# Patient Record
Sex: FEMALE | Race: WHITE | NOT HISPANIC OR LATINO | Employment: OTHER | URBAN - METROPOLITAN AREA
[De-identification: names, ages, dates, MRNs, and addresses within clinical notes are randomized per-mention and may not be internally consistent; named-entity substitution may affect disease eponyms.]

---

## 2017-04-14 ENCOUNTER — TRANSCRIBE ORDERS (OUTPATIENT)
Dept: ADMINISTRATIVE | Facility: HOSPITAL | Age: 82
End: 2017-04-14

## 2017-04-14 DIAGNOSIS — R41.3 MEMORY LOSS: Primary | ICD-10-CM

## 2017-04-20 ENCOUNTER — APPOINTMENT (OUTPATIENT)
Dept: MRI IMAGING | Facility: HOSPITAL | Age: 82
End: 2017-04-20
Attending: INTERNAL MEDICINE

## 2017-04-21 ENCOUNTER — HOSPITAL ENCOUNTER (OUTPATIENT)
Dept: MRI IMAGING | Facility: HOSPITAL | Age: 82
End: 2017-04-21
Attending: INTERNAL MEDICINE

## 2017-04-26 ENCOUNTER — HOSPITAL ENCOUNTER (OUTPATIENT)
Dept: MRI IMAGING | Facility: HOSPITAL | Age: 82
Discharge: HOME | End: 2017-04-26
Attending: INTERNAL MEDICINE | Admitting: INTERNAL MEDICINE

## 2017-04-26 DIAGNOSIS — R41.3 MEMORY LOSS: ICD-10-CM

## 2017-04-26 PROCEDURE — 0 GADOBENATE DIMEGLUMINE 529 MG/ML SOLUTION: Performed by: INTERNAL MEDICINE

## 2017-04-26 PROCEDURE — A9577 INJ MULTIHANCE: HCPCS | Performed by: INTERNAL MEDICINE

## 2017-04-26 PROCEDURE — 70553 MRI BRAIN STEM W/O & W/DYE: CPT

## 2017-04-26 PROCEDURE — 82565 ASSAY OF CREATININE: CPT

## 2017-04-26 RX ADMIN — GADOBENATE DIMEGLUMINE 10 ML: 529 INJECTION, SOLUTION INTRAVENOUS at 14:45

## 2017-05-01 LAB — CREAT BLDA-MCNC: 0.7 MG/DL (ref 0.6–1.3)

## 2017-05-19 ENCOUNTER — TRANSCRIBE ORDERS (OUTPATIENT)
Dept: ADMINISTRATIVE | Facility: HOSPITAL | Age: 82
End: 2017-05-19

## 2017-05-19 ENCOUNTER — HOSPITAL ENCOUNTER (OUTPATIENT)
Dept: GENERAL RADIOLOGY | Facility: HOSPITAL | Age: 82
Discharge: HOME | End: 2017-05-19
Attending: INTERNAL MEDICINE | Admitting: INTERNAL MEDICINE

## 2017-05-19 DIAGNOSIS — M54.50 ACUTE RIGHT-SIDED LOW BACK PAIN WITHOUT SCIATICA: Primary | ICD-10-CM

## 2017-05-19 PROCEDURE — 72110 X-RAY EXAM L-2 SPINE 4/>VWS: CPT

## 2017-10-20 ENCOUNTER — TRANSCRIBE ORDERS (OUTPATIENT)
Dept: ADMINISTRATIVE | Facility: HOSPITAL | Age: 82
End: 2017-10-20

## 2017-10-20 ENCOUNTER — HOSPITAL ENCOUNTER (OUTPATIENT)
Dept: GENERAL RADIOLOGY | Facility: HOSPITAL | Age: 82
Discharge: HOME | End: 2017-10-20
Admitting: INTERNAL MEDICINE

## 2017-10-20 DIAGNOSIS — I27.20 PULMONARY HYPERTENSION (HCC): Primary | ICD-10-CM

## 2017-10-20 PROCEDURE — 71020 HC CHEST PA AND LATERAL: CPT

## 2018-05-06 PROBLEM — S81.812A NONINFECTED SKIN TEAR OF LEG, LEFT, INITIAL ENCOUNTER: Status: ACTIVE | Noted: 2018-05-06

## 2018-05-06 PROBLEM — S81.811A SKIN TEAR OF LOWER LEG WITHOUT COMPLICATION, RIGHT, INITIAL ENCOUNTER: Status: ACTIVE | Noted: 2018-05-06

## 2019-01-03 ENCOUNTER — OFFICE VISIT (OUTPATIENT)
Dept: ORTHOPEDIC SURGERY | Facility: CLINIC | Age: 84
End: 2019-01-03

## 2019-01-03 VITALS — HEART RATE: 80 BPM | BODY MASS INDEX: 21.99 KG/M2 | OXYGEN SATURATION: 98 % | HEIGHT: 60 IN | WEIGHT: 111.99 LBS

## 2019-01-03 DIAGNOSIS — M17.11 PRIMARY OSTEOARTHRITIS OF RIGHT KNEE: Primary | ICD-10-CM

## 2019-01-03 PROCEDURE — 20610 DRAIN/INJ JOINT/BURSA W/O US: CPT | Performed by: ORTHOPAEDIC SURGERY

## 2019-01-03 PROCEDURE — 99204 OFFICE O/P NEW MOD 45 MIN: CPT | Performed by: ORTHOPAEDIC SURGERY

## 2019-01-03 RX ORDER — TRIAMCINOLONE ACETONIDE 40 MG/ML
40 INJECTION, SUSPENSION INTRA-ARTICULAR; INTRAMUSCULAR
Status: COMPLETED | OUTPATIENT
Start: 2019-01-03 | End: 2019-01-03

## 2019-01-03 RX ORDER — TRAMADOL HYDROCHLORIDE 50 MG/1
TABLET ORAL
COMMUNITY
End: 2019-03-04 | Stop reason: SDUPTHER

## 2019-01-03 RX ORDER — AMLODIPINE BESYLATE 10 MG/1
TABLET ORAL
COMMUNITY
End: 2019-03-04 | Stop reason: SDUPTHER

## 2019-01-03 RX ORDER — LIDOCAINE HYDROCHLORIDE 10 MG/ML
3 INJECTION, SOLUTION INFILTRATION; PERINEURAL
Status: COMPLETED | OUTPATIENT
Start: 2019-01-03 | End: 2019-01-03

## 2019-01-03 RX ORDER — ACETAMINOPHEN 500 MG
TABLET ORAL
COMMUNITY
End: 2019-10-04 | Stop reason: HOSPADM

## 2019-01-03 RX ORDER — HYDROCHLOROTHIAZIDE 50 MG/1
TABLET ORAL
COMMUNITY
End: 2019-03-25 | Stop reason: ALTCHOICE

## 2019-01-03 RX ORDER — DONEPEZIL HYDROCHLORIDE 5 MG/1
5 TABLET, FILM COATED ORAL NIGHTLY
COMMUNITY
End: 2019-04-15 | Stop reason: SDUPTHER

## 2019-01-03 RX ADMIN — TRIAMCINOLONE ACETONIDE 40 MG: 40 INJECTION, SUSPENSION INTRA-ARTICULAR; INTRAMUSCULAR at 15:33

## 2019-01-03 RX ADMIN — LIDOCAINE HYDROCHLORIDE 3 ML: 10 INJECTION, SOLUTION INFILTRATION; PERINEURAL at 15:33

## 2019-01-03 NOTE — PROGRESS NOTES
[facility] Note    Subjective     Pain of the Right Knee (Started around 2000- has had 3 falls since that time. Last fall was apx 5 years ago. Has been getting cortisone injections every 3 months by Dr Vergara ( last injection Oct 2018). Pain scale 6/10 today. Mod factors- bracing, activity mod, voltaren gel)      HPI    Temi Jarrett is a 88 y.o. female.  Patient is here today for evaluation of her right knee.  She has fallen on 3 different occasions over the last 15-20 years.  Her rheumatologist Dr. Vergara recently retired and she has had both cortisone and viscous supplement injections in the past.  She reacted unfavorably to the Visco supplement and therefore that has not been repeated.  Her pain as 6 out of 10.  She has been using a knee brace.  Her pain is medial in location.  Physical therapy has made her symptoms worse.     Past Medical History:   Diagnosis Date   • Disease of thyroid gland    • Hypertension    • IBS (irritable bowel syndrome)       Past Surgical History:   Procedure Laterality Date   • CHOLECYSTECTOMY     • EYE SURGERY     • HERNIA REPAIR     • HYSTERECTOMY     • TONSILLECTOMY        Family History   Problem Relation Age of Onset   • Hypertension Mother    • Hypertension Father      Social History     Socioeconomic History   • Marital status:      Spouse name: Not on file   • Number of children: Not on file   • Years of education: Not on file   • Highest education level: Not on file   Social Needs   • Financial resource strain: Not on file   • Food insecurity - worry: Not on file   • Food insecurity - inability: Not on file   • Transportation needs - medical: Not on file   • Transportation needs - non-medical: Not on file   Occupational History   • Not on file   Tobacco Use   • Smoking status: Never Smoker   • Smokeless tobacco: Never Used   Substance and Sexual Activity   • Alcohol use: No   • Drug use: Defer   • Sexual activity: Defer   Other Topics Concern   •  "Not on file   Social History Narrative   • Not on file      Current Outpatient Medications on File Prior to Visit   Medication Sig Dispense Refill   • acetaminophen (TYLENOL) 500 MG tablet Tylenol Extra Strength 500 mg tablet     • amLODIPine (NORVASC) 10 MG tablet Norvasc 10 mg tablet   Daily     • aspirin 81 MG tablet aspirin 81 mg tablet   Daily     • benazepril-hydrochlorthiazide (LOTENSIN HCT) 10-12.5 MG per tablet Take 1 tablet by mouth Daily.     • Calcium Carbonate-Vit D-Min (CALTRATE PLUS PO) Take  by mouth.     • Cholecalciferol (VITAMIN D PO) Vitamin D3     • donepezil (ARICEPT) 5 MG tablet Take 5 mg by mouth Every Night.     • hydrochlorothiazide (HYDRODIURIL) 50 MG tablet hydrochlorothiazide 50 mg tablet     • Levothyroxine Sodium (SYNTHROID PO) Take  by mouth.     • lisinopril (PRINIVIL,ZESTRIL) 10 MG tablet Take 10 mg by mouth Daily.     • POTASSIUM CHLORIDE RENNY ER PO Take  by mouth.     • Sucralfate (CARAFATE PO) sucralfate     • traMADol (ULTRAM) 50 MG tablet tramadol 50 mg tablet       No current facility-administered medications on file prior to visit.       Allergies   Allergen Reactions   • Codeine Nausea Only        The following portions of the patient's history were reviewed and updated as appropriate: allergies, current medications, past family history, past medical history, past social history, past surgical history and problem list.    Review of Systems   Constitutional: Negative.    HENT: Negative.    Eyes: Negative.    Respiratory: Negative.    Cardiovascular: Negative.    Gastrointestinal: Negative.    Endocrine: Negative.    Genitourinary: Negative.    Musculoskeletal: Positive for arthralgias.   Skin: Negative.    Allergic/Immunologic: Negative.    Neurological: Negative.    Hematological: Negative.    Psychiatric/Behavioral: Negative.         Objective      Physical Exam  Pulse 80   Ht 152.4 cm (60\")   Wt 50.8 kg (111 lb 15.9 oz)   LMP  (LMP Unknown)   SpO2 98%   BMI 21.87 " kg/m²     Body mass index is 21.87 kg/m².    General  Mental Status - alert  General Appearance - cooperative, well groomed, not in acute distress  Orientation - Oriented X3  Build & Nutrition - well developed and well nourished  Posture - normal posture  Gait - normal gait     Integumentary  Global Assessment  Examination of related systems reveals - no lymphadenopathy  Ears:  No abnormality  Nose:  No mucous drainage  General Characteristics  Overall examination of the patient's skin reveals - no rashes, no evidence of scars, no suspicious lesions and no bruises.  Color - normal coloration of skin.  Vascular: Brisk capillary refill in all extremities    Ortho Exam  Peripheral Vascular:    Upper Extremity:   Inspection:  Left--no cyanotic nail beds Right--no cyanotic nail beds   Bilateral:  Pink nail beds with brisk capillary refill   Palpation:  Bilateral radial pulse normal    Musculoskeletal:  Global Assessment:  Overall assessment of Lower Extremity Muscle Strength and Tone:  Right quadriceps--5/5   Right hamstrings--5/5       Right tibialis anterior--5/5  Right gastroc-soleus--5/5  Right EHL --5/5    Lower Extremity:  Knee/Patella:  No digital clubbing or cyanosis.    Examination of right knee reveals:  Normal deep tendon reflexes, coordination, strength, tone, sensation.  No known fractures or deformities.    Inspection and Palpation:  Right knee:  Tenderness:  Over the medial joint line and moderate severity  Effusion:  1+  Crepitus:  Positive  Pulses:  2+  Ecchymosis:  None  Warmth:  None     ROM:  Right:  Extension: 5    Flexion:120  Left:  Extension: 5     Flexion: 120    Instability:    Right:  Lachman Test:  Negative, Varus stress test negative, Valgus stress test negative    Deformities/Malalignments/Discrepancies:    Left:  No deformities   Right:  Genu Varum    Functional Testing:  Deion's test:  Negative  Patella grind test:  Positive  Q-angle:  normal        Imaging/Studies    Imaging Results  (last 24 hours)     Procedure Component Value Units Date/Time    XR Knee 4+ View Right [[id number]] Resulted:  01/03/19 1726     Updated:  01/03/19 1729    Narrative:       Knee X-Ray    Indication: Pain    Study:  Upright AP, Skiers, Lateral, and Sunrise views of Right knee(s)    Comparison: None    Findings:    Patient appears to have end-stage degenerative changes in the medial   compartment.  There are minimal degenerative changes in the lateral   compartment.  There are severe changes in the patellofemoral compartment.    Patient has overall varus alignment.  Osteopenia is also noted.    Impression: Severe osteoarthritis of the medial compartment and severe   patellofemoral compartment osteoarthritis of the right knee.              Assessment:  1. Primary osteoarthritis of right knee        Plan:  1. Continue over-the-counter medication as needed for discomfort  2. Right knee injection will be given through an anterior medial approach today at her request  3. Follow-up in 3 months  4. At some point going forward, we may need to assess her specific response to the Visco supplement that caused her to bruise.  5. Off-the-shelf medial off  will be provided today.      Medical Decision Making  Management Options : over-the-counter medicine and prescription/IM medicine  Data/Risk: radiology tests and independent visualization of imaging, lab tests, or EMG/NCV    Gamal Denny MD  01/03/19  6:45 PM

## 2019-01-03 NOTE — PROGRESS NOTES
Procedure   Large Joint Arthrocentesis: R knee  Date/Time: 1/3/2019 3:33 PM  Consent given by: patient  Site marked: site marked  Timeout: Immediately prior to procedure a time out was called to verify the correct patient, procedure, equipment, support staff and site/side marked as required   Supporting Documentation  Indications: pain   Procedure Details  Location: knee - R knee  Preparation: Patient was prepped and draped in the usual sterile fashion  Needle size: 25 G  Approach: anteromedial  Medications administered: 3 mL lidocaine 1 %; 40 mg triamcinolone acetonide 40 MG/ML  Patient tolerance: patient tolerated the procedure well with no immediate complications

## 2019-01-14 ENCOUNTER — DOCUMENTATION (OUTPATIENT)
Dept: INTERNAL MEDICINE | Facility: CLINIC | Age: 84
End: 2019-01-14

## 2019-01-14 RX ORDER — LEVOTHYROXINE SODIUM 0.05 MG/1
50 TABLET ORAL DAILY
COMMUNITY
End: 2019-01-14

## 2019-01-14 RX ORDER — LEVOTHYROXINE SODIUM 175 UG/1
175 TABLET ORAL DAILY
Qty: 90 TABLET | Refills: 0 | Status: SHIPPED | OUTPATIENT
Start: 2019-01-14 | End: 2019-02-28 | Stop reason: SDUPTHER

## 2019-01-15 ENCOUNTER — TELEPHONE (OUTPATIENT)
Dept: INTERNAL MEDICINE | Facility: CLINIC | Age: 84
End: 2019-01-15

## 2019-01-16 ENCOUNTER — CLINICAL SUPPORT (OUTPATIENT)
Dept: INTERNAL MEDICINE | Facility: CLINIC | Age: 84
End: 2019-01-16

## 2019-01-16 DIAGNOSIS — E53.8 VITAMIN B12 DEFICIENCY: Primary | ICD-10-CM

## 2019-01-16 PROCEDURE — 96372 THER/PROPH/DIAG INJ SC/IM: CPT | Performed by: INTERNAL MEDICINE

## 2019-01-16 RX ORDER — CYANOCOBALAMIN 1000 UG/ML
1000 INJECTION, SOLUTION INTRAMUSCULAR; SUBCUTANEOUS
Status: DISCONTINUED | OUTPATIENT
Start: 2019-01-16 | End: 2020-08-03

## 2019-01-16 RX ADMIN — CYANOCOBALAMIN 1000 MCG: 1000 INJECTION, SOLUTION INTRAMUSCULAR; SUBCUTANEOUS at 15:53

## 2019-01-24 ENCOUNTER — TELEPHONE (OUTPATIENT)
Dept: INTERNAL MEDICINE | Facility: CLINIC | Age: 84
End: 2019-01-24

## 2019-02-04 ENCOUNTER — TELEPHONE (OUTPATIENT)
Dept: ORTHOPEDIC SURGERY | Facility: CLINIC | Age: 84
End: 2019-02-04

## 2019-02-04 NOTE — TELEPHONE ENCOUNTER
Patient wants to know if Dr. Denny can take over her nerve pain medication, gabapentin, or if that will need to go to another doctor?

## 2019-02-05 NOTE — TELEPHONE ENCOUNTER
The patient is wanting to know if you will be able to take over prescribing her gabapentin for her since her doctor Karrie Vergara has retired.

## 2019-02-06 NOTE — TELEPHONE ENCOUNTER
Called the patient back to advise that she needs to have her current PCP prescribe the gabapentin for her.

## 2019-02-15 RX ORDER — LISINOPRIL 10 MG/1
TABLET ORAL
Qty: 360 TABLET | Refills: 0 | OUTPATIENT
Start: 2019-02-15

## 2019-02-15 NOTE — TELEPHONE ENCOUNTER
WANTS TO KNOW IF DR. GRIFFITH WILL TAKE OVER THE GABAPENTIN FOR ME THE  THAT PRESCRIBED IT HAS RETIRED. .

## 2019-02-18 ENCOUNTER — CLINICAL SUPPORT (OUTPATIENT)
Dept: INTERNAL MEDICINE | Facility: CLINIC | Age: 84
End: 2019-02-18

## 2019-02-18 DIAGNOSIS — E53.8 VITAMIN B12 DEFICIENCY: ICD-10-CM

## 2019-02-18 PROCEDURE — 96372 THER/PROPH/DIAG INJ SC/IM: CPT | Performed by: INTERNAL MEDICINE

## 2019-02-18 RX ORDER — GABAPENTIN 300 MG/1
300 CAPSULE ORAL 2 TIMES DAILY
COMMUNITY
End: 2019-02-18 | Stop reason: SDUPTHER

## 2019-02-18 RX ORDER — GABAPENTIN 300 MG/1
300 CAPSULE ORAL 2 TIMES DAILY
Qty: 60 CAPSULE | Refills: 3 | Status: SHIPPED | OUTPATIENT
Start: 2019-02-18 | End: 2019-06-18 | Stop reason: SDUPTHER

## 2019-02-18 RX ORDER — GABAPENTIN 300 MG/1
300 CAPSULE ORAL 2 TIMES DAILY
Qty: 60 CAPSULE | Refills: 2 | Status: CANCELLED | OUTPATIENT
Start: 2019-02-18

## 2019-02-18 RX ADMIN — CYANOCOBALAMIN 1000 MCG: 1000 INJECTION, SOLUTION INTRAMUSCULAR; SUBCUTANEOUS at 14:09

## 2019-02-20 RX ORDER — TRAMADOL HYDROCHLORIDE 50 MG/1
TABLET ORAL
Qty: 120 TABLET | Refills: 1 | OUTPATIENT
Start: 2019-02-20

## 2019-02-20 NOTE — TELEPHONE ENCOUNTER
It looks like Dr Rodas gave it last month.  I don't mind prescribing it but please make sure she actually is taking it, and needs it.

## 2019-02-25 RX ORDER — TRAMADOL HYDROCHLORIDE 50 MG/1
TABLET ORAL
Qty: 120 TABLET | Refills: 1 | OUTPATIENT
Start: 2019-02-25

## 2019-02-26 RX ORDER — LISINOPRIL 10 MG/1
TABLET ORAL
Qty: 360 TABLET | Refills: 0 | Status: SHIPPED | OUTPATIENT
Start: 2019-02-26 | End: 2019-05-30 | Stop reason: SDUPTHER

## 2019-02-28 RX ORDER — LEVOTHYROXINE SODIUM 175 UG/1
175 TABLET ORAL DAILY
Qty: 90 TABLET | Refills: 0 | COMMUNITY
Start: 2019-02-28 | End: 2019-02-28 | Stop reason: SDUPTHER

## 2019-02-28 NOTE — TELEPHONE ENCOUNTER
PT RECEIVES A PRESCRIPTION FROM [facility] AT NO CHARGE  HERE SHE CALLED CHECKING TO SEE IF IT WAS HERE     SHE VERBALIZED UNDERSTANDING TO JUST GO TO FRONT AND ASK FOR IT  IT'LL BE UP THERE

## 2019-03-01 RX ORDER — LEVOTHYROXINE SODIUM 175 UG/1
175 TABLET ORAL DAILY
Qty: 90 TABLET | Refills: 0 | COMMUNITY
Start: 2019-03-01 | End: 2019-05-21 | Stop reason: SDUPTHER

## 2019-03-04 RX ORDER — AMLODIPINE BESYLATE 10 MG/1
5 TABLET ORAL DAILY
Qty: 30 TABLET | Refills: 3 | Status: SHIPPED | OUTPATIENT
Start: 2019-03-04 | End: 2019-03-05

## 2019-03-04 RX ORDER — TRAMADOL HYDROCHLORIDE 50 MG/1
50 TABLET ORAL DAILY PRN
Qty: 30 TABLET | Refills: 2 | Status: SHIPPED | OUTPATIENT
Start: 2019-03-04 | End: 2019-06-02 | Stop reason: SDUPTHER

## 2019-03-04 NOTE — TELEPHONE ENCOUNTER
[facility] has amlodipine 5mg daily,   [facility] has 10mg daily.   Please clarify what you want prescribed

## 2019-03-05 DIAGNOSIS — I10 BENIGN ESSENTIAL HYPERTENSION: ICD-10-CM

## 2019-03-05 DIAGNOSIS — E78.2 MIXED HYPERLIPIDEMIA: ICD-10-CM

## 2019-03-05 DIAGNOSIS — E03.9 ACQUIRED HYPOTHYROIDISM: ICD-10-CM

## 2019-03-05 DIAGNOSIS — E53.8 B12 DEFICIENCY: Primary | ICD-10-CM

## 2019-03-05 DIAGNOSIS — E55.9 VITAMIN D DEFICIENCY: ICD-10-CM

## 2019-03-05 NOTE — TELEPHONE ENCOUNTER
PT STATES WHEN SHE WENT TO  HER AMLODIPINE 5 MG  SHE WAS GIVEN 10 MG TO CUT THEM IN HALF. SHE STATED SHE PROTESTED UNTIL THEY GAVE HER THE 5 MG. WANTS TO KNOW IF YOU REALLY WANT HER TO CUT THEM IN HALF WITH THE POSSIBLITY OF HER CUTTING HERSELF . SHE STATES SHE WILL NOT DO IT .  WANTS A CALL BACK ON WHY IT WAS SENT IN LIKE THAT

## 2019-03-05 NOTE — TELEPHONE ENCOUNTER
Called and advised patient. She was just concerned about how it got changed. I advised her i will change it to 5 mg.

## 2019-03-06 RX ORDER — AMLODIPINE BESYLATE 5 MG/1
5 TABLET ORAL DAILY
Qty: 90 TABLET | Refills: 0
Start: 2019-03-06 | End: 2019-07-10 | Stop reason: SDUPTHER

## 2019-03-18 ENCOUNTER — CLINICAL SUPPORT (OUTPATIENT)
Dept: INTERNAL MEDICINE | Facility: CLINIC | Age: 84
End: 2019-03-18

## 2019-03-18 DIAGNOSIS — E53.8 VITAMIN B12 DEFICIENCY: ICD-10-CM

## 2019-03-18 PROCEDURE — 96372 THER/PROPH/DIAG INJ SC/IM: CPT | Performed by: INTERNAL MEDICINE

## 2019-03-18 RX ADMIN — CYANOCOBALAMIN 1000 MCG: 1000 INJECTION, SOLUTION INTRAMUSCULAR; SUBCUTANEOUS at 13:38

## 2019-03-25 RX ORDER — HYDROCHLOROTHIAZIDE 25 MG/1
TABLET ORAL
Qty: 90 TABLET | Refills: 0 | Status: SHIPPED | OUTPATIENT
Start: 2019-03-25 | End: 2019-06-17

## 2019-03-28 ENCOUNTER — OFFICE VISIT (OUTPATIENT)
Dept: ORTHOPEDIC SURGERY | Facility: CLINIC | Age: 84
End: 2019-03-28

## 2019-03-28 VITALS — HEIGHT: 60 IN | BODY MASS INDEX: 22.77 KG/M2 | WEIGHT: 115.96 LBS | HEART RATE: 65 BPM | OXYGEN SATURATION: 91 %

## 2019-03-28 DIAGNOSIS — M17.11 PRIMARY OSTEOARTHRITIS OF RIGHT KNEE: Primary | ICD-10-CM

## 2019-03-28 PROBLEM — I87.2 PERIPHERAL VENOUS INSUFFICIENCY: Status: ACTIVE | Noted: 2019-03-28

## 2019-03-28 PROBLEM — N30.90 BLADDER INFECTION: Status: ACTIVE | Noted: 2019-03-28

## 2019-03-28 PROBLEM — G56.01 CARPAL TUNNEL SYNDROME OF RIGHT WRIST: Status: ACTIVE | Noted: 2019-03-28

## 2019-03-28 PROBLEM — F01.50 MULTI-INFARCT DEMENTIA: Status: ACTIVE | Noted: 2019-03-28

## 2019-03-28 PROBLEM — D51.0 PERNICIOUS ANEMIA: Status: ACTIVE | Noted: 2019-03-28

## 2019-03-28 PROBLEM — R41.3 MEMORY LOSS: Status: ACTIVE | Noted: 2019-03-28

## 2019-03-28 PROBLEM — I10 BENIGN ESSENTIAL HYPERTENSION: Status: ACTIVE | Noted: 2019-03-28

## 2019-03-28 PROBLEM — R35.0 URINARY FREQUENCY: Status: ACTIVE | Noted: 2019-03-28

## 2019-03-28 PROBLEM — M25.569 PAIN, KNEE: Status: ACTIVE | Noted: 2019-03-28

## 2019-03-28 PROBLEM — M54.50 ACUTE RIGHT-SIDED LOW BACK PAIN WITHOUT SCIATICA: Status: ACTIVE | Noted: 2019-03-28

## 2019-03-28 PROBLEM — E78.5 HYPERLIPIDEMIA: Status: ACTIVE | Noted: 2019-03-28

## 2019-03-28 PROBLEM — M48.061 SPINAL STENOSIS OF LUMBAR REGION: Status: ACTIVE | Noted: 2019-03-28

## 2019-03-28 PROBLEM — G62.9 PERIPHERAL NEUROPATHY: Status: ACTIVE | Noted: 2019-03-28

## 2019-03-28 PROBLEM — M51.9 DISC DISORDER OF LUMBAR REGION: Status: ACTIVE | Noted: 2019-03-28

## 2019-03-28 PROBLEM — E03.9 HYPOTHYROIDISM: Status: ACTIVE | Noted: 2019-03-28

## 2019-03-28 PROBLEM — M81.0 OSTEOPOROSIS: Status: ACTIVE | Noted: 2019-03-28

## 2019-03-28 PROBLEM — Z00.00 ANNUAL PHYSICAL EXAM: Status: ACTIVE | Noted: 2019-03-28

## 2019-03-28 PROBLEM — F41.1 GENERALIZED ANXIETY DISORDER: Status: ACTIVE | Noted: 2019-03-28

## 2019-03-28 PROBLEM — N81.10 BLADDER PROLAPSE, FEMALE, ACQUIRED: Status: ACTIVE | Noted: 2019-03-28

## 2019-03-28 PROBLEM — K21.9 ESOPHAGEAL REFLUX: Status: ACTIVE | Noted: 2019-03-28

## 2019-03-28 PROBLEM — R50.9 FEVER: Status: ACTIVE | Noted: 2019-03-28

## 2019-03-28 PROBLEM — N30.00 ACUTE CYSTITIS WITHOUT HEMATURIA: Status: ACTIVE | Noted: 2019-03-28

## 2019-03-28 PROBLEM — N39.0 ACUTE UTI: Status: ACTIVE | Noted: 2019-03-28

## 2019-03-28 PROCEDURE — 20610 DRAIN/INJ JOINT/BURSA W/O US: CPT | Performed by: ORTHOPAEDIC SURGERY

## 2019-03-28 RX ORDER — TRIAMCINOLONE ACETONIDE 40 MG/ML
40 INJECTION, SUSPENSION INTRA-ARTICULAR; INTRAMUSCULAR
Status: COMPLETED | OUTPATIENT
Start: 2019-03-28 | End: 2019-03-28

## 2019-03-28 RX ORDER — LIDOCAINE HYDROCHLORIDE 10 MG/ML
3 INJECTION, SOLUTION INFILTRATION; PERINEURAL
Status: COMPLETED | OUTPATIENT
Start: 2019-03-28 | End: 2019-03-28

## 2019-03-28 RX ADMIN — TRIAMCINOLONE ACETONIDE 40 MG: 40 INJECTION, SUSPENSION INTRA-ARTICULAR; INTRAMUSCULAR at 11:45

## 2019-03-28 RX ADMIN — LIDOCAINE HYDROCHLORIDE 3 ML: 10 INJECTION, SOLUTION INFILTRATION; PERINEURAL at 11:45

## 2019-03-28 NOTE — PROGRESS NOTES
[facility] [place] [facility] Note    Subjective     CC: Follow-up (3 month f/u; Primary osteoarthritis of right knee (injection given last visit 1/3/19))      MARY Jarrett is a 88 y.o. female.  Patient returns the office today for follow-up of her right knee.  In January 2019, she was injected into the right knee through an anterior medial approach and got great relief from the injection.  She has seen Dr. Karrie Vergara for many years but Dr. Vergara has since retired.      ROS:    Constiutional:Pt denies fever, chills, nausea, or vomiting.  MSK:as above    Objective      Past Medical History  Past Medical History:   Diagnosis Date   • Disease of thyroid gland    • Gastric polyp    • History of colonic polyps    • Hypertension    • IBS (irritable bowel syndrome)    • Macular degeneration    • Torn meniscus     right knee      Assessment:  1. Primary osteoarthritis of right knee        Plan:  1. Recommend over the counter anti-inflammatories for pain and/or swelling  2. Repeat steroid injection right knee through an anteromedial approach will be given today  3. Follow-up in 3 months.        Gamal Denny MD  03/28/19  1:14 PM

## 2019-03-28 NOTE — PROGRESS NOTES
Procedure   Large Joint Arthrocentesis: R knee  Date/Time: 3/28/2019 11:45 AM  Consent given by: patient  Site marked: site marked  Timeout: Immediately prior to procedure a time out was called to verify the correct patient, procedure, equipment, support staff and site/side marked as required   Supporting Documentation  Indications: pain   Procedure Details  Location: knee - R knee  Preparation: Patient was prepped and draped in the usual sterile fashion  Needle size: 25 G  Approach: anterolateral  Medications administered: 3 mL lidocaine 1 %; 40 mg triamcinolone acetonide 40 MG/ML  Patient tolerance: patient tolerated the procedure well with no immediate complications

## 2019-04-15 ENCOUNTER — OFFICE VISIT (OUTPATIENT)
Dept: INTERNAL MEDICINE | Facility: CLINIC | Age: 84
End: 2019-04-15

## 2019-04-15 VITALS
WEIGHT: 113 LBS | HEIGHT: 60 IN | DIASTOLIC BLOOD PRESSURE: 72 MMHG | BODY MASS INDEX: 22.19 KG/M2 | SYSTOLIC BLOOD PRESSURE: 156 MMHG | HEART RATE: 64 BPM

## 2019-04-15 DIAGNOSIS — M48.061 SPINAL STENOSIS OF LUMBAR REGION WITHOUT NEUROGENIC CLAUDICATION: ICD-10-CM

## 2019-04-15 DIAGNOSIS — E53.8 VITAMIN B 12 DEFICIENCY: ICD-10-CM

## 2019-04-15 DIAGNOSIS — F41.1 GENERALIZED ANXIETY DISORDER: ICD-10-CM

## 2019-04-15 DIAGNOSIS — F01.50 MULTI-INFARCT DEMENTIA WITHOUT BEHAVIORAL DISTURBANCE (HCC): Primary | ICD-10-CM

## 2019-04-15 DIAGNOSIS — I10 BENIGN ESSENTIAL HYPERTENSION: ICD-10-CM

## 2019-04-15 DIAGNOSIS — G63 POLYNEUROPATHY ASSOCIATED WITH UNDERLYING DISEASE (HCC): ICD-10-CM

## 2019-04-15 PROCEDURE — 96372 THER/PROPH/DIAG INJ SC/IM: CPT | Performed by: INTERNAL MEDICINE

## 2019-04-15 PROCEDURE — 99214 OFFICE O/P EST MOD 30 MIN: CPT | Performed by: INTERNAL MEDICINE

## 2019-04-15 RX ORDER — BUSPIRONE HYDROCHLORIDE 5 MG/1
5 TABLET ORAL 2 TIMES DAILY
Qty: 60 TABLET | Refills: 3 | Status: SHIPPED | OUTPATIENT
Start: 2019-04-15 | End: 2019-06-17 | Stop reason: SDUPTHER

## 2019-04-15 RX ORDER — DONEPEZIL HYDROCHLORIDE 10 MG/1
10 TABLET, FILM COATED ORAL NIGHTLY
Qty: 30 TABLET | Refills: 5 | Status: SHIPPED | OUTPATIENT
Start: 2019-04-15 | End: 2020-01-20

## 2019-04-15 RX ADMIN — CYANOCOBALAMIN 1000 MCG: 1000 INJECTION, SOLUTION INTRAMUSCULAR; SUBCUTANEOUS at 16:36

## 2019-04-15 NOTE — PROGRESS NOTES
[facility]     Temi Jarrett  5/8/1930   [id number]      Patient Care Team:  Eric Patel MD as PCP - General  Eric Patel MD as PCP - Family Medicine    Chief Complaint::   Chief Complaint   Patient presents with   • Hyperlipidemia   • Hypertension   • Hypothyroidism   • Osteoporosis   • Anemia        HPI  Mrs Jarrett comes in concerned that her memory is worse and that she has more anxiety.  She is also concerned about weight loss.  She is very worried about her  whose health has declined and who has lost considerable weight.  She is here for follow-up of her dementia, hypertension, peripheral neuropathy, and knee pain.    Chronic Conditions:      Patient Active Problem List   Diagnosis   • Skin tear of lower leg without complication, right, initial encounter   • Esophageal reflux   • Hypothyroidism   • Generalized anxiety disorder   • Acute UTI   • Hyperlipidemia   • Bladder infection   • Multi-infarct dementia   • Peripheral neuropathy   • Acute cystitis without hematuria   • Carpal tunnel syndrome of right wrist   • Spinal stenosis of lumbar region   • Osteoporosis   • Fever   • Urinary frequency   • Peripheral venous insufficiency   • Disc disorder of lumbar region   • Bladder prolapse, female, acquired   • Acute right-sided low back pain without sciatica   • Pain, knee   • Benign essential hypertension   • Pernicious anemia   • Memory loss   • Annual physical exam        Past Medical History:   Diagnosis Date   • Disease of thyroid gland    • Gastric polyp    • History of colonic polyps    • Hypertension    • IBS (irritable bowel syndrome)    • Macular degeneration    • Torn meniscus     right knee        Past Surgical History:   Procedure Laterality Date   • CHOLECYSTECTOMY  1997   • EYE SURGERY  1998    Cataract extraction   • HERNIA REPAIR     • HYSTERECTOMY  1976   • KNEE SURGERY Right     arthroscopy- 2000   • TONSILLECTOMY         Family History   Problem Relation  Age of Onset   • Hypertension Mother    • Breast cancer Mother    • Obesity Mother    • Hypertension Father    • Diabetes Son        Social History     Socioeconomic History   • Marital status:      Spouse name: Not on file   • Number of children: Not on file   • Years of education: Not on file   • Highest education level: Not on file   Tobacco Use   • Smoking status: Never Smoker   • Smokeless tobacco: Never Used   Substance and Sexual Activity   • Alcohol use: No   • Drug use: Defer   • Sexual activity: Defer       Allergies   Allergen Reactions   • Codeine Nausea Only     Trouble Breathing          Current Outpatient Medications:   •  acetaminophen (TYLENOL) 500 MG tablet, Tylenol Extra Strength 500 mg tablet, Disp: , Rfl:   •  amLODIPine (NORVASC) 5 MG tablet, Take 1 tablet by mouth Daily., Disp: 90 tablet, Rfl: 0  •  aspirin 81 MG tablet, aspirin 81 mg tablet  Daily, Disp: , Rfl:   •  Calcium Carbonate-Vit D-Min (CALTRATE PLUS PO), Take  by mouth., Disp: , Rfl:   •  Cholecalciferol (VITAMIN D PO), Vitamin D3, Disp: , Rfl:   •  diclofenac (VOLTAREN) 1 % gel gel, Apply 4 g topically to the appropriate area as directed 4 (Four) Times a Day., Disp: 100 g, Rfl: 5  •  donepezil (ARICEPT) 10 MG tablet, Take 1 tablet by mouth Every Night., Disp: 30 tablet, Rfl: 5  •  gabapentin (NEURONTIN) 300 MG capsule, Take 1 capsule by mouth 2 (Two) Times a Day., Disp: 60 capsule, Rfl: 3  •  hydrochlorothiazide (HYDRODIURIL) 25 MG tablet, TAKE ONE TABLET BY MOUTH DAILY, Disp: 90 tablet, Rfl: 0  •  levothyroxine (SYNTHROID) 175 MCG tablet, Take 1 tablet by mouth Daily. Patient receives medication from patient assistance.  NDC:[id number] EXP:10/17/2019 [id number], Disp: 90 tablet, Rfl: 0  •  lisinopril (PRINIVIL,ZESTRIL) 10 MG tablet, TAKE TWO TABLETS BY MOUTH TWICE A DAY, Disp: 360 tablet, Rfl: 0  •  POTASSIUM CHLORIDE RENNY ER PO, Take  by mouth., Disp: , Rfl:   •  Sucralfate (CARAFATE PO), sucralfate, Disp: , Rfl:   •   "traMADol (ULTRAM) 50 MG tablet, Take 1 tablet by mouth Daily As Needed for Moderate Pain  (Right knee pain)., Disp: 30 tablet, Rfl: 2  •  busPIRone (BUSPAR) 5 MG tablet, Take 1 tablet by mouth 2 (Two) Times a Day., Disp: 60 tablet, Rfl: 3    Current Facility-Administered Medications:   •  cyanocobalamin injection 1,000 mcg, 1,000 mcg, Intramuscular, Q28 Days, Eric Patel MD, 1,000 mcg at 04/15/19 1636    Review of Systems   Constitutional: Negative for chills, fatigue and fever.   HENT: Negative for congestion, ear pain and sinus pressure.    Respiratory: Negative for cough, chest tightness, shortness of breath and wheezing.    Cardiovascular: Negative for chest pain and palpitations.   Gastrointestinal: Negative for abdominal pain, blood in stool and constipation.   Skin: Negative for color change.   Allergic/Immunologic: Negative for environmental allergies.   Neurological: Negative for dizziness, speech difficulty and headache.   Psychiatric/Behavioral: Negative for decreased concentration. The patient is not nervous/anxious.         Vital Signs  Vitals:    04/15/19 1520   BP: 156/72   BP Location: Left arm   Patient Position: Sitting   Cuff Size: Adult   Pulse: 64   Weight: 51.3 kg (113 lb)   Height: 152.4 cm (60\")       Physical Exam   Constitutional: She is oriented to person, place, and time. She appears well-developed and well-nourished.   HENT:   Head: Normocephalic and atraumatic.   Cardiovascular: Normal rate, regular rhythm and normal heart sounds.   No murmur heard.  Pulmonary/Chest: Effort normal and breath sounds normal.   Neurological: She is alert and oriented to person, place, and time.   Psychiatric: She has a normal mood and affect.   Vitals reviewed.     Procedures    ACE III MINI             Assessment/Plan:    Dementia, observed to be worse by her family.  Increase donepezil to 5 mg a day.    Anxiety, situational, buspirone 5 mg a day which we can increase if needed.  Reviewing her " weights over the past year, she is not lost significant weight only 3-4 pounds so at this point I am not concerned about her weight loss.  We discussed diet and the importance of maintaining her current weight.    Peripheral neuropathy secondary to B12 deficiency, continue gabapentin    Osteoarthritis of the left knee currently controlled with diclofenac gel, tramadol and gabapentin    Hypertension controlled with amlodipine, and lisinopril.    Plan of care reviewed with patient at the conclusion of today's visit. Education was provided regarding diagnosis, management, and any prescribed or recommended OTC medications.Patient verbalizes understanding of and agreement with management plan.         Eric Patel MD

## 2019-04-15 NOTE — PATIENT INSTRUCTIONS
We will increase donepezil to 10 mg a day. Buspirone is for anxiety.  I am giving you a very low dose.  We can increase this in a few weeks if needed.

## 2019-04-17 ENCOUNTER — TELEPHONE (OUTPATIENT)
Dept: INTERNAL MEDICINE | Facility: CLINIC | Age: 84
End: 2019-04-17

## 2019-04-17 RX ORDER — MEMANTINE HYDROCHLORIDE 5 MG/1
5 TABLET ORAL 2 TIMES DAILY
Qty: 60 TABLET | Refills: 5 | Status: SHIPPED | OUTPATIENT
Start: 2019-04-17 | End: 2019-06-17 | Stop reason: SDUPTHER

## 2019-04-17 NOTE — TELEPHONE ENCOUNTER
No, she said she was on 5.  She should stay on 10 mg of donepezil and I will add a second medication called Namenda that she will add to that for her memory.   no

## 2019-04-17 NOTE — TELEPHONE ENCOUNTER
PT CALLED WANTING TO MAKE SURE SHE UNDERSTANDS CORRECTLY THAT YOU  WANT HER TO INCREASE HER DONEPEZIL    IN MED LIST IT STATES 10 MG  1 EVERY NIGHT  IN THE OV NOTE IT STATES INCREASE DONEPIZIL TO 5 MG A DAY     PLEASE ADVISE

## 2019-04-24 ENCOUNTER — TELEPHONE (OUTPATIENT)
Dept: INTERNAL MEDICINE | Facility: CLINIC | Age: 84
End: 2019-04-24

## 2019-04-24 NOTE — TELEPHONE ENCOUNTER
PT CALLED STATING SHE HAS A SLIGHT SORE THROAT ONLY WHEN SHE SWALLOWS   IT'S BEEN GOING ON FOR 4 DAYS NOW  AND WANTS TO KNOW IF SHE SHOULD COME IN OR WILL YOU SEND HER IN SOMETHING     I CALLED PT TO TRY AND GET AN APPT SCHEDULED LEFT  TO CALL BACK

## 2019-04-25 ENCOUNTER — OFFICE VISIT (OUTPATIENT)
Dept: INTERNAL MEDICINE | Facility: CLINIC | Age: 84
End: 2019-04-25

## 2019-04-25 VITALS
DIASTOLIC BLOOD PRESSURE: 60 MMHG | HEART RATE: 64 BPM | TEMPERATURE: 99.9 F | BODY MASS INDEX: 21.6 KG/M2 | HEIGHT: 60 IN | SYSTOLIC BLOOD PRESSURE: 102 MMHG | WEIGHT: 110 LBS

## 2019-04-25 DIAGNOSIS — J02.9 SORE THROAT: Primary | ICD-10-CM

## 2019-04-25 LAB
EXPIRATION DATE: NORMAL
INTERNAL CONTROL: NORMAL
Lab: NORMAL
S PYO RRNA THROAT QL PROBE: NEGATIVE

## 2019-04-25 PROCEDURE — 99213 OFFICE O/P EST LOW 20 MIN: CPT | Performed by: PHYSICIAN ASSISTANT

## 2019-04-25 PROCEDURE — 87651 STREP A DNA AMP PROBE: CPT | Performed by: PHYSICIAN ASSISTANT

## 2019-04-25 NOTE — PROGRESS NOTES
"Patient Care Team:  Eric Patel MD as PCP - General  Eric Patel MD as PCP - Family Medicine    Chief Complaint;:   Chief Complaint   Patient presents with   • Sore Throat     Started over the weekend   • Hoarse   • Fever     Fever was 100.2 yesterday        Subjective     HPI  Sore throat started 4 days ago, slightly better today.   Fever last night.  No nasal congestion, no ear pain, no cough.  Strep was negative  Past Medical History:   Diagnosis Date   • Disease of thyroid gland    • Gastric polyp    • History of colonic polyps    • Hypertension    • IBS (irritable bowel syndrome)    • Macular degeneration    • Torn meniscus     right knee        Social History     Socioeconomic History   • Marital status:      Spouse name: Not on file   • Number of children: Not on file   • Years of education: Not on file   • Highest education level: Not on file   Tobacco Use   • Smoking status: Never Smoker   • Smokeless tobacco: Never Used   Substance and Sexual Activity   • Alcohol use: No   • Drug use: Defer   • Sexual activity: Defer       Allergies   Allergen Reactions   • Codeine Nausea Only     Trouble Breathing        Review of Systems:     Review of Systems   Constitutional: Negative.    HENT: Positive for sore throat and voice change. Negative for congestion, ear pain, hearing loss, mouth sores, postnasal drip, rhinorrhea, sinus pressure, sneezing and trouble swallowing.    Respiratory: Negative for cough.        Vital Signs  Vitals:    04/25/19 1257   BP: 102/60   BP Location: Left arm   Patient Position: Sitting   Cuff Size: Adult   Pulse: 64   Temp: 99.9 °F (37.7 °C)   TempSrc: Temporal   Weight: 49.9 kg (110 lb)   Height: 152.4 cm (60\")         Current Outpatient Medications:   •  acetaminophen (TYLENOL) 500 MG tablet, Tylenol Extra Strength 500 mg tablet, Disp: , Rfl:   •  amLODIPine (NORVASC) 5 MG tablet, Take 1 tablet by mouth Daily., Disp: 90 tablet, Rfl: 0  •  aspirin 81 MG " tablet, aspirin 81 mg tablet  Daily, Disp: , Rfl:   •  busPIRone (BUSPAR) 5 MG tablet, Take 1 tablet by mouth 2 (Two) Times a Day., Disp: 60 tablet, Rfl: 3  •  Calcium Carbonate-Vit D-Min (CALTRATE PLUS PO), Take  by mouth., Disp: , Rfl:   •  diclofenac (VOLTAREN) 1 % gel gel, Apply 4 g topically to the appropriate area as directed 4 (Four) Times a Day., Disp: 100 g, Rfl: 5  •  donepezil (ARICEPT) 10 MG tablet, Take 1 tablet by mouth Every Night., Disp: 30 tablet, Rfl: 5  •  gabapentin (NEURONTIN) 300 MG capsule, Take 1 capsule by mouth 2 (Two) Times a Day., Disp: 60 capsule, Rfl: 3  •  hydrochlorothiazide (HYDRODIURIL) 25 MG tablet, TAKE ONE TABLET BY MOUTH DAILY, Disp: 90 tablet, Rfl: 0  •  levothyroxine (SYNTHROID) 175 MCG tablet, Take 1 tablet by mouth Daily. Patient receives medication from patient assistance.  NDC:[id number] EXP:10/17/2019 [id number], Disp: 90 tablet, Rfl: 0  •  lisinopril (PRINIVIL,ZESTRIL) 10 MG tablet, TAKE TWO TABLETS BY MOUTH TWICE A DAY, Disp: 360 tablet, Rfl: 0  •  memantine (NAMENDA) 5 MG tablet, Take 1 tablet by mouth 2 (Two) Times a Day., Disp: 60 tablet, Rfl: 5  •  POTASSIUM CHLORIDE RENNY ER PO, Take  by mouth., Disp: , Rfl:   •  Sucralfate (CARAFATE PO), sucralfate, Disp: , Rfl:   •  traMADol (ULTRAM) 50 MG tablet, Take 1 tablet by mouth Daily As Needed for Moderate Pain  (Right knee pain)., Disp: 30 tablet, Rfl: 2  •  Cholecalciferol (VITAMIN D PO), Vitamin D3, Disp: , Rfl:     Current Facility-Administered Medications:   •  cyanocobalamin injection 1,000 mcg, 1,000 mcg, Intramuscular, Q28 Days, Eric Patel MD, 1,000 mcg at 04/15/19 1636    Physical Exam:    Physical Exam   Constitutional: She is oriented to person, place, and time. She appears well-developed and well-nourished.   HENT:   Head: Normocephalic and atraumatic.   Throat erythematous   Neck: Normal range of motion. Neck supple.   Pulmonary/Chest: Effort normal and breath sounds normal.   Lymphadenopathy:      She has cervical adenopathy.   Neurological: She is alert and oriented to person, place, and time.   Nursing note and vitals reviewed.      Procedures      Assessment/Plan   Problem List Items Addressed This Visit     None      Visit Diagnoses     Sore throat    -  Primary    Viral.   Tylenol for fever.      Relevant Orders    POCT Strep A, molecular (Completed)        Patient Instructions   Viral.   Tylenol as needed for fever.   Call if not better early next week or sooner if symptoms change.      Plan of care reviewed with patient at the conclusion of today's visit. Education was provided regarding diagnosis, management, and any prescribed or recommended OTC medications.Patient verbalizes understanding of and agreement with management plan.     Shyann Parker PA-C

## 2019-04-25 NOTE — PATIENT INSTRUCTIONS
Viral.   Tylenol as needed for fever.   Call if not better early next week or sooner if symptoms change.

## 2019-05-15 ENCOUNTER — TRANSCRIBE ORDERS (OUTPATIENT)
Dept: LAB | Facility: HOSPITAL | Age: 84
End: 2019-05-15

## 2019-05-15 ENCOUNTER — CLINICAL SUPPORT (OUTPATIENT)
Dept: INTERNAL MEDICINE | Facility: CLINIC | Age: 84
End: 2019-05-15

## 2019-05-15 ENCOUNTER — LAB (OUTPATIENT)
Dept: LAB | Facility: HOSPITAL | Age: 84
End: 2019-05-15

## 2019-05-15 DIAGNOSIS — R63.4 LOSS OF WEIGHT: Primary | ICD-10-CM

## 2019-05-15 DIAGNOSIS — R41.3 MEMORY LOSS: ICD-10-CM

## 2019-05-15 LAB
T3 SERPL-MCNC: 73.5 NG/DL (ref 80–200)
T3FREE SERPL-MCNC: 2.33 PG/ML (ref 2–4.4)
T4 FREE SERPL-MCNC: 1.85 NG/DL (ref 0.93–1.7)
TSH SERPL DL<=0.05 MIU/L-ACNC: 0.38 MIU/ML (ref 0.27–4.2)

## 2019-05-15 PROCEDURE — 84481 FREE ASSAY (FT-3): CPT

## 2019-05-15 PROCEDURE — 84439 ASSAY OF FREE THYROXINE: CPT | Performed by: INTERNAL MEDICINE

## 2019-05-15 PROCEDURE — 82540 ASSAY OF CREATINE: CPT

## 2019-05-15 PROCEDURE — 96372 THER/PROPH/DIAG INJ SC/IM: CPT | Performed by: INTERNAL MEDICINE

## 2019-05-15 PROCEDURE — 84480 ASSAY TRIIODOTHYRONINE (T3): CPT

## 2019-05-15 PROCEDURE — 84443 ASSAY THYROID STIM HORMONE: CPT

## 2019-05-15 PROCEDURE — 36415 COLL VENOUS BLD VENIPUNCTURE: CPT

## 2019-05-15 RX ADMIN — CYANOCOBALAMIN 1000 MCG: 1000 INJECTION, SOLUTION INTRAMUSCULAR; SUBCUTANEOUS at 14:15

## 2019-05-17 LAB — T4 SERPL-MCNC: 10 MCG/DL (ref 4.5–11.7)

## 2019-05-20 ENCOUNTER — TELEPHONE (OUTPATIENT)
Dept: INTERNAL MEDICINE | Facility: CLINIC | Age: 84
End: 2019-05-20

## 2019-05-20 NOTE — TELEPHONE ENCOUNTER
PT WANTS TO KNOW WHEN SHE HAD HER LAST TETANUS VACCINE     PT CUT THE BOTTOM OF HER LEG ON A PIECE OF METAL LAWN CHAIR     I INFORMED PT THAT WE DON'T HAVE A RECORD OF PT HAVING A TETANUS VACCINE AND IT IS RECOMMENDED THAT SHE GET ONE PER [place]     I INFORMED PT WE CANT GIVE IT TO HER HERE THAT SHE WOULD HAVE TO GO TO HER PHARMACY  SHE VERBALIZED UNDERSTANDING

## 2019-05-21 LAB — CREATINE SERPL-MCNC: 1 MG/DL (ref 0.1–1)

## 2019-05-21 RX ORDER — LEVOTHYROXINE SODIUM 175 UG/1
175 TABLET ORAL DAILY
Qty: 90 TABLET | Refills: 0 | COMMUNITY
Start: 2019-05-21 | End: 2019-08-05 | Stop reason: SDUPTHER

## 2019-05-21 RX ORDER — LEVOTHYROXINE SODIUM 175 UG/1
175 TABLET ORAL DAILY
Qty: 90 TABLET | Refills: 0 | COMMUNITY
Start: 2019-05-21 | End: 2019-05-21 | Stop reason: SDUPTHER

## 2019-05-22 RX ORDER — TRAMADOL HYDROCHLORIDE 50 MG/1
TABLET ORAL
Qty: 30 TABLET | Refills: 1 | OUTPATIENT
Start: 2019-05-22

## 2019-05-30 RX ORDER — LISINOPRIL 10 MG/1
TABLET ORAL
Qty: 360 TABLET | Refills: 1 | Status: SHIPPED | OUTPATIENT
Start: 2019-05-30 | End: 2019-06-17 | Stop reason: SDUPTHER

## 2019-06-03 ENCOUNTER — TELEPHONE (OUTPATIENT)
Dept: INTERNAL MEDICINE | Facility: CLINIC | Age: 84
End: 2019-06-03

## 2019-06-03 DIAGNOSIS — I10 BENIGN ESSENTIAL HYPERTENSION: Primary | ICD-10-CM

## 2019-06-03 DIAGNOSIS — M81.0 AGE-RELATED OSTEOPOROSIS WITHOUT CURRENT PATHOLOGICAL FRACTURE: ICD-10-CM

## 2019-06-03 DIAGNOSIS — M25.561 CHRONIC PAIN OF RIGHT KNEE: Primary | ICD-10-CM

## 2019-06-03 DIAGNOSIS — E03.9 ACQUIRED HYPOTHYROIDISM: ICD-10-CM

## 2019-06-03 DIAGNOSIS — E78.2 MIXED HYPERLIPIDEMIA: ICD-10-CM

## 2019-06-03 DIAGNOSIS — G89.29 CHRONIC PAIN OF RIGHT KNEE: Primary | ICD-10-CM

## 2019-06-03 DIAGNOSIS — D51.0 PERNICIOUS ANEMIA: ICD-10-CM

## 2019-06-03 RX ORDER — TRAMADOL HYDROCHLORIDE 50 MG/1
TABLET ORAL
Qty: 30 TABLET | Refills: 1 | Status: SHIPPED | OUTPATIENT
Start: 2019-06-03 | End: 2019-08-05 | Stop reason: SDUPTHER

## 2019-06-03 NOTE — TELEPHONE ENCOUNTER
PT CALLED WANTING REFILL ON HER TRAMADOL   SHE WANTS TO KNOW IF IT CAN GET RENEWED IN THE NEXT HOUR OR SO BECAUSE SHE HAS ONLY ONE CHANCE TO GET WHEN [facility] TAKES HER TO [facility] AND SHE'S OUT OF IT   SHE ALSO WANTS TO KNOW IF SHE CAN HAVE SOME REFILLS     THE TRAMADOL HAS  ALREADY BEEN SENT TO  DR. GRIFFITH TO [place] SIGN     I CALLED PT AND INFORMED HER OF STATUS THAT IT HAS ALREADY BEEN SENT SHE VERBALIZED UNDERSTANDING

## 2019-06-03 NOTE — TELEPHONE ENCOUNTER
PATIENT WANTS A REFERRAL FOR PHYSICAL THERAPY FOR HER RIGHT KNEE AT [facility] PHYSICAL [facility] BEFORE SHE GETS THERE NEXT WEEK.

## 2019-06-03 NOTE — TELEPHONE ENCOUNTER
Last filled: 3/4/19  Last seen:4/25/19  Next appointment:6/17/19  KENDRA: BARBRA enrollment pending

## 2019-06-07 ENCOUNTER — TELEPHONE (OUTPATIENT)
Dept: INTERNAL MEDICINE | Facility: CLINIC | Age: 84
End: 2019-06-07

## 2019-06-10 ENCOUNTER — LAB (OUTPATIENT)
Dept: LAB | Facility: HOSPITAL | Age: 84
End: 2019-06-10

## 2019-06-10 DIAGNOSIS — M81.0 AGE-RELATED OSTEOPOROSIS WITHOUT CURRENT PATHOLOGICAL FRACTURE: ICD-10-CM

## 2019-06-10 DIAGNOSIS — E03.9 ACQUIRED HYPOTHYROIDISM: ICD-10-CM

## 2019-06-10 DIAGNOSIS — D51.0 PERNICIOUS ANEMIA: ICD-10-CM

## 2019-06-10 DIAGNOSIS — E78.2 MIXED HYPERLIPIDEMIA: ICD-10-CM

## 2019-06-10 DIAGNOSIS — I10 BENIGN ESSENTIAL HYPERTENSION: ICD-10-CM

## 2019-06-10 LAB
25(OH)D3 SERPL-MCNC: 25.3 NG/ML (ref 30–100)
ALBUMIN SERPL-MCNC: 4.4 G/DL (ref 3.5–5.2)
ALBUMIN UR-MCNC: 4.3 MG/L
ALBUMIN/GLOB SERPL: 1.9 G/DL
ALP SERPL-CCNC: 58 U/L (ref 39–117)
ALT SERPL W P-5'-P-CCNC: 17 U/L (ref 1–33)
ANION GAP SERPL CALCULATED.3IONS-SCNC: 13.2 MMOL/L
AST SERPL-CCNC: 20 U/L (ref 1–32)
BASOPHILS # BLD AUTO: 0.07 10*3/MM3 (ref 0–0.2)
BASOPHILS NFR BLD AUTO: 1 % (ref 0–1.5)
BILIRUB SERPL-MCNC: 0.7 MG/DL (ref 0.2–1.2)
BUN BLD-MCNC: 20 MG/DL (ref 8–23)
BUN/CREAT SERPL: 27 (ref 7–25)
CALCIUM SPEC-SCNC: 8.8 MG/DL (ref 8.6–10.5)
CHLORIDE SERPL-SCNC: 101 MMOL/L (ref 98–107)
CHOLEST SERPL-MCNC: 163 MG/DL (ref 0–200)
CO2 SERPL-SCNC: 29.8 MMOL/L (ref 22–29)
CREAT BLD-MCNC: 0.74 MG/DL (ref 0.57–1)
CREAT UR-MCNC: 81.4 MG/DL
DEPRECATED RDW RBC AUTO: 48.4 FL (ref 37–54)
EOSINOPHIL # BLD AUTO: 0.07 10*3/MM3 (ref 0–0.4)
EOSINOPHIL NFR BLD AUTO: 1 % (ref 0.3–6.2)
ERYTHROCYTE [DISTWIDTH] IN BLOOD BY AUTOMATED COUNT: 14.1 % (ref 12.3–15.4)
GFR SERPL CREATININE-BSD FRML MDRD: 74 ML/MIN/1.73
GLOBULIN UR ELPH-MCNC: 2.3 GM/DL
GLUCOSE BLD-MCNC: 97 MG/DL (ref 65–99)
HCT VFR BLD AUTO: 42.8 % (ref 34–46.6)
HDLC SERPL-MCNC: 69 MG/DL (ref 40–60)
HGB BLD-MCNC: 13.5 G/DL (ref 12–15.9)
IMM GRANULOCYTES # BLD AUTO: 0.02 10*3/MM3 (ref 0–0.05)
IMM GRANULOCYTES NFR BLD AUTO: 0.3 % (ref 0–0.5)
LDLC SERPL CALC-MCNC: 79 MG/DL (ref 0–100)
LDLC/HDLC SERPL: 1.14 {RATIO}
LYMPHOCYTES # BLD AUTO: 1.32 10*3/MM3 (ref 0.7–3.1)
LYMPHOCYTES NFR BLD AUTO: 18.5 % (ref 19.6–45.3)
MCH RBC QN AUTO: 29.5 PG (ref 26.6–33)
MCHC RBC AUTO-ENTMCNC: 31.5 G/DL (ref 31.5–35.7)
MCV RBC AUTO: 93.4 FL (ref 79–97)
MICROALBUMIN/CREAT UR: 52.8 MG/G
MONOCYTES # BLD AUTO: 0.63 10*3/MM3 (ref 0.1–0.9)
MONOCYTES NFR BLD AUTO: 8.8 % (ref 5–12)
NEUTROPHILS # BLD AUTO: 5.03 10*3/MM3 (ref 1.7–7)
NEUTROPHILS NFR BLD AUTO: 70.4 % (ref 42.7–76)
NRBC BLD AUTO-RTO: 0 /100 WBC (ref 0–0.2)
PLATELET # BLD AUTO: 242 10*3/MM3 (ref 140–450)
PMV BLD AUTO: 10.2 FL (ref 6–12)
POTASSIUM BLD-SCNC: 3.7 MMOL/L (ref 3.5–5.2)
PROT SERPL-MCNC: 6.7 G/DL (ref 6–8.5)
RBC # BLD AUTO: 4.58 10*6/MM3 (ref 3.77–5.28)
SODIUM BLD-SCNC: 144 MMOL/L (ref 136–145)
T4 FREE SERPL-MCNC: 1.84 NG/DL (ref 0.93–1.7)
TRIGL SERPL-MCNC: 77 MG/DL (ref 0–150)
TSH SERPL DL<=0.05 MIU/L-ACNC: 1.06 MIU/ML (ref 0.27–4.2)
VIT B12 BLD-MCNC: 380 PG/ML (ref 211–946)
VLDLC SERPL-MCNC: 15.4 MG/DL (ref 5–40)
WBC NRBC COR # BLD: 7.14 10*3/MM3 (ref 3.4–10.8)

## 2019-06-10 PROCEDURE — 80061 LIPID PANEL: CPT

## 2019-06-10 PROCEDURE — 84443 ASSAY THYROID STIM HORMONE: CPT

## 2019-06-10 PROCEDURE — 85025 COMPLETE CBC W/AUTO DIFF WBC: CPT

## 2019-06-10 PROCEDURE — 82306 VITAMIN D 25 HYDROXY: CPT

## 2019-06-10 PROCEDURE — 82607 VITAMIN B-12: CPT

## 2019-06-10 PROCEDURE — 82570 ASSAY OF URINE CREATININE: CPT

## 2019-06-10 PROCEDURE — 82043 UR ALBUMIN QUANTITATIVE: CPT

## 2019-06-10 PROCEDURE — 84439 ASSAY OF FREE THYROXINE: CPT

## 2019-06-10 PROCEDURE — 80053 COMPREHEN METABOLIC PANEL: CPT

## 2019-06-17 ENCOUNTER — OFFICE VISIT (OUTPATIENT)
Dept: INTERNAL MEDICINE | Facility: CLINIC | Age: 84
End: 2019-06-17

## 2019-06-17 VITALS
WEIGHT: 114 LBS | SYSTOLIC BLOOD PRESSURE: 148 MMHG | HEIGHT: 60 IN | BODY MASS INDEX: 22.38 KG/M2 | DIASTOLIC BLOOD PRESSURE: 56 MMHG | HEART RATE: 64 BPM

## 2019-06-17 DIAGNOSIS — E53.8 VITAMIN B 12 DEFICIENCY: ICD-10-CM

## 2019-06-17 DIAGNOSIS — I10 BENIGN ESSENTIAL HYPERTENSION: ICD-10-CM

## 2019-06-17 DIAGNOSIS — F01.50 MULTI-INFARCT DEMENTIA WITHOUT BEHAVIORAL DISTURBANCE (HCC): ICD-10-CM

## 2019-06-17 DIAGNOSIS — G63 POLYNEUROPATHY ASSOCIATED WITH UNDERLYING DISEASE (HCC): ICD-10-CM

## 2019-06-17 DIAGNOSIS — M48.061 SPINAL STENOSIS OF LUMBAR REGION WITHOUT NEUROGENIC CLAUDICATION: ICD-10-CM

## 2019-06-17 DIAGNOSIS — F41.1 GENERALIZED ANXIETY DISORDER: ICD-10-CM

## 2019-06-17 DIAGNOSIS — Z00.00 ANNUAL PHYSICAL EXAM: Primary | ICD-10-CM

## 2019-06-17 DIAGNOSIS — D51.0 PERNICIOUS ANEMIA: ICD-10-CM

## 2019-06-17 DIAGNOSIS — E03.9 ACQUIRED HYPOTHYROIDISM: ICD-10-CM

## 2019-06-17 DIAGNOSIS — E78.2 MIXED HYPERLIPIDEMIA: ICD-10-CM

## 2019-06-17 DIAGNOSIS — M17.0 PRIMARY OSTEOARTHRITIS OF BOTH KNEES: ICD-10-CM

## 2019-06-17 PROCEDURE — 90471 IMMUNIZATION ADMIN: CPT | Performed by: INTERNAL MEDICINE

## 2019-06-17 PROCEDURE — 96372 THER/PROPH/DIAG INJ SC/IM: CPT | Performed by: INTERNAL MEDICINE

## 2019-06-17 PROCEDURE — G0439 PPPS, SUBSEQ VISIT: HCPCS | Performed by: INTERNAL MEDICINE

## 2019-06-17 PROCEDURE — 90715 TDAP VACCINE 7 YRS/> IM: CPT | Performed by: INTERNAL MEDICINE

## 2019-06-17 RX ORDER — MONTELUKAST SODIUM 4 MG/1
1 TABLET, CHEWABLE ORAL 2 TIMES DAILY
COMMUNITY
End: 2019-11-21 | Stop reason: HOSPADM

## 2019-06-17 RX ORDER — BUSPIRONE HYDROCHLORIDE 10 MG/1
10 TABLET ORAL 2 TIMES DAILY
Qty: 60 TABLET | Refills: 5 | Status: SHIPPED | OUTPATIENT
Start: 2019-06-17 | End: 2020-03-09

## 2019-06-17 RX ORDER — MEMANTINE HYDROCHLORIDE 10 MG/1
10 TABLET ORAL 2 TIMES DAILY
Qty: 60 TABLET | Refills: 5 | Status: SHIPPED | OUTPATIENT
Start: 2019-06-17 | End: 2020-04-06

## 2019-06-17 RX ORDER — DOCUSATE SODIUM 100 MG/1
100 CAPSULE, LIQUID FILLED ORAL 2 TIMES DAILY PRN
COMMUNITY
End: 2019-07-15

## 2019-06-17 RX ORDER — LISINOPRIL 10 MG/1
20 TABLET ORAL 2 TIMES DAILY
Qty: 360 TABLET | Refills: 1 | Status: SHIPPED | OUTPATIENT
Start: 2019-06-17 | End: 2019-10-04 | Stop reason: HOSPADM

## 2019-06-17 RX ADMIN — CYANOCOBALAMIN 1000 MCG: 1000 INJECTION, SOLUTION INTRAMUSCULAR; SUBCUTANEOUS at 16:58

## 2019-06-17 NOTE — PROGRESS NOTES
QUICK REFERENCE INFORMATION:  The ABCs of the Annual Wellness Visit    Subsequent Medicare Wellness Visit    HEALTH RISK ASSESSMENT    5/8/1930    Recent Hospitalizations:  No hospitalization(s) within the last year..        Current Medical Providers:  Patient Care Team:  Eric Patel MD as PCP - General  Eric Patel MD as PCP - Family Medicine        Smoking Status:  Social History     Tobacco Use   Smoking Status Never Smoker   Smokeless Tobacco Never Used       Alcohol Consumption:  Social History     Substance and Sexual Activity   Alcohol Use No       Depression Screen:   PHQ-2/PHQ-9 Depression Screening 6/17/2019   Little interest or pleasure in doing things 0   Feeling down, depressed, or hopeless 0   Total Score 0       Health Habits and Functional and Cognitive Screening:  No flowsheet data found.        Does the patient have evidence of cognitive impairment? Yes            Aspirin use counseling: Taking ASA appropriately as indicated      Recent Lab Results:  CMP:  Lab Results   Component Value Date    BUN 20 06/10/2019    CREATININE 0.74 06/10/2019    EGFRIFNONA 74 06/10/2019    BCR 27.0 (H) 06/10/2019     06/10/2019    K 3.7 06/10/2019    CO2 29.8 (H) 06/10/2019    CALCIUM 8.8 06/10/2019    ALBUMIN 4.40 06/10/2019    BILITOT 0.7 06/10/2019    ALKPHOS 58 06/10/2019    AST 20 06/10/2019    ALT 17 06/10/2019     HbA1c:  No results found for: HGBA1C  Microalbumin:  Lab Results   Component Value Date    MICROALBUR 4.3 06/10/2019     Lipid Panel  Lab Results   Component Value Date    CHOL 163 06/10/2019    TRIG 77 06/10/2019    HDL 69 (H) 06/10/2019    LDL 79 06/10/2019    AST 20 06/10/2019    ALT 17 06/10/2019       Visual Acuity:  No exam data present    Age-appropriate Screening Schedule:  Refer to the list below for future screening recommendations based on patient's age, sex and/or medical conditions. Orders for these recommended tests are listed in the plan section. The patient  has been provided with a written plan.    Health Maintenance   Topic Date Due   • ZOSTER VACCINE (1 of 2) 05/08/1980   • DXA SCAN  05/12/2019   • INFLUENZA VACCINE  08/01/2019   • LIPID PANEL  06/10/2020   • TDAP/TD VACCINES (2 - Td) 06/17/2029   • PNEUMOCOCCAL VACCINES (65+ LOW/MEDIUM RISK)  Completed        Subjective   History of Present Illness    Temi Jarrett is a 89 y.o. female who presents for a Subsequent Wellness Visit.  Her biggest concern today is her 's declining health.  He is currently in the hospital, and will almost certainly require placement in a facility after discharge.  Her daughter-in-law, who accompanies her today, says they are concerned about her memory which seems to be worse.  She also has considerable bruising, and is very stressed and anxious.  She has also lost weight.  Her cardiologist suggested that she stop hydrochlorothiazide, she wanted to check with me first.    CHRONIC CONDITIONS    The following portions of the patient's history were reviewed and updated as appropriate: allergies, current medications, past family history, past medical history, past social history, past surgical history and problem list.    Outpatient Medications Prior to Visit   Medication Sig Dispense Refill   • acetaminophen (TYLENOL) 500 MG tablet Tylenol Extra Strength 500 mg tablet     • amLODIPine (NORVASC) 5 MG tablet Take 1 tablet by mouth Daily. 90 tablet 0   • aspirin 81 MG tablet aspirin 81 mg tablet   Daily     • Calcium Carbonate-Vit D-Min (CALTRATE PLUS PO) Take  by mouth.     • Cholecalciferol (VITAMIN D PO) 1000 U qd     • colestipol (COLESTID) 1 g tablet Take 1 g by mouth Daily.     • diclofenac (VOLTAREN) 1 % gel gel Apply 4 g topically to the appropriate area as directed 4 (Four) Times a Day. 100 g 5   • Diphenoxylate-Atropine (LOMOTIL PO) Take 1 tablet by mouth As Needed.     • docusate sodium (COLACE) 100 MG capsule Take 100 mg by mouth 2 (Two) Times a Day As Needed for Constipation.      • donepezil (ARICEPT) 10 MG tablet Take 1 tablet by mouth Every Night. 30 tablet 5   • levothyroxine (SYNTHROID) 175 MCG tablet Take 1 tablet by mouth Daily. Patient receives medication from patient assistance.  NDC:[id number] EXP:10/17/2019 [id number] 90 tablet 0   • Melatonin 3 MG capsule Take 1 capsule by mouth every night at bedtime.     • Multiple Vitamins-Minerals (PRESERVISION AREDS PO) Take 1 tablet by mouth Daily.     • POTASSIUM CHLORIDE RENNY ER PO Take 20 mEq by mouth Daily.     • Probiotic Product (PROBIOTIC-10) capsule Take 1 capsule by mouth Daily.     • Sucralfate (CARAFATE PO) sucralfate     • traMADol (ULTRAM) 50 MG tablet TAKE ONE TABLET BY MOUTH DAILY AS NEEDED FOR MODERATE PAIN (RIGHT KNEE PAIN) 30 tablet 1   • busPIRone (BUSPAR) 5 MG tablet Take 1 tablet by mouth 2 (Two) Times a Day. 60 tablet 3   • gabapentin (NEURONTIN) 300 MG capsule Take 1 capsule by mouth 2 (Two) Times a Day. 60 capsule 3   • hydrochlorothiazide (HYDRODIURIL) 25 MG tablet TAKE ONE TABLET BY MOUTH DAILY 90 tablet 0   • lisinopril (PRINIVIL,ZESTRIL) 10 MG tablet TAKE TWO TABLETS BY MOUTH TWICE A  tablet 1   • memantine (NAMENDA) 5 MG tablet Take 1 tablet by mouth 2 (Two) Times a Day. 60 tablet 5     Facility-Administered Medications Prior to Visit   Medication Dose Route Frequency Provider Last Rate Last Dose   • cyanocobalamin injection 1,000 mcg  1,000 mcg Intramuscular Q28 Days Eric Patel MD   1,000 mcg at 06/17/19 1658       Patient Active Problem List   Diagnosis   • Skin tear of lower leg without complication, right, initial encounter   • Esophageal reflux   • Hypothyroidism   • Generalized anxiety disorder   • Hyperlipidemia   • Multi-infarct dementia   • Peripheral neuropathy   • Carpal tunnel syndrome of right wrist   • Spinal stenosis of lumbar region   • Osteoporosis   • Fever   • Urinary frequency   • Peripheral venous insufficiency   • Disc disorder of lumbar region   • Bladder prolapse,  female, acquired   • Acute right-sided low back pain without sciatica   • Pain, knee   • Benign essential hypertension   • Pernicious anemia   • Memory loss   • Annual physical exam   • Primary osteoarthritis of both knees       Advance Care Planning:  Patient has an advance directive - a copy has been provided and is visible in patient header    Identification of Risk Factors:  Risk factors include: weight , cardiovascular risk, inactivity, increased fall risk, chronic pain and cognitive impairment.    Review of Systems   Constitutional: Negative for chills, fatigue and fever.   HENT: Negative for congestion, ear pain and sinus pressure.    Respiratory: Negative for cough, chest tightness, shortness of breath and wheezing.    Cardiovascular: Negative for chest pain and palpitations.   Gastrointestinal: Negative for abdominal pain, blood in stool and constipation.   Skin: Negative for color change.   Allergic/Immunologic: Negative for environmental allergies.   Neurological: Negative for dizziness, speech difficulty and headaches.   Psychiatric/Behavioral: Negative for confusion. The patient is nervous/anxious.        Compared to one year ago, the patient feels her physical health is the same.  Compared to one year ago, the patient feels her mental health is the same.    Objective     Physical Exam   Constitutional: She is oriented to person, place, and time. She appears well-developed and well-nourished.   HENT:   Head: Normocephalic and atraumatic.   Right Ear: External ear normal.   Left Ear: External ear normal.   Mouth/Throat: Oropharynx is clear and moist. No oropharyngeal exudate.   Neck: Normal range of motion. Neck supple. No JVD present. No thyromegaly present.   Cardiovascular: Normal rate, regular rhythm and normal heart sounds.   No murmur heard.  Pulmonary/Chest: Effort normal and breath sounds normal.   Abdominal: Soft. Bowel sounds are normal. There is no tenderness.   Lymphadenopathy:     She has no  "cervical adenopathy.   Neurological: She is alert and oriented to person, place, and time.   Skin: Skin is warm and dry.   Psychiatric: She has a normal mood and affect.   Vitals reviewed.       Procedures     Vitals:    06/17/19 1511   BP: 148/56   BP Location: Left arm   Patient Position: Sitting   Cuff Size: Adult   Pulse: 64   Weight: 51.7 kg (114 lb)   Height: 152.4 cm (60\")       Patient's Body mass index is 22.26 kg/m². BMI is within normal parameters. No follow-up required..      Assessment/Plan   Problem List Items Addressed This Visit        Cardiovascular and Mediastinum    Hyperlipidemia    Relevant Medications    colestipol (COLESTID) 1 g tablet    Multi-infarct dementia    Relevant Medications    donepezil (ARICEPT) 10 MG tablet    memantine (NAMENDA) 10 MG tablet    busPIRone (BUSPAR) 10 MG tablet    Benign essential hypertension    Relevant Medications    amLODIPine (NORVASC) 5 MG tablet    lisinopril (PRINIVIL,ZESTRIL) 10 MG tablet       Endocrine    Hypothyroidism    Relevant Medications    levothyroxine (SYNTHROID) 175 MCG tablet       Nervous and Auditory    Peripheral neuropathy       Musculoskeletal and Integument    Primary osteoarthritis of both knees       Hematopoietic and Hemostatic    Pernicious anemia       Other    Generalized anxiety disorder    Relevant Medications    donepezil (ARICEPT) 10 MG tablet    memantine (NAMENDA) 10 MG tablet    busPIRone (BUSPAR) 10 MG tablet    Spinal stenosis of lumbar region    Annual physical exam - Primary      Other Visit Diagnoses     Vitamin B 12 deficiency            Plan    We discussed caregiver stress at length.  Also her 's condition and prognosis.  She will increase her memantine to 10 mg twice a day and continue donepezil.  She will increase her vitamin D to 2000 units a day because her vitamin D was a bit low.  She needs to be on aspirin because of her history of stroke, but due to bleeding she will reduce it to every other day.  She " will increase her buspirone to 10 mg twice a day.    Patient Self-Management and Personalized Health Advice  The patient has been provided with information about: diet, weight management, prevention of cardiac or vascular disease and fall prevention and preventive services including:   · Exercise counseling provided, Nutrition counseling provided.    Outpatient Encounter Medications as of 6/17/2019   Medication Sig Dispense Refill   • acetaminophen (TYLENOL) 500 MG tablet Tylenol Extra Strength 500 mg tablet     • amLODIPine (NORVASC) 5 MG tablet Take 1 tablet by mouth Daily. 90 tablet 0   • aspirin 81 MG tablet aspirin 81 mg tablet   Daily     • busPIRone (BUSPAR) 10 MG tablet Take 1 tablet by mouth 2 (Two) Times a Day. 60 tablet 5   • Calcium Carbonate-Vit D-Min (CALTRATE PLUS PO) Take  by mouth.     • Cholecalciferol (VITAMIN D PO) 1000 U qd     • colestipol (COLESTID) 1 g tablet Take 1 g by mouth Daily.     • diclofenac (VOLTAREN) 1 % gel gel Apply 4 g topically to the appropriate area as directed 4 (Four) Times a Day. 100 g 5   • Diphenoxylate-Atropine (LOMOTIL PO) Take 1 tablet by mouth As Needed.     • docusate sodium (COLACE) 100 MG capsule Take 100 mg by mouth 2 (Two) Times a Day As Needed for Constipation.     • donepezil (ARICEPT) 10 MG tablet Take 1 tablet by mouth Every Night. 30 tablet 5   • levothyroxine (SYNTHROID) 175 MCG tablet Take 1 tablet by mouth Daily. Patient receives medication from patient assistance.  NDC:[id number] EXP:10/17/2019 [id number] 90 tablet 0   • lisinopril (PRINIVIL,ZESTRIL) 10 MG tablet Take 2 tablets by mouth 2 (Two) Times a Day. 360 tablet 1   • Melatonin 3 MG capsule Take 1 capsule by mouth every night at bedtime.     • memantine (NAMENDA) 10 MG tablet Take 1 tablet by mouth 2 (Two) Times a Day. 60 tablet 5   • Multiple Vitamins-Minerals (PRESERVISION AREDS PO) Take 1 tablet by mouth Daily.     • POTASSIUM CHLORIDE RENNY ER PO Take 20 mEq by mouth Daily.     • Probiotic  Product (PROBIOTIC-10) capsule Take 1 capsule by mouth Daily.     • Sucralfate (CARAFATE PO) sucralfate     • traMADol (ULTRAM) 50 MG tablet TAKE ONE TABLET BY MOUTH DAILY AS NEEDED FOR MODERATE PAIN (RIGHT KNEE PAIN) 30 tablet 1   • [DISCONTINUED] busPIRone (BUSPAR) 5 MG tablet Take 1 tablet by mouth 2 (Two) Times a Day. 60 tablet 3   • [DISCONTINUED] gabapentin (NEURONTIN) 300 MG capsule Take 1 capsule by mouth 2 (Two) Times a Day. 60 capsule 3   • [DISCONTINUED] hydrochlorothiazide (HYDRODIURIL) 25 MG tablet TAKE ONE TABLET BY MOUTH DAILY 90 tablet 0   • [DISCONTINUED] lisinopril (PRINIVIL,ZESTRIL) 10 MG tablet TAKE TWO TABLETS BY MOUTH TWICE A  tablet 1   • [DISCONTINUED] memantine (NAMENDA) 5 MG tablet Take 1 tablet by mouth 2 (Two) Times a Day. 60 tablet 5     Facility-Administered Encounter Medications as of 6/17/2019   Medication Dose Route Frequency Provider Last Rate Last Dose   • cyanocobalamin injection 1,000 mcg  1,000 mcg Intramuscular Q28 Days Eric Patel MD   1,000 mcg at 06/17/19 1655       Reviewed use of high risk medication in the elderly: yes  Reviewed for potential of harmful drug interactions in the elderly: yes    Follow Up:  Return in about 6 months (around 12/17/2019) for follow up.     An After Visit Summary and PPPS with all of these plans were given to the patient.

## 2019-06-17 NOTE — PATIENT INSTRUCTIONS
Stop hydrochlorothiazide.  Increase memantine to 10 mg twice day.  Increase vitamin D to 2000 units a day. Reduce aspirin to every other day. Increase buspirone to 10 mg twice a day.

## 2019-06-18 RX ORDER — GABAPENTIN 300 MG/1
CAPSULE ORAL
Qty: 60 CAPSULE | Refills: 2 | Status: SHIPPED | OUTPATIENT
Start: 2019-06-18 | End: 2019-09-16 | Stop reason: SDUPTHER

## 2019-06-19 PROBLEM — M17.0 PRIMARY OSTEOARTHRITIS OF BOTH KNEES: Status: ACTIVE | Noted: 2019-06-19

## 2019-06-21 RX ORDER — HYDROCHLOROTHIAZIDE 25 MG/1
TABLET ORAL
Qty: 90 TABLET | Refills: 1 | Status: SHIPPED | OUTPATIENT
Start: 2019-06-21 | End: 2019-07-15

## 2019-06-24 ENCOUNTER — APPOINTMENT (OUTPATIENT)
Dept: GENERAL RADIOLOGY | Facility: HOSPITAL | Age: 84
End: 2019-06-24

## 2019-06-24 ENCOUNTER — HOSPITAL ENCOUNTER (OUTPATIENT)
Facility: HOSPITAL | Age: 84
Setting detail: OBSERVATION
Discharge: HOME | End: 2019-06-24
Attending: EMERGENCY MEDICINE | Admitting: EMERGENCY MEDICINE

## 2019-06-24 ENCOUNTER — APPOINTMENT (OUTPATIENT)
Dept: CT IMAGING | Facility: HOSPITAL | Age: 84
End: 2019-06-24

## 2019-06-24 VITALS
DIASTOLIC BLOOD PRESSURE: 77 MMHG | OXYGEN SATURATION: 97 % | HEART RATE: 83 BPM | RESPIRATION RATE: 18 BRPM | WEIGHT: 114 LBS | BODY MASS INDEX: 22.38 KG/M2 | SYSTOLIC BLOOD PRESSURE: 134 MMHG | HEIGHT: 60 IN | TEMPERATURE: 98.2 F

## 2019-06-24 DIAGNOSIS — R07.9 CHEST PAIN, UNSPECIFIED TYPE: Primary | ICD-10-CM

## 2019-06-24 DIAGNOSIS — R10.13 EPIGASTRIC ABDOMINAL PAIN: ICD-10-CM

## 2019-06-24 DIAGNOSIS — K44.9 HIATAL HERNIA: ICD-10-CM

## 2019-06-24 PROBLEM — R11.2 INTRACTABLE NAUSEA AND VOMITING: Status: ACTIVE | Noted: 2019-06-24

## 2019-06-24 PROBLEM — R11.2 INTRACTABLE NAUSEA AND VOMITING: Status: RESOLVED | Noted: 2019-06-24 | Resolved: 2019-06-24

## 2019-06-24 PROBLEM — R07.89 CHEST PAIN, ATYPICAL: Status: ACTIVE | Noted: 2019-06-24

## 2019-06-24 LAB
ALBUMIN SERPL-MCNC: 3.7 G/DL (ref 3.5–5.2)
ALBUMIN/GLOB SERPL: 1.9 G/DL
ALP SERPL-CCNC: 53 U/L (ref 39–117)
ALT SERPL W P-5'-P-CCNC: 14 U/L (ref 1–33)
ANION GAP SERPL CALCULATED.3IONS-SCNC: 14 MMOL/L
AST SERPL-CCNC: 14 U/L (ref 1–32)
BASOPHILS # BLD AUTO: 0.04 10*3/MM3 (ref 0–0.2)
BASOPHILS NFR BLD AUTO: 0.5 % (ref 0–1.5)
BILIRUB SERPL-MCNC: 0.3 MG/DL (ref 0.2–1.2)
BUN BLD-MCNC: 26 MG/DL (ref 8–23)
BUN/CREAT SERPL: 38.8 (ref 7–25)
CALCIUM SPEC-SCNC: 8 MG/DL (ref 8.6–10.5)
CHLORIDE SERPL-SCNC: 106 MMOL/L (ref 98–107)
CO2 SERPL-SCNC: 25 MMOL/L (ref 22–29)
CREAT BLD-MCNC: 0.67 MG/DL (ref 0.57–1)
DEPRECATED RDW RBC AUTO: 47.1 FL (ref 37–54)
EOSINOPHIL # BLD AUTO: 0.04 10*3/MM3 (ref 0–0.4)
EOSINOPHIL NFR BLD AUTO: 0.5 % (ref 0.3–6.2)
ERYTHROCYTE [DISTWIDTH] IN BLOOD BY AUTOMATED COUNT: 13.8 % (ref 12.3–15.4)
GFR SERPL CREATININE-BSD FRML MDRD: 83 ML/MIN/1.73
GLOBULIN UR ELPH-MCNC: 2 GM/DL
GLUCOSE BLD-MCNC: 129 MG/DL (ref 65–99)
HCT VFR BLD AUTO: 36 % (ref 34–46.6)
HGB BLD-MCNC: 11.3 G/DL (ref 12–15.9)
HOLD SPECIMEN: NORMAL
HOLD SPECIMEN: NORMAL
IMM GRANULOCYTES # BLD AUTO: 0.03 10*3/MM3 (ref 0–0.05)
IMM GRANULOCYTES NFR BLD AUTO: 0.4 % (ref 0–0.5)
LIPASE SERPL-CCNC: 36 U/L (ref 13–60)
LYMPHOCYTES # BLD AUTO: 0.46 10*3/MM3 (ref 0.7–3.1)
LYMPHOCYTES NFR BLD AUTO: 5.8 % (ref 19.6–45.3)
MCH RBC QN AUTO: 29.4 PG (ref 26.6–33)
MCHC RBC AUTO-ENTMCNC: 31.4 G/DL (ref 31.5–35.7)
MCV RBC AUTO: 93.5 FL (ref 79–97)
MONOCYTES # BLD AUTO: 0.48 10*3/MM3 (ref 0.1–0.9)
MONOCYTES NFR BLD AUTO: 6 % (ref 5–12)
NEUTROPHILS # BLD AUTO: 6.95 10*3/MM3 (ref 1.7–7)
NEUTROPHILS NFR BLD AUTO: 86.8 % (ref 42.7–76)
NRBC BLD AUTO-RTO: 0 /100 WBC (ref 0–0.2)
NT-PROBNP SERPL-MCNC: 356 PG/ML (ref 5–1800)
PLATELET # BLD AUTO: 192 10*3/MM3 (ref 140–450)
PMV BLD AUTO: 9.6 FL (ref 6–12)
POTASSIUM BLD-SCNC: 3.9 MMOL/L (ref 3.5–5.2)
PROT SERPL-MCNC: 5.7 G/DL (ref 6–8.5)
RBC # BLD AUTO: 3.85 10*6/MM3 (ref 3.77–5.28)
SODIUM BLD-SCNC: 145 MMOL/L (ref 136–145)
TROPONIN T SERPL-MCNC: <0.01 NG/ML (ref 0–0.03)
TROPONIN T SERPL-MCNC: <0.01 NG/ML (ref 0–0.03)
WBC NRBC COR # BLD: 8 10*3/MM3 (ref 3.4–10.8)
WHOLE BLOOD HOLD SPECIMEN: NORMAL
WHOLE BLOOD HOLD SPECIMEN: NORMAL

## 2019-06-24 PROCEDURE — G0378 HOSPITAL OBSERVATION PER HR: HCPCS

## 2019-06-24 PROCEDURE — 83880 ASSAY OF NATRIURETIC PEPTIDE: CPT | Performed by: EMERGENCY MEDICINE

## 2019-06-24 PROCEDURE — 25010000002 ONDANSETRON PER 1 MG: Performed by: EMERGENCY MEDICINE

## 2019-06-24 PROCEDURE — 85025 COMPLETE CBC W/AUTO DIFF WBC: CPT | Performed by: EMERGENCY MEDICINE

## 2019-06-24 PROCEDURE — 25010000002 IOPAMIDOL 61 % SOLUTION: Performed by: EMERGENCY MEDICINE

## 2019-06-24 PROCEDURE — 83690 ASSAY OF LIPASE: CPT | Performed by: EMERGENCY MEDICINE

## 2019-06-24 PROCEDURE — 80053 COMPREHEN METABOLIC PANEL: CPT | Performed by: EMERGENCY MEDICINE

## 2019-06-24 PROCEDURE — 96361 HYDRATE IV INFUSION ADD-ON: CPT

## 2019-06-24 PROCEDURE — 99285 EMERGENCY DEPT VISIT HI MDM: CPT

## 2019-06-24 PROCEDURE — 93005 ELECTROCARDIOGRAM TRACING: CPT | Performed by: EMERGENCY MEDICINE

## 2019-06-24 PROCEDURE — 84484 ASSAY OF TROPONIN QUANT: CPT | Performed by: EMERGENCY MEDICINE

## 2019-06-24 PROCEDURE — 96374 THER/PROPH/DIAG INJ IV PUSH: CPT

## 2019-06-24 PROCEDURE — 71045 X-RAY EXAM CHEST 1 VIEW: CPT

## 2019-06-24 PROCEDURE — 99219 PR INITIAL OBSERVATION CARE/DAY 50 MINUTES: CPT | Performed by: INTERNAL MEDICINE

## 2019-06-24 PROCEDURE — 74177 CT ABD & PELVIS W/CONTRAST: CPT

## 2019-06-24 RX ORDER — ALUMINA, MAGNESIA, AND SIMETHICONE 2400; 2400; 240 MG/30ML; MG/30ML; MG/30ML
15 SUSPENSION ORAL ONCE
Status: COMPLETED | OUTPATIENT
Start: 2019-06-24 | End: 2019-06-24

## 2019-06-24 RX ORDER — FAMOTIDINE 20 MG/1
20 TABLET, FILM COATED ORAL 2 TIMES DAILY
Qty: 60 TABLET | Refills: 1 | Status: SHIPPED | OUTPATIENT
Start: 2019-06-24 | End: 2019-07-15

## 2019-06-24 RX ORDER — SODIUM CHLORIDE 0.9 % (FLUSH) 0.9 %
10 SYRINGE (ML) INJECTION AS NEEDED
Status: DISCONTINUED | OUTPATIENT
Start: 2019-06-24 | End: 2019-06-24 | Stop reason: HOSPADM

## 2019-06-24 RX ORDER — ONDANSETRON 4 MG/1
4 TABLET, FILM COATED ORAL EVERY 8 HOURS PRN
Qty: 10 TABLET | Refills: 1 | Status: SHIPPED | OUTPATIENT
Start: 2019-06-24 | End: 2019-11-08

## 2019-06-24 RX ORDER — ONDANSETRON 2 MG/ML
4 INJECTION INTRAMUSCULAR; INTRAVENOUS ONCE
Status: COMPLETED | OUTPATIENT
Start: 2019-06-24 | End: 2019-06-24

## 2019-06-24 RX ORDER — DEXTROSE, SODIUM CHLORIDE, AND POTASSIUM CHLORIDE 5; .45; .15 G/100ML; G/100ML; G/100ML
100 INJECTION INTRAVENOUS CONTINUOUS
Status: DISCONTINUED | OUTPATIENT
Start: 2019-06-24 | End: 2019-06-24 | Stop reason: HOSPADM

## 2019-06-24 RX ORDER — ASPIRIN 81 MG/1
324 TABLET, CHEWABLE ORAL ONCE
Status: DISCONTINUED | OUTPATIENT
Start: 2019-06-24 | End: 2019-06-24 | Stop reason: HOSPADM

## 2019-06-24 RX ORDER — BISACODYL 10 MG
10 SUPPOSITORY, RECTAL RECTAL ONCE
Status: DISCONTINUED | OUTPATIENT
Start: 2019-06-24 | End: 2019-06-24 | Stop reason: HOSPADM

## 2019-06-24 RX ADMIN — SODIUM CHLORIDE 500 ML: 9 INJECTION, SOLUTION INTRAVENOUS at 04:34

## 2019-06-24 RX ADMIN — ONDANSETRON 4 MG: 2 INJECTION INTRAMUSCULAR; INTRAVENOUS at 04:32

## 2019-06-24 RX ADMIN — IOPAMIDOL 100 ML: 612 INJECTION, SOLUTION INTRAVENOUS at 06:03

## 2019-06-24 RX ADMIN — POTASSIUM CHLORIDE, DEXTROSE MONOHYDRATE AND SODIUM CHLORIDE 100 ML/HR: 150; 5; 450 INJECTION, SOLUTION INTRAVENOUS at 09:57

## 2019-06-24 RX ADMIN — ALUMINUM HYDROXIDE, MAGNESIUM HYDROXIDE, AND DIMETHICONE 15 ML: 400; 400; 40 SUSPENSION ORAL at 04:30

## 2019-06-24 RX ADMIN — LIDOCAINE HYDROCHLORIDE 15 ML: 20 SOLUTION ORAL; TOPICAL at 04:30

## 2019-06-24 NOTE — PROGRESS NOTES
Discharge Planning Assessment  [facility]     Patient Name: Temi Jarrett  MRN: [id number]  Today's Date: 6/24/2019    Admit Date: 6/24/2019    Discharge Needs Assessment     Row Name 06/24/19 0846       Living Environment    Lives With  child(darvin), adult    Current Living Arrangements  home/apartment/condo    Primary Care Provided by  self    Provides Primary Care For  no one, unable/limited ability to care for self    Family Caregiver if Needed  child(darvin), adult    Quality of Family Relationships  involved;helpful    Able to Return to Prior Arrangements  yes       Transition Planning    Patient/Family Anticipates Transition to  home with family    Patient/Family Anticipated Services at Transition  none    Transportation Anticipated  family or friend will provide       Discharge Needs Assessment    Readmission Within the Last 30 Days  no previous admission in last 30 days    Concerns to be Addressed  discharge planning    Equipment Currently Used at Home  cane, quad;walker, rolling    Anticipated Changes Related to Illness  none    Equipment Needed After Discharge  none        Discharge Plan     Row Name 06/24/19 0846       Plan    Plan  home    Patient/Family in Agreement with Plan  yes    Plan Comments  Pt lives with her adult son and daughter-in-law in [facility]. She reports she uses a cane in the house and a walker when she is outside. She is independent with most of her ADLs and daughter-in-law assists when needed. Pt is followed by her PCP and has medicare but does not have Medicare D; all medications will be out of pocket. At this time her plan for dicharge is to reutrn home. CM to follow.        Destination      No service coordination in this encounter.      Durable Medical Equipment      No service coordination in this encounter.      Dialysis/Infusion      No service coordination in this encounter.      Home Medical Care      No service coordination in this encounter.      Therapy      No service  coordination in this encounter.      Community Resources      No service coordination in this encounter.          Demographic Summary     Row Name 06/24/19 0845       General Information    Admission Type  observation    Referral Source  physician    Reason for Consult  discharge planning    General Information Comments  PCP Eric Patel        Contact Information    Permission Granted to Share Info With  ;family/designee    Contact Information Comments  Chris Jarrett [phone]        Functional Status     Row Name 06/24/19 0845       Functional Status, IADL    Medications  independent    Meal Preparation  assistive person    Housekeeping  assistive person    Laundry  assistive person    Shopping  independent       Mental Status    General Appearance WDL  WDL       Mental Status Summary    Recent Changes in Mental Status/Cognitive Functioning  no changes        Psychosocial    No documentation.       Abuse/Neglect    No documentation.       Legal    No documentation.       Substance Abuse    No documentation.       Patient Forms    No documentation.           Nisreen Kaur RN

## 2019-06-24 NOTE — ED PROVIDER NOTES
Subjective   89-year-old female presents complaining of upper abdominal pain, chest pain, and nausea/vomiting.  Of note, the patient's only cardiac risk factor is hypertension.  She has no prior history of coronary artery disease.  She states that she felt well yesterday until she ate dinner last night.  After eating veal Marsala, she began experiencing epigastric abdominal pain that radiated up to her chest as well as nausea/vomiting.  No diaphoresis.  No shortness of breath.  EMS was called regarding her symptoms this morning and she was subsequently brought to the emergency department for further evaluation and treatment.  Currently, the pain that she is experiencing is described as a mild discomfort.  Her main complaint at this time is nausea.  She received aspirin and nitroglycerin from EMS prior to arrival without any significant relief.            Review of Systems   Cardiovascular: Positive for chest pain.   Gastrointestinal: Positive for abdominal pain, nausea and vomiting.   All other systems reviewed and are negative.      Past Medical History:   Diagnosis Date   • Disease of thyroid gland    • Gastric polyp    • History of colonic polyps    • Hypertension    • IBS (irritable bowel syndrome)    • Macular degeneration    • Torn meniscus     right knee        Allergies   Allergen Reactions   • Codeine Nausea Only     Trouble Breathing        Past Surgical History:   Procedure Laterality Date   • CHOLECYSTECTOMY  1997   • EYE SURGERY  1998    Cataract extraction   • HERNIA REPAIR     • HYSTERECTOMY  1976   • KNEE SURGERY Right     arthroscopy- 2000   • TONSILLECTOMY         Family History   Problem Relation Age of Onset   • Hypertension Mother    • Breast cancer Mother    • Obesity Mother    • Hypertension Father    • Diabetes Son        Social History     Socioeconomic History   • Marital status:      Spouse name: Not on file   • Number of children: Not on file   • Years of education: Not on file   •  Highest education level: Not on file   Tobacco Use   • Smoking status: Never Smoker   • Smokeless tobacco: Never Used   Substance and Sexual Activity   • Alcohol use: No   • Drug use: Defer   • Sexual activity: Defer           Objective   Physical Exam   Constitutional: She is oriented to person, place, and time. She appears well-developed and well-nourished. No distress.   Well-appearing female in no acute distress   HENT:   Head: Normocephalic and atraumatic.   Mouth/Throat: Oropharynx is clear and moist.   Eyes: EOM are normal. Pupils are equal, round, and reactive to light.   Neck: Normal range of motion. Neck supple.   Cardiovascular: Normal rate, regular rhythm, normal heart sounds, intact distal pulses and normal pulses. Exam reveals no gallop and no friction rub.   No murmur heard.  Pulmonary/Chest: Effort normal and breath sounds normal. No respiratory distress. She has no wheezes. She has no rales.   Abdominal: Soft. Bowel sounds are normal. She exhibits no distension and no mass. There is no tenderness. There is no rebound and no guarding.   No focal abdominal tenderness, no peritoneal signs, negative Alford's sign   Musculoskeletal: Normal range of motion.   Neurological: She is alert and oriented to person, place, and time.   Skin: Skin is warm and dry. No rash noted. She is not diaphoretic. No erythema.   Psychiatric: She has a normal mood and affect. Judgment and thought content normal.   Nursing note and vitals reviewed.      Procedures           ED Course  ED Course as of Jun 24 0744 Mon Jun 24, 2019   0440 89-year-old female presents complaining of epigastric abdominal pain, chest pain, and nausea/vomiting after eating veal Marsala for dinner last night.  She attributes her symptoms to the food.  On arrival to the ED, patient nontoxic-appearing.  Nonsurgical abdomen.  Negative Alford's sign.  The patient's only cardiac risk factor is hypertension.  [DD]   0440 Initial EKG revealed sinus rhythm  with a heart rate of 83, frequent PVCs, and no other ST segments suggestive of are concerning for ischemia.  We will obtain labs and a chest x-ray and will reassess following initial interventions.  [DD]   0502 Chest x-ray remarkable for cardiomegaly and a large hiatal hernia.  [DD]   0648 CT revealed a large hiatal hernia with stomach contents essentially within the thoracic cavity.  [DD]   0718 Upon reevaluation, patient with persistent epigastric abdominal pain as well as persistent nausea.  HEART score of 4.  I had a long discussion with the patient and her family regarding inpatient versus outpatient management, and using shared decision-making, we elected to admit the patient to the hospital for observation and potential cardiology/gastroenterology consults for her persistent symptoms.  She is hemodynamically stable at this time and aware/agreeable with the plan.  [DD]      ED Course User Index  [DD] Angela, James Longoria MD        Recent Results (from the past 24 hour(s))   Troponin    Collection Time: 06/24/19  5:27 AM   Result Value Ref Range    Troponin T <0.010 0.000 - 0.030 ng/mL   Comprehensive Metabolic Panel    Collection Time: 06/24/19  5:27 AM   Result Value Ref Range    Glucose 129 (H) 65 - 99 mg/dL    BUN 26 (H) 8 - 23 mg/dL    Creatinine 0.67 0.57 - 1.00 mg/dL    Sodium 145 136 - 145 mmol/L    Potassium 3.9 3.5 - 5.2 mmol/L    Chloride 106 98 - 107 mmol/L    CO2 25.0 22.0 - 29.0 mmol/L    Calcium 8.0 (L) 8.6 - 10.5 mg/dL    Total Protein 5.7 (L) 6.0 - 8.5 g/dL    Albumin 3.70 3.50 - 5.20 g/dL    ALT (SGPT) 14 1 - 33 U/L    AST (SGOT) 14 1 - 32 U/L    Alkaline Phosphatase 53 39 - 117 U/L    Total Bilirubin 0.3 0.2 - 1.2 mg/dL    eGFR Non African Amer 83 >60 mL/min/1.73    Globulin 2.0 gm/dL    A/G Ratio 1.9 g/dL    BUN/Creatinine Ratio 38.8 (H) 7.0 - 25.0    Anion Gap 14.0 mmol/L   Lipase    Collection Time: 06/24/19  5:27 AM   Result Value Ref Range    Lipase 36 13 - 60 U/L   BNP    Collection  Time: 06/24/19  5:27 AM   Result Value Ref Range    proBNP 356.0 5.0-1,800.0 pg/mL   Light Blue Top    Collection Time: 06/24/19  5:27 AM   Result Value Ref Range    Extra Tube hold for add-on    Green Top (Gel)    Collection Time: 06/24/19  5:27 AM   Result Value Ref Range    Extra Tube Hold for add-ons.    Lavender Top    Collection Time: 06/24/19  5:27 AM   Result Value Ref Range    Extra Tube hold for add-on    Gold Top - SST    Collection Time: 06/24/19  5:27 AM   Result Value Ref Range    Extra Tube Hold for add-ons.    CBC Auto Differential    Collection Time: 06/24/19  5:27 AM   Result Value Ref Range    WBC 8.00 3.40 - 10.80 10*3/mm3    RBC 3.85 3.77 - 5.28 10*6/mm3    Hemoglobin 11.3 (L) 12.0 - 15.9 g/dL    Hematocrit 36.0 34.0 - 46.6 %    MCV 93.5 79.0 - 97.0 fL    MCH 29.4 26.6 - 33.0 pg    MCHC 31.4 (L) 31.5 - 35.7 g/dL    RDW 13.8 12.3 - 15.4 %    RDW-SD 47.1 37.0 - 54.0 fl    MPV 9.6 6.0 - 12.0 fL    Platelets 192 140 - 450 10*3/mm3    Neutrophil % 86.8 (H) 42.7 - 76.0 %    Lymphocyte % 5.8 (L) 19.6 - 45.3 %    Monocyte % 6.0 5.0 - 12.0 %    Eosinophil % 0.5 0.3 - 6.2 %    Basophil % 0.5 0.0 - 1.5 %    Immature Grans % 0.4 0.0 - 0.5 %    Neutrophils, Absolute 6.95 1.70 - 7.00 10*3/mm3    Lymphocytes, Absolute 0.46 (L) 0.70 - 3.10 10*3/mm3    Monocytes, Absolute 0.48 0.10 - 0.90 10*3/mm3    Eosinophils, Absolute 0.04 0.00 - 0.40 10*3/mm3    Basophils, Absolute 0.04 0.00 - 0.20 10*3/mm3    Immature Grans, Absolute 0.03 0.00 - 0.05 10*3/mm3    nRBC 0.0 0.0 - 0.2 /100 WBC     Note: In addition to lab results from this visit, the labs listed above may include labs taken at another facility or during a different encounter within the last 24 hours. Please correlate lab times with ED admission and discharge times for further clarification of the services performed during this visit.    CT Abdomen Pelvis With Contrast   Final Result   Large hiatal hernia with essentially intrathoracic stomach. Mild fluid  "distention but no obvious complication.      Fluid distention of the distal esophagus most likely reflects reflux.      Left lower lobe parenchymal opacity favoring atelectasis. Longitudinal follow-up may be warranted to confirm resolution. Recommend consideration for referral to [facility]. For questions or to make appointment call [phone].      Moderate colonic stool burden may reflect constipation. No obstruction.                     Signer Name: MARIANNE Alcazar MD    Signed: 6/24/2019 6:41 AM    Workstation Name: [email or web address]          XR Chest 1 View   Final Result   Cardiomegaly and large hiatal hernia. No other acute chest findings.      Signer Name: Tanner Mireles MD    Signed: 6/24/2019 4:50 AM    Workstation Name: [email or web address]            Vitals:    06/24/19 0352 06/24/19 0715 06/24/19 0720   BP: 155/91 129/76    Pulse: 81  75   Resp: 20     Temp: 98.3 °F (36.8 °C)     TempSrc: Oral     SpO2: 97%     Weight: 51.7 kg (114 lb)     Height: 152.4 cm (60\")       Medications   sodium chloride 0.9 % flush 10 mL (not administered)   aspirin chewable tablet 324 mg (324 mg Oral Not Given 6/24/19 0355)   sodium chloride 0.9 % bolus 500 mL (500 mL Intravenous New Bag 6/24/19 0434)   aluminum-magnesium hydroxide-simethicone (MAALOX MAX) 400-400-40 MG/5ML suspension 15 mL (15 mL Oral Given 6/24/19 0430)   lidocaine viscous (XYLOCAINE) 2 % mouth solution 15 mL (15 mL Mouth/Throat Given 6/24/19 0430)   ondansetron (ZOFRAN) injection 4 mg (4 mg Intravenous Given 6/24/19 0432)   iopamidol (ISOVUE-300) 61 % injection 100 mL (100 mL Intravenous Given 6/24/19 0603)     ECG/EMG Results (last 24 hours)     Procedure Component Value Units Date/Time    ECG 12 Lead [[id number]] Collected:  06/24/19 0356     Updated:  06/24/19 0652    ECG 12 Lead [[id number]] Collected:  06/24/19 0739     Updated:  06/24/19 0742        ECG 12 Lead         ECG 12 Lead                       MDM      Final diagnoses:   Chest " pain, unspecified type   Epigastric abdominal pain   Hiatal hernia            James Miller MD  06/24/19 7021

## 2019-06-24 NOTE — H&P
[facility] Medicine Services  HISTORY AND PHYSICAL    Patient Name: Temi Jarrett  : 1930  MRN: [id number]  Primary Care Physician: Eric Patel MD    Subjective   Subjective     Chief Complaint: Chest pain and nausea      HPI:  Temi Jarrett is a 89 y.o. female presented to the emergency room overnight after eating chicken Radha.  Patient then proceeded to have chest pain tightness nausea and vomiting.  Patient received anti-medics in the ambulance and continued to be hydrated and treated symptomatically in the emergency room but symptoms did not completely resolve.  Patient was put in observation to monitor her chest pain and to make sure she was stable to eat.  Patient denies any chest pain heaviness or tightness she denies any radiation of pain to her jaw back shoulder or arm.  Review of Systems   Otherwise complete 10-system ROS is negative except as mentioned in the HPI.    Personal History     Past Medical History:   Diagnosis Date   • Disease of thyroid gland    • Gastric polyp    • History of colonic polyps    • Hypertension    • IBS (irritable bowel syndrome)    • Macular degeneration    • Torn meniscus     right knee        Past Surgical History:   Procedure Laterality Date   • CHOLECYSTECTOMY     • EYE SURGERY      Cataract extraction   • HERNIA REPAIR     • HYSTERECTOMY     • KNEE SURGERY Right     arthroscopy-    • TONSILLECTOMY         Family History: family history includes Breast cancer in her mother; Diabetes in her son; Hypertension in her father and mother; Obesity in her mother.     Social History:  reports that she has never smoked. She has never used smokeless tobacco. Drug use questions deferred to the physician. She reports that she does not drink alcohol.  She lives with her son and daughter-in-law, [facility]    Medications:  Facility-Administered Medications Prior to Admission   Medication Dose  Route Frequency Provider Last Rate Last Dose   • cyanocobalamin injection 1,000 mcg  1,000 mcg Intramuscular Q28 Days Eric Patel MD   1,000 mcg at 06/17/19 1658     Medications Prior to Admission   Medication Sig Dispense Refill Last Dose   • acetaminophen (TYLENOL) 500 MG tablet Tylenol Extra Strength 500 mg tablet   Taking   • amLODIPine (NORVASC) 5 MG tablet Take 1 tablet by mouth Daily. 90 tablet 0 Taking   • aspirin 81 MG tablet aspirin 81 mg tablet   Daily   Taking   • busPIRone (BUSPAR) 10 MG tablet Take 1 tablet by mouth 2 (Two) Times a Day. 60 tablet 5    • Calcium Carbonate-Vit D-Min (CALTRATE PLUS PO) Take  by mouth.   Taking   • Cholecalciferol (VITAMIN D PO) 1000 U qd   Patient Taking Differently   • colestipol (COLESTID) 1 g tablet Take 1 g by mouth Daily.   Taking   • Diphenoxylate-Atropine (LOMOTIL PO) Take 2 tablets by mouth As Needed.   Taking   • docusate sodium (COLACE) 100 MG capsule Take 100 mg by mouth 2 (Two) Times a Day As Needed for Constipation.   Taking   • donepezil (ARICEPT) 10 MG tablet Take 1 tablet by mouth Every Night. 30 tablet 5 Taking   • gabapentin (NEURONTIN) 300 MG capsule TAKE ONE CAPSULE BY MOUTH TWICE A DAY 60 capsule 2    • hydrochlorothiazide (HYDRODIURIL) 25 MG tablet TAKE ONE TABLET BY MOUTH DAILY 90 tablet 1    • levothyroxine (SYNTHROID) 175 MCG tablet Take 1 tablet by mouth Daily. Patient receives medication from patient assistance.  NDC:[id number] EXP:10/17/2019 [id number] 90 tablet 0 Taking   • lisinopril (PRINIVIL,ZESTRIL) 10 MG tablet Take 2 tablets by mouth 2 (Two) Times a Day. 360 tablet 1    • memantine (NAMENDA) 10 MG tablet Take 1 tablet by mouth 2 (Two) Times a Day. 60 tablet 5    • Multiple Vitamins-Minerals (PRESERVISION AREDS PO) Take 1 tablet by mouth Daily.   Taking   • POTASSIUM CHLORIDE RENNY ER PO Take 20 mEq by mouth Daily.   Patient Taking Differently   • Probiotic Product (PROBIOTIC-10) capsule Take 1 capsule by mouth Daily.   Taking    • Sucralfate (CARAFATE PO) sucralfate   Taking   • traMADol (ULTRAM) 50 MG tablet TAKE ONE TABLET BY MOUTH DAILY AS NEEDED FOR MODERATE PAIN (RIGHT KNEE PAIN) 30 tablet 1 Taking   • diclofenac (VOLTAREN) 1 % gel gel Apply 4 g topically to the appropriate area as directed 4 (Four) Times a Day. 100 g 5 Taking   • Melatonin 3 MG capsule Take 1 capsule by mouth every night at bedtime.   Taking       Allergies   Allergen Reactions   • Codeine Nausea Only     Trouble Breathing        Objective   Objective     Vital Signs:   Temp:  [98.3 °F (36.8 °C)] 98.3 °F (36.8 °C)  Heart Rate:  [75-81] 75  Resp:  [20] 20  BP: (129-155)/(76-91) 129/76   Physical Exam   Patient is alert and talkative in no distress at rest she is completely comfortable lying flat in bed  Neck is without mass or JVD  Heart is Reg with soft systolic murmur  Lungs are clear wo wheeze or crackle  Abd is soft without HSM or mass, not tender or distended  MAEW  Skin is without rash  Neurologic exam in nonfocal she is oriented to person and place, people  Mood is appropriate    Results Reviewed:  I have personally reviewed current lab, radiology, and data and agree.    Results from last 7 days   Lab Units 06/24/19  0527   WBC 10*3/mm3 8.00   HEMOGLOBIN g/dL 11.3*   HEMATOCRIT % 36.0   PLATELETS 10*3/mm3 192     Results from last 7 days   Lab Units 06/24/19  0527   SODIUM mmol/L 145   POTASSIUM mmol/L 3.9   CHLORIDE mmol/L 106   CO2 mmol/L 25.0   BUN mg/dL 26*   CREATININE mg/dL 0.67   GLUCOSE mg/dL 129*   CALCIUM mg/dL 8.0*   ALT (SGPT) U/L 14   AST (SGOT) U/L 14   EKG showed PVCs but no ST segment changes troponins are negative x2    Imaging Results (last 24 hours)     Procedure Component Value Units Date/Time    CT Abdomen Pelvis With Contrast [[id number]] Collected:  06/24/19 0641     Updated:  06/24/19 0644    Narrative:       CT Abdomen Pelvis W    INDICATION:   Chest and upper abdominal pressure with abdominal pain after eating. Reported large hiatal  hernia. Gradual unintended weight loss.    TECHNIQUE:   CT of the abdomen and pelvis with 100 cc Isovue-300 IV contrast. Coronal and sagittal reconstructions were obtained.  Radiation dose reduction techniques included automated exposure control or exposure modulation based on body size. Count of known CT and  cardiac nuc med studies performed in previous 12 months: 0.     COMPARISON:   CT abdomen pelvis 10/18/2013    FINDINGS:  Abdomen: Large hiatal hernia with at least two thirds of the stomach projecting above the diaphragm. 1.4 x 2.5 cm left lower lobe parenchymal opacity posterior medial lung base may represent atelectasis or focal pneumonitis. Lung nodule or mass  considered less likely. Cardiomegaly. No effusions. Liver unremarkable. Mild splenomegaly (14.2 cm). Patient status post cholecystectomy. Pancreas and adrenal glands unremarkable. Bilateral renal cortical cyst. Moderate colonic stool burden no evidence  of obstruction. Visualized small bowel unremarkable. Diffuse aortic atherosclerotic change. No aneurysm.    Pelvis: Uterus surgically absent. Moderately advanced multilevel degenerative changes thoracic and lumbar spine.      Impression:       Large hiatal hernia with essentially intrathoracic stomach. Mild fluid distention but no obvious complication.    Fluid distention of the distal esophagus most likely reflects reflux.    Left lower lobe parenchymal opacity favoring atelectasis. Longitudinal follow-up may be warranted to confirm resolution. Recommend consideration for referral to [facility]. For questions or to make appointment call [phone].    Moderate colonic stool burden may reflect constipation. No obstruction.              Signer Name: MARIANNE Alcazar MD   Signed: 6/24/2019 6:41 AM   Workstation Name: [email or web address]       XR Chest 1 View [[id number]] Collected:  06/24/19 0450     Updated:  06/24/19 0452    Narrative:       CR Chest 1 Vw    INDICATION:    89-year-old female with chest pain today. Reflux.     COMPARISON:    Chest x-ray 10/20/2017.    FINDINGS:  Single portable AP view of the chest.  Mild cardiomegaly. Mediastinum and pulmonary vasculature are unremarkable. Large hiatal hernia. The lungs and pleural spaces are clear. No pneumothorax.      Impression:       Cardiomegaly and large hiatal hernia. No other acute chest findings.    Signer Name: Tanner Mireles MD   Signed: 6/24/2019 4:50 AM   Workstation Name: [email or web address]                Assessment/Plan   Assessment / Plan     Active Hospital Problems    Diagnosis   • **Hiatal hernia with intrathoracic stomach   • Chest pain, atypical   • Benign essential hypertension   • Esophageal reflux   • Hyperlipidemia   • Hypothyroidism   • Multi-infarct dementia         Assessment & Plan:  89-year-old woman with multi-infarct dementia, hypertension hypothyroidism, hyperlipidemia history of gastroesophageal reflux presented to the emergency room with intractable chest pain tightness nausea and vomiting.  Patient felt better after IV hydration and antiemetics.  She is hungry.  She denies any other chest pain.      Patient was admitted to the observation unit for IV hydration and anti-medics.  Then the patient lost her IV and did not want it replaced.  She was able to tolerate lunch.  Above stable to be discharged home.  Films of her CT scan and chest were shown to the patient and her daughter-in-law explaining her intrathoracic stomach.  Also discussed with Dr. Corcroan as above.  Given that she is quite advanced in age we need to want to minimize her reflux symptoms small frequent meals sitting up after meals--- not lying down.  Patient was discharged home in good condition.    I follow her up walking in the fox prior to discharge no distress.    Patient was discharged afternoon of the same day.      Electronically signed by Mahogany Virgen MD 06/24/19 7:48 PM

## 2019-06-24 NOTE — PROGRESS NOTES
Continued Stay Note  [facility]     Patient Name: Temi Jarrett  MRN: [id number]  Today's Date: 6/24/2019    Admit Date: 6/24/2019    Discharge Plan     Row Name 06/24/19 1449       Plan    Final Discharge Disposition Code  01 - home or self-care    Row Name 06/24/19 1447       Plan    Plan Comments  SW contacted by RN who reports that pt is needing a ride home today. MIHAI scheduled lyft transportation for 3:35pm today. Pt will need to be at [facility] entrance at 3:35pm today. MIHAI updated RN.         Discharge Codes    No documentation.       Expected Discharge Date and Time     Expected Discharge Date Expected Discharge Time    Jun 24, 2019             OLLIE Benjamin

## 2019-06-24 NOTE — PLAN OF CARE
Problem: Patient Care Overview  Goal: Plan of Care Review  Outcome: Ongoing (interventions implemented as appropriate)   06/24/19 1041   Coping/Psychosocial   Plan of Care Reviewed With patient   Plan of Care Review   Progress no change   OTHER   Outcome Summary pt. admitted VSS, no complaints at this time no signs of nausea and vomiting and would like to go home.

## 2019-06-24 NOTE — PROGRESS NOTES
Continued Stay Note  [facility]     Patient Name: Temi Jarrett  MRN: [id number]  Today's Date: 6/24/2019    Admit Date: 6/24/2019    Discharge Plan     Row Name 06/24/19 1447       Plan    Plan Comments  SW contacted by RN who reports that pt is needing a ride home today. MIHAI scheduled lyft transportation for 3:35pm today. Pt will need to be at [facility] entrance at 3:35pm today. MIHAI updated RN.         Discharge Codes    No documentation.       Expected Discharge Date and Time     Expected Discharge Date Expected Discharge Time    Jun 24, 2019             OLLIE Benjamin

## 2019-06-25 ENCOUNTER — READMISSION MANAGEMENT (OUTPATIENT)
Dept: CALL CENTER | Facility: HOSPITAL | Age: 84
End: 2019-06-25

## 2019-06-25 ENCOUNTER — TRANSITIONAL CARE MANAGEMENT TELEPHONE ENCOUNTER (OUTPATIENT)
Dept: INTERNAL MEDICINE | Facility: CLINIC | Age: 84
End: 2019-06-25

## 2019-06-25 NOTE — OUTREACH NOTE
Prep Survey      Responses   Facility patient discharged from?  [place]   Is patient eligible?  Yes   Discharge diagnosis  Hiatal hernia with intrathoracic stomach   Does the patient have one of the following disease processes/diagnoses(primary or secondary)?  Other   Does the patient have Home health ordered?  No   Is there a DME ordered?  No   Prep survey completed?  Yes          Christi Dorman RN

## 2019-06-26 ENCOUNTER — READMISSION MANAGEMENT (OUTPATIENT)
Dept: CALL CENTER | Facility: HOSPITAL | Age: 84
End: 2019-06-26

## 2019-06-26 NOTE — OUTREACH NOTE
Medical Week 1 Survey      Responses   Facility patient discharged from?  [place]   Does the patient have one of the following disease processes/diagnoses(primary or secondary)?  Other   Is there a successful TCM telephone encounter documented?  No   Week 1 attempt successful?  No   Unsuccessful attempts  Attempt 1          Parul Ley RN

## 2019-06-27 ENCOUNTER — OFFICE VISIT (OUTPATIENT)
Dept: ORTHOPEDIC SURGERY | Facility: CLINIC | Age: 84
End: 2019-06-27

## 2019-06-27 VITALS — WEIGHT: 115.08 LBS | BODY MASS INDEX: 22.59 KG/M2 | HEIGHT: 60 IN | HEART RATE: 103 BPM | OXYGEN SATURATION: 98 %

## 2019-06-27 DIAGNOSIS — M17.11 PRIMARY OSTEOARTHRITIS OF RIGHT KNEE: Primary | ICD-10-CM

## 2019-06-27 PROCEDURE — 20610 DRAIN/INJ JOINT/BURSA W/O US: CPT | Performed by: ORTHOPAEDIC SURGERY

## 2019-06-27 RX ORDER — TRIAMCINOLONE ACETONIDE 40 MG/ML
40 INJECTION, SUSPENSION INTRA-ARTICULAR; INTRAMUSCULAR
Status: COMPLETED | OUTPATIENT
Start: 2019-06-27 | End: 2019-06-27

## 2019-06-27 RX ORDER — LIDOCAINE HYDROCHLORIDE 10 MG/ML
3 INJECTION, SOLUTION EPIDURAL; INFILTRATION; INTRACAUDAL; PERINEURAL
Status: COMPLETED | OUTPATIENT
Start: 2019-06-27 | End: 2019-06-27

## 2019-06-27 RX ADMIN — TRIAMCINOLONE ACETONIDE 40 MG: 40 INJECTION, SUSPENSION INTRA-ARTICULAR; INTRAMUSCULAR at 14:22

## 2019-06-27 RX ADMIN — LIDOCAINE HYDROCHLORIDE 3 ML: 10 INJECTION, SOLUTION EPIDURAL; INFILTRATION; INTRACAUDAL; PERINEURAL at 14:22

## 2019-06-27 NOTE — PROGRESS NOTES
[facility] Note    Subjective     CC: Follow-up of the Right Knee (3 month f/u/Primary Osteoarthritis of Right Knee /Cortisone Injection given 03/28/2019/)      MARY Jarrett is a 89 y.o. female.  Patient is here today for follow-up of her right knee arthritis.  She was injected on 3/28/2019 and got good relief.  She is here requesting another injection today.      ROS:    Constiutional:Pt denies fever, chills, nausea, or vomiting.  MSK:as above    Objective      Past Medical History  Past Medical History:   Diagnosis Date   • Disease of thyroid gland    • Gastric polyp    • History of colonic polyps    • Hypertension    • IBS (irritable bowel syndrome)    • Macular degeneration    • Torn meniscus     right knee          Assessment:  1. Primary osteoarthritis of right knee        Plan:  1. Recommend over the counter anti-inflammatories for pain and/or swelling  2. Right knee arthritis--repeat injection will be given today  3. Follow-up in 4 months      Gamal Denny MD  06/27/19  5:41 PM

## 2019-06-27 NOTE — OUTREACH NOTE
Multiple attempts have been made to contact pt to complete YOLANDA call. No further attempts will be made. Pt has a hosp f/u scheduled with PCP Jyothi Murrell APRN 7/1/19 and TCM is applicable.

## 2019-06-27 NOTE — PROGRESS NOTES
Procedure   Large Joint Arthrocentesis: R knee  Date/Time: 6/27/2019 2:22 PM  Consent given by: patient  Site marked: site marked  Timeout: Immediately prior to procedure a time out was called to verify the correct patient, procedure, equipment, support staff and site/side marked as required   Supporting Documentation  Indications: pain   Procedure Details  Location: knee - R knee  Preparation: Patient was prepped and draped in the usual sterile fashion  Needle size: 22 G  Medications administered: 3 mL lidocaine PF 1% 1 %; 40 mg triamcinolone acetonide 40 MG/ML  Patient tolerance: patient tolerated the procedure well with no immediate complications

## 2019-06-28 ENCOUNTER — READMISSION MANAGEMENT (OUTPATIENT)
Dept: CALL CENTER | Facility: HOSPITAL | Age: 84
End: 2019-06-28

## 2019-06-28 NOTE — OUTREACH NOTE
Medical Week 1 Survey      Responses   Facility patient discharged from?  [facility]   Does the patient have one of the following disease processes/diagnoses(primary or secondary)?  Other   Is there a successful TCM telephone encounter documented?  No   Week 1 attempt successful?  No   Unsuccessful attempts  Attempt 2          Laurence Albright RN

## 2019-07-05 RX ORDER — AMLODIPINE BESYLATE 5 MG/1
5 TABLET ORAL DAILY
Qty: 30 TABLET | Refills: 2 | Status: SHIPPED | OUTPATIENT
Start: 2019-07-05 | End: 2019-07-10 | Stop reason: SDUPTHER

## 2019-07-10 ENCOUNTER — TELEPHONE (OUTPATIENT)
Dept: INTERNAL MEDICINE | Facility: CLINIC | Age: 84
End: 2019-07-10

## 2019-07-10 RX ORDER — AMLODIPINE BESYLATE 5 MG/1
5 TABLET ORAL 2 TIMES DAILY
Qty: 60 TABLET | Refills: 2 | Status: SHIPPED | OUTPATIENT
Start: 2019-07-10 | End: 2019-10-04 | Stop reason: HOSPADM

## 2019-07-10 NOTE — TELEPHONE ENCOUNTER
PATIENT'S SON PRADIP LUND ([phone]) CALLED CONCERNED ABOUT HIS MOTHER'S BLOOD PRESSURE.    HE SAYS THAT IN THE MORNINGS SHE RUNS HIGH WITH THE SYSTOLIC 150-160'S AND IS LOWER DURING THE EVENINGS 138-140'S    HE FEELS LIKE HER BLOOD PRESSURE IS DANGEROUSLY HIGH IN THE MORNING AND WORRIED ABOUT A STROKE OR HEART ATTACK.  SHE IS NOT TAKING THE HCTZ, BUT TAKES NORVASC 5MG IN THE MORNING AND LISINOPRIL 10MG 2 TABS IN MORNING AND 2 TABS IN EVENING.  SHE WILL TAKE AN EXTRA LISINOPRIL IN THE MORNING (PER DR. SANTOS) TO GET PRESSURE DOWN.    THE SON WANTS TO KNOW IF SOMETHING CAN BE GIVEN BEFORE THE PRESSURE GETS HIGH IN THE MORNING INSTEAD OF WAITING.?    (DR. GRIFFITH'S PATIENT)

## 2019-07-10 NOTE — TELEPHONE ENCOUNTER
Called and spoke with Victor M. He verbalized understanding of instructions. Temi has appointment for Monday with MAHIN

## 2019-07-10 NOTE — TELEPHONE ENCOUNTER
Have the patient increase amlodipine to 5 mg twice a day.  Continue current dose of lisinopril.  Make her a follow-up appointment in the office with in a week or so

## 2019-07-15 ENCOUNTER — OFFICE VISIT (OUTPATIENT)
Dept: INTERNAL MEDICINE | Facility: CLINIC | Age: 84
End: 2019-07-15

## 2019-07-15 VITALS
DIASTOLIC BLOOD PRESSURE: 72 MMHG | HEART RATE: 76 BPM | BODY MASS INDEX: 23.16 KG/M2 | WEIGHT: 118 LBS | SYSTOLIC BLOOD PRESSURE: 134 MMHG | HEIGHT: 60 IN

## 2019-07-15 DIAGNOSIS — I10 BENIGN ESSENTIAL HYPERTENSION: ICD-10-CM

## 2019-07-15 DIAGNOSIS — E53.8 VITAMIN B12 DEFICIENCY: ICD-10-CM

## 2019-07-15 DIAGNOSIS — F41.1 GENERALIZED ANXIETY DISORDER: ICD-10-CM

## 2019-07-15 DIAGNOSIS — K44.9 HIATAL HERNIA: Primary | ICD-10-CM

## 2019-07-15 DIAGNOSIS — F01.50 MULTI-INFARCT DEMENTIA WITHOUT BEHAVIORAL DISTURBANCE (HCC): ICD-10-CM

## 2019-07-15 PROCEDURE — 99214 OFFICE O/P EST MOD 30 MIN: CPT | Performed by: INTERNAL MEDICINE

## 2019-07-15 PROCEDURE — 96372 THER/PROPH/DIAG INJ SC/IM: CPT | Performed by: INTERNAL MEDICINE

## 2019-07-15 RX ADMIN — CYANOCOBALAMIN 1000 MCG: 1000 INJECTION, SOLUTION INTRAMUSCULAR; SUBCUTANEOUS at 16:01

## 2019-07-15 NOTE — PROGRESS NOTES
[facility]     Temi Jarrett  5/8/1930   [id number]      Patient Care Team:  Eric Patel MD as PCP - General  Eric Patel MD as PCP - Family Medicine    Chief Complaint::   Chief Complaint   Patient presents with   • Hyperlipidemia   • Hypertension   • Hypothyroidism   • Memory Loss        HPI  Mrs. Jarrett is now 89.  She comes in for follow-up of her dementia, neuropathy, lumbar spinal stenosis, and anxiety.  She is very stressed at present because of her 's declining health.  She is very surprised that he now requires nursing home placement.  This is despite an obvious physical decline over the past 6 to 12 months.  Last month she was admitted to the hospital overnight for chest pain, subsequently felt to be due to the fact that her stomach is entirely in her chest.  She attributes it to the fact that she had [employer] sauce at [facility] the night before.  The thoracic stomach is not a new problem.  She has had no symptoms since.    Chronic Conditions:      Patient Active Problem List   Diagnosis   • Skin tear of lower leg without complication, right, initial encounter   • Esophageal reflux   • Hypothyroidism   • Generalized anxiety disorder   • Hyperlipidemia   • Multi-infarct dementia   • Peripheral neuropathy   • Carpal tunnel syndrome of right wrist   • Spinal stenosis of lumbar region   • Osteoporosis   • Fever   • Urinary frequency   • Peripheral venous insufficiency   • Disc disorder of lumbar region   • Bladder prolapse, female, acquired   • Acute right-sided low back pain without sciatica   • Pain, knee   • Benign essential hypertension   • Pernicious anemia   • Memory loss   • Annual physical exam   • Primary osteoarthritis of both knees   • Chest pain, atypical   • Hiatal hernia with intrathoracic stomach        Past Medical History:   Diagnosis Date   • Disease of thyroid gland    • Gastric polyp    • History of colonic polyps    • Hypertension    • IBS  (irritable bowel syndrome)    • Macular degeneration    • Torn meniscus     right knee        Past Surgical History:   Procedure Laterality Date   • CHOLECYSTECTOMY  1997   • EYE SURGERY  1998    Cataract extraction   • HERNIA REPAIR     • HYSTERECTOMY  1976   • KNEE SURGERY Right     arthroscopy- 2000   • TONSILLECTOMY         Family History   Problem Relation Age of Onset   • Hypertension Mother    • Breast cancer Mother    • Obesity Mother    • Hypertension Father    • Diabetes Son        Social History     Socioeconomic History   • Marital status:      Spouse name: Not on file   • Number of children: Not on file   • Years of education: Not on file   • Highest education level: Not on file   Tobacco Use   • Smoking status: Never Smoker   • Smokeless tobacco: Never Used   Substance and Sexual Activity   • Alcohol use: No   • Drug use: Defer   • Sexual activity: Defer   Social History Narrative    Lives with son and daughter in law--  is at [facility] getting rehab       Allergies   Allergen Reactions   • Codeine Nausea Only     Trouble Breathing          Current Outpatient Medications:   •  acetaminophen (TYLENOL) 500 MG tablet, Tylenol Extra Strength 500 mg tablet, Disp: , Rfl:   •  amLODIPine (NORVASC) 5 MG tablet, Take 1 tablet by mouth 2 (Two) Times a Day., Disp: 60 tablet, Rfl: 2  •  aspirin 81 MG tablet, aspirin 81 mg tablet  Daily, Disp: , Rfl:   •  busPIRone (BUSPAR) 10 MG tablet, Take 1 tablet by mouth 2 (Two) Times a Day., Disp: 60 tablet, Rfl: 5  •  Calcium Carbonate-Vit D-Min (CALTRATE PLUS PO), Take  by mouth., Disp: , Rfl:   •  Cholecalciferol (VITAMIN D PO), 2000 U qd, Disp: , Rfl:   •  colestipol (COLESTID) 1 g tablet, Take 1 g by mouth Daily., Disp: , Rfl:   •  diclofenac (VOLTAREN) 1 % gel gel, Apply 4 g topically to the appropriate area as directed 4 (Four) Times a Day., Disp: 100 g, Rfl: 5  •  Diphenoxylate-Atropine (LOMOTIL PO), Take 2 tablets by mouth As Needed., Disp: , Rfl:   •   donepezil (ARICEPT) 10 MG tablet, Take 1 tablet by mouth Every Night., Disp: 30 tablet, Rfl: 5  •  gabapentin (NEURONTIN) 300 MG capsule, TAKE ONE CAPSULE BY MOUTH TWICE A DAY, Disp: 60 capsule, Rfl: 2  •  levothyroxine (SYNTHROID) 175 MCG tablet, Take 1 tablet by mouth Daily. Patient receives medication from patient assistance.  NDC:[id number] EXP:10/17/2019 [id number], Disp: 90 tablet, Rfl: 0  •  lisinopril (PRINIVIL,ZESTRIL) 10 MG tablet, Take 2 tablets by mouth 2 (Two) Times a Day., Disp: 360 tablet, Rfl: 1  •  Melatonin 3 MG capsule, Take 3 capsules by mouth every night at bedtime., Disp: , Rfl:   •  memantine (NAMENDA) 10 MG tablet, Take 1 tablet by mouth 2 (Two) Times a Day., Disp: 60 tablet, Rfl: 5  •  Multiple Vitamins-Minerals (PRESERVISION AREDS PO), Take 1 tablet by mouth Daily., Disp: , Rfl:   •  ondansetron (ZOFRAN) 4 MG tablet, Take 1 tablet by mouth Every 8 (Eight) Hours As Needed for Nausea or Vomiting., Disp: 10 tablet, Rfl: 1  •  POTASSIUM CHLORIDE RENNY ER PO, Take 20 mEq by mouth Daily., Disp: , Rfl:   •  Probiotic Product (PROBIOTIC-10) capsule, Take 1 capsule by mouth Daily., Disp: , Rfl:   •  Sucralfate (CARAFATE PO), sucralfate, Disp: , Rfl:   •  traMADol (ULTRAM) 50 MG tablet, TAKE ONE TABLET BY MOUTH DAILY AS NEEDED FOR MODERATE PAIN (RIGHT KNEE PAIN), Disp: 30 tablet, Rfl: 1  •  sertraline (ZOLOFT) 50 MG tablet, Take 1 tablet by mouth Daily., Disp: 30 tablet, Rfl: 5    Current Facility-Administered Medications:   •  cyanocobalamin injection 1,000 mcg, 1,000 mcg, Intramuscular, Q28 Days, Eric Patel MD, 1,000 mcg at 07/15/19 1601    Review of Systems   Constitutional: Negative for chills, fatigue and fever.   HENT: Negative for congestion, ear pain and sinus pressure.    Respiratory: Negative for cough, chest tightness, shortness of breath and wheezing.    Cardiovascular: Negative for chest pain and palpitations.   Gastrointestinal: Negative for abdominal pain, blood in stool  "and constipation.   Skin: Negative for color change.   Allergic/Immunologic: Negative for environmental allergies.   Neurological: Negative for dizziness, speech difficulty and headache.   Psychiatric/Behavioral: Negative for decreased concentration. The patient is not nervous/anxious.         Vital Signs  Vitals:    07/15/19 1510   BP: 134/72   BP Location: Left arm   Patient Position: Sitting   Cuff Size: Adult   Pulse: 76   Weight: 53.5 kg (118 lb)   Height: 152.4 cm (60\")       Physical Exam   Constitutional: She is oriented to person, place, and time. She appears well-developed and well-nourished.   HENT:   Head: Normocephalic and atraumatic.   Cardiovascular: Normal rate, regular rhythm and normal heart sounds.   No murmur heard.  Pulmonary/Chest: Effort normal and breath sounds normal.   Neurological: She is alert and oriented to person, place, and time.   Psychiatric: She has a normal mood and affect.   Vitals reviewed.     Procedures    ACE III MINI             Assessment/Plan:    Temi was seen today for hyperlipidemia, hypertension, hypothyroidism and memory loss.    Diagnoses and all orders for this visit:    Hiatal hernia with intrathoracic stomach    Vitamin B12 deficiency    Benign essential hypertension    Multi-infarct dementia without behavioral disturbance    Generalized anxiety disorder    Other orders  -     sertraline (ZOLOFT) 50 MG tablet; Take 1 tablet by mouth Daily.    Plan    Her intrathoracic stomach is not a new problem, although her symptoms are.  It appears to me that she may be correct that her symptoms were due to what she ate.  No change in therapy at this point.  Do not think she needs to start on a PPI.    Blood pressure is well controlled on current medication.  Continue amlodipine, lisinopril and salt restriction.    Her dementia is stable, although it complicates her ability to deal with her 's physical decline.  She will continue current medication including donepezil and " memantine.     After discussion with patient and her daughter-in-law, trial of sertraline is given to see if they can help her anxiety due to her 's illness.      Plan of care reviewed with patient at the conclusion of today's visit. Education was provided regarding diagnosis, management, and any prescribed or recommended OTC medications.Patient verbalizes understanding of and agreement with management plan.         Eric Patel MD

## 2019-08-05 RX ORDER — LEVOTHYROXINE SODIUM 175 UG/1
175 TABLET ORAL DAILY
Qty: 90 TABLET | Refills: 0 | COMMUNITY
Start: 2019-08-05 | End: 2020-02-24 | Stop reason: SDUPTHER

## 2019-08-05 RX ORDER — TRAMADOL HYDROCHLORIDE 50 MG/1
TABLET ORAL
Qty: 30 TABLET | Refills: 0 | Status: SHIPPED | OUTPATIENT
Start: 2019-08-05 | End: 2019-09-08 | Stop reason: SDUPTHER

## 2019-08-08 ENCOUNTER — TELEPHONE (OUTPATIENT)
Dept: INTERNAL MEDICINE | Facility: CLINIC | Age: 84
End: 2019-08-08

## 2019-08-08 NOTE — TELEPHONE ENCOUNTER
PT CALLED NEEDING HER TRAMADOL REFILLED I INFORMED HER THAT IT WAS FILLED ON 8/5/19 SHE SAID SHE WOULD CALL THE PHARMACY

## 2019-08-13 RX ORDER — HYDROCHLOROTHIAZIDE 25 MG/1
25 TABLET ORAL DAILY
COMMUNITY
End: 2019-08-13 | Stop reason: SDUPTHER

## 2019-08-13 RX ORDER — HYDROCHLOROTHIAZIDE 25 MG/1
25 TABLET ORAL DAILY
Qty: 90 TABLET | Refills: 1 | Status: SHIPPED | OUTPATIENT
Start: 2019-08-13 | End: 2019-10-04 | Stop reason: HOSPADM

## 2019-08-13 NOTE — TELEPHONE ENCOUNTER
Patient's daughter called stating that patient 's legs and ankles are starting to swell again and would like a refill on Hydrochlorothiazide 25mg.    Prescription was reported in Next Gen but not in Epic. Patient says she thinks her cardiologist took her off of it months ago.  Will you refill?

## 2019-08-16 ENCOUNTER — OFFICE VISIT (OUTPATIENT)
Dept: INTERNAL MEDICINE | Facility: CLINIC | Age: 84
End: 2019-08-16

## 2019-08-16 VITALS
DIASTOLIC BLOOD PRESSURE: 60 MMHG | HEART RATE: 76 BPM | BODY MASS INDEX: 23.66 KG/M2 | WEIGHT: 120.5 LBS | HEIGHT: 60 IN | SYSTOLIC BLOOD PRESSURE: 124 MMHG

## 2019-08-16 DIAGNOSIS — I87.2 VENOUS STASIS ULCER OF OTHER PART OF LEFT LOWER LEG LIMITED TO BREAKDOWN OF SKIN WITHOUT VARICOSE VEINS (HCC): Primary | ICD-10-CM

## 2019-08-16 DIAGNOSIS — E53.8 VITAMIN B12 DEFICIENCY: ICD-10-CM

## 2019-08-16 DIAGNOSIS — L97.821 VENOUS STASIS ULCER OF OTHER PART OF LEFT LOWER LEG LIMITED TO BREAKDOWN OF SKIN WITHOUT VARICOSE VEINS (HCC): Primary | ICD-10-CM

## 2019-08-16 DIAGNOSIS — I87.2 VENOUS INSUFFICIENCY: ICD-10-CM

## 2019-08-16 PROCEDURE — 99213 OFFICE O/P EST LOW 20 MIN: CPT | Performed by: INTERNAL MEDICINE

## 2019-08-16 PROCEDURE — 96372 THER/PROPH/DIAG INJ SC/IM: CPT | Performed by: INTERNAL MEDICINE

## 2019-08-16 RX ORDER — LANSOPRAZOLE 30 MG/1
30 CAPSULE, DELAYED RELEASE ORAL DAILY
Qty: 30 CAPSULE | Refills: 5 | Status: SHIPPED | OUTPATIENT
Start: 2019-08-16 | End: 2019-09-26

## 2019-08-16 RX ORDER — LANSOPRAZOLE 30 MG/1
30 CAPSULE, DELAYED RELEASE ORAL DAILY
COMMUNITY
End: 2019-08-16 | Stop reason: SDUPTHER

## 2019-08-16 RX ADMIN — CYANOCOBALAMIN 1000 MCG: 1000 INJECTION, SOLUTION INTRAMUSCULAR; SUBCUTANEOUS at 16:09

## 2019-08-16 NOTE — PROGRESS NOTES
[facility]     Temi Jarrett  5/8/1930   [id number]      Patient Care Team:  Eric Patel MD as PCP - General  Eric Patel MD as PCP - Family Medicine  Eric Patel MD as PCP - Claims Attributed    Chief Complaint::   Chief Complaint   Patient presents with   • leg wound     left lower leg   • Heartburn   • Edema     legs and ankles        HPI  Mrs Jarrett is in concerned about a lesion on the left lower leg that is weeping.  She remains sad about her 's declining health and the fact that he is in a nursing home.     Chronic Conditions:      Patient Active Problem List   Diagnosis   • Skin tear of lower leg without complication, right, initial encounter   • Esophageal reflux   • Hypothyroidism   • Generalized anxiety disorder   • Hyperlipidemia   • Multi-infarct dementia   • Peripheral neuropathy   • Carpal tunnel syndrome of right wrist   • Spinal stenosis of lumbar region   • Osteoporosis   • Fever   • Urinary frequency   • Peripheral venous insufficiency   • Disc disorder of lumbar region   • Bladder prolapse, female, acquired   • Acute right-sided low back pain without sciatica   • Pain, knee   • Benign essential hypertension   • Pernicious anemia   • Memory loss   • Annual physical exam   • Primary osteoarthritis of both knees   • Chest pain, atypical   • Hiatal hernia with intrathoracic stomach        Past Medical History:   Diagnosis Date   • Disease of thyroid gland    • Gastric polyp    • History of colonic polyps    • Hypertension    • IBS (irritable bowel syndrome)    • Macular degeneration    • Torn meniscus     right knee        Past Surgical History:   Procedure Laterality Date   • CHOLECYSTECTOMY  1997   • EYE SURGERY  1998    Cataract extraction   • HERNIA REPAIR     • HYSTERECTOMY  1976   • KNEE SURGERY Right     arthroscopy- 2000   • TONSILLECTOMY         Family History   Problem Relation Age of Onset   • Hypertension Mother    • Breast cancer Mother     • Obesity Mother    • Hypertension Father    • Diabetes Son        Social History     Socioeconomic History   • Marital status:      Spouse name: Not on file   • Number of children: Not on file   • Years of education: Not on file   • Highest education level: Not on file   Tobacco Use   • Smoking status: Never Smoker   • Smokeless tobacco: Never Used   Substance and Sexual Activity   • Alcohol use: No   • Drug use: Defer   • Sexual activity: Defer   Social History Narrative    Lives with son and daughter in law--  is at [facility] getting rehab       Allergies   Allergen Reactions   • Codeine Nausea Only     Trouble Breathing          Current Outpatient Medications:   •  acetaminophen (TYLENOL) 500 MG tablet, Tylenol Extra Strength 500 mg tablet, Disp: , Rfl:   •  amLODIPine (NORVASC) 5 MG tablet, Take 1 tablet by mouth 2 (Two) Times a Day., Disp: 60 tablet, Rfl: 2  •  aspirin 81 MG tablet, aspirin 81 mg tablet  Daily, Disp: , Rfl:   •  busPIRone (BUSPAR) 10 MG tablet, Take 1 tablet by mouth 2 (Two) Times a Day., Disp: 60 tablet, Rfl: 5  •  Calcium Carbonate-Vit D-Min (CALTRATE PLUS PO), Take  by mouth., Disp: , Rfl:   •  Cholecalciferol (VITAMIN D PO), 2000 U qd, Disp: , Rfl:   •  colestipol (COLESTID) 1 g tablet, Take 1 g by mouth Daily., Disp: , Rfl:   •  diclofenac (VOLTAREN) 1 % gel gel, Apply 4 g topically to the appropriate area as directed 4 (Four) Times a Day., Disp: 100 g, Rfl: 5  •  Diphenoxylate-Atropine (LOMOTIL PO), Take 2 tablets by mouth As Needed., Disp: , Rfl:   •  donepezil (ARICEPT) 10 MG tablet, Take 1 tablet by mouth Every Night., Disp: 30 tablet, Rfl: 5  •  gabapentin (NEURONTIN) 300 MG capsule, TAKE ONE CAPSULE BY MOUTH TWICE A DAY, Disp: 60 capsule, Rfl: 2  •  hydrochlorothiazide (HYDRODIURIL) 25 MG tablet, Take 1 tablet by mouth Daily., Disp: 90 tablet, Rfl: 1  •  lansoprazole (PREVACID) 30 MG capsule, Take 1 capsule by mouth Daily., Disp: 30 capsule, Rfl: 5  •  levothyroxine  (SYNTHROID) 175 MCG tablet, Take 1 tablet by mouth Daily. Patient receives medication from patient assistance.  NDC:7229-7693-20 EXP:07/02/2020 LOT [id number], Disp: 90 tablet, Rfl: 0  •  lisinopril (PRINIVIL,ZESTRIL) 10 MG tablet, Take 2 tablets by mouth 2 (Two) Times a Day., Disp: 360 tablet, Rfl: 1  •  Melatonin 3 MG capsule, Take 3 capsules by mouth every night at bedtime., Disp: , Rfl:   •  memantine (NAMENDA) 10 MG tablet, Take 1 tablet by mouth 2 (Two) Times a Day., Disp: 60 tablet, Rfl: 5  •  Multiple Vitamins-Minerals (PRESERVISION AREDS PO), Take 1 tablet by mouth Daily., Disp: , Rfl:   •  ondansetron (ZOFRAN) 4 MG tablet, Take 1 tablet by mouth Every 8 (Eight) Hours As Needed for Nausea or Vomiting., Disp: 10 tablet, Rfl: 1  •  POTASSIUM CHLORIDE RENNY ER PO, Take 20 mEq by mouth Daily., Disp: , Rfl:   •  Probiotic Product (PROBIOTIC-10) capsule, Take 1 capsule by mouth Daily., Disp: , Rfl:   •  sertraline (ZOLOFT) 50 MG tablet, Take 1 tablet by mouth Daily., Disp: 30 tablet, Rfl: 5  •  traMADol (ULTRAM) 50 MG tablet, TAKE ONE TABLET BY MOUTH DAILY AS NEEDED FOR MODERATE PAIN (RIGHT KNEE), Disp: 30 tablet, Rfl: 0    Current Facility-Administered Medications:   •  cyanocobalamin injection 1,000 mcg, 1,000 mcg, Intramuscular, Q28 Days, Eric Patel MD, 1,000 mcg at 08/16/19 1609    Review of Systems   Constitutional: Negative for chills, fatigue and fever.   HENT: Negative for congestion, ear pain and sinus pressure.    Respiratory: Negative for cough, chest tightness, shortness of breath and wheezing.    Cardiovascular: Negative for chest pain and palpitations.   Gastrointestinal: Negative for abdominal pain, blood in stool and constipation.   Skin: Negative for color change.   Allergic/Immunologic: Negative for environmental allergies.   Neurological: Negative for dizziness, speech difficulty and headache.   Psychiatric/Behavioral: Negative for decreased concentration. The patient is  "nervous/anxious.         Vital Signs  Vitals:    08/16/19 1527   BP: 124/60   BP Location: Left arm   Patient Position: Sitting   Cuff Size: Adult   Pulse: 76   Weight: 54.7 kg (120 lb 8 oz)   Height: 152.4 cm (60\")   PainSc: 0-No pain       Physical Exam   Constitutional: She is oriented to person, place, and time. She appears well-developed and well-nourished.   HENT:   Head: Normocephalic and atraumatic.   Cardiovascular: Normal rate, regular rhythm and normal heart sounds.   No murmur heard.  Pulmonary/Chest: Effort normal and breath sounds normal.   Musculoskeletal: She exhibits edema.   1+ pitting edema bilaterally to mid tibia   Neurological: She is alert and oriented to person, place, and time.   Skin:   Mild venous stasis dermatitis bilaterally.  There is a 1 cm superficial ulceration on the left lower extremity above the lateral malleolus.  There is no erythema there is serosanguineous drainage.   Psychiatric: She has a normal mood and affect.   Vitals reviewed.     Procedures    ACE III MINI             Assessment/Plan:    Temi was seen today for leg wound, heartburn and edema.    Diagnoses and all orders for this visit:    Venous stasis ulcer of other part of left lower leg limited to breakdown of skin without varicose veins (CMS/HCC)    Vitamin B12 deficiency    Venous insufficiency    Other orders  -     lansoprazole (PREVACID) 30 MG capsule; Take 1 capsule by mouth Daily.    Plan    Discussed with the patient and her daughter-in-law, Chris, that the treatment for this is compression.  The ulceration is a result of venous stasis, it is not infected, the drainage is simply due to edema.  Recommended light compression less than 20 mm pressure.      Plan of care reviewed with patient at the conclusion of today's visit. Education was provided regarding diagnosis, management, and any prescribed or recommended OTC medications.Patient verbalizes understanding of and agreement with management plan. "         Eric Patel MD

## 2019-08-30 ENCOUNTER — TELEPHONE (OUTPATIENT)
Dept: INTERNAL MEDICINE | Facility: CLINIC | Age: 84
End: 2019-08-30

## 2019-09-09 RX ORDER — TRAMADOL HYDROCHLORIDE 50 MG/1
TABLET ORAL
Qty: 30 TABLET | Refills: 0 | Status: SHIPPED | OUTPATIENT
Start: 2019-09-09 | End: 2019-10-04 | Stop reason: HOSPADM

## 2019-09-09 RX ORDER — TRAMADOL HYDROCHLORIDE 50 MG/1
TABLET ORAL
Qty: 30 TABLET | Refills: 0 | OUTPATIENT
Start: 2019-09-09

## 2019-09-09 NOTE — TELEPHONE ENCOUNTER
Last seen-  08/16/2019  Next appointment-  12/18/2019  Last approved-  08/05/2019  Jarrell-  arun

## 2019-09-16 ENCOUNTER — CLINICAL SUPPORT (OUTPATIENT)
Dept: INTERNAL MEDICINE | Facility: CLINIC | Age: 84
End: 2019-09-16

## 2019-09-16 DIAGNOSIS — E53.8 B12 DEFICIENCY: ICD-10-CM

## 2019-09-16 PROCEDURE — 96372 THER/PROPH/DIAG INJ SC/IM: CPT | Performed by: INTERNAL MEDICINE

## 2019-09-16 RX ORDER — POTASSIUM CHLORIDE 750 MG/1
CAPSULE, EXTENDED RELEASE ORAL
Qty: 180 CAPSULE | Refills: 2 | Status: SHIPPED | OUTPATIENT
Start: 2019-09-16 | End: 2020-09-08

## 2019-09-16 RX ORDER — GABAPENTIN 300 MG/1
CAPSULE ORAL
Qty: 60 CAPSULE | Refills: 1 | OUTPATIENT
Start: 2019-09-16

## 2019-09-16 RX ORDER — GABAPENTIN 300 MG/1
CAPSULE ORAL
Qty: 60 CAPSULE | Refills: 1 | Status: SHIPPED | OUTPATIENT
Start: 2019-09-16 | End: 2020-01-20 | Stop reason: SDUPTHER

## 2019-09-16 RX ADMIN — CYANOCOBALAMIN 1000 MCG: 1000 INJECTION, SOLUTION INTRAMUSCULAR; SUBCUTANEOUS at 15:04

## 2019-09-26 ENCOUNTER — OFFICE VISIT (OUTPATIENT)
Dept: ORTHOPEDIC SURGERY | Facility: CLINIC | Age: 84
End: 2019-09-26

## 2019-09-26 VITALS — OXYGEN SATURATION: 95 % | WEIGHT: 111.55 LBS | HEART RATE: 64 BPM | HEIGHT: 60 IN | BODY MASS INDEX: 21.9 KG/M2

## 2019-09-26 DIAGNOSIS — M17.11 PRIMARY OSTEOARTHRITIS OF RIGHT KNEE: Primary | ICD-10-CM

## 2019-09-26 PROCEDURE — 20610 DRAIN/INJ JOINT/BURSA W/O US: CPT | Performed by: ORTHOPAEDIC SURGERY

## 2019-09-26 RX ORDER — TRIAMCINOLONE ACETONIDE 40 MG/ML
40 INJECTION, SUSPENSION INTRA-ARTICULAR; INTRAMUSCULAR
Status: COMPLETED | OUTPATIENT
Start: 2019-09-26 | End: 2019-09-26

## 2019-09-26 RX ORDER — LIDOCAINE HYDROCHLORIDE 10 MG/ML
3 INJECTION, SOLUTION EPIDURAL; INFILTRATION; INTRACAUDAL; PERINEURAL
Status: COMPLETED | OUTPATIENT
Start: 2019-09-26 | End: 2019-09-26

## 2019-09-26 RX ORDER — RABEPRAZOLE SODIUM 20 MG/1
TABLET, DELAYED RELEASE ORAL
COMMUNITY
End: 2019-11-08

## 2019-09-26 RX ADMIN — LIDOCAINE HYDROCHLORIDE 3 ML: 10 INJECTION, SOLUTION EPIDURAL; INFILTRATION; INTRACAUDAL; PERINEURAL at 15:09

## 2019-09-26 RX ADMIN — TRIAMCINOLONE ACETONIDE 40 MG: 40 INJECTION, SUSPENSION INTRA-ARTICULAR; INTRAMUSCULAR at 15:09

## 2019-09-26 NOTE — PROGRESS NOTES
Procedure   Large Joint Arthrocentesis: R knee  Date/Time: 9/26/2019 3:09 PM  Consent given by: patient  Site marked: site marked  Timeout: Immediately prior to procedure a time out was called to verify the correct patient, procedure, equipment, support staff and site/side marked as required   Supporting Documentation  Indications: pain   Procedure Details  Location: knee - R knee  Preparation: Patient was prepped and draped in the usual sterile fashion  Needle size: 25 G  Approach: anterolateral  Medications administered: 3 mL lidocaine PF 1% 1 %; 40 mg triamcinolone acetonide 40 MG/ML  Patient tolerance: patient tolerated the procedure well with no immediate complications

## 2019-09-26 NOTE — PROGRESS NOTES
[facility] Note    Subjective     CC: Follow-up (3 month follow up; Primary osteoarthritis of right knee-injections given at last visit 6/27/19)      MARY Jarrett is a 89 y.o. female.  Patient is here today for right knee injection.  She has anterior medial knee pain.  She insists on being injected anterior medially.      ROS:    Constiutional:Pt denies fever, chills, nausea, or vomiting.  MSK:as above    Objective      Past Medical History  Past Medical History:   Diagnosis Date   • Disease of thyroid gland    • Gastric polyp    • History of colonic polyps    • Hypertension    • IBS (irritable bowel syndrome)    • Macular degeneration    • Torn meniscus     right knee          Assessment:  1. Primary osteoarthritis of right knee        Plan:  1. Recommend over the counter anti-inflammatories for pain and/or swelling  2. Right knee arthritis--anterior medial steroid injection will be given today.  Follow-up in 3 to 4 months.      Gamal Denny MD  09/26/19  5:38 PM

## 2019-09-27 ENCOUNTER — HOSPITAL ENCOUNTER (INPATIENT)
Facility: HOSPITAL | Age: 84
LOS: 7 days | Discharge: NURSING, REHAB OR LONG-TERM CARE | End: 2019-10-04
Attending: EMERGENCY MEDICINE | Admitting: ORTHOPAEDIC SURGERY

## 2019-09-27 ENCOUNTER — APPOINTMENT (OUTPATIENT)
Dept: GENERAL RADIOLOGY | Facility: HOSPITAL | Age: 84
End: 2019-09-27

## 2019-09-27 ENCOUNTER — TELEPHONE (OUTPATIENT)
Dept: INTERNAL MEDICINE | Facility: CLINIC | Age: 84
End: 2019-09-27

## 2019-09-27 DIAGNOSIS — I10 ELEVATED BLOOD PRESSURE READING WITH DIAGNOSIS OF HYPERTENSION: ICD-10-CM

## 2019-09-27 DIAGNOSIS — S72.002A CLOSED FRACTURE OF NECK OF LEFT FEMUR, INITIAL ENCOUNTER (HCC): Primary | ICD-10-CM
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
## 2019-09-27 DIAGNOSIS — Z74.09 IMPAIRED MOBILITY AND ADLS: ICD-10-CM

## 2019-09-27 DIAGNOSIS — Z78.9 IMPAIRED MOBILITY AND ADLS: ICD-10-CM

## 2019-09-27 DIAGNOSIS — Z74.09 IMPAIRED FUNCTIONAL MOBILITY, BALANCE, GAIT, AND ENDURANCE: ICD-10-CM

## 2019-09-27 DIAGNOSIS — S72.002A FRACTURE, HIP, LEFT, CLOSED, INITIAL ENCOUNTER (HCC): ICD-10-CM

## 2019-09-27 DIAGNOSIS — K44.9 HIATAL HERNIA: ICD-10-CM

## 2019-09-27 LAB
ALBUMIN SERPL-MCNC: 4 G/DL (ref 3.5–5.2)
ALBUMIN/GLOB SERPL: 1.7 G/DL
ALP SERPL-CCNC: 55 U/L (ref 39–117)
ALT SERPL W P-5'-P-CCNC: 19 U/L (ref 1–33)
ANION GAP SERPL CALCULATED.3IONS-SCNC: 10 MMOL/L (ref 5–15)
APTT PPP: 30.9 SECONDS (ref 24–37)
AST SERPL-CCNC: 20 U/L (ref 1–32)
BASOPHILS # BLD AUTO: 0.02 10*3/MM3 (ref 0–0.2)
BASOPHILS NFR BLD AUTO: 0.1 % (ref 0–1.5)
BILIRUB SERPL-MCNC: 0.4 MG/DL (ref 0.2–1.2)
BUN BLD-MCNC: 37 MG/DL (ref 8–23)
BUN/CREAT SERPL: 48.7 (ref 7–25)
CALCIUM SPEC-SCNC: 8.8 MG/DL (ref 8.6–10.5)
CHLORIDE SERPL-SCNC: 103 MMOL/L (ref 98–107)
CO2 SERPL-SCNC: 28 MMOL/L (ref 22–29)
CREAT BLD-MCNC: 0.76 MG/DL (ref 0.57–1)
DEPRECATED RDW RBC AUTO: 43.8 FL (ref 37–54)
EOSINOPHIL # BLD AUTO: 0 10*3/MM3 (ref 0–0.4)
EOSINOPHIL NFR BLD AUTO: 0 % (ref 0.3–6.2)
ERYTHROCYTE [DISTWIDTH] IN BLOOD BY AUTOMATED COUNT: 13.3 % (ref 12.3–15.4)
FERRITIN SERPL-MCNC: 54.6 NG/ML (ref 13–150)
GFR SERPL CREATININE-BSD FRML MDRD: 72 ML/MIN/1.73
GLOBULIN UR ELPH-MCNC: 2.4 GM/DL
GLUCOSE BLD-MCNC: 118 MG/DL (ref 65–99)
HCT VFR BLD AUTO: 36.3 % (ref 34–46.6)
HGB BLD-MCNC: 11.9 G/DL (ref 12–15.9)
IMM GRANULOCYTES # BLD AUTO: 0.13 10*3/MM3 (ref 0–0.05)
IMM GRANULOCYTES NFR BLD AUTO: 0.9 % (ref 0–0.5)
INR PPP: 1.12 (ref 0.85–1.16)
LIPASE SERPL-CCNC: 20 U/L (ref 13–60)
LYMPHOCYTES # BLD AUTO: 0.54 10*3/MM3 (ref 0.7–3.1)
LYMPHOCYTES NFR BLD AUTO: 3.6 % (ref 19.6–45.3)
MAGNESIUM SERPL-MCNC: 1.9 MG/DL (ref 1.6–2.4)
MCH RBC QN AUTO: 29.2 PG (ref 26.6–33)
MCHC RBC AUTO-ENTMCNC: 32.8 G/DL (ref 31.5–35.7)
MCV RBC AUTO: 89.2 FL (ref 79–97)
MONOCYTES # BLD AUTO: 0.81 10*3/MM3 (ref 0.1–0.9)
MONOCYTES NFR BLD AUTO: 5.4 % (ref 5–12)
NEUTROPHILS # BLD AUTO: 13.46 10*3/MM3 (ref 1.7–7)
NEUTROPHILS NFR BLD AUTO: 90 % (ref 42.7–76)
NRBC BLD AUTO-RTO: 0 /100 WBC (ref 0–0.2)
PLATELET # BLD AUTO: 213 10*3/MM3 (ref 140–450)
PMV BLD AUTO: 9 FL (ref 6–12)
POTASSIUM BLD-SCNC: 4.4 MMOL/L (ref 3.5–5.2)
PROT SERPL-MCNC: 6.4 G/DL (ref 6–8.5)
PROTHROMBIN TIME: 13.9 SECONDS (ref 11.2–14.3)
RBC # BLD AUTO: 4.07 10*6/MM3 (ref 3.77–5.28)
RETICS # AUTO: 0.06 10*6/MM3 (ref 0.02–0.13)
RETICS/RBC NFR AUTO: 1.49 % (ref 0.7–1.9)
SODIUM BLD-SCNC: 141 MMOL/L (ref 136–145)
WBC NRBC COR # BLD: 14.96 10*3/MM3 (ref 3.4–10.8)

## 2019-09-27 PROCEDURE — 83690 ASSAY OF LIPASE: CPT | Performed by: EMERGENCY MEDICINE

## 2019-09-27 PROCEDURE — 80053 COMPREHEN METABOLIC PANEL: CPT | Performed by: EMERGENCY MEDICINE

## 2019-09-27 PROCEDURE — 82746 ASSAY OF FOLIC ACID SERUM: CPT | Performed by: INTERNAL MEDICINE

## 2019-09-27 PROCEDURE — 99284 EMERGENCY DEPT VISIT MOD MDM: CPT

## 2019-09-27 PROCEDURE — 25010000002 MORPHINE PER 10 MG: Performed by: INTERNAL MEDICINE

## 2019-09-27 PROCEDURE — 85025 COMPLETE CBC W/AUTO DIFF WBC: CPT | Performed by: EMERGENCY MEDICINE

## 2019-09-27 PROCEDURE — 3E0T3BZ INTRODUCTION OF ANESTHETIC AGENT INTO PERIPHERAL NERVES AND PLEXI, PERCUTANEOUS APPROACH: ICD-10-PCS | Performed by: EMERGENCY MEDICINE

## 2019-09-27 PROCEDURE — 82728 ASSAY OF FERRITIN: CPT | Performed by: INTERNAL MEDICINE

## 2019-09-27 PROCEDURE — 86900 BLOOD TYPING SEROLOGIC ABO: CPT | Performed by: INTERNAL MEDICINE

## 2019-09-27 PROCEDURE — 86901 BLOOD TYPING SEROLOGIC RH(D): CPT | Performed by: INTERNAL MEDICINE

## 2019-09-27 PROCEDURE — 85610 PROTHROMBIN TIME: CPT | Performed by: EMERGENCY MEDICINE

## 2019-09-27 PROCEDURE — 85730 THROMBOPLASTIN TIME PARTIAL: CPT | Performed by: EMERGENCY MEDICINE

## 2019-09-27 PROCEDURE — 71045 X-RAY EXAM CHEST 1 VIEW: CPT

## 2019-09-27 PROCEDURE — 99285 EMERGENCY DEPT VISIT HI MDM: CPT

## 2019-09-27 PROCEDURE — 99223 1ST HOSP IP/OBS HIGH 75: CPT | Performed by: INTERNAL MEDICINE

## 2019-09-27 PROCEDURE — 86850 RBC ANTIBODY SCREEN: CPT | Performed by: INTERNAL MEDICINE

## 2019-09-27 PROCEDURE — 73502 X-RAY EXAM HIP UNI 2-3 VIEWS: CPT

## 2019-09-27 PROCEDURE — 82607 VITAMIN B-12: CPT | Performed by: INTERNAL MEDICINE

## 2019-09-27 PROCEDURE — 25010000002 MORPHINE PER 10 MG: Performed by: EMERGENCY MEDICINE

## 2019-09-27 PROCEDURE — 85045 AUTOMATED RETICULOCYTE COUNT: CPT | Performed by: INTERNAL MEDICINE

## 2019-09-27 PROCEDURE — 83735 ASSAY OF MAGNESIUM: CPT | Performed by: INTERNAL MEDICINE

## 2019-09-27 PROCEDURE — 25010000002 FENTANYL CITRATE (PF) 100 MCG/2ML SOLUTION: Performed by: EMERGENCY MEDICINE

## 2019-09-27 PROCEDURE — 93005 ELECTROCARDIOGRAM TRACING: CPT | Performed by: EMERGENCY MEDICINE

## 2019-09-27 RX ORDER — POTASSIUM CHLORIDE 750 MG/1
40 CAPSULE, EXTENDED RELEASE ORAL AS NEEDED
Status: DISCONTINUED | OUTPATIENT
Start: 2019-09-27 | End: 2019-10-04 | Stop reason: HOSPADM

## 2019-09-27 RX ORDER — MAGNESIUM SULFATE HEPTAHYDRATE 40 MG/ML
2 INJECTION, SOLUTION INTRAVENOUS AS NEEDED
Status: DISCONTINUED | OUTPATIENT
Start: 2019-09-27 | End: 2019-10-04 | Stop reason: HOSPADM

## 2019-09-27 RX ORDER — FENTANYL CITRATE 50 UG/ML
50 INJECTION, SOLUTION INTRAMUSCULAR; INTRAVENOUS
Status: DISCONTINUED | OUTPATIENT
Start: 2019-09-27 | End: 2019-09-27

## 2019-09-27 RX ORDER — POTASSIUM CHLORIDE 7.45 MG/ML
10 INJECTION INTRAVENOUS
Status: DISCONTINUED | OUTPATIENT
Start: 2019-09-27 | End: 2019-10-04 | Stop reason: HOSPADM

## 2019-09-27 RX ORDER — LISINOPRIL 20 MG/1
20 TABLET ORAL 2 TIMES DAILY
Status: DISCONTINUED | OUTPATIENT
Start: 2019-09-27 | End: 2019-10-01

## 2019-09-27 RX ORDER — ASPIRIN 81 MG/1
81 TABLET ORAL DAILY
Status: DISCONTINUED | OUTPATIENT
Start: 2019-09-28 | End: 2019-10-04 | Stop reason: HOSPADM

## 2019-09-27 RX ORDER — SODIUM CHLORIDE 9 MG/ML
75 INJECTION, SOLUTION INTRAVENOUS ONCE
Status: DISCONTINUED | OUTPATIENT
Start: 2019-09-27 | End: 2019-10-04 | Stop reason: HOSPADM

## 2019-09-27 RX ORDER — MORPHINE SULFATE 4 MG/ML
4 INJECTION, SOLUTION INTRAMUSCULAR; INTRAVENOUS ONCE
Status: COMPLETED | OUTPATIENT
Start: 2019-09-27 | End: 2019-09-27

## 2019-09-27 RX ORDER — SODIUM CHLORIDE 0.9 % (FLUSH) 0.9 %
10 SYRINGE (ML) INJECTION AS NEEDED
Status: DISCONTINUED | OUTPATIENT
Start: 2019-09-27 | End: 2019-10-04 | Stop reason: HOSPADM

## 2019-09-27 RX ORDER — POTASSIUM CHLORIDE 1.5 G/1.77G
40 POWDER, FOR SOLUTION ORAL AS NEEDED
Status: DISCONTINUED | OUTPATIENT
Start: 2019-09-27 | End: 2019-10-04 | Stop reason: HOSPADM

## 2019-09-27 RX ORDER — ACETAMINOPHEN 500 MG
1000 TABLET ORAL ONCE
Status: COMPLETED | OUTPATIENT
Start: 2019-09-27 | End: 2019-09-27

## 2019-09-27 RX ORDER — CYCLOBENZAPRINE HCL 10 MG
10 TABLET ORAL 2 TIMES DAILY PRN
Status: DISCONTINUED | OUTPATIENT
Start: 2019-09-27 | End: 2019-10-02

## 2019-09-27 RX ORDER — DIPHENOXYLATE HYDROCHLORIDE AND ATROPINE SULFATE 2.5; .025 MG/1; MG/1
1 TABLET ORAL 4 TIMES DAILY PRN
Status: DISCONTINUED | OUTPATIENT
Start: 2019-09-27 | End: 2019-10-04 | Stop reason: HOSPADM

## 2019-09-27 RX ORDER — NALOXONE HCL 0.4 MG/ML
0.4 VIAL (ML) INJECTION
Status: DISCONTINUED | OUTPATIENT
Start: 2019-09-27 | End: 2019-10-04 | Stop reason: HOSPADM

## 2019-09-27 RX ORDER — GABAPENTIN 300 MG/1
300 CAPSULE ORAL 2 TIMES DAILY
Status: DISCONTINUED | OUTPATIENT
Start: 2019-09-27 | End: 2019-10-04 | Stop reason: HOSPADM

## 2019-09-27 RX ORDER — SODIUM CHLORIDE 0.9 % (FLUSH) 0.9 %
10 SYRINGE (ML) INJECTION EVERY 12 HOURS SCHEDULED
Status: DISCONTINUED | OUTPATIENT
Start: 2019-09-27 | End: 2019-10-04 | Stop reason: HOSPADM

## 2019-09-27 RX ORDER — BUSPIRONE HYDROCHLORIDE 10 MG/1
10 TABLET ORAL 2 TIMES DAILY
Status: DISCONTINUED | OUTPATIENT
Start: 2019-09-27 | End: 2019-10-04 | Stop reason: HOSPADM

## 2019-09-27 RX ORDER — BUPIVACAINE HYDROCHLORIDE 2.5 MG/ML
30 INJECTION, SOLUTION EPIDURAL; INFILTRATION; INTRACAUDAL ONCE
Status: COMPLETED | OUTPATIENT
Start: 2019-09-27 | End: 2019-09-27

## 2019-09-27 RX ORDER — MAGNESIUM SULFATE HEPTAHYDRATE 40 MG/ML
4 INJECTION, SOLUTION INTRAVENOUS AS NEEDED
Status: DISCONTINUED | OUTPATIENT
Start: 2019-09-27 | End: 2019-10-04 | Stop reason: HOSPADM

## 2019-09-27 RX ORDER — ACETAMINOPHEN 325 MG/1
650 TABLET ORAL EVERY 4 HOURS PRN
Status: DISCONTINUED | OUTPATIENT
Start: 2019-09-27 | End: 2019-10-04 | Stop reason: HOSPADM

## 2019-09-27 RX ORDER — MULTIPLE VITAMINS W/ MINERALS TAB 9MG-400MCG
1 TAB ORAL DAILY
Status: DISCONTINUED | OUTPATIENT
Start: 2019-09-28 | End: 2019-10-04 | Stop reason: HOSPADM

## 2019-09-27 RX ORDER — ONDANSETRON 2 MG/ML
4 INJECTION INTRAMUSCULAR; INTRAVENOUS EVERY 6 HOURS PRN
Status: DISCONTINUED | OUTPATIENT
Start: 2019-09-27 | End: 2019-10-04 | Stop reason: HOSPADM

## 2019-09-27 RX ORDER — MEMANTINE HYDROCHLORIDE 10 MG/1
10 TABLET ORAL 2 TIMES DAILY
Status: DISCONTINUED | OUTPATIENT
Start: 2019-09-27 | End: 2019-10-04 | Stop reason: HOSPADM

## 2019-09-27 RX ORDER — PANTOPRAZOLE SODIUM 40 MG/1
40 TABLET, DELAYED RELEASE ORAL EVERY MORNING
Status: DISCONTINUED | OUTPATIENT
Start: 2019-09-28 | End: 2019-10-04 | Stop reason: HOSPADM

## 2019-09-27 RX ORDER — AMLODIPINE BESYLATE 5 MG/1
5 TABLET ORAL 2 TIMES DAILY
Status: DISCONTINUED | OUTPATIENT
Start: 2019-09-27 | End: 2019-10-02

## 2019-09-27 RX ORDER — MORPHINE SULFATE 2 MG/ML
1 INJECTION, SOLUTION INTRAMUSCULAR; INTRAVENOUS
Status: DISCONTINUED | OUTPATIENT
Start: 2019-09-27 | End: 2019-10-04 | Stop reason: HOSPADM

## 2019-09-27 RX ORDER — CYCLOBENZAPRINE HCL 10 MG
10 TABLET ORAL 2 TIMES DAILY PRN
Qty: 30 TABLET | Refills: 0 | Status: SHIPPED | OUTPATIENT
Start: 2019-09-27 | End: 2019-10-04 | Stop reason: HOSPADM

## 2019-09-27 RX ORDER — ACETAMINOPHEN 160 MG/5ML
650 SOLUTION ORAL EVERY 4 HOURS PRN
Status: DISCONTINUED | OUTPATIENT
Start: 2019-09-27 | End: 2019-10-04 | Stop reason: HOSPADM

## 2019-09-27 RX ORDER — ACETAMINOPHEN 650 MG/1
650 SUPPOSITORY RECTAL EVERY 4 HOURS PRN
Status: DISCONTINUED | OUTPATIENT
Start: 2019-09-27 | End: 2019-10-04 | Stop reason: HOSPADM

## 2019-09-27 RX ORDER — TRAMADOL HYDROCHLORIDE 50 MG/1
25 TABLET ORAL EVERY 8 HOURS PRN
Status: DISCONTINUED | OUTPATIENT
Start: 2019-09-27 | End: 2019-10-04 | Stop reason: HOSPADM

## 2019-09-27 RX ORDER — DONEPEZIL HYDROCHLORIDE 10 MG/1
10 TABLET, FILM COATED ORAL NIGHTLY
Status: DISCONTINUED | OUTPATIENT
Start: 2019-09-27 | End: 2019-10-04 | Stop reason: HOSPADM

## 2019-09-27 RX ORDER — HYDROCHLOROTHIAZIDE 25 MG/1
25 TABLET ORAL DAILY
Status: DISCONTINUED | OUTPATIENT
Start: 2019-09-28 | End: 2019-10-01

## 2019-09-27 RX ORDER — LIDOCAINE HYDROCHLORIDE AND EPINEPHRINE 10; 10 MG/ML; UG/ML
10 INJECTION, SOLUTION INFILTRATION; PERINEURAL ONCE
Status: COMPLETED | OUTPATIENT
Start: 2019-09-27 | End: 2019-09-27

## 2019-09-27 RX ADMIN — MORPHINE SULFATE 4 MG: 4 INJECTION, SOLUTION INTRAMUSCULAR; INTRAVENOUS at 18:28

## 2019-09-27 RX ADMIN — LIDOCAINE HYDROCHLORIDE,EPINEPHRINE BITARTRATE 5 ML: 10; .01 INJECTION, SOLUTION INFILTRATION; PERINEURAL at 21:00

## 2019-09-27 RX ADMIN — CYCLOBENZAPRINE HYDROCHLORIDE 10 MG: 10 TABLET, FILM COATED ORAL at 23:04

## 2019-09-27 RX ADMIN — FENTANYL CITRATE 50 MCG: 50 INJECTION INTRAMUSCULAR; INTRAVENOUS at 18:59

## 2019-09-27 RX ADMIN — TRAMADOL HYDROCHLORIDE 25 MG: 50 TABLET, FILM COATED ORAL at 23:04

## 2019-09-27 RX ADMIN — ACETAMINOPHEN 1000 MG: 500 TABLET, FILM COATED ORAL at 18:58

## 2019-09-27 RX ADMIN — BUPIVACAINE HYDROCHLORIDE 30 ML: 2.5 INJECTION, SOLUTION EPIDURAL; INFILTRATION; INTRACAUDAL at 21:08

## 2019-09-27 RX ADMIN — MORPHINE SULFATE 1 MG: 2 INJECTION, SOLUTION INTRAMUSCULAR; INTRAVENOUS at 22:36

## 2019-09-27 NOTE — TELEPHONE ENCOUNTER
PT CALLED WANTS TO KNOW IF YOU WOULD SEND IN HER A MUSCLE RELAXER SHE SHE HAS A VERY PAINFUL MUSCLE SPASM IN HER LEFT THIGH   SHE ALSO WANTS TO KNOW IF  THERE IS  ANYTHING SHE CAN TAKE TO PREVENT THE SPASMS ON A REGULAR BASIS IF SO SHE WANTS YOU TO SEND THAT IN ALSO

## 2019-09-28 ENCOUNTER — ANESTHESIA (OUTPATIENT)
Dept: PERIOP | Facility: HOSPITAL | Age: 84
End: 2019-09-28

## 2019-09-28 ENCOUNTER — ANESTHESIA EVENT (OUTPATIENT)
Dept: PERIOP | Facility: HOSPITAL | Age: 84
End: 2019-09-28

## 2019-09-28 ENCOUNTER — APPOINTMENT (OUTPATIENT)
Dept: GENERAL RADIOLOGY | Facility: HOSPITAL | Age: 84
End: 2019-09-28

## 2019-09-28 LAB
ABO GROUP BLD: NORMAL
ABO GROUP BLD: NORMAL
ANION GAP SERPL CALCULATED.3IONS-SCNC: 8 MMOL/L (ref 5–15)
BACTERIA UR QL AUTO: ABNORMAL /HPF
BASOPHILS # BLD AUTO: 0.01 10*3/MM3 (ref 0–0.2)
BASOPHILS NFR BLD AUTO: 0.1 % (ref 0–1.5)
BILIRUB UR QL STRIP: NEGATIVE
BLD GP AB SCN SERPL QL: NEGATIVE
BUN BLD-MCNC: 25 MG/DL (ref 8–23)
BUN/CREAT SERPL: 41 (ref 7–25)
CALCIUM SPEC-SCNC: 8.6 MG/DL (ref 8.6–10.5)
CHLORIDE SERPL-SCNC: 106 MMOL/L (ref 98–107)
CLARITY UR: CLEAR
CO2 SERPL-SCNC: 30 MMOL/L (ref 22–29)
COLOR UR: YELLOW
CREAT BLD-MCNC: 0.61 MG/DL (ref 0.57–1)
DEPRECATED RDW RBC AUTO: 44.7 FL (ref 37–54)
EOSINOPHIL # BLD AUTO: 0.01 10*3/MM3 (ref 0–0.4)
EOSINOPHIL NFR BLD AUTO: 0.1 % (ref 0.3–6.2)
ERYTHROCYTE [DISTWIDTH] IN BLOOD BY AUTOMATED COUNT: 13.7 % (ref 12.3–15.4)
FOLATE SERPL-MCNC: 14 NG/ML (ref 4.78–24.2)
GFR SERPL CREATININE-BSD FRML MDRD: 92 ML/MIN/1.73
GLUCOSE BLD-MCNC: 94 MG/DL (ref 65–99)
GLUCOSE UR STRIP-MCNC: NEGATIVE MG/DL
HCT VFR BLD AUTO: 37.9 % (ref 34–46.6)
HGB BLD-MCNC: 11.9 G/DL (ref 12–15.9)
HGB UR QL STRIP.AUTO: ABNORMAL
HYALINE CASTS UR QL AUTO: ABNORMAL /LPF
IMM GRANULOCYTES # BLD AUTO: 0.03 10*3/MM3 (ref 0–0.05)
IMM GRANULOCYTES NFR BLD AUTO: 0.3 % (ref 0–0.5)
IRON 24H UR-MRATE: 54 MCG/DL (ref 37–145)
IRON SATN MFR SERPL: 18 % (ref 20–50)
KETONES UR QL STRIP: NEGATIVE
LEUKOCYTE ESTERASE UR QL STRIP.AUTO: NEGATIVE
LYMPHOCYTES # BLD AUTO: 0.78 10*3/MM3 (ref 0.7–3.1)
LYMPHOCYTES NFR BLD AUTO: 7.8 % (ref 19.6–45.3)
MAGNESIUM SERPL-MCNC: 1.9 MG/DL (ref 1.6–2.4)
MCH RBC QN AUTO: 28.4 PG (ref 26.6–33)
MCHC RBC AUTO-ENTMCNC: 31.4 G/DL (ref 31.5–35.7)
MCV RBC AUTO: 90.5 FL (ref 79–97)
MONOCYTES # BLD AUTO: 0.72 10*3/MM3 (ref 0.1–0.9)
MONOCYTES NFR BLD AUTO: 7.2 % (ref 5–12)
NEUTROPHILS # BLD AUTO: 8.46 10*3/MM3 (ref 1.7–7)
NEUTROPHILS NFR BLD AUTO: 84.5 % (ref 42.7–76)
NITRITE UR QL STRIP: NEGATIVE
NRBC BLD AUTO-RTO: 0 /100 WBC (ref 0–0.2)
PH UR STRIP.AUTO: 7 [PH] (ref 5–8)
PHOSPHATE SERPL-MCNC: 3.8 MG/DL (ref 2.5–4.5)
PLATELET # BLD AUTO: 202 10*3/MM3 (ref 140–450)
PMV BLD AUTO: 9.2 FL (ref 6–12)
POTASSIUM BLD-SCNC: 4.4 MMOL/L (ref 3.5–5.2)
PROCALCITONIN SERPL-MCNC: 0.16 NG/ML (ref 0.1–0.25)
PROT UR QL STRIP: NEGATIVE
RBC # BLD AUTO: 4.19 10*6/MM3 (ref 3.77–5.28)
RBC # UR: ABNORMAL /HPF
REF LAB TEST METHOD: ABNORMAL
RH BLD: POSITIVE
RH BLD: POSITIVE
SODIUM BLD-SCNC: 144 MMOL/L (ref 136–145)
SP GR UR STRIP: 1.01 (ref 1–1.03)
SQUAMOUS #/AREA URNS HPF: ABNORMAL /HPF
T&S EXPIRATION DATE: NORMAL
TIBC SERPL-MCNC: 302 MCG/DL (ref 298–536)
TRANSFERRIN SERPL-MCNC: 203 MG/DL (ref 200–360)
TSH SERPL DL<=0.05 MIU/L-ACNC: 0.72 UIU/ML (ref 0.27–4.2)
UROBILINOGEN UR QL STRIP: ABNORMAL
VIT B12 BLD-MCNC: 546 PG/ML (ref 211–946)
WBC NRBC COR # BLD: 10.01 10*3/MM3 (ref 3.4–10.8)
WBC UR QL AUTO: ABNORMAL /HPF

## 2019-09-28 PROCEDURE — 25010000002 PROPOFOL 10 MG/ML EMULSION: Performed by: NURSE ANESTHETIST, CERTIFIED REGISTERED

## 2019-09-28 PROCEDURE — 73502 X-RAY EXAM HIP UNI 2-3 VIEWS: CPT

## 2019-09-28 PROCEDURE — 88311 DECALCIFY TISSUE: CPT | Performed by: ORTHOPAEDIC SURGERY

## 2019-09-28 PROCEDURE — 25010000002 MORPHINE PER 10 MG: Performed by: INTERNAL MEDICINE

## 2019-09-28 PROCEDURE — C1776 JOINT DEVICE (IMPLANTABLE): HCPCS | Performed by: ORTHOPAEDIC SURGERY

## 2019-09-28 PROCEDURE — P9612 CATHETERIZE FOR URINE SPEC: HCPCS

## 2019-09-28 PROCEDURE — 25010000002 ONDANSETRON PER 1 MG: Performed by: NURSE ANESTHETIST, CERTIFIED REGISTERED

## 2019-09-28 PROCEDURE — 84443 ASSAY THYROID STIM HORMONE: CPT | Performed by: INTERNAL MEDICINE

## 2019-09-28 PROCEDURE — 86901 BLOOD TYPING SEROLOGIC RH(D): CPT

## 2019-09-28 PROCEDURE — 84100 ASSAY OF PHOSPHORUS: CPT | Performed by: INTERNAL MEDICINE

## 2019-09-28 PROCEDURE — 0SRS0JA REPLACEMENT OF LEFT HIP JOINT, FEMORAL SURFACE WITH SYNTHETIC SUBSTITUTE, UNCEMENTED, OPEN APPROACH: ICD-10-PCS | Performed by: ORTHOPAEDIC SURGERY

## 2019-09-28 PROCEDURE — 25010000002 NEOSTIGMINE 10 MG/10ML SOLUTION: Performed by: NURSE ANESTHETIST, CERTIFIED REGISTERED

## 2019-09-28 PROCEDURE — 84466 ASSAY OF TRANSFERRIN: CPT | Performed by: INTERNAL MEDICINE

## 2019-09-28 PROCEDURE — 25010000003 CEFAZOLIN IN DEXTROSE 2-4 GM/100ML-% SOLUTION: Performed by: ORTHOPAEDIC SURGERY

## 2019-09-28 PROCEDURE — 88305 TISSUE EXAM BY PATHOLOGIST: CPT | Performed by: ORTHOPAEDIC SURGERY

## 2019-09-28 PROCEDURE — 80048 BASIC METABOLIC PNL TOTAL CA: CPT | Performed by: INTERNAL MEDICINE

## 2019-09-28 PROCEDURE — 94799 UNLISTED PULMONARY SVC/PX: CPT

## 2019-09-28 PROCEDURE — 81001 URINALYSIS AUTO W/SCOPE: CPT | Performed by: EMERGENCY MEDICINE

## 2019-09-28 PROCEDURE — 83540 ASSAY OF IRON: CPT | Performed by: INTERNAL MEDICINE

## 2019-09-28 PROCEDURE — 99231 SBSQ HOSP IP/OBS SF/LOW 25: CPT | Performed by: INTERNAL MEDICINE

## 2019-09-28 PROCEDURE — 84145 PROCALCITONIN (PCT): CPT | Performed by: INTERNAL MEDICINE

## 2019-09-28 PROCEDURE — 83735 ASSAY OF MAGNESIUM: CPT | Performed by: INTERNAL MEDICINE

## 2019-09-28 PROCEDURE — 85025 COMPLETE CBC W/AUTO DIFF WBC: CPT | Performed by: INTERNAL MEDICINE

## 2019-09-28 PROCEDURE — 25010000002 ROPIVACAINE PER 1 MG: Performed by: NURSE ANESTHETIST, CERTIFIED REGISTERED

## 2019-09-28 PROCEDURE — 25010000002 FENTANYL CITRATE (PF) 100 MCG/2ML SOLUTION: Performed by: ANESTHESIOLOGY

## 2019-09-28 PROCEDURE — 25010000002 DEXAMETHASONE PER 1 MG: Performed by: NURSE ANESTHETIST, CERTIFIED REGISTERED

## 2019-09-28 PROCEDURE — 86900 BLOOD TYPING SEROLOGIC ABO: CPT

## 2019-09-28 DEVICE — SYNERGY POROUS PLUS HA STANDARD                                    OFFSET SIZE 12
Type: IMPLANTABLE DEVICE | Status: FUNCTIONAL
Brand: SYNERGY

## 2019-09-28 DEVICE — TANDEM BIPOLAR COBALT CHROME 45MM                                    OUTER DIAMETER 28MM INNER DIAMETER
Type: IMPLANTABLE DEVICE | Site: HIP | Status: FUNCTIONAL
Brand: TANDEM

## 2019-09-28 DEVICE — COBALT CHROME 12/14 TAPER FEMORAL                                    HEAD 28MM + 0: Type: IMPLANTABLE DEVICE | Site: TROCHANTER | Status: FUNCTIONAL

## 2019-09-28 DEVICE — IMPLANTABLE DEVICE: Type: IMPLANTABLE DEVICE | Status: FUNCTIONAL

## 2019-09-28 DEVICE — SUT FW 5 W .5 CIR CUT NDL 48M AR7211: Type: IMPLANTABLE DEVICE | Site: HIP | Status: FUNCTIONAL

## 2019-09-28 RX ORDER — FENTANYL CITRATE 50 UG/ML
50 INJECTION, SOLUTION INTRAMUSCULAR; INTRAVENOUS
Status: DISCONTINUED | OUTPATIENT
Start: 2019-09-28 | End: 2019-09-28 | Stop reason: HOSPADM

## 2019-09-28 RX ORDER — ROCURONIUM BROMIDE 10 MG/ML
INJECTION, SOLUTION INTRAVENOUS AS NEEDED
Status: DISCONTINUED | OUTPATIENT
Start: 2019-09-28 | End: 2019-09-28 | Stop reason: SURG

## 2019-09-28 RX ORDER — ONDANSETRON 2 MG/ML
4 INJECTION INTRAMUSCULAR; INTRAVENOUS ONCE AS NEEDED
Status: DISCONTINUED | OUTPATIENT
Start: 2019-09-28 | End: 2019-09-28 | Stop reason: HOSPADM

## 2019-09-28 RX ORDER — LIDOCAINE HYDROCHLORIDE 10 MG/ML
INJECTION, SOLUTION EPIDURAL; INFILTRATION; INTRACAUDAL; PERINEURAL AS NEEDED
Status: DISCONTINUED | OUTPATIENT
Start: 2019-09-28 | End: 2019-09-28 | Stop reason: SURG

## 2019-09-28 RX ORDER — DEXAMETHASONE SODIUM PHOSPHATE 4 MG/ML
INJECTION, SOLUTION INTRA-ARTICULAR; INTRALESIONAL; INTRAMUSCULAR; INTRAVENOUS; SOFT TISSUE AS NEEDED
Status: DISCONTINUED | OUTPATIENT
Start: 2019-09-28 | End: 2019-09-28 | Stop reason: SURG

## 2019-09-28 RX ORDER — MAGNESIUM HYDROXIDE 1200 MG/15ML
LIQUID ORAL AS NEEDED
Status: DISCONTINUED | OUTPATIENT
Start: 2019-09-28 | End: 2019-09-28 | Stop reason: HOSPADM

## 2019-09-28 RX ORDER — HYDROMORPHONE HYDROCHLORIDE 1 MG/ML
0.2 INJECTION, SOLUTION INTRAMUSCULAR; INTRAVENOUS; SUBCUTANEOUS
Status: DISCONTINUED | OUTPATIENT
Start: 2019-09-28 | End: 2019-09-28 | Stop reason: HOSPADM

## 2019-09-28 RX ORDER — SODIUM CHLORIDE 0.9 % (FLUSH) 0.9 %
3-10 SYRINGE (ML) INJECTION AS NEEDED
Status: CANCELLED | OUTPATIENT
Start: 2019-09-28

## 2019-09-28 RX ORDER — CEFAZOLIN SODIUM 2 G/100ML
2 INJECTION, SOLUTION INTRAVENOUS EVERY 8 HOURS
Status: COMPLETED | OUTPATIENT
Start: 2019-09-28 | End: 2019-09-30

## 2019-09-28 RX ORDER — ESMOLOL HYDROCHLORIDE 10 MG/ML
INJECTION INTRAVENOUS AS NEEDED
Status: DISCONTINUED | OUTPATIENT
Start: 2019-09-28 | End: 2019-09-28 | Stop reason: SURG

## 2019-09-28 RX ORDER — SUCRALFATE 1 G/1
1 TABLET ORAL
Status: DISCONTINUED | OUTPATIENT
Start: 2019-09-28 | End: 2019-10-04 | Stop reason: HOSPADM

## 2019-09-28 RX ORDER — ONDANSETRON 2 MG/ML
INJECTION INTRAMUSCULAR; INTRAVENOUS AS NEEDED
Status: DISCONTINUED | OUTPATIENT
Start: 2019-09-28 | End: 2019-09-28 | Stop reason: SURG

## 2019-09-28 RX ORDER — SUCRALFATE 1 G/1
1 TABLET ORAL ONCE
Status: DISCONTINUED | OUTPATIENT
Start: 2019-09-28 | End: 2019-09-28 | Stop reason: SDUPTHER

## 2019-09-28 RX ORDER — FENTANYL CITRATE 50 UG/ML
25 INJECTION, SOLUTION INTRAMUSCULAR; INTRAVENOUS
Status: DISCONTINUED | OUTPATIENT
Start: 2019-09-28 | End: 2019-09-28

## 2019-09-28 RX ORDER — SODIUM CHLORIDE, SODIUM LACTATE, POTASSIUM CHLORIDE, CALCIUM CHLORIDE 600; 310; 30; 20 MG/100ML; MG/100ML; MG/100ML; MG/100ML
9 INJECTION, SOLUTION INTRAVENOUS CONTINUOUS
Status: DISCONTINUED | OUTPATIENT
Start: 2019-09-28 | End: 2019-10-04 | Stop reason: HOSPADM

## 2019-09-28 RX ORDER — ROPIVACAINE HYDROCHLORIDE 2 MG/ML
INJECTION, SOLUTION EPIDURAL; INFILTRATION; PERINEURAL
Status: COMPLETED | OUTPATIENT
Start: 2019-09-28 | End: 2019-09-28

## 2019-09-28 RX ORDER — PROPOFOL 10 MG/ML
VIAL (ML) INTRAVENOUS AS NEEDED
Status: DISCONTINUED | OUTPATIENT
Start: 2019-09-28 | End: 2019-09-28 | Stop reason: SURG

## 2019-09-28 RX ORDER — NEOSTIGMINE METHYLSULFATE 1 MG/ML
INJECTION, SOLUTION INTRAVENOUS AS NEEDED
Status: DISCONTINUED | OUTPATIENT
Start: 2019-09-28 | End: 2019-09-28 | Stop reason: SURG

## 2019-09-28 RX ORDER — GLYCOPYRROLATE 0.2 MG/ML
INJECTION INTRAMUSCULAR; INTRAVENOUS AS NEEDED
Status: DISCONTINUED | OUTPATIENT
Start: 2019-09-28 | End: 2019-09-28 | Stop reason: SURG

## 2019-09-28 RX ORDER — HYDROMORPHONE HYDROCHLORIDE 1 MG/ML
0.5 INJECTION, SOLUTION INTRAMUSCULAR; INTRAVENOUS; SUBCUTANEOUS
Status: DISCONTINUED | OUTPATIENT
Start: 2019-09-28 | End: 2019-09-28

## 2019-09-28 RX ORDER — LIDOCAINE HYDROCHLORIDE 10 MG/ML
0.5 INJECTION, SOLUTION EPIDURAL; INFILTRATION; INTRACAUDAL; PERINEURAL ONCE AS NEEDED
Status: CANCELLED | OUTPATIENT
Start: 2019-09-28

## 2019-09-28 RX ORDER — LEVOTHYROXINE SODIUM 175 UG/1
175 TABLET ORAL
Status: DISCONTINUED | OUTPATIENT
Start: 2019-09-29 | End: 2019-10-04 | Stop reason: HOSPADM

## 2019-09-28 RX ORDER — SODIUM CHLORIDE 0.9 % (FLUSH) 0.9 %
3 SYRINGE (ML) INJECTION EVERY 12 HOURS SCHEDULED
Status: CANCELLED | OUTPATIENT
Start: 2019-09-28

## 2019-09-28 RX ADMIN — DONEPEZIL HYDROCHLORIDE 10 MG: 10 TABLET, FILM COATED ORAL at 20:33

## 2019-09-28 RX ADMIN — LISINOPRIL 20 MG: 20 TABLET ORAL at 11:49

## 2019-09-28 RX ADMIN — TRAMADOL HYDROCHLORIDE 25 MG: 50 TABLET, FILM COATED ORAL at 13:54

## 2019-09-28 RX ADMIN — GLYCOPYRROLATE 0.4 MG: 0.2 INJECTION, SOLUTION INTRAMUSCULAR; INTRAVENOUS at 09:46

## 2019-09-28 RX ADMIN — SODIUM CHLORIDE, POTASSIUM CHLORIDE, SODIUM LACTATE AND CALCIUM CHLORIDE: 600; 310; 30; 20 INJECTION, SOLUTION INTRAVENOUS at 08:15

## 2019-09-28 RX ADMIN — MULTIPLE VITAMINS W/ MINERALS TAB 1 TABLET: TAB ORAL at 11:48

## 2019-09-28 RX ADMIN — CEFAZOLIN SODIUM 2 G: 2 INJECTION, SOLUTION INTRAVENOUS at 16:04

## 2019-09-28 RX ADMIN — CEFAZOLIN SODIUM 2 G: 2 INJECTION, SOLUTION INTRAVENOUS at 20:32

## 2019-09-28 RX ADMIN — SUCRALFATE 1 G: 1 TABLET ORAL at 17:42

## 2019-09-28 RX ADMIN — LISINOPRIL 20 MG: 20 TABLET ORAL at 00:16

## 2019-09-28 RX ADMIN — GABAPENTIN 300 MG: 300 CAPSULE ORAL at 00:16

## 2019-09-28 RX ADMIN — BUSPIRONE HYDROCHLORIDE 10 MG: 10 TABLET ORAL at 20:33

## 2019-09-28 RX ADMIN — AMLODIPINE BESYLATE 5 MG: 5 TABLET ORAL at 20:34

## 2019-09-28 RX ADMIN — ASPIRIN 81 MG: 81 TABLET, COATED ORAL at 16:03

## 2019-09-28 RX ADMIN — LIDOCAINE HYDROCHLORIDE 50 MG: 10 INJECTION, SOLUTION EPIDURAL; INFILTRATION; INTRACAUDAL; PERINEURAL at 08:19

## 2019-09-28 RX ADMIN — LISINOPRIL 20 MG: 20 TABLET ORAL at 20:33

## 2019-09-28 RX ADMIN — BUSPIRONE HYDROCHLORIDE 10 MG: 10 TABLET ORAL at 00:16

## 2019-09-28 RX ADMIN — TRANEXAMIC ACID 1000 MG: 100 INJECTION, SOLUTION INTRAVENOUS at 08:26

## 2019-09-28 RX ADMIN — AMLODIPINE BESYLATE 5 MG: 5 TABLET ORAL at 00:16

## 2019-09-28 RX ADMIN — DONEPEZIL HYDROCHLORIDE 10 MG: 10 TABLET, FILM COATED ORAL at 00:15

## 2019-09-28 RX ADMIN — BUSPIRONE HYDROCHLORIDE 10 MG: 10 TABLET ORAL at 11:48

## 2019-09-28 RX ADMIN — SODIUM CHLORIDE, PRESERVATIVE FREE 10 ML: 5 INJECTION INTRAVENOUS at 20:34

## 2019-09-28 RX ADMIN — HYDROCHLOROTHIAZIDE 25 MG: 25 TABLET ORAL at 11:49

## 2019-09-28 RX ADMIN — MEMANTINE 10 MG: 10 TABLET ORAL at 20:34

## 2019-09-28 RX ADMIN — FENTANYL CITRATE 50 MCG: 50 INJECTION INTRAMUSCULAR; INTRAVENOUS at 10:43

## 2019-09-28 RX ADMIN — AMLODIPINE BESYLATE 5 MG: 5 TABLET ORAL at 11:48

## 2019-09-28 RX ADMIN — SODIUM CHLORIDE, PRESERVATIVE FREE 10 ML: 5 INJECTION INTRAVENOUS at 00:26

## 2019-09-28 RX ADMIN — MORPHINE SULFATE 1 MG: 2 INJECTION, SOLUTION INTRAMUSCULAR; INTRAVENOUS at 01:28

## 2019-09-28 RX ADMIN — PROPOFOL 100 MG: 10 INJECTION, EMULSION INTRAVENOUS at 08:19

## 2019-09-28 RX ADMIN — MORPHINE SULFATE 1 MG: 2 INJECTION, SOLUTION INTRAMUSCULAR; INTRAVENOUS at 11:36

## 2019-09-28 RX ADMIN — FENTANYL CITRATE 25 MCG: 50 INJECTION INTRAMUSCULAR; INTRAVENOUS at 10:36

## 2019-09-28 RX ADMIN — TRANEXAMIC ACID 1000 MG: 100 INJECTION, SOLUTION INTRAVENOUS at 09:46

## 2019-09-28 RX ADMIN — SERTRALINE HYDROCHLORIDE 50 MG: 50 TABLET ORAL at 11:50

## 2019-09-28 RX ADMIN — ROCURONIUM BROMIDE 40 MG: 10 SOLUTION INTRAVENOUS at 08:19

## 2019-09-28 RX ADMIN — ONDANSETRON 4 MG: 2 INJECTION INTRAMUSCULAR; INTRAVENOUS at 09:46

## 2019-09-28 RX ADMIN — MAGNESIUM SULFATE HEPTAHYDRATE 4 G: 40 INJECTION, SOLUTION INTRAVENOUS at 11:37

## 2019-09-28 RX ADMIN — MEMANTINE 10 MG: 10 TABLET ORAL at 00:16

## 2019-09-28 RX ADMIN — MEMANTINE 10 MG: 10 TABLET ORAL at 11:49

## 2019-09-28 RX ADMIN — ROPIVACAINE HYDROCHLORIDE 40 ML: 2 INJECTION, SOLUTION EPIDURAL; INFILTRATION at 08:32

## 2019-09-28 RX ADMIN — DEXAMETHASONE SODIUM PHOSPHATE 8 MG: 4 INJECTION, SOLUTION INTRAMUSCULAR; INTRAVENOUS at 08:42

## 2019-09-28 RX ADMIN — NEOSTIGMINE METHYLSULFATE 3 MG: 1 INJECTION, SOLUTION INTRAVENOUS at 09:46

## 2019-09-28 RX ADMIN — ESMOLOL HYDROCHLORIDE 20 MG: 10 INJECTION, SOLUTION INTRAVENOUS at 08:24

## 2019-09-28 RX ADMIN — GABAPENTIN 300 MG: 300 CAPSULE ORAL at 20:33

## 2019-09-28 RX ADMIN — GABAPENTIN 300 MG: 300 CAPSULE ORAL at 11:49

## 2019-09-29 LAB
BASOPHILS # BLD AUTO: 0.01 10*3/MM3 (ref 0–0.2)
BASOPHILS NFR BLD AUTO: 0.1 % (ref 0–1.5)
DEPRECATED RDW RBC AUTO: 44.9 FL (ref 37–54)
EOSINOPHIL # BLD AUTO: 0 10*3/MM3 (ref 0–0.4)
EOSINOPHIL NFR BLD AUTO: 0 % (ref 0.3–6.2)
ERYTHROCYTE [DISTWIDTH] IN BLOOD BY AUTOMATED COUNT: 13.7 % (ref 12.3–15.4)
HCT VFR BLD AUTO: 37.1 % (ref 34–46.6)
HGB BLD-MCNC: 11.8 G/DL (ref 12–15.9)
IMM GRANULOCYTES # BLD AUTO: 0.12 10*3/MM3 (ref 0–0.05)
IMM GRANULOCYTES NFR BLD AUTO: 0.9 % (ref 0–0.5)
LYMPHOCYTES # BLD AUTO: 1.12 10*3/MM3 (ref 0.7–3.1)
LYMPHOCYTES NFR BLD AUTO: 8.2 % (ref 19.6–45.3)
MAGNESIUM SERPL-MCNC: 2 MG/DL (ref 1.6–2.4)
MCH RBC QN AUTO: 28.6 PG (ref 26.6–33)
MCHC RBC AUTO-ENTMCNC: 31.8 G/DL (ref 31.5–35.7)
MCV RBC AUTO: 89.8 FL (ref 79–97)
MONOCYTES # BLD AUTO: 1.33 10*3/MM3 (ref 0.1–0.9)
MONOCYTES NFR BLD AUTO: 9.7 % (ref 5–12)
NEUTROPHILS # BLD AUTO: 11.09 10*3/MM3 (ref 1.7–7)
NEUTROPHILS NFR BLD AUTO: 81.1 % (ref 42.7–76)
NRBC BLD AUTO-RTO: 0 /100 WBC (ref 0–0.2)
PLAT MORPH BLD: NORMAL
PLATELET # BLD AUTO: 234 10*3/MM3 (ref 140–450)
PMV BLD AUTO: 9.4 FL (ref 6–12)
RBC # BLD AUTO: 4.13 10*6/MM3 (ref 3.77–5.28)
RBC MORPH BLD: NORMAL
WBC MORPH BLD: NORMAL
WBC NRBC COR # BLD: 13.67 10*3/MM3 (ref 3.4–10.8)

## 2019-09-29 PROCEDURE — 25010000002 ENOXAPARIN PER 10 MG: Performed by: INTERNAL MEDICINE

## 2019-09-29 PROCEDURE — 97162 PT EVAL MOD COMPLEX 30 MIN: CPT

## 2019-09-29 PROCEDURE — 85007 BL SMEAR W/DIFF WBC COUNT: CPT | Performed by: INTERNAL MEDICINE

## 2019-09-29 PROCEDURE — 25010000002 MORPHINE PER 10 MG: Performed by: INTERNAL MEDICINE

## 2019-09-29 PROCEDURE — 97110 THERAPEUTIC EXERCISES: CPT

## 2019-09-29 PROCEDURE — 99231 SBSQ HOSP IP/OBS SF/LOW 25: CPT | Performed by: INTERNAL MEDICINE

## 2019-09-29 PROCEDURE — 85025 COMPLETE CBC W/AUTO DIFF WBC: CPT | Performed by: INTERNAL MEDICINE

## 2019-09-29 PROCEDURE — P9612 CATHETERIZE FOR URINE SPEC: HCPCS

## 2019-09-29 PROCEDURE — 97166 OT EVAL MOD COMPLEX 45 MIN: CPT | Performed by: OCCUPATIONAL THERAPIST

## 2019-09-29 PROCEDURE — 25010000003 CEFAZOLIN IN DEXTROSE 2-4 GM/100ML-% SOLUTION: Performed by: ORTHOPAEDIC SURGERY

## 2019-09-29 PROCEDURE — 83735 ASSAY OF MAGNESIUM: CPT | Performed by: INTERNAL MEDICINE

## 2019-09-29 PROCEDURE — 25010000002 ONDANSETRON PER 1 MG: Performed by: INTERNAL MEDICINE

## 2019-09-29 RX ADMIN — ENOXAPARIN SODIUM 40 MG: 40 INJECTION SUBCUTANEOUS at 20:05

## 2019-09-29 RX ADMIN — AMLODIPINE BESYLATE 5 MG: 5 TABLET ORAL at 09:23

## 2019-09-29 RX ADMIN — CEFAZOLIN SODIUM 2 G: 2 INJECTION, SOLUTION INTRAVENOUS at 19:34

## 2019-09-29 RX ADMIN — CEFAZOLIN SODIUM 2 G: 2 INJECTION, SOLUTION INTRAVENOUS at 11:40

## 2019-09-29 RX ADMIN — BUSPIRONE HYDROCHLORIDE 10 MG: 10 TABLET ORAL at 19:35

## 2019-09-29 RX ADMIN — DONEPEZIL HYDROCHLORIDE 10 MG: 10 TABLET, FILM COATED ORAL at 19:35

## 2019-09-29 RX ADMIN — PANTOPRAZOLE SODIUM 40 MG: 40 TABLET, DELAYED RELEASE ORAL at 05:12

## 2019-09-29 RX ADMIN — TRAMADOL HYDROCHLORIDE 25 MG: 50 TABLET, FILM COATED ORAL at 18:42

## 2019-09-29 RX ADMIN — TRAMADOL HYDROCHLORIDE 25 MG: 50 TABLET, FILM COATED ORAL at 09:22

## 2019-09-29 RX ADMIN — MEMANTINE 10 MG: 10 TABLET ORAL at 19:35

## 2019-09-29 RX ADMIN — LISINOPRIL 20 MG: 20 TABLET ORAL at 19:36

## 2019-09-29 RX ADMIN — CEFAZOLIN SODIUM 2 G: 2 INJECTION, SOLUTION INTRAVENOUS at 05:11

## 2019-09-29 RX ADMIN — BUSPIRONE HYDROCHLORIDE 10 MG: 10 TABLET ORAL at 09:23

## 2019-09-29 RX ADMIN — GABAPENTIN 300 MG: 300 CAPSULE ORAL at 09:22

## 2019-09-29 RX ADMIN — MORPHINE SULFATE 1 MG: 2 INJECTION, SOLUTION INTRAMUSCULAR; INTRAVENOUS at 04:50

## 2019-09-29 RX ADMIN — MEMANTINE 10 MG: 10 TABLET ORAL at 09:22

## 2019-09-29 RX ADMIN — MULTIPLE VITAMINS W/ MINERALS TAB 1 TABLET: TAB ORAL at 09:22

## 2019-09-29 RX ADMIN — SERTRALINE HYDROCHLORIDE 50 MG: 50 TABLET ORAL at 09:22

## 2019-09-29 RX ADMIN — HYDROCHLOROTHIAZIDE 25 MG: 25 TABLET ORAL at 09:22

## 2019-09-29 RX ADMIN — GABAPENTIN 300 MG: 300 CAPSULE ORAL at 19:35

## 2019-09-29 RX ADMIN — AMLODIPINE BESYLATE 5 MG: 5 TABLET ORAL at 19:35

## 2019-09-29 RX ADMIN — LISINOPRIL 20 MG: 20 TABLET ORAL at 09:22

## 2019-09-29 RX ADMIN — ASPIRIN 81 MG: 81 TABLET, COATED ORAL at 09:22

## 2019-09-29 RX ADMIN — LEVOTHYROXINE SODIUM 175 MCG: 175 TABLET ORAL at 05:11

## 2019-09-29 RX ADMIN — ONDANSETRON 4 MG: 2 INJECTION INTRAMUSCULAR; INTRAVENOUS at 01:16

## 2019-09-30 ENCOUNTER — TELEPHONE (OUTPATIENT)
Dept: INTERNAL MEDICINE | Facility: CLINIC | Age: 84
End: 2019-09-30

## 2019-09-30 ENCOUNTER — APPOINTMENT (OUTPATIENT)
Dept: GENERAL RADIOLOGY | Facility: HOSPITAL | Age: 84
End: 2019-09-30

## 2019-09-30 LAB
ANION GAP SERPL CALCULATED.3IONS-SCNC: 9 MMOL/L (ref 5–15)
BASOPHILS # BLD AUTO: 0.02 10*3/MM3 (ref 0–0.2)
BASOPHILS NFR BLD AUTO: 0.1 % (ref 0–1.5)
BUN BLD-MCNC: 17 MG/DL (ref 8–23)
BUN/CREAT SERPL: 25 (ref 7–25)
CALCIUM SPEC-SCNC: 8.2 MG/DL (ref 8.6–10.5)
CHLORIDE SERPL-SCNC: 98 MMOL/L (ref 98–107)
CO2 SERPL-SCNC: 32 MMOL/L (ref 22–29)
CREAT BLD-MCNC: 0.68 MG/DL (ref 0.57–1)
DEPRECATED RDW RBC AUTO: 44.6 FL (ref 37–54)
EOSINOPHIL # BLD AUTO: 0.08 10*3/MM3 (ref 0–0.4)
EOSINOPHIL NFR BLD AUTO: 0.6 % (ref 0.3–6.2)
ERYTHROCYTE [DISTWIDTH] IN BLOOD BY AUTOMATED COUNT: 13.4 % (ref 12.3–15.4)
GFR SERPL CREATININE-BSD FRML MDRD: 81 ML/MIN/1.73
GLUCOSE BLD-MCNC: 129 MG/DL (ref 65–99)
HCT VFR BLD AUTO: 36 % (ref 34–46.6)
HGB BLD-MCNC: 11.3 G/DL (ref 12–15.9)
IMM GRANULOCYTES # BLD AUTO: 0.06 10*3/MM3 (ref 0–0.05)
IMM GRANULOCYTES NFR BLD AUTO: 0.4 % (ref 0–0.5)
LYMPHOCYTES # BLD AUTO: 1.01 10*3/MM3 (ref 0.7–3.1)
LYMPHOCYTES NFR BLD AUTO: 7.3 % (ref 19.6–45.3)
MCH RBC QN AUTO: 28.4 PG (ref 26.6–33)
MCHC RBC AUTO-ENTMCNC: 31.4 G/DL (ref 31.5–35.7)
MCV RBC AUTO: 90.5 FL (ref 79–97)
MONOCYTES # BLD AUTO: 1.27 10*3/MM3 (ref 0.1–0.9)
MONOCYTES NFR BLD AUTO: 9.2 % (ref 5–12)
NEUTROPHILS # BLD AUTO: 11.42 10*3/MM3 (ref 1.7–7)
NEUTROPHILS NFR BLD AUTO: 82.4 % (ref 42.7–76)
NRBC BLD AUTO-RTO: 0 /100 WBC (ref 0–0.2)
PLATELET # BLD AUTO: 210 10*3/MM3 (ref 140–450)
PMV BLD AUTO: 9.5 FL (ref 6–12)
POTASSIUM BLD-SCNC: 3 MMOL/L (ref 3.5–5.2)
PROCALCITONIN SERPL-MCNC: 0.32 NG/ML (ref 0.1–0.25)
RBC # BLD AUTO: 3.98 10*6/MM3 (ref 3.77–5.28)
SODIUM BLD-SCNC: 139 MMOL/L (ref 136–145)
WBC NRBC COR # BLD: 13.86 10*3/MM3 (ref 3.4–10.8)

## 2019-09-30 PROCEDURE — 97530 THERAPEUTIC ACTIVITIES: CPT

## 2019-09-30 PROCEDURE — P9612 CATHETERIZE FOR URINE SPEC: HCPCS

## 2019-09-30 PROCEDURE — 97116 GAIT TRAINING THERAPY: CPT

## 2019-09-30 PROCEDURE — 85025 COMPLETE CBC W/AUTO DIFF WBC: CPT | Performed by: INTERNAL MEDICINE

## 2019-09-30 PROCEDURE — 92610 EVALUATE SWALLOWING FUNCTION: CPT

## 2019-09-30 PROCEDURE — 99232 SBSQ HOSP IP/OBS MODERATE 35: CPT | Performed by: INTERNAL MEDICINE

## 2019-09-30 PROCEDURE — 80048 BASIC METABOLIC PNL TOTAL CA: CPT | Performed by: INTERNAL MEDICINE

## 2019-09-30 PROCEDURE — 25010000002 ENOXAPARIN PER 10 MG: Performed by: INTERNAL MEDICINE

## 2019-09-30 PROCEDURE — 84145 PROCALCITONIN (PCT): CPT | Performed by: INTERNAL MEDICINE

## 2019-09-30 PROCEDURE — 71045 X-RAY EXAM CHEST 1 VIEW: CPT

## 2019-09-30 PROCEDURE — 97535 SELF CARE MNGMENT TRAINING: CPT

## 2019-09-30 PROCEDURE — 25010000003 CEFAZOLIN IN DEXTROSE 2-4 GM/100ML-% SOLUTION: Performed by: ORTHOPAEDIC SURGERY

## 2019-09-30 RX ORDER — BISACODYL 10 MG
10 SUPPOSITORY, RECTAL RECTAL DAILY PRN
Status: DISCONTINUED | OUTPATIENT
Start: 2019-09-30 | End: 2019-10-04 | Stop reason: HOSPADM

## 2019-09-30 RX ADMIN — GABAPENTIN 300 MG: 300 CAPSULE ORAL at 21:48

## 2019-09-30 RX ADMIN — CEFAZOLIN SODIUM 2 G: 2 INJECTION, SOLUTION INTRAVENOUS at 03:03

## 2019-09-30 RX ADMIN — TRAMADOL HYDROCHLORIDE 25 MG: 50 TABLET, FILM COATED ORAL at 13:28

## 2019-09-30 RX ADMIN — BUSPIRONE HYDROCHLORIDE 10 MG: 10 TABLET ORAL at 21:48

## 2019-09-30 RX ADMIN — AMLODIPINE BESYLATE 5 MG: 5 TABLET ORAL at 09:32

## 2019-09-30 RX ADMIN — DONEPEZIL HYDROCHLORIDE 10 MG: 10 TABLET, FILM COATED ORAL at 21:48

## 2019-09-30 RX ADMIN — LEVOTHYROXINE SODIUM 175 MCG: 175 TABLET ORAL at 09:48

## 2019-09-30 RX ADMIN — LISINOPRIL 20 MG: 20 TABLET ORAL at 09:32

## 2019-09-30 RX ADMIN — SERTRALINE HYDROCHLORIDE 50 MG: 50 TABLET ORAL at 09:32

## 2019-09-30 RX ADMIN — AMLODIPINE BESYLATE 5 MG: 5 TABLET ORAL at 21:48

## 2019-09-30 RX ADMIN — ACETAMINOPHEN 650 MG: 325 TABLET ORAL at 05:32

## 2019-09-30 RX ADMIN — GABAPENTIN 300 MG: 300 CAPSULE ORAL at 09:32

## 2019-09-30 RX ADMIN — MULTIPLE VITAMINS W/ MINERALS TAB 1 TABLET: TAB ORAL at 09:32

## 2019-09-30 RX ADMIN — SODIUM CHLORIDE, PRESERVATIVE FREE 10 ML: 5 INJECTION INTRAVENOUS at 21:49

## 2019-09-30 RX ADMIN — MEMANTINE 10 MG: 10 TABLET ORAL at 09:32

## 2019-09-30 RX ADMIN — ASPIRIN 81 MG: 81 TABLET, COATED ORAL at 17:57

## 2019-09-30 RX ADMIN — ENOXAPARIN SODIUM 40 MG: 40 INJECTION SUBCUTANEOUS at 21:48

## 2019-09-30 RX ADMIN — BUSPIRONE HYDROCHLORIDE 10 MG: 10 TABLET ORAL at 09:32

## 2019-09-30 RX ADMIN — SUCRALFATE 1 G: 1 TABLET ORAL at 17:57

## 2019-09-30 RX ADMIN — MEMANTINE 10 MG: 10 TABLET ORAL at 21:48

## 2019-09-30 RX ADMIN — HYDROCHLOROTHIAZIDE 25 MG: 25 TABLET ORAL at 09:32

## 2019-09-30 RX ADMIN — Medication 10 MG: at 06:27

## 2019-10-01 LAB
ANION GAP SERPL CALCULATED.3IONS-SCNC: 4 MMOL/L (ref 5–15)
BASOPHILS # BLD AUTO: 0.02 10*3/MM3 (ref 0–0.2)
BASOPHILS NFR BLD AUTO: 0.2 % (ref 0–1.5)
BUN BLD-MCNC: 20 MG/DL (ref 8–23)
BUN/CREAT SERPL: 36.4 (ref 7–25)
CALCIUM SPEC-SCNC: 8.1 MG/DL (ref 8.6–10.5)
CHLORIDE SERPL-SCNC: 101 MMOL/L (ref 98–107)
CO2 SERPL-SCNC: 33 MMOL/L (ref 22–29)
CREAT BLD-MCNC: 0.55 MG/DL (ref 0.57–1)
CYTO UR: NORMAL
DEPRECATED RDW RBC AUTO: 44.3 FL (ref 37–54)
EOSINOPHIL # BLD AUTO: 0.17 10*3/MM3 (ref 0–0.4)
EOSINOPHIL NFR BLD AUTO: 1.8 % (ref 0.3–6.2)
ERYTHROCYTE [DISTWIDTH] IN BLOOD BY AUTOMATED COUNT: 13.3 % (ref 12.3–15.4)
GFR SERPL CREATININE-BSD FRML MDRD: 104 ML/MIN/1.73
GLUCOSE BLD-MCNC: 99 MG/DL (ref 65–99)
HCT VFR BLD AUTO: 32.1 % (ref 34–46.6)
HGB BLD-MCNC: 10 G/DL (ref 12–15.9)
IMM GRANULOCYTES # BLD AUTO: 0.03 10*3/MM3 (ref 0–0.05)
IMM GRANULOCYTES NFR BLD AUTO: 0.3 % (ref 0–0.5)
LAB AP CASE REPORT: NORMAL
LAB AP CLINICAL INFORMATION: NORMAL
LYMPHOCYTES # BLD AUTO: 1.21 10*3/MM3 (ref 0.7–3.1)
LYMPHOCYTES NFR BLD AUTO: 12.5 % (ref 19.6–45.3)
MCH RBC QN AUTO: 28.4 PG (ref 26.6–33)
MCHC RBC AUTO-ENTMCNC: 31.2 G/DL (ref 31.5–35.7)
MCV RBC AUTO: 91.2 FL (ref 79–97)
MONOCYTES # BLD AUTO: 0.84 10*3/MM3 (ref 0.1–0.9)
MONOCYTES NFR BLD AUTO: 8.7 % (ref 5–12)
NEUTROPHILS # BLD AUTO: 7.38 10*3/MM3 (ref 1.7–7)
NEUTROPHILS NFR BLD AUTO: 76.5 % (ref 42.7–76)
NRBC BLD AUTO-RTO: 0 /100 WBC (ref 0–0.2)
PATH REPORT.FINAL DX SPEC: NORMAL
PATH REPORT.GROSS SPEC: NORMAL
PLATELET # BLD AUTO: 182 10*3/MM3 (ref 140–450)
PMV BLD AUTO: 9.5 FL (ref 6–12)
POTASSIUM BLD-SCNC: 4.1 MMOL/L (ref 3.5–5.2)
RBC # BLD AUTO: 3.52 10*6/MM3 (ref 3.77–5.28)
SODIUM BLD-SCNC: 138 MMOL/L (ref 136–145)
WBC NRBC COR # BLD: 9.65 10*3/MM3 (ref 3.4–10.8)

## 2019-10-01 PROCEDURE — 97110 THERAPEUTIC EXERCISES: CPT

## 2019-10-01 PROCEDURE — 97116 GAIT TRAINING THERAPY: CPT

## 2019-10-01 PROCEDURE — 25010000002 ENOXAPARIN PER 10 MG: Performed by: INTERNAL MEDICINE

## 2019-10-01 PROCEDURE — 94799 UNLISTED PULMONARY SVC/PX: CPT

## 2019-10-01 PROCEDURE — 99232 SBSQ HOSP IP/OBS MODERATE 35: CPT | Performed by: INTERNAL MEDICINE

## 2019-10-01 PROCEDURE — 85025 COMPLETE CBC W/AUTO DIFF WBC: CPT | Performed by: INTERNAL MEDICINE

## 2019-10-01 PROCEDURE — 80048 BASIC METABOLIC PNL TOTAL CA: CPT | Performed by: INTERNAL MEDICINE

## 2019-10-01 RX ORDER — LISINOPRIL 5 MG/1
5 TABLET ORAL 2 TIMES DAILY
Status: DISCONTINUED | OUTPATIENT
Start: 2019-10-01 | End: 2019-10-02

## 2019-10-01 RX ORDER — BISACODYL 5 MG/1
10 TABLET, DELAYED RELEASE ORAL DAILY PRN
Status: DISCONTINUED | OUTPATIENT
Start: 2019-10-01 | End: 2019-10-04 | Stop reason: HOSPADM

## 2019-10-01 RX ORDER — HYDROCHLOROTHIAZIDE 12.5 MG/1
12.5 TABLET ORAL DAILY
Status: DISCONTINUED | OUTPATIENT
Start: 2019-10-01 | End: 2019-10-02

## 2019-10-01 RX ORDER — TAMSULOSIN HYDROCHLORIDE 0.4 MG/1
0.4 CAPSULE ORAL DAILY
Status: DISCONTINUED | OUTPATIENT
Start: 2019-10-01 | End: 2019-10-04 | Stop reason: HOSPADM

## 2019-10-01 RX ADMIN — BISACODYL 10 MG: 5 TABLET, COATED ORAL at 05:37

## 2019-10-01 RX ADMIN — SERTRALINE HYDROCHLORIDE 50 MG: 50 TABLET ORAL at 08:51

## 2019-10-01 RX ADMIN — TRAMADOL HYDROCHLORIDE 25 MG: 50 TABLET, FILM COATED ORAL at 12:46

## 2019-10-01 RX ADMIN — TAMSULOSIN HYDROCHLORIDE 0.4 MG: 0.4 CAPSULE ORAL at 19:43

## 2019-10-01 RX ADMIN — AMLODIPINE BESYLATE 5 MG: 5 TABLET ORAL at 08:54

## 2019-10-01 RX ADMIN — LEVOTHYROXINE SODIUM 175 MCG: 175 TABLET ORAL at 05:37

## 2019-10-01 RX ADMIN — ENOXAPARIN SODIUM 40 MG: 40 INJECTION SUBCUTANEOUS at 22:14

## 2019-10-01 RX ADMIN — TRAMADOL HYDROCHLORIDE 25 MG: 50 TABLET, FILM COATED ORAL at 02:37

## 2019-10-01 RX ADMIN — BUSPIRONE HYDROCHLORIDE 10 MG: 10 TABLET ORAL at 22:15

## 2019-10-01 RX ADMIN — MEMANTINE 10 MG: 10 TABLET ORAL at 22:14

## 2019-10-01 RX ADMIN — POTASSIUM CHLORIDE 40 MEQ: 750 CAPSULE, EXTENDED RELEASE ORAL at 02:37

## 2019-10-01 RX ADMIN — GABAPENTIN 300 MG: 300 CAPSULE ORAL at 08:51

## 2019-10-01 RX ADMIN — PANTOPRAZOLE SODIUM 40 MG: 40 TABLET, DELAYED RELEASE ORAL at 05:37

## 2019-10-01 RX ADMIN — SODIUM CHLORIDE, PRESERVATIVE FREE 10 ML: 5 INJECTION INTRAVENOUS at 09:03

## 2019-10-01 RX ADMIN — SUCRALFATE 1 G: 1 TABLET ORAL at 16:53

## 2019-10-01 RX ADMIN — POTASSIUM CHLORIDE 40 MEQ: 750 CAPSULE, EXTENDED RELEASE ORAL at 07:51

## 2019-10-01 RX ADMIN — MULTIPLE VITAMINS W/ MINERALS TAB 1 TABLET: TAB ORAL at 08:51

## 2019-10-01 RX ADMIN — SODIUM CHLORIDE, PRESERVATIVE FREE 10 ML: 5 INJECTION INTRAVENOUS at 22:15

## 2019-10-01 RX ADMIN — POTASSIUM CHLORIDE 40 MEQ: 750 CAPSULE, EXTENDED RELEASE ORAL at 15:15

## 2019-10-01 RX ADMIN — GABAPENTIN 300 MG: 300 CAPSULE ORAL at 22:14

## 2019-10-01 RX ADMIN — BUSPIRONE HYDROCHLORIDE 10 MG: 10 TABLET ORAL at 08:51

## 2019-10-01 RX ADMIN — HYDROCHLOROTHIAZIDE 12.5 MG: 12.5 TABLET ORAL at 08:51

## 2019-10-01 RX ADMIN — DONEPEZIL HYDROCHLORIDE 10 MG: 10 TABLET, FILM COATED ORAL at 22:14

## 2019-10-01 RX ADMIN — MEMANTINE 10 MG: 10 TABLET ORAL at 08:52

## 2019-10-01 RX ADMIN — ASPIRIN 81 MG: 81 TABLET, COATED ORAL at 08:51

## 2019-10-01 NOTE — PROGRESS NOTES
[facility] Medicine Services  PROGRESS NOTE    Patient Name: Temi Jarrett  : 1930  MRN: [id number]    Date of Admission: 2019  Primary Care Physician: Eric Patel MD    Subjective   Subjective     CC:  Hip pain    HPI:  Hip pain improved. Patient very complimentary of the hospital staff and appreciates the care she has recieved    Review of Systems  Gen- No fevers, chills  CV- No chest pain, palpitations  Resp- No cough, dyspnea  GI- No N/V/D, abd pain            Objective   Objective     Vital Signs:   Temp:  [98.1 °F (36.7 °C)-99.5 °F (37.5 °C)] 98.1 °F (36.7 °C)  Heart Rate:  [69-81] 74  Resp:  [16-18] 16  BP: (100-115)/(50-63) 115/54        Physical Exam:  Constitutional -no acute distress, non toxic, in bed, frail female  HEENT-NCAT, mucous membranes moist  CV-RRR, S1 S2 normal, no m/r/g  Resp-CTAB, no wheezes, rhonchi or rales  Abd-soft, non-tender, non-distended, normo active bowel sounds  Ext-No lower extremity cyanosis, clubbing or edema bilaterally  Neuro-alert and oriented, speech clear, moves all extremities   Psych-normal affect   Skin- No rash on exposed UE or LE bilaterally      Results Reviewed:    Results from last 7 days   Lab Units 10/01/19  0757 09/30/19  1104 19  0915  19  0646 19  1824   WBC 10*3/mm3 9.65 13.86* 13.67*   < >  --  14.96*   HEMOGLOBIN g/dL 10.0* 11.3* 11.8*   < >  --  11.9*   HEMATOCRIT % 32.1* 36.0 37.1   < >  --  36.3   PLATELETS 10*3/mm3 182 210 234   < >  --  213   INR   --   --   --   --   --  1.12   PROCALCITONIN ng/mL  --  0.32*  --   --  0.16  --     < > = values in this interval not displayed.     Results from last 7 days   Lab Units 10/01/19  0757 09/30/19  1104 19  0646 19  1824   SODIUM mmol/L 138 139 144 141   POTASSIUM mmol/L 4.1 3.0* 4.4 4.4   CHLORIDE mmol/L 101 98 106 103   CO2 mmol/L 33.0* 32.0* 30.0* 28.0   BUN mg/dL 20 17 25* 37*   CREATININE mg/dL 0.55* 0.68 0.61 0.76   GLUCOSE  mg/dL 99 129* 94 118*   CALCIUM mg/dL 8.1* 8.2* 8.6 8.8   ALT (SGPT) U/L  --   --   --  19   AST (SGOT) U/L  --   --   --  20     Estimated Creatinine Clearance: 40 mL/min (A) (by C-G formula based on SCr of 0.55 mg/dL (L)).    Microbiology Results Abnormal     None          Imaging Results (last 24 hours)     Procedure Component Value Units Date/Time    XR Chest 1 View [[id number]] Collected:  10/01/19 0724     Updated:  10/01/19 0935    Narrative:       EXAMINATION: XR CHEST 1 VW-09/30/2019:      INDICATION: Low grade temps, hip fracture.; S72.002A-Fracture of  unspecified part of neck of left femur, initial encounter for closed  fracture; I10-Essential (primary) hypertension; K44.9-Diaphragmatic  hernia without obstruction or gangrene; W19.XXXA-Unspecified fall,  initial encounter; S72.002A-Fracture of unspecified part of neck of left  femur, initial encounter for closed fracture; Z74.09-Other reduced mo.      COMPARISON: 09/27/2019.     FINDINGS: Very large hiatal hernia is noted. The heart is upper normal  size.  The vasculature is at upper limits of normal. Mild nonspecific  interstitial changes appear stable. There is trace bibasilar pleural  effusion.           Impression:       1.  Pulmonary venous hypertension and trace bibasilar pleural effusion.  2.  Very large hiatal hernia as on the prior study.     D:  10/01/2019  E:  10/01/2019                        I have reviewed the medications:  Scheduled Meds:    amLODIPine 5 mg Oral BID   aspirin 81 mg Oral Daily   busPIRone 10 mg Oral BID   donepezil 10 mg Oral Nightly   enoxaparin 40 mg Subcutaneous Nightly   gabapentin 300 mg Oral BID   hydroCHLOROthiazide 12.5 mg Oral Daily   lisinopril 5 mg Oral BID   memantine 10 mg Oral BID   multivitamin with minerals 1 tablet Oral Daily   pantoprazole 40 mg Oral QAM   sertraline 50 mg Oral Daily   sodium chloride 10 mL Intravenous Q12H   sodium chloride 75 mL/hr Intravenous Once   sucralfate 1 g Oral TID AC    levothyroxine 175 mcg Oral Q AM     Continuous Infusions:    lactated ringers 9 mL/hr     PRN Meds:.•  acetaminophen **OR** acetaminophen **OR** acetaminophen  •  bisacodyl  •  bisacodyl  •  cyclobenzaprine  •  diphenoxylate-atropine  •  magnesium sulfate **OR** magnesium sulfate **OR** magnesium sulfate  •  Morphine **AND** naloxone  •  ondansetron  •  potassium chloride **OR** potassium chloride **OR** potassium chloride  •  [COMPLETED] Insert peripheral IV **AND** sodium chloride  •  sodium chloride  •  traMADol      Assessment/Plan   Assessment / Plan     Active Hospital Problems    Diagnosis  POA   • **Closed fracture of neck of left femur (CMS/HCC) [S72.002A]  Yes   • Primary osteoarthritis of both knees [M17.0]  Yes   • Pernicious anemia [D51.0]  Yes   • Osteoporosis [M81.0]  Yes   • Multi-infarct dementia (CMS/[facility]) [F01.50]  Yes   • Hyperlipidemia [E78.5]  Yes   • Hypothyroidism [E03.9]  Yes   • Generalized anxiety disorder [F41.1]  Yes   • Esophageal reflux [K21.9]  Yes   • Benign essential hypertension [I10]  Yes      Resolved Hospital Problems   No resolved problems to display.        Brief Hospital Course to date:  Temi Jarrett is a 89 y.o. female with history of dementia and osteoporosis with mechanical fall found to have a left hip fracture s/p LT hemiarthroplasty with  9/28/19     Assessment/Plan     LT subcapital neck fracture of the femur s/p mechanical fall  S/p hemiarthroplasty 9/28/19 with Dr. Segundo  - pain control  - PT/OT  - lovenox 40mg DA x2 weeks followed by ASA 325mg DA x4 weeks per Dr. Segundo    Leukocytosis with low grade temperature  - suspect elevated temps from atelectasis, Leukocytosis most likely post-op reactive changes  - while procalcitonin somewhat elevated, UA bland, and CXR from yesterday evening personally reviewed and no infiltrate to my eye. Leukocytosis  - temps improving with reinforcement of incentive spirometer use    Urinary retention  - added flomax, I/O  cath prn, encourage up to commode    HTN  - low bp this am, lowered lisinopril from 20 to 5 mg, and hctz from 25 to 12.5 mg.    Normocytic anemia, history of pernicious anemia  - stable     Dementia  - aricept, namend,     DVT Prophylaxis:  Lovenox     Disposition: I expect the patient to be discharged pending PT/OT and rehab placement needs, may be medically ready in am.      CODE STATUS:   Code Status and Medical Interventions:   Ordered at: 09/27/19 2124     Level Of Support Discussed With:    Patient     Code Status:    CPR     Medical Interventions (Level of Support Prior to Arrest):    Full         Electronically signed by Jose Mustafa MD, 10/01/19, 6:10 PM.

## 2019-10-01 NOTE — PLAN OF CARE
Problem: Patient Care Overview  Goal: Plan of Care Review  Outcome: Ongoing (interventions implemented as appropriate)   10/01/19 2150   Coping/Psychosocial   Plan of Care Reviewed With patient   Plan of Care Review   Progress improving   OTHER   Outcome Summary Patient progressing with mobility. Able to ambulate 40 feet with mechanical gait aid.

## 2019-10-01 NOTE — PLAN OF CARE
Problem: Patient Care Overview  Goal: Plan of Care Review   10/01/19 1542   Coping/Psychosocial   Plan of Care Reviewed With patient   Plan of Care Review   Progress no change   OTHER   Outcome Summary Remains sinus with PVC;s on the moitor. Prineo dressing CDI. Up to chair and tolerates well. Did not cry out loud with movement today. Still unable to void on her own. Bladder scanned today at 1100 and result was 297. Bladder scanned at approsximately 1240 and result was 354. Pt was symptomatic at this time and was In and out cathed for 400 ml of urine. Urine is concentrated and dark yellow in color. Waiting discharge plan. Pain controlled with tramadol .        Problem: Pain, Chronic (Adult)  Goal: Acceptable Pain/Comfort Level and Functional Ability  Outcome: Ongoing (interventions implemented as appropriate)   10/01/19 1542   Pain, Chronic (Adult)   Acceptable Pain/Comfort Level and Functional Ability making progress toward outcome       Problem: Fall Risk (Adult)  Goal: Absence of Fall  Outcome: Ongoing (interventions implemented as appropriate)   10/01/19 1542   Fall Risk (Adult)   Absence of Fall making progress toward outcome       Problem: Skin Injury Risk (Adult)  Goal: Skin Health and Integrity  Outcome: Ongoing (interventions implemented as appropriate)   10/01/19 1542   Skin Injury Risk (Adult)   Skin Health and Integrity making progress toward outcome       Problem: Hip Arthroplasty (Total, Partial) (Adult)  Goal: Signs and Symptoms of Listed Potential Problems Will be Absent, Minimized or Managed (Hip Arthroplasty)  Outcome: Ongoing (interventions implemented as appropriate)   10/01/19 0413   Goal/Outcome Evaluation   Problems Assessed (Hip Arthroplasty) all   Problems Present (Hip Arthroplasty) pain;functional deficit;situational response

## 2019-10-01 NOTE — PLAN OF CARE
Problem: Patient Care Overview  Goal: Plan of Care Review  Outcome: Ongoing (interventions implemented as appropriate)   10/01/19 0413   Coping/Psychosocial   Plan of Care Reviewed With patient;family   Plan of Care Review   Progress no change   OTHER   Outcome Summary PT VSS and AOx4. Pt got up to the BSC with x3 assist and gait belt. Pt needed lots of encouragement to try to get out of bed to the BSC. Pt retaining fluid. Bladder scanned Pt for 715ml. Cathed Pt for 500ml. Pt got potassium replacement. Pt potassium was 3.0. Awaiting new lab draws. Continuing to monitor.        Problem: Hip Arthroplasty (Total, Partial) (Adult)  Goal: Signs and Symptoms of Listed Potential Problems Will be Absent, Minimized or Managed (Hip Arthroplasty)  Outcome: Ongoing (interventions implemented as appropriate)

## 2019-10-01 NOTE — THERAPY TREATMENT NOTE
Patient Name: Temi Jarrett  : 1930    MRN: [id number]                              Today's Date: 10/1/2019       Admit Date: 2019    Visit Dx:     ICD-10-CM ICD-9-CM   1. Closed fracture of neck of left femur, initial encounter (CMS/HCC) S72.002A 820.8   2. Elevated blood pressure reading with diagnosis of hypertension I10 401.9   3. Hiatal hernia K44.9 553.3   4. Fall, initial encounter W19.XXXA E888.9   5. Fracture, hip, left, closed, initial encounter (CMS/HCC) S72.002A 820.8   6. Impaired functional mobility, balance, gait, and endurance Z74.09 V49.89   7. Impaired mobility and ADLs Z74.09 799.89     Patient Active Problem List   Diagnosis   • Skin tear of lower leg without complication, right, initial encounter   • Esophageal reflux   • Hypothyroidism   • Generalized anxiety disorder   • Hyperlipidemia   • Multi-infarct dementia (CMS/HCC)   • Peripheral neuropathy   • Carpal tunnel syndrome of right wrist   • Spinal stenosis of lumbar region   • Osteoporosis   • Fever   • Urinary frequency   • Peripheral venous insufficiency   • Disc disorder of lumbar region   • Bladder prolapse, female, acquired   • Acute right-sided low back pain without sciatica   • Pain, knee   • Benign essential hypertension   • Pernicious anemia   • Memory loss   • Annual physical exam   • Primary osteoarthritis of both knees   • Chest pain, atypical   • Hiatal hernia with intrathoracic stomach   • Closed fracture of neck of left femur (CMS/[facility])     Past Medical History:   Diagnosis Date   • Disease of thyroid gland    • Gastric polyp    • History of colonic polyps    • Hypertension    • IBS (irritable bowel syndrome)    • Macular degeneration    • Torn meniscus     right knee      Past Surgical History:   Procedure Laterality Date   • CHOLECYSTECTOMY     • EYE SURGERY      Cataract extraction   • HERNIA REPAIR     • HIP HEMIARTHROPLASTY Left 2019    Procedure: HIP HEMIARTHROPLASTY LEFT;  Surgeon: Reddy Segundo  MARIANNE Palacios MD;  Location: [facility] OR;  Service: Orthopedics   • HYSTERECTOMY  1976   • KNEE SURGERY Right     arthroscopy- 2000   • TONSILLECTOMY       General Information     Row Name 10/01/19 1449          PT Evaluation Time/Intention    Document Type  progress note/recertification;therapy note (daily note)  -SC     Mode of Treatment  physical therapy  -SC     Row Name 10/01/19 1449          General Information    Patient Profile Reviewed?  yes  -SC     Row Name 10/01/19 1449          Cognitive Assessment/Intervention- PT/OT    Orientation Status (Cognition)  oriented x 4  -SC     Cognitive Assessment/Intervention Comment  follows  commands with cues  -SC     Row Name 10/01/19 1449          Safety Issues, Functional Mobility    Impairments Affecting Function (Mobility)  endurance/activity tolerance;balance;pain;strength  -SC       User Key  (r) = Recorded By, (t) = Taken By, (c) = Cosigned By    Initials Name Provider Type    SC Erika Ocampo, PT Physical Therapist        Mobility     Row Name 10/01/19 1451          Bed Mobility Assessment/Treatment    Bed Mobility Assessment/Treatment  supine-sit  -SC     Supine-Sit [place] (Bed Mobility)  maximum assist (25% patient effort);2 person assist;verbal cues  -SC     Assistive Device (Bed Mobility)  draw sheet;head of bed elevated  -SC     Comment (Bed Mobility)  working on bridging over to edge of bed, draw sheet to help paitnent  -SC     Row Name 10/01/19 1451          Transfer Assessment/Treatment    Comment (Transfers)  STS with arjo with cues for hand placement  -SC     Row Name 10/01/19 1451          Sit-Stand Transfer    Sit-Stand [place] (Transfers)  moderate assist (50% patient effort)  -SC     Assistive Device (Sit-Stand Transfers)  -- arjo  -SC     Row Name 10/01/19 1451          Gait/Stairs Assessment/Training    Gait/Stairs Assessment/Training  gait/ambulation independence  -SC     [place] Level (Gait)  moderate assist (50% patient effort);1  person to manage equipment;1 person assist  -SC     Assistive Device (Gait)  -- arjo  -SC     Distance in Feet (Gait)  40-in room  -SC     Pattern (Gait)  step-to  -SC     Deviations/Abnormal Patterns (Gait)  left sided deviations;antalgic  -SC     Left Sided Gait Deviations  weight shift ability decreased  -SC     Comment (Gait/Stairs)  Working on sequencing steps, encouragd to wt bear through elbows. Demonstrated good effort  -[facility] Name 10/01/19 1451          Mobility Assessment/Intervention    Extremity Weight-bearing Status  left lower extremity  -SC     Left Lower Extremity (Weight-bearing Status)  weight-bearing as tolerated (WBAT)  -SC       User Key  (r) = Recorded By, (t) = Taken By, (c) = Cosigned By    Initials Name Provider Type    SC Erika Ocampo, PT Physical Therapist        Obj/Interventions     [facility] Name 10/01/19 1459          Therapeutic Exercise    Lower Extremity (Therapeutic Exercise)  gluteal sets;heel slides, left;LAQ (long arc quad), left;SAQ (short arc quad), left  -SC     Lower Extremity Range of Motion (Therapeutic Exercise)  hip abduction/adduction, left  -[facility] Name 10/01/19 1459          Dynamic Standing Balance    Level of [place], Reaches Outside Midline (Standing, Dynamic Balance)  moderate assist, 50 to 74% patient effort  -SC     Time Able to Maintain Position, Reaches Outside Midline (Standing, Dynamic Balance)  4 to 5 minutes  -SC     Assistive Device Utilized (Supported Standing, Dynamic Balance)  -- arjo  -SC       User Key  (r) = Recorded By, (t) = Taken By, (c) = Cosigned By    Initials Name Provider Type    SC Erika Ocampo, PT Physical Therapist        Goals/Plan    No documentation.       Clinical Impression     [facility] Name 10/01/19 1500          Pain Assessment    Additional Documentation  Pain Scale: FACES Pre/Post-Treatment (Group)  -[facility] Name 10/01/19 1500          Pain Scale: Numbers Pre/Post-Treatment    Pain Scale: Numbers, Pretreatment  3/10   -SC     Pain Scale: Numbers, Post-Treatment  5/10  -SC     Pain Location - Side  Left  -SC     Pain Location  hip  -SC     Pain Intervention(s)  Repositioned  -SC     Row Name 10/01/19 1500          Plan of Care Review    Plan of Care Reviewed With  patient  -SC     Row Name 10/01/19 1500          Physical Therapy Clinical Impression    Criteria for Skilled Interventions Met (PT Clinical Impression)  yes;treatment indicated  -SC     Rehab Potential (PT Clinical Summary)  good, to achieve stated therapy goals  -SC     Row Name 10/01/19 1500          Positioning and Restraints    Pre-Treatment Position  in bed  -SC     Post Treatment Position  chair  -SC     In Chair  reclined;sitting;exit alarm on;notified nsg;call light within reach  -SC       User Key  (r) = Recorded By, (t) = Taken By, (c) = Cosigned By    Initials Name Provider Type    Erika Whitlock PT Physical Therapist        Outcome Measures     Row Name 10/01/19 1502          How much help from another person do you currently need...    Turning from your back to your side while in flat bed without using bedrails?  2  -SC     Moving from lying on back to sitting on the side of a flat bed without bedrails?  2  -SC     Moving to and from a bed to a chair (including a wheelchair)?  2  -SC     Standing up from a chair using your arms (e.g., wheelchair, bedside chair)?  2  -SC     Climbing 3-5 steps with a railing?  1  -SC     To walk in hospital room?  2  -SC     AM-PAC 6 Clicks Score (PT)  11  -SC     Row Name 10/01/19 1502          Functional Assessment    Outcome Measure Options  AM-PAC 6 Clicks Basic Mobility (PT)  -SC       User Key  (r) = Recorded By, (t) = Taken By, (c) = Cosigned By    Initials Name Provider Type    Erika Whitlock, PT Physical Therapist        Physical Therapy Education     Title: PT OT SLP Therapies (In Progress)     Topic: Physical Therapy (Done)     Point: Mobility training (Done)     Learning Progress Summary            Patient Lisa, IZABELLA, VU,NR by SC at 10/1/2019  3:03 PM    Comment:  reviewed benefits of activity    Acceptance, E,D, NR by  at 9/30/2019  9:33 AM    Comment:  Reviewed hip precautions, HEP, gait mechanics, correct transfer technique, benefits of mobility    Acceptance, E,D,H, NR by  at 9/29/2019  8:15 AM    Comment:  Edu re: hip precautions, HEP, correct transfer technique, benefits of mobility. Issued hip precautions handout                   Point: Home exercise program (Done)     Learning Progress Summary           Patient Lisa, IZABELLA, VU,NR by SC at 10/1/2019  3:03 PM    Comment:  reviewed benefits of activity    Acceptance, E,D, NR by  at 9/30/2019  9:33 AM    Comment:  Reviewed hip precautions, HEP, gait mechanics, correct transfer technique, benefits of mobility    Acceptance, E,D,H, NR by  at 9/29/2019  8:15 AM    Comment:  Edu re: hip precautions, HEP, correct transfer technique, benefits of mobility. Issued hip precautions handout                   Point: Body mechanics (Done)     Learning Progress Summary           Patient Lisa, IZABELLA, VU,NR by SC at 10/1/2019  3:03 PM    Comment:  reviewed benefits of activity    Acceptance, E,D, NR by  at 9/30/2019  9:33 AM    Comment:  Reviewed hip precautions, HEP, gait mechanics, correct transfer technique, benefits of mobility    Acceptance, E,D,H, NR by  at 9/29/2019  8:15 AM    Comment:  Edu re: hip precautions, HEP, correct transfer technique, benefits of mobility. Issued hip precautions handout                   Point: Precautions (Done)     Learning Progress Summary           Patient Lisa, IZABELLA, VU,NR by SC at 10/1/2019  3:03 PM    Comment:  reviewed benefits of activity    Acceptance, E,D, NR by  at 9/30/2019  9:33 AM    Comment:  Reviewed hip precautions, HEP, gait mechanics, correct transfer technique, benefits of mobility    Acceptance, E,D,H, NR by  at 9/29/2019  8:15 AM    Comment:  Edu re: hip precautions, HEP, correct transfer technique,  benefits of mobility. Issued hip precautions handout                               User Key     Initials Effective Dates Name Provider Type Discipline    SC 06/19/15 -  Erika Ocampo PT Physical Therapist PT     04/03/18 -  Dorian Aden PT Physical Therapist PT              PT Recommendation and Plan     Outcome Summary/Treatment Plan (PT)  Anticipated Discharge Disposition (PT): skilled nursing facility  Plan of Care Reviewed With: patient  Progress: improving  Outcome Summary: Patient progressing with mobility. Able to ambulate 40 feet wtih mechanical gait aid.     Time Calculation:   PT Charges     Row Name 10/01/19 1505 10/01/19 1333          Time Calculation    Start Time  --  1333  -SC     PT Received On  --  10/01/19  -SC     PT Goal Re-Cert Due Date  --  10/09/19  -SC        Time Calculation- PT    Total Timed Code Minutes- PT  --  30 minute(s)  -SC        Timed Charges    82227 - PT Therapeutic Exercise Minutes  10  -SC  --     49373 - Gait Training Minutes   10  -SC  --     14868 - PT Therapeutic Activity Minutes  10  -SC  --       User Key  (r) = Recorded By, (t) = Taken By, (c) = Cosigned By    Initials Name Provider Type    SC Erika Ocampo, PT Physical Therapist        Therapy Charges for Today     Code Description Service Date Service Provider Modifiers Qty    25372506125 HC PT THER PROC EA 15 MIN 10/1/2019 Erika Ocampo, PT GP 1    68281991379 HC GAIT TRAINING EA 15 MIN 10/1/2019 Erika Ocampo PT GP 1    76924202684 HC PT THER SUPP EA 15 MIN 10/1/2019 Erika Ocampo PT GP 2          PT G-Codes  Outcome Measure Options: AM-PAC 6 Clicks Basic Mobility (PT)  AM-PAC 6 Clicks Score (PT): 11  AM-PAC 6 Clicks Score (OT): 14    Erika Ocampo PT  10/1/2019

## 2019-10-01 NOTE — PROGRESS NOTES
"Orthopedic Daily Progress Note      CC: YULI overnight. Potential d/c tho [facility] when medically ready and approved by insurance.    Pain well controlled  General: no fevers, chills  Abdomen: no nausea, vomiting, or diarrhea    No other complaints    Physical Exam:  I have reviewed the vital signs.  Temp:  [98.2 °F (36.8 °C)-99.9 °F (37.7 °C)] 99.1 °F (37.3 °C)  Heart Rate:  [69-81] 77  Resp:  [16-18] 16  BP: (100-112)/(50-63) 106/50    Objective:  Vital signs: (most recent): Blood pressure 106/50, pulse 77, temperature 99.1 °F (37.3 °C), temperature source Oral, resp. rate 16, height 154.9 cm (61\"), weight 61.2 kg (135 lb), SpO2 96 %, not currently breastfeeding.            General Appearance:    Alert, cooperative, no distress  Extremities: incision c/d/i  Pulses:  2+ in distal surgical extremity        Results Review:    I have reviewed the labs, radiology results and diagnostic studies:    Results from last 7 days   Lab Units 10/01/19  0757   WBC 10*3/mm3 9.65   HEMOGLOBIN g/dL 10.0*   PLATELETS 10*3/mm3 182     Results from last 7 days   Lab Units 10/01/19  0757   SODIUM mmol/L 138   POTASSIUM mmol/L 4.1   CO2 mmol/L 33.0*   CREATININE mg/dL 0.55*   GLUCOSE mg/dL 99       I have reviewed the medications.    Assessment/Problem List  POD #3 L hip helio    Plan  WBAT LLE with post hip precautions x 6weeks  PT/OT  DVT ppx with Lovenox 40 mg sq daily x 2 weeks followed by  mg daily x 4 weeks      Reddy Segundo Jr., MD  10/01/19  10:18 AM            "

## 2019-10-02 PROCEDURE — 99233 SBSQ HOSP IP/OBS HIGH 50: CPT | Performed by: INTERNAL MEDICINE

## 2019-10-02 PROCEDURE — 97110 THERAPEUTIC EXERCISES: CPT

## 2019-10-02 PROCEDURE — 97535 SELF CARE MNGMENT TRAINING: CPT | Performed by: OCCUPATIONAL THERAPIST

## 2019-10-02 PROCEDURE — 25010000002 ENOXAPARIN PER 10 MG: Performed by: INTERNAL MEDICINE

## 2019-10-02 PROCEDURE — 97530 THERAPEUTIC ACTIVITIES: CPT

## 2019-10-02 PROCEDURE — 94799 UNLISTED PULMONARY SVC/PX: CPT

## 2019-10-02 RX ORDER — SODIUM CHLORIDE 9 MG/ML
75 INJECTION, SOLUTION INTRAVENOUS CONTINUOUS
Status: ACTIVE | OUTPATIENT
Start: 2019-10-02 | End: 2019-10-03

## 2019-10-02 RX ADMIN — CYCLOBENZAPRINE HYDROCHLORIDE 10 MG: 10 TABLET, FILM COATED ORAL at 11:51

## 2019-10-02 RX ADMIN — HYDROCHLOROTHIAZIDE 12.5 MG: 12.5 TABLET ORAL at 09:23

## 2019-10-02 RX ADMIN — SODIUM CHLORIDE 75 ML/HR: 9 INJECTION, SOLUTION INTRAVENOUS at 13:48

## 2019-10-02 RX ADMIN — SODIUM CHLORIDE, PRESERVATIVE FREE 10 ML: 5 INJECTION INTRAVENOUS at 09:52

## 2019-10-02 RX ADMIN — BUSPIRONE HYDROCHLORIDE 10 MG: 10 TABLET ORAL at 09:23

## 2019-10-02 RX ADMIN — AMLODIPINE BESYLATE 5 MG: 5 TABLET ORAL at 09:24

## 2019-10-02 RX ADMIN — TRAMADOL HYDROCHLORIDE 25 MG: 50 TABLET, FILM COATED ORAL at 20:36

## 2019-10-02 RX ADMIN — BISACODYL 10 MG: 5 TABLET, COATED ORAL at 11:51

## 2019-10-02 RX ADMIN — TRAMADOL HYDROCHLORIDE 25 MG: 50 TABLET, FILM COATED ORAL at 09:23

## 2019-10-02 RX ADMIN — TAMSULOSIN HYDROCHLORIDE 0.4 MG: 0.4 CAPSULE ORAL at 09:24

## 2019-10-02 RX ADMIN — SUCRALFATE 1 G: 1 TABLET ORAL at 17:24

## 2019-10-02 RX ADMIN — SERTRALINE HYDROCHLORIDE 50 MG: 50 TABLET ORAL at 09:23

## 2019-10-02 RX ADMIN — GABAPENTIN 300 MG: 300 CAPSULE ORAL at 20:36

## 2019-10-02 RX ADMIN — MEMANTINE 10 MG: 10 TABLET ORAL at 20:37

## 2019-10-02 RX ADMIN — DONEPEZIL HYDROCHLORIDE 10 MG: 10 TABLET, FILM COATED ORAL at 20:43

## 2019-10-02 RX ADMIN — LISINOPRIL 5 MG: 5 TABLET ORAL at 09:23

## 2019-10-02 RX ADMIN — GABAPENTIN 300 MG: 300 CAPSULE ORAL at 09:24

## 2019-10-02 RX ADMIN — PANTOPRAZOLE SODIUM 40 MG: 40 TABLET, DELAYED RELEASE ORAL at 06:19

## 2019-10-02 RX ADMIN — LEVOTHYROXINE SODIUM 175 MCG: 175 TABLET ORAL at 06:19

## 2019-10-02 RX ADMIN — ASPIRIN 81 MG: 81 TABLET, COATED ORAL at 09:23

## 2019-10-02 RX ADMIN — MEMANTINE 10 MG: 10 TABLET ORAL at 09:23

## 2019-10-02 RX ADMIN — BUSPIRONE HYDROCHLORIDE 10 MG: 10 TABLET ORAL at 20:36

## 2019-10-02 RX ADMIN — ENOXAPARIN SODIUM 40 MG: 40 INJECTION SUBCUTANEOUS at 20:36

## 2019-10-02 RX ADMIN — MULTIPLE VITAMINS W/ MINERALS TAB 1 TABLET: TAB ORAL at 09:23

## 2019-10-02 NOTE — THERAPY TREATMENT NOTE
Acute Care - Occupational Therapy Treatment Note  [facility]     Patient Name: Temi Jarrett  : 1930  MRN: [id number]  Today's Date: 10/2/2019  Onset of Illness/Injury or Date of Surgery: 19  Date of Referral to OT: 19  Referring Physician: MD Sanya    Admit Date: 2019       ICD-10-CM ICD-9-CM   1. Closed fracture of neck of left femur, initial encounter (CMS/HCC) S72.002A 820.8   2. Elevated blood pressure reading with diagnosis of hypertension I10 401.9   3. Hiatal hernia K44.9 553.3   4. Fall, initial encounter W19.XXXA E888.9   5. Fracture, hip, left, closed, initial encounter (CMS/HCC) S72.002A 820.8   6. Impaired functional mobility, balance, gait, and endurance Z74.09 V49.89   7. Impaired mobility and ADLs Z74.09 799.89     Patient Active Problem List   Diagnosis   • Skin tear of lower leg without complication, right, initial encounter   • Esophageal reflux   • Hypothyroidism   • Generalized anxiety disorder   • Hyperlipidemia   • Multi-infarct dementia (CMS/[facility])   • Peripheral neuropathy   • Carpal tunnel syndrome of right wrist   • Spinal stenosis of lumbar region   • Osteoporosis   • Fever   • Urinary frequency   • Peripheral venous insufficiency   • Disc disorder of lumbar region   • Bladder prolapse, female, acquired   • Acute right-sided low back pain without sciatica   • Pain, knee   • Benign essential hypertension   • Pernicious anemia   • Memory loss   • Annual physical exam   • Primary osteoarthritis of both knees   • Chest pain, atypical   • Hiatal hernia with intrathoracic stomach   • Closed fracture of neck of left femur (CMS/[facility])     Past Medical History:   Diagnosis Date   • Disease of thyroid gland    • Gastric polyp    • History of colonic polyps    • Hypertension    • IBS (irritable bowel syndrome)    • Macular degeneration    • Torn meniscus     right knee      Past Surgical History:   Procedure Laterality Date   • CHOLECYSTECTOMY     • EYE SURGERY       Cataract extraction   • HERNIA REPAIR     • HIP HEMIARTHROPLASTY Left 9/28/2019    Procedure: HIP HEMIARTHROPLASTY LEFT;  Surgeon: Reddy Segundo Jr., MD;  Location: [facility];  Service: Orthopedics   • HYSTERECTOMY  1976   • KNEE SURGERY Right     arthroscopy- 2000   • TONSILLECTOMY         Therapy Treatment    Rehabilitation Treatment Summary     Row Name 10/02/19 1059             Treatment Time/Intention    Discipline  occupational therapist  -ST      Document Type  therapy note (daily note)  -ST      Subjective Information  complains of;pain abd. pain  -ST      Mode of Treatment  individual therapy;occupational therapy  -ST      Patient/Family Observations  no family present; UIC at arrival  -ST      Therapy Frequency (OT Eval)  daily  -ST      Patient Effort  good  -ST      Recorded by [ST] Shanelle Souza OTR 10/02/19 1129      Row Name 10/02/19 1059             Cognitive Assessment/Intervention- PT/OT    Affect/Mental Status (Cognitive)  WNL  -ST      Orientation Status (Cognition)  oriented x 4  -ST      Follows Commands (Cognition)  WNL with grooming  -ST      Cognitive Assessment/Intervention Comment  pt motivated to complete grooming tasks   -ST      Recorded by [ST] Shanelle Souza OTR 10/02/19 1129      Row Name 10/02/19 1059             Mobility Assessment/Intervention    Extremity Weight-bearing Status  left lower extremity  -ST      Left Lower Extremity (Weight-bearing Status)  weight-bearing as tolerated (WBAT)  -ST      Recorded by [ST] Shanelle Souza OTR 10/02/19 1129      Row Name 10/02/19 1059             Bed Mobility Assessment/Treatment    Comment (Bed Mobility)  UIC at arrival  -ST      Recorded by [ST] Shanelle Souza OTR 10/02/19 1129      Row Name 10/02/19 1059             Transfer Assessment/Treatment    Comment (Transfers)  pt refused  -ST      Recorded by [ST] Shanelle Souza OTR 10/02/19 1129      Row Name 10/02/19 1059             ADL Assessment/Intervention     "40952 - OT Self Care/Mgmt Minutes  24  -ST      BADL Assessment/Intervention  bathing;grooming  -ST      Recorded by [ST] Shanelle Souza OTR 10/02/19 1129      Row Name 10/02/19 1059             Bathing Assessment/Intervention    Bathing [place] Level  upper body;upper extremities;chest/trunk  -ST      Bathing Position  supported sitting  -ST      Comment (Bathing)  neck/chest/BUE's/hands/axilla regions  -ST      Recorded by [ST] Shanelle Souza OTR 10/02/19 1129      Row Name 10/02/19 1059             Grooming Assessment/Training    [place] Level (Grooming)  wash face, hands;oral care regimen;hair care, combing/brushing;set up  -ST      Grooming Position  supported sitting  -ST      Comment (Grooming)  additional time for all grooming d/t fatigue level   -ST      Recorded by [ST] Shanelle Souza OTR 10/02/19 1129      Row Name 10/02/19 1059             Pain Scale: Numbers Pre/Post-Treatment    Pain Scale: Numbers, Pretreatment  5/10  -ST      Pain Scale: Numbers, Post-Treatment  5/10  -ST      Pain Location - Orientation  lower  -ST      Pain Location  abdomen  -ST      Pain Intervention(s)  -- notified RN; pt states \"bowel pain\"  -ST      Recorded by [ST] Shanelle Souza OTR 10/02/19 1129      Row Name                Wound 09/28/19 0917 Left hip Incision    Wound - Properties Group Date first assessed: 09/28/19 [JS] Time first assessed: 0917 [JS] Side: Left [JS] Location: hip [JS] Primary Wound Type: Incision [JS] Recorded by:  [ITKA] Tiffany Aguirre RN 09/28/19 0917      User Key  (r) = Recorded By, (t) = Taken By, (c) = Cosigned By    Initials Name Effective Dates Discipline    ST Shanelle Souza OTR 06/10/18 -  OT    Tiffany Rey RN 01/23/17 -  Nurse        Wound 09/28/19 0917 Left hip Incision (Active)   Dressing Appearance dry;intact;no drainage 10/2/2019  6:19 AM   Closure Liquid skin adhesive 10/1/2019  7:55 PM   Base clean;dry 10/1/2019  7:55 PM   Periwound " intact;dry 10/1/2019  7:55 PM   Periwound Temperature warm 10/1/2019  7:55 PM   Drainage Amount none 10/1/2019  7:55 PM       Occupational Therapy Education     Title: PT OT SLP Therapies (In Progress)     Topic: Occupational Therapy (Done)     Point: ADL training (Done)     Description: Instruct learner(s) on proper safety adaptation and remediation techniques during self care or transfers.   Instruct in proper use of assistive devices.    Learning Progress Summary           Patient Acceptance, E,TB,D, VU,DU,NR by  at 10/2/2019 10:59 AM    Comment:  see flow sheet    Acceptance, E, NR by  at 9/30/2019 10:09 AM    Acceptance, E,TB,D, VU by  at 9/29/2019  8:01 AM    Comment:  see flow sheet                   Point: Home exercise program (Done)     Description: Instruct learner(s) on appropriate technique for monitoring, assisting and/or progressing therapeutic exercises/activities.    Learning Progress Summary           Patient Acceptance, E,TB,D, VU by  at 9/29/2019  8:01 AM    Comment:  see flow sheet                   Point: Precautions (Done)     Description: Instruct learner(s) on prescribed precautions during self-care and functional transfers.    Learning Progress Summary           Patient Acceptance, E,TB,D, VU,DU,NR by  at 10/2/2019 10:59 AM    Comment:  see flow sheet    Acceptance, E,TB,D, VU by  at 9/29/2019  8:01 AM    Comment:  see flow sheet                   Point: Body mechanics (Done)     Description: Instruct learner(s) on proper positioning and spine alignment during self-care, functional mobility activities and/or exercises.    Learning Progress Summary           Patient Acceptance, E,TB,D, VU by  at 9/29/2019  8:01 AM    Comment:  see flow sheet                               User Key     Initials Effective Dates Name Provider Type Discipline     06/10/18 -  Shanelle Souza OTR Occupational Therapist OT     07/18/19 -  Felipa Garrison OT Occupational Therapist OT                 OT Recommendation and Plan  Outcome Summary/Treatment Plan (OT)  Anticipated Discharge Disposition (OT): skilled nursing facility  Planned Therapy Interventions (OT Eval): activity tolerance training, adaptive equipment training, functional balance retraining, occupation/activity based interventions, patient/caregiver education/training, transfer/mobility retraining  Therapy Frequency (OT Eval): daily  Plan of Care Review  Plan of Care Reviewed With: patient  Plan of Care Reviewed With: patient  Outcome Summary: Pt declined standing/transfers d/t abd. discomfort however motivated to complete grooming and UB bathing tasks. Pt requires significantly more time to complete all activities however SUA. Pt w/difficulty recalling THP.   Outcome Measures     Row Name 10/02/19 1059 09/30/19 1009 09/30/19 0933       How much help from another person do you currently need...    Turning from your back to your side while in flat bed without using bedrails?  --  --  2  -MJ    Moving from lying on back to sitting on the side of a flat bed without bedrails?  --  --  2  -MJ    Moving to and from a bed to a chair (including a wheelchair)?  --  --  2  -MJ    Standing up from a chair using your arms (e.g., wheelchair, bedside chair)?  --  --  2  -MJ    Climbing 3-5 steps with a railing?  --  --  1  -MJ    To walk in hospital room?  --  --  2  -MJ    AM-PAC 6 Clicks Score (PT)  --  --  11  -MJ       How much help from another is currently needed...    Putting on and taking off regular lower body clothing?  1  -ST  1  -LC  --    Bathing (including washing, rinsing, and drying)  2  -ST  2  -LC  --    Toileting (which includes using toilet bed pan or urinal)  1  -ST  1  -LC  --    Putting on and taking off regular upper body clothing  3  -ST  3  -LC  --    Taking care of personal grooming (such as brushing teeth)  4  -ST  3  -LC  --    Eating meals  4  -ST  4  -LC  --    AM-PAC 6 Clicks Score (OT)  15  -ST  14  -LC  --        Functional Assessment    Outcome Measure Options  AM-PAC 6 Clicks Daily Activity (OT)  -ST  --  AM-PAC 6 Clicks Basic Mobility (PT)  -      User Key  (r) = Recorded By, (t) = Taken By, (c) = Cosigned By    Initials Name Provider Type    Shanelle Zavala OTEVETTE Occupational Therapist    Dorian Hernández, PT Physical Therapist    Felipa Juarez, OT Occupational Therapist           Time Calculation:   Time Calculation- OT     Row Name 10/02/19 1059 10/02/19 0953          Time Calculation- OT    OT Start Time  1059 -ST  --     OT Received On  10/02/19  -ST  --     OT Goal Re-Cert Due Date  10/09/19  -ST  --        Timed Charges    30993 - Gait Training Minutes   --  5  -SC     33073 - OT Self Care/Mgmt Minutes  24  -ST  --       User Key  (r) = Recorded By, (t) = Taken By, (c) = Cosigned By    Initials Name Provider Type    SC Erika Ocampo, PT Physical Therapist     Shanelle Souza OTR Occupational Therapist        Therapy Charges for Today     Code Description Service Date Service Provider Modifiers Qty    [id number]  OT SELF CARE/MGMT/TRAIN EA 15 MIN 10/2/2019 Shanelle Souza OTR GO 2               BRENNAN Feng  10/2/2019

## 2019-10-02 NOTE — PROGRESS NOTES
Case Management Discharge Note    Final note: Per Gustavo burnett/ [facility], they are able to accept patient to the General Rehab Unit tomorrow if medically ready, nurse to call report to [phone], dc summary will be pulled from [email or web address]. EMS scheduled for 1300, pcs in the chart. Patient notified and agreeable.     Destination - Selection Complete      Service Provider Request Status Selected Services Address Phone Number Fax Number    [facility] Selected Inpatient Rehabilitation [address] [facility] [address] [phone]           Transportation Services  Ambulance: [facility]    Final Discharge Disposition Code: 62 - inpatient rehab facility

## 2019-10-02 NOTE — PLAN OF CARE
Problem: Patient Care Overview  Goal: Plan of Care Review  Outcome: Ongoing (interventions implemented as appropriate)   10/02/19 1541   Coping/Psychosocial   Plan of Care Reviewed With patient   Plan of Care Review   Progress no change   OTHER   Outcome Summary Pt remains sinus on the monitor with PVC;s . BP dropped today to 86/46. MD notified and fluids started at 75 per hour. Prineo dressing CDI. O2 at 2 liters per NC. SCDS on . Pain managed with oral medications today. Still unable to urinate on her own. In and out cath today for 400 ml. Pt was symptomatic and in tears due to bladder discomfort. Discomfort resolved with draining of the bladder. Discharge planned for tomorrow at 1:00 PM to rehab.        Problem: Pain, Chronic (Adult)  Goal: Acceptable Pain/Comfort Level and Functional Ability  Outcome: Ongoing (interventions implemented as appropriate)   10/02/19 1541   Pain, Chronic (Adult)   Acceptable Pain/Comfort Level and Functional Ability making progress toward outcome       Problem: Fall Risk (Adult)  Goal: Absence of Fall  Outcome: Ongoing (interventions implemented as appropriate)   10/02/19 1541   Fall Risk (Adult)   Absence of Fall making progress toward outcome       Problem: Skin Injury Risk (Adult)  Goal: Skin Health and Integrity  Outcome: Ongoing (interventions implemented as appropriate)   10/02/19 1541   Skin Injury Risk (Adult)   Skin Health and Integrity making progress toward outcome       Problem: Hip Arthroplasty (Total, Partial) (Adult)  Goal: Signs and Symptoms of Listed Potential Problems Will be Absent, Minimized or Managed (Hip Arthroplasty)  Outcome: Ongoing (interventions implemented as appropriate)   10/02/19 1059   Goal/Outcome Evaluation   Problems Assessed (Hip Arthroplasty) functional deficit   Problems Present (Hip Arthroplasty) functional deficit          Chief Complaint:  Patient is a 52y old  Female who presents with a chief complaint of cholangitis (09 Jan 2019 08:25)      Interval Events:   - she is feeling much better today  - denies abdominal pain, nausea or vomiting.     From consult note: HPI: 51 y/o Female w/ a pmh significant for DM2, HTN, OA and asthma presenting to the ED w/ a chief complaint of abdominal pain. Pt recently discharged from [facility] (12/18-12/24), at that time was admitted for dizziness and fall, found to have elevated liver enzymes (tbili 2.3, ALP in the 500s) and CBD dilatation up to 12mm and cholelithiasis on US, MRCP showing dilated common bile duct with an abrupt transition at the very distal common bile duct, concerning for bile duct stone or cholangiocarcinoma, s/p ERCP with plastic stent placement and biopsy confirming invasive ampullary adenocarcinoma. Pt was discharged home and was told to follow with surgical oncology for management. She has some chronic abdominal upper quadrant pain since prior to the last hospitalization at [place]. Pt presented to the ED today with acute onset RUQ and RLQ abdominal pain x1 day that is constant and crampy, followed by 2 episodes of NBNB emesis and decreased PO intake. Pt also has been having intermittent fevers for the past few days. In the ED, patient was found be febrile to 101.5, hypotensive initially to 80s/40s, labs notable for WBC of 15.2, , Tbili 2.2, AST//60. Pt s/p Vanc and zosyn. Due to insurance issues, they have not had follow-up with oncology or surgical oncology yet.  Repeat CT showed intra and extrahepatic biliary ductal dilatation with pneumobilia likely related to recent ERCP, also hyperattenuation and mild wall thickening involving CBD raises possibility of cholangitis, CBD (pigtail) stent located in distal CBD and duodenum. Currently patient feels better and has no further fevers. She has the chronic abdominal pain which is present even prior to the last admission and is unchanged.    Allergies:  No Known Allergies      Hospital Medications:  acetaminophen   Tablet .. 650 milliGRAM(s) Oral every 6 hours PRN  benzocaine 15 mG/menthol 3.6 mG Lozenge 1 Lozenge Oral every 4 hours PRN  benzonatate 100 milliGRAM(s) Oral three times a day PRN  dextrose 40% Gel 15 Gram(s) Oral once PRN  dextrose 5%. 1000 milliLiter(s) IV Continuous <Continuous>  dextrose 50% Injectable 12.5 Gram(s) IV Push once  dextrose 50% Injectable 25 Gram(s) IV Push once  dextrose 50% Injectable 25 Gram(s) IV Push once  DULoxetine 60 milliGRAM(s) Oral daily  enoxaparin Injectable 40 milliGRAM(s) SubCutaneous daily  gabapentin 100 milliGRAM(s) Oral three times a day  glucagon  Injectable 1 milliGRAM(s) IntraMuscular once PRN  insulin lispro (HumaLOG) corrective regimen sliding scale   SubCutaneous Before meals and at bedtime  ondansetron Injectable 8 milliGRAM(s) IV Push every 6 hours PRN  piperacillin/tazobactam IVPB. 3.375 Gram(s) IV Intermittent every 8 hours  polyethylene glycol 3350 17 Gram(s) Oral daily  senna 2 Tablet(s) Oral at bedtime  traMADol 50 milliGRAM(s) Oral every 8 hours PRN      PMHX/PSHX:  Adenocarcinoma of gallbladder  Type 2 diabetes mellitus without complication, without long-term current use of insulin  Neuropathy  Arthritis  Mild intermittent asthma without complication  Gastroesophageal reflux disease without esophagitis  Essential hypertension  No pertinent past medical history  No significant past surgical history      Family history:  No pertinent family history in first degree relatives      ROS:     General:  No wt loss, fevers, chills, night sweats, fatigue,   Eyes:  Good vision, no reported pain  ENT:  No sore throat, pain, runny nose, dysphagia  CV:  No pain, palpitations, hypo/hypertension  Resp:  No dyspnea, cough, tachypnea, wheezing  GI:  See HPI  :  No pain, bleeding, incontinence, nocturia  Muscle:  No pain, weakness  Neuro:  No weakness, tingling, memory problems  Psych:  No fatigue, insomnia, mood problems, depression  Endocrine:  No polyuria, polydipsia, cold/heat intolerance  Heme:  No petechiae, ecchymosis, easy bruisability  Skin:  No rash, edema      PHYSICAL EXAM:     GENERAL:  Appears stated age, well-groomed, well-nourished, no distress  HEENT:  NC/AT,  conjunctivae clear, sclera -anicteric  CHEST:  Full & symmetric excursion, no increased effort, breath sounds clear  HEART:  Regular rhythm, S1, S2, no murmur/rub/S3/S4,  no edema  ABDOMEN:  Soft, non-tender, non-distended, normoactive bowel sounds,  no masses ,no hepato-splenomegaly,   EXTREMITIES:  no cyanosis,clubbing or edema  SKIN:  No rash/erythema/ecchymoses/petechiae/wounds/abscess/warm/dry  NEURO:  Alert, oriented    Vital Signs:  Vital Signs Last 24 Hrs  T(C): 36.4 (09 Jan 2019 05:34), Max: 37.1 (08 Jan 2019 15:35)  T(F): 97.6 (09 Jan 2019 05:34), Max: 98.7 (08 Jan 2019 15:35)  HR: 59 (09 Jan 2019 05:34) (51 - 66)  BP: 129/82 (09 Jan 2019 05:34) (107/55 - 131/75)  BP(mean): --  RR: 17 (09 Jan 2019 05:34) (17 - 18)  SpO2: 99% (09 Jan 2019 05:34) (96% - 99%)  Daily     Daily     LABS:                        9.6    10.10 )-----------( 238      ( 09 Jan 2019 06:04 )             29.8     01-09    141  |  103  |  7   ----------------------------<  125<H>  3.6   |  27  |  0.68    Ca    9.0      09 Jan 2019 06:04  Phos  3.4     01-09  Mg     2.0     01-09    TPro  7.0  /  Alb  3.2<L>  /  TBili  1.1  /  DBili  x   /  AST  36<H>  /  ALT  32  /  AlkPhos  421<H>  01-09    LIVER FUNCTIONS - ( 09 Jan 2019 06:04 )  Alb: 3.2 g/dL / Pro: 7.0 g/dL / ALK PHOS: 421 u/L / ALT: 32 u/L / AST: 36 u/L / GGT: x               Imaging:      < from: CT Abdomen and Pelvis w/ IV Cont (01.05.19 @ 06:42) >  LIVER: Normal morphology. Heterogeneous enhancement in the right lobe   with questionable hypodensity in segment 7 measuring 1.3 cm (series 2   image 20).  BILE DUCTS: Intrahepatic and extrahepatic biliary ductal dilatation; the   common bile duct measures up to 1.9 cm wall thickening and   hyperattenuation. The biliary duct catheter pigtails arein the distal   common bile duct and the small bowel at the level of the duodenal jejunal   flexure. Mild pneumobilia likely secondary to recent ERCP.  GALLBLADDER: Gallbladder wall edema cholelithiasis.  SPLEEN: Within normal limits.  PANCREAS: The main pancreatic duct measures up to 0.6 cm in the   pancreatic head, similar in appearance to the prior exam 12/21/2018.  ADRENALS: Within normal limits.  KIDNEYS/URETERS: Heterogeneous patchy hypoenhancement involving the right   midpole (series 2 image 31 and series 602 image 64). No hydronephrosis.    BLADDER: Within normal limits.  REPRODUCTIVE ORGANS: Unremarkable CT appearance of the uterus and adnexa.    BOWEL: No bowel obstruction. Appendix is normal.  PERITONEUM: No ascites or pneumoperitoneum.  VESSELS:  Normal caliber abdominal aorta.  RETROPERITONEUM: Prominent retroperitoneal nodes, a reference precarinal   node measures 1.0 cm (series 2 image 33).    ABDOMINAL WALL: Multiple subcutaneous nodules within the ventral abdomen.   For example, there is a 1.6 cm nodule in the left ventral abdomen (2, 76)   and a 1.6 cm nodule in the right ventral abdomen (2, 79), likely related   to injection granulomas; correlate clinically.  BONES: Mild degenerative changes of the spine.    IMPRESSION:     Intra and extrahepatic biliary ductal dilatation is again noted with   pneumobilia likely related to recent ERCP. Hyperattenuation and mild wall   thickening involving the CBD raises the possibility of cholangitis;   correlate with laboratory values. CBD stent located in the distal CBD and   duodenum.    Heterogeneity of the right hepatic lobe with questionable 1.3 cm   hypoattenuating lesion in segment 7.    Heterogeneous patchy hypoenhancement involving the right kidney;   correlate clinically for pyelonephritis.  < end of copied text >      ERCP 1/7/2018    Impression:   - During the EGD, one stent was removed from the biliary tree.   - ERCP was then done with one new plastic stent placed into the common bile duct.     Recommendation:   - Return patient to hospital [facility] for ongoing care.   - Clear liquid diet.   - Continue antibiotics.   - Continue IVF LR  - Patient needs to follow with surg onc for resection of the ampullary adenocarcinoma.

## 2019-10-02 NOTE — THERAPY TREATMENT NOTE
Patient Name: Temi Jarrett  : 1930    MRN: [id number]                              Today's Date: 10/2/2019       Admit Date: 2019    Visit Dx:     ICD-10-CM ICD-9-CM   1. Closed fracture of neck of left femur, initial encounter (CMS/HCC) S72.002A 820.8   2. Elevated blood pressure reading with diagnosis of hypertension I10 401.9   3. Hiatal hernia K44.9 553.3   4. Fall, initial encounter W19.XXXA E888.9   5. Fracture, hip, left, closed, initial encounter (CMS/HCC) S72.002A 820.8   6. Impaired functional mobility, balance, gait, and endurance Z74.09 V49.89   7. Impaired mobility and ADLs Z74.09 799.89     Patient Active Problem List   Diagnosis   • Skin tear of lower leg without complication, right, initial encounter   • Esophageal reflux   • Hypothyroidism   • Generalized anxiety disorder   • Hyperlipidemia   • Multi-infarct dementia (CMS/HCC)   • Peripheral neuropathy   • Carpal tunnel syndrome of right wrist   • Spinal stenosis of lumbar region   • Osteoporosis   • Fever   • Urinary frequency   • Peripheral venous insufficiency   • Disc disorder of lumbar region   • Bladder prolapse, female, acquired   • Acute right-sided low back pain without sciatica   • Pain, knee   • Benign essential hypertension   • Pernicious anemia   • Memory loss   • Annual physical exam   • Primary osteoarthritis of both knees   • Chest pain, atypical   • Hiatal hernia with intrathoracic stomach   • Closed fracture of neck of left femur (CMS/[facility])     Past Medical History:   Diagnosis Date   • Disease of thyroid gland    • Gastric polyp    • History of colonic polyps    • Hypertension    • IBS (irritable bowel syndrome)    • Macular degeneration    • Torn meniscus     right knee      Past Surgical History:   Procedure Laterality Date   • CHOLECYSTECTOMY     • EYE SURGERY      Cataract extraction   • HERNIA REPAIR     • HIP HEMIARTHROPLASTY Left 2019    Procedure: HIP HEMIARTHROPLASTY LEFT;  Surgeon: Reddy Segundo  MARIANNE Palacios MD;  Location: [facility] OR;  Service: Orthopedics   • HYSTERECTOMY  1976   • KNEE SURGERY Right     arthroscopy- 2000   • TONSILLECTOMY       General Information     Row Name 10/02/19 1117          PT Evaluation Time/Intention    Document Type  therapy note (daily note)  -SC     Mode of Treatment  physical therapy  -SC     Row Name 10/02/19 1117          General Information    Patient Profile Reviewed?  yes c/o L calf spasm and L groin pain  -SC     Row Name 10/02/19 1117          Cognitive Assessment/Intervention- PT/OT    Orientation Status (Cognition)  oriented x 3  -SC     Cognitive Assessment/Intervention Comment  flat affect today, follows commands with repeted cues  -SC       User Key  (r) = Recorded By, (t) = Taken By, (c) = Cosigned By    Initials Name Provider Type    SC Erika Ocampo PT Physical Therapist        Mobility     Row Name 10/02/19 1118          Bed Mobility Assessment/Treatment    Bed Mobility Assessment/Treatment  supine-sit  -SC     Supine-Sit [place] (Bed Mobility)  maximum assist (25% patient effort);verbal cues;1 person assist  -SC     Assistive Device (Bed Mobility)  draw sheet;head of bed elevated  -SC     Row Name 10/02/19 1118          Transfer Assessment/Treatment    Comment (Transfers)  STS with vc for hand placement. Patietn c/o L calf spasms which improved in standing  -SC     Row Name 10/02/19 1118          Sit-Stand Transfer    Sit-Stand [place] (Transfers)  moderate assist (50% patient effort)  -SC     Assistive Device (Sit-Stand Transfers)  mechanical lift/aid ARJO  -SC     Row Name 10/02/19 1118          Gait/Stairs Assessment/Training    Gait/Stairs Assessment/Training  gait/ambulation independence  -SC     [place] Level (Gait)  moderate assist (50% patient effort);1 person to manage equipment;1 person assist  -SC     Assistive Device (Gait)  other (see comments) ARJO  -SC     Distance in Feet (Gait)  20 in room. limited by c/o weakness  -SC      Pattern (Gait)  step-to  -SC     Deviations/Abnormal Patterns (Gait)  left sided deviations;antalgic  -SC     Bilateral Gait Deviations  forward flexed posture  -SC     Left Sided Gait Deviations  weight shift ability decreased  -SC     Comment (Gait/Stairs)  walk focused on staying closer to arjo and improving upright posture. Followed by chair  -SC     Row Name 10/02/19 1118          Mobility Assessment/Intervention    Extremity Weight-bearing Status  left lower extremity  -SC     Left Lower Extremity (Weight-bearing Status)  weight-bearing as tolerated (WBAT)  -SC       User Key  (r) = Recorded By, (t) = Taken By, (c) = Cosigned By    Initials Name Provider Type    SC Terrence, Carrollon A, PT Physical Therapist        Obj/Interventions     Row Name 10/02/19 1122          Therapeutic Exercise    Lower Extremity (Therapeutic Exercise)  LAQ (long arc quad), left;heel slides, left;gastroc stretch, bilateral;gluteal sets  -SC     Lower Extremity Range of Motion (Therapeutic Exercise)  hip abduction/adduction, left  -SC     Exercise Type (Therapeutic Exercise)  AAROM (active assistive range of motion);AROM (active range of motion);isometric contraction, static  -SC     Position (Therapeutic Exercise)  seated;supine  -SC     Sets/Reps (Therapeutic Exercise)  10  -SC     Row Name 10/02/19 1122          Dynamic Standing Balance    Level of [place], Reaches Outside Midline (Standing, Dynamic Balance)  moderate assist, 50 to 74% patient effort  -SC     Time Able to Maintain Position, Reaches Outside Midline (Standing, Dynamic Balance)  3 to 4 minutes  -SC     Assistive Device Utilized (Supported Standing, Dynamic Balance)  -- arjo  -SC       User Key  (r) = Recorded By, (t) = Taken By, (c) = Cosigned By    Initials Name Provider Type    SC Erika Ocampo, PT Physical Therapist        Goals/Plan    No documentation.       Clinical Impression     Row Name 10/02/19 1123          Pain Assessment    Additional Documentation   Pain Scale: FACES Pre/Post-Treatment (Group)  -SC     Row Name 10/02/19 1123          Pain Scale: Numbers Pre/Post-Treatment    Pain Location - Side  Left  -SC     Pain Location  groin  -SC     Pain Intervention(s)  Repositioned;Cold applied;Other (Comment) massage to L calf  -SC     Row Name 10/02/19 1123          Pain Scale: FACES Pre/Post-Treatment    Pain: FACES Scale, Pretreatment  2-->hurts little bit  -SC     Pain: FACES Scale, Post-Treatment  6-->hurts even more  -SC     Pre/Post Treatment Pain Comment  c/o L groin pain and L calf spasms  -SC     Row Name 10/02/19 1123          Plan of Care Review    Plan of Care Reviewed With  patient  -SC     Row Name 10/02/19 1123          Physical Therapy Clinical Impression    Criteria for Skilled Interventions Met (PT Clinical Impression)  yes;treatment indicated  -SC     Rehab Potential (PT Clinical Summary)  good, to achieve stated therapy goals  -SC     Row Name 10/02/19 1123          Positioning and Restraints    Pre-Treatment Position  in bed  -SC     Post Treatment Position  chair  -SC     In Chair  notified nsg;reclined;sitting;call light within reach;encouraged to call for assist;exit alarm on  -SC       User Key  (r) = Recorded By, (t) = Taken By, (c) = Cosigned By    Initials Name Provider Type    Erika Whitlock, PT Physical Therapist        Outcome Measures     Row Name 10/02/19 1128          How much help from another person do you currently need...    Turning from your back to your side while in flat bed without using bedrails?  2  -SC     Moving to and from a bed to a chair (including a wheelchair)?  2  -SC     Standing up from a chair using your arms (e.g., wheelchair, bedside chair)?  2  -SC     Climbing 3-5 steps with a railing?  1  -SC     To walk in hospital room?  2  -SC       User Key  (r) = Recorded By, (t) = Taken By, (c) = Cosigned By    Initials Name Provider Type    Erika Whitlock, PT Physical Therapist        Physical Therapy  Education     Title: PT OT SLP Therapies (In Progress)     Topic: Physical Therapy (In Progress)     Point: Mobility training (In Progress)     Learning Progress Summary           Patient Acceptance, E, NR by SC at 10/2/2019 11:27 AM    Comment:  reviewed HEP    Eager, E, VU,NR by SC at 10/1/2019  3:03 PM    Comment:  reviewed benefits of activity    Acceptance, E,D, NR by  at 9/30/2019  9:33 AM    Comment:  Reviewed hip precautions, HEP, gait mechanics, correct transfer technique, benefits of mobility    Acceptance, E,D,H, NR by  at 9/29/2019  8:15 AM    Comment:  Edu re: hip precautions, HEP, correct transfer technique, benefits of mobility. Issued hip precautions handout                   Point: Home exercise program (In Progress)     Learning Progress Summary           Patient Acceptance, E, NR by SC at 10/2/2019 11:27 AM    Comment:  reviewed HEP    Eager, E, VU,NR by SC at 10/1/2019  3:03 PM    Comment:  reviewed benefits of activity    Acceptance, E,D, NR by  at 9/30/2019  9:33 AM    Comment:  Reviewed hip precautions, HEP, gait mechanics, correct transfer technique, benefits of mobility    Acceptance, E,D,H, NR by  at 9/29/2019  8:15 AM    Comment:  Edu re: hip precautions, HEP, correct transfer technique, benefits of mobility. Issued hip precautions handout                   Point: Body mechanics (In Progress)     Learning Progress Summary           Patient Acceptance, E, NR by SC at 10/2/2019 11:27 AM    Comment:  reviewed HEP    Eager, E, VU,NR by SC at 10/1/2019  3:03 PM    Comment:  reviewed benefits of activity    Acceptance, E,D, NR by  at 9/30/2019  9:33 AM    Comment:  Reviewed hip precautions, HEP, gait mechanics, correct transfer technique, benefits of mobility    Acceptance, E,D,H, NR by  at 9/29/2019  8:15 AM    Comment:  Edu re: hip precautions, HEP, correct transfer technique, benefits of mobility. Issued hip precautions handout                   Point: Precautions (In Progress)      Learning Progress Summary           Patient Acceptance, E, NR by SC at 10/2/2019 11:27 AM    Comment:  reviewed HEP    Eager, IZABELLA, VU,NR by SC at 10/1/2019  3:03 PM    Comment:  reviewed benefits of activity    Acceptance, E,D, NR by  at 9/30/2019  9:33 AM    Comment:  Reviewed hip precautions, HEP, gait mechanics, correct transfer technique, benefits of mobility    Acceptance, E,D,H, NR by  at 9/29/2019  8:15 AM    Comment:  Edu re: hip precautions, HEP, correct transfer technique, benefits of mobility. Issued hip precautions handout                               User Key     Initials Effective Dates Name Provider Type Discipline    SC 06/19/15 -  Erika Ocampo PT Physical Therapist PT    ELENA 04/03/18 -  Dorian Aden PT Physical Therapist PT              PT Recommendation and Plan     Outcome Summary/Treatment Plan (PT)  Anticipated Discharge Disposition (PT): skilled nursing facility  Plan of Care Reviewed With: patient  Progress: improving  Outcome Summary: Patietn with multiple complaints today of L calf spasms and L groin pain. This limited her overall mobility. She ambulated 20 feet wtih [employer] gait aid     Time Calculation:   PT Charges     Row Name 10/02/19 0953             Time Calculation    Start Time  0953  -SC      PT Received On  10/02/19  -SC      PT Goal Re-Cert Due Date  10/09/19  -SC         Time Calculation- PT    Total Timed Code Minutes- PT  35 minute(s)  -SC         Timed Charges    88177 - PT Therapeutic Exercise Minutes  15  -SC      18003 - Gait Training Minutes   5  -SC      42258 - PT Therapeutic Activity Minutes  15  -SC        User Key  (r) = Recorded By, (t) = Taken By, (c) = Cosigned By    Initials Name Provider Type    SC Erika Ocampo PT Physical Therapist        Therapy Charges for Today     Code Description Service Date Service Provider Modifiers Qty    58767214622 HC PT THER PROC EA 15 MIN 10/1/2019 Erika Ocampo, PT GP 1    56381688647 HC GAIT TRAINING EA 15 MIN  10/1/2019 Terrence, Erika LEOS, PT GP 1    52615115954 HC PT THER SUPP EA 15 MIN 10/1/2019 Terrence, Erika A, PT GP 2    21476962073 HC PT THER PROC EA 15 MIN 10/2/2019 Terrence, Carrollon DAFNE, PT GP 1    72047560468 HC PT THERAPEUTIC ACT EA 15 MIN 10/2/2019 Terrence, Carrollon DAFNE, PT GP 1    94496924479 HC PT THER SUPP EA 15 MIN 10/2/2019 TerrenceErika valle, PT GP 2          PT G-Codes  Outcome Measure Options: AM-PAC 6 Clicks Basic Mobility (PT)  AM-PAC 6 Clicks Score (PT): 11  AM-PAC 6 Clicks Score (OT): 14    Erika LOCKE Terrence, PT  10/2/2019

## 2019-10-02 NOTE — PROGRESS NOTES
Continued Stay Note  [facility]     Patient Name: Temi Jarrett  MRN: [id number]  Today's Date: 10/2/2019    Admit Date: 9/27/2019    Discharge Plan     Row Name 10/02/19 0914       Plan    Plan  [facility]    Patient/Family in Agreement with Plan  yes    Plan Comments  Per Gustavo burnett/ [facility], they are still following and are tentatively hoping to accept patient tomorrow if medically ready. Once an accepting unit has been finalized  will update the note with number to call report.     Final Discharge Disposition Code  62 - inpatient rehab facility        Discharge Codes    No documentation.       Expected Discharge Date and Time     Expected Discharge Date Expected Discharge Time    Oct 4, 2019             Sherly Howell RN

## 2019-10-02 NOTE — PLAN OF CARE
Problem: Patient Care Overview  Goal: Plan of Care Review  Outcome: Ongoing (interventions implemented as appropriate)   10/02/19 1127   Coping/Psychosocial   Plan of Care Reviewed With patient   OTHER   Outcome Summary Patient with multiple complaints today of L calf spasms and L groin pain. This limited her overall mobility. She ambulated 20 feet with mechanical gait aid

## 2019-10-02 NOTE — PROGRESS NOTES
[facility] Medicine Services  PROGRESS NOTE    Patient Name: Temi Jarrett  : 1930  MRN: [id number]    Date of Admission: 2019  Primary Care Physician: Eric Patel MD    Subjective   Subjective     CC:  Hip pain    HPI:  Hip still a bit sore, but improving since admission. Had a BM this morning. Asked patient several times regarding possible urinary retention, but patient does not think this is a problem now.    Review of Systems  Gen- No fevers, chills  CV- No chest pain, palpitations  Resp- No cough, dyspnea  GI- No N/V/D, abd pain      Objective   Objective     Vital Signs:   Temp:  [97.8 °F (36.6 °C)-98.2 °F (36.8 °C)] 97.8 °F (36.6 °C)  Heart Rate:  [66-85] 74  Resp:  [14-18] 18  BP: ()/(46-76) 97/55        Physical Exam:  Constitutional - no acute distress, non toxic, up in the recliner, asleep but awakens easily to voice, frail.  HEENT- NCAT, mucous membranes moist  CV- RRR, S1 S2 normal, no m/r/g  Resp - CTAB, no wheezes, rhonchi or rales  Abd-soft, non-tender, non-distended, normo active bowel sounds  Ext - No lower extremity cyanosis, clubbing or edema bilaterally  Neuro-alert, speech clear, moves all extremities   Psych-normal affect   Skin- No rash on exposed UE or LE bilaterally      Results Reviewed:    Results from last 7 days   Lab Units 10/01/19  0757 19  1104 19  0915  19  0646 19  1824   WBC 10*3/mm3 9.65 13.86* 13.67*   < >  --  14.96*   HEMOGLOBIN g/dL 10.0* 11.3* 11.8*   < >  --  11.9*   HEMATOCRIT % 32.1* 36.0 37.1   < >  --  36.3   PLATELETS 10*3/mm3 182 210 234   < >  --  213   INR   --   --   --   --   --  1.12   PROCALCITONIN ng/mL  --  0.32*  --   --  0.16  --     < > = values in this interval not displayed.     Results from last 7 days   Lab Units 10/01/19  0757 19  1104 19  0646 19  1824   SODIUM mmol/L 138 139 144 141   POTASSIUM mmol/L 4.1 3.0* 4.4 4.4   CHLORIDE mmol/L 101 98 106 103   CO2  mmol/L 33.0* 32.0* 30.0* 28.0   BUN mg/dL 20 17 25* 37*   CREATININE mg/dL 0.55* 0.68 0.61 0.76   GLUCOSE mg/dL 99 129* 94 118*   CALCIUM mg/dL 8.1* 8.2* 8.6 8.8   ALT (SGPT) U/L  --   --   --  19   AST (SGOT) U/L  --   --   --  20     Estimated Creatinine Clearance: 40.1 mL/min (A) (by C-G formula based on SCr of 0.55 mg/dL (L)).    Microbiology Results Abnormal     None          Imaging Results (last 24 hours)     Procedure Component Value Units Date/Time    XR Chest 1 View [[id number]] Collected:  10/01/19 0724     Updated:  10/01/19 2115    Narrative:       EXAMINATION: XR CHEST 1 VW-09/30/2019:      INDICATION: Low grade temps, hip fracture.; S72.002A-Fracture of  unspecified part of neck of left femur, initial encounter for closed  fracture; I10-Essential (primary) hypertension; K44.9-Diaphragmatic  hernia without obstruction or gangrene; W19.XXXA-Unspecified fall,  initial encounter; S72.002A-Fracture of unspecified part of neck of left  femur, initial encounter for closed fracture; Z74.09-Other reduced mo.      COMPARISON: 09/27/2019.     FINDINGS: Very large hiatal hernia is noted. The heart is upper normal  size.  The vasculature is at upper limits of normal. Mild nonspecific  interstitial changes appear stable. There is trace bibasilar pleural  effusion.           Impression:       1.  Pulmonary venous hypertension and trace bibasilar pleural effusion.  2.  Very large hiatal hernia as on the prior study.     D:  10/01/2019  E:  10/01/2019     This report was finalized on 10/1/2019 9:11 PM by DR. Joni Lagos MD.                  I have reviewed the medications:  Scheduled Meds:    aspirin 81 mg Oral Daily   busPIRone 10 mg Oral BID   donepezil 10 mg Oral Nightly   enoxaparin 40 mg Subcutaneous Nightly   gabapentin 300 mg Oral BID   memantine 10 mg Oral BID   multivitamin with minerals 1 tablet Oral Daily   pantoprazole 40 mg Oral QAM   sertraline 50 mg Oral Daily   sodium chloride 10 mL Intravenous Q12H    sodium chloride 75 mL/hr Intravenous Once   sucralfate 1 g Oral TID AC   levothyroxine 175 mcg Oral Q AM   tamsulosin 0.4 mg Oral Daily     Continuous Infusions:    lactated ringers 9 mL/hr    sodium chloride 75 mL/hr Last Rate: 75 mL/hr (10/02/19 1348)     PRN Meds:.•  acetaminophen **OR** acetaminophen **OR** acetaminophen  •  bisacodyl  •  bisacodyl  •  diphenoxylate-atropine  •  magnesium sulfate **OR** magnesium sulfate **OR** magnesium sulfate  •  Morphine **AND** naloxone  •  ondansetron  •  potassium chloride **OR** potassium chloride **OR** potassium chloride  •  [COMPLETED] Insert peripheral IV **AND** sodium chloride  •  sodium chloride  •  traMADol      Assessment/Plan   Assessment / Plan     Active Hospital Problems    Diagnosis  POA   • **Closed fracture of neck of left femur (CMS/[facility]) [S72.002A]  Yes   • Primary osteoarthritis of both knees [M17.0]  Yes   • Pernicious anemia [D51.0]  Yes   • Osteoporosis [M81.0]  Yes   • Multi-infarct dementia (CMS/[facility]) [F01.50]  Yes   • Hyperlipidemia [E78.5]  Yes   • Hypothyroidism [E03.9]  Yes   • Generalized anxiety disorder [F41.1]  Yes   • Esophageal reflux [K21.9]  Yes   • Benign essential hypertension [I10]  Yes      Resolved Hospital Problems   No resolved problems to display.        Brief Hospital Course to date:  Temi Jarrett is a 89 y.o. female with history of dementia and osteoporosis with mechanical fall found to have a left hip fracture s/p LT hemiarthroplasty with  9/28/19     Assessment/Plan     LT subcapital neck fracture of the femur s/p mechanical fall  S/p hemiarthroplasty 9/28/19 with Dr. Segundo  - pain control  - PT/OT  - lovenox 40mg DA x2 weeks followed by ASA 325mg DA x4 weeks per Dr. Segundo    Leukocytosis with low grade temperature  - leukocytosis resolved  - no further elevated temps after encouraging regular incentive spirometry use    Urinary retention  - post surgery  - added flomax  - patient denied ongoing retention  today, but I see that later this afternoon she required I/O catheterization  - could consider adding bethanechol, but would prefer simply to get patient up to toilet for voiding attempts    Hypotension  - low bp this morning with systolics in 80's -- suspect secondary to narcotics and muscle relaxers  - yesterday had cut back antihypertensives signficantly  - today with stop all antihypertensives, stop flexeril, give gentle IV fluids x 12 hrs    HTN  - will need to add back BP meds as needed while at rehab    Normocytic anemia, history of pernicious anemia  - stable     Dementia  - aricept, namenda     DVT Prophylaxis:  Lovenox     Disposition: I expect the patient to be discharged pending PT/OT and rehab placement needs, may be medically ready in am.      CODE STATUS:   Code Status and Medical Interventions:   Ordered at: 09/27/19 2124     Level Of Support Discussed With:    Patient     Code Status:    CPR     Medical Interventions (Level of Support Prior to Arrest):    Full         Electronically signed by Jose Mustafa MD, 10/02/19, 4:36 PM.

## 2019-10-02 NOTE — PLAN OF CARE
Problem: Patient Care Overview  Goal: Plan of Care Review  Outcome: Ongoing (interventions implemented as appropriate)   10/02/19 0419   Coping/Psychosocial   Plan of Care Reviewed With patient   Plan of Care Review   Progress no change   OTHER   Outcome Summary Patient Alert and Oriented x3. Has short term memory deficit and mild confusion at times; easily re-oriented. VSS. She continues with inability to void. Monitored by bladder scan and I&O cath as needed. Pain controlled well with po meds. Was able to be transported in her bed to visit with her spouse in room 366 last night. Left hip dressing CDI, no drainage. Possible transfer to [facility] when medically ready.       Problem: Fall Risk (Adult)  Goal: Absence of Fall  Outcome: Ongoing (interventions implemented as appropriate)

## 2019-10-02 NOTE — PLAN OF CARE
Problem: Patient Care Overview  Goal: Plan of Care Review  Outcome: Ongoing (interventions implemented as appropriate)   10/02/19 1059   Coping/Psychosocial   Plan of Care Reviewed With patient   OTHER   Outcome Summary Pt declined standing/transfers d/t abd. discomfort however motivated to complete grooming and UB bathing tasks. Pt requires significantly more time to complete all activities however SUA. Pt w/difficulty recalling THP.        Problem: Hip Arthroplasty (Total, Partial) (Adult)  Goal: Signs and Symptoms of Listed Potential Problems Will be Absent, Minimized or Managed (Hip Arthroplasty)  Outcome: Ongoing (interventions implemented as appropriate)   10/02/19 1059   Goal/Outcome Evaluation   Problems Assessed (Hip Arthroplasty) functional deficit   Problems Present (Hip Arthroplasty) functional deficit

## 2019-10-02 NOTE — PROGRESS NOTES
"Orthopedic Daily Progress Note      CC: YULI overnight. Potential d/c tho [facility] when medically ready and approved by insurance.    Pain well controlled  General: no fevers, chills  Abdomen: no nausea, vomiting, or diarrhea    No other complaints    Physical Exam:  I have reviewed the vital signs.  Temp:  [98.1 °F (36.7 °C)-98.2 °F (36.8 °C)] 98.1 °F (36.7 °C)  Heart Rate:  [66-85] 73  Resp:  [14-16] 16  BP: ()/(50-76) 103/58    Objective:  Vital signs: (most recent): Blood pressure 103/58, pulse 73, temperature 98.1 °F (36.7 °C), temperature source Oral, resp. rate 16, height 154.9 cm (61\"), weight 61.5 kg (135 lb 9.6 oz), SpO2 95 %, not currently breastfeeding.            General Appearance:    Alert, cooperative, no distress  Extremities: incision c/d/i  Pulses:  2+ in distal surgical extremity        Results Review:    I have reviewed the labs, radiology results and diagnostic studies:    Results from last 7 days   Lab Units 10/01/19  0757   WBC 10*3/mm3 9.65   HEMOGLOBIN g/dL 10.0*   PLATELETS 10*3/mm3 182     Results from last 7 days   Lab Units 10/01/19  0757   SODIUM mmol/L 138   POTASSIUM mmol/L 4.1   CO2 mmol/L 33.0*   CREATININE mg/dL 0.55*   GLUCOSE mg/dL 99       I have reviewed the medications.    Assessment/Problem List  POD #4 L hip helio    Plan  WBAT LLE with post hip precautions x 6weeks  PT/OT  DVT ppx with Lovenox 40 mg sq daily x 2 weeks followed by  mg daily x 4 weeks    F/u in 2-3 weeks at [facility].      Reddy Segundo Jr., MD  10/02/19  8:20 AM            "

## 2019-10-03 PROCEDURE — 94799 UNLISTED PULMONARY SVC/PX: CPT

## 2019-10-03 PROCEDURE — P9612 CATHETERIZE FOR URINE SPEC: HCPCS

## 2019-10-03 PROCEDURE — 97110 THERAPEUTIC EXERCISES: CPT | Performed by: PHYSICAL THERAPIST

## 2019-10-03 PROCEDURE — 99232 SBSQ HOSP IP/OBS MODERATE 35: CPT | Performed by: INTERNAL MEDICINE

## 2019-10-03 PROCEDURE — 97530 THERAPEUTIC ACTIVITIES: CPT | Performed by: PHYSICAL THERAPIST

## 2019-10-03 PROCEDURE — 25010000002 ENOXAPARIN PER 10 MG: Performed by: INTERNAL MEDICINE

## 2019-10-03 RX ORDER — BETHANECHOL CHLORIDE 10 MG/1
5 TABLET ORAL 3 TIMES DAILY
Status: DISCONTINUED | OUTPATIENT
Start: 2019-10-03 | End: 2019-10-04

## 2019-10-03 RX ADMIN — MULTIPLE VITAMINS W/ MINERALS TAB 1 TABLET: TAB ORAL at 08:18

## 2019-10-03 RX ADMIN — GABAPENTIN 300 MG: 300 CAPSULE ORAL at 20:15

## 2019-10-03 RX ADMIN — DONEPEZIL HYDROCHLORIDE 10 MG: 10 TABLET, FILM COATED ORAL at 20:14

## 2019-10-03 RX ADMIN — MEMANTINE 10 MG: 10 TABLET ORAL at 08:18

## 2019-10-03 RX ADMIN — BUSPIRONE HYDROCHLORIDE 10 MG: 10 TABLET ORAL at 20:14

## 2019-10-03 RX ADMIN — BUSPIRONE HYDROCHLORIDE 10 MG: 10 TABLET ORAL at 08:18

## 2019-10-03 RX ADMIN — GABAPENTIN 300 MG: 300 CAPSULE ORAL at 08:18

## 2019-10-03 RX ADMIN — LEVOTHYROXINE SODIUM 175 MCG: 175 TABLET ORAL at 06:01

## 2019-10-03 RX ADMIN — SODIUM CHLORIDE, PRESERVATIVE FREE 10 ML: 5 INJECTION INTRAVENOUS at 20:15

## 2019-10-03 RX ADMIN — ENOXAPARIN SODIUM 40 MG: 40 INJECTION SUBCUTANEOUS at 20:15

## 2019-10-03 RX ADMIN — PANTOPRAZOLE SODIUM 40 MG: 40 TABLET, DELAYED RELEASE ORAL at 06:01

## 2019-10-03 RX ADMIN — BETHANECHOL CHLORIDE 5 MG: 10 TABLET ORAL at 20:30

## 2019-10-03 RX ADMIN — SERTRALINE HYDROCHLORIDE 50 MG: 50 TABLET ORAL at 08:18

## 2019-10-03 RX ADMIN — TRAMADOL HYDROCHLORIDE 25 MG: 50 TABLET, FILM COATED ORAL at 20:15

## 2019-10-03 RX ADMIN — MEMANTINE 10 MG: 10 TABLET ORAL at 20:15

## 2019-10-03 RX ADMIN — SUCRALFATE 1 G: 1 TABLET ORAL at 17:14

## 2019-10-03 RX ADMIN — SUCRALFATE 1 G: 1 TABLET ORAL at 10:36

## 2019-10-03 RX ADMIN — TAMSULOSIN HYDROCHLORIDE 0.4 MG: 0.4 CAPSULE ORAL at 08:18

## 2019-10-03 NOTE — THERAPY TREATMENT NOTE
Patient Name: Temi Jarrett  : 1930    MRN: [id number]                              Today's Date: 10/3/2019       Admit Date: 2019    Visit Dx:     ICD-10-CM ICD-9-CM   1. Closed fracture of neck of left femur, initial encounter (CMS/HCC) S72.002A 820.8   2. Elevated blood pressure reading with diagnosis of hypertension I10 401.9   3. Hiatal hernia K44.9 553.3   4. Fall, initial encounter W19.XXXA E888.9   5. Fracture, hip, left, closed, initial encounter (CMS/HCC) S72.002A 820.8   6. Impaired functional mobility, balance, gait, and endurance Z74.09 V49.89   7. Impaired mobility and ADLs Z74.09 799.89     Patient Active Problem List   Diagnosis   • Skin tear of lower leg without complication, right, initial encounter   • Esophageal reflux   • Hypothyroidism   • Generalized anxiety disorder   • Hyperlipidemia   • Multi-infarct dementia (CMS/HCC)   • Peripheral neuropathy   • Carpal tunnel syndrome of right wrist   • Spinal stenosis of lumbar region   • Osteoporosis   • Fever   • Urinary frequency   • Peripheral venous insufficiency   • Disc disorder of lumbar region   • Bladder prolapse, female, acquired   • Acute right-sided low back pain without sciatica   • Pain, knee   • Benign essential hypertension   • Pernicious anemia   • Memory loss   • Annual physical exam   • Primary osteoarthritis of both knees   • Chest pain, atypical   • Hiatal hernia with intrathoracic stomach   • Closed fracture of neck of left femur (CMS/[facility])     Past Medical History:   Diagnosis Date   • Disease of thyroid gland    • Gastric polyp    • History of colonic polyps    • Hypertension    • IBS (irritable bowel syndrome)    • Macular degeneration    • Torn meniscus     right knee      Past Surgical History:   Procedure Laterality Date   • CHOLECYSTECTOMY     • EYE SURGERY      Cataract extraction   • HERNIA REPAIR     • HIP HEMIARTHROPLASTY Left 2019    Procedure: HIP HEMIARTHROPLASTY LEFT;  Surgeon: Reddy Segundo  MARIANNE Palacios MD;  Location: formerly [facility] OR;  Service: Orthopedics   • HYSTERECTOMY  1976   • KNEE SURGERY Right     arthroscopy- 2000   • TONSILLECTOMY       General Information     Row Name 10/03/19 1100          PT Evaluation Time/Intention    Document Type  therapy note (daily note)  -CS     Mode of Treatment  physical therapy  -CS     Row Name 10/03/19 1100          General Information    Patient Profile Reviewed?  yes  -CS     Existing Precautions/Restrictions  hip, posterior;fall  -CS     Row Name 10/03/19 1100          Cognitive Assessment/Intervention- PT/OT    Orientation Status (Cognition)  oriented to;person;place;situation  -CS     Row Name 10/03/19 1100          Safety Issues, Functional Mobility    Impairments Affecting Function (Mobility)  endurance/activity tolerance;balance;pain;strength  -CS       User Key  (r) = Recorded By, (t) = Taken By, (c) = Cosigned By    Initials Name Provider Type    CS Leandra Elise, PT Physical Therapist        Mobility     Row Name 10/03/19 1100          Bed Mobility Assessment/Treatment    Bed Mobility Assessment/Treatment  supine-sit  -CS     Supine-Sit [place] (Bed Mobility)  maximum assist (25% patient effort);1 person assist;verbal cues  -CS     Assistive Device (Bed Mobility)  draw sheet;head of bed elevated  -CS     Comment (Bed Mobility)  Pt tends to push back with supine to sit. Several cues to not push back.   -CS     Row Name 10/03/19 1100          Transfer Assessment/Treatment    Comment (Transfers)  VC's with STS on hand placement of walker. Pt reports L calf pain with standing and also reported abdominal pain due to needing to go to the bathroom.   -CS     Row Name 10/03/19 1100          Bed-Chair Transfer    Bed-Chair [place] (Transfers)  1 person assist;minimum assist (75% patient effort)  -CS     Assistive Device (Bed-Chair Transfers)  walker, front-wheeled  -CS     Row Name 10/03/19 1100          Sit-Stand Transfer    Sit-Stand [place]  (Transfers)  moderate assist (50% patient effort);1 person assist  -CS     Assistive Device (Sit-Stand Transfers)  walker, front-wheeled  -CS     Row Name 10/03/19 1100          Gait/Stairs Assessment/Training    83028 - Gait Training Minutes   5  -CS     Gait/Stairs Assessment/Training  gait/ambulation independence;gait/ambulation assistive device;distance ambulated;gait pattern  -CS     [place] Level (Gait)  minimum assist (75% patient effort);1 person assist  -CS     Assistive Device (Gait)  walker, front-wheeled  -CS     Distance in Feet (Gait)  10'   -CS     Pattern (Gait)  step-to  -CS     Deviations/Abnormal Patterns (Gait)  left sided deviations;antalgic;kane decreased;stride length decreased  -CS     Bilateral Gait Deviations  forward flexed posture  -CS     Left Sided Gait Deviations  weight shift ability decreased  -CS     Comment (Gait/Stairs)  Pt ambulated 10' with RW with min assist. Pt required several cues to maintain upright posture and to increase WB on L LE. Pt was limited due to weakness.   -CS     Row Name 10/03/19 1100          Mobility Assessment/Intervention    Extremity Weight-bearing Status  left lower extremity  -CS     Left Lower Extremity (Weight-bearing Status)  weight-bearing as tolerated (WBAT)  -CS       User Key  (r) = Recorded By, (t) = Taken By, (c) = Cosigned By    Initials Name Provider Type    CS Leandra Elise PT Physical Therapist        Obj/Interventions     Row Name 10/03/19 1100          Therapeutic Exercise    Lower Extremity (Therapeutic Exercise)  gluteal sets;quad sets, bilateral;heel slides, left  -CS     Lower Extremity Range of Motion (Therapeutic Exercise)  hip abduction/adduction, left;ankle dorsiflexion/plantar flexion, bilateral  -     Exercise Type (Therapeutic Exercise)  AAROM (active assistive range of motion);AROM (active range of motion);isotonic contraction, concentric;isometric contraction, static  -CS     Position (Therapeutic Exercise)   supine  -CS     Sets/Reps (Therapeutic Exercise)  10 each   -CS     Comment (Therapeutic Exercise)  Cues for technique   -CS       User Key  (r) = Recorded By, (t) = Taken By, (c) = Cosigned By    Initials Name Provider Type    CS Leandra Elise, PT Physical Therapist        Goals/Plan     Row Name 10/03/19 1100          Bed Mobility Goal 1 (PT)    Activity/Assistive Device (Bed Mobility Goal 1, PT)  sit to supine/supine to sit  -CS     [place] Level/Cues Needed (Bed Mobility Goal 1, PT)  moderate assist (50-74% patient effort);2 person assist  -CS     Time Frame (Bed Mobility Goal 1, PT)  1 week;long term goal (LTG)  -CS     Progress/Outcomes (Bed Mobility Goal 1, PT)  goal ongoing  -CS     Row Name 10/03/19 1100          Transfer Goal 1 (PT)    Activity/Assistive Device (Transfer Goal 1, PT)  sit-to-stand/stand-to-sit;walker, rolling  -CS     [place] Level/Cues Needed (Transfer Goal 1, PT)  minimum assist (75% or more patient effort);2 person assist  -CS     Time Frame (Transfer Goal 1, PT)  long term goal (LTG);1 week  -CS     Progress/Outcome (Transfer Goal 1, PT)  goal ongoing  -CS     Row Name 10/03/19 1100          Gait Training Goal 1 (PT)    Activity/Assistive Device (Gait Training Goal 1, PT)  gait (walking locomotion);walker, rolling  -CS     [place] Level (Gait Training Goal 1, PT)  moderate assist (50-74% patient effort);2 person assist  -CS     Time Frame (Gait Training Goal 1, PT)  1 week;long term goal (LTG)  -CS     Progress/Outcome (Gait Training Goal 1, PT)  goal ongoing  -CS       User Key  (r) = Recorded By, (t) = Taken By, (c) = Cosigned By    Initials Name Provider Type    Leandra Vogt PT Physical Therapist        Clinical Impression     Row Name 10/03/19 1100          Pain Assessment    Additional Documentation  Pain Scale: FACES Pre/Post-Treatment (Group)  -CS     Row Name 10/03/19 1100          Pain Scale: Numbers Pre/Post-Treatment    Pain Location - Side   Left  -CS     Pain Location - Orientation  lower  -CS     Pain Location  hip  -CS     Pain Intervention(s)  Repositioned;Ambulation/increased activity  -CS     Row Name 10/03/19 1100          Pain Scale: FACES Pre/Post-Treatment    Pain: FACES Scale, Pretreatment  2-->hurts little bit  -CS     Pain: FACES Scale, Post-Treatment  4-->hurts little more  -CS     Pre/Post Treatment Pain Comment  Pt reports increase in L calf pain. Reported no more abdominal pain at end of therapy session after using bathroom.   -CS     Row Name 10/03/19 1100          Plan of Care Review    Plan of Care Reviewed With  patient  -CS     Row Name 10/03/19 1100          Positioning and Restraints    Pre-Treatment Position  in bed  -CS     Post Treatment Position  chair  -CS     In Chair  reclined;call light within reach;encouraged to call for assist;exit alarm on;on mechanical lift sling  -CS       User Key  (r) = Recorded By, (t) = Taken By, (c) = Cosigned By    Initials Name Provider Type    Leandra Vogt PT Physical Therapist        Outcome Measures     Row Name 10/03/19 1100          How much help from another person do you currently need...    Turning from your back to your side while in flat bed without using bedrails?  2  -CS     Moving from lying on back to sitting on the side of a flat bed without bedrails?  2  -CS     Moving to and from a bed to a chair (including a wheelchair)?  2  -CS     Standing up from a chair using your arms (e.g., wheelchair, bedside chair)?  2  -CS     Climbing 3-5 steps with a railing?  1  -CS     To walk in hospital room?  3  -CS     AM-PAC 6 Clicks Score (PT)  12  -CS     Row Name 10/03/19 1100          Functional Assessment    Outcome Measure Options  AM-PAC 6 Clicks Basic Mobility (PT)  -CS       User Key  (r) = Recorded By, (t) = Taken By, (c) = Cosigned By    Initials Name Provider Type    Leandra Vogt PT Physical Therapist        Physical Therapy Education     Title: PT OT SLP  Therapies (Done)     Topic: Physical Therapy (Done)     Point: Mobility training (Done)     Learning Progress Summary           Patient Acceptance, E,TB,H, VU,NR by  at 10/3/2019 11:00 AM    Comment:  Educated patient on hip precautions and patient was able to recite back keeping toes forward but could not recall the others. Provided pt handout in room as reminder.    Acceptance, E, NR by SC at 10/2/2019 11:27 AM    Comment:  reviewed HEP    Eager, E, VU,NR by SC at 10/1/2019  3:03 PM    Comment:  reviewed benefits of activity    Acceptance, E,D, NR by  at 9/30/2019  9:33 AM    Comment:  Reviewed hip precautions, HEP, gait mechanics, correct transfer technique, benefits of mobility    Acceptance, E,D,H, NR by  at 9/29/2019  8:15 AM    Comment:  Edu re: hip precautions, HEP, correct transfer technique, benefits of mobility. Issued hip precautions handout                   Point: Home exercise program (Done)     Learning Progress Summary           Patient Acceptance, E,TB,H, VU,NR by  at 10/3/2019 11:00 AM    Comment:  Educated patient on hip precautions and patient was able to recite back keeping toes forward but could not recall the others. Provided pt handout in room as reminder.    Acceptance, E, NR by SC at 10/2/2019 11:27 AM    Comment:  reviewed HEP    Eager, E, VU,NR by SC at 10/1/2019  3:03 PM    Comment:  reviewed benefits of activity    Acceptance, E,D, NR by  at 9/30/2019  9:33 AM    Comment:  Reviewed hip precautions, HEP, gait mechanics, correct transfer technique, benefits of mobility    Acceptance, E,D,H, NR by  at 9/29/2019  8:15 AM    Comment:  Edu re: hip precautions, HEP, correct transfer technique, benefits of mobility. Issued hip precautions handout                   Point: Body mechanics (Done)     Learning Progress Summary           Patient Acceptance, E,TB,H, VU,NR by  at 10/3/2019 11:00 AM    Comment:  Educated patient on hip precautions and patient was able to recite back  keeping toes forward but could not recall the others. Provided pt handout in room as reminder.    Acceptance, E, NR by SC at 10/2/2019 11:27 AM    Comment:  reviewed HEP    Eager, E, VU,NR by SC at 10/1/2019  3:03 PM    Comment:  reviewed benefits of activity    Acceptance, E,D, NR by  at 9/30/2019  9:33 AM    Comment:  Reviewed hip precautions, HEP, gait mechanics, correct transfer technique, benefits of mobility    Acceptance, E,D,H, NR by  at 9/29/2019  8:15 AM    Comment:  Edu re: hip precautions, HEP, correct transfer technique, benefits of mobility. Issued hip precautions handout                   Point: Precautions (Done)     Learning Progress Summary           Patient Acceptance, E,TB,H, VU,NR by  at 10/3/2019 11:00 AM    Comment:  Educated patient on hip precautions and patient was able to recite back keeping toes forward but could not recall the others. Provided pt handout in room as reminder.    Acceptance, E, NR by SC at 10/2/2019 11:27 AM    Comment:  reviewed HEP    Eager, E, VU,NR by SC at 10/1/2019  3:03 PM    Comment:  reviewed benefits of activity    Acceptance, E,D, NR by  at 9/30/2019  9:33 AM    Comment:  Reviewed hip precautions, HEP, gait mechanics, correct transfer technique, benefits of mobility    Acceptance, E,D,H, NR by  at 9/29/2019  8:15 AM    Comment:  Edu re: hip precautions, HEP, correct transfer technique, benefits of mobility. Issued hip precautions handout                               User Key     Initials Effective Dates Name Provider Type Discipline    SC 06/19/15 -  Erika Ocampo, PT Physical Therapist PT     04/03/18 -  Dorian Aden, PT Physical Therapist PT     03/26/19 -  Leandra Elise, PT Physical Therapist PT              PT Recommendation and Plan  Planned Therapy Interventions (PT Eval): balance training, bed mobility training, gait training, home exercise program, patient/family education, strengthening, transfer training  Plan of Care Reviewed  With: patient  Progress: no change  Outcome Summary: Pt able to ambulate 10' in room with RW min assist x 1. Pt required cuing to maintain upright posture and increase WB on L LE. Pt also able to transfer to [facility] with min assist. Pt requires mod assist for STS and cues on hand placement with walker. Pt continues to require cues for hip precautions.     Time Calculation:   PT Charges     Row Name 10/03/19 1100             Time Calculation    Start Time  1100  -CS      PT Received On  10/03/19  -         Time Calculation- PT    Total Timed Code Minutes- PT  26 minute(s)  -CS         Timed Charges    60523 - PT Therapeutic Exercise Minutes  8  -CS      04148 - Gait Training Minutes   5  -CS      67643 - PT Therapeutic Activity Minutes  13  -CS        User Key  (r) = Recorded By, (t) = Taken By, (c) = Cosigned By    Initials Name Provider Type    CS Leandra Elise, PT Physical Therapist        Therapy Charges for Today     Code Description Service Date Service Provider Modifiers Qty    93721700917 HC PT THERAPEUTIC ACT EA 15 MIN 10/3/2019 Leandra Elise, PT GP 1    26278933159 HC PT THER PROC EA 15 MIN 10/3/2019 Leandra Elise, PT GP 1          PT G-Codes  Outcome Measure Options: AM-PAC 6 Clicks Basic Mobility (PT)  AM-PAC 6 Clicks Score (PT): 12  AM-PAC 6 Clicks Score (OT): 15    Leandra Elise PT  10/3/2019

## 2019-10-03 NOTE — PLAN OF CARE
Problem: Patient Care Overview  Goal: Plan of Care Review  Outcome: Ongoing (interventions implemented as appropriate)   10/03/19 0500   Coping/Psychosocial   Plan of Care Reviewed With patient   Plan of Care Review   Progress no change   OTHER   Outcome Summary Patient alert and oriented x3. She does have short term memory loss and confusion at times. BP remains on lower end. VSS. Left hip dressing intact, no SS of infection. Pain controlled with po tramadol. She remains Unable to void. Requires I&O catheterization to drain bladder despite medications and C voiding trials. She is requesting a Urology consult. Bed available today at [facility] and transportation arranged for 1300 if medically ready.       Problem: Fall Risk (Adult)  Goal: Absence of Fall  Outcome: Ongoing (interventions implemented as appropriate)

## 2019-10-03 NOTE — PLAN OF CARE
Problem: Patient Care Overview  Goal: Plan of Care Review  Outcome: Ongoing (interventions implemented as appropriate)   10/03/19 1100   Coping/Psychosocial   Plan of Care Reviewed With patient   Plan of Care Review   Progress no change   OTHER   Outcome Summary Pt able to ambulate 10' in room with RW min assist x 1. Pt required cuing to maintain upright posture and increase WB on L LE. Pt also able to transfer to [facility] with min assist. Pt requires mod assist for STS and cues on hand placement with walker. Pt continues to require cues for hip precautions.

## 2019-10-03 NOTE — PROGRESS NOTES
"Orthopedic Daily Progress Note      CC: YULI overnight. Potential d/c [facility] tomorrow, 10/4.    Pain well controlled  General: no fevers, chills  Abdomen: no nausea, vomiting, or diarrhea    No other complaints    Physical Exam:  I have reviewed the vital signs.  Temp:  [97.2 °F (36.2 °C)-99.8 °F (37.7 °C)] 97.2 °F (36.2 °C)  Heart Rate:  [64-82] 70  Resp:  [16-18] 18  BP: ()/(43-55) 101/53    Objective:  Vital signs: (most recent): Blood pressure 101/53, pulse 70, temperature 97.2 °F (36.2 °C), temperature source Oral, resp. rate 18, height 154.9 cm (61\"), weight 61.5 kg (135 lb 9.6 oz), SpO2 98 %, not currently breastfeeding.            General Appearance:    Alert, cooperative, no distress  Extremities: incision c/d/i  Pulses:  2+ in distal surgical extremity        Results Review:    I have reviewed the labs, radiology results and diagnostic studies:    Results from last 7 days   Lab Units 10/01/19  0757   WBC 10*3/mm3 9.65   HEMOGLOBIN g/dL 10.0*   PLATELETS 10*3/mm3 182     Results from last 7 days   Lab Units 10/01/19  0757   SODIUM mmol/L 138   POTASSIUM mmol/L 4.1   CO2 mmol/L 33.0*   CREATININE mg/dL 0.55*   GLUCOSE mg/dL 99       I have reviewed the medications.    Assessment/Problem List  POD #5 L hip helio    Plan  WBAT LLE with post hip precautions x 6weeks  PT/OT  DVT ppx with Lovenox 40 mg sq daily x 2 weeks followed by  mg daily x 4 weeks  I will be out of town starting tomorrow, 10/4. Should problems/questions arise, please call me at [phone].    F/u in 2-3 weeks at [facility].      Reddy Segundo Jr., MD  10/03/19  2:11 PM            "

## 2019-10-03 NOTE — PLAN OF CARE
Problem: Skin Injury Risk (Adult)  Goal: Identify Related Risk Factors and Signs and Symptoms  Outcome: Ongoing (interventions implemented as appropriate)   10/03/19 2809   Skin Injury Risk (Adult)   Related Risk Factors (Skin Injury Risk) advanced age;tissue perfusion altered

## 2019-10-03 NOTE — PLAN OF CARE
Problem: Patient Care Overview  Goal: Plan of Care Review  Outcome: Ongoing (interventions implemented as appropriate)   10/03/19 1800   Coping/Psychosocial   Plan of Care Reviewed With patient   Plan of Care Review   Progress no change   OTHER   Outcome Summary pain controlled still unable to void rehab vitals WNL site CDI

## 2019-10-03 NOTE — PROGRESS NOTES
Continued Stay Note  [facility]     Patient Name: Temi Jarrett  MRN: [id number]  Today's Date: 10/3/2019    Admit Date: 9/27/2019    Discharge Plan     Row Name 10/03/19 1109       Plan    Plan  [facility]    Plan Comments  Discussed patient in am rounds.  Patient not medically ready for discharge today.  Updated Gustavo with [facility].  EMS rescheduled for tomorrow, 10/4 at 1300.   will continue to follow.  Jemma Vargas RN [phone]    Final Discharge Disposition Code  62 - inpatient rehab facility        Discharge Codes    No documentation.       Expected Discharge Date and Time     Expected Discharge Date Expected Discharge Time    Oct 3, 2019             Jemma Vargas RN

## 2019-10-03 NOTE — DISCHARGE INSTRUCTIONS
You have a prineo dressing in place. This dressing is waterproof and you may shower with it in place. It will remain in place for 2 weeks and does not need to be covered.

## 2019-10-03 NOTE — PROGRESS NOTES
[facility] Medicine Services  PROGRESS NOTE    Patient Name: Temi Jarrett  : 1930  MRN: [id number]    Date of Admission: 2019  Primary Care Physician: Eric Patel MD    Subjective   Subjective     CC:  Hip pain    HPI:  Hip still remains a bit tender. Feels fatigued. Concerned about spouse.    Review of Systems  Gen- No fevers, chills  CV- No chest pain, palpitations  Resp- No cough, dyspnea  GI- No N/V/D, abd pain          Objective   Objective     Vital Signs:   Temp:  [97.2 °F (36.2 °C)-99.6 °F (37.6 °C)] 99.4 °F (37.4 °C)  Heart Rate:  [64-91] 91  Resp:  [16-18] 16  BP: ()/(43-85) 108/85        Physical Exam:  Constitutional -non toxic, frail female, in bed  HEENT-NCAT, mucous membranes moist  CV-RRR, S1 S2 normal, no m/r/g  Resp-CTAB, no wheezes, rhonchi or rales  Abd-soft, non-tender, non-distended, normo active bowel sounds  Ext-No lower extremity cyanosis, clubbing or edema bilaterally  Neuro-alert and oriented, speech clear, moves all extremities   Psych-normal affect   Skin- No rash on exposed UE or LE bilaterally        Results Reviewed:    Results from last 7 days   Lab Units 10/01/19  0757 09/30/19  1104 09/29/19  0915  19  1824   WBC 10*3/mm3 9.65 13.86* 13.67*   < >  --  14.96*   HEMOGLOBIN g/dL 10.0* 11.3* 11.8*   < >  --  11.9*   HEMATOCRIT % 32.1* 36.0 37.1   < >  --  36.3   PLATELETS 10*3/mm3 182 210 234   < >  --  213   INR   --   --   --   --   --  1.12   PROCALCITONIN ng/mL  --  0.32*  --   --  0.16  --     < > = values in this interval not displayed.     Results from last 7 days   Lab Units 10/01/19  0757 09/30/19  11019/19  1824   SODIUM mmol/L 138 139 144 141   POTASSIUM mmol/L 4.1 3.0* 4.4 4.4   CHLORIDE mmol/L 101 98 106 103   CO2 mmol/L 33.0* 32.0* 30.0* 28.0   BUN mg/dL 20 17 25* 37*   CREATININE mg/dL 0.55* 0.68 0.61 0.76   GLUCOSE mg/dL 99 129* 94 118*   CALCIUM mg/dL 8.1* 8.2* 8.6 8.8   ALT  (SGPT) U/L  --   --   --  19   AST (SGOT) U/L  --   --   --  20     Estimated Creatinine Clearance: 40.1 mL/min (A) (by C-G formula based on SCr of 0.55 mg/dL (L)).    Microbiology Results Abnormal     None          Imaging Results (last 24 hours)     ** No results found for the last 24 hours. **               I have reviewed the medications:  Scheduled Meds:    aspirin 81 mg Oral Daily   busPIRone 10 mg Oral BID   donepezil 10 mg Oral Nightly   enoxaparin 40 mg Subcutaneous Nightly   gabapentin 300 mg Oral BID   memantine 10 mg Oral BID   multivitamin with minerals 1 tablet Oral Daily   pantoprazole 40 mg Oral QAM   sertraline 50 mg Oral Daily   sodium chloride 10 mL Intravenous Q12H   sodium chloride 75 mL/hr Intravenous Once   sucralfate 1 g Oral TID AC   levothyroxine 175 mcg Oral Q AM   tamsulosin 0.4 mg Oral Daily     Continuous Infusions:    lactated ringers 9 mL/hr     PRN Meds:.•  acetaminophen **OR** acetaminophen **OR** acetaminophen  •  bisacodyl  •  bisacodyl  •  diphenoxylate-atropine  •  magnesium sulfate **OR** magnesium sulfate **OR** magnesium sulfate  •  Morphine **AND** naloxone  •  ondansetron  •  potassium chloride **OR** potassium chloride **OR** potassium chloride  •  [COMPLETED] Insert peripheral IV **AND** sodium chloride  •  sodium chloride  •  traMADol      Assessment/Plan   Assessment / Plan     Active Hospital Problems    Diagnosis  POA   • **Closed fracture of neck of left femur (CMS/[facility]) [S72.002A]  Yes   • Primary osteoarthritis of both knees [M17.0]  Yes   • Pernicious anemia [D51.0]  Yes   • Osteoporosis [M81.0]  Yes   • Multi-infarct dementia (CMS/[facility]) [F01.50]  Yes   • Hyperlipidemia [E78.5]  Yes   • Hypothyroidism [E03.9]  Yes   • Generalized anxiety disorder [F41.1]  Yes   • Esophageal reflux [K21.9]  Yes   • Benign essential hypertension [I10]  Yes      Resolved Hospital Problems   No resolved problems to display.        Brief Hospital Course to date:  Temi Jarrett is a 89 y.o.  female with history of dementia and osteoporosis with mechanical fall found to have a left hip fracture s/p LT hemiarthroplasty with  9/28/19     Assessment/Plan     LT subcapital neck fracture of the femur s/p mechanical fall  S/p hemiarthroplasty 9/28/19 with Dr. Segundo  - pain control  - PT/OT  - lovenox 40mg DA x2 weeks followed by ASA 325mg DA x4 weeks per Dr. Segundo    Leukocytosis with low grade temperature  - leukocytosis resolved  - no further elevated temps after encouraging regular incentive spirometry use    Urinary retention  - persists post-operatively  - no improvement with flomax  - discussed with Urology Dr Yoder. Resonable to try addition of bethanechol, but real improvement may not be seen for 1-2 weeks after patient becomes more mobile.  - continue I/O catheterization prn    Hypotension  - marginal bp but improved compared to yesterday, held all bp meds and muscle relaxers    HTN  - will need to add back BP meds as needed while at rehab    Normocytic anemia, history of pernicious anemia  - stable     Dementia  - aricept, namenda     DVT Prophylaxis:  Lovenox     Disposition: I expect the patient to be discharged to rehab, should be medically ready in am.      CODE STATUS:   Code Status and Medical Interventions:   Ordered at: 09/27/19 3707     Level Of Support Discussed With:    Patient     Code Status:    CPR     Medical Interventions (Level of Support Prior to Arrest):    Full         Electronically signed by Jose Mustafa MD, 10/03/19, 7:36 PM.

## 2019-10-04 VITALS
TEMPERATURE: 98.3 F | RESPIRATION RATE: 16 BRPM | OXYGEN SATURATION: 100 % | SYSTOLIC BLOOD PRESSURE: 106 MMHG | HEIGHT: 61 IN | HEART RATE: 72 BPM | WEIGHT: 130.5 LBS | BODY MASS INDEX: 24.64 KG/M2 | DIASTOLIC BLOOD PRESSURE: 56 MMHG

## 2019-10-04 PROCEDURE — 97110 THERAPEUTIC EXERCISES: CPT | Performed by: PHYSICAL THERAPIST

## 2019-10-04 PROCEDURE — 97530 THERAPEUTIC ACTIVITIES: CPT | Performed by: PHYSICAL THERAPIST

## 2019-10-04 PROCEDURE — 99239 HOSP IP/OBS DSCHRG MGMT >30: CPT | Performed by: INTERNAL MEDICINE

## 2019-10-04 PROCEDURE — 94799 UNLISTED PULMONARY SVC/PX: CPT

## 2019-10-04 RX ORDER — TRAMADOL HYDROCHLORIDE 50 MG/1
25 TABLET ORAL EVERY 8 HOURS PRN
Qty: 10 TABLET | Refills: 0 | Status: SHIPPED | OUTPATIENT
Start: 2019-10-04 | End: 2019-10-31

## 2019-10-04 RX ORDER — TAMSULOSIN HYDROCHLORIDE 0.4 MG/1
0.4 CAPSULE ORAL DAILY
Qty: 30 CAPSULE
Start: 2019-10-05 | End: 2019-11-08

## 2019-10-04 RX ORDER — ASPIRIN 325 MG
325 TABLET, DELAYED RELEASE (ENTERIC COATED) ORAL DAILY
Qty: 28 TABLET | Refills: 0
Start: 2019-10-15 | End: 2019-11-21 | Stop reason: HOSPADM

## 2019-10-04 RX ORDER — ACETAMINOPHEN 325 MG/1
650 TABLET ORAL EVERY 4 HOURS PRN
Start: 2019-10-04 | End: 2019-11-08

## 2019-10-04 RX ORDER — ASPIRIN 325 MG
325 TABLET, DELAYED RELEASE (ENTERIC COATED) ORAL DAILY
Qty: 14 TABLET | Refills: 0 | Status: SHIPPED | OUTPATIENT
Start: 2019-10-15 | End: 2019-10-04 | Stop reason: SDUPTHER

## 2019-10-04 RX ADMIN — MEMANTINE 10 MG: 10 TABLET ORAL at 08:39

## 2019-10-04 RX ADMIN — SUCRALFATE 1 G: 1 TABLET ORAL at 07:50

## 2019-10-04 RX ADMIN — TRAMADOL HYDROCHLORIDE 25 MG: 50 TABLET, FILM COATED ORAL at 07:49

## 2019-10-04 RX ADMIN — ASPIRIN 81 MG: 81 TABLET, COATED ORAL at 10:37

## 2019-10-04 RX ADMIN — PANTOPRAZOLE SODIUM 40 MG: 40 TABLET, DELAYED RELEASE ORAL at 06:40

## 2019-10-04 RX ADMIN — SERTRALINE HYDROCHLORIDE 50 MG: 50 TABLET ORAL at 08:38

## 2019-10-04 RX ADMIN — MULTIPLE VITAMINS W/ MINERALS TAB 1 TABLET: TAB ORAL at 08:38

## 2019-10-04 RX ADMIN — LEVOTHYROXINE SODIUM 175 MCG: 175 TABLET ORAL at 06:40

## 2019-10-04 RX ADMIN — BUSPIRONE HYDROCHLORIDE 10 MG: 10 TABLET ORAL at 08:38

## 2019-10-04 RX ADMIN — TAMSULOSIN HYDROCHLORIDE 0.4 MG: 0.4 CAPSULE ORAL at 08:38

## 2019-10-04 RX ADMIN — GABAPENTIN 300 MG: 300 CAPSULE ORAL at 08:38

## 2019-10-04 RX ADMIN — BETHANECHOL CHLORIDE 5 MG: 10 TABLET ORAL at 08:39

## 2019-10-04 NOTE — PLAN OF CARE
Problem: Patient Care Overview  Goal: Plan of Care Review  Outcome: Ongoing (interventions implemented as appropriate)   10/04/19 1005   Coping/Psychosocial   Plan of Care Reviewed With patient   Plan of Care Review   Progress no change   OTHER   Outcome Summary Performed supine to sit with max assist x 1. Once EOB pt had significant calf spasms in L LE. Pt became very emotional with calf spasms. Performed calf stretch and massage to calf and pt continued to have significant pain and refused to stand. Pt was agreeable to exercises. Pt was max x 2 for sit to supine. Applied ice to calf once supine and patient reported relief of spasms.

## 2019-10-04 NOTE — PLAN OF CARE
"Problem: Patient Care Overview  Goal: Plan of Care Review  Outcome: Ongoing (interventions implemented as appropriate)   10/04/19 2408   Coping/Psychosocial   Plan of Care Reviewed With patient   Plan of Care Review   Progress no change   OTHER   Outcome Summary Patient became anxious early in the shift. Stated nervous about moving tomorrow. complained of \"heavy feeling\" in left leg. Pain managed with po tramadol. she remains unable to void. I&O cath required. Left hip dressing CDI. VSS. Plans are for transfer to [facility] today by ambulance.          "

## 2019-10-04 NOTE — PROGRESS NOTES
Continued Stay Note  [facility]     Patient Name: Temi Jarrett  MRN: [id number]  Today's Date: 10/4/2019    Admit Date: 9/27/2019    Discharge Plan     Row Name 10/04/19 1033       Plan    Plan  [facility]    Patient/Family in Agreement with Plan  yes    Plan Comments  Patient's plan is an acute rehab bed on [facility] at [facility] today.  Nurse to call report to [phone].  Discharge summary will be pulled from [facility].  EMS scheduled for 1300.  PCS on chartlet.  Jemma Vargas RN [phone]        Discharge Codes    No documentation.       Expected Discharge Date and Time     Expected Discharge Date Expected Discharge Time    Oct 4, 2019             Jemma Vargas RN

## 2019-10-04 NOTE — THERAPY TREATMENT NOTE
Patient Name: Temi Jarrett  : 1930    MRN: [id number]                              Today's Date: 10/4/2019       Admit Date: 2019    Visit Dx:     ICD-10-CM ICD-9-CM   1. Closed fracture of neck of left femur, initial encounter (CMS/HCC) S72.002A 820.8   2. Elevated blood pressure reading with diagnosis of hypertension I10 401.9   3. Hiatal hernia K44.9 553.3   4. Fall, initial encounter W19.XXXA E888.9   5. Fracture, hip, left, closed, initial encounter (CMS/HCC) S72.002A 820.8   6. Impaired functional mobility, balance, gait, and endurance Z74.09 V49.89   7. Impaired mobility and ADLs Z74.09 799.89     Patient Active Problem List   Diagnosis   • Skin tear of lower leg without complication, right, initial encounter   • Esophageal reflux   • Hypothyroidism   • Generalized anxiety disorder   • Hyperlipidemia   • Multi-infarct dementia (CMS/HCC)   • Peripheral neuropathy   • Carpal tunnel syndrome of right wrist   • Spinal stenosis of lumbar region   • Osteoporosis   • Fever   • Urinary frequency   • Peripheral venous insufficiency   • Disc disorder of lumbar region   • Bladder prolapse, female, acquired   • Acute right-sided low back pain without sciatica   • Pain, knee   • Benign essential hypertension   • Pernicious anemia   • Memory loss   • Annual physical exam   • Primary osteoarthritis of both knees   • Chest pain, atypical   • Hiatal hernia with intrathoracic stomach   • Closed fracture of neck of left femur (CMS/[facility])     Past Medical History:   Diagnosis Date   • Disease of thyroid gland    • Gastric polyp    • History of colonic polyps    • Hypertension    • IBS (irritable bowel syndrome)    • Macular degeneration    • Torn meniscus     right knee      Past Surgical History:   Procedure Laterality Date   • CHOLECYSTECTOMY     • EYE SURGERY      Cataract extraction   • HERNIA REPAIR     • HIP HEMIARTHROPLASTY Left 2019    Procedure: HIP HEMIARTHROPLASTY LEFT;  Surgeon: Reddy Segundo  MARIANNE Palacios MD;  Location: [facility];  Service: Orthopedics   • HYSTERECTOMY  1976   • KNEE SURGERY Right     arthroscopy- 2000   • TONSILLECTOMY       General Information     Row Name 10/04/19 1005          PT Evaluation Time/Intention    Document Type  therapy note (daily note)  -CS     Mode of Treatment  individual therapy;physical therapy  -CS     Row Name 10/04/19 1005          General Information    Patient Profile Reviewed?  yes  -CS     Row Name 10/04/19 1005          Cognitive Assessment/Intervention- PT/OT    Orientation Status (Cognition)  oriented to;person;situation;place  -CS     Row Name 10/04/19 1005          Safety Issues, Functional Mobility    Safety Issues Affecting Function (Mobility)  safety precaution awareness;safety precautions follow-through/compliance  -CS     Impairments Affecting Function (Mobility)  endurance/activity tolerance;balance;pain;strength  -CS       User Key  (r) = Recorded By, (t) = Taken By, (c) = Cosigned By    Initials Name Provider Type    CS Leandra Elise, PT Physical Therapist        Mobility     Row Name 10/04/19 1005          Bed Mobility Assessment/Treatment    Bed Mobility Assessment/Treatment  supine-sit;sit-supine  -CS     Supine-Sit [place] (Bed Mobility)  maximum assist (25% patient effort);1 person assist  -CS     Sit-Supine [place] (Bed Mobility)  maximum assist (25% patient effort);2 person assist  -CS     Assistive Device (Bed Mobility)  bed rails;draw sheet;head of bed elevated  -CS     Comment (Bed Mobility)  Pt requires verbal/tactile cues to slide legs to EOB. Requires max assist to sit and verbal cues to not push back. Pt requires max x 2 for sit to supine.   -CS     Row Name 10/04/19 1005          Transfer Assessment/Treatment    Comment (Transfers)  Did not perform today due to patient's L calf cramping and being in significant pain. Pt was very emotional about leg cramping, stretched calf, massaged calf and pt still in pain and refused to  stand.  -     Row Name 10/04/19 1005          Gait/Stairs Assessment/Training    Comment (Gait/Stairs)  Did not perform today. See comment in transfers section.   -CS     Row Name 10/04/19 1005          Mobility Assessment/Intervention    Extremity Weight-bearing Status  left lower extremity  -CS     Left Lower Extremity (Weight-bearing Status)  weight-bearing as tolerated (WBAT)  -CS       User Key  (r) = Recorded By, (t) = Taken By, (c) = Cosigned By    Initials Name Provider Type    Leandra Vogt PT Physical Therapist        Obj/Interventions     Row Name 10/04/19 1005          Therapeutic Exercise    Lower Extremity (Therapeutic Exercise)  gluteal sets;quad sets, bilateral;heel slides, left;LAQ (long arc quad), bilateral;gastroc stretch, left  -CS     Lower Extremity Range of Motion (Therapeutic Exercise)  hip abduction/adduction, left;ankle dorsiflexion/plantar flexion, bilateral  -CS     Exercise Type (Therapeutic Exercise)  AAROM (active assistive range of motion);AROM (active range of motion);isometric contraction, static;isotonic contraction, concentric  -CS     Position (Therapeutic Exercise)  supine  -CS     Sets/Reps (Therapeutic Exercise)  10 reps each  -CS     Comment (Therapeutic Exercise)  cues for technique  -CS       User Key  (r) = Recorded By, (t) = Taken By, (c) = Cosigned By    Initials Name Provider Type    Leandra Vogt, LEFTY Physical Therapist        Goals/Plan     Row Name 10/04/19 1005          Bed Mobility Goal 1 (PT)    Activity/Assistive Device (Bed Mobility Goal 1, PT)  sit to supine;supine to sit  -CS     [place] Level/Cues Needed (Bed Mobility Goal 1, PT)  moderate assist (50-74% patient effort);2 person assist  -CS     Time Frame (Bed Mobility Goal 1, PT)  long term goal (LTG);1 week  -CS     Progress/Outcomes (Bed Mobility Goal 1, PT)  goal ongoing  -     Row Name 10/04/19 1005          Transfer Goal 1 (PT)    Activity/Assistive Device (Transfer Goal 1,  PT)  sit-to-stand/stand-to-sit;walker, rolling  -CS     [place] Level/Cues Needed (Transfer Goal 1, PT)  minimum assist (75% or more patient effort);2 person assist  -CS     Time Frame (Transfer Goal 1, PT)  long term goal (LTG);1 week  -CS     Progress/Outcome (Transfer Goal 1, PT)  goal ongoing  -CS     Row Name 10/04/19 1005          Gait Training Goal 1 (PT)    Activity/Assistive Device (Gait Training Goal 1, PT)  gait (walking locomotion);walker, rolling  -CS     [place] Level (Gait Training Goal 1, PT)  moderate assist (50-74% patient effort);2 person assist  -CS     Distance (Gait Goal 1, PT)  10'  -CS     Time Frame (Gait Training Goal 1, PT)  long term goal (LTG);1 week  -CS     Progress/Outcome (Gait Training Goal 1, PT)  goal ongoing  -CS       User Key  (r) = Recorded By, (t) = Taken By, (c) = Cosigned By    Initials Name Provider Type    CS Leandra Elise, PT Physical Therapist        Clinical Impression     Row Name 10/04/19 1005          Pain Assessment    Additional Documentation  Pain Scale: FACES Pre/Post-Treatment (Group)  -CS     Row Name 10/04/19 1005          Pain Scale: Numbers Pre/Post-Treatment    Pain Location - Side  Left  -CS     Pain Location - Orientation  lower  -CS     Pain Location  other (see comments) hip and calf  -CS     Pain Intervention(s)  Repositioned;Ambulation/increased activity;Cold pack;Other (Comment) Ice to calf for cramping. Nsg reports it helped previous day  -CS     Row Name 10/04/19 1005          Pain Scale: FACES Pre/Post-Treatment    Pain: FACES Scale, Pretreatment  4-->hurts little more  -CS     Pain: FACES Scale, Post-Treatment  6-->hurts even more  -CS     Pre/Post Treatment Pain Comment  Pt reports increase in spasm in L calf. Reports relief once ice is applied to leg.   -CS     Row Name 10/04/19 1005          Plan of Care Review    Plan of Care Reviewed With  patient  -CS     Row Name 10/04/19 1005          Positioning and Restraints     Pre-Treatment Position  in bed  -CS     Post Treatment Position  bed  -CS     In Bed  notified nsg;supine;call light within reach;encouraged to call for assist;exit alarm on  -CS       User Key  (r) = Recorded By, (t) = Taken By, (c) = Cosigned By    Initials Name Provider Type    Leandra Vogt PT Physical Therapist        Outcome Measures     Row Name 10/04/19 1005          How much help from another person do you currently need...    Turning from your back to your side while in flat bed without using bedrails?  2  -CS     Moving from lying on back to sitting on the side of a flat bed without bedrails?  2  -CS     Moving to and from a bed to a chair (including a wheelchair)?  2  -CS     Standing up from a chair using your arms (e.g., wheelchair, bedside chair)?  2  -CS     Climbing 3-5 steps with a railing?  1  -CS     To walk in hospital room?  2  -CS     AM-PAC 6 Clicks Score (PT)  11  -CS     Row Name 10/04/19 1005          Functional Assessment    Outcome Measure Options  AM-PAC 6 Clicks Basic Mobility (PT)  -CS       User Key  (r) = Recorded By, (t) = Taken By, (c) = Cosigned By    Initials Name Provider Type    Leandra Vogt PT Physical Therapist        Physical Therapy Education     Title: PT OT SLP Therapies (Done)     Topic: Physical Therapy (Done)     Point: Mobility training (Done)     Learning Progress Summary           Patient Acceptance, E,TB, VU,NR by  at 10/4/2019 10:05 AM    Comment:  Educated patient on hip precautions, pt only able to recall keep feet facing forward. Pt needed max cuing to not lean forward when siting on edge of bed and required a lot of education on not leaning forward.    Acceptance, E,TB,H, VU,NR by  at 10/3/2019 11:00 AM    Comment:  Educated patient on hip precautions and patient was able to recite back keeping toes forward but could not recall the others. Provided pt handout in room as reminder.    Acceptance, E, NR by SC at 10/2/2019 11:27 AM     Comment:  reviewed HEP    Eager, E, VU,NR by SC at 10/1/2019  3:03 PM    Comment:  reviewed benefits of activity    Acceptance, E,D, NR by  at 9/30/2019  9:33 AM    Comment:  Reviewed hip precautions, HEP, gait mechanics, correct transfer technique, benefits of mobility    Acceptance, E,D,H, NR by  at 9/29/2019  8:15 AM    Comment:  Edu re: hip precautions, HEP, correct transfer technique, benefits of mobility. Issued hip precautions handout                   Point: Home exercise program (Done)     Learning Progress Summary           Patient Acceptance, E,TB, VU,NR by  at 10/4/2019 10:05 AM    Comment:  Educated patient on hip precautions, pt only able to recall keep feet facing forward. Pt needed max cuing to not lean forward when siting on edge of bed and required a lot of education on not leaning forward.    Acceptance, E,TB,H, VU,NR by  at 10/3/2019 11:00 AM    Comment:  Educated patient on hip precautions and patient was able to recite back keeping toes forward but could not recall the others. Provided pt handout in room as reminder.    Acceptance, E, NR by SC at 10/2/2019 11:27 AM    Comment:  reviewed HEP    Eager, E, VU,NR by SC at 10/1/2019  3:03 PM    Comment:  reviewed benefits of activity    Acceptance, E,D, NR by  at 9/30/2019  9:33 AM    Comment:  Reviewed hip precautions, HEP, gait mechanics, correct transfer technique, benefits of mobility    Acceptance, E,D,H, NR by  at 9/29/2019  8:15 AM    Comment:  Edu re: hip precautions, HEP, correct transfer technique, benefits of mobility. Issued hip precautions handout                   Point: Body mechanics (Done)     Learning Progress Summary           Patient Acceptance, E,TB, VU,NR by  at 10/4/2019 10:05 AM    Comment:  Educated patient on hip precautions, pt only able to recall keep feet facing forward. Pt needed max cuing to not lean forward when siting on edge of bed and required a lot of education on not leaning forward.    Acceptance,  E,TB,H, VU,NR by  at 10/3/2019 11:00 AM    Comment:  Educated patient on hip precautions and patient was able to recite back keeping toes forward but could not recall the others. Provided pt handout in room as reminder.    Acceptance, E, NR by SC at 10/2/2019 11:27 AM    Comment:  reviewed HEP    Eager, E, VU,NR by SC at 10/1/2019  3:03 PM    Comment:  reviewed benefits of activity    Acceptance, E,D, NR by  at 9/30/2019  9:33 AM    Comment:  Reviewed hip precautions, HEP, gait mechanics, correct transfer technique, benefits of mobility    Acceptance, E,D,H, NR by  at 9/29/2019  8:15 AM    Comment:  Edu re: hip precautions, HEP, correct transfer technique, benefits of mobility. Issued hip precautions handout                   Point: Precautions (Done)     Learning Progress Summary           Patient Acceptance, E,TB, VU,NR by  at 10/4/2019 10:05 AM    Comment:  Educated patient on hip precautions, pt only able to recall keep feet facing forward. Pt needed max cuing to not lean forward when siting on edge of bed and required a lot of education on not leaning forward.    Acceptance, E,TB,H, VU,NR by  at 10/3/2019 11:00 AM    Comment:  Educated patient on hip precautions and patient was able to recite back keeping toes forward but could not recall the others. Provided pt handout in room as reminder.    Acceptance, E, NR by SC at 10/2/2019 11:27 AM    Comment:  reviewed HEP    Eager, E, VU,NR by SC at 10/1/2019  3:03 PM    Comment:  reviewed benefits of activity    Acceptance, E,D, NR by  at 9/30/2019  9:33 AM    Comment:  Reviewed hip precautions, HEP, gait mechanics, correct transfer technique, benefits of mobility    Acceptance, E,D,H, NR by  at 9/29/2019  8:15 AM    Comment:  Edu re: hip precautions, HEP, correct transfer technique, benefits of mobility. Issued hip precautions handout                               User Key     Initials Effective Dates Name Provider Type Discipline    SC 06/19/15 -   Erika Ocampo, PT Physical Therapist PT    MJ 04/03/18 -  Dorian Aden PT Physical Therapist PT    CS 03/26/19 -  Leandra Elise PT Physical Therapist PT              PT Recommendation and Plan  Planned Therapy Interventions (PT Eval): balance training, bed mobility training, gait training, home exercise program, neuromuscular re-education, patient/family education, strengthening, transfer training  Plan of Care Reviewed With: patient  Progress: no change  Outcome Summary: Performed supine to sit with max assist x 1. Once EOB pt had significant calf spasms in L LE. Pt became very emotional with calf spasms. Performed calf stretch and massage to calf and pt continued to have significant pain and refused to stand. Pt was agreeable to exercises. Pt was max x 2 for sit to supine. Applied ice to calf once supine and patient reported relief of spasms.      Time Calculation:   PT Charges     Row Name 10/04/19 1005             Time Calculation    Start Time  1005  -CS      PT Received On  10/04/19  -CS         Time Calculation- PT    Total Timed Code Minutes- PT  29 minute(s)  -CS         Timed Charges    02161 - PT Therapeutic Exercise Minutes  8  -CS      78976 - PT Therapeutic Activity Minutes  20  -CS        User Key  (r) = Recorded By, (t) = Taken By, (c) = Cosigned By    Initials Name Provider Type    CS Leandra Elise PT Physical Therapist        Therapy Charges for Today     Code Description Service Date Service Provider Modifiers Qty    96646865561 HC PT THERAPEUTIC ACT EA 15 MIN 10/3/2019 Leandra Elise, PT GP 1    03717273436 HC PT THER PROC EA 15 MIN 10/3/2019 Leandra Elise, PT GP 1    71995307587 HC PT THER PROC EA 15 MIN 10/4/2019 Leandra Elise, PT GP 1    64289344222 HC PT THERAPEUTIC ACT EA 15 MIN 10/4/2019 Leandra Elise, PT GP 1          PT G-Codes  Outcome Measure Options: AM-PAC 6 Clicks Basic Mobility (PT)  AM-PAC 6 Clicks Score (PT): 11  AM-PAC 6 Clicks Score (OT):  15    Leandra Elise, PT  10/4/2019

## 2019-10-04 NOTE — DISCHARGE SUMMARY
[facility] Medicine Services  DISCHARGE SUMMARY    Patient Name: Temi Jarrett  : 1930  MRN: [id number]    Date of Admission: 2019  Date of Discharge:  10/4/19  Primary Care Physician: Eric Patel MD    Consults     Date and Time Order Name Status Description    2019 Inpatient Orthopedic Surgery Consult      2019 Inpatient Orthopedic Surgery Consult Completed           Hospital Course     Presenting Problem:   Closed fracture of neck of left femur, initial encounter (CMS/[facility]) [S72.002A]    Active Hospital Problems    Diagnosis  POA   • **Closed fracture of neck of left femur (CMS/[facility]) [S72.002A]  Yes   • Primary osteoarthritis of both knees [M17.0]  Yes   • Pernicious anemia [D51.0]  Yes   • Osteoporosis [M81.0]  Yes   • Multi-infarct dementia (CMS/[facility]) [F01.50]  Yes   • Hyperlipidemia [E78.5]  Yes   • Hypothyroidism [E03.9]  Yes   • Generalized anxiety disorder [F41.1]  Yes   • Esophageal reflux [K21.9]  Yes   • Benign essential hypertension [I10]  Yes      Resolved Hospital Problems   No resolved problems to display.          Hospital Course:  Temi Jarrett is a 89 y.o. female with history of dementia and osteoporosis with mechanical fall found to have a left hip fracture s/p LT hemiarthroplasty with  19     Assessment/Plan     LT subcapital neck fracture of the femur s/p mechanical fall  S/p hemiarthroplasty 19 with Dr. Segundo  - pain control  - PT/OT  - lovenox 40mg per day x 2 weeks followed by  mg per day x 4 weeks per Dr. Segundo (of course, hold home asa 81 mg while on the 325 mg dose)  - follow up in 2-3 weeks with Orthopedics at [facility]     Leukocytosis with low grade temperature  - leukocytosis resolved  - no further elevated temps after encouraging regular incentive spirometry use     Urinary retention  - persists post-operatively  - no improvement with flomax  - discussed with Urology, real improvement may not be  seen for 1-2 weeks after patient becomes more mobile.  - continue I/O catheterization prn     Hypotension  - marginal bp but improved compared to yesterday, held all bp meds and muscle relaxers     HTN  - will need to add back home BP meds as needed while at rehab, currently patient normotensive off all antihypertensives     Normocytic anemia, history of pernicious anemia  - stable     Dementia  - aricept, namenda         Discharge Follow Up Recommendations for labs/diagnostics:      Day of Discharge     HPI:   Occasional left thigh spasms, but none currently. Somewhat anxious regarding going to rehab today, states that she does not like change.    Review of Systems  Gen- No fevers, chills  CV- No chest pain, palpitations  Resp- No cough, dyspnea  GI- No N/V/D, abd pain        Vital Signs:   Temp:  [98.1 °F (36.7 °C)-99.5 °F (37.5 °C)] 98.3 °F (36.8 °C)  Heart Rate:  [72-91] 72  Resp:  [16-18] 16  BP: (106-114)/(53-85) 106/56     Physical Exam:  Constitutional -no acute distress, non toxic, in bed, frail female  HEENT-NCAT, mucous membranes moist  CV-RRR, S1 S2 normal, no m/r/g  Resp-CTAB, no wheezes, rhonchi or rales  Abd-soft, non-tender, non-distended, normo active bowel sounds  Ext-No lower extremity cyanosis, clubbing or edema bilaterally  Neuro-alert, speech clear, moves all extremities   Psych-normal affect   Skin- No rash on exposed UE or LE bilaterally      Pertinent  and/or Most Recent Results     Results from last 7 days   Lab Units 10/01/19  0757 09/30/19  1104 09/29/19  0915 09/28/19  0647 09/28/19  0646 09/27/19  1824   WBC 10*3/mm3 9.65 13.86* 13.67* 10.01  --  14.96*   HEMOGLOBIN g/dL 10.0* 11.3* 11.8* 11.9*  --  11.9*   HEMATOCRIT % 32.1* 36.0 37.1 37.9  --  36.3   PLATELETS 10*3/mm3 182 210 234 202  --  213   SODIUM mmol/L 138 139  --   --  144 141   POTASSIUM mmol/L 4.1 3.0*  --   --  4.4 4.4   CHLORIDE mmol/L 101 98  --   --  106 103   CO2 mmol/L 33.0* 32.0*  --   --  30.0* 28.0   BUN mg/dL 20 17   --   --  25* 37*   CREATININE mg/dL 0.55* 0.68  --   --  0.61 0.76   GLUCOSE mg/dL 99 129*  --   --  94 118*   CALCIUM mg/dL 8.1* 8.2*  --   --  8.6 8.8     Results from last 7 days   Lab Units 09/27/19  1824   BILIRUBIN mg/dL 0.4   ALK PHOS U/L 55   ALT (SGPT) U/L 19   AST (SGOT) U/L 20   PROTIME Seconds 13.9   INR  1.12   APTT seconds 30.9           Invalid input(s): TG, LDLCALC, LDLREALC  Results from last 7 days   Lab Units 09/30/19  1104 09/28/19  0646   TSH uIU/mL  --  0.716   PROCALCITONIN ng/mL 0.32* 0.16       Brief Urine Lab Results  (Last result in the past 365 days)      Color   Clarity   Blood   Leuk Est   Nitrite   Protein   CREAT   Urine HCG        09/28/19 1439 Yellow Clear Small (1+) Negative Negative Negative               Microbiology Results Abnormal     None          Imaging Results (all)     Procedure Component Value Units Date/Time    XR Chest 1 View [[id number]] Collected:  10/01/19 0724     Updated:  10/01/19 2115    Narrative:       EXAMINATION: XR CHEST 1 VW-09/30/2019:      INDICATION: Low grade temps, hip fracture.; S72.002A-Fracture of  unspecified part of neck of left femur, initial encounter for closed  fracture; I10-Essential (primary) hypertension; K44.9-Diaphragmatic  hernia without obstruction or gangrene; W19.XXXA-Unspecified fall,  initial encounter; S72.002A-Fracture of unspecified part of neck of left  femur, initial encounter for closed fracture; Z74.09-Other reduced mo.      COMPARISON: 09/27/2019.     FINDINGS: Very large hiatal hernia is noted. The heart is upper normal  size.  The vasculature is at upper limits of normal. Mild nonspecific  interstitial changes appear stable. There is trace bibasilar pleural  effusion.           Impression:       1.  Pulmonary venous hypertension and trace bibasilar pleural effusion.  2.  Very large hiatal hernia as on the prior study.     D:  10/01/2019  E:  10/01/2019     This report was finalized on 10/1/2019 9:11 PM by DR. Joni Lagos,  MD.       XR Hip With or Without Pelvis 2 - 3 View Left [[id number]] Collected:  09/28/19 2005     Updated:  09/30/19 0016    Narrative:       HISTORY: Left hip fracture.     AP PELVIS; AP LEFT HIP, 3 VIEWS - 09/28/2019     COMPARISON: 09/27/2019     FINDINGS: Left hip prosthesis is now seen in place and in anatomic  alignment.     Bony structures are osteopenic but intact. No new bony abnormality is   appreciated. There is expected post-op soft tissue air.       Impression:       Left hip prosthesis in anatomic alignment.     DICTATED:   09/29/2019  EDITED/ls :   09/29/2019      This report was finalized on 9/30/2019 12:13 AM by DR. Joni Lagos MD.       XR Hip With or Without Pelvis 2 - 3 View Left [[id number]] Collected:  09/27/19 1923     Updated:  09/28/19 2323    Narrative:       HISTORY: Fall, left hip pain.     AP PELVIS, AP LEFT HIP - 09/27/2019       Impression:       None.     FINDINGS: There is a subcapital neck fracture of the proximal humerus.  No comminution is identified. Remaining bony structures are osteopenic  but intact.     IMPRESSION: Acute left femoral neck fracture.     DICTATED:   09/27/2019  EDITED/ls :   09/27/2019      This report was finalized on 9/28/2019 11:19 PM by DR. Joni Lagos MD.       XR Chest 1 View [[id number]] Collected:  09/27/19 1936     Updated:  09/27/19 1938    Narrative:       CR Chest 1 Vw    INDICATION:   Status post fall with likely left hip fracture.     COMPARISON:    6/24/2019    FINDINGS:  Single portable AP view(s) of the chest.  Cardiomegaly without failure. No acute airspace disease or effusions. No pneumothorax. Middle mediastinal density highly suggestive of a large hiatal hernia.      Impression:       Suspected large hiatal hernia. No acute cardiopulmonary findings.    Signer Name: MARIANNE Aclazar MD   Signed: 9/27/2019 7:36 PM   Workstation Name: [facility]    [facility]                           Discharge Details        Discharge  Medications      New Medications      Instructions Start Date   enoxaparin 40 MG/0.4ML solution syringe  Commonly known as:  LOVENOX   40 mg, Subcutaneous, Nightly      tamsulosin 0.4 MG capsule 24 hr capsule  Commonly known as:  FLOMAX   0.4 mg, Oral, Daily   Start Date:  10/5/2019        Changes to Medications      Instructions Start Date   acetaminophen 325 MG tablet  Commonly known as:  TYLENOL  What changed:    · medication strength  · See the new instructions.   650 mg, Oral, Every 4 Hours PRN      aspirin 81 MG tablet  What changed:  Another medication with the same name was added. Make sure you understand how and when to take each.   aspirin 81 mg tablet   Daily      aspirin  MG tablet  What changed:  You were already taking a medication with the same name, and this prescription was added. Make sure you understand how and when to take each.   325 mg, Oral, Daily   Start Date:  10/15/2019     traMADol 50 MG tablet  Commonly known as:  ULTRAM  What changed:  See the new instructions.   25 mg, Oral, Every 8 Hours PRN         Continue These Medications      Instructions Start Date   ACIPHEX 20 MG EC tablet  Generic drug:  RABEprazole   AcipHex 20 mg tablet,delayed release   Daily      busPIRone 10 MG tablet  Commonly known as:  BUSPAR   10 mg, Oral, 2 Times Daily      CALTRATE PLUS PO   Oral      colestipol 1 g tablet  Commonly known as:  COLESTID   1 g, Oral, Daily      diclofenac 1 % gel gel  Commonly known as:  VOLTAREN   4 g, Topical, 4 Times Daily      donepezil 10 MG tablet  Commonly known as:  ARICEPT   10 mg, Oral, Nightly      gabapentin 300 MG capsule  Commonly known as:  NEURONTIN   TAKE ONE CAPSULE BY MOUTH TWICE A DAY      levothyroxine 175 MCG tablet  Commonly known as:  SYNTHROID   175 mcg, Oral, Daily, Patient receives medication from patient assistance.  NDC:4674-9032-57 EXP:07/02/2020 LOT [id number]      LOMOTIL PO   2 tablets, Oral, As Needed      Melatonin 3 MG capsule   3 capsules, Oral,  Every Night at Bedtime      memantine 10 MG tablet  Commonly known as:  NAMENDA   10 mg, Oral, 2 Times Daily      ondansetron 4 MG tablet  Commonly known as:  ZOFRAN   4 mg, Oral, Every 8 Hours PRN      potassium chloride 10 MEQ CR capsule  Commonly known as:  MICRO-K   TAKE TWO CAPSULES BY MOUTH DAILY      PRESERVISION AREDS PO   1 tablet, Oral, Daily      PROBIOTIC-10 capsule   1 capsule, Oral, Daily      sertraline 50 MG tablet  Commonly known as:  ZOLOFT   50 mg, Oral, Daily      VITAMIN D PO   2000 U qd         Stop These Medications    amLODIPine 5 MG tablet  Commonly known as:  NORVASC     cyclobenzaprine 10 MG tablet  Commonly known as:  FLEXERIL     hydroCHLOROthiazide 25 MG tablet  Commonly known as:  HYDRODIURIL     lisinopril 10 MG tablet  Commonly known as:  PRINIVIL,ZESTRIL     POTASSIUM CHLORIDE RENNY ER PO            Allergies   Allergen Reactions   • Codeine Nausea Only     Trouble Breathing          Discharge Disposition:  Rehab Facility or Unit (DC - External)    Diet:  Hospital:  Diet Order   Procedures   • Diet Regular     Discharge:   Diet Instructions     You may resume a regular diet.                  Discharge Activity:   Activity Instructions     You may bear weight as tolerated on your left leg.     Use ice as needed for pain and swelling.                    CODE STATUS:    Code Status and Medical Interventions:   Ordered at: 09/27/19 2124     Level Of Support Discussed With:    Patient     Code Status:    CPR     Medical Interventions (Level of Support Prior to Arrest):    Full         Future Appointments   Date Time Provider Department Center   12/18/2019  1:30 PM Eric Patel MD [facility]   1/2/2020  3:10 PM Gamal Denny MD [facility] None       Additional Instructions for the Follow-ups that You Need to Schedule     Discharge Follow-up with Specialty: follow up in in 2-3 weeks with Ortho Dr Segundo's office ([facility])   As directed      Specialty:  follow up in  in 2-3 weeks with Ortho Dr Segundo's office ([facility])               Time Spent on Discharge:  40 minutes    Electronically signed by Jose Mustafa MD, 10/04/19, 12:48 PM.

## 2019-10-22 ENCOUNTER — TELEPHONE (OUTPATIENT)
Dept: INTERNAL MEDICINE | Facility: CLINIC | Age: 84
End: 2019-10-22

## 2019-10-22 ENCOUNTER — READMISSION MANAGEMENT (OUTPATIENT)
Dept: CALL CENTER | Facility: HOSPITAL | Age: 84
End: 2019-10-22

## 2019-10-22 NOTE — TELEPHONE ENCOUNTER
Patient called asking to speak with Natalia. Patient would like to know if she has a bottle of sample Synthroid waiting for pickup in the office. Please advise and return patients call.

## 2019-10-22 NOTE — TELEPHONE ENCOUNTER
We had gotten notification that pt was d/c'd from [facility] on 10/19/19, however, when I called pt. for TCM call, she states she is still there and possibly going home tomorrow.

## 2019-10-22 NOTE — OUTREACH NOTE
Prep Survey      Responses   Facility patient discharged from?  [place]   Is patient eligible?  Yes   Discharge diagnosis  Closed fracture of neck of left femur after a fall, s/p LT hemiarthroplasty with Dr. Segundo 9/28/2019, dementia   Does the patient have one of the following disease processes/diagnoses(primary or secondary)?  Other   Does the patient have Home health ordered?  Yes   What is the Home health agency?   [facility] at d/c from [facility]   Is there a DME ordered?  No   What DME was ordered?  Per [facility]   Comments regarding appointments  See AVS   Medication alerts for this patient  See AVs   General alerts for this patient  D/C sub-acute care, lives with NA Winkler and son    Prep survey completed?  Yes          Lynn Thompson RN

## 2019-10-23 ENCOUNTER — TRANSITIONAL CARE MANAGEMENT TELEPHONE ENCOUNTER (OUTPATIENT)
Dept: INTERNAL MEDICINE | Facility: CLINIC | Age: 84
End: 2019-10-23

## 2019-10-23 NOTE — OUTREACH NOTE
TCM call completed.  See TCM flowsheet for details. Needs follow up appt with Dr. Patel.   Has been home a couple hours from [facility]. Granddaughter is with her helping her today.  Is up and is able to walk with walker.  Still has dressing on area, but states [facility] is suppose to see her and she will have them check incision site.  Eating and drinking and no issues with bowels or bladder.  Denies and fever, chills, lightheaded or dizziness, N/V or SOB.  Needs hospital follow up appt, but only wants to see Dr. Patel.  Will send message to office.

## 2019-10-23 NOTE — TELEPHONE ENCOUNTER
Called and advised patient. She just opened new bottle. Will check with company to see if it is time to enroll again.

## 2019-10-24 ENCOUNTER — TELEPHONE (OUTPATIENT)
Dept: PEDIATRICS | Facility: OTHER | Age: 84
End: 2019-10-24

## 2019-10-24 NOTE — TELEPHONE ENCOUNTER
PATIENT RECENTLY DISCHARGED  FROM [facility].  THE NURSE OPENED THE PATIENT TO HOME HLTH SERVICES YESTERDAY.  SHE IS VERY CONFUSED ABOUT THE MEDICINES.  SHE WOULD LIKE HOME HLTH TO COME OUT AND EXPLAIN MEDS TO HER.

## 2019-10-25 ENCOUNTER — READMISSION MANAGEMENT (OUTPATIENT)
Dept: CALL CENTER | Facility: HOSPITAL | Age: 84
End: 2019-10-25

## 2019-10-25 NOTE — OUTREACH NOTE
Medical Week 1 Survey      Responses   Facility patient discharged from?  [place]   Does the patient have one of the following disease processes/diagnoses(primary or secondary)?  Other   Call start time  1544   Call end time  1548   General alerts for this patient  D/C sub-acute care, lives with NA Winkler and son    Discharge diagnosis  Closed fracture of neck of left femur after a fall, s/p LT hemiarthroplasty with Dr. Segundo 9/28/2019, dementia   Meds reviewed with patient/caregiver?  Yes   Is the patient having any side effects they believe may be caused by any medication additions or changes?  No   Does the patient have all medications ordered at discharge?  Yes   Is the patient taking all medications as directed (includes completed medication regime)?  Yes   Does the patient have a primary care provider?   Yes   Does the patient have an appointment with their PCP within 7 days of discharge?  Yes   Has the patient kept scheduled appointments due by today?  Yes   What is the Home health agency?   [facility]  at d/c from [facility]   Has home health visited the patient within 72 hours of discharge?  Yes   Psychosocial issues?  No   Did the patient receive a copy of their discharge instructions?  Yes   Nursing interventions  Reviewed instructions with patient   What is the patient's perception of their health status since discharge?  Improving   Is the patient/caregiver able to teach back signs and symptoms related to disease process for when to call PCP?  Yes   Is the patient/caregiver able to teach back signs and symptoms related to disease process for when to call 911?  Yes   Is the patient/caregiver able to teach back the hierarchy of who to call/visit for symptoms/problems? PCP, Specialist, Home health nurse, Urgent Care, ED, 911  Yes          James Ashraf RN

## 2019-10-28 ENCOUNTER — HOSPITAL ENCOUNTER (EMERGENCY)
Facility: HOSPITAL | Age: 84
Discharge: LEFT BEFORE DISCHARGE | End: 2019-10-28

## 2019-10-28 VITALS
DIASTOLIC BLOOD PRESSURE: 70 MMHG | HEIGHT: 68 IN | SYSTOLIC BLOOD PRESSURE: 154 MMHG | HEART RATE: 80 BPM | TEMPERATURE: 98.6 F | OXYGEN SATURATION: 100 % | BODY MASS INDEX: 18.19 KG/M2 | WEIGHT: 120 LBS | RESPIRATION RATE: 16 BRPM

## 2019-10-29 ENCOUNTER — TELEPHONE (OUTPATIENT)
Dept: INTERNAL MEDICINE | Facility: CLINIC | Age: 84
End: 2019-10-29

## 2019-10-29 NOTE — TELEPHONE ENCOUNTER
[facility] called back and gave info of Dr. Patel's verbal order for in and out cath as needed.     No further questions at this time

## 2019-10-29 NOTE — TELEPHONE ENCOUNTER
Cady from [facility] called to inform doctor that the patient is now back home but she is having difficulty urinating. She is requesting a call back to get an order for a catheter. Please advise. Call back number provided is [phone].

## 2019-10-30 ENCOUNTER — TELEPHONE (OUTPATIENT)
Dept: INTERNAL MEDICINE | Facility: CLINIC | Age: 84
End: 2019-10-30

## 2019-10-30 NOTE — TELEPHONE ENCOUNTER
Patients daughter (Jan) states that her mother was informed by [facility] ([phone]) that for her mother to receive a bath that her Doctor needed to write a referral for her to receive a bath atleast 3 times a week.    Please call and advise.

## 2019-10-30 NOTE — TELEPHONE ENCOUNTER
Called and spoke with kwesi. They do need an order for bathing 3-4 tomes a week sent to [facility]

## 2019-10-30 NOTE — TELEPHONE ENCOUNTER
Patients daughter (Jan) states that her mother was informed by [facility] ([phone]) that for her mother to receive a bath that her Doctor needed to write a referral for her to receive a bath atleast 3 times a week.    Please call and advise.     Previous encounter closed in error. Encounter is still open.

## 2019-10-31 RX ORDER — TRAMADOL HYDROCHLORIDE 50 MG/1
TABLET ORAL
Qty: 30 TABLET | Refills: 0 | Status: SHIPPED | OUTPATIENT
Start: 2019-10-31 | End: 2020-01-02 | Stop reason: SDUPTHER

## 2019-10-31 NOTE — TELEPHONE ENCOUNTER
Last filled: 10/4/19  Last seen:8/16/19  Next appointment:11/8/19  KENDRA: TELLO  *instructions given to stop at discharge

## 2019-11-03 ENCOUNTER — READMISSION MANAGEMENT (OUTPATIENT)
Dept: CALL CENTER | Facility: HOSPITAL | Age: 84
End: 2019-11-03

## 2019-11-03 NOTE — OUTREACH NOTE
Medical Week 4 Survey      Responses   Facility patient discharged from?  [place]   Does the patient have one of the following disease processes/diagnoses(primary or secondary)?  Other   Week 4 attempt successful?  No          Radha Lassiter RN

## 2019-11-08 ENCOUNTER — OFFICE VISIT (OUTPATIENT)
Dept: INTERNAL MEDICINE | Facility: CLINIC | Age: 84
End: 2019-11-08

## 2019-11-08 VITALS
HEIGHT: 59 IN | HEART RATE: 64 BPM | WEIGHT: 125 LBS | BODY MASS INDEX: 25.2 KG/M2 | SYSTOLIC BLOOD PRESSURE: 148 MMHG | DIASTOLIC BLOOD PRESSURE: 90 MMHG

## 2019-11-08 DIAGNOSIS — Z23 NEEDS FLU SHOT: ICD-10-CM

## 2019-11-08 DIAGNOSIS — E53.8 VITAMIN B12 DEFICIENCY: ICD-10-CM

## 2019-11-08 DIAGNOSIS — S72.002D CLOSED FRACTURE OF NECK OF LEFT FEMUR WITH ROUTINE HEALING, SUBSEQUENT ENCOUNTER: ICD-10-CM

## 2019-11-08 DIAGNOSIS — I10 BENIGN ESSENTIAL HYPERTENSION: Primary | ICD-10-CM

## 2019-11-08 DIAGNOSIS — F01.50 MULTI-INFARCT DEMENTIA WITHOUT BEHAVIORAL DISTURBANCE (HCC): ICD-10-CM

## 2019-11-08 DIAGNOSIS — M48.061 SPINAL STENOSIS OF LUMBAR REGION WITHOUT NEUROGENIC CLAUDICATION: ICD-10-CM

## 2019-11-08 DIAGNOSIS — L89.151 PRESSURE INJURY OF SACRAL REGION, STAGE 1: ICD-10-CM

## 2019-11-08 PROCEDURE — 96372 THER/PROPH/DIAG INJ SC/IM: CPT | Performed by: INTERNAL MEDICINE

## 2019-11-08 PROCEDURE — 90653 IIV ADJUVANT VACCINE IM: CPT | Performed by: INTERNAL MEDICINE

## 2019-11-08 PROCEDURE — G0008 ADMIN INFLUENZA VIRUS VAC: HCPCS | Performed by: INTERNAL MEDICINE

## 2019-11-08 PROCEDURE — 99214 OFFICE O/P EST MOD 30 MIN: CPT | Performed by: INTERNAL MEDICINE

## 2019-11-08 RX ORDER — BENZONATATE 100 MG/1
100 CAPSULE ORAL 3 TIMES DAILY PRN
COMMUNITY
End: 2019-11-21 | Stop reason: HOSPADM

## 2019-11-08 RX ORDER — HYDROCHLOROTHIAZIDE 25 MG/1
1 TABLET ORAL
COMMUNITY
End: 2019-11-21 | Stop reason: HOSPADM

## 2019-11-08 RX ORDER — SUCRALFATE 1 G/1
1 TABLET ORAL
COMMUNITY
End: 2020-07-06 | Stop reason: SDUPTHER

## 2019-11-08 RX ORDER — METHOCARBAMOL 500 MG/1
500 TABLET, FILM COATED ORAL 4 TIMES DAILY
COMMUNITY
End: 2020-01-02 | Stop reason: SDUPTHER

## 2019-11-08 RX ORDER — BETHANECHOL CHLORIDE 10 MG/1
10 TABLET ORAL 4 TIMES DAILY
COMMUNITY
End: 2020-01-02 | Stop reason: SDUPTHER

## 2019-11-08 RX ADMIN — CYANOCOBALAMIN 1000 MCG: 1000 INJECTION, SOLUTION INTRAMUSCULAR; SUBCUTANEOUS at 17:08

## 2019-11-08 NOTE — PROGRESS NOTES
[facility]     Temi Jarrett  5/8/1930   [id number]      Patient Care Team:  Eric Patel MD as PCP - General  Eric Patel MD as PCP - Family Medicine  Eric Patel MD as PCP - Claims Attributed    Chief Complaint::   Chief Complaint   Patient presents with   • Hyperlipidemia   • Hip replacement     follow up - surg in Sept   • Skin Ulcer     bottom        HPI  Mrs Jarrett sustained left hip fracture in late September requiring surgical repair and then a stay at [facility].  She has been home about two weeks.  She is doing well.  Her mood is better, she is less distressed about her 's poor health now requiring him to be in nursing facility.  She has no pain.  She does have an area of skiin breakdown in the presacral area. She is also here for follow up of HTN, dementia, and lumbar spinal stenosis.     Chronic Conditions:      Patient Active Problem List   Diagnosis   • Skin tear of lower leg without complication, right, initial encounter   • Esophageal reflux   • Hypothyroidism   • Generalized anxiety disorder   • Hyperlipidemia   • Multi-infarct dementia (CMS/HCC)   • Peripheral neuropathy   • Carpal tunnel syndrome of right wrist   • Spinal stenosis of lumbar region   • Osteoporosis   • Fever   • Urinary frequency   • Peripheral venous insufficiency   • Disc disorder of lumbar region   • Bladder prolapse, female, acquired   • Acute right-sided low back pain without sciatica   • Pain, knee   • Benign essential hypertension   • Pernicious anemia   • Memory loss   • Annual physical exam   • Primary osteoarthritis of both knees   • Chest pain, atypical   • Hiatal hernia with intrathoracic stomach   • Closed fracture of neck of left femur (CMS/HCC)        Past Medical History:   Diagnosis Date   • Disease of thyroid gland    • Gastric polyp    • History of colonic polyps    • Hypertension    • IBS (irritable bowel syndrome)    • Macular degeneration    • Torn meniscus      right knee        Past Surgical History:   Procedure Laterality Date   • CHOLECYSTECTOMY  1997   • EYE SURGERY  1998    Cataract extraction   • HERNIA REPAIR     • HIP HEMIARTHROPLASTY Left 9/28/2019    Procedure: HIP HEMIARTHROPLASTY LEFT;  Surgeon: Reddy Segundo Jr., MD;  Location: [facility];  Service: Orthopedics   • HYSTERECTOMY  1976   • KNEE SURGERY Right     arthroscopy- 2000   • TONSILLECTOMY         Family History   Problem Relation Age of Onset   • Hypertension Mother    • Breast cancer Mother    • Obesity Mother    • Hypertension Father    • Diabetes Son        Social History     Socioeconomic History   • Marital status:      Spouse name: Not on file   • Number of children: Not on file   • Years of education: Not on file   • Highest education level: Not on file   Tobacco Use   • Smoking status: Never Smoker   • Smokeless tobacco: Never Used   Substance and Sexual Activity   • Alcohol use: No   • Drug use: Defer   • Sexual activity: Defer   Social History Narrative    Lives with son and daughter in law--  is at [place] getting rehab       Allergies   Allergen Reactions   • Codeine Nausea Only     Trouble Breathing          Current Outpatient Medications:   •  aspirin  MG tablet, Take 1 tablet by mouth Daily for 28 days., Disp: 28 tablet, Rfl: 0  •  benzonatate (TESSALON) 100 MG capsule, Take 100 mg by mouth 3 (Three) Times a Day As Needed for Cough., Disp: , Rfl:   •  bethanechol (URECHOLINE) 10 MG tablet, Take 10 mg by mouth 4 (Four) Times a Day., Disp: , Rfl:   •  busPIRone (BUSPAR) 10 MG tablet, Take 1 tablet by mouth 2 (Two) Times a Day., Disp: 60 tablet, Rfl: 5  •  Calcium Carbonate-Vit D-Min (CALTRATE PLUS PO), Take  by mouth., Disp: , Rfl:   •  Cholecalciferol (VITAMIN D PO), 2000 U qd, Disp: , Rfl:   •  colestipol (COLESTID) 1 g tablet, Take 1 g by mouth 2 (Two) Times a Day., Disp: , Rfl:   •  diclofenac (VOLTAREN) 1 % gel gel, Apply 4 g topically to the appropriate area  as directed 4 (Four) Times a Day., Disp: 100 g, Rfl: 5  •  Diphenoxylate-Atropine (LOMOTIL PO), Take 2 tablets by mouth As Needed., Disp: , Rfl:   •  donepezil (ARICEPT) 10 MG tablet, Take 1 tablet by mouth Every Night., Disp: 30 tablet, Rfl: 5  •  gabapentin (NEURONTIN) 300 MG capsule, TAKE ONE CAPSULE BY MOUTH TWICE A DAY, Disp: 60 capsule, Rfl: 1  •  hydroCHLOROthiazide (HYDRODIURIL) 25 MG tablet, Take 1 tablet by mouth., Disp: , Rfl:   •  levothyroxine (SYNTHROID) 175 MCG tablet, Take 1 tablet by mouth Daily. Patient receives medication from patient assistance.  NDC:5805-5485-27 EXP:07/02/2020 LOT [id number], Disp: 90 tablet, Rfl: 0  •  Melatonin 3 MG capsule, Take 3 capsules by mouth every night at bedtime., Disp: , Rfl:   •  memantine (NAMENDA) 10 MG tablet, Take 1 tablet by mouth 2 (Two) Times a Day., Disp: 60 tablet, Rfl: 5  •  methocarbamol (ROBAXIN) 500 MG tablet, Take 500 mg by mouth 4 (Four) Times a Day., Disp: , Rfl:   •  Multiple Vitamins-Minerals (PRESERVISION AREDS PO), Take 1 tablet by mouth Daily., Disp: , Rfl:   •  potassium chloride (MICRO-K) 10 MEQ CR capsule, TAKE TWO CAPSULES BY MOUTH DAILY, Disp: 180 capsule, Rfl: 2  •  Probiotic Product (PROBIOTIC-10) capsule, Take 1 capsule by mouth Daily., Disp: , Rfl:   •  sertraline (ZOLOFT) 50 MG tablet, Take 1 tablet by mouth Daily., Disp: 30 tablet, Rfl: 5  •  sucralfate (CARAFATE) 1 g tablet, Take 1 tablet by mouth Every 6 (Six) Hours., Disp: , Rfl:   •  traMADol (ULTRAM) 50 MG tablet, TAKE ONE TABLET BY MOUTH DAILY AS NEEDED FOR MODERATE PAIN (RIGHT KNEE), Disp: 30 tablet, Rfl: 0    Current Facility-Administered Medications:   •  cyanocobalamin injection 1,000 mcg, 1,000 mcg, Intramuscular, Q28 Days, Eric Patel MD, 1,000 mcg at 11/08/19 1708    Review of Systems   Constitutional: Negative for chills, fatigue and fever.   HENT: Negative for congestion, ear pain and sinus pressure.    Respiratory: Negative for cough, chest tightness,  "shortness of breath and wheezing.    Cardiovascular: Negative for chest pain and palpitations.   Gastrointestinal: Negative for abdominal pain, blood in stool and constipation.   Skin: Negative for color change.   Allergic/Immunologic: Negative for environmental allergies.   Neurological: Negative for dizziness, speech difficulty and headache.   Psychiatric/Behavioral: Negative for decreased concentration. The patient is not nervous/anxious.         Vital Signs  Vitals:    11/08/19 1622   BP: 148/90   BP Location: Right arm   Patient Position: Sitting   Cuff Size: Adult   Pulse: 64   Weight: 56.7 kg (125 lb)   Height: 148.6 cm (58.5\")   PainSc: 0-No pain       Physical Exam   Constitutional: She is oriented to person, place, and time. She appears well-developed and well-nourished.   HENT:   Head: Normocephalic and atraumatic.   Cardiovascular: Normal rate, regular rhythm and normal heart sounds.   No murmur heard.  Pulmonary/Chest: Effort normal and breath sounds normal.   Neurological: She is alert and oriented to person, place, and time.   Skin:   Left hip incision well healed.  She has erythema without skin breakdown in presacral area.    Psychiatric: She has a normal mood and affect.   Vitals reviewed.     Procedures    ACE III MINI             Assessment/Plan:    Temi was seen today for hyperlipidemia, hip replacement and skin ulcer.    Diagnoses and all orders for this visit:    Benign essential hypertension    Needs flu shot  -     Fluad Tri 65yr+    Vitamin B12 deficiency    Closed fracture of neck of left femur with routine healing, subsequent encounter    Multi-infarct dementia without behavioral disturbance (CMS/HCC)    Spinal stenosis of lumbar region without neurogenic claudication    Pressure injury of sacral region, stage 1    Plan    Blood pressure is well controlled on hydrochlorothiazide.  Her edema is dependent, most likely due to venous insufficiency.  No evidence of heart failure.    She will " continue B12 injections monthly.    She is doing very well about 6 weeks following hip fracture.    Dementia is stable.  Her depression appears to be much better.  Continue memantine is donepezil, and sertraline for depression.    Back pain is about the same, she uses her walker.  She uses tramadol as needed for her back and her knees.    She will continue using barrier protection of her sacral area the help of home health nursing.      Plan of care reviewed with patient at the conclusion of today's visit. Education was provided regarding diagnosis, management, and any prescribed or recommended OTC medications.Patient verbalizes understanding of and agreement with management plan.         Eric Patel MD

## 2019-11-11 ENCOUNTER — TELEPHONE (OUTPATIENT)
Dept: INTERNAL MEDICINE | Facility: CLINIC | Age: 84
End: 2019-11-11

## 2019-11-11 RX ORDER — ONDANSETRON 4 MG/1
4 TABLET, ORALLY DISINTEGRATING ORAL EVERY 8 HOURS PRN
Qty: 20 TABLET | Refills: 0 | Status: SHIPPED | OUTPATIENT
Start: 2019-11-11 | End: 2020-08-09 | Stop reason: HOSPADM

## 2019-11-11 NOTE — TELEPHONE ENCOUNTER
Agree with tylenol, may use 1000 mg every 8 hours.  I have sent in zofran for nausea.  Might also help to take benadryl since this is probably a reaction to the flu shot.  Benadryl might blunt the inflammatory response, 25 mg every 8 hours.

## 2019-11-11 NOTE — TELEPHONE ENCOUNTER
Called and talked to Chris. She said vomiting started Sat evening -she did not check her temp Sat. Sun she felt a little better, but had a bad cough Sun evening. This morning her temp was 100.8 around 9:00 and she gave her 2 regular strength Tylenol. She checked her temp again around 11:30 and it was 101.2. She is chilling and still coughing

## 2019-11-11 NOTE — TELEPHONE ENCOUNTER
Patients daughter called stating Temi received a flu shot on Friday and thinks she is having an allergic reaction to it. She stated that she was vomiting, had a temp of 100.5, and felt cold over the weekend. She stated that this morning she was still running  A fever and felt like she could vomit again. She was also coughing a lot.     Warm transferred to office.

## 2019-11-11 NOTE — TELEPHONE ENCOUNTER
Please call Chris at [phone]; she stated pts temp has dropped 99 degrees; she said you may speak to her, Tiffany, or Victor M

## 2019-11-12 ENCOUNTER — APPOINTMENT (OUTPATIENT)
Dept: GENERAL RADIOLOGY | Facility: HOSPITAL | Age: 84
End: 2019-11-12

## 2019-11-12 ENCOUNTER — TELEPHONE (OUTPATIENT)
Dept: INTERNAL MEDICINE | Facility: CLINIC | Age: 84
End: 2019-11-12

## 2019-11-12 ENCOUNTER — HOSPITAL ENCOUNTER (INPATIENT)
Facility: HOSPITAL | Age: 84
LOS: 9 days | Discharge: NURSING, REHAB OR LONG-TERM CARE | End: 2019-11-21
Attending: EMERGENCY MEDICINE | Admitting: INTERNAL MEDICINE

## 2019-11-12 DIAGNOSIS — J96.01 ACUTE RESPIRATORY FAILURE WITH HYPOXIA (HCC): ICD-10-CM

## 2019-11-12 DIAGNOSIS — A49.9 BACTERIAL UTI: ICD-10-CM

## 2019-11-12 DIAGNOSIS — N39.0 BACTERIAL UTI: ICD-10-CM

## 2019-11-12 DIAGNOSIS — Z74.09 IMPAIRED MOBILITY AND ADLS: ICD-10-CM

## 2019-11-12 DIAGNOSIS — A41.9 SEPSIS WITHOUT ACUTE ORGAN DYSFUNCTION, DUE TO UNSPECIFIED ORGANISM (HCC): Primary | ICD-10-CM

## 2019-11-12 DIAGNOSIS — Z78.9 IMPAIRED MOBILITY AND ADLS: ICD-10-CM

## 2019-11-12 DIAGNOSIS — J90 PLEURAL EFFUSION: ICD-10-CM

## 2019-11-12 DIAGNOSIS — J18.9 PNEUMONIA OF BOTH LUNGS DUE TO INFECTIOUS ORGANISM, UNSPECIFIED PART OF LUNG: ICD-10-CM

## 2019-11-12 PROBLEM — R82.90 ABNORMAL URINALYSIS: Status: ACTIVE | Noted: 2019-11-12

## 2019-11-12 PROBLEM — D72.829 LEUKOCYTOSIS: Status: ACTIVE | Noted: 2019-11-12

## 2019-11-12 PROBLEM — R09.02 HYPOXIA: Status: ACTIVE | Noted: 2019-11-12

## 2019-11-12 PROBLEM — R79.89 ELEVATED LACTIC ACID LEVEL: Status: ACTIVE | Noted: 2019-11-12

## 2019-11-12 LAB
ALBUMIN SERPL-MCNC: 3.4 G/DL (ref 3.5–5.2)
ALBUMIN/GLOB SERPL: 1.3 G/DL
ALP SERPL-CCNC: 75 U/L (ref 39–117)
ALT SERPL W P-5'-P-CCNC: 9 U/L (ref 1–33)
ANION GAP SERPL CALCULATED.3IONS-SCNC: 13 MMOL/L (ref 5–15)
AST SERPL-CCNC: 16 U/L (ref 1–32)
B PARAPERT DNA SPEC QL NAA+PROBE: NOT DETECTED
B PERT DNA SPEC QL NAA+PROBE: NOT DETECTED
BACTERIA UR QL AUTO: ABNORMAL /HPF
BASOPHILS # BLD MANUAL: 0.14 10*3/MM3 (ref 0–0.2)
BASOPHILS NFR BLD AUTO: 1 % (ref 0–1.5)
BILIRUB SERPL-MCNC: 0.7 MG/DL (ref 0.2–1.2)
BILIRUB UR QL STRIP: NEGATIVE
BUN BLD-MCNC: 17 MG/DL (ref 8–23)
BUN/CREAT SERPL: 27 (ref 7–25)
C PNEUM DNA NPH QL NAA+NON-PROBE: NOT DETECTED
CALCIUM SPEC-SCNC: 8.5 MG/DL (ref 8.6–10.5)
CHLORIDE SERPL-SCNC: 98 MMOL/L (ref 98–107)
CLARITY UR: ABNORMAL
CO2 SERPL-SCNC: 31 MMOL/L (ref 22–29)
COLOR UR: YELLOW
CREAT BLD-MCNC: 0.63 MG/DL (ref 0.57–1)
D-LACTATE SERPL-SCNC: 1.8 MMOL/L (ref 0.5–2)
D-LACTATE SERPL-SCNC: 2.2 MMOL/L (ref 0.5–2)
DEPRECATED RDW RBC AUTO: 44.8 FL (ref 37–54)
EOSINOPHIL # BLD MANUAL: 0 10*3/MM3 (ref 0–0.4)
EOSINOPHIL NFR BLD MANUAL: 0 % (ref 0.3–6.2)
ERYTHROCYTE [DISTWIDTH] IN BLOOD BY AUTOMATED COUNT: 14 % (ref 12.3–15.4)
FLUAV H1 2009 PAND RNA NPH QL NAA+PROBE: NOT DETECTED
FLUAV H1 HA GENE NPH QL NAA+PROBE: NOT DETECTED
FLUAV H3 RNA NPH QL NAA+PROBE: NOT DETECTED
FLUAV SUBTYP SPEC NAA+PROBE: NOT DETECTED
FLUBV RNA ISLT QL NAA+PROBE: NOT DETECTED
GFR SERPL CREATININE-BSD FRML MDRD: 89 ML/MIN/1.73
GLOBULIN UR ELPH-MCNC: 2.6 GM/DL
GLUCOSE BLD-MCNC: 136 MG/DL (ref 65–99)
GLUCOSE UR STRIP-MCNC: NEGATIVE MG/DL
HADV DNA SPEC NAA+PROBE: NOT DETECTED
HCOV 229E RNA SPEC QL NAA+PROBE: NOT DETECTED
HCOV HKU1 RNA SPEC QL NAA+PROBE: NOT DETECTED
HCOV NL63 RNA SPEC QL NAA+PROBE: NOT DETECTED
HCOV OC43 RNA SPEC QL NAA+PROBE: NOT DETECTED
HCT VFR BLD AUTO: 39.5 % (ref 34–46.6)
HGB BLD-MCNC: 12.1 G/DL (ref 12–15.9)
HGB UR QL STRIP.AUTO: ABNORMAL
HMPV RNA NPH QL NAA+NON-PROBE: NOT DETECTED
HOLD SPECIMEN: NORMAL
HPIV1 RNA SPEC QL NAA+PROBE: NOT DETECTED
HPIV2 RNA SPEC QL NAA+PROBE: NOT DETECTED
HPIV3 RNA NPH QL NAA+PROBE: NOT DETECTED
HPIV4 P GENE NPH QL NAA+PROBE: NOT DETECTED
HYALINE CASTS UR QL AUTO: ABNORMAL /LPF
HYPOCHROMIA BLD QL: ABNORMAL
KETONES UR QL STRIP: NEGATIVE
LEUKOCYTE ESTERASE UR QL STRIP.AUTO: ABNORMAL
LYMPHOCYTES # BLD MANUAL: 0.84 10*3/MM3 (ref 0.7–3.1)
LYMPHOCYTES NFR BLD MANUAL: 3 % (ref 5–12)
LYMPHOCYTES NFR BLD MANUAL: 6 % (ref 19.6–45.3)
M PNEUMO IGG SER IA-ACNC: NOT DETECTED
MCH RBC QN AUTO: 27 PG (ref 26.6–33)
MCHC RBC AUTO-ENTMCNC: 30.6 G/DL (ref 31.5–35.7)
MCV RBC AUTO: 88.2 FL (ref 79–97)
MONOCYTES # BLD AUTO: 0.42 10*3/MM3 (ref 0.1–0.9)
MRSA DNA SPEC QL NAA+PROBE: NEGATIVE
NEUTROPHILS # BLD AUTO: 12.5 10*3/MM3 (ref 1.7–7)
NEUTROPHILS NFR BLD MANUAL: 73 % (ref 42.7–76)
NEUTS BAND NFR BLD MANUAL: 16 % (ref 0–5)
NITRITE UR QL STRIP: POSITIVE
NT-PROBNP SERPL-MCNC: 1318 PG/ML (ref 5–1800)
PH UR STRIP.AUTO: 6 [PH] (ref 5–8)
PLAT MORPH BLD: NORMAL
PLATELET # BLD AUTO: 267 10*3/MM3 (ref 140–450)
PMV BLD AUTO: 9.1 FL (ref 6–12)
POLYCHROMASIA BLD QL SMEAR: ABNORMAL
POTASSIUM BLD-SCNC: 3.4 MMOL/L (ref 3.5–5.2)
POTASSIUM BLD-SCNC: 4.1 MMOL/L (ref 3.5–5.2)
PROCALCITONIN SERPL-MCNC: 0.23 NG/ML (ref 0.1–0.25)
PROT SERPL-MCNC: 6 G/DL (ref 6–8.5)
PROT UR QL STRIP: ABNORMAL
RBC # BLD AUTO: 4.48 10*6/MM3 (ref 3.77–5.28)
RBC # UR: ABNORMAL /HPF
REF LAB TEST METHOD: ABNORMAL
RHINOVIRUS RNA SPEC NAA+PROBE: NOT DETECTED
RSV RNA NPH QL NAA+NON-PROBE: NOT DETECTED
SODIUM BLD-SCNC: 142 MMOL/L (ref 136–145)
SP GR UR STRIP: 1.02 (ref 1–1.03)
SQUAMOUS #/AREA URNS HPF: ABNORMAL /HPF
TROPONIN T SERPL-MCNC: 0.03 NG/ML (ref 0–0.03)
UROBILINOGEN UR QL STRIP: ABNORMAL
VARIANT LYMPHS NFR BLD MANUAL: 1 % (ref 0–5)
WBC MORPH BLD: NORMAL
WBC NRBC COR # BLD: 14.05 10*3/MM3 (ref 3.4–10.8)
WBC UR QL AUTO: ABNORMAL /HPF
WHOLE BLOOD HOLD SPECIMEN: NORMAL
WHOLE BLOOD HOLD SPECIMEN: NORMAL

## 2019-11-12 PROCEDURE — 0100U HC BIOFIRE FILMARRAY RESP PANEL 2: CPT | Performed by: INTERNAL MEDICINE

## 2019-11-12 PROCEDURE — 94799 UNLISTED PULMONARY SVC/PX: CPT

## 2019-11-12 PROCEDURE — 85007 BL SMEAR W/DIFF WBC COUNT: CPT | Performed by: EMERGENCY MEDICINE

## 2019-11-12 PROCEDURE — 87040 BLOOD CULTURE FOR BACTERIA: CPT | Performed by: INTERNAL MEDICINE

## 2019-11-12 PROCEDURE — 84145 PROCALCITONIN (PCT): CPT | Performed by: INTERNAL MEDICINE

## 2019-11-12 PROCEDURE — 94640 AIRWAY INHALATION TREATMENT: CPT

## 2019-11-12 PROCEDURE — 25010000002 HEPARIN (PORCINE) PER 1000 UNITS: Performed by: INTERNAL MEDICINE

## 2019-11-12 PROCEDURE — 87641 MR-STAPH DNA AMP PROBE: CPT

## 2019-11-12 PROCEDURE — 25010000002 VANCOMYCIN PER 500 MG: Performed by: EMERGENCY MEDICINE

## 2019-11-12 PROCEDURE — 25010000002 PIPERACILLIN SOD-TAZOBACTAM PER 1 G: Performed by: INTERNAL MEDICINE

## 2019-11-12 PROCEDURE — 99223 1ST HOSP IP/OBS HIGH 75: CPT | Performed by: INTERNAL MEDICINE

## 2019-11-12 PROCEDURE — 93005 ELECTROCARDIOGRAM TRACING: CPT | Performed by: EMERGENCY MEDICINE

## 2019-11-12 PROCEDURE — 87186 SC STD MICRODIL/AGAR DIL: CPT | Performed by: EMERGENCY MEDICINE

## 2019-11-12 PROCEDURE — 87077 CULTURE AEROBIC IDENTIFY: CPT | Performed by: EMERGENCY MEDICINE

## 2019-11-12 PROCEDURE — 80053 COMPREHEN METABOLIC PANEL: CPT | Performed by: EMERGENCY MEDICINE

## 2019-11-12 PROCEDURE — 84132 ASSAY OF SERUM POTASSIUM: CPT | Performed by: INTERNAL MEDICINE

## 2019-11-12 PROCEDURE — P9612 CATHETERIZE FOR URINE SPEC: HCPCS

## 2019-11-12 PROCEDURE — 99285 EMERGENCY DEPT VISIT HI MDM: CPT

## 2019-11-12 PROCEDURE — 87040 BLOOD CULTURE FOR BACTERIA: CPT | Performed by: EMERGENCY MEDICINE

## 2019-11-12 PROCEDURE — 87086 URINE CULTURE/COLONY COUNT: CPT | Performed by: EMERGENCY MEDICINE

## 2019-11-12 PROCEDURE — 25010000002 PIPERACILLIN SOD-TAZOBACTAM PER 1 G: Performed by: EMERGENCY MEDICINE

## 2019-11-12 PROCEDURE — 83880 ASSAY OF NATRIURETIC PEPTIDE: CPT | Performed by: EMERGENCY MEDICINE

## 2019-11-12 PROCEDURE — 83605 ASSAY OF LACTIC ACID: CPT | Performed by: EMERGENCY MEDICINE

## 2019-11-12 PROCEDURE — 71045 X-RAY EXAM CHEST 1 VIEW: CPT

## 2019-11-12 PROCEDURE — 84484 ASSAY OF TROPONIN QUANT: CPT | Performed by: EMERGENCY MEDICINE

## 2019-11-12 PROCEDURE — 81001 URINALYSIS AUTO W/SCOPE: CPT | Performed by: EMERGENCY MEDICINE

## 2019-11-12 PROCEDURE — 85025 COMPLETE CBC W/AUTO DIFF WBC: CPT | Performed by: EMERGENCY MEDICINE

## 2019-11-12 RX ORDER — VANCOMYCIN HYDROCHLORIDE 1 G/200ML
20 INJECTION, SOLUTION INTRAVENOUS ONCE
Status: DISCONTINUED | OUTPATIENT
Start: 2019-11-12 | End: 2019-11-12

## 2019-11-12 RX ORDER — IPRATROPIUM BROMIDE AND ALBUTEROL SULFATE 2.5; .5 MG/3ML; MG/3ML
3 SOLUTION RESPIRATORY (INHALATION)
Status: DISCONTINUED | OUTPATIENT
Start: 2019-11-12 | End: 2019-11-17

## 2019-11-12 RX ORDER — DOCUSATE SODIUM 100 MG/1
100 CAPSULE, LIQUID FILLED ORAL 2 TIMES DAILY PRN
Status: DISCONTINUED | OUTPATIENT
Start: 2019-11-12 | End: 2019-11-21 | Stop reason: HOSPADM

## 2019-11-12 RX ORDER — ACETAMINOPHEN 160 MG/5ML
650 SOLUTION ORAL EVERY 4 HOURS PRN
Status: DISCONTINUED | OUTPATIENT
Start: 2019-11-12 | End: 2019-11-21 | Stop reason: HOSPADM

## 2019-11-12 RX ORDER — HEPARIN SODIUM 5000 [USP'U]/ML
5000 INJECTION, SOLUTION INTRAVENOUS; SUBCUTANEOUS EVERY 12 HOURS SCHEDULED
Status: DISCONTINUED | OUTPATIENT
Start: 2019-11-12 | End: 2019-11-15

## 2019-11-12 RX ORDER — VANCOMYCIN HYDROCHLORIDE 1 G/200ML
20 INJECTION, SOLUTION INTRAVENOUS ONCE
Status: COMPLETED | OUTPATIENT
Start: 2019-11-12 | End: 2019-11-12

## 2019-11-12 RX ORDER — BUSPIRONE HYDROCHLORIDE 10 MG/1
10 TABLET ORAL 2 TIMES DAILY
Status: DISCONTINUED | OUTPATIENT
Start: 2019-11-12 | End: 2019-11-21 | Stop reason: HOSPADM

## 2019-11-12 RX ORDER — BETHANECHOL CHLORIDE 10 MG/1
10 TABLET ORAL 4 TIMES DAILY
Status: DISCONTINUED | OUTPATIENT
Start: 2019-11-12 | End: 2019-11-21 | Stop reason: HOSPADM

## 2019-11-12 RX ORDER — TRAMADOL HYDROCHLORIDE 50 MG/1
50 TABLET ORAL EVERY 8 HOURS PRN
Status: DISCONTINUED | OUTPATIENT
Start: 2019-11-12 | End: 2019-11-21 | Stop reason: HOSPADM

## 2019-11-12 RX ORDER — ACETAMINOPHEN 650 MG/1
650 SUPPOSITORY RECTAL EVERY 4 HOURS PRN
Status: DISCONTINUED | OUTPATIENT
Start: 2019-11-12 | End: 2019-11-21 | Stop reason: HOSPADM

## 2019-11-12 RX ORDER — POTASSIUM CHLORIDE 750 MG/1
40 CAPSULE, EXTENDED RELEASE ORAL AS NEEDED
Status: DISCONTINUED | OUTPATIENT
Start: 2019-11-12 | End: 2019-11-21 | Stop reason: HOSPADM

## 2019-11-12 RX ORDER — GABAPENTIN 300 MG/1
300 CAPSULE ORAL 2 TIMES DAILY
Status: DISCONTINUED | OUTPATIENT
Start: 2019-11-12 | End: 2019-11-21 | Stop reason: HOSPADM

## 2019-11-12 RX ORDER — MEMANTINE HYDROCHLORIDE 10 MG/1
10 TABLET ORAL 2 TIMES DAILY
Status: DISCONTINUED | OUTPATIENT
Start: 2019-11-12 | End: 2019-11-21 | Stop reason: HOSPADM

## 2019-11-12 RX ORDER — CALCIUM CARBONATE 750 MG/1
750 TABLET, CHEWABLE ORAL 3 TIMES DAILY PRN
Status: DISCONTINUED | OUTPATIENT
Start: 2019-11-12 | End: 2019-11-21 | Stop reason: HOSPADM

## 2019-11-12 RX ORDER — ASPIRIN 325 MG
325 TABLET, DELAYED RELEASE (ENTERIC COATED) ORAL DAILY
Status: DISCONTINUED | OUTPATIENT
Start: 2019-11-12 | End: 2019-11-21 | Stop reason: HOSPADM

## 2019-11-12 RX ORDER — SODIUM CHLORIDE 0.9 % (FLUSH) 0.9 %
10 SYRINGE (ML) INJECTION AS NEEDED
Status: DISCONTINUED | OUTPATIENT
Start: 2019-11-12 | End: 2019-11-21 | Stop reason: HOSPADM

## 2019-11-12 RX ORDER — SODIUM CHLORIDE 0.9 % (FLUSH) 0.9 %
10 SYRINGE (ML) INJECTION EVERY 12 HOURS SCHEDULED
Status: DISCONTINUED | OUTPATIENT
Start: 2019-11-12 | End: 2019-11-21 | Stop reason: HOSPADM

## 2019-11-12 RX ORDER — ACETAMINOPHEN 325 MG/1
650 TABLET ORAL EVERY 4 HOURS PRN
Status: DISCONTINUED | OUTPATIENT
Start: 2019-11-12 | End: 2019-11-21 | Stop reason: HOSPADM

## 2019-11-12 RX ORDER — DIPHENOXYLATE HYDROCHLORIDE AND ATROPINE SULFATE 2.5; .025 MG/1; MG/1
1 TABLET ORAL 4 TIMES DAILY PRN
Status: DISCONTINUED | OUTPATIENT
Start: 2019-11-12 | End: 2019-11-21 | Stop reason: HOSPADM

## 2019-11-12 RX ORDER — GUAIFENESIN 600 MG/1
600 TABLET, EXTENDED RELEASE ORAL EVERY 12 HOURS SCHEDULED
Status: DISCONTINUED | OUTPATIENT
Start: 2019-11-12 | End: 2019-11-18

## 2019-11-12 RX ORDER — SUCRALFATE 1 G/1
1 TABLET ORAL EVERY 6 HOURS
Status: DISCONTINUED | OUTPATIENT
Start: 2019-11-12 | End: 2019-11-17

## 2019-11-12 RX ORDER — POTASSIUM CHLORIDE 1.5 G/1.77G
40 POWDER, FOR SOLUTION ORAL AS NEEDED
Status: DISCONTINUED | OUTPATIENT
Start: 2019-11-12 | End: 2019-11-21 | Stop reason: HOSPADM

## 2019-11-12 RX ORDER — POTASSIUM CHLORIDE 7.45 MG/ML
10 INJECTION INTRAVENOUS
Status: DISCONTINUED | OUTPATIENT
Start: 2019-11-12 | End: 2019-11-21 | Stop reason: HOSPADM

## 2019-11-12 RX ORDER — DONEPEZIL HYDROCHLORIDE 10 MG/1
10 TABLET, FILM COATED ORAL NIGHTLY
Status: DISCONTINUED | OUTPATIENT
Start: 2019-11-12 | End: 2019-11-21 | Stop reason: HOSPADM

## 2019-11-12 RX ORDER — ACETAMINOPHEN 325 MG/1
650 TABLET ORAL ONCE
Status: COMPLETED | OUTPATIENT
Start: 2019-11-12 | End: 2019-11-12

## 2019-11-12 RX ORDER — SODIUM CHLORIDE 9 MG/ML
100 INJECTION, SOLUTION INTRAVENOUS CONTINUOUS
Status: ACTIVE | OUTPATIENT
Start: 2019-11-12 | End: 2019-11-12

## 2019-11-12 RX ADMIN — POTASSIUM CHLORIDE 40 MEQ: 750 CAPSULE, EXTENDED RELEASE ORAL at 14:00

## 2019-11-12 RX ADMIN — GABAPENTIN 300 MG: 300 CAPSULE ORAL at 12:27

## 2019-11-12 RX ADMIN — TAZOBACTAM SODIUM AND PIPERACILLIN SODIUM 3.38 G: 375; 3 INJECTION, SOLUTION INTRAVENOUS at 21:47

## 2019-11-12 RX ADMIN — BETHANECHOL CHLORIDE 10 MG: 10 TABLET ORAL at 21:48

## 2019-11-12 RX ADMIN — MEMANTINE 10 MG: 10 TABLET ORAL at 21:48

## 2019-11-12 RX ADMIN — Medication 750 MG: at 23:17

## 2019-11-12 RX ADMIN — SERTRALINE HYDROCHLORIDE 50 MG: 50 TABLET ORAL at 12:27

## 2019-11-12 RX ADMIN — GUAIFENESIN 600 MG: 600 TABLET, EXTENDED RELEASE ORAL at 14:00

## 2019-11-12 RX ADMIN — BUSPIRONE HYDROCHLORIDE 10 MG: 10 TABLET ORAL at 12:27

## 2019-11-12 RX ADMIN — BETHANECHOL CHLORIDE 10 MG: 10 TABLET ORAL at 13:41

## 2019-11-12 RX ADMIN — ASPIRIN 325 MG: 325 TABLET, DELAYED RELEASE ORAL at 12:27

## 2019-11-12 RX ADMIN — HEPARIN SODIUM 5000 UNITS: 5000 INJECTION, SOLUTION INTRAVENOUS; SUBCUTANEOUS at 21:48

## 2019-11-12 RX ADMIN — SODIUM CHLORIDE 100 ML/HR: 9 INJECTION, SOLUTION INTRAVENOUS at 12:26

## 2019-11-12 RX ADMIN — DONEPEZIL HYDROCHLORIDE 10 MG: 10 TABLET, FILM COATED ORAL at 21:48

## 2019-11-12 RX ADMIN — SUCRALFATE 1 G: 1 TABLET ORAL at 12:27

## 2019-11-12 RX ADMIN — IPRATROPIUM BROMIDE AND ALBUTEROL SULFATE 3 ML: 2.5; .5 SOLUTION RESPIRATORY (INHALATION) at 15:32

## 2019-11-12 RX ADMIN — MEMANTINE 10 MG: 10 TABLET ORAL at 12:30

## 2019-11-12 RX ADMIN — BUSPIRONE HYDROCHLORIDE 10 MG: 10 TABLET ORAL at 21:48

## 2019-11-12 RX ADMIN — ACETAMINOPHEN 650 MG: 325 TABLET ORAL at 07:56

## 2019-11-12 RX ADMIN — GUAIFENESIN 600 MG: 600 TABLET, EXTENDED RELEASE ORAL at 21:48

## 2019-11-12 RX ADMIN — VANCOMYCIN HYDROCHLORIDE 1000 MG: 1 INJECTION, SOLUTION INTRAVENOUS at 08:24

## 2019-11-12 RX ADMIN — TAZOBACTAM SODIUM AND PIPERACILLIN SODIUM 3.38 G: 375; 3 INJECTION, SOLUTION INTRAVENOUS at 07:57

## 2019-11-12 RX ADMIN — LEVOTHYROXINE SODIUM 175 MCG: 25 TABLET ORAL at 13:45

## 2019-11-12 RX ADMIN — TAZOBACTAM SODIUM AND PIPERACILLIN SODIUM 3.38 G: 375; 3 INJECTION, SOLUTION INTRAVENOUS at 13:42

## 2019-11-12 RX ADMIN — HEPARIN SODIUM 5000 UNITS: 5000 INJECTION, SOLUTION INTRAVENOUS; SUBCUTANEOUS at 12:27

## 2019-11-12 RX ADMIN — SODIUM CHLORIDE 100 ML/HR: 9 INJECTION, SOLUTION INTRAVENOUS at 21:22

## 2019-11-12 RX ADMIN — SODIUM CHLORIDE, PRESERVATIVE FREE 10 ML: 5 INJECTION INTRAVENOUS at 21:48

## 2019-11-12 RX ADMIN — TRAMADOL HYDROCHLORIDE 50 MG: 50 TABLET, FILM COATED ORAL at 21:53

## 2019-11-12 RX ADMIN — BETHANECHOL CHLORIDE 10 MG: 10 TABLET ORAL at 17:12

## 2019-11-12 RX ADMIN — SUCRALFATE 1 G: 1 TABLET ORAL at 17:48

## 2019-11-12 RX ADMIN — POTASSIUM CHLORIDE 40 MEQ: 750 CAPSULE, EXTENDED RELEASE ORAL at 17:41

## 2019-11-12 RX ADMIN — SODIUM CHLORIDE, PRESERVATIVE FREE 10 ML: 5 INJECTION INTRAVENOUS at 12:29

## 2019-11-12 RX ADMIN — GABAPENTIN 300 MG: 300 CAPSULE ORAL at 21:48

## 2019-11-12 NOTE — TELEPHONE ENCOUNTER
Pt son has called stating pt is in [facility] with acute pneumonia and would like the ma or  give him a call regarding other medication for his family

## 2019-11-12 NOTE — TELEPHONE ENCOUNTER
PT'S SON HAS CALLED BACK WANTING TO SPEAK TO SOMEONE ABOUT HIS MOTHER     SON IS WORRIED ABOUT NEEDING AN ANTIBIOTIC FOR HIS FAMILY DUE TO HIS MOTHER HAVING BACTERIA  PNEUMONIA. MOTHER LIVES WITH THEM AND THEY HAVE BEEN AROUND HER.     PLEASE ADVISE AND GIVE CALL   VERIFIED PHONE NUMBER [phone]

## 2019-11-12 NOTE — ED PROVIDER NOTES
Subjective   Temi Jarrett is a 89 y.o. female who presents to the ED with complaints of shortness of breath. She reports that she woke up in the middle of the night to go to the bathroom. She states that she has no health complaints but that her son and daughter said she was very short of breath prompting the call to EMS. EMS reports that her oxygen saturation was 73% on room air at their arrival. She does not wear oxygen at home. She has also experienced fever, chills, and a cough for about 2 days so her son and daughter think she has pneumonia. She denies experiencing any pain. There are no other acute complaints at this time.        History provided by:  Patient  Shortness of Breath   Severity:  Mild  Onset quality:  Sudden  Timing:  Sporadic  Progression:  Resolved  Chronicity:  New  Relieved by:  None tried  Ineffective treatments:  None tried  Associated symptoms: cough and fever    Associated symptoms: no abdominal pain, no chest pain, no headaches and no neck pain        Review of Systems   Constitutional: Positive for chills and fever.   Respiratory: Positive for cough and shortness of breath.    Cardiovascular: Negative for chest pain.   Gastrointestinal: Negative for abdominal pain.   Musculoskeletal: Negative for neck pain.   Neurological: Negative for headaches.   All other systems reviewed and are negative.      Past Medical History:   Diagnosis Date   • Disease of thyroid gland    • Gastric polyp    • History of colonic polyps    • Hypertension    • IBS (irritable bowel syndrome)    • Macular degeneration    • Torn meniscus     right knee        Allergies   Allergen Reactions   • Codeine Nausea Only     Trouble Breathing        Past Surgical History:   Procedure Laterality Date   • CHOLECYSTECTOMY  1997   • EYE SURGERY  1998    Cataract extraction   • HERNIA REPAIR     • HIP HEMIARTHROPLASTY Left 9/28/2019    Procedure: HIP HEMIARTHROPLASTY LEFT;  Surgeon: Reddy Segundo Jr., MD;  Location:   [facility];  Service: Orthopedics   • HYSTERECTOMY  1976   • KNEE SURGERY Right     arthroscopy- 2000   • TONSILLECTOMY         Family History   Problem Relation Age of Onset   • Hypertension Mother    • Breast cancer Mother    • Obesity Mother    • Hypertension Father    • Diabetes Son        Social History     Socioeconomic History   • Marital status:      Spouse name: Not on file   • Number of children: Not on file   • Years of education: Not on file   • Highest education level: Not on file   Tobacco Use   • Smoking status: Never Smoker   • Smokeless tobacco: Never Used   Substance and Sexual Activity   • Alcohol use: No   • Drug use: Defer   • Sexual activity: Defer   Social History Narrative    Lives with son and daughter in law--  is at [facility] getting rehab         Objective   Physical Exam   Constitutional: She is oriented to person, place, and time. She appears well-developed and well-nourished. No distress.   HENT:   Head: Normocephalic and atraumatic.   Nose: Nose normal.   Eyes: Conjunctivae are normal. No scleral icterus.   Neck: Normal range of motion. Neck supple.   Cardiovascular: Normal rate, regular rhythm and normal heart sounds.   No murmur heard.  Pulmonary/Chest: Effort normal. No respiratory distress. She has rales.   Rales diffusely in the right lung. Has an occasional rattling cough.   Abdominal: Soft. She exhibits no mass. There is no tenderness. There is no rebound and no guarding.   Abdomen soft and non-tender.   Musculoskeletal: Normal range of motion. She exhibits no edema.        Right lower leg: She exhibits no edema.        Left lower leg: She exhibits no edema.   No pedal edema.   Neurological: She is alert and oriented to person, place, and time.   Skin: Skin is warm and dry.   Psychiatric: She has a normal mood and affect. Her behavior is normal.   Nursing note and vitals reviewed.      Procedures         ED Course  ED Course as of Nov 12 1430   Tue Nov 12, 2019   0900  D/w Dr Claudio who will admit  [DT]      ED Course User Index  [DT] Cecil Serrato MD      Recent Results (from the past 24 hour(s))   Comprehensive Metabolic Panel    Collection Time: 11/12/19  7:16 AM   Result Value Ref Range    Glucose 136 (H) 65 - 99 mg/dL    BUN 17 8 - 23 mg/dL    Creatinine 0.63 0.57 - 1.00 mg/dL    Sodium 142 136 - 145 mmol/L    Potassium 3.4 (L) 3.5 - 5.2 mmol/L    Chloride 98 98 - 107 mmol/L    CO2 31.0 (H) 22.0 - 29.0 mmol/L    Calcium 8.5 (L) 8.6 - 10.5 mg/dL    Total Protein 6.0 6.0 - 8.5 g/dL    Albumin 3.40 (L) 3.50 - 5.20 g/dL    ALT (SGPT) 9 1 - 33 U/L    AST (SGOT) 16 1 - 32 U/L    Alkaline Phosphatase 75 39 - 117 U/L    Total Bilirubin 0.7 0.2 - 1.2 mg/dL    eGFR Non African Amer 89 >60 mL/min/1.73    Globulin 2.6 gm/dL    A/G Ratio 1.3 g/dL    BUN/Creatinine Ratio 27.0 (H) 7.0 - 25.0    Anion Gap 13.0 5.0 - 15.0 mmol/L   BNP    Collection Time: 11/12/19  7:16 AM   Result Value Ref Range    proBNP 1,318.0 5.0-1,800.0 pg/mL   Troponin    Collection Time: 11/12/19  7:16 AM   Result Value Ref Range    Troponin T 0.030 0.000 - 0.030 ng/mL   Light Blue Top    Collection Time: 11/12/19  7:16 AM   Result Value Ref Range    Extra Tube hold for add-on    Green Top (Gel)    Collection Time: 11/12/19  7:16 AM   Result Value Ref Range    Extra Tube Hold for add-ons.    Lavender Top    Collection Time: 11/12/19  7:16 AM   Result Value Ref Range    Extra Tube hold for add-on    Gold Top - SST    Collection Time: 11/12/19  7:16 AM   Result Value Ref Range    Extra Tube Hold for add-ons.    CBC Auto Differential    Collection Time: 11/12/19  7:16 AM   Result Value Ref Range    WBC 14.05 (H) 3.40 - 10.80 10*3/mm3    RBC 4.48 3.77 - 5.28 10*6/mm3    Hemoglobin 12.1 12.0 - 15.9 g/dL    Hematocrit 39.5 34.0 - 46.6 %    MCV 88.2 79.0 - 97.0 fL    MCH 27.0 26.6 - 33.0 pg    MCHC 30.6 (L) 31.5 - 35.7 g/dL    RDW 14.0 12.3 - 15.4 %    RDW-SD 44.8 37.0 - 54.0 fl    MPV 9.1 6.0 - 12.0 fL    Platelets 267  140 - 450 10*3/mm3   Lactic Acid, Plasma    Collection Time: 11/12/19  7:16 AM   Result Value Ref Range    Lactate 2.2 (C) 0.5 - 2.0 mmol/L   Lactic Acid, Reflex Timer (This will reflex a repeat order 3-3:15 hours after ordered.)    Collection Time: 11/12/19  7:16 AM   Result Value Ref Range    Extra Tube Hold for add-ons.    Manual Differential    Collection Time: 11/12/19  7:16 AM   Result Value Ref Range    Neutrophil % 73.0 42.7 - 76.0 %    Lymphocyte % 6.0 (L) 19.6 - 45.3 %    Monocyte % 3.0 (L) 5.0 - 12.0 %    Eosinophil % 0.0 (L) 0.3 - 6.2 %    Basophil % 1.0 0.0 - 1.5 %    Bands %  16.0 (H) 0.0 - 5.0 %    Atypical Lymphocyte % 1.0 0.0 - 5.0 %    Neutrophils Absolute 12.50 (H) 1.70 - 7.00 10*3/mm3    Lymphocytes Absolute 0.84 0.70 - 3.10 10*3/mm3    Monocytes Absolute 0.42 0.10 - 0.90 10*3/mm3    Eosinophils Absolute 0.00 0.00 - 0.40 10*3/mm3    Basophils Absolute 0.14 0.00 - 0.20 10*3/mm3    Hypochromia Slight/1+ None Seen    Polychromasia Slight/1+ None Seen    WBC Morphology Normal Normal    Platelet Morphology Normal Normal   Procalcitonin    Collection Time: 11/12/19  7:16 AM   Result Value Ref Range    Procalcitonin 0.23 0.10 - 0.25 ng/mL   Urinalysis With Culture If Indicated - Urine, Catheter    Collection Time: 11/12/19  7:17 AM   Result Value Ref Range    Color, UA Yellow Yellow, Straw    Appearance, UA Cloudy (A) Clear    pH, UA 6.0 5.0 - 8.0    Specific Gravity, UA 1.025 1.005 - 1.030    Glucose, UA Negative Negative    Ketones, UA Negative Negative    Bilirubin, UA Negative Negative    Blood, UA Small (1+) (A) Negative    Protein,  mg/dL (2+) (A) Negative    Leuk Esterase, UA Moderate (2+) (A) Negative    Nitrite, UA Positive (A) Negative    Urobilinogen, UA 0.2 E.U./dL 0.2 - 1.0 E.U./dL   Urinalysis, Microscopic Only - Urine, Catheter    Collection Time: 11/12/19  7:17 AM   Result Value Ref Range    RBC, UA 0-2 None Seen, 0-2 /HPF    WBC, UA Too Numerous to Count (A) None Seen, 0-2 /HPF     Bacteria, UA 4+ (A) None Seen, Trace /HPF    Squamous Epithelial Cells, UA 0-2 None Seen, 0-2 /HPF    Hyaline Casts, UA 0-6 0 - 6 /LPF    Methodology Automated Microscopy    Respiratory Panel, PCR - Swab, Nasopharynx    Collection Time: 11/12/19 10:22 AM   Result Value Ref Range    ADENOVIRUS, PCR Not Detected Not Detected    Coronavirus 229E Not Detected Not Detected    Coronavirus HKU1 Not Detected Not Detected    Coronavirus NL63 Not Detected Not Detected    Coronavirus OC43 Not Detected Not Detected    Human Metapneumovirus Not Detected Not Detected    Human Rhinovirus/Enterovirus Not Detected Not Detected    Influenza B PCR Not Detected Not Detected    Parainfluenza Virus 1 Not Detected Not Detected    Parainfluenza Virus 2 Not Detected Not Detected    Parainfluenza Virus 3 Not Detected Not Detected    Parainfluenza Virus 4 Not Detected Not Detected    Bordetella pertussis pcr Not Detected Not Detected    Influenza A H1 2009 PCR Not Detected Not Detected    Chlamydophila pneumoniae PCR Not Detected Not Detected    Mycoplasma pneumo by PCR Not Detected Not Detected    Influenza A PCR Not Detected Not Detected    Influenza A H3 Not Detected Not Detected    Influenza A H1 Not Detected Not Detected    RSV, PCR Not Detected Not Detected    Bordetella parapertussis PCR Not Detected Not Detected   Lactic Acid, Reflex    Collection Time: 11/12/19 11:31 AM   Result Value Ref Range    Lactate 1.8 0.5 - 2.0 mmol/L     Note: In addition to lab results from this visit, the labs listed above may include labs taken at another facility or during a different encounter within the last 24 hours. Please correlate lab times with ED admission and discharge times for further clarification of the services performed during this visit.    XR Chest 1 View   Preliminary Result   1.  Interval development of right upper lobe pneumonia with additional   right lower lobe and left lower lobe hazy airspace changes suggested   superimposed on  "chronic lung disease. Follow-up chest radiography to   resolution advised.    2.  Cardiomegaly, similar to prior.   3.  Suspected large hiatal hernia, not as pronounced on today's exam   when compared to prior studies.       D:  11/12/2019   E:  11/12/2019                            Vitals:    11/12/19 0830 11/12/19 1022 11/12/19 1110 11/12/19 1115   BP: 101/54  110/70 107/59   BP Location:   Left arm Right arm   Patient Position:   Lying Lying   Pulse: 100  88 99   Resp:  20 16    Temp:   98.2 °F (36.8 °C)    TempSrc:   Oral    SpO2: 93%  93% 95%   Weight:   56.7 kg (125 lb 1.6 oz)    Height:   152.4 cm (60\")      Medications   sodium chloride 0.9 % flush 10 mL (not administered)   aspirin EC tablet 325 mg (325 mg Oral Given 11/12/19 1227)   bethanechol (URECHOLINE) tablet 10 mg (10 mg Oral Given 11/12/19 1341)   busPIRone (BUSPAR) tablet 10 mg (10 mg Oral Given 11/12/19 1227)   diphenoxylate-atropine (LOMOTIL) 2.5-0.025 MG per tablet 1 tablet (not administered)   donepezil (ARICEPT) tablet 10 mg (not administered)   gabapentin (NEURONTIN) capsule 300 mg (300 mg Oral Given 11/12/19 1227)   memantine (NAMENDA) tablet 10 mg (10 mg Oral Given 11/12/19 1230)   sertraline (ZOLOFT) tablet 50 mg (50 mg Oral Given 11/12/19 1227)   sucralfate (CARAFATE) tablet 1 g (1 g Oral Given 11/12/19 1227)   traMADol (ULTRAM) tablet 50 mg (not administered)   sodium chloride 0.9 % flush 10 mL (10 mL Intravenous Given 11/12/19 1229)   sodium chloride 0.9 % flush 10 mL (not administered)   heparin (porcine) 5000 UNIT/ML injection 5,000 Units (5,000 Units Subcutaneous Given 11/12/19 1227)   acetaminophen (TYLENOL) tablet 650 mg (not administered)     Or   acetaminophen (TYLENOL) 160 MG/5ML solution 650 mg (not administered)     Or   acetaminophen (TYLENOL) suppository 650 mg (not administered)   sodium chloride 0.9 % infusion (100 mL/hr Intravenous New Bag 11/12/19 1226)   docusate sodium (COLACE) capsule 100 mg (not administered) "   Pharmacy to dose vancomycin (not administered)   piperacillin-tazobactam (ZOSYN) 3.375 g in iso-osmotic dextrose 50 ml (premix) (3.375 g Intravenous New Bag 11/12/19 1342)   levothyroxine (SYNTHROID, LEVOTHROID) tablet 175 mcg (175 mcg Oral Given 11/12/19 1345)   potassium chloride (MICRO-K) CR capsule 40 mEq (40 mEq Oral Given 11/12/19 1400)     Or   potassium chloride (KLOR-CON) packet 40 mEq ( Oral Not Given:  See Alt 11/12/19 1400)     Or   potassium chloride 10 mEq in 100 mL IVPB ( Intravenous Not Given:  See Alt 11/12/19 1400)   ipratropium-albuterol (DUO-NEB) nebulizer solution 3 mL (not administered)   guaiFENesin (MUCINEX) 12 hr tablet 600 mg (600 mg Oral Given 11/12/19 1400)   vancomycin (VANCOCIN) in iso-osmotic dextrose IVPB 1 g (premix) 200 mL (0 mg/kg × 53.5 kg Intravenous Stopped 11/12/19 0957)   piperacillin-tazobactam (ZOSYN) 3.375 g in iso-osmotic dextrose 50 ml (premix) (0 g Intravenous Stopped 11/12/19 0824)   acetaminophen (TYLENOL) tablet 650 mg (650 mg Oral Given 11/12/19 0756)     ECG/EMG Results (last 24 hours)     Procedure Component Value Units Date/Time    ECG 12 Lead [[id number]] Collected:  11/12/19 0658     Updated:  11/12/19 0659        ECG 12 Lead                               MDM  Number of Diagnoses or Management Options  Acute respiratory failure with hypoxia (CMS/HCC): new and requires workup  Bacterial UTI: new and requires workup  Pneumonia of both lungs due to infectious organism, unspecified part of lung: new and requires workup  Sepsis without acute organ dysfunction, due to unspecified organism (CMS/HCC): new and requires workup     Amount and/or Complexity of Data Reviewed  Clinical lab tests: reviewed and ordered  Tests in the radiology section of CPT®: ordered and reviewed  Obtain history from someone other than the patient: yes  Discuss the patient with other providers: yes  Independent visualization of images, tracings, or specimens: yes    Patient Progress  Patient  progress: improved      Final diagnoses:   Sepsis without acute organ dysfunction, due to unspecified organism (CMS/[facility])   Pneumonia of both lungs due to infectious organism, unspecified part of lung   Bacterial UTI   Acute respiratory failure with hypoxia (CMS/[facility])       Documentation assistance provided by abril Mosqueda.  Information recorded by the scribe was done at my direction and has been verified and validated by me.     Jagdish Mosqueda  11/12/19 0850       Cecil Serrato MD  11/12/19 3812

## 2019-11-12 NOTE — TELEPHONE ENCOUNTER
Called [phone] with no answer and unable to leave voicemail. LVM on cell # in chart. Patient has been admitted to the hospital with UTI, Sepsis, pneumonia in both lungs.

## 2019-11-12 NOTE — PROGRESS NOTES
Discharge Planning Assessment  [facility]     Patient Name: Temi Jarrett  MRN: [id number]  Today's Date: 11/12/2019    Admit Date: 11/12/2019    Discharge Needs Assessment     Row Name 11/12/19 0929       Living Environment    Lives With  child(darvin), adult    Name(s) of Who Lives With Patient  KSENIA JONES    Current Living Arrangements  home/apartment/condo    Primary Care Provided by  other (see comments)    Provides Primary Care For  no one    Family Caregiver if Needed  child(darvin), adult    Family Caregiver Names  ALYSSA AND TORO JARRETT    Quality of Family Relationships  unable to assess    Able to Return to Prior Arrangements  yes       Resource/Environmental Concerns    Resource/Environmental Concerns  none    Transportation Concerns  car, none       Transition Planning    Patient/Family Anticipates Transition to  home with family    Patient/Family Anticipated Services at Transition  home health care    Transportation Anticipated  family or friend will provide       Discharge Needs Assessment    Readmission Within the Last 30 Days  no previous admission in last 30 days    Concerns to be Addressed  denies needs/concerns at this time    Equipment Currently Used at Home  walker, rolling    Anticipated Changes Related to Illness  none    Outpatient/Agency/Support Group Needs  homecare agency    [facility]        Discharge Plan     Row Name 11/12/19 0936       Plan    Plan  INITIAL    Plan Comments  PT LIVES IN [place] WITH HER SON, ALYSSA, AND DAUGHTER-IN-LAW TORO. SHE USES ASSIST OF FURNITURE IN HOME TO AMBULATE BUT FAMILY INSISTS SHE USE HER WALKER WHEN GOIING OUT. SHE DOES REALIZE SHE PROBABLY SHOULD AT LEAST USE A CANE IN THE HOME. SHE IS CURRENT WITH [facility] FOR SN, PT, AND NURSING AIDE. TORO HAS ALREADY CALLED TO CANCEL FOR TODAY. PCP IS BARRY GRIFFITH    Final Discharge Disposition Code  01 - home or self-care     "    Destination      No service coordination in this encounter.      Durable Medical Equipment      No service coordination in this encounter.      Dialysis/Infusion      No service coordination in this encounter.      Home Medical Care      No service coordination in this encounter.      Therapy      No service coordination in this encounter.      Community Resources      No service coordination in this encounter.          Demographic Summary    No documentation.       Functional Status     Row Name 11/12/19 0927       Functional Status    Usual Activity Tolerance  good    Current Activity Tolerance  fair    Functional Status Comments  USES A ROLLING WALKER OUTSIDE THE HOUSE BUT USES THE \"FURNITURE\" IN THE HOUSE       Functional Status, IADL    Medications  assistive person    Meal Preparation  assistive person    Housekeeping  assistive person    Laundry  assistive person    Shopping  assistive person    IADL Comments  -- CURRENTLY HAS CARETENDERS AIDE FOR BATHING       Mental Status    General Appearance WDL  WDL       Mental Status Summary    Recent Changes in Mental Status/Cognitive Functioning  no changes       Employment/    Employment Status  retired        Psychosocial    No documentation.       Abuse/Neglect    No documentation.       Legal    No documentation.       Substance Abuse    No documentation.       Patient Forms    No documentation.           Olesya Lainez RN    "

## 2019-11-12 NOTE — PROGRESS NOTES
"Pharmacy Consult - Vancomycin Dosing    Temi Jarrett is a 89 y.o. female receiving vancomycin therapy.     Indication: Pneumonia   Consulting Provider: Dr. Claudio  ID Consult: No    Goal Trough: 15-20 mcg/mL    Current Antimicrobial Therapy  Zosyn 11/12-now  Vancomycin 11/12-now      Allergies  Allergies as of 11/12/2019 - Reviewed 11/12/2019   Allergen Reaction Noted   • Codeine Nausea Only 05/06/2018       Labs    Results from last 7 days   Lab Units 11/12/19  0716   BUN mg/dL 17   CREATININE mg/dL 0.63     Results from last 7 days   Lab Units 11/12/19  0716   WBC 10*3/mm3 14.05*       Evaluation of Dosing     Last Dose Received in the ED/Outside Facility: vancomycin 1000mg (~20 mg/kg) IV x1 given in ED 11/12 at 0824  Is Patient on Dialysis or Renal Replacement: no    Ht - 152.4 cm (60\")  Wt - 56.7 kg (125 lb 1.6 oz)    Estimated Creatinine Clearance: 37.6 mL/min (by C-G formula based on SCr of 0.63 mg/dL).    Intake & Output (last 3 days)         11/09 0701 - 11/10 0700 11/10 0701 - 11/11 0700 11/11 0701 - 11/12 0700 11/12 0701 - 11/13 0700    IV Piggyback    250    Total Intake(mL/kg)    250 (4.4)    Net    +250                    Microbiology and Radiology  Microbiology Results (last 10 days)       Procedure Component Value - Date/Time    Respiratory Panel, PCR - Swab, Nasopharynx [[id number]]  (Normal) Collected:  11/12/19 1022    Lab Status:  Final result Specimen:  Swab from Nasopharynx Updated:  11/12/19 1247     ADENOVIRUS, PCR Not Detected     Coronavirus 229E Not Detected     Coronavirus HKU1 Not Detected     Coronavirus NL63 Not Detected     Coronavirus OC43 Not Detected     Human Metapneumovirus Not Detected     Human Rhinovirus/Enterovirus Not Detected     Influenza B PCR Not Detected     Parainfluenza Virus 1 Not Detected     Parainfluenza Virus 2 Not Detected     Parainfluenza Virus 3 Not Detected     Parainfluenza Virus 4 Not Detected     Bordetella pertussis pcr Not Detected     Influenza A H1 " 2009 PCR Not Detected     Chlamydophila pneumoniae PCR Not Detected     Mycoplasma pneumo by PCR Not Detected     Influenza A PCR Not Detected     Influenza A H3 Not Detected     Influenza A H1 Not Detected     RSV, PCR Not Detected     Bordetella parapertussis PCR Not Detected            Evaluation of Level                 Assessment/Plan:  1. Pharmacy dosing vancomycin for pneumonia, goal trough 15-20.  2. Patient received loading dose of vancomycin 1000mg (~20 mg/kg) IV x1 in ED at 0824. Due to patient's age and renal function, will obtain random vancomycin level tomorrow morning to determine further dosing.  3. MRSA PCR ordered and in process.  4. Pharmacy will continue to monitor and adjust vancomycin dose as necessary based on renal function, cultures, labs, and clinical status.       Thank you,  Jean Claude Martell, PharmD, BCPS  11/12/2019  2:40 PM

## 2019-11-12 NOTE — H&P
[facility] Medicine Services  HISTORY AND PHYSICAL    Patient Name: Temi Jarrett  : 1930  MRN: [id number]  Primary Care Physician: Eric Patel MD  Date of admission: 2019      Subjective   Subjective     Chief Complaint:  SOA     HPI:  Temi Jarrett is a 89 y.o. female with history of HTN, dementia, recent hip fracture who presents today with SOA. Recent admission in Sept for hip fracture and she was discharged to rehab. She has been staying with her son and daughter in law and has been doing fairly well. States she noted a cough and chest heaviness that started about 1 week ago. This AM son noted she was breathing heavy and presented to the ED. Patient denies any new SOA. States she has had increase in fatigue. Denies fever but thinks she has had chills. Cough has not been productive.     In the ED, she was noted to be hypoxic and required 5-6L of O2.  CXR with multifocal PNA, leukocytosis and elevated lactate. She was given vanc and zosyn and medicine notified for admission.     Denies recent URI symptoms, n/v/d, abd pain, dysuria or frequency.     Review of Systems   Gen- No fevers, chills  CV- No chest pain, palpitations  Resp- + cough, + dyspnea  GI- No N/V/D, abd pain    All other systems reviewed and are negative.     Personal History     Past Medical History:   Diagnosis Date   • Disease of thyroid gland    • Gastric polyp    • History of colonic polyps    • Hypertension    • IBS (irritable bowel syndrome)    • Macular degeneration    • Torn meniscus     right knee         Past Surgical History:   Procedure Laterality Date   • CHOLECYSTECTOMY     • EYE SURGERY      Cataract extraction   • HERNIA REPAIR     • HIP HEMIARTHROPLASTY Left 2019    Procedure: HIP HEMIARTHROPLASTY LEFT;  Surgeon: Reddy Segundo Jr., MD;  Location: [facility];  Service: Orthopedics   • HYSTERECTOMY     • KNEE SURGERY Right     arthroscopy-    •  TONSILLECTOMY         Family History: family history includes Breast cancer in her mother; Diabetes in her son; Hypertension in her father and mother; Obesity in her mother. Otherwise pertinent FHx was reviewed and unremarkable.     Social History:  reports that she has never smoked. She has never used smokeless tobacco. Drug use questions deferred to the physician. She reports that she does not drink alcohol.  Social History     Social History Narrative    Lives with son and daughter in law--  is at [facility] getting rehab       Medications:  Available home medication information reviewed.  Facility-Administered Medications Prior to Admission   Medication Dose Route Frequency Provider Last Rate Last Dose   • cyanocobalamin injection 1,000 mcg  1,000 mcg Intramuscular Q28 Days Eric Patel MD   1,000 mcg at 11/08/19 1708     Medications Prior to Admission   Medication Sig Dispense Refill Last Dose   • aspirin  MG tablet Take 1 tablet by mouth Daily for 28 days. 28 tablet 0 Taking   • benzonatate (TESSALON) 100 MG capsule Take 100 mg by mouth 3 (Three) Times a Day As Needed for Cough.   Taking   • bethanechol (URECHOLINE) 10 MG tablet Take 10 mg by mouth 4 (Four) Times a Day.   Taking   • busPIRone (BUSPAR) 10 MG tablet Take 1 tablet by mouth 2 (Two) Times a Day. 60 tablet 5 Taking   • Calcium Carbonate-Vit D-Min (CALTRATE PLUS PO) Take  by mouth.   Taking   • Cholecalciferol (VITAMIN D PO) 2000 U qd   Taking   • colestipol (COLESTID) 1 g tablet Take 1 g by mouth 2 (Two) Times a Day.   Patient Taking Differently   • diclofenac (VOLTAREN) 1 % gel gel Apply 4 g topically to the appropriate area as directed 4 (Four) Times a Day. 100 g 5 Taking   • Diphenoxylate-Atropine (LOMOTIL PO) Take 2 tablets by mouth As Needed.   Taking   • donepezil (ARICEPT) 10 MG tablet Take 1 tablet by mouth Every Night. 30 tablet 5 Taking   • gabapentin (NEURONTIN) 300 MG capsule TAKE ONE CAPSULE BY MOUTH TWICE A DAY 60  capsule 1 Taking   • hydroCHLOROthiazide (HYDRODIURIL) 25 MG tablet Take 1 tablet by mouth.   Taking   • levothyroxine (SYNTHROID) 175 MCG tablet Take 1 tablet by mouth Daily. Patient receives medication from patient assistance.  NDC:7376-6015-71 EXP:07/02/2020 LOT [id number] 90 tablet 0 Taking   • Melatonin 3 MG capsule Take 3 capsules by mouth every night at bedtime.   Taking   • memantine (NAMENDA) 10 MG tablet Take 1 tablet by mouth 2 (Two) Times a Day. 60 tablet 5 Taking   • methocarbamol (ROBAXIN) 500 MG tablet Take 500 mg by mouth 4 (Four) Times a Day.   Taking   • Multiple Vitamins-Minerals (PRESERVISION AREDS PO) Take 1 tablet by mouth Daily.   Taking   • ondansetron ODT (ZOFRAN-ODT) 4 MG disintegrating tablet Take 1 tablet by mouth Every 8 (Eight) Hours As Needed for Nausea or Vomiting. 20 tablet 0    • potassium chloride (MICRO-K) 10 MEQ CR capsule TAKE TWO CAPSULES BY MOUTH DAILY 180 capsule 2 Taking   • Probiotic Product (PROBIOTIC-10) capsule Take 1 capsule by mouth Daily.   Taking   • sertraline (ZOLOFT) 50 MG tablet Take 1 tablet by mouth Daily. 30 tablet 5 Taking   • sucralfate (CARAFATE) 1 g tablet Take 1 tablet by mouth Every 6 (Six) Hours.   Taking   • traMADol (ULTRAM) 50 MG tablet TAKE ONE TABLET BY MOUTH DAILY AS NEEDED FOR MODERATE PAIN (RIGHT KNEE) 30 tablet 0 Taking       Allergies   Allergen Reactions   • Codeine Nausea Only     Trouble Breathing        Objective   Objective     Vital Signs:   Temp:  [98.2 °F (36.8 °C)-101.5 °F (38.6 °C)] 98.2 °F (36.8 °C)  Heart Rate:  [] 99  Resp:  [16-32] 16  BP: (101-139)/(50-73) 107/59        Physical Exam   Constitutional: Awake, alert; elderly   Eyes: PERRLA, sclerae anicteric, no conjunctival injection  HENT: NCAT, mucous membranes dry  Neck: Supple, no thyromegaly, no lymphadenopathy, trachea midline  Respiratory: + rhonchi/rales mostly in upper lobes; slight increase in effort when talking   Cardiovascular: RRR, II/VI murmurs, no rubs, or  gallops, palpable pedal pulses bilaterally  Gastrointestinal: Positive bowel sounds, soft, nontender, nondistended  Musculoskeletal: trace bilateral ankle edema, no clubbing or cyanosis to extremities  Psychiatric: Appropriate affect, cooperative  Neurologic: Oriented x 3, strength symmetric in all extremities, Cranial Nerves grossly intact to confrontation, speech clear  Skin: No rashes      Results Reviewed:  I have personally reviewed current lab and radiology data.    Results from last 7 days   Lab Units 11/12/19  0716   WBC 10*3/mm3 14.05*   HEMOGLOBIN g/dL 12.1   HEMATOCRIT % 39.5   PLATELETS 10*3/mm3 267     Results from last 7 days   Lab Units 11/12/19  1131 11/12/19  0716   SODIUM mmol/L  --  142   POTASSIUM mmol/L  --  3.4*   CHLORIDE mmol/L  --  98   CO2 mmol/L  --  31.0*   BUN mg/dL  --  17   CREATININE mg/dL  --  0.63   GLUCOSE mg/dL  --  136*   CALCIUM mg/dL  --  8.5*   ALT (SGPT) U/L  --  9   AST (SGOT) U/L  --  16   TROPONIN T ng/mL  --  0.030   PROBNP pg/mL  --  1,318.0   LACTATE mmol/L 1.8 2.2*   PROCALCITONIN ng/mL  --  0.23     Estimated Creatinine Clearance: 37.6 mL/min (by C-G formula based on SCr of 0.63 mg/dL).  Brief Urine Lab Results  (Last result in the past 365 days)      Color   Clarity   Blood   Leuk Est   Nitrite   Protein   CREAT   Urine HCG        11/12/19 0717 Yellow Cloudy Small (1+) Moderate (2+) Positive 100 mg/dL (2+)             Imaging Results (Last 24 Hours)     Procedure Component Value Units Date/Time    XR Chest 1 View [[id number]] Collected:  11/12/19 0822     Updated:  11/12/19 0907    Narrative:       EXAMINATION: XR CHEST 1 VW-11/12/2019:      INDICATION: SOA, triage protocol.      COMPARISON: Chest radiograph 09/30/2019.     FINDINGS: Single portable chest radiograph was submitted for review. The  heart is enlarged, similar to prior. Probable large hiatal hernia noted,  not as pronounced on today's exam as when compared to 09/30/2019. There  has been interval  development of right upper lobe airspace disease with  additional right lower lobe airspace disease superimposed on chronic  lung changes. No sizable pleural effusion or pneumothorax. The  visualized upper abdomen is unrevealing. No acute osseous abnormality is  identified. Diffuse osteopenia is present.           Impression:       1.  Interval development of right upper lobe pneumonia with additional  right lower lobe and left lower lobe hazy airspace changes suggested  superimposed on chronic lung disease. Follow-up chest radiography to  resolution advised.   2.  Cardiomegaly, similar to prior.  3.  Suspected large hiatal hernia, not as pronounced on today's exam  when compared to prior studies.     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 Jarrett is a 89 year old female with history of HTN, dementia, recent hip fracture who presented with cough and SOA found to have multifocal PNA and significant hypoxia.     Acute hypoxic resp failure  Multifocal PNA   Sepsis related to PNA   - Febrile, leukocytosis, chest imaging with multifocal PNA   - BCx pending; resp PCR negative   - Strep and legionella pending   - Started on vanc and zosyn (11/12)  - O2 support; duo-nebs; IS and flutter     Elevated lactate  - Trended down with fluids     Abnormal UA  - No symptoms; history of prolapse  - UCx pending      Hypokalemia   - sliding scale replacement; monitor     HTN   - holding home HCTZ; resume when appropriate     Hypothyroid   - continue home synthroid     Dementia/mood  - Continue home zoloft, buspar, aricept and namenda     Neuropathy  - Cont home gabapentin     History of pernicious anemia   - blood counts stable     DVT prophylaxis:  Heparin     CODE STATUS:    Code Status and Medical Interventions:   Ordered at: 11/12/19 1002     Level Of Support Discussed With:    Patient     Code Status:    CPR     Medical Interventions (Level of Support Prior to Arrest):    Full       Admission Status:  I believe this patient meets INPATIENT status due to acute hypoxic resp failure, need for O2, IV antibiotics and close monitoring on tele with high risk of decompensation.  I feel patient’s risk for adverse outcomes and need for care warrant INPATIENT evaluation and I predict the patient’s care encounter to likely last beyond 2 midnights.      Electronically signed by Amanda Claudio DO, 11/12/19, 1:29 PM.

## 2019-11-12 NOTE — PLAN OF CARE
Problem: Patient Care Overview  Goal: Plan of Care Review  Outcome: Ongoing (interventions implemented as appropriate)   19 1120   Coping/Psychosocial   Plan of Care Reviewed With patient;family     Goal: Individualization and Mutuality  Outcome: Ongoing (interventions implemented as appropriate)    Goal: Discharge Needs Assessment  Outcome: Ongoing (interventions implemented as appropriate)   19 0929 19 1126   Disability   Equipment Currently Used at Home --  walker, rolling   Discharge Needs Assessment   Readmission Within the Last 30 Days no previous admission in last 30 days --    Concerns to be Addressed denies needs/concerns at this time --    Patient/Family Anticipates Transition to home with family --    Patient/Family Anticipated Services at Transition home health care --    Transportation Concerns car, none --    Transportation Anticipated family or friend will provide --    Anticipated Changes Related to Illness none --    Outpatient/Agency/Support Group Needs homecare agency --    Patient's Choice of Community Agency(s) [facility]  FOR PT, SN, AND AIDE --      Goal: Interprofessional Rounds/Family Conf  Outcome: Ongoing (interventions implemented as appropriate)      Problem: Fall Risk (Adult)  Intervention: Monitor/Assist with Self Care   19 1600   Activity   Activity Assistance Provided assistance, 1 person     Intervention: Reduce Risk/Promote Restraint Free Environment   19 1600   Safety Management   Environmental Safety Modification assistive device/personal items within reach;clutter free environment maintained;room near unit station;room organization consistent   Safety Promotion/Fall Prevention activity supervised;fall prevention program maintained;nonskid shoes/slippers when out of bed;safety round/check completed   Prevent  Drop/Fall   Safety/Security Measures bed alarm set     Intervention: Review Medications/Identify Contributors to Fall Risk   19  1200   Safety Management   Medication Review/Management medications reviewed       Goal: Identify Related Risk Factors and Signs and Symptoms  Outcome: Ongoing (interventions implemented as appropriate)    Goal: Absence of Fall  Outcome: Ongoing (interventions implemented as appropriate)      Problem: Skin Injury Risk (Adult)  Intervention: Prevent/Manage Excess Moisture   11/12/19 1400   Skin Interventions   Skin Protection adhesive use limited;incontinence pads utilized     Intervention: Maintain Head of Bed Elevation Less Than 30 Degrees as Tolerated   11/12/19 1600   Positioning   Head of Bed (HOB) HOB at 30-45 degrees     Intervention: Prevent/Minimize Shear/Friction Injuries   11/12/19 1400 11/12/19 1600   Skin Interventions   Pressure Reduction Devices pressure-redistributing mattress utilized;positioning supports utilized --    Positioning   Positioning/Transfer Devices --  in use;pillows     Intervention: Prevent or Minimize Pressure   11/12/19 1400 11/12/19 1600   Skin Interventions   Pressure Reduction Techniques frequent weight shift encouraged;heels elevated off bed;weight shift assistance provided --    Positioning   Body Position --  weight shift assistance provided       Goal: Identify Related Risk Factors and Signs and Symptoms  Outcome: Ongoing (interventions implemented as appropriate)    Goal: Skin Health and Integrity  Outcome: Ongoing (interventions implemented as appropriate)      Problem: Sepsis/Septic Shock (Adult)  Intervention: Promote Rest/Minimize Oxygen Consumption   11/12/19 1600   Activity   Activity Management activity adjusted per tolerance     Intervention: Provide Oxygenation/Ventilation/Perfusion Support   11/12/19 1600   Activity   Activity Management activity adjusted per tolerance   Positioning   Head of Bed (HOB) HOB at 30-45 degrees     Intervention: Monitor/Manage Perfusion   11/12/19 1200   Safety Management   Medication Review/Management medications reviewed      Intervention: Prevent/Manage DVT/VTE Risk   11/12/19 1120   Interventions   VTE Prevention/Management other (see comments)  (pt has heparin)       Goal: Signs and Symptoms of Listed Potential Problems Will be Absent, Minimized or Managed (Sepsis/Septic Shock)  Outcome: Ongoing (interventions implemented as appropriate)

## 2019-11-13 LAB
ANION GAP SERPL CALCULATED.3IONS-SCNC: 7 MMOL/L (ref 5–15)
BASOPHILS # BLD MANUAL: 0.12 10*3/MM3 (ref 0–0.2)
BASOPHILS NFR BLD AUTO: 1 % (ref 0–1.5)
BUN BLD-MCNC: 21 MG/DL (ref 8–23)
BUN/CREAT SERPL: 34.4 (ref 7–25)
CALCIUM SPEC-SCNC: 7.9 MG/DL (ref 8.6–10.5)
CHLORIDE SERPL-SCNC: 101 MMOL/L (ref 98–107)
CO2 SERPL-SCNC: 31 MMOL/L (ref 22–29)
CREAT BLD-MCNC: 0.61 MG/DL (ref 0.57–1)
DEPRECATED RDW RBC AUTO: 44.3 FL (ref 37–54)
EOSINOPHIL # BLD MANUAL: 0.12 10*3/MM3 (ref 0–0.4)
EOSINOPHIL NFR BLD MANUAL: 1 % (ref 0.3–6.2)
ERYTHROCYTE [DISTWIDTH] IN BLOOD BY AUTOMATED COUNT: 13.9 % (ref 12.3–15.4)
GFR SERPL CREATININE-BSD FRML MDRD: 92 ML/MIN/1.73
GLUCOSE BLD-MCNC: 98 MG/DL (ref 65–99)
HCT VFR BLD AUTO: 31.2 % (ref 34–46.6)
HGB BLD-MCNC: 9.6 G/DL (ref 12–15.9)
LYMPHOCYTES # BLD MANUAL: 1.23 10*3/MM3 (ref 0.7–3.1)
LYMPHOCYTES NFR BLD MANUAL: 10 % (ref 19.6–45.3)
LYMPHOCYTES NFR BLD MANUAL: 4 % (ref 5–12)
MAGNESIUM SERPL-MCNC: 1.7 MG/DL (ref 1.6–2.4)
MCH RBC QN AUTO: 26.7 PG (ref 26.6–33)
MCHC RBC AUTO-ENTMCNC: 30.8 G/DL (ref 31.5–35.7)
MCV RBC AUTO: 86.7 FL (ref 79–97)
MONOCYTES # BLD AUTO: 0.49 10*3/MM3 (ref 0.1–0.9)
NEUTROPHILS # BLD AUTO: 10.32 10*3/MM3 (ref 1.7–7)
NEUTROPHILS NFR BLD MANUAL: 81 % (ref 42.7–76)
NEUTS BAND NFR BLD MANUAL: 3 % (ref 0–5)
PLAT MORPH BLD: NORMAL
PLATELET # BLD AUTO: 228 10*3/MM3 (ref 140–450)
PMV BLD AUTO: 9.6 FL (ref 6–12)
POTASSIUM BLD-SCNC: 4.2 MMOL/L (ref 3.5–5.2)
PROCALCITONIN SERPL-MCNC: 4.04 NG/ML (ref 0.1–0.25)
RBC # BLD AUTO: 3.6 10*6/MM3 (ref 3.77–5.28)
RBC MORPH BLD: NORMAL
SODIUM BLD-SCNC: 139 MMOL/L (ref 136–145)
VANCOMYCIN SERPL-MCNC: 5.6 MCG/ML (ref 5–40)
WBC MORPH BLD: NORMAL
WBC NRBC COR # BLD: 12.28 10*3/MM3 (ref 3.4–10.8)

## 2019-11-13 PROCEDURE — 85027 COMPLETE CBC AUTOMATED: CPT | Performed by: INTERNAL MEDICINE

## 2019-11-13 PROCEDURE — 94799 UNLISTED PULMONARY SVC/PX: CPT

## 2019-11-13 PROCEDURE — 97116 GAIT TRAINING THERAPY: CPT

## 2019-11-13 PROCEDURE — 97530 THERAPEUTIC ACTIVITIES: CPT

## 2019-11-13 PROCEDURE — 83735 ASSAY OF MAGNESIUM: CPT | Performed by: INTERNAL MEDICINE

## 2019-11-13 PROCEDURE — 25010000002 MAGNESIUM SULFATE 2 GM/50ML SOLUTION

## 2019-11-13 PROCEDURE — 25010000002 VANCOMYCIN PER 500 MG

## 2019-11-13 PROCEDURE — 97165 OT EVAL LOW COMPLEX 30 MIN: CPT

## 2019-11-13 PROCEDURE — 25010000002 HEPARIN (PORCINE) PER 1000 UNITS: Performed by: INTERNAL MEDICINE

## 2019-11-13 PROCEDURE — 99233 SBSQ HOSP IP/OBS HIGH 50: CPT | Performed by: INTERNAL MEDICINE

## 2019-11-13 PROCEDURE — 97162 PT EVAL MOD COMPLEX 30 MIN: CPT

## 2019-11-13 PROCEDURE — 84145 PROCALCITONIN (PCT): CPT

## 2019-11-13 PROCEDURE — 97535 SELF CARE MNGMENT TRAINING: CPT

## 2019-11-13 PROCEDURE — 80048 BASIC METABOLIC PNL TOTAL CA: CPT | Performed by: INTERNAL MEDICINE

## 2019-11-13 PROCEDURE — 85007 BL SMEAR W/DIFF WBC COUNT: CPT | Performed by: INTERNAL MEDICINE

## 2019-11-13 PROCEDURE — 80202 ASSAY OF VANCOMYCIN: CPT

## 2019-11-13 PROCEDURE — 25010000002 PIPERACILLIN SOD-TAZOBACTAM PER 1 G: Performed by: INTERNAL MEDICINE

## 2019-11-13 RX ORDER — GUAIFENESIN/DEXTROMETHORPHAN 100-10MG/5
5 SYRUP ORAL EVERY 4 HOURS PRN
Status: DISCONTINUED | OUTPATIENT
Start: 2019-11-13 | End: 2019-11-21 | Stop reason: HOSPADM

## 2019-11-13 RX ORDER — MAGNESIUM SULFATE HEPTAHYDRATE 40 MG/ML
4 INJECTION, SOLUTION INTRAVENOUS AS NEEDED
Status: DISCONTINUED | OUTPATIENT
Start: 2019-11-13 | End: 2019-11-21 | Stop reason: HOSPADM

## 2019-11-13 RX ORDER — MAGNESIUM SULFATE 1 G/100ML
1 INJECTION INTRAVENOUS AS NEEDED
Status: DISCONTINUED | OUTPATIENT
Start: 2019-11-13 | End: 2019-11-21 | Stop reason: HOSPADM

## 2019-11-13 RX ORDER — MAGNESIUM SULFATE HEPTAHYDRATE 40 MG/ML
2 INJECTION, SOLUTION INTRAVENOUS AS NEEDED
Status: DISCONTINUED | OUTPATIENT
Start: 2019-11-13 | End: 2019-11-21 | Stop reason: HOSPADM

## 2019-11-13 RX ADMIN — MEMANTINE 10 MG: 10 TABLET ORAL at 09:37

## 2019-11-13 RX ADMIN — BETHANECHOL CHLORIDE 10 MG: 10 TABLET ORAL at 17:49

## 2019-11-13 RX ADMIN — SODIUM CHLORIDE, PRESERVATIVE FREE 10 ML: 5 INJECTION INTRAVENOUS at 22:05

## 2019-11-13 RX ADMIN — MEMANTINE 10 MG: 10 TABLET ORAL at 22:03

## 2019-11-13 RX ADMIN — IPRATROPIUM BROMIDE AND ALBUTEROL SULFATE 3 ML: 2.5; .5 SOLUTION RESPIRATORY (INHALATION) at 08:54

## 2019-11-13 RX ADMIN — SUCRALFATE 1 G: 1 TABLET ORAL at 17:49

## 2019-11-13 RX ADMIN — TAZOBACTAM SODIUM AND PIPERACILLIN SODIUM 3.38 G: 375; 3 INJECTION, SOLUTION INTRAVENOUS at 14:46

## 2019-11-13 RX ADMIN — LEVOTHYROXINE SODIUM 175 MCG: 25 TABLET ORAL at 09:37

## 2019-11-13 RX ADMIN — DONEPEZIL HYDROCHLORIDE 10 MG: 10 TABLET, FILM COATED ORAL at 22:03

## 2019-11-13 RX ADMIN — HEPARIN SODIUM 5000 UNITS: 5000 INJECTION, SOLUTION INTRAVENOUS; SUBCUTANEOUS at 22:04

## 2019-11-13 RX ADMIN — SERTRALINE HYDROCHLORIDE 50 MG: 50 TABLET ORAL at 09:37

## 2019-11-13 RX ADMIN — TAZOBACTAM SODIUM AND PIPERACILLIN SODIUM 3.38 G: 375; 3 INJECTION, SOLUTION INTRAVENOUS at 22:04

## 2019-11-13 RX ADMIN — TRAMADOL HYDROCHLORIDE 50 MG: 50 TABLET, FILM COATED ORAL at 22:03

## 2019-11-13 RX ADMIN — TAZOBACTAM SODIUM AND PIPERACILLIN SODIUM 3.38 G: 375; 3 INJECTION, SOLUTION INTRAVENOUS at 06:15

## 2019-11-13 RX ADMIN — BETHANECHOL CHLORIDE 10 MG: 10 TABLET ORAL at 12:15

## 2019-11-13 RX ADMIN — ASPIRIN 325 MG: 325 TABLET, DELAYED RELEASE ORAL at 09:37

## 2019-11-13 RX ADMIN — SUCRALFATE 1 G: 1 TABLET ORAL at 09:37

## 2019-11-13 RX ADMIN — GABAPENTIN 300 MG: 300 CAPSULE ORAL at 09:38

## 2019-11-13 RX ADMIN — IPRATROPIUM BROMIDE AND ALBUTEROL SULFATE 3 ML: 2.5; .5 SOLUTION RESPIRATORY (INHALATION) at 12:43

## 2019-11-13 RX ADMIN — GUAIFENESIN 600 MG: 600 TABLET, EXTENDED RELEASE ORAL at 09:38

## 2019-11-13 RX ADMIN — GUAIFENESIN 600 MG: 600 TABLET, EXTENDED RELEASE ORAL at 22:04

## 2019-11-13 RX ADMIN — IPRATROPIUM BROMIDE AND ALBUTEROL SULFATE 3 ML: 2.5; .5 SOLUTION RESPIRATORY (INHALATION) at 16:49

## 2019-11-13 RX ADMIN — BETHANECHOL CHLORIDE 10 MG: 10 TABLET ORAL at 09:38

## 2019-11-13 RX ADMIN — GABAPENTIN 300 MG: 300 CAPSULE ORAL at 22:03

## 2019-11-13 RX ADMIN — BUSPIRONE HYDROCHLORIDE 10 MG: 10 TABLET ORAL at 09:38

## 2019-11-13 RX ADMIN — MAGNESIUM SULFATE 2 G: 2 INJECTION INTRAVENOUS at 09:50

## 2019-11-13 RX ADMIN — SODIUM CHLORIDE, PRESERVATIVE FREE 10 ML: 5 INJECTION INTRAVENOUS at 09:38

## 2019-11-13 RX ADMIN — BUSPIRONE HYDROCHLORIDE 10 MG: 10 TABLET ORAL at 22:03

## 2019-11-13 RX ADMIN — GUAIFENESIN AND DEXTROMETHORPHAN 5 ML: 100; 10 SYRUP ORAL at 12:15

## 2019-11-13 RX ADMIN — ACETAMINOPHEN 650 MG: 325 TABLET ORAL at 16:10

## 2019-11-13 RX ADMIN — BETHANECHOL CHLORIDE 10 MG: 10 TABLET ORAL at 22:04

## 2019-11-13 RX ADMIN — HEPARIN SODIUM 5000 UNITS: 5000 INJECTION, SOLUTION INTRAVENOUS; SUBCUTANEOUS at 09:36

## 2019-11-13 NOTE — THERAPY EVALUATION
Patient Name: Temi Jarrett  : 1930    MRN: [id number]                              Today's Date: 2019       Admit Date: 2019    Visit Dx:     ICD-10-CM ICD-9-CM   1. Sepsis without acute organ dysfunction, due to unspecified organism (CMS/HCC) A41.9 038.9     995.91   2. Pneumonia of both lungs due to infectious organism, unspecified part of lung J18.9 483.8   3. Bacterial UTI N39.0 599.0    A49.9 041.9   4. Acute respiratory failure with hypoxia (CMS/[facility]) J96.01 518.81   5. Impaired mobility and ADLs Z74.09 799.89     Patient Active Problem List   Diagnosis   • Skin tear of lower leg without complication, right, initial encounter   • Esophageal reflux   • Hypothyroidism   • Generalized anxiety disorder   • Hyperlipidemia   • Multi-infarct dementia (CMS/[facility])   • Peripheral neuropathy   • Carpal tunnel syndrome of right wrist   • Spinal stenosis of lumbar region   • Osteoporosis   • Fever   • Urinary frequency   • Peripheral venous insufficiency   • Disc disorder of lumbar region   • Bladder prolapse, female, acquired   • Acute right-sided low back pain without sciatica   • Pain, knee   • Benign essential hypertension   • Pernicious anemia   • Memory loss   • Annual physical exam   • Primary osteoarthritis of both knees   • Chest pain, atypical   • Hiatal hernia with intrathoracic stomach   • Closed fracture of neck of left femur (CMS/HCC)   • Hypoxia   • Multifocal pneumonia   • Elevated lactic acid level   • Leukocytosis   • Sepsis (CMS/HCC)   • Abnormal urinalysis   • Acute respiratory failure with hypoxia (CMS/HCC)     Past Medical History:   Diagnosis Date   • Disease of thyroid gland    • Gastric polyp    • History of colonic polyps    • Hypertension    • IBS (irritable bowel syndrome)    • Macular degeneration    • Torn meniscus     right knee      Past Surgical History:   Procedure Laterality Date   • CHOLECYSTECTOMY     • EYE SURGERY      Cataract extraction   • HERNIA REPAIR      • HIP HEMIARTHROPLASTY Left 9/28/2019    Procedure: HIP HEMIARTHROPLASTY LEFT;  Surgeon: Reddy Segundo Jr., MD;  Location: [facility];  Service: Orthopedics   • HYSTERECTOMY  1976   • KNEE SURGERY Right     arthroscopy- 2000   • TONSILLECTOMY       General Information     Row Name 11/13/19 1400          PT Evaluation Time/Intention    Document Type  evaluation  -VG     Mode of Treatment  individual therapy;physical therapy  -VG     Row Name 11/13/19 1400          General Information    Patient Profile Reviewed?  yes  -VG     Prior Level of Function  independent:;all household mobility;min assist:;ADL's;home management;dependent:;community mobility  -VG     Existing Precautions/Restrictions  fall;oxygen therapy device and L/min;left;hip, posterior  (Significant)  s/p L hip helio arthroplasty 9/28  -VG     Barriers to Rehab  previous functional deficit  -VG     Row Name 11/13/19 1400          Relationship/Environment    Lives With  child(darvin), adult Lives with son and DIL who provide 24/7 spv/assist.  -VG     Row Name 11/13/19 1400          Resource/Environmental Concerns    Current Living Arrangements  home/apartment/condo  -VG     Row Name 11/13/19 1400          Home Main Entrance    Number of Stairs, Main Entrance  none  -VG     Row Name 11/13/19 1400          Stairs Within Home, Primary    Stairs, Within Home, Primary  23 stairs including landing to acces 2nd floor for primary bed/bath. Bilateral hand rails.  -VG     Row Name 11/13/19 1400          Cognitive Assessment/Intervention- PT/OT    Orientation Status (Cognition)  oriented to;person;place;situation;time  -VG     Row Name 11/13/19 1400          Safety Issues, Functional Mobility    Safety Issues Affecting Function (Mobility)  safety precaution awareness;safety precautions follow-through/compliance  -VG     Impairments Affecting Function (Mobility)  balance;endurance/activity tolerance;strength  -VG     Comment, Safety Issues/Impairments (Mobility)  Pt  able to recal 1/3 posterior hip precautions  -VG       User Key  (r) = Recorded By, (t) = Taken By, (c) = Cosigned By    Initials Name Provider Type    VG Natalia Pires, PT Physical Therapist        Mobility     Row Name 11/13/19 1403          Bed Mobility Assessment/Treatment    Comment (Bed Mobility)  UIC  -VG     Row Name 11/13/19 1403          Transfer Assessment/Treatment    Comment (Transfers)  Cues for hand placement and sequencing.  -VG     Row Name 11/13/19 1403          Sit-Stand Transfer    Sit-Stand [place] (Transfers)  contact guard;verbal cues  -VG     Assistive Device (Sit-Stand Transfers)  walker, front-wheeled  -VG     Row Name 11/13/19 1403          Gait/Stairs Assessment/Training    [place] Level (Gait)  contact guard;verbal cues  -VG     Assistive Device (Gait)  walker, front-wheeled  -VG     Distance in Feet (Gait)  100  -VG     Deviations/Abnormal Patterns (Gait)  antalgic;festinating/shuffling;gait speed decreased;stride length decreased  -VG     Comment (Gait/Stairs)  Distance limited by fatigue, weakness, R knee pain, and SOA. VSS on 6LO2NC. Cues for upright posture and AD mangement. Demonstrates impaired endurance, balance, strength.  -VG       User Key  (r) = Recorded By, (t) = Taken By, (c) = Cosigned By    Initials Name Provider Type    VG Natalia Pires, PT Physical Therapist        Obj/Interventions     Row Name 11/13/19 1404          General ROM    GENERAL ROM COMMENTS  BLEs WFL  -VG     Row Name 11/13/19 1404          MMT (Manual Muscle Testing)    General MMT Comments  BLEs 4/5 grossly  -VG     Row Name 11/13/19 1404          Static Standing Balance    Level of [place] (Supported Standing, Static Balance)  contact guard assist  -VG     Assistive Device Utilized (Supported Standing, Static Balance)  walker, rolling  -VG     Row Name 11/13/19 1404          Sensory Assessment/Intervention    Sensory General Assessment  no sensation deficits identified  -VG        User Key  (r) = Recorded By, (t) = Taken By, (c) = Cosigned By    Initials Name Provider Type    VG Natalia Pires, PT Physical Therapist        Goals/Plan     Row Name 11/13/19 1406          Bed Mobility Goal 1 (PT)    Activity/Assistive Device (Bed Mobility Goal 1, PT)  sit to supine/supine to sit  -VG     [place] Level/Cues Needed (Bed Mobility Goal 1, PT)  independent  -VG     Time Frame (Bed Mobility Goal 1, PT)  long term goal (LTG);2 weeks  -VG     Row Name 11/13/19 1406          Transfer Goal 1 (PT)    Activity/Assistive Device (Transfer Goal 1, PT)  sit-to-stand/stand-to-sit;walker, rolling  -VG     [place] Level/Cues Needed (Transfer Goal 1, PT)  conditional independence  -VG     Time Frame (Transfer Goal 1, PT)  long term goal (LTG);2 weeks  -VG     Row Name 11/13/19 1406          Gait Training Goal 1 (PT)    Activity/Assistive Device (Gait Training Goal 1, PT)  gait (walking locomotion);assistive device use;walker, rolling  -VG     [place] Level (Gait Training Goal 1, PT)  conditional independence  -VG     Distance (Gait Goal 1, PT)  300  -VG     Time Frame (Gait Training Goal 1, PT)  long term goal (LTG);2 weeks  -VG     Row Name 11/13/19 1406          Stairs Goal 1 (PT)    Activity/Assistive Device (Stairs Goal 1, PT)  stairs, all skills;using handrail, right;using handrail, left  -VG     [place] Level/Cues Needed (Stairs Goal 1, PT)  supervision required  -VG     Number of Stairs (Stairs Goal 1, PT)  23  -VG     Time Frame (Stairs Goal 1, PT)  long term goal (LTG);2 weeks  -VG     Row Name 11/13/19 1406          Patient Education Goal (PT)    Activity (Patient Education Goal, PT)  HEP  -VG     [place]/Cues/Accuracy (Memory Goal 2, PT)  independent  -VG     Time Frame (Patient Education Goal, PT)  long term goal (LTG);2 weeks  -VG       User Key  (r) = Recorded By, (t) = Taken By, (c) = Cosigned By    Initials Name Provider Type    VG Natalia Pires, PT Physical Therapist         Clinical Impression     Row Name 11/13/19 1405          Pain Scale: Numbers Pre/Post-Treatment    Pain Scale: Numbers, Pretreatment  0/10 - no pain  -VG     Pain Scale: Numbers, Post-Treatment  0/10 - no pain  -VG     Row Name 11/13/19 1405          Plan of Care Review    Plan of Care Reviewed With  patient  -VG     Row Name 11/13/19 1405          Physical Therapy Clinical Impression    Criteria for Skilled Interventions Met (PT Clinical Impression)  yes;treatment indicated  -VG     Rehab Potential (PT Clinical Summary)  good, to achieve stated therapy goals  -VG     Row Name 11/13/19 1405          Vital Signs    Pre Systolic BP Rehab  106  -VG     Pre Treatment Diastolic BP  58  -VG     Pretreatment Heart Rate (beats/min)  95  -VG     Posttreatment Heart Rate (beats/min)  124  -VG     Pre SpO2 (%)  95  -VG     O2 Delivery Pre Treatment  supplemental O2  -VG     Post SpO2 (%)  91  -VG     O2 Delivery Post Treatment  supplemental O2  -VG     Pre Patient Position  Sitting  -VG     Post Patient Position  Sitting  -VG     Row Name 11/13/19 1404          Positioning and Restraints    Pre-Treatment Position  sitting in chair/recliner  -VG     Post Treatment Position  chair  -VG     In Chair  reclined;call light within reach;encouraged to call for assist;exit alarm on;notified nsg;waffle cushion;legs elevated  -VG       User Key  (r) = Recorded By, (t) = Taken By, (c) = Cosigned By    Initials Name Provider Type    Natalia Acevedo, PT Physical Therapist        Outcome Measures     Row Name 11/13/19 1409          How much help from another person do you currently need...    Turning from your back to your side while in flat bed without using bedrails?  4  -VG     Moving from lying on back to sitting on the side of a flat bed without bedrails?  3  -VG     Moving to and from a bed to a chair (including a wheelchair)?  3  -VG     Standing up from a chair using your arms (e.g., wheelchair, bedside chair)?  3  -VG      Climbing 3-5 steps with a railing?  2  -VG     To walk in hospital room?  3  -VG     AM-PAC 6 Clicks Score (PT)  18  -VG     Row Name 11/13/19 1407          Functional Assessment    Outcome Measure Options  AM-PAC 6 Clicks Basic Mobility (PT)  -VG       User Key  (r) = Recorded By, (t) = Taken By, (c) = Cosigned By    Initials Name Provider Type    VG Natalia Pires, PT Physical Therapist        Physical Therapy Education     Title: PT OT SLP Therapies (In Progress)     Topic: Physical Therapy (In Progress)     Point: Mobility training (In Progress)     Learning Progress Summary           Patient Acceptance, E,TB, NR by VG at 11/13/2019  2:08 PM    Comment:  Pt able to recall 1/3 posterior hip prec.                   Point: Home exercise program (In Progress)     Learning Progress Summary           Patient Acceptance, E,TB, NR by VG at 11/13/2019  2:08 PM    Comment:  Pt able to recall 1/3 posterior hip prec.                   Point: Body mechanics (In Progress)     Learning Progress Summary           Patient Acceptance, E,TB, NR by VG at 11/13/2019  2:08 PM    Comment:  Pt able to recall 1/3 posterior hip prec.                   Point: Precautions (In Progress)     Learning Progress Summary           Patient Acceptance, E,TB, NR by VG at 11/13/2019  2:08 PM    Comment:  Pt able to recall 1/3 posterior hip prec.                               User Key     Initials Effective Dates Name Provider Type Discipline     05/29/18 -  Natalia Pires, PT Physical Therapist PT              PT Recommendation and Plan  Planned Therapy Interventions (PT Eval): balance training, bed mobility training, gait training, home exercise program, patient/family education, stair training, strengthening, transfer training  Outcome Summary/Treatment Plan (PT)  Anticipated Discharge Disposition (PT): home with assist, home with home health  Plan of Care Reviewed With: patient  Outcome Summary: IP PT eval completed. Pt ambulated 100 ft  with RW and CGA, limited by fatigue, weakness and SOA. VSS on 6LO2NC. Demonstrates impaired endurance, balance, strength and safety awareness. Pt s/p L hemiarthroplasty 9/28, still on posterior hip prec. Recommend appropriate for home with assist and HH PT upon d/c. Will continue to progress pt as able per POC.     Time Calculation:   PT Charges     Row Name 11/13/19 1316             Time Calculation    Start Time  1316  -VG      PT Received On  11/13/19  -VG      PT Goal Re-Cert Due Date  11/23/19  -VG         Timed Charges    14121 - Gait Training Minutes   15  -VG        User Key  (r) = Recorded By, (t) = Taken By, (c) = Cosigned By    Initials Name Provider Type    VG Natalia Pires, PT Physical Therapist        Therapy Charges for Today     Code Description Service Date Service Provider Modifiers Qty    52763411336 HC GAIT TRAINING EA 15 MIN 11/13/2019 Natalia Pires, PT GP 1    [id number]  PT EVAL MOD COMPLEXITY 4 11/13/2019 Natalia Pires, PT GP 1          PT G-Codes  Outcome Measure Options: AM-PAC 6 Clicks Basic Mobility (PT)  AM-PAC 6 Clicks Score (PT): 18  AM-PAC 6 Clicks Score (OT): 18    Brenda Pires PT  11/13/2019

## 2019-11-13 NOTE — PLAN OF CARE
Problem: Patient Care Overview  Goal: Plan of Care Review   11/13/19 6946   Coping/Psychosocial   Plan of Care Reviewed With patient   OTHER   Outcome Summary IP PT eval completed. Pt ambulated 100 ft with RW and CGA, limited by fatigue, weakness and SOA. VSS on 6LO2NC. Demonstrates impaired endurance, balance, strength and safety awareness. Pt s/p L hemiarthroplasty 9/28, still on posterior hip prec. Recommend appropriate for home with assist and HH PT upon d/c. Will continue to progress pt as able per POC.

## 2019-11-13 NOTE — PROGRESS NOTES
Continued Stay Note   [place]     Patient Name: Temi Jarrett  MRN: [id number]  Today's Date: 11/13/2019    Admit Date: 11/12/2019    Discharge Plan     Row Name 11/13/19 1518       Plan    Plan  update    Patient/Family in Agreement with Plan  yes    Plan Comments  Spoke with patient at bedside regarding discharge plan.  Patient indicates that she plans to return home when she is better and has [facility]  coming out to see her.  Discussed with patient that she may need O2 at time of discharge home who indicates that she does not want to have Oxygen.  CM reassured patient that we will continue to monitor her O2 sats to see if home O2 is needed.  Patient verbalized understanding.  Patient has no discharge needs at this time.  CM following.  Patient plan is to discharge home and resume [facility]  with family to transport.     Final Discharge Disposition Code  06 - home with home health care        Discharge Codes    No documentation.       Expected Discharge Date and Time     Expected Discharge Date Expected Discharge Time    Nov 16, 2019             Abbey Serna RN

## 2019-11-13 NOTE — PLAN OF CARE
Problem: Patient Care Overview  Goal: Plan of Care Review  Outcome: Ongoing (interventions implemented as appropriate)   11/13/19 0354   Coping/Psychosocial   Plan of Care Reviewed With patient   Plan of Care Review   Progress no change   OTHER   Outcome Summary VSS. 6L NC. Flat affect. Up x 1 with walker. NS @100. Pt states she only takes carafate once daily with dinner. Pharmacy advised. Congested cough, not productive. Mucinex given. NSR with PAC's. Will continue to monitor.       Problem: Fall Risk (Adult)  Goal: Identify Related Risk Factors and Signs and Symptoms  Outcome: Outcome(s) achieved Date Met: 11/13/19    Goal: Absence of Fall  Outcome: Ongoing (interventions implemented as appropriate)      Problem: Skin Injury Risk (Adult)  Goal: Identify Related Risk Factors and Signs and Symptoms  Outcome: Outcome(s) achieved Date Met: 11/13/19    Goal: Skin Health and Integrity  Outcome: Ongoing (interventions implemented as appropriate)      Problem: Sepsis/Septic Shock (Adult)  Goal: Signs and Symptoms of Listed Potential Problems Will be Absent, Minimized or Managed (Sepsis/Septic Shock)  Outcome: Ongoing (interventions implemented as appropriate)      Problem: Pneumonia (Adult)  Goal: Signs and Symptoms of Listed Potential Problems Will be Absent, Minimized or Managed (Pneumonia)  Outcome: Ongoing (interventions implemented as appropriate)

## 2019-11-13 NOTE — PLAN OF CARE
Problem: Patient Care Overview  Goal: Plan of Care Review  Outcome: Ongoing (interventions implemented as appropriate)   11/13/19 1621   Coping/Psychosocial   Plan of Care Reviewed With patient   Plan of Care Review   Progress no change   OTHER   Outcome Summary NSR on monitor. Pt using 4L O2, down from 6L. Fever spiked, 101.2, tylenol given. Pt worked with PT/OT. IV abx continued. Will continue to monitor.        Problem: Fall Risk (Adult)  Goal: Absence of Fall  Outcome: Ongoing (interventions implemented as appropriate)   11/13/19 1621   Fall Risk (Adult)   Absence of Fall making progress toward outcome       Problem: Skin Injury Risk (Adult)  Goal: Skin Health and Integrity  Outcome: Ongoing (interventions implemented as appropriate)      Problem: Sepsis/Septic Shock (Adult)  Goal: Signs and Symptoms of Listed Potential Problems Will be Absent, Minimized or Managed (Sepsis/Septic Shock)  Outcome: Ongoing (interventions implemented as appropriate)   11/13/19 1621   Goal/Outcome Evaluation   Problems Assessed (Sepsis) all   Problems Present (Sepsis) none       Problem: Pneumonia (Adult)  Goal: Signs and Symptoms of Listed Potential Problems Will be Absent, Minimized or Managed (Pneumonia)  Outcome: Ongoing (interventions implemented as appropriate)

## 2019-11-13 NOTE — TELEPHONE ENCOUNTER
Called and advisedVictor M. He verbalized understnaidng. He wanted to know how long it would take for her to get better. I advised he should talk to the MD at the hospital since they are taking care of her and know what her treatment plan is

## 2019-11-13 NOTE — PLAN OF CARE
Problem: Patient Care Overview  Goal: Plan of Care Review  Outcome: Ongoing (interventions implemented as appropriate)   11/13/19 1100   Coping/Psychosocial   Plan of Care Reviewed With patient   Plan of Care Review   Progress improving   OTHER   Outcome Summary OT evaluation completed post brief chart review. Pt. SBA with bed mobility, CGA trasnfers, CGA grooming sinkside, min A toileting BSC. Pt. needs cues for safety with transfers. Pt. use AE at home for LBD. Pt. CGA mobility to bathroom and to recliner. Pt. reported fatigue throughout session. Pt. appropriate for skilled OT services for safety and endurance building. Pt. will kye be able to discharge home with family assist.

## 2019-11-13 NOTE — THERAPY EVALUATION
Acute Care - Occupational Therapy Initial Evaluation  [facility]     Patient Name: Temi Jarrett  : 1930  MRN: [id number]  Today's Date: 2019             Admit Date: 2019       ICD-10-CM ICD-9-CM   1. Sepsis without acute organ dysfunction, due to unspecified organism (CMS/HCC) A41.9 038.9     995.91   2. Pneumonia of both lungs due to infectious organism, unspecified part of lung J18.9 483.8   3. Bacterial UTI N39.0 599.0    A49.9 041.9   4. Acute respiratory failure with hypoxia (CMS/HCC) J96.01 518.81   5. Impaired mobility and ADLs Z74.09 799.89     Patient Active Problem List   Diagnosis   • Skin tear of lower leg without complication, right, initial encounter   • Esophageal reflux   • Hypothyroidism   • Generalized anxiety disorder   • Hyperlipidemia   • Multi-infarct dementia (CMS/HCC)   • Peripheral neuropathy   • Carpal tunnel syndrome of right wrist   • Spinal stenosis of lumbar region   • Osteoporosis   • Fever   • Urinary frequency   • Peripheral venous insufficiency   • Disc disorder of lumbar region   • Bladder prolapse, female, acquired   • Acute right-sided low back pain without sciatica   • Pain, knee   • Benign essential hypertension   • Pernicious anemia   • Memory loss   • Annual physical exam   • Primary osteoarthritis of both knees   • Chest pain, atypical   • Hiatal hernia with intrathoracic stomach   • Closed fracture of neck of left femur (CMS/HCC)   • Hypoxia   • Multifocal pneumonia   • Elevated lactic acid level   • Leukocytosis   • Sepsis (CMS/HCC)   • Abnormal urinalysis   • Acute respiratory failure with hypoxia (CMS/HCC)     Past Medical History:   Diagnosis Date   • Disease of thyroid gland    • Gastric polyp    • History of colonic polyps    • Hypertension    • IBS (irritable bowel syndrome)    • Macular degeneration    • Torn meniscus     right knee      Past Surgical History:   Procedure Laterality Date   • CHOLECYSTECTOMY     • EYE SURGERY       Cataract extraction   • HERNIA REPAIR     • HIP HEMIARTHROPLASTY Left 9/28/2019    Procedure: HIP HEMIARTHROPLASTY LEFT;  Surgeon: Reddy Segundo Jr., MD;  Location: [facility];  Service: Orthopedics   • HYSTERECTOMY  1976   • KNEE SURGERY Right     arthroscopy- 2000   • TONSILLECTOMY            OT ASSESSMENT FLOWSHEET (last 12 hours)      Occupational Therapy Evaluation     Row Name 11/13/19 1100                   OT Evaluation Time/Intention    Subjective Information  complains of;fatigue  -SANDEEP        Document Type  evaluation  -SANDEEP        Mode of Treatment  individual therapy;occupational therapy  -SANDEEP        Patient Effort  excellent  -SANDEEP        Symptoms Noted During/After Treatment  fatigue  -SANDEEP           General Information    Patient Profile Reviewed?  yes  -SANDEEP        Patient Observations  alert;cooperative;agree to therapy  -SANDEEP        Patient/Family Observations  Pt. sitting up in bed on 6L of 02 on tele with IV.  -SANDEEP        Prior Level of Function  independent:;all household mobility;ADL's;max assist:;home management  -SANDEEP        Equipment Currently Used at Home  rollator;commode, bedside;bath bench;grab bar;dressing device per pt. she was not using tub bench recently  -SANDEEP        Existing Precautions/Restrictions  fall;oxygen therapy device and L/min  -SANDEEP        Risks Reviewed  patient:;LOB;increased discomfort;change in vital signs;lines disloged  -SANDEEP        Benefits Reviewed  patient:;improve function;increase independence;increase knowledge improve endurance.  -SANDEEP           Relationship/Environment    Lives With  child(darvin), adult son and DIL  -SANDEEP           Resource/Environmental Concerns    Current Living Arrangements  home/apartment/condo  -SANDEEP           Stairs Within Home, Primary    Stairs, Within Home, Primary  -- 1.5 flight to upstairs bedroom and bathroom, 2 rails  -SANDEEP           Cognitive Assessment/Intervention- PT/OT    Orientation Status (Cognition)  oriented to;person;place +year, pt. thought was  end of October.  -SANDEEP        Follows Commands (Cognition)  follows one step commands;over 90% accuracy;repetition of directions required  -SANDEEP        Safety Deficit (Cognitive)  safety precautions awareness  -SANDEEP        Cognitive Assessment/Intervention Comment  pt. appears with some underlying confusion with mild STM loss  -SANDEEP           Safety Issues, Functional Mobility    Safety Issues Affecting Function (Mobility)  safety precaution awareness  -SANDEEP        Impairments Affecting Function (Mobility)  endurance/activity tolerance;balance  -SANDEEP           Bed Mobility Assessment/Treatment    Bed Mobility Assessment/Treatment  supine-sit;scooting/bridging  -SANDEEP        Scooting/Bridging [place] (Bed Mobility)  supervision;verbal cues  -SANDEEP        Supine-Sit [place] (Bed Mobility)  supervision;verbal cues  -SANDEEP        Assistive Device (Bed Mobility)  bed rails;head of bed elevated  -SANDEEP        Comment (Bed Mobility)  assist with lines.  Pt. had to rest EOB a short time due to fatigue.  -SANDEEP           Functional Mobility    Functional Mobility- Ind. Level  contact guard assist;verbal cues required  -SANDEEP        Functional Mobility- Device  rolling walker  -        Functional Mobility-Distance (Feet)  20  -SANDEEP        Functional Mobility- Safety Issues  step length decreased;supplemental O2  -SANDEEP        Functional Mobility- Comment  cues to watch for lines and turn correct direction  -SANDEEP           Transfer Assessment/Treatment    Transfer Assessment/Treatment  sit-stand transfer;stand-sit transfer;toilet transfer  -        Comment (Transfers)  cues for hand placement.  -SANDEEP           Sit-Stand Transfer    Sit-Stand [place] (Transfers)  contact guard;verbal cues  -SANDEEP        Assistive Device (Sit-Stand Transfers)  walker, front-wheeled  -SANDEEP           Stand-Sit Transfer    Stand-Sit [place] (Transfers)  contact guard;verbal cues  -SANDEEP        Assistive Device (Stand-Sit Transfers)  walker, front-wheeled  -SANDEEP            Toilet Transfer    Type (Toilet Transfer)  stand-sit;sit-stand  -SANDEEP        [place] Level (Toilet Transfer)  contact guard;verbal cues  -SANDEEP        Assistive Device (Toilet Transfer)  commode, bedside without drop arms  -SANDEEP           ADL Assessment/Intervention    38805 - OT Self Care/Mgmt Minutes  18  -SANDEEP        BADL Assessment/Intervention  lower body dressing;upper body dressing;grooming;toileting;feeding  -SANDEEP           Upper Body Dressing Assessment/Training    Upper Body Dressing [place] Level  maximum assist (25% patient effort);hoa/jordy  -SANDEEP        Comment (Upper Body Dressing)  limited by lines  -SANDEEP           Lower Body Dressing Assessment/Training    Lower Body Dressing [place] Level  don;socks;dependent (less than 25% patient effort)  -SANDEEP        Comment (Lower Body Dressing)  per pt. she uses AE to dress self at home since hip surgery.  -SANDEEP           Grooming Assessment/Training    [place] Level (Grooming)  hair care, combing/brushing;oral care regimen;wash face, hands;supervision  -SANDEEP        Grooming Position  sink side  -SANDEEP        Comment (Grooming)  assist to only open new containers.  Pt. having to rest elbow on sink at times to balance self.  -SANDEEP           Self-Feeding Assessment/Training    [place] Level (Feeding)  liquids to mouth;independent  -SANDEEP        Position (Self-Feeding)  supported sitting  -SANDEEP           Toileting Assessment/Training    [place] Level (Toileting)  adjust/manage clothing;minimum assist (75% patient effort);perform perineal hygiene;contact guard assist  -SANDEEP        Assistive Devices (Toileting)  commode, bedside without drop arms  -SANDEEP        Toileting Position  supported standing  -SANDEEP           BADL Safety/Performance    Impairments, BADL Safety/Performance  balance;endurance/activity tolerance  -SANDEEP        Skilled BADL Treatment/Intervention  cognitive/safety deficit modifications  -SANDEEP           General ROM    GENERAL ROM COMMENTS  WFL AROM BUE  -SANDEEP            MMT (Manual Muscle Testing)    General MMT Comments  Grossly 4+/5 BUE  -SANDEEP           Motor Assessment/Interventions    Additional Documentation  Balance (Group);Balance Interventions (Group);Therapeutic Exercise (Group);Therapeutic Exercise Interventions (Group)  -SANDEEP           Therapeutic Exercise    47494 - OT Therapeutic Activity Minutes  8  -SANDEEP           Balance    Balance  static sitting balance;static standing balance;dynamic sitting balance;dynamic standing balance  -SANDEEP           Static Sitting Balance    Level of [place] (Unsupported Sitting, Static Balance)  independent  -SANDEEP        Sitting Position (Unsupported Sitting, Static Balance)  sitting on edge of bed  -SANDEEP           Dynamic Sitting Balance    Level of [place], Reaches Outside Midline (Sitting, Dynamic Balance)  standby assist  -SANDEEP        Sitting Position, Reaches Outside Midline (Sitting, Dynamic Balance)  -- [facility] toileting  -SANDEEP           Static Standing Balance    Level [place] (Supported Standing, Static Balance)  standby assist  -SANDEEP        Assistive Device Utilized (Supported Standing, Static Balance)  walker, rolling  -SANDEEP           Dynamic Standing Balance    Level of [place], Reaches Outside Midline (Standing, Dynamic Balance)  contact guard assist  -SANDEEP        Time Able to Maintain Position, Reaches Outside Midline (Standing, Dynamic Balance)  more than 5 minutes  -SNADEEP        Assistive Device Utilized (Supported Standing, Dynamic Balance)  walker, rolling  -SANDEEP           Sensory Assessment/Intervention    Sensory General Assessment  no sensation deficits identified BUE  -SANDEEP        Additional Documentation  Vision Assessment/Intervention (Group)  -SANDEEP           Vision Assessment/Intervention    Visual Impairment/Limitations  corrective lenses full time  -SANDEEP           Positioning and Restraints    Pre-Treatment Position  in bed  -SANDEEP        Post Treatment Position  chair  -SANDEEP        In Chair  reclined;call light within  reach;encouraged to call for assist;exit alarm on;waffle cushion;heels elevated  -SANDEEP           Pain Assessment    Additional Documentation  Pain Scale: Numbers Pre/Post-Treatment (Group)  -SANDEEP           Pain Scale: Numbers Pre/Post-Treatment    Pain Scale: Numbers, Pretreatment  0/10 - no pain  -SANDEEP        Pain Scale: Numbers, Post-Treatment  0/10 - no pain  -SANDEEP           Plan of Care Review    Plan of Care Reviewed With  patient  -SANDEEP           Clinical Impression (OT)    OT Diagnosis  impaired ADL indpendence  -SANDEEP        Patient/Family Goals Statement (OT Eval)  return home to [facility]  -SANDEEP        Criteria for Skilled Therapeutic Interventions Met (OT Eval)  yes;treatment indicated  -SANDEEP        Rehab Potential (OT Eval)  good, to achieve stated therapy goals  -SANDEEP        Therapy Frequency (OT Eval)  daily  -SANDEEP        Care Plan Review (OT)  evaluation/treatment results reviewed;care plan/treatment goals reviewed;risks/benefits reviewed;current/potential barriers reviewed;patient/other agree to care plan  -SANDEEP        Anticipated Discharge Disposition (OT)  home with assist  -SANDEEP           Vital Signs    Pre Systolic BP Rehab  118  -SANDEEP        Pre Treatment Diastolic BP  69  -SANDEEP        Pretreatment Heart Rate (beats/min)  81  -SANDEEP        Posttreatment Heart Rate (beats/min)  79  -SANDEEP        Pre SpO2 (%)  93  -SANDEEP        O2 Delivery Pre Treatment  supplemental O2  -SANDEEP        Post SpO2 (%)  94  -SANDEEP        O2 Delivery Post Treatment  supplemental O2  -SANDEEP        Pre Patient Position  Supine  -SANDEEP        Intra Patient Position  Standing  -SANDEEP        Post Patient Position  Sitting  -SANDEEP           Planned OT Interventions    Planned Therapy Interventions (OT Eval)  activity tolerance training;BADL retraining;functional balance retraining;occupation/activity based interventions;patient/caregiver education/training  -           OT Goals    Transfer Goal Selection (OT)  transfer, OT goal 1  -SANDEEP        Dressing Goal Selection (OT)  dressing,  OT goal 1  -SANDEEP        Toileting Goal Selection (OT)  toileting, OT goal 1  -SANDEEP        Activity Tolerance Goal Selection (OT)  activity tolerance, OT goal 1  -SANDEEP        Additional Documentation  Activity Tolerance Goal Selection (OT) (Row)  -SANDEEP           Transfer Goal 1 (OT)    Activity/Assistive Device (Transfer Goal 1, OT)  sit-to-stand/stand-to-sit;bed-to-chair/chair-to-bed;toilet;commode, bedside without drop arms;walker, rolling good safety awareness  -SANDEEP        [place] Level/Cues Needed (Transfer Goal 1, OT)  standby assist  -SANDEEP        Time Frame (Transfer Goal 1, OT)  long term goal (LTG);10 days  -SANDEEP        Progress/Outcome (Transfer Goal 1, OT)  goal ongoing  -SADNEEP           Dressing Goal 1 (OT)    Activity/Assistive Device (Dressing Goal 1, OT)  lower body dressing;long handled shoe horn;reacher;sock-aid  -SANDEEP        [place]/Cues Needed (Dressing Goal 1, OT)  set-up required;standby assist  -SANDEEP        Time Frame (Dressing Goal 1, OT)  long term goal (LTG);10 days  -SANDEEP        Progress/Outcome (Dressing Goal 1, OT)  goal ongoing  -SANDEEP           Toileting Goal 1 (OT)    Activity/Device (Toileting Goal 1, OT)  toileting skills, all;commode, bedside without drop arms;grab bar/safety frame;raised toilet seat  -SANDEEP        [place] Level/Cues Needed (Toileting Goal 1, OT)  standby assist  -SANDEEP        Time Frame (Toileting Goal 1, OT)  long term goal (LTG);10 days  -SANDEEP        Progress/Outcome (Toileting Goal 1, OT)  goal ongoing  -SANDEEP            Activity Tolerance Goal 1 (OT)    Activity Tolerance Goal 1 (OT)  TE, ADL, mobility  -SANEDEP        Activity Level (Endurance Goal 1, OT)  20 min activity;O2 sat >/ equal to 88% 3 L or less  -SANDEEP        Time Frame (Activity Tolerance Goal 1, OT)  long term goal (LTG);10 days  -SANDEEP        Progress/Outcome (Activity Tolerance Goal 1, OT)  goal ongoing  -SANDEEP           Living Environment    Home Accessibility  stairs within home;tub/shower is not walk in  -SANDEEP          User Key   (r) = Recorded By, (t) = Taken By, (c) = Cosigned By    Initials Name Effective Dates    Didi Chavez OT 06/08/18 -          Occupational Therapy Education     Title: PT OT SLP Therapies (In Progress)     Topic: Occupational Therapy (In Progress)     Point: ADL training (Done)     Description: Instruct learner(s) on proper safety adaptation and remediation techniques during self care or transfers.   Instruct in proper use of assistive devices.    Learning Progress Summary           Patient Acceptance, E, VU,NR by SANDEEP at 11/13/2019 11:00 AM    Comment:  reason for consult, noted deficits, role OT, transfer safety.                   Point: Precautions (Done)     Description: Instruct learner(s) on prescribed precautions during self-care and functional transfers.    Learning Progress Summary           Patient Acceptance, E, VU,NR by SANDEEP at 11/13/2019 11:00 AM    Comment:  reason for consult, noted deficits, role OT, transfer safety.                               User Key     Initials Effective Dates Name Provider Type Discipline    SANDEEP 06/08/18 -  Didi Tao, ROSEMARY Occupational Therapist OT                  OT Recommendation and Plan  Outcome Summary/Treatment Plan (OT)  Anticipated Discharge Disposition (OT): home with assist  Planned Therapy Interventions (OT Eval): activity tolerance training, BADL retraining, functional balance retraining, occupation/activity based interventions, patient/caregiver education/training  Therapy Frequency (OT Eval): daily  Plan of Care Review  Plan of Care Reviewed With: patient  Plan of Care Reviewed With: patient  Outcome Summary: OT evaluation completed post brief chart review.  Pt. SBA with bed mobility, CGA trasnfers, CGA grooming sinkside, min A toileting BSC.  Pt. needs cues for safety with transfers.  Pt. use AE at home for LBD.  Pt.  CGA mobility to bathroom and to recliner.  Pt. reported fatigue throughout session.  Pt. appropriate for skilled OT services for safety and  endurance building.  Pt. will kye be able to discharge home with family assist.    Outcome Measures     Row Name 11/13/19 1100             How much help from another is currently needed...    Putting on and taking off regular lower body clothing?  3 if has AE  -SANDEEP      Bathing (including washing, rinsing, and drying)  2  -SANDEEP      Toileting (which includes using toilet bed pan or urinal)  3  -SANDEEP      Putting on and taking off regular upper body clothing  3  -SANDEEP      Taking care of personal grooming (such as brushing teeth)  3  -SANDEEP      Eating meals  4  -SANDEEP      AM-PAC 6 Clicks Score (OT)  18  -SANDEEP         Functional Assessment    Outcome Measure Options  AM-PAC 6 Clicks Daily Activity (OT)  -SANDEEP        User Key  (r) = Recorded By, (t) = Taken By, (c) = Cosigned By    Initials Name Provider Type    Didi Chavez OT Occupational Therapist          Time Calculation:   Time Calculation- OT     Row Name 11/13/19 1100             Time Calculation- OT    OT Start Time  1100  -SANDEEP      OT Received On  11/13/19  -SANDEEP      OT Goal Re-Cert Due Date  11/23/19  -         Timed Charges    34019 - OT Therapeutic Activity Minutes  8  -SANDEEP      88029 - OT Self Care/Mgmt Minutes  18  -SANDEEP        User Key  (r) = Recorded By, (t) = Taken By, (c) = Cosigned By    Initials Name Provider Type    Didi Chavez OT Occupational Therapist        Therapy Charges for Today     Code Description Service Date Service Provider Modifiers Qty    21710485777 HC OT THERAPEUTIC ACT EA 15 MIN 11/13/2019 Didi Tao OT GO 1    98893618097 HC OT SELF CARE/MGMT/TRAIN EA 15 MIN 11/13/2019 Didi Tao OT GO 1    72869994809 HC OT EVAL LOW COMPLEXITY 3 11/13/2019 Didi Tao OT GO 1               Didi Tao OT  11/13/2019

## 2019-11-13 NOTE — PROGRESS NOTES
[facility] Medicine Services  PROGRESS NOTE    Patient Name: Temi Jarrett  : 1930  MRN: [id number]    Date of Admission: 2019  Primary Care Physician: Eric Patel MD    Subjective   Subjective     CC:  PNA    HPI:  Patient complaining of continued dry cough, asking for cough suppressant. Feels about the same as admission. No family present at bedside.     Review of Systems  Gen- No fevers, chills  CV- No chest pain, palpitations  Resp- + cough, dyspnea  GI- No N/V/D, abd pain          Objective   Objective     Vital Signs:   Temp:  [97.4 °F (36.3 °C)-99.7 °F (37.6 °C)] 98.4 °F (36.9 °C)  Heart Rate:  [] 100  Resp:  [14-20] 20  BP: (108-122)/(6-73) 118/69        Physical Exam:  GEN- no acute distress noted, chronically ill, elderly   HEENT- atraumatic, normocephlic, eomi  NECK- supple, trachea midline, no masses  RESP: on 4 L NC, rhonchorus breath sounds, coughing throughout   CV: no murmurs, s1/s2, rrr  MSK: no edema noted, spontaneous movement of all extremities  NEURO: alert, oriented, no focal deficits  SKIN: no rashes  PSYCH: appropriate mood and affect       Results Reviewed:    Results from last 7 days   Lab Units 19  0353 19  0716   WBC 10*3/mm3 12.28* 14.05*   HEMOGLOBIN g/dL 9.6* 12.1   HEMATOCRIT % 31.2* 39.5   PLATELETS 10*3/mm3 228 267   PROCALCITONIN ng/mL 4.04* 0.23     Results from last 7 days   Lab Units 19  0353 19  2145 19  0716   SODIUM mmol/L 139  --  142   POTASSIUM mmol/L 4.2 4.1 3.4*   CHLORIDE mmol/L 101  --  98   CO2 mmol/L 31.0*  --  31.0*   BUN mg/dL 21  --  17   CREATININE mg/dL 0.61  --  0.63   GLUCOSE mg/dL 98  --  136*   CALCIUM mg/dL 7.9*  --  8.5*   ALT (SGPT) U/L  --   --  9   AST (SGOT) U/L  --   --  16   TROPONIN T ng/mL  --   --  0.030   PROBNP pg/mL  --   --  1,318.0     Estimated Creatinine Clearance: 37.6 mL/min (by C-G formula based on SCr of 0.61 mg/dL).    Microbiology Results Abnormal      Procedure Component Value - Date/Time    Blood Culture - Blood, Arm, Left [[id number]] Collected:  11/12/19 1218    Lab Status:  Preliminary result Specimen:  Blood from Arm, Left Updated:  11/13/19 1246     Blood Culture No growth at 24 hours    Blood Culture - Blood, Arm, Right [[id number]] Collected:  11/12/19 1218    Lab Status:  Preliminary result Specimen:  Blood from Arm, Right Updated:  11/13/19 1246     Blood Culture No growth at 24 hours    Urine Culture - Urine, Urine, Catheter [[id number]]  (Abnormal) Collected:  11/12/19 0717    Lab Status:  Preliminary result Specimen:  Urine, Catheter Updated:  11/13/19 1223     Urine Culture >100,000 CFU/mL Gram Negative Bacilli    Blood Culture - Blood, Arm, Left [[id number]] Collected:  11/12/19 0745    Lab Status:  Preliminary result Specimen:  Blood from Arm, Left Updated:  11/13/19 1016     Blood Culture No growth at 24 hours    Blood Culture - Blood, Arm, Right [[id number]] Collected:  11/12/19 0740    Lab Status:  Preliminary result Specimen:  Blood from Arm, Right Updated:  11/13/19 1016     Blood Culture No growth at 24 hours    MRSA Screen, PCR - Swab, Nares [[id number]]  (Normal) Collected:  11/12/19 1403    Lab Status:  Final result Specimen:  Swab from Nares Updated:  11/12/19 1541     MRSA PCR Negative    Narrative:       MRSA Negative    Respiratory Panel, PCR - Swab, Nasopharynx [[id number]]  (Normal) Collected:  11/12/19 1022    Lab Status:  Final result Specimen:  Swab from Nasopharynx Updated:  11/12/19 1247     ADENOVIRUS, PCR Not Detected     Coronavirus 229E Not Detected     Coronavirus HKU1 Not Detected     Coronavirus NL63 Not Detected     Coronavirus OC43 Not Detected     Human Metapneumovirus Not Detected     Human Rhinovirus/Enterovirus Not Detected     Influenza B PCR Not Detected     Parainfluenza Virus 1 Not Detected     Parainfluenza Virus 2 Not Detected     Parainfluenza Virus 3 Not Detected     Parainfluenza Virus 4 Not Detected      Bordetella pertussis pcr Not Detected     Influenza A H1 2009 PCR Not Detected     Chlamydophila pneumoniae PCR Not Detected     Mycoplasma pneumo by PCR Not Detected     Influenza A PCR Not Detected     Influenza A H3 Not Detected     Influenza A H1 Not Detected     RSV, PCR Not Detected     Bordetella parapertussis PCR Not Detected          Imaging Results (Last 24 Hours)     Procedure Component Value Units Date/Time    XR Chest 1 View [[id number]] Collected:  11/12/19 0822     Updated:  11/13/19 0841    Narrative:       EXAMINATION: XR CHEST 1 VW-11/12/2019:      INDICATION: SOA, triage protocol.      COMPARISON: Chest radiograph 09/30/2019.     FINDINGS: Single portable chest radiograph was submitted for review. The  heart is enlarged, similar to prior. Probable large hiatal hernia noted,  not as pronounced on today's exam as when compared to 09/30/2019. There  has been interval development of right upper lobe airspace disease with  additional right lower lobe airspace disease superimposed on chronic  lung changes. No sizable pleural effusion or pneumothorax. The  visualized upper abdomen is unrevealing. No acute osseous abnormality is  identified. Diffuse osteopenia is present.           Impression:       1.  Interval development of right upper lobe pneumonia with additional  right lower lobe and left lower lobe hazy airspace changes suggested  superimposed on chronic lung disease. Follow-up chest radiography to  resolution advised.   2.  Cardiomegaly, similar to prior.  3.  Suspected large hiatal hernia, not as pronounced on today's exam  when compared to prior studies.     D:  11/12/2019  E:  11/12/2019           This report was finalized on 11/13/2019 8:38 AM by Dr. Kendall Doe MD.                  I have reviewed the medications:  Scheduled Meds:  aspirin  mg Oral Daily   bethanechol 10 mg Oral 4x Daily   busPIRone 10 mg Oral BID   donepezil 10 mg Oral Nightly   gabapentin 300 mg Oral BID    guaiFENesin 600 mg Oral Q12H   heparin (porcine) 5,000 Units Subcutaneous Q12H   ipratropium-albuterol 3 mL Nebulization 4x Daily - RT   levothyroxine 175 mcg Oral Daily   memantine 10 mg Oral BID   piperacillin-tazobactam 3.375 g Intravenous Q8H   sertraline 50 mg Oral Daily   sodium chloride 10 mL Intravenous Q12H   sucralfate 1 g Oral Q6H     Continuous Infusions:   PRN Meds:.•  acetaminophen **OR** acetaminophen **OR** acetaminophen  •  calcium carbonate EX  •  diphenoxylate-atropine  •  docusate sodium  •  guaiFENesin-dextromethorphan  •  magnesium sulfate **OR** magnesium sulfate in D5W 1g/100mL (PREMIX) **OR** magnesium sulfate  •  potassium chloride **OR** potassium chloride **OR** potassium chloride  •  sodium chloride  •  sodium chloride  •  traMADol      Assessment/Plan   Assessment / Plan     Active Hospital Problems    Diagnosis  POA   • Hypoxia [R09.02]  Unknown   • Multifocal pneumonia [J18.9]  Unknown   • Elevated lactic acid level [R79.89]  Unknown   • Leukocytosis [D72.829]  Unknown   • Sepsis (CMS/HCC) [A41.9]  Unknown   • Abnormal urinalysis [R82.90]  Unknown   • Acute respiratory failure with hypoxia (CMS/HCC) [J96.01]  Unknown   • Hypothyroidism [E03.9]  Yes   • Hyperlipidemia [E78.5]  Yes   • Benign essential hypertension [I10]  Yes   • Multi-infarct dementia (CMS/HCC) [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        Brief Hospital Course to date:  Temi Jarrett is a 89 year old female with history of HTN, dementia, recent hip fracture who presented with cough and SOA found to have multifocal PNA and significant hypoxia.      Acute hypoxic resp failure  Multifocal PNA   Sepsis related to PNA   - Febrile, leukocytosis on admission-- chest imaging reviewed with multifocal PNA   - BCx pending; resp PCR negative   - Strep and legionella pending   - Started on vanc and zosyn, will continue for now  - O2 support; duo-nebs;  IS and flutter   - adding robittusin today for cough     Elevated lactate, resolved  - resolved with IVF      Abnormal UA  GNB UTI  - No symptoms; history of prolapse  - UCx with >100k GNB     Hypokalemia   - sliding scale replacement; monitor      HTN   - holding home HCTZ in setting of sepsis      Hypothyroid   - continue home synthroid      Dementia/mood  - Continue home zoloft, buspar, aricept and namenda      Neuropathy  - Cont home gabapentin      History of pernicious anemia   - blood counts stable      DVT prophylaxis:  Heparin     Disposition: I expect the patient to be discharged pending further improvement    CODE STATUS:   Code Status and Medical Interventions:   Ordered at: 11/12/19 1002     Level Of Support Discussed With:    Patient     Code Status:    CPR     Medical Interventions (Level of Support Prior to Arrest):    Full         Electronically signed by Fadia Kingston MD, 11/13/19, 2:30 PM.

## 2019-11-14 ENCOUNTER — APPOINTMENT (OUTPATIENT)
Dept: GENERAL RADIOLOGY | Facility: HOSPITAL | Age: 84
End: 2019-11-14

## 2019-11-14 LAB
ANION GAP SERPL CALCULATED.3IONS-SCNC: 10 MMOL/L (ref 5–15)
BACTERIA SPEC AEROBE CULT: ABNORMAL
BASOPHILS # BLD AUTO: 0.06 10*3/MM3 (ref 0–0.2)
BASOPHILS NFR BLD AUTO: 0.5 % (ref 0–1.5)
BUN BLD-MCNC: 16 MG/DL (ref 8–23)
BUN/CREAT SERPL: 29.6 (ref 7–25)
CALCIUM SPEC-SCNC: 7.7 MG/DL (ref 8.6–10.5)
CHLORIDE SERPL-SCNC: 101 MMOL/L (ref 98–107)
CO2 SERPL-SCNC: 28 MMOL/L (ref 22–29)
CREAT BLD-MCNC: 0.54 MG/DL (ref 0.57–1)
DEPRECATED RDW RBC AUTO: 45.3 FL (ref 37–54)
EOSINOPHIL # BLD AUTO: 0.17 10*3/MM3 (ref 0–0.4)
EOSINOPHIL NFR BLD AUTO: 1.5 % (ref 0.3–6.2)
ERYTHROCYTE [DISTWIDTH] IN BLOOD BY AUTOMATED COUNT: 13.9 % (ref 12.3–15.4)
GFR SERPL CREATININE-BSD FRML MDRD: 106 ML/MIN/1.73
GLUCOSE BLD-MCNC: 98 MG/DL (ref 65–99)
GLUCOSE BLDC GLUCOMTR-MCNC: 111 MG/DL (ref 70–130)
HCT VFR BLD AUTO: 33.2 % (ref 34–46.6)
HGB BLD-MCNC: 9.8 G/DL (ref 12–15.9)
IMM GRANULOCYTES # BLD AUTO: 0.13 10*3/MM3 (ref 0–0.05)
IMM GRANULOCYTES NFR BLD AUTO: 1.2 % (ref 0–0.5)
LYMPHOCYTES # BLD AUTO: 0.79 10*3/MM3 (ref 0.7–3.1)
LYMPHOCYTES NFR BLD AUTO: 7.2 % (ref 19.6–45.3)
MCH RBC QN AUTO: 26.3 PG (ref 26.6–33)
MCHC RBC AUTO-ENTMCNC: 29.5 G/DL (ref 31.5–35.7)
MCV RBC AUTO: 89.2 FL (ref 79–97)
MONOCYTES # BLD AUTO: 0.63 10*3/MM3 (ref 0.1–0.9)
MONOCYTES NFR BLD AUTO: 5.7 % (ref 5–12)
NEUTROPHILS # BLD AUTO: 9.2 10*3/MM3 (ref 1.7–7)
NEUTROPHILS NFR BLD AUTO: 83.9 % (ref 42.7–76)
NRBC BLD AUTO-RTO: 0 /100 WBC (ref 0–0.2)
PLATELET # BLD AUTO: 237 10*3/MM3 (ref 140–450)
PMV BLD AUTO: 9.7 FL (ref 6–12)
POTASSIUM BLD-SCNC: 3.8 MMOL/L (ref 3.5–5.2)
RBC # BLD AUTO: 3.72 10*6/MM3 (ref 3.77–5.28)
SODIUM BLD-SCNC: 139 MMOL/L (ref 136–145)
WBC NRBC COR # BLD: 10.98 10*3/MM3 (ref 3.4–10.8)

## 2019-11-14 PROCEDURE — 25010000002 HEPARIN (PORCINE) PER 1000 UNITS: Performed by: INTERNAL MEDICINE

## 2019-11-14 PROCEDURE — 80048 BASIC METABOLIC PNL TOTAL CA: CPT | Performed by: INTERNAL MEDICINE

## 2019-11-14 PROCEDURE — 73501 X-RAY EXAM HIP UNI 1 VIEW: CPT

## 2019-11-14 PROCEDURE — 82962 GLUCOSE BLOOD TEST: CPT

## 2019-11-14 PROCEDURE — 97110 THERAPEUTIC EXERCISES: CPT

## 2019-11-14 PROCEDURE — 85025 COMPLETE CBC W/AUTO DIFF WBC: CPT | Performed by: INTERNAL MEDICINE

## 2019-11-14 PROCEDURE — 99233 SBSQ HOSP IP/OBS HIGH 50: CPT | Performed by: INTERNAL MEDICINE

## 2019-11-14 PROCEDURE — 25010000002 PIPERACILLIN SOD-TAZOBACTAM PER 1 G: Performed by: INTERNAL MEDICINE

## 2019-11-14 PROCEDURE — 94799 UNLISTED PULMONARY SVC/PX: CPT

## 2019-11-14 PROCEDURE — 97116 GAIT TRAINING THERAPY: CPT

## 2019-11-14 RX ORDER — BENZONATATE 100 MG/1
100 CAPSULE ORAL 3 TIMES DAILY PRN
Status: DISCONTINUED | OUTPATIENT
Start: 2019-11-14 | End: 2019-11-21 | Stop reason: HOSPADM

## 2019-11-14 RX ADMIN — ACETAMINOPHEN 650 MG: 325 TABLET ORAL at 23:58

## 2019-11-14 RX ADMIN — BETHANECHOL CHLORIDE 10 MG: 10 TABLET ORAL at 22:17

## 2019-11-14 RX ADMIN — TAZOBACTAM SODIUM AND PIPERACILLIN SODIUM 3.38 G: 375; 3 INJECTION, SOLUTION INTRAVENOUS at 22:17

## 2019-11-14 RX ADMIN — DONEPEZIL HYDROCHLORIDE 10 MG: 10 TABLET, FILM COATED ORAL at 22:17

## 2019-11-14 RX ADMIN — BETHANECHOL CHLORIDE 10 MG: 10 TABLET ORAL at 11:55

## 2019-11-14 RX ADMIN — SUCRALFATE 1 G: 1 TABLET ORAL at 17:56

## 2019-11-14 RX ADMIN — BETHANECHOL CHLORIDE 10 MG: 10 TABLET ORAL at 10:14

## 2019-11-14 RX ADMIN — SUCRALFATE 1 G: 1 TABLET ORAL at 11:54

## 2019-11-14 RX ADMIN — BETHANECHOL CHLORIDE 10 MG: 10 TABLET ORAL at 17:56

## 2019-11-14 RX ADMIN — GABAPENTIN 300 MG: 300 CAPSULE ORAL at 10:14

## 2019-11-14 RX ADMIN — HEPARIN SODIUM 5000 UNITS: 5000 INJECTION, SOLUTION INTRAVENOUS; SUBCUTANEOUS at 22:18

## 2019-11-14 RX ADMIN — GUAIFENESIN 600 MG: 600 TABLET, EXTENDED RELEASE ORAL at 10:13

## 2019-11-14 RX ADMIN — IPRATROPIUM BROMIDE AND ALBUTEROL SULFATE 3 ML: 2.5; .5 SOLUTION RESPIRATORY (INHALATION) at 19:59

## 2019-11-14 RX ADMIN — MEMANTINE 10 MG: 10 TABLET ORAL at 22:18

## 2019-11-14 RX ADMIN — SODIUM CHLORIDE, PRESERVATIVE FREE 10 ML: 5 INJECTION INTRAVENOUS at 22:18

## 2019-11-14 RX ADMIN — IPRATROPIUM BROMIDE AND ALBUTEROL SULFATE 3 ML: 2.5; .5 SOLUTION RESPIRATORY (INHALATION) at 11:48

## 2019-11-14 RX ADMIN — TRAMADOL HYDROCHLORIDE 50 MG: 50 TABLET, FILM COATED ORAL at 06:12

## 2019-11-14 RX ADMIN — ASPIRIN 325 MG: 325 TABLET, DELAYED RELEASE ORAL at 10:14

## 2019-11-14 RX ADMIN — SERTRALINE HYDROCHLORIDE 50 MG: 50 TABLET ORAL at 10:13

## 2019-11-14 RX ADMIN — MEMANTINE 10 MG: 10 TABLET ORAL at 10:14

## 2019-11-14 RX ADMIN — GUAIFENESIN 600 MG: 600 TABLET, EXTENDED RELEASE ORAL at 22:18

## 2019-11-14 RX ADMIN — IPRATROPIUM BROMIDE AND ALBUTEROL SULFATE 3 ML: 2.5; .5 SOLUTION RESPIRATORY (INHALATION) at 16:38

## 2019-11-14 RX ADMIN — BUSPIRONE HYDROCHLORIDE 10 MG: 10 TABLET ORAL at 10:13

## 2019-11-14 RX ADMIN — BENZONATATE 100 MG: 100 CAPSULE ORAL at 22:18

## 2019-11-14 RX ADMIN — GABAPENTIN 300 MG: 300 CAPSULE ORAL at 22:17

## 2019-11-14 RX ADMIN — IPRATROPIUM BROMIDE AND ALBUTEROL SULFATE 3 ML: 2.5; .5 SOLUTION RESPIRATORY (INHALATION) at 07:41

## 2019-11-14 RX ADMIN — HEPARIN SODIUM 5000 UNITS: 5000 INJECTION, SOLUTION INTRAVENOUS; SUBCUTANEOUS at 10:15

## 2019-11-14 RX ADMIN — SODIUM CHLORIDE, PRESERVATIVE FREE 10 ML: 5 INJECTION INTRAVENOUS at 10:16

## 2019-11-14 RX ADMIN — TAZOBACTAM SODIUM AND PIPERACILLIN SODIUM 3.38 G: 375; 3 INJECTION, SOLUTION INTRAVENOUS at 06:17

## 2019-11-14 RX ADMIN — LEVOTHYROXINE SODIUM 175 MCG: 25 TABLET ORAL at 10:13

## 2019-11-14 RX ADMIN — TRAMADOL HYDROCHLORIDE 50 MG: 50 TABLET, FILM COATED ORAL at 22:17

## 2019-11-14 RX ADMIN — BUSPIRONE HYDROCHLORIDE 10 MG: 10 TABLET ORAL at 22:18

## 2019-11-14 RX ADMIN — TAZOBACTAM SODIUM AND PIPERACILLIN SODIUM 3.38 G: 375; 3 INJECTION, SOLUTION INTRAVENOUS at 13:33

## 2019-11-14 NOTE — PROGRESS NOTES
Continued Stay Note  [facility]     Patient Name: Temi Jarrett  MRN: [id number]  Today's Date: 11/14/2019    Admit Date: 11/12/2019    Discharge Plan     Row Name 11/14/19 1532       Plan    Plan  update    Patient/Family in Agreement with Plan  yes    Plan Comments  Spoke with patient at bedside regarding discharge plan and talked about where she may want to go for rehab.  Patient reports she has been to [facility] in the past and would like to go back there.  Discussed other facility choices and patient is agreeable to [facility] if [facility] will not take her.  No other discharge needs verbalized.  Called Kandis with [facility] who will review referral and call back.  CM following.  Patient plan is to discharge to [facility] via car with family to transport.      Final Discharge Disposition Code  03 - skilled nursing facility (SNF)        Discharge Codes    No documentation.       Expected Discharge Date and Time     Expected Discharge Date Expected Discharge Time    Nov 16, 2019             Abbey Serna RN

## 2019-11-14 NOTE — THERAPY TREATMENT NOTE
Patient Name: Temi Jarrett  : 1930    MRN: [id number]                              Today's Date: 2019       Admit Date: 2019    Visit Dx:     ICD-10-CM ICD-9-CM   1. Sepsis without acute organ dysfunction, due to unspecified organism (CMS/HCC) A41.9 038.9     995.91   2. Pneumonia of both lungs due to infectious organism, unspecified part of lung J18.9 483.8   3. Bacterial UTI N39.0 599.0    A49.9 041.9   4. Acute respiratory failure with hypoxia (CMS/[facility]) J96.01 518.81   5. Impaired mobility and ADLs Z74.09 799.89     Patient Active Problem List   Diagnosis   • Skin tear of lower leg without complication, right, initial encounter   • Esophageal reflux   • Hypothyroidism   • Generalized anxiety disorder   • Hyperlipidemia   • Multi-infarct dementia (CMS/[facility])   • Peripheral neuropathy   • Carpal tunnel syndrome of right wrist   • Spinal stenosis of lumbar region   • Osteoporosis   • Fever   • Urinary frequency   • Peripheral venous insufficiency   • Disc disorder of lumbar region   • Bladder prolapse, female, acquired   • Acute right-sided low back pain without sciatica   • Pain, knee   • Benign essential hypertension   • Pernicious anemia   • Memory loss   • Annual physical exam   • Primary osteoarthritis of both knees   • Chest pain, atypical   • Hiatal hernia with intrathoracic stomach   • Closed fracture of neck of left femur (CMS/HCC)   • Hypoxia   • Multifocal pneumonia   • Elevated lactic acid level   • Leukocytosis   • Sepsis (CMS/HCC)   • Abnormal urinalysis   • Acute respiratory failure with hypoxia (CMS/HCC)     Past Medical History:   Diagnosis Date   • Disease of thyroid gland    • Gastric polyp    • History of colonic polyps    • Hypertension    • IBS (irritable bowel syndrome)    • Macular degeneration    • Torn meniscus     right knee      Past Surgical History:   Procedure Laterality Date   • CHOLECYSTECTOMY     • EYE SURGERY      Cataract extraction   • HERNIA REPAIR      • HIP HEMIARTHROPLASTY Left 9/28/2019    Procedure: HIP HEMIARTHROPLASTY LEFT;  Surgeon: Reddy Segundo Jr., MD;  Location: [facility];  Service: Orthopedics   • HYSTERECTOMY  1976   • KNEE SURGERY Right     arthroscopy- 2000   • TONSILLECTOMY       General Information     Row Name 11/14/19 1404          PT Evaluation Time/Intention    Document Type  therapy note (daily note)  -VG     Mode of Treatment  individual therapy;physical therapy  -VG     Row Name 11/14/19 1404          General Information    Existing Precautions/Restrictions  fall  -VG     Row Name 11/14/19 1404          Cognitive Assessment/Intervention- PT/OT    Orientation Status (Cognition)  oriented x 3  -VG       User Key  (r) = Recorded By, (t) = Taken By, (c) = Cosigned By    Initials Name Provider Type    VG Natalia Pires PT Physical Therapist        Mobility     Row Name 11/14/19 1404          Bed Mobility Assessment/Treatment    Bed Mobility Assessment/Treatment  supine-sit  -VG     Supine-Sit [place] (Bed Mobility)  minimum assist (75% patient effort);verbal cues  -VG     Assistive Device (Bed Mobility)  bed rails;head of bed elevated  -VG     Comment (Bed Mobility)  Assist at trunk for supine to sit and scooting hips to EOB.  -VG     Row Name 11/14/19 1404          Transfer Assessment/Treatment    Comment (Transfers)  Cues for hand placement and sequencing.  -VG     Row Name 11/14/19 1404          Sit-Stand Transfer    Sit-Stand [place] (Transfers)  contact guard;verbal cues  -VG     Assistive Device (Sit-Stand Transfers)  walker, front-wheeled  -VG     Row Name 11/14/19 1404          Gait/Stairs Assessment/Training    [place] Level (Gait)  contact guard;verbal cues  -VG     Assistive Device (Gait)  walker, front-wheeled  -VG     Distance in Feet (Gait)  50  -VG     Deviations/Abnormal Patterns (Gait)  base of support, narrow;kane decreased;gait speed decreased;festinating/shuffling  -VG     Comment (Gait/Stairs)   Distance limited by fatigue and weakness.   -VG       User Key  (r) = Recorded By, (t) = Taken By, (c) = Cosigned By    Initials Name Provider Type    Natalia Acevedo, LEFTY Physical Therapist        Obj/Interventions     Row Name 11/14/19 1407          Therapeutic Exercise    Lower Extremity (Therapeutic Exercise)  LAQ (long arc quad), bilateral;marching while seated  -VG     Lower Extremity Range of Motion (Therapeutic Exercise)  hip abduction/adduction, bilateral;ankle dorsiflexion/plantar flexion, bilateral  -VG     Exercise Type (Therapeutic Exercise)  AROM (active range of motion)  -VG     Position (Therapeutic Exercise)  seated  -VG     Sets/Reps (Therapeutic Exercise)  10x  -VG     Comment (Therapeutic Exercise)  Cues for technique.  -VG       User Key  (r) = Recorded By, (t) = Taken By, (c) = Cosigned By    Initials Name Provider Type    Natalia Acevedo, PT Physical Therapist        Goals/Plan    No documentation.       Clinical Impression     Row Name 11/14/19 1407          Pain Scale: Numbers Pre/Post-Treatment    Pain Scale: Numbers, Pretreatment  0/10 - no pain  -VG     Pain Scale: Numbers, Post-Treatment  0/10 - no pain  -VG     Row Name 11/14/19 1407          Vital Signs    Pre SpO2 (%)  90  -VG     O2 Delivery Pre Treatment  supplemental O2  -VG     Intra SpO2 (%)  98  -VG     O2 Delivery Intra Treatment  supplemental O2  -VG     Post SpO2 (%)  98  -VG     O2 Delivery Post Treatment  supplemental O2  -VG     Pre Patient Position  Supine  -VG     Intra Patient Position  Sitting  -VG     Post Patient Position  Sitting  -VG     Row Name 11/14/19 1407          Positioning and Restraints    Pre-Treatment Position  in bed  -VG     Post Treatment Position  chair  -VG     In Chair  reclined;call light within reach;encouraged to call for assist;exit alarm on;waffle cushion;legs elevated  -VG       User Key  (r) = Recorded By, (t) = Taken By, (c) = Cosigned By    Initials Name Provider Type    DHARMESH Pires  Natalia FERNANDEZ, PT Physical Therapist        Outcome Measures     Row Name 11/14/19 1408          How much help from another person do you currently need...    Turning from your back to your side while in flat bed without using bedrails?  4  -VG     Moving from lying on back to sitting on the side of a flat bed without bedrails?  3  -VG     Moving to and from a bed to a chair (including a wheelchair)?  3  -VG     Standing up from a chair using your arms (e.g., wheelchair, bedside chair)?  3  -VG     Climbing 3-5 steps with a railing?  2  -VG     To walk in hospital room?  3  -VG     AM-PAC 6 Clicks Score (PT)  18  -VG     Row Name 11/14/19 1408          Functional Assessment    Outcome Measure Options  AM-PAC 6 Clicks Basic Mobility (PT)  -VG       User Key  (r) = Recorded By, (t) = Taken By, (c) = Cosigned By    Initials Name Provider Type    VG Natalia Pires, PT Physical Therapist        Physical Therapy Education     Title: PT OT SLP Therapies (In Progress)     Topic: Physical Therapy (Done)     Point: Mobility training (Done)     Learning Progress Summary           Patient Acceptance, E, VU,NR by VG at 11/14/2019  2:09 PM    Acceptance, E,TB, NR by VG at 11/13/2019  2:08 PM    Comment:  Pt able to recall 1/3 posterior hip prec.                   Point: Home exercise program (Done)     Learning Progress Summary           Patient Acceptance, E, VU,NR by VG at 11/14/2019  2:09 PM    Acceptance, E,TB, NR by VG at 11/13/2019  2:08 PM    Comment:  Pt able to recall 1/3 posterior hip prec.                   Point: Body mechanics (Done)     Learning Progress Summary           Patient Acceptance, E, VU,NR by VG at 11/14/2019  2:09 PM    Acceptance, E,TB, NR by VG at 11/13/2019  2:08 PM    Comment:  Pt able to recall 1/3 posterior hip prec.                   Point: Precautions (Done)     Learning Progress Summary           Patient Acceptance, E, VU,NR by VG at 11/14/2019  2:09 PM    Acceptance, E,TB, NR by VG at 11/13/2019   2:08 PM    Comment:  Pt able to recall 1/3 posterior hip prec.                               User Key     Initials Effective Dates Name Provider Type Discipline     05/29/18 -  Natalia Pires, PT Physical Therapist PT              PT Recommendation and Plan  Planned Therapy Interventions (PT Eval): balance training, bed mobility training, gait training, home exercise program, patient/family education, stair training, strengthening, transfer training  Outcome Summary/Treatment Plan (PT)  Anticipated Discharge Disposition (PT): home with assist, home with home health  Plan of Care Reviewed With: patient  Progress: no change  Outcome Summary: Pt ambulated 50 ft with RW and CGA, limited by fatigue and weakness. VSS on 5-6LO2NC, titrated for activity. Will continue to progress pt as able per POC.     Time Calculation:   PT Charges     Row Name 11/14/19 1331             Time Calculation    Start Time  1331  -VG      PT Received On  11/14/19  -      PT Goal Re-Cert Due Date  11/23/19  -VG         Time Calculation- PT    Total Timed Code Minutes- PT  30 minute(s)  -VG         Timed Charges    12115 - PT Therapeutic Exercise Minutes  10  -VG      45047 - Gait Training Minutes   20  -VG        User Key  (r) = Recorded By, (t) = Taken By, (c) = Cosigned By    Initials Name Provider Type    VG Natalia Pires, PT Physical Therapist        Therapy Charges for Today     Code Description Service Date Service Provider Modifiers Qty    12330866698 HC GAIT TRAINING EA 15 MIN 11/13/2019 Natalia Pires, PT GP 1    39968915203 HC PT EVAL MOD COMPLEXITY 4 11/13/2019 Natalia Pires, PT GP 1    13246792500 HC PT THER PROC EA 15 MIN 11/14/2019 Natalia Pires, PT GP 1    37990035469 HC GAIT TRAINING EA 15 MIN 11/14/2019 Natalia Pires, PT GP 1          PT G-Codes  Outcome Measure Options: AM-PAC 6 Clicks Basic Mobility (PT)  AM-PAC 6 Clicks Score (PT): 18  AM-PAC 6 Clicks Score (OT): 18    Brenda JIM Pires  PT  11/14/2019

## 2019-11-14 NOTE — PLAN OF CARE
Problem: Patient Care Overview  Goal: Plan of Care Review   11/14/19 1616   Coping/Psychosocial   Plan of Care Reviewed With patient   Plan of Care Review   Progress no change   OTHER   Outcome Summary Pt ambulated 50 ft with RW and CGA, limited by fatigue and weakness. VSS on 5-6LO2NC, titrated for activity. Will continue to progress pt as able per POC.

## 2019-11-14 NOTE — SIGNIFICANT NOTE
At 05:35 patient found on floor next to bed.  Patient states she thought she could get up by herself. Walker next to bed.  Patient states she fell on her bottom.  Patient had recent hip sx so L hip and pelvis xray ordered.  Reported to House supervisor, MAIDA Bravo, and pt son.  Skin tear on Right middle finger.  Cleaned and covered with a bandaid.  Patient tearful and apologetic.  Re-educated on calling for help before getting out of bed.  Pt verbalizes understanding.

## 2019-11-14 NOTE — PROGRESS NOTES
[facility] Medicine Services  PROGRESS NOTE    Patient Name: Temi Jarrett  : 1930  MRN: [id number]    Date of Admission: 2019  Primary Care Physician: Eric Patel MD    Subjective   Subjective     CC:  PNA    HPI:  Fall noted this morning close to 6 AM, noted finger laceration with bleeding now stopped and fall on left hip. Patient reports minimal hip pain at this time, finger improving. Breathing overall stable, not much better. Patient reports she was getting out of bed by herself which led to fall.     Review of Systems  Gen- No fevers, chills, +finger pain  CV- No chest pain, palpitations  Resp- + cough, dyspnea  GI- No N/V/D, abd pain          Objective   Objective     Vital Signs:   Temp:  [98.2 °F (36.8 °C)-101.2 °F (38.4 °C)] 98.6 °F (37 °C)  Heart Rate:  [] 103  Resp:  [16-20] 18  BP: (113-152)/(62-83) 113/74        Physical Exam:  GEN- no acute distress noted, chronically ill, elderly   HEENT- atraumatic, normocephlic, eomi  NECK- supple, trachea midline, no masses  RESP: on 4 L NC, rhonchorus breath sounds, coughing throughout   CV: no murmurs, s1/s2, rrr  MSK: no edema noted, spontaneous movement of all extremities, right second finger with laceration, some bleeding, steri strips in place  NEURO: alert, oriented, no focal deficits  SKIN: no rashes  PSYCH: appropriate mood and affect       Results Reviewed:    Results from last 7 days   Lab Units 19  0534 193 19  0716   WBC 10*3/mm3 10.98* 12.28* 14.05*   HEMOGLOBIN g/dL 9.8* 9.6* 12.1   HEMATOCRIT % 33.2* 31.2* 39.5   PLATELETS 10*3/mm3 237 228 267   PROCALCITONIN ng/mL  --  4.04* 0.23     Results from last 7 days   Lab Units 19  0534 19  0353 19  2145 19  0716   SODIUM mmol/L 139 139  --  142   POTASSIUM mmol/L 3.8 4.2 4.1 3.4*   CHLORIDE mmol/L 101 101  --  98   CO2 mmol/L 28.0 31.0*  --  31.0*   BUN mg/dL 16 21  --  17   CREATININE mg/dL 0.54*  0.61  --  0.63   GLUCOSE mg/dL 98 98  --  136*   CALCIUM mg/dL 7.7* 7.9*  --  8.5*   ALT (SGPT) U/L  --   --   --  9   AST (SGOT) U/L  --   --   --  16   TROPONIN T ng/mL  --   --   --  0.030   PROBNP pg/mL  --   --   --  1,318.0     Estimated Creatinine Clearance: 37.6 mL/min (A) (by C-G formula based on SCr of 0.54 mg/dL (L)).    Microbiology Results Abnormal     Procedure Component Value - Date/Time    Blood Culture - Blood, Arm, Left [[id number]] Collected:  11/12/19 0745    Lab Status:  Preliminary result Specimen:  Blood from Arm, Left Updated:  11/14/19 1016     Blood Culture No growth at 2 days    Blood Culture - Blood, Arm, Right [[id number]] Collected:  11/12/19 0740    Lab Status:  Preliminary result Specimen:  Blood from Arm, Right Updated:  11/14/19 1016     Blood Culture No growth at 2 days    Urine Culture - Urine, Urine, Catheter [[id number]]  (Abnormal)  (Susceptibility) Collected:  11/12/19 0717    Lab Status:  Final result Specimen:  Urine, Catheter Updated:  11/14/19 0348     Urine Culture >100,000 CFU/mL Klebsiella pneumoniae ssp pneumoniae    Susceptibility      Klebsiella pneumoniae ssp pneumoniae     MARY KAY     Ampicillin Resistant     Ampicillin + Sulbactam Susceptible     Cefazolin Susceptible     Cefepime Susceptible     Ceftazidime Susceptible     Ceftriaxone Susceptible     Gentamicin Susceptible     Levofloxacin Susceptible     Nitrofurantoin Intermediate     Piperacillin + Tazobactam Susceptible     Tetracycline Susceptible     Trimethoprim + Sulfamethoxazole Susceptible                    Blood Culture - Blood, Arm, Left [[id number]] Collected:  11/12/19 1218    Lab Status:  Preliminary result Specimen:  Blood from Arm, Left Updated:  11/13/19 1246     Blood Culture No growth at 24 hours    Blood Culture - Blood, Arm, Right [[id number]] Collected:  11/12/19 1218    Lab Status:  Preliminary result Specimen:  Blood from Arm, Right Updated:  11/13/19 1246     Blood Culture No growth at 24 hours     MRSA Screen, PCR - Swab, Nares [[id number]]  (Normal) Collected:  11/12/19 1403    Lab Status:  Final result Specimen:  Swab from Nares Updated:  11/12/19 1541     MRSA PCR Negative    Narrative:       MRSA Negative    Respiratory Panel, PCR - Swab, Nasopharynx [[id number]]  (Normal) Collected:  11/12/19 1022    Lab Status:  Final result Specimen:  Swab from Nasopharynx Updated:  11/12/19 1247     ADENOVIRUS, PCR Not Detected     Coronavirus 229E Not Detected     Coronavirus HKU1 Not Detected     Coronavirus NL63 Not Detected     Coronavirus OC43 Not Detected     Human Metapneumovirus Not Detected     Human Rhinovirus/Enterovirus Not Detected     Influenza B PCR Not Detected     Parainfluenza Virus 1 Not Detected     Parainfluenza Virus 2 Not Detected     Parainfluenza Virus 3 Not Detected     Parainfluenza Virus 4 Not Detected     Bordetella pertussis pcr Not Detected     Influenza A H1 2009 PCR Not Detected     Chlamydophila pneumoniae PCR Not Detected     Mycoplasma pneumo by PCR Not Detected     Influenza A PCR Not Detected     Influenza A H3 Not Detected     Influenza A H1 Not Detected     RSV, PCR Not Detected     Bordetella parapertussis PCR Not Detected          Imaging Results (Last 24 Hours)     Procedure Component Value Units Date/Time    XR Hip With or Without Pelvis 1 View Left [[id number]] Collected:  11/14/19 0704     Updated:  11/14/19 0706    Narrative:       CR Hip 1 Vw LT    INDICATION:   Status post fall complains of left hip pain. Recent left hip arthroplasty for fracture.    COMPARISON:   9/28/2019    FINDINGS:  AP and frog-leg lateral view(s) of the left hip.  Left hip hemiarthroplasty component in expected position alignment. No periprosthetic fracture or loosening. The visualized pelvis unremarkable. Questionable induration and edema over the left hip region  may be related to contusion.      Impression:       Left hip hemiarthroplasty in expected position. No acute osseous abnormality.  Questionable reticulation and edema overlying the left hip and gluteal region.    Signer Name: MARIANNE Alcazar MD   Signed: 11/14/2019 7:04 AM   Workstation Name: [facility]    [facility]               I have reviewed the medications:  Scheduled Meds:    aspirin  mg Oral Daily   bethanechol 10 mg Oral 4x Daily   busPIRone 10 mg Oral BID   donepezil 10 mg Oral Nightly   gabapentin 300 mg Oral BID   guaiFENesin 600 mg Oral Q12H   heparin (porcine) 5,000 Units Subcutaneous Q12H   ipratropium-albuterol 3 mL Nebulization 4x Daily - RT   levothyroxine 175 mcg Oral Daily   memantine 10 mg Oral BID   piperacillin-tazobactam 3.375 g Intravenous Q8H   sertraline 50 mg Oral Daily   sodium chloride 10 mL Intravenous Q12H   sucralfate 1 g Oral Q6H     Continuous Infusions:   PRN Meds:.•  acetaminophen **OR** acetaminophen **OR** acetaminophen  •  calcium carbonate EX  •  diphenoxylate-atropine  •  docusate sodium  •  guaiFENesin-dextromethorphan  •  magnesium sulfate **OR** magnesium sulfate in D5W 1g/100mL (PREMIX) **OR** magnesium sulfate  •  potassium chloride **OR** potassium chloride **OR** potassium chloride  •  sodium chloride  •  sodium chloride  •  traMADol      Assessment/Plan   Assessment / Plan     Active Hospital Problems    Diagnosis  POA   • Hypoxia [R09.02]  Unknown   • Multifocal pneumonia [J18.9]  Unknown   • Elevated lactic acid level [R79.89]  Unknown   • Leukocytosis [D72.829]  Unknown   • Sepsis (CMS/HCC) [A41.9]  Unknown   • Abnormal urinalysis [R82.90]  Unknown   • Acute respiratory failure with hypoxia (CMS/HCC) [J96.01]  Unknown   • Hypothyroidism [E03.9]  Yes   • Hyperlipidemia [E78.5]  Yes   • Benign essential hypertension [I10]  Yes   • Multi-infarct dementia (CMS/HCC) [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        Brief Hospital Course to date:  Temi Jarrett is a 89 year old female with  history of HTN, dementia, recent hip fracture who presented with cough and SOA found to have multifocal PNA and significant hypoxia.      Acute hypoxic resp failure  Multifocal PNA   Sepsis related to PNA   - Febrile, leukocytosis on admission-- chest imaging reviewed with multifocal PNA   - BCx pending; resp PCR negative   - Strep and legionella pending   - Started on vanc and zosyn, will continue deescalate to zosyn as MRSA PCR negative  - O2 support; duo-nebs; IS and flutter   - added robittusin today for cough     Elevated lactate, resolved  - resolved with IVF      Klebsiella UTI  - No symptoms; history of prolapse  - UCx with >100k GNB, now speciated to Klebsiella, suspectibilities reviewed, continue zosyn     Fall, finger laceration  - continue steri strips  - monitor hip pain- xray reviewed with ? Edema but hemiarthroplasty in appropriate place- if worsening pain can consider further imaging  - fall precautions     Hypokalemia   - sliding scale replacement; monitor , normal today at 3.8     HTN   - holding home HCTZ in setting of sepsis , remains normotensive      Hypothyroid   - continue home synthroid      Dementia/mood  - Continue home zoloft, buspar, aricept and namenda      Neuropathy  - Cont home gabapentin      History of pernicious anemia   - blood counts stable      DVT prophylaxis:  Heparin     Disposition: I expect the patient to be discharged pending further improvement    CODE STATUS:   Code Status and Medical Interventions:   Ordered at: 11/12/19 1002     Level Of Support Discussed With:    Patient     Code Status:    CPR     Medical Interventions (Level of Support Prior to Arrest):    Full         Electronically signed by Fadia Kingston MD, 11/14/19, 11:47 AM.

## 2019-11-14 NOTE — PLAN OF CARE
Problem: Patient Care Overview  Goal: Plan of Care Review  Outcome: Ongoing (interventions implemented as appropriate)   11/14/19 2424   Coping/Psychosocial   Plan of Care Reviewed With patient   Plan of Care Review   Progress no change   OTHER   Outcome Summary Afebrile overnight. Some confusion. Up x 1 assist and walker. IV abx continued. 5L NC. Will continue to monitor.       Problem: Fall Risk (Adult)  Goal: Absence of Fall  Outcome: Ongoing (interventions implemented as appropriate)      Problem: Skin Injury Risk (Adult)  Goal: Skin Health and Integrity  Outcome: Ongoing (interventions implemented as appropriate)      Problem: Sepsis/Septic Shock (Adult)  Goal: Signs and Symptoms of Listed Potential Problems Will be Absent, Minimized or Managed (Sepsis/Septic Shock)  Outcome: Ongoing (interventions implemented as appropriate)      Problem: Pneumonia (Adult)  Goal: Signs and Symptoms of Listed Potential Problems Will be Absent, Minimized or Managed (Pneumonia)  Outcome: Ongoing (interventions implemented as appropriate)

## 2019-11-14 NOTE — PLAN OF CARE
Problem: Patient Care Overview  Goal: Plan of Care Review  Outcome: Ongoing (interventions implemented as appropriate)   11/14/19 1409   Coping/Psychosocial   Plan of Care Reviewed With patient   Plan of Care Review   Progress no change   OTHER   Outcome Summary Pt ambulated 50 ft with RW and CGA, limited by fatigue and weakness. VSS on 5-6LO2NC, titrated for activity. Will continue to progress pt as able per POC.     Goal: Individualization and Mutuality  Outcome: Ongoing (interventions implemented as appropriate)    Goal: Discharge Needs Assessment  Outcome: Ongoing (interventions implemented as appropriate)   11/12/19 0929 11/12/19 1126   Disability   Equipment Currently Used at Home --  walker, rolling   Discharge Needs Assessment   Readmission Within the Last 30 Days no previous admission in last 30 days --    Concerns to be Addressed denies needs/concerns at this time --    Patient/Family Anticipates Transition to home with family --    Patient/Family Anticipated Services at Transition home health care --    Transportation Concerns car, none --    Transportation Anticipated family or friend will provide --    Anticipated Changes Related to Illness none --    Outpatient/Agency/Support Group Needs homecare agency --    [facility] --      Goal: Interprofessional Rounds/Family Conf  Outcome: Ongoing (interventions implemented as appropriate)      Problem: Fall Risk (Adult)  Intervention: Monitor/Assist with Self Care   11/14/19 1600   Activity   Activity Assistance Provided assistance, 1 person     Intervention: Reduce Risk/Promote Restraint Free Environment   11/14/19 1600   Safety Management   Environmental Safety Modification assistive device/personal items within reach;clutter free environment maintained;room near unit station;room organization consistent   Safety Promotion/Fall Prevention activity supervised;fall prevention program  maintained;nonskid shoes/slippers when out of bed;safety round/check completed   Prevent  Drop/Fall   Safety/Security Measures chair alarm set     Intervention: Review Medications/Identify Contributors to Fall Risk   19 1000   Safety Management   Medication Review/Management medications reviewed       Goal: Absence of Fall  Outcome: Ongoing (interventions implemented as appropriate)   19 044   Fall Risk (Adult)   Absence of Fall achieves outcome       Problem: Skin Injury Risk (Adult)  Intervention: Prevent/Manage Excess Moisture   19 1026 19 1600   Hygiene Care   Perineal Care perineum cleansed --    Bathing/Skin Care bath, complete;dressed/undressed;foot care;linen changed --    Skin Interventions   Skin Protection --  adhesive use limited;incontinence pads utilized     Intervention: Maintain Head of Bed Elevation Less Than 30 Degrees as Tolerated   19 1600   Positioning   Head of Bed (HOB) HOB at 30-45 degrees     Intervention: Prevent/Minimize Shear/Friction Injuries   19 1600   Skin Interventions   Pressure Reduction Devices pressure-redistributing mattress utilized;positioning supports utilized   Positioning   Positioning/Transfer Devices in use;pillows     Intervention: Prevent or Minimize Pressure   19 1600   Skin Interventions   Pressure Reduction Techniques frequent weight shift encouraged;heels elevated off bed;weight shift assistance provided   Positioning   Body Position weight shift assistance provided       Goal: Skin Health and Integrity  Outcome: Ongoing (interventions implemented as appropriate)   19   Skin Injury Risk (Adult)   Skin Health and Integrity making progress toward outcome       Problem: Sepsis/Septic Shock (Adult)  Intervention: Promote Rest/Minimize Oxygen Consumption   19 1600   Activity   Activity Management activity adjusted per tolerance;up in chair     Intervention: Provide Oxygenation/Ventilation/Perfusion  Support   11/14/19 1600   Activity   Activity Management activity adjusted per tolerance;up in chair   Positioning   Head of Bed (HOB) HOB at 30-45 degrees     Intervention: Support Psychosocial Response to Life-changing Event/Hospitalization   11/13/19 2013 11/14/19 0815   Coping/Psychosocial Interventions   Supportive Measures active listening utilized;verbalization of feelings encouraged --    Psychosocial Support   Family/Support System Care --  self-care encouraged;support provided     Intervention: Prevent Infection Progression   11/14/19 1600   Safety Management   Infection Prevention single patient room provided     Intervention: Monitor/Manage Perfusion   11/14/19 1000   Safety Management   Medication Review/Management medications reviewed     Intervention: Prevent/Manage DVT/VTE Risk   11/14/19 0815   Interventions   VTE Prevention/Management bilateral;sequential compression devices on;other (see comments)  (pt has heparin)     Intervention: Manage Bleeding Risk   11/14/19 1600   Safety Interventions   Bleeding Management dressing monitored   Bleeding Precautions monitored for signs of bleeding       Goal: Signs and Symptoms of Listed Potential Problems Will be Absent, Minimized or Managed (Sepsis/Septic Shock)  Outcome: Ongoing (interventions implemented as appropriate)   11/14/19 0440   Goal/Outcome Evaluation   Problems Assessed (Sepsis) all   Problems Present (Sepsis) hypoxia/hypoxemia;situational response;infection progression       Problem: Pneumonia (Adult)  Intervention: Maximize Oxygenation/Ventilation/Perfusion   11/14/19 1600   Positioning   Head of Bed (HOB) HOB at 30-45 degrees     Intervention: Prevent/Manage Infection Progression   11/14/19 1600   Safety Management   Infection Prevention single patient room provided       Goal: Signs and Symptoms of Listed Potential Problems Will be Absent, Minimized or Managed (Pneumonia)  Outcome: Ongoing (interventions implemented as appropriate)    11/13/19 1621 11/14/19 0440   Goal/Outcome Evaluation   Problems Assessed (Pneumonia) all --    Problems Present (Pneumonia) --  respiratory compromise;infection progression

## 2019-11-15 ENCOUNTER — TELEPHONE (OUTPATIENT)
Dept: INTERNAL MEDICINE | Facility: CLINIC | Age: 84
End: 2019-11-15

## 2019-11-15 ENCOUNTER — APPOINTMENT (OUTPATIENT)
Dept: CT IMAGING | Facility: HOSPITAL | Age: 84
End: 2019-11-15

## 2019-11-15 LAB
ANION GAP SERPL CALCULATED.3IONS-SCNC: 11 MMOL/L (ref 5–15)
BASOPHILS # BLD AUTO: 0.04 10*3/MM3 (ref 0–0.2)
BASOPHILS NFR BLD AUTO: 0.3 % (ref 0–1.5)
BUN BLD-MCNC: 18 MG/DL (ref 8–23)
BUN/CREAT SERPL: 32.1 (ref 7–25)
CALCIUM SPEC-SCNC: 7.9 MG/DL (ref 8.6–10.5)
CHLORIDE SERPL-SCNC: 100 MMOL/L (ref 98–107)
CO2 SERPL-SCNC: 28 MMOL/L (ref 22–29)
CREAT BLD-MCNC: 0.56 MG/DL (ref 0.57–1)
DEPRECATED RDW RBC AUTO: 44.5 FL (ref 37–54)
EOSINOPHIL # BLD AUTO: 0.13 10*3/MM3 (ref 0–0.4)
EOSINOPHIL NFR BLD AUTO: 0.9 % (ref 0.3–6.2)
ERYTHROCYTE [DISTWIDTH] IN BLOOD BY AUTOMATED COUNT: 13.8 % (ref 12.3–15.4)
GFR SERPL CREATININE-BSD FRML MDRD: 102 ML/MIN/1.73
GLUCOSE BLD-MCNC: 118 MG/DL (ref 65–99)
HCT VFR BLD AUTO: 37.2 % (ref 34–46.6)
HGB BLD-MCNC: 11 G/DL (ref 12–15.9)
IMM GRANULOCYTES # BLD AUTO: 0.07 10*3/MM3 (ref 0–0.05)
IMM GRANULOCYTES NFR BLD AUTO: 0.5 % (ref 0–0.5)
LYMPHOCYTES # BLD AUTO: 0.9 10*3/MM3 (ref 0.7–3.1)
LYMPHOCYTES NFR BLD AUTO: 6 % (ref 19.6–45.3)
MCH RBC QN AUTO: 26.1 PG (ref 26.6–33)
MCHC RBC AUTO-ENTMCNC: 29.6 G/DL (ref 31.5–35.7)
MCV RBC AUTO: 88.2 FL (ref 79–97)
MONOCYTES # BLD AUTO: 1.28 10*3/MM3 (ref 0.1–0.9)
MONOCYTES NFR BLD AUTO: 8.6 % (ref 5–12)
NEUTROPHILS # BLD AUTO: 12.49 10*3/MM3 (ref 1.7–7)
NEUTROPHILS NFR BLD AUTO: 83.7 % (ref 42.7–76)
NRBC BLD AUTO-RTO: 0 /100 WBC (ref 0–0.2)
PLATELET # BLD AUTO: 358 10*3/MM3 (ref 140–450)
PMV BLD AUTO: 9.4 FL (ref 6–12)
POTASSIUM BLD-SCNC: 3.6 MMOL/L (ref 3.5–5.2)
POTASSIUM BLD-SCNC: 4.4 MMOL/L (ref 3.5–5.2)
PROCALCITONIN SERPL-MCNC: 1.3 NG/ML (ref 0.1–0.25)
RBC # BLD AUTO: 4.22 10*6/MM3 (ref 3.77–5.28)
SODIUM BLD-SCNC: 139 MMOL/L (ref 136–145)
WBC NRBC COR # BLD: 14.91 10*3/MM3 (ref 3.4–10.8)

## 2019-11-15 PROCEDURE — 97110 THERAPEUTIC EXERCISES: CPT

## 2019-11-15 PROCEDURE — 80048 BASIC METABOLIC PNL TOTAL CA: CPT | Performed by: INTERNAL MEDICINE

## 2019-11-15 PROCEDURE — 97116 GAIT TRAINING THERAPY: CPT

## 2019-11-15 PROCEDURE — 25010000002 IOPAMIDOL 61 % SOLUTION: Performed by: INTERNAL MEDICINE

## 2019-11-15 PROCEDURE — 87899 AGENT NOS ASSAY W/OPTIC: CPT | Performed by: INTERNAL MEDICINE

## 2019-11-15 PROCEDURE — 84132 ASSAY OF SERUM POTASSIUM: CPT | Performed by: INTERNAL MEDICINE

## 2019-11-15 PROCEDURE — 25010000002 AZITHROMYCIN PER 500 MG: Performed by: INTERNAL MEDICINE

## 2019-11-15 PROCEDURE — 84145 PROCALCITONIN (PCT): CPT

## 2019-11-15 PROCEDURE — 94799 UNLISTED PULMONARY SVC/PX: CPT

## 2019-11-15 PROCEDURE — 25010000002 PIPERACILLIN SOD-TAZOBACTAM PER 1 G: Performed by: INTERNAL MEDICINE

## 2019-11-15 PROCEDURE — 25010000002 ONDANSETRON PER 1 MG: Performed by: INTERNAL MEDICINE

## 2019-11-15 PROCEDURE — 99233 SBSQ HOSP IP/OBS HIGH 50: CPT | Performed by: INTERNAL MEDICINE

## 2019-11-15 PROCEDURE — 25010000002 HEPARIN (PORCINE) PER 1000 UNITS: Performed by: INTERNAL MEDICINE

## 2019-11-15 PROCEDURE — 85025 COMPLETE CBC W/AUTO DIFF WBC: CPT | Performed by: INTERNAL MEDICINE

## 2019-11-15 PROCEDURE — 71260 CT THORAX DX C+: CPT

## 2019-11-15 RX ORDER — HEPARIN SODIUM 5000 [USP'U]/ML
5000 INJECTION, SOLUTION INTRAVENOUS; SUBCUTANEOUS EVERY 12 HOURS SCHEDULED
Status: DISCONTINUED | OUTPATIENT
Start: 2019-11-16 | End: 2019-11-16

## 2019-11-15 RX ORDER — ONDANSETRON 2 MG/ML
4 INJECTION INTRAMUSCULAR; INTRAVENOUS EVERY 6 HOURS PRN
Status: DISCONTINUED | OUTPATIENT
Start: 2019-11-15 | End: 2019-11-21 | Stop reason: HOSPADM

## 2019-11-15 RX ADMIN — BETHANECHOL CHLORIDE 10 MG: 10 TABLET ORAL at 12:27

## 2019-11-15 RX ADMIN — SODIUM CHLORIDE, PRESERVATIVE FREE 10 ML: 5 INJECTION INTRAVENOUS at 09:16

## 2019-11-15 RX ADMIN — AZITHROMYCIN MONOHYDRATE 500 MG: 500 INJECTION, POWDER, LYOPHILIZED, FOR SOLUTION INTRAVENOUS at 17:17

## 2019-11-15 RX ADMIN — SUCRALFATE 1 G: 1 TABLET ORAL at 09:14

## 2019-11-15 RX ADMIN — SUCRALFATE 1 G: 1 TABLET ORAL at 18:45

## 2019-11-15 RX ADMIN — GUAIFENESIN AND DEXTROMETHORPHAN 5 ML: 100; 10 SYRUP ORAL at 16:04

## 2019-11-15 RX ADMIN — GUAIFENESIN 600 MG: 600 TABLET, EXTENDED RELEASE ORAL at 09:14

## 2019-11-15 RX ADMIN — DONEPEZIL HYDROCHLORIDE 10 MG: 10 TABLET, FILM COATED ORAL at 22:07

## 2019-11-15 RX ADMIN — BETHANECHOL CHLORIDE 10 MG: 10 TABLET ORAL at 22:07

## 2019-11-15 RX ADMIN — BUSPIRONE HYDROCHLORIDE 10 MG: 10 TABLET ORAL at 22:07

## 2019-11-15 RX ADMIN — IPRATROPIUM BROMIDE AND ALBUTEROL SULFATE 3 ML: 2.5; .5 SOLUTION RESPIRATORY (INHALATION) at 20:10

## 2019-11-15 RX ADMIN — POTASSIUM CHLORIDE 40 MEQ: 750 CAPSULE, EXTENDED RELEASE ORAL at 12:53

## 2019-11-15 RX ADMIN — SERTRALINE HYDROCHLORIDE 50 MG: 50 TABLET ORAL at 09:15

## 2019-11-15 RX ADMIN — TAZOBACTAM SODIUM AND PIPERACILLIN SODIUM 3.38 G: 375; 3 INJECTION, SOLUTION INTRAVENOUS at 13:00

## 2019-11-15 RX ADMIN — HEPARIN SODIUM 5000 UNITS: 5000 INJECTION, SOLUTION INTRAVENOUS; SUBCUTANEOUS at 09:14

## 2019-11-15 RX ADMIN — MEMANTINE 10 MG: 10 TABLET ORAL at 09:15

## 2019-11-15 RX ADMIN — LEVOTHYROXINE SODIUM 175 MCG: 25 TABLET ORAL at 09:15

## 2019-11-15 RX ADMIN — GABAPENTIN 300 MG: 300 CAPSULE ORAL at 09:14

## 2019-11-15 RX ADMIN — TAZOBACTAM SODIUM AND PIPERACILLIN SODIUM 3.38 G: 375; 3 INJECTION, SOLUTION INTRAVENOUS at 05:57

## 2019-11-15 RX ADMIN — ONDANSETRON 4 MG: 2 INJECTION INTRAMUSCULAR; INTRAVENOUS at 18:34

## 2019-11-15 RX ADMIN — BETHANECHOL CHLORIDE 10 MG: 10 TABLET ORAL at 09:14

## 2019-11-15 RX ADMIN — MEMANTINE 10 MG: 10 TABLET ORAL at 22:08

## 2019-11-15 RX ADMIN — IPRATROPIUM BROMIDE AND ALBUTEROL SULFATE 3 ML: 2.5; .5 SOLUTION RESPIRATORY (INHALATION) at 11:57

## 2019-11-15 RX ADMIN — SUCRALFATE 1 G: 1 TABLET ORAL at 12:27

## 2019-11-15 RX ADMIN — POTASSIUM CHLORIDE 40 MEQ: 750 CAPSULE, EXTENDED RELEASE ORAL at 09:22

## 2019-11-15 RX ADMIN — IOPAMIDOL 60 ML: 612 INJECTION, SOLUTION INTRAVENOUS at 11:30

## 2019-11-15 RX ADMIN — BUSPIRONE HYDROCHLORIDE 10 MG: 10 TABLET ORAL at 09:15

## 2019-11-15 RX ADMIN — ASPIRIN 325 MG: 325 TABLET, DELAYED RELEASE ORAL at 09:15

## 2019-11-15 RX ADMIN — TAZOBACTAM SODIUM AND PIPERACILLIN SODIUM 3.38 G: 375; 3 INJECTION, SOLUTION INTRAVENOUS at 22:08

## 2019-11-15 RX ADMIN — BETHANECHOL CHLORIDE 10 MG: 10 TABLET ORAL at 18:45

## 2019-11-15 RX ADMIN — GABAPENTIN 300 MG: 300 CAPSULE ORAL at 22:07

## 2019-11-15 RX ADMIN — GUAIFENESIN 600 MG: 600 TABLET, EXTENDED RELEASE ORAL at 22:06

## 2019-11-15 RX ADMIN — GUAIFENESIN AND DEXTROMETHORPHAN 5 ML: 100; 10 SYRUP ORAL at 09:15

## 2019-11-15 RX ADMIN — IPRATROPIUM BROMIDE AND ALBUTEROL SULFATE 3 ML: 2.5; .5 SOLUTION RESPIRATORY (INHALATION) at 08:08

## 2019-11-15 NOTE — PLAN OF CARE
Problem: Patient Care Overview  Goal: Plan of Care Review  Outcome: Ongoing (interventions implemented as appropriate)   11/15/19 0219   Coping/Psychosocial   Plan of Care Reviewed With patient   Plan of Care Review   Progress declining   OTHER   Outcome Summary Pt very drowsy, Temp of 101.7. Reported to RABIA Pickett. Tylenol given. Slow rebound with any exertion on O2 sats. Continues to require 6L NC. IV ABX. Will continue to monitor.       Problem: Fall Risk (Adult)  Goal: Absence of Fall  Outcome: Ongoing (interventions implemented as appropriate)      Problem: Skin Injury Risk (Adult)  Goal: Skin Health and Integrity  Outcome: Ongoing (interventions implemented as appropriate)      Problem: Sepsis/Septic Shock (Adult)  Goal: Signs and Symptoms of Listed Potential Problems Will be Absent, Minimized or Managed (Sepsis/Septic Shock)  Outcome: Ongoing (interventions implemented as appropriate)      Problem: Pneumonia (Adult)  Goal: Signs and Symptoms of Listed Potential Problems Will be Absent, Minimized or Managed (Pneumonia)  Outcome: Ongoing (interventions implemented as appropriate)

## 2019-11-15 NOTE — THERAPY TREATMENT NOTE
Patient Name: Temi Jarrett  : 1930    MRN: [id number]                              Today's Date: 11/15/2019       Admit Date: 2019    Visit Dx:     ICD-10-CM ICD-9-CM   1. Sepsis without acute organ dysfunction, due to unspecified organism (CMS/HCC) A41.9 038.9     995.91   2. Pneumonia of both lungs due to infectious organism, unspecified part of lung J18.9 483.8   3. Bacterial UTI N39.0 599.0    A49.9 041.9   4. Acute respiratory failure with hypoxia (CMS/[facility]) J96.01 518.81   5. Impaired mobility and ADLs Z74.09 799.89     Patient Active Problem List   Diagnosis   • Skin tear of lower leg without complication, right, initial encounter   • Esophageal reflux   • Hypothyroidism   • Generalized anxiety disorder   • Hyperlipidemia   • Multi-infarct dementia (CMS/[facility])   • Peripheral neuropathy   • Carpal tunnel syndrome of right wrist   • Spinal stenosis of lumbar region   • Osteoporosis   • Fever   • Urinary frequency   • Peripheral venous insufficiency   • Disc disorder of lumbar region   • Bladder prolapse, female, acquired   • Acute right-sided low back pain without sciatica   • Pain, knee   • Benign essential hypertension   • Pernicious anemia   • Memory loss   • Annual physical exam   • Primary osteoarthritis of both knees   • Chest pain, atypical   • Hiatal hernia with intrathoracic stomach   • Closed fracture of neck of left femur (CMS/HCC)   • Hypoxia   • Multifocal pneumonia   • Elevated lactic acid level   • Leukocytosis   • Sepsis (CMS/HCC)   • Abnormal urinalysis   • Acute respiratory failure with hypoxia (CMS/HCC)     Past Medical History:   Diagnosis Date   • Disease of thyroid gland    • Gastric polyp    • History of colonic polyps    • Hypertension    • IBS (irritable bowel syndrome)    • Macular degeneration    • Torn meniscus     right knee      Past Surgical History:   Procedure Laterality Date   • CHOLECYSTECTOMY     • EYE SURGERY      Cataract extraction   • HERNIA REPAIR      • HIP HEMIARTHROPLASTY Left 9/28/2019    Procedure: HIP HEMIARTHROPLASTY LEFT;  Surgeon: Reddy Segundo Jr., MD;  Location: [facility];  Service: Orthopedics   • HYSTERECTOMY  1976   • KNEE SURGERY Right     arthroscopy- 2000   • TONSILLECTOMY       General Information     Row Name 11/15/19 1430          PT Evaluation Time/Intention    Document Type  therapy note (daily note)  -VG     Mode of Treatment  individual therapy;physical therapy  -VG     Row Name 11/15/19 1430          General Information    Existing Precautions/Restrictions  fall;oxygen therapy device and L/min;hip, posterior  (Significant)   -VG     Row Name 11/15/19 1430          Cognitive Assessment/Intervention- PT/OT    Orientation Status (Cognition)  oriented x 3  -VG       User Key  (r) = Recorded By, (t) = Taken By, (c) = Cosigned By    Initials Name Provider Type    VG Natalia Pires, PT Physical Therapist        Mobility     Row Name 11/15/19 1430          Bed Mobility Assessment/Treatment    Bed Mobility Assessment/Treatment  supine-sit  -VG     Supine-Sit Trigg (Bed Mobility)  minimum assist (75% patient effort);verbal cues  -VG     Assistive Device (Bed Mobility)  bed rails;head of bed elevated  -VG     Comment (Bed Mobility)  Assist at trunk for supine to sit and scooting hips to EOB.  -VG     Row Name 11/15/19 1430          Transfer Assessment/Treatment    Comment (Transfers)  Cues for hand placement and sequencing.  -VG     Row Name 11/15/19 1430          Sit-Stand Transfer    Sit-Stand Trigg (Transfers)  contact guard;verbal cues  -VG     Assistive Device (Sit-Stand Transfers)  walker, front-wheeled  -VG     Row Name 11/15/19 1430          Gait/Stairs Assessment/Training    Trigg Level (Gait)  contact guard;verbal cues  -VG     Assistive Device (Gait)  walker, front-wheeled  -VG     Distance in Feet (Gait)  125  -VG     Deviations/Abnormal Patterns (Gait)  kane decreased;festinating/shuffling;gait speed  decreased  -VG     Bilateral Gait Deviations  forward flexed posture  -VG     Comment (Gait/Stairs)  Distance limited by fatigue, weakness, and SOA. Cues for upright posture and AD management. Improved endurance this session.  -VG       User Key  (r) = Recorded By, (t) = Taken By, (c) = Cosigned By    Initials Name Provider Type    VG Natalia Pires, PT Physical Therapist        Obj/Interventions     Row Name 11/15/19 1432          Therapeutic Exercise    Lower Extremity (Therapeutic Exercise)  LAQ (long arc quad), bilateral;marching while seated  -VG     Lower Extremity Range of Motion (Therapeutic Exercise)  hip abduction/adduction, bilateral;ankle dorsiflexion/plantar flexion, bilateral  -VG     Exercise Type (Therapeutic Exercise)  AROM (active range of motion)  -VG     Position (Therapeutic Exercise)  seated  -VG     Sets/Reps (Therapeutic Exercise)  10x  -VG     Comment (Therapeutic Exercise)  Cues for technique.  -VG       User Key  (r) = Recorded By, (t) = Taken By, (c) = Cosigned By    Initials Name Provider Type    Natalia Acevedo, PT Physical Therapist        Goals/Plan    No documentation.       Clinical Impression     Row Name 11/15/19 1432          Pain Scale: Numbers Pre/Post-Treatment    Pain Scale: Numbers, Pretreatment  0/10 - no pain  -VG     Pain Scale: Numbers, Post-Treatment  0/10 - no pain  -VG     Row Name 11/15/19 1432          Positioning and Restraints    Pre-Treatment Position  in bed  -VG     Post Treatment Position  bsc  -VG     On BS commode  sitting;call light within reach;encouraged to call for assist  -VG       User Key  (r) = Recorded By, (t) = Taken By, (c) = Cosigned By    Initials Name Provider Type    Natalia Acevedo, PT Physical Therapist        Outcome Measures     Row Name 11/15/19 1350          How much help from another person do you currently need...    Turning from your back to your side while in flat bed without using bedrails?  4  -VG     Moving from lying on  back to sitting on the side of a flat bed without bedrails?  3  -VG     Moving to and from a bed to a chair (including a wheelchair)?  3  -VG     Standing up from a chair using your arms (e.g., wheelchair, bedside chair)?  3  -VG     Climbing 3-5 steps with a railing?  2  -VG     To walk in hospital room?  3  -VG     AM-PAC 6 Clicks Score (PT)  18  -VG     Row Name 11/15/19 1620          Functional Assessment    Outcome Measure Options  AM-PAC 6 Clicks Basic Mobility (PT)  -VG       User Key  (r) = Recorded By, (t) = Taken By, (c) = Cosigned By    Initials Name Provider Type    VG Natalia Pires PT Physical Therapist        Physical Therapy Education     Title: PT OT SLP Therapies (In Progress)     Topic: Physical Therapy (Done)     Point: Mobility training (Done)     Learning Progress Summary           Patient Acceptance, E, VU by VG at 11/15/2019  2:33 PM    Acceptance, E, VU,NR by VG at 11/14/2019  2:09 PM    Acceptance, E,TB, NR by VG at 11/13/2019  2:08 PM    Comment:  Pt able to recall 1/3 posterior hip prec.                   Point: Home exercise program (Done)     Learning Progress Summary           Patient Acceptance, E, VU by VG at 11/15/2019  2:33 PM    Acceptance, E, VU,NR by VG at 11/14/2019  2:09 PM    Acceptance, E,TB, NR by VG at 11/13/2019  2:08 PM    Comment:  Pt able to recall 1/3 posterior hip prec.                   Point: Body mechanics (Done)     Learning Progress Summary           Patient Acceptance, E, VU by VG at 11/15/2019  2:33 PM    Acceptance, E, VU,NR by VG at 11/14/2019  2:09 PM    Acceptance, E,TB, NR by VG at 11/13/2019  2:08 PM    Comment:  Pt able to recall 1/3 posterior hip prec.                   Point: Precautions (Done)     Learning Progress Summary           Patient Acceptance, E, VU by VG at 11/15/2019  2:33 PM    Acceptance, E, VU,NR by VG at 11/14/2019  2:09 PM    Acceptance, E,TB, NR by VG at 11/13/2019  2:08 PM    Comment:  Pt able to recall 1/3 posterior hip prec.                                User Key     Initials Effective Dates Name Provider Type Discipline    VG 05/29/18 -  Natalia Pires, PT Physical Therapist PT              PT Recommendation and Plan  Planned Therapy Interventions (PT Eval): balance training, bed mobility training, gait training, home exercise program, patient/family education, stair training, strengthening, transfer training  Outcome Summary/Treatment Plan (PT)  Anticipated Discharge Disposition (PT): home with assist, home with home health  Plan of Care Reviewed With: patient  Progress: improving  Outcome Summary: Pt increased ambulation distance to 125 ft with RW and CGA, limited by fatigue, weakness, and SOA. Improved endurance. Will continue to progress pt as able per POC.     Time Calculation:   PT Charges     Row Name 11/15/19 1350             Time Calculation    Start Time  1350  -VG      PT Received On  11/15/19  -      PT Goal Re-Cert Due Date  11/23/19  -         Time Calculation- PT    Total Timed Code Minutes- PT  39 minute(s)  -VG         Timed Charges    44369 - PT Therapeutic Exercise Minutes  14  -VG      98975 - Gait Training Minutes   25  -VG        User Key  (r) = Recorded By, (t) = Taken By, (c) = Cosigned By    Initials Name Provider Type    VG Natalia Pires, PT Physical Therapist        Therapy Charges for Today     Code Description Service Date Service Provider Modifiers Qty    55684494886 HC PT THER PROC EA 15 MIN 11/14/2019 Natalia Pires, PT GP 1    46346554007 HC GAIT TRAINING EA 15 MIN 11/14/2019 Natalia Pires, PT GP 1    70219332432 HC PT THER PROC EA 15 MIN 11/15/2019 Natalia Pires, PT GP 1    07549256454 HC GAIT TRAINING EA 15 MIN 11/15/2019 Natalia Pires, PT GP 2          PT G-Codes  Outcome Measure Options: AM-PAC 6 Clicks Basic Mobility (PT)  AM-PAC 6 Clicks Score (PT): 18  AM-PAC 6 Clicks Score (OT): 18    Brenda Pires PT  11/15/2019

## 2019-11-15 NOTE — PLAN OF CARE
Problem: Patient Care Overview  Goal: Plan of Care Review   11/15/19 6626   Coping/Psychosocial   Plan of Care Reviewed With patient   Plan of Care Review   Progress improving   OTHER   Outcome Summary Pt increased ambulation distance to 125 ft with RW and CGA, limited by fatigue, weakness, and SOA. Improved endurance. Will continue to progress pt as able per POC.

## 2019-11-15 NOTE — PROGRESS NOTES
Continued Stay Note  [facility]     Patient Name: Temi Jarrett  MRN: [id number]  Today's Date: 11/15/2019    Admit Date: 11/12/2019    Discharge Plan     Row Name 11/15/19 1439       Plan    Plan  update    Patient/Family in Agreement with Plan  yes    Plan Comments  Spoke to Feng with [facility] who reports that patient looks appropriate for SRU, CM to advises with patient ready to discharge to check for bed availability.  Spoke with patient at bedside regarding discharge plan and advised that when she was ready for rehab [facility] will check for bed availability.  Patient verbalizes agreement with plan.  No new needs verbalized.  Per RN, possible procedure upcoming.  CM following.  Patient plan is to discharge to [facility] via car with family to transport.      Final Discharge Disposition Code  03 - skilled nursing facility (SNF)        Discharge Codes    No documentation.       Expected Discharge Date and Time     Expected Discharge Date Expected Discharge Time    Nov 16, 2019             Abbey Serna, RN

## 2019-11-15 NOTE — TELEPHONE ENCOUNTER
PATIENT CALLED WANTED TO MAKE SURE SHE HAD A B-12 SHOT 11/8/19 AND SHE WANTED CONY AND DR GRIFFITH TO KNOW THAT SHE HAS BEEN HOSPITALIZED THE LAST 4 DAYS WITH PNEUMONIA

## 2019-11-15 NOTE — PLAN OF CARE
Problem: Patient Care Overview  Goal: Plan of Care Review  Outcome: Ongoing (interventions implemented as appropriate)  Patient A+Ox4 with periods of confusion.  Patient has been mid 90s-100s (HR).  All other VS stable.  Patient has had a CT of her chest today, has not been read as of 1330.  Continues on 5 L of oxygen via nasal canula.  Alarms in use for patient safety, but has not attempted to get up by herself.     11/15/19 1341   Coping/Psychosocial   Plan of Care Reviewed With patient   Plan of Care Review   Progress no change     Goal: Discharge Needs Assessment  Outcome: Ongoing (interventions implemented as appropriate)      Problem: Fall Risk (Adult)  Goal: Absence of Fall  Outcome: Ongoing (interventions implemented as appropriate)      Problem: Skin Injury Risk (Adult)  Goal: Skin Health and Integrity  Outcome: Ongoing (interventions implemented as appropriate)      Problem: Sepsis/Septic Shock (Adult)  Goal: Signs and Symptoms of Listed Potential Problems Will be Absent, Minimized or Managed (Sepsis/Septic Shock)  Outcome: Ongoing (interventions implemented as appropriate)      Problem: Pneumonia (Adult)  Goal: Signs and Symptoms of Listed Potential Problems Will be Absent, Minimized or Managed (Pneumonia)  Outcome: Ongoing (interventions implemented as appropriate)

## 2019-11-15 NOTE — PROGRESS NOTES
[facility] Medicine Services  PROGRESS NOTE    Patient Name: Temi Jarrett  : 1930  MRN: [id number]    Date of Admission: 2019  Primary Care Physician: Eric Patel MD    Subjective   Subjective     CC:  PNA    HPI:  Patient states she feels about the same, continues to have very slow improvement in respiratory status. Also complaining of dry skin on face/lips.    Review of Systems  Gen- No fevers, chills, +finger pain  CV- No chest pain, palpitations  Resp- + cough, + dyspnea  GI- No N/V/D, abd pain          Objective   Objective     Vital Signs:   Temp:  [97.9 °F (36.6 °C)-101.7 °F (38.7 °C)] 97.9 °F (36.6 °C)  Heart Rate:  [] 111  Resp:  [8-18] 18  BP: (106-136)/(81-86) 106/84        Physical Exam:  GEN- no acute distress noted, chronically ill, elderly   HEENT- atraumatic, normocephlic, eomi, dry crusted skin on lips/.face  NECK- supple, trachea midline, no masses  RESP: on 4 L NC, rhonchorus breath sounds, minimal improvement  CV: no murmurs, s1/s2, rrr  MSK: no edema noted, spontaneous movement of all extremities, right second finger with laceration, some bleeding, steri strips in place  NEURO: alert, oriented, no focal deficits  SKIN: no rashes  PSYCH: appropriate mood and affect       Results Reviewed:    Results from last 7 days   Lab Units 11/15/19  061934 19  0716   WBC 10*3/mm3  --  10.98* 12.28* 14.05*   HEMOGLOBIN g/dL  --  9.8* 9.6* 12.1   HEMATOCRIT %  --  33.2* 31.2* 39.5   PLATELETS 10*3/mm3  --  237 228 267   PROCALCITONIN ng/mL 1.30*  --  4.04* 0.23     Results from last 7 days   Lab Units 11/15/19  0623 19  0534 193  19  0716   SODIUM mmol/L 139 139 139  --  142   POTASSIUM mmol/L 3.6 3.8 4.2   < > 3.4*   CHLORIDE mmol/L 100 101 101  --  98   CO2 mmol/L 28.0 28.0 31.0*  --  31.0*   BUN mg/dL 18 16 21  --  17   CREATININE mg/dL 0.56* 0.54* 0.61  --  0.63   GLUCOSE mg/dL 118* 98 98   --  136*   CALCIUM mg/dL 7.9* 7.7* 7.9*  --  8.5*   ALT (SGPT) U/L  --   --   --   --  9   AST (SGOT) U/L  --   --   --   --  16   TROPONIN T ng/mL  --   --   --   --  0.030   PROBNP pg/mL  --   --   --   --  1,318.0    < > = values in this interval not displayed.     Estimated Creatinine Clearance: 37.6 mL/min (A) (by C-G formula based on SCr of 0.56 mg/dL (L)).    Microbiology Results Abnormal     Procedure Component Value - Date/Time    Blood Culture - Blood, Arm, Left [[id number]] Collected:  11/12/19 1218    Lab Status:  Preliminary result Specimen:  Blood from Arm, Left Updated:  11/15/19 1246     Blood Culture No growth at 3 days    Blood Culture - Blood, Arm, Right [[id number]] Collected:  11/12/19 1218    Lab Status:  Preliminary result Specimen:  Blood from Arm, Right Updated:  11/15/19 1246     Blood Culture No growth at 3 days    Blood Culture - Blood, Arm, Left [[id number]] Collected:  11/12/19 0745    Lab Status:  Preliminary result Specimen:  Blood from Arm, Left Updated:  11/15/19 1016     Blood Culture No growth at 3 days    Blood Culture - Blood, Arm, Right [[id number]] Collected:  11/12/19 0740    Lab Status:  Preliminary result Specimen:  Blood from Arm, Right Updated:  11/15/19 1016     Blood Culture No growth at 3 days    Urine Culture - Urine, Urine, Catheter [[id number]]  (Abnormal)  (Susceptibility) Collected:  11/12/19 0717    Lab Status:  Final result Specimen:  Urine, Catheter Updated:  11/14/19 0348     Urine Culture >100,000 CFU/mL Klebsiella pneumoniae ssp pneumoniae    Susceptibility      Klebsiella pneumoniae ssp pneumoniae     MARY KAY     Ampicillin Resistant     Ampicillin + Sulbactam Susceptible     Cefazolin Susceptible     Cefepime Susceptible     Ceftazidime Susceptible     Ceftriaxone Susceptible     Gentamicin Susceptible     Levofloxacin Susceptible     Nitrofurantoin Intermediate     Piperacillin + Tazobactam Susceptible     Tetracycline Susceptible     Trimethoprim +  Sulfamethoxazole Susceptible                    MRSA Screen, PCR - Swab, Nares [[id number]]  (Normal) Collected:  11/12/19 1403    Lab Status:  Final result Specimen:  Swab from Nares Updated:  11/12/19 1541     MRSA PCR Negative    Narrative:       MRSA Negative    Respiratory Panel, PCR - Swab, Nasopharynx [[id number]]  (Normal) Collected:  11/12/19 1022    Lab Status:  Final result Specimen:  Swab from Nasopharynx Updated:  11/12/19 1247     ADENOVIRUS, PCR Not Detected     Coronavirus 229E Not Detected     Coronavirus HKU1 Not Detected     Coronavirus NL63 Not Detected     Coronavirus OC43 Not Detected     Human Metapneumovirus Not Detected     Human Rhinovirus/Enterovirus Not Detected     Influenza B PCR Not Detected     Parainfluenza Virus 1 Not Detected     Parainfluenza Virus 2 Not Detected     Parainfluenza Virus 3 Not Detected     Parainfluenza Virus 4 Not Detected     Bordetella pertussis pcr Not Detected     Influenza A H1 2009 PCR Not Detected     Chlamydophila pneumoniae PCR Not Detected     Mycoplasma pneumo by PCR Not Detected     Influenza A PCR Not Detected     Influenza A H3 Not Detected     Influenza A H1 Not Detected     RSV, PCR Not Detected     Bordetella parapertussis PCR Not Detected          Imaging Results (Last 24 Hours)     Procedure Component Value Units Date/Time    CT Chest With Contrast [[id number]] Updated:  11/15/19 1139               I have reviewed the medications:  Scheduled Meds:    aspirin  mg Oral Daily   bethanechol 10 mg Oral 4x Daily   busPIRone 10 mg Oral BID   donepezil 10 mg Oral Nightly   gabapentin 300 mg Oral BID   guaiFENesin 600 mg Oral Q12H   heparin (porcine) 5,000 Units Subcutaneous Q12H   ipratropium-albuterol 3 mL Nebulization 4x Daily - RT   levothyroxine 175 mcg Oral Daily   memantine 10 mg Oral BID   piperacillin-tazobactam 3.375 g Intravenous Q8H   sertraline 50 mg Oral Daily   sodium chloride 10 mL Intravenous Q12H   sucralfate 1 g Oral Q6H      Continuous Infusions:   PRN Meds:.•  acetaminophen **OR** acetaminophen **OR** acetaminophen  •  benzonatate  •  calcium carbonate EX  •  diphenoxylate-atropine  •  docusate sodium  •  guaiFENesin-dextromethorphan  •  magnesium sulfate **OR** magnesium sulfate in D5W 1g/100mL (PREMIX) **OR** magnesium sulfate  •  potassium chloride **OR** potassium chloride **OR** potassium chloride  •  sodium chloride  •  sodium chloride  •  traMADol      Assessment/Plan   Assessment / Plan     Active Hospital Problems    Diagnosis  POA   • Hypoxia [R09.02]  Unknown   • Multifocal pneumonia [J18.9]  Unknown   • Elevated lactic acid level [R79.89]  Unknown   • Leukocytosis [D72.829]  Unknown   • Sepsis (CMS/HCC) [A41.9]  Unknown   • Abnormal urinalysis [R82.90]  Unknown   • Acute respiratory failure with hypoxia (CMS/HCC) [J96.01]  Unknown   • Hypothyroidism [E03.9]  Yes   • Hyperlipidemia [E78.5]  Yes   • Benign essential hypertension [I10]  Yes   • Multi-infarct dementia (CMS/HCC) [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        Brief Hospital Course to date:  Temi Jarrett is a 89 year old female with history of HTN, dementia, recent hip fracture who presented with cough and SOA found to have multifocal PNA and significant hypoxia.      Acute hypoxic resp failure  Multifocal PNA   Sepsis related to PNA   - Febrile, leukocytosis on admission-- chest xray reviewed with multifocal PNA --given continued fevers and slow improvement will obtain CT chest with contrast today  - BCx pending; resp PCR negative   - Strep and legionella pending   - Continues zosyn monotherapy- vanc deescalated as MRSA PCR negative  - O2 support; duo-nebs; IS and flutter   - added robittusin for cough    Addendum--- reviewed results of CT chest. D/w patient and recommended thoracentesis via IR. Patient wishes to talk with son and asked me to call son to discuss. Wants to hold on  thoracentesis at this time. I attempted x2 to call son Victor M with no answer. Will place pulmonary consultation as well. Added azithromycin for atypical coverage.    Addendum--- d/w Turner Jarrett- he is agreeable to holding on thoracentesis at this time. Will await pulmonary recommendations.       Elevated lactate, resolved  - resolved with IVF      Klebsiella UTI  - No symptoms; history of prolapse  - UCx with >100k GNB, now speciated to Klebsiella, suspectibilities reviewed, continue zosyn     Fall, finger laceration  - continue steri strips  - monitor hip pain- improved  - fall precautions     Hypokalemia   - sliding scale replacement; monitor , normal today at 3.8     HTN   - holding home HCTZ in setting of sepsis , remains normotensive      Hypothyroid   - continue home synthroid      Dementia/mood  - Continue home zoloft, buspar, aricept and namenda      Neuropathy  - Cont home gabapentin      History of pernicious anemia   - blood counts stable      DVT prophylaxis:  Heparin     Disposition: I expect the patient to be discharged pending further improvement    CODE STATUS:   Code Status and Medical Interventions:   Ordered at: 11/12/19 1002     Level Of Support Discussed With:    Patient     Code Status:    CPR     Medical Interventions (Level of Support Prior to Arrest):    Full         Electronically signed by Fadia Kingston MD, 11/15/19, 1:08 PM.

## 2019-11-16 ENCOUNTER — APPOINTMENT (OUTPATIENT)
Dept: GENERAL RADIOLOGY | Facility: HOSPITAL | Age: 84
End: 2019-11-16

## 2019-11-16 PROBLEM — J90 PLEURAL EFFUSION: Status: ACTIVE | Noted: 2019-11-16

## 2019-11-16 LAB
ANION GAP SERPL CALCULATED.3IONS-SCNC: 10 MMOL/L (ref 5–15)
APPEARANCE FLD: ABNORMAL
BASOPHILS # BLD AUTO: 0.08 10*3/MM3 (ref 0–0.2)
BASOPHILS NFR BLD AUTO: 0.6 % (ref 0–1.5)
BUN BLD-MCNC: 19 MG/DL (ref 8–23)
BUN/CREAT SERPL: 30.6 (ref 7–25)
CALCIUM SPEC-SCNC: 8.4 MG/DL (ref 8.6–10.5)
CHLORIDE SERPL-SCNC: 102 MMOL/L (ref 98–107)
CO2 SERPL-SCNC: 29 MMOL/L (ref 22–29)
COLOR FLD: YELLOW
CREAT BLD-MCNC: 0.62 MG/DL (ref 0.57–1)
DEPRECATED RDW RBC AUTO: 44.3 FL (ref 37–54)
EOSINOPHIL # BLD AUTO: 0.26 10*3/MM3 (ref 0–0.4)
EOSINOPHIL NFR BLD AUTO: 1.9 % (ref 0.3–6.2)
ERYTHROCYTE [DISTWIDTH] IN BLOOD BY AUTOMATED COUNT: 13.7 % (ref 12.3–15.4)
GFR SERPL CREATININE-BSD FRML MDRD: 91 ML/MIN/1.73
GLUCOSE BLD-MCNC: 101 MG/DL (ref 65–99)
HCT VFR BLD AUTO: 38.5 % (ref 34–46.6)
HGB BLD-MCNC: 11.4 G/DL (ref 12–15.9)
IMM GRANULOCYTES # BLD AUTO: 0.16 10*3/MM3 (ref 0–0.05)
IMM GRANULOCYTES NFR BLD AUTO: 1.1 % (ref 0–0.5)
KOH PREP NAIL: NORMAL
L PNEUMO1 AG UR QL IA: NEGATIVE
LYMPHOCYTES # BLD AUTO: 1.42 10*3/MM3 (ref 0.7–3.1)
LYMPHOCYTES NFR BLD AUTO: 10.2 % (ref 19.6–45.3)
MCH RBC QN AUTO: 26 PG (ref 26.6–33)
MCHC RBC AUTO-ENTMCNC: 29.6 G/DL (ref 31.5–35.7)
MCV RBC AUTO: 87.7 FL (ref 79–97)
MONOCYTES # BLD AUTO: 1.36 10*3/MM3 (ref 0.1–0.9)
MONOCYTES NFR BLD AUTO: 9.7 % (ref 5–12)
NEUTROPHILS # BLD AUTO: 10.68 10*3/MM3 (ref 1.7–7)
NEUTROPHILS NFR BLD AUTO: 76.5 % (ref 42.7–76)
NRBC BLD AUTO-RTO: 0 /100 WBC (ref 0–0.2)
PLATELET # BLD AUTO: 384 10*3/MM3 (ref 140–450)
PMV BLD AUTO: 9.3 FL (ref 6–12)
POTASSIUM BLD-SCNC: 4.7 MMOL/L (ref 3.5–5.2)
RBC # BLD AUTO: 4.39 10*6/MM3 (ref 3.77–5.28)
RBC # FLD AUTO: 5000 /MM3
S PNEUM AG SPEC QL LA: NEGATIVE
SODIUM BLD-SCNC: 141 MMOL/L (ref 136–145)
WBC # FLD AUTO: 727 /MM3
WBC NRBC COR # BLD: 13.96 10*3/MM3 (ref 3.4–10.8)

## 2019-11-16 PROCEDURE — 87205 SMEAR GRAM STAIN: CPT | Performed by: INTERNAL MEDICINE

## 2019-11-16 PROCEDURE — 89051 BODY FLUID CELL COUNT: CPT | Performed by: INTERNAL MEDICINE

## 2019-11-16 PROCEDURE — 88305 TISSUE EXAM BY PATHOLOGIST: CPT | Performed by: INTERNAL MEDICINE

## 2019-11-16 PROCEDURE — 99222 1ST HOSP IP/OBS MODERATE 55: CPT | Performed by: INTERNAL MEDICINE

## 2019-11-16 PROCEDURE — 88112 CYTOPATH CELL ENHANCE TECH: CPT | Performed by: INTERNAL MEDICINE

## 2019-11-16 PROCEDURE — 87015 SPECIMEN INFECT AGNT CONCNTJ: CPT | Performed by: INTERNAL MEDICINE

## 2019-11-16 PROCEDURE — 25010000002 AZITHROMYCIN PER 500 MG: Performed by: INTERNAL MEDICINE

## 2019-11-16 PROCEDURE — 87070 CULTURE OTHR SPECIMN AEROBIC: CPT | Performed by: INTERNAL MEDICINE

## 2019-11-16 PROCEDURE — 87220 TISSUE EXAM FOR FUNGI: CPT | Performed by: INTERNAL MEDICINE

## 2019-11-16 PROCEDURE — 71045 X-RAY EXAM CHEST 1 VIEW: CPT

## 2019-11-16 PROCEDURE — 25010000002 ONDANSETRON PER 1 MG: Performed by: INTERNAL MEDICINE

## 2019-11-16 PROCEDURE — 32555 ASPIRATE PLEURA W/ IMAGING: CPT | Performed by: INTERNAL MEDICINE

## 2019-11-16 PROCEDURE — 87102 FUNGUS ISOLATION CULTURE: CPT | Performed by: INTERNAL MEDICINE

## 2019-11-16 PROCEDURE — 94799 UNLISTED PULMONARY SVC/PX: CPT

## 2019-11-16 PROCEDURE — 99232 SBSQ HOSP IP/OBS MODERATE 35: CPT | Performed by: INTERNAL MEDICINE

## 2019-11-16 PROCEDURE — 87206 SMEAR FLUORESCENT/ACID STAI: CPT | Performed by: INTERNAL MEDICINE

## 2019-11-16 PROCEDURE — 25010000002 PIPERACILLIN SOD-TAZOBACTAM PER 1 G: Performed by: INTERNAL MEDICINE

## 2019-11-16 PROCEDURE — 0W9930Z DRAINAGE OF RIGHT PLEURAL CAVITY WITH DRAINAGE DEVICE, PERCUTANEOUS APPROACH: ICD-10-PCS | Performed by: INTERNAL MEDICINE

## 2019-11-16 PROCEDURE — 83615 LACTATE (LD) (LDH) ENZYME: CPT | Performed by: INTERNAL MEDICINE

## 2019-11-16 PROCEDURE — 80048 BASIC METABOLIC PNL TOTAL CA: CPT | Performed by: INTERNAL MEDICINE

## 2019-11-16 PROCEDURE — 87116 MYCOBACTERIA CULTURE: CPT | Performed by: INTERNAL MEDICINE

## 2019-11-16 PROCEDURE — 85025 COMPLETE CBC W/AUTO DIFF WBC: CPT | Performed by: INTERNAL MEDICINE

## 2019-11-16 RX ORDER — HEPARIN SODIUM 5000 [USP'U]/ML
5000 INJECTION, SOLUTION INTRAVENOUS; SUBCUTANEOUS EVERY 12 HOURS SCHEDULED
Status: DISCONTINUED | OUTPATIENT
Start: 2019-11-17 | End: 2019-11-21 | Stop reason: HOSPADM

## 2019-11-16 RX ORDER — HEPARIN SODIUM 5000 [USP'U]/ML
5000 INJECTION, SOLUTION INTRAVENOUS; SUBCUTANEOUS ONCE
Status: COMPLETED | OUTPATIENT
Start: 2019-11-17 | End: 2019-11-16

## 2019-11-16 RX ADMIN — HEPARIN SODIUM 5000 UNITS: 5000 INJECTION, SOLUTION INTRAVENOUS; SUBCUTANEOUS at 23:35

## 2019-11-16 RX ADMIN — GABAPENTIN 300 MG: 300 CAPSULE ORAL at 23:38

## 2019-11-16 RX ADMIN — TAZOBACTAM SODIUM AND PIPERACILLIN SODIUM 3.38 G: 375; 3 INJECTION, SOLUTION INTRAVENOUS at 13:01

## 2019-11-16 RX ADMIN — SUCRALFATE 1 G: 1 TABLET ORAL at 12:02

## 2019-11-16 RX ADMIN — GUAIFENESIN AND DEXTROMETHORPHAN 5 ML: 100; 10 SYRUP ORAL at 18:38

## 2019-11-16 RX ADMIN — TAZOBACTAM SODIUM AND PIPERACILLIN SODIUM 3.38 G: 375; 3 INJECTION, SOLUTION INTRAVENOUS at 06:54

## 2019-11-16 RX ADMIN — TAZOBACTAM SODIUM AND PIPERACILLIN SODIUM 3.38 G: 375; 3 INJECTION, SOLUTION INTRAVENOUS at 23:34

## 2019-11-16 RX ADMIN — BETHANECHOL CHLORIDE 10 MG: 10 TABLET ORAL at 12:02

## 2019-11-16 RX ADMIN — SODIUM CHLORIDE, PRESERVATIVE FREE 10 ML: 5 INJECTION INTRAVENOUS at 08:07

## 2019-11-16 RX ADMIN — BUSPIRONE HYDROCHLORIDE 10 MG: 10 TABLET ORAL at 23:36

## 2019-11-16 RX ADMIN — GUAIFENESIN 600 MG: 600 TABLET, EXTENDED RELEASE ORAL at 23:35

## 2019-11-16 RX ADMIN — SUCRALFATE 1 G: 1 TABLET ORAL at 06:54

## 2019-11-16 RX ADMIN — GUAIFENESIN 600 MG: 600 TABLET, EXTENDED RELEASE ORAL at 08:06

## 2019-11-16 RX ADMIN — BETHANECHOL CHLORIDE 10 MG: 10 TABLET ORAL at 23:37

## 2019-11-16 RX ADMIN — LEVOTHYROXINE SODIUM 175 MCG: 25 TABLET ORAL at 08:07

## 2019-11-16 RX ADMIN — GABAPENTIN 300 MG: 300 CAPSULE ORAL at 08:06

## 2019-11-16 RX ADMIN — BETHANECHOL CHLORIDE 10 MG: 10 TABLET ORAL at 08:07

## 2019-11-16 RX ADMIN — MEMANTINE 10 MG: 10 TABLET ORAL at 23:37

## 2019-11-16 RX ADMIN — SERTRALINE HYDROCHLORIDE 50 MG: 50 TABLET ORAL at 08:07

## 2019-11-16 RX ADMIN — ONDANSETRON 4 MG: 2 INJECTION INTRAMUSCULAR; INTRAVENOUS at 19:06

## 2019-11-16 RX ADMIN — MEMANTINE 10 MG: 10 TABLET ORAL at 08:06

## 2019-11-16 RX ADMIN — ASPIRIN 325 MG: 325 TABLET, DELAYED RELEASE ORAL at 08:06

## 2019-11-16 RX ADMIN — DONEPEZIL HYDROCHLORIDE 10 MG: 10 TABLET, FILM COATED ORAL at 23:38

## 2019-11-16 RX ADMIN — IPRATROPIUM BROMIDE AND ALBUTEROL SULFATE 3 ML: 2.5; .5 SOLUTION RESPIRATORY (INHALATION) at 07:51

## 2019-11-16 RX ADMIN — SUCRALFATE 1 G: 1 TABLET ORAL at 23:35

## 2019-11-16 RX ADMIN — IPRATROPIUM BROMIDE AND ALBUTEROL SULFATE 3 ML: 2.5; .5 SOLUTION RESPIRATORY (INHALATION) at 16:38

## 2019-11-16 RX ADMIN — SUCRALFATE 1 G: 1 TABLET ORAL at 17:07

## 2019-11-16 RX ADMIN — GUAIFENESIN AND DEXTROMETHORPHAN 5 ML: 100; 10 SYRUP ORAL at 08:06

## 2019-11-16 RX ADMIN — BUSPIRONE HYDROCHLORIDE 10 MG: 10 TABLET ORAL at 08:06

## 2019-11-16 RX ADMIN — BETHANECHOL CHLORIDE 10 MG: 10 TABLET ORAL at 17:07

## 2019-11-16 RX ADMIN — AZITHROMYCIN MONOHYDRATE 500 MG: 500 INJECTION, POWDER, LYOPHILIZED, FOR SOLUTION INTRAVENOUS at 17:07

## 2019-11-16 RX ADMIN — TRAMADOL HYDROCHLORIDE 50 MG: 50 TABLET, FILM COATED ORAL at 23:45

## 2019-11-16 NOTE — PROGRESS NOTES
[facility] Medicine Services  PROGRESS NOTE    Patient Name: Temi Jarrett  : 1930  MRN: [id number]    Date of Admission: 2019  Primary Care Physician: Eric Patel MD    Subjective   Subjective     CC:  PNA    HPI:  Patient states she feels about the same, no changes. No fevers overnight.     Review of Systems  Gen- No fevers, chills  CV- No chest pain, palpitations  Resp- + cough, + dyspnea  GI- No N/V/D, abd pain          Objective   Objective     Vital Signs:   Temp:  [97.7 °F (36.5 °C)-99.1 °F (37.3 °C)] 98.9 °F (37.2 °C)  Heart Rate:  [108-110] 110  Resp:  [16-20] 18  BP: (110-145)/(72-93) 139/93        Physical Exam:  GEN- no acute distress noted, chronically ill, elderly   HEENT- atraumatic, normocephlic, eomi, dry crusted skin on lips/.face  NECK- supple, trachea midline, no masses  RESP: on 4 L NC, rhonchorus breath sounds, stable from previous exam  CV: no murmurs, s1/s2, rrr  MSK: no edema noted, spontaneous movement of all extremities, right second finger steri strips in place  NEURO: alert, oriented, no focal deficits  SKIN: no rashes  PSYCH: appropriate mood and affect       Results Reviewed:    Results from last 7 days   Lab Units 11/15/19  1644 11/15/19  061934 193 19  0716   WBC 10*3/mm3 14.91*  --  10.98* 12.28* 14.05*   HEMOGLOBIN g/dL 11.0*  --  9.8* 9.6* 12.1   HEMATOCRIT % 37.2  --  33.2* 31.2* 39.5   PLATELETS 10*3/mm3 358  --  237 228 267   PROCALCITONIN ng/mL  --  1.30*  --  4.04* 0.23     Results from last 7 days   Lab Units 11/15/19  1644 11/15/19  0623 19  0534 19  0353  19  0716   SODIUM mmol/L  --  139 139 139  --  142   POTASSIUM mmol/L 4.4 3.6 3.8 4.2   < > 3.4*   CHLORIDE mmol/L  --  100 101 101  --  98   CO2 mmol/L  --  28.0 28.0 31.0*  --  31.0*   BUN mg/dL  --  18 16 21  --  17   CREATININE mg/dL  --  0.56* 0.54* 0.61  --  0.63   GLUCOSE mg/dL  --  118* 98 98  --  136*   CALCIUM  mg/dL  --  7.9* 7.7* 7.9*  --  8.5*   ALT (SGPT) U/L  --   --   --   --   --  9   AST (SGOT) U/L  --   --   --   --   --  16   TROPONIN T ng/mL  --   --   --   --   --  0.030   PROBNP pg/mL  --   --   --   --   --  1,318.0    < > = values in this interval not displayed.     Estimated Creatinine Clearance: 37.6 mL/min (A) (by C-G formula based on SCr of 0.56 mg/dL (L)).    Microbiology Results Abnormal     Procedure Component Value - Date/Time    Blood Culture - Blood, Arm, Left [[id number]] Collected:  11/12/19 1218    Lab Status:  Preliminary result Specimen:  Blood from Arm, Left Updated:  11/16/19 1246     Blood Culture No growth at 4 days    Blood Culture - Blood, Arm, Right [[id number]] Collected:  11/12/19 1218    Lab Status:  Preliminary result Specimen:  Blood from Arm, Right Updated:  11/16/19 1246     Blood Culture No growth at 4 days    Blood Culture - Blood, Arm, Left [[id number]] Collected:  11/12/19 0745    Lab Status:  Preliminary result Specimen:  Blood from Arm, Left Updated:  11/16/19 1016     Blood Culture No growth at 4 days    Blood Culture - Blood, Arm, Right [[id number]] Collected:  11/12/19 0740    Lab Status:  Preliminary result Specimen:  Blood from Arm, Right Updated:  11/16/19 1016     Blood Culture No growth at 4 days    S. Pneumo Ag Urine or CSF - Urine, Urine, Clean Catch [[id number]]  (Normal) Collected:  11/15/19 1459    Lab Status:  Final result Specimen:  Urine, Clean Catch Updated:  11/16/19 0042     Strep Pneumo Ag Negative    Legionella Antigen, Urine - Urine, Urine, Clean Catch [[id number]]  (Normal) Collected:  11/15/19 1459    Lab Status:  Final result Specimen:  Urine, Clean Catch Updated:  11/16/19 0042     LEGIONELLA ANTIGEN, URINE Negative    Urine Culture - Urine, Urine, Catheter [[id number]]  (Abnormal)  (Susceptibility) Collected:  11/12/19 0717    Lab Status:  Final result Specimen:  Urine, Catheter Updated:  11/14/19 0348     Urine Culture >100,000 CFU/mL Klebsiella  pneumoniae ssp pneumoniae    Susceptibility      Klebsiella pneumoniae ssp pneumoniae     MARY KAY     Ampicillin Resistant     Ampicillin + Sulbactam Susceptible     Cefazolin Susceptible     Cefepime Susceptible     Ceftazidime Susceptible     Ceftriaxone Susceptible     Gentamicin Susceptible     Levofloxacin Susceptible     Nitrofurantoin Intermediate     Piperacillin + Tazobactam Susceptible     Tetracycline Susceptible     Trimethoprim + Sulfamethoxazole Susceptible                    MRSA Screen, PCR - Swab, Nares [[id number]]  (Normal) Collected:  11/12/19 1403    Lab Status:  Final result Specimen:  Swab from Nares Updated:  11/12/19 1541     MRSA PCR Negative    Narrative:       MRSA Negative    Respiratory Panel, PCR - Swab, Nasopharynx [[id number]]  (Normal) Collected:  11/12/19 1022    Lab Status:  Final result Specimen:  Swab from Nasopharynx Updated:  11/12/19 1247     ADENOVIRUS, PCR Not Detected     Coronavirus 229E Not Detected     Coronavirus HKU1 Not Detected     Coronavirus NL63 Not Detected     Coronavirus OC43 Not Detected     Human Metapneumovirus Not Detected     Human Rhinovirus/Enterovirus Not Detected     Influenza B PCR Not Detected     Parainfluenza Virus 1 Not Detected     Parainfluenza Virus 2 Not Detected     Parainfluenza Virus 3 Not Detected     Parainfluenza Virus 4 Not Detected     Bordetella pertussis pcr Not Detected     Influenza A H1 2009 PCR Not Detected     Chlamydophila pneumoniae PCR Not Detected     Mycoplasma pneumo by PCR Not Detected     Influenza A PCR Not Detected     Influenza A H3 Not Detected     Influenza A H1 Not Detected     RSV, PCR Not Detected     Bordetella parapertussis PCR Not Detected          Imaging Results (Last 24 Hours)     Procedure Component Value Units Date/Time    CT Chest With Contrast [[id number]] Collected:  11/15/19 1325     Updated:  11/15/19 1903    Narrative:       EXAMINATION: CT CHEST WITH CONTRAST-11/15/2019:      INDICATION: Multifocal PNA;  A41.9-Sepsis, unspecified organism;  J18.9-Pneumonia, unspecified organism; N39.0-Urinary tract infection,  site not specified; A49.9-Bacterial infection, unspecified; J96.01-Acute  respiratory failure with hypoxia; Z74.09-Other reduced mobility.     TECHNIQUE: CT scan of the chest was performed without contrast.     The radiation dose reduction device was turned on for each scan per the  ALARA (As Low as Reasonably Achievable) protocol.     COMPARISON: 05/09/2014.     FINDINGS: There is no axillary lymphadenopathy. There is mild  mediastinal lymphadenopathy.  There is no pericardial effusion. Images  displayed at lung window settings demonstrate widespread consolidation  and fibrotic change in the upper lobes, right greater than left. There  is also similar disease in the lingula with lesser and bibasilar disease  and with moderately large pleural effusions, right greater than left.  All of the aforementioned abnormalities are new when compared with  05/09/2014.       Impression:       There is widespread disease with some areas of consolidation  and fibrotic change in the right upper lobe, left upper lobe and  lingula. To a lesser extent there is bibasilar disease which is somewhat  obscured by sizable bilateral pleural effusions, right greater than  left. All of the aforementioned findings are new when compared with  05/09/2014.     D:  11/15/2019  E:  11/15/2019           This report was finalized on 11/15/2019 7:00 PM by Dr. Maikol Poe MD.                  I have reviewed the medications:  Scheduled Meds:    aspirin  mg Oral Daily   azithromycin 500 mg Intravenous Q24H   bethanechol 10 mg Oral 4x Daily   busPIRone 10 mg Oral BID   donepezil 10 mg Oral Nightly   gabapentin 300 mg Oral BID   guaiFENesin 600 mg Oral Q12H   heparin (porcine) 5,000 Units Subcutaneous Q12H   ipratropium-albuterol 3 mL Nebulization 4x Daily - RT   levothyroxine 175 mcg Oral Daily   memantine 10 mg Oral BID    piperacillin-tazobactam 3.375 g Intravenous Q8H   sertraline 50 mg Oral Daily   sodium chloride 10 mL Intravenous Q12H   sucralfate 1 g Oral Q6H     Continuous Infusions:   PRN Meds:.•  acetaminophen **OR** acetaminophen **OR** acetaminophen  •  benzonatate  •  calcium carbonate EX  •  diphenoxylate-atropine  •  docusate sodium  •  guaiFENesin-dextromethorphan  •  magnesium sulfate **OR** magnesium sulfate in D5W 1g/100mL (PREMIX) **OR** magnesium sulfate  •  ondansetron  •  potassium chloride **OR** potassium chloride **OR** potassium chloride  •  sodium chloride  •  sodium chloride  •  traMADol      Assessment/Plan   Assessment / Plan     Active Hospital Problems    Diagnosis  POA   • Hypoxia [R09.02]  Unknown   • Multifocal pneumonia [J18.9]  Unknown   • Elevated lactic acid level [R79.89]  Unknown   • Leukocytosis [D72.829]  Unknown   • Sepsis (CMS/HCC) [A41.9]  Unknown   • Abnormal urinalysis [R82.90]  Unknown   • Acute respiratory failure with hypoxia (CMS/HCC) [J96.01]  Unknown   • Hypothyroidism [E03.9]  Yes   • Hyperlipidemia [E78.5]  Yes   • Benign essential hypertension [I10]  Yes   • Multi-infarct dementia (CMS/HCC) [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        Brief Hospital Course to date:  Temi Jarrett is a 89 year old female with history of HTN, dementia, recent hip fracture who presented with cough and SOA found to have multifocal PNA and significant hypoxia.      Acute hypoxic resp failure  Multifocal PNA   Sepsis related to PNA   - Febrile, leukocytosis on admission-- chest xray reviewed with multifocal PNA --CT 11/15 with multifocal PNA and b/l pleural effusions. D/w patient and recommended thoracentesis via IR. Also d/w son Victor M per patient request. They wish to await pulmonary opinion prior to thoracentesis.  - BCx pending; resp PCR negative   - Strep and legionella pending   - Continues zosyn/azithro- vanc  deescalated as MRSA PCR negative  - O2 support; duo-nebs; IS and flutter   - added robittusin for cough      Elevated lactate, resolved  - resolved with IVF      Klebsiella UTI  - No symptoms; history of prolapse  - UCx with >100k GNB, now speciated to Klebsiella, suspectibilities reviewed, continue zosyn as above    Fall, finger laceration  - continue steri strips  - monitor hip pain- improved  - fall precautions     Hypokalemia   - sliding scale replacement; monitor , labs today pending     HTN   - holding home HCTZ in setting of sepsis , remains normotensive      Hypothyroid   - continue home synthroid      Dementia/mood  - Continue home zoloft, buspar, aricept and namenda      Neuropathy  - Cont home gabapentin      History of pernicious anemia   - blood counts stable      DVT prophylaxis:  Heparin     Disposition: I expect the patient to be discharged pending further improvement    CODE STATUS:   Code Status and Medical Interventions:   Ordered at: 11/12/19 1002     Level Of Support Discussed With:    Patient     Code Status:    CPR     Medical Interventions (Level of Support Prior to Arrest):    Full         Electronically signed by Fadia Kingston MD, 11/16/19, 12:54 PM.       25-Feb-2017

## 2019-11-16 NOTE — PLAN OF CARE
Problem: Patient Care Overview  Goal: Plan of Care Review  Outcome: Ongoing (interventions implemented as appropriate)   11/16/19 0551   Coping/Psychosocial   Plan of Care Reviewed With patient   Plan of Care Review   Progress improving   OTHER   Outcome Summary VSS. Denies pain/ discomfort. Using call bell for assist as needed. Safety maintained. Will continue to monitor.       Problem: Fall Risk (Adult)  Goal: Absence of Fall  Outcome: Ongoing (interventions implemented as appropriate)      Problem: Skin Injury Risk (Adult)  Goal: Skin Health and Integrity  Outcome: Ongoing (interventions implemented as appropriate)      Problem: Sepsis/Septic Shock (Adult)  Goal: Signs and Symptoms of Listed Potential Problems Will be Absent, Minimized or Managed (Sepsis/Septic Shock)  Outcome: Ongoing (interventions implemented as appropriate)      Problem: Pneumonia (Adult)  Goal: Signs and Symptoms of Listed Potential Problems Will be Absent, Minimized or Managed (Pneumonia)  Outcome: Ongoing (interventions implemented as appropriate)

## 2019-11-16 NOTE — PLAN OF CARE
Problem: Patient Care Overview  Goal: Plan of Care Review  Outcome: Ongoing (interventions implemented as appropriate)  Pts VSS, has denied pain and SOA.  Was on 6 liters O2, decreased to 5L at 1300.  Pt continues with non-productive cough, PRN medications given.  Patient A+Ox4 with periods of confusion.  Patient has been up in bedside chair this am and currently in bed.  Alarms in place for patient safety, has not attempted to get up without calling for assistance.  Rests on a waffle mattress and changes position every one-two hours.  IV antibiotics given per MD orders.      11/16/19 1304   Coping/Psychosocial   Plan of Care Reviewed With patient   Plan of Care Review   Progress no change     Goal: Discharge Needs Assessment  Outcome: Ongoing (interventions implemented as appropriate)      Problem: Fall Risk (Adult)  Goal: Absence of Fall  Outcome: Ongoing (interventions implemented as appropriate)      Problem: Skin Injury Risk (Adult)  Goal: Skin Health and Integrity  Outcome: Ongoing (interventions implemented as appropriate)      Problem: Sepsis/Septic Shock (Adult)  Goal: Signs and Symptoms of Listed Potential Problems Will be Absent, Minimized or Managed (Sepsis/Septic Shock)  Outcome: Ongoing (interventions implemented as appropriate)      Problem: Pneumonia (Adult)  Goal: Signs and Symptoms of Listed Potential Problems Will be Absent, Minimized or Managed (Pneumonia)  Outcome: Ongoing (interventions implemented as appropriate)

## 2019-11-17 ENCOUNTER — APPOINTMENT (OUTPATIENT)
Dept: GENERAL RADIOLOGY | Facility: HOSPITAL | Age: 84
End: 2019-11-17

## 2019-11-17 PROBLEM — J95.811 POSTPROCEDURAL PNEUMOTHORAX: Status: ACTIVE | Noted: 2019-11-17

## 2019-11-17 LAB
ANION GAP SERPL CALCULATED.3IONS-SCNC: 9 MMOL/L (ref 5–15)
BACTERIA SPEC AEROBE CULT: NORMAL
BASOPHILS # BLD AUTO: 0.08 10*3/MM3 (ref 0–0.2)
BASOPHILS NFR BLD AUTO: 0.7 % (ref 0–1.5)
BUN BLD-MCNC: 15 MG/DL (ref 8–23)
BUN/CREAT SERPL: 25.9 (ref 7–25)
CALCIUM SPEC-SCNC: 8.1 MG/DL (ref 8.6–10.5)
CHLORIDE SERPL-SCNC: 104 MMOL/L (ref 98–107)
CO2 SERPL-SCNC: 29 MMOL/L (ref 22–29)
CREAT BLD-MCNC: 0.58 MG/DL (ref 0.57–1)
DEPRECATED RDW RBC AUTO: 44.3 FL (ref 37–54)
EOSINOPHIL # BLD AUTO: 0.25 10*3/MM3 (ref 0–0.4)
EOSINOPHIL NFR BLD AUTO: 2.3 % (ref 0.3–6.2)
ERYTHROCYTE [DISTWIDTH] IN BLOOD BY AUTOMATED COUNT: 14 % (ref 12.3–15.4)
GFR SERPL CREATININE-BSD FRML MDRD: 98 ML/MIN/1.73
GLUCOSE BLD-MCNC: 102 MG/DL (ref 65–99)
HCT VFR BLD AUTO: 33.6 % (ref 34–46.6)
HGB BLD-MCNC: 9.9 G/DL (ref 12–15.9)
IMM GRANULOCYTES # BLD AUTO: 0.1 10*3/MM3 (ref 0–0.05)
IMM GRANULOCYTES NFR BLD AUTO: 0.9 % (ref 0–0.5)
LDH FLD-CCNC: 187 U/L
LDH SERPL-CCNC: 204 U/L (ref 135–214)
LYMPHOCYTES # BLD AUTO: 1.21 10*3/MM3 (ref 0.7–3.1)
LYMPHOCYTES NFR BLD AUTO: 11.3 % (ref 19.6–45.3)
LYMPHOCYTES NFR FLD MANUAL: 7 %
MCH RBC QN AUTO: 25.8 PG (ref 26.6–33)
MCHC RBC AUTO-ENTMCNC: 29.5 G/DL (ref 31.5–35.7)
MCV RBC AUTO: 87.5 FL (ref 79–97)
MONOCYTES # BLD AUTO: 0.97 10*3/MM3 (ref 0.1–0.9)
MONOCYTES NFR BLD AUTO: 9.1 % (ref 5–12)
MONOCYTES NFR FLD: 2 %
NEUTROPHILS # BLD AUTO: 8.07 10*3/MM3 (ref 1.7–7)
NEUTROPHILS NFR BLD AUTO: 75.7 % (ref 42.7–76)
NEUTROPHILS NFR FLD MANUAL: 91 %
NRBC BLD AUTO-RTO: 0 /100 WBC (ref 0–0.2)
PLATELET # BLD AUTO: 297 10*3/MM3 (ref 140–450)
PMV BLD AUTO: 9.4 FL (ref 6–12)
POTASSIUM BLD-SCNC: 4.3 MMOL/L (ref 3.5–5.2)
PROCALCITONIN SERPL-MCNC: 0.64 NG/ML (ref 0.1–0.25)
PROT FLD-MCNC: 2.1 G/DL
RBC # BLD AUTO: 3.84 10*6/MM3 (ref 3.77–5.28)
SODIUM BLD-SCNC: 142 MMOL/L (ref 136–145)
WBC NRBC COR # BLD: 10.68 10*3/MM3 (ref 3.4–10.8)

## 2019-11-17 PROCEDURE — 97530 THERAPEUTIC ACTIVITIES: CPT

## 2019-11-17 PROCEDURE — 25010000002 AZITHROMYCIN PER 500 MG: Performed by: INTERNAL MEDICINE

## 2019-11-17 PROCEDURE — 92610 EVALUATE SWALLOWING FUNCTION: CPT

## 2019-11-17 PROCEDURE — 84145 PROCALCITONIN (PCT): CPT

## 2019-11-17 PROCEDURE — 25010000002 HEPARIN (PORCINE) PER 1000 UNITS: Performed by: INTERNAL MEDICINE

## 2019-11-17 PROCEDURE — 25010000002 PIPERACILLIN SOD-TAZOBACTAM PER 1 G: Performed by: INTERNAL MEDICINE

## 2019-11-17 PROCEDURE — 84157 ASSAY OF PROTEIN OTHER: CPT | Performed by: INTERNAL MEDICINE

## 2019-11-17 PROCEDURE — 83615 LACTATE (LD) (LDH) ENZYME: CPT | Performed by: INTERNAL MEDICINE

## 2019-11-17 PROCEDURE — 80048 BASIC METABOLIC PNL TOTAL CA: CPT | Performed by: INTERNAL MEDICINE

## 2019-11-17 PROCEDURE — 85025 COMPLETE CBC W/AUTO DIFF WBC: CPT | Performed by: INTERNAL MEDICINE

## 2019-11-17 PROCEDURE — 99232 SBSQ HOSP IP/OBS MODERATE 35: CPT | Performed by: INTERNAL MEDICINE

## 2019-11-17 PROCEDURE — 97535 SELF CARE MNGMENT TRAINING: CPT

## 2019-11-17 PROCEDURE — 25010000002 KETOROLAC TROMETHAMINE PER 15 MG: Performed by: NURSE PRACTITIONER

## 2019-11-17 PROCEDURE — 71045 X-RAY EXAM CHEST 1 VIEW: CPT

## 2019-11-17 RX ORDER — KETOROLAC TROMETHAMINE 30 MG/ML
15 INJECTION, SOLUTION INTRAMUSCULAR; INTRAVENOUS ONCE
Status: COMPLETED | OUTPATIENT
Start: 2019-11-17 | End: 2019-11-17

## 2019-11-17 RX ORDER — SUCRALFATE 1 G/1
1 TABLET ORAL
Status: DISCONTINUED | OUTPATIENT
Start: 2019-11-17 | End: 2019-11-21 | Stop reason: HOSPADM

## 2019-11-17 RX ORDER — LIDOCAINE 50 MG/G
1 PATCH TOPICAL
Status: DISCONTINUED | OUTPATIENT
Start: 2019-11-17 | End: 2019-11-21 | Stop reason: HOSPADM

## 2019-11-17 RX ORDER — IPRATROPIUM BROMIDE AND ALBUTEROL SULFATE 2.5; .5 MG/3ML; MG/3ML
3 SOLUTION RESPIRATORY (INHALATION) 4 TIMES DAILY PRN
Status: DISCONTINUED | OUTPATIENT
Start: 2019-11-17 | End: 2019-11-21 | Stop reason: HOSPADM

## 2019-11-17 RX ADMIN — SUCRALFATE 1 G: 1 TABLET ORAL at 20:35

## 2019-11-17 RX ADMIN — ASPIRIN 325 MG: 325 TABLET, DELAYED RELEASE ORAL at 08:34

## 2019-11-17 RX ADMIN — KETOROLAC TROMETHAMINE 15 MG: 30 INJECTION, SOLUTION INTRAMUSCULAR; INTRAVENOUS at 00:42

## 2019-11-17 RX ADMIN — TAZOBACTAM SODIUM AND PIPERACILLIN SODIUM 3.38 G: 375; 3 INJECTION, SOLUTION INTRAVENOUS at 21:19

## 2019-11-17 RX ADMIN — BETHANECHOL CHLORIDE 10 MG: 10 TABLET ORAL at 11:00

## 2019-11-17 RX ADMIN — TAZOBACTAM SODIUM AND PIPERACILLIN SODIUM 3.38 G: 375; 3 INJECTION, SOLUTION INTRAVENOUS at 13:29

## 2019-11-17 RX ADMIN — BETHANECHOL CHLORIDE 10 MG: 10 TABLET ORAL at 08:34

## 2019-11-17 RX ADMIN — LIDOCAINE 1 PATCH: 50 PATCH TOPICAL at 10:59

## 2019-11-17 RX ADMIN — BUSPIRONE HYDROCHLORIDE 10 MG: 10 TABLET ORAL at 08:35

## 2019-11-17 RX ADMIN — AZITHROMYCIN MONOHYDRATE 500 MG: 500 INJECTION, POWDER, LYOPHILIZED, FOR SOLUTION INTRAVENOUS at 18:05

## 2019-11-17 RX ADMIN — SUCRALFATE 1 G: 1 TABLET ORAL at 11:05

## 2019-11-17 RX ADMIN — SODIUM CHLORIDE, PRESERVATIVE FREE 10 ML: 5 INJECTION INTRAVENOUS at 08:36

## 2019-11-17 RX ADMIN — GUAIFENESIN 600 MG: 600 TABLET, EXTENDED RELEASE ORAL at 08:35

## 2019-11-17 RX ADMIN — MEMANTINE 10 MG: 10 TABLET ORAL at 20:35

## 2019-11-17 RX ADMIN — DONEPEZIL HYDROCHLORIDE 10 MG: 10 TABLET, FILM COATED ORAL at 20:35

## 2019-11-17 RX ADMIN — SUCRALFATE 1 G: 1 TABLET ORAL at 18:06

## 2019-11-17 RX ADMIN — SODIUM CHLORIDE, PRESERVATIVE FREE 10 ML: 5 INJECTION INTRAVENOUS at 20:34

## 2019-11-17 RX ADMIN — TAZOBACTAM SODIUM AND PIPERACILLIN SODIUM 3.38 G: 375; 3 INJECTION, SOLUTION INTRAVENOUS at 06:12

## 2019-11-17 RX ADMIN — ACETAMINOPHEN 650 MG: 325 TABLET ORAL at 20:37

## 2019-11-17 RX ADMIN — MEMANTINE 10 MG: 10 TABLET ORAL at 08:34

## 2019-11-17 RX ADMIN — BETHANECHOL CHLORIDE 10 MG: 10 TABLET ORAL at 18:05

## 2019-11-17 RX ADMIN — SERTRALINE HYDROCHLORIDE 50 MG: 50 TABLET ORAL at 08:34

## 2019-11-17 RX ADMIN — BETHANECHOL CHLORIDE 10 MG: 10 TABLET ORAL at 20:35

## 2019-11-17 RX ADMIN — HEPARIN SODIUM 5000 UNITS: 5000 INJECTION, SOLUTION INTRAVENOUS; SUBCUTANEOUS at 08:34

## 2019-11-17 RX ADMIN — BUSPIRONE HYDROCHLORIDE 10 MG: 10 TABLET ORAL at 20:35

## 2019-11-17 RX ADMIN — LEVOTHYROXINE SODIUM 175 MCG: 25 TABLET ORAL at 08:34

## 2019-11-17 RX ADMIN — HEPARIN SODIUM 5000 UNITS: 5000 INJECTION, SOLUTION INTRAVENOUS; SUBCUTANEOUS at 20:34

## 2019-11-17 RX ADMIN — SUCRALFATE 1 G: 1 TABLET ORAL at 06:12

## 2019-11-17 RX ADMIN — GUAIFENESIN 600 MG: 600 TABLET, EXTENDED RELEASE ORAL at 20:35

## 2019-11-17 RX ADMIN — GABAPENTIN 300 MG: 300 CAPSULE ORAL at 08:35

## 2019-11-17 RX ADMIN — GABAPENTIN 300 MG: 300 CAPSULE ORAL at 20:35

## 2019-11-17 NOTE — THERAPY TREATMENT NOTE
Acute Care - Occupational Therapy Treatment Note  [facility]     Patient Name: Temi Jarrett  : 1930  MRN: [id number]  Today's Date: 2019             Admit Date: 2019       ICD-10-CM ICD-9-CM   1. Sepsis without acute organ dysfunction, due to unspecified organism (CMS/Formerly [facility]) A41.9 038.9     995.91   2. Pneumonia of both lungs due to infectious organism, unspecified part of lung J18.9 483.8   3. Bacterial UTI N39.0 599.0    A49.9 041.9   4. Acute respiratory failure with hypoxia (CMS/Formerly [facility]) J96.01 518.81   5. Impaired mobility and ADLs Z74.09 799.89   6. Pleural effusion - bilateral, right greater than left parapneumonic pleural effusions s/p right thoracentesis 19.  J90 511.9     Patient Active Problem List   Diagnosis   • Skin tear of lower leg without complication, right, initial encounter   • Esophageal reflux   • Hypothyroidism   • Generalized anxiety disorder   • Hyperlipidemia   • Multi-infarct dementia (CMS/Formerly [facility])   • Peripheral neuropathy   • Carpal tunnel syndrome of right wrist   • Spinal stenosis of lumbar region   • Osteoporosis   • Fever   • Urinary frequency   • Peripheral venous insufficiency   • Disc disorder of lumbar region   • Bladder prolapse, female, acquired   • Acute right-sided low back pain without sciatica   • Pain, knee   • Benign essential hypertension   • Pernicious anemia   • Memory loss   • Annual physical exam   • Primary osteoarthritis of both knees   • Chest pain, atypical   • Hiatal hernia with intrathoracic stomach   • Closed fracture of neck of left femur (CMS/Formerly [facility])   • Hypoxia   • Multifocal pneumonia   • Elevated lactic acid level   • Leukocytosis   • Sepsis (CMS/Formerly [facility])   • Abnormal urinalysis   • Acute respiratory failure with hypoxia (CMS/Formerly [facility])   • Pleural effusion - bilateral, right greater than left parapneumonic pleural effusions s/p right thoracentesis 19.    • Postprocedural pneumothorax     Past Medical History:   Diagnosis Date   • Disease of  thyroid gland    • Gastric polyp    • History of colonic polyps    • Hypertension    • IBS (irritable bowel syndrome)    • Macular degeneration    • Torn meniscus     right knee      Past Surgical History:   Procedure Laterality Date   • CHOLECYSTECTOMY  1997   • EYE SURGERY  1998    Cataract extraction   • HERNIA REPAIR     • HIP HEMIARTHROPLASTY Left 9/28/2019    Procedure: HIP HEMIARTHROPLASTY LEFT;  Surgeon: Reddy Segundo Jr., MD;  Location: [facility];  Service: Orthopedics   • HYSTERECTOMY  1976   • KNEE SURGERY Right     arthroscopy- 2000   • TONSILLECTOMY         Therapy Treatment    Rehabilitation Treatment Summary     Row Name 11/17/19 1418             Treatment Time/Intention    Discipline  occupational therapist  -JR      Document Type  therapy note (daily note)  -JR      Subjective Information  complains of;pain  -JR      Mode of Treatment  occupational therapy  -JR      Care Plan Review  risks/benefits reviewed;patient/other agree to care plan  -JR      Patient Effort  adequate  -JR      Existing Precautions/Restrictions  fall;oxygen therapy device and L/min R chest tube  -JR      Recorded by [JR] Tiffany Lord OT 11/17/19 1455      Row Name 11/17/19 1418             Vital Signs    Pre Systolic BP Rehab  125  -JR      Pre Treatment Diastolic BP  73  -JR      Post Systolic BP Rehab  128  -JR      Post Treatment Diastolic BP  77  -JR      Pretreatment Heart Rate (beats/min)  108  -JR      Posttreatment Heart Rate (beats/min)  108  -JR      Pre SpO2 (%)  95  -JR      O2 Delivery Pre Treatment  supplemental O2 7L  -JR      Post SpO2 (%)  93  -JR      O2 Delivery Post Treatment  supplemental O2  -JR      Pre Patient Position  Sitting  -JR      Intra Patient Position  Standing  -JR      Post Patient Position  Supine  -JR      Recorded by [JR] Tiffany Lrod OT 11/17/19 1455      Row Name 11/17/19 1418             Cognitive Assessment/Intervention- PT/OT    Orientation Status (Cognition)  oriented  x 3  -JR      Follows Commands (Cognition)  over 90% accuracy  -JR      Safety Deficit (Cognitive)  mild deficit;awareness of need for assistance;insight into deficits/self awareness  -JR      Recorded by [JR] Tiffany Lord OT 11/17/19 1455      Row Name 11/17/19 1418             Bed Mobility Assessment/Treatment    Bed Mobility Assessment/Treatment  sit-supine  -JR      Sit-Supine [place] (Bed Mobility)  moderate assist (50% patient effort);verbal cues  -JR      Bed Mobility, Safety Issues  cognitive deficits limit understanding;decreased use of arms for pushing/pulling;decreased use of legs for bridging/pushing  -JR      Assistive Device (Bed Mobility)  draw sheet  -JR      Recorded by [JR] Tiffany Lord OT 11/17/19 1455      Row Name 11/17/19 1418             Functional Mobility    Functional Mobility- Ind. Level  contact guard assist;verbal cues required  -JR      Functional Mobility- Device  rolling walker  -JR      Functional Mobility-Distance (Feet)  -- chair to bed  -JR      Functional Mobility- Safety Issues  step length decreased;supplemental O2  -JR      Recorded by [JR] Tiffany Lord OT 11/17/19 1455      Row Name 11/17/19 1418             Sit-Stand Transfer    Sit-Stand [place] (Transfers)  contact guard;verbal cues  -JR      Assistive Device (Sit-Stand Transfers)  walker, front-wheeled  -JR      Recorded by [JR] Tiffany Lord OT 11/17/19 1455      Row Name 11/17/19 1418             Stand-Sit Transfer    Stand-Sit [place] (Transfers)  contact guard;verbal cues  -JR      Assistive Device (Stand-Sit Transfers)  walker, front-wheeled  -JR      Recorded by [JR] Tiffany Lord OT 11/17/19 1455      Row Name 11/17/19 1418             ADL Assessment/Intervention    66836 - OT Self Care/Mgmt Minutes  13  -JR      Recorded by [JR] Tiffany Lord OT 11/17/19 1455      Row Name 11/17/19 1418             Grooming Assessment/Training    [place]he Level (Grooming)  hair  care, combing/brushing;set up  -JR      Grooming Position  supported sitting  -JR      Recorded by [JR] Tiffany Lord OT 11/17/19 1455      Row Name 11/17/19 1418             Motor Skills Assessment/Interventions    Additional Documentation  Therapeutic Exercise (Group);Therapeutic Exercise Interventions (Group)  -JR      Recorded by [JR] Tiffany Lord OT 11/17/19 1455      Row Name 11/17/19 1418             Therapeutic Exercise    80527 - OT Therapeutic Activity Minutes  10  -JR      Recorded by [JR] Tiffany Lord OT 11/17/19 1457      Row Name 11/17/19 1418             Therapeutic Exercise    Upper Extremity Range of Motion (Therapeutic Exercise)  shoulder flexion/extension, bilateral;elbow flexion/extension, bilateral;forearm supination/pronation, bilateral;shoulder abduction/adduction, bilateral  -JR      Exercise Type (Therapeutic Exercise)  AROM (active range of motion)  -JR      Position (Therapeutic Exercise)  seated  -JR      Sets/Reps (Therapeutic Exercise)  1/10  -JR      Recorded by [JR] Tiffany Lord OT 11/17/19 1455      Row Name 11/17/19 1418             Static Standing Balance    Level of [place] (Supported Standing, Static Balance)  standby assist  -JR      Recorded by [JR] Tiffany Lord OT 11/17/19 1455      Row Name 11/17/19 1418             Dynamic Standing Balance    Level of [place], Reaches Outside Midline (Standing, Dynamic Balance)  contact guard assist  -JR      Assistive Device Utilized (Supported Standing, Dynamic Balance)  walker, rolling  -JR      Recorded by [JR] Tiffany Lord OT 11/17/19 1458      Row Name 11/17/19 1418             Positioning and Restraints    Pre-Treatment Position  sitting in chair/recliner  -JR      Post Treatment Position  bed  -JR      In Bed  notified nsg;supine;call light within reach;encouraged to call for assist;exit alarm on  -JR      Recorded by [JR] Tiffany Lord OT 11/17/19 1455      Row Name 11/17/19 1418              Pain Scale: Numbers Pre/Post-Treatment    Pain Scale: Numbers, Pretreatment  7/10  -JR      Pain Scale: Numbers, Post-Treatment  5/10  -JR      Recorded by [JR] Tiffany Lord OT 11/17/19 1455      Row Name 11/17/19 1418             Plan of Care Review    Plan of Care Reviewed With  patient  -JR      Recorded by [JR] Tiffany Lord OT 11/17/19 1455      Row Name 11/17/19 1418             Outcome Summary/Treatment Plan (OT)    Daily Summary of Progress (OT)  progress toward functional goals is good  -JR      Barriers to Overall Progress (OT)  medicall.y complex  -JR      Plan for Continued Treatment (OT)  Continue OT per POC  -JR      Anticipated Discharge Disposition (OT)  skilled nursing facility  -JR      Recorded by [JR] Tiffany Lord OT 11/17/19 1455        User Key  (r) = Recorded By, (t) = Taken By, (c) = Cosigned By    Initials Name Effective Dates Discipline    JR Tiffany Lord OT 06/22/15 -  OT             Occupational Therapy Education     Title: PT OT SLP Therapies (In Progress)     Topic: Occupational Therapy (In Progress)     Point: ADL training (In Progress)     Description: Instruct learner(s) on proper safety adaptation and remediation techniques during self care or transfers.   Instruct in proper use of assistive devices.    Learning Progress Summary           Patient Acceptance, E, NR by  at 11/17/2019  2:11 PM    Comment:  Educated pt regarding therex, THP    Acceptance, E, VU,NR by SANDEEP at 11/13/2019 11:00 AM    Comment:  reason for consult, noted deficits, role OT, transfer safety.                   Point: Home exercise program (In Progress)     Description: Instruct learner(s) on appropriate technique for monitoring, assisting and/or progressing therapeutic exercises/activities.    Learning Progress Summary           Patient Acceptance, E, NR by  at 11/17/2019  2:11 PM    Comment:  Educated pt regarding therex, THP                   Point: Precautions (In Progress)      Description: Instruct learner(s) on prescribed precautions during self-care and functional transfers.    Learning Progress Summary           Patient Acceptance, E, NR by  at 11/17/2019  2:11 PM    Comment:  Educated pt regarding therex, THP    Acceptance, E, VU,NR by SANDEEP at 11/13/2019 11:00 AM    Comment:  reason for consult, noted deficits, role OT, transfer safety.                               User Key     Initials Effective Dates Name Provider Type Discipline    SANDEEP 06/08/18 -  Didi Tao, OT Occupational Therapist OT     06/22/15 -  Tiffnay Lord OT Occupational Therapist OT                OT Recommendation and Plan  Outcome Summary/Treatment Plan (OT)  Daily Summary of Progress (OT): progress toward functional goals is good  Barriers to Overall Progress (OT): medicall.y complex  Plan for Continued Treatment (OT): Continue OT per POC  Anticipated Discharge Disposition (OT): skilled nursing facility  Daily Summary of Progress (OT): progress toward functional goals is good  Plan of Care Review  Plan of Care Reviewed With: patient  Plan of Care Reviewed With: patient  Outcome Summary: Pt with increased pain and SOB limiting mobility this date. Recommend continued skilled OT services and SNF at d/c.  Outcome Measures     Row Name 11/17/19 1418             How much help from another is currently needed...    Putting on and taking off regular lower body clothing?  1  -JR      Bathing (including washing, rinsing, and drying)  2  -JR      Toileting (which includes using toilet bed pan or urinal)  1  -JR      Putting on and taking off regular upper body clothing  2  -JR      Taking care of personal grooming (such as brushing teeth)  3  -JR      Eating meals  3  -JR      AM-PAC 6 Clicks Score (OT)  12  -JR         Functional Assessment    Outcome Measure Options  AM-PAC 6 Clicks Daily Activity (OT)  -JR        User Key  (r) = Recorded By, (t) = Taken By, (c) = Cosigned By    Initials Name Provider Type      Tiffany Lord OT Occupational Therapist           Time Calculation:   Time Calculation- OT     Row Name 11/17/19 1418             Time Calculation- OT    OT Start Time  1418  -      Total Timed Code Minutes- OT  23 minute(s)  -      OT Received On  11/17/19  -         Timed Charges    44349 - OT Therapeutic Activity Minutes  10  -      84952 - OT Self Care/Mgmt Minutes  13  -        User Key  (r) = Recorded By, (t) = Taken By, (c) = Cosigned By    Initials Name Provider Type     Tiffany Lord OT Occupational Therapist        Therapy Charges for Today     Code Description Service Date Service Provider Modifiers Qty    05642870238 HC OT THERAPEUTIC ACT EA 15 MIN 11/17/2019 Tiffany Lord OT GO 1    68819331046 HC OT SELF CARE/MGMT/TRAIN EA 15 MIN 11/17/2019 Tiffany Lord OT GO 1               Tiffany Lord OT  11/17/2019

## 2019-11-17 NOTE — CONSULTS
Pulmonary Hospital Consult Note     LOS: 4 days   Patient Care Team:  Eric Patel MD as PCP - General  Eric Patel MD as PCP - Family Medicine  Eric Patel MD as PCP - Claims Attributed    Chief Complaint:    Chief Complaint   Patient presents with   • Shortness of Breath     Subjective     HPI: 89 y.o. female with history of hiatal hernia, hypothyroidism, hypertension, hypothyroidism, recent left hip fracture status post left hip hemiarthroplasty by Dr. Segundo on 9/28/2019 who was admitted on 11/12/2019 for pneumonia to the hospitalist service.  Patient was initially febrile however has defervesced.  CT scan of the chest done on 11/15/2019 showed a moderate right and small left pleural effusion.  The hospitalist ordered a CT-guided thoracentesis however the patient's son was concerned about this and wanted a pulmonary consultation prior to the procedure.    The patient reports that she generally feels better since admission however did not have a great day today.  She feels a bit weaker today and was sweating a bit more.  She has not been up much.  No fevers or chills.  No nausea or vomiting.  She denies chest pain.  She does have dyspnea.  She does have a cough which has been nonproductive.  The patient does report occasional coughing spells at night but that they are infrequent.  She does have occasional reflux symptoms but denies recent symptoms.    History taken from: patient    Past Medical History:   Diagnosis Date   • Disease of thyroid gland    • Gastric polyp    • History of colonic polyps    • Hypertension    • IBS (irritable bowel syndrome)    • Macular degeneration    • Torn meniscus     right knee        Past Surgical History:   Procedure Laterality Date   • CHOLECYSTECTOMY  1997   • EYE SURGERY  1998    Cataract extraction   • HERNIA REPAIR     • HIP HEMIARTHROPLASTY Left 9/28/2019    Procedure: HIP HEMIARTHROPLASTY LEFT;  Surgeon: Reddy Segundo Jr., MD;   Location: [facility] OR;  Service: Orthopedics   • HYSTERECTOMY  1976   • KNEE SURGERY Right     arthroscopy- 2000   • TONSILLECTOMY         Family History   Problem Relation Age of Onset   • Hypertension Mother    • Breast cancer Mother    • Obesity Mother    • Hypertension Father    • Diabetes Son        Social History     Socioeconomic History   • Marital status:      Spouse name: Not on file   • Number of children: Not on file   • Years of education: Not on file   • Highest education level: Not on file   Tobacco Use   • Smoking status: Never Smoker   • Smokeless tobacco: Never Used   Substance and Sexual Activity   • Alcohol use: No   • Drug use: Defer   • Sexual activity: Defer   Social History Narrative    Lives with son and daughter in law--  is at [place] getting rehab       Allergies   Allergen Reactions   • Codeine Nausea Only     Trouble Breathing        PMH/FH/SocH were reviewed by me and updates were made.     Review of Systems:    All other systems were reviewed and are negative.  Exceptions are noted in subjective or below.      Objective     Vital Signs  Temp:  [97.7 °F (36.5 °C)-99.1 °F (37.3 °C)] 98.3 °F (36.8 °C)  Heart Rate:  [106-111] 110  Resp:  [16-20] 18  BP: (117-145)/(80-96) 138/96    Physical Exam:     Constitutional:    Alert, cooperative, in no acute distress   Head:    Normocephalic, without obvious abnormality, atraumatic   Eyes:            Lids and lashes normal, conjunctivae and sclerae normal, no   icterus, no pallor, corneas clear, PER   ENMT:   Ears appear intact with no abnormalities noted      No oral lesions, no thrush, oral mucosa moist   Neck:   No adenopathy, supple, trachea midline, no thyromegaly, no JVD       Lungs/Resp:     Normal effort, symmetric chest rise, no crepitus, mild rhonchi bilaterally with diminished breath sounds at bases bilaterally.  Dullness to percussion at bases.                  Heart/CV:    Regular rhythm and normal rate, normal S1 and S2,  no            murmur   Abdomen/GI:     Normal bowel sounds, no masses, no organomegaly, soft,        non-tender, non-distended, no guarding, no rebound                tenderness   :     Deferred   Extremities/MSK:   No clubbing, cyanosis or edema.  No deformities.    Pulses:   Pulses palpable and equal bilaterally   Skin:   No bleeding, bruising or rash   Hem/Lymph:   No cervical or supraclavicular adenopathy.    Neurologic:   Moves all extremities with no obvious focal motor deficit.  Cranial nerves 2 - 12 grossly intact             Psychiatric: Normal mood and affect, oriented x 3.      Results Review:     I reviewed the patient's new clinical results.   Results from last 7 days   Lab Units 11/16/19  1438 11/15/19  1644 11/15/19  0623 11/14/19  0534  11/12/19  0716   SODIUM mmol/L 141  --  139 139   < > 142   POTASSIUM mmol/L 4.7 4.4 3.6 3.8   < > 3.4*   CHLORIDE mmol/L 102  --  100 101   < > 98   CO2 mmol/L 29.0  --  28.0 28.0   < > 31.0*   BUN mg/dL 19  --  18 16   < > 17   CREATININE mg/dL 0.62  --  0.56* 0.54*   < > 0.63   CALCIUM mg/dL 8.4*  --  7.9* 7.7*   < > 8.5*   BILIRUBIN mg/dL  --   --   --   --   --  0.7   ALK PHOS U/L  --   --   --   --   --  75   ALT (SGPT) U/L  --   --   --   --   --  9   AST (SGOT) U/L  --   --   --   --   --  16   GLUCOSE mg/dL 101*  --  118* 98   < > 136*    < > = values in this interval not displayed.     Results from last 7 days   Lab Units 11/16/19  1438 11/15/19  1644 11/14/19  0534 11/13/19  0353 11/12/19  0716   WBC 10*3/mm3 13.96* 14.91* 10.98* 12.28* 14.05*   HEMOGLOBIN g/dL 11.4* 11.0* 9.8* 9.6* 12.1   HEMATOCRIT % 38.5 37.2 33.2* 31.2* 39.5   PLATELETS 10*3/mm3 384 358 237 228 267   MONOCYTES % %  --   --   --  4.0* 3.0*           I reviewed the patient's new imaging including images and reports.  CT chest 11/15/2019 was reviewed.  It shows bilateral airspace opacities slightly worse in the upper lobes with a moderate right and small left pleural effusion.  The  patient has a large hiatal hernia with herniation of the stomach into both the right and left chest cavities.  The radiologist impression follows:    IMPRESSION:  There is widespread disease with some areas of consolidation  and fibrotic change in the right upper lobe, left upper lobe and  lingula. To a lesser extent there is bibasilar disease which is somewhat  obscured by sizable bilateral pleural effusions, right greater than  left. All of the aforementioned findings are new when compared with 05/09/2014.    Medication Review:     aspirin  mg Oral Daily   azithromycin 500 mg Intravenous Q24H   bethanechol 10 mg Oral 4x Daily   busPIRone 10 mg Oral BID   donepezil 10 mg Oral Nightly   gabapentin 300 mg Oral BID   guaiFENesin 600 mg Oral Q12H   [START ON 11/17/2019] heparin (porcine) 5,000 Units Subcutaneous Q12H   ipratropium-albuterol 3 mL Nebulization 4x Daily - RT   levothyroxine 175 mcg Oral Daily   memantine 10 mg Oral BID   piperacillin-tazobactam 3.375 g Intravenous Q8H   sertraline 50 mg Oral Daily   sodium chloride 10 mL Intravenous Q12H   sucralfate 1 g Oral Q6H       hold 1 each       Assessment/Plan       Hypothyroidism    Hyperlipidemia    Multi-infarct dementia (CMS/HCC)    Benign essential hypertension    Pernicious anemia    Hypoxia    Multifocal pneumonia    Elevated lactic acid level    Leukocytosis    Sepsis (CMS/HCC)    Abnormal urinalysis    Acute respiratory failure with hypoxia (CMS/HCC)    89 y.o. female admitted for pneumonia who was found to have bilateral pleural effusions.  She also has a large hiatal hernia.  Given her large hiatal hernia as well as evidence of fluid and possible food in the dilated esophagus I do have concerns that the patient may actually have aspiration pneumonia.  She is certainly at high risk for reflux given her hiatal hernia with evidence of herniation of her stomach into her chest and dilation of her esophagus.    As far as the patient's pleural  effusions.  I do think that, given her slight increase in white blood cell count, and moderate size right pleural effusion, that a thoracentesis on the right would be worthwhile.  Leaving the fluid might lead to a situation where the fluid thickens and she ends up with fibrothorax and a trapped lung or empyema.  I discussed the risks and benefits with the patient as well as her son/POA by phone.  I told him that I could do the procedure by ultrasound at the bedside or the procedure could be done by interventional radiology by CT guidance but that that would not be done tonight and possibly would not be done until Monday.  After discussion, it was decided to go ahead and do a bedside ultrasound-guided thoracentesis.  Additionally, MRSA swab from nares was negative.    Plan:  1.  Continue current antibiotics, Zosyn and azithromycin.  2.  Ultrasound guided thoracentesis at bedside this evening.  3.  Plan to send fluid for usual analysis.  4.  Recommend consideration for evaluation of GI tract, possibly upper GI series with small bowel follow-through and/or speech evaluation.  Defer to primary/consultants.  5.  Pulmonary consults will follow.      Keith Perry MD  11/16/19  10:55 PM

## 2019-11-17 NOTE — PROGRESS NOTES
Pulmonary/Critical Care Follow-up     LOS: 5 days   Patient Care Team:  Eric Patel MD as PCP - General  Eric Patel MD as PCP - Family Medicine  Eric Patel MD as PCP - Claims Attributed        Chief Complaint   Patient presents with   • Shortness of Breath     Subjective      HPI: 89 y.o. female with history of hiatal hernia, hypothyroidism, hypertension, hypothyroidism, recent left hip fracture status post left hip hemiarthroplasty by Dr. Segundo on 9/28/2019 who was admitted on 11/12/2019 for pneumonia to the hospitalist service.  Patient was initially febrile however has defervesced.  CT scan of the chest done on 11/15/2019 showed a moderate right and small left pleural effusion.  The hospitalist ordered a CT-guided thoracentesis however the patient's son was concerned about this and wanted a pulmonary consultation prior to the procedure.     The patient reports that she generally feels better since admission however did not have a great day today.  She feels a bit weaker today and was sweating a bit more.  She has not been up much.  No fevers or chills.  No nausea or vomiting.  She denies chest pain.  She does have dyspnea.  She does have a cough which has been nonproductive.  The patient does report occasional coughing spells at night but that they are infrequent.  She does have occasional reflux symptoms but denies recent symptoms.    Interval History:   Patient had some pain at chest tube site overnight and was given Toradol per nursing.  She was drowsy after that.  She is arousable this morning.  She denies pain or dyspnea currently.  Oxygen saturations are 95% on supplemental oxygen.  No fevers or chills overnight.  No nausea or vomiting.    Patient had 100 mL of serous fluid from chest tube overnight.  No current air leak.    History taken from: Patient.    PMH/FH/Social History were reviewed and updated appropriately in the electronic medical record.     Review of  Systems:    Review of 14 systems was completed with positives and pertinent negatives noted in the subjective section.  All other systems reviewed and are negative.         Objective     Vital Signs  Temp:  [98.3 °F (36.8 °C)-98.9 °F (37.2 °C)] 98.3 °F (36.8 °C)  Heart Rate:  [105-111] 105  Resp:  [18] 18  BP: (124-139)/(82-96) 135/96  11/16 0701 - 11/17 0700  In: 350   Out: 675 [Urine:175]  Body mass index is 24.43 kg/m².     Physical Exam:     Constitutional:   Alert, cooperative, in no acute distress   Head:   Normocephalic, without obvious abnormality, atraumatic   Eyes:           Lids and lashes normal, conjunctivae and sclerae normal.  No icterus, no pallor, corneas clear, PER   ENMT:  Ears appear intact with no abnormalities noted     No oral lesions, no thrush, oral mucosa moist   Neck:  No adenopathy, supple, trachea midline, no thyromegaly, no JVD   Lungs/Resp:    Normal effort, symmetric chest rise, no crepitus, right side small bore chest tube in place without air leak, mild rhonchi on left with diminished breath sounds at left base              Heart/CV:   Regular rhythm and normal rate, normal S1 and S2, no            murmur   Abdomen/GI:    Normal bowel sounds, no masses, no organomegaly, soft        non-tender, non-distended   :    Deferred   Extremities/MSK:  No clubbing or cyanosis.  No edema.  Normal tone.    No deformities.    Pulses:  Pulses palpable and equal bilaterally   Skin:  No bleeding, bruising or rash   Heme/Lymph:  No cervical or supraclavicular adenopathy.   Neurologic:    Psychiatric:    Moves all extremities with no obvious focal motor deficit.  Cranial nerves 2 - 12 grossly intact  Alert, normal affect.      Results Review:     I reviewed the patient's new clinical results.   Results from last 7 days   Lab Units 11/17/19  0525 11/16/19  1438 11/15/19  1644 11/15/19  0623  11/12/19  0716   SODIUM mmol/L 142 141  --  139   < > 142   POTASSIUM mmol/L 4.3 4.7 4.4 3.6   < > 3.4*    CHLORIDE mmol/L 104 102  --  100   < > 98   CO2 mmol/L 29.0 29.0  --  28.0   < > 31.0*   BUN mg/dL 15 19  --  18   < > 17   CREATININE mg/dL 0.58 0.62  --  0.56*   < > 0.63   CALCIUM mg/dL 8.1* 8.4*  --  7.9*   < > 8.5*   BILIRUBIN mg/dL  --   --   --   --   --  0.7   ALK PHOS U/L  --   --   --   --   --  75   ALT (SGPT) U/L  --   --   --   --   --  9   AST (SGOT) U/L  --   --   --   --   --  16   GLUCOSE mg/dL 102* 101*  --  118*   < > 136*    < > = values in this interval not displayed.     Results from last 7 days   Lab Units 11/17/19  0525 11/16/19  1438 11/15/19  1644  11/13/19  0353 11/12/19  0716   WBC 10*3/mm3 10.68 13.96* 14.91*   < > 12.28* 14.05*   HEMOGLOBIN g/dL 9.9* 11.4* 11.0*   < > 9.6* 12.1   HEMATOCRIT % 33.6* 38.5 37.2   < > 31.2* 39.5   PLATELETS 10*3/mm3 297 384 358   < > 228 267   MONOCYTES % %  --   --   --   --  4.0* 3.0*    < > = values in this interval not displayed.         Results from last 7 days   Lab Units 11/13/19  0353   MAGNESIUM mg/dL 1.7       I reviewed the patient's new imaging including images and reports.    Chest x-ray done this morning's shows small residual right pneumothorax with small bore chest tube remaining in position.    Medication Review:     aspirin  mg Oral Daily   azithromycin 500 mg Intravenous Q24H   bethanechol 10 mg Oral 4x Daily   busPIRone 10 mg Oral BID   donepezil 10 mg Oral Nightly   gabapentin 300 mg Oral BID   guaiFENesin 600 mg Oral Q12H   heparin (porcine) 5,000 Units Subcutaneous Q12H   ipratropium-albuterol 3 mL Nebulization 4x Daily - RT   levothyroxine 175 mcg Oral Daily   memantine 10 mg Oral BID   piperacillin-tazobactam 3.375 g Intravenous Q8H   sertraline 50 mg Oral Daily   sodium chloride 10 mL Intravenous Q12H   sucralfate 1 g Oral Q6H       hold 1 each       Assessment/Plan       Hypothyroidism    Hyperlipidemia    Multi-infarct dementia (CMS/HCC)    Benign essential hypertension    Pernicious anemia    Hypoxia    Multifocal  pneumonia    Elevated lactic acid level    Leukocytosis    Sepsis (CMS/HCC)    Abnormal urinalysis    Acute respiratory failure with hypoxia (CMS/HCC)    Pleural effusion - bilateral, right greater than left parapneumonic pleural effusions s/p right thoracentesis 11/16/19.     Postprocedural pneumothorax    89 y.o. female admitted for pneumonia who was found to have bilateral pleural effusions.  She also has a large hiatal hernia.  Given her large hiatal hernia as well as evidence of fluid and possible food in the dilated esophagus I do have concerns that the patient may actually have aspiration pneumonia.  She is certainly at high risk for reflux given her hiatal hernia with evidence of herniation of her stomach into her chest and dilation of her esophagus.     As far as the patient's pleural effusions.  I felt that, given her slight increase in white blood cell count, and moderate size right pleural effusion, that a thoracentesis on the right would be worthwhile.  I subsequently performed a right-sided ultrasound-guided thoracentesis.  There was some air within the pleural space and I left the tube in position to continue draining.  Follow-up chest x-ray did show a small pneumothorax on the right.  It is unclear if it was from introduction of air from the procedure or iatrogenic.  I left the drainage catheter in place as management of potential pneumothorax.     Plan:  1.  Continue current antibiotics, Zosyn and azithromycin.  2.  Leave small bore catheter in place in the right chest.  Will place to waterseal.  3.  Follow-up labs on fluid for usual analysis.  4.  Recommend consideration for evaluation of GI tract, possibly upper GI series with small bowel follow-through and/or speech evaluation.  Defer to primary/consultants.  5.  Pulmonary consults will follow.  6.  Follow chest x-ray daily.    Keith Perry MD  11/17/19  8:17 AM      *. Please note that portions of this note were completed with a voice  recognition program. Efforts were made to edit the dictations, but occasionally words are mistranscribed.

## 2019-11-17 NOTE — PLAN OF CARE
Problem: Patient Care Overview  Goal: Plan of Care Review  Outcome: Ongoing (interventions implemented as appropriate)   11/17/19 8283   Coping/Psychosocial   Plan of Care Reviewed With patient   Plan of Care Review   Progress no change   OTHER   Outcome Summary The patient slept throughout the shift. She complained of being sore where the tube was placed last night, a lidocaine patch was applied today. When she was up or shifting in the bed, her oxygen saturation dropped and it took a couple of minutes for her to recover. She remained on 6L oxygen. She also had a cough in the morning but drinking water appeared to help. Will continue POC.

## 2019-11-17 NOTE — PLAN OF CARE
Problem: Patient Care Overview  Goal: Plan of Care Review  Outcome: Ongoing (interventions implemented as appropriate)   11/17/19 1015   Coping/Psychosocial   Plan of Care Reviewed With patient   Plan of Care Review   Progress improving   SLP evaluation completed. Will sign-off dysphagia. Please see note for further details and recommendations.

## 2019-11-17 NOTE — THERAPY TREATMENT NOTE
Patient Name: Temi Jarrett  : 1930    MRN: [id number]                              Today's Date: 2019       Admit Date: 2019    Visit Dx:     ICD-10-CM ICD-9-CM   1. Sepsis without acute organ dysfunction, due to unspecified organism (CMS/[facility]) A41.9 038.9     995.91   2. Pneumonia of both lungs due to infectious organism, unspecified part of lung J18.9 483.8   3. Bacterial UTI N39.0 599.0    A49.9 041.9   4. Acute respiratory failure with hypoxia (CMS/[facility]) J96.01 518.81   5. Impaired mobility and ADLs Z74.09 799.89   6. Pleural effusion - bilateral, right greater than left parapneumonic pleural effusions s/p right thoracentesis 19.  J90 511.9     Patient Active Problem List   Diagnosis   • Skin tear of lower leg without complication, right, initial encounter   • Esophageal reflux   • Hypothyroidism   • Generalized anxiety disorder   • Hyperlipidemia   • Multi-infarct dementia (CMS/[facility])   • Peripheral neuropathy   • Carpal tunnel syndrome of right wrist   • Spinal stenosis of lumbar region   • Osteoporosis   • Fever   • Urinary frequency   • Peripheral venous insufficiency   • Disc disorder of lumbar region   • Bladder prolapse, female, acquired   • Acute right-sided low back pain without sciatica   • Pain, knee   • Benign essential hypertension   • Pernicious anemia   • Memory loss   • Annual physical exam   • Primary osteoarthritis of both knees   • Chest pain, atypical   • Hiatal hernia with intrathoracic stomach   • Closed fracture of neck of left femur (CMS/[facility])   • Hypoxia   • Multifocal pneumonia   • Elevated lactic acid level   • Leukocytosis   • Sepsis (CMS/[facility])   • Abnormal urinalysis   • Acute respiratory failure with hypoxia (CMS/[facility])   • Pleural effusion - bilateral, right greater than left parapneumonic pleural effusions s/p right thoracentesis 19.    • Postprocedural pneumothorax     Past Medical History:   Diagnosis Date   • Disease of thyroid gland    • Gastric polyp     • History of colonic polyps    • Hypertension    • IBS (irritable bowel syndrome)    • Macular degeneration    • Torn meniscus     right knee      Past Surgical History:   Procedure Laterality Date   • CHOLECYSTECTOMY  1997   • EYE SURGERY  1998    Cataract extraction   • HERNIA REPAIR     • HIP HEMIARTHROPLASTY Left 9/28/2019    Procedure: HIP HEMIARTHROPLASTY LEFT;  Surgeon: Reddy Segundo Jr., MD;  Location: [facility];  Service: Orthopedics   • HYSTERECTOMY  1976   • KNEE SURGERY Right     arthroscopy- 2000   • TONSILLECTOMY       General Information     Row Name 11/17/19 1459          PT Evaluation Time/Intention    Document Type  therapy note (daily note)  -KR     Mode of Treatment  physical therapy  -KR     Row Name 11/17/19 1459          General Information    Existing Precautions/Restrictions  fall;oxygen therapy device and L/min;other (see comments) R chest tube  -KR     Row Name 11/17/19 1459          Cognitive Assessment/Intervention- PT/OT    Orientation Status (Cognition)  oriented to;person;place  -KR     Cognitive Assessment/Intervention Comment  confusion throughout treatment session  -KR     Row Name 11/17/19 1459          Safety Issues, Functional Mobility    Safety Issues Affecting Function (Mobility)  insight into deficits/self awareness;safety precaution awareness;safety precautions follow-through/compliance  -KR     Impairments Affecting Function (Mobility)  balance;coordination;endurance/activity tolerance;shortness of breath;strength;pain  -KR       User Key  (r) = Recorded By, (t) = Taken By, (c) = Cosigned By    Initials Name Provider Type    KR Stacey Trimble, PT Physical Therapist        Mobility     Row Name 11/17/19 1500          Bed Mobility Assessment/Treatment    Bed Mobility Assessment/Treatment  supine-sit  -KR     Supine-Sit [place] (Bed Mobility)  minimum assist (75% patient effort);verbal cues  -KR     Sit-Supine [place] (Bed Mobility)  not tested  -KR      Assistive Device (Bed Mobility)  draw sheet;head of bed elevated  -KR     Comment (Bed Mobility)  VC's for sequencing. Pt required assistance at trunk and BLEs. Increased time to complete.   -KR     Row Name 11/17/19 1500          Transfer Assessment/Treatment    Comment (Transfers)  VC's for sequencing and hand placement.   -KR     Row Name 11/17/19 1500          Sit-Stand Transfer    Sit-Stand [place] (Transfers)  minimum assist (75% patient effort);verbal cues  -KR     Assistive Device (Sit-Stand Transfers)  walker, front-wheeled  -KR     Row Name 11/17/19 1500          Gait/Stairs Assessment/Training    Gait/Stairs Assessment/Training  gait/ambulation assistive device  -KR     [place] Level (Gait)  contact guard;verbal cues  -KR     Assistive Device (Gait)  walker, front-wheeled  -KR     Distance in Feet (Gait)  80  -KR     Pattern (Gait)  step-to  -KR     Deviations/Abnormal Patterns (Gait)  base of support, narrow;kane decreased;stride length decreased  -KR     Bilateral Gait Deviations  forward flexed posture;heel strike decreased  -KR     Comment (Gait/Stairs)  Pt demonstrated step to gait pattern with slow kane and forward flexed posture. Frequent cues to keep RW close and to take steps up inside walker. Pt required multiple standing rest breaks due to increased coughing. Mobility limited by c/o increased pain from R chest tube.   -KR       User Key  (r) = Recorded By, (t) = Taken By, (c) = Cosigned By    Initials Name Provider Type    Stacey Daniels, PT Physical Therapist        Obj/Interventions     Row Name 11/17/19 1503          Static Sitting Balance    Level of [place] (Unsupported Sitting, Static Balance)  supervision  -KR     Sitting Position (Unsupported Sitting, Static Balance)  sitting in chair  -KR     Row Name 11/17/19 1503          Static Standing Balance    Level of [place] (Supported Standing, Static Balance)  standby assist  -KR     Assistive Device Utilized  (Supported Standing, Static Balance)  walker, rolling  -KR     Row Name 11/17/19 1503          Dynamic Standing Balance    Level of [place], Reaches Outside Midline (Standing, Dynamic Balance)  contact guard assist  -KR     Assistive Device Utilized (Supported Standing, Dynamic Balance)  walker, rolling  -KR       User Key  (r) = Recorded By, (t) = Taken By, (c) = Cosigned By    Initials Name Provider Type    KR Stacey Trimble, PT Physical Therapist        Goals/Plan    No documentation.       Clinical Impression     Row Name 11/17/19 1503          Pain Assessment    Additional Documentation  Pain Scale: Numbers Pre/Post-Treatment (Group)  -KR     Row Name 11/17/19 1503          Pain Scale: Numbers Pre/Post-Treatment    Pain Scale: Numbers, Pretreatment  6/10  -KR     Pain Scale: Numbers, Post-Treatment  6/10  -KR     Pain Location - Side  Right  -KR     Pain Location  chest  -KR     Pain Intervention(s)  Repositioned;Ambulation/increased activity  -KR     Row Name 11/17/19 1503          Plan of Care Review    Plan of Care Reviewed With  patient  -KR     Row Name 11/17/19 1503          Vital Signs    Pre Systolic BP Rehab  125  -KR     Pre Treatment Diastolic BP  73  -KR     Pretreatment Heart Rate (beats/min)  110  -KR     Posttreatment Heart Rate (beats/min)  112  -KR     Pre SpO2 (%)  92  -KR     O2 Delivery Pre Treatment  supplemental O2  -KR     Post SpO2 (%)  90  -KR     O2 Delivery Post Treatment  supplemental O2  -KR     Pre Patient Position  Supine  -KR     Intra Patient Position  Standing  -KR     Post Patient Position  Sitting  -KR     Row Name 11/17/19 1503          Positioning and Restraints    Pre-Treatment Position  in bed  -KR     Post Treatment Position  chair  -KR     In Chair  notified nsg;sitting;call light within reach;encouraged to call for assist;exit alarm on  -KR       User Key  (r) = Recorded By, (t) = Taken By, (c) = Cosigned By    Initials Name Provider Type    Stacey Daniels,  PT Physical Therapist        Outcome Measures     Row Name 11/17/19 1505          How much help from another person do you currently need...    Turning from your back to your side while in flat bed without using bedrails?  3  -KR     Moving from lying on back to sitting on the side of a flat bed without bedrails?  3  -KR     Moving to and from a bed to a chair (including a wheelchair)?  3  -KR     Standing up from a chair using your arms (e.g., wheelchair, bedside chair)?  3  -KR     Climbing 3-5 steps with a railing?  2  -KR     To walk in hospital room?  3  -KR     AM-PAC 6 Clicks Score (PT)  17  -KR     Row Name 11/17/19 1505          Functional Assessment    Outcome Measure Options  AM-PAC 6 Clicks Basic Mobility (PT)  -KR       User Key  (r) = Recorded By, (t) = Taken By, (c) = Cosigned By    Initials Name Provider Type    Stacey Daniels, PT Physical Therapist        Physical Therapy Education     Title: PT OT SLP Therapies (In Progress)     Topic: Physical Therapy (In Progress)     Point: Mobility training (In Progress)     Learning Progress Summary           Patient Acceptance, E, NR by WOOD at 11/17/2019  1:20 PM    Acceptance, E, VU by DHARMESH at 11/15/2019  2:33 PM    Acceptance, E, VU,NR by VG at 11/14/2019  2:09 PM    Acceptance, E,TB, NR by VG at 11/13/2019  2:08 PM    Comment:  Pt able to recall 1/3 posterior hip prec.                   Point: Home exercise program (In Progress)     Learning Progress Summary           Patient Acceptance, E, NR by WOOD at 11/17/2019  1:20 PM    Acceptance, E, VU by VG at 11/15/2019  2:33 PM    Acceptance, E, VU,NR by VG at 11/14/2019  2:09 PM    Acceptance, E,TB, NR by DHARMESH at 11/13/2019  2:08 PM    Comment:  Pt able to recall 1/3 posterior hip prec.                   Point: Body mechanics (In Progress)     Learning Progress Summary           Patient Acceptance, E, NR by WOOD at 11/17/2019  1:20 PM    Acceptance, E, VU by VG at 11/15/2019  2:33 PM    Acceptance, E, VU,NR by VG  at 11/14/2019  2:09 PM    Acceptance, E,TB, NR by VG at 11/13/2019  2:08 PM    Comment:  Pt able to recall 1/3 posterior hip prec.                   Point: Precautions (In Progress)     Learning Progress Summary           Patient Acceptance, E, NR by KR at 11/17/2019  1:20 PM    Acceptance, E, VU by VG at 11/15/2019  2:33 PM    Acceptance, E, VU,NR by VG at 11/14/2019  2:09 PM    Acceptance, E,TB, NR by VG at 11/13/2019  2:08 PM    Comment:  Pt able to recall 1/3 posterior hip prec.                               User Key     Initials Effective Dates Name Provider Type Discipline     04/03/18 -  Stacey Trimble, PT Physical Therapist PT    VG 05/29/18 -  Natalia Pires, PT Physical Therapist PT              PT Recommendation and Plan     Plan of Care Reviewed With: patient  Progress: no change  Outcome Summary: Pt ambulated 80ft with RW and CGA. VC's for increased step length and to keep RW close. Pt required multiple standing rest breaks due to SOA and increased coughing. Mobility limited by c/o increased R chest/flank pain. Continue to progress as appropriate.      Time Calculation:   PT Charges     Row Name 11/17/19 1320             Time Calculation    Start Time  1320  -KR      PT Received On  11/17/19  -KR      PT Goal Re-Cert Due Date  11/23/19  -         Time Calculation- PT    Total Timed Code Minutes- PT  27 minute(s)  -KR         Timed Charges    02956 - PT Therapeutic Activity Minutes  27  -KR        User Key  (r) = Recorded By, (t) = Taken By, (c) = Cosigned By    Initials Name Provider Type    Stacey Daniels, PT Physical Therapist        Therapy Charges for Today     Code Description Service Date Service Provider Modifiers Qty    60498307090 HC PT THERAPEUTIC ACT EA 15 MIN 11/17/2019 Stacey Trimble, PT GP 2          PT G-Codes  Outcome Measure Options: AM-PAC 6 Clicks Basic Mobility (PT)  AM-PAC 6 Clicks Score (PT): 17  AM-PAC 6 Clicks Score (OT): 12    Chaya Trimble PT  11/17/2019

## 2019-11-17 NOTE — PLAN OF CARE
Problem: Patient Care Overview  Goal: Plan of Care Review  Outcome: Ongoing (interventions implemented as appropriate)   11/17/19 9331   Coping/Psychosocial   Plan of Care Reviewed With patient   OTHER   Outcome Summary Pt with increased pain and SOB limiting mobility this date. Recommend continued skilled OT services and SNF at d/c.

## 2019-11-17 NOTE — PLAN OF CARE
Problem: Patient Care Overview  Goal: Plan of Care Review  Outcome: Ongoing (interventions implemented as appropriate)   11/17/19 1320   Coping/Psychosocial   Plan of Care Reviewed With patient   Plan of Care Review   Progress no change   OTHER   Outcome Summary Pt ambulated 80ft with RW and CGA. VC's for increased step length and to keep RW close. Pt required multiple standing rest breaks due to SOA and increased coughing. Mobility limited by c/o increased R chest/flank pain. Continue to progress as appropriate.

## 2019-11-17 NOTE — PROGRESS NOTES
[facility] Medicine Services  PROGRESS NOTE    Patient Name: Temi Jarrett  : 1930  MRN: [id number]    Date of Admission: 2019  Primary Care Physician: Eric Patel MD    Subjective   Subjective     CC:  PNA    HPI: Patient with some side/chest discomfort around site of chest tube. Dyspnea persists. Otherwise no new issues. Will plan to get SLP eval.    :Review of Systems  Gen- No fevers, chills  CV- No chest pain, palpitations  Resp- + cough, + dyspnea  GI- No N/V/D, abd pain          Objective   Objective     Vital Signs:   Temp:  [97.5 °F (36.4 °C)-98.5 °F (36.9 °C)] 97.5 °F (36.4 °C)  Heart Rate:  [] 89  Resp:  [18-20] 20  BP: (124-138)/(73-96) 125/73        Physical Exam:  GEN- no acute distress noted, chronically ill, elderly   HEENT- atraumatic, normocephlic, eomi, dry crusted skin on lips/.face  NECK- supple, trachea midline, no masses  RESP: on 4 L NC, rhonchorus breath sounds, CXR in place with serosanginous drainage--approximately 100cc seen since 7AM  CV: no murmurs, s1/s2, rrr  MSK: no edema noted, spontaneous movement of all extremities, right second finger steri strips in place  NEURO: alert, oriented, no focal deficits  SKIN: no rashes  PSYCH: appropriate mood and affect       Results Reviewed:    Results from last 7 days   Lab Units 19  0525 19  1438 11/15/19  1644 11/15/19  0623  19  0353   WBC 10*3/mm3 10.68 13.96* 14.91*  --    < > 12.28*   HEMOGLOBIN g/dL 9.9* 11.4* 11.0*  --    < > 9.6*   HEMATOCRIT % 33.6* 38.5 37.2  --    < > 31.2*   PLATELETS 10*3/mm3 297 384 358  --    < > 228   PROCALCITONIN ng/mL 0.64*  --   --  1.30*  --  4.04*    < > = values in this interval not displayed.     Results from last 7 days   Lab Units 19  0525 19  1438 11/15/19  1644 11/15/19  0623  19  0716   SODIUM mmol/L 142 141  --  139   < > 142   POTASSIUM mmol/L 4.3 4.7 4.4 3.6   < > 3.4*   CHLORIDE mmol/L 104 102  --  100    < > 98   CO2 mmol/L 29.0 29.0  --  28.0   < > 31.0*   BUN mg/dL 15 19  --  18   < > 17   CREATININE mg/dL 0.58 0.62  --  0.56*   < > 0.63   GLUCOSE mg/dL 102* 101*  --  118*   < > 136*   CALCIUM mg/dL 8.1* 8.4*  --  7.9*   < > 8.5*   ALT (SGPT) U/L  --   --   --   --   --  9   AST (SGOT) U/L  --   --   --   --   --  16   TROPONIN T ng/mL  --   --   --   --   --  0.030   PROBNP pg/mL  --   --   --   --   --  1,318.0    < > = values in this interval not displayed.     Estimated Creatinine Clearance: 37.6 mL/min (by C-G formula based on SCr of 0.58 mg/dL).    Microbiology Results Abnormal     Procedure Component Value - Date/Time    AFB Culture - Body Fluid, Pleural Cavity [[id number]] Collected:  11/16/19 2253    Lab Status:  Preliminary result Specimen:  Body Fluid from Pleural Cavity Updated:  11/17/19 1159     AFB Stain No acid fast bacilli seen on concentrated smear    Blood Culture - Blood, Arm, Right [[id number]] Collected:  11/12/19 0740    Lab Status:  Final result Specimen:  Blood from Arm, Right Updated:  11/17/19 1016     Blood Culture No growth at 5 days    Blood Culture - Blood, Arm, Left [[id number]] Collected:  11/12/19 0778    Lab Status:  Final result Specimen:  Blood from Arm, Left Updated:  11/17/19 1016     Blood Culture No growth at 5 days    Body Fluid Culture - Body Fluid, Pleural Cavity [[id number]] Collected:  11/16/19 2253    Lab Status:  Preliminary result Specimen:  Body Fluid from Pleural Cavity Updated:  11/16/19 9560     Gram Stain Few (2+) WBCs seen      No organisms seen    KOH Prep - Body Fluid, Pleural Cavity [[id number]] Collected:  11/16/19 2253    Lab Status:  Final result Specimen:  Body Fluid from Pleural Cavity Updated:  11/16/19 2915     KOH Prep No yeast or hyphal elements seen    Blood Culture - Blood, Arm, Left [[id number]] Collected:  11/12/19 1218    Lab Status:  Preliminary result Specimen:  Blood from Arm, Left Updated:  11/16/19 1246     Blood Culture No growth at 4 days     Blood Culture - Blood, Arm, Right [[id number]] Collected:  11/12/19 1218    Lab Status:  Preliminary result Specimen:  Blood from Arm, Right Updated:  11/16/19 1246     Blood Culture No growth at 4 days    S. Pneumo Ag Urine or CSF - Urine, Urine, Clean Catch [[id number]]  (Normal) Collected:  11/15/19 1459    Lab Status:  Final result Specimen:  Urine, Clean Catch Updated:  11/16/19 0042     Strep Pneumo Ag Negative    Legionella Antigen, Urine - Urine, Urine, Clean Catch [[id number]]  (Normal) Collected:  11/15/19 1459    Lab Status:  Final result Specimen:  Urine, Clean Catch Updated:  11/16/19 0042     LEGIONELLA ANTIGEN, URINE Negative    Urine Culture - Urine, Urine, Catheter [[id number]]  (Abnormal)  (Susceptibility) Collected:  11/12/19 0717    Lab Status:  Final result Specimen:  Urine, Catheter Updated:  11/14/19 0348     Urine Culture >100,000 CFU/mL Klebsiella pneumoniae ssp pneumoniae    Susceptibility      Klebsiella pneumoniae ssp pneumoniae     MARY KAY     Ampicillin Resistant     Ampicillin + Sulbactam Susceptible     Cefazolin Susceptible     Cefepime Susceptible     Ceftazidime Susceptible     Ceftriaxone Susceptible     Gentamicin Susceptible     Levofloxacin Susceptible     Nitrofurantoin Intermediate     Piperacillin + Tazobactam Susceptible     Tetracycline Susceptible     Trimethoprim + Sulfamethoxazole Susceptible                    MRSA Screen, PCR - Swab, Nares [[id number]]  (Normal) Collected:  11/12/19 1403    Lab Status:  Final result Specimen:  Swab from Nares Updated:  11/12/19 1541     MRSA PCR Negative    Narrative:       MRSA Negative    Respiratory Panel, PCR - Swab, Nasopharynx [[id number]]  (Normal) Collected:  11/12/19 1022    Lab Status:  Final result Specimen:  Swab from Nasopharynx Updated:  11/12/19 1247     ADENOVIRUS, PCR Not Detected     Coronavirus 229E Not Detected     Coronavirus HKU1 Not Detected     Coronavirus NL63 Not Detected     Coronavirus OC43 Not Detected     Human  Metapneumovirus Not Detected     Human Rhinovirus/Enterovirus Not Detected     Influenza B PCR Not Detected     Parainfluenza Virus 1 Not Detected     Parainfluenza Virus 2 Not Detected     Parainfluenza Virus 3 Not Detected     Parainfluenza Virus 4 Not Detected     Bordetella pertussis pcr Not Detected     Influenza A H1 2009 PCR Not Detected     Chlamydophila pneumoniae PCR Not Detected     Mycoplasma pneumo by PCR Not Detected     Influenza A PCR Not Detected     Influenza A H3 Not Detected     Influenza A H1 Not Detected     RSV, PCR Not Detected     Bordetella parapertussis PCR Not Detected          Imaging Results (Last 24 Hours)     Procedure Component Value Units Date/Time    XR Chest 1 View [[id number]] Collected:  11/17/19 0813     Updated:  11/17/19 1142    Narrative:          EXAMINATION: XR CHEST 1 VW - 11/17/2019     INDICATION A41.9-Sepsis, unspecified organism; J18.9-Pneumonia,  unspecified organism; N39.0-Urinary tract infection, site not specified;  A49.9-Bacterial infection, unspecified; J96.01-Acute respiratory failure  with hypoxia; Z74.09-Other reduced mobility; T74-Kfllunt effusion, not  elsewhere classified      COMPARISON: 11/16/2019     FINDINGS: Portable chest reveals heart to be enlarged. There is a chest  tube identified on the right with tiny pneumothorax present. There are  increased markings seen within the left upper and lower lung fields.  Increased markings seen within the suprahilar region. Degenerative  changes seen within the spine.       Impression:       Chest tube identified on the right with tiny apical  pneumothorax present. The remainder of the chest is stable. Heart is  enlarged with increased markings again seen within the left mid to lower  lower lung field.     DICTATED:   11/17/2019  EDITED/ls :   11/17/2019        XR Chest 1 View [[id number]] Collected:  11/17/19 0834     Updated:  11/17/19 1131    Narrative:          EXAMINATION: XR CHEST 1 VW - 11/16/2019      INDICATION: ; A41.9-Sepsis, unspecified organism; J18.9-Pneumonia,  unspecified organism; N39.0-Urinary tract infection, site not specified;  A49.9-Bacterial infection, unspecified; J96.01-Acute respiratory failure  with hypoxia; Z74.09-Other reduced mobility      COMPARISON: 11/12/2019     FINDINGS: Portable chest reveals small chest tube identified on the  right with small apical pneumothorax. Increased markings improving  within the right upper lobe. Ill-defined opacification within the left  mid to lower lung field. Development of a small left pleural effusion.            Impression:       Chest tube identified on the right with small apical  pneumothorax. Improvement seen in aeration of the right upper lobe.  There is development of a small left pleural effusion with ill-defined  opacification within the left mid to lower lung field.     DICTATED:   11/17/2019  EDITED/ls :   11/17/2019        XR Chest 1 View [[id number]] Collected:  11/17/19 0941     Updated:  11/17/19 0942    Narrative:          EXAMINATION: XR CHEST 1 VW-      INDICATION: follow up pneumothorax; A41.9-Sepsis, unspecified organism;  J18.9-Pneumonia, unspecified organism; N39.0-Urinary tract infection,  site not specified; A49.9-Bacterial infection, unspecified; J96.01-Acute  respiratory failure with hypoxia; Z74.09-Other reduced mobility;  K99-Ubvxqop effusion, not elsewhere classified      COMPARISON: NONE     FINDINGS: Portable chest reveals heart to be enlarged. There is a chest  tube identified on the right with tiny apical pneumothorax present.  Ill-defined opacification seen within the left middle lower lung field.  The heart is enlarged. Air bronchograms identified on the left.  Degenerative changes seen within the spine.           Impression:       Chest tube identified on the right with tiny apical  pneumothorax. Ill-defined opacification again seen within the left  middle lower lung field with air bronchograms present on the left.  Small  left pleural effusion present. Some shift of the mediastinum to the left  which is stable.                     I have reviewed the medications:  Scheduled Meds:    aspirin  mg Oral Daily   azithromycin 500 mg Intravenous Q24H   bethanechol 10 mg Oral 4x Daily   busPIRone 10 mg Oral BID   donepezil 10 mg Oral Nightly   gabapentin 300 mg Oral BID   guaiFENesin 600 mg Oral Q12H   heparin (porcine) 5,000 Units Subcutaneous Q12H   levothyroxine 175 mcg Oral Daily   lidocaine 1 patch Transdermal Q24H   memantine 10 mg Oral BID   piperacillin-tazobactam 3.375 g Intravenous Q8H   sertraline 50 mg Oral Daily   sodium chloride 10 mL Intravenous Q12H   sucralfate 1 g Oral 4x Daily AC & at Bedtime     Continuous Infusions:   PRN Meds:.•  acetaminophen **OR** acetaminophen **OR** acetaminophen  •  benzonatate  •  calcium carbonate EX  •  diphenoxylate-atropine  •  docusate sodium  •  guaiFENesin-dextromethorphan  •  ipratropium-albuterol  •  magnesium sulfate **OR** magnesium sulfate in D5W 1g/100mL (PREMIX) **OR** magnesium sulfate  •  ondansetron  •  potassium chloride **OR** potassium chloride **OR** potassium chloride  •  sodium chloride  •  sodium chloride  •  traMADol      Assessment/Plan   Assessment / Plan     Active Hospital Problems    Diagnosis  POA   • Postprocedural pneumothorax [J95.811]  No   • Pleural effusion - bilateral, right greater than left parapneumonic pleural effusions s/p right thoracentesis 11/16/19.  [J90]  Unknown   • Hypoxia [R09.02]  Unknown   • Multifocal pneumonia [J18.9]  Unknown   • Elevated lactic acid level [R79.89]  Unknown   • Leukocytosis [D72.829]  Unknown   • Sepsis (CMS/HCC) [A41.9]  Unknown   • Abnormal urinalysis [R82.90]  Unknown   • Acute respiratory failure with hypoxia (CMS/HCC) [J96.01]  Unknown   • Hypothyroidism [E03.9]  Yes   • Hyperlipidemia [E78.5]  Yes   • Benign essential hypertension [I10]  Yes   • Multi-infarct dementia (CMS/HCC) [F01.50]  Yes   • Pernicious  anemia [D51.0]  Yes      Resolved Hospital Problems   No resolved problems to display.        Brief Hospital Course to date:  Temi Jarrett is a 89 year old female with history of HTN, dementia, recent hip fracture who presented with cough and SOA found to have multifocal PNA and significant hypoxia.      Acute hypoxic resp failure  Multifocal PNA   Sepsis related to PNA   - Febrile, leukocytosis on admission-- chest xray reviewed with multifocal PNA --CT 11/15 with multifocal PNA and b/l pleural effusions. Pulmonary evaluation and patient s/p bedside thoracentesis with pulmonary 11/16- prelim studies reviewed with LDH and protein still pending. CTX in place currently and monitoring output closely.   - BCx pending; resp PCR negative   - Given concern of possible aspiration component, will have SLP eval today   - Strep and legionella pending   - Continues zosyn/azithro- vanc deescalated as MRSA PCR negative  - O2 support; duo-nebs; IS and flutter   - added robittusin for cough      Elevated lactate, resolved  - resolved with IVF      Klebsiella UTI  - No symptoms; history of prolapse  - UCx with >100k GNB, now speciated to Klebsiella, suspectibilities reviewed, continue zosyn as above    Fall, finger laceration  - continue steri strips  - monitor hip pain- improved  - fall precautions     Hypokalemia   - sliding scale replacement; monitor , labs today pending     HTN   - holding home HCTZ in setting of sepsis , remains normotensive      Hypothyroid   - continue home synthroid      Dementia/mood  - Continue home zoloft, buspar, aricept and namenda      Neuropathy  - Cont home gabapentin      History of pernicious anemia   - blood counts stable      DVT prophylaxis:  Heparin     Disposition: I expect the patient to be discharged pending further improvement    CODE STATUS:   Code Status and Medical Interventions:   Ordered at: 11/12/19 1002     Level Of Support Discussed With:    Patient     Code Status:    CPR      Medical Interventions (Level of Support Prior to Arrest):    Full         Electronically signed by Fadia Kingston MD, 11/17/19, 12:28 PM.

## 2019-11-17 NOTE — THERAPY EVALUATION
Acute Care - Speech Language Pathology   Swallow Initial Evaluation [facility]     Patient Name: Temi Jarrett  : 1930  MRN: [id number]  Today's Date: 2019               Admit Date: 2019    Visit Dx:     ICD-10-CM ICD-9-CM   1. Sepsis without acute organ dysfunction, due to unspecified organism (CMS/[facility]) A41.9 038.9     995.91   2. Pneumonia of both lungs due to infectious organism, unspecified part of lung J18.9 483.8   3. Bacterial UTI N39.0 599.0    A49.9 041.9   4. Acute respiratory failure with hypoxia (CMS/[facility]) J96.01 518.81   5. Impaired mobility and ADLs Z74.09 799.89   6. Pleural effusion - bilateral, right greater than left parapneumonic pleural effusions s/p right thoracentesis 19.  J90 511.9     Patient Active Problem List   Diagnosis   • Skin tear of lower leg without complication, right, initial encounter   • Esophageal reflux   • Hypothyroidism   • Generalized anxiety disorder   • Hyperlipidemia   • Multi-infarct dementia (CMS/[facility])   • Peripheral neuropathy   • Carpal tunnel syndrome of right wrist   • Spinal stenosis of lumbar region   • Osteoporosis   • Fever   • Urinary frequency   • Peripheral venous insufficiency   • Disc disorder of lumbar region   • Bladder prolapse, female, acquired   • Acute right-sided low back pain without sciatica   • Pain, knee   • Benign essential hypertension   • Pernicious anemia   • Memory loss   • Annual physical exam   • Primary osteoarthritis of both knees   • Chest pain, atypical   • Hiatal hernia with intrathoracic stomach   • Closed fracture of neck of left femur (CMS/[facility])   • Hypoxia   • Multifocal pneumonia   • Elevated lactic acid level   • Leukocytosis   • Sepsis (CMS/[facility])   • Abnormal urinalysis   • Acute respiratory failure with hypoxia (CMS/[facility])   • Pleural effusion - bilateral, right greater than left parapneumonic pleural effusions s/p right thoracentesis 19.    • Postprocedural pneumothorax     Past Medical History:    Diagnosis Date   • Disease of thyroid gland    • Gastric polyp    • History of colonic polyps    • Hypertension    • IBS (irritable bowel syndrome)    • Macular degeneration    • Torn meniscus     right knee      Past Surgical History:   Procedure Laterality Date   • CHOLECYSTECTOMY  1997   • EYE SURGERY  1998    Cataract extraction   • HERNIA REPAIR     • HIP HEMIARTHROPLASTY Left 9/28/2019    Procedure: HIP HEMIARTHROPLASTY LEFT;  Surgeon: Reddy Segundo Jr., MD;  Location: [facility];  Service: Orthopedics   • HYSTERECTOMY  1976   • KNEE SURGERY Right     arthroscopy- 2000   • TONSILLECTOMY          SWALLOW EVALUATION (last 72 hours)      SLP Adult Swallow Evaluation     Row Name 11/17/19 1000                   Rehab Evaluation    Document Type  evaluation  -AW        Subjective Information  no complaints  -AW        Patient Observations  alert;cooperative  -AW        Patient/Family Observations  resting in bed  -AW        Patient Effort  good  -AW           General Information    Patient Profile Reviewed  yes  -AW        Pertinent History Of Current Problem  pna  -AW        Current Method of Nutrition  regular textures;thin liquids  -AW        Precautions/Limitations, Vision  WFL  -AW        Precautions/Limitations, Hearing  WFL  -AW        Prior Level of Function-Communication  cognitive-linguistic impairment h/o dementia  -AW        Prior Level of Function-Swallowing  no diet consistency restrictions  -AW        Plans/Goals Discussed with  patient  -AW        Barriers to Rehab  none identified  -AW        Patient's Goals for Discharge  patient did not state  -AW           Pain Assessment    Additional Documentation  Pain Scale: FACES Pre/Post-Treatment (Group)  -AW           Pain Scale: FACES Pre/Post-Treatment    Pain: FACES Scale, Pretreatment  2-->hurts little bit  -AW        Pain: FACES Scale, Post-Treatment  2-->hurts little bit  -AW        Pre/Post Treatment Pain Comment  R side, RN Aware  -AW         "   Oral Motor and Function    Dentition Assessment  natural, present and adequate  -AW        Secretion Management  WNL/WFL  -AW        Mucosal Quality  moist, healthy  -AW        Volitional Swallow  WFL  -AW        Volitional Cough  WFL  -AW           Oral Musculature and Cranial Nerve Assessment    Oral Motor General Assessment  WFL  -AW           General Eating/Swallowing Observations    Respiratory Support Currently in Use  nasal cannula  -AW        Eating/Swallowing Skills  self-fed  -AW        Positioning During Eating  upright in bed  -AW        Utensils Used  straw  -AW        Consistencies Trialed  thin liquids  -AW           Clinical Swallow Eval    Oral Prep Phase  WFL  -AW        Oral Transit  WFL  -AW        Oral Residue  WFL  -AW        Pharyngeal Phase  WFL  -AW        Esophageal Phase  unremarkable  -AW        Clinical Swallow Evaluation Summary  Pt with known hiatal hernia, but does not feel that this is giving  her any problems. She showed no s/s with thins and said \"I'm not having any problems, lets not create another one.\" Pt said no issue chewing, so did not test solids. She said she doesn't feel things sticking either when she eats. Exp possibility of aspiration of reflux, but she denies any issue. Do not suspect there is a pharyngeal dysphagia given observations with water. Pt clearly did not want further evaluation done. Will sign off at this time.   -AW           Clinical Impression    SLP Swallowing Diagnosis  functional oral phase;functional pharyngeal phase  -AW        Functional Impact  no impact on function  -AW        Swallow Criteria for Skilled Therapeutic Interventions Met  no problems identified which require skilled intervention  -AW           Recommendations    Therapy Frequency (Swallow)  evaluation only  -AW        SLP Diet Recommendation  regular textures;thin liquids  -AW        SLP Rec. for Method of Medication Administration  meds whole;with thin liquids  -AW        Monitor " for Signs of Aspiration  notify SLP if any concerns  -          User Key  (r) = Recorded By, (t) = Taken By, (c) = Cosigned By    Initials Name Effective Dates    Yumiko Corrigan MS CCC-SLP 06/22/15 -           EDUCATION  The patient has been educated in the following areas:   Dysphagia (Swallowing Impairment).    SLP Recommendation and Plan  SLP Swallowing Diagnosis: functional oral phase, functional pharyngeal phase  SLP Diet Recommendation: regular textures, thin liquids     SLP Rec. for Method of Medication Administration: meds whole, with thin liquids     Monitor for Signs of Aspiration: notify SLP if any concerns     Swallow Criteria for Skilled Therapeutic Interventions Met: no problems identified which require skilled intervention        Therapy Frequency (Swallow): evaluation only          Plan of Care Reviewed With: patient  Progress: improving           Time Calculation:   Time Calculation- SLP     Row Name 11/17/19 1016             Time Calculation- SLP    SLP Start Time  0915  -      SLP Received On  11/17/19  -        User Key  (r) = Recorded By, (t) = Taken By, (c) = Cosigned By    Initials Name Provider Type    Yumiko Corrigan MS CCC-SLP Speech and Language Pathologist          Therapy Charges for Today     Code Description Service Date Service Provider Modifiers Qty    [id number] HC ST EVAL ORAL PHARYNG SWALLOW 2 11/17/2019 Yumiko Freeman MS CCC-SLP GN 1               Yumiko Freeman MS CCC-JULIANA  11/17/2019

## 2019-11-17 NOTE — PROCEDURES
"Bedside Thoracentesis Without  Radiology  Date/Time: 11/16/2019 11:28 PM  Performed by: Keith Perry MD  Authorized by: Keith Perry MD   Consent: Verbal consent obtained.  Risks and benefits: risks, benefits and alternatives were discussed  Consent given by: power of   Patient understanding: patient states understanding of the procedure being performed  Patient consent: the patient's understanding of the procedure matches consent given  Patient identity confirmed: verbally with patient and arm band  Time out: Immediately prior to procedure a \"time out\" was called to verify the correct patient, procedure, equipment, support staff and site/side marked as required.  Procedure purpose: diagnostic and therapeutic  Indications: pleural effusion  Preparation: Patient was prepped and draped in the usual sterile fashion.  Local anesthesia used: yes    Anesthesia:  Local anesthesia used: yes  Local Anesthetic: lidocaine 1% without epinephrine  Anesthetic total: 10 mL    Sedation:  Patient sedated: no    Preparation: skin prepped with ChloraPrep  Patient position: right lateral decubitus  Ultrasound guidance: yes  Intercostal space: 7th  Puncture method: over-the-needle catheter  Needle size: 18  Catheter size: 8 Frisian  Number of attempts: 1  Drainage amount: 500 ml  Drainage characteristics: cloudy and serous  Patient tolerance: Patient tolerated the procedure well with no immediate complications  Chest x-ray performed: yes  Chest x-ray findings: pneumothorax  Comments: Patient did have some air that was aspirated from the pleural space at the termination of the procedure.  Because of this, I left the catheter in place and sutured it with a single 0 silk suture.  I placed a sterile dressing and obtained a stat chest x-ray which shows a small apical pneumothorax.  There is not currently an air leak from the tube.  The plan will be to leave the tube in place and hopefully remove within the next day or 2.  " I discussed the procedure with the patient's son by phone.

## 2019-11-17 NOTE — PLAN OF CARE
Problem: Patient Care Overview  Goal: Plan of Care Review  Outcome: Ongoing (interventions implemented as appropriate)   11/16/19 9635   Coping/Psychosocial   Plan of Care Reviewed With patient   Plan of Care Review   Progress no change   OTHER   Outcome Summary VSS. Denies pain/discomfort. No acute rep. distress. O2 at 5 L humidified in progress.       Problem: Fall Risk (Adult)  Goal: Absence of Fall  Outcome: Ongoing (interventions implemented as appropriate)      Problem: Skin Injury Risk (Adult)  Goal: Skin Health and Integrity  Outcome: Ongoing (interventions implemented as appropriate)      Problem: Sepsis/Septic Shock (Adult)  Goal: Signs and Symptoms of Listed Potential Problems Will be Absent, Minimized or Managed (Sepsis/Septic Shock)  Outcome: Ongoing (interventions implemented as appropriate)      Problem: Pneumonia (Adult)  Goal: Signs and Symptoms of Listed Potential Problems Will be Absent, Minimized or Managed (Pneumonia)  Outcome: Ongoing (interventions implemented as appropriate)

## 2019-11-18 ENCOUNTER — APPOINTMENT (OUTPATIENT)
Dept: GENERAL RADIOLOGY | Facility: HOSPITAL | Age: 84
End: 2019-11-18

## 2019-11-18 PROBLEM — A41.9 SEPSIS (HCC): Status: RESOLVED | Noted: 2019-11-12 | Resolved: 2019-11-18

## 2019-11-18 PROBLEM — B96.89 UTI DUE TO KLEBSIELLA SPECIES: Status: ACTIVE | Noted: 2019-11-18

## 2019-11-18 PROBLEM — D72.829 LEUKOCYTOSIS: Status: RESOLVED | Noted: 2019-11-12 | Resolved: 2019-11-18

## 2019-11-18 PROBLEM — R82.90 ABNORMAL URINALYSIS: Status: RESOLVED | Noted: 2019-11-12 | Resolved: 2019-11-18

## 2019-11-18 PROBLEM — R79.89 ELEVATED LACTIC ACID LEVEL: Status: RESOLVED | Noted: 2019-11-12 | Resolved: 2019-11-18

## 2019-11-18 PROBLEM — N39.0 UTI DUE TO KLEBSIELLA SPECIES: Status: ACTIVE | Noted: 2019-11-18

## 2019-11-18 LAB
ANION GAP SERPL CALCULATED.3IONS-SCNC: 11 MMOL/L (ref 5–15)
BASOPHILS # BLD AUTO: 0.05 10*3/MM3 (ref 0–0.2)
BASOPHILS NFR BLD AUTO: 0.6 % (ref 0–1.5)
BUN BLD-MCNC: 15 MG/DL (ref 8–23)
BUN/CREAT SERPL: 31.3 (ref 7–25)
CALCIUM SPEC-SCNC: 7.9 MG/DL (ref 8.6–10.5)
CHLORIDE SERPL-SCNC: 102 MMOL/L (ref 98–107)
CO2 SERPL-SCNC: 27 MMOL/L (ref 22–29)
CREAT BLD-MCNC: 0.48 MG/DL (ref 0.57–1)
DEPRECATED RDW RBC AUTO: 45.3 FL (ref 37–54)
EOSINOPHIL # BLD AUTO: 0.27 10*3/MM3 (ref 0–0.4)
EOSINOPHIL NFR BLD AUTO: 3.1 % (ref 0.3–6.2)
ERYTHROCYTE [DISTWIDTH] IN BLOOD BY AUTOMATED COUNT: 14.1 % (ref 12.3–15.4)
GFR SERPL CREATININE-BSD FRML MDRD: 122 ML/MIN/1.73
GLUCOSE BLD-MCNC: 94 MG/DL (ref 65–99)
HCT VFR BLD AUTO: 35.4 % (ref 34–46.6)
HGB BLD-MCNC: 10.4 G/DL (ref 12–15.9)
IMM GRANULOCYTES # BLD AUTO: 0.11 10*3/MM3 (ref 0–0.05)
IMM GRANULOCYTES NFR BLD AUTO: 1.3 % (ref 0–0.5)
LYMPHOCYTES # BLD AUTO: 1.18 10*3/MM3 (ref 0.7–3.1)
LYMPHOCYTES NFR BLD AUTO: 13.5 % (ref 19.6–45.3)
MCH RBC QN AUTO: 25.7 PG (ref 26.6–33)
MCHC RBC AUTO-ENTMCNC: 29.4 G/DL (ref 31.5–35.7)
MCV RBC AUTO: 87.4 FL (ref 79–97)
MONOCYTES # BLD AUTO: 0.76 10*3/MM3 (ref 0.1–0.9)
MONOCYTES NFR BLD AUTO: 8.7 % (ref 5–12)
NEUTROPHILS # BLD AUTO: 6.37 10*3/MM3 (ref 1.7–7)
NEUTROPHILS NFR BLD AUTO: 72.8 % (ref 42.7–76)
NRBC BLD AUTO-RTO: 0 /100 WBC (ref 0–0.2)
PLATELET # BLD AUTO: 297 10*3/MM3 (ref 140–450)
PMV BLD AUTO: 9.8 FL (ref 6–12)
POTASSIUM BLD-SCNC: 4 MMOL/L (ref 3.5–5.2)
RBC # BLD AUTO: 4.05 10*6/MM3 (ref 3.77–5.28)
SODIUM BLD-SCNC: 140 MMOL/L (ref 136–145)
WBC NRBC COR # BLD: 8.74 10*3/MM3 (ref 3.4–10.8)

## 2019-11-18 PROCEDURE — 25010000002 KETOROLAC TROMETHAMINE PER 15 MG: Performed by: INTERNAL MEDICINE

## 2019-11-18 PROCEDURE — 71045 X-RAY EXAM CHEST 1 VIEW: CPT

## 2019-11-18 PROCEDURE — 99233 SBSQ HOSP IP/OBS HIGH 50: CPT | Performed by: INTERNAL MEDICINE

## 2019-11-18 PROCEDURE — 80048 BASIC METABOLIC PNL TOTAL CA: CPT | Performed by: INTERNAL MEDICINE

## 2019-11-18 PROCEDURE — 93010 ELECTROCARDIOGRAM REPORT: CPT | Performed by: INTERNAL MEDICINE

## 2019-11-18 PROCEDURE — 25010000002 PIPERACILLIN SOD-TAZOBACTAM PER 1 G: Performed by: INTERNAL MEDICINE

## 2019-11-18 PROCEDURE — 85025 COMPLETE CBC W/AUTO DIFF WBC: CPT | Performed by: INTERNAL MEDICINE

## 2019-11-18 PROCEDURE — 25010000002 HEPARIN (PORCINE) PER 1000 UNITS: Performed by: INTERNAL MEDICINE

## 2019-11-18 PROCEDURE — 25010000002 AZITHROMYCIN PER 500 MG: Performed by: INTERNAL MEDICINE

## 2019-11-18 PROCEDURE — 93005 ELECTROCARDIOGRAM TRACING: CPT | Performed by: INTERNAL MEDICINE

## 2019-11-18 RX ORDER — GUAIFENESIN 600 MG/1
600 TABLET, EXTENDED RELEASE ORAL ONCE
Status: COMPLETED | OUTPATIENT
Start: 2019-11-18 | End: 2019-11-18

## 2019-11-18 RX ORDER — KETOROLAC TROMETHAMINE 30 MG/ML
15 INJECTION, SOLUTION INTRAMUSCULAR; INTRAVENOUS ONCE
Status: COMPLETED | OUTPATIENT
Start: 2019-11-18 | End: 2019-11-18

## 2019-11-18 RX ORDER — GUAIFENESIN 600 MG/1
1200 TABLET, EXTENDED RELEASE ORAL EVERY 12 HOURS SCHEDULED
Status: DISCONTINUED | OUTPATIENT
Start: 2019-11-18 | End: 2019-11-21 | Stop reason: HOSPADM

## 2019-11-18 RX ORDER — PANTOPRAZOLE SODIUM 40 MG/1
40 TABLET, DELAYED RELEASE ORAL
Status: DISCONTINUED | OUTPATIENT
Start: 2019-11-19 | End: 2019-11-21 | Stop reason: HOSPADM

## 2019-11-18 RX ADMIN — GABAPENTIN 300 MG: 300 CAPSULE ORAL at 08:07

## 2019-11-18 RX ADMIN — GUAIFENESIN 600 MG: 600 TABLET, EXTENDED RELEASE ORAL at 08:07

## 2019-11-18 RX ADMIN — KETOROLAC TROMETHAMINE 15 MG: 30 INJECTION, SOLUTION INTRAMUSCULAR; INTRAVENOUS at 13:33

## 2019-11-18 RX ADMIN — HEPARIN SODIUM 5000 UNITS: 5000 INJECTION, SOLUTION INTRAVENOUS; SUBCUTANEOUS at 08:06

## 2019-11-18 RX ADMIN — DONEPEZIL HYDROCHLORIDE 10 MG: 10 TABLET, FILM COATED ORAL at 21:44

## 2019-11-18 RX ADMIN — SERTRALINE HYDROCHLORIDE 50 MG: 50 TABLET ORAL at 08:07

## 2019-11-18 RX ADMIN — SODIUM CHLORIDE, PRESERVATIVE FREE 10 ML: 5 INJECTION INTRAVENOUS at 09:14

## 2019-11-18 RX ADMIN — ASPIRIN 325 MG: 325 TABLET, DELAYED RELEASE ORAL at 08:06

## 2019-11-18 RX ADMIN — GABAPENTIN 300 MG: 300 CAPSULE ORAL at 21:44

## 2019-11-18 RX ADMIN — HEPARIN SODIUM 5000 UNITS: 5000 INJECTION, SOLUTION INTRAVENOUS; SUBCUTANEOUS at 21:40

## 2019-11-18 RX ADMIN — TAZOBACTAM SODIUM AND PIPERACILLIN SODIUM 3.38 G: 375; 3 INJECTION, SOLUTION INTRAVENOUS at 21:39

## 2019-11-18 RX ADMIN — SUCRALFATE 1 G: 1 TABLET ORAL at 17:57

## 2019-11-18 RX ADMIN — SUCRALFATE 1 G: 1 TABLET ORAL at 21:40

## 2019-11-18 RX ADMIN — BUSPIRONE HYDROCHLORIDE 10 MG: 10 TABLET ORAL at 08:07

## 2019-11-18 RX ADMIN — AZITHROMYCIN MONOHYDRATE 500 MG: 500 INJECTION, POWDER, LYOPHILIZED, FOR SOLUTION INTRAVENOUS at 17:57

## 2019-11-18 RX ADMIN — GUAIFENESIN 1200 MG: 600 TABLET, EXTENDED RELEASE ORAL at 21:40

## 2019-11-18 RX ADMIN — BETHANECHOL CHLORIDE 10 MG: 10 TABLET ORAL at 21:40

## 2019-11-18 RX ADMIN — MEMANTINE 10 MG: 10 TABLET ORAL at 08:07

## 2019-11-18 RX ADMIN — TAZOBACTAM SODIUM AND PIPERACILLIN SODIUM 3.38 G: 375; 3 INJECTION, SOLUTION INTRAVENOUS at 05:29

## 2019-11-18 RX ADMIN — SUCRALFATE 1 G: 1 TABLET ORAL at 13:00

## 2019-11-18 RX ADMIN — TAZOBACTAM SODIUM AND PIPERACILLIN SODIUM 3.38 G: 375; 3 INJECTION, SOLUTION INTRAVENOUS at 13:00

## 2019-11-18 RX ADMIN — SUCRALFATE 1 G: 1 TABLET ORAL at 08:07

## 2019-11-18 RX ADMIN — MEMANTINE 10 MG: 10 TABLET ORAL at 21:40

## 2019-11-18 RX ADMIN — LEVOTHYROXINE SODIUM 175 MCG: 25 TABLET ORAL at 08:07

## 2019-11-18 RX ADMIN — TRAMADOL HYDROCHLORIDE 50 MG: 50 TABLET, FILM COATED ORAL at 09:09

## 2019-11-18 RX ADMIN — BETHANECHOL CHLORIDE 10 MG: 10 TABLET ORAL at 17:57

## 2019-11-18 RX ADMIN — GUAIFENESIN AND DEXTROMETHORPHAN 5 ML: 100; 10 SYRUP ORAL at 10:46

## 2019-11-18 RX ADMIN — BETHANECHOL CHLORIDE 10 MG: 10 TABLET ORAL at 13:00

## 2019-11-18 RX ADMIN — GUAIFENESIN 600 MG: 600 TABLET, EXTENDED RELEASE ORAL at 13:02

## 2019-11-18 RX ADMIN — BETHANECHOL CHLORIDE 10 MG: 10 TABLET ORAL at 08:07

## 2019-11-18 RX ADMIN — BUSPIRONE HYDROCHLORIDE 10 MG: 10 TABLET ORAL at 21:46

## 2019-11-18 RX ADMIN — LIDOCAINE 1 PATCH: 50 PATCH TOPICAL at 08:08

## 2019-11-18 NOTE — PROGRESS NOTES
Continued Stay Note   [facility]     Patient Name: Temi Jarrett  MRN: [id number]  Today's Date: 11/18/2019    Admit Date: 11/12/2019    Discharge Plan     Row Name 11/18/19 1557       Plan    Plan  update    Patient/Family in Agreement with Plan  yes    Plan Comments  Spoke with patient at bedside regarding discharge plan and advised that [facility] is still following her.  Patient still has chest tube in and complains of being uncomfortable.  No new discharge needs.  Called Annebelle with [facility] to advise who will continue to follow and will check for bed availability when patient has had CT removed and is ready to discharge.  CM following.  Patient plan is to discharge to [facility] via car with family to transport.      Final Discharge Disposition Code  03 - skilled nursing facility (SNF)        Discharge Codes    No documentation.       Expected Discharge Date and Time     Expected Discharge Date Expected Discharge Time    Nov 19, 2019             Abbey Serna RN

## 2019-11-18 NOTE — PROGRESS NOTES
[facility] Medicine Services  PROGRESS NOTE    Patient Name: Temi Jarrett  : 1930  MRN: [id number]    Date of Admission: 2019  Primary Care Physician: Eric Patel MD    Subjective   Subjective     CC:  Follow up for multifocal pneumonia    HPI:  Complaining of left sided chest pain worse with taking a deep breath. Continues to have a dry cough. Denies fever or chills.    Review of Systems  CV- No chest pain, palpitations  GI- No N/V/D, abd pain      Objective   Objective     Vital Signs:   Temp:  [97.4 °F (36.3 °C)-98.6 °F (37 °C)] 97.4 °F (36.3 °C)  Heart Rate:  [] 100  Resp:  [16-20] 20  BP: (119-148)/(74-99) 122/80        Physical Exam:  Constitutional: Very tearful and stating she has significant pain in the right side of her back   HENT: NCAT, mucous membranes moist  Respiratory: decreased breath sounds on left compared to right, no wheezing, on 4L nasal canula  Cardiovascular: no murmurs, rubs, or gallops, no lower extremity edema  Gastrointestinal: Positive bowel sounds, soft, nontender, nondistended  Psychiatric: Appropriate affect, cooperative  Neurologic: Oriented x 3, strength symmetric in all extremities, Cranial Nerves grossly intact to confrontation, speech clear  Skin: No rashes on exposed skin     Results Reviewed:    Results from last 7 days   Lab Units 19  0616 19  0525 19  1438  11/15/19  0623  19  0353   WBC 10*3/mm3 8.74 10.68 13.96*   < >  --    < > 12.28*   HEMOGLOBIN g/dL 10.4* 9.9* 11.4*   < >  --    < > 9.6*   HEMATOCRIT % 35.4 33.6* 38.5   < >  --    < > 31.2*   PLATELETS 10*3/mm3 297 297 384   < >  --    < > 228   PROCALCITONIN ng/mL  --  0.64*  --   --  1.30*  --  4.04*    < > = values in this interval not displayed.     Results from last 7 days   Lab Units 19  0616 19  0525 19  1438  19  0716   SODIUM mmol/L 140 142 141   < > 142   POTASSIUM mmol/L 4.0 4.3 4.7   < > 3.4*    CHLORIDE mmol/L 102 104 102   < > 98   CO2 mmol/L 27.0 29.0 29.0   < > 31.0*   BUN mg/dL 15 15 19   < > 17   CREATININE mg/dL 0.48* 0.58 0.62   < > 0.63   GLUCOSE mg/dL 94 102* 101*   < > 136*   CALCIUM mg/dL 7.9* 8.1* 8.4*   < > 8.5*   ALT (SGPT) U/L  --   --   --   --  9   AST (SGOT) U/L  --   --   --   --  16   TROPONIN T ng/mL  --   --   --   --  0.030   PROBNP pg/mL  --   --   --   --  1,318.0    < > = values in this interval not displayed.     Estimated Creatinine Clearance: 37.6 mL/min (A) (by C-G formula based on SCr of 0.48 mg/dL (L)).    Microbiology Results Abnormal     Procedure Component Value - Date/Time    Body Fluid Culture - Body Fluid, Pleural Cavity [[id number]] Collected:  11/16/19 2253    Lab Status:  Preliminary result Specimen:  Body Fluid from Pleural Cavity Updated:  11/18/19 0747     Body Fluid Culture No growth     Gram Stain Few (2+) WBCs seen      No organisms seen    Blood Culture - Blood, Arm, Left [[id number]] Collected:  11/12/19 1218    Lab Status:  Final result Specimen:  Blood from Arm, Left Updated:  11/17/19 1246     Blood Culture No growth at 5 days    Blood Culture - Blood, Arm, Right [[id number]] Collected:  11/12/19 1218    Lab Status:  Final result Specimen:  Blood from Arm, Right Updated:  11/17/19 1246     Blood Culture No growth at 5 days    AFB Culture - Body Fluid, Pleural Cavity [[id number]] Collected:  11/16/19 2253    Lab Status:  Preliminary result Specimen:  Body Fluid from Pleural Cavity Updated:  11/17/19 1159     AFB Stain No acid fast bacilli seen on concentrated smear    Blood Culture - Blood, Arm, Right [[id number]] Collected:  11/12/19 0792    Lab Status:  Final result Specimen:  Blood from Arm, Right Updated:  11/17/19 1016     Blood Culture No growth at 5 days    Blood Culture - Blood, Arm, Left [[id number]] Collected:  11/12/19 0742    Lab Status:  Final result Specimen:  Blood from Arm, Left Updated:  11/17/19 1016     Blood Culture No growth at 5 days     KOH Prep - Body Fluid, Pleural Cavity [[id number]] Collected:  11/16/19 2253    Lab Status:  Final result Specimen:  Body Fluid from Pleural Cavity Updated:  11/16/19 2355     KOH Prep No yeast or hyphal elements seen    S. Pneumo Ag Urine or CSF - Urine, Urine, Clean Catch [[id number]]  (Normal) Collected:  11/15/19 1459    Lab Status:  Final result Specimen:  Urine, Clean Catch Updated:  11/16/19 0042     Strep Pneumo Ag Negative    Legionella Antigen, Urine - Urine, Urine, Clean Catch [[id number]]  (Normal) Collected:  11/15/19 1459    Lab Status:  Final result Specimen:  Urine, Clean Catch Updated:  11/16/19 0042     LEGIONELLA ANTIGEN, URINE Negative    Urine Culture - Urine, Urine, Catheter [[id number]]  (Abnormal)  (Susceptibility) Collected:  11/12/19 0717    Lab Status:  Final result Specimen:  Urine, Catheter Updated:  11/14/19 0348     Urine Culture >100,000 CFU/mL Klebsiella pneumoniae ssp pneumoniae    Susceptibility      Klebsiella pneumoniae ssp pneumoniae     MARY KAY     Ampicillin Resistant     Ampicillin + Sulbactam Susceptible     Cefazolin Susceptible     Cefepime Susceptible     Ceftazidime Susceptible     Ceftriaxone Susceptible     Gentamicin Susceptible     Levofloxacin Susceptible     Nitrofurantoin Intermediate     Piperacillin + Tazobactam Susceptible     Tetracycline Susceptible     Trimethoprim + Sulfamethoxazole Susceptible                    MRSA Screen, PCR - Swab, Nares [[id number]]  (Normal) Collected:  11/12/19 1403    Lab Status:  Final result Specimen:  Swab from Nares Updated:  11/12/19 1541     MRSA PCR Negative    Narrative:       MRSA Negative    Respiratory Panel, PCR - Swab, Nasopharynx [[id number]]  (Normal) Collected:  11/12/19 1022    Lab Status:  Final result Specimen:  Swab from Nasopharynx Updated:  11/12/19 1247     ADENOVIRUS, PCR Not Detected     Coronavirus 229E Not Detected     Coronavirus HKU1 Not Detected     Coronavirus NL63 Not Detected     Coronavirus OC43 Not  Detected     Human Metapneumovirus Not Detected     Human Rhinovirus/Enterovirus Not Detected     Influenza B PCR Not Detected     Parainfluenza Virus 1 Not Detected     Parainfluenza Virus 2 Not Detected     Parainfluenza Virus 3 Not Detected     Parainfluenza Virus 4 Not Detected     Bordetella pertussis pcr Not Detected     Influenza A H1 2009 PCR Not Detected     Chlamydophila pneumoniae PCR Not Detected     Mycoplasma pneumo by PCR Not Detected     Influenza A PCR Not Detected     Influenza A H3 Not Detected     Influenza A H1 Not Detected     RSV, PCR Not Detected     Bordetella parapertussis PCR Not Detected          Imaging Results (Last 24 Hours)     Procedure Component Value Units Date/Time    XR Chest 1 View [[id number]] Collected:  11/18/19 0859     Updated:  11/18/19 0947    Narrative:       EXAMINATION: XR CHEST 1 VW-11/18/2019:      INDICATION: Chest tube clamped; A41.9-Sepsis, unspecified organism;  J18.9-Pneumonia, unspecified organism; N39.0-Urinary tract infection,  site not specified; A49.9-Bacterial infection, unspecified; J96.01-Acute  respiratory failure with hypoxia; Z74.09-Other reduced mobility;  M31-Oqyeygd effusion, not elsewhere classified.      COMPARISON: 11/17/2019.     FINDINGS: Portable chest reveals the heart to be enlarged. Ill-defined  opacification at the left lung base with small left pleural effusion.  Chest tube identified on the right with ill-defined opacification seen  within the right lung base. Small right pleural effusion. The upper lung  field on the right is stable and unchanged. Degenerative changes seen  within the spine with vascular calcification seen within the thoracic  aorta.           Impression:       Stable chest as above. No definite pneumothorax.     D:  11/18/2019  E:  11/18/2019             XR Chest 1 View [[id number]] Collected:  11/17/19 1615     Updated:  11/17/19 1735    Narrative:          EXAMINATION: XR CHEST 1 VW - 11/17/2019     INDICATION:   A41.9-Sepsis, unspecified organism; J18.9-Pneumonia,  unspecified organism; N39.0-Urinary tract infection, site not specified;  A49.9-Bacterial infection, unspecified; J96.01-Acute respiratory failure  with hypoxia; Z74.09-Other reduced mobility; G15-Vbklocw effusion, not  elsewhere classified      COMPARISON: None     FINDINGS: Portable chest reveals heart to be enlarged. Mild increased  markings identified in the upper lung fields. Degenerative changes seen  within the spine. Chest tube identified on the right with small left  pleural effusion. Mild increased markings identified left lung base. No  change seen in the size of the pneumothorax on the right.       Impression:       Chest tube remains on the right with no change seen in size  of the tiny pneumothorax. Ill-defined opacification seen within the left  mid to lower lung field. Left pleural effusion time-of-flight     DICTATED:   11/17/2019  EDITED/ls :   11/17/2019           XR Chest 1 View [[id number]] Collected:  11/17/19 0941     Updated:  11/17/19 1613    Narrative:          EXAMINATION: XR CHEST 1 VW - 11/17/2019     INDICATION: A41.9-Sepsis, unspecified organism; J18.9-Pneumonia,  unspecified organism; N39.0-Urinary tract infection, site not specified;  A49.9-Bacterial infection, unspecified; J96.01-Acute respiratory failure  with hypoxia; Z74.09-Other reduced mobility; H66-Degfkev effusion, not  elsewhere classified      COMPARISON: None     FINDINGS: Portable chest reveals heart to be enlarged. There is a chest  tube identified on the right with tiny apical pneumothorax present.  Ill-defined opacification seen within the left mid to lower lung field.  The heart is enlarged. Air bronchograms identified on the left.  Degenerative changes seen within the spine.           Impression:       Chest tube identified on the right with tiny apical  pneumothorax. Ill-defined opacification again seen within the left mid  to lower lung field with air  bronchograms present on the left. Small  left pleural effusion present. Some shift of the mediastinum to the left  which is stable.     DICTATED:   11/17/2019  EDITED/ls :   11/17/2019      This report was finalized on 11/17/2019 4:10 PM by Dr. Marita Vance MD.       XR Chest 1 View [[id number]] Collected:  11/17/19 0813     Updated:  11/17/19 1611    Narrative:          EXAMINATION: XR CHEST 1 VW - 11/17/2019     INDICATION A41.9-Sepsis, unspecified organism; J18.9-Pneumonia,  unspecified organism; N39.0-Urinary tract infection, site not specified;  A49.9-Bacterial infection, unspecified; J96.01-Acute respiratory failure  with hypoxia; Z74.09-Other reduced mobility; M97-Sizuofv effusion, not  elsewhere classified      COMPARISON: 11/16/2019     FINDINGS: Portable chest reveals heart to be enlarged. There is a chest  tube identified on the right with tiny pneumothorax present. There are  increased markings seen within the left upper and lower lung fields.  Increased markings seen within the suprahilar region. Degenerative  changes seen within the spine.       Impression:       Chest tube identified on the right with tiny apical  pneumothorax present. The remainder of the chest is stable. Heart is  enlarged with increased markings again seen within the left mid to lower  lower lung field.     DICTATED:   11/17/2019  EDITED/ls :   11/17/2019      This report was finalized on 11/17/2019 4:08 PM by Dr. Marita Vance MD.       XR Chest 1 View [[id number]] Collected:  11/17/19 0834     Updated:  11/17/19 1611    Narrative:          EXAMINATION: XR CHEST 1 VW - 11/16/2019     INDICATION: ; A41.9-Sepsis, unspecified organism; J18.9-Pneumonia,  unspecified organism; N39.0-Urinary tract infection, site not specified;  A49.9-Bacterial infection, unspecified; J96.01-Acute respiratory failure  with hypoxia; Z74.09-Other reduced mobility      COMPARISON: 11/12/2019     FINDINGS: Portable chest reveals small chest tube  identified on the  right with small apical pneumothorax. Increased markings improving  within the right upper lobe. Ill-defined opacification within the left  mid to lower lung field. Development of a small left pleural effusion.            Impression:       Chest tube identified on the right with small apical  pneumothorax. Improvement seen in aeration of the right upper lobe.  There is development of a small left pleural effusion with ill-defined  opacification within the left mid to lower lung field.     DICTATED:   11/17/2019  EDITED/ls :   11/17/2019      This report was finalized on 11/17/2019 4:07 PM by Dr. Marita Vance MD.                  I have reviewed the medications:  Scheduled Meds:  aspirin  mg Oral Daily   azithromycin 500 mg Intravenous Q24H   bethanechol 10 mg Oral 4x Daily   busPIRone 10 mg Oral BID   donepezil 10 mg Oral Nightly   gabapentin 300 mg Oral BID   guaiFENesin 1,200 mg Oral Q12H   heparin (porcine) 5,000 Units Subcutaneous Q12H   ketorolac 15 mg Intravenous Once   levothyroxine 175 mcg Oral Daily   lidocaine 1 patch Transdermal Q24H   memantine 10 mg Oral BID   [START ON 11/19/2019] pantoprazole 40 mg Oral Q AM   piperacillin-tazobactam 3.375 g Intravenous Q8H   sertraline 50 mg Oral Daily   sodium chloride 10 mL Intravenous Q12H   sucralfate 1 g Oral 4x Daily AC & at Bedtime     Continuous Infusions:   PRN Meds:.•  acetaminophen **OR** acetaminophen **OR** acetaminophen  •  benzonatate  •  calcium carbonate EX  •  diphenoxylate-atropine  •  docusate sodium  •  guaiFENesin-dextromethorphan  •  ipratropium-albuterol  •  magnesium sulfate **OR** magnesium sulfate in D5W 1g/100mL (PREMIX) **OR** magnesium sulfate  •  ondansetron  •  potassium chloride **OR** potassium chloride **OR** potassium chloride  •  sodium chloride  •  sodium chloride  •  traMADol      Assessment/Plan   Assessment / Plan     Active Hospital Problems    Diagnosis  POA   • **Multifocal aspiration  pneumonia due to large intrathoracic hiatal hernia [J18.9]  Yes   • UTI due to Klebsiella species [N39.0, B96.1]  Yes   • Small postprocedural pneumothorax [J95.811]  No   • Bilateral parapneumonic pleural effusions.  S/p placement small right chest tube 11/16/19.  [J90]  Yes   • Acute respiratory failure with hypoxia not requiring ventilatory support (CMS/HCC) [J96.01]  Yes   • Massive hiatal hernia with intrathoracic stomach [K44.9]  Yes   • Hypothyroidism [E03.9]  Yes   • Hyperlipidemia [E78.5]  Yes   • Benign essential hypertension [I10]  Yes   • Multi-infarct dementia (CMS/HCC) [F01.50]  Yes   • Pernicious anemia [D51.0]  Yes      Resolved Hospital Problems    Diagnosis Date Resolved POA   • Elevated lactic acid level [R79.89] 11/18/2019 Yes   • Leukocytosis [D72.829] 11/18/2019 Yes   • Sepsis (CMS/HCC) [A41.9] 11/18/2019 Yes   • Abnormal urinalysis [R82.90] 11/18/2019 Yes        Brief Hospital Course to date:  Temi Jarrett is a 89 y.o. female  New to me today with hx of HLD, dementia, pernicious anemia, and HTN who was admitted on 11/12/2019 for acute hypoxic respiratory failure and sepsis 2/2 multifocal pneumonia, had right sided thoracentesis on November 16 and now with chest tube in place.     Acute hypoxic respiratory failure  Sepsis   2/2 Multifocal pneumonia and Klebsiella UTI  -s pneumo and legionella urine antigens and MRSA nares negative   -blood cultures NG at 5 days   -s/p right sided thoracentesis w/chest tube on right side, will put to suction, once draining <100 cc/24 hr will remove per Dr. Stoddard's recs  -continue azithromycin x 5 days and zosyn x 10 days for multifocal pneumonia  -continue supplemental oxygen prn, and IS  -given extra dose of toradol IV today as she was having severe right sided pain at chest tube insertion     Large hiatal hernia  -as seen on CT chest and CT abd/pelvis  -SLP eval normal - signed off     -aspiration precautions while eating  -started on pantoprazole 40 mg  qd     Fall  -PT/OT following  -continue fall precautions     HTN : held HCTZ due to sepsis   Hypothyroidism: continue synthroid     Dementia: continue zoloft, buspar, aricept, and namenda   Neuropathy : continue gabapentin  Hx of pernicious anemia: Hgb stable       DVT Prophylaxis:  Heparin sc    Disposition: I expect the patient to be discharged when respiratory status improves.    CODE STATUS:   Code Status and Medical Interventions:   Ordered at: 11/12/19 1002     Level Of Support Discussed With:    Patient     Code Status:    CPR     Medical Interventions (Level of Support Prior to Arrest):    Full         Electronically signed by Darcy Cordova MD, 11/18/19, 1:29 PM.

## 2019-11-18 NOTE — PROGRESS NOTES
Intensivist Note     11/18/2019  Hospital Day: 6  * No surgery found *      Ms. Temi Jarrett, 89 y.o. female is followed for:    Multifocal aspiration pneumonia due to large intrathoracic hiatal hernia    Massive hiatal hernia with intrathoracic stomach    Acute respiratory failure with hypoxia not requiring ventilatory support (CMS/HCC)    Bilateral parapneumonic pleural effusions.  S/p placement small right chest tube 11/16/19.     Small postprocedural pneumothorax    UTI due to Klebsiella species    Multi-infarct dementia (CMS/HCC)    Hypothyroidism    Hyperlipidemia    Benign essential hypertension    Pernicious anemia       SUBJECTIVE     89-year-old white female status post recent left hip replacement by Dr. Segundo 9/28/2019.  Carries a history of large intrathoracic hiatal hernia with reflux, hypertension, hyperlipidemia, hypothyroidism on replacement, multi-infarct dementia, and pernicious anemia.  Patient was admitted 11/12/2019 with fever and evidence of bilateral diffuse pneumonia.  Fever has resolved and WBC is normal on Zosyn and Zithromax.  No organisms have been cultured although it was noted at the time of admission that she also has a Klebsiella UTI which is also sensitive to Zosyn.  During the course of her hospital stay a CT scan was obtained and it was apparent that the patient had small bilateral pleural effusions right greater than left.  We were contacted and a small chest tube was placed the evening of 11/16/2019.  A total of 770 cc was obtained the first day.  Fluid had 5000 WBCs with 91% segmented neutrophils and was an exudative effusion by LDH criteria.  Cultures remain negative.    Interval history: Patient has no fever and WBC is normal.  She is not coughing excessively but is requiring a significant amount of oxygen (6 L nasal O2).  Even on 6 L when ambulates her O2 sats dropped into the 80s.  Denies pleuritic pain or hemoptysis.  Drainage from right pleural tube is 75 cc over the  "last 24 hours, but for some reason the tube was clamped last evening.  Chest x-ray shows definite improvement in right upper lobe infiltrate and right lower lobe infiltrate but the left chest actually looks worse (although it is a poor rotated film).    ROS: Per subjective, all other systems reviewed and were negative.    The patient's relevant PMH, PSH, FH, and SH were reviewed and updated in Epic as appropriate. Allergies and Medications reviewed.    OBJECTIVE     /80 (BP Location: Right arm, Patient Position: Lying)   Pulse 100   Temp 97.4 °F (36.3 °C) (Oral)   Resp 20   Ht 152.4 cm (60\")   Wt 56.7 kg (125 lb 1.6 oz)   LMP  (LMP Unknown)   SpO2 94%   BMI 24.43 kg/m²      Flow (L/min): 6    Flowsheet Rows      First Filed Value   Admission Height  152.4 cm (60\") Documented at 11/12/2019 0653   Admission Weight  53.5 kg (118 lb) Documented at 11/12/2019 0653        Intake & Output (last day)       11/17 0701 - 11/18 0700 11/18 0701 - 11/19 0700    P.O. 120     IV Piggyback      Total Intake(mL/kg) 120 (2.1)     Urine (mL/kg/hr) 100 (0.1)     Drains 75     Total Output 175     Net -55           Urine Unmeasured Occurrence 1 x           Exam:  General Exam:  Elderly articulate white female who appears quite irritated and interrupts frequently as I try to explain her situation  HEENT: Pupils equal and reactive. Nose and throat clear.  Neck:                          Supple, no JVD, thyromegaly, or adenopathy  Lungs: Bilateral fine rales. Diminished in the bases.  Cardiovascular: RRR without murmurs or gallops.   bpm  Abdomen: Soft nontender without organomegaly or masses.   and rectal: Deferred.  Extremities: No cyanosis or clubbing or edema but has evidence of previous venous stasis skin changes.  Neurologic:                 Symmetric strength but diffusely weak. No focal deficits.    Chest X-Ray: Rotated poor quality film.  Cardiomegaly with a large intrathoracic hiatal hernia.  Grossly noted " right upper and right lower lobe infiltrates look improved.  Right pigtail drain remains in place without evidence of right-sided effusion.  Left lower lobe densely opacified and impossible to distinguish hiatal hernia from effusion, atelectasis, infiltrate      Results from last 7 days   Lab Units 11/18/19  0616 11/17/19  0525 11/16/19  1438   WBC 10*3/mm3 8.74 10.68 13.96*   HEMOGLOBIN g/dL 10.4* 9.9* 11.4*   HEMATOCRIT % 35.4 33.6* 38.5   PLATELETS 10*3/mm3 297 297 384     Results from last 7 days   Lab Units 11/18/19  0616 11/17/19  0525   SODIUM mmol/L 140 142   POTASSIUM mmol/L 4.0 4.3   CHLORIDE mmol/L 102 104   CO2 mmol/L 27.0 29.0   BUN mg/dL 15 15   CREATININE mg/dL 0.48* 0.58   GLUCOSE mg/dL 94 102*   CALCIUM mg/dL 7.9* 8.1*     Results from last 7 days   Lab Units 11/13/19  0353   MAGNESIUM mg/dL 1.7     Results from last 7 days   Lab Units 11/12/19  0716   ALK PHOS U/L 75   BILIRUBIN mg/dL 0.7   ALT (SGPT) U/L 9   AST (SGOT) U/L 16       No results found for: SEDRATE  No results found for: BNP  Lab Results   Component Value Date    TROPONINT 0.030 11/12/2019     Lab Results   Component Value Date    TSH 0.716 09/28/2019     Lab Results   Component Value Date    LACTATE 1.8 11/12/2019     No results found for: CORTISOL    I reviewed the patient's results, images and medication.    Assessment/Plan   ASSESSMENT        Multifocal aspiration pneumonia due to large intrathoracic hiatal hernia    Massive hiatal hernia with intrathoracic stomach    Acute respiratory failure with hypoxia not requiring ventilatory support (CMS/HCC)    Bilateral parapneumonic pleural effusions.  S/p placement small right chest tube 11/16/19.     Small postprocedural pneumothorax    UTI due to Klebsiella species    Multi-infarct dementia (CMS/HCC)    Hypothyroidism    Hyperlipidemia    Benign essential hypertension    Pernicious anemia      DISCUSSION: In reviewing her CT scan and comparing it to an old abdominal CT scan its  apparent that her massive intrathoracic hiatal hernia with air-fluid levels and it has been present for quite some time.  The differences that now I can see that her esophagus is dilated all the way up her entire trachea.  I feel quite sure that this entire presentation with bilateral infiltrates is due to aspiration.  I was trying to explain to her that this is all come about because of her hiatal hernia and she immediately went on guard to tell me that she would never consider surgery although I had not even recommended (and probably would not do so at her age).  Conservative medical management of her hiatal hernia is the best we can offer but she will have recurrent pneumonias.    PLAN     1.  Would probably complete a full 10 to 14 days of parenteral therapy with Zosyn.  If you send her home before that complete her course with Augmentin  2.  She should never sleep flat.  Would actually put her bed in reverse Trendelenburg (20 degrees) and keep the head of her bed elevated  3.  Should not eat or drink for 3 hours before laying down to sleep  4.  Should be on a PPI in my opinion (rather than Carafate)  3.  I have unclamped her pigtail drain and will pull it when less than 100 cc drains over a 24-hour.  (This is just a parapneumonic effusion).  4.  If left lower lobe effusion increases may have to drain it as well, but I doubt it  5.  Unfortunately because of her intrathoracic hiatal hernia, will always be difficult to read just standard chest x-rays  6.  I did not do so today, but the patient is adamant about not even considering the possibility of a corrective technique for her hiatal hernia, she should also reconsider her CODE STATUS.  I think she is probably correct that she would not tolerate surgery, but if that is the case it would also be inappropriate to put her through a code, intubation, ventilatory support.  That should be reflected in the record.      Plan of care and goals reviewed with  mulitdisciplinary team at daily rounds.    I discussed the patient's findings and my recommendations with patient and nursing staff    High level of risk due to: severe exacerbation of chronic illness, illness with threat to life or bodily function and parenteral controlled substances.    Time spent Critical care 35 min (It does not include procedure time).    Hoang Stoddard MD  Intensive Care Medicine  11/18/19 11:20 AM

## 2019-11-18 NOTE — NURSING NOTE
Per phone call from Dr. Nichols and chest tube clamp order placed by , the roller clamp on the pigtail tube and the main tube to the chest tube drain box has also been clamped.

## 2019-11-18 NOTE — PLAN OF CARE
Problem: Patient Care Overview  Goal: Plan of Care Review  Outcome: Ongoing (interventions implemented as appropriate)   11/18/19 0314   Coping/Psychosocial   Plan of Care Reviewed With patient   Plan of Care Review   Progress no change   OTHER   Outcome Summary No acute events throughout the night. Pt on 6L NC and no SOA reported. Chest tube clamped per MD order. Vital signs stable, will continue to monitor.        Problem: Fall Risk (Adult)  Goal: Absence of Fall  Outcome: Ongoing (interventions implemented as appropriate)   11/18/19 0314   Fall Risk (Adult)   Absence of Fall making progress toward outcome       Problem: Skin Injury Risk (Adult)  Goal: Skin Health and Integrity  Outcome: Ongoing (interventions implemented as appropriate)   11/18/19 0314   Skin Injury Risk (Adult)   Skin Health and Integrity making progress toward outcome       Problem: Sepsis/Septic Shock (Adult)  Goal: Signs and Symptoms of Listed Potential Problems Will be Absent, Minimized or Managed (Sepsis/Septic Shock)  Outcome: Ongoing (interventions implemented as appropriate)   11/18/19 0314   Goal/Outcome Evaluation   Problems Assessed (Sepsis) all   Problems Present (Sepsis) none       Problem: Pneumonia (Adult)  Goal: Signs and Symptoms of Listed Potential Problems Will be Absent, Minimized or Managed (Pneumonia)  Outcome: Ongoing (interventions implemented as appropriate)   11/18/19 0314   Goal/Outcome Evaluation   Problems Assessed (Pneumonia) all   Problems Present (Pneumonia) respiratory compromise

## 2019-11-19 ENCOUNTER — APPOINTMENT (OUTPATIENT)
Dept: GENERAL RADIOLOGY | Facility: HOSPITAL | Age: 84
End: 2019-11-19

## 2019-11-19 LAB — GIE STN SPEC: NORMAL

## 2019-11-19 PROCEDURE — 25010000002 HEPARIN (PORCINE) PER 1000 UNITS: Performed by: INTERNAL MEDICINE

## 2019-11-19 PROCEDURE — 25010000002 AZITHROMYCIN PER 500 MG: Performed by: INTERNAL MEDICINE

## 2019-11-19 PROCEDURE — 97116 GAIT TRAINING THERAPY: CPT

## 2019-11-19 PROCEDURE — 99233 SBSQ HOSP IP/OBS HIGH 50: CPT | Performed by: INTERNAL MEDICINE

## 2019-11-19 PROCEDURE — 97110 THERAPEUTIC EXERCISES: CPT

## 2019-11-19 PROCEDURE — 97535 SELF CARE MNGMENT TRAINING: CPT | Performed by: OCCUPATIONAL THERAPIST

## 2019-11-19 PROCEDURE — 25010000002 KETOROLAC TROMETHAMINE PER 15 MG: Performed by: INTERNAL MEDICINE

## 2019-11-19 PROCEDURE — 97530 THERAPEUTIC ACTIVITIES: CPT | Performed by: OCCUPATIONAL THERAPIST

## 2019-11-19 PROCEDURE — 71045 X-RAY EXAM CHEST 1 VIEW: CPT

## 2019-11-19 PROCEDURE — 25010000002 PIPERACILLIN SOD-TAZOBACTAM PER 1 G: Performed by: INTERNAL MEDICINE

## 2019-11-19 RX ORDER — KETOROLAC TROMETHAMINE 30 MG/ML
15 INJECTION, SOLUTION INTRAMUSCULAR; INTRAVENOUS EVERY 6 HOURS PRN
Status: DISCONTINUED | OUTPATIENT
Start: 2019-11-19 | End: 2019-11-21 | Stop reason: HOSPADM

## 2019-11-19 RX ADMIN — BUSPIRONE HYDROCHLORIDE 10 MG: 10 TABLET ORAL at 21:28

## 2019-11-19 RX ADMIN — PANTOPRAZOLE SODIUM 40 MG: 40 TABLET, DELAYED RELEASE ORAL at 06:29

## 2019-11-19 RX ADMIN — TAZOBACTAM SODIUM AND PIPERACILLIN SODIUM 3.38 G: 375; 3 INJECTION, SOLUTION INTRAVENOUS at 22:25

## 2019-11-19 RX ADMIN — LEVOTHYROXINE SODIUM 175 MCG: 25 TABLET ORAL at 08:10

## 2019-11-19 RX ADMIN — TAZOBACTAM SODIUM AND PIPERACILLIN SODIUM 3.38 G: 375; 3 INJECTION, SOLUTION INTRAVENOUS at 12:31

## 2019-11-19 RX ADMIN — KETOROLAC TROMETHAMINE 15 MG: 30 INJECTION, SOLUTION INTRAMUSCULAR at 11:33

## 2019-11-19 RX ADMIN — SUCRALFATE 1 G: 1 TABLET ORAL at 08:10

## 2019-11-19 RX ADMIN — SUCRALFATE 1 G: 1 TABLET ORAL at 11:32

## 2019-11-19 RX ADMIN — DONEPEZIL HYDROCHLORIDE 10 MG: 10 TABLET, FILM COATED ORAL at 21:27

## 2019-11-19 RX ADMIN — GUAIFENESIN 1200 MG: 600 TABLET, EXTENDED RELEASE ORAL at 08:09

## 2019-11-19 RX ADMIN — ASPIRIN 325 MG: 325 TABLET, DELAYED RELEASE ORAL at 08:09

## 2019-11-19 RX ADMIN — GABAPENTIN 300 MG: 300 CAPSULE ORAL at 08:09

## 2019-11-19 RX ADMIN — BETHANECHOL CHLORIDE 10 MG: 10 TABLET ORAL at 11:32

## 2019-11-19 RX ADMIN — AZITHROMYCIN MONOHYDRATE 500 MG: 500 INJECTION, POWDER, LYOPHILIZED, FOR SOLUTION INTRAVENOUS at 17:20

## 2019-11-19 RX ADMIN — LIDOCAINE 1 PATCH: 50 PATCH TOPICAL at 08:08

## 2019-11-19 RX ADMIN — HEPARIN SODIUM 5000 UNITS: 5000 INJECTION, SOLUTION INTRAVENOUS; SUBCUTANEOUS at 21:28

## 2019-11-19 RX ADMIN — SERTRALINE HYDROCHLORIDE 50 MG: 50 TABLET ORAL at 08:09

## 2019-11-19 RX ADMIN — BETHANECHOL CHLORIDE 10 MG: 10 TABLET ORAL at 21:28

## 2019-11-19 RX ADMIN — GUAIFENESIN 1200 MG: 600 TABLET, EXTENDED RELEASE ORAL at 21:28

## 2019-11-19 RX ADMIN — SODIUM CHLORIDE, PRESERVATIVE FREE 10 ML: 5 INJECTION INTRAVENOUS at 21:27

## 2019-11-19 RX ADMIN — TAZOBACTAM SODIUM AND PIPERACILLIN SODIUM 3.38 G: 375; 3 INJECTION, SOLUTION INTRAVENOUS at 06:29

## 2019-11-19 RX ADMIN — GABAPENTIN 300 MG: 300 CAPSULE ORAL at 21:28

## 2019-11-19 RX ADMIN — BETHANECHOL CHLORIDE 10 MG: 10 TABLET ORAL at 17:21

## 2019-11-19 RX ADMIN — KETOROLAC TROMETHAMINE 15 MG: 30 INJECTION, SOLUTION INTRAMUSCULAR at 23:18

## 2019-11-19 RX ADMIN — GUAIFENESIN AND DEXTROMETHORPHAN 5 ML: 100; 10 SYRUP ORAL at 11:32

## 2019-11-19 RX ADMIN — SUCRALFATE 1 G: 1 TABLET ORAL at 21:28

## 2019-11-19 RX ADMIN — HEPARIN SODIUM 5000 UNITS: 5000 INJECTION, SOLUTION INTRAVENOUS; SUBCUTANEOUS at 08:09

## 2019-11-19 RX ADMIN — MEMANTINE 10 MG: 10 TABLET ORAL at 08:09

## 2019-11-19 RX ADMIN — MEMANTINE 10 MG: 10 TABLET ORAL at 21:28

## 2019-11-19 RX ADMIN — BETHANECHOL CHLORIDE 10 MG: 10 TABLET ORAL at 08:08

## 2019-11-19 RX ADMIN — BUSPIRONE HYDROCHLORIDE 10 MG: 10 TABLET ORAL at 08:10

## 2019-11-19 RX ADMIN — SODIUM CHLORIDE, PRESERVATIVE FREE 10 ML: 5 INJECTION INTRAVENOUS at 08:08

## 2019-11-19 RX ADMIN — SUCRALFATE 1 G: 1 TABLET ORAL at 17:20

## 2019-11-19 NOTE — PLAN OF CARE
Problem: Patient Care Overview  Goal: Plan of Care Review  Outcome: Ongoing (interventions implemented as appropriate)   11/19/19 6460   Coping/Psychosocial   Plan of Care Reviewed With patient   Plan of Care Review   Progress no change   OTHER   Outcome Summary The patient was anxious, scared, and crying in the early afternoon. She calmed down once she was in the chair and given pain medication that she asked for (for her back). There was a small blood smear with a stool on her ismael. Dr. Cordova was told about it. She still frequently asked about her care that is being provided and frequently sought after staff. No acute issues arose during the shift, vital signs remained stable. Will continue POC.

## 2019-11-19 NOTE — PROGRESS NOTES
Total of 510 cc out right pigtail drain in the last 24 hours.  In the last 12 hours however was only 80 cc.  I am still going to wait until tomorrow before I remove the pigtail drain.  Patient is afebrile, right lung looks clear without any effusion, and the left chest remains to read because of the overlying large intrathoracic hiatal hernia.  If anything however the left chest has also improved.    Will come by tomorrow and probably remove right pigtail drain if minimal amount out.  No other recommendations other than continuing Zosyn and converting to oral Augmentin upon discharge if full course of therapy has not been completed.  Unfortunately she is a poor candidate for repairing her intrathoracic hiatal hernia as she will develop recurrent pulmonary issues due to aspiration.  Her aspiration does not occur with eating but is more likely to occur when sleeping or supine.  Esophagus is dilated all the way up to her neck.    Hoang Stoddard MD  Pulmonary and Critical Care Medicine  11/19/19 11:03 AM

## 2019-11-19 NOTE — PLAN OF CARE
Problem: Patient Care Overview  Goal: Plan of Care Review   11/19/19 1022   Coping/Psychosocial   Plan of Care Reviewed With patient   Plan of Care Review   Progress no change   OTHER   Outcome Summary Pt ambulated 50 ft with RW and CGA, limited by fatigue, weakness, SOA, and R flank pain at chest tube site. SpO2 desat 85% on 6LO2NC, recovered to 92% within 60 sec seated rest. Will continue to progress pt as able per POC.

## 2019-11-19 NOTE — PROGRESS NOTES
Clinical Nutrition     Nutrition Assessment  Reason for Visit:   [facility]    Patient Name: Temi Jarrett  YOB: 1930  MRN: [id number]  Date of Encounter: 11/19/19 3:01 PM  Admission date: 11/12/2019    Nutrition Assessment   Admission Diagnosis       Multifocal aspiration pneumonia due to large intrathoracic hiatal hernia    Hypothyroidism    Hyperlipidemia    Multi-infarct dementia (CMS/HCC)    Benign essential hypertension    Pernicious anemia    Massive hiatal hernia with intrathoracic stomach    Acute respiratory failure with hypoxia not requiring ventilatory support (CMS/HCC)    Bilateral parapneumonic pleural effusions.  S/p placement small right chest tube 11/16/19.     Small postprocedural pneumothorax    UTI due to Klebsiella species    s/p  Thoracentesis 11-16-19    PMH: She  has a past medical history of Disease of thyroid gland, Gastric polyp, History of colonic polyps, Hypertension, IBS (irritable bowel syndrome), Macular degeneration, and Torn meniscus.   PSxH: She  has a past surgical history that includes Cholecystectomy (1997); Eye surgery (1998); Hernia repair; Hysterectomy (1976); Tonsillectomy; Knee surgery (Right); and Hip hemiArthroplasty (Left, 9/28/2019).     SLP Recommendation and Plan 11-17-19  SLP Swallowing Diagnosis: functional oral phase, functional pharyngeal phase  SLP Diet Recommendation: regular textures, thin liquids  SLP Rec. for Method of Medication Administration: meds whole, with thin liquids  Reported/Observed/Food/Nutrition Related History:     Pt sitting up in chair, reports good appetite, she ate more than usual today and is very full, normally she eats smaller portions, she does not like supplement drinks, but is willing to eats snack in between meals,  she walked 2x today and would like to walk 4x/day if she can get someone to walk with her, has questions regarding when her CT will be removed and when she will go to rehab    Anthropometrics  "    Height: 152.4 cm (60\")  Last filed wt: 125lb  Weight Method: Bed scale    BMI: 24  Normal: 18.5-24.9kg/m2      Labs reviewed     Results from last 7 days   Lab Units 11/18/19  0616   SODIUM mmol/L 140   POTASSIUM mmol/L 4.0   CHLORIDE mmol/L 102   CO2 mmol/L 27.0   BUN mg/dL 15   CREATININE mg/dL 0.48*   CALCIUM mg/dL 7.9*   GLUCOSE mg/dL 94       Results from last 7 days   Lab Units 11/14/19  0545   GLUCOSE mg/dL 111     No results found for: HGBA1C      Medications reviewed   Pertinent: abx, heparin, protonix, carafate         GI: wnl    SKIN: red/ blanchable    Current Nutrition Prescription     PO: Diet Regular    Intake:43% of 11 meals         Nutrition Diagnosis     Problem Inadequate oral intake   Etiology Per Clinical Status   Signs/Symptoms Po Intake 43%       Nutrition Intervention     Interventions Goal    General: Nutrition support treatment  Increase Intake    Nutrition Interventions   1.  Follow treatment progress, Advised available snacks, Interview for preferences, Menu provided, Menu adjusted, Encourage intake, Supplement offered/refused    Encourage small meals, with snacks in between, sitting up a couple hour s/p eating or drinking 2nd large intrathoracic hiatal hernia    Monitor/ Evaluation    Per protocol, I&O, PO intake, Pertinent labs, Weight, Skin status, GI status, Symptoms, Swallow function    Eliana Leavitt RD  Time Spent: 60min   "

## 2019-11-19 NOTE — PLAN OF CARE
Problem: Patient Care Overview  Goal: Plan of Care Review  Outcome: Ongoing (interventions implemented as appropriate)   11/19/19 9360   Coping/Psychosocial   Plan of Care Reviewed With patient   Plan of Care Review   Progress improving   OTHER   Outcome Summary Pt. with increasing occupational endurance, tolerating 35 ft. ambulation with RW & O2 sats remaining in 90's. Pt. CGA with t/f's, set up with grooming & feeding, but max A with toileting.

## 2019-11-19 NOTE — PLAN OF CARE
Problem: Patient Care Overview  Goal: Plan of Care Review  Outcome: Ongoing (interventions implemented as appropriate)   11/19/19 0552   Coping/Psychosocial   Plan of Care Reviewed With patient   Plan of Care Review   Progress no change   OTHER   Outcome Summary VSS. Denies pain/discomfort. Chest tube to low suction. No acute respiratory distress. Will continue to monitor.       Problem: Fall Risk (Adult)  Goal: Absence of Fall  Outcome: Ongoing (interventions implemented as appropriate)      Problem: Skin Injury Risk (Adult)  Goal: Skin Health and Integrity  Outcome: Ongoing (interventions implemented as appropriate)      Problem: Sepsis/Septic Shock (Adult)  Goal: Signs and Symptoms of Listed Potential Problems Will be Absent, Minimized or Managed (Sepsis/Septic Shock)  Outcome: Ongoing (interventions implemented as appropriate)      Problem: Pneumonia (Adult)  Goal: Signs and Symptoms of Listed Potential Problems Will be Absent, Minimized or Managed (Pneumonia)  Outcome: Ongoing (interventions implemented as appropriate)

## 2019-11-19 NOTE — PROGRESS NOTES
Continued Stay Note  [facility]     Patient Name: Temi Jarrett  MRN: [id number]  Today's Date: 11/19/2019    Admit Date: 11/12/2019    Discharge Plan     Row Name 11/19/19 1617       Plan    Plan  update    Patient/Family in Agreement with Plan  yes    Plan Comments  Spoke with patient at bedside regarding discharge plan.  Patient still has chest tube in but indicates that the pain is much better.  Patient still indicates that she would like to go to [facility].  CM advised patient that [facility] is still following her and can offer her a bed when she is ready to discharge and bed is available.  No new needs noted.  CM following.  Patient plan is to discharge to [facility] via car with family to transport.          Discharge Codes    No documentation.       Expected Discharge Date and Time     Expected Discharge Date Expected Discharge Time    Nov 19, 2019             Abbey Serna RN

## 2019-11-19 NOTE — THERAPY TREATMENT NOTE
Acute Care - Occupational Therapy Treatment Note  [facility]     Patient Name: Temi Jarrett  : 1930  MRN: [id number]  Today's Date: 2019             Admit Date: 2019       ICD-10-CM ICD-9-CM   1. Sepsis without acute organ dysfunction, due to unspecified organism (CMS/[facility]) A41.9 038.9     995.91   2. Pneumonia of both lungs due to infectious organism, unspecified part of lung J18.9 483.8   3. Bacterial UTI N39.0 599.0    A49.9 041.9   4. Acute respiratory failure with hypoxia (CMS/[facility]) J96.01 518.81   5. Impaired mobility and ADLs Z74.09 799.89   6. Pleural effusion - bilateral, right greater than left parapneumonic pleural effusions s/p right thoracentesis 19.  J90 511.9     Patient Active Problem List   Diagnosis   • Skin tear of lower leg without complication, right, initial encounter   • Esophageal reflux   • Hypothyroidism   • Generalized anxiety disorder   • Hyperlipidemia   • Multi-infarct dementia (CMS/[facility])   • Peripheral neuropathy   • Carpal tunnel syndrome of right wrist   • Spinal stenosis of lumbar region   • Osteoporosis   • Fever   • Urinary frequency   • Peripheral venous insufficiency   • Disc disorder of lumbar region   • Bladder prolapse, female, acquired   • Acute right-sided low back pain without sciatica   • Pain, knee   • Benign essential hypertension   • Pernicious anemia   • Memory loss   • Annual physical exam   • Primary osteoarthritis of both knees   • Chest pain, atypical   • Massive hiatal hernia with intrathoracic stomach   • Closed fracture of neck of left femur (CMS/[facility])   • Hypoxia   • Multifocal aspiration pneumonia due to large intrathoracic hiatal hernia   • Acute respiratory failure with hypoxia not requiring ventilatory support (CMS/[facility])   • Bilateral parapneumonic pleural effusions.  S/p placement small right chest tube 19.    • Small postprocedural pneumothorax   • UTI due to Klebsiella species     Past Medical History:   Diagnosis Date   •  Disease of thyroid gland    • Gastric polyp    • History of colonic polyps    • Hypertension    • IBS (irritable bowel syndrome)    • Macular degeneration    • Torn meniscus     right knee      Past Surgical History:   Procedure Laterality Date   • CHOLECYSTECTOMY  1997   • EYE SURGERY  1998    Cataract extraction   • HERNIA REPAIR     • HIP HEMIARTHROPLASTY Left 9/28/2019    Procedure: HIP HEMIARTHROPLASTY LEFT;  Surgeon: Reddy Segundo Jr., MD;  Location: [facility];  Service: Orthopedics   • HYSTERECTOMY  1976   • KNEE SURGERY Right     arthroscopy- 2000   • TONSILLECTOMY         Therapy Treatment    Rehabilitation Treatment Summary     Row Name 11/19/19 0812             Treatment Time/Intention    Discipline  occupational therapist  -SD      Document Type  therapy note (daily note)  -SD      Subjective Information  complains of;pain  -SD      Mode of Treatment  occupational therapy  -SD      Patient/Family Observations  Pt. supine in bed. No family present. Pt. with R chest tube, 6LO2 via NC, purewick catheter, IV, SCDS, bed alarm, tele, & O2 sensor.  -SD      Care Plan Review  care plan/treatment goals reviewed;patient/other agree to care plan  -SD      Total Minutes, Occupational Therapy Treatment  34  -SD      Patient Effort  good  -SD      Recorded by [SD] Sadie Santos, OT 11/19/19 0902      Row Name 11/19/19 0812             Vital Signs    Pre Systolic BP Rehab  136  -SD      Pre Treatment Diastolic BP  76  -SD      Posttreatment Heart Rate (beats/min)  87  -SD      Pre SpO2 (%)  96  -SD      O2 Delivery Pre Treatment  nasal cannula  -SD      Intra SpO2 (%)  91  -SD      O2 Delivery Intra Treatment  nasal cannula  -SD      Post SpO2 (%)  94  -SD      O2 Delivery Post Treatment  nasal cannula  -SD      Pre Patient Position  Supine  -SD      Intra Patient Position  Standing  -SD      Post Patient Position  Sitting  -SD      Recorded by [SD] Sadie Santos OT 11/19/19 0902      Row Name  11/19/19 0812             Cognitive Assessment/Intervention- PT/OT    Orientation Status (Cognition)  oriented to;person;place;situation  -SD      Follows Commands (Cognition)  follows one step commands;over 90% accuracy;verbal cues/prompting required;repetition of directions required  -SD      Safety Deficit (Cognitive)  mild deficit;insight into deficits/self awareness;safety precautions awareness;safety precautions follow-through/compliance  -SD      Recorded by [SD] Sadie Santos, OT 11/19/19 0902      Row Name 11/19/19 0812             Safety Issues, Functional Mobility    Safety Issues Affecting Function (Mobility)  insight into deficits/self awareness;safety precaution awareness;safety precautions follow-through/compliance  -SD      Impairments Affecting Function (Mobility)  balance;endurance/activity tolerance;pain;strength;shortness of breath  -SD      Recorded by [SD] Saide Santos OT 11/19/19 0902      Row Name 11/19/19 0812             Bed Mobility Assessment/Treatment    Bed Mobility Assessment/Treatment  rolling left;scooting/bridging;supine-sit  -SD      Rolling Left [place] (Bed Mobility)  contact guard  -SD      Scooting/Bridging [place] (Bed Mobility)  contact guard;verbal cues  -SD      Supine-Sit [place] (Bed Mobility)  contact guard;verbal cues  -SD      Bed Mobility, Safety Issues  decreased use of arms for pushing/pulling;decreased use of legs for bridging/pushing  -SD      Assistive Device (Bed Mobility)  bed rails;head of bed elevated  -SD      Recorded by [SD] Sadie Santos OT 11/19/19 0902      Row Name 11/19/19 0812             Functional Mobility    Functional Mobility- Ind. Level  contact guard assist  -SD      Functional Mobility- Device  rolling walker  -SD      Functional Mobility-Distance (Feet)  35  -SD      Functional Mobility- Safety Issues  balance decreased during turns;supplemental O2  -SD      Recorded by [SD] Sadie Santos  Gretchen, OT 11/19/19 0902      Row Name 11/19/19 0812             Transfer Assessment/Treatment    Transfer Assessment/Treatment  bed-chair transfer;sit-stand transfer;stand-sit transfer;toilet transfer  -SD      Recorded by [SD] Sadie Santos, OT 11/19/19 0902      Row Name 11/19/19 0812             Bed-Chair Transfer    Bed-Chair [place] (Transfers)  contact guard  -SD      Assistive Device (Bed-Chair Transfers)  walker, front-wheeled  -SD      Recorded by [SD] Sadie Santos, OT 11/19/19 0902      Row Name 11/19/19 0812             Sit-Stand Transfer    Sit-Stand [place] (Transfers)  contact guard  -SD      Assistive Device (Sit-Stand Transfers)  walker, front-wheeled  -SD      Recorded by [SD] Sadie Santos OT 11/19/19 0902      Row Name 11/19/19 0812             Stand-Sit Transfer    Stand-Sit [place] (Transfers)  contact guard  -SD      Assistive Device (Stand-Sit Transfers)  walker, front-wheeled  -SD      Recorded by [SD] Sadie Santos, OT 11/19/19 0902      Row Name 11/19/19 0812             Toilet Transfer    Type (Toilet Transfer)  stand pivot/stand step  -SD      [place] Level (Toilet Transfer)  contact guard;verbal cues  -SD      Assistive Device (Toilet Transfer)  commode, bedside without drop arms;walker, front-wheeled  -SD      Recorded by [SD] Sadie Santos OT 11/19/19 0902      Row Name 11/19/19 0812             ADL Assessment/Intervention    01507 - OT Self Care/Mgmt Minutes  10  -SD      BADL Assessment/Intervention  grooming;toileting;upper body dressing;feeding  -SD      Recorded by [SD] Sadie Santos OT 11/19/19 0902      Row Name 11/19/19 0812             Upper Body Dressing Assessment/Training    Upper Body Dressing [place] Level  don;pajama/robe;minimum assist (75% patient effort)  -SD      Upper Body Dressing Position  edge of bed sitting  -SD      Recorded by [SD] Sadie Santos OT 11/19/19 0902       Row Name 11/19/19 0812             Grooming Assessment/Training    [place] Level (Grooming)  hair care, combing/brushing;wash face, hands;set up  -SD      Grooming Position  edge of bed sitting  -SD      Recorded by [SD] Sadie Santos OT 11/19/19 0902      Row Name 11/19/19 0812             Self-Feeding Assessment/Training    [place] Level (Feeding)  liquids to mouth;scoop food and bring to mouth;set up  -SD      Position (Self-Feeding)  supported sitting  -SD      Recorded by [SD] Sadie Santos, OT 11/19/19 0902      Row Name 11/19/19 0812             Toileting Assessment/Training    [place] Level (Toileting)  adjust/manage clothing;perform perineal hygiene;maximum assist (25% patient effort)  -SD      Assistive Devices (Toileting)  commode, bedside without drop arms  -SD      Toileting Position  supported standing  -SD      Recorded by [SD] Sadie Santos OT 11/19/19 0902      Row Name 11/19/19 0812             Therapeutic Exercise    41569 - OT Therapeutic Activity Minutes  24  -SD      Recorded by [SD] Sadie Santos OT 11/19/19 0902      Row Name 11/19/19 0812             Static Sitting Balance    Level of [place] (Unsupported Sitting, Static Balance)  supervision  -SD      Sitting Position (Unsupported Sitting, Static Balance)  sitting on edge of bed  -SD      Recorded by [SD] Sadie Santos OT 11/19/19 0902      Row Name 11/19/19 0812             Static Standing Balance    Level of [place] (Supported Standing, Static Balance)  contact guard assist  -SD      Assistive Device Utilized (Supported Standing, Static Balance)  walker, rolling  -SD      Recorded by [SD] Sadie Santos OT 11/19/19 0902      Row Name 11/19/19 0812             Dynamic Standing Balance    Level of [place], Reaches Outside Midline (Standing, Dynamic Balance)  contact guard assist  -SD      Assistive Device Utilized (Supported Standing, Dynamic Balance)   walker, rolling  -SD      Recorded by [SD] Sadie Santos, OT 11/19/19 0902      Row Name 11/19/19 0812             Positioning and Restraints    Pre-Treatment Position  in bed  -SD      Post Treatment Position  chair  -SD      In Chair  reclined;call light within reach;encouraged to call for assist;exit alarm on;legs elevated  -SD      Recorded by [SD] Sadie Santos, OT 11/19/19 0902      Row Name 11/19/19 0812             Pain Scale: Numbers Pre/Post-Treatment    Pain Location - Side  Right  -SD      Pain Location  chest  -SD      Pain Intervention(s)  Repositioned;Ambulation/increased activity  -SD      Recorded by [SD] Sadie Santos, OT 11/19/19 0902      Row Name 11/19/19 0812             Pain Scale: FACES Pre/Post-Treatment    Pain: FACES Scale, Pretreatment  2-->hurts little bit  -SD      Pain: FACES Scale, Post-Treatment  4-->hurts little more  -SD      Pre/Post Treatment Pain Comment  chest tube site  -SD      Recorded by [SD] Sadie Santos, OT 11/19/19 0902      Row Name 11/19/19 0812             Coping    Observed Emotional State  accepting;calm;cooperative  -SD      Verbalized Emotional State  acceptance  -SD      Recorded by [SD] Sadie Santos, OT 11/19/19 0902      Row Name 11/19/19 0812             Plan of Care Review    Plan of Care Reviewed With  patient  -SD      Recorded by [SD] Sadie Santos, OT 11/19/19 0902      Row Name 11/19/19 0812             Outcome Summary/Treatment Plan (OT)    Daily Summary of Progress (OT)  progress toward functional goals is good  -SD      Plan for Continued Treatment (OT)  cont OT POC  -SD      Anticipated Discharge Disposition (OT)  inpatient rehabilitation facility  -SD      Recorded by [SD] Sadie Santos, OT 11/19/19 0902        User Key  (r) = Recorded By, (t) = Taken By, (c) = Cosigned By    Initials Name Effective Dates Discipline    SD Sadie Santos, OT 06/08/18 -  OT              Occupational Therapy Education     Title: PT OT SLP Therapies (Done)     Topic: Occupational Therapy (Done)     Point: ADL training (Done)     Description: Instruct learner(s) on proper safety adaptation and remediation techniques during self care or transfers.   Instruct in proper use of assistive devices.    Learning Progress Summary           Patient Acceptance, E, VU by  at 11/18/2019 12:23 AM    Acceptance, E, NR by  at 11/17/2019  2:11 PM    Comment:  Educated pt regarding therex, THP    Acceptance, E, VU,NR by SANDEEP at 11/13/2019 11:00 AM    Comment:  reason for consult, noted deficits, role OT, transfer safety.                   Point: Home exercise program (Done)     Description: Instruct learner(s) on appropriate technique for monitoring, assisting and/or progressing therapeutic exercises/activities.    Learning Progress Summary           Patient Acceptance, E, VU by  at 11/18/2019 12:23 AM    Acceptance, E, NR by  at 11/17/2019  2:11 PM    Comment:  Educated pt regarding therex, THP                   Point: Precautions (Done)     Description: Instruct learner(s) on prescribed precautions during self-care and functional transfers.    Learning Progress Summary           Patient Acceptance, E, VU by  at 11/18/2019 12:23 AM    Acceptance, E, NR by  at 11/17/2019  2:11 PM    Comment:  Educated pt regarding therex, THP    Acceptance, E, VU,NR by SANDEEP at 11/13/2019 11:00 AM    Comment:  reason for consult, noted deficits, role OT, transfer safety.                   Point: Body mechanics (Done)     Description: Instruct learner(s) on proper positioning and spine alignment during self-care, functional mobility activities and/or exercises.    Learning Progress Summary           Patient Acceptance, E, VU by  at 11/18/2019 12:23 AM                               User Key     Initials Effective Dates Name Provider Type Discipline    SANDEEP 06/08/18 -  Didi Tao OT Occupational Therapist OT      06/22/15 -  Tiffany Lord, OT Occupational Therapist OT     02/15/19 -  Sunita Bauman RN Registered Nurse Nurse                OT Recommendation and Plan  Outcome Summary/Treatment Plan (OT)  Daily Summary of Progress (OT): progress toward functional goals is good  Plan for Continued Treatment (OT): cont OT POC  Anticipated Discharge Disposition (OT): inpatient rehabilitation facility  Daily Summary of Progress (OT): progress toward functional goals is good  Plan of Care Review  Plan of Care Reviewed With: patient  Plan of Care Reviewed With: patient  Outcome Summary: Pt. with increasing occupational endurance, tolerating 35 ft. ambulation with RW & O2 sats remaining in 90's. Pt. CGA with t/f's, set up with grooming & feeding, but max A with toileting.  Outcome Measures     Row Name 11/19/19 0812 11/17/19 1418          How much help from another is currently needed...    Putting on and taking off regular lower body clothing?  2  -SD  1  -JR     Bathing (including washing, rinsing, and drying)  2  -SD  2  -JR     Toileting (which includes using toilet bed pan or urinal)  2  -SD  1  -JR     Putting on and taking off regular upper body clothing  3  -SD  2  -JR     Taking care of personal grooming (such as brushing teeth)  3  -SD  3  -JR     Eating meals  3  -SD  3  -JR     AM-PAC 6 Clicks Score (OT)  15  -SD  12  -JR        Functional Assessment    Outcome Measure Options  AM-PAC 6 Clicks Daily Activity (OT)  -SD  AM-PAC 6 Clicks Daily Activity (OT)  -JR       User Key  (r) = Recorded By, (t) = Taken By, (c) = Cosigned By    Initials Name Provider Type    Sadie Cardenas, OT Occupational Therapist    Tiffany Dhaliwal OT Occupational Therapist           Time Calculation:   Time Calculation- OT     Row Name 11/19/19 0812             Time Calculation- OT    OT Start Time  0812  -SD      OT Stop Time  0846  -SD      OT Time Calculation (min)  34 min  -SD      OT Received On  11/19/19  -SD      OT  Goal Re-Cert Due Date  11/23/19  -SD         Timed Charges    57170 - OT Therapeutic Activity Minutes  24  -SD      02913 - OT Self Care/Mgmt Minutes  10  -SD        User Key  (r) = Recorded By, (t) = Taken By, (c) = Cosigned By    Initials Name Provider Type    Sadie Cardenas OT Occupational Therapist        Therapy Charges for Today     Code Description Service Date Service Provider Modifiers Qty    15111370987 HC OT THERAPEUTIC ACT EA 15 MIN 11/19/2019 Sadie Santos OT GO 1    01284179238 HC OT SELF CARE/MGMT/TRAIN EA 15 MIN 11/19/2019 Sadie Santos OT GO 1               Sadie Santos OT  11/19/2019

## 2019-11-19 NOTE — PROGRESS NOTES
[facility] Medicine Services  PROGRESS NOTE    Patient Name: Temi Jarrett  : 1930  MRN: [id number]    Date of Admission: 2019  Primary Care Physician: Eric Patel MD    Subjective   Subjective     CC:  Follow up multifocal pneumonia    HPI:  Continues to have pain at site of chest tube insertion. She continues to have dyspnea as well, but it's not better, or worse.    Review of Systems  Gen- No fevers, chills  CV- No chest pain, palpitations  GI- No N/V/D, abd pain    Objective   Objective     Vital Signs:   Temp:  [97.8 °F (36.6 °C)-98.8 °F (37.1 °C)] 98.1 °F (36.7 °C)  Heart Rate:  [80-92] 80  Resp:  [16-20] 16  BP: ()/(61-85) 97/61        Physical Exam:  Constitutional: No acute distress, awake, alert  HENT: NCAT, mucous membranes moist  Respiratory: crackles appreciated bilaterally, respiratory effort normal on supplemental oxygen, has chest tube in place on the right side   Cardiovascular: RRR, no murmurs, rubs, or gallops, no lower extremity edema   Gastrointestinal: Positive bowel sounds, soft, nontender, nondistended  Psychiatric: Appropriate affect, cooperative, very pessimistic about her situation   Neurologic: Oriented x 3, Cranial Nerves grossly intact to confrontation, speech clear    Results Reviewed:    Results from last 7 days   Lab Units 19  0616 19  0525 19  1438  11/15/19  0623  19  0353   WBC 10*3/mm3 8.74 10.68 13.96*   < >  --    < > 12.28*   HEMOGLOBIN g/dL 10.4* 9.9* 11.4*   < >  --    < > 9.6*   HEMATOCRIT % 35.4 33.6* 38.5   < >  --    < > 31.2*   PLATELETS 10*3/mm3 297 297 384   < >  --    < > 228   PROCALCITONIN ng/mL  --  0.64*  --   --  1.30*  --  4.04*    < > = values in this interval not displayed.     Results from last 7 days   Lab Units 19  0616 19  0525 19  1438   SODIUM mmol/L 140 142 141   POTASSIUM mmol/L 4.0 4.3 4.7   CHLORIDE mmol/L 102 104 102   CO2 mmol/L 27.0 29.0 29.0    BUN mg/dL 15 15 19   CREATININE mg/dL 0.48* 0.58 0.62   GLUCOSE mg/dL 94 102* 101*   CALCIUM mg/dL 7.9* 8.1* 8.4*     Estimated Creatinine Clearance: 37.6 mL/min (A) (by C-G formula based on SCr of 0.48 mg/dL (L)).    Microbiology Results Abnormal     Procedure Component Value - Date/Time    Body Fluid Culture - Body Fluid, Pleural Cavity [[id number]] Collected:  11/16/19 2253    Lab Status:  Preliminary result Specimen:  Body Fluid from Pleural Cavity Updated:  11/19/19 1220     Body Fluid Culture No growth at 2 days     Gram Stain Few (2+) WBCs seen      No organisms seen    Blood Culture - Blood, Arm, Left [[id number]] Collected:  11/12/19 1218    Lab Status:  Final result Specimen:  Blood from Arm, Left Updated:  11/17/19 1246     Blood Culture No growth at 5 days    Blood Culture - Blood, Arm, Right [[id number]] Collected:  11/12/19 1218    Lab Status:  Final result Specimen:  Blood from Arm, Right Updated:  11/17/19 1246     Blood Culture No growth at 5 days    AFB Culture - Body Fluid, Pleural Cavity [[id number]] Collected:  11/16/19 2253    Lab Status:  Preliminary result Specimen:  Body Fluid from Pleural Cavity Updated:  11/17/19 1159     AFB Stain No acid fast bacilli seen on concentrated smear    Blood Culture - Blood, Arm, Right [[id number]] Collected:  11/12/19 0740    Lab Status:  Final result Specimen:  Blood from Arm, Right Updated:  11/17/19 1016     Blood Culture No growth at 5 days    Blood Culture - Blood, Arm, Left [[id number]] Collected:  11/12/19 0745    Lab Status:  Final result Specimen:  Blood from Arm, Left Updated:  11/17/19 1016     Blood Culture No growth at 5 days    KOH Prep - Body Fluid, Pleural Cavity [[id number]] Collected:  11/16/19 2253    Lab Status:  Final result Specimen:  Body Fluid from Pleural Cavity Updated:  11/16/19 3749     KOH Prep No yeast or hyphal elements seen    S. Pneumo Ag Urine or CSF - Urine, Urine, Clean Catch [[id number]]  (Normal) Collected:  11/15/19 2053     Lab Status:  Final result Specimen:  Urine, Clean Catch Updated:  11/16/19 0042     Strep Pneumo Ag Negative    Legionella Antigen, Urine - Urine, Urine, Clean Catch [[id number]]  (Normal) Collected:  11/15/19 1459    Lab Status:  Final result Specimen:  Urine, Clean Catch Updated:  11/16/19 0042     LEGIONELLA ANTIGEN, URINE Negative    Urine Culture - Urine, Urine, Catheter [[id number]]  (Abnormal)  (Susceptibility) Collected:  11/12/19 0717    Lab Status:  Final result Specimen:  Urine, Catheter Updated:  11/14/19 0348     Urine Culture >100,000 CFU/mL Klebsiella pneumoniae ssp pneumoniae    Susceptibility      Klebsiella pneumoniae ssp pneumoniae     MARY KAY     Ampicillin Resistant     Ampicillin + Sulbactam Susceptible     Cefazolin Susceptible     Cefepime Susceptible     Ceftazidime Susceptible     Ceftriaxone Susceptible     Gentamicin Susceptible     Levofloxacin Susceptible     Nitrofurantoin Intermediate     Piperacillin + Tazobactam Susceptible     Tetracycline Susceptible     Trimethoprim + Sulfamethoxazole Susceptible                    MRSA Screen, PCR - Swab, Nares [[id number]]  (Normal) Collected:  11/12/19 1403    Lab Status:  Final result Specimen:  Swab from Nares Updated:  11/12/19 1541     MRSA PCR Negative    Narrative:       MRSA Negative    Respiratory Panel, PCR - Swab, Nasopharynx [[id number]]  (Normal) Collected:  11/12/19 1022    Lab Status:  Final result Specimen:  Swab from Nasopharynx Updated:  11/12/19 1247     ADENOVIRUS, PCR Not Detected     Coronavirus 229E Not Detected     Coronavirus HKU1 Not Detected     Coronavirus NL63 Not Detected     Coronavirus OC43 Not Detected     Human Metapneumovirus Not Detected     Human Rhinovirus/Enterovirus Not Detected     Influenza B PCR Not Detected     Parainfluenza Virus 1 Not Detected     Parainfluenza Virus 2 Not Detected     Parainfluenza Virus 3 Not Detected     Parainfluenza Virus 4 Not Detected     Bordetella pertussis pcr Not Detected      Influenza A H1 2009 PCR Not Detected     Chlamydophila pneumoniae PCR Not Detected     Mycoplasma pneumo by PCR Not Detected     Influenza A PCR Not Detected     Influenza A H3 Not Detected     Influenza A H1 Not Detected     RSV, PCR Not Detected     Bordetella parapertussis PCR Not Detected          Imaging Results (Last 24 Hours)     Procedure Component Value Units Date/Time    XR Chest 1 View [[id number]] Collected:  11/19/19 0818     Updated:  11/19/19 1110    Narrative:       EXAMINATION: XR CHEST 1 VW- 11/19/2019     INDICATION: f/u respiratory illness; A41.9-Sepsis, unspecified organism;  J18.9-Pneumonia, unspecified organism; N39.0-Urinary tract infection,  site not specified; A49.9-Bacterial infection, unspecified; J96.01-Acute  respiratory failure with hypoxia; Z74.09-Other reduced mobility;  Q11-Gerjgql effusion, not elsewhere classified      COMPARISON: Chest x-ray 11/18/2019     FINDINGS: Cardiac size enlarged along with large hiatal hernia projects  over the lower chest. Patchy opacifications in the bilateral upper lungs  similar to prior. Right percutaneous chest tube remains in place without  distinct pleural effusion component. Small left pleural effusion similar  to prior.           Impression:       No significant interval change.     D:  11/19/2019  E:  11/19/2019     This report was finalized on 11/19/2019 11:07 AM by Dr. Shai Lucas.       XR Chest 1 View [[id number]] Collected:  11/18/19 1355     Updated:  11/18/19 1551    Narrative:       EXAMINATION: XR CHEST 1 VW-11/18/2019:      INDICATION: DYSPNEA; A41.9-Sepsis, unspecified organism;  J18.9-Pneumonia, unspecified organism; N39.0-Urinary tract infection,  site not specified; A49.9-Bacterial infection, unspecified; J96.01-Acute  respiratory failure with hypoxia; Z74.09-Other reduced mobility;  O68-Yvpbkwb effusion, not elsewhere classified.      COMPARISON: 11/18/2019.     FINDINGS: Portable chest reveals the heart to be enlarged. There  is  ill-defined opacification again seen within the right upper lobe with  chest tube identified on the right. There is no definite pneumothorax  identified. Large hiatal hernia with enlargement of the heart.  Ill-defined opacification seen at the left lung base with small left  pleural effusion.           Impression:       Tiny chest tube identified on the right with no definite  pneumothorax. Increased markings seen within the right upper lobe as  well as dense consolidation seen in the left mid and lower lung field.  The heart remains enlarged with small-to-moderate sized left pleural  effusion.     D:  11/18/2019  E:  11/18/2019     This report was finalized on 11/18/2019 3:48 PM by Dr. Marita Vance MD.       XR Chest 1 View [[id number]] Collected:  11/18/19 0859     Updated:  11/18/19 1539    Narrative:       EXAMINATION: XR CHEST 1 VW-11/18/2019:      INDICATION: Chest tube clamped; A41.9-Sepsis, unspecified organism;  J18.9-Pneumonia, unspecified organism; N39.0-Urinary tract infection,  site not specified; A49.9-Bacterial infection, unspecified; J96.01-Acute  respiratory failure with hypoxia; Z74.09-Other reduced mobility;  Q44-Nadfvgo effusion, not elsewhere classified.      COMPARISON: 11/17/2019.     FINDINGS: Portable chest reveals the heart to be enlarged. Ill-defined  opacification at the left lung base with small left pleural effusion.  Chest tube identified on the right with ill-defined opacification seen  within the right lung base. Small right pleural effusion. The upper lung  field on the right is stable and unchanged. Degenerative changes seen  within the spine with vascular calcification seen within the thoracic  aorta.           Impression:       Stable chest as above. No definite pneumothorax.     D:  11/18/2019  E:  11/18/2019     This report was finalized on 11/18/2019 3:36 PM by Dr. Marita Vance MD.       XR Chest 1 View [[id number]] Collected:  11/17/19 1615     Updated:   11/18/19 1532    Narrative:          EXAMINATION: XR CHEST 1 VW - 11/17/2019     INDICATION:  A41.9-Sepsis, unspecified organism; J18.9-Pneumonia,  unspecified organism; N39.0-Urinary tract infection, site not specified;  A49.9-Bacterial infection, unspecified; J96.01-Acute respiratory failure  with hypoxia; Z74.09-Other reduced mobility; O96-Uefadji effusion, not  elsewhere classified      COMPARISON: None     FINDINGS: Portable chest reveals heart to be enlarged. Mild increased  markings identified in the upper lung fields. Degenerative changes seen  within the spine. Chest tube identified on the right with small left  pleural effusion. Mild increased markings identified left lung base. No  change seen in the size of the pneumothorax on the right.       Impression:       Chest tube remains on the right with no change seen in size  of the tiny pneumothorax. Ill-defined opacification seen within the left  mid to lower lung field. Left pleural effusion time-of-flight     DICTATED:   11/17/2019  EDITED/ls :   11/17/2019      This report was finalized on 11/18/2019 3:29 PM by Dr. Marita Vance MD.                  I have reviewed the medications:  Scheduled Meds:  aspirin  mg Oral Daily   azithromycin 500 mg Intravenous Q24H   bethanechol 10 mg Oral 4x Daily   busPIRone 10 mg Oral BID   donepezil 10 mg Oral Nightly   gabapentin 300 mg Oral BID   guaiFENesin 1,200 mg Oral Q12H   heparin (porcine) 5,000 Units Subcutaneous Q12H   levothyroxine 175 mcg Oral Daily   lidocaine 1 patch Transdermal Q24H   memantine 10 mg Oral BID   pantoprazole 40 mg Oral Q AM   piperacillin-tazobactam 3.375 g Intravenous Q8H   sertraline 50 mg Oral Daily   sodium chloride 10 mL Intravenous Q12H   sucralfate 1 g Oral 4x Daily AC & at Bedtime     Continuous Infusions:   PRN Meds:.•  acetaminophen **OR** acetaminophen **OR** acetaminophen  •  benzonatate  •  calcium carbonate EX  •  diphenoxylate-atropine  •  docusate sodium  •   guaiFENesin-dextromethorphan  •  ipratropium-albuterol  •  ketorolac  •  magnesium sulfate **OR** magnesium sulfate in D5W 1g/100mL (PREMIX) **OR** magnesium sulfate  •  ondansetron  •  potassium chloride **OR** potassium chloride **OR** potassium chloride  •  sodium chloride  •  sodium chloride  •  traMADol      Assessment/Plan   Assessment / Plan     Active Hospital Problems    Diagnosis  POA   • **Multifocal aspiration pneumonia due to large intrathoracic hiatal hernia [J18.9]  Yes   • UTI due to Klebsiella species [N39.0, B96.1]  Yes   • Small postprocedural pneumothorax [J95.811]  No   • Bilateral parapneumonic pleural effusions.  S/p placement small right chest tube 11/16/19.  [J90]  Yes   • Acute respiratory failure with hypoxia not requiring ventilatory support (CMS/HCC) [J96.01]  Yes   • Massive hiatal hernia with intrathoracic stomach [K44.9]  Yes   • Hypothyroidism [E03.9]  Yes   • Hyperlipidemia [E78.5]  Yes   • Benign essential hypertension [I10]  Yes   • Multi-infarct dementia (CMS/HCC) [F01.50]  Yes   • Pernicious anemia [D51.0]  Yes      Resolved Hospital Problems    Diagnosis Date Resolved POA   • Elevated lactic acid level [R79.89] 11/18/2019 Yes   • Leukocytosis [D72.829] 11/18/2019 Yes   • Sepsis (CMS/HCC) [A41.9] 11/18/2019 Yes   • Abnormal urinalysis [R82.90] 11/18/2019 Yes        Brief Hospital Course to date:  Temi Jarrett is a 89 y.o. female with hx of HLD, dementia, pernicious anemia, and HTN who was admitted on 11/12/2019 for acute hypoxic respiratory failure and sepsis 2/2 multifocal pneumonia, had right sided thoracentesis on November 16 and now with chest tube in place.      Acute hypoxic respiratory failure  Sepsis   2/2 Multifocal pneumonia and Klebsiella UTI  -s pneumo and legionella urine antigens and MRSA nares negative   -blood cultures NG at 5 days   -s/p right sided thoracentesis w/chest tube on right side, had a lot of output overnight, will CTM tonight and address day by  day status; pulmonology following   -continue azithromycin x 5 days and zosyn x 10 days for multifocal pneumonia  -continue supplemental oxygen prn, and IS  -toradol 15 mg prn severe pain at site of CT insertion     Large hiatal hernia - stable   -as seen on CT chest and CT abd/pelvis  -SLP eval normal - signed off     -aspiration precautions while eating  -continue pantoprazole 40 mg qd      Fall  -PT/OT following - will need SNF on discharge   -continue fall precautions      HTN : held HCTZ due to sepsis   Hypothyroidism: continue synthroid   Dementia: continue zoloft, buspar, aricept, and namenda   Neuropathy : continue gabapentin  Hx of pernicious anemia: Hgb stable      DVT Prophylaxis:  Heparin sc     Disposition: I expect the patient to be discharged when respiratory status improves.    CODE STATUS:   Code Status and Medical Interventions:   Ordered at: 11/12/19 1002     Level Of Support Discussed With:    Patient     Code Status:    CPR     Medical Interventions (Level of Support Prior to Arrest):    Full         Electronically signed by Darcy Cordova MD, 11/19/19, 2:24 PM.

## 2019-11-19 NOTE — PLAN OF CARE
Problem: Patient Care Overview  Goal: Plan of Care Review  Outcome: Ongoing (interventions implemented as appropriate)   11/19/19 0902   Coping/Psychosocial   Plan of Care Reviewed With patient   Plan of Care Review   Progress improving   OTHER   Outcome Summary Pt. supine to sit on EOB with CGA & v/c's. Pt. completed grooming skills sitting EOB with set up. Pt. STS from EOB with CGA using RW. Pt. ambulated 35 ft. with CGA using RW. Pt. t/f'ed onto BSC with CGA, then to chair with CGA. Pt. max A for toileting.

## 2019-11-19 NOTE — THERAPY TREATMENT NOTE
Patient Name: Temi Jarrett  : 1930    MRN: [id number]                              Today's Date: 2019       Admit Date: 2019    Visit Dx:     ICD-10-CM ICD-9-CM   1. Sepsis without acute organ dysfunction, due to unspecified organism (CMS/[facility]) A41.9 038.9     995.91   2. Pneumonia of both lungs due to infectious organism, unspecified part of lung J18.9 483.8   3. Bacterial UTI N39.0 599.0    A49.9 041.9   4. Acute respiratory failure with hypoxia (CMS/[facility]) J96.01 518.81   5. Impaired mobility and ADLs Z74.09 799.89   6. Pleural effusion - bilateral, right greater than left parapneumonic pleural effusions s/p right thoracentesis 19.  J90 511.9     Patient Active Problem List   Diagnosis   • Skin tear of lower leg without complication, right, initial encounter   • Esophageal reflux   • Hypothyroidism   • Generalized anxiety disorder   • Hyperlipidemia   • Multi-infarct dementia (CMS/[facility])   • Peripheral neuropathy   • Carpal tunnel syndrome of right wrist   • Spinal stenosis of lumbar region   • Osteoporosis   • Fever   • Urinary frequency   • Peripheral venous insufficiency   • Disc disorder of lumbar region   • Bladder prolapse, female, acquired   • Acute right-sided low back pain without sciatica   • Pain, knee   • Benign essential hypertension   • Pernicious anemia   • Memory loss   • Annual physical exam   • Primary osteoarthritis of both knees   • Chest pain, atypical   • Massive hiatal hernia with intrathoracic stomach   • Closed fracture of neck of left femur (CMS/[facility])   • Hypoxia   • Multifocal aspiration pneumonia due to large intrathoracic hiatal hernia   • Acute respiratory failure with hypoxia not requiring ventilatory support (CMS/[facility])   • Bilateral parapneumonic pleural effusions.  S/p placement small right chest tube 19.    • Small postprocedural pneumothorax   • UTI due to Klebsiella species     Past Medical History:   Diagnosis Date   • Disease of thyroid gland    •  Gastric polyp    • History of colonic polyps    • Hypertension    • IBS (irritable bowel syndrome)    • Macular degeneration    • Torn meniscus     right knee      Past Surgical History:   Procedure Laterality Date   • CHOLECYSTECTOMY  1997   • EYE SURGERY  1998    Cataract extraction   • HERNIA REPAIR     • HIP HEMIARTHROPLASTY Left 9/28/2019    Procedure: HIP HEMIARTHROPLASTY LEFT;  Surgeon: Reddy Segundo Jr., MD;  [facility];  Service: Orthopedics   • HYSTERECTOMY  1976   • KNEE SURGERY Right     arthroscopy- 2000   • TONSILLECTOMY       General Information     Row Name 11/19/19 1018          PT Evaluation Time/Intention    Document Type  therapy note (daily note)  -VG     Mode of Treatment  individual therapy;physical therapy  -VG     Row Name 11/19/19 1018          General Information    Existing Precautions/Restrictions  fall;oxygen therapy device and L/min  -VG     Row Name 11/19/19 1018          Cognitive Assessment/Intervention- PT/OT    Orientation Status (Cognition)  oriented x 3  -VG       User Key  (r) = Recorded By, (t) = Taken By, (c) = Cosigned By    Initials Name Provider Type    VG Natalia Pires, PT Physical Therapist        Mobility     Row Name 11/19/19 1018          Bed Mobility Assessment/Treatment    Comment (Bed Mobility)  UIC  -VG     Row Name 11/19/19 1018          Transfer Assessment/Treatment    Comment (Transfers)  Cues for hand placement and sequencing.   -VG     Row Name 11/19/19 1018          Sit-Stand Transfer    Sit-Stand [place] (Transfers)  minimum assist (75% patient effort);verbal cues  -VG     Assistive Device (Sit-Stand Transfers)  walker, front-wheeled  -VG     Row Name 11/19/19 1018          Gait/Stairs Assessment/Training    [place] Level (Gait)  contact guard;verbal cues  -VG     Assistive Device (Gait)  walker, front-wheeled  -VG     Distance in Feet (Gait)  50  -VG     Deviations/Abnormal Patterns (Gait)  base of support, narrow;[place]  decreased;festinating/shuffling;gait speed decreased  -VG     Bilateral Gait Deviations  forward flexed posture  -VG     Comment (Gait/Stairs)  Distance limited by fatigue, weakness, SOA, and pain in R flank d/t chest tube. SpO2 desat 85% on 6LO2NC. Cues for upright posture and PLB.   -VG       User Key  (r) = Recorded By, (t) = Taken By, (c) = Cosigned By    Initials Name Provider Type    Natalia Acevedo, PT Physical Therapist        Obj/Interventions     Row Name 11/19/19 1020          Therapeutic Exercise    Lower Extremity (Therapeutic Exercise)  LAQ (long arc quad), bilateral;marching while seated  -VG     Lower Extremity Range of Motion (Therapeutic Exercise)  hip abduction/adduction, bilateral;ankle dorsiflexion/plantar flexion, bilateral  -VG     Exercise Type (Therapeutic Exercise)  AROM (active range of motion)  -VG     Position (Therapeutic Exercise)  seated  -VG     Sets/Reps (Therapeutic Exercise)  10x  -VG     Comment (Therapeutic Exercise)  Cues for technique.  -VG       User Key  (r) = Recorded By, (t) = Taken By, (c) = Cosigned By    Initials Name Provider Type    VG Natalia Pires, PT Physical Therapist        Goals/Plan    No documentation.       Clinical Impression     Row Name 11/19/19 1021          Pain Scale: Numbers Pre/Post-Treatment    Pain Scale: Numbers, Pretreatment  6/10  -VG     Pain Scale: Numbers, Post-Treatment  8/10  -VG     Pain Location - Side  Right  -VG     Pain Location  flank  -VG     Row Name 11/19/19 1021          Plan of Care Review    Plan of Care Reviewed With  patient  -VG     Row Name 11/19/19 1021          Vital Signs    Pre Systolic BP Rehab  136  -VG     Pre Treatment Diastolic BP  76  -VG     Post Systolic BP Rehab  121  -VG     Post Treatment Diastolic BP  82  -VG     Pretreatment Heart Rate (beats/min)  80  -VG     Posttreatment Heart Rate (beats/min)  94  -VG     Pre SpO2 (%)  94  -VG     O2 Delivery Pre Treatment  supplemental O2  -VG     Intra SpO2 (%)   85  -VG     O2 Delivery Intra Treatment  supplemental O2  -VG     Post SpO2 (%)  92  -VG     O2 Delivery Post Treatment  supplemental O2  -VG     Pre Patient Position  Sitting  -VG     Intra Patient Position  Sitting  -VG     Post Patient Position  Sitting  -VG     Row Name 11/19/19 1021          Positioning and Restraints    Pre-Treatment Position  sitting in chair/recliner  -VG     Post Treatment Position  chair  -VG     In Chair  reclined;call light within reach;encouraged to call for assist;exit alarm on;waffle cushion;legs elevated  -VG       User Key  (r) = Recorded By, (t) = Taken By, (c) = Cosigned By    Initials Name Provider Type    VG Natalia Pires, PT Physical Therapist        Outcome Measures     Row Name 11/19/19 1022          How much help from another person do you currently need...    Turning from your back to your side while in flat bed without using bedrails?  3  -VG     Moving from lying on back to sitting on the side of a flat bed without bedrails?  3  -VG     Moving to and from a bed to a chair (including a wheelchair)?  3  -VG     Standing up from a chair using your arms (e.g., wheelchair, bedside chair)?  3  -VG     Climbing 3-5 steps with a railing?  2  -VG     To walk in hospital room?  3  -VG     AM-PAC 6 Clicks Score (PT)  17  -VG     Row Name 11/19/19 1022          Functional Assessment    Outcome Measure Options  AM-PAC 6 Clicks Basic Mobility (PT)  -VG       User Key  (r) = Recorded By, (t) = Taken By, (c) = Cosigned By    Initials Name Provider Type    Natalia Acevedo, PT Physical Therapist        Physical Therapy Education     Title: PT OT SLP Therapies (Done)     Topic: Physical Therapy (Done)     Point: Mobility training (Done)     Learning Progress Summary           Patient Acceptance, E, VU by DHARMESH at 11/19/2019 10:22 AM    Acceptance, E, VU by EVA at 11/18/2019 12:23 AM    Acceptance, E, NR by WOOD at 11/17/2019  1:20 PM    Acceptance, E, VU by DHARMESH at 11/15/2019  2:33 PM     Acceptance, E, VU,NR by VG at 11/14/2019  2:09 PM    Acceptance, E,TB, NR by VG at 11/13/2019  2:08 PM    Comment:  Pt able to recall 1/3 posterior hip prec.                   Point: Home exercise program (Done)     Learning Progress Summary           Patient Acceptance, E, VU by  at 11/19/2019 10:22 AM    Acceptance, E, VU by  at 11/18/2019 12:23 AM    Acceptance, E, NR by KR at 11/17/2019  1:20 PM    Acceptance, E, VU by VG at 11/15/2019  2:33 PM    Acceptance, E, VU,NR by VG at 11/14/2019  2:09 PM    Acceptance, E,TB, NR by VG at 11/13/2019  2:08 PM    Comment:  Pt able to recall 1/3 posterior hip prec.                   Point: Body mechanics (Done)     Learning Progress Summary           Patient Acceptance, E, VU by  at 11/19/2019 10:22 AM    Acceptance, E, VU by  at 11/18/2019 12:23 AM    Acceptance, E, NR by KR at 11/17/2019  1:20 PM    Acceptance, E, VU by VG at 11/15/2019  2:33 PM    Acceptance, E, VU,NR by VG at 11/14/2019  2:09 PM    Acceptance, E,TB, NR by VG at 11/13/2019  2:08 PM    Comment:  Pt able to recall 1/3 posterior hip prec.                   Point: Precautions (Done)     Learning Progress Summary           Patient Acceptance, E, VU by  at 11/19/2019 10:22 AM    Acceptance, E, VU by  at 11/18/2019 12:23 AM    Acceptance, E, NR by KR at 11/17/2019  1:20 PM    Acceptance, E, VU by VG at 11/15/2019  2:33 PM    Acceptance, E, VU,NR by VG at 11/14/2019  2:09 PM    Acceptance, E,TB, NR by VG at 11/13/2019  2:08 PM    Comment:  Pt able to recall 1/3 posterior hip prec.                               User Key     Initials Effective Dates Name Provider Type Discipline     02/15/19 -  Sunita Bauman RN Registered Nurse Nurse    KR 04/03/18 -  Stacey Trimble, PT Physical Therapist PT     05/29/18 -  Pires, Natalia D, PT Physical Therapist PT              PT Recommendation and Plan  Planned Therapy Interventions (PT Eval): balance training, bed mobility training, gait training, home  exercise program, patient/family education, stair training, strengthening, transfer training  Outcome Summary/Treatment Plan (PT)  Anticipated Discharge Disposition (PT): skilled nursing facility  Plan of Care Reviewed With: patient  Progress: no change  Outcome Summary: Pt ambulated 50 ft with RW and CGA, limited by fatigue, weakness, SOA, and R flank pain at chest tube site. SpO2 desat 85% on 6LO2NC, recovered to 92% within 60 sec seated rest. Will continue to progress pt as able per POC.     Time Calculation:   PT Charges     Row Name 11/19/19 0945             Time Calculation    Start Time  0945  -VG      PT Received On  11/19/19  -VG      PT Goal Re-Cert Due Date  11/23/19  -VG         Time Calculation- PT    Total Timed Code Minutes- PT  30 minute(s)  -VG         Timed Charges    93379 - PT Therapeutic Exercise Minutes  10  -VG      56751 - Gait Training Minutes   20  -VG        User Key  (r) = Recorded By, (t) = Taken By, (c) = Cosigned By    Initials Name Provider Type    VG Natalia Pires, PT Physical Therapist        Therapy Charges for Today     Code Description Service Date Service Provider Modifiers Qty    97701380072 HC PT THER PROC EA 15 MIN 11/19/2019 Natalia Pires, PT GP 1    58811039869 HC GAIT TRAINING EA 15 MIN 11/19/2019 Natalia Pires, PT GP 1          PT G-Codes  Outcome Measure Options: AM-PAC 6 Clicks Basic Mobility (PT)  AM-PAC 6 Clicks Score (PT): 17  AM-PAC 6 Clicks Score (OT): 15    Brenda Pires PT  11/19/2019

## 2019-11-20 ENCOUNTER — APPOINTMENT (OUTPATIENT)
Dept: GENERAL RADIOLOGY | Facility: HOSPITAL | Age: 84
End: 2019-11-20

## 2019-11-20 LAB
BACTERIA FLD CULT: NORMAL
GRAM STN SPEC: NORMAL
GRAM STN SPEC: NORMAL

## 2019-11-20 PROCEDURE — 99233 SBSQ HOSP IP/OBS HIGH 50: CPT | Performed by: INTERNAL MEDICINE

## 2019-11-20 PROCEDURE — 93010 ELECTROCARDIOGRAM REPORT: CPT | Performed by: INTERNAL MEDICINE

## 2019-11-20 PROCEDURE — 97116 GAIT TRAINING THERAPY: CPT

## 2019-11-20 PROCEDURE — 25010000002 HEPARIN (PORCINE) PER 1000 UNITS: Performed by: INTERNAL MEDICINE

## 2019-11-20 PROCEDURE — 25010000002 PIPERACILLIN SOD-TAZOBACTAM PER 1 G: Performed by: INTERNAL MEDICINE

## 2019-11-20 PROCEDURE — 93005 ELECTROCARDIOGRAM TRACING: CPT | Performed by: INTERNAL MEDICINE

## 2019-11-20 PROCEDURE — 99232 SBSQ HOSP IP/OBS MODERATE 35: CPT | Performed by: INTERNAL MEDICINE

## 2019-11-20 PROCEDURE — 71046 X-RAY EXAM CHEST 2 VIEWS: CPT

## 2019-11-20 PROCEDURE — 97110 THERAPEUTIC EXERCISES: CPT

## 2019-11-20 PROCEDURE — 25010000002 KETOROLAC TROMETHAMINE PER 15 MG: Performed by: INTERNAL MEDICINE

## 2019-11-20 RX ORDER — AMOXICILLIN AND CLAVULANATE POTASSIUM 875; 125 MG/1; MG/1
1 TABLET, FILM COATED ORAL EVERY 12 HOURS SCHEDULED
Status: DISCONTINUED | OUTPATIENT
Start: 2019-11-20 | End: 2019-11-21 | Stop reason: HOSPADM

## 2019-11-20 RX ADMIN — BETHANECHOL CHLORIDE 10 MG: 10 TABLET ORAL at 20:10

## 2019-11-20 RX ADMIN — HEPARIN SODIUM 5000 UNITS: 5000 INJECTION, SOLUTION INTRAVENOUS; SUBCUTANEOUS at 20:09

## 2019-11-20 RX ADMIN — MEMANTINE 10 MG: 10 TABLET ORAL at 20:09

## 2019-11-20 RX ADMIN — ASPIRIN 325 MG: 325 TABLET, DELAYED RELEASE ORAL at 09:47

## 2019-11-20 RX ADMIN — BETHANECHOL CHLORIDE 10 MG: 10 TABLET ORAL at 17:24

## 2019-11-20 RX ADMIN — GUAIFENESIN 1200 MG: 600 TABLET, EXTENDED RELEASE ORAL at 20:10

## 2019-11-20 RX ADMIN — SUCRALFATE 1 G: 1 TABLET ORAL at 09:47

## 2019-11-20 RX ADMIN — GUAIFENESIN 1200 MG: 600 TABLET, EXTENDED RELEASE ORAL at 09:47

## 2019-11-20 RX ADMIN — AMOXICILLIN AND CLAVULANATE POTASSIUM 1 TABLET: 875; 125 TABLET, FILM COATED ORAL at 20:09

## 2019-11-20 RX ADMIN — BETHANECHOL CHLORIDE 10 MG: 10 TABLET ORAL at 12:38

## 2019-11-20 RX ADMIN — BETHANECHOL CHLORIDE 10 MG: 10 TABLET ORAL at 09:47

## 2019-11-20 RX ADMIN — DONEPEZIL HYDROCHLORIDE 10 MG: 10 TABLET, FILM COATED ORAL at 20:08

## 2019-11-20 RX ADMIN — SUCRALFATE 1 G: 1 TABLET ORAL at 12:37

## 2019-11-20 RX ADMIN — BUSPIRONE HYDROCHLORIDE 10 MG: 10 TABLET ORAL at 09:47

## 2019-11-20 RX ADMIN — KETOROLAC TROMETHAMINE 15 MG: 30 INJECTION, SOLUTION INTRAMUSCULAR at 12:48

## 2019-11-20 RX ADMIN — SUCRALFATE 1 G: 1 TABLET ORAL at 17:24

## 2019-11-20 RX ADMIN — BUSPIRONE HYDROCHLORIDE 10 MG: 10 TABLET ORAL at 20:10

## 2019-11-20 RX ADMIN — PANTOPRAZOLE SODIUM 40 MG: 40 TABLET, DELAYED RELEASE ORAL at 05:45

## 2019-11-20 RX ADMIN — GABAPENTIN 300 MG: 300 CAPSULE ORAL at 20:10

## 2019-11-20 RX ADMIN — SODIUM CHLORIDE, PRESERVATIVE FREE 10 ML: 5 INJECTION INTRAVENOUS at 20:08

## 2019-11-20 RX ADMIN — SERTRALINE HYDROCHLORIDE 50 MG: 50 TABLET ORAL at 09:47

## 2019-11-20 RX ADMIN — GABAPENTIN 300 MG: 300 CAPSULE ORAL at 09:47

## 2019-11-20 RX ADMIN — TAZOBACTAM SODIUM AND PIPERACILLIN SODIUM 3.38 G: 375; 3 INJECTION, SOLUTION INTRAVENOUS at 05:45

## 2019-11-20 RX ADMIN — HEPARIN SODIUM 5000 UNITS: 5000 INJECTION, SOLUTION INTRAVENOUS; SUBCUTANEOUS at 09:48

## 2019-11-20 RX ADMIN — MEMANTINE 10 MG: 10 TABLET ORAL at 09:48

## 2019-11-20 NOTE — PROGRESS NOTES
Intensivist Note     11/20/2019  Hospital Day: 8  * No surgery found *      Ms. Temi Jarrett, 89 y.o. female is followed for:    Multifocal aspiration pneumonia due to large intrathoracic hiatal hernia    Massive hiatal hernia with intrathoracic stomach    Acute respiratory failure with hypoxia not requiring ventilatory support (CMS/HCC)    Bilateral parapneumonic pleural effusions.  S/p placement small right chest tube 11/16/19.     Small postprocedural pneumothorax    UTI due to Klebsiella species    Multi-infarct dementia (CMS/HCC)    Hypothyroidism    Hyperlipidemia    Benign essential hypertension    Pernicious anemia       SUBJECTIVE     89-year-old white female status post recent left hip replacement by Dr. Segundo 9/28/2019.  Carries a history of large intrathoracic hiatal hernia with reflux, hypertension, hyperlipidemia, hypothyroidism on replacement, multi-infarct dementia, and pernicious anemia.  Patient was admitted 11/12/2019 with fever and evidence of bilateral diffuse pneumonia.  Fever has resolved and WBC is normal on Zosyn and Zithromax.  No organisms have been cultured although it was noted at the time of admission that she also has a Klebsiella UTI which is also sensitive to Zosyn.  During the course of her hospital stay a CT scan was obtained and it was apparent that the patient had small bilateral pleural effusions right greater than left.  We were contacted and a small chest tube was placed the evening of 11/16/2019.  A total of 770 cc was obtained the first day.  Fluid had 5000 WBCs with 91% segmented neutrophils and met criteria for an exudative effusion by LDH criteria.  Cultures remain negative.     Interval history: Patient denies dyspnea, chest pain(except at CT site), has no fever and WBC has been normal.  She is not coughing excessively but is requiring a significant amount of oxygen (6 L nasal O2).  Even on 6 L when ambulates her O2 sats dropped into the 80s.  Denies pleuritic pain  "or hemoptysis.  Drainage from right pleural tube is 75 cc over the last 24 hours, but for some reason the tube was clamped last evening.  Chest x-ray shows definite improvement in right upper lobe infiltrate and right lower lobe infiltrate but the left chest actually looks worse (although it is a poor rotated film).    ROS: Per subjective, all other systems reviewed and were negative.    The patient's relevant PMH, PSH, FH, and SH were reviewed and updated in Epic as appropriate. Allergies and Medications reviewed.    OBJECTIVE     /81 (BP Location: Right arm, Patient Position: Lying)   Pulse 82   Temp 98.9 °F (37.2 °C) (Oral)   Resp 19   Ht 152.4 cm (60\")   Wt 56.7 kg (125 lb 1.6 oz)   LMP  (LMP Unknown)   SpO2 92%   BMI 24.43 kg/m²      Flow (L/min): 2    Flowsheet Rows      First Filed Value   Admission Height  152.4 cm (60\") Documented at 11/12/2019 0653   Admission Weight  53.5 kg (118 lb) Documented at 11/12/2019 0653        Intake & Output (last day)       11/19 0701 - 11/20 0700 11/20 0701 - 11/21 0700    IV Piggyback 100     Total Intake(mL/kg) 100 (1.8)     Urine (mL/kg/hr) 600 (0.4) 900 (5.7)    Drains 30     Stool      Total Output 630 900    Net -530 -900          Urine Unmeasured Occurrence 1 x     Stool Unmeasured Occurrence 1 x           Exam:  General Exam:  Elderly white female in good spirits. No acute distress.  HEENT: Pupils equal and reactive. Nose and throat clear.  Neck:                          Supple, no JVD, thyromegaly, or adenopathy  Lungs: Clear anteriorly, diminished in bases  Chest:                         Right posterolateral pigtail drain in place  Cardiovascular: Regular rate and rhythm without murmurs or gallops. HR 80 BPM  Abdomen: Soft nontender without organomegaly or masses.   and rectal: Deferred.  Extremities: No cyanosis clubbing edema.  Neurologic:                 Symmetric strength. No focal deficits. Alert, oriented and pleasant     Chest X-Ray: " Pending      Results from last 7 days   Lab Units 11/18/19  0616 11/17/19  0525 11/16/19  1438   WBC 10*3/mm3 8.74 10.68 13.96*   HEMOGLOBIN g/dL 10.4* 9.9* 11.4*   HEMATOCRIT % 35.4 33.6* 38.5   PLATELETS 10*3/mm3 297 297 384     Results from last 7 days   Lab Units 11/18/19  0616 11/17/19  0525   SODIUM mmol/L 140 142   POTASSIUM mmol/L 4.0 4.3   CHLORIDE mmol/L 102 104   CO2 mmol/L 27.0 29.0   BUN mg/dL 15 15   CREATININE mg/dL 0.48* 0.58   GLUCOSE mg/dL 94 102*   CALCIUM mg/dL 7.9* 8.1*               No results found for: SEDRATE  No results found for: BNP  Lab Results   Component Value Date    TROPONINT 0.030 11/12/2019     Lab Results   Component Value Date    TSH 0.716 09/28/2019     Lab Results   Component Value Date    LACTATE 1.8 11/12/2019     No results found for: CORTISOL    I reviewed the patient's results, images and medication.    Assessment/Plan   ASSESSMENT        Multifocal aspiration pneumonia due to large intrathoracic hiatal hernia    Massive hiatal hernia with intrathoracic stomach    Acute respiratory failure with hypoxia not requiring ventilatory support (CMS/[facility])    Bilateral parapneumonic pleural effusions.  S/p placement small right chest tube 11/16/19.     Small postprocedural pneumothorax    UTI due to Klebsiella species    Multi-infarct dementia (CMS/HCC)    Hypothyroidism    Hyperlipidemia    Benign essential hypertension    Pernicious anemia      DISCUSSION: Doing well and only 30 ml out R pigtail drain over 24 hr so will pull it. Will take quite a while for chest to clear completely and would just complete a total of 14 days of antimicrobial therapy. Can switch to Augmentin anytime you wish. She will remain at high risk of recurrent aspiration of gastric contents, but no good options as I agree with her that because of age and overall poor condition she would be at high risk for lap repair.    PLAN     Pull pigtail drain,,,,Done  F/U PA and lateral CXR post tube removal  Switch to  Augmentin and complete total of 14 days AB  Mobilize more  Rehab vs home with home health  Attempt to minimize reflux as per my note 11/18/19    Will be available PRN. Thanks    Plan of care and goals reviewed with mulitdisciplinary team at daily rounds.    I discussed the patient's findings and my recommendations with patient and nursing staff    High level of risk due to: severe exacerbation of chronic illness, illness with threat to life or bodily function and parenteral controlled substances.    Time spent Critical care 25 min (It does not include procedure time).    Hoang Stoddard MD  Intensive Care Medicine  11/20/19 9:47 AM

## 2019-11-20 NOTE — PLAN OF CARE
Problem: Patient Care Overview  Goal: Plan of Care Review  Outcome: Ongoing (interventions implemented as appropriate)   11/20/19 3466   OTHER   Outcome Summary Pt pleasant and cooperative, no acute issues. She is alert and oriented, but very forgetful. Pigtaiol pulled this am by pulm, cheset xray today. Pt complained of pain in L side, Toradol given. VSS will cont to monitor.        Problem: Fall Risk (Adult)  Goal: Absence of Fall  Outcome: Ongoing (interventions implemented as appropriate)      Problem: Skin Injury Risk (Adult)  Goal: Skin Health and Integrity  Outcome: Ongoing (interventions implemented as appropriate)      Problem: Sepsis/Septic Shock (Adult)  Goal: Signs and Symptoms of Listed Potential Problems Will be Absent, Minimized or Managed (Sepsis/Septic Shock)  Outcome: Ongoing (interventions implemented as appropriate)      Problem: Pneumonia (Adult)  Goal: Signs and Symptoms of Listed Potential Problems Will be Absent, Minimized or Managed (Pneumonia)  Outcome: Ongoing (interventions implemented as appropriate)

## 2019-11-20 NOTE — PLAN OF CARE
Problem: Patient Care Overview  Goal: Plan of Care Review   11/20/19 1292   Coping/Psychosocial   Plan of Care Reviewed With patient   Plan of Care Review   Progress improving   OTHER   Outcome Summary Pt with increased confusion today, required cues throughout for attention to task. Increased ambulation distance to 75 ft with RW and CGA, limited by fatigue, weakness and SOA. VSS on 5LO2NC. Improved endurance this session. Will continue to progress pt as able per POC.

## 2019-11-20 NOTE — PROGRESS NOTES
[facility] Medicine Services  PROGRESS NOTE    Patient Name: Temi Jarrett  : 1930  MRN: [id number]    Date of Admission: 2019  Primary Care Physician: Eric Patel MD    Subjective   Subjective     CC:  Follow up multifocal pneumonia     HPI:  No acute events overnight. Denies significant pain in right side where chest tube is. Denies worsening dyspnea or chest pain.    Review of Systems  Gen- No fevers, chills  CV- No chest pain, palpitations  GI- No N/V/D, abd pain    Objective   Objective     Vital Signs:   Temp:  [98 °F (36.7 °C)-98.9 °F (37.2 °C)] 98.5 °F (36.9 °C)  Heart Rate:  [76-92] 84  Resp:  [16-20] 20  BP: (104-149)/(76-83) 104/76        Physical Exam:  Constitutional: No acute distress, awake, alert, frail elderly lady  HENT: NCAT, mucous membranes moist  Respiratory: crackles appreciated bilaterally, breathing comfortably on nasal canula   Cardiovascular: no murmurs, rubs, or gallops, no lower extremity edema   Gastrointestinal: Positive bowel sounds, soft, nontender, nondistended  Musculoskeletal: No bilateral ankle edema  Psychiatric: Appropriate affect, cooperative  Neurologic: Oriented x 3, Cranial Nerves grossly intact to confrontation, speech clear  Skin: No rashes on exposed skin    Results Reviewed:    Results from last 7 days   Lab Units 19  0616 19  0525 19  1438  11/15/19  0623   WBC 10*3/mm3 8.74 10.68 13.96*   < >  --    HEMOGLOBIN g/dL 10.4* 9.9* 11.4*   < >  --    HEMATOCRIT % 35.4 33.6* 38.5   < >  --    PLATELETS 10*3/mm3 297 297 384   < >  --    PROCALCITONIN ng/mL  --  0.64*  --   --  1.30*    < > = values in this interval not displayed.     Results from last 7 days   Lab Units 19  0616 19  0525 19  1438   SODIUM mmol/L 140 142 141   POTASSIUM mmol/L 4.0 4.3 4.7   CHLORIDE mmol/L 102 104 102   CO2 mmol/L 27.0 29.0 29.0   BUN mg/dL 15 15 19   CREATININE mg/dL 0.48* 0.58 0.62   GLUCOSE mg/dL 94  102* 101*   CALCIUM mg/dL 7.9* 8.1* 8.4*     Estimated Creatinine Clearance: 37.6 mL/min (A) (by C-G formula based on SCr of 0.48 mg/dL (L)).    Microbiology Results Abnormal     Procedure Component Value - Date/Time    Fungus Smear - Body Fluid, Pleural Cavity [[id number]] Collected:  11/16/19 2253    Lab Status:  Final result Specimen:  Body Fluid from Pleural Cavity Updated:  11/19/19 2046     Fungal Stain No fungal elements seen    Body Fluid Culture - Body Fluid, Pleural Cavity [[id number]] Collected:  11/16/19 2253    Lab Status:  Preliminary result Specimen:  Body Fluid from Pleural Cavity Updated:  11/19/19 1220     Body Fluid Culture No growth at 2 days     Gram Stain Few (2+) WBCs seen      No organisms seen    Blood Culture - Blood, Arm, Left [[id number]] Collected:  11/12/19 1218    Lab Status:  Final result Specimen:  Blood from Arm, Left Updated:  11/17/19 1246     Blood Culture No growth at 5 days    Blood Culture - Blood, Arm, Right [[id number]] Collected:  11/12/19 1218    Lab Status:  Final result Specimen:  Blood from Arm, Right Updated:  11/17/19 1246     Blood Culture No growth at 5 days    AFB Culture - Body Fluid, Pleural Cavity [[id number]] Collected:  11/16/19 2253    Lab Status:  Preliminary result Specimen:  Body Fluid from Pleural Cavity Updated:  11/17/19 1159     AFB Stain No acid fast bacilli seen on concentrated smear    Blood Culture - Blood, Arm, Right [[id number]] Collected:  11/12/19 0740    Lab Status:  Final result Specimen:  Blood from Arm, Right Updated:  11/17/19 1016     Blood Culture No growth at 5 days    Blood Culture - Blood, Arm, Left [[id number]] Collected:  11/12/19 0745    Lab Status:  Final result Specimen:  Blood from Arm, Left Updated:  11/17/19 1016     Blood Culture No growth at 5 days    KOH Prep - Body Fluid, Pleural Cavity [[id number]] Collected:  11/16/19 2253    Lab Status:  Final result Specimen:  Body Fluid from Pleural Cavity Updated:  11/16/19 1060     JOCELINE Prep  No yeast or hyphal elements seen    S. Pneumo Ag Urine or CSF - Urine, Urine, Clean Catch [[id number]]  (Normal) Collected:  11/15/19 1459    Lab Status:  Final result Specimen:  Urine, Clean Catch Updated:  11/16/19 0042     Strep Pneumo Ag Negative    Legionella Antigen, Urine - Urine, Urine, Clean Catch [[id number]]  (Normal) Collected:  11/15/19 1459    Lab Status:  Final result Specimen:  Urine, Clean Catch Updated:  11/16/19 0042     LEGIONELLA ANTIGEN, URINE Negative    Urine Culture - Urine, Urine, Catheter [[id number]]  (Abnormal)  (Susceptibility) Collected:  11/12/19 0717    Lab Status:  Final result Specimen:  Urine, Catheter Updated:  11/14/19 0348     Urine Culture >100,000 CFU/mL Klebsiella pneumoniae ssp pneumoniae    Susceptibility      Klebsiella pneumoniae ssp pneumoniae     MARY KAY     Ampicillin Resistant     Ampicillin + Sulbactam Susceptible     Cefazolin Susceptible     Cefepime Susceptible     Ceftazidime Susceptible     Ceftriaxone Susceptible     Gentamicin Susceptible     Levofloxacin Susceptible     Nitrofurantoin Intermediate     Piperacillin + Tazobactam Susceptible     Tetracycline Susceptible     Trimethoprim + Sulfamethoxazole Susceptible                    MRSA Screen, PCR - Swab, Nares [[id number]]  (Normal) Collected:  11/12/19 1403    Lab Status:  Final result Specimen:  Swab from Nares Updated:  11/12/19 1541     MRSA PCR Negative    Narrative:       MRSA Negative    Respiratory Panel, PCR - Swab, Nasopharynx [[id number]]  (Normal) Collected:  11/12/19 1022    Lab Status:  Final result Specimen:  Swab from Nasopharynx Updated:  11/12/19 1247     ADENOVIRUS, PCR Not Detected     Coronavirus 229E Not Detected     Coronavirus HKU1 Not Detected     Coronavirus NL63 Not Detected     Coronavirus OC43 Not Detected     Human Metapneumovirus Not Detected     Human Rhinovirus/Enterovirus Not Detected     Influenza B PCR Not Detected     Parainfluenza Virus 1 Not Detected     Parainfluenza Virus 2  Not Detected     Parainfluenza Virus 3 Not Detected     Parainfluenza Virus 4 Not Detected     Bordetella pertussis pcr Not Detected     Influenza A H1 2009 PCR Not Detected     Chlamydophila pneumoniae PCR Not Detected     Mycoplasma pneumo by PCR Not Detected     Influenza A PCR Not Detected     Influenza A H3 Not Detected     Influenza A H1 Not Detected     RSV, PCR Not Detected     Bordetella parapertussis PCR Not Detected          Imaging Results (Last 24 Hours)     ** No results found for the last 24 hours. **               I have reviewed the medications:  Scheduled Meds:  amoxicillin-clavulanate 1 tablet Oral Q12H   aspirin  mg Oral Daily   bethanechol 10 mg Oral 4x Daily   busPIRone 10 mg Oral BID   donepezil 10 mg Oral Nightly   gabapentin 300 mg Oral BID   guaiFENesin 1,200 mg Oral Q12H   heparin (porcine) 5,000 Units Subcutaneous Q12H   [START ON 11/21/2019] levothyroxine 175 mcg Oral Q AM   lidocaine 1 patch Transdermal Q24H   memantine 10 mg Oral BID   pantoprazole 40 mg Oral Q AM   sertraline 50 mg Oral Daily   sodium chloride 10 mL Intravenous Q12H   sucralfate 1 g Oral 4x Daily AC & at Bedtime     Continuous Infusions:   PRN Meds:.•  acetaminophen **OR** acetaminophen **OR** acetaminophen  •  benzonatate  •  calcium carbonate EX  •  diphenoxylate-atropine  •  docusate sodium  •  guaiFENesin-dextromethorphan  •  ipratropium-albuterol  •  ketorolac  •  magnesium sulfate **OR** magnesium sulfate in D5W 1g/100mL (PREMIX) **OR** magnesium sulfate  •  ondansetron  •  potassium chloride **OR** potassium chloride **OR** potassium chloride  •  sodium chloride  •  sodium chloride  •  traMADol      Assessment/Plan   Assessment / Plan     Active Hospital Problems    Diagnosis  POA   • **Multifocal aspiration pneumonia due to large intrathoracic hiatal hernia [J18.9]  Yes   • UTI due to Klebsiella species [N39.0, B96.1]  Yes   • Small postprocedural pneumothorax [J95.811]  No   • Bilateral parapneumonic  pleural effusions.  S/p placement small right chest tube 11/16/19.  [J90]  Yes   • Acute respiratory failure with hypoxia not requiring ventilatory support (CMS/HCC) [J96.01]  Yes   • Massive hiatal hernia with intrathoracic stomach [K44.9]  Yes   • Hypothyroidism [E03.9]  Yes   • Hyperlipidemia [E78.5]  Yes   • Benign essential hypertension [I10]  Yes   • Multi-infarct dementia (CMS/HCC) [F01.50]  Yes   • Pernicious anemia [D51.0]  Yes      Resolved Hospital Problems    Diagnosis Date Resolved POA   • Elevated lactic acid level [R79.89] 11/18/2019 Yes   • Leukocytosis [D72.829] 11/18/2019 Yes   • Sepsis (CMS/HCC) [A41.9] 11/18/2019 Yes   • Abnormal urinalysis [R82.90] 11/18/2019 Yes        Brief Hospital Course to date:  Temi Jarrett is a 89 y.o. female female with hx of HLD, dementia, pernicious anemia, and HTN who was admitted on 11/12/2019 for acute hypoxic respiratory failure and sepsis 2/2 multifocal pneumonia, had right sided thoracentesis on November 16 and had chest tube in place until 11/20.     Acute hypoxic respiratory failure  Sepsis   2/2 Multifocal pneumonia and Klebsiella UTI  -s pneumo and legionella urine antigens and MRSA nares negative   -blood cultures NG at 5 days   -s/p right sided thoracentesis w/chest tube on right side, removed today 11/20 - post removal CXR pending  -completed azithromycin x 5 days and zosyn x 9 days, will continue augmentin po x5 more days   -continue supplemental oxygen prn and IS   -toradol 15 mg prn severe pain at site of CT insertion     Large hiatal hernia - stable   -as seen on CT chest and CT abd/pelvis  -SLP eval normal - signed off     -continue aspiration precautions while eating  -continue pantoprazole 40 mg qd      Fall  -PT/OT following - will need SNF on discharge   -continue fall precautions      HTN :-stable - held HCTZ due to sepsis    Hypothyroidism: continue synthroid   Dementia: continue zoloft, buspar, aricept, and namenda   Neuropathy : continue  gabapentin  Hx of pernicious anemia: Hgb stable      DVT Prophylaxis:  Heparin sc     Disposition: I expect the patient to be discharged tomorrow to rehab.    CODE STATUS:   Code Status and Medical Interventions:   Ordered at: 11/12/19 1002     Level Of Support Discussed With:    Patient     Code Status:    CPR     Medical Interventions (Level of Support Prior to Arrest):    Full         Electronically signed by Darcy Cordova MD, 11/20/19, 12:53 PM.

## 2019-11-20 NOTE — PLAN OF CARE
Problem: Patient Care Overview  Goal: Plan of Care Review  Outcome: Ongoing (interventions implemented as appropriate)   11/20/19 0451   Coping/Psychosocial   Plan of Care Reviewed With patient   Plan of Care Review   Progress no change   OTHER   Outcome Summary VSS. No acute overnight events. Cont to monitor.       Problem: Fall Risk (Adult)  Goal: Absence of Fall  Outcome: Ongoing (interventions implemented as appropriate)      Problem: Skin Injury Risk (Adult)  Goal: Skin Health and Integrity  Outcome: Ongoing (interventions implemented as appropriate)

## 2019-11-20 NOTE — PROGRESS NOTES
Continued Stay Note  [facility]     Patient Name: Temi Jarrett  MRN: [id number]  Today's Date: 11/20/2019    Admit Date: 11/12/2019    Discharge Plan     Row Name 11/20/19 1157       Plan    Plan  update    Patient/Family in Agreement with Plan  yes    Plan Comments  Per RN, chest tube has now been pulled.  Per MD rounds this a.m., if CT pulled today may be able to go to  [facility] tomorrow.  Called Annebelle with [facility] to advises who will check for bed availability and call back.  Spoke with patient at bedside regarding discharge plan and advised she may be able to go to [facility] for rehab tomorrow depending on how she is doing.  Patient agreeable to plan, reports feeling tired today.  No new discharge needs noted.  CM following.  Patient plan is to discharge to [facility] via car with family to transport.      Final Discharge Disposition Code  03 - skilled nursing facility (SNF)        Discharge Codes    No documentation.       Expected Discharge Date and Time     Expected Discharge Date Expected Discharge Time    Nov 22, 2019             Abbey Serna RN

## 2019-11-20 NOTE — THERAPY TREATMENT NOTE
Patient Name: Temi Jarrett  : 1930    MRN: [id number]                              Today's Date: 2019       Admit Date: 2019    Visit Dx:     ICD-10-CM ICD-9-CM   1. Sepsis without acute organ dysfunction, due to unspecified organism (CMS/[facility]) A41.9 038.9     995.91   2. Pneumonia of both lungs due to infectious organism, unspecified part of lung J18.9 483.8   3. Bacterial UTI N39.0 599.0    A49.9 041.9   4. Acute respiratory failure with hypoxia (CMS/[facility]) J96.01 518.81   5. Impaired mobility and ADLs Z74.09 799.89   6. Pleural effusion - bilateral, right greater than left parapneumonic pleural effusions s/p right thoracentesis 19.  J90 511.9     Patient Active Problem List   Diagnosis   • Skin tear of lower leg without complication, right, initial encounter   • Esophageal reflux   • Hypothyroidism   • Generalized anxiety disorder   • Hyperlipidemia   • Multi-infarct dementia (CMS/[facility])   • Peripheral neuropathy   • Carpal tunnel syndrome of right wrist   • Spinal stenosis of lumbar region   • Osteoporosis   • Fever   • Urinary frequency   • Peripheral venous insufficiency   • Disc disorder of lumbar region   • Bladder prolapse, female, acquired   • Acute right-sided low back pain without sciatica   • Pain, knee   • Benign essential hypertension   • Pernicious anemia   • Memory loss   • Annual physical exam   • Primary osteoarthritis of both knees   • Chest pain, atypical   • Massive hiatal hernia with intrathoracic stomach   • Closed fracture of neck of left femur (CMS/[facility])   • Hypoxia   • Multifocal aspiration pneumonia due to large intrathoracic hiatal hernia   • Acute respiratory failure with hypoxia not requiring ventilatory support (CMS/[facility])   • Bilateral parapneumonic pleural effusions.  S/p placement small right chest tube 19.    • Small postprocedural pneumothorax   • UTI due to Klebsiella species     Past Medical History:   Diagnosis Date   • Disease of thyroid gland    •  Gastric polyp    • History of colonic polyps    • Hypertension    • IBS (irritable bowel syndrome)    • Macular degeneration    • Torn meniscus     right knee      Past Surgical History:   Procedure Laterality Date   • CHOLECYSTECTOMY  1997   • EYE SURGERY  1998    Cataract extraction   • HERNIA REPAIR     • HIP HEMIARTHROPLASTY Left 9/28/2019    Procedure: HIP HEMIARTHROPLASTY LEFT;  Surgeon: Reddy Segundo Jr., MD;  [facility]: [facility];  Service: Orthopedics   • HYSTERECTOMY  1976   • KNEE SURGERY Right     arthroscopy- 2000   • TONSILLECTOMY       General Information     Row Name 11/20/19 1325          PT Evaluation Time/Intention    Document Type  therapy note (daily note)  -VG     Mode of Treatment  individual therapy;physical therapy  -VG     Row Name 11/20/19 1325          General Information    Existing Precautions/Restrictions  fall;oxygen therapy device and L/min  -VG     Row Name 11/20/19 1325          Cognitive Assessment/Intervention- PT/OT    Orientation Status (Cognition)  oriented x 3  -VG       User Key  (r) = Recorded By, (t) = Taken By, (c) = Cosigned By    Initials Name Provider Type    VG Natalia Pires PT Physical Therapist        Mobility     Row Name 11/20/19 1325          Bed Mobility Assessment/Treatment    Comment (Bed Mobility)  UIC  -VG     Row Name 11/20/19 1325          Transfer Assessment/Treatment    Comment (Transfers)  Cues for hand placement and sequencing.  -VG     Row Name 11/20/19 1325          Sit-Stand Transfer    Sit-Stand [place] (Transfers)  contact guard;verbal cues  -VG     Assistive Device (Sit-Stand Transfers)  walker, front-wheeled  -VG     Row Name 11/20/19 1325          Gait/Stairs Assessment/Training    [place] Level (Gait)  contact guard;verbal cues  -VG     Assistive Device (Gait)  walker, front-wheeled  -VG     Distance in Feet (Gait)  75  -VG     Deviations/Abnormal Patterns (Gait)  kane decreased;gait speed decreased;festinating/shuffling   -VG     Bilateral Gait Deviations  forward flexed posture  -VG     Comment (Gait/Stairs)  Distance limited by fatigue, weakness, and SOA. VSS on 5LO2NC. Cues for upright posture and AD management. Improved endurance.  -VG       User Key  (r) = Recorded By, (t) = Taken By, (c) = Cosigned By    Initials Name Provider Type    VG Natalia Pires, PT Physical Therapist        Obj/Interventions     Row Name 11/20/19 1326          Therapeutic Exercise    Lower Extremity (Therapeutic Exercise)  LAQ (long arc quad), bilateral;marching while seated  -VG     Lower Extremity Range of Motion (Therapeutic Exercise)  hip abduction/adduction, bilateral;ankle dorsiflexion/plantar flexion, bilateral  -VG     Exercise Type (Therapeutic Exercise)  AROM (active range of motion)  -VG     Position (Therapeutic Exercise)  seated  -VG     Sets/Reps (Therapeutic Exercise)  10x  -VG     Comment (Therapeutic Exercise)  Cues for technique.  -VG     Row Name 11/20/19 1326          Static Standing Balance    Level of [place] (Supported Standing, Static Balance)  supervision  -VG     Time Able to Maintain Position (Supported Standing, Static Balance)  more than 5 minutes  -VG     Assistive Device Utilized (Supported Standing, Static Balance)  walker, rolling  -VG     Comment (Supported Standing, Static Balance)  Worked on standing tolerance and balance while completing grooming tasks at sink.  -VG       User Key  (r) = Recorded By, (t) = Taken By, (c) = Cosigned By    Initials Name Provider Type    VG Natalia Pires, PT Physical Therapist        Goals/Plan    No documentation.       Clinical Impression     Row Name 11/20/19 1328          Pain Scale: Numbers Pre/Post-Treatment    Pain Scale: Numbers, Pretreatment  0/10 - no pain  -VG     Pain Scale: Numbers, Post-Treatment  0/10 - no pain  -VG     Row Name 11/20/19 1328          Plan of Care Review    Plan of Care Reviewed With  patient  -VG     Row Name 11/20/19 1328          Vital Signs     Pre Systolic BP Rehab  109  -VG     Pre Treatment Diastolic BP  68  -VG     Post Systolic BP Rehab  111  -VG     Post Treatment Diastolic BP  85  -VG     Pretreatment Heart Rate (beats/min)  83  -VG     Posttreatment Heart Rate (beats/min)  96  -VG     Pre SpO2 (%)  92  -VG     O2 Delivery Pre Treatment  supplemental O2  -VG     Intra SpO2 (%)  90  -VG     O2 Delivery Intra Treatment  supplemental O2  -VG     Post SpO2 (%)  90  -VG     O2 Delivery Post Treatment  supplemental O2  -VG     Pre Patient Position  Sitting  -VG     Intra Patient Position  Sitting  -VG     Post Patient Position  Sitting  -VG     Row Name 11/20/19 1328          Positioning and Restraints    Pre-Treatment Position  sitting in chair/recliner  -VG     Post Treatment Position  chair  -VG     In Chair  reclined;call light within reach;encouraged to call for assist;exit alarm on;waffle cushion  -VG       User Key  (r) = Recorded By, (t) = Taken By, (c) = Cosigned By    Initials Name Provider Type    VG Natalia Pires, PT Physical Therapist        Outcome Measures     Row Name 11/20/19 1329          How much help from another person do you currently need...    Turning from your back to your side while in flat bed without using bedrails?  3  -VG     Moving from lying on back to sitting on the side of a flat bed without bedrails?  3  -VG     Moving to and from a bed to a chair (including a wheelchair)?  3  -VG     Standing up from a chair using your arms (e.g., wheelchair, bedside chair)?  3  -VG     Climbing 3-5 steps with a railing?  2  -VG     To walk in hospital room?  3  -VG     AM-PAC 6 Clicks Score (PT)  17  -VG     Row Name 11/20/19 1329          Functional Assessment    Outcome Measure Options  AM-PAC 6 Clicks Basic Mobility (PT)  -VG       User Key  (r) = Recorded By, (t) = Taken By, (c) = Cosigned By    Initials Name Provider Type    VG Natalia Pires, LEFTY Physical Therapist        Physical Therapy Education     Title: PT OT SLP  Therapies (Done)     Topic: Physical Therapy (Done)     Point: Mobility training (Done)     Learning Progress Summary           Patient Acceptance, E, VU by VG at 11/20/2019  1:29 PM    Acceptance, E, VU by VG at 11/19/2019 10:22 AM    Acceptance, E, VU by  at 11/18/2019 12:23 AM    Acceptance, E, NR by KR at 11/17/2019  1:20 PM    Acceptance, E, VU by VG at 11/15/2019  2:33 PM    Acceptance, E, VU,NR by VG at 11/14/2019  2:09 PM    Acceptance, E,TB, NR by VG at 11/13/2019  2:08 PM    Comment:  Pt able to recall 1/3 posterior hip prec.                   Point: Home exercise program (Done)     Learning Progress Summary           Patient Acceptance, E, VU by VG at 11/20/2019  1:29 PM    Acceptance, E, VU by VG at 11/19/2019 10:22 AM    Acceptance, E, VU by EVA at 11/18/2019 12:23 AM    Acceptance, E, NR by KR at 11/17/2019  1:20 PM    Acceptance, E, VU by VG at 11/15/2019  2:33 PM    Acceptance, E, VU,NR by VG at 11/14/2019  2:09 PM    Acceptance, E,TB, NR by VG at 11/13/2019  2:08 PM    Comment:  Pt able to recall 1/3 posterior hip prec.                   Point: Body mechanics (Done)     Learning Progress Summary           Patient Acceptance, E, VU by VG at 11/20/2019  1:29 PM    Acceptance, E, VU by VG at 11/19/2019 10:22 AM    Acceptance, E, VU by EVA at 11/18/2019 12:23 AM    Acceptance, E, NR by KR at 11/17/2019  1:20 PM    Acceptance, E, VU by VG at 11/15/2019  2:33 PM    Acceptance, E, VU,NR by VG at 11/14/2019  2:09 PM    Acceptance, E,TB, NR by VG at 11/13/2019  2:08 PM    Comment:  Pt able to recall 1/3 posterior hip prec.                   Point: Precautions (Done)     Learning Progress Summary           Patient Acceptance, E, VU by VG at 11/20/2019  1:29 PM    Acceptance, E, VU by VG at 11/19/2019 10:22 AM    Acceptance, E, VU by EVA at 11/18/2019 12:23 AM    Acceptance, E NR by WOOD at 11/17/2019  1:20 PM    Acceptance, ESIOBHAN by DHARMESH at 11/15/2019  2:33 PM    Acceptance, SIOBHAN PAREKHNR by DHARMESH at 11/14/2019  2:09 PM     Acceptance, E,TB, NR by VG at 11/13/2019  2:08 PM    Comment:  Pt able to recall 1/3 posterior hip prec.                               User Key     Initials Effective Dates Name Provider Type Discipline     02/15/19 -  Sunita Bauman, RN Registered Nurse Nurse    KR 04/03/18 -  Stacey Trimble, PT Physical Therapist PT    VG 05/29/18 -  Natalia Pires, PT Physical Therapist PT              PT Recommendation and Plan  Planned Therapy Interventions (PT Eval): balance training, bed mobility training, gait training, home exercise program, patient/family education, stair training, strengthening, transfer training  Outcome Summary/Treatment Plan (PT)  Anticipated Discharge Disposition (PT): skilled nursing facility  Plan of Care Reviewed With: patient  Progress: improving  Outcome Summary: Pt with increased confusion today, required cues throughout for attention to task. Increased ambulation distance to 75 ft with RW and CGA, limited by fatigue, weakness and SOA. VSS on 5LO2NC. Improved endurance this session. Will continue to progress pt as able per POC.     Time Calculation:   PT Charges     Row Name 11/20/19 1100             Time Calculation    Start Time  1100  -VG      PT Received On  11/20/19  -VG      PT Goal Re-Cert Due Date  11/23/19  -VG         Time Calculation- PT    Total Timed Code Minutes- PT  39 minute(s)  -VG         Timed Charges    61365 - PT Therapeutic Exercise Minutes  14  -VG      33037 - Gait Training Minutes   25  -VG        User Key  (r) = Recorded By, (t) = Taken By, (c) = Cosigned By    Initials Name Provider Type    VG Natalia Pires, PT Physical Therapist        Therapy Charges for Today     Code Description Service Date Service Provider Modifiers Qty    80233759843 HC PT THER PROC EA 15 MIN 11/19/2019 Natalia Pires, PT GP 1    10989468218 HC GAIT TRAINING EA 15 MIN 11/19/2019 Natalia Pires, PT GP 1    8619348 HC PT THER PROC EA 15 MIN 11/20/2019 Ntaalia Pires, PT GP 1     [id number]  GAIT TRAINING EA 15 MIN 11/20/2019 Natalia Pires, PT GP 2          PT G-Codes  Outcome Measure Options: AM-PAC 6 Clicks Basic Mobility (PT)  AM-PAC 6 Clicks Score (PT): 17  AM-PAC 6 Clicks Score (OT): 15    Brenda Pires, LEFTY  11/20/2019

## 2019-11-21 VITALS
RESPIRATION RATE: 18 BRPM | DIASTOLIC BLOOD PRESSURE: 90 MMHG | WEIGHT: 134.7 LBS | OXYGEN SATURATION: 100 % | BODY MASS INDEX: 26.45 KG/M2 | HEART RATE: 79 BPM | HEIGHT: 60 IN | SYSTOLIC BLOOD PRESSURE: 139 MMHG | TEMPERATURE: 98.3 F

## 2019-11-21 PROCEDURE — 25010000002 KETOROLAC TROMETHAMINE PER 15 MG: Performed by: INTERNAL MEDICINE

## 2019-11-21 PROCEDURE — 99239 HOSP IP/OBS DSCHRG MGMT >30: CPT | Performed by: INTERNAL MEDICINE

## 2019-11-21 PROCEDURE — 25010000002 HEPARIN (PORCINE) PER 1000 UNITS: Performed by: INTERNAL MEDICINE

## 2019-11-21 RX ORDER — GUAIFENESIN 600 MG/1
1200 TABLET, EXTENDED RELEASE ORAL EVERY 12 HOURS SCHEDULED
Qty: 12 TABLET | Refills: 0 | Status: SHIPPED | OUTPATIENT
Start: 2019-11-21 | End: 2019-11-24

## 2019-11-21 RX ORDER — PANTOPRAZOLE SODIUM 40 MG/1
40 TABLET, DELAYED RELEASE ORAL DAILY
Qty: 28 TABLET | Refills: 0 | Status: SHIPPED | OUTPATIENT
Start: 2019-11-21 | End: 2019-12-24 | Stop reason: HOSPADM

## 2019-11-21 RX ORDER — AMOXICILLIN AND CLAVULANATE POTASSIUM 875; 125 MG/1; MG/1
1 TABLET, FILM COATED ORAL EVERY 12 HOURS SCHEDULED
Qty: 11 TABLET | Refills: 0 | Status: SHIPPED | OUTPATIENT
Start: 2019-11-21 | End: 2019-11-27

## 2019-11-21 RX ORDER — ACETAMINOPHEN 325 MG/1
650 TABLET ORAL EVERY 4 HOURS PRN
Qty: 18 TABLET | Refills: 0 | Status: SHIPPED | OUTPATIENT
Start: 2019-11-21 | End: 2019-11-24

## 2019-11-21 RX ORDER — IPRATROPIUM BROMIDE AND ALBUTEROL SULFATE 2.5; .5 MG/3ML; MG/3ML
3 SOLUTION RESPIRATORY (INHALATION) 4 TIMES DAILY PRN
Qty: 360 ML | Refills: 0 | Status: SHIPPED | OUTPATIENT
Start: 2019-11-21 | End: 2020-01-06

## 2019-11-21 RX ORDER — KETOROLAC TROMETHAMINE 30 MG/ML
15 INJECTION, SOLUTION INTRAMUSCULAR; INTRAVENOUS EVERY 6 HOURS PRN
Qty: 6 ML | Refills: 0 | Status: CANCELLED | OUTPATIENT
Start: 2019-11-21 | End: 2019-11-24

## 2019-11-21 RX ORDER — AMLODIPINE BESYLATE 5 MG/1
5 TABLET ORAL DAILY
Qty: 30 TABLET | Refills: 0 | Status: SHIPPED | OUTPATIENT
Start: 2019-11-21 | End: 2019-12-08 | Stop reason: HOSPADM

## 2019-11-21 RX ADMIN — BETHANECHOL CHLORIDE 10 MG: 10 TABLET ORAL at 12:06

## 2019-11-21 RX ADMIN — SERTRALINE HYDROCHLORIDE 50 MG: 50 TABLET ORAL at 08:10

## 2019-11-21 RX ADMIN — MEMANTINE 10 MG: 10 TABLET ORAL at 08:10

## 2019-11-21 RX ADMIN — HEPARIN SODIUM 5000 UNITS: 5000 INJECTION, SOLUTION INTRAVENOUS; SUBCUTANEOUS at 08:11

## 2019-11-21 RX ADMIN — AMOXICILLIN AND CLAVULANATE POTASSIUM 1 TABLET: 875; 125 TABLET, FILM COATED ORAL at 08:10

## 2019-11-21 RX ADMIN — LEVOTHYROXINE SODIUM 175 MCG: 25 TABLET ORAL at 04:53

## 2019-11-21 RX ADMIN — PANTOPRAZOLE SODIUM 40 MG: 40 TABLET, DELAYED RELEASE ORAL at 04:52

## 2019-11-21 RX ADMIN — GABAPENTIN 300 MG: 300 CAPSULE ORAL at 08:11

## 2019-11-21 RX ADMIN — KETOROLAC TROMETHAMINE 15 MG: 30 INJECTION, SOLUTION INTRAMUSCULAR at 04:53

## 2019-11-21 RX ADMIN — GUAIFENESIN 1200 MG: 600 TABLET, EXTENDED RELEASE ORAL at 08:10

## 2019-11-21 RX ADMIN — BETHANECHOL CHLORIDE 10 MG: 10 TABLET ORAL at 08:10

## 2019-11-21 RX ADMIN — BUSPIRONE HYDROCHLORIDE 10 MG: 10 TABLET ORAL at 08:10

## 2019-11-21 RX ADMIN — ASPIRIN 325 MG: 325 TABLET, DELAYED RELEASE ORAL at 08:10

## 2019-11-21 RX ADMIN — SUCRALFATE 1 G: 1 TABLET ORAL at 08:10

## 2019-11-21 NOTE — PLAN OF CARE
Problem: Patient Care Overview  Goal: Plan of Care Review  Outcome: Ongoing (interventions implemented as appropriate)   11/21/19 0513   Coping/Psychosocial   Plan of Care Reviewed With patient   Plan of Care Review   Progress improving   OTHER   Outcome Summary Pt's VSS. She is currently on 2L NC and satting well. No acute overnight events. Plan to cont to monitor.       Problem: Fall Risk (Adult)  Goal: Absence of Fall  Outcome: Ongoing (interventions implemented as appropriate)      Problem: Skin Injury Risk (Adult)  Goal: Skin Health and Integrity  Outcome: Ongoing (interventions implemented as appropriate)

## 2019-11-21 NOTE — DISCHARGE SUMMARY
[facility] Medicine Services  DISCHARGE SUMMARY    Patient Name: Temi Jarrett  : 1930  MRN: [id number]    Date of Admission: 2019  7:00 AM  Date of Discharge:  2019  Primary Care Physician: Eric Patel MD    Consults     Date and Time Order Name Status Description    2019 0030 Inpatient Pulmonology Consult Completed           Hospital Course     Presenting Problem:   Sepsis without acute organ dysfunction, due to unspecified organism (CMS/HCC) [A41.9]    Active Hospital Problems    Diagnosis  POA   • **Multifocal aspiration pneumonia due to large intrathoracic hiatal hernia [J18.9]  Yes   • UTI due to Klebsiella species [N39.0, B96.1]  Yes   • Small postprocedural pneumothorax [J95.811]  No   • Bilateral parapneumonic pleural effusions.  S/p placement small right chest tube 19.  [J90]  Yes   • Acute respiratory failure with hypoxia not requiring ventilatory support (CMS/HCC) [J96.01]  Yes   • Massive hiatal hernia with intrathoracic stomach [K44.9]  Yes   • Hypothyroidism [E03.9]  Yes   • Hyperlipidemia [E78.5]  Yes   • Benign essential hypertension [I10]  Yes   • Multi-infarct dementia (CMS/HCC) [F01.50]  Yes   • Pernicious anemia [D51.0]  Yes      Resolved Hospital Problems    Diagnosis Date Resolved POA   • Elevated lactic acid level [R79.89] 2019 Yes   • Leukocytosis [D72.829] 2019 Yes   • Sepsis (CMS/HCC) [A41.9] 2019 Yes   • Abnormal urinalysis [R82.90] 2019 Yes          Hospital Course:  Temi Jarrett is a 89 y.o. female with hx of HLD, dementia, pernicious anemia, and HTN who was admitted on 2019 for acute hypoxic respiratory failure and sepsis 2/2 multifocal pneumonia, she was started on vancomycin and zosyn initially, then de-escalated to zosyn and azitthromycin because MRSA nares was negative. She had a CT scan of her chest done on 11/15 that showed multifocal pneumonia and bilateral pleural effusions,  as well as a large hiatal hernia which was thought to be causing a lot of her symptoms. She had right sided thoracentesis on November 16 and had chest tube in place until 11/20. Chext xray on 11/20 after removal of chest tube did not show pneumothorax. Her respiratory status was stable on 2-3L nasal canula.  It was thought that she may have been aspirating due to the large hiatal hernia, and speech evaluation did not show significant aspiration. She was started on a PPI for the hernia.   Her blood pressure was initially low on presentation and antihypertensive medications were held while inpatient. On discharge her blood pressure was 140s/80s. She told me she had been on norvasc in the past, so I restarted norvasc 5 mg qd. Will stop HCTZ.    Discharge Follow Up Recommendations for labs/diagnostics:  Follow-up with PCP for blood pressure check     Day of Discharge     HPI:   No acute events overnight. She's worried about going to rehab and how much they are going to push her to do. I told her she will need to work hard, but take it day by day. Denies significant worsening shortness of breath, cough, or chest pain.    Review of Systems  Gen- No fevers, chills  CV- No chest pain, palpitations  GI- No N/V/D, abd pain  All other systems reviewed and are negative.    Vital Signs:   Temp:  [98 °F (36.7 °C)-98.4 °F (36.9 °C)] 98.3 °F (36.8 °C)  Heart Rate:  [77-85] 77  Resp:  [18-22] 18  BP: (124-146)/(75-91) 139/90     Physical Exam:  Constitutional: No acute distress, awake, alert  HENT: NCAT, mucous membranes moist  Respiratory: Clear to auscultation bilaterally, respiratory effort normal on nasal canula  Cardiovascular: RRR, no murmurs, rubs, or gallops, no lower extremity edema  Gastrointestinal: Positive bowel sounds, soft, nontender, nondistended  Psychiatric: Appropriate affect, cooperative, but worries a lot  Neurologic: Oriented x 3, strength symmetric in all extremities, Cranial Nerves grossly intact to  confrontation, speech clear  Skin: No rashes on exposed skin; has bandage on right flank/back area where chest tube was inserted, it was nontender and without erythema/drainage/bleeding.    Pertinent  and/or Most Recent Results     Results from last 7 days   Lab Units 11/18/19  0616 11/17/19  0525 11/16/19  1438 11/15/19  1644 11/15/19  0623   WBC 10*3/mm3 8.74 10.68 13.96* 14.91*  --    HEMOGLOBIN g/dL 10.4* 9.9* 11.4* 11.0*  --    HEMATOCRIT % 35.4 33.6* 38.5 37.2  --    PLATELETS 10*3/mm3 297 297 384 358  --    SODIUM mmol/L 140 142 141  --  139   POTASSIUM mmol/L 4.0 4.3 4.7 4.4 3.6   CHLORIDE mmol/L 102 104 102  --  100   CO2 mmol/L 27.0 29.0 29.0  --  28.0   BUN mg/dL 15 15 19  --  18   CREATININE mg/dL 0.48* 0.58 0.62  --  0.56*   GLUCOSE mg/dL 94 102* 101*  --  118*   CALCIUM mg/dL 7.9* 8.1* 8.4*  --  7.9*           Invalid input(s): PROT, LABALBU        Invalid input(s): TG, LDLCALC, LDLREALC  Results from last 7 days   Lab Units 11/17/19  0525 11/15/19  0623   PROCALCITONIN ng/mL 0.64* 1.30*       Brief Urine Lab Results  (Last result in the past 365 days)      Color   Clarity   Blood   Leuk Est   Nitrite   Protein   CREAT   Urine HCG        11/12/19 0717 Yellow Cloudy Small (1+) Moderate (2+) Positive 100 mg/dL (2+)               Microbiology Results Abnormal     Procedure Component Value - Date/Time    Body Fluid Culture - Body Fluid, Pleural Cavity [[id number]] Collected:  11/16/19 2253    Lab Status:  Final result Specimen:  Body Fluid from Pleural Cavity Updated:  11/20/19 1300     Body Fluid Culture No growth at 3 days     Gram Stain Few (2+) WBCs seen      No organisms seen    Fungus Smear - Body Fluid, Pleural Cavity [[id number]] Collected:  11/16/19 2253    Lab Status:  Final result Specimen:  Body Fluid from Pleural Cavity Updated:  11/19/19 2046     Fungal Stain No fungal elements seen    Blood Culture - Blood, Arm, Left [[id number]] Collected:  11/12/19 1218    Lab Status:  Final result Specimen:   Blood from Arm, Left Updated:  11/17/19 1246     Blood Culture No growth at 5 days    Blood Culture - Blood, Arm, Right [[id number]] Collected:  11/12/19 1218    Lab Status:  Final result Specimen:  Blood from Arm, Right Updated:  11/17/19 1246     Blood Culture No growth at 5 days    AFB Culture - Body Fluid, Pleural Cavity [[id number]] Collected:  11/16/19 2253    Lab Status:  Preliminary result Specimen:  Body Fluid from Pleural Cavity Updated:  11/17/19 1159     AFB Stain No acid fast bacilli seen on concentrated smear    Blood Culture - Blood, Arm, Right [[id number]] Collected:  11/12/19 0740    Lab Status:  Final result Specimen:  Blood from Arm, Right Updated:  11/17/19 1016     Blood Culture No growth at 5 days    Blood Culture - Blood, Arm, Left [[id number]] Collected:  11/12/19 0745    Lab Status:  Final result Specimen:  Blood from Arm, Left Updated:  11/17/19 1016     Blood Culture No growth at 5 days    KOH Prep - Body Fluid, Pleural Cavity [[id number]] Collected:  11/16/19 2253    Lab Status:  Final result Specimen:  Body Fluid from Pleural Cavity Updated:  11/16/19 2355     KOH Prep No yeast or hyphal elements seen    S. Pneumo Ag Urine or CSF - Urine, Urine, Clean Catch [[id number]]  (Normal) Collected:  11/15/19 1459    Lab Status:  Final result Specimen:  Urine, Clean Catch Updated:  11/16/19 0042     Strep Pneumo Ag Negative    Legionella Antigen, Urine - Urine, Urine, Clean Catch [[id number]]  (Normal) Collected:  11/15/19 1459    Lab Status:  Final result Specimen:  Urine, Clean Catch Updated:  11/16/19 0042     LEGIONELLA ANTIGEN, URINE Negative    Urine Culture - Urine, Urine, Catheter [[id number]]  (Abnormal)  (Susceptibility) Collected:  11/12/19 0717    Lab Status:  Final result Specimen:  Urine, Catheter Updated:  11/14/19 0348     Urine Culture >100,000 CFU/mL Klebsiella pneumoniae ssp pneumoniae    Susceptibility      Klebsiella pneumoniae ssp pneumoniae     MARY KAY     Ampicillin Resistant      Ampicillin + Sulbactam Susceptible     Cefazolin Susceptible     Cefepime Susceptible     Ceftazidime Susceptible     Ceftriaxone Susceptible     Gentamicin Susceptible     Levofloxacin Susceptible     Nitrofurantoin Intermediate     Piperacillin + Tazobactam Susceptible     Tetracycline Susceptible     Trimethoprim + Sulfamethoxazole Susceptible                    MRSA Screen, PCR - Swab, Nares [[id number]]  (Normal) Collected:  11/12/19 1403    Lab Status:  Final result Specimen:  Swab from Nares Updated:  11/12/19 1541     MRSA PCR Negative    Narrative:       MRSA Negative    Respiratory Panel, PCR - Swab, Nasopharynx [[id number]]  (Normal) Collected:  11/12/19 1022    Lab Status:  Final result Specimen:  Swab from Nasopharynx Updated:  11/12/19 1247     ADENOVIRUS, PCR Not Detected     Coronavirus 229E Not Detected     Coronavirus HKU1 Not Detected     Coronavirus NL63 Not Detected     Coronavirus OC43 Not Detected     Human Metapneumovirus Not Detected     Human Rhinovirus/Enterovirus Not Detected     Influenza B PCR Not Detected     Parainfluenza Virus 1 Not Detected     Parainfluenza Virus 2 Not Detected     Parainfluenza Virus 3 Not Detected     Parainfluenza Virus 4 Not Detected     Bordetella pertussis pcr Not Detected     Influenza A H1 2009 PCR Not Detected     Chlamydophila pneumoniae PCR Not Detected     Mycoplasma pneumo by PCR Not Detected     Influenza A PCR Not Detected     Influenza A H3 Not Detected     Influenza A H1 Not Detected     RSV, PCR Not Detected     Bordetella parapertussis PCR Not Detected          Imaging Results (All)     Procedure Component Value Units Date/Time    XR Chest PA & Lateral [[id number]] Collected:  11/20/19 1439     Updated:  11/20/19 1555    Narrative:       EXAMINATION: XR CHEST, PA AND LATERAL-11/20/2019:      INDICATION: F/U aspiration pneumonia and removal of R pigtail drain;  A41.9-Sepsis, unspecified organism; J18.9-Pneumonia, unspecified  organism; N39.0-Urinary  tract infection, site not specified;  A49.9-Bacterial infection, unspecified; J96.01-Acute respiratory failure  with hypoxia; Z74.09-Other reduced mobility; P79-Padizos effusion, not  elsewhere classified.      COMPARISON: NONE.     FINDINGS: PA and lateral views of the chest reveal the heart to be  enlarged. Bilateral pleural effusions with removal of the chest tube on  the right. The pulmonary vascularity is unchanged. Degenerative changes  seen within the spine. No pneumothorax.           Impression:       Removal of the chest tube on the right with no evidence of  pneumothorax. Bilateral pleural effusions are identified. Increased  markings at the lung bases. The heart remains enlarged.     D:  11/20/2019  E:  11/20/2019             XR Chest 1 View [[id number]] Collected:  11/19/19 0818     Updated:  11/19/19 1110    Narrative:       EXAMINATION: XR CHEST 1 VW- 11/19/2019     INDICATION: f/u respiratory illness; A41.9-Sepsis, unspecified organism;  J18.9-Pneumonia, unspecified organism; N39.0-Urinary tract infection,  site not specified; A49.9-Bacterial infection, unspecified; J96.01-Acute  respiratory failure with hypoxia; Z74.09-Other reduced mobility;  C85-Nxfpggg effusion, not elsewhere classified      COMPARISON: Chest x-ray 11/18/2019     FINDINGS: Cardiac size enlarged along with large hiatal hernia projects  over the lower chest. Patchy opacifications in the bilateral upper lungs  similar to prior. Right percutaneous chest tube remains in place without  distinct pleural effusion component. Small left pleural effusion similar  to prior.           Impression:       No significant interval change.     D:  11/19/2019  E:  11/19/2019     This report was finalized on 11/19/2019 11:07 AM by Dr. Shai Lucas.       XR Chest 1 View [[id number]] Collected:  11/18/19 1355     Updated:  11/18/19 1551    Narrative:       EXAMINATION: XR CHEST 1 VW-11/18/2019:      INDICATION: DYSPNEA; A41.9-Sepsis, unspecified  organism;  J18.9-Pneumonia, unspecified organism; N39.0-Urinary tract infection,  site not specified; A49.9-Bacterial infection, unspecified; J96.01-Acute  respiratory failure with hypoxia; Z74.09-Other reduced mobility;  O71-Axsgedq effusion, not elsewhere classified.      COMPARISON: 11/18/2019.     FINDINGS: Portable chest reveals the heart to be enlarged. There is  ill-defined opacification again seen within the right upper lobe with  chest tube identified on the right. There is no definite pneumothorax  identified. Large hiatal hernia with enlargement of the heart.  Ill-defined opacification seen at the left lung base with small left  pleural effusion.           Impression:       Tiny chest tube identified on the right with no definite  pneumothorax. Increased markings seen within the right upper lobe as  well as dense consolidation seen in the left mid and lower lung field.  The heart remains enlarged with small-to-moderate sized left pleural  effusion.     D:  11/18/2019  E:  11/18/2019     This report was finalized on 11/18/2019 3:48 PM by Dr. Marita Vance MD.       XR Chest 1 View [[id number]] Collected:  11/18/19 0859     Updated:  11/18/19 1539    Narrative:       EXAMINATION: XR CHEST 1 VW-11/18/2019:      INDICATION: Chest tube clamped; A41.9-Sepsis, unspecified organism;  J18.9-Pneumonia, unspecified organism; N39.0-Urinary tract infection,  site not specified; A49.9-Bacterial infection, unspecified; J96.01-Acute  respiratory failure with hypoxia; Z74.09-Other reduced mobility;  W64-Yoybnvm effusion, not elsewhere classified.      COMPARISON: 11/17/2019.     FINDINGS: Portable chest reveals the heart to be enlarged. Ill-defined  opacification at the left lung base with small left pleural effusion.  Chest tube identified on the right with ill-defined opacification seen  within the right lung base. Small right pleural effusion. The upper lung  field on the right is stable and unchanged.  Degenerative changes seen  within the spine with vascular calcification seen within the thoracic  aorta.           Impression:       Stable chest as above. No definite pneumothorax.     D:  11/18/2019  E:  11/18/2019     This report was finalized on 11/18/2019 3:36 PM by Dr. Marita Vance MD.       XR Chest 1 View [[id number]] Collected:  11/17/19 1615     Updated:  11/18/19 1532    Narrative:          EXAMINATION: XR CHEST 1 VW - 11/17/2019     INDICATION:  A41.9-Sepsis, unspecified organism; J18.9-Pneumonia,  unspecified organism; N39.0-Urinary tract infection, site not specified;  A49.9-Bacterial infection, unspecified; J96.01-Acute respiratory failure  with hypoxia; Z74.09-Other reduced mobility; I55-Mjjogin effusion, not  elsewhere classified      COMPARISON: None     FINDINGS: Portable chest reveals heart to be enlarged. Mild increased  markings identified in the upper lung fields. Degenerative changes seen  within the spine. Chest tube identified on the right with small left  pleural effusion. Mild increased markings identified left lung base. No  change seen in the size of the pneumothorax on the right.       Impression:       Chest tube remains on the right with no change seen in size  of the tiny pneumothorax. Ill-defined opacification seen within the left  mid to lower lung field. Left pleural effusion time-of-flight     DICTATED:   11/17/2019  EDITED/ls :   11/17/2019      This report was finalized on 11/18/2019 3:29 PM by Dr. Marita Vance MD.       XR Chest 1 View [[id number]] Collected:  11/17/19 0941     Updated:  11/17/19 1613    Narrative:          EXAMINATION: XR CHEST 1 VW - 11/17/2019     INDICATION: A41.9-Sepsis, unspecified organism; J18.9-Pneumonia,  unspecified organism; N39.0-Urinary tract infection, site not specified;  A49.9-Bacterial infection, unspecified; J96.01-Acute respiratory failure  with hypoxia; Z74.09-Other reduced mobility; K53-Glgtbrg effusion, not  elsewhere  classified      COMPARISON: None     FINDINGS: Portable chest reveals heart to be enlarged. There is a chest  tube identified on the right with tiny apical pneumothorax present.  Ill-defined opacification seen within the left mid to lower lung field.  The heart is enlarged. Air bronchograms identified on the left.  Degenerative changes seen within the spine.           Impression:       Chest tube identified on the right with tiny apical  pneumothorax. Ill-defined opacification again seen within the left mid  to lower lung field with air bronchograms present on the left. Small  left pleural effusion present. Some shift of the mediastinum to the left  which is stable.     DICTATED:   11/17/2019  EDITED/ls :   11/17/2019      This report was finalized on 11/17/2019 4:10 PM by Dr. Marita Vance MD.       XR Chest 1 View [[id number]] Collected:  11/17/19 0813     Updated:  11/17/19 1611    Narrative:          EXAMINATION: XR CHEST 1 VW - 11/17/2019     INDICATION A41.9-Sepsis, unspecified organism; J18.9-Pneumonia,  unspecified organism; N39.0-Urinary tract infection, site not specified;  A49.9-Bacterial infection, unspecified; J96.01-Acute respiratory failure  with hypoxia; Z74.09-Other reduced mobility; Y78-Vxznqkc effusion, not  elsewhere classified      COMPARISON: 11/16/2019     FINDINGS: Portable chest reveals heart to be enlarged. There is a chest  tube identified on the right with tiny pneumothorax present. There are  increased markings seen within the left upper and lower lung fields.  Increased markings seen within the suprahilar region. Degenerative  changes seen within the spine.       Impression:       Chest tube identified on the right with tiny apical  pneumothorax present. The remainder of the chest is stable. Heart is  enlarged with increased markings again seen within the left mid to lower  lower lung field.     DICTATED:   11/17/2019  EDITED/ls :   11/17/2019      This report was finalized on  11/17/2019 4:08 PM by Dr. Marita Vance MD.       XR Chest 1 View [[id number]] Collected:  11/17/19 0834     Updated:  11/17/19 1611    Narrative:          EXAMINATION: XR CHEST 1 VW - 11/16/2019     INDICATION: ; A41.9-Sepsis, unspecified organism; J18.9-Pneumonia,  unspecified organism; N39.0-Urinary tract infection, site not specified;  A49.9-Bacterial infection, unspecified; J96.01-Acute respiratory failure  with hypoxia; Z74.09-Other reduced mobility      COMPARISON: 11/12/2019     FINDINGS: Portable chest reveals small chest tube identified on the  right with small apical pneumothorax. Increased markings improving  within the right upper lobe. Ill-defined opacification within the left  mid to lower lung field. Development of a small left pleural effusion.            Impression:       Chest tube identified on the right with small apical  pneumothorax. Improvement seen in aeration of the right upper lobe.  There is development of a small left pleural effusion with ill-defined  opacification within the left mid to lower lung field.     DICTATED:   11/17/2019  EDITED/ls :   11/17/2019      This report was finalized on 11/17/2019 4:07 PM by Dr. Marita Vance MD.       CT Chest With Contrast [[id number]] Collected:  11/15/19 1325     Updated:  11/15/19 1903    Narrative:       EXAMINATION: CT CHEST WITH CONTRAST-11/15/2019:      INDICATION: Multifocal PNA; A41.9-Sepsis, unspecified organism;  J18.9-Pneumonia, unspecified organism; N39.0-Urinary tract infection,  site not specified; A49.9-Bacterial infection, unspecified; J96.01-Acute  respiratory failure with hypoxia; Z74.09-Other reduced mobility.     TECHNIQUE: CT scan of the chest was performed without contrast.     The radiation dose reduction device was turned on for each scan per the  ALARA (As Low as Reasonably Achievable) protocol.     COMPARISON: 05/09/2014.     FINDINGS: There is no axillary lymphadenopathy. There is mild  mediastinal  lymphadenopathy.  There is no pericardial effusion. Images  displayed at lung window settings demonstrate widespread consolidation  and fibrotic change in the upper lobes, right greater than left. There  is also similar disease in the lingula with lesser and bibasilar disease  and with moderately large pleural effusions, right greater than left.  All of the aforementioned abnormalities are new when compared with  05/09/2014.       Impression:       There is widespread disease with some areas of consolidation  and fibrotic change in the right upper lobe, left upper lobe and  lingula. To a lesser extent there is bibasilar disease which is somewhat  obscured by sizable bilateral pleural effusions, right greater than  left. All of the aforementioned findings are new when compared with  05/09/2014.     D:  11/15/2019  E:  11/15/2019           This report was finalized on 11/15/2019 7:00 PM by Dr. Maikol Poe MD.       XR Hip With or Without Pelvis 1 View Left [[id number]] Collected:  11/14/19 0704     Updated:  11/14/19 0706    Narrative:       CR Hip 1 Vw LT    INDICATION:   Status post fall complains of left hip pain. Recent left hip arthroplasty for fracture.    COMPARISON:   9/28/2019    FINDINGS:  AP and frog-leg lateral view(s) of the left hip.  Left hip hemiarthroplasty component in expected position alignment. No periprosthetic fracture or loosening. The visualized pelvis unremarkable. Questionable induration and edema over the left hip region  may be related to contusion.      Impression:       Left hip hemiarthroplasty in expected position. No acute osseous abnormality. Questionable reticulation and edema overlying the left hip and gluteal region.    Signer Name: MARIANNE Alcazar MD   Signed: 11/14/2019 7:04 AM   Workstation Name: [id number]    [facility]    XR Chest 1 View [[id number]] Collected:  11/12/19 0822     Updated:  11/13/19 0841    Narrative:       EXAMINATION: XR CHEST 1  VW-11/12/2019:      INDICATION: SOA, triage protocol.      COMPARISON: Chest radiograph 09/30/2019.     FINDINGS: Single portable chest radiograph was submitted for review. The  heart is enlarged, similar to prior. Probable large hiatal hernia noted,  not as pronounced on today's exam as when compared to 09/30/2019. There  has been interval development of right upper lobe airspace disease with  additional right lower lobe airspace disease superimposed on chronic  lung changes. No sizable pleural effusion or pneumothorax. The  visualized upper abdomen is unrevealing. No acute osseous abnormality is  identified. Diffuse osteopenia is present.           Impression:       1.  Interval development of right upper lobe pneumonia with additional  right lower lobe and left lower lobe hazy airspace changes suggested  superimposed on chronic lung disease. Follow-up chest radiography to  resolution advised.   2.  Cardiomegaly, similar to prior.  3.  Suspected large hiatal hernia, not as pronounced on today's exam  when compared to prior studies.     D:  11/12/2019  E:  11/12/2019           This report was finalized on 11/13/2019 8:38 AM by Dr. Kendall Doe MD.                             Order Current Status    Fungus Culture - Body Fluid, Pleural Cavity In process    Non-gynecologic Cytology In process    AFB Culture - Body Fluid, Pleural Cavity Preliminary result        Discharge Details        Discharge Medications      New Medications      Instructions Start Date   acetaminophen 325 MG tablet  Commonly known as:  TYLENOL   650 mg, Oral, Every 4 Hours PRN      amLODIPine 5 MG tablet  Commonly known as:  NORVASC   5 mg, Oral, Daily      amoxicillin-clavulanate 875-125 MG per tablet  Commonly known as:  AUGMENTIN   1 tablet, Oral, Every 12 Hours Scheduled      guaiFENesin 600 MG 12 hr tablet  Commonly known as:  MUCINEX   1,200 mg, Oral, Every 12 Hours Scheduled      ipratropium-albuterol 0.5-2.5 mg/3 ml  nebulizer  Commonly known as:  DUO-NEB   3 mL, Nebulization, 4 Times Daily PRN      pantoprazole 40 MG EC tablet  Commonly known as:  PROTONIX   40 mg, Oral, Daily         Continue These Medications      Instructions Start Date   aspirin 325 MG EC tablet   325 mg, Oral, Daily      bethanechol 10 MG tablet  Commonly known as:  URECHOLINE   10 mg, Oral, 4 Times Daily      busPIRone 10 MG tablet  Commonly known as:  BUSPAR   10 mg, Oral, 2 Times Daily      CALTRATE PLUS PO   Oral      diclofenac 1 % gel gel  Commonly known as:  VOLTAREN   4 g, Topical, 4 Times Daily      donepezil 10 MG tablet  Commonly known as:  ARICEPT   10 mg, Oral, Nightly      gabapentin 300 MG capsule  Commonly known as:  NEURONTIN   TAKE ONE CAPSULE BY MOUTH TWICE A DAY      levothyroxine 175 MCG tablet  Commonly known as:  SYNTHROID   175 mcg, Oral, Daily, Patient receives medication from patient assistance.  NDC:6150-3924-57 EXP:07/02/2020 LOT [id number]      LOMOTIL PO   2 tablets, Oral, As Needed      Melatonin 3 MG capsule   3 capsules, Oral, Every Night at Bedtime      memantine 10 MG tablet  Commonly known as:  NAMENDA   10 mg, Oral, 2 Times Daily      methocarbamol 500 MG tablet  Commonly known as:  ROBAXIN   500 mg, Oral, 4 Times Daily      ondansetron ODT 4 MG disintegrating tablet  Commonly known as:  ZOFRAN-ODT   4 mg, Oral, Every 8 Hours PRN      potassium chloride 10 MEQ CR capsule  Commonly known as:  MICRO-K   TAKE TWO CAPSULES BY MOUTH DAILY      PRESERVISION AREDS PO   1 tablet, Oral, Daily      PROBIOTIC-10 capsule   1 capsule, Oral, Daily      sucralfate 1 g tablet  Commonly known as:  CARAFATE   1 tablet, Oral, Every 6 Hours      traMADol 50 MG tablet  Commonly known as:  ULTRAM   TAKE ONE TABLET BY MOUTH DAILY AS NEEDED FOR MODERATE PAIN (RIGHT KNEE)      VITAMIN D PO   2000 U qd         Stop These Medications    benzonatate 100 MG capsule  Commonly known as:  TESSALON     colestipol 1 g tablet  Commonly known as:  COLESTID      hydroCHLOROthiazide 25 MG tablet  Commonly known as:  HYDRODIURIL     sertraline 50 MG tablet  Commonly known as:  ZOLOFT            Allergies   Allergen Reactions   • Codeine Nausea Only     Trouble Breathing          Discharge Disposition:  Skilled Nursing Facility (DC - External)    Diet:  Hospital:  Diet Order   Procedures   • Diet Regular       Activity: Up with assistance       Restrictions or Other Recommendations:  Sit up when eating   Do not eat 3 hours before bed time  Eat small, but frequent meals   Avoid spicy foods, as well as tomato and citrus based food, coffee, and alcohol       CODE STATUS:    Code Status and Medical Interventions:   Ordered at: 11/12/19 1002     Level Of Support Discussed With:    Patient     Code Status:    CPR     Medical Interventions (Level of Support Prior to Arrest):    Full       Future Appointments   Date Time Provider Department Center   12/18/2019  1:30 PM Eric Patel MD [facility]   1/2/2020  3:10 PM Gamal Denny MD [facility] None       Additional Instructions for the Follow-ups that You Need to Schedule     Discharge Follow-up with PCP   As directed       Currently Documented PCP:    Eric Patel MD    PCP Phone Number:    [phone]     Follow Up Details:  for blood pressure check               Time Spent on Discharge:  42 minutes    Electronically signed by Darcy Cordova MD, 11/21/19, 12:24 PM.

## 2019-11-21 NOTE — PROGRESS NOTES
Case Management Discharge Note      Final Note: Received a call that transport arrangements have been made for patient with [facility] to [facility] at 1500.  Called patient son, Victor M, to advise who verbalizes being very grateful and is in agreement.  Spoke to patient to advises who is also in agreement with plan.  No other discharge needs verbalized.  RN notified.  Patient plan is to discharge to [facility] Unit today via [facility] scheduled at 1500.  Nursing to call report to [phone].         Destination      No service has been selected for the patient.      Durable Medical Equipment      No service has been selected for the patient.      Dialysis/Infusion      No service has been selected for the patient.      Home Medical Care      No service has been selected for the patient.      Therapy      No service has been selected for the patient.      Community Resources      No service has been selected for the patient.             Final Discharge Disposition Code: 62 - inpatient rehab facility

## 2019-11-21 NOTE — PROGRESS NOTES
Continued Stay Note  [facility]     Patient Name: Temi Jarrett  MRN: [id number]  Today's Date: 11/21/2019    Admit Date: 11/12/2019    Discharge Plan     Row Name 11/21/19 1052       Plan    Plan  [facility]    Patient/Family in Agreement with Plan  yes    Plan Comments  Per MD rounds today patient ready for discharge.  Spoke to Oleksandr with [facility] who advises that they can accept patient to Med Unit today.  Spoke with patient daughter in law via telephone regarding transport to rehab who advises that they had assumed that patient would be transported via ambulance to rehab.  CM advised that an attempt will be made to schedule medical van transport.  Called to make arrangements for medical van transport however van full for today.  Called patient family to advises and offered to make arrangements for [employer] at the out of pocket cost of $80 who do not want to have to pay for transport and would like CM to find a way to cover cost.  CM following.      Final Discharge Disposition Code  62 - inpatient rehab facility        Discharge Codes    No documentation.       Expected Discharge Date and Time     Expected Discharge Date Expected Discharge Time    Nov 22, 2019             Abbey Serna, RN

## 2019-11-25 LAB
LAB AP CASE REPORT: NORMAL
PATH REPORT.FINAL DX SPEC: NORMAL

## 2019-12-04 ENCOUNTER — HOSPITAL ENCOUNTER (OUTPATIENT)
Facility: HOSPITAL | Age: 84
Setting detail: OBSERVATION
LOS: 1 days | Discharge: NURSING, REHAB OR LONG-TERM CARE | End: 2019-12-08
Attending: EMERGENCY MEDICINE | Admitting: HOSPITALIST

## 2019-12-04 DIAGNOSIS — D72.829 LEUKOCYTOSIS, UNSPECIFIED TYPE: ICD-10-CM

## 2019-12-04 DIAGNOSIS — Z78.9 IMPAIRED MOBILITY AND ADLS: ICD-10-CM

## 2019-12-04 DIAGNOSIS — K62.5 RECTAL BLEEDING: Primary | ICD-10-CM

## 2019-12-04 DIAGNOSIS — R19.7 DIARRHEA, UNSPECIFIED TYPE: ICD-10-CM

## 2019-12-04 DIAGNOSIS — Z74.09 IMPAIRED MOBILITY AND ADLS: ICD-10-CM

## 2019-12-04 DIAGNOSIS — E53.8 VITAMIN B12 DEFICIENCY: ICD-10-CM

## 2019-12-04 DIAGNOSIS — Z74.09 IMPAIRED FUNCTIONAL MOBILITY, BALANCE, GAIT, AND ENDURANCE: ICD-10-CM

## 2019-12-04 LAB
ABO GROUP BLD: NORMAL
ALBUMIN SERPL-MCNC: 3.5 G/DL (ref 3.5–5.2)
ALBUMIN/GLOB SERPL: 1.2 G/DL
ALP SERPL-CCNC: 88 U/L (ref 39–117)
ALT SERPL W P-5'-P-CCNC: 8 U/L (ref 1–33)
ANION GAP SERPL CALCULATED.3IONS-SCNC: 11 MMOL/L (ref 5–15)
APTT PPP: 32.2 SECONDS (ref 85–120)
AST SERPL-CCNC: 15 U/L (ref 1–32)
BASOPHILS # BLD AUTO: 0.09 10*3/MM3 (ref 0–0.2)
BASOPHILS NFR BLD AUTO: 0.4 % (ref 0–1.5)
BILIRUB SERPL-MCNC: 0.6 MG/DL (ref 0.2–1.2)
BLD GP AB SCN SERPL QL: NEGATIVE
BUN BLD-MCNC: 17 MG/DL (ref 8–23)
BUN/CREAT SERPL: 20.2 (ref 7–25)
CALCIUM SPEC-SCNC: 9.2 MG/DL (ref 8.6–10.5)
CHLORIDE SERPL-SCNC: 96 MMOL/L (ref 98–107)
CO2 SERPL-SCNC: 32 MMOL/L (ref 22–29)
CREAT BLD-MCNC: 0.84 MG/DL (ref 0.57–1)
DEPRECATED RDW RBC AUTO: 47.8 FL (ref 37–54)
DEVELOPER EXPIRATION DATE: ABNORMAL
DEVELOPER LOT NUMBER: ABNORMAL
EOSINOPHIL # BLD AUTO: 0.07 10*3/MM3 (ref 0–0.4)
EOSINOPHIL NFR BLD AUTO: 0.3 % (ref 0.3–6.2)
ERYTHROCYTE [DISTWIDTH] IN BLOOD BY AUTOMATED COUNT: 15.3 % (ref 12.3–15.4)
EXPIRATION DATE: ABNORMAL
FECAL OCCULT BLOOD SCREEN, POC: POSITIVE
GFR SERPL CREATININE-BSD FRML MDRD: 64 ML/MIN/1.73
GLOBULIN UR ELPH-MCNC: 3 GM/DL
GLUCOSE BLD-MCNC: 110 MG/DL (ref 65–99)
HCT VFR BLD AUTO: 38.3 % (ref 34–46.6)
HGB BLD-MCNC: 11.4 G/DL (ref 12–15.9)
HOLD SPECIMEN: NORMAL
HOLD SPECIMEN: NORMAL
IMM GRANULOCYTES # BLD AUTO: 0.11 10*3/MM3 (ref 0–0.05)
IMM GRANULOCYTES NFR BLD AUTO: 0.5 % (ref 0–0.5)
INR PPP: 1.3 (ref 0.85–1.16)
LYMPHOCYTES # BLD AUTO: 0.92 10*3/MM3 (ref 0.7–3.1)
LYMPHOCYTES NFR BLD AUTO: 4.4 % (ref 19.6–45.3)
Lab: ABNORMAL
MCH RBC QN AUTO: 25.4 PG (ref 26.6–33)
MCHC RBC AUTO-ENTMCNC: 29.8 G/DL (ref 31.5–35.7)
MCV RBC AUTO: 85.5 FL (ref 79–97)
MONOCYTES # BLD AUTO: 1.1 10*3/MM3 (ref 0.1–0.9)
MONOCYTES NFR BLD AUTO: 5.3 % (ref 5–12)
NEGATIVE CONTROL: NEGATIVE
NEUTROPHILS # BLD AUTO: 18.63 10*3/MM3 (ref 1.7–7)
NEUTROPHILS NFR BLD AUTO: 89.1 % (ref 42.7–76)
NRBC BLD AUTO-RTO: 0 /100 WBC (ref 0–0.2)
PLAT MORPH BLD: NORMAL
PLATELET # BLD AUTO: 389 10*3/MM3 (ref 140–450)
PMV BLD AUTO: 9.3 FL (ref 6–12)
POSITIVE CONTROL: POSITIVE
POTASSIUM BLD-SCNC: 3.5 MMOL/L (ref 3.5–5.2)
PROT SERPL-MCNC: 6.5 G/DL (ref 6–8.5)
PROTHROMBIN TIME: 15.6 SECONDS (ref 11.2–14.3)
RBC # BLD AUTO: 4.48 10*6/MM3 (ref 3.77–5.28)
RBC MORPH BLD: NORMAL
RH BLD: POSITIVE
SODIUM BLD-SCNC: 139 MMOL/L (ref 136–145)
T&S EXPIRATION DATE: NORMAL
WBC MORPH BLD: NORMAL
WBC NRBC COR # BLD: 20.92 10*3/MM3 (ref 3.4–10.8)
WHOLE BLOOD HOLD SPECIMEN: NORMAL
WHOLE BLOOD HOLD SPECIMEN: NORMAL

## 2019-12-04 PROCEDURE — 99220 PR INITIAL OBSERVATION CARE/DAY 70 MINUTES: CPT | Performed by: INTERNAL MEDICINE

## 2019-12-04 PROCEDURE — 85730 THROMBOPLASTIN TIME PARTIAL: CPT | Performed by: EMERGENCY MEDICINE

## 2019-12-04 PROCEDURE — 86900 BLOOD TYPING SEROLOGIC ABO: CPT | Performed by: EMERGENCY MEDICINE

## 2019-12-04 PROCEDURE — 99284 EMERGENCY DEPT VISIT MOD MDM: CPT

## 2019-12-04 PROCEDURE — 85007 BL SMEAR W/DIFF WBC COUNT: CPT | Performed by: EMERGENCY MEDICINE

## 2019-12-04 PROCEDURE — 85610 PROTHROMBIN TIME: CPT | Performed by: EMERGENCY MEDICINE

## 2019-12-04 PROCEDURE — 86850 RBC ANTIBODY SCREEN: CPT | Performed by: EMERGENCY MEDICINE

## 2019-12-04 PROCEDURE — 80053 COMPREHEN METABOLIC PANEL: CPT | Performed by: EMERGENCY MEDICINE

## 2019-12-04 PROCEDURE — 25010000002 CEFTRIAXONE PER 250 MG: Performed by: INTERNAL MEDICINE

## 2019-12-04 PROCEDURE — 85025 COMPLETE CBC W/AUTO DIFF WBC: CPT | Performed by: EMERGENCY MEDICINE

## 2019-12-04 PROCEDURE — 82270 OCCULT BLOOD FECES: CPT | Performed by: EMERGENCY MEDICINE

## 2019-12-04 PROCEDURE — 86901 BLOOD TYPING SEROLOGIC RH(D): CPT | Performed by: EMERGENCY MEDICINE

## 2019-12-04 RX ORDER — POTASSIUM CHLORIDE 20 MEQ/1
40 TABLET, EXTENDED RELEASE ORAL 2 TIMES DAILY
Status: ON HOLD | COMMUNITY
End: 2019-12-05

## 2019-12-04 RX ORDER — TRAZODONE HYDROCHLORIDE 50 MG/1
25 TABLET ORAL NIGHTLY PRN
Status: DISCONTINUED | OUTPATIENT
Start: 2019-12-04 | End: 2019-12-08 | Stop reason: HOSPADM

## 2019-12-04 RX ORDER — MEMANTINE HYDROCHLORIDE 10 MG/1
10 TABLET ORAL 2 TIMES DAILY
Status: DISCONTINUED | OUTPATIENT
Start: 2019-12-04 | End: 2019-12-08 | Stop reason: HOSPADM

## 2019-12-04 RX ORDER — MAGNESIUM SULFATE HEPTAHYDRATE 40 MG/ML
4 INJECTION, SOLUTION INTRAVENOUS AS NEEDED
Status: DISCONTINUED | OUTPATIENT
Start: 2019-12-04 | End: 2019-12-08 | Stop reason: HOSPADM

## 2019-12-04 RX ORDER — ASPIRIN 81 MG/1
81 TABLET ORAL DAILY
COMMUNITY

## 2019-12-04 RX ORDER — NYSTATIN 100000 [USP'U]/G
POWDER TOPICAL EVERY 12 HOURS SCHEDULED
Status: DISCONTINUED | OUTPATIENT
Start: 2019-12-04 | End: 2019-12-08 | Stop reason: HOSPADM

## 2019-12-04 RX ORDER — SODIUM CHLORIDE 0.9 % (FLUSH) 0.9 %
10 SYRINGE (ML) INJECTION AS NEEDED
Status: DISCONTINUED | OUTPATIENT
Start: 2019-12-04 | End: 2019-12-08 | Stop reason: HOSPADM

## 2019-12-04 RX ORDER — GABAPENTIN 300 MG/1
300 CAPSULE ORAL 2 TIMES DAILY
Status: DISCONTINUED | OUTPATIENT
Start: 2019-12-04 | End: 2019-12-08 | Stop reason: HOSPADM

## 2019-12-04 RX ORDER — DOCUSATE SODIUM 100 MG/1
100 CAPSULE, LIQUID FILLED ORAL 2 TIMES DAILY
Status: ON HOLD | COMMUNITY
End: 2019-12-08 | Stop reason: SDUPTHER

## 2019-12-04 RX ORDER — TRAZODONE HYDROCHLORIDE 50 MG/1
25 TABLET ORAL NIGHTLY PRN
COMMUNITY
End: 2019-12-24 | Stop reason: HOSPADM

## 2019-12-04 RX ORDER — BETHANECHOL CHLORIDE 10 MG/1
10 TABLET ORAL 4 TIMES DAILY
Status: DISCONTINUED | OUTPATIENT
Start: 2019-12-04 | End: 2019-12-08 | Stop reason: HOSPADM

## 2019-12-04 RX ORDER — MAGNESIUM SULFATE HEPTAHYDRATE 40 MG/ML
2 INJECTION, SOLUTION INTRAVENOUS AS NEEDED
Status: DISCONTINUED | OUTPATIENT
Start: 2019-12-04 | End: 2019-12-08 | Stop reason: HOSPADM

## 2019-12-04 RX ORDER — SODIUM CHLORIDE 0.9 % (FLUSH) 0.9 %
10 SYRINGE (ML) INJECTION EVERY 12 HOURS SCHEDULED
Status: DISCONTINUED | OUTPATIENT
Start: 2019-12-04 | End: 2019-12-08 | Stop reason: HOSPADM

## 2019-12-04 RX ORDER — POTASSIUM CHLORIDE 750 MG/1
40 CAPSULE, EXTENDED RELEASE ORAL AS NEEDED
Status: DISCONTINUED | OUTPATIENT
Start: 2019-12-04 | End: 2019-12-08 | Stop reason: HOSPADM

## 2019-12-04 RX ORDER — LEVOTHYROXINE SODIUM 175 UG/1
175 TABLET ORAL
Status: DISCONTINUED | OUTPATIENT
Start: 2019-12-05 | End: 2019-12-08 | Stop reason: HOSPADM

## 2019-12-04 RX ORDER — DONEPEZIL HYDROCHLORIDE 10 MG/1
10 TABLET, FILM COATED ORAL NIGHTLY
Status: DISCONTINUED | OUTPATIENT
Start: 2019-12-04 | End: 2019-12-08 | Stop reason: HOSPADM

## 2019-12-04 RX ORDER — POTASSIUM CHLORIDE 1.5 G/1.77G
40 POWDER, FOR SOLUTION ORAL AS NEEDED
Status: DISCONTINUED | OUTPATIENT
Start: 2019-12-04 | End: 2019-12-08 | Stop reason: HOSPADM

## 2019-12-04 RX ORDER — ACETAMINOPHEN 650 MG/1
650 SUPPOSITORY RECTAL EVERY 4 HOURS PRN
Status: DISCONTINUED | OUTPATIENT
Start: 2019-12-04 | End: 2019-12-08 | Stop reason: HOSPADM

## 2019-12-04 RX ORDER — ONDANSETRON 2 MG/ML
4 INJECTION INTRAMUSCULAR; INTRAVENOUS EVERY 6 HOURS PRN
Status: DISCONTINUED | OUTPATIENT
Start: 2019-12-04 | End: 2019-12-08 | Stop reason: HOSPADM

## 2019-12-04 RX ORDER — ACETAMINOPHEN 325 MG/1
650 TABLET ORAL EVERY 4 HOURS PRN
Status: DISCONTINUED | OUTPATIENT
Start: 2019-12-04 | End: 2019-12-08 | Stop reason: HOSPADM

## 2019-12-04 RX ORDER — FUROSEMIDE 40 MG/1
40 TABLET ORAL DAILY
Status: ON HOLD | COMMUNITY
End: 2019-12-24 | Stop reason: SDUPTHER

## 2019-12-04 RX ORDER — TRAMADOL HYDROCHLORIDE 50 MG/1
25 TABLET ORAL EVERY 6 HOURS PRN
Status: DISCONTINUED | OUTPATIENT
Start: 2019-12-04 | End: 2019-12-08 | Stop reason: HOSPADM

## 2019-12-04 RX ORDER — ACETAMINOPHEN 160 MG/5ML
650 SOLUTION ORAL EVERY 4 HOURS PRN
Status: DISCONTINUED | OUTPATIENT
Start: 2019-12-04 | End: 2019-12-08 | Stop reason: HOSPADM

## 2019-12-04 RX ORDER — ACETAMINOPHEN 325 MG/1
650 TABLET ORAL EVERY 6 HOURS PRN
COMMUNITY

## 2019-12-04 RX ORDER — POTASSIUM CHLORIDE 7.45 MG/ML
10 INJECTION INTRAVENOUS
Status: DISCONTINUED | OUTPATIENT
Start: 2019-12-04 | End: 2019-12-08 | Stop reason: HOSPADM

## 2019-12-04 RX ORDER — BUSPIRONE HYDROCHLORIDE 10 MG/1
10 TABLET ORAL 2 TIMES DAILY
Status: DISCONTINUED | OUTPATIENT
Start: 2019-12-04 | End: 2019-12-08 | Stop reason: HOSPADM

## 2019-12-04 RX ORDER — SODIUM CHLORIDE, SODIUM LACTATE, POTASSIUM CHLORIDE, CALCIUM CHLORIDE 600; 310; 30; 20 MG/100ML; MG/100ML; MG/100ML; MG/100ML
75 INJECTION, SOLUTION INTRAVENOUS CONTINUOUS
Status: DISCONTINUED | OUTPATIENT
Start: 2019-12-04 | End: 2019-12-05

## 2019-12-04 RX ORDER — ASPIRIN 81 MG/1
81 TABLET ORAL DAILY
Status: DISCONTINUED | OUTPATIENT
Start: 2019-12-05 | End: 2019-12-08 | Stop reason: HOSPADM

## 2019-12-04 RX ORDER — METHOCARBAMOL 500 MG/1
500 TABLET, FILM COATED ORAL 4 TIMES DAILY
Status: DISCONTINUED | OUTPATIENT
Start: 2019-12-04 | End: 2019-12-08 | Stop reason: HOSPADM

## 2019-12-04 RX ORDER — PANTOPRAZOLE SODIUM 40 MG/1
40 TABLET, DELAYED RELEASE ORAL
Status: DISCONTINUED | OUTPATIENT
Start: 2019-12-05 | End: 2019-12-08 | Stop reason: HOSPADM

## 2019-12-04 RX ORDER — ONDANSETRON 4 MG/1
4 TABLET, FILM COATED ORAL EVERY 6 HOURS PRN
Status: DISCONTINUED | OUTPATIENT
Start: 2019-12-04 | End: 2019-12-08 | Stop reason: HOSPADM

## 2019-12-04 RX ADMIN — METHOCARBAMOL 500 MG: 500 TABLET, FILM COATED ORAL at 21:03

## 2019-12-04 RX ADMIN — BETHANECHOL CHLORIDE 10 MG: 10 TABLET ORAL at 21:00

## 2019-12-04 RX ADMIN — SODIUM CHLORIDE, PRESERVATIVE FREE 10 ML: 5 INJECTION INTRAVENOUS at 21:00

## 2019-12-04 RX ADMIN — BUSPIRONE HYDROCHLORIDE 10 MG: 10 TABLET ORAL at 21:00

## 2019-12-04 RX ADMIN — NYSTATIN: 100000 POWDER TOPICAL at 22:23

## 2019-12-04 RX ADMIN — METRONIDAZOLE 500 MG: 500 INJECTION, SOLUTION INTRAVENOUS at 18:07

## 2019-12-04 RX ADMIN — SODIUM CHLORIDE, POTASSIUM CHLORIDE, SODIUM LACTATE AND CALCIUM CHLORIDE 75 ML/HR: 600; 310; 30; 20 INJECTION, SOLUTION INTRAVENOUS at 18:07

## 2019-12-04 RX ADMIN — DONEPEZIL HYDROCHLORIDE 10 MG: 10 TABLET, FILM COATED ORAL at 21:00

## 2019-12-04 RX ADMIN — BETHANECHOL CHLORIDE 10 MG: 10 TABLET ORAL at 18:08

## 2019-12-04 RX ADMIN — CEFTRIAXONE 1 G: 1 INJECTION, POWDER, FOR SOLUTION INTRAMUSCULAR; INTRAVENOUS at 16:50

## 2019-12-04 RX ADMIN — GABAPENTIN 300 MG: 300 CAPSULE ORAL at 21:00

## 2019-12-04 RX ADMIN — MEMANTINE 10 MG: 10 TABLET ORAL at 21:00

## 2019-12-04 NOTE — ED PROVIDER NOTES
Subjective   Temi Jarrett is an 89 y.o.female who presents to the ED with complaints of diarrhea and hematochezia. The patient has been passing bright red blood in her stool for the past two days. She has not taken any medication since developing her symptoms. Of note, she is currently residing at [facility] following a hip surgery in September. She was also diagnosed with pneumonia on 11/21. Her most recent hemoglobin was 9.7. There are no other complaints at this time.         History provided by:  EMS personnel and patient  Diarrhea   The primary symptoms include diarrhea and hematochezia. The illness began yesterday. The onset was sudden. The problem has not changed since onset.      Review of Systems   Gastrointestinal: Positive for blood in stool, diarrhea and hematochezia.   All other systems reviewed and are negative.      Past Medical History:   Diagnosis Date   • Disease of thyroid gland    • Gastric polyp    • History of colonic polyps    • Hypertension    • IBS (irritable bowel syndrome)    • Macular degeneration    • Torn meniscus     right knee        Allergies   Allergen Reactions   • Codeine Nausea Only     Trouble Breathing        Past Surgical History:   Procedure Laterality Date   • CHOLECYSTECTOMY  1997   • EYE SURGERY  1998    Cataract extraction   • HERNIA REPAIR     • HIP HEMIARTHROPLASTY Left 9/28/2019    Procedure: HIP HEMIARTHROPLASTY LEFT;  Surgeon: Reddy Segundo Jr., MD;  Location: [facility] OR;  Service: Orthopedics   • HYSTERECTOMY  1976   • KNEE SURGERY Right     arthroscopy- 2000   • TONSILLECTOMY         Family History   Problem Relation Age of Onset   • Hypertension Mother    • Breast cancer Mother    • Obesity Mother    • Hypertension Father    • Diabetes Son        Social History     Socioeconomic History   • Marital status:      Spouse name: Not on file   • Number of children: Not on file   • Years of education: Not on file   • Highest education level: Not on file    Tobacco Use   • Smoking status: Never Smoker   • Smokeless tobacco: Never Used   Substance and Sexual Activity   • Alcohol use: No   • Drug use: Defer   • Sexual activity: Defer   Social History Narrative    Lives with son and daughter in law--  is at [place] getting rehab         Objective   Physical Exam   Constitutional: She is oriented to person, place, and time. She appears well-developed and well-nourished.   HENT:   Head: Normocephalic and atraumatic.   Eyes: Conjunctivae are normal. No scleral icterus.   Neck: Normal range of motion. Neck supple.   Cardiovascular: Normal rate, regular rhythm, normal heart sounds and intact distal pulses.   No murmur heard.  Pulmonary/Chest: Effort normal. No tachypnea. No respiratory distress. She has no wheezes. She has no rhonchi. She has rales.   Minimal bibasilar rales.    Abdominal: Soft. Bowel sounds are normal. There is no tenderness.   Genitourinary: Rectal exam shows guaiac positive stool.   Musculoskeletal: Normal range of motion. She exhibits no edema.   Neurological: She is alert and oriented to person, place, and time.   Skin: Skin is warm and dry.   Yeast dermatitis on the left crease. Well healed left surgical scar.    Psychiatric: She has a normal mood and affect. Her behavior is normal.   Nursing note and vitals reviewed.      Procedures         ED Course  ED Course as of Dec 04 1330   Wed Dec 04, 2019   1130 Rectal examination reveals liquid bloody stool, and not just streaking.  I discussed this with the patient.  Will obtain stool biofire as well in view of her diarrhea for the last 2 days.  Patient is not ill-appearing.  She has no abdominal tenderness.  She is hemodynamically stable.  I discussed the plan of care with patient who agrees  [HH]   1330 This findings with Dr. Henry will admit for definitive inpatient care.  Have discussed plan of care with patient who agrees  [HH]      ED Course User Index  [HH] Dk Edwards MD     Recent  Results (from the past 24 hour(s))   POC Occult Blood Stool    Collection Time: 12/04/19 11:33 AM   Result Value Ref Range    Fecal Occult Blood Positive (A) Negative    Lot Number [id number]     Expiration Date 1/22     DEVELOPER LOT NUMBER [id number]     DEVELOPER EXPIRATION DATE 10/22     Positive Control Positive Positive    Negative Control Negative Negative   Light Blue Top    Collection Time: 12/04/19 12:01 PM   Result Value Ref Range    Extra Tube hold for add-on    Lavender Top    Collection Time: 12/04/19 12:01 PM   Result Value Ref Range    Extra Tube hold for add-on    CBC Auto Differential    Collection Time: 12/04/19 12:01 PM   Result Value Ref Range    WBC 20.92 (H) 3.40 - 10.80 10*3/mm3    RBC 4.48 3.77 - 5.28 10*6/mm3    Hemoglobin 11.4 (L) 12.0 - 15.9 g/dL    Hematocrit 38.3 34.0 - 46.6 %    MCV 85.5 79.0 - 97.0 fL    MCH 25.4 (L) 26.6 - 33.0 pg    MCHC 29.8 (L) 31.5 - 35.7 g/dL    RDW 15.3 12.3 - 15.4 %    RDW-SD 47.8 37.0 - 54.0 fl    MPV 9.3 6.0 - 12.0 fL    Platelets 389 140 - 450 10*3/mm3    Neutrophil % 89.1 (H) 42.7 - 76.0 %    Lymphocyte % 4.4 (L) 19.6 - 45.3 %    Monocyte % 5.3 5.0 - 12.0 %    Eosinophil % 0.3 0.3 - 6.2 %    Basophil % 0.4 0.0 - 1.5 %    Immature Grans % 0.5 0.0 - 0.5 %    Neutrophils, Absolute 18.63 (H) 1.70 - 7.00 10*3/mm3    Lymphocytes, Absolute 0.92 0.70 - 3.10 10*3/mm3    Monocytes, Absolute 1.10 (H) 0.10 - 0.90 10*3/mm3    Eosinophils, Absolute 0.07 0.00 - 0.40 10*3/mm3    Basophils, Absolute 0.09 0.00 - 0.20 10*3/mm3    Immature Grans, Absolute 0.11 (H) 0.00 - 0.05 10*3/mm3    nRBC 0.0 0.0 - 0.2 /100 WBC   Protime-INR    Collection Time: 12/04/19 12:01 PM   Result Value Ref Range    Protime 15.6 (H) 11.2 - 14.3 Seconds    INR 1.30 (H) 0.85 - 1.16   aPTT    Collection Time: 12/04/19 12:01 PM   Result Value Ref Range    PTT 32.2 (L) 85.0 - 120.0 seconds   Scan Slide    Collection Time: 12/04/19 12:01 PM   Result Value Ref Range    RBC Morphology Normal Normal    WBC  "Morphology Normal Normal    Platelet Morphology Normal Normal     Note: In addition to lab results from this visit, the labs listed above may include labs taken at another facility or during a different encounter within the last 24 hours. Please correlate lab times with ED admission and discharge times for further clarification of the services performed during this visit.    No orders to display     Vitals:    12/04/19 1100 12/04/19 1240   BP: 128/87 (!) 96/37   Patient Position: Lying    Pulse: 93    Resp: 14    Temp: 98 °F (36.7 °C)    TempSrc: Oral    SpO2: 95% 91%   Weight: 52.6 kg (116 lb)    Height: 152.4 cm (60\")      Medications   sodium chloride 0.9 % flush 10 mL (not administered)     ECG/EMG Results (last 24 hours)     ** No results found for the last 24 hours. **        No orders to display                       MDM    Final diagnoses:   Rectal bleeding   Diarrhea, unspecified type   Leukocytosis, unspecified type       Documentation assistance provided by abril Nunez.  Information recorded by the scribe was done at my direction and has been verified and validated by me.     Shaan Nunez  12/04/19 1135       Shaan Nunez  12/04/19 1329       Dk Edwards MD  12/04/19 1330    "

## 2019-12-04 NOTE — H&P
[facility] Medicine Services  HISTORY AND PHYSICAL    Patient Name: Temi Jarrett  : 1930  MRN: [id number]  Primary Care Physician: Eric Patel MD  Date of admission: 2019      Subjective   Subjective     Chief Complaint:  Diarrhea    HPI:  Temi Jarrett is a 89 y.o. female with history of hypertension, hyperlipidemia, dementia who presents with diarrhea.  Patient was recently admitted from - for sepsis secondary pneumonia and was on IV antibiotics.  She was discharged to [facility] and had been there doing well until last night when she had an episode of profuse diarrhea.  Patient denies any abdominal pain.  She had an additional episode of diarrhea this morning.  Patient reports that she was told that there was blood in her diarrhea; however, patient cannot see her diarrhea secondary to macular degeneration.  Denies any fevers or chills.  Tolerating p.o.  She denies any associated dizziness.  She does report generalized weakness over the past 1 to 2 days encompassing comparison to prior.    Review of Systems   Constitutional: Negative for chills and fever.        Weight gain   HENT: Negative for trouble swallowing.    Eyes: Negative for visual disturbance.        Baseline vision loss secondary to macular degeneration   Respiratory: Negative for cough and shortness of breath.    Cardiovascular: Negative for chest pain.        Chronic lower extremity swelling, no changes   Gastrointestinal: Positive for blood in stool and diarrhea. Negative for abdominal pain and nausea.   Endocrine: Negative for polyuria.   Genitourinary: Negative for dysuria.   Skin: Negative for rash.   Neurological: Positive for weakness. Negative for dizziness.        All other systems reviewed and are negative.     Personal History     Past Medical History:   Diagnosis Date   • Disease of thyroid gland    • Gastric polyp    • History of colonic polyps    • Hypertension    • IBS  (irritable bowel syndrome)    • Macular degeneration    • Torn meniscus     right knee        Past Surgical History:   Procedure Laterality Date   • CHOLECYSTECTOMY  1997   • EYE SURGERY  1998    Cataract extraction   • HERNIA REPAIR     • HIP HEMIARTHROPLASTY Left 9/28/2019    Procedure: HIP HEMIARTHROPLASTY LEFT;  Surgeon: Reddy Segundo Jr., MD;  Location: [facility];  Service: Orthopedics   • HYSTERECTOMY  1976   • KNEE SURGERY Right     arthroscopy- 2000   • TONSILLECTOMY         Family History: family history includes Breast cancer in her mother; Diabetes in her son; Hypertension in her father and mother; Obesity in her mother. Otherwise pertinent FHx was reviewed and unremarkable.     Social History:  reports that she has never smoked. She has never used smokeless tobacco. Drug use questions deferred to the physician. She reports that she does not drink alcohol.  Social History     Social History Narrative    Lives with son and daughter in law--  is at [place] getting rehab       Medications:  Available home medication information reviewed.    (Not in a hospital admission)    Allergies   Allergen Reactions   • Codeine Nausea Only     Trouble Breathing        Objective   Objective     Vital Signs:   Temp:  [98 °F (36.7 °C)] 98 °F (36.7 °C)  Heart Rate:  [93] 93  Resp:  [14] 14  BP: ()/(37-87) 96/37        Physical Exam   Constitutional: Awake, alert, no acute distress.  Pleasant elderly female  Eyes: PERRLA, sclerae anicteric, no conjunctival injection  HENT: NCAT, dry mucous membranes  Neck: Supple, trachea midline  Respiratory: Clear to auscultation bilaterally, nonlabored respirations   Cardiovascular: RRR, no murmurs, rubs, or gallops, palpable pedal pulses bilaterally  Gastrointestinal: Positive bowel sounds, soft, nontender, nondistended  Musculoskeletal: No bilateral ankle edema, no clubbing or cyanosis to extremities  Psychiatric: Appropriate affect, cooperative  Neurologic: Oriented x  3, strength symmetric in all extremities, Cranial Nerves grossly intact to confrontation, speech clear  Skin: No rashes      Results Reviewed:  I have personally reviewed current lab and radiology data.    Results from last 7 days   Lab Units 12/04/19  1201   WBC 10*3/mm3 20.92*   HEMOGLOBIN g/dL 11.4*   HEMATOCRIT % 38.3   PLATELETS 10*3/mm3 389   INR  1.30*     Results from last 7 days   Lab Units 12/04/19  1246   SODIUM mmol/L 139   POTASSIUM mmol/L 3.5   CHLORIDE mmol/L 96*   CO2 mmol/L 32.0*   BUN mg/dL 17   CREATININE mg/dL 0.84   GLUCOSE mg/dL 110*   CALCIUM mg/dL 9.2   ALT (SGPT) U/L 8   AST (SGOT) U/L 15     Estimated Creatinine Clearance: 37.7 mL/min (by C-G formula based on SCr of 0.84 mg/dL).  Brief Urine Lab Results  (Last result in the past 365 days)      Color   Clarity   Blood   Leuk Est   Nitrite   Protein   CREAT   Urine HCG        11/12/19 0717 Yellow Cloudy Small (1+) Moderate (2+) Positive 100 mg/dL (2+)             Imaging Results (Last 24 Hours)     ** No results found for the last 24 hours. 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  She does have a leukocytosis but otherwise is hemodynamically stable.    - Patient recently on IV antibiotics for sepsis.  Will check a C. Difficile.  -Will start empiric Rocephin and Flagyl  -Stool bio fire  -Fluids overnight  -BMP and CBC in a.m.  -If no improvement in diarrhea, worsening bleeding or worsening anemia we will consider GI consult in the morning    Hypertension  -Patient is borderline hypotensive on presentation.  Holding home  antihypertensives    Dementia  -Continue donepezil, amantadine    DVT prophylaxis:  SCDs    CODE STATUS:    Code Status and Medical Interventions:   Ordered at: 12/04/19 1354     Level Of Support Discussed With:    Patient     Code Status:    CPR     Medical Interventions (Level of Support Prior to Arrest):    Full       Admission Status:  I believe this patient meets INPATIENT status due to diarrhea with rectal bleeding. Recent admission with sepsis on antibiotics. Concern for C. diff.  I feel patient’s risk for adverse outcomes and need for care warrant INPATIENT evaluation and I predict the patient’s care encounter to likely last beyond 2 midnights.      Electronically signed by Mahogany Henry MD, 12/04/19, 1:55 PM.

## 2019-12-05 LAB
ANION GAP SERPL CALCULATED.3IONS-SCNC: 8 MMOL/L (ref 5–15)
BILIRUB UR QL STRIP: NEGATIVE
BUN BLD-MCNC: 16 MG/DL (ref 8–23)
BUN/CREAT SERPL: 24.2 (ref 7–25)
CALCIUM SPEC-SCNC: 8 MG/DL (ref 8.6–10.5)
CHLORIDE SERPL-SCNC: 97 MMOL/L (ref 98–107)
CLARITY UR: CLEAR
CO2 SERPL-SCNC: 32 MMOL/L (ref 22–29)
COLOR UR: YELLOW
CREAT BLD-MCNC: 0.66 MG/DL (ref 0.57–1)
DEPRECATED RDW RBC AUTO: 47.8 FL (ref 37–54)
ERYTHROCYTE [DISTWIDTH] IN BLOOD BY AUTOMATED COUNT: 15.4 % (ref 12.3–15.4)
GFR SERPL CREATININE-BSD FRML MDRD: 84 ML/MIN/1.73
GLUCOSE BLD-MCNC: 75 MG/DL (ref 65–99)
GLUCOSE UR STRIP-MCNC: NEGATIVE MG/DL
HCT VFR BLD AUTO: 31.8 % (ref 34–46.6)
HGB BLD-MCNC: 9.7 G/DL (ref 12–15.9)
HGB UR QL STRIP.AUTO: NEGATIVE
KETONES UR QL STRIP: NEGATIVE
LEUKOCYTE ESTERASE UR QL STRIP.AUTO: NEGATIVE
MAGNESIUM SERPL-MCNC: 1.8 MG/DL (ref 1.6–2.4)
MCH RBC QN AUTO: 25.9 PG (ref 26.6–33)
MCHC RBC AUTO-ENTMCNC: 30.5 G/DL (ref 31.5–35.7)
MCV RBC AUTO: 85 FL (ref 79–97)
NITRITE UR QL STRIP: NEGATIVE
PH UR STRIP.AUTO: 7 [PH] (ref 5–8)
PLATELET # BLD AUTO: 286 10*3/MM3 (ref 140–450)
PMV BLD AUTO: 9.4 FL (ref 6–12)
POTASSIUM BLD-SCNC: 3.6 MMOL/L (ref 3.5–5.2)
PROT UR QL STRIP: NEGATIVE
RBC # BLD AUTO: 3.74 10*6/MM3 (ref 3.77–5.28)
SODIUM BLD-SCNC: 137 MMOL/L (ref 136–145)
SP GR UR STRIP: 1.01 (ref 1–1.03)
UROBILINOGEN UR QL STRIP: NORMAL
WBC NRBC COR # BLD: 15.59 10*3/MM3 (ref 3.4–10.8)

## 2019-12-05 PROCEDURE — 80048 BASIC METABOLIC PNL TOTAL CA: CPT | Performed by: INTERNAL MEDICINE

## 2019-12-05 PROCEDURE — 81003 URINALYSIS AUTO W/O SCOPE: CPT | Performed by: INTERNAL MEDICINE

## 2019-12-05 PROCEDURE — 97110 THERAPEUTIC EXERCISES: CPT

## 2019-12-05 PROCEDURE — 96361 HYDRATE IV INFUSION ADD-ON: CPT

## 2019-12-05 PROCEDURE — 97530 THERAPEUTIC ACTIVITIES: CPT

## 2019-12-05 PROCEDURE — 25010000002 CEFTRIAXONE PER 250 MG: Performed by: INTERNAL MEDICINE

## 2019-12-05 PROCEDURE — 96365 THER/PROPH/DIAG IV INF INIT: CPT

## 2019-12-05 PROCEDURE — 51701 INSERT BLADDER CATHETER: CPT

## 2019-12-05 PROCEDURE — 85027 COMPLETE CBC AUTOMATED: CPT | Performed by: INTERNAL MEDICINE

## 2019-12-05 PROCEDURE — 97116 GAIT TRAINING THERAPY: CPT

## 2019-12-05 PROCEDURE — G0378 HOSPITAL OBSERVATION PER HR: HCPCS

## 2019-12-05 PROCEDURE — 99225 PR SBSQ OBSERVATION CARE/DAY 25 MINUTES: CPT | Performed by: INTERNAL MEDICINE

## 2019-12-05 PROCEDURE — 97162 PT EVAL MOD COMPLEX 30 MIN: CPT

## 2019-12-05 PROCEDURE — 83735 ASSAY OF MAGNESIUM: CPT | Performed by: INTERNAL MEDICINE

## 2019-12-05 RX ADMIN — TRAZODONE HYDROCHLORIDE 25 MG: 50 TABLET ORAL at 20:40

## 2019-12-05 RX ADMIN — METRONIDAZOLE 500 MG: 500 INJECTION, SOLUTION INTRAVENOUS at 17:16

## 2019-12-05 RX ADMIN — METRONIDAZOLE 500 MG: 500 INJECTION, SOLUTION INTRAVENOUS at 09:04

## 2019-12-05 RX ADMIN — METHOCARBAMOL 500 MG: 500 TABLET, FILM COATED ORAL at 17:16

## 2019-12-05 RX ADMIN — GABAPENTIN 300 MG: 300 CAPSULE ORAL at 20:18

## 2019-12-05 RX ADMIN — MEMANTINE 10 MG: 10 TABLET ORAL at 09:04

## 2019-12-05 RX ADMIN — CEFTRIAXONE 1 G: 1 INJECTION, POWDER, FOR SOLUTION INTRAMUSCULAR; INTRAVENOUS at 17:14

## 2019-12-05 RX ADMIN — BETHANECHOL CHLORIDE 10 MG: 10 TABLET ORAL at 13:07

## 2019-12-05 RX ADMIN — POTASSIUM CHLORIDE 40 MEQ: 750 CAPSULE, EXTENDED RELEASE ORAL at 20:31

## 2019-12-05 RX ADMIN — DONEPEZIL HYDROCHLORIDE 10 MG: 10 TABLET, FILM COATED ORAL at 20:18

## 2019-12-05 RX ADMIN — ASPIRIN 81 MG: 81 TABLET, COATED ORAL at 09:04

## 2019-12-05 RX ADMIN — BETHANECHOL CHLORIDE 10 MG: 10 TABLET ORAL at 09:04

## 2019-12-05 RX ADMIN — METHOCARBAMOL 500 MG: 500 TABLET, FILM COATED ORAL at 09:04

## 2019-12-05 RX ADMIN — METRONIDAZOLE 500 MG: 500 INJECTION, SOLUTION INTRAVENOUS at 02:04

## 2019-12-05 RX ADMIN — LEVOTHYROXINE SODIUM 175 MCG: 175 TABLET ORAL at 05:54

## 2019-12-05 RX ADMIN — METHOCARBAMOL 500 MG: 500 TABLET, FILM COATED ORAL at 20:18

## 2019-12-05 RX ADMIN — BETHANECHOL CHLORIDE 10 MG: 10 TABLET ORAL at 17:16

## 2019-12-05 RX ADMIN — SODIUM CHLORIDE, PRESERVATIVE FREE 10 ML: 5 INJECTION INTRAVENOUS at 20:19

## 2019-12-05 RX ADMIN — BUSPIRONE HYDROCHLORIDE 10 MG: 10 TABLET ORAL at 09:04

## 2019-12-05 RX ADMIN — MEMANTINE 10 MG: 10 TABLET ORAL at 20:18

## 2019-12-05 RX ADMIN — NYSTATIN 1 APPLICATION: 100000 POWDER TOPICAL at 09:05

## 2019-12-05 RX ADMIN — SODIUM CHLORIDE, PRESERVATIVE FREE 10 ML: 5 INJECTION INTRAVENOUS at 09:05

## 2019-12-05 RX ADMIN — PANTOPRAZOLE SODIUM 40 MG: 40 TABLET, DELAYED RELEASE ORAL at 05:54

## 2019-12-05 RX ADMIN — BETHANECHOL CHLORIDE 10 MG: 10 TABLET ORAL at 20:18

## 2019-12-05 RX ADMIN — SODIUM CHLORIDE, POTASSIUM CHLORIDE, SODIUM LACTATE AND CALCIUM CHLORIDE 75 ML/HR: 600; 310; 30; 20 INJECTION, SOLUTION INTRAVENOUS at 09:03

## 2019-12-05 RX ADMIN — BUSPIRONE HYDROCHLORIDE 10 MG: 10 TABLET ORAL at 20:18

## 2019-12-05 RX ADMIN — MAGNESIUM SULFATE HEPTAHYDRATE 4 G: 40 INJECTION, SOLUTION INTRAVENOUS at 20:31

## 2019-12-05 RX ADMIN — ONDANSETRON HYDROCHLORIDE 4 MG: 4 TABLET, FILM COATED ORAL at 22:03

## 2019-12-05 RX ADMIN — GABAPENTIN 300 MG: 300 CAPSULE ORAL at 09:05

## 2019-12-05 RX ADMIN — NYSTATIN: 100000 POWDER TOPICAL at 20:19

## 2019-12-05 RX ADMIN — METHOCARBAMOL 500 MG: 500 TABLET, FILM COATED ORAL at 13:08

## 2019-12-05 NOTE — THERAPY EVALUATION
Patient Name: Temi Jarrett  : 1930    MRN: [id number]                              Today's Date: 2019       Admit Date: 2019    Visit Dx:     ICD-10-CM ICD-9-CM   1. Rectal bleeding K62.5 569.3   2. Diarrhea, unspecified type R19.7 787.91   3. Leukocytosis, unspecified type D72.829 288.60   4. Vitamin B12 deficiency E53.8 266.2   5. Impaired functional mobility, balance, gait, and endurance Z74.09 V49.89     Patient Active Problem List   Diagnosis   • Skin tear of lower leg without complication, right, initial encounter   • Esophageal reflux   • Hypothyroidism   • Generalized anxiety disorder   • Hyperlipidemia   • Multi-infarct dementia (CMS/HCC)   • Peripheral neuropathy   • Carpal tunnel syndrome of right wrist   • Spinal stenosis of lumbar region   • Osteoporosis   • Fever   • Urinary frequency   • Peripheral venous insufficiency   • Disc disorder of lumbar region   • Bladder prolapse, female, acquired   • Acute right-sided low back pain without sciatica   • Pain, knee   • Benign essential hypertension   • Pernicious anemia   • Memory loss   • Annual physical exam   • Primary osteoarthritis of both knees   • Chest pain, atypical   • Massive hiatal hernia with intrathoracic stomach   • Closed fracture of neck of left femur (CMS/HCC)   • Hypoxia   • Multifocal aspiration pneumonia due to large intrathoracic hiatal hernia   • Leukocytosis   • Acute respiratory failure with hypoxia not requiring ventilatory support (CMS/HCC)   • Bilateral parapneumonic pleural effusions.  S/p placement small right chest tube 19.    • Small postprocedural pneumothorax   • UTI due to Klebsiella species   • Rectal bleeding   • Diarrhea     Past Medical History:   Diagnosis Date   • Disease of thyroid gland    • Gastric polyp    • History of colonic polyps    • Hypertension    • IBS (irritable bowel syndrome)    • Macular degeneration    • Torn meniscus     right knee      Past Surgical History:   Procedure  "Laterality Date   • CHOLECYSTECTOMY  1997   • EYE SURGERY  1998    Cataract extraction   • HERNIA REPAIR     • HIP HEMIARTHROPLASTY Left 9/28/2019    Procedure: HIP HEMIARTHROPLASTY LEFT;  Surgeon: Reddy Segundo Jr., MD;  Location: [facility] OR;  Service: Orthopedics   • HYSTERECTOMY  1976   • KNEE SURGERY Right     arthroscopy- 2000   • TONSILLECTOMY       General Information     Row Name 12/05/19 0844          PT Evaluation Time/Intention    Document Type  evaluation  -DM     Mode of Treatment  physical therapy  -DM     Row Name 12/05/19 0844          General Information    Patient Profile Reviewed?  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 but never told me what\");recent hosp [facility] 11-16 to 11-21 W/PNA,B pl.eff(had R C.T., PNX, & UTI),then d/c to [facility]  -DM     Barriers to Rehab  medically complex;previous functional deficit;cognitive status;visual deficit Mac Deg.  -DM     Row Name 12/05/19 0844          Relationship/Environment    Lives With  child(darvin), adult son,NA,grdtr  -DM     Row Name 12/05/19 0844          Resource/Environmental Concerns    Current Living Arrangements  home/apartment/condo currently@[facility],but lives w/ sonNA in 2-st. w/ 2nd fl B/B, tub/shower w/ bench & gr.bar, elev toilet seat w/ bars  -DM     Row Name 12/05/19 0844          Home Main Entrance    Number of Stairs, Main Entrance  one garage ent.  -DM     Stair Railings, Main Entrance  none  -DM     Row Name 12/05/19 0844          Stairs Within " "Home, Primary    Stairs, Within Home, Primary  15  -DM     Number of Stairs, Within Home, Primary  other (see comments)  -DM     Stair Railings, Within Home, Primary  railings on both sides of stairs w/ landing  -DM     Row Name 19          Cognitive Assessment/Intervention- PT/OT    Orientation Status (Cognition)  oriented to;person;place stating it is Nov  -DM     Cognitive Assessment/Intervention Comment  \"I can't remember much\"(req. freq repeating of cues)  -DM     Row Name 19          Safety Issues, Functional Mobility    Safety Issues Affecting Function (Mobility)  ability to follow commands;insight into deficits/self awareness;safety precaution awareness;safety precautions follow-through/compliance;judgment  -DM     Impairments Affecting Function (Mobility)  balance;cognition;endurance/activity tolerance;pain;postural/trunk control;strength;visual/perceptual  -DM       User Key  (r) = Recorded By, (t) = Taken By, (c) = Cosigned By    Initials Name Provider Type    DM Victoria Gallagher, PT Physical Therapist        Mobility     Row Name 19          Bed Mobility Assessment/Treatment    Comment (Bed Mobility)  UIC  -DM     Row Name 19          Transfer Assessment/Treatment    Comment (Transfers)  cues for HP,Seq;issued loaner R wx  -DM     Row Name 19          Sit-Stand Transfer    Sit-Stand [place] (Transfers)  verbal cues;moderate assist (50% patient effort) from low juan lounge chair;init.attempting to pull up w/R wx;cued to push from armrests; noted bloody mucous discharge from vaginal area upon standing (btwn thighs, on gown, & pads under pt); pads changed;BP standin/63;non-sympt;nsg notified;   -DM     Assistive Device (Sit-Stand Transfers)  walker, front-wheeled  -DM     Row Name 19          Gait/Stairs Assessment/Training    Gait/Stairs Assessment/Training  gait/ambulation independence  -DM     [place] Level (Gait)  minimum " "assist (75% patient effort);verbal cues has IV,port o2;Nicole to guide R wx (mac.deg.);protesting \"I'm too weak for this\",but agreed to sidestep to EOB, rest 4 min.sitting, sidestep back to chair,then F/B 5 FT X 2 w/min A;noted , w/trigem PVC's;c/o fatigue;o2 91%;sat to rest; nsg notified   -DM     Assistive Device (Gait)  walker, front-wheeled  -DM     Distance in Feet (Gait)  16 (3 ft x2 side stepping, + 5 ft x 2 F/B)  -DM     Pattern (Gait)  step-to  -DM     Deviations/Abnormal Patterns (Gait)  antalgic;right sided deviations;base of support, narrow;kane decreased;stride length decreased decr.step length;CHR. R Knee pain(denies pain@ L part.THR site)  -DM     Bilateral Gait Deviations  forward flexed posture;heel strike decreased  -DM     Comment (Gait/Stairs)  cues to incr step length, ext trunk/focus ahead, PLB  -DM       User Key  (r) = Recorded By, (t) = Taken By, (c) = Cosigned By    Initials Name Provider Type    DM Victoria Gallagher, PT Physical Therapist        Obj/Interventions     Row Name 12/05/19 2879          General ROM    GENERAL ROM COMMENTS  L hip campbell. 25% d/t recent part. L THR; UE 's per OT  -DM     Row Name 12/05/19 0846          MMT (Manual Muscle Testing)    General MMT Comments  L hip grossly 3- to  3/5; R quad 4/5 (limited by chr. knee pain)  -DM     Row Name 12/05/19 9785          Therapeutic Exercise    Lower Extremity (Therapeutic Exercise)  gluteal sets;hamstring sets, bilateral;heel slides, bilateral;LAQ (long arc quad), bilateral;marching while seated;quad sets, bilateral;SAQ (short arc quad), bilateral gentle marches L Hip  -DM     Lower Extremity Range of Motion (Therapeutic Exercise)  hip internal/external rotation, right;hip abduction/adduction, bilateral;ankle dorsiflexion/plantar flexion, bilateral abdom sets; def. L hip rot. d/t recent sx.  -DM     Exercise Type (Therapeutic Exercise)  AROM (active range of motion);isometric contraction, static  -DM     Position " (Therapeutic Exercise)  seated  -DM     Sets/Reps (Therapeutic Exercise)  1/10  -DM     Comment (Therapeutic Exercise)  cont. cueing needed to focus on task & for proper technique  -DM     Row Name 12/05/19 0844          Static Sitting Balance    Level of [place] (Unsupported Sitting, Static Balance)  supervision  -DM     Sitting Position (Unsupported Sitting, Static Balance)  sitting in chair;sitting on edge of bed  -DM     Time Able to Maintain Position (Unsupported Sitting, Static Balance)  4 to 5 minutes  -DM     Comment (Unsupported Sitting, Static Balance)  trunk ext, PLB  -DM     Row Name 12/05/19 0844          Dynamic Sitting Balance    Level of [place], Reaches Outside Midline (Sitting, Dynamic Balance)  contact guard assist  -DM     Sitting Position, Reaches Outside Midline (Sitting, Dynamic Balance)  sitting on edge of bed;sitting in chair  -DM     Comment, Reaches Outside Midline (Sitting, Dynamic Balance)  Recip scooting  -DM     Row Name 12/05/19 0844          Static Standing Balance    Level of [place] (Supported Standing, Static Balance)  contact guard assist  -DM     Time Able to Maintain Position (Supported Standing, Static Balance)  3 to 4 minutes  -DM     Assistive Device Utilized (Supported Standing, Static Balance)  walker, rolling  -DM     Comment (Supported Standing, Static Balance)  took orthostat BP Reading(signif drop); trunk ext;cued to focus ahead at PT's fingers held in front of pt,then identify no. held up L vis.field,& on R   -DM     Row Name 12/05/19 0844          Dynamic Standing Balance    Level of [place], Reaches Outside Midline (Standing, Dynamic Balance)  minimal assist, 75% patient effort  -DM     Time Able to Maintain Position, Reaches Outside Midline (Standing, Dynamic Balance)  45 to 60 seconds  -DM     Assistive Device Utilized (Supported Standing, Dynamic Balance)  walker, helio  -DM     Comment, Reaches Outside Midline (Standing, Dynamic Balance)  WS  to init sidesteps/backing to EOB, then chair  -DM       User Key  (r) = Recorded By, (t) = Taken By, (c) = Cosigned By    Initials Name Provider Type    Victoria Navarro, PT Physical Therapist        Goals/Plan     Row Name 12/05/19 0844          Bed Mobility Goal 1 (PT)    Activity/Assistive Device (Bed Mobility Goal 1, PT)  bed mobility activities, all  -DM     [place] Level/Cues Needed (Bed Mobility Goal 1, PT)  contact guard assist  -DM     Time Frame (Bed Mobility Goal 1, PT)  long term goal (LTG);1 week  -DM     Row Name 12/05/19 0844          Transfer Goal 1 (PT)    Activity/Assistive Device (Transfer Goal 1, PT)  sit-to-stand/stand-to-sit;bed-to-chair/chair-to-bed;walker, rolling  -DM     [place] Level/Cues Needed (Transfer Goal 1, PT)  standby assist  -DM     Time Frame (Transfer Goal 1, PT)  long term goal (LTG);1 week  -DM     Row Name 12/05/19 0844          Gait Training Goal 1 (PT)    Activity/Assistive Device (Gait Training Goal 1, PT)  gait (walking locomotion);assistive device use;walker, rolling stable VS  -DM     [place] Level (Gait Training Goal 1, PT)  contact guard assist  -DM     Distance (Gait Goal 1, PT)  100  -DM     Time Frame (Gait Training Goal 1, PT)  long term goal (LTG);1 week  -DM     Row Name 12/05/19 0844          Patient Education Goal (PT)    Activity (Patient Education Goal, PT)  HEP exer  -DM     [place]/Cues/Accuracy (Memory Goal 2, PT)  demonstrates adequately;verbalizes understanding  -DM     Time Frame (Patient Education Goal, PT)  long term goal (LTG);1 week  -DM       User Key  (r) = Recorded By, (t) = Taken By, (c) = Cosigned By    Initials Name Provider Type    Victoria Navarro, PT Physical Therapist        Clinical Impression     Row Name 12/05/19 0844          Pain Assessment    Additional Documentation  Pain Scale: Numbers Pre/Post-Treatment (Group)  -DM     Row Name 12/05/19 0844          Pain Scale: Numbers Pre/Post-Treatment    Pain Scale:  Numbers, Pretreatment  2/10  -DM     Pain Scale: Numbers, Post-Treatment  3/10  -DM     Pain Location - Side  Right  -DM     Pain Location  knee  -DM     Pain Intervention(s)  Repositioned;Rest;Elevated  -DM     Row Name 12/05/19 0844          Plan of Care Review    Plan of Care Reviewed With  patient  -DM     Row Name 12/05/19 0844          Physical Therapy Clinical Impression    Patient/Family Goals Statement (PT Clinical Impression)  improved funct mobil & ret. to PLOF  -DM     Criteria for Skilled Interventions Met (PT Clinical Impression)  yes;treatment indicated  -DM     Rehab Potential (PT Clinical Summary)  good, to achieve stated therapy goals  -DM     Row Name 12/05/19 0844          Vital Signs    Pre Systolic BP Rehab  106  -DM     Pre Treatment Diastolic BP  60  -DM     Intra Systolic BP Rehab  79  -DM     Intra Treatment Diastolic BP  63  -DM     Post Systolic BP Rehab  92  -DM     Post Treatment Diastolic BP  69  -DM     Pretreatment Heart Rate (beats/min)  82 IRREG  -DM     Intratreatment Heart Rate (beats/min)  109 PVC'S (INCL. trigem)  -DM     Posttreatment Heart Rate (beats/min)  86  -DM     Pre SpO2 (%)  97  -DM     O2 Delivery Pre Treatment  supplemental O2  -DM     Intra SpO2 (%)  91  -DM     O2 Delivery Intra Treatment  supplemental O2  -DM     Post SpO2 (%)  94  -DM     O2 Delivery Post Treatment  supplemental O2  -DM     Pre Patient Position  Sitting  -DM     Intra Patient Position  Standing  -DM     Post Patient Position  Sitting  -DM     Rest Breaks   3 2 stand. + 1 sit  -DM     Row Name 12/05/19 0844          Positioning and Restraints    Pre-Treatment Position  sitting in chair/recliner  -DM     Post Treatment Position  chair  -DM       User Key  (r) = Recorded By, (t) = Taken By, (c) = Cosigned By    Initials Name Provider Type    Victoria Navarro, PT Physical Therapist        Outcome Measures     Row Name 12/05/19 0844          How much help from another person do you currently  need...    Turning from your back to your side while in flat bed without using bedrails?  3  -DM     Moving from lying on back to sitting on the side of a flat bed without bedrails?  3  -DM     Moving to and from a bed to a chair (including a wheelchair)?  3  -DM     Standing up from a chair using your arms (e.g., wheelchair, bedside chair)?  2  -DM     Climbing 3-5 steps with a railing?  2  -DM     To walk in hospital room?  3  -DM     AM-PAC 6 Clicks Score (PT)  16  -DM     Row Name 12/05/19 0844          Functional Assessment    Outcome Measure Options  AM-PAC 6 Clicks Basic Mobility (PT)  -DM       User Key  (r) = Recorded By, (t) = Taken By, (c) = Cosigned By    Initials Name Provider Type    Victoria Navarro, PT Physical Therapist        Physical Therapy Education     Title: PT OT SLP Therapies (In Progress)     Topic: Physical Therapy (In Progress)     Point: Mobility training (In Progress)     Learning Progress Summary           Patient Eager, E,D, NR by DM at 12/5/2019 11:28 AM    Acceptance, E,TB, VU,NR by NOEMÍ at 12/4/2019  4:25 PM   Family Acceptance, E,TB, VU,NR by NOEMÍ at 12/4/2019  4:25 PM                   Point: Home exercise program (In Progress)     Learning Progress Summary           Patient Eager, E,D, NR by SALO at 12/5/2019 11:28 AM    Acceptance, E,TB, VU,NR by NOEMÍ at 12/4/2019  4:25 PM   Family Acceptance, E,TB, VU,NR by NOEMÍ at 12/4/2019  4:25 PM                   Point: Body mechanics (In Progress)     Learning Progress Summary           Patient Eager, E,D, NR by SALO at 12/5/2019 11:28 AM    Acceptance, E,TB, VU,NR by NOEMÍ at 12/4/2019  4:25 PM   Family Acceptance, E,TB, VU,NR by NOEMÍ at 12/4/2019  4:25 PM                   Point: Precautions (In Progress)     Learning Progress Summary           Patient Eager, E,D, NR by SALO at 12/5/2019 11:28 AM    Acceptance, E,TB, VU,NR by NOEMÍ at 12/4/2019  4:25 PM   Family Acceptance, E,TB, VU,NR by JA at 12/4/2019  4:25 PM                               User Key      Initials Effective Dates Name Provider Type Discipline    DM 06/19/15 -  Victoria Gallagher, PT Physical Therapist PT     09/05/17 -  Kristina Moreno, RN Registered Nurse Nurse              PT Recommendation and Plan  Planned Therapy Interventions (PT Eval): balance training, bed mobility training, gait training, home exercise program, patient/family education, postural re-education, strengthening, transfer training  Outcome Summary/Treatment Plan (PT)  Anticipated Discharge Disposition (PT): inpatient rehabilitation facility(ret. to [facility])  Plan of Care Reviewed With: patient  Progress: improving  Outcome Summary: Presents w/ evolving sympt incl rectal bleed, C Diff, recent L part.THR s/p fall 9/'19, low HGB (9.7), orthostat BP changes, decr. strength/endurance & impaired funct mobil; able to transf STS (from low chair) w/ Rwx & mod A, amb total of 16 ft w/ min A (2 stand. + 1 sit.rest), + perform bal activ & ther exer w/ mult cues d/t Dem.& visual impair.(mac deg.); recommend ret. to [facility] at d/c; requested OT consult      Time Calculation:   PT Charges     Row Name 12/05/19 1134             Time Calculation    Start Time  0844  -DM      PT Received On  12/05/19  -DM      PT Goal Re-Cert Due Date  12/15/19  -DM         Time Calculation- PT    Total Timed Code Minutes- PT  50 minute(s)  -DM         Timed Charges    70014 - PT Therapeutic Exercise Minutes  14  -DM      46478 - Gait Training Minutes   16  -DM      08971 - PT Therapeutic Activity Minutes  20  -DM        User Key  (r) = Recorded By, (t) = Taken By, (c) = Cosigned By    Initials Name Provider Type    DM Victoria Gallagher, PT Physical Therapist        Therapy Charges for Today     Code Description Service Date Service Provider Modifiers Qty    00825993204 HC PT THER PROC EA 15 MIN 12/5/2019 Victoria Gallagher, PT GP 1    55283091545 HC GAIT TRAINING EA 15 MIN 12/5/2019 Victoria Gallagher, PT GP 1    34008154811 HC PT THERAPEUTIC ACT EA 15 MIN 12/5/2019  Victoria Gallagher, PT GP 1    05592259336 HC PT EVAL MOD COMPLEXITY 4 12/5/2019 Victoria Gallagher, PT GP 1          PT G-Codes  Outcome Measure Options: AM-PAC 6 Clicks Basic Mobility (PT)  AM-PAC 6 Clicks Score (PT): 16    Victoria Gallagher, PT  12/5/2019

## 2019-12-05 NOTE — PLAN OF CARE
Problem: Patient Care Overview  Goal: Plan of Care Review  Outcome: Ongoing (interventions implemented as appropriate)    Goal: Individualization and Mutuality  Outcome: Ongoing (interventions implemented as appropriate)    Goal: Discharge Needs Assessment  Outcome: Ongoing (interventions implemented as appropriate)    Goal: Interprofessional Rounds/Family Conf  Outcome: Ongoing (interventions implemented as appropriate)      Problem: Skin Injury Risk (Adult)  Goal: Identify Related Risk Factors and Signs and Symptoms  Outcome: Ongoing (interventions implemented as appropriate)    Goal: Skin Health and Integrity  Outcome: Ongoing (interventions implemented as appropriate)      Problem: Gastrointestinal Bleeding (Adult)  Goal: Signs and Symptoms of Listed Potential Problems Will be Absent, Minimized or Managed (Gastrointestinal Bleeding)  Outcome: Ongoing (interventions implemented as appropriate)

## 2019-12-05 NOTE — PROGRESS NOTES
"    [facility] Medicine Services  PROGRESS NOTE    Patient Name: Temi Jarrett  : 1930  MRN: [id number]    Date of Admission: 2019  Primary Care Physician: Eric Patel MD    Subjective   Subjective     CC:  Blood per rectum assoc with diarrhea.     HPI:  No BMs today.    Denies rectal pain or abdominal pain.  Eating without difficulty.  Worried about cancer.   Sees Dr. Margo Antunez; has had colonoscopy before.     Review of Systems   Gen- No fevers, chills  CV- No chest pain, palpitations  Resp- No cough, dyspnea  GI- No N/V/D, abd pain.  Eating.    - voided this morning, no dysuria  History is limited by her memory deficit.      Objective   Objective     Vital Signs:   Temp:  [97.9 °F (36.6 °C)-98.6 °F (37 °C)] 98.3 °F (36.8 °C)  Heart Rate:  [79-95] 90  Resp:  [16-18] 18  BP: ()/(37-70) 86/58        Physical Exam:  Gen:  Bright perky woman sitting up in chair.  \"So, should I worry about cancer?\"  Neuro: alert and oriented, clear speech, follows commands, grossly nonfocal but cannot remember recent events well.   HEENT:  NC/AT PERRL, OP benign  Neck:  Supple, no LAD  Heart RRR no murmur, rub, or gallop  Lungs CTA, no wheeze, nonlabored sitting up at rest.   Abd:  Soft, nontender, no rebound or guarding, pos BS  Extrem:  No c/c/e    Results Reviewed:    Results from last 7 days   Lab Units 19  0515 19  1201   WBC 10*3/mm3 15.59* 20.92*   HEMOGLOBIN g/dL 9.7* 11.4*   HEMATOCRIT % 31.8* 38.3   PLATELETS 10*3/mm3 286 389   INR   --  1.30*     Results from last 7 days   Lab Units 19  0515 19  1246   SODIUM mmol/L 137 139   POTASSIUM mmol/L 3.6 3.5   CHLORIDE mmol/L 97* 96*   CO2 mmol/L 32.0* 32.0*   BUN mg/dL 16 17   CREATININE mg/dL 0.66 0.84   GLUCOSE mg/dL 75 110*   CALCIUM mg/dL 8.0* 9.2   ALT (SGPT) U/L  --  8   AST (SGOT) U/L  --  15     Estimated Creatinine Clearance: 40.3 mL/min (by C-G formula based on SCr of 0.66 " mg/dL).    Microbiology Results Abnormal     None          Imaging Results (Last 24 Hours)     ** No results found for the last 24 hours. **               I have reviewed the medications:  Scheduled Meds:  aspirin 81 mg Oral Daily   bethanechol 10 mg Oral 4x Daily   busPIRone 10 mg Oral BID   cefTRIAXone 1 g Intravenous Q24H   donepezil 10 mg Oral Nightly   gabapentin 300 mg Oral BID   levothyroxine 175 mcg Oral Q AM   memantine 10 mg Oral BID   methocarbamol 500 mg Oral 4x Daily   metroNIDAZOLE 500 mg Intravenous Q8H   nystatin  Topical Q12H   pantoprazole 40 mg Oral Q AM   sodium chloride 10 mL Intravenous Q12H     Continuous Infusions:  lactated ringers 75 mL/hr Last Rate: 75 mL/hr (12/05/19 0903)     PRN Meds:.•  acetaminophen **OR** acetaminophen **OR** acetaminophen  •  magnesium sulfate **OR** magnesium sulfate **OR** magnesium sulfate  •  ondansetron **OR** ondansetron  •  potassium chloride **OR** potassium chloride **OR** potassium chloride  •  sodium chloride  •  sodium chloride  •  traMADol  •  traZODone      Assessment/Plan   Assessment / Plan     Active Hospital Problems    Diagnosis  POA   • Rectal bleeding [K62.5]  Yes   • Diarrhea [R19.7]  Yes   • Leukocytosis [D72.829]  Yes   • Hyperlipidemia [E78.5]  Yes   • Multi-infarct dementia (CMS/HCC) [F01.50]  Yes   • Benign essential hypertension [I10]  Yes      Resolved Hospital Problems   No resolved problems to display.        Brief Hospital Course to date:  Temi Jarrett is a 89 y.o. female with recent admission for sepsis, sent from [facility] after acute diarrhea with rectal bleeding x 2 days.    Diarrhea and BRBPR with leukocytosis  - this seems to have stopped   - Cdiff and biofire ordered; no results yet since there are no samples to sent  -  empiric Rocephin and Flagyl  - request last colonoscopy from Dr. Antunez  - plan Dr Antunez followup after DC.     Some dispute over source of blood  - PT saw blood with mucus on front of gown  - s/p hysterectomy  -  rectal exam in ER confirmed rectal bleeding    Hypertension  -Patient was borderline hypotensive on presentation.  Holding home antihypertensives     Dementia  -Continue donepezil, amantadine     DVT Prophylaxis:  [facility]    Disposition: I expect the patient to be discharged tomorrow back to [facility] if no bleeding noted. .    CODE STATUS:   Code Status and Medical Interventions:   Ordered at: 12/04/19 1354     Level Of Support Discussed With:    Patient     Code Status:    CPR     Medical Interventions (Level of Support Prior to Arrest):    Full         Electronically signed by Elaine Wheeler MD, 12/05/19, 12:18 PM.

## 2019-12-05 NOTE — PLAN OF CARE
Problem: Patient Care Overview  Goal: Plan of Care Review  Outcome: Ongoing (interventions implemented as appropriate)   12/05/19 0331   Coping/Psychosocial   Plan of Care Reviewed With patient   Plan of Care Review   Progress no change   OTHER   Outcome Summary VSS. Denies pain or discomfort. Pt with decreased ouput this shift. Bladder scan done and 153 noted, pt denies discomfort. Will continue to monitor.      Goal: Discharge Needs Assessment  Outcome: Ongoing (interventions implemented as appropriate)   12/04/19 1625   Discharge Needs Assessment   Patient/Family Anticipates Transition to home with family;inpatient rehabilitation facility   Patient/Family Anticipated Services at Transition rehabilitation services;home health care   Transportation Concerns car, none   Transportation Anticipated family or friend will provide     Goal: Interprofessional Rounds/Family Conf  Outcome: Ongoing (interventions implemented as appropriate)   12/05/19 0331   Interdisciplinary Rounds/Family Conf   Participants ;family;nursing;patient;physician       Problem: Skin Injury Risk (Adult)  Goal: Identify Related Risk Factors and Signs and Symptoms  Outcome: Ongoing (interventions implemented as appropriate)   12/05/19 0331   Skin Injury Risk (Adult)   Related Risk Factors (Skin Injury Risk) advanced age;fluid intake inadequate;mobility impaired;nutritional deficiencies     Goal: Skin Health and Integrity  Outcome: Ongoing (interventions implemented as appropriate)   12/05/19 0331   Skin Injury Risk (Adult)   Skin Health and Integrity making progress toward outcome

## 2019-12-05 NOTE — PLAN OF CARE
Problem: Patient Care Overview  Goal: Plan of Care Review  Outcome: Ongoing (interventions implemented as appropriate)   12/05/19 1128   Coping/Psychosocial   Plan of Care Reviewed With patient   Plan of Care Review   Progress improving   OTHER   Outcome Summary Presents w/ evolving sympt incl rectal bleed, C Diff, recent L part.THR s/p fall 9/'19, low HGB (9.7), orthostat BP changes, decr. strength/endurance & impaired funct mobil; able to transf STS (from low chair) w/ Rwx & mod A, amb total of 16 ft w/ min A (2 stand. + 1 sit.rest), + perform bal activ & ther exer w/ mult cues d/t Dem.& visual impair.(mac deg.); recommend ret. to [facility] at d/c; requested OT consult

## 2019-12-05 NOTE — PROGRESS NOTES
Discharge Planning Assessment  [facility]     Patient Name: Temi Jarrett  MRN: [id number]  Today's Date: 12/5/2019    Admit Date: 12/4/2019    Discharge Needs Assessment     Row Name 12/05/19 1432       Living Environment    Lives With  child(darvin), adult    Name(s) of Who Lives With Patient  Benjamin    Current Living Arrangements  home/apartment/condo    Primary Care Provided by  self;child(darvin)    Provides Primary Care For  no one, unable/limited ability to care for self    Family Caregiver if Needed  child(darvin), adult    Quality of Family Relationships  helpful;supportive    Living Arrangement Comments  Spoke with pt in room with permission and spoke with pt's son, Victor M and daughter in law, Chris on cell.  Pt resides in [place] [facility] with adult children, Victor M and Chris.. Pt has been at [facility] for short term rehab and plan is to return to complete rehab.        Resource/Environmental Concerns    Resource/Environmental Concerns  none       Transition Planning    Patient/Family Anticipates Transition to  inpatient rehabilitation facility [facility]    Patient/Family Anticipated Services at Transition      Transportation Anticipated  other (see comments) [facility]       Discharge Needs Assessment    Readmission Within the Last 30 Days  current reason for admission unrelated to previous admission    Concerns to be Addressed  discharge planning    Equipment Currently Used at Home  walker, rolling    Anticipated Changes Related to Illness  other (see comments) Complete rehab    Equipment Needed After Discharge  walker, rolling    Outpatient/Agency/Support Group Needs  inpatient rehabilitation facility    Discharge Facility/Level of Care Needs  rehabilitation facility    Discharge Coordination/Progress  Pt has Medicare and [employer] and denies recent changes in insurance. Pt has prescription coverage and uses [facility] on [address]. Pt came from [facility](acute rehab) and plans to return to  complete rehab.  Spoke with Chanel and as long as pt returns by tomorrow evening, she will not need a new referral.  Spoke with Victor M and Chris and they want [facility] to arrange transportation.  There are no spots available with [facility]. Family is agreeable to [facility] to transport and cost of $77.. Transportation arranged with [facility] for tomorrow, Friday, 12/6 at 4pm(Ref no.  [id number]). Family will need to call [facility] by 230 pm to confirm and make payment.  Family wants a call from  by 2pm for update.    Per family request call [phone] or Chris's cell at [phone] CM will cont to follow                  Discharge Plan     Row Name 12/05/19 2592       Plan    Plan  discharge plan    Patient/Family in Agreement with Plan  yes    Plan Comments  Plan is back to [facility] acute rehab to complete rehab stay. Per Desirae, pt can return with no new referral as long as she returns by tomorrow.  CM will follow up with Desirae at [facility] tomorrow. Transportation arranged per pt requestl  [facility] to transport tomorrow, Friday, at 4pm and family agreeable to cost of $77. Family will need to call [facility] at [phone] by 2pm. Family wants an update by  at 2pm to make payment arrangements to [facility].    Per family request call [phone] or Chris's cell at [phone] CM will cont to follow         Final Discharge Disposition Code  62 - inpatient rehab facility        Destination      No service coordination in this encounter.      Durable Medical Equipment      No service coordination in this encounter.      Dialysis/Infusion      No service coordination in this encounter.      Home Medical Care      No service coordination in this encounter.      Therapy      No service coordination in this encounter.      Community Resources      No service coordination in this encounter.          Demographic Summary     Row Name 12/05/19 1412       General Information    General Information Comments  Pt's PCP is Eric Patel        Contact Information    Permission Granted to Share Info With      Contact Information Obtained for      Contact Information Comments  Victor M Jarrett(son):  [phone] or [phone]        Functional Status    No documentation.       Psychosocial    No documentation.       Abuse/Neglect    No documentation.       Legal    No documentation.       Substance Abuse    No documentation.       Patient Forms    No documentation.           Sudha Jaquez RN

## 2019-12-06 LAB
ANION GAP SERPL CALCULATED.3IONS-SCNC: 8 MMOL/L (ref 5–15)
BUN BLD-MCNC: 11 MG/DL (ref 8–23)
BUN/CREAT SERPL: 20.8 (ref 7–25)
CALCIUM SPEC-SCNC: 7.9 MG/DL (ref 8.6–10.5)
CHLORIDE SERPL-SCNC: 100 MMOL/L (ref 98–107)
CO2 SERPL-SCNC: 30 MMOL/L (ref 22–29)
CREAT BLD-MCNC: 0.53 MG/DL (ref 0.57–1)
DEPRECATED RDW RBC AUTO: 46.4 FL (ref 37–54)
ERYTHROCYTE [DISTWIDTH] IN BLOOD BY AUTOMATED COUNT: 15.2 % (ref 12.3–15.4)
GFR SERPL CREATININE-BSD FRML MDRD: 109 ML/MIN/1.73
GLUCOSE BLD-MCNC: 88 MG/DL (ref 65–99)
HCT VFR BLD AUTO: 31.3 % (ref 34–46.6)
HGB BLD-MCNC: 9.3 G/DL (ref 12–15.9)
MAGNESIUM SERPL-MCNC: 2.8 MG/DL (ref 1.6–2.4)
MCH RBC QN AUTO: 25 PG (ref 26.6–33)
MCHC RBC AUTO-ENTMCNC: 29.7 G/DL (ref 31.5–35.7)
MCV RBC AUTO: 84.1 FL (ref 79–97)
PLATELET # BLD AUTO: 264 10*3/MM3 (ref 140–450)
PMV BLD AUTO: 9.4 FL (ref 6–12)
POTASSIUM BLD-SCNC: 4.4 MMOL/L (ref 3.5–5.2)
RBC # BLD AUTO: 3.72 10*6/MM3 (ref 3.77–5.28)
SODIUM BLD-SCNC: 138 MMOL/L (ref 136–145)
WBC NRBC COR # BLD: 9.05 10*3/MM3 (ref 3.4–10.8)

## 2019-12-06 PROCEDURE — 96368 THER/DIAG CONCURRENT INF: CPT

## 2019-12-06 PROCEDURE — G0378 HOSPITAL OBSERVATION PER HR: HCPCS

## 2019-12-06 PROCEDURE — 83735 ASSAY OF MAGNESIUM: CPT | Performed by: INTERNAL MEDICINE

## 2019-12-06 PROCEDURE — 97530 THERAPEUTIC ACTIVITIES: CPT | Performed by: PHYSICAL THERAPIST

## 2019-12-06 PROCEDURE — 97166 OT EVAL MOD COMPLEX 45 MIN: CPT

## 2019-12-06 PROCEDURE — 25010000002 CEFTRIAXONE PER 250 MG: Performed by: INTERNAL MEDICINE

## 2019-12-06 PROCEDURE — 85027 COMPLETE CBC AUTOMATED: CPT | Performed by: INTERNAL MEDICINE

## 2019-12-06 PROCEDURE — 96366 THER/PROPH/DIAG IV INF ADDON: CPT

## 2019-12-06 PROCEDURE — 99225 PR SBSQ OBSERVATION CARE/DAY 25 MINUTES: CPT | Performed by: HOSPITALIST

## 2019-12-06 PROCEDURE — 99222 1ST HOSP IP/OBS MODERATE 55: CPT | Performed by: INTERNAL MEDICINE

## 2019-12-06 PROCEDURE — 80048 BASIC METABOLIC PNL TOTAL CA: CPT | Performed by: INTERNAL MEDICINE

## 2019-12-06 PROCEDURE — 97116 GAIT TRAINING THERAPY: CPT | Performed by: PHYSICAL THERAPIST

## 2019-12-06 RX ORDER — HYDROCORTISONE ACETATE 25 MG/1
25 SUPPOSITORY RECTAL 2 TIMES DAILY
Status: DISCONTINUED | OUTPATIENT
Start: 2019-12-06 | End: 2019-12-08 | Stop reason: HOSPADM

## 2019-12-06 RX ADMIN — POTASSIUM CHLORIDE 40 MEQ: 750 CAPSULE, EXTENDED RELEASE ORAL at 01:27

## 2019-12-06 RX ADMIN — BETHANECHOL CHLORIDE 10 MG: 10 TABLET ORAL at 21:08

## 2019-12-06 RX ADMIN — PANTOPRAZOLE SODIUM 40 MG: 40 TABLET, DELAYED RELEASE ORAL at 05:24

## 2019-12-06 RX ADMIN — METHOCARBAMOL 500 MG: 500 TABLET, FILM COATED ORAL at 21:07

## 2019-12-06 RX ADMIN — SODIUM CHLORIDE, PRESERVATIVE FREE 10 ML: 5 INJECTION INTRAVENOUS at 21:07

## 2019-12-06 RX ADMIN — METRONIDAZOLE 500 MG: 500 INJECTION, SOLUTION INTRAVENOUS at 17:15

## 2019-12-06 RX ADMIN — CEFTRIAXONE 1 G: 1 INJECTION, POWDER, FOR SOLUTION INTRAMUSCULAR; INTRAVENOUS at 17:16

## 2019-12-06 RX ADMIN — HYDROCORTISONE ACETATE 25 MG: 25 SUPPOSITORY RECTAL at 21:07

## 2019-12-06 RX ADMIN — BETHANECHOL CHLORIDE 10 MG: 10 TABLET ORAL at 17:16

## 2019-12-06 RX ADMIN — LEVOTHYROXINE SODIUM 175 MCG: 175 TABLET ORAL at 05:24

## 2019-12-06 RX ADMIN — NYSTATIN: 100000 POWDER TOPICAL at 21:07

## 2019-12-06 RX ADMIN — NYSTATIN: 100000 POWDER TOPICAL at 08:27

## 2019-12-06 RX ADMIN — METRONIDAZOLE 500 MG: 500 INJECTION, SOLUTION INTRAVENOUS at 01:28

## 2019-12-06 RX ADMIN — GABAPENTIN 300 MG: 300 CAPSULE ORAL at 08:27

## 2019-12-06 RX ADMIN — METHOCARBAMOL 500 MG: 500 TABLET, FILM COATED ORAL at 08:27

## 2019-12-06 RX ADMIN — BUSPIRONE HYDROCHLORIDE 10 MG: 10 TABLET ORAL at 21:08

## 2019-12-06 RX ADMIN — MEMANTINE 10 MG: 10 TABLET ORAL at 21:07

## 2019-12-06 RX ADMIN — GABAPENTIN 300 MG: 300 CAPSULE ORAL at 21:07

## 2019-12-06 RX ADMIN — BETHANECHOL CHLORIDE 10 MG: 10 TABLET ORAL at 11:57

## 2019-12-06 RX ADMIN — BUSPIRONE HYDROCHLORIDE 10 MG: 10 TABLET ORAL at 08:27

## 2019-12-06 RX ADMIN — METRONIDAZOLE 500 MG: 500 INJECTION, SOLUTION INTRAVENOUS at 08:27

## 2019-12-06 RX ADMIN — METHOCARBAMOL 500 MG: 500 TABLET, FILM COATED ORAL at 17:16

## 2019-12-06 RX ADMIN — ASPIRIN 81 MG: 81 TABLET, COATED ORAL at 08:27

## 2019-12-06 RX ADMIN — MEMANTINE 10 MG: 10 TABLET ORAL at 08:27

## 2019-12-06 RX ADMIN — METHOCARBAMOL 500 MG: 500 TABLET, FILM COATED ORAL at 11:57

## 2019-12-06 RX ADMIN — BETHANECHOL CHLORIDE 10 MG: 10 TABLET ORAL at 08:27

## 2019-12-06 RX ADMIN — DONEPEZIL HYDROCHLORIDE 10 MG: 10 TABLET, FILM COATED ORAL at 21:07

## 2019-12-06 NOTE — PLAN OF CARE
Problem: Patient Care Overview  Goal: Discharge Needs Assessment  Outcome: Ongoing (interventions implemented as appropriate)   12/05/19 1432 12/05/19 1722   Discharge Needs Assessment   Readmission Within the Last 30 Days --  current reason for admission unrelated to previous admission   Concerns to be Addressed --  discharge planning   Patient/Family Anticipates Transition to --  inpatient rehabilitation facility   Patient/Family Anticipated Services at Transition --     Transportation Concerns --  car, none   Transportation Anticipated other (see comments)  ([facility]) --    Anticipated Changes Related to Illness other (see comments)  (Complete rehab) --    Equipment Needed After Discharge --  walker, rolling   Outpatient/Agency/Support Group Needs --  inpatient rehabilitation facility   Discharge Facility/Level of Care Needs --  rehabilitation facility   Disability   Equipment Currently Used at Home --  walker, rolling     Goal: Interprofessional Rounds/Family Conf  Outcome: Ongoing (interventions implemented as appropriate)   12/06/19 0333   Interdisciplinary Rounds/Family Conf   Participants ;family;nursing;patient;physician       Problem: Skin Injury Risk (Adult)  Goal: Identify Related Risk Factors and Signs and Symptoms  Outcome: Ongoing (interventions implemented as appropriate)   12/06/19 0333   Skin Injury Risk (Adult)   Related Risk Factors (Skin Injury Risk) advanced age;fluid intake inadequate;mobility impaired;nutritional deficiencies;cognitive impairment     Goal: Skin Health and Integrity  Outcome: Ongoing (interventions implemented as appropriate)   12/06/19 0333   Skin Injury Risk (Adult)   Skin Health and Integrity making progress toward outcome       Problem: Gastrointestinal Bleeding (Adult)  Goal: Signs and Symptoms of Listed Potential Problems Will be Absent, Minimized or Managed (Gastrointestinal Bleeding)  Outcome: Ongoing (interventions implemented as appropriate)    12/06/19 0333   Goal/Outcome Evaluation   Problems Assessed (GI Bleeding) all   Problems Present (GI Bleeding) none

## 2019-12-06 NOTE — PLAN OF CARE
Problem: Patient Care Overview  Goal: Plan of Care Review  Outcome: Ongoing (interventions implemented as appropriate)   12/06/19 6506   Coping/Psychosocial   Plan of Care Reviewed With patient   OTHER   Outcome Summary Pt progressing well towards skilled PT goals.  Pt very motivated to work with PT and return to [facility]. Pt transferred supine-->sit with MinAx1, stood with CGA, and ambulated 200 feet using rw with CGAx1. Pt ambulates forward flexed and with an antalgic gait.  Continue with skilled PT to improve mobility and safety.

## 2019-12-06 NOTE — THERAPY EVALUATION
Acute Care - Occupational Therapy Initial Evaluation  [facility]     Patient Name: Temi Jarrett  : 1930  MRN: [id number]  Today's Date: 2019  Onset of Illness/Injury or Date of Surgery: 19  Date of Referral to OT: 19  Referring Physician: MD Summer    Admit Date: 2019       ICD-10-CM ICD-9-CM   1. Rectal bleeding K62.5 569.3   2. Diarrhea, unspecified type R19.7 787.91   3. Leukocytosis, unspecified type D72.829 288.60   4. Vitamin B12 deficiency E53.8 266.2   5. Impaired functional mobility, balance, gait, and endurance Z74.09 V49.89   6. Impaired mobility and ADLs Z74.09 799.89     Patient Active Problem List   Diagnosis   • Skin tear of lower leg without complication, right, initial encounter   • Esophageal reflux   • Hypothyroidism   • Generalized anxiety disorder   • Hyperlipidemia   • Multi-infarct dementia (CMS/HCC)   • Peripheral neuropathy   • Carpal tunnel syndrome of right wrist   • Spinal stenosis of lumbar region   • Osteoporosis   • Fever   • Urinary frequency   • Peripheral venous insufficiency   • Disc disorder of lumbar region   • Bladder prolapse, female, acquired   • Acute right-sided low back pain without sciatica   • Pain, knee   • Benign essential hypertension   • Pernicious anemia   • Memory loss   • Annual physical exam   • Primary osteoarthritis of both knees   • Chest pain, atypical   • Massive hiatal hernia with intrathoracic stomach   • Closed fracture of neck of left femur (CMS/HCC)   • Hypoxia   • Multifocal aspiration pneumonia due to large intrathoracic hiatal hernia   • Leukocytosis   • Acute respiratory failure with hypoxia not requiring ventilatory support (CMS/HCC)   • Bilateral parapneumonic pleural effusions.  S/p placement small right chest tube 19.    • Small postprocedural pneumothorax   • UTI due to Klebsiella species   • Rectal bleeding   • Diarrhea     Past Medical History:   Diagnosis Date   • Disease of thyroid gland    • Gastric  polyp    • History of colonic polyps    • Hypertension    • IBS (irritable bowel syndrome)    • Macular degeneration    • Torn meniscus     right knee      Past Surgical History:   Procedure Laterality Date   • CHOLECYSTECTOMY  1997   • EYE SURGERY  1998    Cataract extraction   • HERNIA REPAIR     • HIP HEMIARTHROPLASTY Left 9/28/2019    Procedure: HIP HEMIARTHROPLASTY LEFT;  Surgeon: Reddy Segundo Jr., MD;  Location: On license of [facility];  Service: Orthopedics   • HYSTERECTOMY  1976   • KNEE SURGERY Right     arthroscopy- 2000   • TONSILLECTOMY            OT ASSESSMENT FLOWSHEET (last 12 hours)      Occupational Therapy Evaluation     Row Name 12/06/19 1331                   OT Evaluation Time/Intention    Document Type  evaluation  -AC        Mode of Treatment  occupational therapy  -AC        Patient Effort  good  -AC           General Information    Patient Profile Reviewed?  yes  -AC        Onset of Illness/Injury or Date of Surgery  12/05/19  -AC        Referring Physician  MD Summer  -AC        Prior Level of Function  independent:;feeding;min assist:;grooming;all household mobility;transfer;max assist:;dressing;bathing  -AC        Equipment Currently Used at Home  bath bench;commode, bedside;rollator;grab bar  -AC        Pertinent History of Current Functional Problem  Admit from [facility] with rectal bleeding.  S/P L  THR 09/19.  Pt with dementia  -AC        Existing Precautions/Restrictions  fall  -AC        Barriers to Rehab  medically complex;previous functional deficit;cognitive status;visual deficit  -AC           Relationship/Environment    Primary Source of Support/Comfort  child(darvin)  -AC        Lives With  child(darvin), adult  -AC        Concerns About Impact on Relationships  son and dtr in law  -AC           Resource/Environmental Concerns    Current Living Arrangements  home/apartment/condo  -           Home Main Entrance    Number of Stairs, Main Entrance  one  -AC        Stair Railings, Main Entrance   none  -AC           Stairs Within Home, Primary    Stairs, Within Home, Primary  15  -AC        Stair Railings, Within Home, Primary  railings on both sides of stairs  -           Cognitive Assessment/Interventions    Additional Documentation  Cognitive Assessment/Intervention (Group)  -           Cognitive Assessment/Intervention- PT/OT    Orientation Status (Cognition)  oriented x 3  -AC        Follows Commands (Cognition)  follows one step commands;over 90% accuracy;repetition of directions required;verbal cues/prompting required  -        Cognitive Assessment/Intervention Comment  confused and repeating self every few minutes  -           Safety Issues, Functional Mobility    Safety Issues Affecting Function (Mobility)  safety precaution awareness;safety precautions follow-through/compliance  -        Impairments Affecting Function (Mobility)  balance;endurance/activity tolerance;strength;postural/trunk control  -           Bed Mobility Assessment/Treatment    Bed Mobility Assessment/Treatment  supine-sit;sit-supine  -        Supine-Sit [place] (Bed Mobility)  minimum assist (75% patient effort)  -        Sit-Supine [place] (Bed Mobility)  supervision  -        Assistive Device (Bed Mobility)  bed rails;head of bed elevated  -           Functional Mobility    Functional Mobility- Ind. Level  contact guard assist  -        Functional Mobility- Device  rolling walker  -        Functional Mobility-Distance (Feet)  200  -           Transfer Assessment/Treatment    Transfer Assessment/Treatment  sit-stand transfer;stand-sit transfer;toilet transfer  -        Comment (Transfers)  VCs for hand placement  -           Sit-Stand Transfer    Sit-Stand [place] (Transfers)  contact guard  -        Assistive Device (Sit-Stand Transfers)  walker, front-wheeled  -           Stand-Sit Transfer    Stand-Sit [place] (Transfers)  contact guard  -        Assistive Device  (Stand-Sit Transfers)  walker, front-wheeled  -           Toilet Transfer    Type (Toilet Transfer)  stand pivot/stand step  -AC        [place] Level (Toilet Transfer)  contact guard  -        Assistive Device (Toilet Transfer)  commode, bedside without drop arms;walker, front-wheeled  -AC           ADL Assessment/Intervention    BADL Assessment/Intervention  toileting  -           Toileting Assessment/Training    [place] Level (Toileting)  adjust/manage clothing;perform perineal hygiene;minimum assist (75% patient effort)  -        Assistive Devices (Toileting)  commode, bedside without drop arms  -AC        Toileting Position  unsupported sitting;supported standing  -           BADL Safety/Performance    Impairments, BADL Safety/Performance  balance;strength;trunk/postural control;endurance/activity tolerance  -           MMT (Manual Muscle Testing)    General MMT Comments  BUE grossly 4-/5  -AC           Positioning and Restraints    Pre-Treatment Position  in bed  -AC        Post Treatment Position  bed  -AC        In Bed  fowlers;call light within reach;encouraged to call for assist;exit alarm on  -AC           Pain Scale: Numbers Pre/Post-Treatment    Pain Scale: Numbers, Pretreatment  0/10 - no pain  -AC        Pain Scale: Numbers, Post-Treatment  0/10 - no pain  -AC           Clinical Impression (OT)    Date of Referral to OT  12/05/19  -        OT Diagnosis  ADL decline  -AC        Patient/Family Goals Statement (OT Eval)  increase ADL independence  -        Criteria for Skilled Therapeutic Interventions Met (OT Eval)  yes;treatment indicated  -AC        Rehab Potential (OT Eval)  good, to achieve stated therapy goals  -AC        Therapy Frequency (OT Eval)  daily  -AC        Care Plan Review (OT)  evaluation/treatment results reviewed  -AC        Anticipated Discharge Disposition (OT)  inpatient rehabilitation facility  -AC           Vital Signs    Pre Systolic BP Rehab  -- VSS   -AC           Planned OT Interventions    Planned Therapy Interventions (OT Eval)  BADL retraining;functional balance retraining;occupation/activity based interventions;strengthening exercise;transfer/mobility retraining  -AC           OT Goals    Bed Mobility Goal Selection (OT)  bed mobility, OT goal 1  -AC        Transfer Goal Selection (OT)  transfer, OT goal 1  -AC        Toileting Goal Selection (OT)  toileting, OT goal 1  -AC        Grooming Goal Selection (OT)  grooming, OT goal 1  -AC        Additional Documentation  Grooming Goal Selection (OT) (Row)  -AC           Bed Mobility Goal 1 (OT)    Activity/Assistive Device (Bed Mobility Goal 1, OT)  bed mobility activities, all  -AC        [place] Level/Cues Needed (Bed Mobility Goal 1, OT)  supervision required  -AC        Time Frame (Bed Mobility Goal 1, OT)  by discharge  -AC        Progress/Outcomes (Bed Mobility Goal 1, OT)  goal ongoing  -AC           Transfer Goal 1 (OT)    Activity/Assistive Device (Transfer Goal 1, OT)  bed-to-chair/chair-to-bed;toilet;walker, rolling  -AC        [place] Level/Cues Needed (Transfer Goal 1, OT)  supervision required  -AC        Time Frame (Transfer Goal 1, OT)  by discharge  -AC        Progress/Outcome (Transfer Goal 1, OT)  goal ongoing  -AC           Toileting Goal 1 (OT)    Activity/Device (Toileting Goal 1, OT)  toileting skills, all  -AC        [place] Level/Cues Needed (Toileting Goal 1, OT)  supervision required  -AC        Time Frame (Toileting Goal 1, OT)  by discharge  -AC        Progress/Outcome (Toileting Goal 1, OT)  goal ongoing  -AC           Grooming Goal 1 (OT)    Activity/Device (Grooming Goal 1, OT)  hair care;oral care;wash face, hands  -AC        [place] (Grooming Goal 1, OT)  supervision required sinkside  -AC        Time Frame (Grooming Goal 1, OT)  by discharge  -AC        Progress/Outcome (Grooming Goal 1, OT)  goal ongoing  -AC           Living Environment    Home  Accessibility  stairs within home;tub/shower is not walk in  -          User Key  (r) = Recorded By, (t) = Taken By, (c) = Cosigned By    Initials Name Effective Dates     Ramandeep Durán, OT 06/23/15 -          Occupational Therapy Education     Title: PT OT SLP Therapies (In Progress)     Topic: Occupational Therapy (In Progress)     Point: ADL training (Done)     Description: Instruct learner(s) on proper safety adaptation and remediation techniques during self care or transfers.   Instruct in proper use of assistive devices.    Learning Progress Summary           Patient Acceptance, E, VU by PRIYANKA at 12/6/2019  1:31 PM    Comment:  benefits of activity, role of OT    Eager, E,D, NR by SALO at 12/5/2019 11:28 AM    Acceptance, E,TB, VU,NR by NOEMÍ at 12/4/2019  4:25 PM   Family Acceptance, E,TB, VU,NR by NOEMÍ at 12/4/2019  4:25 PM                   Point: Home exercise program (In Progress)     Description: Instruct learner(s) on appropriate technique for monitoring, assisting and/or progressing therapeutic exercises/activities.    Learning Progress Summary           Patient Eager, E,D, NR by DM at 12/5/2019 11:28 AM    Acceptance, E,TB, VU,NR by NOEMÍ at 12/4/2019  4:25 PM   Family Acceptance, E,TB, VU,NR by NOEMÍ at 12/4/2019  4:25 PM                   Point: Precautions (In Progress)     Description: Instruct learner(s) on prescribed precautions during self-care and functional transfers.    Learning Progress Summary           Patient Eager, E,D, NR by DM at 12/5/2019 11:28 AM    Acceptance, E,TB, VU,NR by NOEMÍ at 12/4/2019  4:25 PM   Family Acceptance, E,TB, VU,NR by NOEMÍ at 12/4/2019  4:25 PM                   Point: Body mechanics (In Progress)     Description: Instruct learner(s) on proper positioning and spine alignment during self-care, functional mobility activities and/or exercises.    Learning Progress Summary           Patient Eager, E,D, NR by DM at 12/5/2019 11:28 AM    Acceptance, E,TB, VU,NR by NOEMÍ at 12/4/2019   4:25 PM   Family Acceptance, E,TB, VU,NR by NOEMÍ at 12/4/2019  4:25 PM                               User Key     Initials Effective Dates Name Provider Type Discipline    AC 06/23/15 -  Ramandeep Durán, OT Occupational Therapist OT    DM 06/19/15 -  Victoria Gallagher, PT Physical Therapist PT    NOEMÍ 09/05/17 -  Kristina Moreno, RN Registered Nurse Nurse                  OT Recommendation and Plan  Outcome Summary/Treatment Plan (OT)  Anticipated Discharge Disposition (OT): inpatient rehabilitation facility  Planned Therapy Interventions (OT Eval): BADL retraining, functional balance retraining, occupation/activity based interventions, strengthening exercise, transfer/mobility retraining  Therapy Frequency (OT Eval): daily  Plan of Care Review  Plan of Care Reviewed With: patient  Plan of Care Reviewed With: patient  Outcome Summary: OT eval complete.  Pt with h/o dementia, L THR 09/2019. Pt presents with weakness and decreased balance limiting independence with self care.  Pt CGA BSC transfer with RW,  min a post toileting hygiene.  Pt CGA to ambulate 200 ft with RW.   OT will follow to advance pt toward increased independence with self care.  Recommend IP rehab upon d/c.     Outcome Measures     Row Name 12/06/19 1331             How much help from another is currently needed...    Putting on and taking off regular lower body clothing?  2  -AC      Bathing (including washing, rinsing, and drying)  2  -AC      Toileting (which includes using toilet bed pan or urinal)  3  -AC      Putting on and taking off regular upper body clothing  3  -AC      Taking care of personal grooming (such as brushing teeth)  3  -AC      Eating meals  3  -AC      AM-PAC 6 Clicks Score (OT)  16  -AC         Functional Assessment    Outcome Measure Options  AM-PAC 6 Clicks Daily Activity (OT)  -AC        User Key  (r) = Recorded By, (t) = Taken By, (c) = Cosigned By    Initials Name Provider Type    AC Ramandeep Durán, OT Occupational Therapist           Time Calculation:   Time Calculation- OT     Row Name 12/06/19 1331 12/06/19 1326          Time Calculation- OT    OT Start Time  1331  -AC  --     OT Received On  12/06/19  -  --     OT Goal Re-Cert Due Date  12/16/19  -  --        Timed Charges    10736 - Gait Training Minutes   --  15  -LM       User Key  (r) = Recorded By, (t) = Taken By, (c) = Cosigned By    Initials Name Provider Type    AC Ramandeep Durán, OT Occupational Therapist     Sowmya Whipple, PT Physical Therapist        Therapy Charges for Today     Code Description Service Date Service Provider Modifiers Qty    [id number] HC OT EVAL MOD COMPLEXITY 4 12/6/2019 Ramandeep Durán OT GO 1               Ramandeep Durán OT  12/6/2019

## 2019-12-06 NOTE — PLAN OF CARE
Problem: Patient Care Overview  Goal: Plan of Care Review  Outcome: Ongoing (interventions implemented as appropriate)   12/06/19 1331   Coping/Psychosocial   Plan of Care Reviewed With patient   OTHER   Outcome Summary OT nehal complete. Pt with h/o dementia, L THR 09/2019. Pt presents with weakness and decreased balance limiting independence with self care. Pt CGA BSC transfer with RW, min a post toileting hygiene. Pt CGA to ambulate 200 ft with RW. OT will follow to advance pt toward increased independence with self care. Recommend IP rehab upon d/c.

## 2019-12-06 NOTE — THERAPY TREATMENT NOTE
Patient Name: Temi Jarrett  : 1930    MRN: [id number]                              Today's Date: 2019       Admit Date: 2019    Visit Dx:     ICD-10-CM ICD-9-CM   1. Rectal bleeding K62.5 569.3   2. Diarrhea, unspecified type R19.7 787.91   3. Leukocytosis, unspecified type D72.829 288.60   4. Vitamin B12 deficiency E53.8 266.2   5. Impaired functional mobility, balance, gait, and endurance Z74.09 V49.89   6. Impaired mobility and ADLs Z74.09 799.89     Patient Active Problem List   Diagnosis   • Skin tear of lower leg without complication, right, initial encounter   • Esophageal reflux   • Hypothyroidism   • Generalized anxiety disorder   • Hyperlipidemia   • Multi-infarct dementia (CMS/HCC)   • Peripheral neuropathy   • Carpal tunnel syndrome of right wrist   • Spinal stenosis of lumbar region   • Osteoporosis   • Fever   • Urinary frequency   • Peripheral venous insufficiency   • Disc disorder of lumbar region   • Bladder prolapse, female, acquired   • Acute right-sided low back pain without sciatica   • Pain, knee   • Benign essential hypertension   • Pernicious anemia   • Memory loss   • Annual physical exam   • Primary osteoarthritis of both knees   • Chest pain, atypical   • Massive hiatal hernia with intrathoracic stomach   • Closed fracture of neck of left femur (CMS/HCC)   • Hypoxia   • Multifocal aspiration pneumonia due to large intrathoracic hiatal hernia   • Leukocytosis   • Acute respiratory failure with hypoxia not requiring ventilatory support (CMS/HCC)   • Bilateral parapneumonic pleural effusions.  S/p placement small right chest tube 19.    • Small postprocedural pneumothorax   • UTI due to Klebsiella species   • Rectal bleeding   • Diarrhea     Past Medical History:   Diagnosis Date   • Disease of thyroid gland    • Gastric polyp    • History of colonic polyps    • Hypertension    • IBS (irritable bowel syndrome)    • Macular degeneration    • Torn meniscus     right  knee      Past Surgical History:   Procedure Laterality Date   • CHOLECYSTECTOMY  1997   • EYE SURGERY  1998    Cataract extraction   • HERNIA REPAIR     • HIP HEMIARTHROPLASTY Left 9/28/2019    Procedure: HIP HEMIARTHROPLASTY LEFT;  Surgeon: Reddy Segundo Jr., MD;  Location: [facility];  Service: Orthopedics   • HYSTERECTOMY  1976   • KNEE SURGERY Right     arthroscopy- 2000   • TONSILLECTOMY       General Information     Row Name 12/06/19 1326          PT Evaluation Time/Intention    Document Type  therapy note (daily note)  -LM     Mode of Treatment  individual therapy;physical therapy  -LM     Row Name 12/06/19 1326          General Information    Patient Profile Reviewed?  yes  -LM     Existing Precautions/Restrictions  fall  -LM     Row Name 12/06/19 1326          Cognitive Assessment/Intervention- PT/OT    Orientation Status (Cognition)  oriented x 3  -LM     Row Name 12/06/19 1326          Safety Issues, Functional Mobility    Safety Issues Affecting Function (Mobility)  safety precaution awareness;safety precautions follow-through/compliance  -LM     Impairments Affecting Function (Mobility)  balance;endurance/activity tolerance;strength  -LM       User Key  (r) = Recorded By, (t) = Taken By, (c) = Cosigned By    Initials Name Provider Type    LM Sowmya Whipple, PT Physical Therapist        Mobility     Row Name 12/06/19 1326          Bed Mobility Assessment/Treatment    Bed Mobility Assessment/Treatment  supine-sit  -LM     Supine-Sit [place] (Bed Mobility)  minimum assist (75% patient effort);1 person assist  -LM     Assistive Device (Bed Mobility)  bed rails;head of bed elevated  -     Row Name 12/06/19 1326          Transfer Assessment/Treatment    Comment (Transfers)  Vc's for hand placement.  Pt first transferred from bed-->BSC.    -LM     Row Name 12/06/19 1326          Bed-Chair Transfer    Bed-Chair [place] (Transfers)  contact guard;1 person assist  -LM     Assistive Device  (Bed-Chair Transfers)  walker, front-wheeled  -LM     Row Name 12/06/19 1326          Sit-Stand Transfer    Sit-Stand [place] (Transfers)  contact guard;1 person assist  -LM     Assistive Device (Sit-Stand Transfers)  walker, front-wheeled  -LM     Row Name 12/06/19 1326          Gait/Stairs Assessment/Training    Gait/Stairs Assessment/Training  gait/ambulation independence;gait/ambulation assistive device  -LM     [place] Level (Gait)  contact guard;1 person assist  -LM     Assistive Device (Gait)  walker, front-wheeled  -LM     Distance in Feet (Gait)  200 feet  -LM     Deviations/Abnormal Patterns (Gait)  antalgic;base of support, narrow;kane decreased;stride length decreased  -LM     Bilateral Gait Deviations  weight shift ability decreased;forward flexed posture;heel strike decreased  -LM     Comment (Gait/Stairs)  Vc's for upright posture and to stay inside walker.  -LM       User Key  (r) = Recorded By, (t) = Taken By, (c) = Cosigned By    Initials Name Provider Type    LM Sowmya Whipple PT Physical Therapist        Obj/Interventions     Row Name 12/06/19 1326          Static Sitting Balance    Level of [place] (Unsupported Sitting, Static Balance)  supervision  -LM     Sitting Position (Unsupported Sitting, Static Balance)  sitting on edge of bed;sitting in chair  -LM     Time Able to Maintain Position (Unsupported Sitting, Static Balance)  more than 5 minutes  -LM     Row Name 12/06/19 1326          Static Standing Balance    Level of [place] (Supported Standing, Static Balance)  contact guard assist;1 person assist  -LM     Time Able to Maintain Position (Supported Standing, Static Balance)  1 to 2 minutes  -LM     Assistive Device Utilized (Supported Standing, Static Balance)  walker, rolling  -LM     Row Name 12/06/19 1326          Dynamic Standing Balance    Level of [place], Reaches Outside Midline (Standing, Dynamic Balance)  contact guard assist;1 person assist  -LM      Time Able to Maintain Position, Reaches Outside Midline (Standing, Dynamic Balance)  2 to 3 minutes  -LM     Assistive Device Utilized (Supported Standing, Dynamic Balance)  walker, rolling  -LM       User Key  (r) = Recorded By, (t) = Taken By, (c) = Cosigned By    Initials Name Provider Type    Sowmya Jules, PT Physical Therapist        Goals/Plan    No documentation.       Clinical Impression     Row Name 12/06/19 1326          Pain Assessment    Additional Documentation  Pain Scale: Numbers Pre/Post-Treatment (Group)  -LM     Row Name 12/06/19 1326          Pain Scale: Numbers Pre/Post-Treatment    Pain Scale: Numbers, Pretreatment  0/10 - no pain  -LM     Pain Scale: Numbers, Post-Treatment  0/10 - no pain  -LM     Row Name 12/06/19 1326          Plan of Care Review    Plan of Care Reviewed With  patient  -LM     Row Name 12/06/19 1326          Vital Signs    Pre Systolic BP Rehab  -- VSS - RN cleared for tx  -LM     Pre Patient Position  Supine  -LM     Intra Patient Position  Standing  -LM     Post Patient Position  Supine  -LM     Row Name 12/06/19 1326          Positioning and Restraints    Pre-Treatment Position  in bed  -LM     Post Treatment Position  bed  -LM     In Bed  supine;call light within reach;encouraged to call for assist;exit alarm on;notified nsg  -LM       User Key  (r) = Recorded By, (t) = Taken By, (c) = Cosigned By    Initials Name Provider Type    Sowmya Jules, LEFTY Physical Therapist        Outcome Measures     Row Name 12/06/19 1326          How much help from another person do you currently need...    Turning from your back to your side while in flat bed without using bedrails?  4  -LM     Moving from lying on back to sitting on the side of a flat bed without bedrails?  3  -LM     Moving to and from a bed to a chair (including a wheelchair)?  3  -LM     Standing up from a chair using your arms (e.g., wheelchair, bedside chair)?  3  -LM     Climbing 3-5 steps with a railing?  3   -LM     To walk in hospital room?  3  -LM     AM-PAC 6 Clicks Score (PT)  19  -LM     Row Name 12/06/19 1326          Functional Assessment    Outcome Measure Options  AM-PAC 6 Clicks Basic Mobility (PT)  -LM       User Key  (r) = Recorded By, (t) = Taken By, (c) = Cosigned By    Initials Name Provider Type    LM Sowmya Whipple, PT Physical Therapist        Physical Therapy Education     Title: PT OT SLP Therapies (In Progress)     Topic: Physical Therapy (In Progress)     Point: Mobility training (In Progress)     Learning Progress Summary           Patient Eager, E,D, NR by DM at 12/5/2019 11:28 AM    Acceptance, E,TB, VU,NR by NOEMÍ at 12/4/2019  4:25 PM   Family Acceptance, E,TB, VU,NR by NOEMÍ at 12/4/2019  4:25 PM                   Point: Home exercise program (In Progress)     Learning Progress Summary           Patient Eager, E,D, NR by SALO at 12/5/2019 11:28 AM    Acceptance, E,TB, VU,NR by NOEMÍ at 12/4/2019  4:25 PM   Family Acceptance, E,TB, VU,NR by NOEMÍ at 12/4/2019  4:25 PM                   Point: Body mechanics (In Progress)     Learning Progress Summary           Patient Eager, E,D, NR by DM at 12/5/2019 11:28 AM    Acceptance, E,TB, VU,NR by NOEMÍ at 12/4/2019  4:25 PM   Family Acceptance, E,TB, VU,NR by NOEMÍ at 12/4/2019  4:25 PM                   Point: Precautions (In Progress)     Learning Progress Summary           Patient Eager, E,D, NR by SALO at 12/5/2019 11:28 AM    Acceptance, E,TB, VU,NR by NOEMÍ at 12/4/2019  4:25 PM   Family Acceptance, E,TB, VU,NR by NOEMÍ at 12/4/2019  4:25 PM                               User Key     Initials Effective Dates Name Provider Type Discipline    DM 06/19/15 -  Victoria Gallagher, PT Physical Therapist PT    NOEMÍ 09/05/17 -  Kristina Moreno, RN Registered Nurse Nurse              PT Recommendation and Plan     Plan of Care Reviewed With: patient  Outcome Summary: Pt progressing well towards skilled PT goals.  Pt very motivated to work with PT and return to [facility]. Pt transferred  supine-->sit with MinAx1, stood with CGA, and ambulated 200 feet using rw with CGAx1. Pt ambulates forward flexed and with an antalgic gait.  Continue with skilled PT to improve mobility and safety.     Time Calculation:   PT Charges     Row Name 12/06/19 1326             Time Calculation    Start Time  1326  -LM      PT Received On  12/06/19  -LM      PT Goal Re-Cert Due Date  12/15/19  -LM         Timed Charges    47377 - Gait Training Minutes   15  -LM      53306 - PT Therapeutic Activity Minutes  15  -LM        User Key  (r) = Recorded By, (t) = Taken By, (c) = Cosigned By    Initials Name Provider Type    LM Sowmya Whipple, PT Physical Therapist        Therapy Charges for Today     Code Description Service Date Service Provider Modifiers Qty    69518181287 HC GAIT TRAINING EA 15 MIN 12/6/2019 Sowmya Whipple, PT GP 1    46957728709 HC PT THERAPEUTIC ACT EA 15 MIN 12/6/2019 Sowmya Whipple, PT GP 1          PT G-Codes  Outcome Measure Options: AM-PAC 6 Clicks Basic Mobility (PT)  AM-PAC 6 Clicks Score (PT): 19    Sowmya Whipple PT  12/6/2019

## 2019-12-06 NOTE — PROGRESS NOTES
Continued Stay Note  [facility]     Patient Name: Temi Jarrett  MRN: [id number]  Today's Date: 12/6/2019    Admit Date: 12/4/2019    Discharge Plan     Row Name 12/06/19 1318       Plan    Plan  [facility]    Patient/Family in Agreement with Plan  yes    Plan Comments  Per MD, patient will be staying to have a GI evaluation and will not discharge today. Updated Celi with [facility]. She states patient will be considered a new referral and will need updated PT and OT evaluations. PT and OT are following patient. [facility] will re-evaluate patient for admission. There is a chance patient could return to [facility] if a bed is available, but acceptance and bed availability are PENDING. Spoke with patient's son, Victor M, by phone. He asks that the GI MD call him to update him on the POC. He states he would like CM to arranged [facility] transportation if patient is able to transfer to [facility] this weekend. CM will continue to follow.     Row Name 12/06/19 1308       Plan    Plan  [facility]    Patient/Family in Agreement with Plan  yes        Discharge Codes    No documentation.       Expected Discharge Date and Time     Expected Discharge Date Expected Discharge Time    Dec 6, 2019             Renée Herrera RN

## 2019-12-06 NOTE — CONSULTS
[facility]    Referring Provider: No ref. provider found    Primary Care Provider: Eric Patel MD    Reason for Consultation: Diarrhea, hematochezia    Chief complaint, diarrhea, hematochezia    History of present illness:  Temi Jarrett is a 89 y.o. female who is admitted with diarrhea and blood in stool.  Patient has some mild dementia gives a very good history.  They report the history is taken from her son.    2 days prior admission she was having some diarrhea with bloody contents.  She denied abdominal pain.  Apparently she has had several colonoscopies in the past by Dr. Smith.  Her last was in the last 3 to 5 years.  She has had some small polyps in the past.  She had some nausea and  vomiting prior to admission as well.  She has an intrathoracic stomach which she has elected not to repair in the past.    Allergies:  Codeine and Shrimp    Scheduled Meds:    aspirin 81 mg Oral Daily   bethanechol 10 mg Oral 4x Daily   busPIRone 10 mg Oral BID   cefTRIAXone 1 g Intravenous Q24H   donepezil 10 mg Oral Nightly   gabapentin 300 mg Oral BID   levothyroxine 175 mcg Oral Q AM   memantine 10 mg Oral BID   methocarbamol 500 mg Oral 4x Daily   metroNIDAZOLE 500 mg Intravenous Q8H   nystatin  Topical Q12H   pantoprazole 40 mg Oral Q AM   sodium chloride 10 mL Intravenous Q12H        Infusions:       PRN Meds:  •  acetaminophen **OR** acetaminophen **OR** acetaminophen  •  magnesium sulfate **OR** magnesium sulfate **OR** magnesium sulfate  •  ondansetron **OR** ondansetron  •  potassium chloride **OR** potassium chloride **OR** potassium chloride  •  sodium chloride  •  sodium chloride  •  traMADol  •  traZODone    Home Meds:  Facility-Administered Medications Prior to Admission   Medication Dose Route Frequency Provider Last Rate Last Dose   • cyanocobalamin injection 1,000 mcg  1,000 mcg Intramuscular Q28 Days Eric Patel MD   1,000 mcg at 11/08/19 1708     Medications Prior to  Admission   Medication Sig Dispense Refill Last Dose   • acetaminophen (TYLENOL) 325 MG tablet Take 650 mg by mouth Every 6 (Six) Hours As Needed for Mild Pain .   Past Week at Unknown time   • amLODIPine (NORVASC) 5 MG tablet Take 1 tablet by mouth Daily for 30 days. 30 tablet 0 12/4/2019 at Unknown time   • aspirin 81 MG EC tablet Take 81 mg by mouth Daily.   12/4/2019 at Unknown time   • bethanechol (URECHOLINE) 10 MG tablet Take 10 mg by mouth 4 (Four) Times a Day.   12/4/2019 at Unknown time   • busPIRone (BUSPAR) 10 MG tablet Take 1 tablet by mouth 2 (Two) Times a Day. 60 tablet 5 12/4/2019 at Unknown time   • Calcium Carbonate-Vit D-Min (CALTRATE PLUS PO) Take 1 tablet by mouth Daily.   12/4/2019 at Unknown time   • Cholecalciferol (VITAMIN D PO) 2000 U qd   Past Month at Unknown time   • diclofenac (VOLTAREN) 1 % gel gel Apply 4 g topically to the appropriate area as directed 4 (Four) Times a Day. 100 g 5 12/4/2019 at Unknown time   • docusate sodium (COLACE) 100 MG capsule Take 100 mg by mouth 2 (Two) Times a Day.   12/4/2019 at Unknown time   • donepezil (ARICEPT) 10 MG tablet Take 1 tablet by mouth Every Night. 30 tablet 5 12/3/2019 at Unknown time   • furosemide (LASIX) 40 MG tablet Take 40 mg by mouth Daily. After lunch   12/3/2019 at Unknown time   • gabapentin (NEURONTIN) 300 MG capsule TAKE ONE CAPSULE BY MOUTH TWICE A DAY 60 capsule 1 12/4/2019 at Unknown time   • GUAIFENESIN 1200 PO Take 2 tablets by mouth 2 (Two) Times a Day.   12/4/2019 at Unknown time   • ipratropium-albuterol (DUO-NEB) 0.5-2.5 mg/3 ml nebulizer Take 3 mL by nebulization 4 (Four) Times a Day As Needed for Shortness of Air. 360 mL 0 Past Month at Unknown time   • levothyroxine (SYNTHROID) 175 MCG tablet Take 1 tablet by mouth Daily. Patient receives medication from patient assistance.  NDC:4391-6006-93 EXP:07/02/2020 LOT [id number] 90 tablet 0 12/4/2019 at Unknown time   • Melatonin 3 MG capsule Take 3 capsules by mouth every night  at bedtime.   12/3/2019 at Unknown time   • memantine (NAMENDA) 10 MG tablet Take 1 tablet by mouth 2 (Two) Times a Day. 60 tablet 5 Past Month at Unknown time   • methocarbamol (ROBAXIN) 500 MG tablet Take 500 mg by mouth 4 (Four) Times a Day.   12/4/2019 at Unknown time   • Multiple Vitamins-Minerals (PRESERVISION AREDS PO) Take 1 tablet by mouth Daily.   12/4/2019 at Unknown time   • ondansetron ODT (ZOFRAN-ODT) 4 MG disintegrating tablet Take 1 tablet by mouth Every 8 (Eight) Hours As Needed for Nausea or Vomiting. 20 tablet 0 Past Week at Unknown time   • pantoprazole (PROTONIX) 40 MG EC tablet Take 1 tablet by mouth Daily for 28 days. 28 tablet 0 12/4/2019 at Unknown time   • potassium chloride (MICRO-K) 10 MEQ CR capsule TAKE TWO CAPSULES BY MOUTH DAILY 180 capsule 2 12/4/2019 at Unknown time   • Probiotic Product (PROBIOTIC-10) capsule Take 1 capsule by mouth Daily.   12/4/2019 at Unknown time   • sucralfate (CARAFATE) 1 g tablet Take 1 tablet by mouth Every 6 (Six) Hours.   12/4/2019 at Unknown time   • traMADol (ULTRAM) 50 MG tablet TAKE ONE TABLET BY MOUTH DAILY AS NEEDED FOR MODERATE PAIN (RIGHT KNEE) 30 tablet 0 Past Week at Unknown time   • traZODone (DESYREL) 25 MG half tablet Take 25 mg by mouth At Night As Needed for Sleep.   Past Week at Unknown time   • Diphenoxylate-Atropine (LOMOTIL PO) Take 2 tablets by mouth As Needed.   More than a month at Unknown time       ROS: Review of Systems   Unable to perform ROS: Dementia   Constitutional: Negative for unexpected weight change.   Respiratory: Negative for shortness of breath.    Cardiovascular: Negative for chest pain.   Gastrointestinal: Positive for blood in stool, diarrhea, nausea and vomiting.   Psychiatric/Behavioral:        Decreased short-term memory       PAST MED HX: Pt  has a past medical history of Disease of thyroid gland, Gastric polyp, History of colonic polyps, Hypertension, IBS (irritable bowel syndrome), Macular degeneration, and  "Torn meniscus.  PAST SURG HX: Pt  has a past surgical history that includes Cholecystectomy (1997); Eye surgery (1998); Hernia repair; Hysterectomy (1976); Tonsillectomy; Knee surgery (Right); and Hip hemiArthroplasty (Left, 9/28/2019).  FAM HX: family history includes Breast cancer in her mother; Diabetes in her son; Hypertension in her father and mother; Obesity in her mother.  SOC HX: Pt  reports that she has never smoked. She has never used smokeless tobacco. Drug use questions deferred to the physician. She reports that she does not drink alcohol.    /68 (BP Location: Left arm, Patient Position: Lying)   Pulse 90   Temp 98.4 °F (36.9 °C) (Oral)   Resp 18   Ht 152.4 cm (60\")   Wt 53.5 kg (118 lb)   LMP  (LMP Unknown)   SpO2 97%   BMI 23.05 kg/m²     Physical Exam  Wt Readings from Last 3 Encounters:   12/04/19 53.5 kg (118 lb)   11/21/19 61.1 kg (134 lb 11.2 oz)   11/08/19 56.7 kg (125 lb)   ,body mass index is 23.05 kg/m².    General Well developed; well nourished; no acute distress.   ENT Good dentition.  Oral mucosa pink & moist without thrush or lesions.    Neck Neck supple; trachea midline. No thyromegaly  Resp CTA; no rhonchi, rales, or wheezes.  Respiration effort normal  CV RRR; ; no M/R/G. No lower extremity edema  GI Abd soft, mild tender LLQ, ND, normal active bowel sounds.  No HSM.  No abd hernia  Skin No rash; no lesions; no bruises.  Skin turgor normal  Musc No clubbing; no cyanosis.    Psych Oriented to time, place, and person.  Appropriate affect      Results Review:   I reviewed the patient's new clinical results.    Lab Results   Component Value Date    WBC 9.05 12/06/2019    HGB 9.3 (L) 12/06/2019    HCT 31.3 (L) 12/06/2019    MCV 84.1 12/06/2019     12/06/2019       Lab Results   Component Value Date    GLUCOSE 88 12/06/2019    BUN 11 12/06/2019    CREATININE 0.53 (L) 12/06/2019    EGFRIFNONA 109 12/06/2019    BCR 20.8 12/06/2019    CO2 30.0 (H) 12/06/2019    CALCIUM 7.9 " (L) 12/06/2019    ALBUMIN 3.50 12/04/2019    AST 15 12/04/2019    ALT 8 12/04/2019       ASSESSMENTS/PLANS    1.  Diarrhea-resolved  2.  Blood in stool  3.  Anemia    Patient currently seems stable.  No diarrhea for the last 2 days.  She does have some mild left lower quadrant tenderness.  This may be ischemic colitis less likely diverticular.  May be infectious with secondary hemorrhoid bleed however she is not had enough stool to collect the specimen.  Has had several colonoscopies in the past.  This point recommended continue to observe.  If bleeding were to continue to worsen would proceed with colonoscopy otherwise watchful waiting.  Would  also begin hemorrhoid suppositories for a week to see if this will help    These findings were discussed with the patient as well as her son Victor M over the phone.        I discussed the patients findings and my recommendations with patient and family    Horacio Mills MD  12/06/19  4:47 PM

## 2019-12-06 NOTE — PROGRESS NOTES
[facility] Medicine Services  PROGRESS NOTE    Patient Name: Temi Jarrett  : 1930  MRN: [id number]    Date of Admission: 2019  Primary Care Physician: Eric Patel MD    Subjective   Subjective     CC:  Rectal bleeding    HPI:   Says she would like to know why she is bleeding.  She previously has seen Dr. Antunez.  Son says she has dementia.  She tells me she is has macular degeneration and cannot see.      Review of Systems    Gen- No fevers, chills  CV- No chest pain, palpitations  Resp- No cough, dyspnea  GI- No N/V/D, abd pain    Objective   Objective     Vital Signs:   Temp:  [98.4 °F (36.9 °C)-98.7 °F (37.1 °C)] 98.4 °F (36.9 °C)  Heart Rate:  [90] 90  Resp:  [18] 18  BP: (118-119)/(68-89) 119/68     Physical Exam:    Constitutional: No acute distress, awake, alert  HENT: NCAT, mucous membranes moist  Respiratory: Clear to auscultation bilaterally, respiratory effort normal   Cardiovascular: RRR, no murmurs, rubs, or gallops, palpable pedal pulses bilaterally  Gastrointestinal: Positive bowel sounds, soft, nontender, nondistended  Musculoskeletal: No bilateral ankle edema  Psychiatric: Appropriate affect, cooperative  Neurologic: Oriented x 3, strength symmetric in all extremities, Cranial Nerves grossly intact to confrontation, speech clear  Skin: No rashes    Results Reviewed:    Results from last 7 days   Lab Units 19  0536 19  0515 19  1201   WBC 10*3/mm3 9.05 15.59* 20.92*   HEMOGLOBIN g/dL 9.3* 9.7* 11.4*   HEMATOCRIT % 31.3* 31.8* 38.3   PLATELETS 10*3/mm3 264 286 389   INR   --   --  1.30*     Results from last 7 days   Lab Units 19  0536 19  0515 19  1246   SODIUM mmol/L 138 137 139   POTASSIUM mmol/L 4.4 3.6 3.5   CHLORIDE mmol/L 100 97* 96*   CO2 mmol/L 30.0* 32.0* 32.0*   BUN mg/dL 11 16 17   CREATININE mg/dL 0.53* 0.66 0.84   GLUCOSE mg/dL 88 75 110*   CALCIUM mg/dL 7.9* 8.0* 9.2   ALT (SGPT) U/L  --   --  8   AST  (SGOT) U/L  --   --  15     Estimated Creatinine Clearance: 40.3 mL/min (A) (by C-G formula based on SCr of 0.53 mg/dL (L)).    Microbiology Results Abnormal     None          I have reviewed the medications:  Scheduled Meds:  aspirin 81 mg Oral Daily   bethanechol 10 mg Oral 4x Daily   busPIRone 10 mg Oral BID   cefTRIAXone 1 g Intravenous Q24H   donepezil 10 mg Oral Nightly   gabapentin 300 mg Oral BID   levothyroxine 175 mcg Oral Q AM   memantine 10 mg Oral BID   methocarbamol 500 mg Oral 4x Daily   metroNIDAZOLE 500 mg Intravenous Q8H   nystatin  Topical Q12H   pantoprazole 40 mg Oral Q AM   sodium chloride 10 mL Intravenous Q12H     Continuous Infusions:   PRN Meds:.•  acetaminophen **OR** acetaminophen **OR** acetaminophen  •  magnesium sulfate **OR** magnesium sulfate **OR** magnesium sulfate  •  ondansetron **OR** ondansetron  •  potassium chloride **OR** potassium chloride **OR** potassium chloride  •  sodium chloride  •  sodium chloride  •  traMADol  •  traZODone    Assessment/Plan   Assessment / Plan     Active Hospital Problems    Diagnosis  POA   • Rectal bleeding [K62.5]  Yes   • Diarrhea [R19.7]  Yes   • Leukocytosis [D72.829]  Yes   • Hyperlipidemia [E78.5]  Yes   • Multi-infarct dementia (CMS/HCC) [F01.50]  Yes   • Benign essential hypertension [I10]  Yes      Resolved Hospital Problems   No resolved problems to display.     Brief Hospital Course to date:  Temi Jarrett is a 89 y.o. female presented from [facility] with bloody diarrhea.      Rectal Bleeding  - fecal occult + on admission  - baseline hemoglobin ~ 11  - hemoglobin in the 9 range today  - on aspirin at baseline  - history of Gastric Polyps, history of hiatal hernia  - history of gastroesophageal varices based on 2014 imaging  Leukocytosis  - improved on IV abx - Flagyl / Ceftriaxone  Diarrhea  - ? bleeding  - history of normal TSH in 2019  - started on IV abx on admission  Dementia  - on Aricept  - on Namenda  Hypothyroidism  -  continue levothyroxine 175 mcg    Long discussion with patient and son / daughter-in-law and ultimately they would like GI evaluation    DVT Prophylaxis:  SCDs    Disposition: I expect the patient to be discharged TBD    CODE STATUS:   Code Status and Medical Interventions:   Ordered at: 12/04/19 1354     Level Of Support Discussed With:    Patient     Code Status:    CPR     Medical Interventions (Level of Support Prior to Arrest):    Full       Electronically signed by Ceferino Robert MD, 12/06/19, 3:21 PM.

## 2019-12-07 PROCEDURE — G0378 HOSPITAL OBSERVATION PER HR: HCPCS

## 2019-12-07 PROCEDURE — 96366 THER/PROPH/DIAG IV INF ADDON: CPT

## 2019-12-07 PROCEDURE — 25010000002 CEFTRIAXONE PER 250 MG: Performed by: INTERNAL MEDICINE

## 2019-12-07 PROCEDURE — 99225 PR SBSQ OBSERVATION CARE/DAY 25 MINUTES: CPT | Performed by: HOSPITALIST

## 2019-12-07 RX ADMIN — MEMANTINE 10 MG: 10 TABLET ORAL at 09:07

## 2019-12-07 RX ADMIN — LEVOTHYROXINE SODIUM 175 MCG: 175 TABLET ORAL at 06:52

## 2019-12-07 RX ADMIN — METRONIDAZOLE 500 MG: 500 INJECTION, SOLUTION INTRAVENOUS at 03:00

## 2019-12-07 RX ADMIN — BUSPIRONE HYDROCHLORIDE 10 MG: 10 TABLET ORAL at 09:07

## 2019-12-07 RX ADMIN — CEFTRIAXONE 1 G: 1 INJECTION, POWDER, FOR SOLUTION INTRAMUSCULAR; INTRAVENOUS at 16:51

## 2019-12-07 RX ADMIN — BETHANECHOL CHLORIDE 10 MG: 10 TABLET ORAL at 11:55

## 2019-12-07 RX ADMIN — NYSTATIN: 100000 POWDER TOPICAL at 09:08

## 2019-12-07 RX ADMIN — GABAPENTIN 300 MG: 300 CAPSULE ORAL at 09:08

## 2019-12-07 RX ADMIN — ASPIRIN 81 MG: 81 TABLET, COATED ORAL at 09:07

## 2019-12-07 RX ADMIN — METRONIDAZOLE 500 MG: 500 INJECTION, SOLUTION INTRAVENOUS at 17:35

## 2019-12-07 RX ADMIN — POLYETHYLENE GLYCOL 3350 17 G: 17 POWDER, FOR SOLUTION ORAL at 21:00

## 2019-12-07 RX ADMIN — METHOCARBAMOL 500 MG: 500 TABLET, FILM COATED ORAL at 20:13

## 2019-12-07 RX ADMIN — BETHANECHOL CHLORIDE 10 MG: 10 TABLET ORAL at 09:07

## 2019-12-07 RX ADMIN — MEMANTINE 10 MG: 10 TABLET ORAL at 20:13

## 2019-12-07 RX ADMIN — METHOCARBAMOL 500 MG: 500 TABLET, FILM COATED ORAL at 09:08

## 2019-12-07 RX ADMIN — GABAPENTIN 300 MG: 300 CAPSULE ORAL at 20:13

## 2019-12-07 RX ADMIN — BETHANECHOL CHLORIDE 10 MG: 10 TABLET ORAL at 20:13

## 2019-12-07 RX ADMIN — HYDROCORTISONE ACETATE 25 MG: 25 SUPPOSITORY RECTAL at 21:00

## 2019-12-07 RX ADMIN — DONEPEZIL HYDROCHLORIDE 10 MG: 10 TABLET, FILM COATED ORAL at 20:13

## 2019-12-07 RX ADMIN — SODIUM CHLORIDE, PRESERVATIVE FREE 10 ML: 5 INJECTION INTRAVENOUS at 09:08

## 2019-12-07 RX ADMIN — METHOCARBAMOL 500 MG: 500 TABLET, FILM COATED ORAL at 17:35

## 2019-12-07 RX ADMIN — PANTOPRAZOLE SODIUM 40 MG: 40 TABLET, DELAYED RELEASE ORAL at 06:52

## 2019-12-07 RX ADMIN — BETHANECHOL CHLORIDE 10 MG: 10 TABLET ORAL at 17:35

## 2019-12-07 RX ADMIN — TRAZODONE HYDROCHLORIDE 25 MG: 50 TABLET ORAL at 01:49

## 2019-12-07 RX ADMIN — BUSPIRONE HYDROCHLORIDE 10 MG: 10 TABLET ORAL at 20:13

## 2019-12-07 RX ADMIN — METRONIDAZOLE 500 MG: 500 INJECTION, SOLUTION INTRAVENOUS at 09:07

## 2019-12-07 RX ADMIN — METHOCARBAMOL 500 MG: 500 TABLET, FILM COATED ORAL at 11:55

## 2019-12-07 RX ADMIN — NYSTATIN: 100000 POWDER TOPICAL at 21:00

## 2019-12-07 RX ADMIN — TRAZODONE HYDROCHLORIDE 25 MG: 50 TABLET ORAL at 22:20

## 2019-12-07 NOTE — PROGRESS NOTES
[facility] Medicine Services  PROGRESS NOTE    Patient Name: Temi Jarrett  : 1930  MRN: [id number]    Date of Admission: 2019  Primary Care Physician: Eric Patel MD    Subjective   Subjective     CC: Rectal bleeding    HPI: No rectal bleeding reported.  She is worried about constipation and would like to start a medicine for bowels.  She has some level of dementia that is unqualified.  Denies abdominal pain.  Denies fevers or chills    Review of Systems    Gen- No fevers, chills  CV- No chest pain, palpitations  Resp- No cough, dyspnea  GI- No N/V/D, abd pain    Objective   Objective     Vital Signs:   Temp:  [98.4 °F (36.9 °C)] 98.4 °F (36.9 °C)  Heart Rate:  [79] 79  Resp:  [18] 18  BP: (106-133)/(67-76) 133/76      Physical Exam:    Constitutional: No acute distress, awake, alert  HENT: NCAT, dry tongue  Respiratory: Clear to auscultation bilaterally, respiratory effort normal   Cardiovascular: RRR, no murmurs, rubs, or gallops, palpable pedal pulses bilaterally  Gastrointestinal: Positive bowel sounds, soft, nontender, nondistended  Musculoskeletal: No bilateral ankle edema  Psychiatric: Appropriate affect, cooperative  Skin: dry skin    Results Reviewed:    Results from last 7 days   Lab Units 19  0536 19  0515 19  1201   WBC 10*3/mm3 9.05 15.59* 20.92*   HEMOGLOBIN g/dL 9.3* 9.7* 11.4*   HEMATOCRIT % 31.3* 31.8* 38.3   PLATELETS 10*3/mm3 264 286 389   INR   --   --  1.30*     Results from last 7 days   Lab Units 19  0536 19  0515 19  1246   SODIUM mmol/L 138 137 139   POTASSIUM mmol/L 4.4 3.6 3.5   CHLORIDE mmol/L 100 97* 96*   CO2 mmol/L 30.0* 32.0* 32.0*   BUN mg/dL 11 16 17   CREATININE mg/dL 0.53* 0.66 0.84   GLUCOSE mg/dL 88 75 110*   CALCIUM mg/dL 7.9* 8.0* 9.2   ALT (SGPT) U/L  --   --  8   AST (SGOT) U/L  --   --  15     Estimated Creatinine Clearance: 40.3 mL/min (A) (by C-G formula based on SCr of 0.53 mg/dL  (L)).    Microbiology Results Abnormal     None          Imaging Results (Last 24 Hours)     ** No results found for the last 24 hours. **               I have reviewed the medications:  Scheduled Meds:  aspirin 81 mg Oral Daily   bethanechol 10 mg Oral 4x Daily   busPIRone 10 mg Oral BID   cefTRIAXone 1 g Intravenous Q24H   donepezil 10 mg Oral Nightly   gabapentin 300 mg Oral BID   hydrocortisone 25 mg Rectal BID   levothyroxine 175 mcg Oral Q AM   memantine 10 mg Oral BID   methocarbamol 500 mg Oral 4x Daily   metroNIDAZOLE 500 mg Intravenous Q8H   nystatin  Topical Q12H   pantoprazole 40 mg Oral Q AM   sodium chloride 10 mL Intravenous Q12H     Continuous Infusions:   PRN Meds:.•  acetaminophen **OR** acetaminophen **OR** acetaminophen  •  magnesium sulfate **OR** magnesium sulfate **OR** magnesium sulfate  •  ondansetron **OR** ondansetron  •  potassium chloride **OR** potassium chloride **OR** potassium chloride  •  sodium chloride  •  sodium chloride  •  traMADol  •  traZODone    Assessment/Plan   Assessment / Plan     Active Hospital Problems    Diagnosis  POA   • Rectal bleeding [K62.5]  Yes   • Diarrhea [R19.7]  Yes   • Leukocytosis [D72.829]  Yes   • Hyperlipidemia [E78.5]  Yes   • Multi-infarct dementia (CMS/HCC) [F01.50]  Yes   • Benign essential hypertension [I10]  Yes      Resolved Hospital Problems   No resolved problems to display.        Brief Hospital Course to date:  Temi Jarrett is a 89 y.o. female admitted with Rectal Bleeding    Rectal Bleeding  -No bleeding reported today  -Observe for now and transfuse as needed  -Check hemoglobin tomorrow  Leukocytosis  -IV Flagyl and IV ceftriaxone  -We will consider discontinuing at discharge  Constipation  -Start MiraLAX twice daily  Dementia  -Namenda 10mg oral twice a day  -Aricept 10 mg at night  Hypothyroidism  -Continue levothyroxine 175 mcg oral daily    She presented with diarrhea and now is worried about constipation.  We will start her on  MiraLAX but needed may need to stop it immediately.  She has a history of irritable bowel syndrome    DVT Prophylaxis: SCDs due to rectal bleeding    Disposition: I expect the patient to be discharged to [facility] tomorrow.    CODE STATUS:   Code Status and Medical Interventions:   Ordered at: 12/04/19 1354     Level Of Support Discussed With:    Patient     Code Status:    CPR     Medical Interventions (Level of Support Prior to Arrest):    Full         Electronically signed by Ceferino Robert MD, 12/07/19, 6:22 PM.

## 2019-12-07 NOTE — PROGRESS NOTES
Case Management Discharge Note      Final Note: Plan is for pt. to dc to Med Unit at [facility]. Family agreeable with arrangements. [facility] to pick pt. up at 1300pm. Left VM for son. Ref. number [id number]. Nurse please call report to [phone]. Please fax DC summary to [phone].          Destination - Selection Complete      Service Provider Request Status Selected Services Address Phone Number Fax Number    [facility] Selected Inpatient Rehabilitation [address] [address] [phone]      Durable Medical Equipment      No service has been selected for the patient.      Dialysis/Infusion      No service has been selected for the patient.      Home Medical Care      No service has been selected for the patient.      Therapy      No service has been selected for the patient.      Community Resources      No service has been selected for the patient.        Transportation Services  Ambulance: [facility](Sched. for  at 1300p on 12/8)    Final Discharge Disposition Code: 62 - inpatient rehab facility

## 2019-12-07 NOTE — PLAN OF CARE
Patient ambulated to bathroom multiple times. Ambulated in fox with walker and standby assist x 1 of family member. Spent most of the day sitting on the chair. No BM today, no c/o pain. Family visiting at the bedside.

## 2019-12-07 NOTE — PLAN OF CARE
Problem: Patient Care Overview  Goal: Plan of Care Review  Outcome: Ongoing (interventions implemented as appropriate)  Flowsheets  Taken 12/7/2019 0750  Progress: improving  Outcome Summary: VSS. Pt has had no signs of rectal bleeding tonight, no bowel movement. Pt has had no complaints of pain during the night. WIll continue to monitor.  Taken 12/6/2019 2107  Plan of Care Reviewed With: patient

## 2019-12-08 VITALS
HEIGHT: 60 IN | BODY MASS INDEX: 23.16 KG/M2 | HEART RATE: 85 BPM | RESPIRATION RATE: 18 BRPM | DIASTOLIC BLOOD PRESSURE: 82 MMHG | TEMPERATURE: 98.2 F | OXYGEN SATURATION: 92 % | WEIGHT: 118 LBS | SYSTOLIC BLOOD PRESSURE: 137 MMHG

## 2019-12-08 PROCEDURE — G0378 HOSPITAL OBSERVATION PER HR: HCPCS

## 2019-12-08 PROCEDURE — 96366 THER/PROPH/DIAG IV INF ADDON: CPT

## 2019-12-08 PROCEDURE — 99217 PR OBSERVATION CARE DISCHARGE MANAGEMENT: CPT | Performed by: NURSE PRACTITIONER

## 2019-12-08 RX ORDER — DOCUSATE SODIUM 100 MG/1
100 CAPSULE, LIQUID FILLED ORAL 2 TIMES DAILY PRN
Status: ON HOLD
Start: 2019-12-08 | End: 2020-08-03

## 2019-12-08 RX ORDER — HYDROCORTISONE ACETATE 25 MG/1
25 SUPPOSITORY RECTAL 2 TIMES DAILY
Qty: 7 SUPPOSITORY | Refills: 0
Start: 2019-12-08 | End: 2019-12-12

## 2019-12-08 RX ORDER — NYSTATIN 100000 [USP'U]/G
POWDER TOPICAL EVERY 12 HOURS SCHEDULED
Start: 2019-12-08 | End: 2020-01-06

## 2019-12-08 RX ADMIN — PANTOPRAZOLE SODIUM 40 MG: 40 TABLET, DELAYED RELEASE ORAL at 06:43

## 2019-12-08 RX ADMIN — METRONIDAZOLE 500 MG: 500 INJECTION, SOLUTION INTRAVENOUS at 08:39

## 2019-12-08 RX ADMIN — POLYETHYLENE GLYCOL 3350 17 G: 17 POWDER, FOR SOLUTION ORAL at 08:37

## 2019-12-08 RX ADMIN — ASPIRIN 81 MG: 81 TABLET, COATED ORAL at 08:38

## 2019-12-08 RX ADMIN — METRONIDAZOLE 500 MG: 500 INJECTION, SOLUTION INTRAVENOUS at 02:00

## 2019-12-08 RX ADMIN — GABAPENTIN 300 MG: 300 CAPSULE ORAL at 08:37

## 2019-12-08 RX ADMIN — NYSTATIN: 100000 POWDER TOPICAL at 08:37

## 2019-12-08 RX ADMIN — HYDROCORTISONE ACETATE 25 MG: 25 SUPPOSITORY RECTAL at 08:37

## 2019-12-08 RX ADMIN — BETHANECHOL CHLORIDE 10 MG: 10 TABLET ORAL at 08:37

## 2019-12-08 RX ADMIN — LEVOTHYROXINE SODIUM 175 MCG: 175 TABLET ORAL at 06:43

## 2019-12-08 RX ADMIN — METHOCARBAMOL 500 MG: 500 TABLET, FILM COATED ORAL at 08:37

## 2019-12-08 RX ADMIN — BUSPIRONE HYDROCHLORIDE 10 MG: 10 TABLET ORAL at 08:37

## 2019-12-08 RX ADMIN — MEMANTINE 10 MG: 10 TABLET ORAL at 08:37

## 2019-12-08 NOTE — DISCHARGE SUMMARY
[facility] Medicine Services  DISCHARGE SUMMARY    Patient Name: Temi Jarrett  : 1930  MRN: [id number]    Date of Admission: 2019 11:12 AM  Date of Discharge:  2019  Primary Care Physician: Eric Patel MD    Consults     Date and Time Order Name Status Description    2019 1304 Inpatient Gastroenterology Consult Completed     2019 0030 Inpatient Pulmonology Consult Completed           Hospital Course     Presenting Problem:   Rectal bleeding [K62.5]  Rectal bleeding [K62.5]    Active Hospital Problems    Diagnosis  POA   • Rectal bleeding [K62.5]  Yes   • Diarrhea [R19.7]  Yes   • Leukocytosis [D72.829]  Yes   • Hyperlipidemia [E78.5]  Yes   • Multi-infarct dementia (CMS/HCC) [F01.50]  Yes   • Benign essential hypertension [I10]  Yes      Resolved Hospital Problems   No resolved problems to display.          Hospital Course:  Temi Jarrett is a 89 y.o. female with history of hypertension, hyperlipidemia, dementia who presents with diarrhea.  Patient was recently admitted from - for sepsis secondary pneumonia and was on IV antibiotics.  She was discharged to [facility] and had been there doing well until last night when she had an episode of profuse diarrhea.  Patient denies any abdominal pain.  She had an additional episode of diarrhea this morning.  Patient reports that she was told that there was blood in her diarrhea; however, patient cannot see her diarrhea secondary to macular degeneration.  Denies any fevers or chills.  Tolerating p.o.  She denies any associated dizziness.  She does report generalized weakness over the past 1 to 2 days encompassing comparison to prior. Patient was admitted to hospital medicine for further evaluation and Gastroenterology was consulted.     Diarrhea and BRBPR with leukocytosis  - this seems to have stopped   - Cdiff and biofire ordered; no results yet since there are no samples to sent  -  empiric  Rocephin and Flagyl pt received 4 days of therapy while in hospital   -- leukocytosis resolved and patient has been afebrile   - request last colonoscopy from Dr. Antunez  - plan Dr Antunez followup after DC.   --GI was consulted and recommended observing for now. No diarrhea since admitted and  Bleeding felt secondary to hemorrhoid bleed.   -- pt was started on anusol suppositories and GI recommends continuing for a week   -- Miralax was started on 12/7 since patient had not had Bm since admit. She will need monitored due to hx of irritable bowel syndrome. Will defer to facility provider.      Some dispute over source of blood  - PT saw blood with mucus on front of gown  - s/p hysterectomy  - rectal exam in ER confirmed rectal bleeding     Hypertension  -Patient was borderline hypotensive on presentation.  Holding home antihypertensives  -- bp has been stable off of medication will defer to PCP or facility provider on restarting if needed.      Dementia  -Continue donepezil, amantadine    Patient has remained clinically stable and will be discharged to rehab today. Patient will need to follow up with PCP one week. Follow up with Dr. Antunez after dc from rehab.     Discharge Follow Up Recommendations for labs/diagnostics:   PCP one week   Follow up with Dr. Antunez after d/c from rehab     Day of Discharge     HPI:   Patient sitting up in bed in NAD eating breakfast. She feels good. Was OOB yesterday. No diarrhea. No acute events overnight per nursing. Plan for rehab today.     Review of Systems  Gen- No fevers, chills  CV- No chest pain, palpitations  Resp- No cough, dyspnea  GI- No N/V/D, abd pain    Otherwise ROS is negative except as mentioned in the HPI.    Vital Signs:   Temp:  [98.2 °F (36.8 °C)-98.5 °F (36.9 °C)] 98.2 °F (36.8 °C)  Heart Rate:  [71-85] 85  Resp:  [18] 18  BP: (125-137)/(68-82) 137/82     Physical Exam:  Constitutional: No acute distress, awake, alert  HENT: NCAT,   Respiratory: Clear to auscultation  bilaterally, respiratory effort normal 2L 95%  Cardiovascular: RRR, no murmurs, rubs, or gallops, palpable pedal pulses bilaterally  Gastrointestinal: Positive bowel sounds, soft, nontender, nondistended  Musculoskeletal: No bilateral ankle edema  Psychiatric: Appropriate affect, cooperative  Skin: warm, dry,    Pertinent  and/or Most Recent Results     Results from last 7 days   Lab Units 12/06/19  0536 12/05/19  0515 12/04/19  1246 12/04/19  1201   WBC 10*3/mm3 9.05 15.59*  --  20.92*   HEMOGLOBIN g/dL 9.3* 9.7*  --  11.4*   HEMATOCRIT % 31.3* 31.8*  --  38.3   PLATELETS 10*3/mm3 264 286  --  389   SODIUM mmol/L 138 137 139  --    POTASSIUM mmol/L 4.4 3.6 3.5  --    CHLORIDE mmol/L 100 97* 96*  --    CO2 mmol/L 30.0* 32.0* 32.0*  --    BUN mg/dL 11 16 17  --    CREATININE mg/dL 0.53* 0.66 0.84  --    GLUCOSE mg/dL 88 75 110*  --    CALCIUM mg/dL 7.9* 8.0* 9.2  --      Results from last 7 days   Lab Units 12/04/19  1246 12/04/19  1201   BILIRUBIN mg/dL 0.6  --    ALK PHOS U/L 88  --    ALT (SGPT) U/L 8  --    AST (SGOT) U/L 15  --    PROTIME Seconds  --  15.6*   INR   --  1.30*   APTT seconds  --  32.2*           Invalid input(s): TG, LDLCALC, LDLREALC        Brief Urine Lab Results  (Last result in the past 365 days)      Color   Clarity   Blood   Leuk Est   Nitrite   Protein   CREAT   Urine HCG        12/05/19 0647 Yellow Clear Negative Negative Negative Negative               Microbiology Results Abnormal     None          Imaging Results (All)     None                           Discharge Details        Discharge Medications      New Medications      Instructions Start Date   hydrocortisone 25 MG suppository  Commonly known as:  ANUSOL-HC   25 mg, Rectal, 2 Times Daily      nystatin 403427 UNIT/GM powder  Commonly known as:  MYCOSTATIN   Topical, Every 12 Hours Scheduled      polyethylene glycol pack packet  Commonly known as:  MIRALAX   17 g, Oral, Daily         Changes to Medications      Instructions Start  Date   docusate sodium 100 MG capsule  Commonly known as:  COLACE  What changed:    · when to take this  · reasons to take this   100 mg, Oral, 2 Times Daily PRN         Continue These Medications      Instructions Start Date   acetaminophen 325 MG tablet  Commonly known as:  TYLENOL   650 mg, Oral, Every 6 Hours PRN      aspirin 81 MG EC tablet   81 mg, Oral, Daily      bethanechol 10 MG tablet  Commonly known as:  URECHOLINE   10 mg, Oral, 4 Times Daily      busPIRone 10 MG tablet  Commonly known as:  BUSPAR   10 mg, Oral, 2 Times Daily      CALTRATE PLUS PO   1 tablet, Oral, Daily      diclofenac 1 % gel gel  Commonly known as:  VOLTAREN   4 g, Topical, 4 Times Daily      donepezil 10 MG tablet  Commonly known as:  ARICEPT   10 mg, Oral, Nightly      furosemide 40 MG tablet  Commonly known as:  LASIX   40 mg, Oral, Daily, After lunch       gabapentin 300 MG capsule  Commonly known as:  NEURONTIN   TAKE ONE CAPSULE BY MOUTH TWICE A DAY      GUAIFENESIN 1200 PO   2 tablets, Oral, 2 Times Daily      ipratropium-albuterol 0.5-2.5 mg/3 ml nebulizer  Commonly known as:  DUO-NEB   3 mL, Nebulization, 4 Times Daily PRN      levothyroxine 175 MCG tablet  Commonly known as:  SYNTHROID   175 mcg, Oral, Daily, Patient receives medication from patient assistance.  NDC:4925-4270-35 EXP:07/02/2020 LOT [id number]      LOMOTIL PO   2 tablets, Oral, As Needed      Melatonin 3 MG capsule   3 capsules, Oral, Every Night at Bedtime      memantine 10 MG tablet  Commonly known as:  NAMENDA   10 mg, Oral, 2 Times Daily      methocarbamol 500 MG tablet  Commonly known as:  ROBAXIN   500 mg, Oral, 4 Times Daily      ondansetron ODT 4 MG disintegrating tablet  Commonly known as:  ZOFRAN-ODT   4 mg, Oral, Every 8 Hours PRN      pantoprazole 40 MG EC tablet  Commonly known as:  PROTONIX   40 mg, Oral, Daily      potassium chloride 10 MEQ CR capsule  Commonly known as:  MICRO-K   TAKE TWO CAPSULES BY MOUTH DAILY      PRESERVISION AREDS PO   1  tablet, Oral, Daily      PROBIOTIC-10 capsule   1 capsule, Oral, Daily      sucralfate 1 g tablet  Commonly known as:  CARAFATE   1 tablet, Oral, Every 6 Hours      traMADol 50 MG tablet  Commonly known as:  ULTRAM   TAKE ONE TABLET BY MOUTH DAILY AS NEEDED FOR MODERATE PAIN (RIGHT KNEE)      traZODone 25 MG half tablet  Commonly known as:  DESYREL   25 mg, Oral, Nightly PRN      VITAMIN D PO   2000 U qd         Stop These Medications    amLODIPine 5 MG tablet  Commonly known as:  NORVASC            Allergies   Allergen Reactions   • Codeine Nausea Only     Trouble Breathing    • Shrimp Hives         Discharge Disposition:  Rehab Facility or Unit (DC - External)    Diet:  Hospital:  Diet Order   Procedures   • Diet Regular; Low Fiber / Low Residue, GI Soft       Activity:  Activity Instructions     Activity as Tolerated      Measure Blood Pressure            Restrictions or Other Recommendations:         CODE STATUS:    Code Status and Medical Interventions:   Ordered at: 12/04/19 1354     Level Of Support Discussed With:    Patient     Code Status:    CPR     Medical Interventions (Level of Support Prior to Arrest):    Full       Future Appointments   Date Time Provider Department Center   12/10/2019  1:45 PM Jyothi Murrell APRN [facility]   12/18/2019  1:30 PM Eric Patel MD [facility]   1/2/2020  3:10 PM Gamal Denny MD [facility] None       Additional Instructions for the Follow-ups that You Need to Schedule     Discharge Follow-up with PCP   As directed       Currently Documented PCP:    Eric Patel MD    PCP Phone Number:    [phone]     Follow Up Details:  pcp one week         Discharge Follow-up with Specified Provider: Dr. Antunez follow up after d/c from rehab   As directed      To:  Dr. Antunez follow up after d/c from rehab               Time Spent on Discharge:  35 minutes    Electronically signed by MAIDA Stapleton, 12/08/19, 8:37 AM.

## 2019-12-14 ENCOUNTER — APPOINTMENT (OUTPATIENT)
Dept: CT IMAGING | Facility: HOSPITAL | Age: 84
End: 2019-12-14

## 2019-12-14 ENCOUNTER — HOSPITAL ENCOUNTER (EMERGENCY)
Facility: HOSPITAL | Age: 84
Discharge: HOME | End: 2019-12-14
Attending: EMERGENCY MEDICINE | Admitting: EMERGENCY MEDICINE

## 2019-12-14 ENCOUNTER — APPOINTMENT (OUTPATIENT)
Dept: GENERAL RADIOLOGY | Facility: HOSPITAL | Age: 84
End: 2019-12-14

## 2019-12-14 VITALS
WEIGHT: 120 LBS | BODY MASS INDEX: 23.56 KG/M2 | HEART RATE: 76 BPM | HEIGHT: 60 IN | SYSTOLIC BLOOD PRESSURE: 149 MMHG | TEMPERATURE: 98.5 F | RESPIRATION RATE: 16 BRPM | OXYGEN SATURATION: 94 % | DIASTOLIC BLOOD PRESSURE: 90 MMHG

## 2019-12-14 DIAGNOSIS — R11.0 NAUSEA: ICD-10-CM

## 2019-12-14 DIAGNOSIS — R07.89 CHEST PRESSURE: Primary | ICD-10-CM

## 2019-12-14 DIAGNOSIS — K44.9 HIATAL HERNIA: ICD-10-CM

## 2019-12-14 LAB
ALBUMIN SERPL-MCNC: 3.5 G/DL (ref 3.5–5.2)
ALBUMIN/GLOB SERPL: 1.3 G/DL
ALP SERPL-CCNC: 66 U/L (ref 39–117)
ALT SERPL W P-5'-P-CCNC: 8 U/L (ref 1–33)
ANION GAP SERPL CALCULATED.3IONS-SCNC: 10 MMOL/L (ref 5–15)
AST SERPL-CCNC: 16 U/L (ref 1–32)
BASOPHILS # BLD AUTO: 0.04 10*3/MM3 (ref 0–0.2)
BASOPHILS NFR BLD AUTO: 0.5 % (ref 0–1.5)
BILIRUB SERPL-MCNC: 0.2 MG/DL (ref 0.2–1.2)
BUN BLD-MCNC: 11 MG/DL (ref 8–23)
BUN/CREAT SERPL: 18.3 (ref 7–25)
CALCIUM SPEC-SCNC: 8.7 MG/DL (ref 8.6–10.5)
CHLORIDE SERPL-SCNC: 99 MMOL/L (ref 98–107)
CO2 SERPL-SCNC: 33 MMOL/L (ref 22–29)
CREAT BLD-MCNC: 0.6 MG/DL (ref 0.57–1)
DEPRECATED RDW RBC AUTO: 48 FL (ref 37–54)
EOSINOPHIL # BLD AUTO: 0.16 10*3/MM3 (ref 0–0.4)
EOSINOPHIL NFR BLD AUTO: 2 % (ref 0.3–6.2)
ERYTHROCYTE [DISTWIDTH] IN BLOOD BY AUTOMATED COUNT: 15.8 % (ref 12.3–15.4)
FUNGUS WND CULT: NORMAL
GFR SERPL CREATININE-BSD FRML MDRD: 94 ML/MIN/1.73
GLOBULIN UR ELPH-MCNC: 2.7 GM/DL
GLUCOSE BLD-MCNC: 101 MG/DL (ref 65–99)
HCT VFR BLD AUTO: 36.5 % (ref 34–46.6)
HGB BLD-MCNC: 10.8 G/DL (ref 12–15.9)
HOLD SPECIMEN: NORMAL
HOLD SPECIMEN: NORMAL
IMM GRANULOCYTES # BLD AUTO: 0.03 10*3/MM3 (ref 0–0.05)
IMM GRANULOCYTES NFR BLD AUTO: 0.4 % (ref 0–0.5)
LIPASE SERPL-CCNC: 54 U/L (ref 13–60)
LYMPHOCYTES # BLD AUTO: 1.31 10*3/MM3 (ref 0.7–3.1)
LYMPHOCYTES NFR BLD AUTO: 16.5 % (ref 19.6–45.3)
MCH RBC QN AUTO: 25 PG (ref 26.6–33)
MCHC RBC AUTO-ENTMCNC: 29.6 G/DL (ref 31.5–35.7)
MCV RBC AUTO: 84.5 FL (ref 79–97)
MONOCYTES # BLD AUTO: 0.46 10*3/MM3 (ref 0.1–0.9)
MONOCYTES NFR BLD AUTO: 5.8 % (ref 5–12)
NEUTROPHILS # BLD AUTO: 5.96 10*3/MM3 (ref 1.7–7)
NEUTROPHILS NFR BLD AUTO: 74.8 % (ref 42.7–76)
NRBC BLD AUTO-RTO: 0 /100 WBC (ref 0–0.2)
NT-PROBNP SERPL-MCNC: 701.3 PG/ML (ref 5–1800)
PLATELET # BLD AUTO: 275 10*3/MM3 (ref 140–450)
PMV BLD AUTO: 9.5 FL (ref 6–12)
POTASSIUM BLD-SCNC: 3.8 MMOL/L (ref 3.5–5.2)
PROT SERPL-MCNC: 6.2 G/DL (ref 6–8.5)
RBC # BLD AUTO: 4.32 10*6/MM3 (ref 3.77–5.28)
SODIUM BLD-SCNC: 142 MMOL/L (ref 136–145)
TROPONIN T SERPL-MCNC: 0.01 NG/ML (ref 0–0.03)
TROPONIN T SERPL-MCNC: 0.02 NG/ML (ref 0–0.03)
WBC NRBC COR # BLD: 7.96 10*3/MM3 (ref 3.4–10.8)
WHOLE BLOOD HOLD SPECIMEN: NORMAL
WHOLE BLOOD HOLD SPECIMEN: NORMAL

## 2019-12-14 PROCEDURE — 84484 ASSAY OF TROPONIN QUANT: CPT | Performed by: EMERGENCY MEDICINE

## 2019-12-14 PROCEDURE — 93005 ELECTROCARDIOGRAM TRACING: CPT | Performed by: PHYSICIAN ASSISTANT

## 2019-12-14 PROCEDURE — 83690 ASSAY OF LIPASE: CPT | Performed by: EMERGENCY MEDICINE

## 2019-12-14 PROCEDURE — 83880 ASSAY OF NATRIURETIC PEPTIDE: CPT | Performed by: EMERGENCY MEDICINE

## 2019-12-14 PROCEDURE — 25010000002 ONDANSETRON PER 1 MG: Performed by: PHYSICIAN ASSISTANT

## 2019-12-14 PROCEDURE — 25010000002 PROMETHAZINE PER 50 MG: Performed by: PHYSICIAN ASSISTANT

## 2019-12-14 PROCEDURE — 99284 EMERGENCY DEPT VISIT MOD MDM: CPT

## 2019-12-14 PROCEDURE — 80053 COMPREHEN METABOLIC PANEL: CPT | Performed by: EMERGENCY MEDICINE

## 2019-12-14 PROCEDURE — 84484 ASSAY OF TROPONIN QUANT: CPT | Performed by: PHYSICIAN ASSISTANT

## 2019-12-14 PROCEDURE — 96374 THER/PROPH/DIAG INJ IV PUSH: CPT

## 2019-12-14 PROCEDURE — 85025 COMPLETE CBC W/AUTO DIFF WBC: CPT | Performed by: EMERGENCY MEDICINE

## 2019-12-14 PROCEDURE — 93005 ELECTROCARDIOGRAM TRACING: CPT | Performed by: EMERGENCY MEDICINE

## 2019-12-14 PROCEDURE — 74176 CT ABD & PELVIS W/O CONTRAST: CPT

## 2019-12-14 PROCEDURE — 71045 X-RAY EXAM CHEST 1 VIEW: CPT

## 2019-12-14 PROCEDURE — 96375 TX/PRO/DX INJ NEW DRUG ADDON: CPT

## 2019-12-14 RX ORDER — ALUMINA, MAGNESIA, AND SIMETHICONE 2400; 2400; 240 MG/30ML; MG/30ML; MG/30ML
15 SUSPENSION ORAL ONCE
Status: COMPLETED | OUTPATIENT
Start: 2019-12-14 | End: 2019-12-14

## 2019-12-14 RX ORDER — FAMOTIDINE 10 MG/ML
20 INJECTION, SOLUTION INTRAVENOUS ONCE
Status: COMPLETED | OUTPATIENT
Start: 2019-12-14 | End: 2019-12-14

## 2019-12-14 RX ORDER — LIDOCAINE HYDROCHLORIDE 20 MG/ML
15 SOLUTION OROPHARYNGEAL ONCE
Status: COMPLETED | OUTPATIENT
Start: 2019-12-14 | End: 2019-12-14

## 2019-12-14 RX ORDER — PROMETHAZINE HYDROCHLORIDE 25 MG/ML
6.25 INJECTION, SOLUTION INTRAMUSCULAR; INTRAVENOUS ONCE
Status: COMPLETED | OUTPATIENT
Start: 2019-12-14 | End: 2019-12-14

## 2019-12-14 RX ORDER — SODIUM CHLORIDE 0.9 % (FLUSH) 0.9 %
10 SYRINGE (ML) INJECTION AS NEEDED
Status: DISCONTINUED | OUTPATIENT
Start: 2019-12-14 | End: 2019-12-15 | Stop reason: HOSPADM

## 2019-12-14 RX ORDER — ONDANSETRON 2 MG/ML
4 INJECTION INTRAMUSCULAR; INTRAVENOUS ONCE
Status: COMPLETED | OUTPATIENT
Start: 2019-12-14 | End: 2019-12-14

## 2019-12-14 RX ADMIN — PROMETHAZINE HYDROCHLORIDE 6.25 MG: 25 INJECTION INTRAMUSCULAR; INTRAVENOUS at 21:49

## 2019-12-14 RX ADMIN — LIDOCAINE HYDROCHLORIDE 15 ML: 20 SOLUTION ORAL; TOPICAL at 21:21

## 2019-12-14 RX ADMIN — FAMOTIDINE 20 MG: 10 INJECTION, SOLUTION INTRAVENOUS at 19:36

## 2019-12-14 RX ADMIN — ALUMINUM HYDROXIDE, MAGNESIUM HYDROXIDE, AND DIMETHICONE 15 ML: 400; 400; 40 SUSPENSION ORAL at 21:21

## 2019-12-14 RX ADMIN — ONDANSETRON 4 MG: 2 INJECTION INTRAMUSCULAR; INTRAVENOUS at 21:21

## 2019-12-14 NOTE — ED PROVIDER NOTES
Subjective   Temi Jarrett is a 89 y.o. female who presents to the ED with complaints of intermittent chest discomfort for the past 6 hours. She states that the chest discomfort started shortly after she ate lunch today. She states it feels like indigestion. She reports that she ate baked chicken tenders, carrots, mashed potatoes, brownies and canned peaches. Subsequently, the patient states that she became nauseous. She also complains of constipation over the past 3-4 days and abdominal bloating. However, she denies any diarrhea, abdominal pain, shortness of breath, cough, fever, chills, dysuria, or difficulty urinating. The patient reports that she has had similar episode of discomfort secondary to her hiatal hernia. Her family reports that the patient has been at [facility] for rehab due to fractured left hip September 2019. Her stay in [facility] was extended when she developed pneumonia and then bloody stool. She has a history of HTN, hiatal hernia, thyroid disease, IBS, colonic polyps, and macular degeneration. Her past surgical history includes a cholecystectomy, hernia repair, hysterectomy, and knee arthroscopy. Her PCP is Dr. Patel. There are no other acute complaints at this time. She has been followed by GI Dr. Antunez in the past for hiatal hernia.      History provided by:  Patient  Chest Pain   Pain location:  Epigastric  Pain quality comment:  Discomfort  Pain radiates to:  Does not radiate  Pain severity:  Mild  Onset quality:  Sudden  Duration:  6 hours  Timing:  Intermittent  Progression:  Unchanged  Chronicity:  Recurrent  Context: eating    Relieved by:  None tried  Worsened by:  Nothing  Ineffective treatments:  None tried  Associated symptoms: nausea    Associated symptoms: no abdominal pain, no back pain, no cough, no dizziness, no dysphagia, no fever, no headache, no numbness, no shortness of breath, no vomiting and no weakness    Risk factors: hypertension    Risk factors: not male         Review of Systems   Constitutional: Negative for chills and fever.   HENT: Negative for congestion, ear pain, sore throat and trouble swallowing.    Eyes: Negative for pain, redness and visual disturbance.   Respiratory: Negative for cough, chest tightness and shortness of breath.    Cardiovascular: Positive for chest pain (discomfort). Negative for leg swelling.   Gastrointestinal: Positive for nausea. Negative for abdominal pain, constipation, diarrhea and vomiting.        +abdominal bloating   Genitourinary: Negative for difficulty urinating, dysuria, flank pain, hematuria and vaginal bleeding.   Musculoskeletal: Negative for arthralgias, back pain and joint swelling.   Skin: Negative for rash and wound.   Neurological: Negative for dizziness, syncope, speech difficulty, weakness, numbness and headaches.   Psychiatric/Behavioral: Negative for confusion.   All other systems reviewed and are negative.      Past Medical History:   Diagnosis Date   • Disease of thyroid gland    • Gastric polyp    • History of colonic polyps    • Hypertension    • IBS (irritable bowel syndrome)    • Macular degeneration    • Torn meniscus     right knee        Allergies   Allergen Reactions   • Codeine Nausea Only     Trouble Breathing    • Shrimp Hives       Past Surgical History:   Procedure Laterality Date   • CHOLECYSTECTOMY  1997   • EYE SURGERY  1998    Cataract extraction   • HERNIA REPAIR     • HIP HEMIARTHROPLASTY Left 9/28/2019    Procedure: HIP HEMIARTHROPLASTY LEFT;  Surgeon: Reddy Segundo Jr., MD;  Location: [facility];  Service: Orthopedics   • HYSTERECTOMY  1976   • KNEE SURGERY Right     arthroscopy- 2000   • TONSILLECTOMY         Family History   Problem Relation Age of Onset   • Hypertension Mother    • Breast cancer Mother    • Obesity Mother    • Hypertension Father    • Diabetes Son        Social History     Socioeconomic History   • Marital status:      Spouse name: Not on file   • Number of  children: Not on file   • Years of education: Not on file   • Highest education level: Not on file   Tobacco Use   • Smoking status: Never Smoker   • Smokeless tobacco: Never Used   Substance and Sexual Activity   • Alcohol use: No   • Drug use: Defer   • Sexual activity: Defer   Social History Narrative    Lives with son and daughter in law--  is at [place] getting rehab         Objective   Physical Exam   Constitutional: She is oriented to person, place, and time. She appears well-developed and well-nourished.   HENT:   Head: Normocephalic and atraumatic.   Nose: Nose normal.   Eyes: Conjunctivae are normal. No scleral icterus.   Neck: Normal range of motion. Neck supple.   Cardiovascular: Normal rate, regular rhythm and normal heart sounds.   No murmur heard.  Pulmonary/Chest: Effort normal and breath sounds normal. No respiratory distress.   Abdominal: Soft. Bowel sounds are normal. She exhibits no distension. There is tenderness in the epigastric area.   Mild epigastric tenderness. No distension. Normal bowel sounds.   Musculoskeletal: Normal range of motion.   Neurological: She is alert and oriented to person, place, and time.   Skin: Skin is warm and dry.   Psychiatric: She has a normal mood and affect. Her behavior is normal.   Nursing note and vitals reviewed.      Procedures         ED Course      Re-examined patient several times in ED. Pt anxious and family states this is a recurrent issue with her hiatal hernia and concern she is not taking her Carafate as prescribed 4 times daily and is only taking at night. She is supposed to eat 6 small meals per day and ate too much at lunch. Pt's sx improved after Zofran, GI cocktail, and Pepcid in ED. EKG x2 and Trop x2 WNL. Pt will be discharged back to [facility] and f/u with GI outpatient. She will return to ED if new or worse sx.     Reviewed old records.     Recent Results (from the past 24 hour(s))   Troponin    Collection Time: 12/14/19  6:35 PM    Result Value Ref Range    Troponin T 0.016 0.000 - 0.030 ng/mL   Comprehensive Metabolic Panel    Collection Time: 12/14/19  6:35 PM   Result Value Ref Range    Glucose 101 (H) 65 - 99 mg/dL    BUN 11 8 - 23 mg/dL    Creatinine 0.60 0.57 - 1.00 mg/dL    Sodium 142 136 - 145 mmol/L    Potassium 3.8 3.5 - 5.2 mmol/L    Chloride 99 98 - 107 mmol/L    CO2 33.0 (H) 22.0 - 29.0 mmol/L    Calcium 8.7 8.6 - 10.5 mg/dL    Total Protein 6.2 6.0 - 8.5 g/dL    Albumin 3.50 3.50 - 5.20 g/dL    ALT (SGPT) 8 1 - 33 U/L    AST (SGOT) 16 1 - 32 U/L    Alkaline Phosphatase 66 39 - 117 U/L    Total Bilirubin 0.2 0.2 - 1.2 mg/dL    eGFR Non African Amer 94 >60 mL/min/1.73    Globulin 2.7 gm/dL    A/G Ratio 1.3 g/dL    BUN/Creatinine Ratio 18.3 7.0 - 25.0    Anion Gap 10.0 5.0 - 15.0 mmol/L   Lipase    Collection Time: 12/14/19  6:35 PM   Result Value Ref Range    Lipase 54 13 - 60 U/L   BNP    Collection Time: 12/14/19  6:35 PM   Result Value Ref Range    proBNP 701.3 5.0-1,800.0 pg/mL   Light Blue Top    Collection Time: 12/14/19  6:35 PM   Result Value Ref Range    Extra Tube hold for add-on    Green Top (Gel)    Collection Time: 12/14/19  6:35 PM   Result Value Ref Range    Extra Tube Hold for add-ons.    Lavender Top    Collection Time: 12/14/19  6:35 PM   Result Value Ref Range    Extra Tube hold for add-on    Gold Top - SST    Collection Time: 12/14/19  6:35 PM   Result Value Ref Range    Extra Tube Hold for add-ons.    CBC Auto Differential    Collection Time: 12/14/19  6:35 PM   Result Value Ref Range    WBC 7.96 3.40 - 10.80 10*3/mm3    RBC 4.32 3.77 - 5.28 10*6/mm3    Hemoglobin 10.8 (L) 12.0 - 15.9 g/dL    Hematocrit 36.5 34.0 - 46.6 %    MCV 84.5 79.0 - 97.0 fL    MCH 25.0 (L) 26.6 - 33.0 pg    MCHC 29.6 (L) 31.5 - 35.7 g/dL    RDW 15.8 (H) 12.3 - 15.4 %    RDW-SD 48.0 37.0 - 54.0 fl    MPV 9.5 6.0 - 12.0 fL    Platelets 275 140 - 450 10*3/mm3    Neutrophil % 74.8 42.7 - 76.0 %    Lymphocyte % 16.5 (L) 19.6 - 45.3 %     "Monocyte % 5.8 5.0 - 12.0 %    Eosinophil % 2.0 0.3 - 6.2 %    Basophil % 0.5 0.0 - 1.5 %    Immature Grans % 0.4 0.0 - 0.5 %    Neutrophils, Absolute 5.96 1.70 - 7.00 10*3/mm3    Lymphocytes, Absolute 1.31 0.70 - 3.10 10*3/mm3    Monocytes, Absolute 0.46 0.10 - 0.90 10*3/mm3    Eosinophils, Absolute 0.16 0.00 - 0.40 10*3/mm3    Basophils, Absolute 0.04 0.00 - 0.20 10*3/mm3    Immature Grans, Absolute 0.03 0.00 - 0.05 10*3/mm3    nRBC 0.0 0.0 - 0.2 /100 WBC   Troponin    Collection Time: 12/14/19  9:04 PM   Result Value Ref Range    Troponin T 0.012 0.000 - 0.030 ng/mL     Note: In addition to lab results from this visit, the labs listed above may include labs taken at another facility or during a different encounter within the last 24 hours. Please correlate lab times with ED admission and discharge times for further clarification of the services performed during this visit.    CT Abdomen Pelvis Without Contrast   Final Result   There is a very large hiatal hernia containing most of the stomach and the stomach is significantly distended with fluid.      Small bowel is not distended.      Prior cholecystectomy and hysterectomy      Simple renal cysts.               Signer Name: Vikram Sánchez MD    Signed: 12/14/2019 7:45 PM    Workstation Name: [facility]     [facility]      XR Chest 1 View   Preliminary Result   Increased heart size with mild increased markings at the   lung bases, small bilateral pleural effusions and increased pulmonary   vascularity bilaterally.                Vitals:    12/14/19 1815 12/14/19 1937 12/14/19 2030   BP: 141/79 111/75 133/83   Patient Position: Lying     Pulse: 71 74 77   Resp: 16 16 16   Temp: 98.5 °F (36.9 °C)     TempSrc: Oral     SpO2: 94% 93% 93%   Weight: 54.4 kg (120 lb)     Height: 152.4 cm (60\")       Medications   sodium chloride 0.9 % flush 10 mL (has no administration in time range)   famotidine (PEPCID) injection 20 mg (20 mg Intravenous Given " 12/14/19 1936)   ondansetron (ZOFRAN) injection 4 mg (4 mg Intravenous Given 12/14/19 2121)   aluminum-magnesium hydroxide-simethicone (MAALOX MAX) 400-400-40 MG/5ML suspension 15 mL (15 mL Oral Given 12/14/19 2121)   Lidocaine Viscous HCl (XYLOCAINE) 2 % mouth solution 15 mL (15 mL Mouth/Throat Given 12/14/19 2121)   promethazine (PHENERGAN) injection 6.25 mg (6.25 mg Intravenous Given 12/14/19 2149)     ECG/EMG Results (last 24 hours)     Procedure Component Value Units Date/Time    ECG 12 Lead [[id number]] Collected:  12/14/19 1838     Updated:  12/14/19 1837        ECG 12 Lead   Final Result   Test Reason : UPPER ABDOMINAL PAIN   Blood Pressure : **/** mmHG   Vent. Rate : 072 BPM     Atrial Rate : 072 BPM      P-R Int : 168 ms          QRS Dur : 078 ms       QT Int : 376 ms       P-R-T Axes : 037 028 -14 degrees      QTc Int : 411 ms      Normal sinus rhythm   Possible Left atrial enlargement   Nonspecific T wave abnormality   Abnormal ECG   When compared with ECG of 14-DEC-2019 18:38,   premature atrial complexes are no longer present   Confirmed by RAMON MONTIEL MD ([id number]) on 12/14/2019 9:15:42 PM      Referred By:  SONAL DELGADO           Confirmed By:RAMON MONTIEL MD      ECG 12 Lead   Final Result   Test Reason : chest pain   Blood Pressure : **/** mmHG   Vent. Rate : 074 BPM     Atrial Rate : 074 BPM      P-R Int : 164 ms          QRS Dur : 078 ms       QT Int : 398 ms       P-R-T Axes : 012 020 -18 degrees      QTc Int : 441 ms      Sinus rhythm with premature atrial complexes   Possible Left atrial enlargement   Nonspecific T wave abnormality   Abnormal ECG   When compared with ECG of 20-NOV-2019 12:45,   premature atrial complexes are now present   Confirmed by RAMON MONTIEL MD ([id number]) on 12/14/2019 7:11:11 PM      Referred By:  SONAL DELGADO           Confirmed By:RAMON MONTIEL MD                        [facility]    Final diagnoses:   Chest pressure   Hiatal hernia   Nausea       Documentation assistance provided by abril  Ana Hardy.  Information recorded by the scribe was done at my direction and has been verified and validated by me.     Ana Hardy  12/14/19 1915       Clara Mares PA  12/14/19 1774

## 2019-12-15 NOTE — DISCHARGE INSTRUCTIONS
TAKE CARAFATE AS PRESCRIBED INSTEAD OF JUST IN THE EVENING PER PATIENT'S REPORT    POSSIBLY TRY GAS EX

## 2019-12-18 ENCOUNTER — APPOINTMENT (OUTPATIENT)
Dept: CT IMAGING | Facility: HOSPITAL | Age: 84
End: 2019-12-18

## 2019-12-18 ENCOUNTER — HOSPITAL ENCOUNTER (INPATIENT)
Facility: HOSPITAL | Age: 84
LOS: 6 days | Discharge: HOME HEALTH | End: 2019-12-24
Attending: EMERGENCY MEDICINE | Admitting: INTERNAL MEDICINE

## 2019-12-18 DIAGNOSIS — N39.0 UTI DUE TO KLEBSIELLA SPECIES: ICD-10-CM

## 2019-12-18 DIAGNOSIS — A41.9 SEPSIS, DUE TO UNSPECIFIED ORGANISM, UNSPECIFIED WHETHER ACUTE ORGAN DYSFUNCTION PRESENT (HCC): Primary | ICD-10-CM

## 2019-12-18 DIAGNOSIS — Z78.9 IMPAIRED MOBILITY AND ADLS: ICD-10-CM

## 2019-12-18 DIAGNOSIS — J96.01 ACUTE RESPIRATORY FAILURE WITH HYPOXIA (HCC): ICD-10-CM

## 2019-12-18 DIAGNOSIS — R07.89 CHEST PAIN, ATYPICAL: ICD-10-CM

## 2019-12-18 DIAGNOSIS — M17.0 PRIMARY OSTEOARTHRITIS OF BOTH KNEES: ICD-10-CM

## 2019-12-18 DIAGNOSIS — R19.7 DIARRHEA, UNSPECIFIED TYPE: ICD-10-CM

## 2019-12-18 DIAGNOSIS — I87.2 PERIPHERAL VENOUS INSUFFICIENCY: ICD-10-CM

## 2019-12-18 DIAGNOSIS — B96.89 UTI DUE TO KLEBSIELLA SPECIES: ICD-10-CM

## 2019-12-18 DIAGNOSIS — Z74.09 IMPAIRED MOBILITY AND ADLS: ICD-10-CM

## 2019-12-18 DIAGNOSIS — M54.50 ACUTE RIGHT-SIDED LOW BACK PAIN WITHOUT SCIATICA: ICD-10-CM

## 2019-12-18 LAB
ALBUMIN SERPL-MCNC: 3.7 G/DL (ref 3.5–5.2)
ALBUMIN/GLOB SERPL: 1.2 G/DL
ALP SERPL-CCNC: 76 U/L (ref 39–117)
ALT SERPL W P-5'-P-CCNC: 10 U/L (ref 1–33)
ANION GAP SERPL CALCULATED.3IONS-SCNC: 15 MMOL/L (ref 5–15)
AST SERPL-CCNC: 25 U/L (ref 1–32)
BACTERIA UR QL AUTO: ABNORMAL /HPF
BASOPHILS # BLD MANUAL: 0 10*3/MM3 (ref 0–0.2)
BASOPHILS NFR BLD AUTO: 0 % (ref 0–1.5)
BILIRUB SERPL-MCNC: 0.5 MG/DL (ref 0.2–1.2)
BILIRUB UR QL STRIP: ABNORMAL
BUN BLD-MCNC: 25 MG/DL (ref 8–23)
BUN/CREAT SERPL: 20 (ref 7–25)
CALCIUM SPEC-SCNC: 9.6 MG/DL (ref 8.6–10.5)
CHLORIDE SERPL-SCNC: 93 MMOL/L (ref 98–107)
CLARITY UR: ABNORMAL
CO2 SERPL-SCNC: 28 MMOL/L (ref 22–29)
COLOR UR: ABNORMAL
CREAT BLD-MCNC: 1.25 MG/DL (ref 0.57–1)
D-LACTATE SERPL-SCNC: 2.8 MMOL/L (ref 0.5–2)
D-LACTATE SERPL-SCNC: 4 MMOL/L (ref 0.5–2)
DEPRECATED RDW RBC AUTO: 48.6 FL (ref 37–54)
EOSINOPHIL # BLD MANUAL: 0 10*3/MM3 (ref 0–0.4)
EOSINOPHIL NFR BLD MANUAL: 0 % (ref 0.3–6.2)
ERYTHROCYTE [DISTWIDTH] IN BLOOD BY AUTOMATED COUNT: 16.4 % (ref 12.3–15.4)
GFR SERPL CREATININE-BSD FRML MDRD: 40 ML/MIN/1.73
GLOBULIN UR ELPH-MCNC: 3.2 GM/DL
GLUCOSE BLD-MCNC: 117 MG/DL (ref 65–99)
GLUCOSE UR STRIP-MCNC: NEGATIVE MG/DL
HCT VFR BLD AUTO: 41.3 % (ref 34–46.6)
HGB BLD-MCNC: 12.3 G/DL (ref 12–15.9)
HGB UR QL STRIP.AUTO: NEGATIVE
HOLD SPECIMEN: NORMAL
HYALINE CASTS UR QL AUTO: ABNORMAL /LPF
KETONES UR QL STRIP: ABNORMAL
LEUKOCYTE ESTERASE UR QL STRIP.AUTO: ABNORMAL
LYMPHOCYTES # BLD MANUAL: 2.09 10*3/MM3 (ref 0.7–3.1)
LYMPHOCYTES NFR BLD MANUAL: 3 % (ref 5–12)
LYMPHOCYTES NFR BLD MANUAL: 7 % (ref 19.6–45.3)
MCH RBC QN AUTO: 25 PG (ref 26.6–33)
MCHC RBC AUTO-ENTMCNC: 29.8 G/DL (ref 31.5–35.7)
MCV RBC AUTO: 83.9 FL (ref 79–97)
MONOCYTES # BLD AUTO: 0.89 10*3/MM3 (ref 0.1–0.9)
NEUTROPHILS # BLD AUTO: 26.85 10*3/MM3 (ref 1.7–7)
NEUTROPHILS NFR BLD MANUAL: 68 % (ref 42.7–76)
NEUTS BAND NFR BLD MANUAL: 22 % (ref 0–5)
NITRITE UR QL STRIP: POSITIVE
PH UR STRIP.AUTO: <=5 [PH] (ref 5–8)
PLAT MORPH BLD: NORMAL
PLATELET # BLD AUTO: 369 10*3/MM3 (ref 140–450)
PMV BLD AUTO: 9.7 FL (ref 6–12)
POTASSIUM BLD-SCNC: 3.7 MMOL/L (ref 3.5–5.2)
PROCALCITONIN SERPL-MCNC: 1.52 NG/ML (ref 0.1–0.25)
PROT SERPL-MCNC: 6.9 G/DL (ref 6–8.5)
PROT UR QL STRIP: ABNORMAL
RBC # BLD AUTO: 4.92 10*6/MM3 (ref 3.77–5.28)
RBC # UR: ABNORMAL /HPF
RBC MORPH BLD: NORMAL
REF LAB TEST METHOD: ABNORMAL
SODIUM BLD-SCNC: 136 MMOL/L (ref 136–145)
SP GR UR STRIP: 1.03 (ref 1–1.03)
SQUAMOUS #/AREA URNS HPF: ABNORMAL /HPF
UROBILINOGEN UR QL STRIP: ABNORMAL
WBC MORPH BLD: NORMAL
WBC NRBC COR # BLD: 29.83 10*3/MM3 (ref 3.4–10.8)
WBC UR QL AUTO: ABNORMAL /HPF
YEAST URNS QL MICRO: ABNORMAL /HPF

## 2019-12-18 PROCEDURE — 83605 ASSAY OF LACTIC ACID: CPT | Performed by: EMERGENCY MEDICINE

## 2019-12-18 PROCEDURE — 84145 PROCALCITONIN (PCT): CPT | Performed by: EMERGENCY MEDICINE

## 2019-12-18 PROCEDURE — 25010000002 PIPERACILLIN SOD-TAZOBACTAM PER 1 G: Performed by: HOSPITALIST

## 2019-12-18 PROCEDURE — 85025 COMPLETE CBC W/AUTO DIFF WBC: CPT | Performed by: EMERGENCY MEDICINE

## 2019-12-18 PROCEDURE — 99223 1ST HOSP IP/OBS HIGH 75: CPT | Performed by: HOSPITALIST

## 2019-12-18 PROCEDURE — 81001 URINALYSIS AUTO W/SCOPE: CPT | Performed by: EMERGENCY MEDICINE

## 2019-12-18 PROCEDURE — 87086 URINE CULTURE/COLONY COUNT: CPT | Performed by: INTERNAL MEDICINE

## 2019-12-18 PROCEDURE — 87040 BLOOD CULTURE FOR BACTERIA: CPT | Performed by: EMERGENCY MEDICINE

## 2019-12-18 PROCEDURE — 74176 CT ABD & PELVIS W/O CONTRAST: CPT

## 2019-12-18 PROCEDURE — 25010000002 PIPERACILLIN SOD-TAZOBACTAM PER 1 G: Performed by: EMERGENCY MEDICINE

## 2019-12-18 PROCEDURE — 99284 EMERGENCY DEPT VISIT MOD MDM: CPT

## 2019-12-18 PROCEDURE — 85007 BL SMEAR W/DIFF WBC COUNT: CPT | Performed by: EMERGENCY MEDICINE

## 2019-12-18 PROCEDURE — 80053 COMPREHEN METABOLIC PANEL: CPT | Performed by: EMERGENCY MEDICINE

## 2019-12-18 PROCEDURE — 25010000002 VANCOMYCIN PER 500 MG: Performed by: EMERGENCY MEDICINE

## 2019-12-18 PROCEDURE — 25010000002 HEPARIN (PORCINE) PER 1000 UNITS: Performed by: HOSPITALIST

## 2019-12-18 RX ORDER — SACCHAROMYCES BOULARDII 250 MG
250 CAPSULE ORAL 2 TIMES DAILY
Status: DISCONTINUED | OUTPATIENT
Start: 2019-12-18 | End: 2019-12-24 | Stop reason: HOSPADM

## 2019-12-18 RX ORDER — SENNOSIDES 8.6 MG
2 TABLET ORAL NIGHTLY
COMMUNITY
End: 2020-01-06

## 2019-12-18 RX ORDER — UREA 10 %
1 LOTION (ML) TOPICAL DAILY
COMMUNITY
End: 2022-05-27

## 2019-12-18 RX ORDER — AMOXICILLIN 250 MG
2 CAPSULE ORAL NIGHTLY PRN
Status: DISCONTINUED | OUTPATIENT
Start: 2019-12-18 | End: 2019-12-24 | Stop reason: HOSPADM

## 2019-12-18 RX ORDER — SODIUM CHLORIDE 9 MG/ML
125 INJECTION, SOLUTION INTRAVENOUS CONTINUOUS
Status: DISCONTINUED | OUTPATIENT
Start: 2019-12-18 | End: 2019-12-20

## 2019-12-18 RX ORDER — SODIUM CHLORIDE 0.9 % (FLUSH) 0.9 %
10 SYRINGE (ML) INJECTION AS NEEDED
Status: DISCONTINUED | OUTPATIENT
Start: 2019-12-18 | End: 2019-12-24 | Stop reason: HOSPADM

## 2019-12-18 RX ORDER — ALBUTEROL SULFATE 2.5 MG/3ML
2.5 SOLUTION RESPIRATORY (INHALATION)
COMMUNITY
End: 2020-01-06

## 2019-12-18 RX ORDER — VANCOMYCIN HYDROCHLORIDE 1 G/200ML
20 INJECTION, SOLUTION INTRAVENOUS ONCE
Status: DISCONTINUED | OUTPATIENT
Start: 2019-12-18 | End: 2019-12-18 | Stop reason: SDUPTHER

## 2019-12-18 RX ORDER — HEPARIN SODIUM 5000 [USP'U]/ML
5000 INJECTION, SOLUTION INTRAVENOUS; SUBCUTANEOUS EVERY 8 HOURS SCHEDULED
Status: DISCONTINUED | OUTPATIENT
Start: 2019-12-18 | End: 2019-12-24 | Stop reason: HOSPADM

## 2019-12-18 RX ORDER — LISINOPRIL 20 MG/1
20 TABLET ORAL 2 TIMES DAILY
COMMUNITY
End: 2020-07-08

## 2019-12-18 RX ORDER — VANCOMYCIN HYDROCHLORIDE 1 G/200ML
20 INJECTION, SOLUTION INTRAVENOUS ONCE
Status: COMPLETED | OUTPATIENT
Start: 2019-12-18 | End: 2019-12-18

## 2019-12-18 RX ORDER — SERTRALINE HYDROCHLORIDE 100 MG/1
100 TABLET, FILM COATED ORAL DAILY
Status: ON HOLD | COMMUNITY
End: 2020-08-03

## 2019-12-18 RX ADMIN — SODIUM CHLORIDE 75 ML/HR: 9 INJECTION, SOLUTION INTRAVENOUS at 20:35

## 2019-12-18 RX ADMIN — TAZOBACTAM SODIUM AND PIPERACILLIN SODIUM 3.38 G: 375; 3 INJECTION, SOLUTION INTRAVENOUS at 23:50

## 2019-12-18 RX ADMIN — TAZOBACTAM SODIUM AND PIPERACILLIN SODIUM 3.38 G: 375; 3 INJECTION, SOLUTION INTRAVENOUS at 15:41

## 2019-12-18 RX ADMIN — HEPARIN SODIUM 5000 UNITS: 5000 INJECTION INTRAVENOUS; SUBCUTANEOUS at 23:50

## 2019-12-18 RX ADMIN — SODIUM CHLORIDE 1000 ML: 9 INJECTION, SOLUTION INTRAVENOUS at 15:44

## 2019-12-18 RX ADMIN — Medication 250 MG: at 23:50

## 2019-12-18 RX ADMIN — VANCOMYCIN HYDROCHLORIDE 1000 MG: 1 INJECTION, SOLUTION INTRAVENOUS at 15:50

## 2019-12-19 PROBLEM — R79.89 LACTIC ACID BLOOD INCREASED: Status: RESOLVED | Noted: 2019-11-12 | Resolved: 2019-12-19

## 2019-12-19 LAB
ANION GAP SERPL CALCULATED.3IONS-SCNC: 10 MMOL/L (ref 5–15)
BASOPHILS # BLD AUTO: 0.08 10*3/MM3 (ref 0–0.2)
BASOPHILS NFR BLD AUTO: 0.6 % (ref 0–1.5)
BUN BLD-MCNC: 29 MG/DL (ref 8–23)
BUN/CREAT SERPL: 26.9 (ref 7–25)
CALCIUM SPEC-SCNC: 8.1 MG/DL (ref 8.6–10.5)
CHLORIDE SERPL-SCNC: 98 MMOL/L (ref 98–107)
CLUMPED PLATELETS: PRESENT
CO2 SERPL-SCNC: 27 MMOL/L (ref 22–29)
CREAT BLD-MCNC: 1.08 MG/DL (ref 0.57–1)
D-LACTATE SERPL-SCNC: 1.3 MMOL/L (ref 0.5–2)
D-LACTATE SERPL-SCNC: 1.4 MMOL/L (ref 0.5–2)
D-LACTATE SERPL-SCNC: 1.9 MMOL/L (ref 0.5–2)
DEPRECATED RDW RBC AUTO: 50.3 FL (ref 37–54)
EOSINOPHIL # BLD AUTO: 0.08 10*3/MM3 (ref 0–0.4)
EOSINOPHIL NFR BLD AUTO: 0.6 % (ref 0.3–6.2)
ERYTHROCYTE [DISTWIDTH] IN BLOOD BY AUTOMATED COUNT: 16.9 % (ref 12.3–15.4)
GFR SERPL CREATININE-BSD FRML MDRD: 48 ML/MIN/1.73
GLUCOSE BLD-MCNC: 81 MG/DL (ref 65–99)
HCT VFR BLD AUTO: 34.8 % (ref 34–46.6)
HGB BLD-MCNC: 10.3 G/DL (ref 12–15.9)
IMM GRANULOCYTES # BLD AUTO: 0.06 10*3/MM3 (ref 0–0.05)
IMM GRANULOCYTES NFR BLD AUTO: 0.5 % (ref 0–0.5)
LYMPHOCYTES # BLD AUTO: 1.13 10*3/MM3 (ref 0.7–3.1)
LYMPHOCYTES NFR BLD AUTO: 8.5 % (ref 19.6–45.3)
MCH RBC QN AUTO: 25.1 PG (ref 26.6–33)
MCHC RBC AUTO-ENTMCNC: 29.6 G/DL (ref 31.5–35.7)
MCV RBC AUTO: 84.9 FL (ref 79–97)
MONOCYTES # BLD AUTO: 0.6 10*3/MM3 (ref 0.1–0.9)
MONOCYTES NFR BLD AUTO: 4.5 % (ref 5–12)
NEUTROPHILS # BLD AUTO: 11.32 10*3/MM3 (ref 1.7–7)
NEUTROPHILS NFR BLD AUTO: 85.3 % (ref 42.7–76)
NRBC BLD AUTO-RTO: 0 /100 WBC (ref 0–0.2)
PLATELET # BLD AUTO: 258 10*3/MM3 (ref 140–450)
PMV BLD AUTO: 10.7 FL (ref 6–12)
POTASSIUM BLD-SCNC: 4.4 MMOL/L (ref 3.5–5.2)
RBC # BLD AUTO: 4.1 10*6/MM3 (ref 3.77–5.28)
RBC MORPH BLD: NORMAL
SODIUM BLD-SCNC: 135 MMOL/L (ref 136–145)
VANCOMYCIN SERPL-MCNC: 10.7 MCG/ML (ref 5–40)
WBC MORPH BLD: NORMAL
WBC NRBC COR # BLD: 13.27 10*3/MM3 (ref 3.4–10.8)

## 2019-12-19 PROCEDURE — 80202 ASSAY OF VANCOMYCIN: CPT

## 2019-12-19 PROCEDURE — 97162 PT EVAL MOD COMPLEX 30 MIN: CPT

## 2019-12-19 PROCEDURE — 85025 COMPLETE CBC W/AUTO DIFF WBC: CPT | Performed by: HOSPITALIST

## 2019-12-19 PROCEDURE — 97535 SELF CARE MNGMENT TRAINING: CPT

## 2019-12-19 PROCEDURE — 94799 UNLISTED PULMONARY SVC/PX: CPT

## 2019-12-19 PROCEDURE — 99233 SBSQ HOSP IP/OBS HIGH 50: CPT | Performed by: INTERNAL MEDICINE

## 2019-12-19 PROCEDURE — 63710000001 DIPHENHYDRAMINE PER 50 MG: Performed by: INTERNAL MEDICINE

## 2019-12-19 PROCEDURE — 97165 OT EVAL LOW COMPLEX 30 MIN: CPT

## 2019-12-19 PROCEDURE — 83605 ASSAY OF LACTIC ACID: CPT | Performed by: HOSPITALIST

## 2019-12-19 PROCEDURE — 25010000002 HEPARIN (PORCINE) PER 1000 UNITS: Performed by: HOSPITALIST

## 2019-12-19 PROCEDURE — 25010000002 PIPERACILLIN SOD-TAZOBACTAM PER 1 G: Performed by: HOSPITALIST

## 2019-12-19 PROCEDURE — 80048 BASIC METABOLIC PNL TOTAL CA: CPT | Performed by: HOSPITALIST

## 2019-12-19 PROCEDURE — 85007 BL SMEAR W/DIFF WBC COUNT: CPT | Performed by: HOSPITALIST

## 2019-12-19 RX ORDER — NYSTATIN 100000 [USP'U]/G
POWDER TOPICAL EVERY 12 HOURS SCHEDULED
Status: DISCONTINUED | OUTPATIENT
Start: 2019-12-19 | End: 2019-12-24 | Stop reason: HOSPADM

## 2019-12-19 RX ORDER — ALBUTEROL SULFATE 2.5 MG/3ML
2.5 SOLUTION RESPIRATORY (INHALATION)
Status: DISCONTINUED | OUTPATIENT
Start: 2019-12-19 | End: 2019-12-24 | Stop reason: HOSPADM

## 2019-12-19 RX ORDER — TRAZODONE HYDROCHLORIDE 50 MG/1
25 TABLET ORAL NIGHTLY PRN
Status: DISCONTINUED | OUTPATIENT
Start: 2019-12-19 | End: 2019-12-24 | Stop reason: HOSPADM

## 2019-12-19 RX ORDER — BETHANECHOL CHLORIDE 10 MG/1
10 TABLET ORAL 4 TIMES DAILY
Status: DISCONTINUED | OUTPATIENT
Start: 2019-12-19 | End: 2019-12-24 | Stop reason: HOSPADM

## 2019-12-19 RX ORDER — ASPIRIN 81 MG/1
81 TABLET ORAL DAILY
Status: DISCONTINUED | OUTPATIENT
Start: 2019-12-19 | End: 2019-12-24 | Stop reason: HOSPADM

## 2019-12-19 RX ORDER — IPRATROPIUM BROMIDE AND ALBUTEROL SULFATE 2.5; .5 MG/3ML; MG/3ML
3 SOLUTION RESPIRATORY (INHALATION) 4 TIMES DAILY PRN
Status: DISCONTINUED | OUTPATIENT
Start: 2019-12-19 | End: 2019-12-24 | Stop reason: HOSPADM

## 2019-12-19 RX ORDER — MULTIVIT AND MINERALS-FERROUS GLUCONATE 9 MG IRON/15 ML ORAL LIQUID 9 MG/15 ML
15 LIQUID (ML) ORAL DAILY
Status: DISCONTINUED | OUTPATIENT
Start: 2019-12-19 | End: 2019-12-24 | Stop reason: HOSPADM

## 2019-12-19 RX ORDER — DOCUSATE SODIUM 100 MG/1
100 CAPSULE, LIQUID FILLED ORAL 2 TIMES DAILY PRN
Status: DISCONTINUED | OUTPATIENT
Start: 2019-12-19 | End: 2019-12-24 | Stop reason: HOSPADM

## 2019-12-19 RX ORDER — PANTOPRAZOLE SODIUM 40 MG/1
40 TABLET, DELAYED RELEASE ORAL DAILY
Status: DISCONTINUED | OUTPATIENT
Start: 2019-12-19 | End: 2019-12-24 | Stop reason: HOSPADM

## 2019-12-19 RX ORDER — DONEPEZIL HYDROCHLORIDE 10 MG/1
10 TABLET, FILM COATED ORAL NIGHTLY
Status: DISCONTINUED | OUTPATIENT
Start: 2019-12-19 | End: 2019-12-24 | Stop reason: HOSPADM

## 2019-12-19 RX ORDER — TRAMADOL HYDROCHLORIDE 50 MG/1
25 TABLET ORAL EVERY 12 HOURS PRN
Status: DISCONTINUED | OUTPATIENT
Start: 2019-12-19 | End: 2019-12-24 | Stop reason: HOSPADM

## 2019-12-19 RX ORDER — MEMANTINE HYDROCHLORIDE 10 MG/1
10 TABLET ORAL 2 TIMES DAILY
Status: DISCONTINUED | OUTPATIENT
Start: 2019-12-19 | End: 2019-12-24 | Stop reason: HOSPADM

## 2019-12-19 RX ORDER — DIPHENHYDRAMINE HCL 25 MG
25 CAPSULE ORAL EVERY 6 HOURS PRN
Status: DISCONTINUED | OUTPATIENT
Start: 2019-12-19 | End: 2019-12-24 | Stop reason: HOSPADM

## 2019-12-19 RX ORDER — BUSPIRONE HYDROCHLORIDE 10 MG/1
10 TABLET ORAL 2 TIMES DAILY
Status: DISCONTINUED | OUTPATIENT
Start: 2019-12-19 | End: 2019-12-24 | Stop reason: HOSPADM

## 2019-12-19 RX ORDER — GABAPENTIN 300 MG/1
300 CAPSULE ORAL 2 TIMES DAILY
Status: DISCONTINUED | OUTPATIENT
Start: 2019-12-19 | End: 2019-12-24 | Stop reason: HOSPADM

## 2019-12-19 RX ORDER — SERTRALINE HYDROCHLORIDE 100 MG/1
100 TABLET, FILM COATED ORAL DAILY
Status: DISCONTINUED | OUTPATIENT
Start: 2019-12-19 | End: 2019-12-24 | Stop reason: HOSPADM

## 2019-12-19 RX ADMIN — HEPARIN SODIUM 5000 UNITS: 5000 INJECTION INTRAVENOUS; SUBCUTANEOUS at 06:24

## 2019-12-19 RX ADMIN — DONEPEZIL HYDROCHLORIDE 10 MG: 5 TABLET ORAL at 20:56

## 2019-12-19 RX ADMIN — ALBUTEROL SULFATE 2.5 MG: 2.5 SOLUTION RESPIRATORY (INHALATION) at 12:49

## 2019-12-19 RX ADMIN — BUSPIRONE HYDROCHLORIDE 10 MG: 10 TABLET ORAL at 12:56

## 2019-12-19 RX ADMIN — NYSTATIN: 100000 POWDER TOPICAL at 20:57

## 2019-12-19 RX ADMIN — Medication 250 MG: at 09:08

## 2019-12-19 RX ADMIN — GABAPENTIN 300 MG: 300 CAPSULE ORAL at 13:06

## 2019-12-19 RX ADMIN — TAZOBACTAM SODIUM AND PIPERACILLIN SODIUM 3.38 G: 375; 3 INJECTION, SOLUTION INTRAVENOUS at 13:14

## 2019-12-19 RX ADMIN — NYSTATIN: 100000 POWDER TOPICAL at 13:08

## 2019-12-19 RX ADMIN — MEMANTINE 10 MG: 10 TABLET ORAL at 20:56

## 2019-12-19 RX ADMIN — TRAZODONE HYDROCHLORIDE 25 MG: 50 TABLET ORAL at 22:30

## 2019-12-19 RX ADMIN — ASPIRIN 81 MG: 81 TABLET, COATED ORAL at 12:58

## 2019-12-19 RX ADMIN — SERTRALINE HYDROCHLORIDE 100 MG: 100 TABLET ORAL at 13:06

## 2019-12-19 RX ADMIN — ALBUTEROL SULFATE 2.5 MG: 2.5 SOLUTION RESPIRATORY (INHALATION) at 19:45

## 2019-12-19 RX ADMIN — MEMANTINE 10 MG: 10 TABLET ORAL at 12:57

## 2019-12-19 RX ADMIN — SODIUM CHLORIDE 75 ML/HR: 9 INJECTION, SOLUTION INTRAVENOUS at 09:31

## 2019-12-19 RX ADMIN — TAZOBACTAM SODIUM AND PIPERACILLIN SODIUM 3.38 G: 375; 3 INJECTION, SOLUTION INTRAVENOUS at 21:00

## 2019-12-19 RX ADMIN — HEPARIN SODIUM 5000 UNITS: 5000 INJECTION INTRAVENOUS; SUBCUTANEOUS at 21:03

## 2019-12-19 RX ADMIN — DIPHENHYDRAMINE HYDROCHLORIDE 25 MG: 25 CAPSULE ORAL at 18:33

## 2019-12-19 RX ADMIN — LEVOTHYROXINE SODIUM 175 MCG: 125 TABLET ORAL at 13:07

## 2019-12-19 RX ADMIN — BUSPIRONE HYDROCHLORIDE 10 MG: 10 TABLET ORAL at 21:03

## 2019-12-19 RX ADMIN — BETHANECHOL CHLORIDE 10 MG: 10 TABLET ORAL at 13:07

## 2019-12-19 RX ADMIN — SODIUM CHLORIDE 500 ML: 9 INJECTION, SOLUTION INTRAVENOUS at 07:03

## 2019-12-19 RX ADMIN — BETHANECHOL CHLORIDE 10 MG: 10 TABLET ORAL at 20:56

## 2019-12-19 RX ADMIN — TAZOBACTAM SODIUM AND PIPERACILLIN SODIUM 3.38 G: 375; 3 INJECTION, SOLUTION INTRAVENOUS at 06:24

## 2019-12-19 RX ADMIN — MULTIVITAMIN 15 ML: LIQUID ORAL at 13:08

## 2019-12-19 RX ADMIN — PANTOPRAZOLE SODIUM 40 MG: 40 TABLET, DELAYED RELEASE ORAL at 13:07

## 2019-12-19 RX ADMIN — SODIUM CHLORIDE 125 ML/HR: 9 INJECTION, SOLUTION INTRAVENOUS at 18:36

## 2019-12-19 RX ADMIN — BETHANECHOL CHLORIDE 10 MG: 10 TABLET ORAL at 17:53

## 2019-12-19 RX ADMIN — Medication 250 MG: at 20:56

## 2019-12-19 RX ADMIN — GABAPENTIN 300 MG: 300 CAPSULE ORAL at 21:03

## 2019-12-20 LAB
ANION GAP SERPL CALCULATED.3IONS-SCNC: 9 MMOL/L (ref 5–15)
BACTERIA SPEC AEROBE CULT: ABNORMAL
BUN BLD-MCNC: 18 MG/DL (ref 8–23)
BUN/CREAT SERPL: 26.5 (ref 7–25)
CALCIUM SPEC-SCNC: 7.5 MG/DL (ref 8.6–10.5)
CHLORIDE SERPL-SCNC: 109 MMOL/L (ref 98–107)
CO2 SERPL-SCNC: 26 MMOL/L (ref 22–29)
CREAT BLD-MCNC: 0.68 MG/DL (ref 0.57–1)
D-LACTATE SERPL-SCNC: 1.1 MMOL/L (ref 0.5–2)
D-LACTATE SERPL-SCNC: 1.1 MMOL/L (ref 0.5–2)
DEPRECATED RDW RBC AUTO: 51.1 FL (ref 37–54)
ERYTHROCYTE [DISTWIDTH] IN BLOOD BY AUTOMATED COUNT: 16.7 % (ref 12.3–15.4)
GFR SERPL CREATININE-BSD FRML MDRD: 81 ML/MIN/1.73
GLUCOSE BLD-MCNC: 92 MG/DL (ref 65–99)
HCT VFR BLD AUTO: 28.5 % (ref 34–46.6)
HGB BLD-MCNC: 8.5 G/DL (ref 12–15.9)
MCH RBC QN AUTO: 25.4 PG (ref 26.6–33)
MCHC RBC AUTO-ENTMCNC: 29.8 G/DL (ref 31.5–35.7)
MCV RBC AUTO: 85.3 FL (ref 79–97)
PLATELET # BLD AUTO: 230 10*3/MM3 (ref 140–450)
PMV BLD AUTO: 9.2 FL (ref 6–12)
POTASSIUM BLD-SCNC: 3.6 MMOL/L (ref 3.5–5.2)
RBC # BLD AUTO: 3.34 10*6/MM3 (ref 3.77–5.28)
SODIUM BLD-SCNC: 144 MMOL/L (ref 136–145)
WBC NRBC COR # BLD: 6.05 10*3/MM3 (ref 3.4–10.8)

## 2019-12-20 PROCEDURE — 83605 ASSAY OF LACTIC ACID: CPT | Performed by: HOSPITALIST

## 2019-12-20 PROCEDURE — 99232 SBSQ HOSP IP/OBS MODERATE 35: CPT | Performed by: INTERNAL MEDICINE

## 2019-12-20 PROCEDURE — 97116 GAIT TRAINING THERAPY: CPT

## 2019-12-20 PROCEDURE — 97530 THERAPEUTIC ACTIVITIES: CPT

## 2019-12-20 PROCEDURE — 80048 BASIC METABOLIC PNL TOTAL CA: CPT | Performed by: INTERNAL MEDICINE

## 2019-12-20 PROCEDURE — 63710000001 DIPHENHYDRAMINE PER 50 MG: Performed by: INTERNAL MEDICINE

## 2019-12-20 PROCEDURE — 85027 COMPLETE CBC AUTOMATED: CPT | Performed by: INTERNAL MEDICINE

## 2019-12-20 PROCEDURE — 25010000002 HEPARIN (PORCINE) PER 1000 UNITS: Performed by: HOSPITALIST

## 2019-12-20 PROCEDURE — 94799 UNLISTED PULMONARY SVC/PX: CPT

## 2019-12-20 PROCEDURE — 25010000002 ONDANSETRON PER 1 MG: Performed by: INTERNAL MEDICINE

## 2019-12-20 PROCEDURE — 25010000002 PIPERACILLIN SOD-TAZOBACTAM PER 1 G: Performed by: HOSPITALIST

## 2019-12-20 RX ORDER — SUCRALFATE 1 G/1
1 TABLET ORAL
Status: DISCONTINUED | OUTPATIENT
Start: 2019-12-20 | End: 2019-12-24 | Stop reason: HOSPADM

## 2019-12-20 RX ORDER — ONDANSETRON 2 MG/ML
4 INJECTION INTRAMUSCULAR; INTRAVENOUS EVERY 6 HOURS PRN
Status: DISCONTINUED | OUTPATIENT
Start: 2019-12-20 | End: 2019-12-24 | Stop reason: HOSPADM

## 2019-12-20 RX ADMIN — ONDANSETRON 4 MG: 2 INJECTION INTRAMUSCULAR; INTRAVENOUS at 15:47

## 2019-12-20 RX ADMIN — BETHANECHOL CHLORIDE 10 MG: 10 TABLET ORAL at 21:37

## 2019-12-20 RX ADMIN — MEMANTINE 10 MG: 10 TABLET ORAL at 08:42

## 2019-12-20 RX ADMIN — GABAPENTIN 300 MG: 300 CAPSULE ORAL at 08:42

## 2019-12-20 RX ADMIN — BETHANECHOL CHLORIDE 10 MG: 10 TABLET ORAL at 17:36

## 2019-12-20 RX ADMIN — BETHANECHOL CHLORIDE 10 MG: 10 TABLET ORAL at 08:42

## 2019-12-20 RX ADMIN — SUCRALFATE 1 G: 1 TABLET ORAL at 21:37

## 2019-12-20 RX ADMIN — BUSPIRONE HYDROCHLORIDE 10 MG: 10 TABLET ORAL at 08:42

## 2019-12-20 RX ADMIN — DONEPEZIL HYDROCHLORIDE 10 MG: 5 TABLET ORAL at 21:37

## 2019-12-20 RX ADMIN — SUCRALFATE 1 G: 1 TABLET ORAL at 17:36

## 2019-12-20 RX ADMIN — SODIUM CHLORIDE 125 ML/HR: 9 INJECTION, SOLUTION INTRAVENOUS at 06:16

## 2019-12-20 RX ADMIN — HEPARIN SODIUM 5000 UNITS: 5000 INJECTION INTRAVENOUS; SUBCUTANEOUS at 06:16

## 2019-12-20 RX ADMIN — ALBUTEROL SULFATE 2.5 MG: 2.5 SOLUTION RESPIRATORY (INHALATION) at 19:55

## 2019-12-20 RX ADMIN — SERTRALINE HYDROCHLORIDE 100 MG: 100 TABLET ORAL at 08:42

## 2019-12-20 RX ADMIN — HEPARIN SODIUM 5000 UNITS: 5000 INJECTION INTRAVENOUS; SUBCUTANEOUS at 14:43

## 2019-12-20 RX ADMIN — TAZOBACTAM SODIUM AND PIPERACILLIN SODIUM 3.38 G: 375; 3 INJECTION, SOLUTION INTRAVENOUS at 21:41

## 2019-12-20 RX ADMIN — ASPIRIN 81 MG: 81 TABLET, COATED ORAL at 08:42

## 2019-12-20 RX ADMIN — NYSTATIN 1 APPLICATION: 100000 POWDER TOPICAL at 21:38

## 2019-12-20 RX ADMIN — LEVOTHYROXINE SODIUM 175 MCG: 125 TABLET ORAL at 06:16

## 2019-12-20 RX ADMIN — BETHANECHOL CHLORIDE 10 MG: 10 TABLET ORAL at 11:50

## 2019-12-20 RX ADMIN — Medication 250 MG: at 08:42

## 2019-12-20 RX ADMIN — DIPHENHYDRAMINE HYDROCHLORIDE 25 MG: 25 CAPSULE ORAL at 00:42

## 2019-12-20 RX ADMIN — GABAPENTIN 300 MG: 300 CAPSULE ORAL at 21:37

## 2019-12-20 RX ADMIN — MEMANTINE 10 MG: 10 TABLET ORAL at 21:37

## 2019-12-20 RX ADMIN — TAZOBACTAM SODIUM AND PIPERACILLIN SODIUM 3.38 G: 375; 3 INJECTION, SOLUTION INTRAVENOUS at 06:16

## 2019-12-20 RX ADMIN — HEPARIN SODIUM 5000 UNITS: 5000 INJECTION INTRAVENOUS; SUBCUTANEOUS at 21:38

## 2019-12-20 RX ADMIN — NYSTATIN: 100000 POWDER TOPICAL at 08:41

## 2019-12-20 RX ADMIN — PANTOPRAZOLE SODIUM 40 MG: 40 TABLET, DELAYED RELEASE ORAL at 08:42

## 2019-12-20 RX ADMIN — BUSPIRONE HYDROCHLORIDE 10 MG: 10 TABLET ORAL at 21:37

## 2019-12-20 RX ADMIN — ALBUTEROL SULFATE 2.5 MG: 2.5 SOLUTION RESPIRATORY (INHALATION) at 08:09

## 2019-12-20 RX ADMIN — Medication 250 MG: at 21:37

## 2019-12-20 RX ADMIN — TAZOBACTAM SODIUM AND PIPERACILLIN SODIUM 3.38 G: 375; 3 INJECTION, SOLUTION INTRAVENOUS at 14:42

## 2019-12-21 LAB
ANION GAP SERPL CALCULATED.3IONS-SCNC: 9 MMOL/L (ref 5–15)
BUN BLD-MCNC: 9 MG/DL (ref 8–23)
BUN/CREAT SERPL: 19.6 (ref 7–25)
CALCIUM SPEC-SCNC: 7.8 MG/DL (ref 8.6–10.5)
CHLORIDE SERPL-SCNC: 103 MMOL/L (ref 98–107)
CO2 SERPL-SCNC: 29 MMOL/L (ref 22–29)
CREAT BLD-MCNC: 0.46 MG/DL (ref 0.57–1)
GFR SERPL CREATININE-BSD FRML MDRD: 128 ML/MIN/1.73
GLUCOSE BLD-MCNC: 88 MG/DL (ref 65–99)
POTASSIUM BLD-SCNC: 3.1 MMOL/L (ref 3.5–5.2)
POTASSIUM BLD-SCNC: 3.6 MMOL/L (ref 3.5–5.2)
SODIUM BLD-SCNC: 141 MMOL/L (ref 136–145)

## 2019-12-21 PROCEDURE — 25010000002 PIPERACILLIN SOD-TAZOBACTAM PER 1 G: Performed by: HOSPITALIST

## 2019-12-21 PROCEDURE — 99232 SBSQ HOSP IP/OBS MODERATE 35: CPT | Performed by: INTERNAL MEDICINE

## 2019-12-21 PROCEDURE — 97110 THERAPEUTIC EXERCISES: CPT

## 2019-12-21 PROCEDURE — 80048 BASIC METABOLIC PNL TOTAL CA: CPT | Performed by: INTERNAL MEDICINE

## 2019-12-21 PROCEDURE — 94799 UNLISTED PULMONARY SVC/PX: CPT

## 2019-12-21 PROCEDURE — 97116 GAIT TRAINING THERAPY: CPT

## 2019-12-21 PROCEDURE — 84132 ASSAY OF SERUM POTASSIUM: CPT

## 2019-12-21 PROCEDURE — 25010000002 HEPARIN (PORCINE) PER 1000 UNITS: Performed by: HOSPITALIST

## 2019-12-21 RX ORDER — POTASSIUM CHLORIDE 750 MG/1
40 CAPSULE, EXTENDED RELEASE ORAL ONCE
Status: COMPLETED | OUTPATIENT
Start: 2019-12-21 | End: 2019-12-21

## 2019-12-21 RX ADMIN — SERTRALINE HYDROCHLORIDE 100 MG: 100 TABLET ORAL at 09:21

## 2019-12-21 RX ADMIN — DONEPEZIL HYDROCHLORIDE 10 MG: 5 TABLET ORAL at 20:46

## 2019-12-21 RX ADMIN — DOCUSATE SODIUM 100 MG: 100 CAPSULE, LIQUID FILLED ORAL at 22:15

## 2019-12-21 RX ADMIN — Medication 250 MG: at 09:22

## 2019-12-21 RX ADMIN — BETHANECHOL CHLORIDE 10 MG: 10 TABLET ORAL at 17:42

## 2019-12-21 RX ADMIN — SUCRALFATE 1 G: 1 TABLET ORAL at 06:40

## 2019-12-21 RX ADMIN — MEMANTINE 10 MG: 10 TABLET ORAL at 20:46

## 2019-12-21 RX ADMIN — NYSTATIN: 100000 POWDER TOPICAL at 09:23

## 2019-12-21 RX ADMIN — MEMANTINE 10 MG: 10 TABLET ORAL at 09:21

## 2019-12-21 RX ADMIN — BUSPIRONE HYDROCHLORIDE 10 MG: 10 TABLET ORAL at 20:46

## 2019-12-21 RX ADMIN — BETHANECHOL CHLORIDE 10 MG: 10 TABLET ORAL at 09:21

## 2019-12-21 RX ADMIN — LEVOTHYROXINE SODIUM 175 MCG: 125 TABLET ORAL at 06:40

## 2019-12-21 RX ADMIN — POTASSIUM CHLORIDE 40 MEQ: 750 CAPSULE, EXTENDED RELEASE ORAL at 17:26

## 2019-12-21 RX ADMIN — HEPARIN SODIUM 5000 UNITS: 5000 INJECTION INTRAVENOUS; SUBCUTANEOUS at 06:39

## 2019-12-21 RX ADMIN — TRAZODONE HYDROCHLORIDE 25 MG: 50 TABLET ORAL at 01:36

## 2019-12-21 RX ADMIN — GABAPENTIN 300 MG: 300 CAPSULE ORAL at 20:46

## 2019-12-21 RX ADMIN — SUCRALFATE 1 G: 1 TABLET ORAL at 17:26

## 2019-12-21 RX ADMIN — GABAPENTIN 300 MG: 300 CAPSULE ORAL at 09:21

## 2019-12-21 RX ADMIN — BUSPIRONE HYDROCHLORIDE 10 MG: 10 TABLET ORAL at 09:22

## 2019-12-21 RX ADMIN — HEPARIN SODIUM 5000 UNITS: 5000 INJECTION INTRAVENOUS; SUBCUTANEOUS at 20:46

## 2019-12-21 RX ADMIN — TAZOBACTAM SODIUM AND PIPERACILLIN SODIUM 3.38 G: 375; 3 INJECTION, SOLUTION INTRAVENOUS at 06:40

## 2019-12-21 RX ADMIN — SUCRALFATE 1 G: 1 TABLET ORAL at 20:46

## 2019-12-21 RX ADMIN — ALBUTEROL SULFATE 2.5 MG: 2.5 SOLUTION RESPIRATORY (INHALATION) at 21:33

## 2019-12-21 RX ADMIN — PANTOPRAZOLE SODIUM 40 MG: 40 TABLET, DELAYED RELEASE ORAL at 09:21

## 2019-12-21 RX ADMIN — SUCRALFATE 1 G: 1 TABLET ORAL at 12:38

## 2019-12-21 RX ADMIN — BETHANECHOL CHLORIDE 10 MG: 10 TABLET ORAL at 12:38

## 2019-12-21 RX ADMIN — BETHANECHOL CHLORIDE 10 MG: 10 TABLET ORAL at 20:46

## 2019-12-21 RX ADMIN — ASPIRIN 81 MG: 81 TABLET, COATED ORAL at 09:22

## 2019-12-21 RX ADMIN — SENNOSIDES AND DOCUSATE SODIUM 2 TABLET: 8.6; 5 TABLET ORAL at 22:15

## 2019-12-21 RX ADMIN — HEPARIN SODIUM 5000 UNITS: 5000 INJECTION INTRAVENOUS; SUBCUTANEOUS at 14:52

## 2019-12-21 RX ADMIN — ALBUTEROL SULFATE 2.5 MG: 2.5 SOLUTION RESPIRATORY (INHALATION) at 07:40

## 2019-12-21 RX ADMIN — TAZOBACTAM SODIUM AND PIPERACILLIN SODIUM 3.38 G: 375; 3 INJECTION, SOLUTION INTRAVENOUS at 14:52

## 2019-12-21 RX ADMIN — Medication 250 MG: at 20:46

## 2019-12-22 LAB
ANION GAP SERPL CALCULATED.3IONS-SCNC: 9 MMOL/L (ref 5–15)
BUN BLD-MCNC: 10 MG/DL (ref 8–23)
BUN/CREAT SERPL: 15.2 (ref 7–25)
CALCIUM SPEC-SCNC: 8.2 MG/DL (ref 8.6–10.5)
CHLORIDE SERPL-SCNC: 103 MMOL/L (ref 98–107)
CO2 SERPL-SCNC: 31 MMOL/L (ref 22–29)
CREAT BLD-MCNC: 0.66 MG/DL (ref 0.57–1)
GFR SERPL CREATININE-BSD FRML MDRD: 84 ML/MIN/1.73
GLUCOSE BLD-MCNC: 88 MG/DL (ref 65–99)
POTASSIUM BLD-SCNC: 3.7 MMOL/L (ref 3.5–5.2)
SODIUM BLD-SCNC: 143 MMOL/L (ref 136–145)

## 2019-12-22 PROCEDURE — 25010000002 PIPERACILLIN SOD-TAZOBACTAM PER 1 G: Performed by: INTERNAL MEDICINE

## 2019-12-22 PROCEDURE — 94799 UNLISTED PULMONARY SVC/PX: CPT

## 2019-12-22 PROCEDURE — 99231 SBSQ HOSP IP/OBS SF/LOW 25: CPT | Performed by: INTERNAL MEDICINE

## 2019-12-22 PROCEDURE — 25010000002 HEPARIN (PORCINE) PER 1000 UNITS: Performed by: HOSPITALIST

## 2019-12-22 PROCEDURE — 80048 BASIC METABOLIC PNL TOTAL CA: CPT

## 2019-12-22 RX ORDER — LISINOPRIL 20 MG/1
20 TABLET ORAL
Status: DISCONTINUED | OUTPATIENT
Start: 2019-12-22 | End: 2019-12-24 | Stop reason: HOSPADM

## 2019-12-22 RX ORDER — BISACODYL 10 MG
10 SUPPOSITORY, RECTAL RECTAL DAILY PRN
Status: DISCONTINUED | OUTPATIENT
Start: 2019-12-22 | End: 2019-12-24 | Stop reason: HOSPADM

## 2019-12-22 RX ORDER — BISACODYL 10 MG
10 SUPPOSITORY, RECTAL RECTAL DAILY
Status: DISCONTINUED | OUTPATIENT
Start: 2019-12-22 | End: 2019-12-22

## 2019-12-22 RX ADMIN — LISINOPRIL 20 MG: 20 TABLET ORAL at 08:52

## 2019-12-22 RX ADMIN — Medication 250 MG: at 21:07

## 2019-12-22 RX ADMIN — SUCRALFATE 1 G: 1 TABLET ORAL at 08:53

## 2019-12-22 RX ADMIN — TAZOBACTAM SODIUM AND PIPERACILLIN SODIUM 3.38 G: 375; 3 INJECTION, SOLUTION INTRAVENOUS at 17:12

## 2019-12-22 RX ADMIN — HEPARIN SODIUM 5000 UNITS: 5000 INJECTION INTRAVENOUS; SUBCUTANEOUS at 13:00

## 2019-12-22 RX ADMIN — BUSPIRONE HYDROCHLORIDE 10 MG: 10 TABLET ORAL at 21:07

## 2019-12-22 RX ADMIN — SODIUM CHLORIDE, PRESERVATIVE FREE 10 ML: 5 INJECTION INTRAVENOUS at 08:53

## 2019-12-22 RX ADMIN — DONEPEZIL HYDROCHLORIDE 10 MG: 5 TABLET ORAL at 21:07

## 2019-12-22 RX ADMIN — BETHANECHOL CHLORIDE 10 MG: 10 TABLET ORAL at 21:07

## 2019-12-22 RX ADMIN — ALBUTEROL SULFATE 2.5 MG: 2.5 SOLUTION RESPIRATORY (INHALATION) at 08:55

## 2019-12-22 RX ADMIN — HEPARIN SODIUM 5000 UNITS: 5000 INJECTION INTRAVENOUS; SUBCUTANEOUS at 21:07

## 2019-12-22 RX ADMIN — GABAPENTIN 300 MG: 300 CAPSULE ORAL at 08:52

## 2019-12-22 RX ADMIN — TAZOBACTAM SODIUM AND PIPERACILLIN SODIUM 3.38 G: 375; 3 INJECTION, SOLUTION INTRAVENOUS at 00:31

## 2019-12-22 RX ADMIN — BETHANECHOL CHLORIDE 10 MG: 10 TABLET ORAL at 17:11

## 2019-12-22 RX ADMIN — ASPIRIN 81 MG: 81 TABLET, COATED ORAL at 08:53

## 2019-12-22 RX ADMIN — TRAZODONE HYDROCHLORIDE 25 MG: 50 TABLET ORAL at 00:29

## 2019-12-22 RX ADMIN — SERTRALINE HYDROCHLORIDE 100 MG: 100 TABLET ORAL at 08:53

## 2019-12-22 RX ADMIN — DOCUSATE SODIUM 100 MG: 100 CAPSULE, LIQUID FILLED ORAL at 08:52

## 2019-12-22 RX ADMIN — LEVOTHYROXINE SODIUM 175 MCG: 125 TABLET ORAL at 06:08

## 2019-12-22 RX ADMIN — HEPARIN SODIUM 5000 UNITS: 5000 INJECTION INTRAVENOUS; SUBCUTANEOUS at 06:08

## 2019-12-22 RX ADMIN — MEMANTINE 10 MG: 10 TABLET ORAL at 21:07

## 2019-12-22 RX ADMIN — TAZOBACTAM SODIUM AND PIPERACILLIN SODIUM 3.38 G: 375; 3 INJECTION, SOLUTION INTRAVENOUS at 08:52

## 2019-12-22 RX ADMIN — MEMANTINE 10 MG: 10 TABLET ORAL at 08:52

## 2019-12-22 RX ADMIN — Medication 250 MG: at 08:52

## 2019-12-22 RX ADMIN — NYSTATIN: 100000 POWDER TOPICAL at 21:07

## 2019-12-22 RX ADMIN — BETHANECHOL CHLORIDE 10 MG: 10 TABLET ORAL at 08:58

## 2019-12-22 RX ADMIN — MULTIVITAMIN 15 ML: LIQUID ORAL at 08:58

## 2019-12-22 RX ADMIN — GABAPENTIN 300 MG: 300 CAPSULE ORAL at 21:07

## 2019-12-22 RX ADMIN — PANTOPRAZOLE SODIUM 40 MG: 40 TABLET, DELAYED RELEASE ORAL at 08:52

## 2019-12-22 RX ADMIN — BETHANECHOL CHLORIDE 10 MG: 10 TABLET ORAL at 13:00

## 2019-12-22 RX ADMIN — NYSTATIN: 100000 POWDER TOPICAL at 08:58

## 2019-12-22 RX ADMIN — SUCRALFATE 1 G: 1 TABLET ORAL at 13:00

## 2019-12-22 RX ADMIN — SUCRALFATE 1 G: 1 TABLET ORAL at 21:07

## 2019-12-22 RX ADMIN — BUSPIRONE HYDROCHLORIDE 10 MG: 10 TABLET ORAL at 08:54

## 2019-12-22 RX ADMIN — SUCRALFATE 1 G: 1 TABLET ORAL at 17:11

## 2019-12-23 LAB
BACTERIA SPEC AEROBE CULT: NORMAL
BACTERIA SPEC AEROBE CULT: NORMAL

## 2019-12-23 PROCEDURE — 97116 GAIT TRAINING THERAPY: CPT | Performed by: PHYSICAL THERAPIST

## 2019-12-23 PROCEDURE — 94799 UNLISTED PULMONARY SVC/PX: CPT

## 2019-12-23 PROCEDURE — 25010000002 HEPARIN (PORCINE) PER 1000 UNITS: Performed by: HOSPITALIST

## 2019-12-23 PROCEDURE — 97110 THERAPEUTIC EXERCISES: CPT | Performed by: PHYSICAL THERAPIST

## 2019-12-23 PROCEDURE — 97535 SELF CARE MNGMENT TRAINING: CPT | Performed by: OCCUPATIONAL THERAPIST

## 2019-12-23 PROCEDURE — 99231 SBSQ HOSP IP/OBS SF/LOW 25: CPT | Performed by: INTERNAL MEDICINE

## 2019-12-23 RX ADMIN — NYSTATIN: 100000 POWDER TOPICAL at 09:22

## 2019-12-23 RX ADMIN — BUSPIRONE HYDROCHLORIDE 10 MG: 10 TABLET ORAL at 20:28

## 2019-12-23 RX ADMIN — SERTRALINE HYDROCHLORIDE 100 MG: 100 TABLET ORAL at 09:22

## 2019-12-23 RX ADMIN — LEVOTHYROXINE SODIUM 175 MCG: 125 TABLET ORAL at 05:44

## 2019-12-23 RX ADMIN — MEMANTINE 10 MG: 10 TABLET ORAL at 09:23

## 2019-12-23 RX ADMIN — BETHANECHOL CHLORIDE 10 MG: 10 TABLET ORAL at 20:28

## 2019-12-23 RX ADMIN — SUCRALFATE 1 G: 1 TABLET ORAL at 16:58

## 2019-12-23 RX ADMIN — SUCRALFATE 1 G: 1 TABLET ORAL at 13:11

## 2019-12-23 RX ADMIN — SUCRALFATE 1 G: 1 TABLET ORAL at 20:28

## 2019-12-23 RX ADMIN — GABAPENTIN 300 MG: 300 CAPSULE ORAL at 20:28

## 2019-12-23 RX ADMIN — ASPIRIN 81 MG: 81 TABLET, COATED ORAL at 09:22

## 2019-12-23 RX ADMIN — NYSTATIN: 100000 POWDER TOPICAL at 20:28

## 2019-12-23 RX ADMIN — TRAZODONE HYDROCHLORIDE 25 MG: 50 TABLET ORAL at 22:19

## 2019-12-23 RX ADMIN — HEPARIN SODIUM 5000 UNITS: 5000 INJECTION INTRAVENOUS; SUBCUTANEOUS at 16:57

## 2019-12-23 RX ADMIN — PANTOPRAZOLE SODIUM 40 MG: 40 TABLET, DELAYED RELEASE ORAL at 09:22

## 2019-12-23 RX ADMIN — SUCRALFATE 1 G: 1 TABLET ORAL at 08:29

## 2019-12-23 RX ADMIN — BETHANECHOL CHLORIDE 10 MG: 10 TABLET ORAL at 13:11

## 2019-12-23 RX ADMIN — LISINOPRIL 20 MG: 20 TABLET ORAL at 09:22

## 2019-12-23 RX ADMIN — BUSPIRONE HYDROCHLORIDE 10 MG: 10 TABLET ORAL at 09:22

## 2019-12-23 RX ADMIN — HEPARIN SODIUM 5000 UNITS: 5000 INJECTION INTRAVENOUS; SUBCUTANEOUS at 20:28

## 2019-12-23 RX ADMIN — ALBUTEROL SULFATE 2.5 MG: 2.5 SOLUTION RESPIRATORY (INHALATION) at 22:12

## 2019-12-23 RX ADMIN — DONEPEZIL HYDROCHLORIDE 10 MG: 5 TABLET ORAL at 20:27

## 2019-12-23 RX ADMIN — Medication 250 MG: at 20:27

## 2019-12-23 RX ADMIN — HEPARIN SODIUM 5000 UNITS: 5000 INJECTION INTRAVENOUS; SUBCUTANEOUS at 05:44

## 2019-12-23 RX ADMIN — Medication 250 MG: at 09:22

## 2019-12-23 RX ADMIN — MEMANTINE 10 MG: 10 TABLET ORAL at 20:28

## 2019-12-23 RX ADMIN — GABAPENTIN 300 MG: 300 CAPSULE ORAL at 09:22

## 2019-12-23 RX ADMIN — BETHANECHOL CHLORIDE 10 MG: 10 TABLET ORAL at 08:30

## 2019-12-24 VITALS
SYSTOLIC BLOOD PRESSURE: 107 MMHG | BODY MASS INDEX: 22.97 KG/M2 | WEIGHT: 117 LBS | OXYGEN SATURATION: 98 % | HEART RATE: 78 BPM | DIASTOLIC BLOOD PRESSURE: 64 MMHG | HEIGHT: 60 IN | RESPIRATION RATE: 16 BRPM | TEMPERATURE: 98.2 F

## 2019-12-24 PROCEDURE — 94799 UNLISTED PULMONARY SVC/PX: CPT

## 2019-12-24 PROCEDURE — 99239 HOSP IP/OBS DSCHRG MGMT >30: CPT | Performed by: INTERNAL MEDICINE

## 2019-12-24 PROCEDURE — 25010000002 HEPARIN (PORCINE) PER 1000 UNITS: Performed by: HOSPITALIST

## 2019-12-24 RX ORDER — TRAZODONE HYDROCHLORIDE 50 MG/1
25 TABLET ORAL NIGHTLY PRN
Qty: 15 TABLET | Refills: 0 | Status: ON HOLD | OUTPATIENT
Start: 2019-12-24 | End: 2020-08-03

## 2019-12-24 RX ORDER — FUROSEMIDE 40 MG/1
20 TABLET ORAL DAILY
Start: 2019-12-24 | End: 2020-01-06

## 2019-12-24 RX ADMIN — HEPARIN SODIUM 5000 UNITS: 5000 INJECTION INTRAVENOUS; SUBCUTANEOUS at 14:45

## 2019-12-24 RX ADMIN — SERTRALINE HYDROCHLORIDE 100 MG: 100 TABLET ORAL at 08:26

## 2019-12-24 RX ADMIN — HEPARIN SODIUM 5000 UNITS: 5000 INJECTION INTRAVENOUS; SUBCUTANEOUS at 06:30

## 2019-12-24 RX ADMIN — MEMANTINE 10 MG: 10 TABLET ORAL at 08:26

## 2019-12-24 RX ADMIN — LISINOPRIL 20 MG: 20 TABLET ORAL at 08:26

## 2019-12-24 RX ADMIN — NYSTATIN: 100000 POWDER TOPICAL at 08:25

## 2019-12-24 RX ADMIN — BUSPIRONE HYDROCHLORIDE 10 MG: 10 TABLET ORAL at 08:26

## 2019-12-24 RX ADMIN — MULTIVITAMIN 15 ML: LIQUID ORAL at 08:25

## 2019-12-24 RX ADMIN — DOCUSATE SODIUM 100 MG: 100 CAPSULE, LIQUID FILLED ORAL at 08:26

## 2019-12-24 RX ADMIN — SUCRALFATE 1 G: 1 TABLET ORAL at 12:27

## 2019-12-24 RX ADMIN — LEVOTHYROXINE SODIUM 175 MCG: 125 TABLET ORAL at 06:29

## 2019-12-24 RX ADMIN — PANTOPRAZOLE SODIUM 40 MG: 40 TABLET, DELAYED RELEASE ORAL at 08:26

## 2019-12-24 RX ADMIN — BETHANECHOL CHLORIDE 10 MG: 10 TABLET ORAL at 08:26

## 2019-12-24 RX ADMIN — SUCRALFATE 1 G: 1 TABLET ORAL at 08:26

## 2019-12-24 RX ADMIN — GABAPENTIN 300 MG: 300 CAPSULE ORAL at 08:26

## 2019-12-24 RX ADMIN — ALBUTEROL SULFATE 2.5 MG: 2.5 SOLUTION RESPIRATORY (INHALATION) at 09:40

## 2019-12-24 RX ADMIN — ASPIRIN 81 MG: 81 TABLET, COATED ORAL at 08:26

## 2019-12-24 RX ADMIN — BETHANECHOL CHLORIDE 10 MG: 10 TABLET ORAL at 12:27

## 2019-12-24 RX ADMIN — Medication 250 MG: at 08:26

## 2019-12-25 ENCOUNTER — READMISSION MANAGEMENT (OUTPATIENT)
Dept: CALL CENTER | Facility: HOSPITAL | Age: 84
End: 2019-12-25

## 2019-12-25 NOTE — OUTREACH NOTE
Prep Survey      Responses   Facility patient discharged from?  [facility]   Is patient eligible?  Yes   Discharge diagnosis  Sepsis   Does the patient have one of the following disease processes/diagnoses(primary or secondary)?  Sepsis   Does the patient have Home health ordered?  Yes   What is the Home health agency?   [address]    Is there a DME ordered?  No   Prep survey completed?  Yes          Jessie Cazares RN

## 2019-12-26 ENCOUNTER — TRANSITIONAL CARE MANAGEMENT TELEPHONE ENCOUNTER (OUTPATIENT)
Dept: INTERNAL MEDICINE | Facility: CLINIC | Age: 84
End: 2019-12-26

## 2019-12-26 NOTE — OUTREACH NOTE
TCM call completed with pt. Pt is glad to be home and states she is doing fine. She said she hasn't heard from HH yet. Called [facility] and they said they'll be coming out tomorrow. Pt notified. Pt continues to say she is doing fine and denies any other questions or needs. Hospital f/u appt confirmed.

## 2019-12-28 LAB
MYCOBACTERIUM SPEC CULT: NORMAL
NIGHT BLUE STAIN TISS: NORMAL

## 2019-12-30 ENCOUNTER — READMISSION MANAGEMENT (OUTPATIENT)
Dept: CALL CENTER | Facility: HOSPITAL | Age: 84
End: 2019-12-30

## 2019-12-30 NOTE — OUTREACH NOTE
Sepsis Week 1 Survey      Responses   Facility patient discharged from?  [facility]   Does the patient have one of the following disease processes/diagnoses(primary or secondary)?  Sepsis   Is there a successful TCM telephone encounter documented?  Yes          Kimberly Blackmon RN

## 2020-01-02 ENCOUNTER — OFFICE VISIT (OUTPATIENT)
Dept: ORTHOPEDIC SURGERY | Facility: CLINIC | Age: 85
End: 2020-01-02

## 2020-01-02 ENCOUNTER — READMISSION MANAGEMENT (OUTPATIENT)
Dept: CALL CENTER | Facility: HOSPITAL | Age: 85
End: 2020-01-02

## 2020-01-02 VITALS — BODY MASS INDEX: 23.75 KG/M2 | HEART RATE: 77 BPM | OXYGEN SATURATION: 96 % | WEIGHT: 121 LBS | HEIGHT: 60 IN

## 2020-01-02 DIAGNOSIS — M17.11 PRIMARY OSTEOARTHRITIS OF RIGHT KNEE: Primary | ICD-10-CM

## 2020-01-02 DIAGNOSIS — G89.29 OTHER CHRONIC PAIN: Primary | ICD-10-CM

## 2020-01-02 PROCEDURE — 20610 DRAIN/INJ JOINT/BURSA W/O US: CPT | Performed by: ORTHOPAEDIC SURGERY

## 2020-01-02 RX ORDER — TRAMADOL HYDROCHLORIDE 50 MG/1
TABLET ORAL
Qty: 30 TABLET | Refills: 0 | Status: SHIPPED | OUTPATIENT
Start: 2020-01-02 | End: 2020-02-18 | Stop reason: SDUPTHER

## 2020-01-02 RX ORDER — LIDOCAINE HYDROCHLORIDE 10 MG/ML
3 INJECTION, SOLUTION EPIDURAL; INFILTRATION; INTRACAUDAL; PERINEURAL
Status: COMPLETED | OUTPATIENT
Start: 2020-01-02 | End: 2020-01-02

## 2020-01-02 RX ORDER — TRIAMCINOLONE ACETONIDE 40 MG/ML
40 INJECTION, SUSPENSION INTRA-ARTICULAR; INTRAMUSCULAR
Status: COMPLETED | OUTPATIENT
Start: 2020-01-02 | End: 2020-01-02

## 2020-01-02 RX ORDER — BETHANECHOL CHLORIDE 10 MG/1
10 TABLET ORAL 4 TIMES DAILY
Qty: 120 TABLET | Refills: 0 | Status: SHIPPED | OUTPATIENT
Start: 2020-01-02 | End: 2020-02-18 | Stop reason: SDUPTHER

## 2020-01-02 RX ORDER — METHOCARBAMOL 500 MG/1
500 TABLET, FILM COATED ORAL 4 TIMES DAILY
Qty: 120 TABLET | Refills: 0 | Status: SHIPPED | OUTPATIENT
Start: 2020-01-02 | End: 2020-02-18 | Stop reason: SDUPTHER

## 2020-01-02 RX ADMIN — TRIAMCINOLONE ACETONIDE 40 MG: 40 INJECTION, SUSPENSION INTRA-ARTICULAR; INTRAMUSCULAR at 15:41

## 2020-01-02 RX ADMIN — LIDOCAINE HYDROCHLORIDE 3 ML: 10 INJECTION, SOLUTION EPIDURAL; INFILTRATION; INTRACAUDAL; PERINEURAL at 15:41

## 2020-01-02 NOTE — PROGRESS NOTES
Procedure   Large Joint Arthrocentesis: R knee  Date/Time: 1/2/2020 3:41 PM  Consent given by: patient  Site marked: site marked  Timeout: Immediately prior to procedure a time out was called to verify the correct patient, procedure, equipment, support staff and site/side marked as required   Supporting Documentation  Indications: pain   Procedure Details  Location: knee - R knee  Preparation: Patient was prepped and draped in the usual sterile fashion  Needle size: 22 G  Approach: anterolateral  Medications administered: 3 mL lidocaine PF 1% 1 %; 40 mg triamcinolone acetonide 40 MG/ML  Patient tolerance: patient tolerated the procedure well with no immediate complications

## 2020-01-02 NOTE — OUTREACH NOTE
Sepsis Week 2 Survey      Responses   Facility patient discharged from?  [place]   Does the patient have one of the following disease processes/diagnoses(primary or secondary)?  Sepsis   Week 2 attempt successful?  Yes   Call start time  1445   Call end time  1451   Discharge diagnosis  Sepsis   Person spoke with today (if not patient) and relationship  Chris-daughter in law   Meds reviewed with patient/caregiver?  Yes   Is the patient having any side effects they believe may be caused by any medication additions or changes?  No   Does the patient have all medications related to this admission filled (includes all antibiotics, inhalers, nebulizers,steroids,etc.)  Yes   Is the patient taking all medications as directed (includes completed medication regime)?  Yes   Does the patient have a primary care provider?   Yes   Does the patient have an appointment with their PCP within 7 days of discharge?  Greater than 7 days   What is preventing the patient from scheduling follow up appointments within 7 days of discharge?  Earlier appointment not available   Nursing Interventions  Verified appointment date/time/provider   Has the patient kept scheduled appointments due by today?  Yes   Comments  On the way to have knee injected   What is the Home health agency?   [address] [place]    Has home health visited the patient within 72 hours of discharge?  Yes   Psychosocial issues?  No   Did the patient receive a copy of their discharge instructions?  Yes   Nursing interventions  Reviewed instructions with patient   What is the patient's perception of their health status since discharge?  Improving   Is the patient/caregiver able to teach back the hierarchy of who to call/visit for symptoms/problems? PCP, Specialist, Home health nurse, Urgent Care, ED, 911  Yes   Week 2 call completed?  Yes   Wrap up additional comments  Quick call with family member as they were in car taking patient for an ortho injection,  call cut short          Jolene Arreguin RN

## 2020-01-02 NOTE — PROGRESS NOTES
[facility] Note        Subjective     CC: Follow-up (3 month follow up - Primary osteoarthritis of right knee -injection given 09/26/19)      MARY Jarrett is a 89 y.o. female.  Patient is here for her 3-month right knee injection.      ROS:    Constiutional:Pt denies fever, chills, nausea, or vomiting.  MSK:as above        Objective      Past Medical History  Past Medical History:   Diagnosis Date   • Arthritis    • Dementia (CMS/HCC)    • Depression    • Disease of thyroid gland    • Gastric polyp    • GERD (gastroesophageal reflux disease)    • History of colonic polyps    • Hyperlipidemia    • Hypertension    • IBS (irritable bowel syndrome)    • Macular degeneration    • Torn meniscus     right knee             Assessment    Assessment:  1. Primary osteoarthritis of right knee        Plan:  1. Recommend over the counter anti-inflammatories for pain and/or swelling  2. Right knee arthritis--patient injected anterior medially.  Follow-up in 3 months.      Gamal Denny MD  01/02/20  5:28 PM

## 2020-01-02 NOTE — TELEPHONE ENCOUNTER
Last seen- in hospital 12/04/2019    Next appointment-  01/06/2020    Last approved- 10/31/2019    Jarrell- in progress

## 2020-01-06 ENCOUNTER — OFFICE VISIT (OUTPATIENT)
Dept: INTERNAL MEDICINE | Facility: CLINIC | Age: 85
End: 2020-01-06

## 2020-01-06 ENCOUNTER — LAB (OUTPATIENT)
Dept: LAB | Facility: HOSPITAL | Age: 85
End: 2020-01-06

## 2020-01-06 VITALS
TEMPERATURE: 98.1 F | HEIGHT: 60 IN | SYSTOLIC BLOOD PRESSURE: 118 MMHG | BODY MASS INDEX: 24.94 KG/M2 | DIASTOLIC BLOOD PRESSURE: 68 MMHG | HEART RATE: 68 BPM | WEIGHT: 127 LBS

## 2020-01-06 DIAGNOSIS — F41.1 GENERALIZED ANXIETY DISORDER: ICD-10-CM

## 2020-01-06 DIAGNOSIS — N39.0 ACUTE UTI (URINARY TRACT INFECTION): ICD-10-CM

## 2020-01-06 DIAGNOSIS — I10 BENIGN ESSENTIAL HYPERTENSION: ICD-10-CM

## 2020-01-06 DIAGNOSIS — F01.50 MULTI-INFARCT DEMENTIA WITHOUT BEHAVIORAL DISTURBANCE (HCC): ICD-10-CM

## 2020-01-06 DIAGNOSIS — E03.9 ACQUIRED HYPOTHYROIDISM: ICD-10-CM

## 2020-01-06 DIAGNOSIS — R82.998 OTHER ABNORMAL FINDINGS IN URINE: ICD-10-CM

## 2020-01-06 DIAGNOSIS — M48.061 SPINAL STENOSIS OF LUMBAR REGION WITHOUT NEUROGENIC CLAUDICATION: ICD-10-CM

## 2020-01-06 DIAGNOSIS — Z87.440 HISTORY OF UTI: Primary | ICD-10-CM

## 2020-01-06 DIAGNOSIS — G63 POLYNEUROPATHY ASSOCIATED WITH UNDERLYING DISEASE (HCC): ICD-10-CM

## 2020-01-06 DIAGNOSIS — Z87.440 HISTORY OF UTI: ICD-10-CM

## 2020-01-06 LAB
BILIRUB BLD-MCNC: NEGATIVE MG/DL
CLARITY, POC: CLEAR
COLOR UR: YELLOW
GLUCOSE UR STRIP-MCNC: NEGATIVE MG/DL
KETONES UR QL: NEGATIVE
LEUKOCYTE EST, POC: ABNORMAL
NITRITE UR-MCNC: NEGATIVE MG/ML
PH UR: 6.5 [PH] (ref 5–8)
PROT UR STRIP-MCNC: NEGATIVE MG/DL
RBC # UR STRIP: NEGATIVE /UL
SP GR UR: 1.01 (ref 1–1.03)
UROBILINOGEN UR QL: NORMAL

## 2020-01-06 PROCEDURE — 99495 TRANSJ CARE MGMT MOD F2F 14D: CPT | Performed by: NURSE PRACTITIONER

## 2020-01-06 PROCEDURE — 81003 URINALYSIS AUTO W/O SCOPE: CPT | Performed by: NURSE PRACTITIONER

## 2020-01-06 PROCEDURE — 87086 URINE CULTURE/COLONY COUNT: CPT

## 2020-01-06 NOTE — PROGRESS NOTES
Transitional Care Follow Up Visit  Subjective     Temi Aretha Jarrett is a 89 y.o. female who presents for a transitional care management visit.    Within 48 business hours after discharge our office contacted her via telephone to coordinate her care and needs.      I reviewed and discussed the details of that call along with the discharge summary, hospital problems, inpatient lab results, inpatient diagnostic studies, and consultation reports with Temi.     Current outpatient and discharge medications have been reconciled for the patient.  Reviewed by: MAIDA Del Cid      Date of TCM Phone Call 12/26/2019   [facility]   Date of Admission -   Date of Discharge 12/24/2019   Discharge Disposition [facility]     Risk for Readmission (LACE) No data recorded      History of Present Illness   Course During Hospital Stay:    Recent hospitalizatin for urosepsis.  Admitted 12/18/19-12/24.  Discharged home with family and [facility] .  Asymptomatic. WBC 29.5=8.  Tx with zosyn IV Hx of recurrent UTI's -- about 4 this year, 2 requiring hospitalization.    Sept sustained fall and fx left hip, rehab at  with subsequent admissions Located within [facility] for pneumonia and urospesis.  See past records.    Using walker.  Fall last Friday and bruised right hip--got out of car without walker, no problem with mobility or pain in that hip since.  Family had taken her to visit her  at [facility].    No fever, night sweats, urinary sx since d/c, good po intake and appetite.     OAB/bladder prolapse:  On bethanechol 10mg four times daily    Anxiety: buspar 10mg BID and zoloft 100mg  Insomnia;  Trazodone 25 mg HS prn    Memory loss:  On memantnie 10 and donepezil 10mg    Hypothyroidism:  On levothyroxine 175mcg. (TSH 9/28/19 0.716)    Neuropathy: on neurontin    Htn: lisinopril 20mg good control    Spinal stenosis:  Using walker, has ultram prn pain and robaxin for muscle relaxer      The following portions of the  patient's history were reviewed and updated as appropriate: allergies, current medications, past family history, past medical history, past social history, past surgical history and problem list.    Review of Systems   Constitutional: Positive for fatigue. Negative for chills and fever.   HENT: Negative for congestion, ear pain and sinus pressure.    Respiratory: Negative for cough, chest tightness, shortness of breath and wheezing.    Cardiovascular: Negative for chest pain and palpitations.   Gastrointestinal: Negative for abdominal pain, blood in stool and constipation.   Skin: Negative for color change.   Allergic/Immunologic: Negative for environmental allergies.   Neurological: Negative for dizziness, speech difficulty and headaches.   Psychiatric/Behavioral: Negative for confusion. The patient is not nervous/anxious.        Objective   Physical Exam   Constitutional: She appears well-developed and well-nourished.   Cardiovascular: Normal rate and regular rhythm.   Pulmonary/Chest: Effort normal and breath sounds normal.   Abdominal: Soft. Bowel sounds are normal.   Musculoskeletal:        Right hip: She exhibits tenderness. She exhibits no swelling and no deformity.   Large ecchymosis s/p fall lateral hip, hematoma present.  Gait steady at baseline with walker   Neurological: She is alert.   Skin: Skin is warm. Capillary refill takes less than 2 seconds.   Psychiatric: She has a normal mood and affect. Her speech is normal and behavior is normal. Thought content normal. Cognition and memory are impaired. She expresses impulsivity.   Pleasantly confused, daughter in law present to help provide history.       Assessment/Plan   Temi was seen today for transitional care management and fall.    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  Notes with family and has [facility]    Acquired hypothyroidism  Comments:  Recheck TSH with next labs, stable in September.  Continue levothyroxine 175 mcg    Polyneuropathy associated with underlying disease (CMS/HCC)  Comments:  Continue Neurontin.  Use walker    Spinal stenosis of lumbar region without neurogenic claudication  Comments:  Use Ultram as needed and muscle relaxer as needed

## 2020-01-07 LAB — BACTERIA SPEC AEROBE CULT: NO GROWTH

## 2020-01-08 ENCOUNTER — TELEPHONE (OUTPATIENT)
Dept: INTERNAL MEDICINE | Facility: CLINIC | Age: 85
End: 2020-01-08

## 2020-01-08 NOTE — TELEPHONE ENCOUNTER
Kamryn with [facility] did the OT eval on patient today - needs a verbal order:    2 times per week for 2 weeks  1 time per week for 2 weeks    Also wants a  to visit for possible low vision equipment needs of patient.    Please call Kamryn @ [phone]

## 2020-01-09 NOTE — PATIENT INSTRUCTIONS
Walker at all times.  Continue medications no changes today.  We will recheck UA today.  Stay hydrated and work on adequate nutritional intake.  Has [facility] currently.

## 2020-01-10 ENCOUNTER — READMISSION MANAGEMENT (OUTPATIENT)
Dept: CALL CENTER | Facility: HOSPITAL | Age: 85
End: 2020-01-10

## 2020-01-10 NOTE — OUTREACH NOTE
Sepsis Week 3 Survey      Responses   Facility patient discharged from?  [place]   Does the patient have one of the following disease processes/diagnoses(primary or secondary)?  Sepsis   Week 3 attempt successful?  Yes   Call start time  1446   Call end time  1449   Discharge diagnosis  Sepsis   Has the patient kept scheduled appointments due by today?  Yes   What is the Home health agency?   [facility], [place]    Psychosocial issues?  No   Comments  Pt reports her stamina is slowly improving. Appetite is WNL. No issues voiding   What is the patient's perception of their health status since discharge?  Improving   Is the patient/caregiver able to teach back Sepsis?  S - Shivering,fever or very cold, S - Sleepy, difficult to arouse,confused   Is patient/caregiver able to teach back steps to recovery at home?  Record milestones and struggles in a journal, Set small, achievable goals for return to baseline health   Is the patient/caregiver able to teach back signs and symptoms of worsening condition:  Fever   Is the patient/caregiver able to teach back the hierarchy of who to call/visit for symptoms/problems? PCP, Specialist, Home health nurse, Urgent Care, ED, 911  Yes   Week 3 call completed?  Yes   Revoked  No further contact(revokes)-requires comment          Carolee Payton, RN

## 2020-01-16 ENCOUNTER — TELEPHONE (OUTPATIENT)
Dept: INTERNAL MEDICINE | Facility: CLINIC | Age: 85
End: 2020-01-16

## 2020-01-16 NOTE — TELEPHONE ENCOUNTER
Kush from [facility] called stating the caregiver for the pt called and stated her leg (location or side not given) is swollen and weeping and would like someone to come out an look at it.  Kush needs a verbal order for prn skilled nursing.

## 2020-01-17 ENCOUNTER — TELEPHONE (OUTPATIENT)
Dept: INTERNAL MEDICINE | Facility: CLINIC | Age: 85
End: 2020-01-17

## 2020-01-17 NOTE — TELEPHONE ENCOUNTER
Maxine with [facility] called and stated that patients legs are still weeping - Maxine is requesting an order for [employer], its a leg wrap that helps with the swelling then wrap the legs with Coban  - Verbal order is ok    Queenie Wynn [phone]

## 2020-01-20 ENCOUNTER — OFFICE VISIT (OUTPATIENT)
Dept: INTERNAL MEDICINE | Facility: CLINIC | Age: 85
End: 2020-01-20

## 2020-01-20 VITALS
BODY MASS INDEX: 25.32 KG/M2 | DIASTOLIC BLOOD PRESSURE: 84 MMHG | HEART RATE: 80 BPM | SYSTOLIC BLOOD PRESSURE: 142 MMHG | WEIGHT: 129 LBS | HEIGHT: 60 IN

## 2020-01-20 DIAGNOSIS — F41.1 GENERALIZED ANXIETY DISORDER: ICD-10-CM

## 2020-01-20 DIAGNOSIS — E53.8 B12 DEFICIENCY: ICD-10-CM

## 2020-01-20 DIAGNOSIS — F01.50 MULTI-INFARCT DEMENTIA WITHOUT BEHAVIORAL DISTURBANCE (HCC): ICD-10-CM

## 2020-01-20 DIAGNOSIS — I10 BENIGN ESSENTIAL HYPERTENSION: Primary | ICD-10-CM

## 2020-01-20 DIAGNOSIS — M17.0 PRIMARY OSTEOARTHRITIS OF BOTH KNEES: ICD-10-CM

## 2020-01-20 DIAGNOSIS — N30.00 ACUTE CYSTITIS WITHOUT HEMATURIA: ICD-10-CM

## 2020-01-20 DIAGNOSIS — G63 POLYNEUROPATHY ASSOCIATED WITH UNDERLYING DISEASE (HCC): ICD-10-CM

## 2020-01-20 LAB
BILIRUB BLD-MCNC: NEGATIVE MG/DL
CLARITY, POC: CLEAR
COLOR UR: ABNORMAL
GLUCOSE UR STRIP-MCNC: NEGATIVE MG/DL
KETONES UR QL: NEGATIVE
LEUKOCYTE EST, POC: ABNORMAL
NITRITE UR-MCNC: NEGATIVE MG/ML
PH UR: 7 [PH] (ref 5–8)
PROT UR STRIP-MCNC: ABNORMAL MG/DL
RBC # UR STRIP: NEGATIVE /UL
SP GR UR: 1.02 (ref 1–1.03)
UROBILINOGEN UR QL: NORMAL

## 2020-01-20 PROCEDURE — 96372 THER/PROPH/DIAG INJ SC/IM: CPT | Performed by: INTERNAL MEDICINE

## 2020-01-20 PROCEDURE — 99214 OFFICE O/P EST MOD 30 MIN: CPT | Performed by: INTERNAL MEDICINE

## 2020-01-20 PROCEDURE — 81003 URINALYSIS AUTO W/O SCOPE: CPT | Performed by: INTERNAL MEDICINE

## 2020-01-20 RX ORDER — GABAPENTIN 300 MG/1
300 CAPSULE ORAL 2 TIMES DAILY
Qty: 180 CAPSULE | Refills: 0 | Status: SHIPPED | OUTPATIENT
Start: 2020-01-20 | End: 2020-04-28

## 2020-01-20 RX ORDER — DONEPEZIL HYDROCHLORIDE 10 MG/1
10 TABLET, FILM COATED ORAL
Qty: 90 TABLET | Refills: 3 | Status: SHIPPED | OUTPATIENT
Start: 2020-01-20 | End: 2021-03-23

## 2020-01-20 RX ORDER — DONEPEZIL HYDROCHLORIDE 10 MG/1
TABLET, FILM COATED ORAL
Qty: 30 TABLET | Refills: 4 | Status: SHIPPED | OUTPATIENT
Start: 2020-01-20 | End: 2020-01-20 | Stop reason: SDUPTHER

## 2020-01-20 RX ADMIN — CYANOCOBALAMIN 1000 MCG: 1000 INJECTION, SOLUTION INTRAMUSCULAR; SUBCUTANEOUS at 15:56

## 2020-01-20 NOTE — PROGRESS NOTES
[facility]     Temi Jarrett  5/8/1930   [id number]      Patient Care Team:  Eric Patel MD as PCP - General  Eric Patel MD as PCP - Family Medicine    Chief Complaint::   Chief Complaint   Patient presents with   • Hyperlipidemia   • Hypertension   • Hypothyroidism        HPI  Mrs. Jarrett comes in for follow-up of her hypertension, peripheral neuropathy, B12 deficiency, dementia, osteoarthritis of both knees with worse pain on the right and anxiety.  She continues to experience edema.  Home health has placed compression wraps.  This has dramatically improved her edema.  She also has a prolapsed bladder and has had recurrent urinary tract infections, most recently causing hospitalization and sepsis.  She had no dysuria or other urinary tract symptoms prior.    Chronic Conditions:      Patient Active Problem List   Diagnosis   • Skin tear of lower leg without complication, right, initial encounter   • Esophageal reflux   • Hypothyroidism   • Generalized anxiety disorder   • Hyperlipidemia   • Multi-infarct dementia (CMS/HCC)   • Peripheral neuropathy   • Carpal tunnel syndrome of right wrist   • Spinal stenosis of lumbar region   • Osteoporosis   • Fever   • Urinary frequency   • Peripheral venous insufficiency   • Disc disorder of lumbar region   • Bladder prolapse, female, acquired   • Acute right-sided low back pain without sciatica   • Pain, knee   • Benign essential hypertension   • Pernicious anemia   • Memory loss   • Annual physical exam   • Primary osteoarthritis of both knees   • Chest pain, atypical   • Massive hiatal hernia with intrathoracic stomach   • Closed fracture of neck of left femur (CMS/[facility])   • Hypoxia   • Multifocal aspiration pneumonia due to large intrathoracic hiatal hernia   • Leukocytosis   • Sepsis (CMS/HCC)   • Acute respiratory failure with hypoxia not requiring ventilatory support (CMS/HCC)   • Bilateral parapneumonic pleural effusions.  S/p  placement small right chest tube 11/16/19.    • Small postprocedural pneumothorax   • UTI due to Klebsiella species   • Rectal bleeding   • Diarrhea   • Acute UTI (urinary tract infection)        Past Medical History:   Diagnosis Date   • Arthritis    • Dementia (CMS/HCC)    • Depression    • Disease of thyroid gland    • Gastric polyp    • GERD (gastroesophageal reflux disease)    • History of colonic polyps    • Hyperlipidemia    • Hypertension    • IBS (irritable bowel syndrome)    • Macular degeneration    • Torn meniscus     right knee        Past Surgical History:   Procedure Laterality Date   • CHOLECYSTECTOMY  1997   • EYE SURGERY  1998    Cataract extraction   • HERNIA REPAIR     • HIP HEMIARTHROPLASTY Left 9/28/2019    Procedure: HIP HEMIARTHROPLASTY LEFT;  Surgeon: Reddy Segundo Jr., MD;  Location: [facility];  Service: Orthopedics   • HYSTERECTOMY  1976   • KNEE SURGERY Right     arthroscopy- 2000   • TONSILLECTOMY         Family History   Problem Relation Age of Onset   • Hypertension Mother    • Breast cancer Mother    • Obesity Mother    • Hypertension Father    • Diabetes Son        Social History     Socioeconomic History   • Marital status:      Spouse name: Not on file   • Number of children: Not on file   • Years of education: Not on file   • Highest education level: Not on file   Tobacco Use   • Smoking status: Never Smoker   • Smokeless tobacco: Never Used   Substance and Sexual Activity   • Alcohol use: No   • Drug use: Defer   • Sexual activity: Defer   Social History Narrative    Lives with son and daughter in law--  is at [place] getting rehab       Allergies   Allergen Reactions   • Codeine Nausea Only     Trouble Breathing    • Shrimp Hives         Current Outpatient Medications:   •  acetaminophen (TYLENOL) 325 MG tablet, Take 650 mg by mouth Every 6 (Six) Hours As Needed for Mild Pain ., Disp: , Rfl:   •  aspirin 81 MG EC tablet, Take 81 mg by mouth Daily., Disp: , Rfl:    •  bethanechol (URECHOLINE) 10 MG tablet, Take 1 tablet by mouth 4 (Four) Times a Day., Disp: 120 tablet, Rfl: 0  •  busPIRone (BUSPAR) 10 MG tablet, Take 1 tablet by mouth 2 (Two) Times a Day., Disp: 60 tablet, Rfl: 5  •  Cholecalciferol (VITAMIN D PO), 2000 U qd, Disp: , Rfl:   •  diclofenac (VOLTAREN) 1 % gel gel, Apply 4 g topically to the appropriate area as directed 4 (Four) Times a Day., Disp: 100 g, Rfl: 5  •  Diphenoxylate-Atropine (LOMOTIL PO), Take 10 mg by mouth Daily As Needed., Disp: , Rfl:   •  docusate sodium (COLACE) 100 MG capsule, Take 1 capsule by mouth 2 (Two) Times a Day As Needed for Constipation., Disp: , Rfl:   •  donepezil (ARICEPT) 10 MG tablet, Take 1 tablet by mouth every night at bedtime., Disp: 90 tablet, Rfl: 3  •  gabapentin (NEURONTIN) 300 MG capsule, Take 1 capsule by mouth 2 (Two) Times a Day., Disp: 180 capsule, Rfl: 0  •  Lactobacillus tablet, Take 1 tablet by mouth Daily., Disp: , Rfl:   •  levothyroxine (SYNTHROID) 175 MCG tablet, Take 1 tablet by mouth Daily. Patient receives medication from patient assistance.  NDC:3610-3509-23 EXP:07/02/2020 LOT [id number], Disp: 90 tablet, Rfl: 0  •  lisinopril (PRINIVIL,ZESTRIL) 20 MG tablet, Take 20 mg by mouth 2 (Two) Times a Day., Disp: , Rfl:   •  memantine (NAMENDA) 10 MG tablet, Take 1 tablet by mouth 2 (Two) Times a Day., Disp: 60 tablet, Rfl: 5  •  methocarbamol (ROBAXIN) 500 MG tablet, Take 1 tablet by mouth 4 (Four) Times a Day., Disp: 120 tablet, Rfl: 0  •  Multiple Vitamins-Minerals (PRESERVISION AREDS PO), Take 1 tablet by mouth Daily., Disp: , Rfl:   •  ondansetron ODT (ZOFRAN-ODT) 4 MG disintegrating tablet, Take 1 tablet by mouth Every 8 (Eight) Hours As Needed for Nausea or Vomiting., Disp: 20 tablet, Rfl: 0  •  potassium chloride (MICRO-K) 10 MEQ CR capsule, TAKE TWO CAPSULES BY MOUTH DAILY, Disp: 180 capsule, Rfl: 2  •  sertraline (ZOLOFT) 100 MG tablet, Take 100 mg by mouth Daily., Disp: , Rfl:   •  sucralfate  "(CARAFATE) 1 g tablet, Take 1 tablet by mouth 3 (Three) Times a Day Before Meals., Disp: , Rfl:   •  traMADol (ULTRAM) 50 MG tablet, Take one tablet by mouth daily as needed for moderate pain  (right knee), Disp: 30 tablet, Rfl: 0  •  traZODone (DESYREL) 50 MG tablet, Take 0.5 tablets by mouth At Night As Needed for Sleep., Disp: 15 tablet, Rfl: 0    Current Facility-Administered Medications:   •  cyanocobalamin injection 1,000 mcg, 1,000 mcg, Intramuscular, Q28 Days, Eric Patel MD, 1,000 mcg at 01/20/20 1556    Review of Systems   Constitutional: Negative for chills, fatigue and fever.   HENT: Negative for congestion, ear pain and sinus pressure.    Respiratory: Negative for cough, chest tightness, shortness of breath and wheezing.    Cardiovascular: Negative for chest pain and palpitations.   Gastrointestinal: Negative for abdominal pain, blood in stool and constipation.   Skin: Negative for color change.   Allergic/Immunologic: Negative for environmental allergies.   Neurological: Negative for dizziness, speech difficulty and headache.   Psychiatric/Behavioral: Negative for decreased concentration. The patient is not nervous/anxious.         Vital Signs  Vitals:    01/20/20 1512   BP: 142/84   BP Location: Left arm   Patient Position: Sitting   Cuff Size: Adult   Pulse: 80   Weight: 58.5 kg (129 lb)   Height: 152.4 cm (60\")   PainSc: 0-No pain       Physical Exam   Constitutional: She is oriented to person, place, and time. She appears well-developed and well-nourished.   HENT:   Head: Normocephalic and atraumatic.   Cardiovascular: Normal rate, regular rhythm and normal heart sounds.   No murmur heard.  Pulmonary/Chest: Effort normal and breath sounds normal.   Musculoskeletal: She exhibits no edema.   She has bilateral lower extremity compression wraps to the knee.   Neurological: She is alert and oriented to person, place, and time.   Psychiatric: She has a normal mood and affect.   Vitals " reviewed.     Procedures    ACE III MINI             Assessment/Plan:    Temi was seen today for hyperlipidemia, hypertension and hypothyroidism.    Diagnoses and all orders for this visit:    Benign essential hypertension    Polyneuropathy associated with underlying disease (CMS/HCC)  -     gabapentin (NEURONTIN) 300 MG capsule; Take 1 capsule by mouth 2 (Two) Times a Day.    Acute cystitis without hematuria  -     POC Urinalysis Dipstick, Automated    B12 deficiency    Multi-infarct dementia without behavioral disturbance (CMS/HCC)    Primary osteoarthritis of both knees    Generalized anxiety disorder    Other orders  -     donepezil (ARICEPT) 10 MG tablet; Take 1 tablet by mouth every night at bedtime.    Plan    Blood pressure is well controlled on lisinopril taken twice a day.    Today's urinalysis is unremarkable, her urinary tract infection has resolved.    She will continue monthly parenteral B12    Cognitive function remains relatively stable.  She has recently begun memantine in addition to donepezil.    Neuropathy secondary to B12 deficiency persists.  She will continue B12 supplementation and gabapentin.    Tramadol helps control knee pain, she will continue taking tramadol plus Tylenol along with Voltaren gel.    Anxiety is reasonably well controlled on buspirone and sertraline.          Plan of care reviewed with patient at the conclusion of today's visit. Education was provided regarding diagnosis, management, and any prescribed or recommended OTC medications.Patient verbalizes understanding of and agreement with management plan.         Eric Patel MD

## 2020-01-27 ENCOUNTER — TELEPHONE (OUTPATIENT)
Dept: INTERNAL MEDICINE | Facility: CLINIC | Age: 85
End: 2020-01-27

## 2020-01-27 RX ORDER — OMEPRAZOLE 20 MG/1
20 CAPSULE, DELAYED RELEASE ORAL DAILY
Qty: 30 CAPSULE | Refills: 1 | Status: SHIPPED | OUTPATIENT
Start: 2020-01-27 | End: 2020-03-23

## 2020-01-27 RX ORDER — PROMETHAZINE HYDROCHLORIDE 25 MG/1
TABLET ORAL
Qty: 30 TABLET | Refills: 0 | Status: SHIPPED | OUTPATIENT
Start: 2020-01-27 | End: 2020-08-09 | Stop reason: HOSPADM

## 2020-01-27 NOTE — TELEPHONE ENCOUNTER
Patient's daughter-in-law, Chris, states that she has been having chronic nausea for the past week.  She has been taking Phenergan and Zofran but they do not seem to be working.   Does she need to come in and see him? Can she get something else prescribed?    [address]

## 2020-01-27 NOTE — TELEPHONE ENCOUNTER
Daugher in law, Chris,  says patients bp has been running high all weekend. 156/102, 159/88, 143/104. All these were taking today. Pt was concerned and wanted to speak with someone.

## 2020-01-27 NOTE — TELEPHONE ENCOUNTER
Suggest she increase lisinopril to twice a day.  For nausea, make sure she is taking her sucralfate.  If she is, suggest a trial of omeprazole 20 mg daily.

## 2020-01-27 NOTE — TELEPHONE ENCOUNTER
Called and talked to Victor M (pt son) He said she is taking sucralfate and would like to try omeprazole. He will increase lisinopril. Rx to pharm

## 2020-01-27 NOTE — TELEPHONE ENCOUNTER
Called and spoke with patient's daughter Jan [phone] to get further information.  She stated that she had been giving her mother Lisinopril 20mg once a day.  I told her that Dr. Patel instructed for it to be taken twice a day.  She denied her being upset or stressed about anything. Daughter verbalized understanding.     The daughter did say that her mother has been constantly nauseated for over a week.  She does have a hiatal hernia per the daughter said Promethazine or Zofran does not help.  Is there anything else she can take for the nausea?

## 2020-01-27 NOTE — TELEPHONE ENCOUNTER
We saw her a week ago and her blood pressure was controlled.  Is she otherwise doing OK?  Is she taking her lisinopril twice a day?  Has she been upset or more stressed about anything?  Any other symptpoms?

## 2020-01-29 ENCOUNTER — OFFICE VISIT (OUTPATIENT)
Dept: INTERNAL MEDICINE | Facility: CLINIC | Age: 85
End: 2020-01-29

## 2020-01-29 ENCOUNTER — LAB (OUTPATIENT)
Dept: LAB | Facility: HOSPITAL | Age: 85
End: 2020-01-29

## 2020-01-29 VITALS
HEART RATE: 84 BPM | DIASTOLIC BLOOD PRESSURE: 74 MMHG | HEIGHT: 60 IN | TEMPERATURE: 100.4 F | WEIGHT: 117 LBS | BODY MASS INDEX: 22.97 KG/M2 | SYSTOLIC BLOOD PRESSURE: 126 MMHG

## 2020-01-29 DIAGNOSIS — R11.2 NAUSEA AND VOMITING, INTRACTABILITY OF VOMITING NOT SPECIFIED, UNSPECIFIED VOMITING TYPE: ICD-10-CM

## 2020-01-29 DIAGNOSIS — R82.90 UNSPECIFIED ABNORMAL FINDINGS IN URINE: ICD-10-CM

## 2020-01-29 DIAGNOSIS — J10.1 INFLUENZA A: Primary | ICD-10-CM

## 2020-01-29 LAB
BILIRUB BLD-MCNC: NEGATIVE MG/DL
CLARITY, POC: ABNORMAL
COLOR UR: ABNORMAL
EXPIRATION DATE: ABNORMAL
FLUAV RNA RESP QL NAA+PROBE: POSITIVE
FLUBV RNA RESP QL NAA+PROBE: NEGATIVE
GLUCOSE UR STRIP-MCNC: NEGATIVE MG/DL
INTERNAL CONTROL: ABNORMAL
KETONES UR QL: NEGATIVE
LEUKOCYTE EST, POC: NEGATIVE
Lab: ABNORMAL
NITRITE UR-MCNC: NEGATIVE MG/ML
PH UR: 7 [PH] (ref 5–8)
PROT UR STRIP-MCNC: ABNORMAL MG/DL
RBC # UR STRIP: ABNORMAL /UL
SP GR UR: 1.02 (ref 1–1.03)
UROBILINOGEN UR QL: NORMAL

## 2020-01-29 PROCEDURE — 99213 OFFICE O/P EST LOW 20 MIN: CPT | Performed by: NURSE PRACTITIONER

## 2020-01-29 PROCEDURE — 87086 URINE CULTURE/COLONY COUNT: CPT

## 2020-01-29 PROCEDURE — 81003 URINALYSIS AUTO W/O SCOPE: CPT | Performed by: NURSE PRACTITIONER

## 2020-01-29 PROCEDURE — 87502 INFLUENZA DNA AMP PROBE: CPT | Performed by: NURSE PRACTITIONER

## 2020-01-29 RX ORDER — OSELTAMIVIR PHOSPHATE 75 MG/1
75 CAPSULE ORAL 2 TIMES DAILY
Qty: 10 CAPSULE | Refills: 0 | Status: ON HOLD | OUTPATIENT
Start: 2020-01-29 | End: 2020-08-03

## 2020-01-29 NOTE — PATIENT INSTRUCTIONS
"Influenza, Adult  Influenza is also called \"the flu.\" It is an infection in the lungs, nose, and throat (respiratory tract). It is caused by a virus. The flu causes symptoms that are similar to symptoms of a cold. It also causes a high fever and body aches.  The flu spreads easily from person to person (is contagious). Getting a flu shot (influenza vaccination) every year is the best way to prevent the flu.  What are the causes?  This condition is caused by the influenza virus. You can get the virus by:  · Breathing in droplets that are in the air from the cough or sneeze of a person who has the virus.  · Touching something that has the virus on it (is contaminated) and then touching your mouth, nose, or eyes.  What increases the risk?  Certain things may make you more likely to get the flu. These include:  · Not washing your hands often.  · Having close contact with many people during cold and flu season.  · Touching your mouth, eyes, or nose without first washing your hands.  · Not getting a flu shot every year.  You may have a higher risk for the flu, along with serious problems such as a lung infection (pneumonia), if you:  · Are older than 65.  · Are pregnant.  · Have a weakened disease-fighting system (immune system) because of a disease or taking certain medicines.  · Have a long-term (chronic) illness, such as:  ? Heart, kidney, or lung disease.  ? Diabetes.  ? Asthma.  · Have a liver disorder.  · Are very overweight (morbidly obese).  · Have anemia. This is a condition that affects your red blood cells.  What are the signs or symptoms?  Symptoms usually begin suddenly and last 4-14 days. They may include:  · Fever and chills.  · Headaches, body aches, or muscle aches.  · Sore throat.  · Cough.  · Runny or stuffy (congested) nose.  · Chest discomfort.  · Not wanting to eat as much as normal (poor appetite).  · Weakness or feeling tired (fatigue).  · Dizziness.  · Feeling sick to your stomach (nauseous) or " throwing up (vomiting).  How is this treated?  If the flu is found early, you can be treated with medicine that can help reduce how bad the illness is and how long it lasts (antiviral medicine). This may be given by mouth (orally) or through an IV tube.  Taking care of yourself at home can help your symptoms get better. Your doctor may suggest:  · Taking over-the-counter medicines.  · Drinking plenty of fluids.  The flu often goes away on its own. If you have very bad symptoms or other problems, you may be treated in a hospital.  Follow these instructions at home:         Activity  · Rest as needed. Get plenty of sleep.  · Stay home from work or school as told by your doctor.  ? Do not leave home until you do not have a fever for 24 hours without taking medicine.  ? Leave home only to visit your doctor.  Eating and drinking  · Take an ORS (oral rehydration solution). This is a drink that is sold at pharmacies and stores.  · Drink enough fluid to keep your pee (urine) pale yellow.  · Drink clear fluids in small amounts as you are able. Clear fluids include:  ? Water.  ? Ice chips.  ? Fruit juice that has water added (diluted fruit juice).  ? Low-calorie sports drinks.  · Eat bland, easy-to-digest foods in small amounts as you are able. These foods include:  ? Bananas.  ? Applesauce.  ? Rice.  ? Lean meats.  ? Toast.  ? Crackers.  · Do not eat or drink:  ? Fluids that have a lot of sugar or caffeine.  ? Alcohol.  ? Spicy or fatty foods.  General instructions  · Take over-the-counter and prescription medicines only as told by your doctor.  · Use a cool mist humidifier to add moisture to the air in your home. This can make it easier for you to breathe.  · Cover your mouth and nose when you cough or sneeze.  · Wash your hands with soap and water often, especially after you cough or sneeze. If you cannot use soap and water, use alcohol-based hand .  · Keep all follow-up visits as told by your doctor. This is  "important.  How is this prevented?    · Get a flu shot every year. You may get the flu shot in late summer, fall, or winter. Ask your doctor when you should get your flu shot.  · Avoid contact with people who are sick during fall and winter (cold and flu season).  Contact a doctor if:  · You get new symptoms.  · You have:  ? Chest pain.  ? Watery poop (diarrhea).  ? A fever.  · Your cough gets worse.  · You start to have more mucus.  · You feel sick to your stomach.  · You throw up.  Get help right away if you:  · Have shortness of breath.  · Have trouble breathing.  · Have skin or nails that turn a bluish color.  · Have very bad pain or stiffness in your neck.  · Get a sudden headache.  · Get sudden pain in your face or ear.  · Cannot eat or drink without throwing up.  Summary  · Influenza (\"the flu\") is an infection in the lungs, nose, and throat. It is caused by a virus.  · Take over-the-counter and prescription medicines only as told by your doctor.  · Getting a flu shot every year is the best way to avoid getting the flu.  This information is not intended to replace advice given to you by your health care provider. Make sure you discuss any questions you have with your health care provider.  Document Released: 09/26/2009 Document Revised: 06/05/2019 Document Reviewed: 06/05/2019  [employer] Interactive Patient Education © 2019 [employer]    "

## 2020-01-29 NOTE — PROGRESS NOTES
Temi Jarrett  5/8/1930  [id number]  Patient Care Team:  Eric Patel MD as PCP - General  Eric Patel MD as PCP - Family Medicine  Eric Patel MD as PCP - Claims Attributed    Temi Jarrett is a pleasant 89 y.o. female who presents for evaluation of Vomiting and Nausea    This patient is accompanied by their daughter-in-law who contributes to the history of their care.  Chief Complaint   Patient presents with   • Vomiting   • Nausea       HPI:   Nausea sine mid Jan.  Vomiting last few days with dry cough.  No abd pain, urinary sx. denies fever or chills at home. Appetite dec more than normal last 2-3 days.  Weight loss of 8 lbs since visit here 9 days ago.      Hx of hiatal hernia, reflux, recurrent urosepsis.    BP better when inc. Lisinopril to BID  On 1/27  Past Medical History:   Diagnosis Date   • Arthritis    • Dementia (CMS/HCC)    • Depression    • Disease of thyroid gland    • Gastric polyp    • GERD (gastroesophageal reflux disease)    • History of colonic polyps    • Hyperlipidemia    • Hypertension    • IBS (irritable bowel syndrome)    • Macular degeneration    • Torn meniscus     right knee      Past Surgical History:   Procedure Laterality Date   • CHOLECYSTECTOMY  1997   • EYE SURGERY  1998    Cataract extraction   • HERNIA REPAIR     • HIP HEMIARTHROPLASTY Left 9/28/2019    Procedure: HIP HEMIARTHROPLASTY LEFT;  Surgeon: Reddy Segundo Jr., MD;  Location: [facility];  Service: Orthopedics   • HYSTERECTOMY  1976   • KNEE SURGERY Right     arthroscopy- 2000   • TONSILLECTOMY       Family History   Problem Relation Age of Onset   • Hypertension Mother    • Breast cancer Mother    • Obesity Mother    • Hypertension Father    • Diabetes Son      Social History     Tobacco Use   Smoking Status Never Smoker   Smokeless Tobacco Never Used     Allergies   Allergen Reactions   • Codeine Nausea Only     Trouble Breathing    • Phenylpropanolamine Provider Review Needed   •  Shrimp Hives       Current Outpatient Medications:   •  acetaminophen (TYLENOL) 325 MG tablet, Take 650 mg by mouth Every 6 (Six) Hours As Needed for Mild Pain ., Disp: , Rfl:   •  aspirin 81 MG EC tablet, Take 81 mg by mouth Daily., Disp: , Rfl:   •  bethanechol (URECHOLINE) 10 MG tablet, Take 1 tablet by mouth 4 (Four) Times a Day., Disp: 120 tablet, Rfl: 0  •  busPIRone (BUSPAR) 10 MG tablet, Take 1 tablet by mouth 2 (Two) Times a Day., Disp: 60 tablet, Rfl: 5  •  Cholecalciferol (VITAMIN D PO), 2000 U qd, Disp: , Rfl:   •  diclofenac (VOLTAREN) 1 % gel gel, Apply 4 g topically to the appropriate area as directed 4 (Four) Times a Day., Disp: 100 g, Rfl: 5  •  Diphenoxylate-Atropine (LOMOTIL PO), Take 10 mg by mouth Daily As Needed., Disp: , Rfl:   •  docusate sodium (COLACE) 100 MG capsule, Take 1 capsule by mouth 2 (Two) Times a Day As Needed for Constipation., Disp: , Rfl:   •  donepezil (ARICEPT) 10 MG tablet, Take 1 tablet by mouth every night at bedtime., Disp: 90 tablet, Rfl: 3  •  gabapentin (NEURONTIN) 300 MG capsule, Take 1 capsule by mouth 2 (Two) Times a Day., Disp: 180 capsule, Rfl: 0  •  Lactobacillus tablet, Take 1 tablet by mouth Daily., Disp: , Rfl:   •  levothyroxine (SYNTHROID) 175 MCG tablet, Take 1 tablet by mouth Daily. Patient receives medication from patient assistance.  NDC:1975-9408-85 EXP:07/02/2020 LOT [id number], Disp: 90 tablet, Rfl: 0  •  lisinopril (PRINIVIL,ZESTRIL) 20 MG tablet, Take 20 mg by mouth 2 (Two) Times a Day., Disp: , Rfl:   •  memantine (NAMENDA) 10 MG tablet, Take 1 tablet by mouth 2 (Two) Times a Day., Disp: 60 tablet, Rfl: 5  •  methocarbamol (ROBAXIN) 500 MG tablet, Take 1 tablet by mouth 4 (Four) Times a Day., Disp: 120 tablet, Rfl: 0  •  Multiple Vitamins-Minerals (PRESERVISION AREDS PO), Take 1 tablet by mouth Daily., Disp: , Rfl:   •  omeprazole (PrilOSEC) 20 MG capsule, Take 1 capsule by mouth Daily., Disp: 30 capsule, Rfl: 1  •  ondansetron ODT (ZOFRAN-ODT) 4 MG  disintegrating tablet, Take 1 tablet by mouth Every 8 (Eight) Hours As Needed for Nausea or Vomiting., Disp: 20 tablet, Rfl: 0  •  potassium chloride (MICRO-K) 10 MEQ CR capsule, TAKE TWO CAPSULES BY MOUTH DAILY, Disp: 180 capsule, Rfl: 2  •  promethazine (PHENERGAN) 25 MG tablet, TAKE ONE-HALF TO ONE TABLET BY MOUTH EVERY 6 HOURS AS NEEDED FOR NAUSEA, Disp: 30 tablet, Rfl: 0  •  sertraline (ZOLOFT) 100 MG tablet, Take 100 mg by mouth Daily., Disp: , Rfl:   •  sucralfate (CARAFATE) 1 g tablet, Take 1 tablet by mouth 3 (Three) Times a Day Before Meals., Disp: , Rfl:   •  traMADol (ULTRAM) 50 MG tablet, Take one tablet by mouth daily as needed for moderate pain  (right knee), Disp: 30 tablet, Rfl: 0  •  traZODone (DESYREL) 50 MG tablet, Take 0.5 tablets by mouth At Night As Needed for Sleep., Disp: 15 tablet, Rfl: 0  •  oseltamivir (TAMIFLU) 75 MG capsule, Take 1 capsule by mouth 2 (Two) Times a Day., Disp: 10 capsule, Rfl: 0    Current Facility-Administered Medications:   •  cyanocobalamin injection 1,000 mcg, 1,000 mcg, Intramuscular, Q28 Days, Eric Patel MD, 1,000 mcg at 01/20/20 1556    Review of Systems   Constitutional: Negative for chills, fatigue and fever.   HENT: Negative for congestion, ear pain and sinus pressure.    Respiratory: Positive for cough. Negative for chest tightness, shortness of breath and wheezing.    Cardiovascular: Negative for chest pain and palpitations.   Gastrointestinal: Positive for diarrhea and nausea. Negative for abdominal pain, blood in stool and constipation.   Skin: Negative for color change.   Allergic/Immunologic: Negative for environmental allergies.   Neurological: Negative for dizziness, speech difficulty and headache.   Psychiatric/Behavioral: Negative for decreased concentration. The patient is not nervous/anxious.      /74 (BP Location: Left arm, Patient Position: Sitting, Cuff Size: Adult)   Pulse 84   Temp 100.4 °F (38 °C) (Temporal)   Ht 152.4 cm  "(60\")   Wt 53.1 kg (117 lb)   LMP  (LMP Unknown)   BMI 22.85 kg/m²     Physical Exam   Constitutional: She appears well-developed and well-nourished.   HENT:   Head: Normocephalic and atraumatic.   Right Ear: External ear normal.   Left Ear: External ear normal.   Mouth/Throat: Oropharynx is clear and moist.   Eyes: Conjunctivae and EOM are normal.   Neck: Normal range of motion. Neck supple.   Cardiovascular: Normal rate, regular rhythm and normal heart sounds.   Pulmonary/Chest: Effort normal and breath sounds normal. No respiratory distress. She has no wheezes. She has no rales.   Musculoskeletal: Normal range of motion.   Neurological: She is alert.   Skin: Skin is warm and dry.   Psychiatric: She has a normal mood and affect. Her behavior is normal. Thought content normal.       Results Review:  I reviewed the patient's new clinical results.    Assessment/Plan:  Temi was seen today for vomiting and nausea.    Diagnoses and all orders for this visit:    Influenza A  Comments:  tamiflu BID x 5 days, encourage good hydration    Nausea and vomiting, intractability of vomiting not specified, unspecified vomiting type  Comments:  will send ua for cx  Orders:  -     Cancel: Urinalysis With Culture If Indicated - Urine, Clean Catch; Future  -     POCT Flu A&B, Molecular  -     POC Urinalysis Dipstick, Automated  -     Urine Culture - Urine, Urine, Clean Catch; Future    Unspecified abnormal findings in urine   -     Urine Culture - Urine, Urine, Clean Catch; Future    Other orders  -     oseltamivir (TAMIFLU) 75 MG capsule; Take 1 capsule by mouth 2 (Two) Times a Day.       Patient Instructions   Influenza, Adult  Influenza is also called \"the flu.\" It is an infection in the lungs, nose, and throat (respiratory tract). It is caused by a virus. The flu causes symptoms that are similar to symptoms of a cold. It also causes a high fever and body aches.  The flu spreads easily from person to person (is contagious). " Getting a flu shot (influenza vaccination) every year is the best way to prevent the flu.  What are the causes?  This condition is caused by the influenza virus. You can get the virus by:  · Breathing in droplets that are in the air from the cough or sneeze of a person who has the virus.  · Touching something that has the virus on it (is contaminated) and then touching your mouth, nose, or eyes.  What increases the risk?  Certain things may make you more likely to get the flu. These include:  · Not washing your hands often.  · Having close contact with many people during cold and flu season.  · Touching your mouth, eyes, or nose without first washing your hands.  · Not getting a flu shot every year.  You may have a higher risk for the flu, along with serious problems such as a lung infection (pneumonia), if you:  · Are older than 65.  · Are pregnant.  · Have a weakened disease-fighting system (immune system) because of a disease or taking certain medicines.  · Have a long-term (chronic) illness, such as:  ? Heart, kidney, or lung disease.  ? Diabetes.  ? Asthma.  · Have a liver disorder.  · Are very overweight (morbidly obese).  · Have anemia. This is a condition that affects your red blood cells.  What are the signs or symptoms?  Symptoms usually begin suddenly and last 4-14 days. They may include:  · Fever and chills.  · Headaches, body aches, or muscle aches.  · Sore throat.  · Cough.  · Runny or stuffy (congested) nose.  · Chest discomfort.  · Not wanting to eat as much as normal (poor appetite).  · Weakness or feeling tired (fatigue).  · Dizziness.  · Feeling sick to your stomach (nauseous) or throwing up (vomiting).  How is this treated?  If the flu is found early, you can be treated with medicine that can help reduce how bad the illness is and how long it lasts (antiviral medicine). This may be given by mouth (orally) or through an IV tube.  Taking care of yourself at home can help your symptoms get better.  Your doctor may suggest:  · Taking over-the-counter medicines.  · Drinking plenty of fluids.  The flu often goes away on its own. If you have very bad symptoms or other problems, you may be treated in a hospital.  Follow these instructions at home:         Activity  · Rest as needed. Get plenty of sleep.  · Stay home from work or school as told by your doctor.  ? Do not leave home until you do not have a fever for 24 hours without taking medicine.  ? Leave home only to visit your doctor.  Eating and drinking  · Take an ORS (oral rehydration solution). This is a drink that is sold at pharmacies and stores.  · Drink enough fluid to keep your pee (urine) pale yellow.  · Drink clear fluids in small amounts as you are able. Clear fluids include:  ? Water.  ? Ice chips.  ? Fruit juice that has water added (diluted fruit juice).  ? Low-calorie sports drinks.  · Eat bland, easy-to-digest foods in small amounts as you are able. These foods include:  ? Bananas.  ? Applesauce.  ? Rice.  ? Lean meats.  ? Toast.  ? Crackers.  · Do not eat or drink:  ? Fluids that have a lot of sugar or caffeine.  ? Alcohol.  ? Spicy or fatty foods.  General instructions  · Take over-the-counter and prescription medicines only as told by your doctor.  · Use a cool mist humidifier to add moisture to the air in your home. This can make it easier for you to breathe.  · Cover your mouth and nose when you cough or sneeze.  · Wash your hands with soap and water often, especially after you cough or sneeze. If you cannot use soap and water, use alcohol-based hand .  · Keep all follow-up visits as told by your doctor. This is important.  How is this prevented?    · Get a flu shot every year. You may get the flu shot in late summer, fall, or winter. Ask your doctor when you should get your flu shot.  · Avoid contact with people who are sick during fall and winter (cold and flu season).  Contact a doctor if:  · You get new symptoms.  · You  "have:  ? Chest pain.  ? Watery poop (diarrhea).  ? A fever.  · Your cough gets worse.  · You start to have more mucus.  · You feel sick to your stomach.  · You throw up.  Get help right away if you:  · Have shortness of breath.  · Have trouble breathing.  · Have skin or nails that turn a bluish color.  · Have very bad pain or stiffness in your neck.  · Get a sudden headache.  · Get sudden pain in your face or ear.  · Cannot eat or drink without throwing up.  Summary  · Influenza (\"the flu\") is an infection in the lungs, nose, and throat. It is caused by a virus.  · Take over-the-counter and prescription medicines only as told by your doctor.  · Getting a flu shot every year is the best way to avoid getting the flu.  This information is not intended to replace advice given to you by your health care provider. Make sure you discuss any questions you have with your health care provider.  Document Released: 09/26/2009 Document Revised: 06/05/2019 Document Reviewed: 06/05/2019  [employer] Interactive Patient Education © 2019 [employer]      Plan of care reviewed with patient at the conclusion of today's visit. Education was provided regarding diagnosis, management and any prescribed or recommended OTC medications.  Patient verbalizes understanding of and agreement with management plan.    Return if symptoms worsen or fail to improve.    *Note that portions of this note were completed with a voice recognition program.  Efforts were made to edit the dictation but occasionally words are transcribed.    MAIDA Del Cid          "

## 2020-01-31 ENCOUNTER — TELEPHONE (OUTPATIENT)
Dept: INTERNAL MEDICINE | Facility: CLINIC | Age: 85
End: 2020-01-31

## 2020-01-31 LAB — BACTERIA SPEC AEROBE CULT: NO GROWTH

## 2020-01-31 NOTE — TELEPHONE ENCOUNTER
----- Message from MAIDA Del Cid sent at 1/31/2020  7:49 AM EST -----  Tell her there is no uti, tell daughter in law Chris as well

## 2020-02-04 ENCOUNTER — TELEPHONE (OUTPATIENT)
Dept: INTERNAL MEDICINE | Facility: CLINIC | Age: 85
End: 2020-02-04

## 2020-02-04 NOTE — TELEPHONE ENCOUNTER
Kamryn with [facility] called to get verbal orders for the patient.    She has requested be contacted at [phone] to clarify and confirm what exactly is needed by someone clinical in the office.

## 2020-02-17 ENCOUNTER — CLINICAL SUPPORT (OUTPATIENT)
Dept: INTERNAL MEDICINE | Facility: CLINIC | Age: 85
End: 2020-02-17

## 2020-02-17 DIAGNOSIS — E53.8 VITAMIN B 12 DEFICIENCY: ICD-10-CM

## 2020-02-17 PROCEDURE — 96372 THER/PROPH/DIAG INJ SC/IM: CPT | Performed by: INTERNAL MEDICINE

## 2020-02-17 RX ADMIN — CYANOCOBALAMIN 1000 MCG: 1000 INJECTION, SOLUTION INTRAMUSCULAR; SUBCUTANEOUS at 16:56

## 2020-02-18 DIAGNOSIS — G89.29 OTHER CHRONIC PAIN: ICD-10-CM

## 2020-02-18 RX ORDER — BETHANECHOL CHLORIDE 10 MG/1
10 TABLET ORAL 4 TIMES DAILY
Qty: 120 TABLET | Refills: 0 | Status: SHIPPED | OUTPATIENT
Start: 2020-02-18 | End: 2020-03-20

## 2020-02-18 RX ORDER — TRAMADOL HYDROCHLORIDE 50 MG/1
TABLET ORAL
Qty: 30 TABLET | Refills: 0 | Status: SHIPPED | OUTPATIENT
Start: 2020-02-18 | End: 2020-03-23

## 2020-02-18 RX ORDER — METHOCARBAMOL 500 MG/1
500 TABLET, FILM COATED ORAL 4 TIMES DAILY
Qty: 120 TABLET | Refills: 0 | Status: SHIPPED | OUTPATIENT
Start: 2020-02-18 | End: 2020-03-20

## 2020-02-18 NOTE — TELEPHONE ENCOUNTER
Last appt  1/29/20    Next appt  4/17/20    Last fill for all - 1/2/20 for a 1 month supply    Jarrell dias

## 2020-02-20 ENCOUNTER — TELEPHONE (OUTPATIENT)
Dept: INTERNAL MEDICINE | Facility: CLINIC | Age: 85
End: 2020-02-20

## 2020-02-24 ENCOUNTER — TELEPHONE (OUTPATIENT)
Dept: INTERNAL MEDICINE | Facility: CLINIC | Age: 85
End: 2020-02-24

## 2020-02-24 RX ORDER — LEVOTHYROXINE SODIUM 175 MCG
175 TABLET ORAL DAILY
Qty: 30 TABLET | Refills: 1 | Status: SHIPPED | OUTPATIENT
Start: 2020-02-24 | End: 2020-03-20 | Stop reason: SDUPTHER

## 2020-02-24 NOTE — TELEPHONE ENCOUNTER
Daughter-in-law dropped off note that pr needs synthroid (JUSTICE) sent to [facility] for 30 day supply. They are waiting on [facility] program and it could take about a month. [facility]

## 2020-02-26 ENCOUNTER — TELEPHONE (OUTPATIENT)
Dept: INTERNAL MEDICINE | Facility: CLINIC | Age: 85
End: 2020-02-26

## 2020-02-26 NOTE — TELEPHONE ENCOUNTER
Shelli with [facility] called and stated that she needs a faxed order saying to add diagnosis of pernicious anemia. Once diagnosis is added then she would an order for b-12 injections sent over to [facility]. Shelli also stated that an order for the b-12 serum would need to be sent to [facility] on [address].    Fax # is [phone]

## 2020-02-28 ENCOUNTER — TELEPHONE (OUTPATIENT)
Dept: INTERNAL MEDICINE | Facility: CLINIC | Age: 85
End: 2020-02-28

## 2020-02-28 NOTE — TELEPHONE ENCOUNTER
Kush from [facility] called as a follow up call requesting documentation that patient has pernicious anemia.    Faxed office note dated 1/20/20 with diagnosis listed.  Faxed to [phone]

## 2020-03-05 ENCOUNTER — TELEPHONE (OUTPATIENT)
Dept: INTERNAL MEDICINE | Facility: CLINIC | Age: 85
End: 2020-03-05

## 2020-03-05 NOTE — TELEPHONE ENCOUNTER
MARIA ANTONIA FROM [facility] STATED THAT PT NEEDS A OCCUPATIONAL THERAPY EVALUATION FOR HOME MODIFICATION.    PLEASE ADVISE.  CALL BACK: [phone]

## 2020-03-06 DIAGNOSIS — D51.0 PERNICIOUS ANEMIA: Primary | ICD-10-CM

## 2020-03-09 RX ORDER — BUSPIRONE HYDROCHLORIDE 10 MG/1
TABLET ORAL
Qty: 60 TABLET | Refills: 4 | Status: ON HOLD | OUTPATIENT
Start: 2020-03-09 | End: 2020-08-04

## 2020-03-11 ENCOUNTER — TELEPHONE (OUTPATIENT)
Dept: INTERNAL MEDICINE | Facility: CLINIC | Age: 85
End: 2020-03-11

## 2020-03-11 NOTE — TELEPHONE ENCOUNTER
GINA, FROM [facility], CALLED AND STATED THAT THE FAMILY OF THE PATIENT REQUESTED A VERBAL ORDER TO [facility] BUT NOT THE NURSE.    GINA'S CALLBACK # [phone]

## 2020-03-13 ENCOUNTER — TELEPHONE (OUTPATIENT)
Dept: INTERNAL MEDICINE | Facility: CLINIC | Age: 85
End: 2020-03-13

## 2020-03-13 DIAGNOSIS — E53.8 VITAMIN B 12 DEFICIENCY: Primary | ICD-10-CM

## 2020-03-13 RX ORDER — CYANOCOBALAMIN 1000 UG/ML
1000 INJECTION, SOLUTION INTRAMUSCULAR; SUBCUTANEOUS
Qty: 1 ML | Refills: 5 | Status: SHIPPED | OUTPATIENT
Start: 2020-03-13 | End: 2020-10-09

## 2020-03-13 RX ORDER — SYRINGE W-NEEDLE,DISPOSAB,3 ML 25GX5/8"
SYRINGE, EMPTY DISPOSABLE MISCELLANEOUS
Qty: 1 EACH | Refills: 5 | Status: SHIPPED | OUTPATIENT
Start: 2020-03-13 | End: 2020-10-09

## 2020-03-13 NOTE — TELEPHONE ENCOUNTER
B 12 injection with syringe. Please call this in for her, [facility] will be giving this to her next week.     [facility]

## 2020-03-20 ENCOUNTER — TELEPHONE (OUTPATIENT)
Dept: INTERNAL MEDICINE | Facility: CLINIC | Age: 85
End: 2020-03-20

## 2020-03-20 RX ORDER — METHOCARBAMOL 500 MG/1
TABLET, FILM COATED ORAL
Qty: 120 TABLET | Refills: 5 | Status: ON HOLD | OUTPATIENT
Start: 2020-03-20 | End: 2020-08-09 | Stop reason: SDUPTHER

## 2020-03-20 RX ORDER — LEVOTHYROXINE SODIUM 175 MCG
175 TABLET ORAL DAILY
Qty: 90 TABLET | Refills: 1 | Status: SHIPPED | OUTPATIENT
Start: 2020-03-20 | End: 2020-12-03 | Stop reason: SDUPTHER

## 2020-03-20 RX ORDER — BETHANECHOL CHLORIDE 10 MG/1
TABLET ORAL
Qty: 120 TABLET | Refills: 5 | Status: SHIPPED | OUTPATIENT
Start: 2020-03-20 | End: 2020-10-09

## 2020-03-20 NOTE — TELEPHONE ENCOUNTER
Pt's daughter in law called to request a 90 day prescription for Synthroid 175 MGC . She stated a 30 day had been sent but the 90 day is needed.    [facility]acy: [facility]  Ph: [phone]  FAX: [phone]

## 2020-03-22 DIAGNOSIS — G89.29 OTHER CHRONIC PAIN: ICD-10-CM

## 2020-03-23 RX ORDER — TRAMADOL HYDROCHLORIDE 50 MG/1
TABLET ORAL
Qty: 30 TABLET | Refills: 0 | Status: SHIPPED | OUTPATIENT
Start: 2020-03-23 | End: 2020-04-29

## 2020-03-23 RX ORDER — OMEPRAZOLE 20 MG/1
CAPSULE, DELAYED RELEASE ORAL
Qty: 30 CAPSULE | Refills: 0 | Status: SHIPPED | OUTPATIENT
Start: 2020-03-23 | End: 2020-04-20

## 2020-03-27 ENCOUNTER — TELEPHONE (OUTPATIENT)
Dept: INTERNAL MEDICINE | Facility: CLINIC | Age: 85
End: 2020-03-27

## 2020-03-27 NOTE — TELEPHONE ENCOUNTER
Called and talked to Celestina with [facility]. She said they just need the form signed and faxed back. Advised all outstanding forms were completed today

## 2020-04-01 ENCOUNTER — TELEPHONE (OUTPATIENT)
Dept: INTERNAL MEDICINE | Facility: CLINIC | Age: 85
End: 2020-04-01

## 2020-04-06 RX ORDER — MEMANTINE HYDROCHLORIDE 10 MG/1
TABLET ORAL
Qty: 60 TABLET | Refills: 4 | Status: SHIPPED | OUTPATIENT
Start: 2020-04-06 | End: 2020-07-08

## 2020-04-06 RX ORDER — HYDROCHLOROTHIAZIDE 25 MG/1
TABLET ORAL
Qty: 90 TABLET | Refills: 0 | OUTPATIENT
Start: 2020-04-06

## 2020-04-13 RX ORDER — HYDROCHLOROTHIAZIDE 25 MG/1
TABLET ORAL
Qty: 90 TABLET | Refills: 0 | OUTPATIENT
Start: 2020-04-13

## 2020-04-20 RX ORDER — OMEPRAZOLE 20 MG/1
CAPSULE, DELAYED RELEASE ORAL
Qty: 30 CAPSULE | Refills: 5 | Status: SHIPPED | OUTPATIENT
Start: 2020-04-20 | End: 2020-09-22

## 2020-04-28 DIAGNOSIS — G63 POLYNEUROPATHY ASSOCIATED WITH UNDERLYING DISEASE (HCC): ICD-10-CM

## 2020-04-28 DIAGNOSIS — G89.29 OTHER CHRONIC PAIN: ICD-10-CM

## 2020-04-28 NOTE — TELEPHONE ENCOUNTER
Last refill:  1/20/20 #180/0 Last office visit:1/29/20  Next office visit: 9/16/20 Jarrell: in process

## 2020-04-29 RX ORDER — GABAPENTIN 300 MG/1
CAPSULE ORAL
Qty: 180 CAPSULE | Refills: 0 | Status: SHIPPED | OUTPATIENT
Start: 2020-04-29 | End: 2020-08-10

## 2020-04-29 RX ORDER — TRAMADOL HYDROCHLORIDE 50 MG/1
TABLET ORAL
Qty: 30 TABLET | Refills: 0 | Status: SHIPPED | OUTPATIENT
Start: 2020-04-29 | End: 2020-06-01

## 2020-04-29 RX ORDER — HYDROCHLOROTHIAZIDE 25 MG/1
TABLET ORAL
Qty: 90 TABLET | Refills: 0 | Status: SHIPPED | OUTPATIENT
Start: 2020-04-29 | End: 2020-07-31 | Stop reason: SDUPTHER

## 2020-05-11 DIAGNOSIS — K59.1 FUNCTIONAL DIARRHEA: Primary | ICD-10-CM

## 2020-05-11 RX ORDER — DIPHENOXYLATE HYDROCHLORIDE AND ATROPINE SULFATE 2.5; .025 MG/1; MG/1
1 TABLET ORAL 4 TIMES DAILY PRN
Qty: 120 TABLET | Refills: 2 | Status: SHIPPED | OUTPATIENT
Start: 2020-05-11 | End: 2020-12-02

## 2020-05-11 NOTE — TELEPHONE ENCOUNTER
Message attached to rx request:    [facility] prescription was discontinued on 11/21/2019 by Darcy Cordova MD for the following reason: Stop Taking at Discharge.    Do you want to fill?

## 2020-05-11 NOTE — TELEPHONE ENCOUNTER
Patient's daughter Chris requested Diphenoxylate-Atropine (LOMOTIL PO) 2.5-0.025 MG tablets. Patient takes 4 tablets by mouth twice a day. Patient was prescribed this by Dr. Antunez who has retired.     Please call and advise. Chris's call back [phone]    [address] [phone] [facility] [phone] FX

## 2020-05-28 ENCOUNTER — OFFICE VISIT (OUTPATIENT)
Dept: ORTHOPEDIC SURGERY | Facility: CLINIC | Age: 85
End: 2020-05-28

## 2020-05-28 DIAGNOSIS — M17.11 PRIMARY OSTEOARTHRITIS OF RIGHT KNEE: Primary | ICD-10-CM

## 2020-05-28 PROCEDURE — 99443 PR PHYS/QHP TELEPHONE EVALUATION 21-30 MIN: CPT | Performed by: ORTHOPAEDIC SURGERY

## 2020-05-28 NOTE — PROGRESS NOTES
[facility] Telephone/Video Visit Note        Subjective     You have chosen to receive care through a telephone visit. Do you consent to use a telephone visit for your medical care today? Yes    CC: Follow-up of the Right Knee (4 months)      MARY Jarrett is a 89 y.o. female.  Patient is contacted via telephone and is on speaker phone with her son and daughter-in-law for follow-up of her right knee.    You have chosen to receive care through a telehealth visit.  Do you consent to use a video/audio connection for your medical care today? Yes     ROS:    Constiutional:Pt denies fever, chills, nausea, or vomiting.  MSK:as above        Objective      Past Medical History  Past Medical History:   Diagnosis Date   • Arthritis    • Dementia (CMS/HCC)    • Depression    • Disease of thyroid gland    • Gastric polyp    • GERD (gastroesophageal reflux disease)    • History of colonic polyps    • Hyperlipidemia    • Hypertension    • IBS (irritable bowel syndrome)    • Macular degeneration    • Torn meniscus     right knee          Telephone/video visit Notes:  Patient was last injected in January 2020 and is in severe pain  Family does not want to get her out and exposed to COVID  Family does not want to get out and expose himself to COVID  They have asked whether or not we can come to the house to injector or have a service come to her house and injector.  I suggested trying [facility] to see if that is an option.  I do not know of any home health service that will inject the patient intra-articularly.  We recommended trying ice as well  I have recommended that the patient be seen in the office for an intra-articular injection anterior medially with Kenalog.  We can do this on a Monday in [facility] and use room 9 and bring the patient in the back door to minimize exposure.  I believe this is a reasonable compromise.        Assessment    Assessment:  1. Primary osteoarthritis of right knee         Plan:  1. Recommend over the counter anti-inflammatories for pain and/or swelling  2. Right knee arthritis--schedule an office visit for right knee injection.    A total of 30 minutes was spent before, during, and after the visit for patient care, counseling and care coordination.  This visit has been rescheduled as a phone visit to comply with patient safety concerns in accordance with CDC recommendations. Total time of discussion was 30  minutes.          Gamal Denny MD  05/28/20  17:30

## 2020-06-01 DIAGNOSIS — G89.29 OTHER CHRONIC PAIN: ICD-10-CM

## 2020-06-01 RX ORDER — TRAMADOL HYDROCHLORIDE 50 MG/1
TABLET ORAL
Qty: 30 TABLET | Refills: 0 | Status: SHIPPED | OUTPATIENT
Start: 2020-06-01 | End: 2020-07-08

## 2020-06-12 ENCOUNTER — TELEPHONE (OUTPATIENT)
Dept: ORTHOPEDIC SURGERY | Facility: CLINIC | Age: 85
End: 2020-06-12

## 2020-06-12 NOTE — TELEPHONE ENCOUNTER
PATIENT IS REQUESTING AN ORDER FOR KENALOG AND LAST OFFICE VISIT IS FAXED [phone]. THIS OFFICE WILL COME TO HER AND PROVIDE INFUSIONS.

## 2020-07-06 ENCOUNTER — TELEPHONE (OUTPATIENT)
Dept: INTERNAL MEDICINE | Facility: CLINIC | Age: 85
End: 2020-07-06

## 2020-07-06 DIAGNOSIS — G89.29 OTHER CHRONIC PAIN: ICD-10-CM

## 2020-07-06 RX ORDER — SUCRALFATE 1 G/1
1 TABLET ORAL
Qty: 270 TABLET | Refills: 1 | Status: SHIPPED | OUTPATIENT
Start: 2020-07-06 | End: 2021-01-04

## 2020-07-06 NOTE — TELEPHONE ENCOUNTER
Caller: Chris Jarrett    Relationship: Emergency Contact    Best call back number: [phone]     What medication are you requesting: sucralfate (CARAFATE) 1 g tablet    What are your current symptoms: GASTRO SYMPTOMS    How long have you been experiencing symptoms:    Have you had these symptoms before:      [x] Yes  [] No    Have you been treated for these symptoms before:     [x] Yes  [] No    If a prescription is needed, what is your preferred pharmacy and phone number: [facility] [address] [phone]       Additional notes PT DAUGHTER CALLED IN STATED HER MOMS GASTRO DOCTOR HAS RETIRED AND SHE WOULD LIKE HER MOMS PCP TO TAKE OVER FILLING FOR THE RX sucralfate (CARAFATE) 1 g tablet DAUGHTER IS FURTHER REQUESTING A 90 DY SUPPLY 1 TABLET BY MOUTH 3 TIMES A DAY PLEASE ADVISE

## 2020-07-08 RX ORDER — TRAMADOL HYDROCHLORIDE 50 MG/1
TABLET ORAL
Qty: 30 TABLET | Refills: 0 | Status: SHIPPED | OUTPATIENT
Start: 2020-07-08 | End: 2020-08-11

## 2020-07-08 RX ORDER — MEMANTINE HYDROCHLORIDE 10 MG/1
TABLET ORAL
Qty: 180 TABLET | Refills: 3 | Status: SHIPPED | OUTPATIENT
Start: 2020-07-08 | End: 2021-07-07 | Stop reason: SDUPTHER

## 2020-07-08 RX ORDER — LISINOPRIL 10 MG/1
TABLET ORAL
Qty: 360 TABLET | Refills: 0 | Status: SHIPPED | OUTPATIENT
Start: 2020-07-08 | End: 2020-08-09 | Stop reason: HOSPADM

## 2020-07-08 NOTE — TELEPHONE ENCOUNTER
Spoke to dgtr in law Chris she states she is taking lisinopril 10mg    2 tablets twice daily.      (Dgtr in law is not sure why she is taking 2 of the  10mg tabs twice daily instead of the 20mg )    Requests 90 day supply

## 2020-07-08 NOTE — TELEPHONE ENCOUNTER
Last appt  1/20/20    Next appt 9/16/20    Last fill tramadol  6/6/20  #30    Is lisinopril DC'd?    Jarrell dias

## 2020-07-31 RX ORDER — HYDROCHLOROTHIAZIDE 25 MG/1
25 TABLET ORAL DAILY
Qty: 90 TABLET | Refills: 1 | Status: ON HOLD | OUTPATIENT
Start: 2020-07-31 | End: 2020-08-03

## 2020-08-02 ENCOUNTER — APPOINTMENT (OUTPATIENT)
Dept: CT IMAGING | Facility: HOSPITAL | Age: 85
End: 2020-08-02

## 2020-08-02 ENCOUNTER — HOSPITAL ENCOUNTER (INPATIENT)
Facility: HOSPITAL | Age: 85
LOS: 6 days | Discharge: HOME HEALTH | End: 2020-08-09
Attending: EMERGENCY MEDICINE | Admitting: INTERNAL MEDICINE

## 2020-08-02 DIAGNOSIS — R41.82 ALTERED MENTAL STATUS, UNSPECIFIED ALTERED MENTAL STATUS TYPE: ICD-10-CM

## 2020-08-02 DIAGNOSIS — I48.91 ATRIAL FIBRILLATION WITH RAPID VENTRICULAR RESPONSE (HCC): ICD-10-CM

## 2020-08-02 DIAGNOSIS — R50.9 FEVER, UNSPECIFIED FEVER CAUSE: ICD-10-CM

## 2020-08-02 DIAGNOSIS — J18.9 PNEUMONIA OF BOTH LOWER LOBES DUE TO INFECTIOUS ORGANISM: Primary | ICD-10-CM

## 2020-08-02 DIAGNOSIS — Z74.09 IMPAIRED MOBILITY AND ADLS: ICD-10-CM

## 2020-08-02 DIAGNOSIS — D72.825 BANDEMIA: ICD-10-CM

## 2020-08-02 DIAGNOSIS — Z78.9 IMPAIRED MOBILITY AND ADLS: ICD-10-CM

## 2020-08-02 LAB
ALBUMIN SERPL-MCNC: 3.3 G/DL (ref 3.5–5.2)
ALBUMIN/GLOB SERPL: 1.1 G/DL
ALP SERPL-CCNC: 111 U/L (ref 39–117)
ALT SERPL W P-5'-P-CCNC: 13 U/L (ref 1–33)
ANION GAP SERPL CALCULATED.3IONS-SCNC: 11 MMOL/L (ref 5–15)
AST SERPL-CCNC: 21 U/L (ref 1–32)
BASOPHILS # BLD AUTO: 0.08 10*3/MM3 (ref 0–0.2)
BASOPHILS NFR BLD AUTO: 0.5 % (ref 0–1.5)
BILIRUB SERPL-MCNC: 0.7 MG/DL (ref 0–1.2)
BUN SERPL-MCNC: 20 MG/DL (ref 8–23)
BUN/CREAT SERPL: 24.7 (ref 7–25)
CALCIUM SPEC-SCNC: 8.5 MG/DL (ref 8.2–9.6)
CHLORIDE SERPL-SCNC: 100 MMOL/L (ref 98–107)
CO2 SERPL-SCNC: 26 MMOL/L (ref 22–29)
CREAT SERPL-MCNC: 0.81 MG/DL (ref 0.57–1)
D-LACTATE SERPL-SCNC: 1.4 MMOL/L (ref 0.5–2)
DEPRECATED RDW RBC AUTO: 45.1 FL (ref 37–54)
EOSINOPHIL # BLD AUTO: 0.03 10*3/MM3 (ref 0–0.4)
EOSINOPHIL NFR BLD AUTO: 0.2 % (ref 0.3–6.2)
ERYTHROCYTE [DISTWIDTH] IN BLOOD BY AUTOMATED COUNT: 14.2 % (ref 12.3–15.4)
GFR SERPL CREATININE-BSD FRML MDRD: 66 ML/MIN/1.73
GLOBULIN UR ELPH-MCNC: 2.9 GM/DL
GLUCOSE SERPL-MCNC: 180 MG/DL (ref 65–99)
HCT VFR BLD AUTO: 33.9 % (ref 34–46.6)
HGB BLD-MCNC: 10.8 G/DL (ref 12–15.9)
HOLD SPECIMEN: NORMAL
HOLD SPECIMEN: NORMAL
IMM GRANULOCYTES # BLD AUTO: 0.07 10*3/MM3 (ref 0–0.05)
IMM GRANULOCYTES NFR BLD AUTO: 0.5 % (ref 0–0.5)
LYMPHOCYTES # BLD AUTO: 0.74 10*3/MM3 (ref 0.7–3.1)
LYMPHOCYTES NFR BLD AUTO: 4.8 % (ref 19.6–45.3)
MCH RBC QN AUTO: 27.6 PG (ref 26.6–33)
MCHC RBC AUTO-ENTMCNC: 31.9 G/DL (ref 31.5–35.7)
MCV RBC AUTO: 86.7 FL (ref 79–97)
MONOCYTES # BLD AUTO: 1.07 10*3/MM3 (ref 0.1–0.9)
MONOCYTES NFR BLD AUTO: 7 % (ref 5–12)
NEUTROPHILS NFR BLD AUTO: 13.3 10*3/MM3 (ref 1.7–7)
NEUTROPHILS NFR BLD AUTO: 87 % (ref 42.7–76)
NRBC BLD AUTO-RTO: 0 /100 WBC (ref 0–0.2)
PLAT MORPH BLD: NORMAL
PLATELET # BLD AUTO: 221 10*3/MM3 (ref 140–450)
PMV BLD AUTO: 10.5 FL (ref 6–12)
POTASSIUM SERPL-SCNC: 4.3 MMOL/L (ref 3.5–5.2)
PROT SERPL-MCNC: 6.2 G/DL (ref 6–8.5)
RBC # BLD AUTO: 3.91 10*6/MM3 (ref 3.77–5.28)
RBC MORPH BLD: NORMAL
SODIUM SERPL-SCNC: 137 MMOL/L (ref 136–145)
WBC # BLD AUTO: 15.29 10*3/MM3 (ref 3.4–10.8)
WBC MORPH BLD: NORMAL
WHOLE BLOOD HOLD SPECIMEN: NORMAL
WHOLE BLOOD HOLD SPECIMEN: NORMAL

## 2020-08-02 PROCEDURE — P9612 CATHETERIZE FOR URINE SPEC: HCPCS

## 2020-08-02 PROCEDURE — 83880 ASSAY OF NATRIURETIC PEPTIDE: CPT | Performed by: INTERNAL MEDICINE

## 2020-08-02 PROCEDURE — 99285 EMERGENCY DEPT VISIT HI MDM: CPT

## 2020-08-02 PROCEDURE — 93005 ELECTROCARDIOGRAM TRACING: CPT | Performed by: EMERGENCY MEDICINE

## 2020-08-02 PROCEDURE — 84443 ASSAY THYROID STIM HORMONE: CPT | Performed by: INTERNAL MEDICINE

## 2020-08-02 PROCEDURE — 80053 COMPREHEN METABOLIC PANEL: CPT | Performed by: EMERGENCY MEDICINE

## 2020-08-02 PROCEDURE — 84484 ASSAY OF TROPONIN QUANT: CPT | Performed by: INTERNAL MEDICINE

## 2020-08-02 PROCEDURE — 83605 ASSAY OF LACTIC ACID: CPT | Performed by: EMERGENCY MEDICINE

## 2020-08-02 PROCEDURE — 85007 BL SMEAR W/DIFF WBC COUNT: CPT | Performed by: EMERGENCY MEDICINE

## 2020-08-02 PROCEDURE — 87040 BLOOD CULTURE FOR BACTERIA: CPT | Performed by: EMERGENCY MEDICINE

## 2020-08-02 PROCEDURE — 25010000002 VANCOMYCIN PER 500 MG: Performed by: EMERGENCY MEDICINE

## 2020-08-02 PROCEDURE — 86140 C-REACTIVE PROTEIN: CPT | Performed by: INTERNAL MEDICINE

## 2020-08-02 PROCEDURE — 85025 COMPLETE CBC W/AUTO DIFF WBC: CPT | Performed by: EMERGENCY MEDICINE

## 2020-08-02 PROCEDURE — 25010000002 PIPERACILLIN SOD-TAZOBACTAM PER 1 G: Performed by: EMERGENCY MEDICINE

## 2020-08-02 RX ORDER — DILTIAZEM HCL IN NACL,ISO-OSM 125 MG/125
5-15 PLASTIC BAG, INJECTION (ML) INTRAVENOUS CONTINUOUS
Status: DISCONTINUED | OUTPATIENT
Start: 2020-08-02 | End: 2020-08-04

## 2020-08-02 RX ORDER — SODIUM CHLORIDE 0.9 % (FLUSH) 0.9 %
10 SYRINGE (ML) INJECTION AS NEEDED
Status: DISCONTINUED | OUTPATIENT
Start: 2020-08-02 | End: 2020-08-09 | Stop reason: HOSPADM

## 2020-08-02 RX ORDER — VANCOMYCIN HYDROCHLORIDE 1 G/200ML
20 INJECTION, SOLUTION INTRAVENOUS ONCE
Status: COMPLETED | OUTPATIENT
Start: 2020-08-02 | End: 2020-08-03

## 2020-08-02 RX ADMIN — Medication 5 MG/HR: at 22:03

## 2020-08-02 RX ADMIN — SODIUM CHLORIDE 1000 ML: 9 INJECTION, SOLUTION INTRAVENOUS at 23:38

## 2020-08-02 RX ADMIN — VANCOMYCIN HYDROCHLORIDE 1000 MG: 1 INJECTION, SOLUTION INTRAVENOUS at 23:11

## 2020-08-02 RX ADMIN — SODIUM CHLORIDE 500 ML: 9 INJECTION, SOLUTION INTRAVENOUS at 23:11

## 2020-08-02 RX ADMIN — TAZOBACTAM SODIUM AND PIPERACILLIN SODIUM 3.38 G: 375; 3 INJECTION, SOLUTION INTRAVENOUS at 22:48

## 2020-08-03 ENCOUNTER — APPOINTMENT (OUTPATIENT)
Dept: CARDIOLOGY | Facility: HOSPITAL | Age: 85
End: 2020-08-03

## 2020-08-03 ENCOUNTER — APPOINTMENT (OUTPATIENT)
Dept: CT IMAGING | Facility: HOSPITAL | Age: 85
End: 2020-08-03

## 2020-08-03 PROBLEM — N39.0 UTI DUE TO KLEBSIELLA SPECIES: Status: RESOLVED | Noted: 2019-11-18 | Resolved: 2020-08-03

## 2020-08-03 PROBLEM — J18.9 MULTIFOCAL PNEUMONIA: Status: RESOLVED | Noted: 2019-11-12 | Resolved: 2020-08-03

## 2020-08-03 PROBLEM — S72.002A CLOSED FRACTURE OF NECK OF LEFT FEMUR: Status: RESOLVED | Noted: 2019-09-27 | Resolved: 2020-08-03

## 2020-08-03 PROBLEM — I87.2 PERIPHERAL VENOUS INSUFFICIENCY: Status: RESOLVED | Noted: 2019-03-28 | Resolved: 2020-08-03

## 2020-08-03 PROBLEM — R07.89 CHEST PAIN, ATYPICAL: Status: RESOLVED | Noted: 2019-06-24 | Resolved: 2020-08-03

## 2020-08-03 PROBLEM — M54.50 ACUTE RIGHT-SIDED LOW BACK PAIN WITHOUT SCIATICA: Status: RESOLVED | Noted: 2019-03-28 | Resolved: 2020-08-03

## 2020-08-03 PROBLEM — R41.3 MEMORY LOSS: Status: RESOLVED | Noted: 2019-03-28 | Resolved: 2020-08-03

## 2020-08-03 PROBLEM — J95.811 POSTPROCEDURAL PNEUMOTHORAX: Status: RESOLVED | Noted: 2019-11-17 | Resolved: 2020-08-03

## 2020-08-03 PROBLEM — R09.02 HYPOXIA: Status: RESOLVED | Noted: 2019-11-12 | Resolved: 2020-08-03

## 2020-08-03 PROBLEM — G56.01 CARPAL TUNNEL SYNDROME OF RIGHT WRIST: Status: RESOLVED | Noted: 2019-03-28 | Resolved: 2020-08-03

## 2020-08-03 PROBLEM — K62.5 RECTAL BLEEDING: Status: RESOLVED | Noted: 2019-12-04 | Resolved: 2020-08-03

## 2020-08-03 PROBLEM — J96.01 ACUTE RESPIRATORY FAILURE WITH HYPOXIA: Status: RESOLVED | Noted: 2019-11-12 | Resolved: 2020-08-03

## 2020-08-03 PROBLEM — I48.0 PAROXYSMAL ATRIAL FIBRILLATION WITH RAPID VENTRICULAR RESPONSE: Status: ACTIVE | Noted: 2020-08-03

## 2020-08-03 PROBLEM — D72.829 LEUKOCYTOSIS: Status: RESOLVED | Noted: 2019-11-12 | Resolved: 2020-08-03

## 2020-08-03 PROBLEM — R19.7 DIARRHEA: Status: RESOLVED | Noted: 2019-12-04 | Resolved: 2020-08-03

## 2020-08-03 PROBLEM — S81.811A SKIN TEAR OF LOWER LEG WITHOUT COMPLICATION, RIGHT, INITIAL ENCOUNTER: Status: RESOLVED | Noted: 2018-05-06 | Resolved: 2020-08-03

## 2020-08-03 PROBLEM — M25.569 PAIN, KNEE: Status: RESOLVED | Noted: 2019-03-28 | Resolved: 2020-08-03

## 2020-08-03 PROBLEM — J18.9 PNEUMONIA OF BOTH LOWER LOBES DUE TO INFECTIOUS ORGANISM: Status: ACTIVE | Noted: 2020-08-03

## 2020-08-03 PROBLEM — I31.39 PERICARDIAL EFFUSION: Status: ACTIVE | Noted: 2020-08-03

## 2020-08-03 PROBLEM — A41.9 SEPSIS: Status: RESOLVED | Noted: 2019-11-12 | Resolved: 2020-08-03

## 2020-08-03 PROBLEM — F03.90 DEMENTIA: Status: ACTIVE | Noted: 2020-08-03

## 2020-08-03 PROBLEM — B96.89 UTI DUE TO KLEBSIELLA SPECIES: Status: RESOLVED | Noted: 2019-11-18 | Resolved: 2020-08-03

## 2020-08-03 PROBLEM — R35.0 URINARY FREQUENCY: Status: RESOLVED | Noted: 2019-03-28 | Resolved: 2020-08-03

## 2020-08-03 PROBLEM — J90 PLEURAL EFFUSION: Status: RESOLVED | Noted: 2019-11-16 | Resolved: 2020-08-03

## 2020-08-03 PROBLEM — F01.50 MULTI-INFARCT DEMENTIA (HCC): Status: RESOLVED | Noted: 2019-03-28 | Resolved: 2020-08-03

## 2020-08-03 PROBLEM — R50.9 FEVER: Status: RESOLVED | Noted: 2019-03-28 | Resolved: 2020-08-03

## 2020-08-03 LAB
B PARAPERT DNA SPEC QL NAA+PROBE: NOT DETECTED
B PERT DNA SPEC QL NAA+PROBE: NOT DETECTED
BACTERIA UR QL AUTO: ABNORMAL /HPF
BH CV ECHO MEAS - AO MAX PG (FULL): 14.8 MMHG
BH CV ECHO MEAS - AO MAX PG: 18.1 MMHG
BH CV ECHO MEAS - AO MEAN PG (FULL): 8.8 MMHG
BH CV ECHO MEAS - AO MEAN PG: 10.4 MMHG
BH CV ECHO MEAS - AO ROOT AREA (BSA CORRECTED): 2.2
BH CV ECHO MEAS - AO ROOT AREA: 8.4 CM^2
BH CV ECHO MEAS - AO ROOT DIAM: 3.3 CM
BH CV ECHO MEAS - AO V2 MAX: 212.5 CM/SEC
BH CV ECHO MEAS - AO V2 MEAN: 151.8 CM/SEC
BH CV ECHO MEAS - AO V2 VTI: 46.5 CM
BH CV ECHO MEAS - AVA(I,A): 1.4 CM^2
BH CV ECHO MEAS - AVA(I,D): 1.4 CM^2
BH CV ECHO MEAS - AVA(V,A): 1.3 CM^2
BH CV ECHO MEAS - AVA(V,D): 1.3 CM^2
BH CV ECHO MEAS - BSA(HAYCOCK): 1.5 M^2
BH CV ECHO MEAS - BSA: 1.5 M^2
BH CV ECHO MEAS - BZI_BMI: 23 KILOGRAMS/M^2
BH CV ECHO MEAS - BZI_METRIC_HEIGHT: 152.4 CM
BH CV ECHO MEAS - BZI_METRIC_WEIGHT: 53.5 KG
BH CV ECHO MEAS - EDV(CUBED): 74.4 ML
BH CV ECHO MEAS - EDV(MOD-SP2): 46 ML
BH CV ECHO MEAS - EDV(MOD-SP4): 46 ML
BH CV ECHO MEAS - EDV(TEICH): 78.8 ML
BH CV ECHO MEAS - EF(CUBED): 77.3 %
BH CV ECHO MEAS - EF(MOD-BP): 67 %
BH CV ECHO MEAS - EF(MOD-SP2): 67.4 %
BH CV ECHO MEAS - EF(MOD-SP4): 65.2 %
BH CV ECHO MEAS - EF(TEICH): 69.8 %
BH CV ECHO MEAS - ESV(CUBED): 16.9 ML
BH CV ECHO MEAS - ESV(MOD-SP2): 15 ML
BH CV ECHO MEAS - ESV(MOD-SP4): 16 ML
BH CV ECHO MEAS - ESV(TEICH): 23.8 ML
BH CV ECHO MEAS - FS: 39 %
BH CV ECHO MEAS - IVS/LVPW: 1
BH CV ECHO MEAS - IVSD: 1 CM
BH CV ECHO MEAS - LA DIMENSION: 4.3 CM
BH CV ECHO MEAS - LA/AO: 1.3
BH CV ECHO MEAS - LAD MAJOR: 6.8 CM
BH CV ECHO MEAS - LAT PEAK E' VEL: 5.2 CM/SEC
BH CV ECHO MEAS - LATERAL E/E' RATIO: 17.5
BH CV ECHO MEAS - LV DIASTOLIC VOL/BSA (35-75): 30.8 ML/M^2
BH CV ECHO MEAS - LV MASS(C)D: 143.1 GRAMS
BH CV ECHO MEAS - LV MASS(C)DI: 95.9 GRAMS/M^2
BH CV ECHO MEAS - LV MAX PG: 3.3 MMHG
BH CV ECHO MEAS - LV MEAN PG: 1.6 MMHG
BH CV ECHO MEAS - LV SYSTOLIC VOL/BSA (12-30): 10.7 ML/M^2
BH CV ECHO MEAS - LV V1 MAX: 91 CM/SEC
BH CV ECHO MEAS - LV V1 MEAN: 56.4 CM/SEC
BH CV ECHO MEAS - LV V1 VTI: 20.9 CM
BH CV ECHO MEAS - LVIDD: 4.2 CM
BH CV ECHO MEAS - LVIDS: 2.6 CM
BH CV ECHO MEAS - LVLD AP2: 5.8 CM
BH CV ECHO MEAS - LVLD AP4: 6.5 CM
BH CV ECHO MEAS - LVLS AP2: 5.1 CM
BH CV ECHO MEAS - LVLS AP4: 5.2 CM
BH CV ECHO MEAS - LVOT AREA (M): 3.1 CM^2
BH CV ECHO MEAS - LVOT AREA: 3.1 CM^2
BH CV ECHO MEAS - LVOT DIAM: 2 CM
BH CV ECHO MEAS - LVPWD: 1 CM
BH CV ECHO MEAS - MED PEAK E' VEL: 4.1 CM/SEC
BH CV ECHO MEAS - MEDIAL E/E' RATIO: 21.8
BH CV ECHO MEAS - MV A MAX VEL: 122.8 CM/SEC
BH CV ECHO MEAS - MV DEC SLOPE: 391.6 CM/SEC^2
BH CV ECHO MEAS - MV DEC TIME: 0.29 SEC
BH CV ECHO MEAS - MV E MAX VEL: 92.5 CM/SEC
BH CV ECHO MEAS - MV E/A: 0.75
BH CV ECHO MEAS - MV MAX PG: 7.7 MMHG
BH CV ECHO MEAS - MV MEAN PG: 3.4 MMHG
BH CV ECHO MEAS - MV P1/2T MAX VEL: 118.7 CM/SEC
BH CV ECHO MEAS - MV P1/2T: 88.8 MSEC
BH CV ECHO MEAS - MV V2 MAX: 138.8 CM/SEC
BH CV ECHO MEAS - MV V2 MEAN: 87.1 CM/SEC
BH CV ECHO MEAS - MV V2 VTI: 40.5 CM
BH CV ECHO MEAS - MVA P1/2T LCG: 1.9 CM^2
BH CV ECHO MEAS - MVA(P1/2T): 2.5 CM^2
BH CV ECHO MEAS - MVA(VTI): 1.6 CM^2
BH CV ECHO MEAS - PA ACC SLOPE: 474.3 CM/SEC^2
BH CV ECHO MEAS - PA ACC TIME: 0.1 SEC
BH CV ECHO MEAS - PA PR(ACCEL): 33.1 MMHG
BH CV ECHO MEAS - PI END-D VEL: 96.5 CM/SEC
BH CV ECHO MEAS - PULM DIAS VEL: 41.8 CM/SEC
BH CV ECHO MEAS - PULM S/D: 1.7
BH CV ECHO MEAS - PULM SYS VEL: 70.3 CM/SEC
BH CV ECHO MEAS - RAP SYSTOLE: 8 MMHG
BH CV ECHO MEAS - RVSP: 49 MMHG
BH CV ECHO MEAS - SI(AO): 260.5 ML/M^2
BH CV ECHO MEAS - SI(CUBED): 38.5 ML/M^2
BH CV ECHO MEAS - SI(LVOT): 43.9 ML/M^2
BH CV ECHO MEAS - SI(MOD-SP2): 20.8 ML/M^2
BH CV ECHO MEAS - SI(MOD-SP4): 20.1 ML/M^2
BH CV ECHO MEAS - SI(TEICH): 36.9 ML/M^2
BH CV ECHO MEAS - SV(AO): 388.6 ML
BH CV ECHO MEAS - SV(CUBED): 57.5 ML
BH CV ECHO MEAS - SV(LVOT): 65.5 ML
BH CV ECHO MEAS - SV(MOD-SP2): 31 ML
BH CV ECHO MEAS - SV(MOD-SP4): 30 ML
BH CV ECHO MEAS - SV(TEICH): 55 ML
BH CV ECHO MEAS - TAPSE (>1.6): 1.6 CM2
BH CV ECHO MEAS - TR MAX PG: 41 MMHG
BH CV ECHO MEAS - TR MAX VEL: 320 CM/SEC
BH CV ECHO MEAS - TV MAX PG: 35.7 MMHG
BH CV ECHO MEAS - TV V2 MAX: 298.3 CM/SEC
BH CV ECHO MEASUREMENTS AVERAGE E/E' RATIO: 19.89
BH CV VAS BP LEFT ARM: NORMAL MMHG
BH CV XLRA - RV BASE: 3.7 CM
BH CV XLRA - RV LENGTH: 6.8 CM
BH CV XLRA - RV MID: 3.7 CM
BH CV XLRA - TDI S': 9.21 CM/SEC
BILIRUB UR QL STRIP: ABNORMAL
C PNEUM DNA NPH QL NAA+NON-PROBE: NOT DETECTED
CLARITY UR: ABNORMAL
COLOR UR: ABNORMAL
CRP SERPL-MCNC: 19.18 MG/DL (ref 0–0.5)
FLUAV H1 2009 PAND RNA NPH QL NAA+PROBE: NOT DETECTED
FLUAV H1 HA GENE NPH QL NAA+PROBE: NOT DETECTED
FLUAV H3 RNA NPH QL NAA+PROBE: NOT DETECTED
FLUAV SUBTYP SPEC NAA+PROBE: NOT DETECTED
FLUBV RNA ISLT QL NAA+PROBE: NOT DETECTED
GLUCOSE UR STRIP-MCNC: NEGATIVE MG/DL
HADV DNA SPEC NAA+PROBE: NOT DETECTED
HCOV 229E RNA SPEC QL NAA+PROBE: NOT DETECTED
HCOV HKU1 RNA SPEC QL NAA+PROBE: NOT DETECTED
HCOV NL63 RNA SPEC QL NAA+PROBE: NOT DETECTED
HCOV OC43 RNA SPEC QL NAA+PROBE: NOT DETECTED
HGB UR QL STRIP.AUTO: NEGATIVE
HMPV RNA NPH QL NAA+NON-PROBE: NOT DETECTED
HPIV1 RNA SPEC QL NAA+PROBE: NOT DETECTED
HPIV2 RNA SPEC QL NAA+PROBE: NOT DETECTED
HPIV3 RNA NPH QL NAA+PROBE: NOT DETECTED
HPIV4 P GENE NPH QL NAA+PROBE: NOT DETECTED
KETONES UR QL STRIP: ABNORMAL
LEFT ATRIUM VOLUME INDEX: 50.3 ML/M^2
LEFT ATRIUM VOLUME: 75 ML
LEUKOCYTE ESTERASE UR QL STRIP.AUTO: ABNORMAL
LV EF 2D ECHO EST: 70 %
M PNEUMO IGG SER IA-ACNC: NOT DETECTED
MAXIMAL PREDICTED HEART RATE: 130 BPM
MRSA DNA SPEC QL NAA+PROBE: NEGATIVE
NITRITE UR QL STRIP: NEGATIVE
NT-PROBNP SERPL-MCNC: 1483 PG/ML (ref 0–1800)
PH UR STRIP.AUTO: <=5 [PH] (ref 5–8)
PROT UR QL STRIP: ABNORMAL
RBC # UR: ABNORMAL /HPF
REF LAB TEST METHOD: ABNORMAL
RHINOVIRUS RNA SPEC NAA+PROBE: NOT DETECTED
RSV RNA NPH QL NAA+NON-PROBE: NOT DETECTED
SARS-COV-2 RNA NPH QL NAA+NON-PROBE: NOT DETECTED
SP GR UR STRIP: 1.03 (ref 1–1.03)
SQUAMOUS #/AREA URNS HPF: ABNORMAL /HPF
STRESS TARGET HR: 111 BPM
TROPONIN T SERPL-MCNC: <0.01 NG/ML (ref 0–0.03)
TSH SERPL DL<=0.05 MIU/L-ACNC: 1.41 UIU/ML (ref 0.27–4.2)
UROBILINOGEN UR QL STRIP: ABNORMAL
WBC UR QL AUTO: ABNORMAL /HPF

## 2020-08-03 PROCEDURE — 74176 CT ABD & PELVIS W/O CONTRAST: CPT

## 2020-08-03 PROCEDURE — 93306 TTE W/DOPPLER COMPLETE: CPT

## 2020-08-03 PROCEDURE — 81001 URINALYSIS AUTO W/SCOPE: CPT | Performed by: EMERGENCY MEDICINE

## 2020-08-03 PROCEDURE — 71250 CT THORAX DX C-: CPT

## 2020-08-03 PROCEDURE — 25010000002 ENOXAPARIN PER 10 MG: Performed by: INTERNAL MEDICINE

## 2020-08-03 PROCEDURE — 97161 PT EVAL LOW COMPLEX 20 MIN: CPT

## 2020-08-03 PROCEDURE — 97535 SELF CARE MNGMENT TRAINING: CPT

## 2020-08-03 PROCEDURE — 93010 ELECTROCARDIOGRAM REPORT: CPT | Performed by: INTERNAL MEDICINE

## 2020-08-03 PROCEDURE — 25010000002 PIPERACILLIN SOD-TAZOBACTAM PER 1 G

## 2020-08-03 PROCEDURE — 93306 TTE W/DOPPLER COMPLETE: CPT | Performed by: INTERNAL MEDICINE

## 2020-08-03 PROCEDURE — 93005 ELECTROCARDIOGRAM TRACING: CPT | Performed by: INTERNAL MEDICINE

## 2020-08-03 PROCEDURE — 0202U NFCT DS 22 TRGT SARS-COV-2: CPT | Performed by: INTERNAL MEDICINE

## 2020-08-03 PROCEDURE — 97165 OT EVAL LOW COMPLEX 30 MIN: CPT

## 2020-08-03 PROCEDURE — 99222 1ST HOSP IP/OBS MODERATE 55: CPT | Performed by: INTERNAL MEDICINE

## 2020-08-03 PROCEDURE — 70450 CT HEAD/BRAIN W/O DYE: CPT

## 2020-08-03 PROCEDURE — 93005 ELECTROCARDIOGRAM TRACING: CPT | Performed by: EMERGENCY MEDICINE

## 2020-08-03 PROCEDURE — 99223 1ST HOSP IP/OBS HIGH 75: CPT | Performed by: INTERNAL MEDICINE

## 2020-08-03 PROCEDURE — 97116 GAIT TRAINING THERAPY: CPT

## 2020-08-03 PROCEDURE — 87641 MR-STAPH DNA AMP PROBE: CPT | Performed by: INTERNAL MEDICINE

## 2020-08-03 RX ORDER — ACETAMINOPHEN 325 MG/1
650 TABLET ORAL EVERY 4 HOURS PRN
Status: DISCONTINUED | OUTPATIENT
Start: 2020-08-03 | End: 2020-08-09 | Stop reason: HOSPADM

## 2020-08-03 RX ORDER — TRAMADOL HYDROCHLORIDE 50 MG/1
50 TABLET ORAL DAILY
Status: DISCONTINUED | OUTPATIENT
Start: 2020-08-03 | End: 2020-08-09 | Stop reason: HOSPADM

## 2020-08-03 RX ORDER — MEMANTINE HYDROCHLORIDE 10 MG/1
10 TABLET ORAL 2 TIMES DAILY
Status: DISCONTINUED | OUTPATIENT
Start: 2020-08-03 | End: 2020-08-09 | Stop reason: HOSPADM

## 2020-08-03 RX ORDER — ONDANSETRON 4 MG/1
4 TABLET, FILM COATED ORAL EVERY 6 HOURS PRN
Status: DISCONTINUED | OUTPATIENT
Start: 2020-08-03 | End: 2020-08-09 | Stop reason: HOSPADM

## 2020-08-03 RX ORDER — PANTOPRAZOLE SODIUM 40 MG/1
40 TABLET, DELAYED RELEASE ORAL
Status: DISCONTINUED | OUTPATIENT
Start: 2020-08-03 | End: 2020-08-09 | Stop reason: HOSPADM

## 2020-08-03 RX ORDER — HYDROCHLOROTHIAZIDE 25 MG/1
TABLET ORAL
Qty: 90 TABLET | Refills: 0 | Status: SHIPPED | OUTPATIENT
Start: 2020-08-03 | End: 2020-09-04

## 2020-08-03 RX ORDER — ACETAMINOPHEN 160 MG/5ML
650 SOLUTION ORAL EVERY 4 HOURS PRN
Status: DISCONTINUED | OUTPATIENT
Start: 2020-08-03 | End: 2020-08-09 | Stop reason: HOSPADM

## 2020-08-03 RX ORDER — ACETAMINOPHEN 650 MG/1
650 SUPPOSITORY RECTAL EVERY 4 HOURS PRN
Status: DISCONTINUED | OUTPATIENT
Start: 2020-08-03 | End: 2020-08-09 | Stop reason: HOSPADM

## 2020-08-03 RX ORDER — BETHANECHOL CHLORIDE 10 MG/1
10 TABLET ORAL 3 TIMES DAILY
Status: DISCONTINUED | OUTPATIENT
Start: 2020-08-03 | End: 2020-08-09 | Stop reason: HOSPADM

## 2020-08-03 RX ORDER — ASPIRIN 81 MG/1
81 TABLET ORAL DAILY
Status: DISCONTINUED | OUTPATIENT
Start: 2020-08-03 | End: 2020-08-04

## 2020-08-03 RX ORDER — SODIUM CHLORIDE 0.9 % (FLUSH) 0.9 %
10 SYRINGE (ML) INJECTION EVERY 12 HOURS SCHEDULED
Status: DISCONTINUED | OUTPATIENT
Start: 2020-08-03 | End: 2020-08-09 | Stop reason: HOSPADM

## 2020-08-03 RX ORDER — LEVOTHYROXINE SODIUM 175 UG/1
175 TABLET ORAL
Status: DISCONTINUED | OUTPATIENT
Start: 2020-08-03 | End: 2020-08-09 | Stop reason: HOSPADM

## 2020-08-03 RX ORDER — SODIUM CHLORIDE 0.9 % (FLUSH) 0.9 %
10 SYRINGE (ML) INJECTION AS NEEDED
Status: DISCONTINUED | OUTPATIENT
Start: 2020-08-03 | End: 2020-08-09 | Stop reason: HOSPADM

## 2020-08-03 RX ORDER — BUSPIRONE HYDROCHLORIDE 10 MG/1
10 TABLET ORAL 2 TIMES DAILY
Status: DISCONTINUED | OUTPATIENT
Start: 2020-08-03 | End: 2020-08-09 | Stop reason: HOSPADM

## 2020-08-03 RX ORDER — METHOCARBAMOL 500 MG/1
500 TABLET, FILM COATED ORAL EVERY 8 HOURS PRN
Status: DISCONTINUED | OUTPATIENT
Start: 2020-08-03 | End: 2020-08-09 | Stop reason: HOSPADM

## 2020-08-03 RX ORDER — CHOLECALCIFEROL (VITAMIN D3) 125 MCG
2.5 CAPSULE ORAL NIGHTLY
Status: DISCONTINUED | OUTPATIENT
Start: 2020-08-03 | End: 2020-08-05

## 2020-08-03 RX ORDER — GABAPENTIN 300 MG/1
300 CAPSULE ORAL 2 TIMES DAILY
Status: DISCONTINUED | OUTPATIENT
Start: 2020-08-03 | End: 2020-08-09 | Stop reason: HOSPADM

## 2020-08-03 RX ORDER — L.ACID,PARA/B.BIFIDUM/S.THERM 8B CELL
1 CAPSULE ORAL DAILY
Status: DISCONTINUED | OUTPATIENT
Start: 2020-08-03 | End: 2020-08-09 | Stop reason: HOSPADM

## 2020-08-03 RX ORDER — ONDANSETRON 2 MG/ML
4 INJECTION INTRAMUSCULAR; INTRAVENOUS EVERY 6 HOURS PRN
Status: DISCONTINUED | OUTPATIENT
Start: 2020-08-03 | End: 2020-08-09 | Stop reason: HOSPADM

## 2020-08-03 RX ORDER — LANOLIN ALCOHOL/MO/W.PET/CERES
9 CREAM (GRAM) TOPICAL NIGHTLY
COMMUNITY
End: 2023-03-02

## 2020-08-03 RX ORDER — DONEPEZIL HYDROCHLORIDE 10 MG/1
10 TABLET, FILM COATED ORAL NIGHTLY
Status: DISCONTINUED | OUTPATIENT
Start: 2020-08-03 | End: 2020-08-09 | Stop reason: HOSPADM

## 2020-08-03 RX ORDER — DOXYCYCLINE 100 MG/1
100 CAPSULE ORAL EVERY 12 HOURS SCHEDULED
Status: DISCONTINUED | OUTPATIENT
Start: 2020-08-03 | End: 2020-08-05

## 2020-08-03 RX ADMIN — ASPIRIN 81 MG: 81 TABLET, COATED ORAL at 10:19

## 2020-08-03 RX ADMIN — Medication 1 CAPSULE: at 10:19

## 2020-08-03 RX ADMIN — BUSPIRONE HYDROCHLORIDE 10 MG: 10 TABLET ORAL at 17:07

## 2020-08-03 RX ADMIN — TAZOBACTAM SODIUM AND PIPERACILLIN SODIUM 3.38 G: 375; 3 INJECTION, SOLUTION INTRAVENOUS at 22:00

## 2020-08-03 RX ADMIN — SODIUM CHLORIDE, PRESERVATIVE FREE 10 ML: 5 INJECTION INTRAVENOUS at 22:02

## 2020-08-03 RX ADMIN — BUSPIRONE HYDROCHLORIDE 10 MG: 10 TABLET ORAL at 10:20

## 2020-08-03 RX ADMIN — DOXYCYCLINE 100 MG: 100 CAPSULE ORAL at 22:00

## 2020-08-03 RX ADMIN — MEMANTINE 10 MG: 10 TABLET ORAL at 22:00

## 2020-08-03 RX ADMIN — TAZOBACTAM SODIUM AND PIPERACILLIN SODIUM 3.38 G: 375; 3 INJECTION, SOLUTION INTRAVENOUS at 04:37

## 2020-08-03 RX ADMIN — BETHANECHOL CHLORIDE 10 MG: 10 TABLET ORAL at 17:07

## 2020-08-03 RX ADMIN — MELATONIN TAB 5 MG 2.5 MG: 5 TAB at 22:00

## 2020-08-03 RX ADMIN — ENOXAPARIN SODIUM 40 MG: 40 INJECTION SUBCUTANEOUS at 10:21

## 2020-08-03 RX ADMIN — LEVOTHYROXINE SODIUM 175 MCG: 175 TABLET ORAL at 10:20

## 2020-08-03 RX ADMIN — PANTOPRAZOLE SODIUM 40 MG: 40 TABLET, DELAYED RELEASE ORAL at 10:20

## 2020-08-03 RX ADMIN — BETHANECHOL CHLORIDE 10 MG: 10 TABLET ORAL at 10:19

## 2020-08-03 RX ADMIN — TAZOBACTAM SODIUM AND PIPERACILLIN SODIUM 3.38 G: 375; 3 INJECTION, SOLUTION INTRAVENOUS at 14:28

## 2020-08-03 RX ADMIN — DOXYCYCLINE 100 MG: 100 CAPSULE ORAL at 10:20

## 2020-08-03 RX ADMIN — GABAPENTIN 300 MG: 300 CAPSULE ORAL at 22:00

## 2020-08-03 RX ADMIN — DONEPEZIL HYDROCHLORIDE 10 MG: 10 TABLET, FILM COATED ORAL at 22:00

## 2020-08-03 RX ADMIN — SERTRALINE HYDROCHLORIDE 50 MG: 50 TABLET, FILM COATED ORAL at 10:21

## 2020-08-03 RX ADMIN — BETHANECHOL CHLORIDE 10 MG: 10 TABLET ORAL at 12:20

## 2020-08-03 RX ADMIN — GABAPENTIN 300 MG: 300 CAPSULE ORAL at 10:20

## 2020-08-03 RX ADMIN — MEMANTINE 10 MG: 10 TABLET ORAL at 10:20

## 2020-08-03 RX ADMIN — TRAMADOL HYDROCHLORIDE 50 MG: 50 TABLET, FILM COATED ORAL at 10:21

## 2020-08-03 RX ADMIN — SODIUM CHLORIDE, PRESERVATIVE FREE 10 ML: 5 INJECTION INTRAVENOUS at 12:21

## 2020-08-03 NOTE — NURSING NOTE
Consulted to assess patient's coccyx:     COVID (rule out)    Will follow-up tomorrow if COVID negative for assessment.  If positive and WOC is needed, take wound photo with unit camera.     Dustin is 20.   No LDA put in.   Tried to contact RN but could not get ahold of her.   Will follow-up tomorrow AM.     In the intern use barrier cream as needed if are is truly a wound.     Thanks

## 2020-08-03 NOTE — PROGRESS NOTES
Pharmacy to dose Zosyn for Temi Jarrett    Estimated Creatinine Clearance: 39 mL/min (by C-G formula based on SCr of 0.81 mg/dL).  53.5 kg (118 lb)    Plan: Zosyn 3.375 Gm IV Q8H (extended infusion protocol)  Diagnosis: pneumonia  Consulting Provider: Hospital Service    Pharmacy will continue to monitor patient's renal function for further potential dose ajdustements.    Von [place], [facility]  8/3/2020  2:23 AM

## 2020-08-03 NOTE — THERAPY EVALUATION
Patient Name: Temi Jarrett  : 1930    MRN: [id number]                              Today's Date: 8/3/2020       Admit Date: 2020    Visit Dx:     ICD-10-CM ICD-9-CM   1. Pneumonia of both lower lobes due to infectious organism J18.9 486   2. Bandemia D72.825 288.66   3. Atrial fibrillation with rapid ventricular response (CMS/HCC) I48.91 427.31   4. Altered mental status, unspecified altered mental status type R41.82 780.97   5. Fever, unspecified fever cause R50.9 780.60   6. Impaired mobility and ADLs Z74.09 V49.89    Z78.9      Patient Active Problem List   Diagnosis   • Esophageal reflux   • Hypothyroidism (acquired)   • Generalized anxiety disorder   • Peripheral neuropathy   • Spinal stenosis of lumbar region   • Osteoporosis   • Disc disorder of lumbar region   • Bladder prolapse, female, acquired   • Essential hypertension   • Pernicious anemia   • Annual physical exam   • Primary osteoarthritis of both knees   • Massive hiatal hernia with intrathoracic stomach   • Acute UTI (urinary tract infection)   • Pneumonia of both lower lobes due to infectious organism   • Paroxysmal atrial fibrillation (CMS/HCC)   • Pericardial effusion   • Dementia (CMS/HCC)     Past Medical History:   Diagnosis Date   • Arthritis    • Closed fracture of neck of left femur (CMS/HCC) 2019   • Dementia (CMS/HCC)    • Depression    • Disease of thyroid gland    • Gastric polyp    • GERD (gastroesophageal reflux disease)    • History of colonic polyps    • Hyperlipidemia    • Hypertension    • IBS (irritable bowel syndrome)    • Macular degeneration    • Torn meniscus     right knee      Past Surgical History:   Procedure Laterality Date   • CHOLECYSTECTOMY     • EYE SURGERY      Cataract extraction   • HERNIA REPAIR     • HIP HEMIARTHROPLASTY Left 2019    Procedure: HIP HEMIARTHROPLASTY LEFT;  Surgeon: Reddy Segundo Jr., MD;  Location: [facility];  Service: Orthopedics   • HYSTERECTOMY     • KNEE  SURGERY Right     arthroscopy- 2000   • TONSILLECTOMY       General Information     Row Name 08/03/20 1454          PT Evaluation Time/Intention    Document Type  evaluation  -VG     Mode of Treatment  individual therapy;physical therapy  -VG     Row Name 08/03/20 1454          General Information    Patient Profile Reviewed?  yes  -VG     Prior Level of Function  independent:;all household mobility;community mobility;min assist:;ADL's;using stairs  -VG     Existing Precautions/Restrictions  fall;oxygen therapy device and L/min  -VG     Barriers to Rehab  medically complex;previous functional deficit  -VG     Row Name 08/03/20 1454          Relationship/Environment    Lives With  child(darvin), adult;grandchild(darvin) Son, DIL, and granddaughter collectively provide 24/7 spv/assist.  -VG     Row Name 08/03/20 1454          Resource/Environmental Concerns    Current Living Arrangements  home/apartment/condo  -VG     Row Name 08/03/20 1454          Home Main Entrance    Number of Stairs, Main Entrance  one  -VG     Stair Railings, Main Entrance  none  -VG     Row Name 08/03/20 1454          Stairs Within Home, Primary    Stairs, Within Home, Primary  12+landing+8 stairs with B handrails to access primary bed/bath.  -VG     Row Name 08/03/20 1451          Cognitive Assessment/Intervention- PT/OT    Orientation Status (Cognition)  oriented to;person;place;situation;time  -VG     Row Name 08/03/20 1450          Safety Issues, Functional Mobility    Safety Issues Affecting Function (Mobility)  insight into deficits/self awareness;awareness of need for assistance  -VG     Impairments Affecting Function (Mobility)  balance;endurance/activity tolerance;shortness of breath;strength  -VG       User Key  (r) = Recorded By, (t) = Taken By, (c) = Cosigned By    Initials Name Provider Type    VG Natalia Pires, PT Physical Therapist        Mobility     Row Name 08/03/20 145          Bed Mobility Assessment/Treatment    Bed Mobility  Assessment/Treatment  sit-supine  -VG     Scooting/Bridging [place] (Bed Mobility)  supervision  -VG     Sit-Supine [place] (Bed Mobility)  supervision  -VG     Assistive Device (Bed Mobility)  bed rails  -VG     Row Name 08/03/20 1455          Transfer Assessment/Treatment    Comment (Transfers)  Cues for hand placement and sequencing.  -VG     Row Name 08/03/20 1455          Sit-Stand Transfer    Sit-Stand [place] (Transfers)  contact guard;verbal cues  -VG     Assistive Device (Sit-Stand Transfers)  walker, front-wheeled  -VG     Row Name 08/03/20 1455          Gait/Stairs Assessment/Training    [place] Level (Gait)  contact guard;verbal cues  -VG     Assistive Device (Gait)  walker, front-wheeled  -VG     Distance in Feet (Gait)  150  -VG     Deviations/Abnormal Patterns (Gait)  festinating/shuffling;gait speed decreased  -VG     Bilateral Gait Deviations  forward flexed posture  -VG     Comment (Gait/Stairs)  Distance limited by fatigue, weakness, and SOA. VSS on 2LO2NC. 1 standing rest break required. Cues for upright posture and keeping body within RW frame.  -VG       User Key  (r) = Recorded By, (t) = Taken By, (c) = Cosigned By    Initials Name Provider Type    VG Natalia Pires, PT Physical Therapist        Obj/Interventions     Row Name 08/03/20 1457          General ROM    GENERAL ROM COMMENTS  BLEs WFL  -VG     Row Name 08/03/20 1457          MMT (Manual Muscle Testing)    General MMT Comments  BLEs 4+/5 grossly  -VG     Row Name 08/03/20 1457          Static Sitting Balance    Level of [place] (Unsupported Sitting, Static Balance)  supervision  -VG     Sitting Position (Unsupported Sitting, Static Balance)  sitting on edge of bed  -VG     Row Name 08/03/20 1457          Static Standing Balance    Level of [place] (Supported Standing, Static Balance)  contact guard assist  -VG     Assistive Device Utilized (Supported Standing, Static Balance)  walker, rolling  -VG      Row Name 08/03/20 1457          Sensory Assessment/Intervention    Sensory General Assessment  no sensation deficits identified  -VG       User Key  (r) = Recorded By, (t) = Taken By, (c) = Cosigned By    Initials Name Provider Type    VG Natalia Pires, PT Physical Therapist        Goals/Plan     Row Name 08/03/20 1500          Bed Mobility Goal 1 (PT)    Activity/Assistive Device (Bed Mobility Goal 1, PT)  bed mobility activities, all  -VG     [place] Level/Cues Needed (Bed Mobility Goal 1, PT)  independent  -VG     Time Frame (Bed Mobility Goal 1, PT)  long term goal (LTG);2 weeks  -VG     Row Name 08/03/20 1500          Transfer Goal 1 (PT)    Activity/Assistive Device (Transfer Goal 1, PT)  sit-to-stand/stand-to-sit;walker, rolling  -VG     [place] Level/Cues Needed (Transfer Goal 1, PT)  conditional independence  -VG     Time Frame (Transfer Goal 1, PT)  long term goal (LTG);2 weeks  -VG     Row Name 08/03/20 1500          Gait Training Goal 1 (PT)    Activity/Assistive Device (Gait Training Goal 1, PT)  gait (walking locomotion);walker, rolling  -VG     [place] Level (Gait Training Goal 1, PT)  conditional independence  -VG     Distance (Gait Goal 1, PT)  500  -VG     Time Frame (Gait Training Goal 1, PT)  long term goal (LTG);2 weeks  -VG     Row Name 08/03/20 1500          Stairs Goal 1 (PT)    Activity/Assistive Device (Stairs Goal 1, PT)  stairs, all skills;using handrail, right;using handrail, left  -VG     [place] Level/Cues Needed (Stairs Goal 1, PT)  contact guard assist  -VG     Number of Stairs (Stairs Goal 1, PT)  12+8  -VG     Time Frame (Stairs Goal 1, PT)  long term goal (LTG);2 weeks  -VG     Row Name 08/03/20 1500          Patient Education Goal (PT)    Activity (Patient Education Goal, PT)  HEP  -VG     [place]/Cues/Accuracy (Memory Goal 2, PT)  independent  -VG     Time Frame (Patient Education Goal, PT)  long term goal (LTG);2 weeks  -VG       User Key  (r) =  Recorded By, (t) = Taken By, (c) = Cosigned By    Initials Name Provider Type    VG Natalia Pires, PT Physical Therapist        Clinical Impression     Row Name 08/03/20 1457          Pain Scale: Numbers Pre/Post-Treatment    Pain Scale: Numbers, Pretreatment  0/10 - no pain  -VG     Pain Scale: Numbers, Post-Treatment  0/10 - no pain  -VG     Row Name 08/03/20 1457          Plan of Care Review    Plan of Care Reviewed With  patient  -VG     Outcome Summary  IP PT eval completed. Pt ambulated 150 ft with RW and CGA. Distance limited by fatigue, weakness, and SOA. VSS on 2LO2NC. Demonstrates mildly impaired endurance, balance, strength, aerobic capacity, and safety awareness. Recommend appropriate for home with 24/7 spv/assist and HH PT, pending performance on stairs next session (pt has 12+8 stairs with B handrails to access primary bed/bath). Will continue to progress pt as able per POC.  -VG     Row Name 08/03/20 1456          Physical Therapy Clinical Impression    Criteria for Skilled Interventions Met (PT Clinical Impression)  yes;treatment indicated  -VG     Rehab Potential (PT Clinical Summary)  good, to achieve stated therapy goals  -VG     Row Name 08/03/20 1451          Vital Signs    Pre Systolic BP Rehab  136  -VG     Pre Treatment Diastolic BP  76  -VG     Post Systolic BP Rehab  124  -VG     Post Treatment Diastolic BP  80  -VG     Pretreatment Heart Rate (beats/min)  106  -VG     Posttreatment Heart Rate (beats/min)  103  -VG     Pre SpO2 (%)  94  -VG     O2 Delivery Pre Treatment  supplemental O2  -VG     Post SpO2 (%)  96  -VG     O2 Delivery Post Treatment  supplemental O2  -VG     Pre Patient Position  Sitting  -VG     Post Patient Position  Supine  -VG     Row Name 08/03/20 1452          Positioning and Restraints    Pre-Treatment Position  sitting in chair/recliner  -VG     Post Treatment Position  bed  -VG     In Bed  fowlers;call light within reach;encouraged to call for assist;exit alarm  on;side rails up x2;notified nsg  -VG       User Key  (r) = Recorded By, (t) = Taken By, (c) = Cosigned By    Initials Name Provider Type    Natalia Acevedo PT Physical Therapist        Outcome Measures     Row Name 08/03/20 1501          How much help from another person do you currently need...    Turning from your back to your side while in flat bed without using bedrails?  4  -VG     Moving from lying on back to sitting on the side of a flat bed without bedrails?  4  -VG     Moving to and from a bed to a chair (including a wheelchair)?  3  -VG     Standing up from a chair using your arms (e.g., wheelchair, bedside chair)?  3  -VG     Climbing 3-5 steps with a railing?  3  -VG     To walk in hospital room?  3  -VG     AM-PAC 6 Clicks Score (PT)  20  -VG     Row Name 08/03/20 1501          Functional Assessment    Outcome Measure Options  AM-PAC 6 Clicks Basic Mobility (PT)  -VG       User Key  (r) = Recorded By, (t) = Taken By, (c) = Cosigned By    Initials Name Provider Type    Natalia Acevedo, PT Physical Therapist        Physical Therapy Education                 Title: PT OT SLP Therapies (Not Started)     Topic: Physical Therapy (In Progress)     Point: Mobility training (Done)     Description:   Instruct learner(s) on safety and technique for assisting patient out of bed, chair or wheelchair.  Instruct in the proper use of assistive devices, such as walker, crutches, cane or brace.              Patient Friendly Description:   It's important to get you on your feet again, but we need to do so in a way that is safe for you. Falling has serious consequences, and your personal safety is the most important thing of all.        When it's time to get out of bed, one of us or a family member will sit next to you on the bed to give you support.     If your doctor or nurse tells you to use a walker, crutches, a cane, or a brace, be sure you use it every time you get out of bed, even if you think you don't  need it.    Learning Progress Summary           Patient Acceptance, E, VU by  at 8/3/2020 1501                   Point: Home exercise program (Not Started)     Description:   Instruct learner(s) on appropriate technique for monitoring, assisting and/or progressing patient with therapeutic exercises and activities.              Learner Progress:   Not documented in this visit.          Point: Body mechanics (Done)     Description:   Instruct learner(s) on proper positioning and spine alignment for patient and/or caregiver during mobility tasks and/or exercises.              Learning Progress Summary           Patient Acceptance, E, VU by  at 8/3/2020 1501                   Point: Precautions (Done)     Description:   Instruct learner(s) on prescribed precautions during mobility and gait tasks              Learning Progress Summary           Patient Acceptance, E, VU by  at 8/3/2020 1501                               User Key     Initials Effective Dates Name Provider Type Discipline     05/29/18 -  Natalia Pires, PT Physical Therapist PT              PT Recommendation and Plan  Planned Therapy Interventions (PT Eval): balance training, bed mobility training, home exercise program, gait training, patient/family education, stair training, strengthening, transfer training  Outcome Summary/Treatment Plan (PT)  Anticipated Discharge Disposition (PT): home with 24/7 care, home with home health  Plan of Care Reviewed With: patient  Outcome Summary: IP PT eval completed. Pt ambulated 150 ft with RW and CGA. Distance limited by fatigue, weakness, and SOA. VSS on 2LO2NC. Demonstrates mildly impaired endurance, balance, strength, aerobic capacity, and safety awareness. Recommend appropriate for home with 24/7 spv/assist and HH PT, pending performance on stairs next session (pt has 12+8 stairs with B handrails to access primary bed/bath). Will continue to progress pt as able per POC.     Time Calculation:   PT Charges      Row Name 08/03/20 1335             Time Calculation    Start Time  1335  -VG      PT Received On  08/03/20  -VG      PT Goal Re-Cert Due Date  08/13/20  -VG         Time Calculation- PT    Total Timed Code Minutes- PT  15 minute(s)  -VG         Timed Charges    89439 - Gait Training Minutes   15  -VG        User Key  (r) = Recorded By, (t) = Taken By, (c) = Cosigned By    Initials Name Provider Type    VG Natalia Pires, PT Physical Therapist        Therapy Charges for Today     Code Description Service Date Service Provider Modifiers Qty    82180486083 HC GAIT TRAINING EA 15 MIN 8/3/2020 Natalia Pires, PT GP 1    77901912274 HC PT EVAL LOW COMPLEXITY 4 8/3/2020 Natalia Pires, PT GP 1          PT G-Codes  Outcome Measure Options: AM-PAC 6 Clicks Basic Mobility (PT)  AM-PAC 6 Clicks Score (PT): 20  AM-PAC 6 Clicks Score (OT): 19    Brenda Pires PT  8/3/2020

## 2020-08-03 NOTE — H&P
[facility] Medicine Services  HISTORY AND PHYSICAL    Patient Name: Temi Jarrett  : 1930  MRN: [id number]  Primary Care Physician: Eric Patel MD  Date of admission: 2020      Subjective   Subjective     Chief Complaint:  fever    HPI:  Temi Jarrett is a 89 y.o. female with history of mild dementia, hypertension, bladder prolapse, generalized anxiety, and frequent urinary tract infections presents from home with a fever of 102 °F and mild confusion.  Patient is not a good historian, and much recent history is obtained via phone from son Victor M and daughter-in-law Chris.  On the ED, Ms. Jarrett was noted to be in atrial fibrillation with RVR.  She was placed on IV Cardizem and converted spontaneously.  No prior history of arrhythmia.    The family says they have been careful not to expose Ms. Jarrett to anyone who may have the novel coronavirus.  She lives at home with her son and daughter-in-law, and her only exposure to people outside the family is a home health nurse who administers B12 shots in the driveway.    Ms. Jarrett denies cough or shortness of breath.  She also denies chest pain or palpitations.  She does follow with a cardiologist at [facility], Dr. Helton, but says she is unaware of any prior diagnosis of cardiac disease.    Review of Systems   Constitutional: Positive for fever.   HENT: Negative.    Eyes: Negative.    Respiratory: Negative.    Cardiovascular: Negative.    Gastrointestinal: Negative.    Endocrine: Negative.    Genitourinary: Negative.    Musculoskeletal: Negative.    Allergic/Immunologic: Negative.    Neurological: Negative.    Hematological: Negative.    Psychiatric/Behavioral: Negative.         All other systems reviewed and are negative.     Personal History     Past Medical History:   Diagnosis Date   • Arthritis    • Dementia (CMS/HCC)    • Depression    • Disease of thyroid gland    • Gastric polyp    • GERD (gastroesophageal  reflux disease)    • History of colonic polyps    • Hyperlipidemia    • Hypertension    • IBS (irritable bowel syndrome)    • Macular degeneration    • Torn meniscus     right knee        Past Surgical History:   Procedure Laterality Date   • CHOLECYSTECTOMY  1997   • EYE SURGERY  1998    Cataract extraction   • HERNIA REPAIR     • HIP HEMIARTHROPLASTY Left 9/28/2019    Procedure: HIP HEMIARTHROPLASTY LEFT;  Surgeon: Reddy Segundo Jr., MD;  [facility];  Service: Orthopedics   • HYSTERECTOMY  1976   • KNEE SURGERY Right     arthroscopy- 2000   • TONSILLECTOMY         Family History: family history includes Breast cancer in her mother; Diabetes in her son; Hypertension in her father and mother; Obesity in her mother. Otherwise pertinent FHx was reviewed and unremarkable.     Social History:  reports that she has never smoked. She has never used smokeless tobacco. Drug use questions deferred to the physician. She reports that she does not drink alcohol.  Social History     Social History Narrative    Lives with son and daughter in law--  is at [place] [facility] [place] getting rehab       Medications:  Available home medication information reviewed.    (Not in a hospital admission)    Allergies   Allergen Reactions   • Codeine Nausea Only     Trouble Breathing    • Phenylpropanolamine Provider Review Needed   • Shrimp Hives       Objective   Objective     Vital Signs:   Temp:  [99.7 °F (37.6 °C)] 99.7 °F (37.6 °C)  Heart Rate:  [] 75  Resp:  [22] 22  BP: ()/(31-78) 127/59        Physical Exam   Constitutional -no acute distress, non toxic, in bed  HEENT-NCAT, mucous membranes moist  CV-RRR, S1 S2 normal, no m/r/g  Resp-grossly clear bilaterally, diminished at bases  Abd-soft, non-tender, non-distended, normo active bowel sounds  Ext-trace to 1+ lower extremity edema bilaterally  Neuro-alert with some mild confusion, speech clear, moves all extremities   Psych-normal affect   Skin- No rash on exposed UE or  LE bilaterally    Results Reviewed:  I have personally reviewed current lab and radiology data.    Results from last 7 days   Lab Units 08/02/20  2156   WBC 10*3/mm3 15.29*   HEMOGLOBIN g/dL 10.8*   HEMATOCRIT % 33.9*   PLATELETS 10*3/mm3 221     Results from last 7 days   Lab Units 08/02/20  2156   SODIUM mmol/L 137   POTASSIUM mmol/L 4.3   CHLORIDE mmol/L 100   CO2 mmol/L 26.0   BUN mg/dL 20   CREATININE mg/dL 0.81   GLUCOSE mg/dL 180*   CALCIUM mg/dL 8.5   ALT (SGPT) U/L 13   AST (SGOT) U/L 21   LACTATE mmol/L 1.4     Estimated Creatinine Clearance: 39 mL/min (by C-G formula based on SCr of 0.81 mg/dL).  Brief Urine Lab Results  (Last result in the past 365 days)      Color   Clarity   Blood   Leuk Est   Nitrite   Protein   CREAT   Urine HCG        01/29/20 1627 Dark Yellow Slightly Cloudy Trace Negative Negative 300 mg/dL             Imaging Results (Last 24 Hours)     Procedure Component Value Units Date/Time    CT Abdomen Pelvis Without Contrast [[id number]] Collected:  08/03/20 0042     Updated:  08/03/20 0044    Narrative:       CT Abdomen Pelvis WO    INDICATION:   Altered mental status atrial fibrillation and confusion.    TECHNIQUE:   CT of the abdomen and pelvis without IV contrast. Coronal and sagittal reconstructions were obtained.  Radiation dose reduction techniques included automated exposure control or exposure modulation based on body size. Count of known CT and cardiac nuc  med studies performed in previous 12 months: 2.     COMPARISON:   12/18/2019    FINDINGS:  Abdomen: The chest CT has been dictated separately. Small pleural effusions and bibasilar atelectasis or pneumonia. Moderately large to large pericardial effusion new compared to the prior study. Large hiatal hernia and partial intrathoracic stomach.  Normal caliber aorta and atherosclerotic change. The spleen is enlarged and adrenal glands are unremarkable to the extent visualized. Pancreas not well visualized or assessed. Gallbladder  surgically absent. Trace pneumobilia probably reflects prior  sphincterotomy. Correlate clinically. The liver is enlarged. The kidneys are nonobstructed and there is no radiopaque stone. There are bilateral renal cysts.    Pelvis: Unremarkable bladder and no drainable fluid collection. Diverticulosis and moderate stool burden. Appendix not clearly demonstrated but no secondary signs of appendicitis. Inguinal canals negative. Status post left hip replacement with  significant streak artifact obscuring portions of the pelvis. No distinct free air. No suspicious bone lesion with degenerative changes.      Impression:         1. No clearly acute process in the abdomen or pelvis. Moderate stool burden but no distinct bowel obstruction.  2. Hepatosplenomegaly.  3. Large hiatal hernia and partial intrathoracic stomach.  4. Bilateral effusions and bibasilar atelectasis or pneumonia.  5. New pericardial effusion. CT chest dictated separately.  6. Bilateral renal cysts.  7. Diverticulosis.          Signer Name: Andriy Hester MD   Signed: 8/3/2020 12:42 AM   Workstation Name: [facility]    [facility]    CT Chest Without Contrast [[id number]] Collected:  08/03/20 0038     Updated:  08/03/20 0040    Narrative:       CT Chest WO    INDICATION:   Altered mental status and fever. Confusion.    TECHNIQUE:   CT of the thorax without IV contrast. Coronal and sagittal reconstructions were obtained.  Radiation dose reduction techniques included automated exposure control or exposure modulation based on body size. Count of known CT and cardiac nuc med studies  performed in previous 12 months: 2.     COMPARISON:   11/15/2019    FINDINGS:  There is a large hiatal hernia and intrathoracic stomach. Small bilateral pleural effusions with compressive atelectasis or pneumonia in the lung bases. The lungs are emphysematous. No new suspicious pulmonary nodule. Tiny 2 mm upper lobe pulmonary  nodule on the right incidentally  noted. A follow-up CT in one year is recommended.    Heart is enlarged. Moderate to large pericardial effusion measures up to 2.5 cm maximum thickness, new compared to the prior study. No new threshold adenopathy. Normal caliber aorta and atherosclerotic change. Probable underlying pulmonary arterial  hypertension. Included upper abdomen demonstrate surgical absence of the gallbladder and renal cysts. The abdomen and pelvis CT has been dictated separately. No suspicious bone lesion.      Impression:         1. Small bilateral effusions with compressive atelectasis or pneumonia in the lung bases.  2. Background emphysema and pulmonary arterial hypertension.  3. Cardiomegaly and moderately large to large pericardial effusion, new compared to prior study.  4. Large hiatal hernia and partial intrathoracic stomach.    Imaging features are atypical or uncommonly reported for COVID-19 pneumonia. Alternative diagnoses should be considered.    Signer Name: Andriy Hester MD   Signed: 8/3/2020 12:38 AM   Workstation Name: [facility]    [facility]    CT Head Without Contrast [[id number]] Collected:  08/03/20 0033     Updated:  08/03/20 0035    Narrative:       CT Head WO    HISTORY:   Altered mental status. Confusion. Atrial fibrillation.    TECHNIQUE:   Axial unenhanced head CT. Radiation dose reduction techniques included automated exposure control or exposure modulation based on body size. Count of known CT and cardiac nuc med studies performed in previous 12 months: 3.     Time of scan: 0010 hours    COMPARISON:   None.    FINDINGS:   No intracranial hemorrhage, mass, or infarct. No hydrocephalus or extra-axial fluid collection. There are senescent changes, including volume loss and nonspecific white matter change, but no acute abnormality is seen. The skull base, calvarium, and  extracranial soft tissues are normal.      Impression:       Senescent changes without acute abnormality.          Signer  Name: Andriy Hester MD   Signed: 8/3/2020 12:33 AM   Workstation Name: [id number]    [facility] [place]         CT chest from admission personally reviewed -to my eye there is bilateral basilar atelectasis with a small right effusion.  A pericardial effusion is noted.  Radiology also mentions pneumonia in the differential.    Assessment/Plan   Assessment & Plan     Active Hospital Problems    Diagnosis POA   • Pneumonia of both lower lobes due to infectious organism [J18.9] Yes       Fever  -102 °F at home  -Differential includes pneumonia, UTI, and viral illness  -Continue empiric antibiotics for possible pneumonia (Zosyn and doxycycline)  -UA remains pending (patient does have a history of frequent UTIs)  -Suspicion for the novel coronavirus remains low, but with the fever we will check a respiratory PCR panel with COVID-19.    Atrial fibrillation with RVR  -Patient is now spontaneously converted to sinus rhythm, suspect arrhythmia was precipitated by her fever  -We will obtain echo and ask cardiology to evaluate    Pericardial effusion  -Etiology unclear, will obtain echocardiogram  -Check cardiac enzymes and proBNP    Dementia    Hypothyroid  - check TSH        DVT prophylaxis:  lovenox      CODE STATUS:  Full code, discussed with son Victor M who says he is the patient's medical decision maker.  Code Status and Medical Interventions:   Ordered at: 08/03/20 0158     Level Of Support Discussed With:    Patient    Next of Kin (If No Surrogate)    Health Care Surrogate     Code Status:    CPR     Medical Interventions (Level of Support Prior to Arrest):    Full       Admission Status:  I believe this patient meets INPATIENT status due to need for IV antibiotics for pneumonia and further evaluation needed for new onset afib with RVR and pericardial effusion..  I feel patient’s risk for adverse outcomes and need for care warrant INPATIENT evaluation and I predict the patient’s care encounter to likely last  beyond 2 midnights.      Electronically signed by Jose Mustafa MD, 08/03/20, 1:58 AM.

## 2020-08-03 NOTE — PLAN OF CARE
Problem: Patient Care Overview  Goal: Plan of Care Review  Outcome: Ongoing (interventions implemented as appropriate)  Flowsheets (Taken 8/3/2020 1300)  Progress: improving  Plan of Care Reviewed With: patient  Outcome Summary: OT evaluation completed.  Pt. appears close to baseline function, but decrease in activity tolerance/endurance.  Pt. appropriate for skilled OT services to address deficit areas.  Pt. SBA to EOB, donned socks SBA, to BSC/chair CGA. Pt. toileted min A clothing CGA toileting. Recommend home with continued family assist.

## 2020-08-03 NOTE — PROGRESS NOTES
Discharge Planning Assessment  [facility]     Patient Name: Temi Jarrett  MRN: [id number]  Today's Date: 8/3/2020    Admit Date: 8/2/2020    Discharge Needs Assessment     Row Name 08/03/20 1152       Living Environment    Lives With  child(adrvin), adult    Current Living Arrangements  home/apartment/condo    Primary Care Provided by  child(darvin)    Provides Primary Care For  no one, unable/limited ability to care for self    Family Caregiver if Needed  child(darvin), adult    Quality of Family Relationships  involved;supportive    Able to Return to Prior Arrangements  yes       Resource/Environmental Concerns    Resource/Environmental Concerns  none       Transition Planning    Patient/Family Anticipates Transition to  home with help/services    Patient/Family Anticipated Services at Transition  ;rehabilitation services    Transportation Anticipated  family or friend will provide       Discharge Needs Assessment    Readmission Within the Last 30 Days  no previous admission in last 30 days    Concerns to be Addressed  discharge planning    Equipment Currently Used at Home  bath bench;rollator    Anticipated Changes Related to Illness  none        Discharge Plan     Row Name 08/03/20 1185       Plan    Plan  Ongoing    Patient/Family in Agreement with Plan  yes    Plan Comments  Spoke with patient's son by phone to initiate discharge planning.  Patient in isolation.  She lives with her son and daughter-in-law in [facility].  Prior to admission, she ambualted with a rollator and her DIL assisted with ADL's.  She also has a tub bench and has had [facility] in the past.  According to patient's son, patient does not have RX coverage, but has her scripts filled at [facility].  He anticipates that patient will need IRF at discharge.  Will await therapy recommendations to determine proper discharge placement.  CM will continue to follow.  Jemma Vargas RN [phone]    Final Discharge Disposition  Code  30 - still a patient        Destination      Coordination has not been started for this encounter.      Durable Medical Equipment      Coordination has not been started for this encounter.      Dialysis/Infusion      Coordination has not been started for this encounter.      Home Medical Care      Coordination has not been started for this encounter.      Therapy      Coordination has not been started for this encounter.      Community Resources      Coordination has not been started for this encounter.        Expected Discharge Date and Time     Expected Discharge Date Expected Discharge Time    Aug 7, 2020         Demographic Summary     Row Name 08/03/20 1150       General Information    Admission Type  inpatient    Arrived From  emergency department    Referral Source  admission list    Reason for Consult  discharge planning    Preferred Language  English     Used During This Interaction  no    General Information Comments  PCP- Eric Patel        Functional Status     Row Name 08/03/20 1151       Functional Status    Usual Activity Tolerance  fair    Current Activity Tolerance  fair       Functional Status, IADL    Medications  completely dependent    Meal Preparation  completely dependent    Housekeeping  completely dependent    Laundry  completely dependent    Shopping  completely dependent        Psychosocial    No documentation.       Abuse/Neglect    No documentation.       Legal     Row Name 08/03/20 1152       Financial/Legal    Finance Comments  Verified with patient's son that she has Medicare/[place].  No issues obtaining medications.        Substance Abuse    No documentation.       Patient Forms    No documentation.           Jemma Vargas RN

## 2020-08-03 NOTE — ED PROVIDER NOTES
EMERGENCY DEPARTMENT ENCOUNTER      Pt Name: Temi Jarrett  MRN: [id number]  YOB: 1930  Date of evaluation: 8/2/2020  Provider: Jagdish Mclaughlin DO    CHIEF COMPLAINT       Chief Complaint   Patient presents with   • Fever         HISTORY OF PRESENT ILLNESS  (Location/Symptom, Timing/Onset, Context/Setting, Quality, Duration, Modifying Factors, Severity.)   Temi Jarrett is a 89 y.o. female who presents to the emergency department via EMS for evaluation of altered mental status, generalized weakness, fever at home per family who is not present to the body additional history on arrival to the emergency department.  The patient herself is very elderly, does have significant Alzheimer's and underlying dementia which does limit my HPI.  She is awake and alert, she does states she does not have any current complaints, no chest pain shortness of breath, abdominal pain.  She is unsure of any prior history of atrial fibrillation.  States the other question she has for me is why she is here.  She denies any falls, no headache or head injury.  Denies any acute complaints at this time.  Again no family bedside to provide any additional history.      Nursing notes were reviewed.    REVIEW OF SYSTEMS    (2-9 systems for level 4, 10 or more for level 5)   ROS:  Except as noted above unable to Larry cath review of systems secondary to patient's Alzheimer's and severe dementia      PAST MEDICAL HISTORY     Past Medical History:   Diagnosis Date   • Arthritis    • Dementia (CMS/HCC)    • Depression    • Disease of thyroid gland    • Gastric polyp    • GERD (gastroesophageal reflux disease)    • History of colonic polyps    • Hyperlipidemia    • Hypertension    • IBS (irritable bowel syndrome)    • Macular degeneration    • Torn meniscus     right knee          SURGICAL HISTORY       Past Surgical History:   Procedure Laterality Date   • CHOLECYSTECTOMY  1997   • EYE SURGERY  1998    Cataract extraction   •  HERNIA REPAIR     • HIP HEMIARTHROPLASTY Left 9/28/2019    Procedure: HIP HEMIARTHROPLASTY LEFT;  Surgeon: Reddy Segundo Jr., MD;  Location: [facility];  Service: Orthopedics   • HYSTERECTOMY  1976   • KNEE SURGERY Right     arthroscopy- 2000   • TONSILLECTOMY           CURRENT MEDICATIONS       Current Facility-Administered Medications:   •  cyanocobalamin injection 1,000 mcg, 1,000 mcg, Intramuscular, Q28 Days, Eric Patel MD, 1,000 mcg at 02/17/20 1656  •  dilTIAZem (CARDIZEM) 125 mg in 125 mL 0.7% sodium chloride (1 mg/mL) infusion, 5-15 mg/hr, Intravenous, Continuous, Jagdish Mclaughlin DO, Stopped at 08/02/20 2321  •  sodium chloride 0.9 % flush 10 mL, 10 mL, Intravenous, PRN, Jagdish Mclaughlin DO  •  sodium chloride 0.9 % flush 10 mL, 10 mL, Intravenous, PRN, Jagdish Mclaughlin DO    Current Outpatient Medications:   •  acetaminophen (TYLENOL) 325 MG tablet, Take 650 mg by mouth Every 6 (Six) Hours As Needed for Mild Pain ., Disp: , Rfl:   •  aspirin 81 MG EC tablet, Take 81 mg by mouth Daily., Disp: , Rfl:   •  bethanechol (URECHOLINE) 10 MG tablet, TAKE ONE TABLET BY MOUTH FOUR TIMES A DAY, Disp: 120 tablet, Rfl: 5  •  busPIRone (BUSPAR) 10 MG tablet, TAKE ONE TABLET BY MOUTH TWICE A DAY, Disp: 60 tablet, Rfl: 4  •  Cholecalciferol (VITAMIN D PO), 2000 U qd, Disp: , Rfl:   •  cyanocobalamin 1000 MCG/ML injection, Inject 1 mL into the appropriate muscle as directed by prescriber Every 30 (Thirty) Days., Disp: 1 mL, Rfl: 5  •  diclofenac (VOLTAREN) 1 % gel gel, APPLY 4 GRAMS TOPICALLY TO THE APPROPRAITE AREA AS DIRECTED FOUR TIMES A DAY, Disp: 100 g, Rfl: 4  •  diphenoxylate-atropine (Lomotil) 2.5-0.025 MG per tablet, Take 1 tablet by mouth 4 (Four) Times a Day As Needed for Diarrhea., Disp: 120 tablet, Rfl: 2  •  docusate sodium (COLACE) 100 MG capsule, Take 1 capsule by mouth 2 (Two) Times a Day As Needed for Constipation., Disp: , Rfl:   •  donepezil (ARICEPT) 10 MG tablet, Take  "1 tablet by mouth every night at bedtime., Disp: 90 tablet, Rfl: 3  •  gabapentin (NEURONTIN) 300 MG capsule, TAKE ONE CAPSULE BY MOUTH TWICE A DAY, Disp: 180 capsule, Rfl: 0  •  hydroCHLOROthiazide (HYDRODIURIL) 25 MG tablet, Take 1 tablet by mouth Daily., Disp: 90 tablet, Rfl: 1  •  Lactobacillus tablet, Take 1 tablet by mouth Daily., Disp: , Rfl:   •  lisinopril (PRINIVIL,ZESTRIL) 10 MG tablet, TAKE TWO TABLETS BY MOUTH TWICE A DAY, Disp: 360 tablet, Rfl: 0  •  memantine (NAMENDA) 10 MG tablet, TAKE ONE TABLET BY MOUTH TWICE A DAY, Disp: 180 tablet, Rfl: 3  •  methocarbamol (ROBAXIN) 500 MG tablet, TAKE ONE TABLET BY MOUTH FOUR TIMES A DAY, Disp: 120 tablet, Rfl: 5  •  Multiple Vitamins-Minerals (PRESERVISION AREDS PO), Take 1 tablet by mouth Daily., Disp: , Rfl:   •  omeprazole (priLOSEC) 20 MG capsule, TAKE ONE CAPSULE BY MOUTH DAILY, Disp: 30 capsule, Rfl: 5  •  ondansetron ODT (ZOFRAN-ODT) 4 MG disintegrating tablet, Take 1 tablet by mouth Every 8 (Eight) Hours As Needed for Nausea or Vomiting., Disp: 20 tablet, Rfl: 0  •  oseltamivir (TAMIFLU) 75 MG capsule, Take 1 capsule by mouth 2 (Two) Times a Day., Disp: 10 capsule, Rfl: 0  •  potassium chloride (MICRO-K) 10 MEQ CR capsule, TAKE TWO CAPSULES BY MOUTH DAILY, Disp: 180 capsule, Rfl: 2  •  promethazine (PHENERGAN) 25 MG tablet, TAKE ONE-HALF TO ONE TABLET BY MOUTH EVERY 6 HOURS AS NEEDED FOR NAUSEA, Disp: 30 tablet, Rfl: 0  •  sertraline (ZOLOFT) 100 MG tablet, Take 100 mg by mouth Daily., Disp: , Rfl:   •  sertraline (ZOLOFT) 50 MG tablet, TAKE ONE TABLET BY MOUTH DAILY, Disp: 90 tablet, Rfl: 4  •  sucralfate (CARAFATE) 1 g tablet, Take 1 tablet by mouth 3 (Three) Times a Day Before Meals., Disp: 270 tablet, Rfl: 1  •  SYNTHROID 175 MCG tablet, Take 1 tablet by mouth Daily. Patient receives medication from patient assistance.  NDC:8035-1524-10 EXP:07/02/2020 LOT [id number], Disp: 90 tablet, Rfl: 1  •  Syringe 23G X 1\" 3 ML misc, Use to inject vitamin b12 " once a month, Disp: 1 each, Rfl: 5  •  traMADol (ULTRAM) 50 MG tablet, TAKE ONE TABLET BY MOUTH DAILY AS NEEDED FOR MODERATE PAIN IN THE RIGHT KNEE, Disp: 30 tablet, Rfl: 0  •  traZODone (DESYREL) 50 MG tablet, Take 0.5 tablets by mouth At Night As Needed for Sleep., Disp: 15 tablet, Rfl: 0    ALLERGIES     Codeine; Phenylpropanolamine; and Shrimp    FAMILY HISTORY       Family History   Problem Relation Age of Onset   • Hypertension Mother    • Breast cancer Mother    • Obesity Mother    • Hypertension Father    • Diabetes Son           SOCIAL HISTORY       Social History     Socioeconomic History   • Marital status:      Spouse name: Not on file   • Number of children: Not on file   • Years of education: Not on file   • Highest education level: Not on file   Tobacco Use   • Smoking status: Never Smoker   • Smokeless tobacco: Never Used   Substance and Sexual Activity   • Alcohol use: No   • Drug use: Defer   • Sexual activity: Defer   Social History Narrative    Lives with son and daughter in law--  is at [place] getting rehab         PHYSICAL EXAM    (up to 7 for level 4, 8 or more for level 5)     Vitals:    08/03/20 0023 08/03/20 0027 08/03/20 0030 08/03/20 0045   BP:  118/65 118/69 127/59   Pulse: 73 81 73    Resp:       Temp:       TempSrc:       SpO2:   97% 99%   Weight:       Height:           Physical Exam  General :Patient is awake, sitting upright in bed, answer most questions with 1-2 word responses, underlying dementia appreciated  HEENT: Pupils are equally round and reactive to light, EOMI, conjunctivae clear, sclerae white, there is no injection no icterus.  Oral mucosa is moist, no exudate. Uvula is midline  Neck: Neck is supple, full range of motion, trachea midline, no meningeal signs on exam  Cardiac: Heart tachycardic, irregularly irregular, no murmurs, rubs, or gallops  Lungs: Lungs with decreased breath sound bilaterally, no acute respiratory distress, no wheezing or rhonchi, no  accessory muscle usage.  Chest wall: There is no tenderness to palpation over the chest wall or over ribs  Abdomen: Abdomen is soft, nontender, nondistended. There is no firm or pulsatile masses, no rebound rigidity or guarding.   Musculoskeletal: Patient able to voluntarily move all 4 extremities although with some generalized muscle atrophy and generalized weakness.  Trace edema bilateral lower extremities.    Neuro: Patient follows most commands, individually able to raise her arms and legs up off the bed although somewhat weak, no left flaccid paralysis or significant unilateral weakness appreciated.  Patient unable to fully follow commands, with underlying dementia which limits my full neurological examination.  Dermatology: Skin is warm and dry  Psych: Unable to fully perform secondary to dementia/Alzheimer's      DIAGNOSTIC RESULTS     EKG: All EKG's are interpreted by the Emergency Department Physician who either signs or Co-signs this chart in the absence of a cardiologist.    ECG 12 Lead   Final Result   Test Reason : repeat   Blood Pressure : **/** mmHG   Vent. Rate : 071 BPM     Atrial Rate : 071 BPM      P-R Int : 126 ms          QRS Dur : 084 ms       QT Int : 426 ms       P-R-T Axes : 005 038 -04 degrees      QTc Int : 462 ms      Normal sinus rhythm   Normal ECG   When compared with ECG of 02-AUG-2020 21:48,   Previous ECG has undetermined rhythm, needs review   Confirmed by IVAN ELIZONDO MD ([id number]) on 8/3/2020 1:20:07 AM      Referred By:  EDMD           Confirmed By:IVAN ELIZONDO MD      ECG 12 Lead   Final Result   Test Reason : afib   Blood Pressure : **/** mmHG   Vent. Rate : 119 BPM     Atrial Rate : 337 BPM      P-R Int : 000 ms          QRS Dur : 076 ms       QT Int : 270 ms       P-R-T Axes : 000 040 -29 degrees      QTc Int : 379 ms      atrial fib with rvr   Low voltage QRS   Abnormal QRS-T angle, consider primary T wave abnormality   Abnormal ECG   When compared with ECG of 14-DEC-2019  20:45,   afib new   Vent. rate has increased BY  47 BPM   T wave inversion no longer evident in Anterior leads   Confirmed by IVAN ELIZONDO MD ([id number]) on 8/2/2020 9:55:48 PM      Referred By:  AP           Confirmed By:IVAN ELIZONDO MD          RADIOLOGY:   Non-plain film images such as CT, Ultrasound and MRI are read by the radiologist. Plain radiographic images are visualized and preliminarily interpreted by the emergency physician with the below findings:      [] Radiologist's Report Reviewed:  CT Head Without Contrast   Final Result   Senescent changes without acute abnormality.               Signer Name: Andriy Hester MD    Signed: 8/3/2020 12:33 AM    Workstation Name: [facility]     Radiology [facility]      CT Chest Without Contrast   Final Result      1. Small bilateral effusions with compressive atelectasis or pneumonia in the lung bases.   2. Background emphysema and pulmonary arterial hypertension.   3. Cardiomegaly and moderately large to large pericardial effusion, new compared to prior study.   4. Large hiatal hernia and partial intrathoracic stomach.      Imaging features are atypical or uncommonly reported for COVID-19 pneumonia. Alternative diagnoses should be considered.      Signer Name: Andriy Hester MD    Signed: 8/3/2020 12:38 AM    Workstation Name: [facility]     Radiology [facility]      CT Abdomen Pelvis Without Contrast   Final Result      1. No clearly acute process in the abdomen or pelvis. Moderate stool burden but no distinct bowel obstruction.   2. Hepatosplenomegaly.   3. Large hiatal hernia and partial intrathoracic stomach.   4. Bilateral effusions and bibasilar atelectasis or pneumonia.   5. New pericardial effusion. CT chest dictated separately.   6. Bilateral renal cysts.   7. Diverticulosis.               Signer Name: Andriy Hester MD    Signed: 8/3/2020 12:42 AM    Workstation Name: [facility]     Radiology [facility]            ED BEDSIDE  ULTRASOUND:   Performed by ED Physician - none    LABS:    I have reviewed and interpreted all of the currently available lab results from this visit (if applicable):  Results for orders placed or performed during the hospital encounter of 08/02/20   Comprehensive Metabolic Panel   Result Value Ref Range    Glucose 180 (H) 65 - 99 mg/dL    BUN 20 8 - 23 mg/dL    Creatinine 0.81 0.57 - 1.00 mg/dL    Sodium 137 136 - 145 mmol/L    Potassium 4.3 3.5 - 5.2 mmol/L    Chloride 100 98 - 107 mmol/L    CO2 26.0 22.0 - 29.0 mmol/L    Calcium 8.5 8.2 - 9.6 mg/dL    Total Protein 6.2 6.0 - 8.5 g/dL    Albumin 3.30 (L) 3.50 - 5.20 g/dL    ALT (SGPT) 13 1 - 33 U/L    AST (SGOT) 21 1 - 32 U/L    Alkaline Phosphatase 111 39 - 117 U/L    Total Bilirubin 0.7 0.0 - 1.2 mg/dL    eGFR Non African Amer 66 >60 mL/min/1.73    Globulin 2.9 gm/dL    A/G Ratio 1.1 g/dL    BUN/Creatinine Ratio 24.7 7.0 - 25.0    Anion Gap 11.0 5.0 - 15.0 mmol/L   Lactic Acid, Plasma   Result Value Ref Range    Lactate 1.4 0.5 - 2.0 mmol/L   CBC Auto Differential   Result Value Ref Range    WBC 15.29 (H) 3.40 - 10.80 10*3/mm3    RBC 3.91 3.77 - 5.28 10*6/mm3    Hemoglobin 10.8 (L) 12.0 - 15.9 g/dL    Hematocrit 33.9 (L) 34.0 - 46.6 %    MCV 86.7 79.0 - 97.0 fL    MCH 27.6 26.6 - 33.0 pg    MCHC 31.9 31.5 - 35.7 g/dL    RDW 14.2 12.3 - 15.4 %    RDW-SD 45.1 37.0 - 54.0 fl    MPV 10.5 6.0 - 12.0 fL    Platelets 221 140 - 450 10*3/mm3    Neutrophil % 87.0 (H) 42.7 - 76.0 %    Lymphocyte % 4.8 (L) 19.6 - 45.3 %    Monocyte % 7.0 5.0 - 12.0 %    Eosinophil % 0.2 (L) 0.3 - 6.2 %    Basophil % 0.5 0.0 - 1.5 %    Immature Grans % 0.5 0.0 - 0.5 %    Neutrophils, Absolute 13.30 (H) 1.70 - 7.00 10*3/mm3    Lymphocytes, Absolute 0.74 0.70 - 3.10 10*3/mm3    Monocytes, Absolute 1.07 (H) 0.10 - 0.90 10*3/mm3    Eosinophils, Absolute 0.03 0.00 - 0.40 10*3/mm3    Basophils, Absolute 0.08 0.00 - 0.20 10*3/mm3    Immature Grans, Absolute 0.07 (H) 0.00 - 0.05 10*3/mm3    nRBC 0.0  "0.0 - 0.2 /100 WBC   Scan Slide   Result Value Ref Range    RBC Morphology Normal Normal    WBC Morphology Normal Normal    Platelet Morphology Normal Normal   Green Top (Gel)   Result Value Ref Range    Extra Tube Hold for add-ons.    Lavender Top   Result Value Ref Range    Extra Tube hold for add-on    Light Blue Top   Result Value Ref Range    Extra Tube hold for add-on    Gold Top - SST   Result Value Ref Range    Extra Tube Hold for add-ons.         All other labs were within normal range or not returned as of this dictation.      EMERGENCY DEPARTMENT COURSE and DIFFERENTIAL DIAGNOSIS/MDM:   Vitals:    Vitals:    08/03/20 0023 08/03/20 0027 08/03/20 0030 08/03/20 0045   BP:  118/65 118/69 127/59   Pulse: 73 81 73    Resp:       Temp:       TempSrc:       SpO2:   97% 99%   Weight:       Height:           ED Course as of Aug 03 0120   Sun Aug 02, 2020   2156 [id number] Score for Atrial Fibrillation Stroke Risk from [employer]  on 8/2/2020  ** All calculations should be rechecked by clinician prior to use **    RESULT SUMMARY:  4 points  Stroke risk was 4.8% per year in >90,000 patients (the [place] Atrial Fibrillation Cohort Study) and 6.7% risk of stroke/TIA/systemic embolism.    One recommendation suggests a 0 score is \"low\" risk and may not require anticoagulation; a 1 score is \"low-moderate\" risk and should consider antiplatelet or anticoagulation, and score 2 or greater is \"moderate-high\" risk and should otherwise be an anticoagulation candidate.      INPUTS:  Age -> 2 = =75  Sex -> 1 = Female  CHF history -> 0 = No  Hypertension history -> 1 = Yes  Stroke/TIA/thromboembolism history -> 0 = No  Vascular disease history (prior MI, peripheral artery disease, or aortic plaque) -> 0 = No  Diabetes history -> 0 = No      [AP]   2157 HAS-BLED Score for Major Bleeding Risk from [email or web address]  on 8/2/2020  ** All calculations should be rechecked by clinician prior to use **    RESULT SUMMARY:  1 points  Risk was " 3.4% in one validation study (Lip 2011) and 1.02 bleeds per 100 patient-years in another validation study (Pisters 2010).    Anticoagulation should be considered: Patient has a relatively low risk for major bleeding (~1/100 patient-years).      INPUTS:  Hypertension -> 0 = No  Renal disease -> 0 = No  Liver disease -> 0 = No  Stroke history -> 0 = No  Prior major bleeding or predisposition to bleeding -> 0 = No  Labile INR -> 0 = No  Age >65 -> 1 = Yes  Medication usage predisposing to bleeding -> 0 = No  Alcohol use -> 0 = No      [AP]      ED Course User Index  [AP] Jagdish Mclaughlin, DO       Patient generalized weakness, confusion altered with fever at home temperature max of 101 per family.  Patient not appear toxic.  Patient family not here to provide additional history upon arrival, we did initiate a septic work-up, she is found to be in atrial fibrillation rapid response on arrival, blood pressure 101/78.  No active chest pain or difficulty breathing.  We will start with a small fluid bolus, initiate Cardizem infusion.  Also initiate a septic work-up.  Blood work as above.  Patient does have a leukocytosis with a left shift, no obvious source of infection, CT head, chest and abdomen obtained, looks like bilateral effusions or atelectasis or pneumonia, also pericardial effusion.  Patient did get hypotensive with her a Cardizem infusion which we did stop, patient was given IV fluids which she responded well to, actually converted back into normal sinus rhythm afterwards.  Blood pressure stabilized.  Given her multiple issues we will continue to treat for possible septic picture, plan for admission to the hospital for further work-up treatments and therapies.  Case discussed with our hospitalist team for admission.      MEDICATIONS ADMINISTERED IN ED:  Medications   sodium chloride 0.9 % flush 10 mL (has no administration in time range)   sodium chloride 0.9 % flush 10 mL (has no administration in time  "range)   dilTIAZem (CARDIZEM) 125 mg in 125 mL 0.7% sodium chloride (1 mg/mL) infusion (0 mg/hr Intravenous Stopped 8/2/20 2321)   dilTIAZem (CARDIZEM) bolus from bag 1 mg/mL 5 mg (5 mg Intravenous Bolus from Bag 8/2/20 2208)   sodium chloride 0.9 % bolus 500 mL (0 mL Intravenous Stopped 8/3/20 0003)   vancomycin (VANCOCIN) in iso-osmotic dextrose IVPB 1 g (premix) 200 mL (0 mg/kg × 53.5 kg Intravenous Stopped 8/3/20 0039)   piperacillin-tazobactam (ZOSYN) 3.375 g in iso-osmotic dextrose 50 ml (premix) (0 g Intravenous Stopped 8/2/20 2318)   sodium chloride 0.9 % bolus 1,000 mL (0 mL Intravenous Stopped 8/3/20 0039)       PROCEDURES:  Procedures    CRITICAL CARE TIME    Total Critical Care time was 45 minutes, excluding separately reportable procedures.  Atrial fibrillation with rapid ventricular response, fever of unknown source, sepsis requiring multiple interventions, rechecks and re-evaluations.  There was a high probability of clinically significant/life threatening deterioration in the patient's condition which required my urgent intervention.      FINAL IMPRESSION      1. Pneumonia of both lower lobes due to infectious organism    2. Bandemia    3. Atrial fibrillation with rapid ventricular response (CMS/HCC)    4. Altered mental status, unspecified altered mental status type    5. Fever, unspecified fever cause          DISPOSITION/PLAN     ED Disposition     ED Disposition Condition Comment    Decision to Admit            PATIENT REFERRED TO:  No follow-up provider specified.    DISCHARGE MEDICATIONS:     Medication List      CONTINUE taking these medications    Syringe 23G X 1\" 3 ML misc  Use to inject vitamin b12 once a month        ASK your doctor about these medications    acetaminophen 325 MG tablet  Commonly known as:  TYLENOL     aspirin 81 MG EC tablet     bethanechol 10 MG tablet  Commonly known as:  URECHOLINE  TAKE ONE TABLET BY MOUTH FOUR TIMES A DAY     busPIRone 10 MG tablet  Commonly known " as:  BUSPAR  TAKE ONE TABLET BY MOUTH TWICE A DAY     cyanocobalamin 1000 MCG/ML injection  Inject 1 mL into the appropriate muscle as directed by prescriber Every 30   (Thirty) Days.     diclofenac 1 % gel gel  Commonly known as:  VOLTAREN  APPLY 4 GRAMS TOPICALLY TO THE APPROPRAITE AREA AS DIRECTED FOUR TIMES A   DAY     diphenoxylate-atropine 2.5-0.025 MG per tablet  Commonly known as:  Lomotil  Take 1 tablet by mouth 4 (Four) Times a Day As Needed for Diarrhea.     docusate sodium 100 MG capsule  Commonly known as:  COLACE  Take 1 capsule by mouth 2 (Two) Times a Day As Needed for Constipation.     donepezil 10 MG tablet  Commonly known as:  ARICEPT  Take 1 tablet by mouth every night at bedtime.     gabapentin 300 MG capsule  Commonly known as:  NEURONTIN  TAKE ONE CAPSULE BY MOUTH TWICE A DAY     hydroCHLOROthiazide 25 MG tablet  Commonly known as:  HYDRODIURIL  Take 1 tablet by mouth Daily.     Lactobacillus tablet     lisinopril 10 MG tablet  Commonly known as:  PRINIVIL,ZESTRIL  TAKE TWO TABLETS BY MOUTH TWICE A DAY     memantine 10 MG tablet  Commonly known as:  NAMENDA  TAKE ONE TABLET BY MOUTH TWICE A DAY     methocarbamol 500 MG tablet  Commonly known as:  ROBAXIN  TAKE ONE TABLET BY MOUTH FOUR TIMES A DAY     omeprazole 20 MG capsule  Commonly known as:  priLOSEC  TAKE ONE CAPSULE BY MOUTH DAILY     ondansetron ODT 4 MG disintegrating tablet  Commonly known as:  ZOFRAN-ODT  Take 1 tablet by mouth Every 8 (Eight) Hours As Needed for Nausea or   Vomiting.     oseltamivir 75 MG capsule  Commonly known as:  Tamiflu  Take 1 capsule by mouth 2 (Two) Times a Day.     potassium chloride 10 MEQ CR capsule  Commonly known as:  MICRO-K  TAKE TWO CAPSULES BY MOUTH DAILY     PRESERVISION AREDS PO     promethazine 25 MG tablet  Commonly known as:  PHENERGAN  TAKE ONE-HALF TO ONE TABLET BY MOUTH EVERY 6 HOURS AS NEEDED FOR NAUSEA     * sertraline 100 MG tablet  Commonly known as:  ZOLOFT     * sertraline 50 MG  tablet  Commonly known as:  ZOLOFT  TAKE ONE TABLET BY MOUTH DAILY     sucralfate 1 g tablet  Commonly known as:  CARAFATE  Take 1 tablet by mouth 3 (Three) Times a Day Before Meals.     Synthroid 175 MCG tablet  Generic drug:  levothyroxine  Take 1 tablet by mouth Daily. Patient receives medication from patient   assistance.  NDC:7370-8732-44 EXP:07/02/2020 LOT [id number]     traMADol 50 MG tablet  Commonly known as:  ULTRAM  TAKE ONE TABLET BY MOUTH DAILY AS NEEDED FOR MODERATE PAIN IN THE RIGHT   KNEE     traZODone 50 MG tablet  Commonly known as:  DESYREL  Take 0.5 tablets by mouth At Night As Needed for Sleep.     VITAMIN D PO         * This list has 2 medication(s) that are the same as other medications   prescribed for you. Read the directions carefully, and ask your doctor or   other care provider to review them with you.                  Comment: Please note this report has been produced using speech recognition software.      Jagdish Mclaughlin DO  Attending Emergency Physician               Jagdish Mclaughlin DO  08/03/20 7077

## 2020-08-03 NOTE — THERAPY EVALUATION
Acute Care - Occupational Therapy Initial Evaluation  [facility]     Patient Name: Temi Jarrett  : 1930  MRN: [id number]  Today's Date: 8/3/2020             Admit Date: 2020       ICD-10-CM ICD-9-CM   1. Pneumonia of both lower lobes due to infectious organism J18.9 486   2. Bandemia D72.825 288.66   3. Atrial fibrillation with rapid ventricular response (CMS/HCC) I48.91 427.31   4. Altered mental status, unspecified altered mental status type R41.82 780.97   5. Fever, unspecified fever cause R50.9 780.60   6. Impaired mobility and ADLs Z74.09 V49.89    Z78.9      Patient Active Problem List   Diagnosis   • Esophageal reflux   • Hypothyroidism (acquired)   • Generalized anxiety disorder   • Peripheral neuropathy   • Spinal stenosis of lumbar region   • Osteoporosis   • Disc disorder of lumbar region   • Bladder prolapse, female, acquired   • Essential hypertension   • Pernicious anemia   • Annual physical exam   • Primary osteoarthritis of both knees   • Massive hiatal hernia with intrathoracic stomach   • Acute UTI (urinary tract infection)   • Pneumonia of both lower lobes due to infectious organism   • Paroxysmal atrial fibrillation with rapid ventricular response (CMS/HCC)   • Pericardial effusion   • Dementia (CMS/HCC)     Past Medical History:   Diagnosis Date   • Arthritis    • Closed fracture of neck of left femur (CMS/HCC) 2019   • Dementia (CMS/HCC)    • Depression    • Disease of thyroid gland    • Gastric polyp    • GERD (gastroesophageal reflux disease)    • History of colonic polyps    • Hyperlipidemia    • Hypertension    • IBS (irritable bowel syndrome)    • Macular degeneration    • Torn meniscus     right knee      Past Surgical History:   Procedure Laterality Date   • CHOLECYSTECTOMY     • EYE SURGERY      Cataract extraction   • HERNIA REPAIR     • HIP HEMIARTHROPLASTY Left 2019    Procedure: HIP HEMIARTHROPLASTY LEFT;  Surgeon: Reddy Segundo Jr., MD;   Location: [facility] OR;  Service: Orthopedics   • HYSTERECTOMY  1976   • KNEE SURGERY Right     arthroscopy- 2000   • TONSILLECTOMY            OT ASSESSMENT FLOWSHEET (last 12 hours)      Occupational Therapy Evaluation     Row Name 08/03/20 0919                   OT Evaluation Time/Intention    Subjective Information  no complaints  -SANDEEP        Document Type  evaluation  -SANDEEP        Mode of Treatment  individual therapy;occupational therapy  -SANDEEP        Patient Effort  good  -SANDEEP        Symptoms Noted During/After Treatment  none  -SANDEEP           General Information    Patient Profile Reviewed?  yes  -SANDEEP        Patient Observations  alert;cooperative;agree to therapy  -SANDEEP        Patient/Family Observations  NO family present.  Pt. sitting up in bed eating last few bites of BF on arrival.  -SANDEEP        Prior Level of Function  independent:;all household mobility;feeding;grooming;dressing;min assist:;bathing;dependent:;home management;driving;shopping meals to room, but eats with family for dinner  -SANDEEP        Equipment Currently Used at Home  bath bench 3 wheeled walker  -SANDEEP        Existing Precautions/Restrictions  fall contact and airborne precuations  -SANDEEP        Risks Reviewed  patient:;LOB;increased discomfort;change in vital signs;lines disloged  -SANDEEP        Benefits Reviewed  patient:;improve function;increase independence;increase strength;increase balance;increase knowledge  -SANDEEP        Barriers to Rehab  previous functional deficit  -SANDEEP           Relationship/Environment    Primary Source of Support/Comfort  child(darvin)  -SANDEEP        Lives With  child(darvin), adult;grandchild(darvin) son, DIL and grandtr assist  -SANDEEP           Resource/Environmental Concerns    Current Living Arrangements  home/apartment/condo  -SANDEEP           Home Main Entrance    Number of Stairs, Main Entrance  one  -SANDEEP           Stairs Within Home, Primary    Stairs, Within Home, Primary  -- 1 1/2 flight  -SANDEEP           Cognitive Assessment/Intervention- PT/OT     Orientation Status (Cognition)  oriented x 3  -SANDEEP        Follows Commands (Cognition)  follows one step commands;over 90% accuracy;repetition of directions required  -SANDEEP        Safety Deficit (Cognitive)  safety precautions awareness  -SANDEEP           Safety Issues, Functional Mobility    Safety Issues Affecting Function (Mobility)  safety precaution awareness  -SANDEEP        Impairments Affecting Function (Mobility)  balance;endurance/activity tolerance;strength  -SANDEEP           Bed Mobility Assessment/Treatment    Bed Mobility Assessment/Treatment  supine-sit;scooting/bridging  -SANDEEP        Scooting/Bridging [place] (Bed Mobility)  -- SBA  -SANDEEP        Supine-Sit [place] (Bed Mobility)  verbal cues SBA  -SANDEEP        Bed Mobility, Safety Issues  decreased use of arms for pushing/pulling  -SANDEEP        Assistive Device (Bed Mobility)  bed rails;head of bed elevated  -SANDEEP           Functional Mobility    Functional Mobility- Comment  deferred to PT  -SANDEEP           Transfer Assessment/Treatment    Transfer Assessment/Treatment  sit-stand transfer;stand-sit transfer;toilet transfer  -SANDEEP           Sit-Stand Transfer    Sit-Stand [place] (Transfers)  contact guard;verbal cues  -SANDEEP        Assistive Device (Sit-Stand Transfers)  other (see comments) gait belt, bed rail   -SANDEEP           Stand-Sit Transfer    Stand-Sit [place] (Transfers)  contact guard;verbal cues  -SANDEEP        Assistive Device (Stand-Sit Transfers)  other (see comments) gait belt and arm rest chair  -SANEDEP           Toilet Transfer    Type (Toilet Transfer)  stand-sit;sit-stand  -SANDEEP        [place] Level (Toilet Transfer)  contact guard  -SANDEEP        Assistive Device (Toilet Transfer)  commode, bedside without drop arms  -SANDEEP           ADL Assessment/Intervention    46641 - OT Self Care/Mgmt Minutes  10  -SANDEEP        BADL Assessment/Intervention  upper body dressing;lower body dressing;toileting;grooming extra time for all tasks  -SANDEEP           Upper Body  Dressing Assessment/Training    Upper Body Dressing [place] Level  don;pajama/robe;maximum assist (25% patient effort);verbal cues  -SANDEEP        Upper Body Dressing Position  edge of bed sitting  -SANDEEP           Lower Body Dressing Assessment/Training    Lower Body Dressing [place] Level  don;socks SBA  -SANDEEP        Lower Body Dressing Position  edge of bed sitting  -SANDEEP        Comment (Lower Body Dressing)  extra time and effort  -SANDEEP           Grooming Assessment/Training    [place] Level (Grooming)  oral care regimen;minimum assist (75% patient effort);wash face, hands;set up;supervision  -        Grooming Position  supported sitting  -SANDEEP           Toileting Assessment/Training    [place] Level (Toileting)  contact guard assist;perform perineal hygiene;minimum assist (75% patient effort);adjust/manage clothing  -SANDEEP        Assistive Devices (Toileting)  commode, bedside without drop arms  -SANDEEP        Toileting Position  supported standing;supported sitting  -SANDEEP        Comment (Toileting)  pt. could not wipe self in sitting position.  Pt. holding to arm rest of chair with one UE was able to wipe with other hand.  -SANDEEP           BADL Safety/Performance    Impairments, BADL Safety/Performance  balance;endurance/activity tolerance  -        Skilled BADL Treatment/Intervention  cognitive/safety deficit modifications  -SANDEEP           General ROM    GENERAL ROM COMMENTS  WFL AROM BUE  -SANDEEP           MMT (Manual Muscle Testing)    General MMT Comments  BUE grossly 4 to 4+/5  -SANDEEP           Motor Assessment/Interventions    Additional Documentation  Balance (Group);Balance Interventions (Group);Therapeutic Exercise (Group);Therapeutic Exercise Interventions (Group)  -           Therapeutic Exercise    83175 - OT Therapeutic Activity Minutes  5  -SANDEEP           Balance    Balance  static sitting balance;static standing balance;dynamic sitting balance;dynamic standing balance  -           Static Sitting Balance     Level of [place] (Unsupported Sitting, Static Balance)  supervision  -SANDEEP        Sitting Position (Unsupported Sitting, Static Balance)  sitting on edge of bed  -SANDEEP           Dynamic Sitting Balance    Level of [place], Reaches Outside Midline (Sitting, Dynamic Balance)  standby assist  -SANDEEP        Sitting Position, Reaches Outside Midline (Sitting, Dynamic Balance)  sitting on edge of bed  -SANDEEP           Static Standing Balance    Level of [place] (Supported Standing, Static Balance)  contact guard assist  -SANDEEP        Assistive Device Utilized (Supported Standing, Static Balance)  -- UE support  -SANDEEP           Sensory Assessment/Intervention    Sensory General Assessment  -- per pt. with neuropathy and some burning at night in feet  -SANDEEP           Positioning and Restraints    Pre-Treatment Position  in bed  -SANDEEP        Post Treatment Position  chair  -SANDEEP        In Chair  reclined;call light within reach;encouraged to call for assist;exit alarm on;waffle cushion;legs elevated  -SANDEEP           Pain Assessment    Additional Documentation  Pain Scale: Numbers Pre/Post-Treatment (Group)  -SANDEEP           Pain Scale: Numbers Pre/Post-Treatment    Pain Scale: Numbers, Pretreatment  0/10 - no pain  -SANDEEP        Pain Scale: Numbers, Post-Treatment  0/10 - no pain  -SANDEEP           Clinical Impression (OT)    OT Diagnosis  impaired ADL independence  -SANDEEP        Patient/Family Goals Statement (OT Eval)  return to PLOF and return home  -SANDEEP        Criteria for Skilled Therapeutic Interventions Met (OT Eval)  yes;treatment indicated  -SANDEEP        Rehab Potential (OT Eval)  good, to achieve stated therapy goals  -SANDEEP        Therapy Frequency (OT Eval)  daily  -SANDEEP        Care Plan Review (OT)  evaluation/treatment results reviewed;care plan/treatment goals reviewed;risks/benefits reviewed;current/potential barriers reviewed;patient/other agree to care plan  -SANDEEP        Anticipated Discharge Disposition (OT)  home with assist  -SANDEEP            Vital Signs    Pre Systolic BP Rehab  122  -SANDEEP        Pre Treatment Diastolic BP  56  -SANDEEP        Pretreatment Heart Rate (beats/min)  80  -SANDEEP        Posttreatment Heart Rate (beats/min)  107  -SANDEEP        Pre SpO2 (%)  96  -SANDEEP        O2 Delivery Pre Treatment  supplemental O2  -SANDEEP        Post SpO2 (%)  97  -SANDEEP        O2 Delivery Post Treatment  supplemental O2  -SANDEEP        Pre Patient Position  Supine  -SANDEEP        Intra Patient Position  Standing  -SANDEEP        Post Patient Position  Sitting  -SANDEEP           Planned OT Interventions    Planned Therapy Interventions (OT Eval)  activity tolerance training;BADL retraining;functional balance retraining;occupation/activity based interventions;patient/caregiver education/training;ROM/therapeutic exercise;strengthening exercise;transfer/mobility retraining  -SANDEEP           OT Goals    Bed Mobility Goal Selection (OT)  bed mobility, OT goal 1  -SANDEEP        Transfer Goal Selection (OT)  transfer, OT goal 1  -SANDEEP        Dressing Goal Selection (OT)  dressing, OT goal 1  -SANDEEP        Additional Documentation  Grooming Goal Selection (OT) (Row)  -SANDEEP           Bed Mobility Goal 1 (OT)    Activity/Assistive Device (Bed Mobility Goal 1, OT)  bed mobility activities, all  -SANDEEP        [place] Level/Cues Needed (Bed Mobility Goal 1, OT)  independent bed flat, no rails  -SANDEEP        Time Frame (Bed Mobility Goal 1, OT)  long term goal (LTG);10 days  -SANDEEP        Progress/Outcomes (Bed Mobility Goal 1, OT)  goal ongoing  -SANDEEP           Transfer Goal 1 (OT)    Activity/Assistive Device (Transfer Goal 1, OT)  sit-to-stand/stand-to-sit;bed-to-chair/chair-to-bed;toilet;commode, bedside without drop arms;walker, rolling  -SANDEEP        [place] Level/Cues Needed (Transfer Goal 1, OT)  standby assist  -SANDEEP        Time Frame (Transfer Goal 1, OT)  long term goal (LTG);10 days  -SANDEEP        Progress/Outcome (Transfer Goal 1, OT)  goal ongoing  -SANDEEP           Dressing Goal 1 (OT)    Activity/Assistive Device  (Dressing Goal 1, OT)  lower body dressing pants/undergarment  -SANDEEP        Ada/Cues Needed (Dressing Goal 1, OT)  standby assist  -SANDEEP        Time Frame (Dressing Goal 1, OT)  long term goal (LTG);10 days  -SANDEEP        Progress/Outcome (Dressing Goal 1, OT)  goal ongoing  -SANDEEP           Living Environment    Home Accessibility  stairs to enter home;stairs within home;tub/shower is not walk in  -SANDEEP          User Key  (r) = Recorded By, (t) = Taken By, (c) = Cosigned By    Initials Name Effective Dates    SANDEEP Tao Didi Diana, OT 06/08/18 -          Occupational Therapy Education                 Title: PT OT SLP Therapies (Not Started)     Topic: Occupational Therapy (In Progress)     Point: ADL training (Done)     Description:   Instruct learner(s) on proper safety adaptation and remediation techniques during self care or transfers.   Instruct in proper use of assistive devices.              Learning Progress Summary           Patient Acceptance, E, VU,NR by SANDEEP at 8/3/2020 0919    Comment:  reason for consult, noted deficit, goal setting.  Transfer safety                   Point: Home exercise program (Not Started)     Description:   Instruct learner(s) on appropriate technique for monitoring, assisting and/or progressing therapeutic exercises/activities.              Learner Progress:   Not documented in this visit.          Point: Precautions (Done)     Description:   Instruct learner(s) on prescribed precautions during self-care and functional transfers.              Learning Progress Summary           Patient Acceptance, E, VU,NR by SANDEEP at 8/3/2020 0919    Comment:  reason for consult, noted deficit, goal setting.  Transfer safety                   Point: Body mechanics (Not Started)     Description:   Instruct learner(s) on proper positioning and spine alignment during self-care, functional mobility activities and/or exercises.              Learner Progress:   Not documented in this visit.                       User Key     Initials Effective Dates Name Provider Type Discipline    SANDEEP 06/08/18 -  Didi Tao OT Occupational Therapist OT                  OT Recommendation and Plan  Outcome Summary/Treatment Plan (OT)  Anticipated Discharge Disposition (OT): home with assist  Planned Therapy Interventions (OT Eval): activity tolerance training, BADL retraining, functional balance retraining, occupation/activity based interventions, patient/caregiver education/training, ROM/therapeutic exercise, strengthening exercise, transfer/mobility retraining  Therapy Frequency (OT Eval): daily  Plan of Care Review  Plan of Care Reviewed With: patient  Plan of Care Reviewed With: patient  Outcome Summary: OT evaluation completed.  Pt. appears close to baseline function, but decrease in activity tolerance/endurance.  Pt. appropriate for skilled OT services to address deficit areas.  Pt. SBA to EOB, donned socks SBA, to BSC/chair CGA. Pt. toileted min A clothing CGA toileting. Recommend home with continued family assist.    Outcome Measures     Row Name 08/03/20 0919             How much help from another is currently needed...    Putting on and taking off regular lower body clothing?  3  -SANDEEP      Bathing (including washing, rinsing, and drying)  3  -SANDEEP      Toileting (which includes using toilet bed pan or urinal)  3  -SANDEEP      Putting on and taking off regular upper body clothing  3  -SANDEEP      Taking care of personal grooming (such as brushing teeth)  3  -SANDEEP      Eating meals  4  -SANDEEP      AM-PAC 6 Clicks Score (OT)  19  -SANDEEP         Functional Assessment    Outcome Measure Options  AM-PAC 6 Clicks Daily Activity (OT)  -SANDEEP        User Key  (r) = Recorded By, (t) = Taken By, (c) = Cosigned By    Initials Name Provider Type    Didi Chavez OT Occupational Therapist          Time Calculation:   Time Calculation- OT     Row Name 08/03/20 1303 08/03/20 0919          Time Calculation- OT    OT Start Time  0919  -SANDEEP  --     OT Received  On  08/03/20  -SANDEEP  --     OT Goal Re-Cert Due Date  08/13/20  -SANDEEP  --        Timed Charges    71153 - OT Therapeutic Activity Minutes  --  5  -SANDEEP     10500 - OT Self Care/Mgmt Minutes  --  10  -SANDEEP       User Key  (r) = Recorded By, (t) = Taken By, (c) = Cosigned By    Initials Name Provider Type    Didi Chavez, OT Occupational Therapist        Therapy Charges for Today     Code Description Service Date Service Provider Modifiers Qty    55454638342 HC OT SELF CARE/MGMT/TRAIN EA 15 MIN 8/3/2020 Didi Tao, OT GO 1    60963111085 HC OT EVAL LOW COMPLEXITY 4 8/3/2020 iDdi Tao OT GO 1               Didi Tao OT  8/3/2020

## 2020-08-03 NOTE — CONSULTS
"Inpatient Cardiology Consult  Consult performed by: Damon Barraza IV, MD  Consult ordered by: Jose Mustafa MD  Reason for consult: atrial fibrillation          [facility]  Cardiovascular Consultation Note    Reason for consult: Atrial fibrillation with RVR, pericardial effusion    Active Hospital Problems    Diagnosis   • **Pneumonia of both lower lobes due to infectious organism   • Acute UTI (urinary tract infection)   • Paroxysmal atrial fibrillation (CMS/HCC)     · A. fib RVR noted in the setting of PNA with quick resolution, 8/3/2020  · Echo (8/3/2020): Normal LVEF.  Moderate  pericardial effusion.  Moderate MR.  RVSP 49 mmHg  · Spontaneously converted to NSR on IV Cardizem  · Chads Vasc 6 (age > 75, female, HTN, CAD)      • Pericardial effusion     · Echo (8/3/2020): Normal LVEF.  Moderate pericardial effusion without tamponade.  Moderate MR. RVSP 49 mmHg     • Essential hypertension            The patient is a 89-year-old female with a history of frequent urinary tract infections who presented to [facility] early this morning with a fever of 102.  On presentation to the ER, she was noted to be in atrial fibrillation with RVR but quickly converted to sinus rhythm with initiation of diltiazem.  The patient was noted to have leukocytosis with left shift.  CT chest showed compressive atelectasis due to hiatal hernia/intrathoracic stomach.  Urinalysis showed mild leukocytes.  The patient has been started on Zosyn, Vanco, and doxycycline.    Presently, the patient feels well and denies any history of atrial fibrillation.  She establish care with Dr. Von Helton at [facility] several years ago \"just in case she needed a cardiologist\".  She denies any palpitations, racing heart, TIA, or stroke symptoms.  The patient denies any exertional chest pain.  She lives with her son who is a writer for the racing form.    Echocardiogram performed today reveals " normal LV systolic function, left atrial dilation, and a pericardial effusion without evidence of tamponade.    Cardiac risk factors: Advanced age, hypertension, hyperlipidemia    Past medical and surgical history, social and family history reviewed in EMR.    REVIEW OF SYSTEMS:   H&P ROS reviewed and pertinent CV ROS as noted in HPI.         Vital Sign Min/Max for last 24 hours  Temp  Min: 96.6 °F (35.9 °C)  Max: 99.7 °F (37.6 °C)   BP  Min: 54/41  Max: 127/59   Pulse  Min: 71  Max: 151   Resp  Min: 16  Max: 22   SpO2  Min: 83 %  Max: 100 %   No data recorded      Intake/Output Summary (Last 24 hours) at 8/3/2020 1514  Last data filed at 8/3/2020 0437  Gross per 24 hour   Intake 1935 ml   Output --   Net 1935 ml           Physical Exam   Constitutional: She is oriented to person, place, and time. She appears well-developed and well-nourished.   HENT:   Head: Normocephalic and atraumatic.   Eyes: Conjunctivae are normal. No scleral icterus.   Neck: Normal range of motion. No JVD present. Carotid bruit is not present. No thyromegaly present.   Cardiovascular: Normal rate and regular rhythm. Exam reveals no gallop.   No murmur heard.  Pulmonary/Chest: Effort normal. She has decreased breath sounds.   Abdominal: Soft. She exhibits no distension and no mass. There is no hepatosplenomegaly.   Musculoskeletal: She exhibits edema.   Mild ankle edema   Neurological: She is alert and oriented to person, place, and time.   Skin: Skin is warm and dry. No rash noted.   Psychiatric: She has a normal mood and affect. Her behavior is normal.     EK/3/2020: Normal sinus rhythm normal without acute ST changes    Lab Review:   Labs reviewed in the electronic medical record.  Pertinent findings include:  Lab Results   Component Value Date    GLUCOSE 180 (H) 2020    BUN 20 2020    CREATININE 0.81 2020    EGFRIFNONA 66 2020    BCR 24.7 2020    K 4.3 2020    CO2 26.0 2020    CALCIUM 8.5  08/02/2020    ALBUMIN 3.30 (L) 08/02/2020    AST 21 08/02/2020    ALT 13 08/02/2020     Lab Results   Component Value Date    WBC 15.29 (H) 08/02/2020    HGB 10.8 (L) 08/02/2020    HCT 33.9 (L) 08/02/2020    MCV 86.7 08/02/2020     08/02/2020     Lab Results   Component Value Date    CHOL 163 06/10/2019    TRIG 77 06/10/2019    HDL 69 (H) 06/10/2019    LDL 79 06/10/2019     Lab Results   Component Value Date    TSH 1.410 08/02/2020              Paroxysmal atrial fibrillation  · Atrial fibrillation easily converted with diltiazem  · Uncertain of clinical relevance of atrial fibrillation in the setting of infection  · Recommend patient follow-up with Dr. Helton and obtain outpatient Holter monitoring to assess for occult atrial fibrillation  · Defer anticoagulation due to short-lived nature of this episode    Pericardial effusion  · Echo demonstrates moderate effusion without tamponade  · Follow-up with Dr. Helton with repeat echo in 4 weeks    Hypertension  · Discontinue HCTZ      Patient requests discharge home rather than to [facility] as she is concerned about exposure to coronavirus.  Defer to hospitalist service, but this seems to be a reasonable request.         · Defer anticoagulation due to short-lived nature of atrial fibrillation.  Continue low dose aspirin  · Follow-up with primary cardiologist, River Cintron, for outpatient Holter monitoring and repeat echo to assess pericardial effusion  · Discontinue HCTZ  · Please call with additional questions      VIELKA Barraza MD [facility], [facility]  Interventional and General Cardiology

## 2020-08-04 LAB
ANION GAP SERPL CALCULATED.3IONS-SCNC: 11 MMOL/L (ref 5–15)
BUN SERPL-MCNC: 23 MG/DL (ref 8–23)
BUN/CREAT SERPL: 45.1 (ref 7–25)
CALCIUM SPEC-SCNC: 8.2 MG/DL (ref 8.2–9.6)
CHLORIDE SERPL-SCNC: 103 MMOL/L (ref 98–107)
CO2 SERPL-SCNC: 26 MMOL/L (ref 22–29)
CREAT SERPL-MCNC: 0.51 MG/DL (ref 0.57–1)
GFR SERPL CREATININE-BSD FRML MDRD: 113 ML/MIN/1.73
GLUCOSE SERPL-MCNC: 138 MG/DL (ref 65–99)
POTASSIUM SERPL-SCNC: 3.8 MMOL/L (ref 3.5–5.2)
SODIUM SERPL-SCNC: 140 MMOL/L (ref 136–145)

## 2020-08-04 PROCEDURE — 25010000002 AMIODARONE IN DEXTROSE 5% 150-4.21 MG/100ML-% SOLUTION: Performed by: INTERNAL MEDICINE

## 2020-08-04 PROCEDURE — 99233 SBSQ HOSP IP/OBS HIGH 50: CPT | Performed by: INTERNAL MEDICINE

## 2020-08-04 PROCEDURE — 93005 ELECTROCARDIOGRAM TRACING: CPT | Performed by: INTERNAL MEDICINE

## 2020-08-04 PROCEDURE — P9047 ALBUMIN (HUMAN), 25%, 50ML: HCPCS | Performed by: PHYSICIAN ASSISTANT

## 2020-08-04 PROCEDURE — 25010000002 PIPERACILLIN SOD-TAZOBACTAM PER 1 G

## 2020-08-04 PROCEDURE — 80048 BASIC METABOLIC PNL TOTAL CA: CPT | Performed by: INTERNAL MEDICINE

## 2020-08-04 PROCEDURE — 25010000002 AMIODARONE IN DEXTROSE 5% 360-4.14 MG/200ML-% SOLUTION: Performed by: INTERNAL MEDICINE

## 2020-08-04 PROCEDURE — 25010000002 ALBUMIN HUMAN 25% PER 50 ML: Performed by: PHYSICIAN ASSISTANT

## 2020-08-04 PROCEDURE — 93010 ELECTROCARDIOGRAM REPORT: CPT | Performed by: INTERNAL MEDICINE

## 2020-08-04 PROCEDURE — 25010000002 DIGOXIN PER 500 MCG: Performed by: PHYSICIAN ASSISTANT

## 2020-08-04 PROCEDURE — 99232 SBSQ HOSP IP/OBS MODERATE 35: CPT | Performed by: NURSE PRACTITIONER

## 2020-08-04 RX ORDER — BUSPIRONE HYDROCHLORIDE 10 MG/1
TABLET ORAL
Qty: 60 TABLET | Refills: 0 | Status: SHIPPED | OUTPATIENT
Start: 2020-08-04 | End: 2020-10-13 | Stop reason: SDUPTHER

## 2020-08-04 RX ORDER — AMIODARONE HYDROCHLORIDE 200 MG/1
200 TABLET ORAL EVERY 12 HOURS
Status: DISCONTINUED | OUTPATIENT
Start: 2020-08-12 | End: 2020-08-09 | Stop reason: HOSPADM

## 2020-08-04 RX ORDER — AMIODARONE HYDROCHLORIDE 200 MG/1
200 TABLET ORAL EVERY 8 HOURS
Status: DISCONTINUED | OUTPATIENT
Start: 2020-08-05 | End: 2020-08-09 | Stop reason: HOSPADM

## 2020-08-04 RX ORDER — DIGOXIN 0.25 MG/ML
125 INJECTION INTRAMUSCULAR; INTRAVENOUS ONCE
Status: COMPLETED | OUTPATIENT
Start: 2020-08-04 | End: 2020-08-04

## 2020-08-04 RX ORDER — AMIODARONE HYDROCHLORIDE 200 MG/1
200 TABLET ORAL DAILY
Status: DISCONTINUED | OUTPATIENT
Start: 2020-08-26 | End: 2020-08-09 | Stop reason: HOSPADM

## 2020-08-04 RX ORDER — AMIODARONE HYDROCHLORIDE 200 MG/1
200 TABLET ORAL ONCE
Status: COMPLETED | OUTPATIENT
Start: 2020-08-05 | End: 2020-08-05

## 2020-08-04 RX ORDER — DIGOXIN 0.25 MG/ML
250 INJECTION INTRAMUSCULAR; INTRAVENOUS ONCE
Status: COMPLETED | OUTPATIENT
Start: 2020-08-04 | End: 2020-08-04

## 2020-08-04 RX ORDER — ALBUMIN (HUMAN) 12.5 G/50ML
25 SOLUTION INTRAVENOUS ONCE
Status: COMPLETED | OUTPATIENT
Start: 2020-08-04 | End: 2020-08-04

## 2020-08-04 RX ADMIN — BETHANECHOL CHLORIDE 10 MG: 10 TABLET ORAL at 09:54

## 2020-08-04 RX ADMIN — Medication 1 CAPSULE: at 09:53

## 2020-08-04 RX ADMIN — AMIODARONE HYDROCHLORIDE 0.5 MG/MIN: 1.8 INJECTION, SOLUTION INTRAVENOUS at 15:58

## 2020-08-04 RX ADMIN — BUSPIRONE HYDROCHLORIDE 10 MG: 10 TABLET ORAL at 09:53

## 2020-08-04 RX ADMIN — APIXABAN 2.5 MG: 2.5 TABLET, FILM COATED ORAL at 09:53

## 2020-08-04 RX ADMIN — PANTOPRAZOLE SODIUM 40 MG: 40 TABLET, DELAYED RELEASE ORAL at 09:52

## 2020-08-04 RX ADMIN — SERTRALINE HYDROCHLORIDE 50 MG: 50 TABLET, FILM COATED ORAL at 09:52

## 2020-08-04 RX ADMIN — AMIODARONE HYDROCHLORIDE 1 MG/MIN: 1.8 INJECTION, SOLUTION INTRAVENOUS at 09:58

## 2020-08-04 RX ADMIN — Medication 5 MG/HR: at 03:49

## 2020-08-04 RX ADMIN — DONEPEZIL HYDROCHLORIDE 10 MG: 10 TABLET, FILM COATED ORAL at 20:31

## 2020-08-04 RX ADMIN — ACETAMINOPHEN 650 MG: 325 TABLET, FILM COATED ORAL at 04:25

## 2020-08-04 RX ADMIN — DIGOXIN 250 MCG: 0.25 INJECTION INTRAMUSCULAR; INTRAVENOUS at 04:25

## 2020-08-04 RX ADMIN — DIGOXIN 125 MCG: 0.25 INJECTION INTRAMUSCULAR; INTRAVENOUS at 06:51

## 2020-08-04 RX ADMIN — TAZOBACTAM SODIUM AND PIPERACILLIN SODIUM 3.38 G: 375; 3 INJECTION, SOLUTION INTRAVENOUS at 20:31

## 2020-08-04 RX ADMIN — BETHANECHOL CHLORIDE 10 MG: 10 TABLET ORAL at 17:57

## 2020-08-04 RX ADMIN — MELATONIN TAB 5 MG 2.5 MG: 5 TAB at 20:31

## 2020-08-04 RX ADMIN — DIGOXIN 125 MCG: 0.25 INJECTION INTRAMUSCULAR; INTRAVENOUS at 05:43

## 2020-08-04 RX ADMIN — GABAPENTIN 300 MG: 300 CAPSULE ORAL at 09:53

## 2020-08-04 RX ADMIN — GABAPENTIN 300 MG: 300 CAPSULE ORAL at 20:31

## 2020-08-04 RX ADMIN — MEMANTINE 10 MG: 10 TABLET ORAL at 20:31

## 2020-08-04 RX ADMIN — APIXABAN 2.5 MG: 2.5 TABLET, FILM COATED ORAL at 20:31

## 2020-08-04 RX ADMIN — ALBUMIN HUMAN 25 G: 0.25 SOLUTION INTRAVENOUS at 07:33

## 2020-08-04 RX ADMIN — TRAMADOL HYDROCHLORIDE 50 MG: 50 TABLET, FILM COATED ORAL at 09:53

## 2020-08-04 RX ADMIN — SODIUM CHLORIDE, PRESERVATIVE FREE 10 ML: 5 INJECTION INTRAVENOUS at 13:05

## 2020-08-04 RX ADMIN — DOXYCYCLINE 100 MG: 100 CAPSULE ORAL at 20:31

## 2020-08-04 RX ADMIN — TAZOBACTAM SODIUM AND PIPERACILLIN SODIUM 3.38 G: 375; 3 INJECTION, SOLUTION INTRAVENOUS at 04:36

## 2020-08-04 RX ADMIN — MEMANTINE 10 MG: 10 TABLET ORAL at 09:53

## 2020-08-04 RX ADMIN — DOXYCYCLINE 100 MG: 100 CAPSULE ORAL at 09:53

## 2020-08-04 RX ADMIN — TAZOBACTAM SODIUM AND PIPERACILLIN SODIUM 3.38 G: 375; 3 INJECTION, SOLUTION INTRAVENOUS at 14:02

## 2020-08-04 RX ADMIN — AMIODARONE HYDROCHLORIDE 150 MG: 1.5 INJECTION, SOLUTION INTRAVENOUS at 09:52

## 2020-08-04 RX ADMIN — BETHANECHOL CHLORIDE 10 MG: 10 TABLET ORAL at 13:02

## 2020-08-04 RX ADMIN — BUSPIRONE HYDROCHLORIDE 10 MG: 10 TABLET ORAL at 17:57

## 2020-08-04 RX ADMIN — LEVOTHYROXINE SODIUM 175 MCG: 175 TABLET ORAL at 04:25

## 2020-08-04 NOTE — PROGRESS NOTES
[facility] at [facility]  Cardiology Progress Note      Chief Complaint/Reason for service:    · Atrial fibrillation with RVR         The on-call cardiology PA was contacted last evening as the patient went into atrial fibrillation with RVR with heart rates in the 150s.  She was initially treated with IV Cardizem but became hypotensive.  She was given several doses of IV digoxin which did improve her rate.  She was also given IV albumin which improved her hypotension.  The patient remained asymptomatic.  She is currently lying flat in bed sleeping but arouses easily.  She denies chest pain or shortness of breath.  She remains in atrial fibrillation with rates in the 80s to low 100s.    Past medical, surgical, social and family history reviewed in the patient's electronic medical record.         Vital Sign Min/Max for last 24 hours  Temp  Min: 97 °F (36.1 °C)  Max: 99.5 °F (37.5 °C)   BP  Min: 97/66  Max: 144/87   Pulse  Min: 105  Max: 141   Resp  Min: 16  Max: 18   SpO2  Min: 94 %  Max: 95 %   Flow (L/min)  Min: 2  Max: 2      Intake/Output Summary (Last 24 hours) at 8/4/2020 0822  Last data filed at 8/4/2020 0433  Gross per 24 hour   Intake 197 ml   Output 300 ml   Net -103 ml           Physical Exam   Constitutional: She is oriented to person, place, and time. She appears well-developed and well-nourished.   HENT:   Head: Normocephalic.   Neck: No JVD present. Carotid bruit is not present.   Cardiovascular: Intact distal pulses. An irregularly irregular rhythm present. Tachycardia present. Exam reveals no gallop and no friction rub.   Murmur heard.  Pulmonary/Chest: Effort normal and breath sounds normal.   Abdominal: Soft.   Musculoskeletal: She exhibits no edema.   Neurological: She is alert and oriented to person, place, and time.   Skin: Skin is warm and dry. No cyanosis. Nails show no clubbing.   Psychiatric: She has a normal mood and affect. Her behavior is normal.       Tele: Atrial  fibrillation    Results Review (reviewed the patient's recent labs in the electronic medical record):     EKG (8/4/2020): Atrial fibrillation with RVR    CT of the chest (8/30/2020): Small bilateral effusions with compressive atelectasis or pneumonia in lung bases.  Background emphysema and pulmonary arterial hypertension.  Cardiomegaly and moderately large pericardial effusion new compared to prior study.  Large hiatal hernia and partial in intrathoracic stomach.  Images are atypical for Covid-19 pneumonia    ECHO (8/3/2020): LVEF 70% grade 1 diastolic dysfunction.  1 to 2 cm pericardial effusion without tamponade.  Moderate MR.  RVSP 49 mmHg.  Lambel's excrescence is noted on an aortic valve leaflet      Results from last 7 days   Lab Units 08/04/20  0626 08/02/20  2156   SODIUM mmol/L 140 137   POTASSIUM mmol/L 3.8 4.3   CHLORIDE mmol/L 103 100   BUN mg/dL 23 20   CREATININE mg/dL 0.51* 0.81     Results from last 7 days   Lab Units 08/02/20  2156   TROPONIN T ng/mL <0.010     Results from last 7 days   Lab Units 08/02/20  2156   WBC 10*3/mm3 15.29*   HEMOGLOBIN g/dL 10.8*   HEMATOCRIT % 33.9*   PLATELETS 10*3/mm3 221       No results found for: HGBA1C    Lab Results   Component Value Date    CHOL 163 06/10/2019    TRIG 77 06/10/2019    HDL 69 (H) 06/10/2019    LDL 79 06/10/2019              Active Hospital Problems    Diagnosis POA   • **Pneumonia of both lower lobes due to infectious organism [J18.9] Yes     Priority: High   • Pericardial effusion [I31.3] Yes     Priority: High     · Echo (8/3/2020): Normal LVEF.  Moderate pericardial effusion without tamponade.  Moderate MR. RVSP 49 mmHg     • Paroxysmal atrial fibrillation (CMS/HCC) [I48.0] Yes     Priority: Medium     · A. fib RVR noted in the setting of PNA with quick resolution, 8/3/2020  · Echo (8/3/2020): Normal LVEF.  Moderate  pericardial effusion.  Moderate MR.  RVSP 49 mmHg  · Spontaneously converted to NSR on IV Cardizem  · Chads Vasc 6 (age > 75,  female, HTN, CAD)      • Essential hypertension [I10] Yes     Priority: Medium   • Acute UTI (urinary tract infection) [N39.0] Yes     Priority: Low              Atrial fibrillation with RVR  · Start IV amiodarone per protocol  · Start anticoagulation with Eliquis 2.5 mg twice daily    Pericardial effusion  · Moderate 1 to 2 cm effusion without tamponade  · Will need follow-up echo in 4 weeks    Hypertension/hypotension  · Most recent blood pressure 108/80    Acute UTI/bilateral pneumonia versus atelectasis from partial intrathoracic stomach  · Managed by hospitalist  · WBCs elevated on admission greater than 15.  Likely related to UTI instead of pneumonia  · Receiving doxycycline and Zosyn    Jacquelin Conklin, APRN  8/4/2020

## 2020-08-04 NOTE — PROGRESS NOTES
[facility] Medicine Services  PROGRESS NOTE    Patient Name: Temi Jarrett  : 1930  MRN: [id number]    Date of Admission: 2020  Primary Care Physician: Eric Patel MD    Subjective   Subjective     CC:  Pneumonia, A.fib RVR    HPI:  Patient went back into A.fib RVR overnight refractory to digoxin, started on diltiazem with drop in BP. Now planning to start amiodarone protocol. She denies chest pain or shortness of breath this morning. She has no other complaints.    Review of Systems  Gen- No fevers, chills  CV- No chest pain, palpitations  Resp- No cough, dyspnea  GI- No N/V/D, abd pain        Objective   Objective     Vital Signs:   Temp:  [97 °F (36.1 °C)-99.5 °F (37.5 °C)] 97.4 °F (36.3 °C)  Heart Rate:  [] 91  Resp:  [16-18] 18  BP: ()/(66-87) 95/67        Physical Exam:  Constitutional: No acute distress, awake, alert  HENT: NCAT, mucous membranes moist  Respiratory: Clear to auscultation bilaterally, respiratory effort normal   Cardiovascular: irregularly irregular, tachycardic, no murmurs, rubs, or gallops, palpable pedal pulses bilaterally  Gastrointestinal: Positive bowel sounds, soft, nontender, nondistended  Musculoskeletal: No bilateral ankle edema  Psychiatric: Appropriate affect, cooperative  Neurologic: Oriented to person/place, strength symmetric in all extremities, Cranial Nerves grossly intact to confrontation, speech clear  Skin: No rashes      Results Reviewed:  Results from last 7 days   Lab Units 20  2156   WBC 10*3/mm3 15.29*   HEMOGLOBIN g/dL 10.8*   HEMATOCRIT % 33.9*   PLATELETS 10*3/mm3 221     Results from last 7 days   Lab Units 20  0626 20  2156   SODIUM mmol/L 140 137   POTASSIUM mmol/L 3.8 4.3   CHLORIDE mmol/L 103 100   CO2 mmol/L 26.0 26.0   BUN mg/dL 23 20   CREATININE mg/dL 0.51* 0.81   GLUCOSE mg/dL 138* 180*   CALCIUM mg/dL 8.2 8.5   ALT (SGPT) U/L  --  13   AST (SGOT) U/L  --  21   TROPONIN T  ng/mL  --  <0.010   PROBNP pg/mL  --  1,483.0     Estimated Creatinine Clearance: 39.5 mL/min (A) (by C-G formula based on SCr of 0.51 mg/dL (L)).    Microbiology Results Abnormal     Procedure Component Value - Date/Time    Blood Culture - Blood, Hand, Left [[id number]] Collected:  08/02/20 2155    Lab Status:  Preliminary result Specimen:  Blood from Hand, Left Updated:  08/03/20 2300     Blood Culture No growth at 24 hours    Blood Culture - Blood, Arm, Right [[id number]] Collected:  08/02/20 2150    Lab Status:  Preliminary result Specimen:  Blood from Arm, Right Updated:  08/03/20 2300     Blood Culture No growth at 24 hours    MRSA Screen, PCR (Inpatient) - Swab, Nares [[id number]]  (Normal) Collected:  08/03/20 0555    Lab Status:  Final result Specimen:  Swab from Nares Updated:  08/03/20 0720     MRSA PCR Negative    Narrative:       MRSA Negative    Respiratory Panel PCR w/COVID-19(SARS-CoV-2) FAMILIA/EMILE/KEARA In-House, NP Swab in [facility], 8-12 Hr TAT - Swab, Nasopharynx [[id number]]  (Normal) Collected:  08/03/20 0554    Lab Status:  Final result Specimen:  Swab from Nasopharynx Updated:  08/03/20 0651     ADENOVIRUS, PCR Not Detected     Coronavirus 229E Not Detected     Coronavirus HKU1 Not Detected     Coronavirus NL63 Not Detected     Coronavirus OC43 Not Detected     COVID19 Not Detected     Human Metapneumovirus Not Detected     Human Rhinovirus/Enterovirus Not Detected     Influenza A PCR Not Detected     Influenza A H1 Not Detected     Influenza A H1 2009 PCR Not Detected     Influenza A H3 Not Detected     Influenza B PCR Not Detected     Parainfluenza Virus 1 Not Detected     Parainfluenza Virus 2 Not Detected     Parainfluenza Virus 3 Not Detected     Parainfluenza Virus 4 Not Detected     RSV, PCR Not Detected     Bordetella pertussis pcr Not Detected     Bordetella parapertussis PCR Not Detected     Chlamydophila pneumoniae PCR Not Detected     Mycoplasma pneumo by PCR Not Detected    Narrative:        Fact sheet for providers: https://docs.[employer]/wp-content/uploads/EPD1004-9217-JO9.1-EUA-Provider-Fact-Sheet-3.pdf    Fact sheet for patients: https://docs.[employer]/wp-content/uploads/NMH6521-0708-RQ6.1-EUA-Patient-Fact-Sheet-1.pdf          Imaging Results (Last 24 Hours)     ** No results found for the last 24 hours. **          Results for orders placed during the hospital encounter of 08/02/20   Transthoracic Echo Complete With Contrast if Necessary Per Protocol    Narrative · Left ventricular systolic function is normal. Estimated EF = 70%.  · Left ventricular diastolic dysfunction (grade I) consistent with   impaired relaxation.  · Left atrial cavity size is moderately dilated.  · There is calcification of the aortic valve. There is no significant   stenosis or regurgitation. A Lambel's excrescence is noted on an aortic   valve leaflet.  · Moderate mitral valve regurgitation is present.  · Estimated right ventricular systolic pressure from tricuspid   regurgitation is moderately elevated (49 mmHg).  · There is a moderate (1-2cm) circumferential pericardial effusion. There   is no tamponade physiology.          I have reviewed the medications:  Scheduled Meds:  [START ON 8/5/2020] amiodarone 200 mg Oral Once   Followed by      [START ON 8/5/2020] amiodarone 200 mg Oral Q8H   Followed by      [START ON 8/12/2020] amiodarone 200 mg Oral Q12H   Followed by      [START ON 8/26/2020] amiodarone 200 mg Oral Daily   apixaban 2.5 mg Oral Q12H   bethanechol 10 mg Oral TID   busPIRone 10 mg Oral BID   donepezil 10 mg Oral Nightly   doxycycline 100 mg Oral Q12H   gabapentin 300 mg Oral BID   lactobacillus acidophilus 1 capsule Oral Daily   levothyroxine 175 mcg Oral Q AM   melatonin 2.5 mg Oral Nightly   memantine 10 mg Oral BID   pantoprazole 40 mg Oral Q AM   piperacillin-tazobactam 3.375 g Intravenous Q8H   sertraline 50 mg Oral Daily   sodium chloride 10 mL Intravenous Q12H   traMADol 50 mg Oral Daily      Continuous Infusions:  amiodarone 1 mg/min Last Rate: 1 mg/min (08/04/20 0958)   Followed by     amiodarone 0.5 mg/min      PRN Meds:.•  acetaminophen **OR** acetaminophen **OR** acetaminophen  •  methocarbamol  •  ondansetron **OR** ondansetron  •  sodium chloride  •  sodium chloride  •  sodium chloride    Assessment/Plan   Assessment & Plan     Active Hospital Problems    Diagnosis  POA   • **Pneumonia of both lower lobes due to infectious organism [J18.9]  Yes   • Paroxysmal atrial fibrillation (CMS/HCC) [I48.0]  Yes   • Pericardial effusion [I31.3]  Yes   • Acute UTI (urinary tract infection) [N39.0]  Yes   • Essential hypertension [I10]  Yes      Resolved Hospital Problems   No resolved problems to display.        Brief Hospital Course to date:  Temi Jarrett is a 89 y.o. female with history of mild dementia, hypertension, bladder prolapse, generalized anxiety, and frequent urinary tract infections presents from home with a fever of 102 °F and mild confusion.    Bilateral PNA with Fever and Leukocytosis  -Continue empiric antibiotics for pneumonia with Zosyn and doxycycline  - pulmonary toielt  - f/u cultures  - COVID Negative     Atrial fibrillation with RVR  -worsening overnight with HR in 140-160s, she is s/p 500mcg digoxin. Did not tolerate cardizem gtt due to hypotension. Amiodarone protocol started today, cardiology following  - start on Eliquis 2.5mg BID     Mod-Large Pericardial effusion  -Etiology unclear, Echo showing 1-2cm pericardial effusion without evidence of tamponade, will need repeat Echocardiogram in 4 weeks, patient followed by Cardiologist at [facility]  -Cardiology following     Dementia  - on Aricept and Namenda     Hypothyroid  - TSH WNL     DVT prophylaxis:  Lovenox    Called and updated patient's son today. Answered numerous questions.     Disposition: I expect the patient to be discharged 2-3 days    CODE STATUS:   Code Status and Medical Interventions:   Ordered at:  08/03/20 0158     Level Of Support Discussed With:    Patient    Next of Kin (If No Surrogate)    Health Care Surrogate     Code Status:    CPR     Medical Interventions (Level of Support Prior to Arrest):    Full       Electronically signed by Mili Doran DO, 08/04/20, 11:21.

## 2020-08-04 NOTE — NURSING NOTE
[facility] follow-up for skin assessment:     Was consulted yesterday for a coccyx wound.   COVID was negative.     At this time patient's coccyx and all areas on her bottom are intact, pink, and blanching.   There are no issues.   No identifiable issues or concerns.     Nothing needed from [facility] nurse.     Will sign off.     Thanks

## 2020-08-05 LAB
BASOPHILS # BLD AUTO: 0.06 10*3/MM3 (ref 0–0.2)
BASOPHILS NFR BLD AUTO: 0.8 % (ref 0–1.5)
DEPRECATED RDW RBC AUTO: 45 FL (ref 37–54)
EOSINOPHIL # BLD AUTO: 0.11 10*3/MM3 (ref 0–0.4)
EOSINOPHIL NFR BLD AUTO: 1.5 % (ref 0.3–6.2)
ERYTHROCYTE [DISTWIDTH] IN BLOOD BY AUTOMATED COUNT: 13.9 % (ref 12.3–15.4)
HCT VFR BLD AUTO: 30.2 % (ref 34–46.6)
HGB BLD-MCNC: 9.3 G/DL (ref 12–15.9)
IMM GRANULOCYTES # BLD AUTO: 0.04 10*3/MM3 (ref 0–0.05)
IMM GRANULOCYTES NFR BLD AUTO: 0.5 % (ref 0–0.5)
LYMPHOCYTES # BLD AUTO: 0.73 10*3/MM3 (ref 0.7–3.1)
LYMPHOCYTES NFR BLD AUTO: 9.8 % (ref 19.6–45.3)
MCH RBC QN AUTO: 27.1 PG (ref 26.6–33)
MCHC RBC AUTO-ENTMCNC: 30.8 G/DL (ref 31.5–35.7)
MCV RBC AUTO: 88 FL (ref 79–97)
MONOCYTES # BLD AUTO: 0.7 10*3/MM3 (ref 0.1–0.9)
MONOCYTES NFR BLD AUTO: 9.4 % (ref 5–12)
NEUTROPHILS NFR BLD AUTO: 5.83 10*3/MM3 (ref 1.7–7)
NEUTROPHILS NFR BLD AUTO: 78 % (ref 42.7–76)
NRBC BLD AUTO-RTO: 0 /100 WBC (ref 0–0.2)
PLATELET # BLD AUTO: 254 10*3/MM3 (ref 140–450)
PMV BLD AUTO: 10 FL (ref 6–12)
RBC # BLD AUTO: 3.43 10*6/MM3 (ref 3.77–5.28)
WBC # BLD AUTO: 7.47 10*3/MM3 (ref 3.4–10.8)

## 2020-08-05 PROCEDURE — 25010000002 PIPERACILLIN SOD-TAZOBACTAM PER 1 G

## 2020-08-05 PROCEDURE — 87086 URINE CULTURE/COLONY COUNT: CPT | Performed by: PHYSICIAN ASSISTANT

## 2020-08-05 PROCEDURE — 85025 COMPLETE CBC W/AUTO DIFF WBC: CPT | Performed by: INTERNAL MEDICINE

## 2020-08-05 PROCEDURE — 93010 ELECTROCARDIOGRAM REPORT: CPT | Performed by: INTERNAL MEDICINE

## 2020-08-05 PROCEDURE — 99232 SBSQ HOSP IP/OBS MODERATE 35: CPT | Performed by: PHYSICIAN ASSISTANT

## 2020-08-05 PROCEDURE — 99231 SBSQ HOSP IP/OBS SF/LOW 25: CPT | Performed by: NURSE PRACTITIONER

## 2020-08-05 PROCEDURE — 93005 ELECTROCARDIOGRAM TRACING: CPT | Performed by: INTERNAL MEDICINE

## 2020-08-05 PROCEDURE — 25010000002 AMIODARONE IN DEXTROSE 5% 360-4.14 MG/200ML-% SOLUTION: Performed by: INTERNAL MEDICINE

## 2020-08-05 RX ORDER — LISINOPRIL 10 MG/1
10 TABLET ORAL
Status: DISCONTINUED | OUTPATIENT
Start: 2020-08-05 | End: 2020-08-09 | Stop reason: HOSPADM

## 2020-08-05 RX ORDER — CEFDINIR 300 MG/1
300 CAPSULE ORAL EVERY 12 HOURS SCHEDULED
Status: DISCONTINUED | OUTPATIENT
Start: 2020-08-05 | End: 2020-08-09 | Stop reason: HOSPADM

## 2020-08-05 RX ORDER — AMIODARONE HYDROCHLORIDE 200 MG/1
200 TABLET ORAL EVERY 12 HOURS
Qty: 28 TABLET | Refills: 0 | Status: CANCELLED | OUTPATIENT
Start: 2020-08-12 | End: 2020-08-26

## 2020-08-05 RX ORDER — DOXYCYCLINE 100 MG/1
100 CAPSULE ORAL EVERY 12 HOURS SCHEDULED
Status: DISCONTINUED | OUTPATIENT
Start: 2020-08-05 | End: 2020-08-09 | Stop reason: HOSPADM

## 2020-08-05 RX ORDER — AMIODARONE HYDROCHLORIDE 200 MG/1
200 TABLET ORAL EVERY 8 HOURS
Qty: 20 TABLET | Refills: 0 | Status: CANCELLED | OUTPATIENT
Start: 2020-08-05 | End: 2020-08-12

## 2020-08-05 RX ORDER — AMIODARONE HYDROCHLORIDE 200 MG/1
200 TABLET ORAL DAILY
Qty: 90 TABLET | Refills: 0 | Status: CANCELLED | OUTPATIENT
Start: 2020-08-26

## 2020-08-05 RX ORDER — CHOLECALCIFEROL (VITAMIN D3) 125 MCG
5 CAPSULE ORAL NIGHTLY
Status: DISCONTINUED | OUTPATIENT
Start: 2020-08-05 | End: 2020-08-09 | Stop reason: HOSPADM

## 2020-08-05 RX ADMIN — BETHANECHOL CHLORIDE 10 MG: 10 TABLET ORAL at 08:02

## 2020-08-05 RX ADMIN — CEFDINIR 300 MG: 300 CAPSULE ORAL at 20:16

## 2020-08-05 RX ADMIN — LEVOTHYROXINE SODIUM 175 MCG: 175 TABLET ORAL at 06:31

## 2020-08-05 RX ADMIN — DOXYCYCLINE 100 MG: 100 CAPSULE ORAL at 20:16

## 2020-08-05 RX ADMIN — PANTOPRAZOLE SODIUM 40 MG: 40 TABLET, DELAYED RELEASE ORAL at 08:02

## 2020-08-05 RX ADMIN — DONEPEZIL HYDROCHLORIDE 10 MG: 10 TABLET, FILM COATED ORAL at 20:16

## 2020-08-05 RX ADMIN — TRAMADOL HYDROCHLORIDE 50 MG: 50 TABLET, FILM COATED ORAL at 08:02

## 2020-08-05 RX ADMIN — BUSPIRONE HYDROCHLORIDE 10 MG: 10 TABLET ORAL at 18:11

## 2020-08-05 RX ADMIN — BETHANECHOL CHLORIDE 10 MG: 10 TABLET ORAL at 13:07

## 2020-08-05 RX ADMIN — APIXABAN 2.5 MG: 2.5 TABLET, FILM COATED ORAL at 20:16

## 2020-08-05 RX ADMIN — GABAPENTIN 300 MG: 300 CAPSULE ORAL at 08:02

## 2020-08-05 RX ADMIN — Medication 1 CAPSULE: at 08:02

## 2020-08-05 RX ADMIN — MEMANTINE 10 MG: 10 TABLET ORAL at 20:16

## 2020-08-05 RX ADMIN — TAZOBACTAM SODIUM AND PIPERACILLIN SODIUM 3.38 G: 375; 3 INJECTION, SOLUTION INTRAVENOUS at 06:31

## 2020-08-05 RX ADMIN — BETHANECHOL CHLORIDE 10 MG: 10 TABLET ORAL at 18:11

## 2020-08-05 RX ADMIN — GABAPENTIN 300 MG: 300 CAPSULE ORAL at 20:16

## 2020-08-05 RX ADMIN — SERTRALINE HYDROCHLORIDE 50 MG: 50 TABLET, FILM COATED ORAL at 08:02

## 2020-08-05 RX ADMIN — APIXABAN 2.5 MG: 2.5 TABLET, FILM COATED ORAL at 08:02

## 2020-08-05 RX ADMIN — MELATONIN TAB 5 MG 5 MG: 5 TAB at 20:16

## 2020-08-05 RX ADMIN — MEMANTINE 10 MG: 10 TABLET ORAL at 08:02

## 2020-08-05 RX ADMIN — BUSPIRONE HYDROCHLORIDE 10 MG: 10 TABLET ORAL at 08:02

## 2020-08-05 RX ADMIN — AMIODARONE HYDROCHLORIDE 200 MG: 200 TABLET ORAL at 08:04

## 2020-08-05 RX ADMIN — LISINOPRIL 10 MG: 10 TABLET ORAL at 13:07

## 2020-08-05 RX ADMIN — DOXYCYCLINE 100 MG: 100 CAPSULE ORAL at 08:02

## 2020-08-05 RX ADMIN — AMIODARONE HYDROCHLORIDE 0.5 MG/MIN: 1.8 INJECTION, SOLUTION INTRAVENOUS at 04:10

## 2020-08-05 RX ADMIN — SODIUM CHLORIDE, PRESERVATIVE FREE 10 ML: 5 INJECTION INTRAVENOUS at 20:16

## 2020-08-05 RX ADMIN — AMIODARONE HYDROCHLORIDE 200 MG: 200 TABLET ORAL at 18:11

## 2020-08-05 NOTE — PROGRESS NOTES
Continued Stay Note  [facility]     Patient Name: Temi Jarrett  MRN: [id number]  Today's Date: 8/5/2020    Admit Date: 8/2/2020    Discharge Plan     Row Name 08/05/20 1511       Plan    Plan  Ongoing    Patient/Family in Agreement with Plan  yes    Plan Comments  Spoke with patient's son by phone.  Therapy recommending home with home health at discharge.  Patient's son still feels like patient will need inpatient rehab prior to returning home.  Patient on Amio gtt, not medically ready for discharge.  Will see how patient progresses with therapy.  CM will continue to follow.  Jemma Vargas RN [phone]    Final Discharge Disposition Code  30 - still a patient        Discharge Codes    No documentation.       Expected Discharge Date and Time     Expected Discharge Date Expected Discharge Time    Aug 7, 2020             Jemma Vargas RN

## 2020-08-05 NOTE — PROGRESS NOTES
[facility] at [facility]  Cardiology Progress Note      Chief Complaint/Reason for service:    · Atrial fibrillation with RVR         Patient was started on IV amiodarone yesterday and converted to normal sinus rhythm.  Blood pressure currently 154/90.  WBCs now within normal limits but H&H slightly decreased from yesterday.  She denies any chest pain.  She has remained hemodynamically stable overnight.    Past medical, surgical, social and family history reviewed in the patient's electronic medical record.         Vital Sign Min/Max for last 24 hours  Temp  Min: 96.4 °F (35.8 °C)  Max: 98.8 °F (37.1 °C)   BP  Min: 117/60  Max: 154/90   Pulse  Min: 82  Max: 115   Resp  Min: 16  Max: 18   SpO2  Min: 92 %  Max: 97 %   Flow (L/min)  Min: 2  Max: 2      Intake/Output Summary (Last 24 hours) at 8/5/2020 0807  Last data filed at 8/5/2020 0631  Gross per 24 hour   Intake 50 ml   Output 650 ml   Net -600 ml           Physical Exam   Constitutional: She is oriented to person, place, and time. She appears well-developed and well-nourished.   HENT:   Head: Normocephalic.   Neck: No JVD present. Carotid bruit is not present.   Cardiovascular: Normal rate, regular rhythm, normal heart sounds and intact distal pulses. Exam reveals no gallop and no friction rub.   No murmur heard.  Pulmonary/Chest: Effort normal and breath sounds normal.   Abdominal: Soft.   Musculoskeletal: She exhibits no edema.   Neurological: She is alert and oriented to person, place, and time.   Skin: Skin is warm and dry. No cyanosis. Nails show no clubbing.   Psychiatric: She has a normal mood and affect. Her behavior is normal.         Tele: Normal sinus rhythm     Results Review (reviewed the patient's recent labs in the electronic medical record):      EKG (8/5/2020):  Normal sinus rhythm nonspecific T wave abnormality     CT of the chest (8/30/2020): Small bilateral effusions with compressive atelectasis or pneumonia in lung  bases.  Background emphysema and pulmonary arterial hypertension.  Cardiomegaly and moderately large pericardial effusion new compared to prior study.  Large hiatal hernia and partial in intrathoracic stomach.  Images are atypical for Covid-19 pneumonia     ECHO (8/3/2020): LVEF 70% grade 1 diastolic dysfunction.  1 to 2 cm pericardial effusion without tamponade.  Moderate MR.  RVSP 49 mmHg.  Lambel's excrescence is noted on an aortic valve leaflet  Results from last 7 days   Lab Units 08/04/20  0626 08/02/20  2156   SODIUM mmol/L 140 137   POTASSIUM mmol/L 3.8 4.3   CHLORIDE mmol/L 103 100   BUN mg/dL 23 20   CREATININE mg/dL 0.51* 0.81     Results from last 7 days   Lab Units 08/02/20  2156   TROPONIN T ng/mL <0.010     Results from last 7 days   Lab Units 08/05/20  0533 08/02/20  2156   WBC 10*3/mm3 7.47 15.29*   HEMOGLOBIN g/dL 9.3* 10.8*   HEMATOCRIT % 30.2* 33.9*   PLATELETS 10*3/mm3 254 221       No results found for: HGBA1C    Lab Results   Component Value Date    CHOL 163 06/10/2019    TRIG 77 06/10/2019    HDL 69 (H) 06/10/2019    LDL 79 06/10/2019              Active Hospital Problems    Diagnosis POA   • **Pneumonia of both lower lobes due to infectious organism [J18.9] Yes     Priority: High   • Pericardial effusion [I31.3] Yes     Priority: High     · Echo (8/3/2020): Normal LVEF.  Moderate pericardial effusion without tamponade.  Moderate MR. RVSP 49 mmHg     • Paroxysmal atrial fibrillation (CMS/HCC) [I48.0] Yes     Priority: Medium     · A. fib RVR noted in the setting of PNA with quick resolution, 8/3/2020  · Echo (8/3/2020): Normal LVEF.  Moderate  pericardial effusion.  Moderate MR.  RVSP 49 mmHg  · Spontaneously converted to NSR on IV Cardizem  · Chads Vasc 6 (age > 75, female, HTN, CAD)      • Essential hypertension [I10] Yes     Priority: Medium   • Acute UTI (urinary tract infection) [N39.0] Yes     Priority: Low                 Atrial fibrillation with RVR  · Converted to normal sinus rhythm  on IV amiodarone  · Eliquis 2.5 mg twice daily started 8/4/2020  · Continue IV amiodarone to p.o. Protocol  · Maintaining normal sinus rhythm on telemetry     Pericardial effusion  · Moderate 1 to 2 cm effusion without tamponade  · Will need follow-up echo in 4 weeks  · Will need follow-up with primary cardiologist Dr. Von Helton in 4 weeks     Hypertension/hypotension  · Most recent blood pressure 154/90  · Start home dose lisinopril 10 mg daily     Acute UTI/bilateral pneumonia versus atelectasis from partial intrathoracic stomach  · Managed by hospitalist  · WBCs elevated on admission greater than 15.  Likely related to UTI instead of pneumonia  · Receiving doxycycline and Zosyn      · Follow IV amiodarone to p.o. taper protocol  · Continue anticoagulation with Eliquis  · Start home dose lisinopril  · Follow-up with primary cardiologist Dr. Von Helton in 4 weeks  · Please call with any further questions    MAIDA Morgan  8/5/2020

## 2020-08-05 NOTE — PROGRESS NOTES
[facility]  PROGRESS NOTE    Patient Name: Temi Jarrett  : 1930  MRN: [id number]    Date of Admission: 2020  Primary Care Physician: Eric Patel MD    Subjective     CC:  F/u pneumonia, A.fib RVR    HPI:  Patient was sitting in bed with primary complaint of weakness. Patient was teary because she misses her . Patient states she feels ill and was some what short of breath. Remains in normal sinus rhythm, now on PO amiodarone.    Review of Systems  Gen- No fevers, chills  CV- No chest pain, palpitations  Resp- No cough, mild dyspnea, no hypoxia   GI- No N/V/D, abd pain    Objective     Vital Signs:   Temp:  [96.4 °F (35.8 °C)-98.8 °F (37.1 °C)] 98.5 °F (36.9 °C)  Heart Rate:  [] 93  Resp:  [16-18] 18  BP: (117-164)/(60-92) 129/83        Physical Exam:  Constitutional: No acute distress, awake, alert, frail appearing   HENT: NCAT, mucous membranes moist  Respiratory: Breath sounds were diminished with no rales, wheezes, rhonchi. O2 Sat 92-96%  Cardiovascular: RR rate 80s, (+) murmur, no rubs or gallops, palpable pedal pulses bilaterally  Gastrointestinal: Positive bowel sounds, soft, nontender, nondistended  Musculoskeletal: No bilateral ankle edema  Psychiatric: Appropriate affect, cooperative  Neurologic: Strength symmetric in all extremities, Cranial Nerves grossly intact to confrontation, speech clear  Skin: No rashes    Results Reviewed:  Results from last 7 days   Lab Units 20  0533 20  2156   WBC 10*3/mm3 7.47 15.29*   HEMOGLOBIN g/dL 9.3* 10.8*   HEMATOCRIT % 30.2* 33.9*   PLATELETS 10*3/mm3 254 221     Results from last 7 days   Lab Units 20  0626 20  2156   SODIUM mmol/L 140 137   POTASSIUM mmol/L 3.8 4.3   CHLORIDE mmol/L 103 100   CO2 mmol/L 26.0 26.0   BUN mg/dL 23 20   CREATININE mg/dL 0.51* 0.81   GLUCOSE mg/dL 138* 180*   CALCIUM mg/dL 8.2 8.5   ALT (SGPT) U/L  --  13   AST (SGOT) U/L  --  21    TROPONIN T ng/mL  --  <0.010   PROBNP pg/mL  --  1,483.0     Estimated Creatinine Clearance: 39.5 mL/min (A) (by C-G formula based on SCr of 0.51 mg/dL (L)).    Microbiology Results Abnormal     Procedure Component Value - Date/Time    Blood Culture - Blood, Hand, Left [[id number]] Collected:  08/02/20 2155    Lab Status:  Preliminary result Specimen:  Blood from Hand, Left Updated:  08/04/20 2300     Blood Culture No growth at 2 days    Blood Culture - Blood, Arm, Right [[id number]] Collected:  08/02/20 2150    Lab Status:  Preliminary result Specimen:  Blood from Arm, Right Updated:  08/04/20 2300     Blood Culture No growth at 2 days    MRSA Screen, PCR (Inpatient) - Swab, Nares [[id number]]  (Normal) Collected:  08/03/20 0555    Lab Status:  Final result Specimen:  Swab from Nares Updated:  08/03/20 0720     MRSA PCR Negative    Narrative:       MRSA Negative    Respiratory Panel PCR w/COVID-19(SARS-CoV-2) FAMILIA/EMILE/KEARA In-House, NP Swab in [facility], 8-12 Hr TAT - Swab, Nasopharynx [[id number]]  (Normal) Collected:  08/03/20 0554    Lab Status:  Final result Specimen:  Swab from Nasopharynx Updated:  08/03/20 0651     ADENOVIRUS, PCR Not Detected     Coronavirus 229E Not Detected     Coronavirus HKU1 Not Detected     Coronavirus NL63 Not Detected     Coronavirus OC43 Not Detected     COVID19 Not Detected     Human Metapneumovirus Not Detected     Human Rhinovirus/Enterovirus Not Detected     Influenza A PCR Not Detected     Influenza A H1 Not Detected     Influenza A H1 2009 PCR Not Detected     Influenza A H3 Not Detected     Influenza B PCR Not Detected     Parainfluenza Virus 1 Not Detected     Parainfluenza Virus 2 Not Detected     Parainfluenza Virus 3 Not Detected     Parainfluenza Virus 4 Not Detected     RSV, PCR Not Detected     Bordetella pertussis pcr Not Detected     Bordetella parapertussis PCR Not Detected     Chlamydophila pneumoniae PCR Not Detected     Mycoplasma pneumo by PCR Not Detected     Narrative:       Fact sheet for providers: https://docs.[employer]/wp-content/uploads/HWU1326-0547-CV1.1-EUA-Provider-Fact-Sheet-3.pdf    Fact sheet for patients: https://docs.[employer]/wp-content/uploads/XHD8859-0052-XX8.1-EUA-Patient-Fact-Sheet-1.pdf        Imaging Results (Last 24 Hours)     ** No results found for the last 24 hours. **        Results for orders placed during the hospital encounter of 08/02/20   Transthoracic Echo Complete With Contrast if Necessary Per Protocol    Narrative · Left ventricular systolic function is normal. Estimated EF = 70%.  · Left ventricular diastolic dysfunction (grade I) consistent with   impaired relaxation.  · Left atrial cavity size is moderately dilated.  · There is calcification of the aortic valve. There is no significant   stenosis or regurgitation. A Lambel's excrescence is noted on an aortic   valve leaflet.  · Moderate mitral valve regurgitation is present.  · Estimated right ventricular systolic pressure from tricuspid   regurgitation is moderately elevated (49 mmHg).  · There is a moderate (1-2cm) circumferential pericardial effusion. There   is no tamponade physiology.        I have reviewed the medications:  Scheduled Meds:    amiodarone 200 mg Oral Q8H   Followed by      [START ON 8/12/2020] amiodarone 200 mg Oral Q12H   Followed by      [START ON 8/26/2020] amiodarone 200 mg Oral Daily   apixaban 2.5 mg Oral Q12H   bethanechol 10 mg Oral TID   busPIRone 10 mg Oral BID   donepezil 10 mg Oral Nightly   doxycycline 100 mg Oral Q12H   gabapentin 300 mg Oral BID   lactobacillus acidophilus 1 capsule Oral Daily   levothyroxine 175 mcg Oral Q AM   lisinopril 10 mg Oral Q24H   melatonin 2.5 mg Oral Nightly   memantine 10 mg Oral BID   pantoprazole 40 mg Oral Q AM   piperacillin-tazobactam 3.375 g Intravenous Q8H   sertraline 50 mg Oral Daily   sodium chloride 10 mL Intravenous Q12H   traMADol 50 mg Oral Daily     Continuous Infusions:   PRN Meds:.•   acetaminophen **OR** acetaminophen **OR** acetaminophen  •  methocarbamol  •  ondansetron **OR** ondansetron  •  sodium chloride  •  sodium chloride  •  sodium chloride    Assessment & Plan     Active Hospital Problems    Diagnosis  POA   • **Pneumonia of both lower lobes due to infectious organism [J18.9]  Yes   • Paroxysmal atrial fibrillation (CMS/HCC) [I48.0]  Yes   • Pericardial effusion [I31.3]  Yes   • Acute UTI (urinary tract infection) [N39.0]  Yes   • Essential hypertension [I10]  Yes      Resolved Hospital Problems   No resolved problems to display.     Brief Hospital Course to date:  Temi Jarrett is a 89 y.o. female with history of mild dementia, hypertension, bladder prolapse, generalized anxiety, and frequent urinary tract infections presents from home with a fever of 102 °F and mild confusion.    This patient's problems and plans were partially entered by my partner and updated as appropriate by me 08/05/20.    Bilateral PNA with Fever and Leukocytosis  - Currently on empiric antibiotics for pneumonia with Zosyn and doxycycline - will transition to Omnicef/Doxycycline to treat for total of 7 days   - Stable on room air, small amount of wheezing, will trial a nebulizer   - MRSA PCR negative, COVID negative, no sputum culture obtained   - Have ordered urine culture from 8/3 UA to rule out alternate cause of infection     Atrial fibrillation with RVR  - Appreciate cardiology assistance   - Did not tolerate cardizem gtt due to hypotension, s/p IV Digoxin   - Amiodarone protocol started, she has now been transitioned to PO Amiodarone and is in normal sinus rhythm   - Due to recurrence of A. Fib, patient started on Eliquis 2.5mg BID     Mod-Large Pericardial effusion  - Etiology unclear, Echo showing moderate 1-2 cm pericardial effusion without evidence of tamponade  - Needs repeat ECHO in 4 weeks  - Follow up with primary cardiologist, Dr. Von Helton of [facility]    Essential hypertension  -  Home dose Lisinopril resumed      Dementia  - on Aricept and Namenda     Hypothyroid  - TSH WNL     DVT prophylaxis:  Lovenox    I called and updated patient's son today and answered numerous question / addressed multiple concerns     Disposition: I expect the patient to be discharged 2-3 days    CODE STATUS:   Code Status and Medical Interventions:   Ordered at: 08/03/20 0158     Level Of Support Discussed With:    Patient    Next of Kin (If No Surrogate)    Health Care Surrogate     Code Status:    CPR     Medical Interventions (Level of Support Prior to Arrest):    Full     Electronically signed by Do Ramos PA-C, 08/05/20, 14:51.

## 2020-08-06 LAB
ANION GAP SERPL CALCULATED.3IONS-SCNC: 9 MMOL/L (ref 5–15)
BACTERIA SPEC AEROBE CULT: NO GROWTH
BUN SERPL-MCNC: 11 MG/DL (ref 8–23)
BUN/CREAT SERPL: 21.6 (ref 7–25)
CALCIUM SPEC-SCNC: 8.1 MG/DL (ref 8.2–9.6)
CHLORIDE SERPL-SCNC: 103 MMOL/L (ref 98–107)
CO2 SERPL-SCNC: 28 MMOL/L (ref 22–29)
CREAT SERPL-MCNC: 0.51 MG/DL (ref 0.57–1)
DEPRECATED RDW RBC AUTO: 43.8 FL (ref 37–54)
ERYTHROCYTE [DISTWIDTH] IN BLOOD BY AUTOMATED COUNT: 13.4 % (ref 12.3–15.4)
GFR SERPL CREATININE-BSD FRML MDRD: 113 ML/MIN/1.73
GLUCOSE BLDC GLUCOMTR-MCNC: 107 MG/DL (ref 70–130)
GLUCOSE BLDC GLUCOMTR-MCNC: 94 MG/DL (ref 70–130)
GLUCOSE SERPL-MCNC: 89 MG/DL (ref 65–99)
HCT VFR BLD AUTO: 30.7 % (ref 34–46.6)
HGB BLD-MCNC: 9.6 G/DL (ref 12–15.9)
MCH RBC QN AUTO: 27.6 PG (ref 26.6–33)
MCHC RBC AUTO-ENTMCNC: 31.3 G/DL (ref 31.5–35.7)
MCV RBC AUTO: 88.2 FL (ref 79–97)
PLATELET # BLD AUTO: 292 10*3/MM3 (ref 140–450)
PMV BLD AUTO: 9.7 FL (ref 6–12)
POTASSIUM SERPL-SCNC: 3.2 MMOL/L (ref 3.5–5.2)
RBC # BLD AUTO: 3.48 10*6/MM3 (ref 3.77–5.28)
SODIUM SERPL-SCNC: 140 MMOL/L (ref 136–145)
WBC # BLD AUTO: 5.99 10*3/MM3 (ref 3.4–10.8)

## 2020-08-06 PROCEDURE — 97530 THERAPEUTIC ACTIVITIES: CPT

## 2020-08-06 PROCEDURE — 80048 BASIC METABOLIC PNL TOTAL CA: CPT | Performed by: PHYSICIAN ASSISTANT

## 2020-08-06 PROCEDURE — 97535 SELF CARE MNGMENT TRAINING: CPT

## 2020-08-06 PROCEDURE — 82962 GLUCOSE BLOOD TEST: CPT

## 2020-08-06 PROCEDURE — 85027 COMPLETE CBC AUTOMATED: CPT | Performed by: PHYSICIAN ASSISTANT

## 2020-08-06 PROCEDURE — 93010 ELECTROCARDIOGRAM REPORT: CPT | Performed by: INTERNAL MEDICINE

## 2020-08-06 PROCEDURE — 97110 THERAPEUTIC EXERCISES: CPT

## 2020-08-06 PROCEDURE — 97116 GAIT TRAINING THERAPY: CPT

## 2020-08-06 PROCEDURE — 99232 SBSQ HOSP IP/OBS MODERATE 35: CPT | Performed by: INTERNAL MEDICINE

## 2020-08-06 PROCEDURE — 93005 ELECTROCARDIOGRAM TRACING: CPT | Performed by: INTERNAL MEDICINE

## 2020-08-06 RX ORDER — CHOLECALCIFEROL (VITAMIN D3) 125 MCG
5 CAPSULE ORAL NIGHTLY PRN
Status: DISCONTINUED | OUTPATIENT
Start: 2020-08-06 | End: 2020-08-06 | Stop reason: SDUPTHER

## 2020-08-06 RX ADMIN — AMIODARONE HYDROCHLORIDE 200 MG: 200 TABLET ORAL at 12:32

## 2020-08-06 RX ADMIN — BETHANECHOL CHLORIDE 10 MG: 10 TABLET ORAL at 17:32

## 2020-08-06 RX ADMIN — BUSPIRONE HYDROCHLORIDE 10 MG: 10 TABLET ORAL at 08:44

## 2020-08-06 RX ADMIN — CEFDINIR 300 MG: 300 CAPSULE ORAL at 08:44

## 2020-08-06 RX ADMIN — DOXYCYCLINE 100 MG: 100 CAPSULE ORAL at 20:15

## 2020-08-06 RX ADMIN — CEFDINIR 300 MG: 300 CAPSULE ORAL at 20:15

## 2020-08-06 RX ADMIN — DOXYCYCLINE 100 MG: 100 CAPSULE ORAL at 08:44

## 2020-08-06 RX ADMIN — AMIODARONE HYDROCHLORIDE 200 MG: 200 TABLET ORAL at 03:30

## 2020-08-06 RX ADMIN — BETHANECHOL CHLORIDE 10 MG: 10 TABLET ORAL at 12:32

## 2020-08-06 RX ADMIN — LEVOTHYROXINE SODIUM 175 MCG: 175 TABLET ORAL at 05:27

## 2020-08-06 RX ADMIN — LISINOPRIL 10 MG: 10 TABLET ORAL at 08:45

## 2020-08-06 RX ADMIN — Medication 1 CAPSULE: at 08:45

## 2020-08-06 RX ADMIN — BETHANECHOL CHLORIDE 10 MG: 10 TABLET ORAL at 08:44

## 2020-08-06 RX ADMIN — SERTRALINE HYDROCHLORIDE 50 MG: 50 TABLET, FILM COATED ORAL at 08:45

## 2020-08-06 RX ADMIN — DONEPEZIL HYDROCHLORIDE 10 MG: 10 TABLET, FILM COATED ORAL at 20:15

## 2020-08-06 RX ADMIN — PANTOPRAZOLE SODIUM 40 MG: 40 TABLET, DELAYED RELEASE ORAL at 08:45

## 2020-08-06 RX ADMIN — SODIUM CHLORIDE, PRESERVATIVE FREE 10 ML: 5 INJECTION INTRAVENOUS at 20:16

## 2020-08-06 RX ADMIN — GABAPENTIN 300 MG: 300 CAPSULE ORAL at 08:44

## 2020-08-06 RX ADMIN — MEMANTINE 10 MG: 10 TABLET ORAL at 08:45

## 2020-08-06 RX ADMIN — TRAMADOL HYDROCHLORIDE 50 MG: 50 TABLET, FILM COATED ORAL at 08:45

## 2020-08-06 RX ADMIN — APIXABAN 2.5 MG: 2.5 TABLET, FILM COATED ORAL at 08:44

## 2020-08-06 RX ADMIN — MELATONIN TAB 5 MG 5 MG: 5 TAB at 20:15

## 2020-08-06 RX ADMIN — GABAPENTIN 300 MG: 300 CAPSULE ORAL at 20:15

## 2020-08-06 RX ADMIN — MEMANTINE 10 MG: 10 TABLET ORAL at 20:15

## 2020-08-06 RX ADMIN — AMIODARONE HYDROCHLORIDE 200 MG: 200 TABLET ORAL at 18:00

## 2020-08-06 RX ADMIN — APIXABAN 2.5 MG: 2.5 TABLET, FILM COATED ORAL at 20:15

## 2020-08-06 RX ADMIN — BUSPIRONE HYDROCHLORIDE 10 MG: 10 TABLET ORAL at 17:31

## 2020-08-06 RX ADMIN — SODIUM CHLORIDE, PRESERVATIVE FREE 10 ML: 5 INJECTION INTRAVENOUS at 08:45

## 2020-08-06 NOTE — THERAPY TREATMENT NOTE
Acute Care - Occupational Therapy Treatment Note   [place]     Patient Name: Temi Jarrett  : 1930  MRN: [id number]  Today's Date: 2020             Admit Date: 2020       ICD-10-CM ICD-9-CM   1. Pneumonia of both lower lobes due to infectious organism J18.9 486   2. Bandemia D72.825 288.66   3. Atrial fibrillation with rapid ventricular response (CMS/[facility]) I48.91 427.31   4. Altered mental status, unspecified altered mental status type R41.82 780.97   5. Fever, unspecified fever cause R50.9 780.60   6. Impaired mobility and ADLs Z74.09 V49.89    Z78.9      Patient Active Problem List   Diagnosis   • Esophageal reflux   • Hypothyroidism (acquired)   • Generalized anxiety disorder   • Peripheral neuropathy   • Spinal stenosis of lumbar region   • Osteoporosis   • Disc disorder of lumbar region   • Bladder prolapse, female, acquired   • Essential hypertension   • Pernicious anemia   • Annual physical exam   • Primary osteoarthritis of both knees   • Massive hiatal hernia with intrathoracic stomach   • Acute UTI (urinary tract infection)   • Pneumonia of both lower lobes due to infectious organism   • Paroxysmal atrial fibrillation (CMS/HCC)   • Pericardial effusion   • Dementia (CMS/HCC)     Past Medical History:   Diagnosis Date   • Arthritis    • Closed fracture of neck of left femur (CMS/HCC) 2019   • Dementia (CMS/HCC)    • Depression    • Disease of thyroid gland    • Gastric polyp    • GERD (gastroesophageal reflux disease)    • History of colonic polyps    • Hyperlipidemia    • Hypertension    • IBS (irritable bowel syndrome)    • Macular degeneration    • Torn meniscus     right knee      Past Surgical History:   Procedure Laterality Date   • CHOLECYSTECTOMY     • EYE SURGERY      Cataract extraction   • HERNIA REPAIR     • HIP HEMIARTHROPLASTY Left 2019    Procedure: HIP HEMIARTHROPLASTY LEFT;  Surgeon: Reddy Segundo Jr., MD;  Location: [facility];  Service: Orthopedics    • HYSTERECTOMY  1976   • KNEE SURGERY Right     arthroscopy- 2000   • TONSILLECTOMY         Therapy Treatment    Rehabilitation Treatment Summary     Row Name 08/06/20 1311             Treatment Time/Intention    Discipline  occupational therapist  -SANDEEP      Document Type  therapy note (daily note)  -SANDEEP      Subjective Information  no complaints  -SANDEEP      Mode of Treatment  individual therapy;occupational therapy  -SANDEEP      Patient/Family Observations  no family present. Pt. up in recliner.  -SANDEEP      Care Plan Review  care plan/treatment goals reviewed;risks/benefits reviewed;patient/other agree to care plan  -SANDEEP      Therapy Frequency (OT Eval)  daily  -SANDEEP      Patient Effort  good  -SANDEEP      Existing Precautions/Restrictions  fall;oxygen therapy device and L/min  -SANDEEP      Recorded by [SANDEEP] Didi Tao, OT 08/06/20 1410      Row Name 08/06/20 1311             Vital Signs    Pre Systolic BP Rehab  139  -SANDEEP      Pre Treatment Diastolic BP  88  -SANDEEP      Post Systolic BP Rehab  143  -SANDEEP      Post Treatment Diastolic BP  79  -SANDEEP      Pretreatment Heart Rate (beats/min)  93  -SANDEEP      Posttreatment Heart Rate (beats/min)  94  -SANDEEP      O2 Delivery Pre Treatment  room air  -SANDEEP      Intra SpO2 (%)  -- in 86 to 93%, when drops if asked to do AB elevates quickly  -SANDEEP      O2 Delivery Intra Treatment  room air  -SANDEEP      Post SpO2 (%)  93  -SANDEEP      O2 Delivery Post Treatment  room air  -SANDEEP      Pre Patient Position  Sitting  -SANDEEP      Intra Patient Position  Standing  -SANDEEP      Post Patient Position  Sitting  -SANDEEP      Recorded by [SANDEEP] Didi Tao, OT 08/06/20 1410      Row Name 08/06/20 1311             Cognitive Assessment/Intervention- PT/OT    Orientation Status (Cognition)  oriented to;person  -SANDEEP      Follows Commands (Cognition)  WFL needs repitition just due [place]  -SANDEEP      Safety Deficit (Cognitive)  insight into deficits/self awareness  -SANDEEP      Recorded by [SANDEEP] Didi Tao, OT 08/06/20 1410      Row Name 08/06/20  1311             Safety Issues, Functional Mobility    Safety Issues Affecting Function (Mobility)  insight into deficits/self awareness  -SANDEEP      Impairments Affecting Function (Mobility)  balance;endurance/activity tolerance  -SANDEEP      Recorded by [SANDEEP] Didi Tao, OT 08/06/20 1410      Row Name 08/06/20 1311             Bed Mobility Assessment/Treatment    Supine-Sit [place] (Bed Mobility)  supervision  -SANDEEP      Sit-Supine [place] (Bed Mobility)  supervision  -SANDEEP      Comment (Bed Mobility)  in and out of bed to place brace on knee, but did not fully lay down  -SANDEEP      Recorded by [SANDEEP] Didi Tao, OT 08/06/20 1410      Row Name 08/06/20 1311             Functional Mobility    Functional Mobility- Ind. Level  contact guard assist  -SANDEEP      Functional Mobility- Device  rolling walker  -SANDEEP      Functional Mobility-Distance (Feet)  60  -SANDEEP      Functional Mobility- Safety Issues  balance decreased during turns  -SANDEEP      Recorded by [SANDEEP] Didi Tao, OT 08/06/20 1410      Row Name 08/06/20 1311             Transfer Assessment/Treatment    Transfer Assessment/Treatment  sit-stand transfer;stand-sit transfer  -SANDEEP      Recorded by [SANDEEP] Didi Tao, OT 08/06/20 1410      Row Name 08/06/20 1311             Sit-Stand Transfer    Sit-Stand [place] (Transfers)  stand by assist  -SANDEEP      Assistive Device (Sit-Stand Transfers)  walker front-wheeled  -SANDEEP      Recorded by [SANDEEP] Didi Tao, OT 08/06/20 1410      Row Name 08/06/20 1311             Stand-Sit Transfer    Stand-Sit [place] (Transfers)  stand by assist  -SANDEEP      Assistive Device (Stand-Sit Transfers)  walker front-wheeled  -SANDEEP      Recorded by [SANDEEP] Didi Tao, OT 08/06/20 1410      Row Name 08/06/20 1311             Toilet Transfer    Type (Toilet Transfer)  sit-stand;stand-sit  -SANDEEP      [place] Level (Toilet Transfer)  stand by assist  -SANDEEP      Assistive Device (Toilet Transfer)  commode, bedside without drop arms  over toilet  -SANDEEP      Recorded by [SANDEEP] Didi Tao, OT 08/06/20 1410      Row Name 08/06/20 1311             ADL Assessment/Intervention    21107 - OT Self Care/Mgmt Minutes  34  -SANDEEP      BADL Assessment/Intervention  lower body dressing;grooming;toileting  -SANDEEP      Comment, IADL Assessment/Training  pt. was able to straight covers on bed standing without wx using bed for support.  Pt. was also able to look through person items on couch and find brace and refold other clothing.  -SANDEEP      Additional Documentation  Comment, IADL Assessment/Training (Row)  -SANDEEP      Recorded by [SANDEEP] Didi Tao, OT 08/06/20 1410      Row Name 08/06/20 1311             Lower Body Dressing Assessment/Training    Lower Body Dressing [place] Level  don;undergarment;doff;socks;supervision  -SANDEEP      Lower Body Dressing Position  unsupported sitting edge of chair, did use chair arm rest  -SANDEEP      Comment (Lower Body Dressing)  extra time and effort to sharon undergarment and doff/sharon socks.  -SANDEEP      Recorded by [SANDEEP] Didi Tao, OT 08/06/20 1410      Row Name 08/06/20 1311             Grooming Assessment/Training    [place] Level (Grooming)  oral care regimen;supervision;wash face, hands;minimum assist (75% patient effort) assist to dispense soap needed  -SANDEEP      Grooming Position  supported standing sinkside  -SANDEEP      Recorded by [SANDEEP] Didi Tao, OT 08/06/20 1410      Row Name 08/06/20 1311             Toileting Assessment/Training    [place] Level (Toileting)  supervision;toileting skills  -SANDEEP      Assistive Devices (Toileting)  commode, bedside without drop arms over toilet  -SANDEEP      Toileting Position  supported standing;supported sitting  -SANDEEP      Recorded by [SANDEEP] Didi Tao, OT 08/06/20 1410      Row Name 08/06/20 1311             BADL Safety/Performance    Impairments, BADL Safety/Performance  balance;endurance/activity tolerance;strength;range of motion  -SANDEEP      Progress in BADL Status   independence level  -SANDEEP      Recorded by [SANDEEP] Didi Tao, OT 08/06/20 1410      Row Name 08/06/20 1311             Therapeutic Exercise    58779 - OT Therapeutic Activity Minutes  8  -SANDEEP      Recorded by [SANDEEP] Didi Toa, OT 08/06/20 1410      Row Name 08/06/20 1311             Balance    Balance  dynamic standing balance  -SANDEEP      Recorded by [SANDEEP] Didi Tao, OT 08/06/20 1410      Row Name 08/06/20 1311             Static Sitting Balance    Level of [place]ess (Unsupported Sitting, Static Balance)  independent  -SANDEEP      Sitting Position (Unsupported Sitting, Static Balance)  sitting in chair  -SANDEEP      Recorded by [SANDEEP] Didi Tao, OT 08/06/20 1410      Row Name 08/06/20 1311             Dynamic Sitting Balance    Level of [place], Reaches Outside Midline (Sitting, Dynamic Balance)  standby assist ADL tasks  -SANDEEP      Sitting Position, Reaches Outside Midline (Sitting, Dynamic Balance)  sitting in chair;sitting on edge of bed  -SANDEEP      Recorded by [SANDEEP] Didi Tao, OT 08/06/20 1410      Row Name 08/06/20 1311             Static Standing Balance    Level of [place] (Supported Standing, Static Balance)  standby assist  -SANDEEP      Assistive Device Utilized (Supported Standing, Static Balance)  walker, rolling  -SANDEEP      Recorded by [SANDEEP] Didi Tao, OT 08/06/20 1410      Row Name 08/06/20 1311             Dynamic Standing Balance    Level of [place], Reaches Outside Midline (Standing, Dynamic Balance)  contact guard assist  -SANDEEP      Time Able to Maintain Position, Reaches Outside Midline (Standing, Dynamic Balance)  more than 5 minutes  -SANDEEP      Assistive Device Utilized (Supported Standing, Dynamic Balance)  walker, rolling bed  -SANDEEP      Recorded by [SANDEEP] iDdi Tao, OT 08/06/20 1410      Row Name 08/06/20 1311             Positioning and Restraints    Pre-Treatment Position  sitting in chair/recliner  -SANDEEP      Post Treatment Position  chair  -SANDEEP      In Chair  reclined;call  light within reach;encouraged to call for assist;exit alarm on  -SANDEEP      Recorded by [SANDEEP] Dinh Didi Diana, OT 08/06/20 1410      Row Name 08/06/20 1311             Pain Scale: Numbers Pre/Post-Treatment    Pain Scale: Numbers, Pretreatment  0/10 - no pain  -SANDEEP      Pain Scale: Numbers, Post-Treatment  0/10 - no pain  -SANDEEP      Recorded by [SANDEEP] Dinh Didi Ortiz, OT 08/06/20 1410      Row Name 08/06/20 1311             Outcome Summary/Treatment Plan (OT)    Daily Summary of Progress (OT)  progress toward functional goals is good  -SANDEEP      Barriers to Overall Progress (OT)  02 drops with some activity  -SANDEEP      Plan for Continued Treatment (OT)  cont OT POC  -ASNDEEP      Anticipated Discharge Disposition (OT)  home with assist;home with home health  -SANDEEP      Recorded by [SANDEEP] Didi Tao, OT 08/06/20 1410        User Key  (r) = Recorded By, (t) = Taken By, (c) = Cosigned By    Initials Name Effective Dates Discipline    SANDEEP Didi Tao, OT 06/08/18 -  OT           Rehab Goal Summary     Row Name 08/06/20 1311             Bed Mobility Goal 1 (OT)    Progress/Outcomes (Bed Mobility Goal 1, OT)  good progress toward goal  -SANDEEP         Transfer Goal 1 (OT)    Progress/Outcome (Transfer Goal 1, OT)  goal partially met met all minus bed to chair and chair to bed  -SANDEEP         Dressing Goal 1 (OT)    Progress/Outcome (Dressing Goal 1, OT)  goal met  -SANDEEP        User Key  (r) = Recorded By, (t) = Taken By, (c) = Cosigned By    Initials Name Provider Type Discipline    Didi Chavez, OT Occupational Therapist OT        Occupational Therapy Education                 Title: PT OT SLP Therapies (Not Started)     Topic: Occupational Therapy (In Progress)     Point: ADL training (Done)     Description:   Instruct learner(s) on proper safety adaptation and remediation techniques during self care or transfers.   Instruct in proper use of assistive devices.              Learning Progress Summary           Patient Acceptance, E,D,  VU,NR by SANDEEP at 8/6/2020 1410    Comment:  adaptive breathing, pacing self, bed making EC technique, progress to goals    Acceptance, E, VU,NR by SANDEEP at 8/3/2020 0919    Comment:  reason for consult, noted deficit, goal setting.  Transfer safety                   Point: Home exercise program (Not Started)     Description:   Instruct learner(s) on appropriate technique for monitoring, assisting and/or progressing therapeutic exercises/activities.              Learner Progress:   Not documented in this visit.          Point: Precautions (Done)     Description:   Instruct learner(s) on prescribed precautions during self-care and functional transfers.              Learning Progress Summary           Patient Acceptance, E,D, VU,NR by SANDEEP at 8/6/2020 1410    Comment:  adaptive breathing, pacing self, bed making EC technique, progress to goals    Acceptance, E, VU,NR by SANDEEP at 8/3/2020 0919    Comment:  reason for consult, noted deficit, goal setting.  Transfer safety                   Point: Body mechanics (Done)     Description:   Instruct learner(s) on proper positioning and spine alignment during self-care, functional mobility activities and/or exercises.              Learning Progress Summary           Patient Acceptance, E,D, VU,NR by SANDEEP at 8/6/2020 1410    Comment:  adaptive breathing, pacing self, bed making EC technique, progress to goals                               User Key     Initials Effective Dates Name Provider Type Discipline    SANDEEP 06/08/18 -  Didi Tao, OT Occupational Therapist OT                OT Recommendation and Plan  Outcome Summary/Treatment Plan (OT)  Daily Summary of Progress (OT): progress toward functional goals is good  Barriers to Overall Progress (OT): 02 drops with some activity  Plan for Continued Treatment (OT): cont OT POC  Anticipated Discharge Disposition (OT): home with assist, home with home health  Planned Therapy Interventions (OT Eval): activity tolerance training, BADL  retraining, functional balance retraining, occupation/activity based interventions, patient/caregiver education/training, ROM/therapeutic exercise, strengthening exercise, transfer/mobility retraining  Therapy Frequency (OT Eval): daily  Daily Summary of Progress (OT): progress toward functional goals is good  Plan of Care Review  Plan of Care Reviewed With: patient  Plan of Care Reviewed With: patient  Outcome Summary: Pt. able to mobilize in her room SBA to CGA.  Pt. able to completed grooming SBA to min A.  Pt. was able to doff and sharon socks and sharon undergarment SBA, but needed extra time and effort and 02 drop to 86%, but with education on AB pt. quickly increased to 90's.  Pt. progressing to all goals.  Recommend home with HH OT/PT if able to perform her steps to second floor bedroom.  Outcome Measures     Row Name 08/06/20 1311             How much help from another is currently needed...    Putting on and taking off regular lower body clothing?  3  -SANDEEP      Bathing (including washing, rinsing, and drying)  3  -SANDEEP      Toileting (which includes using toilet bed pan or urinal)  4  -SANDEEP      Putting on and taking off regular upper body clothing  4  -SANDEEP      Taking care of personal grooming (such as brushing teeth)  3  -SANDEEP      Eating meals  4  -SANDEEP      AM-PAC 6 Clicks Score (OT)  21  -SANDEEP         Functional Assessment    Outcome Measure Options  AM-PAC 6 Clicks Daily Activity (OT)  -SANDEEP        User Key  (r) = Recorded By, (t) = Taken By, (c) = Cosigned By    Initials Name Provider Type    Didi Chavez, OT Occupational Therapist           Time Calculation:   Time Calculation- OT     Row Name 08/06/20 1417 08/06/20 1311 08/06/20 0945       Time Calculation- OT    OT Start Time  1311  -SANDEEP  --  --    OT Received On  08/06/20  -SANDEEP  --  --    OT Goal Re-Cert Due Date  08/13/20  -SANDEEP  --  --       Timed Charges    75064 - Gait Training Minutes   --  --  45  -VG    97038 - OT Therapeutic Activity Minutes  --  8  -SANDEEP   --    63635 - OT Self Care/Mgmt Minutes  --  34  -SANDEEP  --      User Key  (r) = Recorded By, (t) = Taken By, (c) = Cosigned By    Initials Name Provider Type    Didi Chavez, OT Occupational Therapist    Natalia Acevedo, PT Physical Therapist        Therapy Charges for Today     Code Description Service Date Service Provider Modifiers Qty    [id number]  OT THERAPEUTIC ACT EA 15 MIN 8/6/2020 Didi Tao, OT GO 1    [id number]  OT SELF CARE/MGMT/TRAIN EA 15 MIN 8/6/2020 Didi Tao OT GO 2               Didi Tao OT  8/6/2020

## 2020-08-06 NOTE — THERAPY TREATMENT NOTE
Patient Name: Temi Jarrett  : 1930    MRN: [id number]                              Today's Date: 2020       Admit Date: 2020    Visit Dx:     ICD-10-CM ICD-9-CM   1. Pneumonia of both lower lobes due to infectious organism J18.9 486   2. Bandemia D72.825 288.66   3. Atrial fibrillation with rapid ventricular response (CMS/HCC) I48.91 427.31   4. Altered mental status, unspecified altered mental status type R41.82 780.97   5. Fever, unspecified fever cause R50.9 780.60   6. Impaired mobility and ADLs Z74.09 V49.89    Z78.9      Patient Active Problem List   Diagnosis   • Esophageal reflux   • Hypothyroidism (acquired)   • Generalized anxiety disorder   • Peripheral neuropathy   • Spinal stenosis of lumbar region   • Osteoporosis   • Disc disorder of lumbar region   • Bladder prolapse, female, acquired   • Essential hypertension   • Pernicious anemia   • Annual physical exam   • Primary osteoarthritis of both knees   • Massive hiatal hernia with intrathoracic stomach   • Acute UTI (urinary tract infection)   • Pneumonia of both lower lobes due to infectious organism   • Paroxysmal atrial fibrillation (CMS/HCC)   • Pericardial effusion   • Dementia (CMS/HCC)     Past Medical History:   Diagnosis Date   • Arthritis    • Closed fracture of neck of left femur (CMS/HCC) 2019   • Dementia (CMS/HCC)    • Depression    • Disease of thyroid gland    • Gastric polyp    • GERD (gastroesophageal reflux disease)    • History of colonic polyps    • Hyperlipidemia    • Hypertension    • IBS (irritable bowel syndrome)    • Macular degeneration    • Torn meniscus     right knee      Past Surgical History:   Procedure Laterality Date   • CHOLECYSTECTOMY     • EYE SURGERY      Cataract extraction   • HERNIA REPAIR     • HIP HEMIARTHROPLASTY Left 2019    Procedure: HIP HEMIARTHROPLASTY LEFT;  Surgeon: Reddy Segundo Jr., MD;  Location: [facility];  Service: Orthopedics   • HYSTERECTOMY     • KNEE  SURGERY Right     arthroscopy- 2000   • TONSILLECTOMY       General Information     Row Name 08/06/20 1603 08/06/20 1107       PT Evaluation Time/Intention    Document Type  therapy note (daily note)  -VG  therapy note (daily note)  -VG    Mode of Treatment  individual therapy;physical therapy  -VG  individual therapy;physical therapy  -VG    Row Name 08/06/20 1603 08/06/20 1107       General Information    Existing Precautions/Restrictions  fall  -VG  fall;oxygen therapy device and L/min  -VG    Row Name 08/06/20 1603 08/06/20 1107       Cognitive Assessment/Intervention- PT/OT    Orientation Status (Cognition)  oriented x 4  -VG  oriented x 4  -VG      User Key  (r) = Recorded By, (t) = Taken By, (c) = Cosigned By    Initials Name Provider Type    VG Natalia Pires, PT Physical Therapist        Mobility     Row Name 08/06/20 1603 08/06/20 1107       Bed Mobility Assessment/Treatment    Comment (Bed Mobility)  UIC  -VG  UIC  -VG    Row Name 08/06/20 1603 08/06/20 1107       Transfer Assessment/Treatment    Comment (Transfers)  Cues for hand placement and sequencing.  -VG  Cues for hand placement and sequencing.  -VG    Row Name 08/06/20 1603 08/06/20 1107       Sit-Stand Transfer    Sit-Stand Bryan (Transfers)  contact guard;verbal cues  -VG  contact guard;verbal cues  -VG    Assistive Device (Sit-Stand Transfers)  walker, front-wheeled  -VG  walker, front-wheeled  -VG    Row Name 08/06/20 1603 08/06/20 1107       Gait/Stairs Assessment/Training    Bryan Level (Gait)  contact guard;verbal cues  -VG  contact guard;verbal cues  -VG    Assistive Device (Gait)  walker, front-wheeled  -VG  walker, front-wheeled  -VG    Distance in Feet (Gait)  250  -VG  250  -VG    Deviations/Abnormal Patterns (Gait)  kane decreased;festinating/shuffling;gait speed decreased  -VG  festinating/shuffling;gait speed decreased;kane decreased  -VG    Bilateral Gait Deviations  forward flexed posture  -VG  forward flexed  posture  -VG    [place] Level (Stairs)  contact guard;verbal cues  -VG  minimum assist (75% patient effort);verbal cues  -VG    Handrail Location (Stairs)  right side (ascending)  -VG  right side (ascending)  -VG    Number of Steps (Stairs)  12  -VG  6  -VG    Ascending Technique (Stairs)  step-to-step  -VG  step-to-step  -VG    Descending Technique (Stairs)  step-to-step  -VG  step-to-step  -VG    Stairs, Safety Issues  balance decreased during turns  -VG  --    Comment (Gait/Stairs)  Distance limited by fatigue and weakness. No SOA. Improved endurance. Cues for sequencing and pacing.  -VG  Cues for sequencing. Limited by fatigue, weakness, SOA. VSS on RA. Improved endurance. Still needs to be able to do 12+8 stairs for d/c home.   -VG      User Key  (r) = Recorded By, (t) = Taken By, (c) = Cosigned By    Initials Name Provider Type    VG Natalia Pires, PT Physical Therapist        Obj/Interventions     Row Name 08/06/20 1604 08/06/20 1109       Therapeutic Exercise    Lower Extremity (Therapeutic Exercise)  LAQ (long arc quad), bilateral;marching while seated  -VG  LAQ (long arc quad), bilateral;marching while seated  -VG    Lower Extremity Range of Motion (Therapeutic Exercise)  hip abduction/adduction, bilateral;ankle dorsiflexion/plantar flexion, bilateral  -VG  hip abduction/adduction, bilateral;ankle dorsiflexion/plantar flexion, bilateral  -VG    Exercise Type (Therapeutic Exercise)  AROM (active range of motion)  -VG  AROM (active range of motion)  -VG    Position (Therapeutic Exercise)  seated  -VG  seated  -VG    Sets/Reps (Therapeutic Exercise)  10x  -VG  10x  -VG    Comment (Therapeutic Exercise)  Cues for technique.  -VG  Cues for technique.  -VG    Row Name 08/06/20 1604          Sensory Assessment/Intervention    Sensory General Assessment  no sensation deficits identified  -VG       User Key  (r) = Recorded By, (t) = Taken By, (c) = Cosigned By    Initials Name Provider Type    DHARMESH Pires  Natalia FERNANDEZ PT Physical Therapist        Goals/Plan    No documentation.       Clinical Impression     Row Name 08/06/20 1110          Pain Assessment    Additional Documentation  Pain Scale: Numbers Pre/Post-Treatment (Group)  -VG     Row Name 08/06/20 1605 08/06/20 1110       Pain Scale: Numbers Pre/Post-Treatment    Pain Scale: Numbers, Pretreatment  0/10 - no pain  -VG  0/10 - no pain  -VG    Pain Scale: Numbers, Post-Treatment  0/10 - no pain  -VG  0/10 - no pain  -VG    Pain Intervention(s)  --  Ambulation/increased activity;Repositioned  -VG    Row Name 08/06/20 1605 08/06/20 1110       Plan of Care Review    Plan of Care Reviewed With  patient  -VG  patient  -VG    Progress  improving  -VG  improving  -VG    Outcome Summary  Pt completed 12 stairs with CGA-GRACE and R handrail. Then ambulated 250 ft with RW and CGA. Limited by fatigue. Improved endurance and stability with mobility. Anticipate pt will be safe to go home with assist and HH PT with 1-2 more days of BID PT.   -VG  Pt ambulated 250 ft with RW and CGA. Completed 6 stairs with GRACE and R handrail. Limited by fatigue, weakness, SOA. Pt became tearful, she is fearful of catching COVID-19 if she goes to rehab. Given pt's current functional mobility, with BID PT and increased mobilization with nsg (ambulating to bathroom, placed RW in room) for 1-2 day, pt may be able to complete the 12+8 stairs required to d/c home. Also issued written HEP and encouraged pt to complete throughout the day. Will continue to progress pt as able per POC.  -VG    Row Name 08/06/20 1110          Physical Therapy Clinical Impression    Criteria for Skilled Interventions Met (PT Clinical Impression)  yes;treatment indicated  -VG     Rehab Potential (PT Clinical Summary)  good, to achieve stated therapy goals  -VG     Row Name 08/06/20 1605 08/06/20 1110       Vital Signs    Pre SpO2 (%)  93  -VG  --    O2 Delivery Pre Treatment  room air  -VG  --    Intra SpO2 (%)  --  93  -VG     O2 Delivery Intra Treatment  --  room air  -VG    Post SpO2 (%)  93  -VG  93  -VG    O2 Delivery Post Treatment  room air  -VG  room air  -VG    Pre Patient Position  Sitting  -VG  Sitting  -VG    Intra Patient Position  --  Sitting  -VG    Post Patient Position  Sitting  -VG  Sitting  -VG    Row Name 08/06/20 1605 08/06/20 1110       Positioning and Restraints    Pre-Treatment Position  sitting in chair/recliner  -VG  sitting in chair/recliner  -VG    Post Treatment Position  chair  -VG  chair  -VG    In Chair  reclined;call light within reach;encouraged to call for assist;exit alarm on;waffle cushion;legs elevated;heels elevated  -VG  reclined;call light within reach;encouraged to call for assist;exit alarm on;with family/caregiver;waffle cushion;legs elevated;heels elevated;notified nsg  -VG      User Key  (r) = Recorded By, (t) = Taken By, (c) = Cosigned By    Initials Name Provider Type    VG Natalia Pires, PT Physical Therapist        Outcome Measures     Row Name 08/06/20 1609 08/06/20 1114       How much help from another person do you currently need...    Turning from your back to your side while in flat bed without using bedrails?  4  -VG  4  -VG    Moving from lying on back to sitting on the side of a flat bed without bedrails?  3  -VG  4  -VG    Moving to and from a bed to a chair (including a wheelchair)?  3  -VG  3  -VG    Standing up from a chair using your arms (e.g., wheelchair, bedside chair)?  3  -VG  3  -VG    Climbing 3-5 steps with a railing?  3  -VG  3  -VG    To walk in hospital room?  3  -VG  3  -VG    AM-PAC 6 Clicks Score (PT)  19  -VG  20  -VG    Row Name 08/06/20 1609 08/06/20 1114       Functional Assessment    Outcome Measure Options  AM-PAC 6 Clicks Basic Mobility (PT)  -VG  AM-PAC 6 Clicks Basic Mobility (PT)  -VG      User Key  (r) = Recorded By, (t) = Taken By, (c) = Cosigned By    Initials Name Provider Type    Natalia Acevedo, PT Physical Therapist        Physical  Therapy Education                 Title: PT OT SLP Therapies (Not Started)     Topic: Physical Therapy (Done)     Point: Mobility training (Done)     Description:   Instruct learner(s) on safety and technique for assisting patient out of bed, chair or wheelchair.  Instruct in the proper use of assistive devices, such as walker, crutches, cane or brace.              Patient Friendly Description:   It's important to get you on your feet again, but we need to do so in a way that is safe for you. Falling has serious consequences, and your personal safety is the most important thing of all.        When it's time to get out of bed, one of us or a family member will sit next to you on the bed to give you support.     If your doctor or nurse tells you to use a walker, crutches, a cane, or a brace, be sure you use it every time you get out of bed, even if you think you don't need it.    Learning Progress Summary           Patient Acceptance, E, VU,NR by VG at 8/6/2020 1610    Acceptance, E, VU by VG at 8/6/2020 1115    Acceptance, E, VU by VG at 8/3/2020 1501                   Point: Home exercise program (Done)     Description:   Instruct learner(s) on appropriate technique for monitoring, assisting and/or progressing patient with therapeutic exercises and activities.              Learning Progress Summary           Patient Acceptance, E, VU,NR by VG at 8/6/2020 1610    Acceptance, E, VU by VG at 8/6/2020 1115                   Point: Body mechanics (Done)     Description:   Instruct learner(s) on proper positioning and spine alignment for patient and/or caregiver during mobility tasks and/or exercises.              Learning Progress Summary           Patient Acceptance, E, VU,NR by VG at 8/6/2020 1610    Acceptance, E, VU by VG at 8/6/2020 1115    Acceptance, E, VU by VG at 8/3/2020 1501                   Point: Precautions (Done)     Description:   Instruct learner(s) on prescribed precautions during mobility and gait  tasks              Learning Progress Summary           Patient Acceptance, E, VU,NR by VG at 8/6/2020 1610    Acceptance, E, VU by VG at 8/6/2020 1115    Acceptance, E, VU by VG at 8/3/2020 1501                               User Key     Initials Effective Dates Name Provider Type Discipline     05/29/18 -  Natalia Pires, PT Physical Therapist PT              PT Recommendation and Plan  Planned Therapy Interventions (PT Eval): balance training, bed mobility training, home exercise program, gait training, patient/family education, stair training, strengthening, transfer training  Outcome Summary/Treatment Plan (PT)  Anticipated Discharge Disposition (PT): home with 24/7 care, home with home health  Plan of Care Reviewed With: patient  Progress: improving  Outcome Summary: Pt completed 12 stairs with CGA-GRACE and R handrail. Then ambulated 250 ft with RW and CGA. Limited by fatigue. Improved endurance and stability with mobility. Anticipate pt will be safe to go home with assist and  PT with 1-2 more days of BID PT.      Time Calculation:   PT Charges     Row Name 08/06/20 1515 08/06/20 0945          Time Calculation    Start Time  1515  -VG  0945  -VG     PT Received On  08/06/20  -VG  08/06/20  -VG     PT Goal Re-Cert Due Date  08/13/20  -VG  08/13/20  -VG        Time Calculation- PT    Total Timed Code Minutes- PT  45 minute(s)  -VG  60 minute(s)  -VG        Timed Charges    37330 - PT Therapeutic Exercise Minutes  15  -VG  15  -VG     69081 - Gait Training Minutes   30  -VG  45  -VG       User Key  (r) = Recorded By, (t) = Taken By, (c) = Cosigned By    Initials Name Provider Type     Natalia Pires, PT Physical Therapist        Therapy Charges for Today     Code Description Service Date Service Provider Modifiers Qty    56392235515 HC PT THER PROC EA 15 MIN 8/6/2020 Natalia Pires, PT GP 1    89527340761 HC GAIT TRAINING EA 15 MIN 8/6/2020 Natalia Pires, PT GP 3    77661333113 HC PT THER PROC EA  15 MIN 8/6/2020 Natalia Pires, PT GP 1    03494380290 HC GAIT TRAINING EA 15 MIN 8/6/2020 Natalia Pires, PT GP 2          PT G-Codes  Outcome Measure Options: AM-PAC 6 Clicks Basic Mobility (PT)  AM-PAC 6 Clicks Score (PT): 19  AM-PAC 6 Clicks Score (OT): 21    Brenda Pires PT  8/6/2020

## 2020-08-06 NOTE — THERAPY TREATMENT NOTE
Patient Name: Temi Jarrett  : 1930    MRN: [id number]                              Today's Date: 2020       Admit Date: 2020    Visit Dx:     ICD-10-CM ICD-9-CM   1. Pneumonia of both lower lobes due to infectious organism J18.9 486   2. Bandemia D72.825 288.66   3. Atrial fibrillation with rapid ventricular response (CMS/HCC) I48.91 427.31   4. Altered mental status, unspecified altered mental status type R41.82 780.97   5. Fever, unspecified fever cause R50.9 780.60   6. Impaired mobility and ADLs Z74.09 V49.89    Z78.9      Patient Active Problem List   Diagnosis   • Esophageal reflux   • Hypothyroidism (acquired)   • Generalized anxiety disorder   • Peripheral neuropathy   • Spinal stenosis of lumbar region   • Osteoporosis   • Disc disorder of lumbar region   • Bladder prolapse, female, acquired   • Essential hypertension   • Pernicious anemia   • Annual physical exam   • Primary osteoarthritis of both knees   • Massive hiatal hernia with intrathoracic stomach   • Acute UTI (urinary tract infection)   • Pneumonia of both lower lobes due to infectious organism   • Paroxysmal atrial fibrillation (CMS/HCC)   • Pericardial effusion   • Dementia (CMS/HCC)     Past Medical History:   Diagnosis Date   • Arthritis    • Closed fracture of neck of left femur (CMS/HCC) 2019   • Dementia (CMS/HCC)    • Depression    • Disease of thyroid gland    • Gastric polyp    • GERD (gastroesophageal reflux disease)    • History of colonic polyps    • Hyperlipidemia    • Hypertension    • IBS (irritable bowel syndrome)    • Macular degeneration    • Torn meniscus     right knee      Past Surgical History:   Procedure Laterality Date   • CHOLECYSTECTOMY     • EYE SURGERY      Cataract extraction   • HERNIA REPAIR     • HIP HEMIARTHROPLASTY Left 2019    Procedure: HIP HEMIARTHROPLASTY LEFT;  Surgeon: Reddy Segundo Jr., MD;  Location: [facility];  Service: Orthopedics   • HYSTERECTOMY     • KNEE  SURGERY Right     arthroscopy- 2000   • TONSILLECTOMY       General Information     Row Name 08/06/20 1107          PT Evaluation Time/Intention    Document Type  therapy note (daily note)  -VG     Mode of Treatment  individual therapy;physical therapy  -VG     Row Name 08/06/20 1107          General Information    Existing Precautions/Restrictions  fall;oxygen therapy device and L/min  -VG     Row Name 08/06/20 1107          Cognitive Assessment/Intervention- PT/OT    Orientation Status (Cognition)  oriented x 4  -VG       User Key  (r) = Recorded By, (t) = Taken By, (c) = Cosigned By    Initials Name Provider Type    VG Natalia Pires, PT Physical Therapist        Mobility     Row Name 08/06/20 1107          Bed Mobility Assessment/Treatment    Comment (Bed Mobility)  UIC  -VG     Row Name 08/06/20 1107          Transfer Assessment/Treatment    Comment (Transfers)  Cues for hand placement and sequencing.  -VG     Row Name 08/06/20 1107          Sit-Stand Transfer    Sit-Stand [place] (Transfers)  contact guard;verbal cues  -VG     Assistive Device (Sit-Stand Transfers)  walker, front-wheeled  -VG     Row Name 08/06/20 1107          Gait/Stairs Assessment/Training    [place] Level (Gait)  contact guard;verbal cues  -VG     Assistive Device (Gait)  walker, front-wheeled  -VG     Distance in Feet (Gait)  250  -VG     Deviations/Abnormal Patterns (Gait)  festinating/shuffling;gait speed decreased;kane decreased  -VG     Bilateral Gait Deviations  forward flexed posture  -VG     [place] Level (Stairs)  minimum assist (75% patient effort);verbal cues  -VG     Handrail Location (Stairs)  right side (ascending)  -VG     Number of Steps (Stairs)  6  -VG     Ascending Technique (Stairs)  step-to-step  -VG     Descending Technique (Stairs)  step-to-step  -VG     Comment (Gait/Stairs)  Cues for sequencing. Limited by fatigue, weakness, SOA. VSS on RA. Improved endurance. Still needs to be able to do 12+8  stairs for d/c home.   -VG       User Key  (r) = Recorded By, (t) = Taken By, (c) = Cosigned By    Initials Name Provider Type    Natalia Acevedo PT Physical Therapist        Obj/Interventions     Row Name 08/06/20 1109          Therapeutic Exercise    Lower Extremity (Therapeutic Exercise)  LAQ (long arc quad), bilateral;marching while seated  -VG     Lower Extremity Range of Motion (Therapeutic Exercise)  hip abduction/adduction, bilateral;ankle dorsiflexion/plantar flexion, bilateral  -VG     Exercise Type (Therapeutic Exercise)  AROM (active range of motion)  -VG     Position (Therapeutic Exercise)  seated  -VG     Sets/Reps (Therapeutic Exercise)  10x  -VG     Comment (Therapeutic Exercise)  Cues for technique.  -VG       User Key  (r) = Recorded By, (t) = Taken By, (c) = Cosigned By    Initials Name Provider Type    Natalia Acevedo, PT Physical Therapist        Goals/Plan    No documentation.       Clinical Impression     Row Name 08/06/20 1110          Pain Assessment    Additional Documentation  Pain Scale: Numbers Pre/Post-Treatment (Group)  -VG     Row Name 08/06/20 1110          Pain Scale: Numbers Pre/Post-Treatment    Pain Scale: Numbers, Pretreatment  0/10 - no pain  -VG     Pain Scale: Numbers, Post-Treatment  0/10 - no pain  -VG     Pain Intervention(s)  Ambulation/increased activity;Repositioned  -VG     Row Name 08/06/20 1110          Plan of Care Review    Plan of Care Reviewed With  patient  -VG     Progress  improving  -VG     Outcome Summary  Pt ambulated 250 ft with RW and CGA. Completed 6 stairs with GRACE and R handrail. Limited by fatigue, weakness, SOA. Pt became tearful, she is fearful of catching COVID-19 if she goes to rehab. Given pt's current functional mobility, with BID PT and increased mobilization with nsg (ambulating to bathroom, placed RW in room) for 1-2 day, pt may be able to complete the 12+8 stairs required to d/c home. Also issued written HEP and encouraged pt to  complete throughout the day. Will continue to progress pt as able per POC.  -VG     Row Name 08/06/20 1110          Physical Therapy Clinical Impression    Criteria for Skilled Interventions Met (PT Clinical Impression)  yes;treatment indicated  -VG     Rehab Potential (PT Clinical Summary)  good, to achieve stated therapy goals  -VG     Row Name 08/06/20 1110          Vital Signs    Intra SpO2 (%)  93  -VG     O2 Delivery Intra Treatment  room air  -VG     Post SpO2 (%)  93  -VG     O2 Delivery Post Treatment  room air  -VG     Pre Patient Position  Sitting  -VG     Intra Patient Position  Sitting  -VG     Post Patient Position  Sitting  -VG     Row Name 08/06/20 1110          Positioning and Restraints    Pre-Treatment Position  sitting in chair/recliner  -VG     Post Treatment Position  chair  -VG     In Chair  reclined;call light within reach;encouraged to call for assist;exit alarm on;with family/caregiver;waffle cushion;legs elevated;heels elevated;notified nsg  -VG       User Key  (r) = Recorded By, (t) = Taken By, (c) = Cosigned By    Initials Name Provider Type    VG Natalia Pires, PT Physical Therapist        Outcome Measures     Row Name 08/06/20 1114          How much help from another person do you currently need...    Turning from your back to your side while in flat bed without using bedrails?  4  -VG     Moving from lying on back to sitting on the side of a flat bed without bedrails?  4  -VG     Moving to and from a bed to a chair (including a wheelchair)?  3  -VG     Standing up from a chair using your arms (e.g., wheelchair, bedside chair)?  3  -VG     Climbing 3-5 steps with a railing?  3  -VG     To walk in hospital room?  3  -VG     AM-PAC 6 Clicks Score (PT)  20  -VG     Row Name 08/06/20 1114          Functional Assessment    Outcome Measure Options  AM-PAC 6 Clicks Basic Mobility (PT)  -VG       User Key  (r) = Recorded By, (t) = Taken By, (c) = Cosigned By    Initials Name Provider Type     Natalia Acevedo PT Physical Therapist        Physical Therapy Education                 Title: PT OT SLP Therapies (Not Started)     Topic: Physical Therapy (Done)     Point: Mobility training (Done)     Description:   Instruct learner(s) on safety and technique for assisting patient out of bed, chair or wheelchair.  Instruct in the proper use of assistive devices, such as walker, crutches, cane or brace.              Patient Friendly Description:   It's important to get you on your feet again, but we need to do so in a way that is safe for you. Falling has serious consequences, and your personal safety is the most important thing of all.        When it's time to get out of bed, one of us or a family member will sit next to you on the bed to give you support.     If your doctor or nurse tells you to use a walker, crutches, a cane, or a brace, be sure you use it every time you get out of bed, even if you think you don't need it.    Learning Progress Summary           Patient Acceptance, E, VU by VG at 8/6/2020 1115    Acceptance, E, VU by VG at 8/3/2020 1501                   Point: Home exercise program (Done)     Description:   Instruct learner(s) on appropriate technique for monitoring, assisting and/or progressing patient with therapeutic exercises and activities.              Learning Progress Summary           Patient Acceptance, E, VU by VG at 8/6/2020 1115                   Point: Body mechanics (Done)     Description:   Instruct learner(s) on proper positioning and spine alignment for patient and/or caregiver during mobility tasks and/or exercises.              Learning Progress Summary           Patient Acceptance, E, VU by VG at 8/6/2020 1115    Acceptance, E, VU by VG at 8/3/2020 1501                   Point: Precautions (Done)     Description:   Instruct learner(s) on prescribed precautions during mobility and gait tasks              Learning Progress Summary           Patient Acceptance, E, VU  by  at 8/6/2020 1115    Acceptance, E, VU by VG at 8/3/2020 1501                               User Key     Initials Effective Dates Name Provider Type Discipline     05/29/18 -  Natalia Pires, PT Physical Therapist PT              PT Recommendation and Plan  Planned Therapy Interventions (PT Eval): balance training, bed mobility training, home exercise program, gait training, patient/family education, stair training, strengthening, transfer training  Outcome Summary/Treatment Plan (PT)  Anticipated Discharge Disposition (PT): home with 24/7 care, home with home health  Plan of Care Reviewed With: patient  Progress: improving  Outcome Summary: Pt ambulated 250 ft with RW and CGA. Completed 6 stairs with GRACE and R handrail. Limited by fatigue, weakness, SOA. Pt became tearful, she is fearful of catching COVID-19 if she goes to rehab. Given pt's current functional mobility, with BID PT and increased mobilization with nsg (ambulating to bathroom, placed RW in room) for 1-2 day, pt may be able to complete the 12+8 stairs required to d/c home. Also issued written HEP and encouraged pt to complete throughout the day. Will continue to progress pt as able per POC.     Time Calculation:   PT Charges     Row Name 08/06/20 0945             Time Calculation    Start Time  0945  -VG      PT Received On  08/06/20  -      PT Goal Re-Cert Due Date  08/13/20  -         Time Calculation- PT    Total Timed Code Minutes- PT  60 minute(s)  -VG         Timed Charges    91754 - PT Therapeutic Exercise Minutes  15  -VG      36585 - Gait Training Minutes   45  -VG        User Key  (r) = Recorded By, (t) = Taken By, (c) = Cosigned By    Initials Name Provider Type     Natalia Pires, PT Physical Therapist        Therapy Charges for Today     Code Description Service Date Service Provider Modifiers Qty    04761984381 HC PT THER PROC EA 15 MIN 8/6/2020 Natalia Pires, PT GP 1    33705459629 HC GAIT TRAINING EA 15 MIN 8/6/2020  Natalia Pires, PT GP 3          PT G-Codes  Outcome Measure Options: AM-PAC 6 Clicks Basic Mobility (PT)  AM-PAC 6 Clicks Score (PT): 20  AM-PAC 6 Clicks Score (OT): 19    Brenda Pires, PT  8/6/2020

## 2020-08-06 NOTE — PLAN OF CARE
Problem: Patient Care Overview  Goal: Plan of Care Review  8/6/2020 1610 by Natalia Pires, PT  Flowsheets (Taken 8/6/2020 1605)  Progress: improving  Plan of Care Reviewed With: patient  Outcome Summary: Pt completed 12 stairs with CGA-GRACE and R handrail. Then ambulated 250 ft with RW and CGA. Limited by fatigue. Improved endurance and stability with mobility. Anticipate pt will be safe to go home with assist and HH PT with 1-2 more days of BID PT.

## 2020-08-06 NOTE — PLAN OF CARE
Patient pleasant and cooperative.  VSS.  Patient on room air.  Patient continue to walk with PT.  Await improvement in mobility for discharge.  Continue to monitor and provide care.

## 2020-08-06 NOTE — PLAN OF CARE
Problem: Patient Care Overview  Goal: Plan of Care Review  Flowsheets (Taken 8/6/2020 1110)  Progress: improving  Plan of Care Reviewed With: patient  Outcome Summary: Pt ambulated 250 ft with RW and CGA. Completed 6 stairs with GRACE and R handrail. Limited by fatigue, weakness, SOA. Pt became tearful, she is fearful of catching COVID-19 if she goes to rehab and strongly prefers to go home with assist of family and HH PT. Given pt's current functional mobility, with BID PT and increased mobilization with nsg (ambulating to bathroom, placed RW in room) for 1-2 day, pt may be able to complete the 12+8 stairs required to d/c home. Also issued written HEP and encouraged pt to complete throughout the day. Will continue to progress pt as able per POC.

## 2020-08-06 NOTE — PLAN OF CARE
Problem: Patient Care Overview  Goal: Plan of Care Review  Outcome: Ongoing (interventions implemented as appropriate)  Flowsheets (Taken 8/6/2020 1412)  Progress: improving  Plan of Care Reviewed With: patient  Outcome Summary: Pt. able to mobilize in her room SBA to CGA.  Pt. able to completed grooming SBA to min A.  Pt. was able to doff and sharon socks and sharon undergarment SBA, but needed extra time and effort and 02 drop to 86%, but with education on AB pt. quickly increased to 90's.  Pt.  Also worked on simple HM task but again with slight 02 drop, which improved with AB  Pt. progressing to all goals.  Recommend home with HH OT/PT if able to perform her steps to second floor bedroom.

## 2020-08-06 NOTE — PLAN OF CARE
Problem: Patient Care Overview  Goal: Plan of Care Review  8/6/2020 0639 by Divya Brooks, RN  Outcome: Ongoing (interventions implemented as appropriate)  Flowsheets (Taken 8/4/2020 2031 by Dorian Corona, RN)  Plan of Care Reviewed With: patient  Note:   Pt VSS, 2L/NC to keep saturation above 92%. no complaints of pain. Resting on and off throughout the night. Utilizing bedside commode x 1 assist. Has been alert and oriented throughout the shift. Utilizing call bell. Will cont to monitor.

## 2020-08-06 NOTE — PROGRESS NOTES
[facility] Medicine Services  PROGRESS NOTE    Patient Name: Temi Jarrett  : 1930  MRN: [id number]    Date of Admission: 2020  Primary Care Physician: Eric Patel MD    Subjective   Subjective     CC:  Pneumonia, A.fib RVR    HPI:  Patient resting comfortably in bed. Feels fatigued, but would like to get up and walk today. Felt like she had increased urinary frequency overnight and wants to know if the abx we have her on are treating a UTI. She does admit to drinking water all night because she felt thirsty. Endorses cough, but no SOA    Review of Systems  Gen- No fevers, chills  CV- No chest pain, palpitations  Resp- No cough, dyspnea  GI- No N/V/D, abd pain    Objective   Objective     Vital Signs:   Temp:  [97.7 °F (36.5 °C)-98.5 °F (36.9 °C)] 98.5 °F (36.9 °C)  Heart Rate:  [76-98] 85  Resp:  [16-20] 16  BP: (101-161)/(80-89) 139/88        Physical Exam:  Constitutional: No acute distress, awake, alert  HENT: NCAT, mucous membranes moist  Respiratory: Clear to auscultation bilaterally, respiratory effort normal   Cardiovascular: RRR, no murmurs, rubs, or gallops, palpable pedal pulses bilaterally  Gastrointestinal: Positive bowel sounds, soft, nontender, nondistended  Musculoskeletal: No bilateral ankle edema  Psychiatric: Appropriate affect, cooperative  Neurologic: Oriented x 3, strength symmetric in all extremities, Cranial Nerves grossly intact to confrontation, speech clear  Skin: No rashes        Results Reviewed:  Results from last 7 days   Lab Units 20  0402 20  0533 20  2156   WBC 10*3/mm3 5.99 7.47 15.29*   HEMOGLOBIN g/dL 9.6* 9.3* 10.8*   HEMATOCRIT % 30.7* 30.2* 33.9*   PLATELETS 10*3/mm3 292 254 221     Results from last 7 days   Lab Units 20  0402 20  0626 20  2156   SODIUM mmol/L 140 140 137   POTASSIUM mmol/L 3.2* 3.8 4.3   CHLORIDE mmol/L 103 103 100   CO2 mmol/L 28.0 26.0 26.0   BUN mg/dL 11 23 20    CREATININE mg/dL 0.51* 0.51* 0.81   GLUCOSE mg/dL 89 138* 180*   CALCIUM mg/dL 8.1* 8.2 8.5   ALT (SGPT) U/L  --   --  13   AST (SGOT) U/L  --   --  21   TROPONIN T ng/mL  --   --  <0.010   PROBNP pg/mL  --   --  1,483.0     Estimated Creatinine Clearance: 39.5 mL/min (A) (by C-G formula based on SCr of 0.51 mg/dL (L)).    Microbiology Results Abnormal     Procedure Component Value - Date/Time    Urine Culture - Urine, Urine, Catheter [[id number]]  (Normal) Collected:  08/05/20 1348    Lab Status:  Preliminary result Specimen:  Urine, Catheter Updated:  08/06/20 0928     Urine Culture No growth    Blood Culture - Blood, Hand, Left [[id number]] Collected:  08/02/20 2155    Lab Status:  Preliminary result Specimen:  Blood from Hand, Left Updated:  08/05/20 2300     Blood Culture No growth at 3 days    Blood Culture - Blood, Arm, Right [[id number]] Collected:  08/02/20 2150    Lab Status:  Preliminary result Specimen:  Blood from Arm, Right Updated:  08/05/20 2300     Blood Culture No growth at 3 days    MRSA Screen, PCR (Inpatient) - Swab, Nares [[id number]]  (Normal) Collected:  08/03/20 0555    Lab Status:  Final result Specimen:  Swab from Nares Updated:  08/03/20 0720     MRSA PCR Negative    Narrative:       MRSA Negative    Respiratory Panel PCR w/COVID-19(SARS-CoV-2) FAMILIA/EMILE/KEARA In-House, NP Swab in [facility], 8-12 Hr TAT - Swab, Nasopharynx [[id number]]  (Normal) Collected:  08/03/20 0554    Lab Status:  Final result Specimen:  Swab from Nasopharynx Updated:  08/03/20 0651     ADENOVIRUS, PCR Not Detected     Coronavirus 229E Not Detected     Coronavirus HKU1 Not Detected     Coronavirus NL63 Not Detected     Coronavirus OC43 Not Detected     COVID19 Not Detected     Human Metapneumovirus Not Detected     Human Rhinovirus/Enterovirus Not Detected     Influenza A PCR Not Detected     Influenza A H1 Not Detected     Influenza A H1 2009 PCR Not Detected     Influenza A H3 Not Detected     Influenza B PCR Not Detected      Parainfluenza Virus 1 Not Detected     Parainfluenza Virus 2 Not Detected     Parainfluenza Virus 3 Not Detected     Parainfluenza Virus 4 Not Detected     RSV, PCR Not Detected     Bordetella pertussis pcr Not Detected     Bordetella parapertussis PCR Not Detected     Chlamydophila pneumoniae PCR Not Detected     Mycoplasma pneumo by PCR Not Detected    Narrative:       Fact sheet for providers: https://docs.[employer]/wp-content/uploads/GQX5407-4820-LW8.1-EUA-Provider-Fact-Sheet-3.pdf    Fact sheet for patients: https://docs.[employer]/wp-content/uploads/JYI2125-6728-TH1.1-EUA-Patient-Fact-Sheet-1.pdf          Imaging Results (Last 24 Hours)     ** No results found for the last 24 hours. **          Results for orders placed during the hospital encounter of 08/02/20   Transthoracic Echo Complete With Contrast if Necessary Per Protocol    Narrative · Left ventricular systolic function is normal. Estimated EF = 70%.  · Left ventricular diastolic dysfunction (grade I) consistent with   impaired relaxation.  · Left atrial cavity size is moderately dilated.  · There is calcification of the aortic valve. There is no significant   stenosis or regurgitation. A Lambel's excrescence is noted on an aortic   valve leaflet.  · Moderate mitral valve regurgitation is present.  · Estimated right ventricular systolic pressure from tricuspid   regurgitation is moderately elevated (49 mmHg).  · There is a moderate (1-2cm) circumferential pericardial effusion. There   is no tamponade physiology.          I have reviewed the medications:  Scheduled Meds:    amiodarone 200 mg Oral Q8H   Followed by      [START ON 8/12/2020] amiodarone 200 mg Oral Q12H   Followed by      [START ON 8/26/2020] amiodarone 200 mg Oral Daily   apixaban 2.5 mg Oral Q12H   bethanechol 10 mg Oral TID   busPIRone 10 mg Oral BID   cefdinir 300 mg Oral Q12H   donepezil 10 mg Oral Nightly   doxycycline 100 mg Oral Q12H   gabapentin 300 mg Oral BID    lactobacillus acidophilus 1 capsule Oral Daily   levothyroxine 175 mcg Oral Q AM   lisinopril 10 mg Oral Q24H   melatonin 5 mg Oral Nightly   memantine 10 mg Oral BID   pantoprazole 40 mg Oral Q AM   sertraline 50 mg Oral Daily   sodium chloride 10 mL Intravenous Q12H   traMADol 50 mg Oral Daily     Continuous Infusions:   PRN Meds:.•  acetaminophen **OR** acetaminophen **OR** acetaminophen  •  methocarbamol  •  ondansetron **OR** ondansetron  •  sodium chloride  •  sodium chloride  •  sodium chloride    Assessment/Plan   Assessment & Plan     Active Hospital Problems    Diagnosis  POA   • **Pneumonia of both lower lobes due to infectious organism [J18.9]  Yes   • Paroxysmal atrial fibrillation (CMS/HCC) [I48.0]  Yes   • Pericardial effusion [I31.3]  Yes   • Acute UTI (urinary tract infection) [N39.0]  Yes   • Essential hypertension [I10]  Yes      Resolved Hospital Problems   No resolved problems to display.        Brief Hospital Course to date:  Temi Jarrett is a 89 y.o. female with history of mild dementia, hypertension, bladder prolapse, generalized anxiety, and frequent urinary tract infections presents from home with a fever of 102 °F and mild confusion.    Bilateral PNA with Fever and Leukocytosis  - Continue empiric antibiotics for pneumonia with Zosyn and doxycycline  - pulmonary toilet, encourage ambulation as able  - cultures are negative, MRSA PCR negative  - COVID Negative     Atrial fibrillation with RVR  - s/p 500mcg digoxin. Did not tolerate cardizem gtt due to hypotension. Amiodarone protocol started and patient now in NSR. Cardiology following  - started on Eliquis 2.5mg BID     Mod-Large Pericardial effusion  -Etiology unclear, Echo showing 1-2cm pericardial effusion without evidence of tamponade, will need repeat Echocardiogram in 4 weeks, patient followed by Cardiologist at [facility]  -Cardiology following     Dementia  - on Aricept and Namenda     Hypothyroid  - TSH WNL     DVT  prophylaxis:  Lovenox     Disposition: I expect the patient to be discharged 1-2 days, patient now considering rehab placement, but wants to see if she can get strong enough to return home instead as she is nervous about rehab with COVID. She does have several stairs at home that she will be required to climb. PT continues to work with her.    CODE STATUS:   Code Status and Medical Interventions:   Ordered at: 08/03/20 0158     Level Of Support Discussed With:    Patient    Next of Kin (If No Surrogate)    Health Care Surrogate     Code Status:    CPR     Medical Interventions (Level of Support Prior to Arrest):    Full       Electronically signed by Mili Doran DO, 08/06/20, 11:22.

## 2020-08-06 NOTE — PROGRESS NOTES
Patient is on Apixaban.  Education provided on 8/6 verbally and in writing.   Information provided includes  effects of medication,  drug-drug and drug-food interactions, and signs/symptoms of bleeding and clotting.   Patient and family verbalized understanding through teach back.  All pertinent questions were answered.     Tanner Juarez [facility]  8/6/2020  14:41

## 2020-08-07 LAB
ANION GAP SERPL CALCULATED.3IONS-SCNC: 8 MMOL/L (ref 5–15)
BACTERIA SPEC AEROBE CULT: NORMAL
BACTERIA SPEC AEROBE CULT: NORMAL
BASOPHILS # BLD AUTO: 0.07 10*3/MM3 (ref 0–0.2)
BASOPHILS NFR BLD AUTO: 1.1 % (ref 0–1.5)
BUN SERPL-MCNC: 13 MG/DL (ref 8–23)
BUN/CREAT SERPL: 24.1 (ref 7–25)
CALCIUM SPEC-SCNC: 8.3 MG/DL (ref 8.2–9.6)
CHLORIDE SERPL-SCNC: 102 MMOL/L (ref 98–107)
CO2 SERPL-SCNC: 30 MMOL/L (ref 22–29)
CREAT SERPL-MCNC: 0.54 MG/DL (ref 0.57–1)
DEPRECATED RDW RBC AUTO: 43.8 FL (ref 37–54)
EOSINOPHIL # BLD AUTO: 0.18 10*3/MM3 (ref 0–0.4)
EOSINOPHIL NFR BLD AUTO: 2.9 % (ref 0.3–6.2)
ERYTHROCYTE [DISTWIDTH] IN BLOOD BY AUTOMATED COUNT: 13.5 % (ref 12.3–15.4)
GFR SERPL CREATININE-BSD FRML MDRD: 106 ML/MIN/1.73
GLUCOSE SERPL-MCNC: 90 MG/DL (ref 65–99)
HCT VFR BLD AUTO: 34.9 % (ref 34–46.6)
HGB BLD-MCNC: 10.7 G/DL (ref 12–15.9)
IMM GRANULOCYTES # BLD AUTO: 0.02 10*3/MM3 (ref 0–0.05)
IMM GRANULOCYTES NFR BLD AUTO: 0.3 % (ref 0–0.5)
LYMPHOCYTES # BLD AUTO: 0.79 10*3/MM3 (ref 0.7–3.1)
LYMPHOCYTES NFR BLD AUTO: 12.9 % (ref 19.6–45.3)
MAGNESIUM SERPL-MCNC: 1.6 MG/DL (ref 1.6–2.4)
MCH RBC QN AUTO: 27.1 PG (ref 26.6–33)
MCHC RBC AUTO-ENTMCNC: 30.7 G/DL (ref 31.5–35.7)
MCV RBC AUTO: 88.4 FL (ref 79–97)
MONOCYTES # BLD AUTO: 0.53 10*3/MM3 (ref 0.1–0.9)
MONOCYTES NFR BLD AUTO: 8.7 % (ref 5–12)
NEUTROPHILS NFR BLD AUTO: 4.52 10*3/MM3 (ref 1.7–7)
NEUTROPHILS NFR BLD AUTO: 74.1 % (ref 42.7–76)
NRBC BLD AUTO-RTO: 0 /100 WBC (ref 0–0.2)
PLATELET # BLD AUTO: 354 10*3/MM3 (ref 140–450)
PMV BLD AUTO: 9.8 FL (ref 6–12)
POTASSIUM SERPL-SCNC: 3.7 MMOL/L (ref 3.5–5.2)
RBC # BLD AUTO: 3.95 10*6/MM3 (ref 3.77–5.28)
SODIUM SERPL-SCNC: 140 MMOL/L (ref 136–145)
WBC # BLD AUTO: 6.11 10*3/MM3 (ref 3.4–10.8)

## 2020-08-07 PROCEDURE — 97116 GAIT TRAINING THERAPY: CPT

## 2020-08-07 PROCEDURE — 25010000003 MAGNESIUM SULFATE 4 GM/100ML SOLUTION: Performed by: INTERNAL MEDICINE

## 2020-08-07 PROCEDURE — 83735 ASSAY OF MAGNESIUM: CPT | Performed by: INTERNAL MEDICINE

## 2020-08-07 PROCEDURE — 85025 COMPLETE CBC W/AUTO DIFF WBC: CPT | Performed by: INTERNAL MEDICINE

## 2020-08-07 PROCEDURE — 97110 THERAPEUTIC EXERCISES: CPT

## 2020-08-07 PROCEDURE — 99232 SBSQ HOSP IP/OBS MODERATE 35: CPT | Performed by: NURSE PRACTITIONER

## 2020-08-07 PROCEDURE — 93005 ELECTROCARDIOGRAM TRACING: CPT | Performed by: INTERNAL MEDICINE

## 2020-08-07 PROCEDURE — 80048 BASIC METABOLIC PNL TOTAL CA: CPT | Performed by: INTERNAL MEDICINE

## 2020-08-07 PROCEDURE — 93010 ELECTROCARDIOGRAM REPORT: CPT | Performed by: INTERNAL MEDICINE

## 2020-08-07 RX ORDER — POTASSIUM CHLORIDE 750 MG/1
40 CAPSULE, EXTENDED RELEASE ORAL AS NEEDED
Status: DISCONTINUED | OUTPATIENT
Start: 2020-08-07 | End: 2020-08-09 | Stop reason: HOSPADM

## 2020-08-07 RX ORDER — MAGNESIUM SULFATE HEPTAHYDRATE 40 MG/ML
2 INJECTION, SOLUTION INTRAVENOUS ONCE
Status: DISCONTINUED | OUTPATIENT
Start: 2020-08-07 | End: 2020-08-09 | Stop reason: HOSPADM

## 2020-08-07 RX ORDER — POTASSIUM CHLORIDE 1.5 G/1.77G
40 POWDER, FOR SOLUTION ORAL AS NEEDED
Status: DISCONTINUED | OUTPATIENT
Start: 2020-08-07 | End: 2020-08-09 | Stop reason: HOSPADM

## 2020-08-07 RX ORDER — POTASSIUM CHLORIDE 7.45 MG/ML
10 INJECTION INTRAVENOUS
Status: DISCONTINUED | OUTPATIENT
Start: 2020-08-07 | End: 2020-08-09 | Stop reason: HOSPADM

## 2020-08-07 RX ORDER — MAGNESIUM SULFATE HEPTAHYDRATE 40 MG/ML
2 INJECTION, SOLUTION INTRAVENOUS AS NEEDED
Status: DISCONTINUED | OUTPATIENT
Start: 2020-08-07 | End: 2020-08-09 | Stop reason: HOSPADM

## 2020-08-07 RX ORDER — MAGNESIUM SULFATE HEPTAHYDRATE 40 MG/ML
4 INJECTION, SOLUTION INTRAVENOUS AS NEEDED
Status: DISCONTINUED | OUTPATIENT
Start: 2020-08-07 | End: 2020-08-09 | Stop reason: HOSPADM

## 2020-08-07 RX ADMIN — AMIODARONE HYDROCHLORIDE 200 MG: 200 TABLET ORAL at 18:27

## 2020-08-07 RX ADMIN — LISINOPRIL 10 MG: 10 TABLET ORAL at 09:31

## 2020-08-07 RX ADMIN — Medication 1 CAPSULE: at 09:31

## 2020-08-07 RX ADMIN — AMIODARONE HYDROCHLORIDE 200 MG: 200 TABLET ORAL at 12:22

## 2020-08-07 RX ADMIN — SERTRALINE HYDROCHLORIDE 50 MG: 50 TABLET, FILM COATED ORAL at 09:30

## 2020-08-07 RX ADMIN — TRAMADOL HYDROCHLORIDE 50 MG: 50 TABLET, FILM COATED ORAL at 09:31

## 2020-08-07 RX ADMIN — LEVOTHYROXINE SODIUM 175 MCG: 175 TABLET ORAL at 05:51

## 2020-08-07 RX ADMIN — MAGNESIUM SULFATE HEPTAHYDRATE 4 G: 40 INJECTION, SOLUTION INTRAVENOUS at 18:28

## 2020-08-07 RX ADMIN — CEFDINIR 300 MG: 300 CAPSULE ORAL at 09:31

## 2020-08-07 RX ADMIN — GABAPENTIN 300 MG: 300 CAPSULE ORAL at 20:57

## 2020-08-07 RX ADMIN — DONEPEZIL HYDROCHLORIDE 10 MG: 10 TABLET, FILM COATED ORAL at 20:56

## 2020-08-07 RX ADMIN — APIXABAN 2.5 MG: 2.5 TABLET, FILM COATED ORAL at 09:30

## 2020-08-07 RX ADMIN — APIXABAN 2.5 MG: 2.5 TABLET, FILM COATED ORAL at 20:56

## 2020-08-07 RX ADMIN — NYSTATIN 500000 UNITS: 100000 SUSPENSION ORAL at 12:22

## 2020-08-07 RX ADMIN — AMIODARONE HYDROCHLORIDE 200 MG: 200 TABLET ORAL at 02:50

## 2020-08-07 RX ADMIN — GABAPENTIN 300 MG: 300 CAPSULE ORAL at 09:30

## 2020-08-07 RX ADMIN — DOXYCYCLINE 100 MG: 100 CAPSULE ORAL at 20:56

## 2020-08-07 RX ADMIN — PANTOPRAZOLE SODIUM 40 MG: 40 TABLET, DELAYED RELEASE ORAL at 05:51

## 2020-08-07 RX ADMIN — BETHANECHOL CHLORIDE 10 MG: 10 TABLET ORAL at 05:51

## 2020-08-07 RX ADMIN — MEMANTINE 10 MG: 10 TABLET ORAL at 09:30

## 2020-08-07 RX ADMIN — MEMANTINE 10 MG: 10 TABLET ORAL at 20:56

## 2020-08-07 RX ADMIN — SODIUM CHLORIDE, PRESERVATIVE FREE 10 ML: 5 INJECTION INTRAVENOUS at 20:57

## 2020-08-07 RX ADMIN — BUSPIRONE HYDROCHLORIDE 10 MG: 10 TABLET ORAL at 09:31

## 2020-08-07 RX ADMIN — SODIUM CHLORIDE, PRESERVATIVE FREE 10 ML: 5 INJECTION INTRAVENOUS at 09:34

## 2020-08-07 RX ADMIN — CEFDINIR 300 MG: 300 CAPSULE ORAL at 20:57

## 2020-08-07 RX ADMIN — DOXYCYCLINE 100 MG: 100 CAPSULE ORAL at 09:31

## 2020-08-07 RX ADMIN — BETHANECHOL CHLORIDE 10 MG: 10 TABLET ORAL at 18:31

## 2020-08-07 RX ADMIN — NYSTATIN 500000 UNITS: 100000 SUSPENSION ORAL at 20:56

## 2020-08-07 RX ADMIN — BUSPIRONE HYDROCHLORIDE 10 MG: 10 TABLET ORAL at 18:27

## 2020-08-07 RX ADMIN — BETHANECHOL CHLORIDE 10 MG: 10 TABLET ORAL at 12:22

## 2020-08-07 RX ADMIN — NYSTATIN 500000 UNITS: 100000 SUSPENSION ORAL at 18:26

## 2020-08-07 RX ADMIN — MELATONIN TAB 5 MG 5 MG: 5 TAB at 20:56

## 2020-08-07 NOTE — PROGRESS NOTES
Continued Stay Note  [facility]     Patient Name: Temi Jarrett  MRN: [id number]  Today's Date: 8/7/2020    Admit Date: 8/2/2020    Discharge Plan     Row Name 08/07/20 1425       Plan    Plan  Home    Patient/Family in Agreement with Plan  yes    Plan Comments  Spoke with patient's son by phone.  He is declining home health at this time.  He would prefer not to have anyone coming into the house due to Covid 19.  Per therapy notes, patient ambulated 250 ft w/RW and CGA.  Per MD notes, patient likely ready for discharge in 1-2 days.  Son would like patient discharged on Monday if possible.  Family will be available to transport home.  CM will continue to follow.  Jemma Vargas RN [phone]    Final Discharge Disposition Code  01 - home or self-care        Discharge Codes    No documentation.       Expected Discharge Date and Time     Expected Discharge Date Expected Discharge Time    Aug 9, 2020             Jemma Vargas RN

## 2020-08-07 NOTE — PROGRESS NOTES
[facility] Medicine Services  PROGRESS NOTE    Patient Name: Temi Jarrett  : 1930  MRN: [id number]    Date of Admission: 2020  Length of Stay: 4  Primary Care Physician: Eric Patel MD    Subjective   Subjective     CC:  Pneumonia, atrial fib with RVR    HPI:  Patient sitting up in chair, eating breakfast.  Patient alert and oriented x3, pleasant, talkative.  She reports to feel better today.  O2 sat 92% on room air.  Patient was not dependent on oxygen at home.  Reports sore throat.  Wants to stay an extra day or 2 to increase her strength with physical therapy.  Does not want to go to rehab due to fear of COVID.  She would like to have home health with [facility] at discharge. BM yesterday.     Review of Systems  Gen- No fevers, chills  ENT- sore throat  CV- No chest pain, palpitations  Resp- (+) cough, (-) dyspnea  GI- No N/V/D, abd pain  All 14 systems reviewed and are neg, except those mentioned in HPI.    Objective   Objective     Vital Signs:   Temp:  [97.4 °F (36.3 °C)-98.8 °F (37.1 °C)] 97.8 °F (36.6 °C)  Heart Rate:  [77-92] 80  Resp:  [16-20] 16  BP: (131-155)/(82-88) 155/82        Physical Exam:  Constitutional: Awake, alert  Eyes: PERRLA, sclerae anicteric, no conjunctival injection  HENT: NCAT, mucous membranes moist, mild pharyngeal erythema  Neck: Supple, no thyromegaly, no lymphadenopathy, trachea midline  Respiratory: Clear to auscultation bilaterally, nonlabored respirations   Cardiovascular: RRR, no murmurs, rubs, or gallops, palpable pedal pulses bilaterally  Gastrointestinal: Positive bowel sounds, soft, nontender, nondistended  Musculoskeletal: No bilateral ankle edema, no clubbing or cyanosis to extremities  Psychiatric: Appropriate affect, cooperative  Neurologic: Oriented x 3, strength symmetric in all extremities, Cranial Nerves grossly intact to confrontation, speech clear  Skin: No rashes    Results Reviewed:    Results from last 7  days   Lab Units 08/07/20  0552 08/06/20  0402 08/05/20  0533   WBC 10*3/mm3 6.11 5.99 7.47   HEMOGLOBIN g/dL 10.7* 9.6* 9.3*   HEMATOCRIT % 34.9 30.7* 30.2*   PLATELETS 10*3/mm3 354 292 254     Results from last 7 days   Lab Units 08/07/20  0552 08/06/20  0402 08/04/20  0626 08/02/20 2156   SODIUM mmol/L 140 140 140 137   POTASSIUM mmol/L 3.7 3.2* 3.8 4.3   CHLORIDE mmol/L 102 103 103 100   CO2 mmol/L 30.0* 28.0 26.0 26.0   BUN mg/dL 13 11 23 20   CREATININE mg/dL 0.54* 0.51* 0.51* 0.81   GLUCOSE mg/dL 90 89 138* 180*   CALCIUM mg/dL 8.3 8.1* 8.2 8.5   ALT (SGPT) U/L  --   --   --  13   AST (SGOT) U/L  --   --   --  21   TROPONIN T ng/mL  --   --   --  <0.010   PROBNP pg/mL  --   --   --  1,483.0     Estimated Creatinine Clearance: 39.5 mL/min (A) (by C-G formula based on SCr of 0.54 mg/dL (L)).    Microbiology Results Abnormal     Procedure Component Value - Date/Time    Blood Culture - Blood, Hand, Left [[id number]] Collected:  08/02/20 2155    Lab Status:  Preliminary result Specimen:  Blood from Hand, Left Updated:  08/06/20 2300     Blood Culture No growth at 4 days    Blood Culture - Blood, Arm, Right [[id number]] Collected:  08/02/20 2150    Lab Status:  Preliminary result Specimen:  Blood from Arm, Right Updated:  08/06/20 2300     Blood Culture No growth at 4 days    Urine Culture - Urine, Urine, Catheter [[id number]]  (Normal) Collected:  08/05/20 1348    Lab Status:  Final result Specimen:  Urine, Catheter Updated:  08/06/20 1854     Urine Culture No growth    MRSA Screen, PCR (Inpatient) - Swab, Nares [[id number]]  (Normal) Collected:  08/03/20 0555    Lab Status:  Final result Specimen:  Swab from Nares Updated:  08/03/20 0720     MRSA PCR Negative    Narrative:       MRSA Negative    Respiratory Panel PCR w/COVID-19(SARS-CoV-2) FAMILIA/EMILE/KEARA In-House, NP Swab in UTM/VTP, 8-12 Hr TAT - Swab, Nasopharynx [[id number]]  (Normal) Collected:  08/03/20 0554    Lab Status:  Final result Specimen:  Swab from  Nasopharynx Updated:  08/03/20 0651     ADENOVIRUS, PCR Not Detected     Coronavirus 229E Not Detected     Coronavirus HKU1 Not Detected     Coronavirus NL63 Not Detected     Coronavirus OC43 Not Detected     COVID19 Not Detected     Human Metapneumovirus Not Detected     Human Rhinovirus/Enterovirus Not Detected     Influenza A PCR Not Detected     Influenza A H1 Not Detected     Influenza A H1 2009 PCR Not Detected     Influenza A H3 Not Detected     Influenza B PCR Not Detected     Parainfluenza Virus 1 Not Detected     Parainfluenza Virus 2 Not Detected     Parainfluenza Virus 3 Not Detected     Parainfluenza Virus 4 Not Detected     RSV, PCR Not Detected     Bordetella pertussis pcr Not Detected     Bordetella parapertussis PCR Not Detected     Chlamydophila pneumoniae PCR Not Detected     Mycoplasma pneumo by PCR Not Detected    Narrative:       Fact sheet for providers: https://docs.[employer]/wp-content/uploads/KPJ9506-0979-CH4.1-EUA-Provider-Fact-Sheet-3.pdf    Fact sheet for patients: https://docs.[employer]/wp-content/uploads/UWX1424-6150-LS5.1-EUA-Patient-Fact-Sheet-1.pdf          Imaging Results (Last 24 Hours)     ** No results found for the last 24 hours. **        Results for orders placed during the hospital encounter of 08/02/20   Transthoracic Echo Complete With Contrast if Necessary Per Protocol    Narrative · Left ventricular systolic function is normal. Estimated EF = 70%.  · Left ventricular diastolic dysfunction (grade I) consistent with   impaired relaxation.  · Left atrial cavity size is moderately dilated.  · There is calcification of the aortic valve. There is no significant   stenosis or regurgitation. A Lambel's excrescence is noted on an aortic   valve leaflet.  · Moderate mitral valve regurgitation is present.  · Estimated right ventricular systolic pressure from tricuspid   regurgitation is moderately elevated (49 mmHg).  · There is a moderate (1-2cm) circumferential  pericardial effusion. There   is no tamponade physiology.        I have reviewed the medications:  Scheduled Meds:  amiodarone 200 mg Oral Q8H   Followed by      [START ON 8/12/2020] amiodarone 200 mg Oral Q12H   Followed by      [START ON 8/26/2020] amiodarone 200 mg Oral Daily   apixaban 2.5 mg Oral Q12H   bethanechol 10 mg Oral TID   busPIRone 10 mg Oral BID   cefdinir 300 mg Oral Q12H   donepezil 10 mg Oral Nightly   doxycycline 100 mg Oral Q12H   gabapentin 300 mg Oral BID   lactobacillus acidophilus 1 capsule Oral Daily   levothyroxine 175 mcg Oral Q AM   lisinopril 10 mg Oral Q24H   melatonin 5 mg Oral Nightly   memantine 10 mg Oral BID   pantoprazole 40 mg Oral Q AM   sertraline 50 mg Oral Daily   sodium chloride 10 mL Intravenous Q12H   traMADol 50 mg Oral Daily     Continuous Infusions:   PRN Meds:.•  acetaminophen **OR** acetaminophen **OR** acetaminophen  •  methocarbamol  •  ondansetron **OR** ondansetron  •  potassium chloride **OR** potassium chloride **OR** potassium chloride  •  sodium chloride  •  sodium chloride  •  sodium chloride    Assessment/Plan   Assessment / Plan     Active Hospital Problems    Diagnosis  POA   • **Pneumonia of both lower lobes due to infectious organism [J18.9]  Yes   • Paroxysmal atrial fibrillation (CMS/HCC) [I48.0]  Yes   • Pericardial effusion [I31.3]  Yes   • Acute UTI (urinary tract infection) [N39.0]  Yes   • Essential hypertension [I10]  Yes      Resolved Hospital Problems   No resolved problems to display.     Brief Hospital Course to date:  Temi Jarrett is a 89 y.o. female mild dementia, hypertension, bladder prolapse, generalized anxiety, and frequent urinary tract infections presents from home with a fever of 102 °F and mild confusion.  Imaging revealed bilateral pneumonia.  Received Zosyn and doxycycline.  Also developed Atrial fibrillation with RVR.  On amiodarone, Eliquis. ECHO showed 1 to 2 cm pericardial effusion with evidence of tamponade.  Repeat echo  in 4 weeks.    Bilateral PNA with Fever and Leukocytosis  - Continue empiric antibiotics for pneumonia with Zosyn and doxycycline  - 08/05 antibiotic coverage changed to cefdinir and doxycycline.  - pulmonary toilet, encourage ambulation as able  - cultures are negative, MRSA PCR negative  - COVID Negative     Atrial fibrillation with RVR  - s/p 500mcg digoxin. Did not tolerate cardizem gtt due to hypotension. Amiodarone protocol started and patient now in NSR. Cardiology following  - started on Eliquis 2.5mg BID     Mod-Large Pericardial effusion  -Etiology unclear, Echo showing 1-2cm pericardial effusion without evidence of tamponade, will need repeat Echocardiogram in 4 weeks, patient followed by Cardiologist at [facility]  -Cardiology following    Hypomagnesemia  - Magnesium 1.6  -Replace per protocol     Dementia  - on Aricept and Namenda     Hypothyroid  - TSH WNL    DVT Prophylaxis:  eliquis    Disposition: I expect the patient to be discharged 1-2 days with home health provided by [facility].    CODE STATUS:   Code Status and Medical Interventions:   Ordered at: 08/03/20 0158     Level Of Support Discussed With:    Patient    Next of Kin (If No Surrogate)    Health Care Surrogate     Code Status:    CPR     Medical Interventions (Level of Support Prior to Arrest):    Full       Electronically signed by MAIDA Duke, 08/07/20, 09:53.

## 2020-08-07 NOTE — THERAPY TREATMENT NOTE
Patient Name: Temi Jarrett  : 1930    MRN: [id number]                              Today's Date: 2020       Admit Date: 2020    Visit Dx:     ICD-10-CM ICD-9-CM   1. Pneumonia of both lower lobes due to infectious organism J18.9 486   2. Bandemia D72.825 288.66   3. Atrial fibrillation with rapid ventricular response (CMS/HCC) I48.91 427.31   4. Altered mental status, unspecified altered mental status type R41.82 780.97   5. Fever, unspecified fever cause R50.9 780.60   6. Impaired mobility and ADLs Z74.09 V49.89    Z78.9      Patient Active Problem List   Diagnosis   • Esophageal reflux   • Hypothyroidism (acquired)   • Generalized anxiety disorder   • Peripheral neuropathy   • Spinal stenosis of lumbar region   • Osteoporosis   • Disc disorder of lumbar region   • Bladder prolapse, female, acquired   • Essential hypertension   • Pernicious anemia   • Annual physical exam   • Primary osteoarthritis of both knees   • Massive hiatal hernia with intrathoracic stomach   • Acute UTI (urinary tract infection)   • Pneumonia of both lower lobes due to infectious organism   • Paroxysmal atrial fibrillation (CMS/HCC)   • Pericardial effusion   • Dementia (CMS/HCC)     Past Medical History:   Diagnosis Date   • Arthritis    • Closed fracture of neck of left femur (CMS/HCC) 2019   • Dementia (CMS/HCC)    • Depression    • Disease of thyroid gland    • Gastric polyp    • GERD (gastroesophageal reflux disease)    • History of colonic polyps    • Hyperlipidemia    • Hypertension    • IBS (irritable bowel syndrome)    • Macular degeneration    • Torn meniscus     right knee      Past Surgical History:   Procedure Laterality Date   • CHOLECYSTECTOMY     • EYE SURGERY      Cataract extraction   • HERNIA REPAIR     • HIP HEMIARTHROPLASTY Left 2019    Procedure: HIP HEMIARTHROPLASTY LEFT;  Surgeon: Reddy Segundo Jr., MD;  Location: [facility];  Service: Orthopedics   • HYSTERECTOMY     • KNEE  SURGERY Right     arthroscopy- 2000   • TONSILLECTOMY       General Information     Row Name 08/07/20 1201          PT Evaluation Time/Intention    Document Type  therapy note (daily note)  -VG     Mode of Treatment  individual therapy;physical therapy  -VG     Row Name 08/07/20 1201          General Information    Existing Precautions/Restrictions  fall;oxygen therapy device and L/min  -VG     Row Name 08/07/20 1201          Cognitive Assessment/Intervention- PT/OT    Orientation Status (Cognition)  oriented x 4  -VG       User Key  (r) = Recorded By, (t) = Taken By, (c) = Cosigned By    Initials Name Provider Type    VG Natalia Pires, PT Physical Therapist        Mobility     Row Name 08/07/20 1201          Bed Mobility Assessment/Treatment    Comment (Bed Mobility)  UIC  -VG     Row Name 08/07/20 1201          Transfer Assessment/Treatment    Comment (Transfers)  Cues for hand placement and sequencing.  -VG     Row Name 08/07/20 1201          Sit-Stand Transfer    Sit-Stand [place] (Transfers)  contact guard;verbal cues  -VG     Assistive Device (Sit-Stand Transfers)  walker, front-wheeled  -VG     Row Name 08/07/20 1201          Gait/Stairs Assessment/Training    [place] Level (Gait)  contact guard;verbal cues  -VG     Assistive Device (Gait)  walker, front-wheeled  -VG     Distance in Feet (Gait)  300  -VG     Deviations/Abnormal Patterns (Gait)  kane decreased;festinating/shuffling;gait speed decreased  -VG     Bilateral Gait Deviations  forward flexed posture  -VG     [place] Level (Stairs)  contact guard;verbal cues  -VG     Handrail Location (Stairs)  left side (ascending);right side (ascending)  -VG     Number of Steps (Stairs)  15  -VG     Ascending Technique (Stairs)  step-to-step  -VG     Descending Technique (Stairs)  step-to-step  -VG     Comment (Gait/Stairs)  Distance limited by fatigue, weakness. No SOA. VSS on RA. Improved endurance. Cues for sequencing.  -VG       User Key   (r) = Recorded By, (t) = Taken By, (c) = Cosigned By    Initials Name Provider Type    VG Natalia Pires, PT Physical Therapist        Obj/Interventions    No documentation.       Goals/Plan    No documentation.       Clinical Impression     Row Name 08/07/20 1202          Pain Scale: Numbers Pre/Post-Treatment    Pain Scale: Numbers, Pretreatment  0/10 - no pain  -VG     Pain Scale: Numbers, Post-Treatment  0/10 - no pain  -VG     Row Name 08/07/20 1202          Plan of Care Review    Plan of Care Reviewed With  patient  -VG     Progress  improving  -VG     Outcome Summary  Pt ambulated 15 stairs with B rails and CGA, then ambulated 300 ft with RW and CGA. Limited by fatigue. Improved endurance. Plan to see again this afternoon to progress stairs (pt has to be able to do 20 stairs for d/c home). Anticipate pt will be ready to d/c home with 24/7 spv/assist and HH PT tomorrow afternoon after 1 more PT session today and 1 more PT session tommorrow. Good progress.  -VG     Row Name 08/07/20 1202          Vital Signs    Pre Systolic BP Rehab  143  -VG     Pre Treatment Diastolic BP  85  -VG     Post Systolic BP Rehab  155  -VG     Post Treatment Diastolic BP  84  -VG     Pretreatment Heart Rate (beats/min)  85  -VG     Posttreatment Heart Rate (beats/min)  88  -VG     Pre SpO2 (%)  96  -VG     O2 Delivery Pre Treatment  room air  -VG     Post SpO2 (%)  95  -VG     O2 Delivery Post Treatment  room air  -VG     Pre Patient Position  Sitting  -VG     Post Patient Position  Sitting  -VG     Row Name 08/07/20 1202          Positioning and Restraints    Pre-Treatment Position  sitting in chair/recliner  -VG     Post Treatment Position  chair  -VG     In Chair  call light within reach;encouraged to call for assist;exit alarm on;waffle cushion;sitting;notified nsg  -VG       User Key  (r) = Recorded By, (t) = Taken By, (c) = Cosigned By    Initials Name Provider Type    VG Natalia Pires, PT Physical Therapist         Outcome Measures     Row Name 08/07/20 1205          How much help from another person do you currently need...    Turning from your back to your side while in flat bed without using bedrails?  3  -VG     Moving from lying on back to sitting on the side of a flat bed without bedrails?  3  -VG     Moving to and from a bed to a chair (including a wheelchair)?  3  -VG     Standing up from a chair using your arms (e.g., wheelchair, bedside chair)?  3  -VG     Climbing 3-5 steps with a railing?  3  -VG     To walk in hospital room?  3  -VG     AM-PAC 6 Clicks Score (PT)  18  -VG     Row Name 08/07/20 1205          Functional Assessment    Outcome Measure Options  AM-PAC 6 Clicks Basic Mobility (PT)  -VG       User Key  (r) = Recorded By, (t) = Taken By, (c) = Cosigned By    Initials Name Provider Type    VG Natalia Pires, PT Physical Therapist        Physical Therapy Education                 Title: PT OT SLP Therapies (Not Started)     Topic: Physical Therapy (Done)     Point: Mobility training (Done)     Description:   Instruct learner(s) on safety and technique for assisting patient out of bed, chair or wheelchair.  Instruct in the proper use of assistive devices, such as walker, crutches, cane or brace.              Patient Friendly Description:   It's important to get you on your feet again, but we need to do so in a way that is safe for you. Falling has serious consequences, and your personal safety is the most important thing of all.        When it's time to get out of bed, one of us or a family member will sit next to you on the bed to give you support.     If your doctor or nurse tells you to use a walker, crutches, a cane, or a brace, be sure you use it every time you get out of bed, even if you think you don't need it.    Learning Progress Summary           Patient Acceptance, E, VU by DHARMESH at 8/7/2020 1206    Acceptance, E, VU,NR by DHARMESH at 8/6/2020 1610    Acceptance, E, VU by VG at 8/6/2020 1115     Acceptance, E, VU by VG at 8/3/2020 1501                   Point: Home exercise program (Done)     Description:   Instruct learner(s) on appropriate technique for monitoring, assisting and/or progressing patient with therapeutic exercises and activities.              Learning Progress Summary           Patient Acceptance, E, VU by VG at 8/7/2020 1206    Acceptance, E, VU,NR by VG at 8/6/2020 1610    Acceptance, E, VU by VG at 8/6/2020 1115                   Point: Body mechanics (Done)     Description:   Instruct learner(s) on proper positioning and spine alignment for patient and/or caregiver during mobility tasks and/or exercises.              Learning Progress Summary           Patient Acceptance, E, VU by VG at 8/7/2020 1206    Acceptance, E, VU,NR by VG at 8/6/2020 1610    Acceptance, E, VU by VG at 8/6/2020 1115    Acceptance, E, VU by VG at 8/3/2020 1501                   Point: Precautions (Done)     Description:   Instruct learner(s) on prescribed precautions during mobility and gait tasks              Learning Progress Summary           Patient Acceptance, E, VU by VG at 8/7/2020 1206    Acceptance, E, VU,NR by VG at 8/6/2020 1610    Acceptance, E, VU by VG at 8/6/2020 1115    Acceptance, E, VU by VG at 8/3/2020 1501                               User Key     Initials Effective Dates Name Provider Type Discipline     05/29/18 -  Natalia Pires, PT Physical Therapist PT              PT Recommendation and Plan  Planned Therapy Interventions (PT Eval): balance training, bed mobility training, home exercise program, gait training, patient/family education, stair training, strengthening, transfer training  Outcome Summary/Treatment Plan (PT)  Anticipated Discharge Disposition (PT): home with 24/7 care, home with home health  Plan of Care Reviewed With: patient  Progress: improving  Outcome Summary: Pt ambulated 15 stairs with B rails and CGA, then ambulated 300 ft with RW and CGA. Limited by fatigue.  Improved endurance. Plan to see again this afternoon to progress stairs (pt has to be able to do 20 stairs for d/c home). Anticipate pt will be ready to d/c home with 24/7 spv/assist and HH PT tomorrow afternoon after 1 more PT session today and 1 more PT session tommorrow. Good progress.     Time Calculation:   PT Charges     Row Name 08/07/20 1130             Time Calculation    Start Time  1130  -VG      PT Received On  08/07/20  -VG      PT Goal Re-Cert Due Date  08/13/20  -VG         Time Calculation- PT    Total Timed Code Minutes- PT  30 minute(s)  -VG         Timed Charges    30920 - PT Therapeutic Exercise Minutes  10  -VG      57925 - Gait Training Minutes   20  -VG        User Key  (r) = Recorded By, (t) = Taken By, (c) = Cosigned By    Initials Name Provider Type    VG Natalia Pires, PT Physical Therapist        Therapy Charges for Today     Code Description Service Date Service Provider Modifiers Qty    29495757612 HC PT THER PROC EA 15 MIN 8/6/2020 Natalia Pires, PT GP 1    78491202391 HC GAIT TRAINING EA 15 MIN 8/6/2020 Natalia Pires, PT GP 3    31570781836 HC PT THER PROC EA 15 MIN 8/6/2020 Natalia Pires, PT GP 1    25635162167 HC GAIT TRAINING EA 15 MIN 8/6/2020 Natalia Pires, PT GP 2    20475201645 HC PT THER PROC EA 15 MIN 8/7/2020 Natalia Pires, PT GP 1    28974277739 HC GAIT TRAINING EA 15 MIN 8/7/2020 Natalia Pires, PT GP 1          PT G-Codes  Outcome Measure Options: AM-PAC 6 Clicks Basic Mobility (PT)  AM-PAC 6 Clicks Score (PT): 18  AM-PAC 6 Clicks Score (OT): 21    Brenda Pires PT  8/7/2020

## 2020-08-07 NOTE — PLAN OF CARE
Problem: Patient Care Overview  Goal: Plan of Care Review  Flowsheets (Taken 8/7/2020 1202)  Progress: improving  Plan of Care Reviewed With: patient  Outcome Summary: Pt ambulated 15 stairs with B rails and CGA, then ambulated 300 ft with RW and CGA. Limited by fatigue. Improved endurance. Plan to see again this afternoon to progress stairs (pt has to be able to do 20 stairs for d/c home). Anticipate pt will be ready to d/c home with 24/7 spv/assist and HH PT tomorrow afternoon after 1 more PT session today and 1 more PT session tommorrow. Good progress.

## 2020-08-07 NOTE — PLAN OF CARE
VSS, patient up in chair this shift. Worked with PT today. Also, walked with nurse once.Son and daughter in law have requested that if possible could she be discharged on Sunday. Magnesium replaced today. Will continue current plan of care.

## 2020-08-07 NOTE — PLAN OF CARE
Problem: Patient Care Overview  Goal: Plan of Care Review  8/7/2020 1536 by Natalia Pires, PT  Flowsheets (Taken 8/7/2020 1529)  Progress: improving  Plan of Care Reviewed With: patient  Outcome Summary: Pt completed 22 stairs with B handrails and then ambulated 300 ft with RW and CGA. Limited by fatigue and SOA. VSS on RA. Improved endurance and stability. Ready for d/c home with 24/7 spv/assist and  PT when medically ready. Will continue to progress pt as able per POC.

## 2020-08-07 NOTE — THERAPY TREATMENT NOTE
Patient Name: Temi Jarrett  : 1930    MRN: [id number]                              Today's Date: 2020       Admit Date: 2020    Visit Dx:     ICD-10-CM ICD-9-CM   1. Pneumonia of both lower lobes due to infectious organism J18.9 486   2. Bandemia D72.825 288.66   3. Atrial fibrillation with rapid ventricular response (CMS/HCC) I48.91 427.31   4. Altered mental status, unspecified altered mental status type R41.82 780.97   5. Fever, unspecified fever cause R50.9 780.60   6. Impaired mobility and ADLs Z74.09 V49.89    Z78.9      Patient Active Problem List   Diagnosis   • Esophageal reflux   • Hypothyroidism (acquired)   • Generalized anxiety disorder   • Peripheral neuropathy   • Spinal stenosis of lumbar region   • Osteoporosis   • Disc disorder of lumbar region   • Bladder prolapse, female, acquired   • Essential hypertension   • Pernicious anemia   • Annual physical exam   • Primary osteoarthritis of both knees   • Massive hiatal hernia with intrathoracic stomach   • Acute UTI (urinary tract infection)   • Pneumonia of both lower lobes due to infectious organism   • Paroxysmal atrial fibrillation (CMS/HCC)   • Pericardial effusion   • Dementia (CMS/HCC)     Past Medical History:   Diagnosis Date   • Arthritis    • Closed fracture of neck of left femur (CMS/HCC) 2019   • Dementia (CMS/HCC)    • Depression    • Disease of thyroid gland    • Gastric polyp    • GERD (gastroesophageal reflux disease)    • History of colonic polyps    • Hyperlipidemia    • Hypertension    • IBS (irritable bowel syndrome)    • Macular degeneration    • Torn meniscus     right knee      Past Surgical History:   Procedure Laterality Date   • CHOLECYSTECTOMY     • EYE SURGERY      Cataract extraction   • HERNIA REPAIR     • HIP HEMIARTHROPLASTY Left 2019    Procedure: HIP HEMIARTHROPLASTY LEFT;  Surgeon: Reddy Segundo Jr., MD;  Location: [facility];  Service: Orthopedics   • HYSTERECTOMY     • KNEE  SURGERY Right     arthroscopy- 2000   • TONSILLECTOMY       General Information     Row Name 08/07/20 1527 08/07/20 1201       PT Evaluation Time/Intention    Document Type  therapy note (daily note)  -VG  therapy note (daily note)  -VG    Mode of Treatment  individual therapy;physical therapy  -VG  individual therapy;physical therapy  -VG    Row Name 08/07/20 1527 08/07/20 1201       General Information    Existing Precautions/Restrictions  fall  -VG  fall;oxygen therapy device and L/min  -VG    Row Name 08/07/20 1527 08/07/20 1201       Cognitive Assessment/Intervention- PT/OT    Orientation Status (Cognition)  oriented x 4  -VG  oriented x 4  -VG      User Key  (r) = Recorded By, (t) = Taken By, (c) = Cosigned By    Initials Name Provider Type    VG Natalia Pires, PT Physical Therapist        Mobility     Row Name 08/07/20 1527 08/07/20 1201       Bed Mobility Assessment/Treatment    Comment (Bed Mobility)  UIC  -VG  UIC  -VG    Row Name 08/07/20 1201          Transfer Assessment/Treatment    Comment (Transfers)  Cues for hand placement and sequencing.  -VG     Row Name 08/07/20 1527 08/07/20 1201       Sit-Stand Transfer    Sit-Stand Sebastian (Transfers)  supervision  -VG  contact guard;verbal cues  -VG    Assistive Device (Sit-Stand Transfers)  walker, front-wheeled  -VG  walker, front-wheeled  -VG    Row Name 08/07/20 1527 08/07/20 1201       Gait/Stairs Assessment/Training    Sebastian Level (Gait)  supervision  -VG  contact guard;verbal cues  -VG    Assistive Device (Gait)  walker, front-wheeled  -VG  walker, front-wheeled  -VG    Distance in Feet (Gait)  300  -VG  300  -VG    Deviations/Abnormal Patterns (Gait)  --  kane decreased;festinating/shuffling;gait speed decreased  -VG    Bilateral Gait Deviations  --  forward flexed posture  -VG    Sebastian Level (Stairs)  supervision  -VG  contact guard;verbal cues  -VG    Handrail Location (Stairs)  left side (ascending);right side (ascending)   -VG  left side (ascending);right side (ascending)  -VG    Number of Steps (Stairs)  22  -VG  15  -VG    Ascending Technique (Stairs)  step-to-step  -VG  step-to-step  -VG    Descending Technique (Stairs)  step-to-step  -VG  step-to-step  -VG    Comment (Gait/Stairs)  Limited by fatigue and SOA. VSS on RA. Improved endurance. Cues for upright posture and sequencing.  -VG  Distance limited by fatigue, weakness. No SOA. VSS on RA. Improved endurance. Cues for sequencing.  -VG      User Key  (r) = Recorded By, (t) = Taken By, (c) = Cosigned By    Initials Name Provider Type    VG Natalia Pires, PT Physical Therapist        Obj/Interventions    No documentation.       Goals/Plan    No documentation.       Clinical Impression     Row Name 08/07/20 1529 08/07/20 1202       Pain Scale: Numbers Pre/Post-Treatment    Pain Scale: Numbers, Pretreatment  0/10 - no pain  -VG  0/10 - no pain  -VG    Pain Scale: Numbers, Post-Treatment  0/10 - no pain  -VG  0/10 - no pain  -VG    Row Name 08/07/20 1529 08/07/20 1202       Plan of Care Review    Plan of Care Reviewed With  patient  -VG  patient  -VG    Progress  improving  -VG  improving  -VG    Outcome Summary  Pt completed 22 stairs with B handrails and then ambulated 300 ft with RW and CGA. Limited by fatigue and SOA. VSS on RA. Improved endurance and stability. Ready for d/c home with 24/7 spv/assist and HH PT when medically ready. Will continue to progress pt as able per POC.  -VG  Pt ambulated 15 stairs with B rails and CGA, then ambulated 300 ft with RW and CGA. Limited by fatigue. Improved endurance. Plan to see again this afternoon to progress stairs (pt has to be able to do 20 stairs for d/c home). Anticipate pt will be ready to d/c home with 24/7 spv/assist and HH PT tomorrow afternoon after 1 more PT session today and 1 more PT session tommorrow. Good progress.  -VG    Row Name 08/07/20 1529 08/07/20 1202       Vital Signs    Pre Systolic BP Rehab  --  143  -VG    Pre  Treatment Diastolic BP  --  85  -VG    Post Systolic BP Rehab  --  155  -VG    Post Treatment Diastolic BP  --  84  -VG    Pretreatment Heart Rate (beats/min)  --  85  -VG    Posttreatment Heart Rate (beats/min)  --  88  -VG    Pre SpO2 (%)  --  96  -VG    O2 Delivery Pre Treatment  --  room air  -VG    Post SpO2 (%)  92  -VG  95  -VG    O2 Delivery Post Treatment  room air  -VG  room air  -VG    Pre Patient Position  --  Sitting  -VG    Post Patient Position  Sitting  -VG  Sitting  -VG    Row Name 08/07/20 1529 08/07/20 1202       Positioning and Restraints    Pre-Treatment Position  --  sitting in chair/recliner  -VG    Post Treatment Position  chair  -VG  chair  -VG    In Chair  call light within reach;encouraged to call for assist;exit alarm on;waffle cushion;reclined;legs elevated;heels elevated  -VG  call light within reach;encouraged to call for assist;exit alarm on;waffle cushion;sitting;notified nsg  -VG      User Key  (r) = Recorded By, (t) = Taken By, (c) = Cosigned By    Initials Name Provider Type    VG Natalia Pires, PT Physical Therapist        Outcome Measures     Row Name 08/07/20 1533 08/07/20 1205       How much help from another person do you currently need...    Turning from your back to your side while in flat bed without using bedrails?  4  -VG  3  -VG    Moving from lying on back to sitting on the side of a flat bed without bedrails?  4  -VG  3  -VG    Moving to and from a bed to a chair (including a wheelchair)?  4  -VG  3  -VG    Standing up from a chair using your arms (e.g., wheelchair, bedside chair)?  4  -VG  3  -VG    Climbing 3-5 steps with a railing?  3  -VG  3  -VG    To walk in hospital room?  3  -VG  3  -VG    AM-PAC 6 Clicks Score (PT)  22  -VG  18  -VG    Row Name 08/07/20 1533 08/07/20 1205       Functional Assessment    Outcome Measure Options  AM-PAC 6 Clicks Basic Mobility (PT)  -VG  AM-PAC 6 Clicks Basic Mobility (PT)  -VG      User Key  (r) = Recorded By, (t) = Taken  By, (c) = Cosigned By    Initials Name Provider Type    Natalia Acevedo, PT Physical Therapist        Physical Therapy Education                 Title: PT OT SLP Therapies (Not Started)     Topic: Physical Therapy (Done)     Point: Mobility training (Done)     Description:   Instruct learner(s) on safety and technique for assisting patient out of bed, chair or wheelchair.  Instruct in the proper use of assistive devices, such as walker, crutches, cane or brace.              Patient Friendly Description:   It's important to get you on your feet again, but we need to do so in a way that is safe for you. Falling has serious consequences, and your personal safety is the most important thing of all.        When it's time to get out of bed, one of us or a family member will sit next to you on the bed to give you support.     If your doctor or nurse tells you to use a walker, crutches, a cane, or a brace, be sure you use it every time you get out of bed, even if you think you don't need it.    Learning Progress Summary           Patient Acceptance, E, VU by VG at 8/7/2020 1536    Acceptance, E, VU by VG at 8/7/2020 1206    Acceptance, E, VU,NR by VG at 8/6/2020 1610    Acceptance, E, VU by VG at 8/6/2020 1115    Acceptance, E, VU by VG at 8/3/2020 1501                   Point: Home exercise program (Done)     Description:   Instruct learner(s) on appropriate technique for monitoring, assisting and/or progressing patient with therapeutic exercises and activities.              Learning Progress Summary           Patient Acceptance, E, VU by VG at 8/7/2020 1536    Acceptance, E, VU by VG at 8/7/2020 1206    Acceptance, E, VU,NR by VG at 8/6/2020 1610    Acceptance, E, VU by VG at 8/6/2020 1115                   Point: Body mechanics (Done)     Description:   Instruct learner(s) on proper positioning and spine alignment for patient and/or caregiver during mobility tasks and/or exercises.              Learning Progress  Summary           Patient Acceptance, E, VU by VG at 8/7/2020 1536    Acceptance, E, VU by VG at 8/7/2020 1206    Acceptance, E, VU,NR by VG at 8/6/2020 1610    Acceptance, E, VU by VG at 8/6/2020 1115    Acceptance, E, VU by VG at 8/3/2020 1501                   Point: Precautions (Done)     Description:   Instruct learner(s) on prescribed precautions during mobility and gait tasks              Learning Progress Summary           Patient Acceptance, E, VU by VG at 8/7/2020 1536    Acceptance, E, VU by VG at 8/7/2020 1206    Acceptance, E, VU,NR by VG at 8/6/2020 1610    Acceptance, E, VU by VG at 8/6/2020 1115    Acceptance, E, VU by VG at 8/3/2020 1501                               User Key     Initials Effective Dates Name Provider Type Discipline     05/29/18 -  Natalia Pires, PT Physical Therapist PT              PT Recommendation and Plan  Planned Therapy Interventions (PT Eval): balance training, bed mobility training, home exercise program, gait training, patient/family education, stair training, strengthening, transfer training  Outcome Summary/Treatment Plan (PT)  Anticipated Discharge Disposition (PT): home with 24/7 care, home with home health  Plan of Care Reviewed With: patient  Progress: improving  Outcome Summary: Pt completed 22 stairs with B handrails and then ambulated 300 ft with RW and CGA. Limited by fatigue and SOA. VSS on RA. Improved endurance and stability. Ready for d/c home with 24/7 spv/assist and  PT when medically ready. Will continue to progress pt as able per POC.     Time Calculation:   PT Charges     Row Name 08/07/20 1500 08/07/20 1130          Time Calculation    Start Time  1500  -VG  1130  -VG     PT Received On  08/07/20  -VG  08/07/20  -VG     PT Goal Re-Cert Due Date  08/13/20  -VG  08/13/20  -VG        Time Calculation- PT    Total Timed Code Minutes- PT  26 minute(s)  -VG  30 minute(s)  -VG        Timed Charges    11328 - PT Therapeutic Exercise Minutes  --  10  -VG      94620 - Gait Training Minutes   26  -VG  20  -VG       User Key  (r) = Recorded By, (t) = Taken By, (c) = Cosigned By    Initials Name Provider Type    Natalia Acevedo, PT Physical Therapist        Therapy Charges for Today     Code Description Service Date Service Provider Modifiers Qty    16350281657 HC PT THER PROC EA 15 MIN 8/6/2020 Natalia Pires, PT GP 1    44985253058 HC GAIT TRAINING EA 15 MIN 8/6/2020 Natalia Pires, PT GP 3    19932023963 HC PT THER PROC EA 15 MIN 8/6/2020 Natalia Pires, PT GP 1    91575911268 HC GAIT TRAINING EA 15 MIN 8/6/2020 Natalia Pires, PT GP 2    39999834753 HC PT THER PROC EA 15 MIN 8/7/2020 Natalia Pires, PT GP 1    36253791110 HC GAIT TRAINING EA 15 MIN 8/7/2020 Natalia Pires, PT GP 1    10901169584 HC GAIT TRAINING EA 15 MIN 8/7/2020 Natalia Pires, PT GP 2          PT G-Codes  Outcome Measure Options: AM-PAC 6 Clicks Basic Mobility (PT)  AM-PAC 6 Clicks Score (PT): 22  AM-PAC 6 Clicks Score (OT): 21    Brenda Pires PT  8/7/2020

## 2020-08-07 NOTE — PLAN OF CARE
Problem: Patient Care Overview  Goal: Plan of Care Review  Outcome: Ongoing (interventions implemented as appropriate)  Note:   No complaints overnight. Patient did require 2L NC after getting up and going to the bathroom, otherwise okay on RA. Plan to d/c home in the next day or two when cleared by PT. APRN notified of low K+, redraw scheduled for morning and replace per protocol based on that value per APRN. VSS.  Will continue to monitor.

## 2020-08-08 LAB
ANION GAP SERPL CALCULATED.3IONS-SCNC: 9 MMOL/L (ref 5–15)
BASOPHILS # BLD AUTO: 0.09 10*3/MM3 (ref 0–0.2)
BASOPHILS NFR BLD AUTO: 1.3 % (ref 0–1.5)
BUN SERPL-MCNC: 14 MG/DL (ref 8–23)
BUN/CREAT SERPL: 26.4 (ref 7–25)
CALCIUM SPEC-SCNC: 8.3 MG/DL (ref 8.2–9.6)
CHLORIDE SERPL-SCNC: 103 MMOL/L (ref 98–107)
CO2 SERPL-SCNC: 32 MMOL/L (ref 22–29)
CREAT SERPL-MCNC: 0.53 MG/DL (ref 0.57–1)
DEPRECATED RDW RBC AUTO: 43.9 FL (ref 37–54)
EOSINOPHIL # BLD AUTO: 0.19 10*3/MM3 (ref 0–0.4)
EOSINOPHIL NFR BLD AUTO: 2.7 % (ref 0.3–6.2)
ERYTHROCYTE [DISTWIDTH] IN BLOOD BY AUTOMATED COUNT: 13.7 % (ref 12.3–15.4)
GFR SERPL CREATININE-BSD FRML MDRD: 108 ML/MIN/1.73
GLUCOSE SERPL-MCNC: 118 MG/DL (ref 65–99)
HCT VFR BLD AUTO: 36.9 % (ref 34–46.6)
HGB BLD-MCNC: 11.3 G/DL (ref 12–15.9)
IMM GRANULOCYTES # BLD AUTO: 0.06 10*3/MM3 (ref 0–0.05)
IMM GRANULOCYTES NFR BLD AUTO: 0.8 % (ref 0–0.5)
LYMPHOCYTES # BLD AUTO: 1.17 10*3/MM3 (ref 0.7–3.1)
LYMPHOCYTES NFR BLD AUTO: 16.5 % (ref 19.6–45.3)
MAGNESIUM SERPL-MCNC: 2.6 MG/DL (ref 1.6–2.4)
MCH RBC QN AUTO: 26.9 PG (ref 26.6–33)
MCHC RBC AUTO-ENTMCNC: 30.6 G/DL (ref 31.5–35.7)
MCV RBC AUTO: 87.9 FL (ref 79–97)
MONOCYTES # BLD AUTO: 0.51 10*3/MM3 (ref 0.1–0.9)
MONOCYTES NFR BLD AUTO: 7.2 % (ref 5–12)
NEUTROPHILS NFR BLD AUTO: 5.08 10*3/MM3 (ref 1.7–7)
NEUTROPHILS NFR BLD AUTO: 71.5 % (ref 42.7–76)
NRBC BLD AUTO-RTO: 0 /100 WBC (ref 0–0.2)
PLATELET # BLD AUTO: 396 10*3/MM3 (ref 140–450)
PMV BLD AUTO: 9.3 FL (ref 6–12)
POTASSIUM SERPL-SCNC: 3.7 MMOL/L (ref 3.5–5.2)
RBC # BLD AUTO: 4.2 10*6/MM3 (ref 3.77–5.28)
SODIUM SERPL-SCNC: 144 MMOL/L (ref 136–145)
WBC # BLD AUTO: 7.1 10*3/MM3 (ref 3.4–10.8)

## 2020-08-08 PROCEDURE — 85025 COMPLETE CBC W/AUTO DIFF WBC: CPT | Performed by: NURSE PRACTITIONER

## 2020-08-08 PROCEDURE — 80048 BASIC METABOLIC PNL TOTAL CA: CPT | Performed by: NURSE PRACTITIONER

## 2020-08-08 PROCEDURE — 83735 ASSAY OF MAGNESIUM: CPT | Performed by: NURSE PRACTITIONER

## 2020-08-08 PROCEDURE — 93005 ELECTROCARDIOGRAM TRACING: CPT | Performed by: INTERNAL MEDICINE

## 2020-08-08 PROCEDURE — 93010 ELECTROCARDIOGRAM REPORT: CPT | Performed by: INTERNAL MEDICINE

## 2020-08-08 PROCEDURE — 25010000002 CYANOCOBALAMIN PER 1000 MCG: Performed by: NURSE PRACTITIONER

## 2020-08-08 PROCEDURE — 99232 SBSQ HOSP IP/OBS MODERATE 35: CPT | Performed by: NURSE PRACTITIONER

## 2020-08-08 RX ORDER — CYANOCOBALAMIN 1000 UG/ML
1000 INJECTION, SOLUTION INTRAMUSCULAR; SUBCUTANEOUS
Status: DISCONTINUED | OUTPATIENT
Start: 2020-08-08 | End: 2020-08-09 | Stop reason: HOSPADM

## 2020-08-08 RX ADMIN — LEVOTHYROXINE SODIUM 175 MCG: 175 TABLET ORAL at 06:36

## 2020-08-08 RX ADMIN — BETHANECHOL CHLORIDE 10 MG: 10 TABLET ORAL at 06:37

## 2020-08-08 RX ADMIN — AMIODARONE HYDROCHLORIDE 200 MG: 200 TABLET ORAL at 02:45

## 2020-08-08 RX ADMIN — APIXABAN 2.5 MG: 2.5 TABLET, FILM COATED ORAL at 09:36

## 2020-08-08 RX ADMIN — Medication 1 CAPSULE: at 09:35

## 2020-08-08 RX ADMIN — NYSTATIN 500000 UNITS: 100000 SUSPENSION ORAL at 09:36

## 2020-08-08 RX ADMIN — MELATONIN TAB 5 MG 5 MG: 5 TAB at 20:38

## 2020-08-08 RX ADMIN — AMIODARONE HYDROCHLORIDE 200 MG: 200 TABLET ORAL at 17:11

## 2020-08-08 RX ADMIN — CYANOCOBALAMIN 1000 MCG: 1000 INJECTION, SOLUTION INTRAMUSCULAR; SUBCUTANEOUS at 11:56

## 2020-08-08 RX ADMIN — CEFDINIR 300 MG: 300 CAPSULE ORAL at 09:36

## 2020-08-08 RX ADMIN — PANTOPRAZOLE SODIUM 40 MG: 40 TABLET, DELAYED RELEASE ORAL at 06:36

## 2020-08-08 RX ADMIN — GABAPENTIN 300 MG: 300 CAPSULE ORAL at 09:36

## 2020-08-08 RX ADMIN — DONEPEZIL HYDROCHLORIDE 10 MG: 10 TABLET, FILM COATED ORAL at 20:38

## 2020-08-08 RX ADMIN — BUSPIRONE HYDROCHLORIDE 10 MG: 10 TABLET ORAL at 09:35

## 2020-08-08 RX ADMIN — SODIUM CHLORIDE, PRESERVATIVE FREE 10 ML: 5 INJECTION INTRAVENOUS at 20:38

## 2020-08-08 RX ADMIN — SERTRALINE HYDROCHLORIDE 50 MG: 50 TABLET, FILM COATED ORAL at 09:35

## 2020-08-08 RX ADMIN — APIXABAN 2.5 MG: 2.5 TABLET, FILM COATED ORAL at 20:38

## 2020-08-08 RX ADMIN — CEFDINIR 300 MG: 300 CAPSULE ORAL at 20:38

## 2020-08-08 RX ADMIN — TRAMADOL HYDROCHLORIDE 50 MG: 50 TABLET, FILM COATED ORAL at 09:35

## 2020-08-08 RX ADMIN — MEMANTINE 10 MG: 10 TABLET ORAL at 20:38

## 2020-08-08 RX ADMIN — LISINOPRIL 10 MG: 10 TABLET ORAL at 09:35

## 2020-08-08 RX ADMIN — DOXYCYCLINE 100 MG: 100 CAPSULE ORAL at 20:38

## 2020-08-08 RX ADMIN — GABAPENTIN 300 MG: 300 CAPSULE ORAL at 20:38

## 2020-08-08 RX ADMIN — AMIODARONE HYDROCHLORIDE 200 MG: 200 TABLET ORAL at 09:37

## 2020-08-08 RX ADMIN — MEMANTINE 10 MG: 10 TABLET ORAL at 09:35

## 2020-08-08 RX ADMIN — BUSPIRONE HYDROCHLORIDE 10 MG: 10 TABLET ORAL at 17:12

## 2020-08-08 RX ADMIN — DOXYCYCLINE 100 MG: 100 CAPSULE ORAL at 09:35

## 2020-08-08 RX ADMIN — SODIUM CHLORIDE, PRESERVATIVE FREE 10 ML: 5 INJECTION INTRAVENOUS at 09:36

## 2020-08-08 RX ADMIN — BETHANECHOL CHLORIDE 10 MG: 10 TABLET ORAL at 17:12

## 2020-08-08 NOTE — PROGRESS NOTES
[facility] Medicine Services  PROGRESS NOTE    Patient Name: Temi Jarrett  : 1930  MRN: [id number]    Date of Admission: 2020  Length of Stay: 5  Primary Care Physician: Eric Patel MD    Subjective   Subjective     CC:  Pneumonia, atrial fib with RVR    HPI:  Oriented x3, pleasant, talkative.  Patient worked well with physical therapy yesterday.  Patient wants to continue therapy with [facility] at home.  Spoke with the patient's son.  Planning for discharge tomorrow.  She will receive her B12 1000 mcg injection today. She gets a B12 injection every month.    Review of Systems  Gen- No fevers, chills  ENT- sore throat  CV- No chest pain, palpitations  Resp- (+) cough, (-) dyspnea  GI- No N/V/D, abd pain  All 14 systems reviewed and are neg, except those mentioned in HPI.    Objective   Objective     Vital Signs:   Temp:  [96.8 °F (36 °C)-98.4 °F (36.9 °C)] 96.8 °F (36 °C)  Heart Rate:  [78-92] 82  Resp:  [16-20] 18  BP: (104-155)/(77-88) 104/77        Physical Exam:  Constitutional: Awake, alert  Eyes: PERRLA, sclerae anicteric, no conjunctival injection  HENT: NCAT, mucous membranes moist, mild pharyngeal erythema  Neck: Supple, no thyromegaly, no lymphadenopathy, trachea midline  Respiratory: Clear to auscultation bilaterally, nonlabored respirations   Cardiovascular: RRR, no murmurs, rubs, or gallops, palpable pedal pulses bilaterally  Gastrointestinal: Positive bowel sounds, soft, nontender, nondistended  Musculoskeletal: No bilateral ankle edema, no clubbing or cyanosis to extremities  Psychiatric: Appropriate affect, cooperative  Neurologic: Oriented x 3, strength symmetric in all extremities, Cranial Nerves grossly intact to confrontation, speech clear  Skin: No rashes    Results Reviewed:    Results from last 7 days   Lab Units 20  0552 20  0402 20  0533   WBC 10*3/mm3 6.11 5.99 7.47   HEMOGLOBIN g/dL 10.7* 9.6* 9.3*   HEMATOCRIT % 34.9  30.7* 30.2*   PLATELETS 10*3/mm3 354 292 254     Results from last 7 days   Lab Units 08/07/20  0552 08/06/20  0402 08/04/20  0626 08/02/20 2156   SODIUM mmol/L 140 140 140 137   POTASSIUM mmol/L 3.7 3.2* 3.8 4.3   CHLORIDE mmol/L 102 103 103 100   CO2 mmol/L 30.0* 28.0 26.0 26.0   BUN mg/dL 13 11 23 20   CREATININE mg/dL 0.54* 0.51* 0.51* 0.81   GLUCOSE mg/dL 90 89 138* 180*   CALCIUM mg/dL 8.3 8.1* 8.2 8.5   ALT (SGPT) U/L  --   --   --  13   AST (SGOT) U/L  --   --   --  21   TROPONIN T ng/mL  --   --   --  <0.010   PROBNP pg/mL  --   --   --  1,483.0     Estimated Creatinine Clearance: 39.5 mL/min (A) (by C-G formula based on SCr of 0.54 mg/dL (L)).    Microbiology Results Abnormal     Procedure Component Value - Date/Time    Blood Culture - Blood, Hand, Left [[id number]] Collected:  08/02/20 2155    Lab Status:  Final result Specimen:  Blood from Hand, Left Updated:  08/07/20 2300     Blood Culture No growth at 5 days    Blood Culture - Blood, Arm, Right [[id number]] Collected:  08/02/20 2150    Lab Status:  Final result Specimen:  Blood from Arm, Right Updated:  08/07/20 2300     Blood Culture No growth at 5 days    Urine Culture - Urine, Urine, Catheter [[id number]]  (Normal) Collected:  08/05/20 1348    Lab Status:  Final result Specimen:  Urine, Catheter Updated:  08/06/20 1854     Urine Culture No growth    MRSA Screen, PCR (Inpatient) - Swab, Nares [[id number]]  (Normal) Collected:  08/03/20 0555    Lab Status:  Final result Specimen:  Swab from Nares Updated:  08/03/20 0720     MRSA PCR Negative    Narrative:       MRSA Negative    Respiratory Panel PCR w/COVID-19(SARS-CoV-2) FAMILIA/EMILE/KEARA In-House, NP Swab in [facility], 8-12 Hr TAT - Swab, Nasopharynx [[id number]]  (Normal) Collected:  08/03/20 0554    Lab Status:  Final result Specimen:  Swab from Nasopharynx Updated:  08/03/20 0651     ADENOVIRUS, PCR Not Detected     Coronavirus 229E Not Detected     Coronavirus HKU1 Not Detected     Coronavirus NL63 Not  Detected     Coronavirus OC43 Not Detected     COVID19 Not Detected     Human Metapneumovirus Not Detected     Human Rhinovirus/Enterovirus Not Detected     Influenza A PCR Not Detected     Influenza A H1 Not Detected     Influenza A H1 2009 PCR Not Detected     Influenza A H3 Not Detected     Influenza B PCR Not Detected     Parainfluenza Virus 1 Not Detected     Parainfluenza Virus 2 Not Detected     Parainfluenza Virus 3 Not Detected     Parainfluenza Virus 4 Not Detected     RSV, PCR Not Detected     Bordetella pertussis pcr Not Detected     Bordetella parapertussis PCR Not Detected     Chlamydophila pneumoniae PCR Not Detected     Mycoplasma pneumo by PCR Not Detected    Narrative:       Fact sheet for providers: https://docs.[employer]/wp-content/uploads/PVX7843-9980-RX7.1-EUA-Provider-Fact-Sheet-3.pdf    Fact sheet for patients: https://docs.[employer]/wp-content/uploads/TEY6802-8804-LL5.1-EUA-Patient-Fact-Sheet-1.pdf          Imaging Results (Last 24 Hours)     ** No results found for the last 24 hours. **        Results for orders placed during the hospital encounter of 08/02/20   Transthoracic Echo Complete With Contrast if Necessary Per Protocol    Narrative · Left ventricular systolic function is normal. Estimated EF = 70%.  · Left ventricular diastolic dysfunction (grade I) consistent with   impaired relaxation.  · Left atrial cavity size is moderately dilated.  · There is calcification of the aortic valve. There is no significant   stenosis or regurgitation. A Lambel's excrescence is noted on an aortic   valve leaflet.  · Moderate mitral valve regurgitation is present.  · Estimated right ventricular systolic pressure from tricuspid   regurgitation is moderately elevated (49 mmHg).  · There is a moderate (1-2cm) circumferential pericardial effusion. There   is no tamponade physiology.        I have reviewed the medications:  Scheduled Meds:    amiodarone 200 mg Oral Q8H   Followed by      [START  ON 8/12/2020] amiodarone 200 mg Oral Q12H   Followed by      [START ON 8/26/2020] amiodarone 200 mg Oral Daily   apixaban 2.5 mg Oral Q12H   bethanechol 10 mg Oral TID   busPIRone 10 mg Oral BID   cefdinir 300 mg Oral Q12H   cyanocobalamin 1,000 mcg Intramuscular Q28 Days   donepezil 10 mg Oral Nightly   doxycycline 100 mg Oral Q12H   gabapentin 300 mg Oral BID   lactobacillus acidophilus 1 capsule Oral Daily   levothyroxine 175 mcg Oral Q AM   lisinopril 10 mg Oral Q24H   magnesium sulfate 2 g Intravenous Once   melatonin 5 mg Oral Nightly   memantine 10 mg Oral BID   pantoprazole 40 mg Oral Q AM   sertraline 50 mg Oral Daily   sodium chloride 10 mL Intravenous Q12H   traMADol 50 mg Oral Daily     Continuous Infusions:   PRN Meds:.•  acetaminophen **OR** acetaminophen **OR** acetaminophen  •  magnesium sulfate **OR** magnesium sulfate **OR** magnesium sulfate  •  methocarbamol  •  ondansetron **OR** ondansetron  •  phenol  •  potassium chloride **OR** potassium chloride **OR** potassium chloride  •  sodium chloride  •  sodium chloride  •  sodium chloride    Assessment/Plan   Assessment / Plan     Active Hospital Problems    Diagnosis  POA   • **Pneumonia of both lower lobes due to infectious organism [J18.9]  Yes   • Paroxysmal atrial fibrillation (CMS/HCC) [I48.0]  Yes   • Pericardial effusion [I31.3]  Yes   • Acute UTI (urinary tract infection) [N39.0]  Yes   • Essential hypertension [I10]  Yes      Resolved Hospital Problems   No resolved problems to display.     Brief Hospital Course to date:  Temi Jarrett is a 89 y.o. female mild dementia, hypertension, bladder prolapse, generalized anxiety, and frequent urinary tract infections presents from home with a fever of 102 °F and mild confusion.  Imaging revealed bilateral pneumonia.  Received Zosyn and doxycycline.  Also developed Atrial fibrillation with RVR.  On amiodarone, Eliquis. ECHO showed 1 to 2 cm pericardial effusion with evidence of tamponade.  Repeat  echo in 4 weeks.    Bilateral PNA with Fever and Leukocytosis  - Continue empiric antibiotics for pneumonia with Zosyn and doxycycline  - 08/05 antibiotic coverage changed to cefdinir and doxycycline.  - pulmonary toilet, encourage ambulation as able  - cultures are negative, MRSA PCR negative  - COVID Negative     Atrial fibrillation with RVR  - s/p 500mcg digoxin. Did not tolerate cardizem gtt due to hypotension. Amiodarone protocol started and patient now in NSR. Cardiology following  - started on Eliquis 2.5mg BID     Mod-Large Pericardial effusion  -Etiology unclear, Echo showing 1-2cm pericardial effusion without evidence of tamponade, will need repeat Echocardiogram in 4 weeks, patient followed by Cardiologist at [facility]  -Cardiology following    Hypomagnesemia  - Magnesium 1.6 on 08/07  -Replace per protocol  - Repeat Mg today      Dementia  - on Aricept and Namenda     Hypothyroid  - TSH WNL    DVT Prophylaxis:  eliquis    Disposition: I expect the patient to be discharged tomorrow with home health provided by [facility].    CODE STATUS:   Code Status and Medical Interventions:   Ordered at: 08/03/20 0158     Level Of Support Discussed With:    Patient    Next of Kin (If No Surrogate)    Health Care Surrogate     Code Status:    CPR     Medical Interventions (Level of Support Prior to Arrest):    Full       Electronically signed by MAIDA Duke, 08/08/20, 09:45.

## 2020-08-08 NOTE — PLAN OF CARE
Problem: Patient Care Overview  Goal: Plan of Care Review  Outcome: Ongoing (interventions implemented as appropriate)  Flowsheets (Taken 8/8/2020 6626)  Outcome Summary: VSS. Normal sinus on the monitor. Room air. No complaints of pain. Will continue to monitor.

## 2020-08-09 ENCOUNTER — READMISSION MANAGEMENT (OUTPATIENT)
Dept: CALL CENTER | Facility: HOSPITAL | Age: 85
End: 2020-08-09

## 2020-08-09 VITALS
WEIGHT: 118 LBS | HEIGHT: 60 IN | HEART RATE: 82 BPM | TEMPERATURE: 97.3 F | BODY MASS INDEX: 23.16 KG/M2 | OXYGEN SATURATION: 91 % | SYSTOLIC BLOOD PRESSURE: 147 MMHG | RESPIRATION RATE: 20 BRPM | DIASTOLIC BLOOD PRESSURE: 89 MMHG

## 2020-08-09 LAB
BACTERIA UR QL AUTO: ABNORMAL /HPF
BILIRUB UR QL STRIP: NEGATIVE
CLARITY UR: CLEAR
COLOR UR: ABNORMAL
GLUCOSE UR STRIP-MCNC: NEGATIVE MG/DL
HGB UR QL STRIP.AUTO: ABNORMAL
HYALINE CASTS UR QL AUTO: ABNORMAL /LPF
KETONES UR QL STRIP: NEGATIVE
LEUKOCYTE ESTERASE UR QL STRIP.AUTO: ABNORMAL
NITRITE UR QL STRIP: NEGATIVE
PH UR STRIP.AUTO: 7.5 [PH] (ref 5–8)
PROT UR QL STRIP: ABNORMAL
RBC # UR: ABNORMAL /HPF
REF LAB TEST METHOD: ABNORMAL
SP GR UR STRIP: 1.01 (ref 1–1.03)
SQUAMOUS #/AREA URNS HPF: ABNORMAL /HPF
UROBILINOGEN UR QL STRIP: ABNORMAL
WBC UR QL AUTO: ABNORMAL /HPF

## 2020-08-09 PROCEDURE — 81001 URINALYSIS AUTO W/SCOPE: CPT | Performed by: NURSE PRACTITIONER

## 2020-08-09 PROCEDURE — 99239 HOSP IP/OBS DSCHRG MGMT >30: CPT | Performed by: NURSE PRACTITIONER

## 2020-08-09 RX ORDER — AMIODARONE HYDROCHLORIDE 200 MG/1
200 TABLET ORAL EVERY 12 HOURS
Qty: 28 TABLET | Refills: 0 | Status: SHIPPED | OUTPATIENT
Start: 2020-08-12 | End: 2020-08-18 | Stop reason: HOSPADM

## 2020-08-09 RX ORDER — DOXYCYCLINE 100 MG/1
100 CAPSULE ORAL EVERY 12 HOURS SCHEDULED
Qty: 4 CAPSULE | Refills: 0 | Status: SHIPPED | OUTPATIENT
Start: 2020-08-09 | End: 2020-08-11

## 2020-08-09 RX ORDER — METHOCARBAMOL 500 MG/1
500 TABLET, FILM COATED ORAL 3 TIMES DAILY PRN
Qty: 120 TABLET | Refills: 5 | Status: SHIPPED | OUTPATIENT
Start: 2020-08-09 | End: 2020-09-04

## 2020-08-09 RX ORDER — AMIODARONE HYDROCHLORIDE 200 MG/1
200 TABLET ORAL DAILY
Qty: 30 TABLET | Refills: 0 | Status: SHIPPED | OUTPATIENT
Start: 2020-08-26 | End: 2020-08-18 | Stop reason: HOSPADM

## 2020-08-09 RX ORDER — CEFDINIR 300 MG/1
300 CAPSULE ORAL EVERY 12 HOURS SCHEDULED
Qty: 4 CAPSULE | Refills: 0 | Status: SHIPPED | OUTPATIENT
Start: 2020-08-09 | End: 2020-08-11

## 2020-08-09 RX ORDER — LISINOPRIL 10 MG/1
10 TABLET ORAL
Qty: 30 TABLET | Refills: 0 | Status: SHIPPED | OUTPATIENT
Start: 2020-08-10 | End: 2020-09-04 | Stop reason: SDUPTHER

## 2020-08-09 RX ORDER — AMIODARONE HYDROCHLORIDE 200 MG/1
200 TABLET ORAL EVERY 8 HOURS
Qty: 9 TABLET | Refills: 0 | Status: SHIPPED | OUTPATIENT
Start: 2020-08-09 | End: 2020-08-12

## 2020-08-09 RX ADMIN — Medication 1 CAPSULE: at 08:44

## 2020-08-09 RX ADMIN — AMIODARONE HYDROCHLORIDE 200 MG: 200 TABLET ORAL at 03:41

## 2020-08-09 RX ADMIN — AMIODARONE HYDROCHLORIDE 200 MG: 200 TABLET ORAL at 10:59

## 2020-08-09 RX ADMIN — CEFDINIR 300 MG: 300 CAPSULE ORAL at 08:52

## 2020-08-09 RX ADMIN — DOXYCYCLINE 100 MG: 100 CAPSULE ORAL at 08:44

## 2020-08-09 RX ADMIN — MEMANTINE 10 MG: 10 TABLET ORAL at 08:44

## 2020-08-09 RX ADMIN — SERTRALINE HYDROCHLORIDE 50 MG: 50 TABLET, FILM COATED ORAL at 08:44

## 2020-08-09 RX ADMIN — PANTOPRAZOLE SODIUM 40 MG: 40 TABLET, DELAYED RELEASE ORAL at 06:39

## 2020-08-09 RX ADMIN — TRAMADOL HYDROCHLORIDE 50 MG: 50 TABLET, FILM COATED ORAL at 08:44

## 2020-08-09 RX ADMIN — LISINOPRIL 10 MG: 10 TABLET ORAL at 08:44

## 2020-08-09 RX ADMIN — APIXABAN 2.5 MG: 2.5 TABLET, FILM COATED ORAL at 08:44

## 2020-08-09 RX ADMIN — BETHANECHOL CHLORIDE 10 MG: 10 TABLET ORAL at 10:59

## 2020-08-09 RX ADMIN — GABAPENTIN 300 MG: 300 CAPSULE ORAL at 08:44

## 2020-08-09 RX ADMIN — BETHANECHOL CHLORIDE 10 MG: 10 TABLET ORAL at 06:39

## 2020-08-09 RX ADMIN — BUSPIRONE HYDROCHLORIDE 10 MG: 10 TABLET ORAL at 08:44

## 2020-08-09 RX ADMIN — LEVOTHYROXINE SODIUM 175 MCG: 175 TABLET ORAL at 06:39

## 2020-08-09 RX ADMIN — SODIUM CHLORIDE, PRESERVATIVE FREE 10 ML: 5 INJECTION INTRAVENOUS at 08:45

## 2020-08-09 NOTE — PLAN OF CARE
VSS, room air. Pt required 2L NC while resting. Pt complained of frequency or urination. UA ordered by Maxine per pt and family request. Discharge planned for today

## 2020-08-09 NOTE — PROGRESS NOTES
Case Management Discharge Note      Final Note: Received a call from Gely with [facility] who requests orders be faxed to the office for review.  Patient to [facility] with [facility]  today via car with her daughter-in-law to transport.           Destination      No service has been selected for the patient.      Durable Medical Equipment      No service has been selected for the patient.      Dialysis/Infusion      No service has been selected for the patient.      Home Medical Care - Selection Complete      Service Provider Request Status Selected Services Address Phone Number Fax Number    [facility] [address] [id number] [phone]      Therapy      No service has been selected for the patient.      Community Resources      No service has been selected for the patient.             Final Discharge Disposition Code: 06 - home with home health care

## 2020-08-09 NOTE — DISCHARGE INSTR - LAB
Follow-up with cardiology in 1 month  Repeat echo in 4 weeks to reevaluate pericardial effusion with tamponade   Follow-up with PCP in 1 week  Amiodarone taper: 200 mg 3 times a day until 8/12; then 200 mg twice a day until 8/26; then 200 mg daily.

## 2020-08-09 NOTE — PROGRESS NOTES
Continued Stay Note  [facility]     Patient Name: Temi Jarrett  MRN: [id number]  Today's Date: 8/9/2020    Admit Date: 8/2/2020    Discharge Plan     Row Name 08/09/20 1246       Plan    Plan  update    Patient/Family in Agreement with Plan  yes    Plan Comments  Received a call from RN who advises family has contacted her regarding patient Eliquis Rx at [facility] with a copay of $500.  Called [facility] on [address] and spoke to Cameron Roper and provided her with a Free 30-day Trial Offer.  Called patient son and spoke to her daughter in law to advise who verbalized understanding and gratitude for the assistance.      Final Discharge Disposition Code  06 - home with home health care    Row Name 08/09/20 1049       Plan    Plan  Home with [facility]    Patient/Family in Agreement with Plan  yes    Plan Comments  Received a call from Gely with [facility] who requests orders be faxed to the office for review.  Patient to dishcharge home with [facility] today via car with her daughter-in-law to transport.      Final Discharge Disposition Code  06 - home with home health care    Final Note  Received a call from Gely with [facility] who requests orders be faxed to the office for review.  Patient to dishcharge home with [facility] today via car with her daughter-in-law to transport.      Row Name 08/09/20 1012       Plan    Plan  Home with [facility]    Patient/Family in Agreement with Plan  yes    Plan Comments  Spoke with patient at bedside regarding dishcarge plan who wants [facility] to collect her urine because she is convinced she has a UTI however, her son is oppossed to haveing anyone come in to the house and reports that she has had to go into the driveway to received her B12 shots and does not know how she will be able to urinate for them.  Called patient son, Victor M, to discuss options who indicates that he is a diabetic and has not had contact with outside people since February and does not  want anyone coming in to the house and would prefer not to go to her PCP office if avoidable.  He is agreeable to [facility] coming out to collect a urine sample if they can drop of the bottle at the door, let the patient urinate and pass it back again via the door.  Called [facility]  and spoke to Kate with the answering service who will have the OnCall RN call back.  CM following.      Final Discharge Disposition Code  06 - home with home health care        Discharge Codes    No documentation.       Expected Discharge Date and Time     Expected Discharge Date Expected Discharge Time    Aug 9, 2020             Abbey Serna, RN

## 2020-08-09 NOTE — DISCHARGE PLACEMENT REQUEST
"Temi Jarrett (89 y.o. Female)     Date of Birth Social Security Number Address Home Phone MRN    05/08/1930  [address] [facility] [id number] [phone] [id number]    Jew Marital Status          [employer]        Admission Date Admission Type Admitting Provider Attending Provider Department, Room/Bed    8/2/20 Emergency Raj Pierce MD Opi[place], [place], MD [facility] 6A, N611/1    Discharge Date Discharge Disposition Discharge Destination         Home or Self Care              Attending Provider:  Raj Pierce MD    Allergies:  Codeine, Shrimp    Isolation:  None   Infection:  None   Code Status:  CPR    Ht:  152.4 cm (60\")   Wt:  53.5 kg (118 lb)    Admission Cmt:  None   Principal Problem:  Pneumonia of both lower lobes due to infectious organism [J18.9]                 Active Insurance as of 8/2/2020     Primary Coverage     Payor Plan Insurance Group Employer/Plan Group    MEDICARE MEDICARE A & B      Payor Plan Address Payor Plan Phone Number Payor Plan Fax Number Effective Dates    [address] [phone]  5/1/1995 - None Entered    [facility] [id number]       Subscriber Name Subscriber Birth Date Member ID       TEMI JARRETT 5/8/1930 [id number]           Secondary Coverage     Payor Plan Insurance Group Employer/Plan Group    ANTHEM BLUE CROSS ANTHEM BLUE CROSS BLUE SHIELD PPO [id number]     Payor Plan Address Payor Plan Phone Number Payor Plan Fax Number Effective Dates    [address] [phone]  12/1/2016 - None Entered    [facility] [id number]       Subscriber Name Subscriber Birth Date Member ID       TEMI JARRETT 5/8/1930 [id number]                 Emergency Contacts      (Rel.) Home Phone Work Phone Mobile Phone    LuizaVictor M (Son) [phone] -- --    Chris Jarrett (Other) -- -- [phone]    CharlesMery (Daughter) [phone] -- [phone]            Insurance Information                MEDICARE/MEDICARE A & B Phone: [phone]    " Subscriber: Temi Jarrett Subscriber#: [id number]    Group#:  Precert#:         ANTHEM BLUE CROSS/ANTHEM BLUE CROSS BLUE SHIELD PPO Phone: [phone]    Subscriber: Temi Jarrett Subscriber#: [id number]    Group#: [id number] Precert#:              History & Physical      Jose Mustafa MD at 20 0158              [facility]  HISTORY AND PHYSICAL    Patient Name: Temi Jarrett  : 1930  MRN: [id number]  Primary Care Physician: Eric Patel MD  Date of admission: 2020      Subjective   Subjective     Chief Complaint:  fever    HPI:  Temi Jarrett is a 89 y.o. female with history of mild dementia, hypertension, bladder prolapse, generalized anxiety, and frequent urinary tract infections presents from home with a fever of 102 °F and mild confusion.  Patient is not a good historian, and much recent history is obtained via phone from son Victor M and daughter-in-law Chris.  On the ED, Ms. Jarrett was noted to be in atrial fibrillation with RVR.  She was placed on IV Cardizem and converted spontaneously.  No prior history of arrhythmia.    The family says they have been careful not to expose Ms. Jarrett to anyone who may have the novel coronavirus.  She lives at home with her son and daughter-in-law, and her only exposure to people outside the family is a home health nurse who administers B12 shots in the driveway.    Ms. Jarrett denies cough or shortness of breath.  She also denies chest pain or palpitations.  She does follow with a cardiologist at [facility], Dr. Helton, but says she is unaware of any prior diagnosis of cardiac disease.    Review of Systems   Constitutional: Positive for fever.   HENT: Negative.    Eyes: Negative.    Respiratory: Negative.    Cardiovascular: Negative.    Gastrointestinal: Negative.    Endocrine: Negative.    Genitourinary: Negative.    Musculoskeletal: Negative.    Allergic/Immunologic: Negative.     Neurological: Negative.    Hematological: Negative.    Psychiatric/Behavioral: Negative.         All other systems reviewed and are negative.     Personal History     Past Medical History:   Diagnosis Date   • Arthritis    • Dementia (CMS/HCC)    • Depression    • Disease of thyroid gland    • Gastric polyp    • GERD (gastroesophageal reflux disease)    • History of colonic polyps    • Hyperlipidemia    • Hypertension    • IBS (irritable bowel syndrome)    • Macular degeneration    • Torn meniscus     right knee        Past Surgical History:   Procedure Laterality Date   • CHOLECYSTECTOMY  1997   • EYE SURGERY  1998    Cataract extraction   • HERNIA REPAIR     • HIP HEMIARTHROPLASTY Left 9/28/2019    Procedure: HIP HEMIARTHROPLASTY LEFT;  Surgeon: Reddy Segundo Jr., MD;  Location: [facility];  Service: Orthopedics   • HYSTERECTOMY  1976   • KNEE SURGERY Right     arthroscopy- 2000   • TONSILLECTOMY         Family History: family history includes Breast cancer in her mother; Diabetes in her son; Hypertension in her father and mother; Obesity in her mother. Otherwise pertinent FHx was reviewed and unremarkable.     Social History:  reports that she has never smoked. She has never used smokeless tobacco. Drug use questions deferred to the physician. She reports that she does not drink alcohol.  Social History     Social History Narrative    Lives with son and daughter in law--  is at [place] getting rehab       Medications:  Available home medication information reviewed.    (Not in a hospital admission)    Allergies   Allergen Reactions   • Codeine Nausea Only     Trouble Breathing    • Phenylpropanolamine Provider Review Needed   • Shrimp Hives       Objective   Objective     Vital Signs:   Temp:  [99.7 °F (37.6 °C)] 99.7 °F (37.6 °C)  Heart Rate:  [] 75  Resp:  [22] 22  BP: ()/(31-78) 127/59        Physical Exam   Constitutional -no acute distress, non toxic, in bed  HEENT-NCAT, mucous  membranes moist  CV-RRR, S1 S2 normal, no m/r/g  Resp-grossly clear bilaterally, diminished at bases  Abd-soft, non-tender, non-distended, normo active bowel sounds  Ext-trace to 1+ lower extremity edema bilaterally  Neuro-alert with some mild confusion, speech clear, moves all extremities   Psych-normal affect   Skin- No rash on exposed UE or LE bilaterally    Results Reviewed:  I have personally reviewed current lab and radiology data.    Results from last 7 days   Lab Units 08/02/20  2156   WBC 10*3/mm3 15.29*   HEMOGLOBIN g/dL 10.8*   HEMATOCRIT % 33.9*   PLATELETS 10*3/mm3 221     Results from last 7 days   Lab Units 08/02/20  2156   SODIUM mmol/L 137   POTASSIUM mmol/L 4.3   CHLORIDE mmol/L 100   CO2 mmol/L 26.0   BUN mg/dL 20   CREATININE mg/dL 0.81   GLUCOSE mg/dL 180*   CALCIUM mg/dL 8.5   ALT (SGPT) U/L 13   AST (SGOT) U/L 21   LACTATE mmol/L 1.4     Estimated Creatinine Clearance: 39 mL/min (by C-G formula based on SCr of 0.81 mg/dL).  Brief Urine Lab Results  (Last result in the past 365 days)      Color   Clarity   Blood   Leuk Est   Nitrite   Protein   CREAT   Urine HCG        01/29/20 1627 Dark Yellow Slightly Cloudy Trace Negative Negative 300 mg/dL             Imaging Results (Last 24 Hours)     Procedure Component Value Units Date/Time    CT Abdomen Pelvis Without Contrast [[id number]] Collected:  08/03/20 0042     Updated:  08/03/20 0044    Narrative:       CT Abdomen Pelvis WO    INDICATION:   Altered mental status atrial fibrillation and confusion.    TECHNIQUE:   CT of the abdomen and pelvis without IV contrast. Coronal and sagittal reconstructions were obtained.  Radiation dose reduction techniques included automated exposure control or exposure modulation based on body size. Count of known CT and cardiac nuc  med studies performed in previous 12 months: 2.     COMPARISON:   12/18/2019    FINDINGS:  Abdomen: The chest CT has been dictated separately. Small pleural effusions and bibasilar  atelectasis or pneumonia. Moderately large to large pericardial effusion new compared to the prior study. Large hiatal hernia and partial intrathoracic stomach.  Normal caliber aorta and atherosclerotic change. The spleen is enlarged and adrenal glands are unremarkable to the extent visualized. Pancreas not well visualized or assessed. Gallbladder surgically absent. Trace pneumobilia probably reflects prior  sphincterotomy. Correlate clinically. The liver is enlarged. The kidneys are nonobstructed and there is no radiopaque stone. There are bilateral renal cysts.    Pelvis: Unremarkable bladder and no drainable fluid collection. Diverticulosis and moderate stool burden. Appendix not clearly demonstrated but no secondary signs of appendicitis. Inguinal canals negative. Status post left hip replacement with  significant streak artifact obscuring portions of the pelvis. No distinct free air. No suspicious bone lesion with degenerative changes.      Impression:         1. No clearly acute process in the abdomen or pelvis. Moderate stool burden but no distinct bowel obstruction.  2. Hepatosplenomegaly.  3. Large hiatal hernia and partial intrathoracic stomach.  4. Bilateral effusions and bibasilar atelectasis or pneumonia.  5. New pericardial effusion. CT chest dictated separately.  6. Bilateral renal cysts.  7. Diverticulosis.          Signer Name: Andriy Hester MD   Signed: 8/3/2020 12:42 AM   Workstation Name: [facility]    [facility]    CT Chest Without Contrast [[id number]] Collected:  08/03/20 0038     Updated:  08/03/20 0040    Narrative:       CT Chest WO    INDICATION:   Altered mental status and fever. Confusion.    TECHNIQUE:   CT of the thorax without IV contrast. Coronal and sagittal reconstructions were obtained.  Radiation dose reduction techniques included automated exposure control or exposure modulation based on body size. Count of known CT and cardiac nuc med studies  performed in  previous 12 months: 2.     COMPARISON:   11/15/2019    FINDINGS:  There is a large hiatal hernia and intrathoracic stomach. Small bilateral pleural effusions with compressive atelectasis or pneumonia in the lung bases. The lungs are emphysematous. No new suspicious pulmonary nodule. Tiny 2 mm upper lobe pulmonary  nodule on the right incidentally noted. A follow-up CT in one year is recommended.    Heart is enlarged. Moderate to large pericardial effusion measures up to 2.5 cm maximum thickness, new compared to the prior study. No new threshold adenopathy. Normal caliber aorta and atherosclerotic change. Probable underlying pulmonary arterial  hypertension. Included upper abdomen demonstrate surgical absence of the gallbladder and renal cysts. The abdomen and pelvis CT has been dictated separately. No suspicious bone lesion.      Impression:         1. Small bilateral effusions with compressive atelectasis or pneumonia in the lung bases.  2. Background emphysema and pulmonary arterial hypertension.  3. Cardiomegaly and moderately large to large pericardial effusion, new compared to prior study.  4. Large hiatal hernia and partial intrathoracic stomach.    Imaging features are atypical or uncommonly reported for COVID-19 pneumonia. Alternative diagnoses should be considered.    Signer Name: Andriy Hester MD   Signed: 8/3/2020 12:38 AM   Workstation Name: [facility]    [facility]    CT Head Without Contrast [[id number]] Collected:  08/03/20 0033     Updated:  08/03/20 0035    Narrative:       CT Head WO    HISTORY:   Altered mental status. Confusion. Atrial fibrillation.    TECHNIQUE:   Axial unenhanced head CT. Radiation dose reduction techniques included automated exposure control or exposure modulation based on body size. Count of known CT and cardiac nuc med studies performed in previous 12 months: 3.     Time of scan: 0010 hours    COMPARISON:   None.    FINDINGS:   No intracranial hemorrhage,  mass, or infarct. No hydrocephalus or extra-axial fluid collection. There are senescent changes, including volume loss and nonspecific white matter change, but no acute abnormality is seen. The skull base, calvarium, and  extracranial soft tissues are normal.      Impression:       Senescent changes without acute abnormality.          Signer Name: Andriy Hester MD   Signed: 8/3/2020 12:33 AM   Workstation Name: [facility]    [facility]         CT chest from admission personally reviewed -to my eye there is bilateral basilar atelectasis with a small right effusion.  A pericardial effusion is noted.  Radiology also mentions pneumonia in the differential.    Assessment/Plan   Assessment & Plan     Active Hospital Problems    Diagnosis POA   • Pneumonia of both lower lobes due to infectious organism [J18.9] Yes       Fever  -102 °F at home  -Differential includes pneumonia, UTI, and viral illness  -Continue empiric antibiotics for possible pneumonia (Zosyn and doxycycline)  -UA remains pending (patient does have a history of frequent UTIs)  -Suspicion for the novel coronavirus remains low, but with the fever we will check a respiratory PCR panel with COVID-19.    Atrial fibrillation with RVR  -Patient is now spontaneously converted to sinus rhythm, suspect arrhythmia was precipitated by her fever  -We will obtain echo and ask cardiology to evaluate    Pericardial effusion  -Etiology unclear, will obtain echocardiogram  -Check cardiac enzymes and proBNP    Dementia    Hypothyroid  - check TSH        DVT prophylaxis:  lovenox      CODE STATUS:  Full code, discussed with son Victor M who says he is the patient's medical decision maker.  Code Status and Medical Interventions:   Ordered at: 08/03/20 0158     Level Of Support Discussed With:    Patient    Next of Kin (If No Surrogate)    Health Care Surrogate     Code Status:    CPR     Medical Interventions (Level of Support Prior to Arrest):    Full        Admission Status:  I believe this patient meets INPATIENT status due to need for IV antibiotics for pneumonia and further evaluation needed for new onset afib with RVR and pericardial effusion..  I feel patient’s risk for adverse outcomes and need for care warrant INPATIENT evaluation and I predict the patient’s care encounter to likely last beyond 2 midnights.      Electronically signed by Jose Mustafa MD, 08/03/20, 1:58 AM.    Electronically signed by Jose Mustafa MD at 08/03/20 0215         Current Facility-Administered Medications   Medication Dose Route Frequency Provider Last Rate Last Dose   • acetaminophen (TYLENOL) tablet 650 mg  650 mg Oral Q4H PRN Jose Mustafa MD   650 mg at 08/04/20 0425    Or   • acetaminophen (TYLENOL) 160 MG/5ML solution 650 mg  650 mg Oral Q4H PRN Jose Mustafa MD        Or   • acetaminophen (TYLENOL) suppository 650 mg  650 mg Rectal Q4H PRN Jose Mustfaa MD       • amiodarone (PACERONE) tablet 200 mg  200 mg Oral Q8H Damon Barraza IV, MD   200 mg at 08/09/20 0341    Followed by   • [START ON 8/12/2020] amiodarone (PACERONE) tablet 200 mg  200 mg Oral Q12H Damon Barraza IV, MD        Followed by   • [START ON 8/26/2020] amiodarone (PACERONE) tablet 200 mg  200 mg Oral Daily Damon Barraza IV, MD       • apixaban (ELIQUIS) tablet 2.5 mg  2.5 mg Oral Q12H Damon Barraza IV, MD   2.5 mg at 08/09/20 0844   • bethanechol (URECHOLINE) tablet 10 mg  10 mg Oral TID Jose Mustafa MD   10 mg at 08/09/20 0639   • busPIRone (BUSPAR) tablet 10 mg  10 mg Oral BID Jose Mustafa MD   10 mg at 08/09/20 0844   • cefdinir (OMNICEF) capsule 300 mg  300 mg Oral Q12H Do Ramos PA-C   300 mg at 08/09/20 0852   • cyanocobalamin injection 1,000 mcg  1,000 mcg Intramuscular Q28 Days Sadie Bravo APRN   1,000 mcg at 08/08/20 1156   • donepezil (ARICEPT) tablet 10 mg  10 mg Oral Nightly Jose Mustafa MD   10 mg at  08/08/20 2038   • doxycycline (MONODOX) capsule 100 mg  100 mg Oral Q12H Do Ramos PA-C   100 mg at 08/09/20 0844   • gabapentin (NEURONTIN) capsule 300 mg  300 mg Oral BID Jose Mustafa MD   300 mg at 08/09/20 0844   • lactobacillus acidophilus (RISAQUAD) capsule 1 capsule  1 capsule Oral Daily Jose Mustafa MD   1 capsule at 08/09/20 0844   • levothyroxine (SYNTHROID, LEVOTHROID) tablet 175 mcg  175 mcg Oral Q AM Jose Mustafa MD   175 mcg at 08/09/20 0639   • lisinopril (PRINIVIL,ZESTRIL) tablet 10 mg  10 mg Oral Q24H Jazlyn Conklin APRN   10 mg at 08/09/20 0844   • Magnesium Sulfate 2 gram Bolus, followed by 8 gram infusion (total Mg dose 10 grams)- Mg less than or equal to 1mg/dL  2 g Intravenous PRN Raj Pierce MD        Or   • Magnesium Sulfate 2 gram / 50mL Infusion (GIVE X 3 BAGS TO EQUAL 6GM TOTAL DOSE) - Mg 1.1 - 1.5 mg/dl  2 g Intravenous PRN Raj Pierce MD        Or   • Magnesium Sulfate 4 gram infusion- Mg 1.6-1.9 mg/dL  4 g Intravenous PRN Raj Pierce MD 25 mL/hr at 08/07/20 1828 4 g at 08/07/20 1828   • magnesium sulfate 2g/50 mL (PREMIX) infusion  2 g Intravenous Once Sadie Bravo APRN       • melatonin tablet 5 mg  5 mg Oral Nightly Do Ramos PA-C   5 mg at 08/08/20 2038   • memantine (NAMENDA) tablet 10 mg  10 mg Oral BID Jose Mustafa MD   10 mg at 08/09/20 0844   • methocarbamol (ROBAXIN) tablet 500 mg  500 mg Oral Q8H PRN Jose Mustafa MD       • ondansetron (ZOFRAN) tablet 4 mg  4 mg Oral Q6H PRN Jose Mustafa MD        Or   • ondansetron (ZOFRAN) injection 4 mg  4 mg Intravenous Q6H PRN Jose Mustafa MD       • pantoprazole (PROTONIX) EC tablet 40 mg  40 mg Oral Q AM Jose Mustafa MD   40 mg at 08/09/20 0639   • phenol (CHLORASEPTIC) 1.4 % liquid 2 spray  2 spray Mouth/Throat Q2H PRN Sadie Bravo APRN       • potassium chloride (MICRO-K) CR capsule 40 mEq  40 mEq Oral PRN Sumi Yeh APRN        Or   •  potassium chloride (KLOR-CON) packet 40 mEq  40 mEq Oral PRN Sumi Yeh APRN        Or   • potassium chloride 10 mEq in 100 mL IVPB  10 mEq Intravenous Q1H PRN Sumi Yeh APRN       • sertraline (ZOLOFT) tablet 50 mg  50 mg Oral Daily Jose Mustafa MD   50 mg at 20 0844   • sodium chloride 0.9 % flush 10 mL  10 mL Intravenous PRN Jagdish Mclaughlin DO       • sodium chloride 0.9 % flush 10 mL  10 mL Intravenous PRN Jagdish Mclaughlin DO       • sodium chloride 0.9 % flush 10 mL  10 mL Intravenous Q12H Jose Mustafa MD   10 mL at 20 0845   • sodium chloride 0.9 % flush 10 mL  10 mL Intravenous PRN Jose Mustafa MD       • traMADol (ULTRAM) tablet 50 mg  50 mg Oral Daily Jose Mustafa MD   50 mg at 20 0844        Discharge Summary      Sadie Bravo APRN at 20 0918     Attestation signed by Raj Pierce MD at 20 1019      Attending Cosyris     I supervised care of the patient on day of service with direct care provided by the advanced care provider (APC).    Electronically signed by Raj Pierce MD, 20, 10:19 AM.                       [facility] Medicine Services  DISCHARGE SUMMARY    Patient Name: Temi Jarrett  : 1930  MRN: [id number]    Date of Admission: 2020  Date of Discharge:  2020  Primary Care Physician: Eric Patel MD    Consults     Date and Time Order Name Status Description    8/3/2020 0158 Inpatient Cardiology Consult Completed         Hospital Course     Presenting Problem:   Pneumonia of both lower lobes due to infectious organism [J18.9]  Pneumonia of both lower lobes due to infectious organism [J18.9]    Active Hospital Problems    Diagnosis  POA   • **Pneumonia of both lower lobes due to infectious organism [J18.9]  Yes   • Paroxysmal atrial fibrillation (CMS/HCC) [I48.0]  Yes   • Pericardial effusion [I31.3]  Yes   • Acute UTI (urinary tract infection) [N39.0]  Yes    • Essential hypertension [I10]  Yes      Resolved Hospital Problems   No resolved problems to display.     Hospital Course:  Temi Jarrett is a 89 y.o. female mild dementia, hypertension, bladder prolapse, generalized anxiety, and frequent urinary tract infections presents from home with a fever of 102 °F and mild confusion.  Imaging revealed bilateral pneumonia.  Received Zosyn and doxycycline then transition to cefdinir and doxycycline.  Antibiotic treatment should be complete on 8/11.  Also developed Atrial fibrillation with RVR.  On amiodarone, Eliquis. ECHO showed 1 to 2 cm pericardial effusion with evidence of tamponade.  Repeat echo in 4 weeks.    Spoke with the son regarding home health services.  Home health services declined due to fear of coronavirus.    Discharge Follow Up Recommendations for labs/diagnostics:  Follow-up with cardiology in 1 month  Repeat echo in 4 weeks to reevaluate pericardial effusion with tamponade   Follow-up with PCP in 1 week  Amiodarone taper: 200 mg 3 times a day until 8/12; then 200 mg twice a day until 8/26; then 200 mg daily.    Day of Discharge     HPI:   Patient sitting up in recliner, eating breakfast.  Patient is alert and oriented x3, pleasant, talkative.  Reports frequent urination yesterday.  Urinalysis was not indicative of UTI.  Receiving doxycycline and cefdinir.  Denies fever, chills, sweats, chest pain, heart palpitation, shortness of breath, dysruria, nausea, vomiting, diarrhea.    Review of Systems  Gen- No fevers, chills  CV- No chest pain, palpitations  Resp- No cough, dyspnea  GI- No N/V/D, abd pain  - (+) frequent urination    Otherwise ROS is negative except as mentioned in the HPI.    Vital Signs:   Temp:  [97.1 °F (36.2 °C)-97.9 °F (36.6 °C)] 97.3 °F (36.3 °C)  Heart Rate:  [70-83] 82  Resp:  [18-22] 20  BP: (104-152)/(77-91) 147/89     Physical Exam:  Constitutional: Awake, alert  Eyes: PERRLA, sclerae anicteric, no conjunctival injection  HENT:  NCAT, mucous membranes moist  Neck: Supple, no thyromegaly, no lymphadenopathy, trachea midline  Respiratory: Clear to auscultation bilaterally, nonlabored respirations   Cardiovascular: RRR, no murmurs, rubs, or gallops, palpable pedal pulses bilaterally  Gastrointestinal: Positive bowel sounds, soft, nontender, nondistended  Musculoskeletal: No bilateral ankle edema, no clubbing or cyanosis to extremities  Psychiatric: Appropriate affect, cooperative  Neurologic: Oriented x 3, strength symmetric in all extremities, Cranial Nerves grossly intact to confrontation, speech clear  Skin: No rashes  Pertinent  and/or Most Recent Results     Results from last 7 days   Lab Units 08/08/20  0946 08/07/20  0552 08/06/20  0402 08/05/20  0533 08/04/20  0626 08/02/20  2156   WBC 10*3/mm3 7.10 6.11 5.99 7.47  --  15.29*   HEMOGLOBIN g/dL 11.3* 10.7* 9.6* 9.3*  --  10.8*   HEMATOCRIT % 36.9 34.9 30.7* 30.2*  --  33.9*   PLATELETS 10*3/mm3 396 354 292 254  --  221   SODIUM mmol/L 144 140 140  --  140 137   POTASSIUM mmol/L 3.7 3.7 3.2*  --  3.8 4.3   CHLORIDE mmol/L 103 102 103  --  103 100   CO2 mmol/L 32.0* 30.0* 28.0  --  26.0 26.0   BUN mg/dL 14 13 11  --  23 20   CREATININE mg/dL 0.53* 0.54* 0.51*  --  0.51* 0.81   GLUCOSE mg/dL 118* 90 89  --  138* 180*   CALCIUM mg/dL 8.3 8.3 8.1*  --  8.2 8.5     Results from last 7 days   Lab Units 08/02/20  2156   BILIRUBIN mg/dL 0.7   ALK PHOS U/L 111   ALT (SGPT) U/L 13   AST (SGOT) U/L 21           Invalid input(s): TG, LDLCALC, LDLREALC  Results from last 7 days   Lab Units 08/02/20  2156   TSH uIU/mL 1.410   PROBNP pg/mL 1,483.0   TROPONIN T ng/mL <0.010   LACTATE mmol/L 1.4       Brief Urine Lab Results  (Last result in the past 365 days)      Color   Clarity   Blood   Leuk Est   Nitrite   Protein   CREAT   Urine HCG        08/09/20 0130 [place] Clear Trace Trace Negative Trace               Microbiology Results Abnormal     Procedure Component Value - Date/Time    Blood Culture -  Blood, Hand, Left [[id number]] Collected:  08/02/20 2155    Lab Status:  Final result Specimen:  Blood from Hand, Left Updated:  08/07/20 2300     Blood Culture No growth at 5 days    Blood Culture - Blood, Arm, Right [[id number]] Collected:  08/02/20 2150    Lab Status:  Final result Specimen:  Blood from Arm, Right Updated:  08/07/20 2300     Blood Culture No growth at 5 days    Urine Culture - Urine, Urine, Catheter [[id number]]  (Normal) Collected:  08/05/20 1348    Lab Status:  Final result Specimen:  Urine, Catheter Updated:  08/06/20 1854     Urine Culture No growth    MRSA Screen, PCR (Inpatient) - Swab, Nares [[id number]]  (Normal) Collected:  08/03/20 0555    Lab Status:  Final result Specimen:  Swab from Nares Updated:  08/03/20 0720     MRSA PCR Negative    Narrative:       MRSA Negative    Respiratory Panel PCR w/COVID-19(SARS-CoV-2) FAMILIA/EMILE/KEARA In-House, NP Swab in [place]/[facility], 8-12 Hr TAT - Swab, Nasopharynx [[id number]]  (Normal) Collected:  08/03/20 0554    Lab Status:  Final result Specimen:  Swab from Nasopharynx Updated:  08/03/20 0651     ADENOVIRUS, PCR Not Detected     Coronavirus 229E Not Detected     Coronavirus HKU1 Not Detected     Coronavirus NL63 Not Detected     Coronavirus OC43 Not Detected     COVID19 Not Detected     Human Metapneumovirus Not Detected     Human Rhinovirus/Enterovirus Not Detected     Influenza A PCR Not Detected     Influenza A H1 Not Detected     Influenza A H1 2009 PCR Not Detected     Influenza A H3 Not Detected     Influenza B PCR Not Detected     Parainfluenza Virus 1 Not Detected     Parainfluenza Virus 2 Not Detected     Parainfluenza Virus 3 Not Detected     Parainfluenza Virus 4 Not Detected     RSV, PCR Not Detected     Bordetella pertussis pcr Not Detected     Bordetella parapertussis PCR Not Detected     Chlamydophila pneumoniae PCR Not Detected     Mycoplasma pneumo by PCR Not Detected    Narrative:       Fact sheet for providers:  https://docs.[employer]/wp-content/uploads/JTP9356-3450-FN4.1-EUA-Provider-Fact-Sheet-3.pdf    Fact sheet for patients: https://docs.[employer]/wp-content/uploads/FNF1862-2815-VD7.1-EUA-Patient-Fact-Sheet-1.pdf          Imaging Results (All)     Procedure Component Value Units Date/Time    CT Abdomen Pelvis Without Contrast [[id number]] Collected:  08/03/20 0042     Updated:  08/03/20 0044    Narrative:       CT Abdomen Pelvis WO    INDICATION:   Altered mental status atrial fibrillation and confusion.    TECHNIQUE:   CT of the abdomen and pelvis without IV contrast. Coronal and sagittal reconstructions were obtained.  Radiation dose reduction techniques included automated exposure control or exposure modulation based on body size. Count of known CT and cardiac nuc  med studies performed in previous 12 months: 2.     COMPARISON:   12/18/2019    FINDINGS:  Abdomen: The chest CT has been dictated separately. Small pleural effusions and bibasilar atelectasis or pneumonia. Moderately large to large pericardial effusion new compared to the prior study. Large hiatal hernia and partial intrathoracic stomach.  Normal caliber aorta and atherosclerotic change. The spleen is enlarged and adrenal glands are unremarkable to the extent visualized. Pancreas not well visualized or assessed. Gallbladder surgically absent. Trace pneumobilia probably reflects prior  sphincterotomy. Correlate clinically. The liver is enlarged. The kidneys are nonobstructed and there is no radiopaque stone. There are bilateral renal cysts.    Pelvis: Unremarkable bladder and no drainable fluid collection. Diverticulosis and moderate stool burden. Appendix not clearly demonstrated but no secondary signs of appendicitis. Inguinal canals negative. Status post left hip replacement with  significant streak artifact obscuring portions of the pelvis. No distinct free air. No suspicious bone lesion with degenerative changes.      Impression:         1. No  clearly acute process in the abdomen or pelvis. Moderate stool burden but no distinct bowel obstruction.  2. Hepatosplenomegaly.  3. Large hiatal hernia and partial intrathoracic stomach.  4. Bilateral effusions and bibasilar atelectasis or pneumonia.  5. New pericardial effusion. CT chest dictated separately.  6. Bilateral renal cysts.  7. Diverticulosis.      CT Chest Without Contrast [[id number]] Collected:  08/03/20 0038     Updated:  08/03/20 0040    Narrative:       CT Chest WO    INDICATION:   Altered mental status and fever. Confusion.    TECHNIQUE:   CT of the thorax without IV contrast. Coronal and sagittal reconstructions were obtained.  Radiation dose reduction techniques included automated exposure control or exposure modulation based on body size. Count of known CT and cardiac nuc med studies  performed in previous 12 months: 2.     COMPARISON:   11/15/2019    FINDINGS:  There is a large hiatal hernia and intrathoracic stomach. Small bilateral pleural effusions with compressive atelectasis or pneumonia in the lung bases. The lungs are emphysematous. No new suspicious pulmonary nodule. Tiny 2 mm upper lobe pulmonary  nodule on the right incidentally noted. A follow-up CT in one year is recommended.    Heart is enlarged. Moderate to large pericardial effusion measures up to 2.5 cm maximum thickness, new compared to the prior study. No new threshold adenopathy. Normal caliber aorta and atherosclerotic change. Probable underlying pulmonary arterial  hypertension. Included upper abdomen demonstrate surgical absence of the gallbladder and renal cysts. The abdomen and pelvis CT has been dictated separately. No suspicious bone lesion.      Impression:         1. Small bilateral effusions with compressive atelectasis or pneumonia in the lung bases.  2. Background emphysema and pulmonary arterial hypertension.  3. Cardiomegaly and moderately large to large pericardial effusion, new compared to prior study.  4.  Large hiatal hernia and partial intrathoracic stomach.    Imaging features are atypical or uncommonly reported for COVID-19 pneumonia. Alternative diagnoses should be considered.      CT Head Without Contrast [[id number]] Collected:  08/03/20 0033     Updated:  08/03/20 0035    Narrative:       CT Head WO    HISTORY:   Altered mental status. Confusion. Atrial fibrillation.    TECHNIQUE:   Axial unenhanced head CT. Radiation dose reduction techniques included automated exposure control or exposure modulation based on body size. Count of known CT and cardiac nuc med studies performed in previous 12 months: 3.     Time of scan: 0010 hours    COMPARISON:   None.    FINDINGS:   No intracranial hemorrhage, mass, or infarct. No hydrocephalus or extra-axial fluid collection. There are senescent changes, including volume loss and nonspecific white matter change, but no acute abnormality is seen. The skull base, calvarium, and  extracranial soft tissues are normal.      Impression:       Senescent changes without acute abnormality.          Results for orders placed during the hospital encounter of 08/02/20   Transthoracic Echo Complete With Contrast if Necessary Per Protocol    Narrative · Left ventricular systolic function is normal. Estimated EF = 70%.  · Left ventricular diastolic dysfunction (grade I) consistent with   impaired relaxation.  · Left atrial cavity size is moderately dilated.  · There is calcification of the aortic valve. There is no significant   stenosis or regurgitation. A Lambel's excrescence is noted on an aortic   valve leaflet.  · Moderate mitral valve regurgitation is present.  · Estimated right ventricular systolic pressure from tricuspid   regurgitation is moderately elevated (49 mmHg).  · There is a moderate (1-2cm) circumferential pericardial effusion. There   is no tamponade physiology.            Discharge Details        Discharge Medications      New Medications      Instructions Start Date    amiodarone 200 MG tablet  Commonly known as:  PACERONE   200 mg, Oral, Every 8 Hours      amiodarone 200 MG tablet  Commonly known as:  PACERONE   200 mg, Oral, Every 12 Hours   Start Date:  August 12, 2020     amiodarone 200 MG tablet  Commonly known as:  PACERONE   200 mg, Oral, Daily   Start Date:  August 26, 2020     apixaban 2.5 MG tablet tablet  Commonly known as:  ELIQUIS   2.5 mg, Oral, Every 12 Hours Scheduled      cefdinir 300 MG capsule  Commonly known as:  OMNICEF   300 mg, Oral, Every 12 Hours Scheduled      doxycycline 100 MG capsule  Commonly known as:  MONODOX   100 mg, Oral, Every 12 Hours Scheduled         Changes to Medications      Instructions Start Date   bethanechol 10 MG tablet  Commonly known as:  URECHOLINE  What changed:  when to take this   TAKE ONE TABLET BY MOUTH FOUR TIMES A DAY      diphenoxylate-atropine 2.5-0.025 MG per tablet  Commonly known as:  Lomotil  What changed:    · how much to take  · when to take this   1 tablet, Oral, 4 Times Daily PRN      lisinopril 10 MG tablet  Commonly known as:  PRINIVILZESTRIL  What changed:    · how much to take  · when to take this   10 mg, Oral, Every 24 Hours Scheduled   Start Date:  August 10, 2020     methocarbamol 500 MG tablet  Commonly known as:  ROBAXIN  What changed:    · when to take this  · reasons to take this   500 mg, Oral, 3 Times Daily PRN      sucralfate 1 g tablet  Commonly known as:  CARAFATE  What changed:  when to take this   1 g, Oral, 3 Times Daily Before Meals      traMADol 50 MG tablet  Commonly known as:  ULTRAM  What changed:  See the new instructions.   TAKE ONE TABLET BY MOUTH DAILY AS NEEDED FOR MODERATE PAIN IN THE RIGHT KNEE         Continue These Medications      Instructions Start Date   acetaminophen 325 MG tablet  Commonly known as:  TYLENOL   650 mg, Oral, Every 6 Hours PRN      aspirin 81 MG EC tablet   81 mg, Oral, Daily      busPIRone 10 MG tablet  Commonly known as:  BUSPAR   TAKE ONE TABLET BY MOUTH  "TWICE A DAY      calcium citrate-vitamin d 200-250 MG-UNIT tablet tablet  Commonly known as:  CITRACAL   2 tablets, Oral, Daily      cyanocobalamin 1000 MCG/ML injection   1,000 mcg, Intramuscular, Every 30 Days      diclofenac 1 % gel gel  Commonly known as:  VOLTAREN   APPLY 4 GRAMS TOPICALLY TO THE APPROPRAITE AREA AS DIRECTED FOUR TIMES A DAY      donepezil 10 MG tablet  Commonly known as:  ARICEPT   10 mg, Oral, Every Night at Bedtime      gabapentin 300 MG capsule  Commonly known as:  NEURONTIN   TAKE ONE CAPSULE BY MOUTH TWICE A DAY      hydroCHLOROthiazide 25 MG tablet  Commonly known as:  HYDRODIURIL   TAKE ONE TABLET BY MOUTH DAILY      Lactobacillus tablet   1 tablet, Oral, Daily      melatonin 3 MG tablet   3 mg, Oral, Nightly      memantine 10 MG tablet  Commonly known as:  NAMENDA   TAKE ONE TABLET BY MOUTH TWICE A DAY      omeprazole 20 MG capsule  Commonly known as:  priLOSEC   TAKE ONE CAPSULE BY MOUTH DAILY      potassium chloride 10 MEQ CR capsule  Commonly known as:  MICRO-K   TAKE TWO CAPSULES BY MOUTH DAILY      PRESERVISION AREDS PO   1 tablet, Oral, 2 Times Daily      MULTIVITAMIN ADULTS 50+ PO   1 tablet, Oral, Daily      sertraline 50 MG tablet  Commonly known as:  ZOLOFT   TAKE ONE TABLET BY MOUTH DAILY      Synthroid 175 MCG tablet  Generic drug:  levothyroxine   175 mcg, Oral, Daily, Patient receives medication from patient assistance.  NDC:0459-7409-81 EXP:07/02/2020 LOT [id number]      Syringe 23G X 1\" 3 ML misc   Use to inject vitamin b12 once a month      VITAMIN D PO   2000 U qd         Stop These Medications    ondansetron ODT 4 MG disintegrating tablet  Commonly known as:  ZOFRAN-ODT     promethazine 25 MG tablet  Commonly known as:  PHENERGAN          Allergies   Allergen Reactions   • Codeine Nausea Only     Trouble Breathing    • Shrimp Hives     Discharge Disposition:  Home or Self Care    Discharge Diet:  Diet Order   Procedures   • Diet Regular     Discharge Activity: "   Activity Instructions     Activity as Tolerated          CODE STATUS:    Code Status and Medical Interventions:   Ordered at: 20 0158     Level Of Support Discussed With:    Patient    Next of Kin (If No Surrogate)    Health Care Surrogate     Code Status:    CPR     Medical Interventions (Level of Support Prior to Arrest):    Full     Future Appointments   Date Time Provider Department Center   2020  3:30 PM Eric Patel MD [facility]     Additional Instructions for the Follow-ups that You Need to Schedule     Call MD With Problems / Concerns   As directed      Instructions:    Order Comments:  Instructions:          Discharge Follow-up with PCP   As directed       Currently Documented PCP:    Eric Patel MD    PCP Phone Number:    [phone]     Follow Up Details:  F/U in 1 week         Discharge Follow-up with Specified Provider: [facility]; 1 Month   As directed      To:  Dr [facility]    Follow Up:  1 Month             Time Spent on Discharge:  45 minutes    Electronically signed by MAIDA Duke, 20, 9:18 AM.      Electronically signed by Raj Pierce MD at 20 [address] [place] [address]  Phone:  [phone]  Fax:   Date: Aug 9, 2020      Ambulatory Referral to Home Health     Patient:  Temi Jarrett MRN:  [id number]   [address] [address] :  1930  SSN:    Phone: [phone] Sex:  F      INSURANCE PAYOR PLAN GROUP # SUBSCRIBER ID   Primary:  Secondary:    MEDICARE ANTHEM BLUE CROSS [id number]  [id number]    [id number]  [id number]      Referring Provider Information:  RAGHU JARAMILLO Phone: [phone] Fax:       Referral Information:   # Visits:  1 Referral Type: Home Health [42]   Urgency:  Routine Referral Reason: Specialty Services Required   Start Date: Aug 9, 2020 End Date:  To be determined by  Insurer   Diagnosis: Pneumonia of both lower lobes due to infectious organism (J18.9 [ICD-10-CM] 486 [ICD-9-CM])  Impaired mobility and ADLs (Z74.09,Z78.9 [ICD-10-CM] V49.89 [ICD-9-CM])      Refer to Dept:   Refer to Provider:   Refer to Facility:    U/A once clean catch  Fax result to PCP    Son does not want anyone in the house for fear of COVID.  They request specimen bottle to be exchanged at the door.    Face to Face Visit Date: 8/9/2020  Follow-up provider for Plan of Care? I treated the patient in an acute care facility and will not continue treatment after discharge.  Follow-up provider: BARRY GRIFFITH [[id number]]  Reason/Clinical Findings: pneumonia of both lower loves due to infectious organism  Describe mobility limitations that make leaving home difficult: weakness  Nursing/Therapeutic Services Requested: Skilled Nursing  Skilled nursing orders: Other    Frequency: Other (once)     This document serves as a request of services and does not constitute Insurance authorization or approval of services.  To determine eligibility, please contact the members Insurance carrier to verify and review coverage.     If you have medical questions regarding this request for services. Please contact [address] at [phone] during normal business hours.       Verbal Order Mode: Telephone with readback   Authorizing Provider: Sadie Bravo APRN  Authorizing Provider's NPI: [id number]     Order Entered By: Abbey Serna RN 8/9/2020 10:12 AM     Electronically signed by:  Sadie Bravo APRN 8/9/2020 10:46 AM

## 2020-08-09 NOTE — DISCHARGE SUMMARY
[facility]  DISCHARGE SUMMARY    Patient Name: Temi Jarrett  : 1930  MRN: [id number]    Date of Admission: 2020  Date of Discharge:  2020  Primary Care Physician: Eric Patel MD    Consults     Date and Time Order Name Status Description    8/3/2020 0158 Inpatient Cardiology Consult Completed         Hospital Course     Presenting Problem:   Pneumonia of both lower lobes due to infectious organism [J18.9]  Pneumonia of both lower lobes due to infectious organism [J18.9]    Active Hospital Problems    Diagnosis  POA   • **Pneumonia of both lower lobes due to infectious organism [J18.9]  Yes   • Paroxysmal atrial fibrillation (CMS/HCC) [I48.0]  Yes   • Pericardial effusion [I31.3]  Yes   • Acute UTI (urinary tract infection) [N39.0]  Yes   • Essential hypertension [I10]  Yes      Resolved Hospital Problems   No resolved problems to display.     Hospital Course:  Temi Jarrett is a 89 y.o. female mild dementia, hypertension, bladder prolapse, generalized anxiety, and frequent urinary tract infections presents from home with a fever of 102 °F and mild confusion.  Imaging revealed bilateral pneumonia.  Received Zosyn and doxycycline then transition to cefdinir and doxycycline.  Antibiotic treatment should be complete on .  Also developed Atrial fibrillation with RVR.  On amiodarone, Eliquis. ECHO showed 1 to 2 cm pericardial effusion with evidence of tamponade.  Repeat echo in 4 weeks.    Spoke with the son regarding home health services.  Home health services declined due to fear of coronavirus.    Discharge Follow Up Recommendations for labs/diagnostics:  Follow-up with cardiology in 1 month  Repeat echo in 4 weeks to reevaluate pericardial effusion with tamponade   Follow-up with PCP in 1 week  Amiodarone taper: 200 mg 3 times a day until ; then 200 mg twice a day until ; then 200 mg daily.    Day of Discharge     HPI:   Patient  sitting up in recliner, eating breakfast.  Patient is alert and oriented x3, pleasant, talkative.  Reports frequent urination yesterday.  Urinalysis was not indicative of UTI.  Receiving doxycycline and cefdinir.  Denies fever, chills, sweats, chest pain, heart palpitation, shortness of breath, dysruria, nausea, vomiting, diarrhea.    Review of Systems  Gen- No fevers, chills  CV- No chest pain, palpitations  Resp- No cough, dyspnea  GI- No N/V/D, abd pain  - (+) frequent urination    Otherwise ROS is negative except as mentioned in the HPI.    Vital Signs:   Temp:  [97.1 °F (36.2 °C)-97.9 °F (36.6 °C)] 97.3 °F (36.3 °C)  Heart Rate:  [70-83] 82  Resp:  [18-22] 20  BP: (104-152)/(77-91) 147/89     Physical Exam:  Constitutional: Awake, alert  Eyes: PERRLA, sclerae anicteric, no conjunctival injection  HENT: NCAT, mucous membranes moist  Neck: Supple, no thyromegaly, no lymphadenopathy, trachea midline  Respiratory: Clear to auscultation bilaterally, nonlabored respirations   Cardiovascular: RRR, no murmurs, rubs, or gallops, palpable pedal pulses bilaterally  Gastrointestinal: Positive bowel sounds, soft, nontender, nondistended  Musculoskeletal: No bilateral ankle edema, no clubbing or cyanosis to extremities  Psychiatric: Appropriate affect, cooperative  Neurologic: Oriented x 3, strength symmetric in all extremities, Cranial Nerves grossly intact to confrontation, speech clear  Skin: No rashes  Pertinent  and/or Most Recent Results     Results from last 7 days   Lab Units 08/08/20  0946 08/07/20  0552 08/06/20  0402 08/05/20  0533 08/04/20  0626 08/02/20  2156   WBC 10*3/mm3 7.10 6.11 5.99 7.47  --  15.29*   HEMOGLOBIN g/dL 11.3* 10.7* 9.6* 9.3*  --  10.8*   HEMATOCRIT % 36.9 34.9 30.7* 30.2*  --  33.9*   PLATELETS 10*3/mm3 396 354 292 254  --  221   SODIUM mmol/L 144 140 140  --  140 137   POTASSIUM mmol/L 3.7 3.7 3.2*  --  3.8 4.3   CHLORIDE mmol/L 103 102 103  --  103 100   CO2 mmol/L 32.0* 30.0* 28.0  --   26.0 26.0   BUN mg/dL 14 13 11  --  23 20   CREATININE mg/dL 0.53* 0.54* 0.51*  --  0.51* 0.81   GLUCOSE mg/dL 118* 90 89  --  138* 180*   CALCIUM mg/dL 8.3 8.3 8.1*  --  8.2 8.5     Results from last 7 days   Lab Units 08/02/20  2156   BILIRUBIN mg/dL 0.7   ALK PHOS U/L 111   ALT (SGPT) U/L 13   AST (SGOT) U/L 21           Invalid input(s): TG, LDLCALC, LDLREALC  Results from last 7 days   Lab Units 08/02/20  2156   TSH uIU/mL 1.410   PROBNP pg/mL 1,483.0   TROPONIN T ng/mL <0.010   LACTATE mmol/L 1.4       Brief Urine Lab Results  (Last result in the past 365 days)      Color   Clarity   Blood   Leuk Est   Nitrite   Protein   CREAT   Urine HCG        08/09/20 0130 [place] Clear Trace Trace Negative Trace               Microbiology Results Abnormal     Procedure Component Value - Date/Time    Blood Culture - Blood, Hand, Left [[id number]] Collected:  08/02/20 2155    Lab Status:  Final result Specimen:  Blood from Hand, Left Updated:  08/07/20 2300     Blood Culture No growth at 5 days    Blood Culture - Blood, Arm, Right [[id number]] Collected:  08/02/20 2150    Lab Status:  Final result Specimen:  Blood from Arm, Right Updated:  08/07/20 2300     Blood Culture No growth at 5 days    Urine Culture - Urine, Urine, Catheter [[id number]]  (Normal) Collected:  08/05/20 1348    Lab Status:  Final result Specimen:  Urine, Catheter Updated:  08/06/20 1854     Urine Culture No growth    MRSA Screen, PCR (Inpatient) - Swab, Nares [[id number]]  (Normal) Collected:  08/03/20 0555    Lab Status:  Final result Specimen:  Swab from Nares Updated:  08/03/20 0720     MRSA PCR Negative    Narrative:       MRSA Negative    Respiratory Panel PCR w/COVID-19(SARS-CoV-2) FAMILIA/EMILE/KEARA In-House, [facility], 8-12 Hr TAT - Swab, Nasopharynx [[id number]]  (Normal) Collected:  08/03/20 0554    Lab Status:  Final result Specimen:  Swab from Nasopharynx Updated:  08/03/20 0651     ADENOVIRUS, PCR Not Detected     Coronavirus 229E Not Detected      Coronavirus HKU1 Not Detected     Coronavirus NL63 Not Detected     Coronavirus OC43 Not Detected     COVID19 Not Detected     Human Metapneumovirus Not Detected     Human Rhinovirus/Enterovirus Not Detected     Influenza A PCR Not Detected     Influenza A H1 Not Detected     Influenza A H1 2009 PCR Not Detected     Influenza A H3 Not Detected     Influenza B PCR Not Detected     Parainfluenza Virus 1 Not Detected     Parainfluenza Virus 2 Not Detected     Parainfluenza Virus 3 Not Detected     Parainfluenza Virus 4 Not Detected     RSV, PCR Not Detected     Bordetella pertussis pcr Not Detected     Bordetella parapertussis PCR Not Detected     Chlamydophila pneumoniae PCR Not Detected     Mycoplasma pneumo by PCR Not Detected    Narrative:       Fact sheet for providers: https://docs.[employer]/wp-content/uploads/ZPU0380-6039-MI0.1-EUA-Provider-Fact-Sheet-3.pdf    Fact sheet for patients: https://docs.[employer]/wp-content/uploads/YUF2437-2030-ZQ3.1-EUA-Patient-Fact-Sheet-1.pdf          Imaging Results (All)     Procedure Component Value Units Date/Time    CT Abdomen Pelvis Without Contrast [[id number]] Collected:  08/03/20 0042     Updated:  08/03/20 0044    Narrative:       CT Abdomen Pelvis WO    INDICATION:   Altered mental status atrial fibrillation and confusion.    TECHNIQUE:   CT of the abdomen and pelvis without IV contrast. Coronal and sagittal reconstructions were obtained.  Radiation dose reduction techniques included automated exposure control or exposure modulation based on body size. Count of known CT and cardiac nuc  med studies performed in previous 12 months: 2.     COMPARISON:   12/18/2019    FINDINGS:  Abdomen: The chest CT has been dictated separately. Small pleural effusions and bibasilar atelectasis or pneumonia. Moderately large to large pericardial effusion new compared to the prior study. Large hiatal hernia and partial intrathoracic stomach.  Normal caliber aorta and  atherosclerotic change. The spleen is enlarged and adrenal glands are unremarkable to the extent visualized. Pancreas not well visualized or assessed. Gallbladder surgically absent. Trace pneumobilia probably reflects prior  sphincterotomy. Correlate clinically. The liver is enlarged. The kidneys are nonobstructed and there is no radiopaque stone. There are bilateral renal cysts.    Pelvis: Unremarkable bladder and no drainable fluid collection. Diverticulosis and moderate stool burden. Appendix not clearly demonstrated but no secondary signs of appendicitis. Inguinal canals negative. Status post left hip replacement with  significant streak artifact obscuring portions of the pelvis. No distinct free air. No suspicious bone lesion with degenerative changes.      Impression:         1. No clearly acute process in the abdomen or pelvis. Moderate stool burden but no distinct bowel obstruction.  2. Hepatosplenomegaly.  3. Large hiatal hernia and partial intrathoracic stomach.  4. Bilateral effusions and bibasilar atelectasis or pneumonia.  5. New pericardial effusion. CT chest dictated separately.  6. Bilateral renal cysts.  7. Diverticulosis.      CT Chest Without Contrast [[id number]] Collected:  08/03/20 0038     Updated:  08/03/20 0040    Narrative:       CT Chest WO    INDICATION:   Altered mental status and fever. Confusion.    TECHNIQUE:   CT of the thorax without IV contrast. Coronal and sagittal reconstructions were obtained.  Radiation dose reduction techniques included automated exposure control or exposure modulation based on body size. Count of known CT and cardiac nuc med studies  performed in previous 12 months: 2.     COMPARISON:   11/15/2019    FINDINGS:  There is a large hiatal hernia and intrathoracic stomach. Small bilateral pleural effusions with compressive atelectasis or pneumonia in the lung bases. The lungs are emphysematous. No new suspicious pulmonary nodule. Tiny 2 mm upper lobe  pulmonary  nodule on the right incidentally noted. A follow-up CT in one year is recommended.    Heart is enlarged. Moderate to large pericardial effusion measures up to 2.5 cm maximum thickness, new compared to the prior study. No new threshold adenopathy. Normal caliber aorta and atherosclerotic change. Probable underlying pulmonary arterial  hypertension. Included upper abdomen demonstrate surgical absence of the gallbladder and renal cysts. The abdomen and pelvis CT has been dictated separately. No suspicious bone lesion.      Impression:         1. Small bilateral effusions with compressive atelectasis or pneumonia in the lung bases.  2. Background emphysema and pulmonary arterial hypertension.  3. Cardiomegaly and moderately large to large pericardial effusion, new compared to prior study.  4. Large hiatal hernia and partial intrathoracic stomach.    Imaging features are atypical or uncommonly reported for COVID-19 pneumonia. Alternative diagnoses should be considered.      CT Head Without Contrast [[id number]] Collected:  08/03/20 0033     Updated:  08/03/20 0035    Narrative:       CT Head WO    HISTORY:   Altered mental status. Confusion. Atrial fibrillation.    TECHNIQUE:   Axial unenhanced head CT. Radiation dose reduction techniques included automated exposure control or exposure modulation based on body size. Count of known CT and cardiac nuc med studies performed in previous 12 months: 3.     Time of scan: 0010 hours    COMPARISON:   None.    FINDINGS:   No intracranial hemorrhage, mass, or infarct. No hydrocephalus or extra-axial fluid collection. There are senescent changes, including volume loss and nonspecific white matter change, but no acute abnormality is seen. The skull base, calvarium, and  extracranial soft tissues are normal.      Impression:       Senescent changes without acute abnormality.          Results for orders placed during the hospital encounter of 08/02/20   Transthoracic Echo  Complete With Contrast if Necessary Per Protocol    Narrative · Left ventricular systolic function is normal. Estimated EF = 70%.  · Left ventricular diastolic dysfunction (grade I) consistent with   impaired relaxation.  · Left atrial cavity size is moderately dilated.  · There is calcification of the aortic valve. There is no significant   stenosis or regurgitation. A Lambel's excrescence is noted on an aortic   valve leaflet.  · Moderate mitral valve regurgitation is present.  · Estimated right ventricular systolic pressure from tricuspid   regurgitation is moderately elevated (49 mmHg).  · There is a moderate (1-2cm) circumferential pericardial effusion. There   is no tamponade physiology.            Discharge Details        Discharge Medications      New Medications      Instructions Start Date   amiodarone 200 MG tablet  Commonly known as:  PACERONE   200 mg, Oral, Every 8 Hours      amiodarone 200 MG tablet  Commonly known as:  PACERONE   200 mg, Oral, Every 12 Hours   Start Date:  August 12, 2020     amiodarone 200 MG tablet  Commonly known as:  PACERONE   200 mg, Oral, Daily   Start Date:  August 26, 2020     apixaban 2.5 MG tablet tablet  Commonly known as:  ELIQUIS   2.5 mg, Oral, Every 12 Hours Scheduled      cefdinir 300 MG capsule  Commonly known as:  OMNICEF   300 mg, Oral, Every 12 Hours Scheduled      doxycycline 100 MG capsule  Commonly known as:  MONODOX   100 mg, Oral, Every 12 Hours Scheduled         Changes to Medications      Instructions Start Date   bethanechol 10 MG tablet  Commonly known as:  URECHOLINE  What changed:  when to take this   TAKE ONE TABLET BY MOUTH FOUR TIMES A DAY      diphenoxylate-atropine 2.5-0.025 MG per tablet  Commonly known as:  Lomotil  What changed:    · how much to take  · when to take this   1 tablet, Oral, 4 Times Daily PRN      lisinopril 10 MG tablet  Commonly known as:  PRINIVIL,ZESTRIL  What changed:    · how much to take  · when to take this   10 mg, Oral,  Every 24 Hours Scheduled   Start Date:  August 10, 2020     methocarbamol 500 MG tablet  Commonly known as:  ROBAXIN  What changed:    · when to take this  · reasons to take this   500 mg, Oral, 3 Times Daily PRN      sucralfate 1 g tablet  Commonly known as:  CARAFATE  What changed:  when to take this   1 g, Oral, 3 Times Daily Before Meals      traMADol 50 MG tablet  Commonly known as:  ULTRAM  What changed:  See the new instructions.   TAKE ONE TABLET BY MOUTH DAILY AS NEEDED FOR MODERATE PAIN IN THE RIGHT KNEE         Continue These Medications      Instructions Start Date   acetaminophen 325 MG tablet  Commonly known as:  TYLENOL   650 mg, Oral, Every 6 Hours PRN      aspirin 81 MG EC tablet   81 mg, Oral, Daily      busPIRone 10 MG tablet  Commonly known as:  BUSPAR   TAKE ONE TABLET BY MOUTH TWICE A DAY      calcium citrate-vitamin d 200-250 MG-UNIT tablet tablet  Commonly known as:  CITRACAL   2 tablets, Oral, Daily      cyanocobalamin 1000 MCG/ML injection   1,000 mcg, Intramuscular, Every 30 Days      diclofenac 1 % gel gel  Commonly known as:  VOLTAREN   APPLY 4 GRAMS TOPICALLY TO THE APPROPRAITE AREA AS DIRECTED FOUR TIMES A DAY      donepezil 10 MG tablet  Commonly known as:  ARICEPT   10 mg, Oral, Every Night at Bedtime      gabapentin 300 MG capsule  Commonly known as:  NEURONTIN   TAKE ONE CAPSULE BY MOUTH TWICE A DAY      hydroCHLOROthiazide 25 MG tablet  Commonly known as:  HYDRODIURIL   TAKE ONE TABLET BY MOUTH DAILY      Lactobacillus tablet   1 tablet, Oral, Daily      melatonin 3 MG tablet   3 mg, Oral, Nightly      memantine 10 MG tablet  Commonly known as:  NAMENDA   TAKE ONE TABLET BY MOUTH TWICE A DAY      omeprazole 20 MG capsule  Commonly known as:  priLOSEC   TAKE ONE CAPSULE BY MOUTH DAILY      potassium chloride 10 MEQ CR capsule  Commonly known as:  MICRO-K   TAKE TWO CAPSULES BY MOUTH DAILY      PRESERVISION AREDS PO   1 tablet, Oral, 2 Times Daily      MULTIVITAMIN ADULTS 50+ PO   1  "tablet, Oral, Daily      sertraline 50 MG tablet  Commonly known as:  ZOLOFT   TAKE ONE TABLET BY MOUTH DAILY      Synthroid 175 MCG tablet  Generic drug:  levothyroxine   175 mcg, Oral, Daily, Patient receives medication from patient assistance.  NDC:0516-0317-08 EXP:07/02/2020 LOT [id number]      Syringe 23G X 1\" 3 ML misc   Use to inject vitamin b12 once a month      VITAMIN D PO   2000 U qd         Stop These Medications    ondansetron ODT 4 MG disintegrating tablet  Commonly known as:  ZOFRAN-ODT     promethazine 25 MG tablet  Commonly known as:  PHENERGAN          Allergies   Allergen Reactions   • Codeine Nausea Only     Trouble Breathing    • Shrimp Hives     Discharge Disposition:  Home or Self Care    Discharge Diet:  Diet Order   Procedures   • Diet Regular     Discharge Activity:   Activity Instructions     Activity as Tolerated          CODE STATUS:    Code Status and Medical Interventions:   Ordered at: 08/03/20 0158     Level Of Support Discussed With:    Patient    Next of Kin (If No Surrogate)    Health Care Surrogate     Code Status:    CPR     Medical Interventions (Level of Support Prior to Arrest):    Full     Future Appointments   Date Time Provider Department Center   9/16/2020  3:30 PM Eric Patel MD [facility]     Additional Instructions for the Follow-ups that You Need to Schedule     Call MD With Problems / Concerns   As directed      Instructions:    Order Comments:  Instructions:          Discharge Follow-up with PCP   As directed       Currently Documented PCP:    Eric Patel MD    PCP Phone Number:    [phone]     Follow Up Details:  F/U in 1 week         Discharge Follow-up with Specified Provider: [facility]; 1 Month   As directed      To:  [facility]    Follow Up:  1 Month             Time Spent on Discharge:  45 minutes    Electronically signed by MAIDA Duke, 08/09/20, 9:18 AM.    "

## 2020-08-09 NOTE — OUTREACH NOTE
Prep Survey      Responses   [facility] patient discharged from?  [place]   Is LACE score < 7 ?  No   Eligibility  [facility]   Date of Admission  08/02/20   Date of Discharge  08/09/20   Discharge Disposition  Home or Self Care   Discharge diagnosis  Pneumonia,    Acute UTI    COVID-19 Test Status  Negative   Does the patient have one of the following disease processes/diagnoses(primary or secondary)?  COPD/Pneumonia   Does the patient have Home health ordered?  Yes   What is the Home health agency?   [facility]   Is there a DME ordered?  No   Prep survey completed?  Yes          Kimber Flor RN

## 2020-08-09 NOTE — PROGRESS NOTES
Continued Stay Note  [facility]     Patient Name: Temi Jarrett  MRN: [id number]  Today's Date: 8/9/2020    Admit Date: 8/2/2020    Discharge Plan     Row Name 08/09/20 1012       Plan    Plan  Home with [facility]    Patient/Family in Agreement with Plan  yes    Plan Comments  Spoke with patient at bedside regarding dishcarge plan who wants [facility] to collect her urine because she is convinced she has a UTI however, her son is oppossed to haveing anyone come in to the house and reports that she has had to go into the driveway to received her B12 shots and does not know how she will be able to urinate for them.  Called patient son, Victor M, to discuss options who indicates that he is a diabetic and has not had contact with outside people since February and does not want anyone coming in to the house and would prefer not to go to her PCP office if avoidable.  He is agreeable to [facility] coming out to collect a urine sample if they can drop of the bottle at the door, let the patient urinate and pass it back again via the door.  Called [facility]  and spoke to Kate with the answering service who will have the Anitra RN call back.  CM following.      Final Discharge Disposition Code  06 - home with home health care        Discharge Codes    No documentation.       Expected Discharge Date and Time     Expected Discharge Date Expected Discharge Time    Aug 9, 2020             Abbey Serna, RN

## 2020-08-10 ENCOUNTER — TRANSITIONAL CARE MANAGEMENT TELEPHONE ENCOUNTER (OUTPATIENT)
Dept: CALL CENTER | Facility: HOSPITAL | Age: 85
End: 2020-08-10

## 2020-08-10 DIAGNOSIS — G63 POLYNEUROPATHY ASSOCIATED WITH UNDERLYING DISEASE (HCC): ICD-10-CM

## 2020-08-10 RX ORDER — GABAPENTIN 300 MG/1
CAPSULE ORAL
Qty: 180 CAPSULE | Refills: 0 | Status: SHIPPED | OUTPATIENT
Start: 2020-08-10 | End: 2020-11-24

## 2020-08-10 NOTE — OUTREACH NOTE
"Call Center TCM Note      Responses   [facility] patient discharged from?  [place]   COVID-19 Test Status  Negative   Does the patient have one of the following disease processes/diagnoses(primary or secondary)?  COPD/Pneumonia   Was the primary reason for admission:  Pneumonia   TCM attempt successful?  Yes   Call start time  1722   Call end time  1728   Discharge diagnosis  Pneumonia,    Acute UTI    Meds reviewed with patient/caregiver?  Yes   Is the patient having any side effects they believe may be caused by any medication additions or changes?  No   Does the patient have all medications ordered at discharge?  Yes   Is the patient taking all medications as directed (includes completed medication regime)?  Yes   Does the patient have a primary care provider?   Yes   Does the patient have an appointment with their PCP or pulmonologist within 7 days of discharge?  No   What is preventing the patient from scheduling follow up appointments within 7 days of discharge?  -- [Pt reports, \"I'll ask Chris to call\" to schedule appt with PCP]   Nursing Interventions  -- [Routed to PCP]   Has the patient kept scheduled appointments due by today?  N/A   What is the Home health agency?   [facility] comments  Home health was to visit on 8/10/20 however it's 5:26 pm and patient has not heard from them   Pulse Ox monitoring  None   Psychosocial issues?  No   Did the patient receive a copy of their discharge instructions?  -- [Unsure]   Nursing interventions  Reviewed instructions with patient   What is the patient's perception of their health status since discharge?  Improving   Nursing Interventions  Nurse provided patient education   Are the patient's immunizations up to date?   Yes   Nursing interventions  Advised patient to discuss with provider at next visit   If the patient is a current smoker, are they able to teach back resources for cessation?  -- [Nonsmoker]   Is the patient/caregiver able to teach " back the hierarchy of who to call/visit for symptoms/problems? PCP, Specialist, Home health nurse, Urgent Care, ED, 911  Yes   Is the patient/caregiver able to teach back signs and symptoms of worsening condition:  Fever/chills, Shortness of breath, Chest pain   Is the patient/caregiver able to teach back importance of completing antibiotic course of treatment?  Yes   TCM call completed?  Yes          Kimberly Blackmon RN    8/10/2020, 17:28

## 2020-08-11 DIAGNOSIS — G89.29 OTHER CHRONIC PAIN: ICD-10-CM

## 2020-08-11 RX ORDER — TRAMADOL HYDROCHLORIDE 50 MG/1
TABLET ORAL
Qty: 30 TABLET | Refills: 2 | Status: SHIPPED | OUTPATIENT
Start: 2020-08-11 | End: 2020-11-24

## 2020-08-14 ENCOUNTER — TELEPHONE (OUTPATIENT)
Dept: INTERNAL MEDICINE | Facility: CLINIC | Age: 85
End: 2020-08-14

## 2020-08-14 ENCOUNTER — APPOINTMENT (OUTPATIENT)
Dept: GENERAL RADIOLOGY | Facility: HOSPITAL | Age: 85
End: 2020-08-14

## 2020-08-14 ENCOUNTER — APPOINTMENT (OUTPATIENT)
Dept: CT IMAGING | Facility: HOSPITAL | Age: 85
End: 2020-08-14

## 2020-08-14 ENCOUNTER — HOSPITAL ENCOUNTER (INPATIENT)
Facility: HOSPITAL | Age: 85
LOS: 3 days | Discharge: NURSING, REHAB OR LONG-TERM CARE | End: 2020-08-18
Attending: EMERGENCY MEDICINE | Admitting: FAMILY MEDICINE

## 2020-08-14 DIAGNOSIS — I48.0 PAF (PAROXYSMAL ATRIAL FIBRILLATION) (HCC): ICD-10-CM

## 2020-08-14 DIAGNOSIS — Z86.59 HISTORY OF DEMENTIA: ICD-10-CM

## 2020-08-14 DIAGNOSIS — Z87.440 HISTORY OF UTI: ICD-10-CM

## 2020-08-14 DIAGNOSIS — Z87.01 HISTORY OF PNEUMONIA: Primary | ICD-10-CM

## 2020-08-14 DIAGNOSIS — R27.0 ATAXIA: ICD-10-CM

## 2020-08-14 LAB
ALBUMIN SERPL-MCNC: 3.3 G/DL (ref 3.5–5.2)
ALBUMIN/GLOB SERPL: 1.5 G/DL
ALP SERPL-CCNC: 101 U/L (ref 39–117)
ALT SERPL W P-5'-P-CCNC: 8 U/L (ref 1–33)
ANION GAP SERPL CALCULATED.3IONS-SCNC: 7 MMOL/L (ref 5–15)
AST SERPL-CCNC: 11 U/L (ref 1–32)
BASOPHILS # BLD AUTO: 0.05 10*3/MM3 (ref 0–0.2)
BASOPHILS NFR BLD AUTO: 0.3 % (ref 0–1.5)
BILIRUB SERPL-MCNC: 0.5 MG/DL (ref 0–1.2)
BILIRUB UR QL STRIP: NEGATIVE
BUN SERPL-MCNC: 17 MG/DL (ref 8–23)
BUN/CREAT SERPL: 22.4 (ref 7–25)
CALCIUM SPEC-SCNC: 8.4 MG/DL (ref 8.2–9.6)
CHLORIDE SERPL-SCNC: 97 MMOL/L (ref 98–107)
CLARITY UR: CLEAR
CO2 SERPL-SCNC: 32 MMOL/L (ref 22–29)
COLOR UR: YELLOW
CREAT SERPL-MCNC: 0.76 MG/DL (ref 0.57–1)
D-LACTATE SERPL-SCNC: 1.1 MMOL/L (ref 0.5–2)
DEPRECATED RDW RBC AUTO: 47.2 FL (ref 37–54)
EOSINOPHIL # BLD AUTO: 0.03 10*3/MM3 (ref 0–0.4)
EOSINOPHIL NFR BLD AUTO: 0.2 % (ref 0.3–6.2)
ERYTHROCYTE [DISTWIDTH] IN BLOOD BY AUTOMATED COUNT: 15.4 % (ref 12.3–15.4)
GFR SERPL CREATININE-BSD FRML MDRD: 71 ML/MIN/1.73
GLOBULIN UR ELPH-MCNC: 2.2 GM/DL
GLUCOSE SERPL-MCNC: 114 MG/DL (ref 65–99)
GLUCOSE UR STRIP-MCNC: NEGATIVE MG/DL
HCT VFR BLD AUTO: 34.5 % (ref 34–46.6)
HGB BLD-MCNC: 10.8 G/DL (ref 12–15.9)
HGB UR QL STRIP.AUTO: NEGATIVE
HOLD SPECIMEN: NORMAL
HOLD SPECIMEN: NORMAL
IMM GRANULOCYTES # BLD AUTO: 0.1 10*3/MM3 (ref 0–0.05)
IMM GRANULOCYTES NFR BLD AUTO: 0.6 % (ref 0–0.5)
KETONES UR QL STRIP: NEGATIVE
LEUKOCYTE ESTERASE UR QL STRIP.AUTO: NEGATIVE
LYMPHOCYTES # BLD AUTO: 1.32 10*3/MM3 (ref 0.7–3.1)
LYMPHOCYTES NFR BLD AUTO: 8.5 % (ref 19.6–45.3)
MAGNESIUM SERPL-MCNC: 2 MG/DL (ref 1.6–2.4)
MCH RBC QN AUTO: 27.3 PG (ref 26.6–33)
MCHC RBC AUTO-ENTMCNC: 31.3 G/DL (ref 31.5–35.7)
MCV RBC AUTO: 87.3 FL (ref 79–97)
MONOCYTES # BLD AUTO: 1.28 10*3/MM3 (ref 0.1–0.9)
MONOCYTES NFR BLD AUTO: 8.2 % (ref 5–12)
NEUTROPHILS NFR BLD AUTO: 12.84 10*3/MM3 (ref 1.7–7)
NEUTROPHILS NFR BLD AUTO: 82.2 % (ref 42.7–76)
NITRITE UR QL STRIP: NEGATIVE
NRBC BLD AUTO-RTO: 0 /100 WBC (ref 0–0.2)
NT-PROBNP SERPL-MCNC: 1998 PG/ML (ref 0–1800)
PH UR STRIP.AUTO: 6 [PH] (ref 5–8)
PLATELET # BLD AUTO: 253 10*3/MM3 (ref 140–450)
PMV BLD AUTO: 9.5 FL (ref 6–12)
POTASSIUM SERPL-SCNC: 4.1 MMOL/L (ref 3.5–5.2)
PROCALCITONIN SERPL-MCNC: 0.14 NG/ML (ref 0–0.25)
PROT SERPL-MCNC: 5.5 G/DL (ref 6–8.5)
PROT UR QL STRIP: NEGATIVE
RBC # BLD AUTO: 3.95 10*6/MM3 (ref 3.77–5.28)
SODIUM SERPL-SCNC: 136 MMOL/L (ref 136–145)
SP GR UR STRIP: 1.01 (ref 1–1.03)
TROPONIN T SERPL-MCNC: <0.01 NG/ML (ref 0–0.03)
UROBILINOGEN UR QL STRIP: NORMAL
WBC # BLD AUTO: 15.62 10*3/MM3 (ref 3.4–10.8)
WHOLE BLOOD HOLD SPECIMEN: NORMAL
WHOLE BLOOD HOLD SPECIMEN: NORMAL

## 2020-08-14 PROCEDURE — 85025 COMPLETE CBC W/AUTO DIFF WBC: CPT

## 2020-08-14 PROCEDURE — 80053 COMPREHEN METABOLIC PANEL: CPT

## 2020-08-14 PROCEDURE — 99285 EMERGENCY DEPT VISIT HI MDM: CPT

## 2020-08-14 PROCEDURE — 84145 PROCALCITONIN (PCT): CPT | Performed by: NURSE PRACTITIONER

## 2020-08-14 PROCEDURE — 84484 ASSAY OF TROPONIN QUANT: CPT

## 2020-08-14 PROCEDURE — 83605 ASSAY OF LACTIC ACID: CPT | Performed by: NURSE PRACTITIONER

## 2020-08-14 PROCEDURE — 83735 ASSAY OF MAGNESIUM: CPT

## 2020-08-14 PROCEDURE — 71045 X-RAY EXAM CHEST 1 VIEW: CPT

## 2020-08-14 PROCEDURE — P9612 CATHETERIZE FOR URINE SPEC: HCPCS

## 2020-08-14 PROCEDURE — 81003 URINALYSIS AUTO W/O SCOPE: CPT | Performed by: EMERGENCY MEDICINE

## 2020-08-14 PROCEDURE — 93005 ELECTROCARDIOGRAM TRACING: CPT

## 2020-08-14 PROCEDURE — 83880 ASSAY OF NATRIURETIC PEPTIDE: CPT | Performed by: NURSE PRACTITIONER

## 2020-08-14 PROCEDURE — 87040 BLOOD CULTURE FOR BACTERIA: CPT | Performed by: NURSE PRACTITIONER

## 2020-08-14 PROCEDURE — 70450 CT HEAD/BRAIN W/O DYE: CPT

## 2020-08-14 RX ORDER — SODIUM CHLORIDE 0.9 % (FLUSH) 0.9 %
10 SYRINGE (ML) INJECTION AS NEEDED
Status: DISCONTINUED | OUTPATIENT
Start: 2020-08-14 | End: 2020-08-18 | Stop reason: HOSPADM

## 2020-08-14 RX ADMIN — SODIUM CHLORIDE 500 ML: 9 INJECTION, SOLUTION INTRAVENOUS at 20:54

## 2020-08-14 NOTE — TELEPHONE ENCOUNTER
Kush from [facility] called and stated that she had received a call from the patients daughter for some orders to see the patient. Kush states that she will need a verbal okay to continue to see the patient to administer B-12 shots. Kush would also like to know if the patient is an inpatient or outpatient, if the patient was an inpatient Kush needs orders and information to properly help the patient. Please advise.     Kush call back [phone]

## 2020-08-14 NOTE — TELEPHONE ENCOUNTER
Patient's son called and stated the patient had a fever of 100.7 felt bad and was coughing and recently released from the hospital, suggest patient return to [facility] ER for further evaluation patient's son agreed

## 2020-08-14 NOTE — TELEPHONE ENCOUNTER
EKTA left for Kush   Spoke with pharmacy and clarified/updated script order.  Tamara Holguin- LIB[facility]

## 2020-08-15 ENCOUNTER — APPOINTMENT (OUTPATIENT)
Dept: CT IMAGING | Facility: HOSPITAL | Age: 85
End: 2020-08-15

## 2020-08-15 ENCOUNTER — APPOINTMENT (OUTPATIENT)
Dept: MRI IMAGING | Facility: HOSPITAL | Age: 85
End: 2020-08-15

## 2020-08-15 ENCOUNTER — READMISSION MANAGEMENT (OUTPATIENT)
Dept: CALL CENTER | Facility: HOSPITAL | Age: 85
End: 2020-08-15

## 2020-08-15 PROBLEM — R94.31 PROLONGED Q-T INTERVAL ON ECG: Status: ACTIVE | Noted: 2020-08-15

## 2020-08-15 PROBLEM — R27.0 ATAXIA: Status: ACTIVE | Noted: 2020-08-15

## 2020-08-15 PROBLEM — R29.898 WEAKNESS OF BOTH LOWER EXTREMITIES: Status: ACTIVE | Noted: 2020-08-15

## 2020-08-15 PROBLEM — J18.9 HCAP (HEALTHCARE-ASSOCIATED PNEUMONIA): Status: ACTIVE | Noted: 2020-08-15

## 2020-08-15 LAB
ANION GAP SERPL CALCULATED.3IONS-SCNC: 11 MMOL/L (ref 5–15)
B PARAPERT DNA SPEC QL NAA+PROBE: NOT DETECTED
B PERT DNA SPEC QL NAA+PROBE: NOT DETECTED
BASOPHILS # BLD AUTO: 0.05 10*3/MM3 (ref 0–0.2)
BASOPHILS NFR BLD AUTO: 0.3 % (ref 0–1.5)
BUN SERPL-MCNC: 11 MG/DL (ref 8–23)
BUN/CREAT SERPL: 22.9 (ref 7–25)
C PNEUM DNA NPH QL NAA+NON-PROBE: NOT DETECTED
CALCIUM SPEC-SCNC: 8.3 MG/DL (ref 8.2–9.6)
CHLORIDE SERPL-SCNC: 98 MMOL/L (ref 98–107)
CO2 SERPL-SCNC: 29 MMOL/L (ref 22–29)
CREAT SERPL-MCNC: 0.48 MG/DL (ref 0.57–1)
DEPRECATED RDW RBC AUTO: 47.4 FL (ref 37–54)
EOSINOPHIL # BLD AUTO: 0.01 10*3/MM3 (ref 0–0.4)
EOSINOPHIL NFR BLD AUTO: 0.1 % (ref 0.3–6.2)
ERYTHROCYTE [DISTWIDTH] IN BLOOD BY AUTOMATED COUNT: 15.3 % (ref 12.3–15.4)
FLUAV H1 2009 PAND RNA NPH QL NAA+PROBE: NOT DETECTED
FLUAV H1 HA GENE NPH QL NAA+PROBE: NOT DETECTED
FLUAV H3 RNA NPH QL NAA+PROBE: NOT DETECTED
FLUAV SUBTYP SPEC NAA+PROBE: NOT DETECTED
FLUBV RNA ISLT QL NAA+PROBE: NOT DETECTED
GFR SERPL CREATININE-BSD FRML MDRD: 122 ML/MIN/1.73
GLUCOSE SERPL-MCNC: 103 MG/DL (ref 65–99)
HADV DNA SPEC NAA+PROBE: NOT DETECTED
HCOV 229E RNA SPEC QL NAA+PROBE: NOT DETECTED
HCOV HKU1 RNA SPEC QL NAA+PROBE: NOT DETECTED
HCOV NL63 RNA SPEC QL NAA+PROBE: NOT DETECTED
HCOV OC43 RNA SPEC QL NAA+PROBE: NOT DETECTED
HCT VFR BLD AUTO: 36.3 % (ref 34–46.6)
HGB BLD-MCNC: 11.4 G/DL (ref 12–15.9)
HMPV RNA NPH QL NAA+NON-PROBE: NOT DETECTED
HPIV1 RNA SPEC QL NAA+PROBE: NOT DETECTED
HPIV2 RNA SPEC QL NAA+PROBE: NOT DETECTED
HPIV3 RNA NPH QL NAA+PROBE: NOT DETECTED
HPIV4 P GENE NPH QL NAA+PROBE: NOT DETECTED
IMM GRANULOCYTES # BLD AUTO: 0.14 10*3/MM3 (ref 0–0.05)
IMM GRANULOCYTES NFR BLD AUTO: 0.7 % (ref 0–0.5)
L PNEUMO1 AG UR QL IA: NEGATIVE
LYMPHOCYTES # BLD AUTO: 0.53 10*3/MM3 (ref 0.7–3.1)
LYMPHOCYTES NFR BLD AUTO: 2.8 % (ref 19.6–45.3)
M PNEUMO IGG SER IA-ACNC: NOT DETECTED
MCH RBC QN AUTO: 27.5 PG (ref 26.6–33)
MCHC RBC AUTO-ENTMCNC: 31.4 G/DL (ref 31.5–35.7)
MCV RBC AUTO: 87.5 FL (ref 79–97)
MONOCYTES # BLD AUTO: 0.83 10*3/MM3 (ref 0.1–0.9)
MONOCYTES NFR BLD AUTO: 4.4 % (ref 5–12)
NEUTROPHILS NFR BLD AUTO: 17.35 10*3/MM3 (ref 1.7–7)
NEUTROPHILS NFR BLD AUTO: 91.7 % (ref 42.7–76)
NRBC BLD AUTO-RTO: 0 /100 WBC (ref 0–0.2)
PLATELET # BLD AUTO: 245 10*3/MM3 (ref 140–450)
PMV BLD AUTO: 10.2 FL (ref 6–12)
POTASSIUM SERPL-SCNC: 3.3 MMOL/L (ref 3.5–5.2)
RBC # BLD AUTO: 4.15 10*6/MM3 (ref 3.77–5.28)
RHINOVIRUS RNA SPEC NAA+PROBE: NOT DETECTED
RSV RNA NPH QL NAA+NON-PROBE: NOT DETECTED
S PNEUM AG SPEC QL LA: NEGATIVE
SARS-COV-2 RNA PNL SPEC NAA+PROBE: NOT DETECTED
SODIUM SERPL-SCNC: 138 MMOL/L (ref 136–145)
TSH SERPL DL<=0.05 MIU/L-ACNC: 2.85 UIU/ML (ref 0.27–4.2)
WBC # BLD AUTO: 18.91 10*3/MM3 (ref 3.4–10.8)

## 2020-08-15 PROCEDURE — 25010000002 VANCOMYCIN 10 G RECONSTITUTED SOLUTION: Performed by: NURSE PRACTITIONER

## 2020-08-15 PROCEDURE — 87899 AGENT NOS ASSAY W/OPTIC: CPT | Performed by: NURSE PRACTITIONER

## 2020-08-15 PROCEDURE — 85025 COMPLETE CBC W/AUTO DIFF WBC: CPT | Performed by: NURSE PRACTITIONER

## 2020-08-15 PROCEDURE — 97535 SELF CARE MNGMENT TRAINING: CPT

## 2020-08-15 PROCEDURE — 0202U NFCT DS 22 TRGT SARS-COV-2: CPT | Performed by: INTERNAL MEDICINE

## 2020-08-15 PROCEDURE — 70551 MRI BRAIN STEM W/O DYE: CPT

## 2020-08-15 PROCEDURE — 71275 CT ANGIOGRAPHY CHEST: CPT

## 2020-08-15 PROCEDURE — 25010000002 PIPERACILLIN SOD-TAZOBACTAM PER 1 G: Performed by: NURSE PRACTITIONER

## 2020-08-15 PROCEDURE — 99221 1ST HOSP IP/OBS SF/LOW 40: CPT | Performed by: NURSE PRACTITIONER

## 2020-08-15 PROCEDURE — 97116 GAIT TRAINING THERAPY: CPT

## 2020-08-15 PROCEDURE — 97162 PT EVAL MOD COMPLEX 30 MIN: CPT

## 2020-08-15 PROCEDURE — 84443 ASSAY THYROID STIM HORMONE: CPT | Performed by: INTERNAL MEDICINE

## 2020-08-15 PROCEDURE — 80048 BASIC METABOLIC PNL TOTAL CA: CPT | Performed by: NURSE PRACTITIONER

## 2020-08-15 PROCEDURE — 87641 MR-STAPH DNA AMP PROBE: CPT

## 2020-08-15 PROCEDURE — 99223 1ST HOSP IP/OBS HIGH 75: CPT | Performed by: INTERNAL MEDICINE

## 2020-08-15 PROCEDURE — 0 IOPAMIDOL PER 1 ML: Performed by: EMERGENCY MEDICINE

## 2020-08-15 RX ORDER — LEVOTHYROXINE SODIUM 175 UG/1
175 TABLET ORAL DAILY
Status: DISCONTINUED | OUTPATIENT
Start: 2020-08-15 | End: 2020-08-18 | Stop reason: HOSPADM

## 2020-08-15 RX ORDER — SODIUM CHLORIDE 0.9 % (FLUSH) 0.9 %
10 SYRINGE (ML) INJECTION EVERY 12 HOURS SCHEDULED
Status: DISCONTINUED | OUTPATIENT
Start: 2020-08-15 | End: 2020-08-18 | Stop reason: HOSPADM

## 2020-08-15 RX ORDER — ASPIRIN 81 MG/1
81 TABLET ORAL DAILY
Status: DISCONTINUED | OUTPATIENT
Start: 2020-08-15 | End: 2020-08-18 | Stop reason: HOSPADM

## 2020-08-15 RX ORDER — HYDROCHLOROTHIAZIDE 25 MG/1
25 TABLET ORAL DAILY
Status: DISCONTINUED | OUTPATIENT
Start: 2020-08-15 | End: 2020-08-15

## 2020-08-15 RX ORDER — SACCHAROMYCES BOULARDII 250 MG
250 CAPSULE ORAL DAILY
Status: DISCONTINUED | OUTPATIENT
Start: 2020-08-15 | End: 2020-08-18 | Stop reason: HOSPADM

## 2020-08-15 RX ORDER — CHOLECALCIFEROL (VITAMIN D3) 125 MCG
2.5 CAPSULE ORAL NIGHTLY
Status: DISCONTINUED | OUTPATIENT
Start: 2020-08-15 | End: 2020-08-18 | Stop reason: HOSPADM

## 2020-08-15 RX ORDER — DONEPEZIL HYDROCHLORIDE 10 MG/1
10 TABLET, FILM COATED ORAL NIGHTLY
Status: DISCONTINUED | OUTPATIENT
Start: 2020-08-15 | End: 2020-08-18 | Stop reason: HOSPADM

## 2020-08-15 RX ORDER — BUSPIRONE HYDROCHLORIDE 10 MG/1
10 TABLET ORAL 2 TIMES DAILY
Status: DISCONTINUED | OUTPATIENT
Start: 2020-08-15 | End: 2020-08-18 | Stop reason: HOSPADM

## 2020-08-15 RX ORDER — PANTOPRAZOLE SODIUM 40 MG/1
40 TABLET, DELAYED RELEASE ORAL
Status: DISCONTINUED | OUTPATIENT
Start: 2020-08-15 | End: 2020-08-18 | Stop reason: HOSPADM

## 2020-08-15 RX ORDER — AMIODARONE HYDROCHLORIDE 200 MG/1
200 TABLET ORAL EVERY 12 HOURS
Status: DISCONTINUED | OUTPATIENT
Start: 2020-08-15 | End: 2020-08-15

## 2020-08-15 RX ORDER — MELATONIN
1000 DAILY
Status: DISCONTINUED | OUTPATIENT
Start: 2020-08-15 | End: 2020-08-18 | Stop reason: HOSPADM

## 2020-08-15 RX ORDER — ACETAMINOPHEN 650 MG/1
650 SUPPOSITORY RECTAL EVERY 4 HOURS PRN
Status: DISCONTINUED | OUTPATIENT
Start: 2020-08-15 | End: 2020-08-18 | Stop reason: HOSPADM

## 2020-08-15 RX ORDER — ACETAMINOPHEN 325 MG/1
650 TABLET ORAL EVERY 4 HOURS PRN
Status: DISCONTINUED | OUTPATIENT
Start: 2020-08-15 | End: 2020-08-18 | Stop reason: HOSPADM

## 2020-08-15 RX ORDER — SODIUM CHLORIDE 0.9 % (FLUSH) 0.9 %
10 SYRINGE (ML) INJECTION AS NEEDED
Status: DISCONTINUED | OUTPATIENT
Start: 2020-08-15 | End: 2020-08-18 | Stop reason: HOSPADM

## 2020-08-15 RX ORDER — BETHANECHOL CHLORIDE 10 MG/1
10 TABLET ORAL
Status: DISCONTINUED | OUTPATIENT
Start: 2020-08-15 | End: 2020-08-18 | Stop reason: HOSPADM

## 2020-08-15 RX ORDER — ACETAMINOPHEN 160 MG/5ML
650 SOLUTION ORAL EVERY 4 HOURS PRN
Status: DISCONTINUED | OUTPATIENT
Start: 2020-08-15 | End: 2020-08-18 | Stop reason: HOSPADM

## 2020-08-15 RX ORDER — MEMANTINE HYDROCHLORIDE 10 MG/1
10 TABLET ORAL 2 TIMES DAILY
Status: DISCONTINUED | OUTPATIENT
Start: 2020-08-15 | End: 2020-08-18 | Stop reason: HOSPADM

## 2020-08-15 RX ORDER — GABAPENTIN 300 MG/1
300 CAPSULE ORAL 2 TIMES DAILY
Status: DISCONTINUED | OUTPATIENT
Start: 2020-08-15 | End: 2020-08-18 | Stop reason: HOSPADM

## 2020-08-15 RX ORDER — LISINOPRIL 10 MG/1
10 TABLET ORAL
Status: DISCONTINUED | OUTPATIENT
Start: 2020-08-15 | End: 2020-08-18 | Stop reason: HOSPADM

## 2020-08-15 RX ORDER — MULTIPLE VITAMINS W/ MINERALS TAB 9MG-400MCG
1 TAB ORAL DAILY
Status: DISCONTINUED | OUTPATIENT
Start: 2020-08-15 | End: 2020-08-18 | Stop reason: HOSPADM

## 2020-08-15 RX ORDER — SUCRALFATE 1 G/1
1 TABLET ORAL
Status: DISCONTINUED | OUTPATIENT
Start: 2020-08-15 | End: 2020-08-18 | Stop reason: HOSPADM

## 2020-08-15 RX ORDER — POTASSIUM CHLORIDE 750 MG/1
40 CAPSULE, EXTENDED RELEASE ORAL ONCE
Status: COMPLETED | OUTPATIENT
Start: 2020-08-15 | End: 2020-08-15

## 2020-08-15 RX ADMIN — POTASSIUM CHLORIDE 40 MEQ: 10 CAPSULE, COATED, EXTENDED RELEASE ORAL at 16:43

## 2020-08-15 RX ADMIN — ASPIRIN 81 MG: 81 TABLET, COATED ORAL at 08:28

## 2020-08-15 RX ADMIN — GABAPENTIN 300 MG: 300 CAPSULE ORAL at 08:29

## 2020-08-15 RX ADMIN — MEMANTINE 10 MG: 10 TABLET ORAL at 08:27

## 2020-08-15 RX ADMIN — LEVOTHYROXINE SODIUM 175 MCG: 175 TABLET ORAL at 05:46

## 2020-08-15 RX ADMIN — SODIUM CHLORIDE, PRESERVATIVE FREE 10 ML: 5 INJECTION INTRAVENOUS at 03:36

## 2020-08-15 RX ADMIN — TAZOBACTAM SODIUM AND PIPERACILLIN SODIUM 3.38 G: 375; 3 INJECTION, SOLUTION INTRAVENOUS at 03:35

## 2020-08-15 RX ADMIN — SUCRALFATE 1 G: 1 TABLET ORAL at 05:46

## 2020-08-15 RX ADMIN — CHOLECALCIFEROL (VITAMIN D3) 25 MCG (1,000 UNIT) TABLET 1000 UNITS: TABLET at 08:29

## 2020-08-15 RX ADMIN — TAZOBACTAM SODIUM AND PIPERACILLIN SODIUM 3.38 G: 375; 3 INJECTION, SOLUTION INTRAVENOUS at 10:44

## 2020-08-15 RX ADMIN — PANTOPRAZOLE SODIUM 40 MG: 40 TABLET, DELAYED RELEASE ORAL at 12:21

## 2020-08-15 RX ADMIN — MELATONIN TAB 5 MG 2.5 MG: 5 TAB at 03:35

## 2020-08-15 RX ADMIN — APIXABAN 2.5 MG: 2.5 TABLET, FILM COATED ORAL at 03:35

## 2020-08-15 RX ADMIN — BETHANECHOL CHLORIDE 10 MG: 10 TABLET ORAL at 12:12

## 2020-08-15 RX ADMIN — MELATONIN TAB 5 MG 2.5 MG: 5 TAB at 21:02

## 2020-08-15 RX ADMIN — BETHANECHOL CHLORIDE 10 MG: 10 TABLET ORAL at 05:46

## 2020-08-15 RX ADMIN — Medication 250 MG: at 08:27

## 2020-08-15 RX ADMIN — DONEPEZIL HYDROCHLORIDE 10 MG: 10 TABLET, FILM COATED ORAL at 21:03

## 2020-08-15 RX ADMIN — SERTRALINE HYDROCHLORIDE 50 MG: 50 TABLET, FILM COATED ORAL at 08:27

## 2020-08-15 RX ADMIN — VANCOMYCIN HYDROCHLORIDE 1250 MG: 10 INJECTION, POWDER, LYOPHILIZED, FOR SOLUTION INTRAVENOUS at 05:50

## 2020-08-15 RX ADMIN — BUSPIRONE HYDROCHLORIDE 10 MG: 10 TABLET ORAL at 08:27

## 2020-08-15 RX ADMIN — APIXABAN 2.5 MG: 2.5 TABLET, FILM COATED ORAL at 08:36

## 2020-08-15 RX ADMIN — Medication 1 TABLET: at 08:28

## 2020-08-15 RX ADMIN — MULTIPLE VITAMINS W/ MINERALS TAB 1 TABLET: TAB at 08:27

## 2020-08-15 RX ADMIN — IOPAMIDOL 87.7 ML: 755 INJECTION, SOLUTION INTRAVENOUS at 02:57

## 2020-08-15 RX ADMIN — SUCRALFATE 1 G: 1 TABLET ORAL at 16:42

## 2020-08-15 RX ADMIN — APIXABAN 2.5 MG: 2.5 TABLET, FILM COATED ORAL at 21:03

## 2020-08-15 RX ADMIN — BUSPIRONE HYDROCHLORIDE 10 MG: 10 TABLET ORAL at 16:42

## 2020-08-15 RX ADMIN — MEMANTINE 10 MG: 10 TABLET ORAL at 21:03

## 2020-08-15 RX ADMIN — SODIUM CHLORIDE, PRESERVATIVE FREE 10 ML: 5 INJECTION INTRAVENOUS at 08:30

## 2020-08-15 RX ADMIN — GABAPENTIN 300 MG: 300 CAPSULE ORAL at 21:02

## 2020-08-15 RX ADMIN — LISINOPRIL 10 MG: 10 TABLET ORAL at 08:27

## 2020-08-15 RX ADMIN — BETHANECHOL CHLORIDE 10 MG: 10 TABLET ORAL at 16:42

## 2020-08-15 NOTE — PROGRESS NOTES
Chart reviewed, patient seen and examined.  In brief, 89-year-old woman with HTN, HLD, IBS, macular degeneration, mild dementia and recent admission 8/2 through 8/9/2020 with pneumonia, UTI and new A. fib with RVR, admitted yesterday after she reported her legs would not work when she tried to get up yesterday morning.  She noted weakness in her legs and inability to control them.  She denies any headache, vision changes such as blurred or double vision, difficulty with speech or swallowing or any difficulty with using her arms.    Patient's neurologic exam now is unrevealing-she is alert, oriented and appropriate, not aphasic and with no dysarthria.  Cranial nerve exam is unremarkable except for decreased hearing  Motor exam shows symmetric 4/5 strength throughout  Coordination shows intact finger-nose-finger and heel-knee-shin  Sensory exam shows intact vibration in the toes, light touch symmetric    Brain MRI images reviewed, agree no acute abnormality extensive chronic deep white matter change and atrophy consistent with age    Impression:   Bilateral lower extremity ataxia- suspect related to amiodarone given lack of evidence of stroke, and normal peripheral nerve exam.  Agree with holding amiodarone, PT evaluation.  Consider lumbar spine MRI if no improvement with holding amiodarone.

## 2020-08-15 NOTE — OUTREACH NOTE
COPD/PN Week 2 Survey      Responses   [facility] patient discharged from?  [facility]   COVID-19 Test Status  Negative   Does the patient have one of the following disease processes/diagnoses(primary or secondary)?  COPD/Pneumonia   Was the primary reason for admission:  Pneumonia   Week 2 attempt successful?  No   Revoke  Readmitted          Merced Turk RN

## 2020-08-15 NOTE — THERAPY EVALUATION
Patient Name: Temi Jarrett  : 1930    MRN: [id number]                              Today's Date: 8/15/2020       Admit Date: 2020    Visit Dx:     ICD-10-CM ICD-9-CM   1. Ataxia R27.0 781.3   2. History of pneumonia Z87.01 V12.61   3. History of UTI Z87.440 V13.02   4. History of dementia Z86.59 V11.8   5. PAF (paroxysmal atrial fibrillation) (CMS/Formerly [facility]) I48.0 427.31     Patient Active Problem List   Diagnosis   • Esophageal reflux   • Hypothyroidism (acquired)   • Generalized anxiety disorder   • Peripheral neuropathy   • Spinal stenosis of lumbar region   • Osteoporosis   • Disc disorder of lumbar region   • Bladder prolapse, female, acquired   • Essential hypertension   • Pernicious anemia   • Annual physical exam   • Primary osteoarthritis of both knees   • Massive hiatal hernia with intrathoracic stomach   • Acute UTI (urinary tract infection)   • Pneumonia of both lower lobes due to infectious organism   • Paroxysmal atrial fibrillation (CMS/Formerly [facility])   • Pericardial effusion   • Dementia (CMS/Formerly [facility])   • Ataxia   • HCAP (healthcare-associated pneumonia)   • Weakness of both lower extremities   • Prolonged Q-T interval on ECG     Past Medical History:   Diagnosis Date   • Arthritis    • Bladder prolapse, female, acquired    • Closed fracture of neck of left femur (CMS/Formerly [facility]) 2019   • Dementia (CMS/Formerly [facility])    • Depression    • Disease of thyroid gland    • Gastric polyp    • GERD (gastroesophageal reflux disease)    • History of colonic polyps    • Hyperlipidemia    • Hypertension    • IBS (irritable bowel syndrome)    • Macular degeneration    • Torn meniscus     right knee      Past Surgical History:   Procedure Laterality Date   • CHOLECYSTECTOMY     • EYE SURGERY      Cataract extraction   • HERNIA REPAIR     • HIP HEMIARTHROPLASTY Left 2019    Procedure: HIP HEMIARTHROPLASTY LEFT;  Surgeon: Reddy Segundo Jr., MD;  [facility];  Service: Orthopedics   • HYSTERECTOMY     •  KNEE SURGERY Right     arthroscopy- 2000   • TONSILLECTOMY       General Information     Row Name 08/15/20 1608          PT Evaluation Time/Intention    Document Type  evaluation  -     Mode of Treatment  physical therapy  -     Row Name 08/15/20 1608          General Information    Patient Profile Reviewed?  yes  -LS     Prior Level of Function  min assist:;all household mobility;ADL's;bathing;dressing  -     Existing Precautions/Restrictions  fall;oxygen therapy device and L/min  -     Row Name 08/15/20 1608          Relationship/Environment    Lives With  child(darvin), adult;grandchild(darvin)  -     Row Name 08/15/20 1608          Resource/Environmental Concerns    Current Living Arrangements  home/apartment/condo  -     Row Name 08/15/20 1608          Home Main Entrance    Number of Stairs, Main Entrance  one  -[facility] Name 08/15/20 1608          Stairs Within Home, Primary    Stairs, Within Home, Primary  17; landing after initial 7 steps  -     Number of Stairs, Within Home, Primary  other (see comments)  -     Row Name 08/15/20 1608          Cognitive Assessment/Intervention- PT/OT    Orientation Status (Cognition)  oriented x 4  -[facility] Name 08/15/20 1608          Safety Issues, Functional Mobility    Impairments Affecting Function (Mobility)  balance;endurance/activity tolerance;shortness of breath;strength;postural/trunk control  -       User Key  (r) = Recorded By, (t) = Taken By, (c) = Cosigned By    Initials Name Provider Type    LS Ale Harper, PT Physical Therapist        Mobility     Row Name 08/15/20 1609          Bed Mobility Assessment/Treatment    Bed Mobility Assessment/Treatment  sit-supine  -LS     Supine-Sit [place] (Bed Mobility)  minimum assist (75% patient effort);verbal cues  -LS     Comment (Bed Mobility)  UIC upon arrival  -     Row Name 08/15/20 1609          Bed-Chair Transfer    Bed-Chair [place] (Transfers)  contact guard;verbal cues  -LS      Assistive Device (Bed-Chair Transfers)  walker, front-wheeled  -LS     Row Name 08/15/20 1609          Gait/Stairs Assessment/Training    Gait/Stairs Assessment/Training  gait/ambulation assistive device  -LS     [place] Level (Gait)  minimum assist (75% patient effort);verbal cues  -LS     Assistive Device (Gait)  walker, front-wheeled  -LS     Distance in Feet (Gait)  210  -LS     Deviations/Abnormal Patterns (Gait)  bilateral deviations;kane decreased;stride length decreased  -LS     Bilateral Gait Deviations  forward flexed posture;heel strike decreased  -LS     Comment (Gait/Stairs)  VC for upright posture and keeping RW close to body. 3 brief standing rest breaks due to fatigue.  -LS       User Key  (r) = Recorded By, (t) = Taken By, (c) = Cosigned By    Initials Name Provider Type    Ale Stauffer, PT Physical Therapist        Obj/Interventions     Row Name 08/15/20 1610          General ROM    GENERAL ROM COMMENTS  BLE WFL  -LS     Row Name 08/15/20 1610          MMT (Manual Muscle Testing)    General MMT Comments  BLE grossly 4-/5  -LS     Row Name 08/15/20 1610          Static Sitting Balance    Level of [place] (Unsupported Sitting, Static Balance)  supervision  -LS     Sitting Position (Unsupported Sitting, Static Balance)  sitting in chair  -LS     Row Name 08/15/20 1610          Static Standing Balance    Level of [place] (Supported Standing, Static Balance)  contact guard assist  -LS     Assistive Device Utilized (Supported Standing, Static Balance)  walker, rolling  -LS     Row Name 08/15/20 1610          Sensory Assessment/Intervention    Sensory General Assessment  no sensation deficits identified BLE  -LS       User Key  (r) = Recorded By, (t) = Taken By, (c) = Cosigned By    Initials Name Provider Type    Ale Stauffer, PT Physical Therapist        Goals/Plan     Row Name 08/15/20 1617          Bed Mobility Goal 1 (PT)    Activity/Assistive Device (Bed Mobility Goal 1,  PT)  sit to supine/supine to sit  -LS     [place] Level/Cues Needed (Bed Mobility Goal 1, PT)  independent  -LS     Time Frame (Bed Mobility Goal 1, PT)  2 weeks  -LS     Progress/Outcomes (Bed Mobility Goal 1, PT)  goal ongoing  -LS     Row Name 08/15/20 1617          Transfer Goal 1 (PT)    Activity/Assistive Device (Transfer Goal 1, PT)  sit-to-stand/stand-to-sit  -LS     [place] Level/Cues Needed (Transfer Goal 1, PT)  conditional independence  -LS     Time Frame (Transfer Goal 1, PT)  2 weeks  -LS     Progress/Outcome (Transfer Goal 1, PT)  goal ongoing  -LS     Row Name 08/15/20 1617          Gait Training Goal 1 (PT)    Activity/Assistive Device (Gait Training Goal 1, PT)  gait (walking locomotion);walker, rolling  -LS     [place] Level (Gait Training Goal 1, PT)  conditional independence  -LS     Distance (Gait Goal 1, PT)  400  -LS     Time Frame (Gait Training Goal 1, PT)  2 weeks  -LS     Progress/Outcome (Gait Training Goal 1, PT)  goal ongoing  -LS     Row Name 08/15/20 1617          Stairs Goal 1 (PT)    Activity/Assistive Device (Stairs Goal 1, PT)  ascending stairs;descending stairs  -LS     [place] Level/Cues Needed (Stairs Goal 1, PT)  supervision required  -LS     Number of Stairs (Stairs Goal 1, PT)  17  -LS     Time Frame (Stairs Goal 1, PT)  2 weeks  -LS     Progress/Outcome (Stairs Goal 1, PT)  goal ongoing  -LS       User Key  (r) = Recorded By, (t) = Taken By, (c) = Cosigned By    Initials Name Provider Type    LS Ale Harper, PT Physical Therapist        Clinical Impression     Row Name 08/15/20 1611          Pain Assessment    Additional Documentation  Pain Scale: Numbers Pre/Post-Treatment (Group)  -LS     Row Name 08/15/20 1611          Pain Scale: Numbers Pre/Post-Treatment    Pain Scale: Numbers, Pretreatment  0/10 - no pain  -LS     Pain Scale: Numbers, Post-Treatment  0/10 - no pain  -LS     Row Name 08/15/20 1611          Plan of Care Review    Plan of Care  Reviewed With  patient  -LS     Progress  no change  -LS     Outcome Summary  PT initial evaluation completed; pt demonstrates generalized weakness and decreased indep and functional endurance re: mobility tasks, warranting further skilled PT services to promote PLOF and safe d/c. Limited today by fatigue; able to ambulate 210 ft with RW, min A and frequent rest breaks. Recommend d/c home with assist and HHPT services based upon current functional status; TBA further pending progress and social support (as pt will need to be able to ambulate 17 steps at home).   -     Row Name 08/15/20 1611          Physical Therapy Clinical Impression    Patient/Family Goals Statement (PT Clinical Impression)  return to PLOF; go home  -LS     Criteria for Skilled Interventions Met (PT Clinical Impression)  yes;treatment indicated  -LS     Rehab Potential (PT Clinical Summary)  good, to achieve stated therapy goals  -LS     Row Name 08/15/20 1611          Vital Signs    Pre Systolic BP Rehab  137  -LS     Pre Treatment Diastolic BP  70  -LS     Pretreatment Heart Rate (beats/min)  57  -LS     Posttreatment Heart Rate (beats/min)  76  -LS     Pre SpO2 (%)  96  -LS     O2 Delivery Pre Treatment  nasal cannula  -LS     Post SpO2 (%)  93  -LS     O2 Delivery Post Treatment  nasal cannula  -LS     Pre Patient Position  Sitting  -LS     Intra Patient Position  Standing  -LS     Post Patient Position  Supine  -LS     Row Name 08/15/20 1611          Positioning and Restraints    Pre-Treatment Position  sitting in chair/recliner  -LS     Post Treatment Position  bed  -LS     In Bed  notified nsg;fowlers;exit alarm on;encouraged to call for assist;call light within reach  -LS       User Key  (r) = Recorded By, (t) = Taken By, (c) = Cosigned By    Initials Name Provider Type    Ale Stauffer, PT Physical Therapist        Outcome Measures     Row Name 08/15/20 1618          How much help from another person do you currently need...     Turning from your back to your side while in flat bed without using bedrails?  3  -LS     Moving from lying on back to sitting on the side of a flat bed without bedrails?  3  -LS     Moving to and from a bed to a chair (including a wheelchair)?  3  -LS     Standing up from a chair using your arms (e.g., wheelchair, bedside chair)?  3  -LS     Climbing 3-5 steps with a railing?  2  -LS     To walk in hospital room?  3  -LS     AM-PAC 6 Clicks Score (PT)  17  -LS     Row Name 08/15/20 1618          Functional Assessment    Outcome Measure Options  AM-PAC 6 Clicks Basic Mobility (PT)  -       User Key  (r) = Recorded By, (t) = Taken By, (c) = Cosigned By    Initials Name Provider Type    Ale Stauffer, PT Physical Therapist        Physical Therapy Education                 Title: PT OT SLP Therapies (In Progress)     Topic: Physical Therapy (In Progress)     Point: Mobility training (Done)     Description:   Instruct learner(s) on safety and technique for assisting patient out of bed, chair or wheelchair.  Instruct in the proper use of assistive devices, such as walker, crutches, cane or brace.              Patient Friendly Description:   It's important to get you on your feet again, but we need to do so in a way that is safe for you. Falling has serious consequences, and your personal safety is the most important thing of all.        When it's time to get out of bed, one of us or a family member will sit next to you on the bed to give you support.     If your doctor or nurse tells you to use a walker, crutches, a cane, or a brace, be sure you use it every time you get out of bed, even if you think you don't need it.    Learning Progress Summary           Patient Acceptance, E,D, NR,VU by  at 8/15/2020 1619                   Point: Home exercise program (Not Started)     Description:   Instruct learner(s) on appropriate technique for monitoring, assisting and/or progressing patient with therapeutic exercises  and activities.              Learner Progress:   Not documented in this visit.          Point: Body mechanics (Done)     Description:   Instruct learner(s) on proper positioning and spine alignment for patient and/or caregiver during mobility tasks and/or exercises.              Learning Progress Summary           Patient Acceptance, E,D, NR,VU by  at 8/15/2020 1619                   Point: Precautions (Done)     Description:   Instruct learner(s) on prescribed precautions during mobility and gait tasks              Learning Progress Summary           Patient Acceptance, E,D, NR,VU by  at 8/15/2020 1619                               User Key     Initials Effective Dates Name Provider Type Discipline     06/19/15 -  Ale Harper, PT Physical Therapist PT              PT Recommendation and Plan  Planned Therapy Interventions (PT Eval): balance training, bed mobility training, gait training, home exercise program, transfer training, strengthening, stair training, patient/family education  Outcome Summary/Treatment Plan (PT)  Anticipated Discharge Disposition (PT): home with assist, home with home health  Plan of Care Reviewed With: patient  Progress: no change  Outcome Summary: PT initial evaluation completed; pt demonstrates generalized weakness and decreased indep and functional endurance re: mobility tasks, warranting further skilled PT services to promote PLOF and safe d/c. Limited today by fatigue; able to ambulate 210 ft with RW, min A and frequent rest breaks. Recommend d/c home with assist and HHPT services based upon current functional status; TBA further pending progress and social support (as pt will need to be able to ambulate 17 steps at home).      Time Calculation:   PT Charges     Row Name 08/15/20 1619             Time Calculation    Start Time  1440  -LS      PT Received On  08/15/20  -      PT Goal Re-Cert Due Date  08/25/20  -         Time Calculation- PT    Total Timed Code Minutes- PT   12 minute(s)  -LS         Timed Charges    21316 - Gait Training Minutes   12  -LS        User Key  (r) = Recorded By, (t) = Taken By, (c) = Cosigned By    Initials Name Provider Type    Ale Stauffer, PT Physical Therapist        Therapy Charges for Today     Code Description Service Date Service Provider Modifiers Qty    33673105995 HC PT EVAL MOD COMPLEXITY 3 8/15/2020 Ale Harper, PT GP 1    [id number] HC GAIT TRAINING EA 15 MIN 8/15/2020 Ale Harper, PT GP 1          PT G-Codes  Outcome Measure Options: AM-PAC 6 Clicks Basic Mobility (PT)  AM-PAC 6 Clicks Score (PT): 17  AM-PAC 6 Clicks Score (OT): 14    Ale Harper, PT  8/15/2020

## 2020-08-15 NOTE — THERAPY EVALUATION
Acute Care - Occupational Therapy Initial Evaluation  [facility]     Patient Name: Temi Jarrett  : 1930  MRN: [id number]  Today's Date: 8/15/2020             Admit Date: 2020       ICD-10-CM ICD-9-CM   1. Ataxia R27.0 781.3   2. History of pneumonia Z87.01 V12.61   3. History of UTI Z87.440 V13.02   4. History of dementia Z86.59 V11.8   5. PAF (paroxysmal atrial fibrillation) (CMS/[facility]) I48.0 427.31     Patient Active Problem List   Diagnosis   • Esophageal reflux   • Hypothyroidism (acquired)   • Generalized anxiety disorder   • Peripheral neuropathy   • Spinal stenosis of lumbar region   • Osteoporosis   • Disc disorder of lumbar region   • Bladder prolapse, female, acquired   • Essential hypertension   • Pernicious anemia   • Annual physical exam   • Primary osteoarthritis of both knees   • Massive hiatal hernia with intrathoracic stomach   • Acute UTI (urinary tract infection)   • Pneumonia of both lower lobes due to infectious organism   • Paroxysmal atrial fibrillation (CMS/[facility])   • Pericardial effusion   • Dementia (CMS/[facility])   • Ataxia   • HCAP (healthcare-associated pneumonia)   • Weakness of both lower extremities   • Prolonged Q-T interval on ECG     Past Medical History:   Diagnosis Date   • Arthritis    • Bladder prolapse, female, acquired    • Closed fracture of neck of left femur (CMS/[facility]) 2019   • Dementia (CMS/[facility])    • Depression    • Disease of thyroid gland    • Gastric polyp    • GERD (gastroesophageal reflux disease)    • History of colonic polyps    • Hyperlipidemia    • Hypertension    • IBS (irritable bowel syndrome)    • Macular degeneration    • Torn meniscus     right knee      Past Surgical History:   Procedure Laterality Date   • CHOLECYSTECTOMY     • EYE SURGERY      Cataract extraction   • HERNIA REPAIR     • HIP HEMIARTHROPLASTY Left 2019    Procedure: HIP HEMIARTHROPLASTY LEFT;  Surgeon: Reddy Segundo Jr., MD;  Location: [facility];  Service:  Orthopedics   • HYSTERECTOMY  1976   • KNEE SURGERY Right     arthroscopy- 2000   • TONSILLECTOMY            OT ASSESSMENT FLOWSHEET (last 12 hours)      Occupational Therapy Evaluation     Row Name 08/15/20 2451                   OT Evaluation Time/Intention    Subjective Information  complains of;fatigue  -KA        Document Type  evaluation  -KA        Mode of Treatment  occupational therapy  -KA        Patient Effort  excellent  -KA        Symptoms Noted During/After Treatment  shortness of breath  -KA           General Information    Patient Profile Reviewed?  yes  -KA        Patient Observations  cooperative;alert;agree to therapy  -KA        Prior Level of Function  min assist:;all household mobility;transfer;ADL's;dependent:;home management;cooking;cleaning;driving;shopping  -KA        Equipment Currently Used at Home  grab bar;raised toilet;shower chair;walker, rolling  -KA        Existing Precautions/Restrictions  fall;oxygen therapy device and L/min  -KA        Risks Reviewed  patient:;LOB;nausea/vomiting;dizziness;increased discomfort;change in vital signs;increased drainage;lines disloged  -KA        Benefits Reviewed  patient:;improve function;increase independence;decrease pain;increase strength;increase balance;improve skin integrity;decrease risk of DVT;increase knowledge  -KA           Relationship/Environment    Lives With  child(darvin), adult;grandchild(darvin)  -KA        Family Caregiver if Needed  child(darvin), adult;grandchild(darvin), adult  -KA           Resource/Environmental Concerns    Current Living Arrangements  home/apartment/condo  -KA           Stairs Within Home, Primary    Stairs Comment, Within Home, Primary  2 flights up with landing to bed/bath, pt cant stay on main floor.   -KA           Cognitive Assessment/Interventions    Additional Documentation  Cognitive Assessment/Intervention (Group)  -KA           Cognitive Assessment/Intervention- PT/OT    Affect/Mental Status (Cognitive)  WNL   -KA        Orientation Status (Cognition)  oriented x 4  -KA        Follows Commands (Cognition)  WNL  -KA        Cognitive Function (Cognitive)  WNL  -KA           Bed Mobility Assessment/Treatment    Bed Mobility Assessment/Treatment  supine-sit  -KA        Supine-Sit Lanier (Bed Mobility)  moderate assist (50% patient effort)  -KA        Assistive Device (Bed Mobility)  bed rails;draw sheet;head of bed elevated  -KA           Functional Mobility    Functional Mobility- Ind. Level  contact guard assist  -KA        Functional Mobility- Device  rolling walker  -KA           Transfer Assessment/Treatment    Transfer Assessment/Treatment  toilet transfer;bed-chair transfer  -KA           Bed-Chair Transfer    Bed-Chair Lanier (Transfers)  contact guard  -KA        Assistive Device (Bed-Chair Transfers)  walker, front-wheeled  -KA           Toilet Transfer    Type (Toilet Transfer)  stand pivot/stand step  -KA        Lanier Level (Toilet Transfer)  contact guard;verbal cues  -KA        Assistive Device (Toilet Transfer)  commode, bedside without drop arms;walker, front-wheeled  -KA           BADL Safety/Performance    Impairments, BADL Safety/Performance  endurance/activity tolerance;balance;shortness of breath;strength  -KA           General ROM    GENERAL ROM COMMENTS  B UE WFL  -KA           MMT (Manual Muscle Testing)    General MMT Comments  B UE grossly 4-/5  -KA           Positioning and Restraints    Pre-Treatment Position  in bed  -KA        Post Treatment Position  chair  -KA        In Chair  reclined;call light within reach;encouraged to call for assist;exit alarm on;waffle cushion;legs elevated;heels elevated  -KA           Plan of Care Review    Plan of Care Reviewed With  patient  -KA           Clinical Impression (OT)    OT Diagnosis  decreased ADL/IADL independence  -KA        Patient/Family Goals Statement (OT Eval)  return to OF  -        Criteria for Skilled Therapeutic  Interventions Met (OT Eval)  yes;treatment indicated  -KA        Rehab Potential (OT Eval)  good, to achieve stated therapy goals  -KA        Therapy Frequency (OT Eval)  daily  -KA        Predicted Duration of Therapy Intervention (Therapy Eval)  10  -KA        Care Plan Review (OT)  care plan/treatment goals reviewed;evaluation/treatment results reviewed;risks/benefits reviewed;current/potential barriers reviewed;patient/other agree to care plan  -KA        Anticipated Discharge Disposition (OT)  inpatient rehabilitation facility  -KA           Vital Signs    Pre SpO2 (%)  89  -KA        O2 Delivery Pre Treatment  room air  -KA        Intra SpO2 (%)  92  -KA        O2 Delivery Intra Treatment  nasal cannula  -KA        Post SpO2 (%)  93  -KA        O2 Delivery Post Treatment  nasal cannula  -KA        Pre Patient Position  Supine  -KA        Intra Patient Position  Standing  -KA        Post Patient Position  Sitting  -KA           OT Goals    Transfer Goal Selection (OT)  transfer, OT goal 1  -KA        Toileting Goal Selection (OT)  toileting, OT goal 1  -KA        Grooming Goal Selection (OT)  grooming, OT goal 1  -KA        Strength Goal Selection (OT)  strength, OT goal 1  -KA        Additional Documentation  Grooming Goal Selection (OT) (Row);Strength Goal Selection (OT) (Row)  -KA           Transfer Goal 1 (OT)    Activity/Assistive Device (Transfer Goal 1, OT)  toilet;walker, rolling  -KA        Dent Level/Cues Needed (Transfer Goal 1, OT)  standby assist  -KA        Time Frame (Transfer Goal 1, OT)  long term goal (LTG);by discharge  -KA        Progress/Outcome (Transfer Goal 1, OT)  goal ongoing  -KA           Toileting Goal 1 (OT)    Activity/Device (Toileting Goal 1, OT)  adjust/manage clothing;perform perineal hygiene  -KA        Dent Level/Cues Needed (Toileting Goal 1, OT)  maximum assist (25-49% patient effort)  -KA        Time Frame (Toileting Goal 1, OT)  long term goal (LTG);by  discharge  -KA        Progress/Outcome (Toileting Goal 1, OT)  goal ongoing  -KA           Grooming Goal 1 (OT)    Activity/Device (Grooming Goal 1, OT)  hair care;oral care;wash face, hands sink side  -KA        Harvey (Grooming Goal 1, OT)  standby assist  -KA        Time Frame (Grooming Goal 1, OT)  long term goal (LTG);by discharge  -KA        Progress/Outcome (Grooming Goal 1, OT)  goal ongoing  -KA           Strength Goal 1 (OT)    Strength Goal 1 (OT)  Pt will improve B UE from 4-/5 to 4/5 and demo I w/HEP using yellow tband.   -KA        Time Frame (Strength Goal 1, OT)  long term goal (LTG);by discharge  -KA        Progress/Outcome (Strength Goal 1, OT)  goal ongoing  -KA           Living Environment    Home Accessibility  stairs within home;tub/shower is not walk in  -KA          User Key  (r) = Recorded By, (t) = Taken By, (c) = Cosigned By    Initials Name Effective Dates    Kate Graham, ROSEMARY 07/17/19 -          Occupational Therapy Education                 Title: PT OT SLP Therapies (In Progress)     Topic: Occupational Therapy (In Progress)     Point: ADL training (Done)     Description:   Instruct learner(s) on proper safety adaptation and remediation techniques during self care or transfers.   Instruct in proper use of assistive devices.              Learning Progress Summary           Patient Acceptance, E,D, VU,DU by SE at 8/15/2020 0985    Comment:  Pt educated on role of OT, safety, fall prevention, ADLs, transfers, and POC.                   Point: Home exercise program (Not Started)     Description:   Instruct learner(s) on appropriate technique for monitoring, assisting and/or progressing therapeutic exercises/activities.              Learner Progress:   Not documented in this visit.          Point: Precautions (Done)     Description:   Instruct learner(s) on prescribed precautions during self-care and functional transfers.              Learning Progress Summary           Patient  Acceptance, E,D, VU,DU by SE at 8/15/2020 0930    Comment:  Pt educated on role of OT, safety, fall prevention, ADLs, transfers, and POC.                   Point: Body mechanics (Done)     Description:   Instruct learner(s) on proper positioning and spine alignment during self-care, functional mobility activities and/or exercises.              Learning Progress Summary           Patient Acceptance, E,D, VU,DU by SE at 8/15/2020 0930    Comment:  Pt educated on role of OT, safety, fall prevention, ADLs, transfers, and POC.                               User Key     Initials Effective Dates Name Provider Type Discipline     07/17/19 -  Kate Milton OT Occupational Therapist OT                  OT Recommendation and Plan  Outcome Summary/Treatment Plan (OT)  Anticipated Discharge Disposition (OT): inpatient rehabilitation facility  Therapy Frequency (OT Eval): daily  Plan of Care Review  Plan of Care Reviewed With: patient  Plan of Care Reviewed With: patient  Outcome Summary: A&Ox4.  Supine>EOB: modA.  Bed>BSC: CGA w/RW.  BSC>Chair: CGA w/RW.  Limited by endurance, strength, medical.  Recommend IRF @ d/c pending progress.  Pt goal is to return home w/HH, however, limited by 2 flights of steps up to bed/bath.  Will cont to observe and address ADL/IADL deficits as needed.    Outcome Measures     Row Name 08/15/20 0930             How much help from another is currently needed...    Putting on and taking off regular lower body clothing?  2  -KA      Bathing (including washing, rinsing, and drying)  2  -KA      Toileting (which includes using toilet bed pan or urinal)  2  -KA      Putting on and taking off regular upper body clothing  2  -KA      Taking care of personal grooming (such as brushing teeth)  3  -KA      Eating meals  3  -KA      AM-PAC 6 Clicks Score (OT)  14  -KA         Functional Assessment    Outcome Measure Options  AM-PAC 6 Clicks Daily Activity (OT)  -        User Key  (r) = Recorded By, (t)  = Taken By, (c) = Cosigned By    Initials Name Provider Type    Kate Graham OT Occupational Therapist          Time Calculation:   Time Calculation- OT     Row Name 08/15/20 0930             Time Calculation- OT    OT Start Time  0930  -KA      OT Received On  08/15/20  -SE      OT Goal Re-Cert Due Date  08/25/20  -         Timed Charges    10455 - OT Self Care/Mgmt Minutes  10  -KA        User Key  (r) = Recorded By, (t) = Taken By, (c) = Cosigned By    Initials Name Provider Type    Kate Graham OT Occupational Therapist        Therapy Charges for Today     Code Description Service Date Service Provider Modifiers Qty    [id number] HC OT SELF CARE/MGMT/TRAIN EA 15 MIN 8/15/2020 Kate Milton OT GO 1               Kate Milton OT  8/15/2020

## 2020-08-15 NOTE — CONSULTS
"Stroke Consult Note    Patient Name: Temi Jarrett   MRN: [id number]  Age: 89 y.o.  Sex: female  : 1930    Primary Care Physician: Eric Patel MD  Referring Physician:  No ref. provider found    Race: white    Chief Complaint/Reason for Consultation: difficulty walking    HPI: Mrs Jarrett is a 89 year old white female with known diagnosis of dementia, afib, htn, as well as recent hospitalization 8.2.-89.20 for pneumonia, UTI, and pericardial effusion.  She was discharged home with home health and was doing well until today.  When she woke up from a nap, she was unable to walk even with the assistance of her walker.  Prior to that had been able to ambulate 14 stairs but was unable to go down stairs after waking up.  States \"my legs won't work and I can't walk.\"    Denies headache, numbness/tingling, vision changes, speech changes. Family did report fever 100.4 prior to admission.    Upon exam, no  Unilateral weakness but does have ataxia in bilateral lower extremities.  No dysarthria or aphasia. No visual field deficits.    Last Known Normal Date/Time: 1100 81420  EST     Review of Systems   Constitutional: Positive for activity change and fever.   HENT: Negative for trouble swallowing.    Eyes: Negative for visual disturbance.   Respiratory: Negative for shortness of breath.    Cardiovascular: Negative for chest pain and palpitations.   Gastrointestinal: Negative for nausea and vomiting.   Endocrine: Negative.    Genitourinary: Negative for difficulty urinating.   Musculoskeletal: Positive for gait problem.   Skin: Negative.    Allergic/Immunologic: Negative.    Neurological: Positive for weakness. Negative for dizziness, tremors, seizures, syncope, facial asymmetry, speech difficulty, light-headedness, numbness and headaches.   Hematological: Bruises/bleeds easily.   Psychiatric/Behavioral: Negative.         Temp:  [98.1 °F (36.7 °C)-98.8 °F (37.1 °C)] 98.1 °F (36.7 °C)  Heart Rate:  [66-77] " 66  Resp:  [16] 16  BP: (103-139)/(51-85) 139/73    Neurological Exam  Mental Status  Awake, alert and oriented to person, place and time. Speech is normal. Language is fluent with no aphasia.    Cranial Nerves  CN II: Visual fields full to confrontation.  CN III, IV, VI: Extraocular movements intact bilaterally.  CN V: Facial sensation is normal.  CN VII: Full and symmetric facial movement.  CN VIII: Hearing is normal.  CN XI: Shoulder shrug strength is normal.  CN XII: Tongue midline without atrophy or fasciculations.    Motor  Decreased muscle bulk throughout. No fasciculations present. Strength is 5/5 throughout all four extremities.    Sensory  Light touch is normal in upper and lower extremities.     Coordination  Right: Finger-to-nose normal. Heel-to-shin abnormality:  Left: Finger-to-nose normal. Heel-to-shin abnormality:    Gait  Not observed.      Physical Exam   Neurological: She has normal strength.   Psychiatric: Her speech is normal.       Acute Stroke Data    Alteplase (tPA) Inclusion / Exclusion Criteria    Time: 2300  Person Administering Scale: MAIDA Swift    Inclusion Criteria  [x]   18 years of age or greater   []   Onset of symptoms < 4.5 hours before beginning treatment (stroke onset = time patient was last seen well or without symptoms).   []   Diagnosis of acute ischemic stroke causing measurable disabling deficit (Complete Hemianopia, Any Aphasia, Visual or Sensory Extinction, Any weakness limiting sustained effort against gravity)   []   Any remaining deficit considered potentially disabling in view of patient and practitioner   Exclusion criteria (Do not proceed with Alteplase if any are checked under exclusion criteria)  [x]   Onset unknown or GREATER than 4.5 hours   []   ICH on CT/MRI   []   CT demonstrates hypodensity representing acute or subacute infarct   []   Significant head trauma or prior stroke in the previous 3 months   []   Symptoms suggestive of subarachnoid  hemorrhage   []   History of un-ruptured intracranial aneurysm GREATER than 10 mm   []   Recent intracranial or intraspinal surgery within the last 3 months   []   Arterial puncture at a non-compressible site in the previous 7 days   []   Active internal bleeding   []   Acute bleeding tendency   []   Platelet count LESS than 100,000 for known hematological diseases such as leukemia, thrombocytopenia or chronic cirrhosis   []   Current use of anticoagulant with INR GREATER than 1.7 or PT GREATER than 15 seconds, aPTT GREATER than 40 seconds   []   Heparin received within 48 hours, resulting in abnormally elevated aPTT GREATER than upper limit of normal   []   Current use of direct thrombin inhibitors or direct factor Xa inhibitors in the past 48 hours   []   Elevated blood pressure refractory to treatment (systolic GREATER than 185 mm/Hg or diastolic  GREATER than 110 mm/Hg   []   Suspected infective endocarditis and aortic arch dissection   []   Current use of therapeutic treatment dose of low-molecular-weight heparin (LMWH) within the previous 24 hours   []   Structural GI malignancy or bleed   Relative exclusion for all patients  []   Only minor non-disabling symptoms   []   Pregnancy   []   Seizure at onset with postictal residual neurological impairments   []   Major surgery or previous trauma within past 14 days   []   History of previous spontaneous ICH, intracranial neoplasm, or AV malformation   []   Postpartum (within previous 14 days)   []   Recent GI or urinary tract hemorrhage (within previous 21 days)   []   Recent acute MI (within previous 3 months)   []   History of un-ruptured intracranial aneurysm LESS than 10 mm   []   History of ruptured intracranial aneurysm   []   Blood glucose LESS than 50 mg/dL (2.7 mmol/L)   []   Dural puncture within the last 7 days   []   Known GREATER than 10 cerebral microbleeds   Additional exclusions for patients with symptoms onset between 3 and 4.5 hours.  []   Age >  80.   []   On any anticoagulants regardless of INR  >>> Warfarin (Coumadin), Heparin, Enoxaparin (Lovenox), fondaparinux (Arixtra), bivalirudin (Angiomax), Argatroban, dabigatran (Pradaxa), rivaroxaban (Xarelto), or apixaban (Eliquis)   []   Severe stroke (NIHSS > 25).   []   History of BOTH diabetes and previous ischemic stroke.   []   The risks and benefits have been discussed with the patient or family related to the administration of IV Alteplase for stroke symptoms.   []   I have discussed and reviewed the patient's case and imaging with the attending prior to IV Alteplase.   Not administered Time Alteplase administered       Past Medical History:   Diagnosis Date   • Arthritis    • Bladder prolapse, female, acquired    • Closed fracture of neck of left femur (CMS/[facility]) 9/27/2019   • Dementia (CMS/[facility])    • Depression    • Disease of thyroid gland    • Gastric polyp    • GERD (gastroesophageal reflux disease)    • History of colonic polyps    • Hyperlipidemia    • Hypertension    • IBS (irritable bowel syndrome)    • Macular degeneration    • Torn meniscus     right knee      Past Surgical History:   Procedure Laterality Date   • CHOLECYSTECTOMY  1997   • EYE SURGERY  1998    Cataract extraction   • HERNIA REPAIR     • HIP HEMIARTHROPLASTY Left 9/28/2019    Procedure: HIP HEMIARTHROPLASTY LEFT;  Surgeon: Reddy Segundo Jr., MD;  Location: [facility];  Service: Orthopedics   • HYSTERECTOMY  1976   • KNEE SURGERY Right     arthroscopy- 2000   • TONSILLECTOMY       Family History   Problem Relation Age of Onset   • Hypertension Mother    • Breast cancer Mother    • Obesity Mother    • Hypertension Father    • Diabetes Son      Social History     Socioeconomic History   • Marital status:      Spouse name: Not on file   • Number of children: Not on file   • Years of education: Not on file   • Highest education level: Not on file   Tobacco Use   • Smoking status: Never Smoker   • Smokeless tobacco: Never Used    Substance and Sexual Activity   • Alcohol use: No   • Drug use: Defer   • Sexual activity: Defer   Social History Narrative    Lives with son and daughter in law--  is at [facility] getting rehab     Allergies   Allergen Reactions   • Codeine Nausea Only     Trouble Breathing    • Shrimp Hives     Prior to Admission medications    Medication Sig Start Date End Date Taking? Authorizing Provider   acetaminophen (TYLENOL) 325 MG tablet Take 650 mg by mouth Every 6 (Six) Hours As Needed for Mild Pain .    Alpesh Eckert MD   amiodarone (PACERONE) 200 MG tablet Take 1 tablet by mouth Every 12 (Twelve) Hours for 28 doses. 8/12/20 8/26/20  Sadie Bravo APRN   amiodarone (PACERONE) 200 MG tablet Take 1 tablet by mouth Daily. 8/26/20   Sadie Bravo APRN   apixaban (ELIQUIS) 2.5 MG tablet tablet Take 1 tablet by mouth Every 12 (Twelve) Hours. Indications: Atrial Fibrillation 8/9/20   Sadie Bravo APRN   aspirin 81 MG EC tablet Take 81 mg by mouth Daily.    Alpesh Eckert MD   bethanechol (URECHOLINE) 10 MG tablet TAKE ONE TABLET BY MOUTH FOUR TIMES A DAY  Patient taking differently: Take 10 mg by mouth 3 (Three) Times a Day. 3/20/20   Eric Patel MD   busPIRone (BUSPAR) 10 MG tablet TAKE ONE TABLET BY MOUTH TWICE A DAY 8/4/20   Eric Patel MD   calcium citrate-vitamin d (CITRACAL) 200-250 MG-UNIT tablet tablet Take 2 tablets by mouth Daily.    Alpesh Eckert MD   Cholecalciferol (VITAMIN D PO) 2000 U qd    Alpesh Eckert MD   cyanocobalamin 1000 MCG/ML injection Inject 1 mL into the appropriate muscle as directed by prescriber Every 30 (Thirty) Days. 3/13/20   Eric Patel MD   diclofenac (VOLTAREN) 1 % gel gel APPLY 4 GRAMS TOPICALLY TO THE APPROPRAITE AREA AS DIRECTED FOUR TIMES A DAY 5/4/20   Eric Patel MD   diphenoxylate-atropine (Lomotil) 2.5-0.025 MG per tablet Take 1 tablet by mouth 4 (Four) Times a Day As Needed for  Diarrhea.  Patient taking differently: Take 2 tablets by mouth Daily. 5/11/20   Eric Patel MD   donepezil (ARICEPT) 10 MG tablet Take 1 tablet by mouth every night at bedtime. 1/20/20   Eric Patel MD   gabapentin (NEURONTIN) 300 MG capsule TAKE ONE CAPSULE BY MOUTH TWICE A DAY 8/10/20   Eric Patel MD   hydroCHLOROthiazide (HYDRODIURIL) 25 MG tablet TAKE ONE TABLET BY MOUTH DAILY 8/3/20   Eric Patel MD   Lactobacillus tablet Take 1 tablet by mouth Daily.    Alpesh Eckert MD   lisinopril (PRINIVIL,ZESTRIL) 10 MG tablet Take 1 tablet by mouth Daily. 8/10/20   Sadie Bravo APRN   melatonin 3 MG tablet Take 3 mg by mouth Every Night.    Alpesh Eckert MD   memantine (NAMENDA) 10 MG tablet TAKE ONE TABLET BY MOUTH TWICE A DAY 7/8/20   Eric Patel MD   methocarbamol (ROBAXIN) 500 MG tablet Take 1 tablet by mouth 3 (Three) Times a Day As Needed for Muscle Spasms. 8/9/20   Sadie Bravo APRN   Multiple Vitamins-Minerals (MULTIVITAMIN ADULTS 50+ PO) Take 1 tablet by mouth Daily.    Alpesh Eckert MD   Multiple Vitamins-Minerals (PRESERVISION AREDS PO) Take 1 tablet by mouth 2 (Two) Times a Day.    Alpesh Eckert MD   omeprazole (priLOSEC) 20 MG capsule TAKE ONE CAPSULE BY MOUTH DAILY 4/20/20   Eric Patel MD   potassium chloride (MICRO-K) 10 MEQ CR capsule TAKE TWO CAPSULES BY MOUTH DAILY 9/16/19   Eric Patel MD   sertraline (ZOLOFT) 50 MG tablet TAKE ONE TABLET BY MOUTH DAILY 5/11/20   Eric Patel MD   sucralfate (CARAFATE) 1 g tablet Take 1 tablet by mouth 3 (Three) Times a Day Before Meals.  Patient taking differently: Take 1 g by mouth 2 (Two) Times a Day. 7/6/20   Eric Patel MD   SYNTHROID 175 MCG tablet Take 1 tablet by mouth Daily. Patient receives medication from patient assistance.  NDC:5757-7319-96 EXP:07/02/2020 LOT [id number] 3/20/20   Eric Patel MD   Syringe 23G X  "1\" 3 ML misc Use to inject vitamin b12 once a month 3/13/20   Eric Patel MD   traMADol (ULTRAM) 50 MG tablet TAKE ONE TABLET BY MOUTH DAILY AS NEEDED FOR MODERATE PAIN IN THE RIGHT KNEE 8/11/20   Eric Patel MD       [facility] Meds:  Scheduled-   amiodarone 200 mg Oral Q12H   apixaban 2.5 mg Oral Q12H   aspirin 81 mg Oral Daily   bethanechol 10 mg Oral TID AC   busPIRone 10 mg Oral BID   calcium carb-cholecalciferol 1 tablet Oral Daily   cholecalciferol 1,000 Units Oral Daily   donepezil 10 mg Oral Nightly   gabapentin 300 mg Oral BID   levothyroxine 175 mcg Oral Daily   lisinopril 10 mg Oral Q24H   melatonin 2.5 mg Oral Nightly   memantine 10 mg Oral BID   multivitamin with minerals 1 tablet Oral Daily   pantoprazole 40 mg Oral Daily Before Lunch   piperacillin-tazobactam 3.375 g Intravenous Once   piperacillin-tazobactam 3.375 g Intravenous Q8H   saccharomyces boulardii 250 mg Oral Daily   sertraline 50 mg Oral Daily   sodium chloride 10 mL Intravenous Q12H   sucralfate 1 g Oral BID AC   [START ON 8/16/2020] vancomycin (dosing per levels)  Does not apply Daily   vancomycin 20 mg/kg Intravenous Once     Infusions-   Pharmacy to dose vancomycin       PRNs- •  acetaminophen **OR** acetaminophen **OR** acetaminophen  •  vancomycin **AND** Pharmacy to dose vancomycin  •  sodium chloride  •  sodium chloride    Functional Status Prior to Current Stroke/Mary Score: 4    NIH Stroke Scale  Time: 2300  Person Administering Scale: MAIDA Swift    1a  Level of consciousness: 0=alert; keenly responsive   1b. LOC questions:  0=Performs both tasks correctly   1c. LOC commands: 0=Performs both tasks correctly   2.  Best Gaze: 0=normal   3.  Visual: 0=No visual loss   4. Facial Palsy: 0=Normal symmetric movement   5a.  Motor left arm: 0=No drift, limb holds 90 (or 45) degrees for full 10 seconds   5b.  Motor right arm: 0=No drift, limb holds 90 (or 45) degrees for full 10 seconds   6a. motor left " "le=No drift, limb holds 90 (or 45) degrees for full 10 seconds   6b  Motor right le=No drift, limb holds 90 (or 45) degrees for full 10 seconds   7. Limb Ataxia: 2=Present in two limbs   8.  Sensory: 0=Normal; no sensory loss   9. Best Language:  0=No aphasia, normal   10. Dysarthria: 0=Normal   11. Extinction and Inattention: 0=No abnormality    Total:   2       Results Reviewed:  I have personally reviewed current lab, radiology, and data and agree with results.  Lab Results (last 24 hours)     Procedure Component Value Units Date/Time    Urinalysis With Microscopic If Indicated (No Culture) - Urine, Catheter [[id number]]  (Normal) Collected:  20    Specimen:  Urine, Catheter Updated:  20     Color, UA Yellow     Appearance, UA Clear     pH, UA 6.0     Specific Gravity, UA 1.010     Glucose, UA Negative     Ketones, UA Negative     Bilirubin, UA Negative     Blood, UA Negative     Protein, UA Negative     Leuk Esterase, UA Negative     Nitrite, UA Negative     Urobilinogen, UA 0.2 E.U./dL    Narrative:       Urine microscopic not indicated.    Procalcitonin [[id number]]  (Normal) Collected:  20    Specimen:  Blood Updated:  20     Procalcitonin 0.14 ng/mL     Narrative:       As a Marker for Sepsis (Non-Neonates):   1. <0.5 ng/mL represents a low risk of severe sepsis and/or septic shock.  1. >2 ng/mL represents a high risk of severe sepsis and/or septic shock.    As a Marker for Lower Respiratory Tract Infections that require antibiotic therapy:  PCT on Admission     Antibiotic Therapy             6-12 Hrs later  > 0.5                Strongly Recommended            >0.25 - <0.5         Recommended  0.1 - 0.25           Discouraged                   Remeasure/reassess PCT  <0.1                 Strongly Discouraged          Remeasure/reassess PCT      As 28 day mortality risk marker: \"Change in Procalcitonin Result\" (> 80 % or <=80 %) if Day 0 (or Day 1) and Day 4 " values are available. Refer to [email or web address]-[email or web address]-[email or web address]/   Change in PCT <=80 %   A decrease of PCT levels below or equal to 80 % defines a positive change in PCT test result representing a higher risk for 28-day all-cause mortality of patients diagnosed with severe sepsis or septic shock.  Change in PCT > 80 %   A decrease of PCT levels of more than 80 % defines a negative change in PCT result representing a lower risk for 28-day all-cause mortality of patients diagnosed with severe sepsis or septic shock.                Results may be falsely decreased if patient taking Biotin.     [facility] Draw [[id number]] Collected:  08/14/20 1925    Specimen:  Blood Updated:  08/14/20 2030    Narrative:       The following orders were created for panel order [facility] Draw.  Procedure                               Abnormality         Status                     ---------                               -----------         ------                     Light Blue Top[[id number]]                                   Final result               Green Top (Gel)[[id number]]                                  Final result               Lavender Top[[id number]]                                     Final result               Gold Top - SST[[id number]]                                   Final result                 Please view results for these tests on the individual orders.    Light Blue Top [[id number]] Collected:  08/14/20 1925    Specimen:  Blood Updated:  08/14/20 2030     Extra Tube hold for add-on     Comment: Auto resulted       Green Top (Gel) [[id number]] Collected:  08/14/20 1925    Specimen:  Blood Updated:  08/14/20 2030     Extra Tube Hold for add-ons.     Comment: Auto resulted.       Lavender Top [[id number]] Collected:  08/14/20 1925    Specimen:  Blood Updated:  08/14/20 2030     Extra Tube hold for add-on     Comment: Auto resulted       Gold Top - SST [[id number]] Collected:  08/14/20 1925    Specimen:  Blood Updated:  08/14/20  2030     Extra Tube Hold for add-ons.     Comment: Auto resulted.       Blood Culture - Blood, Arm, Right [[id number]] Collected:  08/14/20 2016    Specimen:  Blood from Arm, Right Updated:  08/14/20 2023    Blood Culture - Blood, Hand, Left [[id number]] Collected:  08/14/20 2000    Specimen:  Blood from Hand, Left Updated:  08/14/20 2023    Lactic Acid, Plasma [[id number]]  (Normal) Collected:  08/14/20 1925    Specimen:  Blood Updated:  08/14/20 2019     Lactate 1.1 mmol/L      Comment: Falsely depressed results may occur on samples drawn from patients receiving N-Acetylcysteine (NAC) or Metamizole.       BNP [[id number]]  (Abnormal) Collected:  08/14/20 1925    Specimen:  Blood Updated:  08/14/20 2010     proBNP 1,998.0 pg/mL     Narrative:       Among patients with dyspnea, NT-proBNP is highly sensitive for the detection of acute congestive heart failure. In addition NT-proBNP of <300 pg/ml effectively rules out acute congestive heart failure with 99% negative predictive value.    Results may be falsely decreased if patient taking Biotin.      Comprehensive Metabolic Panel [[id number]]  (Abnormal) Collected:  08/14/20 1925    Specimen:  Blood Updated:  08/14/20 1956     Glucose 114 mg/dL      BUN 17 mg/dL      Creatinine 0.76 mg/dL      Sodium 136 mmol/L      Potassium 4.1 mmol/L      Chloride 97 mmol/L      CO2 32.0 mmol/L      Calcium 8.4 mg/dL      Total Protein 5.5 g/dL      Albumin 3.30 g/dL      ALT (SGPT) 8 U/L      AST (SGOT) 11 U/L      Alkaline Phosphatase 101 U/L      Total Bilirubin 0.5 mg/dL      eGFR Non African Amer 71 mL/min/1.73      Globulin 2.2 gm/dL      A/G Ratio 1.5 g/dL      BUN/Creatinine Ratio 22.4     Anion Gap 7.0 mmol/L     Narrative:       GFR Normal >60  Chronic Kidney Disease <60  Kidney Failure <15      Magnesium [[id number]]  (Normal) Collected:  08/14/20 1925    Specimen:  Blood Updated:  08/14/20 1956     Magnesium 2.0 mg/dL     Troponin [[id number]]  (Normal) Collected:  08/14/20  1925    Specimen:  Blood Updated:  08/14/20 1955     Troponin T <0.010 ng/mL     Narrative:       Troponin T Reference Range:  <= 0.03 ng/mL-   Negative for AMI  >0.03 ng/mL-     Abnormal for myocardial necrosis.  Clinicians would have to utilize clinical acumen, EKG, Troponin and serial changes to determine if it is an Acute Myocardial Infarction or myocardial injury due to an underlying chronic condition.       Results may be falsely decreased if patient taking Biotin.      CBC & Differential [[id number]] Collected:  08/14/20 1925    Specimen:  Blood Updated:  08/14/20 1933    Narrative:       The following orders were created for panel order CBC & Differential.  Procedure                               Abnormality         Status                     ---------                               -----------         ------                     CBC Auto Differential[[id number]]        Abnormal            Final result                 Please view results for these tests on the individual orders.    CBC Auto Differential [[id number]]  (Abnormal) Collected:  08/14/20 1925    Specimen:  Blood Updated:  08/14/20 1933     WBC 15.62 10*3/mm3      RBC 3.95 10*6/mm3      Hemoglobin 10.8 g/dL      Hematocrit 34.5 %      MCV 87.3 fL      MCH 27.3 pg      MCHC 31.3 g/dL      RDW 15.4 %      RDW-SD 47.2 fl      MPV 9.5 fL      Platelets 253 10*3/mm3      Neutrophil % 82.2 %      Lymphocyte % 8.5 %      Monocyte % 8.2 %      Eosinophil % 0.2 %      Basophil % 0.3 %      Immature Grans % 0.6 %      Neutrophils, Absolute 12.84 10*3/mm3      Lymphocytes, Absolute 1.32 10*3/mm3      Monocytes, Absolute 1.28 10*3/mm3      Eosinophils, Absolute 0.03 10*3/mm3      Basophils, Absolute 0.05 10*3/mm3      Immature Grans, Absolute 0.10 10*3/mm3      nRBC 0.0 /100 WBC         Imaging Results (Last 24 Hours)     Procedure Component Value Units Date/Time    CT Angiogram Chest With & Without Contrast [[id number]] Collected:  08/15/20 0305     Updated:   08/15/20 0307    Narrative:       CT ANGIOGRAM CHEST W WO CONTRAST    INDICATION:   Recurrent pneumonia. Abnormal chest x-ray. Shortness of air.    TECHNIQUE:   CT angiogram of the chest with IV contrast. 3-D reconstructions were obtained and reviewed.   Radiation dose reduction techniques included automated exposure control or exposure modulation based on body size. Count of known CT and cardiac nuc med studies  performed in previous 12 months: 3.     COMPARISON:   Chest x-ray from yesterday and the chest 5/3/2020    FINDINGS:   There is adequate opacification of the pulmonary arteries. There is no CT evidence of pulmonary embolus. Aorta is normal in size. Is a tiny pericardial effusion measuring less than a centimeter in thickness. There is a large hiatal hernia. There are  small bilateral effusions with mild bibasilar atelectasis. Upper abdominal images show renal cysts. Rest the lungs are clear.          Impression:       1. Negative for pulmonary embolus.    2. Small pericardial effusion which is decreased size since 8/3/2020    Large hiatal hernia    Small bilateral effusions with bibasilar atelectasis.    Signer Name: Vikram Sánchez MD   Signed: 8/15/2020 3:05 AM   [facility]: [email or web address]    [facility]    MRI Brain Without Contrast [[id number]] Collected:  08/15/20 0150     Updated:  08/15/20 0153    Narrative:       MRI Brain WO    INDICATION:   Sudden onset of lower extremity weakness.    TECHNIQUE:   MRI of the brain without IV contrast.    COMPARISON:    4/26/2017    FINDINGS:  The pituitary gland and foramen magnum region appear normal. There is no abnormal diffusion. There is generalized atrophy with mild small vessel ischemic changes around the ventricles. There are no masses or extra-axial fluid collections or hemorrhage.      Impression:       No acute findings    No change 4/26/2017    Generalized atrophy    Signer Name: Vikram Sánchez MD   Signed: 8/15/2020 1:50 AM    Workstation Name: [email or web address]    [facility]    CT Head Without Contrast [[id number]] Collected:  08/14/20 2011     Updated:  08/14/20 2013    Narrative:       CT Head WO    HISTORY:   89-year-old female with sudden ataxia that began 2 hours ago. Difficulty walking. Currently in the emergency Department.    TECHNIQUE:   Axial unenhanced head CT. Radiation dose reduction techniques included automated exposure control or exposure modulation based on body size. Count of known CT and cardiac nuc med studies performed in previous 12 months: 5.     Time of scan: 1956 hours    COMPARISON:   8/3/2020    FINDINGS:   No intracranial hemorrhage, mass, or infarct. No hydrocephalus or extra-axial fluid collection. There are senescent changes, including volume loss and nonspecific white matter change, but no acute abnormality is seen. The skull base, calvarium, and  extracranial soft tissues are normal.      Impression:       Senescent changes without acute abnormality.          Signer Name: Andriy Hester MD   Signed: 8/14/2020 8:11 PM   Workstation Name: [facility]    [facility]    XR Chest 1 View [[id number]] Collected:  08/14/20 1933     Updated:  08/14/20 1935    Narrative:       CR Chest 1 Vw    INDICATION:   89-year-old female with weakness and dizziness. In the emergency Department.     COMPARISON:    12/14/2019    FINDINGS:  Single portable AP view(s) of the chest.  Globular cardiomegaly. Low lung volumes and bilateral pleural effusions left greater than right and probable compressive atelectasis or less likely pneumonia. Mild indistinctness of the interstitium but no  convincing evidence of volume overload. No pneumothorax.      Impression:         1. Persistent globular cardiomegaly with bilateral effusions left greater than right. Bibasilar airspace disease left in the right. Findings appear stable to slightly worse.    Signer Name: Andriy Hester MD   Signed: 8/14/2020  "7:33 PM   Workstation [facility]: [facility]    [facility] [place]        Results for orders placed during the hospital encounter of 08/02/20   Transthoracic Echo Complete With Contrast if Necessary Per Protocol    Narrative · Left ventricular systolic function is normal. Estimated EF = 70%.  · Left ventricular diastolic dysfunction (grade I) consistent with   impaired relaxation.  · Left atrial cavity size is moderately dilated.  · There is calcification of the aortic valve. There is no significant   stenosis or regurgitation. A Lambel's excrescence is noted on an aortic   valve leaflet.  · Moderate mitral valve regurgitation is present.  · Estimated right ventricular systolic pressure from tricuspid   regurgitation is moderately elevated (49 mmHg).  · There is a moderate (1-2cm) circumferential pericardial effusion. There   is no tamponade physiology.          Assessment/Plan:  Mrs Jarrett is a 89 year old white female with known diagnosis of dementia, afib, htn, as well as recent hospitalization 8.2.20-8.9.20 for pneumonia, UTI, and pericardial effusion.  She was discharged home with home health and was doing well until today.  When she woke up from a nap, she was unable to walk even with the assistance of her walker.  Prior to that had been able to ambulate 14 stairs but was unable to go down stairs after waking up.  States \"my legs won't work and I can't walk.\"    Denies headache, numbness/tingling, vision changes, speech changes. Family did report fever 100.4 prior to admission.    Upon exam, no  Unilateral weakness but does have ataxia in bilateral lower extremities.  No dysarthria or aphasia. No visual field deficits      1. Ataxia: CT head negative for acute changes. MRI is negative for acute infarct. Symptoms could be related to recent addition of amiodarone. Will have general neurology evaluate tomorrow.  2. Afib: continue Eliquis.           Rubi Sun, APRN  August 15, 2020  3:39 " AM

## 2020-08-15 NOTE — ED PROVIDER NOTES
"Subjective   Patient presents to the emergency department via EMS for sudden difficulty walking.  Patient states that she attempted to walk after rising from a nap approximately 2 hours ago and felt as though she could not because either of her legs to function properly.  \"I was walking just fine earlier\".  Patient states she was recently hospitalized for urinary tract infection and pneumonia.    Conversing with the patient's adult son and caregiver, he confirms that the patient has been home since Sunday and \"seemed much better\".  He states also that the patient seemed particularly tired today and slept more than usual.  When she roused from sleeping midday, they noticed her temperature was 100.7 orally.  And that \"she simply could not walk as well\".  He goes on to describe how the patient could walk up 14 stairs upon her arrival home but was ineffective to walk even a single stair after her now.  Family recites that they have given her the home dosing of amiodarone specifically as prescribed together with antibiotics.    Patient's recent hospitalization was for urinary tract infection and pneumonia.  Patient was subsequently found to be in atrial fibrillation.  She was started on Eliquis.  Patient identifies her weakness in both of her legs.  She denies any nausea or palpitations chest pain or a sense of fainting.  She states she has been eating and drinking well.  She arrives stating \"I feel fine and I would like to try to walk again\".    Patient denies any history of myocardial infarction or GI bleeding.  She states that she has been told that \"head scan says I have had 2 old strokes\".  She denies any lung disease.  Family states she has a history of mild Alzheimer's disease.      History provided by:  Patient and caregiver   used: No    Neurologic Problem   The patient's primary symptoms include focal weakness and weakness. The patient's pertinent negatives include no altered mental status, " clumsiness, focal sensory loss, loss of balance, memory loss, near-syncope, syncope or visual change. This is a new problem. The current episode started today. The neurological problem developed suddenly. Last Known Well Instant: walked with her baseline gait this morning.  The problem is unchanged. There was lower extremity focality noted. Associated symptoms include a fever (family reports temp of 100.7 orally at home just pta). Pertinent negatives include no abdominal pain, back pain, bladder incontinence, bowel incontinence, chest pain, confusion, diaphoresis, dizziness, headaches, light-headedness, nausea, neck pain, palpitations, shortness of breath or vomiting. Past treatments include nothing. The treatment provided no relief. Her past medical history is significant for a CVA and dementia. There is no history of a bleeding disorder, a clotting disorder, head trauma or seizures.       Review of Systems   Constitutional: Positive for fever (family reports temp of 100.7 orally at home just pta). Negative for diaphoresis.   Respiratory: Negative for shortness of breath.         Recent pneumonia      Cardiovascular: Negative for chest pain, palpitations and near-syncope.   Gastrointestinal: Negative for abdominal pain, blood in stool (denies melena), bowel incontinence, nausea and vomiting.   Endocrine: Negative.    Genitourinary: Negative.  Negative for bladder incontinence.        Recent UTI     Musculoskeletal: Negative for back pain and neck pain.   Neurological: Positive for focal weakness and weakness. Negative for dizziness, syncope, speech difficulty, light-headedness, headaches and loss of balance.   Hematological: Bruises/bleeds easily (on Eliquis).   Psychiatric/Behavioral: Negative for confusion and memory loss.   All other systems reviewed and are negative.      Past Medical History:   Diagnosis Date   • Arthritis    • Bladder prolapse, female, acquired    • Closed fracture of neck of left femur  (CMS/[facility]) 9/27/2019   • Dementia (CMS/[facility])    • Depression    • Disease of thyroid gland    • Gastric polyp    • GERD (gastroesophageal reflux disease)    • History of colonic polyps    • Hyperlipidemia    • Hypertension    • IBS (irritable bowel syndrome)    • Macular degeneration    • Torn meniscus     right knee        Allergies   Allergen Reactions   • Codeine Nausea Only     Trouble Breathing    • Shrimp Hives       Past Surgical History:   Procedure Laterality Date   • CHOLECYSTECTOMY  1997   • EYE SURGERY  1998    Cataract extraction   • HERNIA REPAIR     • HIP HEMIARTHROPLASTY Left 9/28/2019    Procedure: HIP HEMIARTHROPLASTY LEFT;  Surgeon: Reddy Segundo Jr., MD;  Location: [facility];  Service: Orthopedics   • HYSTERECTOMY  1976   • KNEE SURGERY Right     arthroscopy- 2000   • TONSILLECTOMY         Family History   Problem Relation Age of Onset   • Hypertension Mother    • Breast cancer Mother    • Obesity Mother    • Hypertension Father    • Diabetes Son        Social History     Socioeconomic History   • Marital status:      Spouse name: Not on file   • Number of children: Not on file   • Years of education: Not on file   • Highest education level: Not on file   Tobacco Use   • Smoking status: Never Smoker   • Smokeless tobacco: Never Used   Substance and Sexual Activity   • Alcohol use: No   • Drug use: Defer   • Sexual activity: Defer   Social History Narrative    Lives with son and daughter in law--  is at [facility] getting rehab           Objective   Physical Exam   Constitutional: She is oriented to person, place, and time. She appears well-developed and well-nourished.   HENT:   Head: Normocephalic and atraumatic.   Mouth/Throat: Oropharynx is clear and moist.   Eyes: Pupils are equal, round, and reactive to light. Conjunctivae are normal.   Neck: Normal range of motion. Neck supple.   Cardiovascular: Normal rate and regular rhythm.   Pulmonary/Chest: Effort normal and breath sounds  normal. No respiratory distress. She has no wheezes. She has no rales.   Abdominal: Soft. Bowel sounds are normal. She exhibits no distension. There is no rebound and no guarding.   Musculoskeletal: Normal range of motion. She exhibits no edema.   Lymphadenopathy:     She has no cervical adenopathy.   Neurological: She is alert and oriented to person, place, and time. No sensory deficit. Coordination abnormal.   No focal neurosensory deficit or focal weakness.  I have assisted the patient to standing and observed her walking with an antalgic gait   Skin: Skin is warm and dry. She is not diaphoretic.   Psychiatric: She has a normal mood and affect. Her behavior is normal. Judgment and thought content normal.   Patient is an excellent historian.   Nursing note and vitals reviewed.      Procedures           ED Course  ED Course as of Aug 15 0211   Fri Aug 14, 2020   1944 WBC(!): 15.62 [MS]   2108 proBNP(!): 1,998.0 [MS]   2300 Patient's initial work-up is reassuring without focus for infection appreciated.  Slight elevation in BNP is noted.  Patient has had normal vital signs throughout her ED course.  I have conferred with Dr. Alcazar followed by the stroke navigator nurse practitioner who agrees to consult.     [MS]   Sat Aug 15, 2020   0208 I discussed the patient's case with the hospitalist, Dr. Virgen who accepts inpatient care.  I have also discussed the plan of care with the patient and her family who understand and concur.    [MS]      ED Course User Index  [MS] Aura Jason, MAIDA            Recent Results (from the past 24 hour(s))   Comprehensive Metabolic Panel    Collection Time: 08/14/20  7:25 PM   Result Value Ref Range    Glucose 114 (H) 65 - 99 mg/dL    BUN 17 8 - 23 mg/dL    Creatinine 0.76 0.57 - 1.00 mg/dL    Sodium 136 136 - 145 mmol/L    Potassium 4.1 3.5 - 5.2 mmol/L    Chloride 97 (L) 98 - 107 mmol/L    CO2 32.0 (H) 22.0 - 29.0 mmol/L    Calcium 8.4 8.2 - 9.6 mg/dL    Total Protein 5.5 (L)  6.0 - 8.5 g/dL    Albumin 3.30 (L) 3.50 - 5.20 g/dL    ALT (SGPT) 8 1 - 33 U/L    AST (SGOT) 11 1 - 32 U/L    Alkaline Phosphatase 101 39 - 117 U/L    Total Bilirubin 0.5 0.0 - 1.2 mg/dL    eGFR Non African Amer 71 >60 mL/min/1.73    Globulin 2.2 gm/dL    A/G Ratio 1.5 g/dL    BUN/Creatinine Ratio 22.4 7.0 - 25.0    Anion Gap 7.0 5.0 - 15.0 mmol/L   Troponin    Collection Time: 08/14/20  7:25 PM   Result Value Ref Range    Troponin T <0.010 0.000 - 0.030 ng/mL   Magnesium    Collection Time: 08/14/20  7:25 PM   Result Value Ref Range    Magnesium 2.0 1.6 - 2.4 mg/dL   Light Blue Top    Collection Time: 08/14/20  7:25 PM   Result Value Ref Range    Extra Tube hold for add-on    Green Top (Gel)    Collection Time: 08/14/20  7:25 PM   Result Value Ref Range    Extra Tube Hold for add-ons.    Lavender Top    Collection Time: 08/14/20  7:25 PM   Result Value Ref Range    Extra Tube hold for add-on    Gold Top - SST    Collection Time: 08/14/20  7:25 PM   Result Value Ref Range    Extra Tube Hold for add-ons.    CBC Auto Differential    Collection Time: 08/14/20  7:25 PM   Result Value Ref Range    WBC 15.62 (H) 3.40 - 10.80 10*3/mm3    RBC 3.95 3.77 - 5.28 10*6/mm3    Hemoglobin 10.8 (L) 12.0 - 15.9 g/dL    Hematocrit 34.5 34.0 - 46.6 %    MCV 87.3 79.0 - 97.0 fL    MCH 27.3 26.6 - 33.0 pg    MCHC 31.3 (L) 31.5 - 35.7 g/dL    RDW 15.4 12.3 - 15.4 %    RDW-SD 47.2 37.0 - 54.0 fl    MPV 9.5 6.0 - 12.0 fL    Platelets 253 140 - 450 10*3/mm3    Neutrophil % 82.2 (H) 42.7 - 76.0 %    Lymphocyte % 8.5 (L) 19.6 - 45.3 %    Monocyte % 8.2 5.0 - 12.0 %    Eosinophil % 0.2 (L) 0.3 - 6.2 %    Basophil % 0.3 0.0 - 1.5 %    Immature Grans % 0.6 (H) 0.0 - 0.5 %    Neutrophils, Absolute 12.84 (H) 1.70 - 7.00 10*3/mm3    Lymphocytes, Absolute 1.32 0.70 - 3.10 10*3/mm3    Monocytes, Absolute 1.28 (H) 0.10 - 0.90 10*3/mm3    Eosinophils, Absolute 0.03 0.00 - 0.40 10*3/mm3    Basophils, Absolute 0.05 0.00 - 0.20 10*3/mm3    Immature Grans,  Absolute 0.10 (H) 0.00 - 0.05 10*3/mm3    nRBC 0.0 0.0 - 0.2 /100 WBC   Lactic Acid, Plasma    Collection Time: 08/14/20  7:25 PM   Result Value Ref Range    Lactate 1.1 0.5 - 2.0 mmol/L   Procalcitonin    Collection Time: 08/14/20  7:25 PM   Result Value Ref Range    Procalcitonin 0.14 0.00 - 0.25 ng/mL   BNP    Collection Time: 08/14/20  7:25 PM   Result Value Ref Range    proBNP 1,998.0 (H) 0.0-1,800.0 pg/mL   Urinalysis With Microscopic If Indicated (No Culture) - Urine, Catheter    Collection Time: 08/14/20  9:15 PM   Result Value Ref Range    Color, UA Yellow Yellow, Straw    Appearance, UA Clear Clear    pH, UA 6.0 5.0 - 8.0    Specific Gravity, UA 1.010 1.001 - 1.030    Glucose, UA Negative Negative    Ketones, UA Negative Negative    Bilirubin, UA Negative Negative    Blood, UA Negative Negative    Protein, UA Negative Negative    Leuk Esterase, UA Negative Negative    Nitrite, UA Negative Negative    Urobilinogen, UA 0.2 E.U./dL 0.2 - 1.0 E.U./dL     Note: In addition to lab results from this visit, the labs listed above may include labs taken at another facility or during a different encounter within the last 24 hours. Please correlate lab times with ED admission and discharge times for further clarification of the services performed during this visit.    MRI Brain Without Contrast   Final Result   No acute findings      No change 4/26/2017      Generalized atrophy      Signer Name: Vikram Sánchez MD    Signed: 8/15/2020 1:50 AM    [facility]     [facility]iology [facility]      CT Head Without Contrast   Final Result   Senescent changes without acute abnormality.               Signer Name: Andriy Hester MD    Signed: 8/14/2020 8:11 PM    Workstation [facility]: [facility]     Radiology [facility]      XR Chest 1 View   Final Result      1. Persistent globular cardiomegaly with bilateral effusions left greater than right. Bibasilar airspace disease left in the right.  Findings appear stable to slightly worse.      Signer Name: Andriy Hester MD    Signed: 8/14/2020 7:33 PM    Workstation Name: [email or web address]     [facility] [place]      CT Angiogram Chest With & Without Contrast    (Results Pending)     Vitals:    08/14/20 2330 08/15/20 0000 08/15/20 0037 08/15/20 0100   BP: 117/65 127/78 124/68 133/63   BP Location:       Patient Position:       Pulse: 72 71 71 69   Resp:       Temp:       TempSrc:       SpO2: (!) 85% (!) 88% (!) 83% 96%   Weight:       Height:         Medications   sodium chloride 0.9 % flush 10 mL (has no administration in time range)   sodium chloride 0.9 % bolus 500 mL (0 mL Intravenous Stopped 8/14/20 2125)     ECG/EMG Results (last 24 hours)     Procedure Component Value Units Date/Time    ECG 12 Lead [[id number]] Collected:  08/14/20 1908     Updated:  08/14/20 1909        ECG 12 Lead                                               [facility]    Final diagnoses:   Ataxia   History of pneumonia   History of UTI   History of dementia   PAF (paroxysmal atrial fibrillation) (CMS/[facility])            Aura Jason, MAIDA  08/15/20 0213

## 2020-08-15 NOTE — PROGRESS NOTES
"Pharmacokinetic Consult - Vancomycin Dosing  Temi Jarrett is a 89 y.o. female who is receiving vancomycin for pneumonia and fever      Ordering service: hospitalist  Infectious Disease Consult: No  Goal trough: 15-20 mcg/mL    Ht - 154.9 cm (61\")  Wt - 59 kg (130 lb)    Current Antimicrobial Therapy  Vancomycin - pharmacy dosing per levels  Zosyn 3.375 gm IV q8h (extended infusion)     Allergies  Codeine and Shrimp    Microbiology and Radiology  Microbiology Results (last 10 days)       Procedure Component Value - Date/Time    Urine Culture - Urine, Urine, Catheter [[id number]]  (Normal) Collected:  08/05/20 1348    Lab Status:  Final result Specimen:  Urine, Catheter Updated:  08/06/20 1854     Urine Culture No growth            Relevant clinical data and objective history reviewed:  Results from last 7 days   Lab Units 08/14/20  1925 08/08/20  0946   BUN mg/dL 17 14   CREATININE mg/dL 0.76 0.53*     Estimated Creatinine Clearance: 38.6 mL/min (by C-G formula based on SCr of 0.76 mg/dL).  Intake & Output (last 3 days)         08/12 0701 - 08/13 0700 08/13 0701 - 08/14 0700 08/14 0701 - 08/15 0700    IV Piggyback   500    Total Intake(mL/kg)   500 (8.5)    Net   +500                 Patient weight: 59 kg (130 lb)    Asessment/Plan  Will initiate dose at 1250 mg IV once       followed by  Vancomycin Random level 8/16 with morning labs    Pharmacy will order additional vancomycin doses based on vancomycin random level result.    Jagdish Lou, PharmD, BCPS  8/15/2020  03:01  "

## 2020-08-15 NOTE — PROGRESS NOTES
[facility] Medicine Services  CLINICAL REVIEW NOTE    Patient Name: Temi Jarrett  : 1930  MRN: [id number]    Date of Admission: 2020  Length of Stay: 0  Attending Service: hospitalist      Patient's labs, charting, and events reviewed.  AM labs for 8/15 still pending. CT chest negative for PE and decreased size of small pericardial effusion from last admission August 3. COVID 19 negative, strep pneumo and legionella antigens pending. Amiodarone was ordered but discontinued this morning. Monitor QT.  Neuro consult pending.  DC antibiotics and monitor for signs of infection    No active medical management issues to address today, the Hospitalist team will continue to follow daily, be available for any needs, and see or bill for services as needed.      Electronically signed by Tarsha Rios DO, 08/15/20, 11:29 AM.

## 2020-08-15 NOTE — PLAN OF CARE
Problem: Patient Care Overview  Goal: Plan of Care Review  Flowsheets (Taken 8/15/2020 5722)  Outcome Summary: A&Ox4.  Supine>EOB: modA.  Bed>BSC: CGA w/RW.  BSC>Chair: CGA w/RW.  Limited by endurance, strength, medical.  Recommend IRF @ d/c pending progress.  Pt goal is to return home w/HH, however, limited by 2 flights of steps up to bed/bath.  Will cont to observe and address ADL/IADL deficits as needed.

## 2020-08-15 NOTE — PLAN OF CARE
Problem: Patient Care Overview  Goal: Plan of Care Review  Outcome: Ongoing (interventions implemented as appropriate)  Flowsheets (Taken 8/15/2020 3498)  Progress: no change  Plan of Care Reviewed With: patient  Note:   Pt admitted from ER to floor around 0300. No complaints noted. Awaiting results for Res PCR/COVID swab. NSR on monitor and on 2L NC. Will continue to monitor.

## 2020-08-15 NOTE — PLAN OF CARE
Problem: Patient Care Overview  Goal: Plan of Care Review  Outcome: Ongoing (interventions implemented as appropriate)  Flowsheets (Taken 8/15/2020 1611)  Progress: no change  Plan of Care Reviewed With: patient  Outcome Summary: PT initial evaluation completed; pt demonstrates generalized weakness and decreased indep and functional endurance re: mobility tasks, warranting further skilled PT services to promote PLOF and safe d/c. Limited today by fatigue; able to ambulate 210 ft with RW, min A and frequent rest breaks. Recommend d/c home with assist and HHPT services based upon current functional status; TBA further pending progress and social support (as pt will need to be able to ambulate 17 steps at home).

## 2020-08-15 NOTE — PLAN OF CARE
Pt arrived to unit in stable condition just after 1800; no concerns or complaints voiced by pt at this time; calm, cooperative, and pleasant; plan of care reviewed with pt; NSR on tele  Problem: Patient Care Overview  Goal: Plan of Care Review  Outcome: Ongoing (interventions implemented as appropriate)  Flowsheets (Taken 8/15/2020 1850)  Progress: no change  Plan of Care Reviewed With: patient

## 2020-08-15 NOTE — H&P
"[facility] Medicine Services  HISTORY AND PHYSICAL    Patient Name: Temi Jarrett  : 1930  MRN: [id number]  Primary Care Physician: Eric Patel MD    Subjective   Subjective     Chief Complaint:  \"couldn't walk today\"     HPI:  Temi Jarrett is a 89 y.o. female with a past medical history significant for arthritis, bladder prolapse, dementia, GERD, hypothyroidism, hyperlipidemia, essential hypertension, IBS, and macular degeneration presents to the ED via EMS due to inability to walk.  Patient was recently admitted to [facility] from 20 to 20 secondary to pneumonia of bilateral lower lobes and UTI.  Patient was started on zosyn and doxycycline then transitioned to cefdinir and doxycycline until 20 (she reports compliance)  During her hospitalization she developed atrial fibrillation RVR.  She was started on amiodarone and eliquis.  ECHO at that time showed a 1-2 cm pericardial effusion with evidence of tamponade.  She is to have a repeat echo in 4 weeks.      Patient tells me tonight she had been feeling well up until today.  She reports \" I just couldn't walk\"  Stating generalized weakness to BLE.  She denies any recent falls, numbness/tingling, fever, chills, nausea, vomiting, diarrhea, melena, vision changes speech changes, headache, loss of taste or smell, chest pain, cough or shortness of air.CT of head obtained tonight was unremarkable.  MRI of brain is pending.  CXR is still concerning for bibasilar airspace disease.  Patient will be admitted to [facility] under the care of the Hospitalist for further evaluation and treatment.     Review of Systems   Constitutional: Positive for activity change and fatigue. Negative for appetite change, chills, diaphoresis, fever and unexpected weight change.   HENT: Negative.    Eyes: Negative for photophobia and visual disturbance.   Respiratory: Negative for cough and shortness of breath.    Cardiovascular: Negative for chest " pain, palpitations and leg swelling.   Gastrointestinal: Negative for abdominal distention, abdominal pain, blood in stool, constipation, diarrhea, nausea and vomiting.   Genitourinary: Negative for difficulty urinating, dysuria, flank pain, frequency and hematuria.   Musculoskeletal: Positive for gait problem. Negative for neck pain and neck stiffness.   Skin: Negative.    Neurological: Positive for weakness. Negative for dizziness, seizures, speech difficulty, light-headedness, numbness and headaches.   Psychiatric/Behavioral: Negative.         Otherwise 10-system ROS reviewed and is negative except as mentioned in the HPI.    Personal History     Past Medical History:   Diagnosis Date   • Arthritis    • Bladder prolapse, female, acquired    • Closed fracture of neck of left femur (CMS/[facility]) 9/27/2019   • Dementia (CMS/[facility])    • Depression    • Disease of thyroid gland    • Gastric polyp    • GERD (gastroesophageal reflux disease)    • History of colonic polyps    • Hyperlipidemia    • Hypertension    • IBS (irritable bowel syndrome)    • Macular degeneration    • Torn meniscus     right knee        Past Surgical History:   Procedure Laterality Date   • CHOLECYSTECTOMY  1997   • EYE SURGERY  1998    Cataract extraction   • HERNIA REPAIR     • HIP HEMIARTHROPLASTY Left 9/28/2019    Procedure: HIP HEMIARTHROPLASTY LEFT;  Surgeon: Reddy Segundo Jr., MD;  Location: [facility];  Service: Orthopedics   • HYSTERECTOMY  1976   • KNEE SURGERY Right     arthroscopy- 2000   • TONSILLECTOMY         Family History: family history includes Breast cancer in her mother; Diabetes in her son; Hypertension in her father and mother; Obesity in her mother.     Social History:  reports that she has never smoked. She has never used smokeless tobacco. Drug use questions deferred to the physician. She reports that she does not drink alcohol.    Medications:    (Not in a hospital admission)    Allergies   Allergen Reactions   • Codeine  Nausea Only     Trouble Breathing    • Shrimp Hives       Objective   Objective     Vital Signs:   Temp:  [98.8 °F (37.1 °C)] 98.8 °F (37.1 °C)  Heart Rate:  [70-77] 70  Resp:  [16] 16  BP: (103-125)/(51-85) 106/68   Total (NIH Stroke Scale): 2    Physical Exam   Constitutional: She is oriented to person, place, and time. She appears well-developed and well-nourished. No distress.   Alert and oriented x3   HENT:   Head: Normocephalic and atraumatic.   Eyes: Pupils are equal, round, and reactive to light. Conjunctivae and EOM are normal. Right eye exhibits no discharge. Left eye exhibits no discharge. No scleral icterus.   Neck: Normal range of motion. Neck supple. No JVD present. No tracheal deviation present. No thyromegaly present.   Cardiovascular: Normal rate and intact distal pulses. Exam reveals no gallop and no friction rub.   Murmur heard.  Pulmonary/Chest: Effort normal and breath sounds normal.   Crackles to bilateral lower lobes    Abdominal: Soft. Bowel sounds are normal. She exhibits no distension and no mass. There is no tenderness. There is no rebound and no guarding. No hernia.   Musculoskeletal: Normal range of motion. She exhibits no edema, tenderness or deformity.   Lymphadenopathy:     She has no cervical adenopathy.   Neurological: She is alert and oriented to person, place, and time.    equal, no facial droop, no nystagmus, speech clear, no pronator drift.    Skin: Skin is warm and dry. No rash noted. She is not diaphoretic. No erythema. No pallor.   Psychiatric: She has a normal mood and affect. Her behavior is normal.   Nursing note and vitals reviewed.       Results Reviewed:  I have personally reviewed current lab, radiology, and data and agree.    Results from last 7 days   Lab Units 08/14/20 1925   WBC 10*3/mm3 15.62*   HEMOGLOBIN g/dL 10.8*   HEMATOCRIT % 34.5   PLATELETS 10*3/mm3 253     Results from last 7 days   Lab Units 08/14/20 1925   SODIUM mmol/L 136   POTASSIUM mmol/L 4.1    CHLORIDE mmol/L 97*   CO2 mmol/L 32.0*   BUN mg/dL 17   CREATININE mg/dL 0.76   GLUCOSE mg/dL 114*   CALCIUM mg/dL 8.4   ALT (SGPT) U/L 8   AST (SGOT) U/L 11     No results found for: BNP  No results found for: PHART, PCO2  Imaging Results (Last 24 Hours)     Procedure Component Value Units Date/Time    MRI Brain Without Contrast [[id number]] Resulted:  08/15/20 0105     Updated:  08/15/20 0105    CT Head Without Contrast [[id number]] Collected:  08/14/20 2011     Updated:  08/14/20 2013    Narrative:       CT Head WO    HISTORY:   89-year-old female with sudden ataxia that began 2 hours ago. Difficulty walking. Currently in the emergency Department.    TECHNIQUE:   Axial unenhanced head CT. Radiation dose reduction techniques included automated exposure control or exposure modulation based on body size. Count of known CT and cardiac nuc med studies performed in previous 12 months: 5.     Time of scan: 1956 hours    COMPARISON:   8/3/2020    FINDINGS:   No intracranial hemorrhage, mass, or infarct. No hydrocephalus or extra-axial fluid collection. There are senescent changes, including volume loss and nonspecific white matter change, but no acute abnormality is seen. The skull base, calvarium, and  extracranial soft tissues are normal.      Impression:       Senescent changes without acute abnormality.          Signer Name: Andriy Hester MD   Signed: 8/14/2020 8:11 PM   Workstation Name: [facility]    [facility]    XR Chest 1 View [[id number]] Collected:  08/14/20 1933     Updated:  08/14/20 1935    Narrative:       CR Chest 1 Vw    INDICATION:   89-year-old female with weakness and dizziness. In the emergency Department.     COMPARISON:    12/14/2019    FINDINGS:  Single portable AP view(s) of the chest.  Globular cardiomegaly. Low lung volumes and bilateral pleural effusions left greater than right and probable compressive atelectasis or less likely pneumonia. Mild indistinctness of the  interstitium but no  convincing evidence of volume overload. No pneumothorax.      Impression:         1. Persistent globular cardiomegaly with bilateral effusions left greater than right. Bibasilar airspace disease left in the right. Findings appear stable to slightly worse.    Signer Name: Andriy Hester MD   Signed: 8/14/2020 7:33 PM   [facility]    [facility]        Results for orders placed during the hospital encounter of 08/02/20   Transthoracic Echo Complete With Contrast if Necessary Per Protocol    Narrative · Left ventricular systolic function is normal. Estimated EF = 70%.  · Left ventricular diastolic dysfunction (grade I) consistent with   impaired relaxation.  · Left atrial cavity size is moderately dilated.  · There is calcification of the aortic valve. There is no significant   stenosis or regurgitation. A Lambel's excrescence is noted on an aortic   valve leaflet.  · Moderate mitral valve regurgitation is present.  · Estimated right ventricular systolic pressure from tricuspid   regurgitation is moderately elevated (49 mmHg).  · There is a moderate (1-2cm) circumferential pericardial effusion. There   is no tamponade physiology.          Assessment/Plan   Assessment / Plan     Active Hospital Problems    Diagnosis   • **Ataxia   • HCAP (healthcare-associated pneumonia)   • Weakness of both lower extremities   • Dementia (CMS/HCC)   • Paroxysmal atrial fibrillation (CMS/HCC)     · A. fib RVR noted in the setting of PNA with quick resolution, 8/3/2020  · Echo (8/3/2020): Normal LVEF.  Moderate  pericardial effusion.  Moderate MR.  RVSP 49 mmHg  · Spontaneously converted to NSR on IV Cardizem  · Chads Vasc 6 (age > 75, female, HTN, CAD)      • Hypothyroidism (acquired)   • Essential hypertension       Temi Jarrett is a 89 y.o. female with a past medical history significant for arthritis, bladder prolapse, dementia, GERD, hypothyroidism, hyperlipidemia,  essential hypertension, IBS, and macular degeneration presents to the ED via EMS due to inability to walk.  Patient was recently admitted to [facility] from 8/2/20 to 8/9/20 secondary to pneumonia of bilateral lower lobes and UTI.  Patient was started on zosyn and doxycycline then transitioned to cefdinir and doxycycline until 8/11/20 (she reports compliance)  During her hospitalization she developed atrial fibrillation RVR.  She was started on amiodarone and eliquis.  ECHO at that time showed a 1-2 cm pericardial effusion with evidence of tamponade.  She is to have a repeat echo in 4 weeks.      Assessment & Plan:     1) Ataxia       BLE weakness- could be as simple as exertional hypoxia--  -CT of head negative  -MRI of brain pending  -etiology unclear  -Consult PT/OT  -fall precautions  -up with assistance  -consider neuro consult  -check TSH in light of recent amiodarone-      2) HCAP- possible but procalcitonin is low  -large hiatal hernia could be contributing--- and causing chronic aspiration  -CXR shows persistent globular cardiomegaly with bilateral effusions left greater than right.  Bibasilar airspace disease     -obtain CTA of chest  -check COVID -19  -check urine antigens  -start vancomycin and zosyn -- consider stopping-   -continuous pulse ox  -keep o2 sat >90%  -blood cultures pending   -could have compression from hiatal hernia-     3) PAF      Prolonged QTC  -Chads Vasc: 6  -on eliquis--- STOP amiodarone, mag and potassium are normal  -currently NSR  -monitor QTc: currently 549- DO NOT PROVOKE  -echo as mentioned above, will need repeat echo in 4 weeks     4) hypothyroidism  -continue synthroid  -TSH recently 1.410    5) Dementia  -on namenda, aricept       DVT prophylaxis:scds, eliquis   CODE STATUS:  Full code   Code Status and Medical Interventions:   Ordered at: 08/15/20 0157     Level Of Support Discussed With:    Patient     Code Status:    CPR     Medical Interventions (Level of Support Prior to  "Arrest):    Full       Admission Status:  I believe this patient meets INPATIENT status due to COVID RO though low suspicion, .  I feel patient’s risk for adverse outcomes and need for care warrant INPATIENT evaluation and I predict the patient’s care encounter to likely last beyond 2 midnights.     MAIDA Rodriguez   08/15/20   01:25    Attending   Admission Attestation       I have seen and examined the patient, performing an independent face-to-face diagnostic evaluation with plan of care reviewed and developed with the advanced practice clinician (APC).      Brief Summary Statement:     Temi Jarrett is a 89 y.o. female with a past medical history significant for arthritis, bladder prolapse, dementia, GERD, hypothyroidism, hyperlipidemia, essential hypertension, IBS, and macular degeneration presents to the ED via EMS due to inability to walk.  Patient was recently admitted to [facility] from 8/2/20 to 8/9/20 she was treated with zosyn and doxycycline and then transitioned to cefdinir and doxycycline until 8/11/20 (she reports compliance)  Her stay was complicated by atrial fibrillation RVR.  She was started on amiodarone and eliquis.  ECHO at that time showed a 1-2 cm pericardial effusion with evidence of tamponade.  Tonight:     Remainder of detailed HPI is as noted by APC and has been reviewed and/or edited by me for completeness.    Attending Physical Exam:  /73   Pulse 66   Temp 98.1 °F (36.7 °C) (Oral)   Resp 16   Ht 154.9 cm (61\")   Wt 56 kg (123 lb 6 oz)   LMP  (LMP Unknown)   SpO2 99%   BMI 23.31 kg/m²     Patient is alert and talkative in no distress at rest  Neck is without mass or JVD  Heart is Reg wo murmur  Lungs are clear wo wheeze or crackle  Abdomen is soft without HSM or mass, not tender or distended  MAEW, no edema  Skin is without rash  Neurologic exam in nonfocal   Mood is appropriate    Data:  I have personally reviewed most recent lab results and radiology images and " interpretations and agree with findings, most notably: pericardial effusion on CT, interstitial lung looks clear.    Brief Assessment/Plan :  See detailed assessment and plan developed with APC which I have reviewed and/or edited for completeness.    I believe this patient meets INPATIENT status due to hypoxia- sats as low as 83% in the ED, continued decline at home.  I feel patient’s risk for adverse outcomes and need for care warrant INPATIENT evaluation and I predict the patient’s care encounter to likely last beyond 2 midnights.      Electronically signed by Mahogany Virgen MD, 08/15/20, 3:32 AM.

## 2020-08-16 LAB
ANION GAP SERPL CALCULATED.3IONS-SCNC: 7 MMOL/L (ref 5–15)
BUN SERPL-MCNC: 17 MG/DL (ref 8–23)
BUN/CREAT SERPL: 34 (ref 7–25)
CALCIUM SPEC-SCNC: 8 MG/DL (ref 8.2–9.6)
CHLORIDE SERPL-SCNC: 102 MMOL/L (ref 98–107)
CK SERPL-CCNC: 13 U/L (ref 20–180)
CO2 SERPL-SCNC: 29 MMOL/L (ref 22–29)
CREAT SERPL-MCNC: 0.5 MG/DL (ref 0.57–1)
GFR SERPL CREATININE-BSD FRML MDRD: 116 ML/MIN/1.73
GLUCOSE SERPL-MCNC: 105 MG/DL (ref 65–99)
MRSA DNA SPEC QL NAA+PROBE: NEGATIVE
POTASSIUM SERPL-SCNC: 3.9 MMOL/L (ref 3.5–5.2)
SODIUM SERPL-SCNC: 138 MMOL/L (ref 136–145)

## 2020-08-16 PROCEDURE — 82550 ASSAY OF CK (CPK): CPT | Performed by: FAMILY MEDICINE

## 2020-08-16 PROCEDURE — 93005 ELECTROCARDIOGRAM TRACING: CPT | Performed by: FAMILY MEDICINE

## 2020-08-16 PROCEDURE — 99231 SBSQ HOSP IP/OBS SF/LOW 25: CPT | Performed by: PSYCHIATRY & NEUROLOGY

## 2020-08-16 PROCEDURE — 93010 ELECTROCARDIOGRAM REPORT: CPT | Performed by: INTERNAL MEDICINE

## 2020-08-16 PROCEDURE — 80048 BASIC METABOLIC PNL TOTAL CA: CPT

## 2020-08-16 PROCEDURE — 92610 EVALUATE SWALLOWING FUNCTION: CPT

## 2020-08-16 PROCEDURE — 99232 SBSQ HOSP IP/OBS MODERATE 35: CPT | Performed by: FAMILY MEDICINE

## 2020-08-16 RX ORDER — ALUMINA, MAGNESIA, AND SIMETHICONE 2400; 2400; 240 MG/30ML; MG/30ML; MG/30ML
15 SUSPENSION ORAL EVERY 6 HOURS PRN
Status: DISCONTINUED | OUTPATIENT
Start: 2020-08-16 | End: 2020-08-18 | Stop reason: HOSPADM

## 2020-08-16 RX ADMIN — MELATONIN TAB 5 MG 2.5 MG: 5 TAB at 20:10

## 2020-08-16 RX ADMIN — ASPIRIN 81 MG: 81 TABLET, COATED ORAL at 08:51

## 2020-08-16 RX ADMIN — MEMANTINE 10 MG: 10 TABLET ORAL at 08:50

## 2020-08-16 RX ADMIN — BETHANECHOL CHLORIDE 10 MG: 10 TABLET ORAL at 08:50

## 2020-08-16 RX ADMIN — SODIUM CHLORIDE, PRESERVATIVE FREE 10 ML: 5 INJECTION INTRAVENOUS at 20:11

## 2020-08-16 RX ADMIN — BUSPIRONE HYDROCHLORIDE 10 MG: 10 TABLET ORAL at 08:50

## 2020-08-16 RX ADMIN — GABAPENTIN 300 MG: 300 CAPSULE ORAL at 20:11

## 2020-08-16 RX ADMIN — ALUMINUM HYDROXIDE, MAGNESIUM HYDROXIDE, AND DIMETHICONE 15 ML: 400; 400; 40 SUSPENSION ORAL at 12:05

## 2020-08-16 RX ADMIN — Medication 250 MG: at 08:49

## 2020-08-16 RX ADMIN — SERTRALINE HYDROCHLORIDE 50 MG: 50 TABLET, FILM COATED ORAL at 08:49

## 2020-08-16 RX ADMIN — BETHANECHOL CHLORIDE 10 MG: 10 TABLET ORAL at 18:03

## 2020-08-16 RX ADMIN — Medication 1 TABLET: at 08:50

## 2020-08-16 RX ADMIN — PANTOPRAZOLE SODIUM 40 MG: 40 TABLET, DELAYED RELEASE ORAL at 12:05

## 2020-08-16 RX ADMIN — BUSPIRONE HYDROCHLORIDE 10 MG: 10 TABLET ORAL at 18:03

## 2020-08-16 RX ADMIN — GABAPENTIN 300 MG: 300 CAPSULE ORAL at 08:50

## 2020-08-16 RX ADMIN — BETHANECHOL CHLORIDE 10 MG: 10 TABLET ORAL at 12:05

## 2020-08-16 RX ADMIN — ALUMINUM HYDROXIDE, MAGNESIUM HYDROXIDE, AND DIMETHICONE 15 ML: 400; 400; 40 SUSPENSION ORAL at 20:15

## 2020-08-16 RX ADMIN — MULTIPLE VITAMINS W/ MINERALS TAB 1 TABLET: TAB at 08:49

## 2020-08-16 RX ADMIN — APIXABAN 2.5 MG: 2.5 TABLET, FILM COATED ORAL at 08:50

## 2020-08-16 RX ADMIN — SUCRALFATE 1 G: 1 TABLET ORAL at 08:49

## 2020-08-16 RX ADMIN — MEMANTINE 10 MG: 10 TABLET ORAL at 20:10

## 2020-08-16 RX ADMIN — LISINOPRIL 10 MG: 10 TABLET ORAL at 08:49

## 2020-08-16 RX ADMIN — APIXABAN 2.5 MG: 2.5 TABLET, FILM COATED ORAL at 20:10

## 2020-08-16 RX ADMIN — SUCRALFATE 1 G: 1 TABLET ORAL at 18:03

## 2020-08-16 RX ADMIN — CHOLECALCIFEROL (VITAMIN D3) 25 MCG (1,000 UNIT) TABLET 1000 UNITS: TABLET at 08:49

## 2020-08-16 RX ADMIN — SODIUM CHLORIDE, PRESERVATIVE FREE 10 ML: 5 INJECTION INTRAVENOUS at 08:50

## 2020-08-16 RX ADMIN — LEVOTHYROXINE SODIUM 175 MCG: 175 TABLET ORAL at 06:01

## 2020-08-16 RX ADMIN — DONEPEZIL HYDROCHLORIDE 10 MG: 10 TABLET, FILM COATED ORAL at 20:11

## 2020-08-16 NOTE — PLAN OF CARE
Problem: Fall Risk (Adult)  Goal: Identify Related Risk Factors and Signs and Symptoms  Outcome: Ongoing (interventions implemented as appropriate)  Flowsheets (Taken 8/16/2020 7940)  Related Risk Factors (Fall Risk): fatigue/slow reaction; fear of falling; gait/mobility problems; environment unfamiliar; age-related changes  Signs and Symptoms (Fall Risk): presence of risk factors  Note:   Fall precautions in place. Patient has remained free from falls throughout shift. Will monitor.      Problem: Patient Care Overview  Goal: Plan of Care Review  Outcome: Ongoing (interventions implemented as appropriate)     VSS on 2L per nasal cannula.  No acute issues noted overnight.  Rested well. NSR.  Will continue to monitor.

## 2020-08-16 NOTE — PROGRESS NOTES
"Neurology       Patient Care Team:  Eric Patel MD as PCP - General  Eric Patel MD as PCP - Family Medicine  Eric Patel MD as PCP - Von Montgomery MD as Cardiologist (Cardiology)    Chief complaint leg weakness and/or ataxia      Subjective .     History: Patient reports she has not been up today so she does not know how well her legs are working.  Would like to do PT today.  She denies any new weakness or numbness in her arms.  No headache or vision changes.  Worried about being able to get up the steps at home.  Worked with PT yesterday, who noted generalized weakness, patient's activity limited by fatigue but was able to ambulate 210 feet with rolling walker, minimum assist and frequent rest breaks.    ROS: No fever or chest pain    Objective     Vital Signs   Blood pressure 121/68, pulse 61, temperature 99.2 °F (37.3 °C), temperature source Oral, resp. rate 18, height 154.9 cm (61\"), weight 56 kg (123 lb 6 oz), SpO2 91 %, not currently breastfeeding.    Physical Exam:              Neuro: Elderly white woman in no acute distress, alert, fluent and appropriate with no dysarthria.  Pleasant and cooperative with exam  EOMI face symmetric  Motor 4/5 throughout on MMT      Results Review:              BMP reviewed, glucose 105 creatinine 0.5 calcium 8 otherwise normal    Brain MRI with no acute changes this admission, on 8/15    Assessment/Plan     Bilateral lower extremity ataxia versus weakness- no evidence of stroke on MRI, neuro exam unrevealing.  Concern for amiodarone induced ataxia.  Amiodarone held since admission.  Recommend continuing PT, but otherwise nothing new to add today.    I discussed the patients findings and my recommendations with patient and nursing staff    Ángela Garza MD  08/16/20  15:39    "

## 2020-08-16 NOTE — PLAN OF CARE
Problem: Patient Care Overview  Goal: Plan of Care Review  Outcome: Ongoing (interventions implemented as appropriate)  Flowsheets (Taken 8/16/2020 4574)  Plan of Care Reviewed With: patient  Note:   SLP evaluation completed. Will continue to address for diet tolerance. Please see note for further details and recommendations.

## 2020-08-16 NOTE — PROGRESS NOTES
Case Management Readmission Assessment Note       Case Management Readmission Assessment (all recorded)      Readmission Interview     Row Name 08/16/20 1639             Readmission Indications    Is this hospitalization related to the prior hospital diagnosis?  No      What was the reason you were admitted?  ataxia      [facility] Name 08/16/20 1639             Recommendation for rehospitalization    Did you speak with your physician prior to coming to the hospital  No      Did you seek care elsewhere prior to coming to the hospital?  No      Row Name 08/16/20 1639             Follow up appointment    Do you have a PCP?  Yes      Did you have an appointment with PCP/specialist after hospitalization within 7 days?  Yes      Did you keep your appointment?  -- Readmitted before she was able to see MD      [facility] Name 08/16/20 1639             Medications    Did you have newly prescribed medications at discharge?  Yes      Did you understand the reasons for your medications at discharge and how to take them?  Yes      Are you taking all of you prescribed medications?  Yes      [facility] Name 08/16/20 1639             Discharge Instructions    Did you understand your discharge instructions?  Yes      Row Name 08/16/20 1639             Index discharge location/services    Where did you go upon discharge?  Home with services      What services were arranged at discharge?  Home Health

## 2020-08-16 NOTE — PROGRESS NOTES
"    [facility] Medicine Services  PROGRESS NOTE    Patient Name: Temi Jarrett  : 1930  MRN: [id number]    Date of Admission: 2020  Primary Care Physician: Eric Patel MD    Subjective   Subjective     CC:  Ataxia, general weakness    HPI:  \"I'm too weak tostand\".  No specif complaints.  Denies CP, SOA, cough, abd pain, dysuria.     Review of Systems  Gen- No fevers, chills, HA, uncontrolled pain  CV- No chest pain, palpitations, new edema  Resp- No cough, dyspnea  GI- No N/V/D, abd pain, constipation  Skin - No rash    Objective   Objective     Vital Signs:   Temp:  [98 °F (36.7 °C)-100.1 °F (37.8 °C)] 100.1 °F (37.8 °C)  Heart Rate:  [61-85] 72  Resp:  [16-22] 18  BP: (109-135)/(61-78) 122/78  Total (NIH Stroke Scale): (P) 1     Physical Exam:  Constitutional: No acute distress, awake, alert, nontoxic, normal body habitus  Respiratory: Dull bilateral bases but no rhonchi or rales, good effort, nonlabored respirations   Cardiovascular: RRR  Gastrointestinal: Soft, nontender, nondistended  Musculoskeletal: No peripheral edema, normal muscle tone for age  Psychiatric: Appropriate affect, good insight and judgement, cooperative  Neurologic: Oriented x 3, movements symmetric BUE and BLE, Cranial Nerves grossly intact, speech clear and fluent    Results Reviewed:  Results from last 7 days   Lab Units 08/15/20  11320   WBC 10*3/mm3 18.91* 15.62*   HEMOGLOBIN g/dL 11.4* 10.8*   HEMATOCRIT % 36.3 34.5   PLATELETS 10*3/mm3 245 253   PROCALCITONIN ng/mL  --  0.14     Results from last 7 days   Lab Units 20  0530 08/15/20  1139 20   SODIUM mmol/L 138 138 136   POTASSIUM mmol/L 3.9 3.3* 4.1   CHLORIDE mmol/L 102 98 97*   CO2 mmol/L 29.0 29.0 32.0*   BUN mg/dL 17 11 17   CREATININE mg/dL 0.50* 0.48* 0.76   GLUCOSE mg/dL 105* 103* 114*   CALCIUM mg/dL 8.0* 8.3 8.4   ALT (SGPT) U/L  --   --  8   AST (SGOT) U/L  --   --  11   TROPONIN T ng/mL  --   --  " <0.010   PROBNP pg/mL  --   --  1,998.0*     Estimated Creatinine Clearance: 41.3 mL/min (A) (by C-G formula based on SCr of 0.5 mg/dL (L)).    Microbiology Results Abnormal     Procedure Component Value - Date/Time    MRSA Screen, PCR (Inpatient) - Swab, Nares [[id number]]  (Normal) Collected:  08/15/20 0332    Lab Status:  Final result Specimen:  Swab from Nares Updated:  08/16/20 0738     MRSA PCR Negative    Narrative:       MRSA Negative    Blood Culture - Blood, Hand, Left [[id number]] Collected:  08/14/20 2000    Lab Status:  Preliminary result Specimen:  Blood from Hand, Left Updated:  08/15/20 2030     Blood Culture No growth at 24 hours    Blood Culture - Blood, Arm, Right [[id number]] Collected:  08/14/20 2016    Lab Status:  Preliminary result Specimen:  Blood from Arm, Right Updated:  08/15/20 2030     Blood Culture No growth at 24 hours    S. Pneumo Ag Urine or CSF - Urine, Urine, Clean Catch [[id number]]  (Normal) Collected:  08/15/20 0326    Lab Status:  Final result Specimen:  Urine, Clean Catch Updated:  08/15/20 1339     Strep Pneumo Ag Negative    Legionella Antigen, Urine - Urine, Urine, Clean Catch [[id number]]  (Normal) Collected:  08/15/20 0326    Lab Status:  Final result Specimen:  Urine, Clean Catch Updated:  08/15/20 1339     LEGIONELLA ANTIGEN, URINE Negative    Respiratory Panel PCR w/COVID-19(SARS-CoV-2) FAMILIA/EMILE/KEARA/PAD In-House, NP Swab in UTM/VTM, 3-4 HR TAT - Swab, Nasopharynx [[id number]]  (Normal) Collected:  08/15/20 0240    Lab Status:  Final result Specimen:  Swab from Nasopharynx Updated:  08/15/20 0519     ADENOVIRUS, PCR Not Detected     Coronavirus 229E Not Detected     Coronavirus HKU1 Not Detected     Coronavirus NL63 Not Detected     Coronavirus OC43 Not Detected     COVID19 Not Detected     Human Metapneumovirus Not Detected     Human Rhinovirus/Enterovirus Not Detected     Influenza A PCR Not Detected     Influenza A H1 Not Detected     Influenza A H1 2009 PCR Not Detected      Influenza A H3 Not Detected     Influenza B PCR Not Detected     Parainfluenza Virus 1 Not Detected     Parainfluenza Virus 2 Not Detected     Parainfluenza Virus 3 Not Detected     Parainfluenza Virus 4 Not Detected     RSV, PCR Not Detected     Bordetella pertussis pcr Not Detected     Bordetella parapertussis PCR Not Detected     Chlamydophila pneumoniae PCR Not Detected     Mycoplasma pneumo by PCR Not Detected    Narrative:       Fact sheet for providers: https://docs."[employer]"/wp-content/uploads/EHF3207-4548-PP4.1-EUA-Provider-Fact-Sheet-3.pdf    Fact sheet for patients: https://docs."[employer]"/wp-content/uploads/LHI4464-7341-AJ0.1-EUA-Patient-Fact-Sheet-1.pdf  Fact sheet for providers: https://docs."[employer]"/wp-content/uploads/DCK6132-2123-CS3.1-EUA-Provider-Fact-Sheet-3.pdf    Fact sheet for patients: https://[employer]"/wp-content/uploads/NQO5797-4831-FQ2.1-EUA-Patient-Fact-Sheet-1.pdf          Imaging Results (Last 24 Hours)     ** No results found for the last 24 hours. **          Results for orders placed during the hospital encounter of 08/02/20   Transthoracic Echo Complete With Contrast if Necessary Per Protocol    Narrative · Left ventricular systolic function is normal. Estimated EF = 70%.  · Left ventricular diastolic dysfunction (grade I) consistent with   impaired relaxation.  · Left atrial cavity size is moderately dilated.  · There is calcification of the aortic valve. There is no significant   stenosis or regurgitation. A Lambel's excrescence is noted on an aortic   valve leaflet.  · Moderate mitral valve regurgitation is present.  · Estimated right ventricular systolic pressure from tricuspid   regurgitation is moderately elevated (49 mmHg).  · There is a moderate (1-2cm) circumferential pericardial effusion. There   is no tamponade physiology.          I have reviewed the medications:  Scheduled Meds:  apixaban 2.5 mg Oral Q12H   aspirin 81 mg Oral Daily   bethanechol  10 mg Oral TID AC   busPIRone 10 mg Oral BID   calcium carb-cholecalciferol 1 tablet Oral Daily   cholecalciferol 1,000 Units Oral Daily   donepezil 10 mg Oral Nightly   gabapentin 300 mg Oral BID   levothyroxine 175 mcg Oral Daily   lisinopril 10 mg Oral Q24H   melatonin 2.5 mg Oral Nightly   memantine 10 mg Oral BID   multivitamin with minerals 1 tablet Oral Daily   pantoprazole 40 mg Oral Daily Before Lunch   saccharomyces boulardii 250 mg Oral Daily   sertraline 50 mg Oral Daily   sodium chloride 10 mL Intravenous Q12H   sucralfate 1 g Oral BID AC     Continuous Infusions:   PRN Meds:.•  acetaminophen **OR** acetaminophen **OR** acetaminophen  •  aluminum-magnesium hydroxide-simethicone  •  sodium chloride  •  sodium chloride    Assessment/Plan   Assessment & Plan     Active Hospital Problems    Diagnosis  POA   • **Ataxia [R27.0]  Yes   • HCAP (healthcare-associated pneumonia) [J18.9]  Yes   • Weakness of both lower extremities [R29.898]  Yes   • Prolonged Q-T interval on ECG [R94.31]  Yes   • Dementia (CMS/HCC) [F03.90]  Yes   • Paroxysmal atrial fibrillation (CMS/HCC) [I48.0]  Yes   • Hypothyroidism (acquired) [E03.9]  Yes   • Essential hypertension [I10]  Yes      Resolved Hospital Problems   No resolved problems to display.        Brief Hospital Course to date:  Temi Jarrett is a 89 y.o. female with a past medical history significant for arthritis, bladder prolapse, dementia, GERD, hypothyroidism, hyperlipidemia, essential hypertension, IBS, and macular degeneration presented to the ED via EMS due to inability to walk.  Patient was recently admitted to [facility] from 8/2/20 to 8/9/20 secondary to pneumonia of bilateral lower lobes and UTI and completed full cefdinir and doxycycline course upon discharge.  During her hospitalization she developed atrial fibrillation RVR and was started on amiodarone and Eliquis.       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    CODE STATUS:   Code Status and Medical Interventions:   Ordered at: 08/15/20 0157     Level Of Support Discussed With:    Patient     Code Status:    CPR     Medical Interventions (Level of Support Prior to Arrest):    Full         Electronically signed by Bell Galeana MD, 08/16/20, 15:56.

## 2020-08-16 NOTE — PROGRESS NOTES
Discharge Planning Assessment  [facility]     Patient Name: Temi Jarrett  MRN: [id number]  Today's Date: 8/16/2020    Admit Date: 8/14/2020    Discharge Needs Assessment     Row Name 08/16/20 1641       Living Environment    Lives With  child(darvin), adult    Current Living Arrangements  home/apartment/condo    Primary Care Provided by  self    Provides Primary Care For  no one, unable/limited ability to care for self    Family Caregiver if Needed  child(darvin), adult    Quality of Family Relationships  helpful;involved;supportive    Able to Return to Prior Arrangements  yes       Resource/Environmental Concerns    Resource/Environmental Concerns  none       Transition Planning    Patient/Family Anticipates Transition to  home with help/services;inpatient rehabilitation facility    Patient/Family Anticipated Services at Transition  skilled nursing;home health care TBD    Transportation Anticipated  family or friend will provide;health plan transportation TBD       Discharge Needs Assessment    Readmission Within the Last 30 Days  current reason for admission unrelated to previous admission    Concerns to be Addressed  discharge planning    Equipment Currently Used at Home  bath bench;rollator    Current Discharge Risk  chronically ill;dependent with mobility/activities of daily living        Discharge Plan     Row Name 08/16/20 1642       Plan    Plan  SNF rehab vs home with home health    Patient/Family in Agreement with Plan  yes    Plan Comments  Met with patient in the room to initiate discharge planning. Patient lives with her son and DIL in two-story home in [place]. She states her bedroom is upstairs. Patient requires assistance with ADLs and uses a rollator with  mobility. Patient is a readmission and was at [facility] from 8/2-8/9/20 for PNA. She was discharged home with [facility] but states they have not seen her yet because she readmitted to the hospital. She states she did not want to go  to SNF rehab at discharge last time because of concerns about the pandemic. She may want to think about rehab now because she says she cannot walk. Patient walked 240 ft with PT but does have stairs at home. CM will f/u with patient and son in the morning, per patient's request, and review rehab options vs home health. Patient would consider a referral to [facility] or possibly [facility] or [facility]. If she goes to rehab, she would like to transport via the [facility] medical van. CM will continue to follow.     Final Discharge Disposition Code  30 - still a patient        Destination      Coordination has not been started for this encounter.      Durable Medical Equipment      Coordination has not been started for this encounter.      Dialysis/Infusion      Coordination has not been started for this encounter.      Home Medical Care      Coordination has not been started for this encounter.      Therapy      Coordination has not been started for this encounter.      Community Resources      Coordination has not been started for this encounter.          Demographic Summary     Row Name 08/16/20 1640       General Information    Admission Type  inpatient    Referral Source  admission list    Reason for Consult  discharge planning    General Information Comments  Confirmed with patient that her PCP is Dr. Patel, that she has medical coverage through Medicare A & B. She DOES NOT have rx coverage.         Functional Status     Row Name 08/16/20 1641       Functional Status    Usual Activity Tolerance  moderate       Functional Status, IADL    Medications  assistive person    Meal Preparation  assistive person    Housekeeping  assistive person    Laundry  assistive person        Psychosocial    No documentation.       Abuse/Neglect    No documentation.       Legal    No documentation.       Substance Abuse    No documentation.       Patient Forms    No documentation.           Renée Herrera RN

## 2020-08-16 NOTE — THERAPY EVALUATION
Acute Care - Speech Language Pathology   Swallow Initial Evaluation [facility]     Patient Name: Temi Jarrett  : 1930  MRN: [id number]  Today's Date: 2020               Admit Date: 2020    Visit Dx:     ICD-10-CM ICD-9-CM   1. History of pneumonia Z87.01 V12.61   2. Ataxia R27.0 781.3   3. History of UTI Z87.440 V13.02   4. History of dementia Z86.59 V11.8   5. PAF (paroxysmal atrial fibrillation) (CMS/[facility]) I48.0 427.31     Patient Active Problem List   Diagnosis   • Esophageal reflux   • Hypothyroidism (acquired)   • Generalized anxiety disorder   • Peripheral neuropathy   • Spinal stenosis of lumbar region   • Osteoporosis   • Disc disorder of lumbar region   • Bladder prolapse, female, acquired   • Essential hypertension   • Pernicious anemia   • Annual physical exam   • Primary osteoarthritis of both knees   • Massive hiatal hernia with intrathoracic stomach   • Acute UTI (urinary tract infection)   • Pneumonia of both lower lobes due to infectious organism   • Paroxysmal atrial fibrillation (CMS/[facility])   • Pericardial effusion   • Dementia (CMS/[facility])   • Ataxia   • HCAP (healthcare-associated pneumonia)   • Weakness of both lower extremities   • Prolonged Q-T interval on ECG     Past Medical History:   Diagnosis Date   • Arthritis    • Bladder prolapse, female, acquired    • Closed fracture of neck of left femur (CMS/[facility]) 2019   • Dementia (CMS/[facility])    • Depression    • Disease of thyroid gland    • Gastric polyp    • GERD (gastroesophageal reflux disease)    • History of colonic polyps    • Hyperlipidemia    • Hypertension    • IBS (irritable bowel syndrome)    • Macular degeneration    • Torn meniscus     right knee      Past Surgical History:   Procedure Laterality Date   • CHOLECYSTECTOMY     • EYE SURGERY      Cataract extraction   • HERNIA REPAIR     • HIP HEMIARTHROPLASTY Left 2019    Procedure: HIP HEMIARTHROPLASTY LEFT;  Surgeon: Reddy Segundo Jr., MD;   Location: [facility] OR;  Service: Orthopedics   • HYSTERECTOMY  1976   • KNEE SURGERY Right     arthroscopy- 2000   • TONSILLECTOMY          SWALLOW EVALUATION (last 72 hours)      SLP Adult Swallow Evaluation     Row Name 08/16/20 0830                   Rehab Evaluation    Document Type  evaluation  -HG        Subjective Information  no complaints  -HG        Patient Observations  alert;cooperative  -HG        Patient/Family Observations  No family present. Pt sitting up in bed eating breakfast.  -HG        Patient Effort  excellent  -HG        Symptoms Noted During/After Treatment  none  -HG           General Information    Patient Profile Reviewed  yes  -HG        Pertinent History Of Current Problem  Pt is a 89 year old female admitted with ataxia- complaints of not being able to walk. Pt with recent hx of acute care stay 8/2-8/9/20 with bilateral pneumonia and UTI.  Pt with hx of dementia and GERD.    -HG        Current Method of Nutrition  regular textures;thin liquids  -HG        Precautions/Limitations, Vision  WFL with corrective lenses  -HG        Precautions/Limitations, Hearing  WFL;for purposes of eval  -HG        Prior Level of Function-Communication  WFL  -HG        Prior Level of Function-Swallowing  no diet consistency restrictions  -HG        Plans/Goals Discussed with  patient  -HG        Barriers to Rehab  none identified  -HG           Pain Scale: Numbers Pre/Post-Treatment    Pain Scale: Numbers, Pretreatment  3/10  -HG        Pain Location - Orientation  medial  -HG        Pain Location  chest  -HG        Pre/Post Treatment Pain Comment  RN was present with pt made note of pain.   -HG           Oral Motor and Function    Dentition Assessment  natural, present and adequate  -HG        Secretion Management  WNL/WFL  -HG        Mucosal Quality  moist, healthy  -HG        Gag Response  WFL  -HG        Volitional Swallow  WFL  -HG        Volitional Cough  WFL  -HG           Oral Musculature and  Cranial Nerve Assessment    Oral Motor General Assessment  WFL  -HG           General Eating/Swallowing Observations    Respiratory Support Currently in Use  nasal cannula  -HG        Eating/Swallowing Skills  self-fed  -HG        Positioning During Eating  upright 90 degree;upright in bed  -HG        Utensils Used  spoon;straw  -HG        Consistencies Trialed  regular textures;soft textures;thin liquids  -HG           Clinical Swallow Eval    Oral Prep Phase  WFL  -HG        Oral Transit  WFL  -HG        Oral Residue  WFL  -HG        Pharyngeal Phase  no overt signs/symptoms of pharyngeal impairment  -HG        Clinical Swallow Evaluation Summary  CSE completed this date with full breakfast meal. Pt tolerated water via straw with no s/s of aspiration. Pt tolerated scrambled eggs and potato wedges with no s/s of aspiration. SLP RECs continuing with regular diet and thin liquids at this time. Meds whole with thin or in applesauce/pudding.   -HG           Recommendations    Therapy Frequency (Swallow)  PRN  -HG        SLP Diet Recommendation  regular textures;thin liquids  -HG        SLP Rec. for Method of Medication Administration  meds whole;with thin liquids;with pudding or applesauce  -HG        Monitor for Signs of Aspiration  yes;notify SLP if any concerns;cough;gurgly voice;throat clearing;fever  -HG        Anticipated Dischage Disposition (SLP)  unknown  -HG           Oral Nutrition/Hydration Goal 1 (SLP)    Oral Nutrition/Hydration Goal 1, SLP  LTG: Pt will tolerate regular diet and thin liquids with no s/s of aspiration.  -HG        Time Frame (Oral Nutrition/Hydration Goal 1, SLP)  1 week  -HG           Oral Nutrition/Hydration Goal 2 (SLP)    Oral Nutrition/Hydration Goal 2, SLP  Pt will tolerate recommended diet and liquids with no s/s of aspiration.  -        Time Frame (Oral Nutrition/Hydration Goal 2, SLP)  short term goal (STG);1 week  -          User Key  (r) = Recorded By, (t) = Taken By, (c) =  Cosigned By    Initials Name Effective Dates    Sherly Bedolla MS CCC-SLP 08/09/20 -           EDUCATION  The patient has been educated in the following areas:   Dysphagia (Swallowing Impairment).    SLP Recommendation and Plan     SLP Diet Recommendation: regular textures, thin liquids     SLP Rec. for Method of Medication Administration: meds whole, with thin liquids, with pudding or applesauce     Monitor for Signs of Aspiration: yes, notify SLP if any concerns, cough, gurgly voice, throat clearing, fever        Anticipated Dischage Disposition (SLP): unknown     Therapy Frequency (Swallow): PRN          Plan of Care Reviewed With: patient    SLP GOALS     Row Name 08/16/20 0830             Oral Nutrition/Hydration Goal 1 (SLP)    Oral Nutrition/Hydration Goal 1, SLP  LTG: Pt will tolerate regular diet and thin liquids with no s/s of aspiration.  -HG      Time Frame (Oral Nutrition/Hydration Goal 1, SLP)  1 week  -HG         Oral Nutrition/Hydration Goal 2 (SLP)    Oral Nutrition/Hydration Goal 2, SLP  Pt will tolerate recommended diet and liquids with no s/s of aspiration.  -HG      Time Frame (Oral Nutrition/Hydration Goal 2, SLP)  short term goal (STG);1 week  -        User Key  (r) = Recorded By, (t) = Taken By, (c) = Cosigned By    Initials Name Provider Type    Sherly Bedolla MS CCC-SLP Speech and Language Pathologist             Time Calculation:   Time Calculation- SLP     Row Name 08/16/20 0922             Time Calculation- SLP    SLP Start Time  0830  -HG      SLP Received On  08/16/20  -        User Key  (r) = Recorded By, (t) = Taken By, (c) = Cosigned By    Initials Name Provider Type    Sherly Bedolla MS CCC-SLP Speech and Language Pathologist          Therapy Charges for Today     Code Description Service Date Service Provider Modifiers Qty    [id number]  ST EVAL ORAL PHARYNG SWALLOW 3 8/16/2020 Sherly Ricks MS CCC-SLP GN 1        Patient was not wearing a face  mask and did not exhibit coughing during this therapy encounter.  Procedure performed was not aerosolizing, involved close contact (within 6 feet for at least 15 minutes or longer), and did not involve contact with infectious secretions or specimens.  Therapist used appropriate personal protective equipment including gloves, standard procedure mask, eye protection and N95 mask.  Appropriate PPE was worn during the entire therapy session.  Hand hygiene was completed before and after therapy session.          Sherly Ricks MS CCC-SLP  8/16/2020

## 2020-08-17 PROCEDURE — 99231 SBSQ HOSP IP/OBS SF/LOW 25: CPT | Performed by: FAMILY MEDICINE

## 2020-08-17 PROCEDURE — 93005 ELECTROCARDIOGRAM TRACING: CPT | Performed by: FAMILY MEDICINE

## 2020-08-17 PROCEDURE — 97110 THERAPEUTIC EXERCISES: CPT

## 2020-08-17 PROCEDURE — 97116 GAIT TRAINING THERAPY: CPT

## 2020-08-17 PROCEDURE — 93010 ELECTROCARDIOGRAM REPORT: CPT | Performed by: INTERNAL MEDICINE

## 2020-08-17 PROCEDURE — 99231 SBSQ HOSP IP/OBS SF/LOW 25: CPT | Performed by: PSYCHIATRY & NEUROLOGY

## 2020-08-17 RX ADMIN — BETHANECHOL CHLORIDE 10 MG: 10 TABLET ORAL at 09:22

## 2020-08-17 RX ADMIN — ASPIRIN 81 MG: 81 TABLET, COATED ORAL at 09:23

## 2020-08-17 RX ADMIN — SODIUM CHLORIDE, PRESERVATIVE FREE 10 ML: 5 INJECTION INTRAVENOUS at 09:24

## 2020-08-17 RX ADMIN — MULTIPLE VITAMINS W/ MINERALS TAB 1 TABLET: TAB at 09:23

## 2020-08-17 RX ADMIN — BETHANECHOL CHLORIDE 10 MG: 10 TABLET ORAL at 12:37

## 2020-08-17 RX ADMIN — MELATONIN TAB 5 MG 2.5 MG: 5 TAB at 22:55

## 2020-08-17 RX ADMIN — MEMANTINE 10 MG: 10 TABLET ORAL at 22:54

## 2020-08-17 RX ADMIN — LISINOPRIL 10 MG: 10 TABLET ORAL at 09:23

## 2020-08-17 RX ADMIN — SERTRALINE HYDROCHLORIDE 50 MG: 50 TABLET, FILM COATED ORAL at 09:23

## 2020-08-17 RX ADMIN — GABAPENTIN 300 MG: 300 CAPSULE ORAL at 22:54

## 2020-08-17 RX ADMIN — BUSPIRONE HYDROCHLORIDE 10 MG: 10 TABLET ORAL at 18:11

## 2020-08-17 RX ADMIN — SUCRALFATE 1 G: 1 TABLET ORAL at 09:23

## 2020-08-17 RX ADMIN — LEVOTHYROXINE SODIUM 175 MCG: 175 TABLET ORAL at 05:11

## 2020-08-17 RX ADMIN — GABAPENTIN 300 MG: 300 CAPSULE ORAL at 09:23

## 2020-08-17 RX ADMIN — Medication 250 MG: at 09:23

## 2020-08-17 RX ADMIN — ALUMINUM HYDROXIDE, MAGNESIUM HYDROXIDE, AND DIMETHICONE 15 ML: 400; 400; 40 SUSPENSION ORAL at 09:22

## 2020-08-17 RX ADMIN — APIXABAN 2.5 MG: 2.5 TABLET, FILM COATED ORAL at 09:23

## 2020-08-17 RX ADMIN — BUSPIRONE HYDROCHLORIDE 10 MG: 10 TABLET ORAL at 09:23

## 2020-08-17 RX ADMIN — PANTOPRAZOLE SODIUM 40 MG: 40 TABLET, DELAYED RELEASE ORAL at 12:37

## 2020-08-17 RX ADMIN — CHOLECALCIFEROL (VITAMIN D3) 25 MCG (1,000 UNIT) TABLET 1000 UNITS: TABLET at 09:23

## 2020-08-17 RX ADMIN — SUCRALFATE 1 G: 1 TABLET ORAL at 18:11

## 2020-08-17 RX ADMIN — MEMANTINE 10 MG: 10 TABLET ORAL at 09:23

## 2020-08-17 RX ADMIN — Medication 1 TABLET: at 09:23

## 2020-08-17 RX ADMIN — APIXABAN 2.5 MG: 2.5 TABLET, FILM COATED ORAL at 22:55

## 2020-08-17 RX ADMIN — BETHANECHOL CHLORIDE 10 MG: 10 TABLET ORAL at 18:11

## 2020-08-17 RX ADMIN — SODIUM CHLORIDE, PRESERVATIVE FREE 10 ML: 5 INJECTION INTRAVENOUS at 23:29

## 2020-08-17 RX ADMIN — DONEPEZIL HYDROCHLORIDE 10 MG: 10 TABLET, FILM COATED ORAL at 22:54

## 2020-08-17 RX ADMIN — ALUMINUM HYDROXIDE, MAGNESIUM HYDROXIDE, AND DIMETHICONE 15 ML: 400; 400; 40 SUSPENSION ORAL at 22:59

## 2020-08-17 NOTE — THERAPY TREATMENT NOTE
Patient Name: Temi Jarrett  : 1930    MRN: [id number]                              Today's Date: 2020       Admit Date: 2020    Visit Dx:     ICD-10-CM ICD-9-CM   1. History of pneumonia Z87.01 V12.61   2. Ataxia R27.0 781.3   3. History of UTI Z87.440 V13.02   4. History of dementia Z86.59 V11.8   5. PAF (paroxysmal atrial fibrillation) (CMS/[facility]) I48.0 427.31     Patient Active Problem List   Diagnosis   • Esophageal reflux   • Hypothyroidism (acquired)   • Generalized anxiety disorder   • Peripheral neuropathy   • Spinal stenosis of lumbar region   • Osteoporosis   • Disc disorder of lumbar region   • Bladder prolapse, female, acquired   • Essential hypertension   • Pernicious anemia   • Annual physical exam   • Primary osteoarthritis of both knees   • Massive hiatal hernia with intrathoracic stomach   • Acute UTI (urinary tract infection)   • Pneumonia of both lower lobes due to infectious organism   • Paroxysmal atrial fibrillation (CMS/[facility])   • Pericardial effusion   • Dementia (CMS/[facility])   • Ataxia   • Weakness of both lower extremities   • Prolonged Q-T interval on ECG     Past Medical History:   Diagnosis Date   • Arthritis    • Bladder prolapse, female, acquired    • Closed fracture of neck of left femur (CMS/[facility]) 2019   • Dementia (CMS/[facility])    • Depression    • Disease of thyroid gland    • Gastric polyp    • GERD (gastroesophageal reflux disease)    • History of colonic polyps    • Hyperlipidemia    • Hypertension    • IBS (irritable bowel syndrome)    • Macular degeneration    • Torn meniscus     right knee      Past Surgical History:   Procedure Laterality Date   • CHOLECYSTECTOMY     • EYE SURGERY      Cataract extraction   • HERNIA REPAIR     • HIP HEMIARTHROPLASTY Left 2019    Procedure: HIP HEMIARTHROPLASTY LEFT;  Surgeon: Reddy Segundo Jr., MD;  Location: [facility];  Service: Orthopedics   • HYSTERECTOMY     • KNEE SURGERY Right     arthroscopy-    •  TONSILLECTOMY       General Information     Row Name 08/17/20 1323          PT Evaluation Time/Intention    Document Type  therapy note (daily note)  -LO     Mode of Treatment  physical therapy  -     Row Name 08/17/20 1323          General Information    Patient Profile Reviewed?  yes  -LO     Existing Precautions/Restrictions  fall;oxygen therapy device and L/min  -     Row Name 08/17/20 1323          Cognitive Assessment/Intervention- PT/OT    Orientation Status (Cognition)  oriented x 4  -     Row Name 08/17/20 1323          Safety Issues, Functional Mobility    Impairments Affecting Function (Mobility)  balance;endurance/activity tolerance;shortness of breath;strength;postural/trunk control  -       User Key  (r) = Recorded By, (t) = Taken By, (c) = Cosigned By    Initials Name Provider Type    Ale Wade PT Physical Therapist        Mobility     Row Name 08/17/20 1323          Bed Mobility Assessment/Treatment    Comment (Bed Mobility)  [facility] upon arrival  -     Row Name 08/17/20 1323          Sit-Stand Transfer    Sit-Stand Dade (Transfers)  contact guard  -LO     Assistive Device (Sit-Stand Transfers)  walker, front-wheeled  -LO     Row Name 08/17/20 1323          Gait/Stairs Assessment/Training    Gait/Stairs Assessment/Training  gait/ambulation assistive device  -     Dade Level (Gait)  verbal cues;contact guard  -LO     Assistive Device (Gait)  walker, front-wheeled  -LO     Distance in Feet (Gait)  150'  -LO     Pattern (Gait)  step-to  -LO     Deviations/Abnormal Patterns (Gait)  bilateral deviations;kane decreased;stride length decreased  -LO     Bilateral Gait Deviations  forward flexed posture;heel strike decreased  -LO     Comment (Gait/Stairs)  vc for upright posture as well as to keep walker close at all times.   -LO       User Key  (r) = Recorded By, (t) = Taken By, (c) = Cosigned By    Initials Name Provider Type    Ale Wade, LEFTY Physical Therapist         Obj/Interventions     Row Name 08/17/20 1323          Therapeutic Exercise    Lower Extremity (Therapeutic Exercise)  -- sitting marches, LAQ, heel/toe, hip abd/add  -LO     Exercise Type (Therapeutic Exercise)  AROM (active range of motion)  -LO     Position (Therapeutic Exercise)  seated  -LO     Row Name 08/17/20 1323          Static Sitting Balance    Level of Sherrill (Unsupported Sitting, Static Balance)  conditional independence  -LO     Sitting Position (Unsupported Sitting, Static Balance)  sitting in chair  -LO     Time Able to Maintain Position (Unsupported Sitting, Static Balance)  4 to 5 minutes  -LO     Row Name 08/17/20 1323          Dynamic Sitting Balance    Level of Sherrill, Reaches Outside Midline (Sitting, Dynamic Balance)  conditional independence  -LO     Sitting Position, Reaches Outside Midline (Sitting, Dynamic Balance)  sitting in chair  -LO     Comment, Reaches Outside Midline (Sitting, Dynamic Balance)  performing BLE TE  -LO     Row Name 08/17/20 1323          Static Standing Balance    Level of Sherrill (Supported Standing, Static Balance)  contact guard assist  -LO     Time Able to Maintain Position (Supported Standing, Static Balance)  1 to 2 minutes  -LO     Assistive Device Utilized (Supported Standing, Static Balance)  walker, rolling  -LO     Row Name 08/17/20 1323          Dynamic Standing Balance    Level of Sherrill, Reaches Outside Midline (Standing, Dynamic Balance)  contact guard assist  -LO     Time Able to Maintain Position, Reaches Outside Midline (Standing, Dynamic Balance)  1 to 2 minutes  -LO     Assistive Device Utilized (Supported Standing, Dynamic Balance)  walker, rolling  -LO     Row Name 08/17/20 1323          Sensory Assessment/Intervention    Sensory General Assessment  no sensation deficits identified  -LO       User Key  (r) = Recorded By, (t) = Taken By, (c) = Cosigned By    Initials Name Provider Type    LO Ale Alexander, PT Physical  Therapist        Goals/Plan    No documentation.       Clinical Impression     Row Name 08/17/20 1323          Pain Assessment    Additional Documentation  Pain Scale: FACES Pre/Post-Treatment (Group)  -LO     Row Name 08/17/20 1323          Pain Scale: FACES Pre/Post-Treatment    Pain: FACES Scale, Pretreatment  0-->no hurt  -LO     Pain: FACES Scale, Post-Treatment  0-->no hurt  -LO     Row Name 08/17/20 1323          Plan of Care Review    Plan of Care Reviewed With  patient  -LO     Progress  no change  -LO     Row Name 08/17/20 1323          Physical Therapy Clinical Impression    Criteria for Skilled Interventions Met (PT Clinical Impression)  yes;treatment indicated  -LO     Rehab Potential (PT Clinical Summary)  good, to achieve stated therapy goals  -LO     Row Name 08/17/20 1323          Vital Signs    Pre Systolic BP Rehab  141  -LO     Pre Treatment Diastolic BP  89  -LO     Post Systolic BP Rehab  124  -LO     Post Treatment Diastolic BP  71  -LO     Pretreatment Heart Rate (beats/min)  95  -LO     Posttreatment Heart Rate (beats/min)  93  -LO     Pre SpO2 (%)  97  -LO     O2 Delivery Pre Treatment  nasal cannula  -LO     O2 Delivery Intra Treatment  nasal cannula  -LO     Post SpO2 (%)  96  -LO     O2 Delivery Post Treatment  nasal cannula  -LO     Pre Patient Position  Sitting  -LO     Intra Patient Position  Standing  -LO     Post Patient Position  Sitting  -LO     Row Name 08/17/20 1323          Positioning and Restraints    Pre-Treatment Position  sitting in chair/recliner  -LO     Post Treatment Position  chair  -LO     In Chair  notified nsg;reclined;call light within reach;encouraged to call for assist;exit alarm on;waffle cushion;legs elevated  -LO       User Key  (r) = Recorded By, (t) = Taken By, (c) = Cosigned By    Initials Name Provider Type    Ale Wade, PT Physical Therapist        Outcome Measures     Row Name 08/17/20 1323          How much help from another person do you  currently need...    Turning from your back to your side while in flat bed without using bedrails?  3  -LO     Moving from lying on back to sitting on the side of a flat bed without bedrails?  3  -LO     Moving to and from a bed to a chair (including a wheelchair)?  3  -LO     Standing up from a chair using your arms (e.g., wheelchair, bedside chair)?  3  -LO     Climbing 3-5 steps with a railing?  2  -LO     To walk in hospital room?  3  -LO     AM-PAC 6 Clicks Score (PT)  17  -LO       User Key  (r) = Recorded By, (t) = Taken By, (c) = Cosigned By    Initials Name Provider Type    Ale Wade, PT Physical Therapist        Physical Therapy Education                 Title: PT OT SLP Therapies (In Progress)     Topic: Physical Therapy (In Progress)     Point: Mobility training (Done)     Description:   Instruct learner(s) on safety and technique for assisting patient out of bed, chair or wheelchair.  Instruct in the proper use of assistive devices, such as walker, crutches, cane or brace.              Patient Friendly Description:   It's important to get you on your feet again, but we need to do so in a way that is safe for you. Falling has serious consequences, and your personal safety is the most important thing of all.        When it's time to get out of bed, one of us or a family member will sit next to you on the bed to give you support.     If your doctor or nurse tells you to use a walker, crutches, a cane, or a brace, be sure you use it every time you get out of bed, even if you think you don't need it.    Learning Progress Summary           Patient Acceptance, E,D, NR,VU by OMER at 8/15/2020 1619                   Point: Home exercise program (In Progress)     Description:   Instruct learner(s) on appropriate technique for monitoring, assisting and/or progressing patient with therapeutic exercises and activities.              Learning Progress Summary           Patient Acceptance, E, NR by STEFANIA at 8/17/2020  1323    Comment:  Patient education regarding HEP program                   Point: Body mechanics (Done)     Description:   Instruct learner(s) on proper positioning and spine alignment for patient and/or caregiver during mobility tasks and/or exercises.              Learning Progress Summary           Patient Acceptance, E,D, NR,VU by  at 8/15/2020 1619                   Point: Precautions (Done)     Description:   Instruct learner(s) on prescribed precautions during mobility and gait tasks              Learning Progress Summary           Patient Acceptance, E,D, NR,VU by  at 8/15/2020 1619                               User Key     Initials Effective Dates Name Provider Type Discipline     06/19/15 -  Ale Harper, PT Physical Therapist PT     03/11/20 -  Ale Alexander, PT Physical Therapist PT              PT Recommendation and Plan     Outcome Summary/Treatment Plan (PT)  Anticipated Discharge Disposition (PT): inpatient rehabilitation facility  Plan of Care Reviewed With: patient  Progress: no change  Outcome Summary: Patient with some progress this session in functional mobility, but reduction noted in functional endurance. Peforms transfers with CGA with FWW & ambulates x 150' with FWW and CGA. Patient reports significant anxiety this session, nervous about potentially going to rehab tomorrow and states she doesnt feel she is strong enough yet. Anxiety may have effected functional performance on this date. All vitals stable throughout session. Recommend IPR at dc due to number of steps (17) patient must navigate at home.     Time Calculation:   PT Charges     Row Name 08/17/20 1323             Time Calculation    Start Time  1323  -LO      PT Received On  08/17/20  -LO      PT Goal Re-Cert Due Date  08/25/20  -LO         Timed Charges    98431 - PT Therapeutic Exercise Minutes  10  -LO      09406 - Gait Training Minutes   12  -LO      24069 - PT Therapeutic Activity Minutes  6  -LO        User Key   (r) = Recorded By, (t) = Taken By, (c) = Cosigned By    Initials Name Provider Type    Ale Wade, PT Physical Therapist        Therapy Charges for Today     Code Description Service Date Service Provider Modifiers Qty    15145006715 HC PT THER PROC EA 15 MIN 8/17/2020 Ale Alexander, PT GP 1    61973830934 HC GAIT TRAINING EA 15 MIN 8/17/2020 Ale Alexander, PT GP 1          PT G-Codes  Outcome Measure Options: AM-PAC 6 Clicks Basic Mobility (PT)  AM-PAC 6 Clicks Score (PT): 17  AM-PAC 6 Clicks Score (OT): 14    Ale Alexander PT  8/17/2020

## 2020-08-17 NOTE — PROGRESS NOTES
Continued Stay Note  [facility]     Patient Name: Temi Jarrett  MRN: [id number]  Today's Date: 8/17/2020    Admit Date: 8/14/2020    Discharge Plan     Row Name 08/17/20 1355       Plan    Plan  rehab    Patient/Family in Agreement with Plan  yes    Plan Comments  I spoke with Victor M, Mrs. Jarrett's son on the phone about her discharge plan. Victor M is requesting that a referral is made to [facility]. We discussed rehab options in [facility], however he is only agreeable to his mother going to [facility]. I have called a referral to [facility] in admissions.     Final Discharge Disposition Code  30 - still a patient        Discharge Codes    No documentation.             Andrez Cochran RN

## 2020-08-17 NOTE — PROGRESS NOTES
[facility] Medicine Services  PROGRESS NOTE    Patient Name: Temi Jarrett  : 1930  MRN: [id number]    Date of Admission: 2020  Primary Care Physician: Eric Patel MD    Subjective   Subjective     CC:  Ataxia, general weakness    HPI:  Baseline dementia and anxiety.  Little more confused today but still very pleasant.     Review of Systems  Gen- No fevers, chills  CV- No chest pain  Resp- No cough, dyspnea  GI- No N/V/D, abd pain    Objective   Objective     Vital Signs:   Temp:  [97.9 °F (36.6 °C)-100.1 °F (37.8 °C)] 97.9 °F (36.6 °C)  Heart Rate:  [61-99] 99  Resp:  [16-20] 16  BP: (111-146)/(65-85) 146/85  Total (NIH Stroke Scale): 1     Physical Exam:  Constitutional: No acute distress, awake, alert, nontoxic, normal body habitus  Respiratory: Dull bilateral bases but no rhonchi or rales, good effort, nonlabored respirations   Cardiovascular: RRR  Gastrointestinal: Soft, nontender, nondistended  Musculoskeletal: No peripheral edema, normal muscle tone for age  Psychiatric: Anxious affect, good insight and judgement, cooperative  Neurologic: Oriented x 2, movements symmetric BUE and BLE, Cranial Nerves grossly intact, speech clear and fluent  Unchanged    Results Reviewed:  Results from last 7 days   Lab Units 08/15/20  1139 20  1925   WBC 10*3/mm3 18.91* 15.62*   HEMOGLOBIN g/dL 11.4* 10.8*   HEMATOCRIT % 36.3 34.5   PLATELETS 10*3/mm3 245 253   PROCALCITONIN ng/mL  --  0.14     Results from last 7 days   Lab Units 20  0530 08/15/20  1139 20  1925   SODIUM mmol/L 138 138 136   POTASSIUM mmol/L 3.9 3.3* 4.1   CHLORIDE mmol/L 102 98 97*   CO2 mmol/L 29.0 29.0 32.0*   BUN mg/dL 17 11 17   CREATININE mg/dL 0.50* 0.48* 0.76   GLUCOSE mg/dL 105* 103* 114*   CALCIUM mg/dL 8.0* 8.3 8.4   ALT (SGPT) U/L  --   --  8   AST (SGOT) U/L  --   --  11   TROPONIN T ng/mL  --   --  <0.010   PROBNP pg/mL  --   --  1,998.0*     Estimated Creatinine Clearance:  41.3 mL/min (A) (by C-G formula based on SCr of 0.5 mg/dL (L)).    Microbiology Results Abnormal     Procedure Component Value - Date/Time    Blood Culture - Blood, Hand, Left [[id number]] Collected:  08/14/20 2000    Lab Status:  Preliminary result Specimen:  Blood from Hand, Left Updated:  08/16/20 2030     Blood Culture No growth at 2 days    Blood Culture - Blood, Arm, Right [[id number]] Collected:  08/14/20 2016    Lab Status:  Preliminary result Specimen:  Blood from Arm, Right Updated:  08/16/20 2030     Blood Culture No growth at 2 days    MRSA Screen, PCR (Inpatient) - Swab, Nares [[id number]]  (Normal) Collected:  08/15/20 0332    Lab Status:  Final result Specimen:  Swab from Nares Updated:  08/16/20 0738     MRSA PCR Negative    Narrative:       MRSA Negative    S. Pneumo Ag Urine or CSF - Urine, Urine, Clean Catch [[id number]]  (Normal) Collected:  08/15/20 0326    Lab Status:  Final result Specimen:  Urine, Clean Catch Updated:  08/15/20 1339     Strep Pneumo Ag Negative    Legionella Antigen, Urine - Urine, Urine, Clean Catch [[id number]]  (Normal) Collected:  08/15/20 0326    Lab Status:  Final result Specimen:  Urine, Clean Catch Updated:  08/15/20 1339     LEGIONELLA ANTIGEN, URINE Negative    Respiratory Panel PCR w/COVID-19(SARS-CoV-2) FAMILIA/EMILE/KEARA/PAD In-House, NP Swab in UTM/VTM, 3-4 HR TAT - Swab, Nasopharynx [[id number]]  (Normal) Collected:  08/15/20 0240    Lab Status:  Final result Specimen:  Swab from Nasopharynx Updated:  08/15/20 0519     ADENOVIRUS, PCR Not Detected     Coronavirus 229E Not Detected     Coronavirus HKU1 Not Detected     Coronavirus NL63 Not Detected     Coronavirus OC43 Not Detected     COVID19 Not Detected     Human Metapneumovirus Not Detected     Human Rhinovirus/Enterovirus Not Detected     Influenza A PCR Not Detected     Influenza A H1 Not Detected     Influenza A H1 2009 PCR Not Detected     Influenza A H3 Not Detected     Influenza B PCR Not Detected     Parainfluenza  Virus 1 Not Detected     Parainfluenza Virus 2 Not Detected     Parainfluenza Virus 3 Not Detected     Parainfluenza Virus 4 Not Detected     RSV, PCR Not Detected     Bordetella pertussis pcr Not Detected     Bordetella parapertussis PCR Not Detected     Chlamydophila pneumoniae PCR Not Detected     Mycoplasma pneumo by PCR Not Detected    Narrative:       Fact sheet for providers: https://docs.[employer]/wp-content/uploads/FAG7185-6124-GC7.1-EUA-Provider-Fact-Sheet-3.pdf    Fact sheet for patients: https://docs.[employer]/wp-content/uploads/LCY3265-5564-IT4.1-EUA-Patient-Fact-Sheet-1.pdf  Fact sheet for providers: https://[employer]/wp-content/uploads/IXN2598-1282-QQ0.1-EUA-Provider-Fact-Sheet-3.pdf    Fact sheet for patients: https://[employer]/wp-content/uploads/KRF0548-9260-QC0.1-EUA-Patient-Fact-Sheet-1.pdf          Imaging Results (Last 24 Hours)     ** No results found for the last 24 hours. **          Results for orders placed during the hospital encounter of 08/02/20   Transthoracic Echo Complete With Contrast if Necessary Per Protocol    Narrative · Left ventricular systolic function is normal. Estimated EF = 70%.  · Left ventricular diastolic dysfunction (grade I) consistent with   impaired relaxation.  · Left atrial cavity size is moderately dilated.  · There is calcification of the aortic valve. There is no significant   stenosis or regurgitation. A Lambel's excrescence is noted on an aortic   valve leaflet.  · Moderate mitral valve regurgitation is present.  · Estimated right ventricular systolic pressure from tricuspid   regurgitation is moderately elevated (49 mmHg).  · There is a moderate (1-2cm) circumferential pericardial effusion. There   is no tamponade physiology.          I have reviewed the medications:  Scheduled Meds:    apixaban 2.5 mg Oral Q12H   aspirin 81 mg Oral Daily   bethanechol 10 mg Oral TID AC   busPIRone 10 mg Oral BID   calcium carb-cholecalciferol 1  tablet Oral Daily   cholecalciferol 1,000 Units Oral Daily   donepezil 10 mg Oral Nightly   gabapentin 300 mg Oral BID   levothyroxine 175 mcg Oral Daily   lisinopril 10 mg Oral Q24H   melatonin 2.5 mg Oral Nightly   memantine 10 mg Oral BID   multivitamin with minerals 1 tablet Oral Daily   pantoprazole 40 mg Oral Daily Before Lunch   saccharomyces boulardii 250 mg Oral Daily   sertraline 50 mg Oral Daily   sodium chloride 10 mL Intravenous Q12H   sucralfate 1 g Oral BID AC     Continuous Infusions:   PRN Meds:.•  acetaminophen **OR** acetaminophen **OR** acetaminophen  •  aluminum-magnesium hydroxide-simethicone  •  sodium chloride  •  sodium chloride    Assessment/Plan   Assessment & Plan     Active Hospital Problems    Diagnosis  POA   • **Ataxia [R27.0]  Yes   • Weakness of both lower extremities [R29.898]  Yes   • Prolonged Q-T interval on ECG [R94.31]  Yes   • Dementia (CMS/HCC) [F03.90]  Yes   • Paroxysmal atrial fibrillation (CMS/HCC) [I48.0]  Yes   • Hypothyroidism (acquired) [E03.9]  Yes   • Essential hypertension [I10]  Yes      Resolved Hospital Problems   No resolved problems to display.        Brief Hospital Course to date:  Temi Jarrett is a 89 y.o. female with a past medical history significant for arthritis, bladder prolapse, dementia, GERD, hypothyroidism, hyperlipidemia, essential hypertension, IBS, and macular degeneration presented to the ED via EMS due to inability to walk.  Patient was recently admitted to [facility] from 8/2/20 to 8/9/20 secondary to pneumonia of bilateral lower lobes and UTI and completed full cefdinir and doxycycline course upon discharge.  During her hospitalization she developed atrial fibrillation RVR and was started on amiodarone and Eliquis.       - deconditioning from recent hospital stay and illness coupled with likely amiodarone related ataxia  - PT/OT  - amiodarone has been stopped (58 day half life so will take several weeks to fully wash out)  - recent pna and UTI  are fully resolved     DVT Prophylaxis:  Eliqius      Disposition: I expect the patient to be discharged to IRF once accepted and approved.      CODE STATUS:   Code Status and Medical Interventions:   Ordered at: 08/15/20 0157     Level Of Support Discussed With:    Patient     Code Status:    CPR     Medical Interventions (Level of Support Prior to Arrest):    Full         Electronically signed by Bell Galeana MD, 08/17/20, 09:34.

## 2020-08-17 NOTE — PROGRESS NOTES
"Neurology       Patient Care Team:  rEic Patel MD as PCP - General  Eric Patel MD as PCP - Family Medicine  Eric Patel MD as PCP - Von Montgomery MD as Cardiologist (Cardiology)    Chief complaint inability to walk      Subjective .     History: Patient has been doing well with PT, walked 240 feet with RW and min assist, but needs to be able to climb steps at home.  Case management looking at inpatient rehab options.  Patient currently complaining of shortness of breath.  Discussed off the phone with her son.    ROS: No fever or chest pain    Objective     Vital Signs   Blood pressure 117/70, pulse 99, temperature 99.1 °F (37.3 °C), temperature source Oral, resp. rate 16, height 154.9 cm (61\"), weight 56 kg (123 lb 6 oz), SpO2 90 %, not currently breastfeeding.    Physical Exam:              Neuro: Anxious appearing elderly white woman sitting in bedside chair, alert, fluent and appropriate.  No dysarthria.  O2 monitor off.  EOMI face symmetric  Moving all extremities symmetrically against gravity  (Nurse assessing patient)    Results Review:              CK yesterday 13    Assessment/Plan     Bilateral lower extremity ataxia versus weakness in patient with recent pneumonia and UTI (fully treated)- no evidence of stroke on MRI, neuro exam unrevealing.    Concern for amiodarone induced ataxia in addition to deconditioning.  Amiodarone held since admission, but extremely prolonged half-life as noted by Dr. Galeana.  Agree with plan for rehab to allow patient to return home.  Will sign off, please call if needed again this admission.    I discussed the patients findings and my recommendations with patient and nursing staff    Ángela Garza MD  08/17/20  12:28    "

## 2020-08-17 NOTE — PLAN OF CARE
Problem: Patient Care Overview  Goal: Plan of Care Review  Flowsheets (Taken 8/17/2020 1323)  Outcome Summary: Patient with some progress this session in functional mobility, but reduction noted in functional endurance. Peforms transfers with CGA with FWW & ambulates x 150' with FWW and CGA. Patient reports significant anxiety this session, nervous about potentially going to rehab tomorrow and states she doesnt feel she is strong enough yet. Anxiety may have effected functional performance on this date. All vitals stable throughout session. Recommend IPR at dc due to number of steps (17) patient must navigate at home.

## 2020-08-18 VITALS
RESPIRATION RATE: 20 BRPM | SYSTOLIC BLOOD PRESSURE: 123 MMHG | HEIGHT: 61 IN | BODY MASS INDEX: 23.29 KG/M2 | TEMPERATURE: 98.6 F | WEIGHT: 123.38 LBS | OXYGEN SATURATION: 93 % | HEART RATE: 85 BPM | DIASTOLIC BLOOD PRESSURE: 73 MMHG

## 2020-08-18 LAB
ANION GAP SERPL CALCULATED.3IONS-SCNC: 4 MMOL/L (ref 5–15)
BASOPHILS # BLD AUTO: 0.04 10*3/MM3 (ref 0–0.2)
BASOPHILS NFR BLD AUTO: 0.4 % (ref 0–1.5)
BUN SERPL-MCNC: 14 MG/DL (ref 8–23)
BUN/CREAT SERPL: 25 (ref 7–25)
CALCIUM SPEC-SCNC: 8.4 MG/DL (ref 8.2–9.6)
CHLORIDE SERPL-SCNC: 102 MMOL/L (ref 98–107)
CO2 SERPL-SCNC: 34 MMOL/L (ref 22–29)
CREAT SERPL-MCNC: 0.56 MG/DL (ref 0.57–1)
DEPRECATED RDW RBC AUTO: 48.4 FL (ref 37–54)
EOSINOPHIL # BLD AUTO: 0.15 10*3/MM3 (ref 0–0.4)
EOSINOPHIL NFR BLD AUTO: 1.6 % (ref 0.3–6.2)
ERYTHROCYTE [DISTWIDTH] IN BLOOD BY AUTOMATED COUNT: 15.2 % (ref 12.3–15.4)
GFR SERPL CREATININE-BSD FRML MDRD: 102 ML/MIN/1.73
GLUCOSE SERPL-MCNC: 101 MG/DL (ref 65–99)
HCT VFR BLD AUTO: 34.4 % (ref 34–46.6)
HGB BLD-MCNC: 10.6 G/DL (ref 12–15.9)
IMM GRANULOCYTES # BLD AUTO: 0.05 10*3/MM3 (ref 0–0.05)
IMM GRANULOCYTES NFR BLD AUTO: 0.5 % (ref 0–0.5)
LYMPHOCYTES # BLD AUTO: 1.06 10*3/MM3 (ref 0.7–3.1)
LYMPHOCYTES NFR BLD AUTO: 11 % (ref 19.6–45.3)
MAGNESIUM SERPL-MCNC: 2.2 MG/DL (ref 1.6–2.4)
MCH RBC QN AUTO: 27.2 PG (ref 26.6–33)
MCHC RBC AUTO-ENTMCNC: 30.8 G/DL (ref 31.5–35.7)
MCV RBC AUTO: 88.2 FL (ref 79–97)
MONOCYTES # BLD AUTO: 0.7 10*3/MM3 (ref 0.1–0.9)
MONOCYTES NFR BLD AUTO: 7.3 % (ref 5–12)
NEUTROPHILS NFR BLD AUTO: 7.63 10*3/MM3 (ref 1.7–7)
NEUTROPHILS NFR BLD AUTO: 79.2 % (ref 42.7–76)
NRBC BLD AUTO-RTO: 0 /100 WBC (ref 0–0.2)
PLATELET # BLD AUTO: 271 10*3/MM3 (ref 140–450)
PMV BLD AUTO: 9.7 FL (ref 6–12)
POTASSIUM SERPL-SCNC: 4.3 MMOL/L (ref 3.5–5.2)
RBC # BLD AUTO: 3.9 10*6/MM3 (ref 3.77–5.28)
SODIUM SERPL-SCNC: 140 MMOL/L (ref 136–145)
WBC # BLD AUTO: 9.63 10*3/MM3 (ref 3.4–10.8)

## 2020-08-18 PROCEDURE — 85025 COMPLETE CBC W/AUTO DIFF WBC: CPT | Performed by: FAMILY MEDICINE

## 2020-08-18 PROCEDURE — 83735 ASSAY OF MAGNESIUM: CPT | Performed by: FAMILY MEDICINE

## 2020-08-18 PROCEDURE — 80048 BASIC METABOLIC PNL TOTAL CA: CPT | Performed by: FAMILY MEDICINE

## 2020-08-18 PROCEDURE — 97535 SELF CARE MNGMENT TRAINING: CPT

## 2020-08-18 PROCEDURE — 99239 HOSP IP/OBS DSCHRG MGMT >30: CPT | Performed by: FAMILY MEDICINE

## 2020-08-18 RX ADMIN — SUCRALFATE 1 G: 1 TABLET ORAL at 08:29

## 2020-08-18 RX ADMIN — SODIUM CHLORIDE, PRESERVATIVE FREE 10 ML: 5 INJECTION INTRAVENOUS at 09:00

## 2020-08-18 RX ADMIN — ASPIRIN 81 MG: 81 TABLET, COATED ORAL at 12:06

## 2020-08-18 RX ADMIN — MULTIPLE VITAMINS W/ MINERALS TAB 1 TABLET: TAB at 12:07

## 2020-08-18 RX ADMIN — PANTOPRAZOLE SODIUM 40 MG: 40 TABLET, DELAYED RELEASE ORAL at 12:07

## 2020-08-18 RX ADMIN — SERTRALINE HYDROCHLORIDE 50 MG: 50 TABLET, FILM COATED ORAL at 12:06

## 2020-08-18 RX ADMIN — BETHANECHOL CHLORIDE 10 MG: 10 TABLET ORAL at 12:07

## 2020-08-18 RX ADMIN — CHOLECALCIFEROL (VITAMIN D3) 25 MCG (1,000 UNIT) TABLET 1000 UNITS: TABLET at 12:06

## 2020-08-18 RX ADMIN — LEVOTHYROXINE SODIUM 175 MCG: 175 TABLET ORAL at 06:36

## 2020-08-18 RX ADMIN — MEMANTINE 10 MG: 10 TABLET ORAL at 12:07

## 2020-08-18 RX ADMIN — BUSPIRONE HYDROCHLORIDE 10 MG: 10 TABLET ORAL at 08:29

## 2020-08-18 RX ADMIN — LISINOPRIL 10 MG: 10 TABLET ORAL at 12:08

## 2020-08-18 RX ADMIN — Medication 1 TABLET: at 12:06

## 2020-08-18 RX ADMIN — Medication 250 MG: at 12:05

## 2020-08-18 RX ADMIN — APIXABAN 2.5 MG: 2.5 TABLET, FILM COATED ORAL at 12:07

## 2020-08-18 RX ADMIN — BETHANECHOL CHLORIDE 10 MG: 10 TABLET ORAL at 08:29

## 2020-08-18 RX ADMIN — GABAPENTIN 300 MG: 300 CAPSULE ORAL at 12:07

## 2020-08-18 NOTE — PROGRESS NOTES
Case Management Discharge Note      Final Note: Per [facility], they can offer Mrs. Jarrett an acute bed on the med unit today. I've discussed this with Victor M Jarrett, Mrs. Jarrett's son and he is agreeable to this. Nurse to call report to [phone]. [facility] is scheduled to transport Mrs. Jarrett there today. They will pick her up at the [address] matern[address] entrance at 2:30.         Destination - Selection Complete      Service Provider Request Status Selected Services Address Phone Number Fax Number    [facility] Selected Inpatient Rehabilitation [address] [address] [phone]      Durable Medical Equipment      No service has been selected for the patient.      Dialysis/Infusion      No service has been selected for the patient.      Home Medical Care      No service has been selected for the patient.      Therapy      No service has been selected for the patient.      Community Resources      No service has been selected for the patient.        Transportation Services  W/C Van: ([facility])    Final Discharge Disposition Code: 62 - inpatient rehab facility

## 2020-08-18 NOTE — PLAN OF CARE
Pt A&Ox4 w intermittent confusion, VSS on RA, sinus arrhythmia on the monitor. No complaints of pain this shift, rested well. Will continue to monitor.

## 2020-08-18 NOTE — PROGRESS NOTES
[facility] Medicine Services  PROGRESS NOTE    Patient Name: Temi Jarrett  : 1930  MRN: [id number]    Date of Admission: 2020  Primary Care Physician: Eric Patel MD    Subjective   Subjective     CC:  Ataxia, general weakness    HPI:  Baseline dementia and anxiety.  Worried about COVID and where she will go to get rehab, very anxious today....     Review of Systems  Gen- No fevers, chills  CV- No chest pain  Resp- No cough, dyspnea  GI- No N/V/D, abd pain    Objective   Objective     Vital Signs:   Temp:  [97.9 °F (36.6 °C)-98.6 °F (37 °C)] 98.6 °F (37 °C)  Heart Rate:  [74-96] 83  Resp:  [16-24] 20  BP: (113-152)/(68-79) 123/73  Total (NIH Stroke Scale): 1     Physical Exam:  Constitutional: No acute distress, awake, alert, nontoxic, normal body habitus  Respiratory: Dull bilateral bases but no rhonchi or rales, good effort, nonlabored respirations   Cardiovascular: RRR  Gastrointestinal: Soft, nontender, nondistended  Musculoskeletal: No peripheral edema, normal muscle tone for age  Psychiatric: Anxious affect, good insight and judgement, cooperative  Neurologic: Oriented x 2, movements symmetric BUE and BLE, Cranial Nerves grossly intact, speech clear and fluent  Unchanged    Results Reviewed:  Results from last 7 days   Lab Units 20  0803 08/15/20  [id number]   WBC 10*3/mm3 9.63 18.91* 15.62*   HEMOGLOBIN g/dL 10.6* 11.4* 10.8*   HEMATOCRIT % 34.4 36.3 34.5   PLATELETS 10*3/mm3 271 245 253   PROCALCITONIN ng/mL  --   --  0.14     Results from last 7 days   Lab Units 20  0803 20  0530 08/15/20  1139 20   SODIUM mmol/L 140 138 138 136   POTASSIUM mmol/L 4.3 3.9 3.3* 4.1   CHLORIDE mmol/L 102 102 98 97*   CO2 mmol/L 34.0* 29.0 29.0 32.0*   BUN mg/dL 14 17 11 17   CREATININE mg/dL 0.56* 0.50* 0.48* 0.76   GLUCOSE mg/dL 101* 105* 103* 114*   CALCIUM mg/dL 8.4 8.0* 8.3 8.4   ALT (SGPT) U/L  --   --   --  8   AST (SGOT) U/L  --    --   --  11   TROPONIN T ng/mL  --   --   --  <0.010   PROBNP pg/mL  --   --   --  1,998.0*     Estimated Creatinine Clearance: 41.3 mL/min (A) (by C-G formula based on SCr of 0.56 mg/dL (L)).    Microbiology Results Abnormal     Procedure Component Value - Date/Time    Blood Culture - Blood, Hand, Left [[id number]] Collected:  08/14/20 2000    Lab Status:  Preliminary result Specimen:  Blood from Hand, Left Updated:  08/17/20 2030     Blood Culture No growth at 3 days    Blood Culture - Blood, Arm, Right [[id number]] Collected:  08/14/20 2016    Lab Status:  Preliminary result Specimen:  Blood from Arm, Right Updated:  08/17/20 2030     Blood Culture No growth at 3 days    MRSA Screen, PCR (Inpatient) - Swab, Nares [[id number]]  (Normal) Collected:  08/15/20 0332    Lab Status:  Final result Specimen:  Swab from Nares Updated:  08/16/20 0738     MRSA PCR Negative    Narrative:       MRSA Negative    S. Pneumo Ag Urine or CSF - Urine, Urine, Clean Catch [[id number]]  (Normal) Collected:  08/15/20 0326    Lab Status:  Final result Specimen:  Urine, Clean Catch Updated:  08/15/20 1339     Strep Pneumo Ag Negative    Legionella Antigen, Urine - Urine, Urine, Clean Catch [[id number]]  (Normal) Collected:  08/15/20 0326    Lab Status:  Final result Specimen:  Urine, Clean Catch Updated:  08/15/20 1339     LEGIONELLA ANTIGEN, URINE Negative    Respiratory Panel PCR w/COVID-19(SARS-CoV-2) FAMILIA/EMILE/KEARA/PAD In-House, NP Swab in UTM/VTM, 3-4 HR TAT - Swab, Nasopharynx [[id number]]  (Normal) Collected:  08/15/20 0240    Lab Status:  Final result Specimen:  Swab from Nasopharynx Updated:  08/15/20 0519     ADENOVIRUS, PCR Not Detected     Coronavirus 229E Not Detected     Coronavirus HKU1 Not Detected     Coronavirus NL63 Not Detected     Coronavirus OC43 Not Detected     COVID19 Not Detected     Human Metapneumovirus Not Detected     Human Rhinovirus/Enterovirus Not Detected     Influenza A PCR Not Detected     Influenza A H1 Not  Detected     Influenza A H1 2009 PCR Not Detected     Influenza A H3 Not Detected     Influenza B PCR Not Detected     Parainfluenza Virus 1 Not Detected     Parainfluenza Virus 2 Not Detected     Parainfluenza Virus 3 Not Detected     Parainfluenza Virus 4 Not Detected     RSV, PCR Not Detected     Bordetella pertussis pcr Not Detected     Bordetella parapertussis PCR Not Detected     Chlamydophila pneumoniae PCR Not Detected     Mycoplasma pneumo by PCR Not Detected    Narrative:       Fact sheet for providers: https://docs.[employer]/wp-content/uploads/JDG7579-1135-GO8.1-EUA-Provider-Fact-Sheet-3.pdf    Fact sheet for patients: https://docs.[employer]/wp-content/uploads/CED1356-8517-NW0.1-EUA-Patient-Fact-Sheet-1.pdf  Fact sheet for providers: https://docs.[employer]/wp-content/uploads/SSK6220-3158-QR1.1-EUA-Provider-Fact-Sheet-3.pdf    Fact sheet for patients: https://docs.[employer]/wp-content/uploads/MEO1794-6642-CB4.1-EUA-Patient-Fact-Sheet-1.pdf          Imaging Results (Last 24 Hours)     ** No results found for the last 24 hours. **          Results for orders placed during the hospital encounter of 08/02/20   Transthoracic Echo Complete With Contrast if Necessary Per Protocol    Narrative · Left ventricular systolic function is normal. Estimated EF = 70%.  · Left ventricular diastolic dysfunction (grade I) consistent with   impaired relaxation.  · Left atrial cavity size is moderately dilated.  · There is calcification of the aortic valve. There is no significant   stenosis or regurgitation. A Lambel's excrescence is noted on an aortic   valve leaflet.  · Moderate mitral valve regurgitation is present.  · Estimated right ventricular systolic pressure from tricuspid   regurgitation is moderately elevated (49 mmHg).  · There is a moderate (1-2cm) circumferential pericardial effusion. There   is no tamponade physiology.          I have reviewed the medications:  Scheduled Meds:    apixaban 2.5 mg  Oral Q12H   aspirin 81 mg Oral Daily   bethanechol 10 mg Oral TID AC   busPIRone 10 mg Oral BID   calcium carb-cholecalciferol 1 tablet Oral Daily   cholecalciferol 1,000 Units Oral Daily   donepezil 10 mg Oral Nightly   gabapentin 300 mg Oral BID   levothyroxine 175 mcg Oral Daily   lisinopril 10 mg Oral Q24H   melatonin 2.5 mg Oral Nightly   memantine 10 mg Oral BID   multivitamin with minerals 1 tablet Oral Daily   pantoprazole 40 mg Oral Daily Before Lunch   saccharomyces boulardii 250 mg Oral Daily   sertraline 50 mg Oral Daily   sodium chloride 10 mL Intravenous Q12H   sucralfate 1 g Oral BID AC     Continuous Infusions:   PRN Meds:.•  acetaminophen **OR** acetaminophen **OR** acetaminophen  •  aluminum-magnesium hydroxide-simethicone  •  sodium chloride  •  sodium chloride    Assessment/Plan   Assessment & Plan     Active Hospital Problems    Diagnosis  POA   • **Ataxia [R27.0]  Yes   • Weakness of both lower extremities [R29.898]  Yes   • Prolonged Q-T interval on ECG [R94.31]  Yes   • Dementia (CMS/HCC) [F03.90]  Yes   • Paroxysmal atrial fibrillation (CMS/HCC) [I48.0]  Yes   • Hypothyroidism (acquired) [E03.9]  Yes   • Essential hypertension [I10]  Yes      Resolved Hospital Problems   No resolved problems to display.        Brief Hospital Course to date:  Temi Jarrett is a 89 y.o. female with a past medical history significant for arthritis, bladder prolapse, dementia, GERD, hypothyroidism, hyperlipidemia, essential hypertension, IBS, and macular degeneration presented to the ED via EMS due to inability to walk.  Patient was recently admitted to [facility] from 8/2/20 to 8/9/20 secondary to pneumonia of bilateral lower lobes and UTI and completed full cefdinir and doxycycline course upon discharge.  During her hospitalization she developed atrial fibrillation RVR and was started on amiodarone and Eliquis.       - deconditioning from recent hospital stay and illness coupled with likely amiodarone related  ataxia  - PT/OT  - amiodarone has been stopped (58 day half life so will take several weeks to fully wash out)  - recent pna and UTI are fully resolved     DVT Prophylaxis:  Eliqius      Disposition: I expect the patient to be discharged to IRF once accepted and approved.      CODE STATUS:   Code Status and Medical Interventions:   Ordered at: 08/15/20 0157     Level Of Support Discussed With:    Patient     Code Status:    CPR     Medical Interventions (Level of Support Prior to Arrest):    Full         Electronically signed by Bell Galeana MD, 08/18/20, 13:03.

## 2020-08-18 NOTE — PLAN OF CARE
Problem: Patient Care Overview  Goal: Plan of Care Review  8/18/2020 1521 by Yuliya Gayle, OT  Flowsheets (Taken 8/18/2020 1329)  Outcome Summary: Pt completes sit to stand with SBA and ambulates with CGA and RW. Pt completed all UBB with setup and required assist to complete bathing of distal LE. Pt requires modA for LBD and Nicole for toileting. Pt plans to d/c to [facility] this date.

## 2020-08-18 NOTE — THERAPY TREATMENT NOTE
Acute Care - Occupational Therapy Treatment Note  [facility]     Patient Name: Temi Jarrett  : 1930  MRN: [id number]  Today's Date: 2020             Admit Date: 2020       ICD-10-CM ICD-9-CM   1. History of pneumonia Z87.01 V12.61   2. Ataxia R27.0 781.3   3. History of UTI Z87.440 V13.02   4. History of dementia Z86.59 V11.8   5. PAF (paroxysmal atrial fibrillation) (CMS/Formerly [facility]) I48.0 427.31     Patient Active Problem List   Diagnosis   • Esophageal reflux   • Hypothyroidism (acquired)   • Generalized anxiety disorder   • Peripheral neuropathy   • Spinal stenosis of lumbar region   • Osteoporosis   • Disc disorder of lumbar region   • Bladder prolapse, female, acquired   • Essential hypertension   • Pernicious anemia   • Annual physical exam   • Primary osteoarthritis of both knees   • Massive hiatal hernia with intrathoracic stomach   • Acute UTI (urinary tract infection)   • Pneumonia of both lower lobes due to infectious organism   • Paroxysmal atrial fibrillation (CMS/[facility])   • Pericardial effusion   • Dementia (CMS/[facility])   • Ataxia   • Weakness of both lower extremities   • Prolonged Q-T interval on ECG     Past Medical History:   Diagnosis Date   • Arthritis    • Bladder prolapse, female, acquired    • Closed fracture of neck of left femur (CMS/[facility]ly [facility]) 2019   • Dementia (CMS/[facility])    • Depression    • Disease of thyroid gland    • Gastric polyp    • GERD (gastroesophageal reflux disease)    • History of colonic polyps    • Hyperlipidemia    • Hypertension    • IBS (irritable bowel syndrome)    • Macular degeneration    • Torn meniscus     right knee      Past Surgical History:   Procedure Laterality Date   • CHOLECYSTECTOMY     • EYE SURGERY      Cataract extraction   • HERNIA REPAIR     • HIP HEMIARTHROPLASTY Left 2019    Procedure: HIP HEMIARTHROPLASTY LEFT;  Surgeon: Reddy Segundo Jr., MD;  Location: [facility];  Service: Orthopedics   • HYSTERECTOMY     • KNEE SURGERY  Right     arthroscopy- 2000   • TONSILLECTOMY         Therapy Treatment    Rehabilitation Treatment Summary     Row Name 08/18/20 1329             Treatment Time/Intention    Discipline  occupational therapist  -HK      Document Type  therapy note (daily note)  -HK      Subjective Information  complains of;weakness;fatigue;pain  -HK      Mode of Treatment  occupational therapy  -HK      Patient/Family Observations  Pt received up in chair   -HK      Care Plan Review  care plan/treatment goals reviewed;risks/benefits reviewed;patient/other agree to care plan  -HK      Patient Effort  good  -HK      Existing Precautions/Restrictions  fall;oxygen therapy device and L/min  -HK      Recorded by [HK] Yuliya Gayle, OT 08/18/20 1519      Row Name 08/18/20 1329             Vital Signs    Pre Systolic BP Rehab  -- RN cleared for tx; VSS   -HK      Pre Patient Position  Sitting  -HK      Intra Patient Position  Standing  -HK      Post Patient Position  Sitting  -HK      Recorded by [HK] Yuliya Gayle, OT 08/18/20 1519      Row Name 08/18/20 1329             Cognitive Assessment/Intervention    Additional Documentation  Cognitive Assessment/Intervention (Group)  -HK      Recorded by [HK] Yuliya Gayle, OT 08/18/20 1519      Row Name 08/18/20 1329             Cognitive Assessment/Intervention- PT/OT    Affect/Mental Status (Cognitive)  confused  -HK      Orientation Status (Cognition)  oriented x 4  -HK      Follows Commands (Cognition)  follows one step commands;over 90% accuracy  -HK      Cognitive Function (Cognitive)  safety deficit;memory deficit  -HK      Memory Deficit (Cognitive)  moderate deficit  -HK      Safety Deficit (Cognitive)  mild deficit  -HK      Recorded by [HK] Yuliya Gayle, OT 08/18/20 1519      Row Name 08/18/20 1329             Safety Issues, Functional Mobility    Safety Issues Affecting Function (Mobility)  safety precautions follow-through/compliance;judgment;insight into deficits/self  awareness;awareness of need for assistance;at risk behavior observed;problem solving;safety precaution awareness;sequencing abilities  -HK      Impairments Affecting Function (Mobility)  balance;endurance/activity tolerance;pain;strength  -HK      Recorded by [HK] Yuliya Gayle, OT 08/18/20 1519      Row Name 08/18/20 1329             Bed Mobility Assessment/Treatment    Comment (Bed Mobility)  Pt received up in chair and left on BSC with RN.   -HK      Recorded by [HK] Yuliya Gayle, OT 08/18/20 1519      Row Name 08/18/20 1329             Functional Mobility    Functional Mobility- Ind. Level  contact guard assist;verbal cues required  -HK      Functional Mobility- Device  rolling walker  -HK      Functional Mobility-Distance (Feet)  15  -HK      Functional Mobility- Safety Issues  step length decreased;weight-shifting ability decreased  -HK      Functional Mobility- Comment  Pt ambulates with CGA and RW. Verbal cues for safety and becomes fatigued.   -HK      Recorded by [HK] Yuliya Gayle, OT 08/18/20 1519      Row Name 08/18/20 1329             Transfer Assessment/Treatment    Transfer Assessment/Treatment  sit-stand transfer;stand-sit transfer  -HK      Comment (Transfers)  Verbal cues for safe hand placement.   -HK      Recorded by [HK] Yuliya Gayle, OT 08/18/20 1519      Row Name 08/18/20 1329             Sit-Stand Transfer    Sit-Stand [place] (Transfers)  stand by assist  -HK      Assistive Device (Sit-Stand Transfers)  walker, front-wheeled  -HK      Recorded by [HK] Yuliya Gayle, OT 08/18/20 1519      Row Name 08/18/20 1329             Stand-Sit Transfer    Stand-Sit [place] (Transfers)  stand by assist  -HK      Assistive Device (Stand-Sit Transfers)  walker, front-wheeled  -HK      Recorded by [HK] Yuliya Gayle, OT 08/18/20 1519      Row Name 08/18/20 1329             Toilet Transfer    Type (Toilet Transfer)  sit-stand;stand-sit  -HK      [place] Level (Toilet Transfer)  contact  guard;verbal cues  -HK      Assistive Device (Toilet Transfer)  walker, front-wheeled  -HK      Recorded by [HK] Yuliya Gayle, OT 08/18/20 1519      Row Name 08/18/20 1329             ADL Assessment/Intervention    BADL Assessment/Intervention  bathing;upper body dressing;lower body dressing;toileting;grooming  -HK      Recorded by [HK] Yuliya Gayle, OT 08/18/20 1519      Row Name 08/18/20 1329             Bathing Assessment/Intervention    Bathing [place] Level  upper extremities;chest/trunk;proximal lower extremities;perineal area;set up;distal lower extremities/feet;maximum assist (25% patient effort)  -HK      Bathing Position  unsupported sitting  -HK      Comment (Bathing)  Pt washed UB and proximal LE with setup. Pt requires maxA to bath distal LE.   -HK      Recorded by [HK] Yuliya Gayle, OT 08/18/20 1519      Row Name 08/18/20 1329             Upper Body Dressing Assessment/Training    Upper Body Dressing [place] Level  don;bra/undergarment;minimum assist (75% patient effort);pull-over garment;set up  -HK      Upper Body Dressing Position  unsupported sitting  -HK      Comment (Upper Body Dressing)  Pt required Nicole to don bra.  -HK      Recorded by [HK] Yuliya Gayle, OT 08/18/20 1519      Row Name 08/18/20 1329             Lower Body Dressing Assessment/Training    Lower Body Dressing [place] Level  don;pants/bottoms;undergarment;moderate assist (50% patient effort);socks;maximum assist (25% patient effort)  -HK      Lower Body Dressing Position  unsupported sitting  -HK      Comment (Lower Body Dressing)  Pt required modA to don pants and underwear and required maxA to don socks.   -HK      Recorded by [HK] Yuliya Gayle, OT 08/18/20 1519      Row Name 08/18/20 1329             Grooming Assessment/Training    [place] Level (Grooming)  hair care, combing/brushing;oral care regimen;wash face, hands;contact guard assist  -HK      Grooming Position  supported standing  -HK      Comment  (Grooming)  Pt stood at sink with RW to complete grooming with CGA for balance.  -HK      Recorded by [HK] Yuliya Gayle, OT 08/18/20 1519      Row Name 08/18/20 1329             Toileting Assessment/Training    [place] Level (Toileting)  adjust/manage clothing;perform perineal hygiene;minimum assist (75% patient effort);verbal cues  -HK      Toileting Position  unsupported sitting;unsupported standing  -HK      Comment (Toileting)  Nicole for clothing management and [place] care.  -HK      Recorded by [HK] Yuliya Gayle, OT 08/18/20 1519      Row Name 08/18/20 1329             BADL Safety/Performance    Impairments, BADL Safety/Performance  balance;endurance/activity tolerance;pain;strength;motor planning;coordination  -HK      Skilled BADL Treatment/Intervention  BADL process/adaptation training;adaptive equipment training  -HK      Recorded by [HK] Yuliya Gayle, OT 08/18/20 1519      Row Name 08/18/20 1329             Motor Skills Assessment/Interventions    Additional Documentation  Balance (Group)  -HK      Recorded by [HK] Yuliya Gayle, OT 08/18/20 1519      Row Name 08/18/20 1329             Balance    Balance  static sitting balance;static standing balance;dynamic sitting balance;dynamic standing balance  -HK      Recorded by [HK] Yuliya Gayle, OT 08/18/20 1519      Row Name 08/18/20 1329             Static Sitting Balance    Level of [place] (Unsupported Sitting, Static Balance)  independent  -HK      Sitting Position (Unsupported Sitting, Static Balance)  sitting in chair  -HK      Time Able to Maintain Position (Unsupported Sitting, Static Balance)  3 to 4 minutes  -HK      Recorded by [HK] Yuliya Gayle, OT 08/18/20 1519      Row Name 08/18/20 1329             Dynamic Sitting Balance    Level of [place], Reaches Outside Midline (Sitting, Dynamic Balance)  supervision  -HK      Sitting Position, Reaches Outside Midline (Sitting, Dynamic Balance)  sitting in chair  -HK      Comment, Reaches  Outside Midline (Sitting, Dynamic Balance)  completing bathing and dressing   -HK      Recorded by [HK] Yuliya Gayle, OT 08/18/20 1519      Row Name 08/18/20 1329             Static Standing Balance    Level of [place] (Supported Standing, Static Balance)  contact guard assist  -HK      Time Able to Maintain Position (Supported Standing, Static Balance)  1 to 2 minutes  -HK      Assistive Device Utilized (Supported Standing, Static Balance)  walker, rolling  -HK      Recorded by [HK] Yuliya Gayle, OT 08/18/20 1519      Row Name 08/18/20 1329             Dynamic Standing Balance    Level of [place], Reaches Outside Midline (Standing, Dynamic Balance)  contact guard assist  -HK      Time Able to Maintain Position, Reaches Outside Midline (Standing, Dynamic Balance)  3 to 4 minutes  -HK      Assistive Device Utilized (Supported Standing, Dynamic Balance)  walker, rolling  -HK      Comment, Reaches Outside Midline (Standing, Dynamic Balance)  completing grooming.  -HK      Recorded by [HK] Yuliya Gayle, OT 08/18/20 1519      Row Name 08/18/20 1329             Positioning and Restraints    Pre-Treatment Position  sitting in chair/recliner  -HK      Post Treatment Position  chair  -HK      In Chair  notified nsg;reclined;call light within reach;encouraged to call for assist;exit alarm on  -HK      Recorded by [HK] Yuliya Gayle, OT 08/18/20 1519      Row Name 08/18/20 1329             Pain Assessment    Additional Documentation  Pain Scale: FACES Pre/Post-Treatment (Group)  -HK      Recorded by [HK] Yuliya Gayle, OT 08/18/20 1519      Row Name 08/18/20 1329             Pain Scale: FACES Pre/Post-Treatment    Pain: FACES Scale, Pretreatment  0-->no hurt  -HK      Pain: FACES Scale, Post-Treatment  0-->no hurt  -HK      Recorded by [HK] Yuliya Gayle, OT 08/18/20 1519      Row Name 08/18/20 1329             Sensory Assessment/Intervention    Sensory General Assessment  no sensation deficits identified  -HK       Recorded by [HK] Yuliya Gayle, OT 08/18/20 1519      Row Name 08/18/20 1329             Coping    Observed Emotional State  accepting;calm;cooperative  -HK      Recorded by [HK] Yuliya Gayle, OT 08/18/20 1519      Row Name 08/18/20 1329             Plan of Care Review    Plan of Care Reviewed With  patient  -HK      Progress  improving  -HK      Recorded by [HK] Yuliya Gayle, OT 08/18/20 1519      Row Name 08/18/20 1329             Outcome Summary/Treatment Plan (OT)    Daily Summary of Progress (OT)  progress toward functional goals is good  -HK      Recorded by [HK] Yuliya Gayle, OT 08/18/20 1519        User Key  (r) = Recorded By, (t) = Taken By, (c) = Cosigned By    Initials Name Effective Dates Discipline    HK Yuliya Gayle, OT 03/07/18 -  OT             Occupational Therapy Education                 Title: PT OT SLP Therapies (In Progress)     Topic: Occupational Therapy (In Progress)     Point: ADL training (Done)     Description:   Instruct learner(s) on proper safety adaptation and remediation techniques during self care or transfers.   Instruct in proper use of assistive devices.              Learning Progress Summary           Patient Acceptance, E,D, VU,DU by SE at 8/15/2020 0930    Comment:  Pt educated on role of OT, safety, fall prevention, ADLs, transfers, and POC.                   Point: Home exercise program (Not Started)     Description:   Instruct learner(s) on appropriate technique for monitoring, assisting and/or progressing therapeutic exercises/activities.              Learner Progress:   Not documented in this visit.          Point: Precautions (Done)     Description:   Instruct learner(s) on prescribed precautions during self-care and functional transfers.              Learning Progress Summary           Patient Acceptance, E,D, VU,DU by SE at 8/15/2020 0930    Comment:  Pt educated on role of OT, safety, fall prevention, ADLs, transfers, and POC.                   Point: Body  mechanics (Done)     Description:   Instruct learner(s) on proper positioning and spine alignment during self-care, functional mobility activities and/or exercises.              Learning Progress Summary           Patient Acceptance, E,D, VU,DU by SE at 8/15/2020 0930    Comment:  Pt educated on role of OT, safety, fall prevention, ADLs, transfers, and POC.                               User Key     Initials Effective Dates Name Provider Type [facility]    SE 07/17/19 -  Kate Milton OT Occupational Therapist OT                OT Recommendation and Plan  Outcome Summary/Treatment Plan (OT)  Daily Summary of Progress (OT): progress toward functional goals is good  Daily Summary of Progress (OT): progress toward functional goals is good  Plan of Care Review  Plan of Care Reviewed With: patient  Plan of Care Reviewed With: patient  Outcome Summary: Pt completes sit to stand with SBA and ambulates with CGA and RW. Pt completed all UBB with setup and required assist to complete bathing of distal LE. Pt requires modA for LBD and Nicole for toileting. Pt plans to d/c to [facility] this date.       Time Calculation:   Time Calculation- OT     Row Name 08/18/20 1329             Time Calculation- OT    OT Start Time  1329  -HK      OT Received On  08/18/20  -         Timed Charges    06363 - OT Self Care/Mgmt Minutes  56  -HK        User Key  (r) = Recorded By, (t) = Taken By, (c) = Cosigned By    Initials Name Provider Type     Yuliya Gayle OT Occupational Therapist        Therapy Charges for Today     Code Description Service Date Service Provider Modifiers Qty    [id number]  OT SELF CARE/MGMT/TRAIN EA 15 MIN 8/18/2020 Yuliya Gayle OT GO 4               Yuliya Gayle OT  8/18/2020

## 2020-08-18 NOTE — DISCHARGE SUMMARY
[facility] Medicine Services  TRANSFER SUMMARY    Patient Name: Temi Jarrett  : 1930  MRN: [id number]    Date of Admission: 2020  Date of Discharge:  20    Length of Stay: 3  Primary Care Physician: Eric Patel MD    Consults     Date and Time Order Name Status Description    8/15/2020 0829 Inpatient Neurology Consult General Completed     8/3/2020 0158 Inpatient Cardiology Consult Completed           Hospital Course     Presenting Problem:   Ataxia [R27.0]  Ataxia [R27.0]    Active Hospital Problems    Diagnosis  POA   • **Ataxia [R27.0]  Yes   • Weakness of both lower extremities [R29.898]  Yes   • Prolonged Q-T interval on ECG [R94.31]  Yes   • Dementia (CMS/HCC) [F03.90]  Yes   • Paroxysmal atrial fibrillation (CMS/HCC) [I48.0]  Yes   • Hypothyroidism (acquired) [E03.9]  Yes   • Essential hypertension [I10]  Yes      Resolved Hospital Problems   No resolved problems to display.          Hospital Course:  Temi Jarrett is a 89 y.o. female with a past medical history significant for arthritis, bladder prolapse, dementia, GERD, hypothyroidism, hyperlipidemia, essential hypertension, IBS, and macular degeneration presented to the ED via EMS due to inability to walk.  Patient was recently admitted to [facility] from 20 to 20 secondary to pneumonia of bilateral lower lobes and UTI and completed full cefdinir and doxycycline course upon discharge. During her last hospitalization she developed atrial fibrillation RVR and was started on amiodarone and Eliquis.        Vandana has been prgressively weaker at home since recent discharge.  Her deconditioning from recent hospital stay and illness coupled with likely amiodarone related ataxia are culprits.  Her amiodarone has stooped indefinitely (58 day half-life so will takes weeks to full wash out).  PT/OT/CM evaluations this stay recommended [facility] rehab at discharge for which patient and family are willing.      Day of Discharge     HPI:   Baseline dementia and anxiety.  Worried about COVID and going to rehab, very anxious today....     Review of Systems  Gen- No fevers, chills  CV- No chest pain  Resp- No cough, dyspnea  GI- No N/V/D, abd pain     Vital Signs:   Temp:  [97.9 °F (36.6 °C)-98.6 °F (37 °C)] 98.6 °F (37 °C)  Heart Rate:  [74-96] 83  Resp:  [16-24] 20  BP: (113-152)/(68-79) 123/73     Physical Exam:  Constitutional: No acute distress, awake, alert, nontoxic, normal body habitus  Respiratory: Dull bilateral bases but no rhonchi or rales, good effort, nonlabored respirations   Cardiovascular: RRR  Gastrointestinal: Soft, nontender, nondistended  Musculoskeletal: No peripheral edema, normal muscle tone for age  Psychiatric: Anxious affect, good insight and judgement, cooperative  Neurologic: Oriented x 2, movements symmetric BUE and BLE, Cranial Nerves grossly intact, speech clear and fluent  Unchanged       Pertinent Results     Results from last 7 days   Lab Units 08/18/20  0803 08/16/20  0530 08/15/20  1139 08/14/20  1925   WBC 10*3/mm3 9.63  --  18.91* 15.62*   HEMOGLOBIN g/dL 10.6*  --  11.4* 10.8*   HEMATOCRIT % 34.4  --  36.3 34.5   PLATELETS 10*3/mm3 271  --  245 253   SODIUM mmol/L 140 138 138 136   POTASSIUM mmol/L 4.3 3.9 3.3* 4.1   CHLORIDE mmol/L 102 102 98 97*   CO2 mmol/L 34.0* 29.0 29.0 32.0*   BUN mg/dL 14 17 11 17   CREATININE mg/dL 0.56* 0.50* 0.48* 0.76   GLUCOSE mg/dL 101* 105* 103* 114*   CALCIUM mg/dL 8.4 8.0* 8.3 8.4     Results from last 7 days   Lab Units 08/14/20  1925   BILIRUBIN mg/dL 0.5   ALK PHOS U/L 101   ALT (SGPT) U/L 8   AST (SGOT) U/L 11           Invalid input(s): TG, LDLCALC, LDLREALC  Results from last 7 days   Lab Units 08/15/20  1139   TSH uIU/mL 2.850     Brief Urine Lab Results  (Last result in the past 365 days)      Color   Clarity   Blood   Leuk Est   Nitrite   Protein   CREAT   Urine HCG        08/14/20 2115 Yellow Clear Negative Negative Negative Negative                Microbiology Results Abnormal     Procedure Component Value - Date/Time    Blood Culture - Blood, Hand, Left [[id number]] Collected:  08/14/20 2000    Lab Status:  Preliminary result Specimen:  Blood from Hand, Left Updated:  08/17/20 2030     Blood Culture No growth at 3 days    Blood Culture - Blood, Arm, Right [[id number]] Collected:  08/14/20 2016    Lab Status:  Preliminary result Specimen:  Blood from Arm, Right Updated:  08/17/20 2030     Blood Culture No growth at 3 days    MRSA Screen, PCR (Inpatient) - Swab, Nares [[id number]]  (Normal) Collected:  08/15/20 0332    Lab Status:  Final result Specimen:  Swab from Nares Updated:  08/16/20 0738     MRSA PCR Negative    Narrative:       MRSA Negative    S. Pneumo Ag Urine or CSF - Urine, Urine, Clean Catch [[id number]]  (Normal) Collected:  08/15/20 0326    Lab Status:  Final result Specimen:  Urine, Clean Catch Updated:  08/15/20 1339     Strep Pneumo Ag Negative    Legionella Antigen, Urine - Urine, Urine, Clean Catch [[id number]]  (Normal) Collected:  08/15/20 0326    Lab Status:  Final result Specimen:  Urine, Clean Catch Updated:  08/15/20 1339     LEGIONELLA ANTIGEN, URINE Negative    Respiratory Panel PCR w/COVID-19(SARS-CoV-2) FAMILIA/EMILE/KEARA/PAD In-House, NP Swab in UTM/VTM, 3-4 HR TAT - Swab, Nasopharynx [[id number]]  (Normal) Collected:  08/15/20 0240    Lab Status:  Final result Specimen:  Swab from Nasopharynx Updated:  08/15/20 0519     ADENOVIRUS, PCR Not Detected     Coronavirus 229E Not Detected     Coronavirus HKU1 Not Detected     Coronavirus NL63 Not Detected     Coronavirus OC43 Not Detected     COVID19 Not Detected     Human Metapneumovirus Not Detected     Human Rhinovirus/Enterovirus Not Detected     Influenza A PCR Not Detected     Influenza A H1 Not Detected     Influenza A H1 2009 PCR Not Detected     Influenza A H3 Not Detected     Influenza B PCR Not Detected     Parainfluenza Virus 1 Not Detected     Parainfluenza Virus 2 Not  Detected     Parainfluenza Virus 3 Not Detected     Parainfluenza Virus 4 Not Detected     RSV, PCR Not Detected     Bordetella pertussis pcr Not Detected     Bordetella parapertussis PCR Not Detected     Chlamydophila pneumoniae PCR Not Detected     Mycoplasma pneumo by PCR Not Detected    Narrative:       Fact sheet for providers: https://SnoopWalls.LaunchSide.com/wp-content/uploads/KEY9460-8497-HZ9.1-EUA-Provider-Fact-Sheet-3.pdf    Fact sheet for patients: https://docs.LaunchSide.com/wp-content/uploads/FFC6043-1226-MY4.1-EUA-Patient-Fact-Sheet-1.pdf  Fact sheet for providers: https://Wingu.LaunchSide.com/wp-content/uploads/AVT1735-7250-FG7.1-EUA-Provider-Fact-Sheet-3.pdf    Fact sheet for patients: https://CrowdFanatic/wp-content/uploads/ZWI0375-9219-WB6.1-EUA-Patient-Fact-Sheet-1.pdf          Imaging Results (All)     Procedure Component Value Units Date/Time    CT Angiogram Chest With & Without Contrast [[id number]] Collected:  08/15/20 0305     Updated:  08/15/20 0307    Narrative:       CT ANGIOGRAM CHEST W WO CONTRAST    INDICATION:   Recurrent pneumonia. Abnormal chest x-ray. Shortness of air.    TECHNIQUE:   CT angiogram of the chest with IV contrast. 3-D reconstructions were obtained and reviewed.   Radiation dose reduction techniques included automated exposure control or exposure modulation based on body size. Count of known CT and cardiac nuc med studies  performed in previous 12 months: 3.     COMPARISON:   Chest x-ray from yesterday and the chest 5/3/2020    FINDINGS:   There is adequate opacification of the pulmonary arteries. There is no CT evidence of pulmonary embolus. Aorta is normal in size. Is a tiny pericardial effusion measuring less than a centimeter in thickness. There is a large hiatal hernia. There are  small bilateral effusions with mild bibasilar atelectasis. Upper abdominal images show renal cysts. Rest the lungs are clear.          Impression:       1. Negative for pulmonary  embolus.    2. Small pericardial effusion which is decreased size since 8/3/2020    Large hiatal hernia    Small bilateral effusions with bibasilar atelectasis.    Signer Name: Vikram Sánchez MD   Signed: 8/15/2020 3:05 AM   Workstation Name: [facility]    Radiology [facility]    MRI Brain Without Contrast [[id number]] Collected:  08/15/20 0150     Updated:  08/15/20 0153    Narrative:       MRI Brain WO    INDICATION:   Sudden onset of lower extremity weakness.    TECHNIQUE:   MRI of the brain without IV contrast.    COMPARISON:    4/26/2017    FINDINGS:  The pituitary gland and foramen magnum region appear normal. There is no abnormal diffusion. There is generalized atrophy with mild small vessel ischemic changes around the ventricles. There are no masses or extra-axial fluid collections or hemorrhage.      Impression:       No acute findings    No change 4/26/2017    Generalized atrophy    Signer Name: Vikrma Sánchez MD   Signed: 8/15/2020 1:50 AM   Workstation Name: [facility]    CT Head Without Contrast [[id number]] Collected:  08/14/20 2011     Updated:  08/14/20 2013    Narrative:       CT Head WO    HISTORY:   89-year-old female with sudden ataxia that began 2 hours ago. Difficulty walking. Currently in the emergency Department.    TECHNIQUE:   Axial unenhanced head CT. Radiation dose reduction techniques included automated exposure control or exposure modulation based on body size. Count of known CT and cardiac nuc med studies performed in previous 12 months: 5.     Time of scan: 1956 hours    COMPARISON:   8/3/2020    FINDINGS:   No intracranial hemorrhage, mass, or infarct. No hydrocephalus or extra-axial fluid collection. There are senescent changes, including volume loss and nonspecific white matter change, but no acute abnormality is seen. The skull base, calvarium, and  extracranial soft tissues are normal.      Impression:       Senescent changes without  acute abnormality.          Signer Name: Andriy Hester MD   Signed: 8/14/2020 8:11 PM   Workstation Name: [facility]    Radiology [facility]    XR Chest 1 View [[id number]] Collected:  08/14/20 1933     Updated:  08/14/20 1935    Narrative:       CR Chest 1 Vw    INDICATION:   89-year-old female with weakness and dizziness. In the emergency Department.     COMPARISON:    12/14/2019    FINDINGS:  Single portable AP view(s) of the chest.  Globular cardiomegaly. Low lung volumes and bilateral pleural effusions left greater than right and probable compressive atelectasis or less likely pneumonia. Mild indistinctness of the interstitium but no  convincing evidence of volume overload. No pneumothorax.      Impression:         1. Persistent globular cardiomegaly with bilateral effusions left greater than right. Bibasilar airspace disease left in the right. Findings appear stable to slightly worse.    Signer Name: Andriy Hester MD   Signed: 8/14/2020 7:33 PM   Workstation Name: [facility]          Results for orders placed during the hospital encounter of 08/02/20   Transthoracic Echo Complete With Contrast if Necessary Per Protocol    Narrative · Left ventricular systolic function is normal. Estimated EF = 70%.  · Left ventricular diastolic dysfunction (grade I) consistent with   impaired relaxation.  · Left atrial cavity size is moderately dilated.  · There is calcification of the aortic valve. There is no significant   stenosis or regurgitation. A Lambel's excrescence is noted on an aortic   valve leaflet.  · Moderate mitral valve regurgitation is present.  · Estimated right ventricular systolic pressure from tricuspid   regurgitation is moderately elevated (49 mmHg).  · There is a moderate (1-2cm) circumferential pericardial effusion. There   is no tamponade physiology.           Order Current Status    Blood Culture - Blood, Arm, Right Preliminary result    Blood  Culture - Blood, Hand, Left Preliminary result          Discharge Details        Discharge Medications      Changes to Medications      Instructions Start Date   bethanechol 10 MG tablet  Commonly known as:  URECHOLINE  What changed:  when to take this   TAKE ONE TABLET BY MOUTH FOUR TIMES A DAY      diphenoxylate-atropine 2.5-0.025 MG per tablet  Commonly known as:  Lomotil  What changed:    · how much to take  · when to take this   1 tablet, Oral, 4 Times Daily PRN      sucralfate 1 g tablet  Commonly known as:  CARAFATE  What changed:  when to take this   1 g, Oral, 3 Times Daily Before Meals         Continue These Medications      Instructions Start Date   acetaminophen 325 MG tablet  Commonly known as:  TYLENOL   650 mg, Oral, Every 6 Hours PRN      apixaban 2.5 MG tablet tablet  Commonly known as:  ELIQUIS   2.5 mg, Oral, Every 12 Hours Scheduled      aspirin 81 MG EC tablet   81 mg, Oral, Daily      busPIRone 10 MG tablet  Commonly known as:  BUSPAR   TAKE ONE TABLET BY MOUTH TWICE A DAY      calcium citrate-vitamin d 200-250 MG-UNIT tablet tablet  Commonly known as:  CITRACAL   2 tablets, Oral, Daily      cyanocobalamin 1000 MCG/ML injection   1,000 mcg, Intramuscular, Every 30 Days      diclofenac 1 % gel gel  Commonly known as:  VOLTAREN   APPLY 4 GRAMS TOPICALLY TO THE APPROPRAITE AREA AS DIRECTED FOUR TIMES A DAY      donepezil 10 MG tablet  Commonly known as:  ARICEPT   10 mg, Oral, Every Night at Bedtime      gabapentin 300 MG capsule  Commonly known as:  NEURONTIN   TAKE ONE CAPSULE BY MOUTH TWICE A DAY      hydroCHLOROthiazide 25 MG tablet  Commonly known as:  HYDRODIURIL   TAKE ONE TABLET BY MOUTH DAILY      Lactobacillus tablet   1 tablet, Oral, Daily      lisinopril 10 MG tablet  Commonly known as:  PRINIVIL,ZESTRIL   10 mg, Oral, Every 24 Hours Scheduled      melatonin 3 MG tablet   3 mg, Oral, Nightly      memantine 10 MG tablet  Commonly known as:  NAMENDA   TAKE ONE TABLET BY MOUTH TWICE A  "DAY      methocarbamol 500 MG tablet  Commonly known as:  ROBAXIN   500 mg, Oral, 3 Times Daily PRN      omeprazole 20 MG capsule  Commonly known as:  priLOSEC   TAKE ONE CAPSULE BY MOUTH DAILY      potassium chloride 10 MEQ CR capsule  Commonly known as:  MICRO-K   TAKE TWO CAPSULES BY MOUTH DAILY      PRESERVISION AREDS PO   1 tablet, Oral, 2 Times Daily      MULTIVITAMIN ADULTS 50+ PO   1 tablet, Oral, Daily      sertraline 50 MG tablet  Commonly known as:  ZOLOFT   TAKE ONE TABLET BY MOUTH DAILY      Synthroid 175 MCG tablet  Generic drug:  levothyroxine   175 mcg, Oral, Daily, Patient receives medication from patient assistance.  NDC:0808-7063-87 EXP:07/02/2020 LOT [id number]      Syringe 23G X 1\" 3 ML misc   Use to inject vitamin b12 once a month      traMADol 50 MG tablet  Commonly known as:  ULTRAM   TAKE ONE TABLET BY MOUTH DAILY AS NEEDED FOR MODERATE PAIN IN THE RIGHT KNEE      VITAMIN D PO   2000 U qd         Stop These Medications    amiodarone 200 MG tablet  Commonly known as:  PACERONE            Allergies   Allergen Reactions   • Codeine Nausea Only     Trouble Breathing    • Shrimp Hives         Discharge Disposition:  Rehab Facility or Unit (DC - External)    Discharge Diet:  Diet Order   Procedures   • Diet Regular; Cardiac       Discharge Activity:        CODE STATUS:    Code Status and Medical Interventions:   Ordered at: 08/15/20 0157     Level Of Support Discussed With:    Patient     Code Status:    CPR     Medical Interventions (Level of Support Prior to Arrest):    Full         Future Appointments   Date Time Provider Department Center   9/16/2020  3:30 PM Eric Patel MD [facility]       Additional Instructions for the Follow-ups that You Need to Schedule     Discharge Follow-up with PCP   As directed       Currently Documented PCP:    Eric Patel MD    PCP Phone Number:    [phone]     Follow Up Details:  within 1 week from d/c of [facility] for Transt[facility] of Care " Visit                   Electronically signed by Bell Galeana MD, 08/18/20, 1:22 PM.    Time Spent on Discharge: I spent  45  minutes on this discharge activity which included: face-to-face encounter with the patient, reviewing the data in the system, coordination of the care with the nursing staff as well as consultants, documentation, and entering orders.

## 2020-08-19 LAB
BACTERIA SPEC AEROBE CULT: NORMAL
BACTERIA SPEC AEROBE CULT: NORMAL

## 2020-08-26 ENCOUNTER — TELEPHONE (OUTPATIENT)
Dept: INTERNAL MEDICINE | Facility: CLINIC | Age: 85
End: 2020-08-26

## 2020-08-26 NOTE — TELEPHONE ENCOUNTER
JUAN ANTONIO STATED THEY FAXED ORDERS ON 8/17 TO [facility], FOLLOWING HOSPITAL RELEASE FROM [facility].    PLEASE FAX BACK [phone]    PHONE CALL BACK  [phone]

## 2020-08-28 ENCOUNTER — TRANSITIONAL CARE MANAGEMENT TELEPHONE ENCOUNTER (OUTPATIENT)
Dept: CALL CENTER | Facility: HOSPITAL | Age: 85
End: 2020-08-28

## 2020-08-28 ENCOUNTER — TELEPHONE (OUTPATIENT)
Dept: INTERNAL MEDICINE | Facility: CLINIC | Age: 85
End: 2020-08-28

## 2020-08-28 ENCOUNTER — READMISSION MANAGEMENT (OUTPATIENT)
Dept: CALL CENTER | Facility: HOSPITAL | Age: 85
End: 2020-08-28

## 2020-08-28 NOTE — OUTREACH NOTE
Prep Survey      Responses   Religious facility patient discharged from?  Non-BH   Is LACE score < 7 ?  Non-BH Discharge   Eligibility  [facility]   Date of Discharge  08/27/20   Discharge Disposition  Home-Health Care [facility]   Discharge diagnosis  malaise   Does the patient have one of the following disease processes/diagnoses(primary or secondary)?  Other   Does the patient have Home health ordered?  Yes   What is the Home health agency?   Home - with Home Health Services   Prep survey completed?  Yes          Merced Turk RN

## 2020-08-28 NOTE — TELEPHONE ENCOUNTER
----- Message from Anurag Cuellar sent at 8/28/2020  2:41 PM EDT -----  Regarding: FW: TCM visit  Toshia-   I do not see any availability for Dr. Patel within the 14 days for TCM. Do I need to schedule with Mahogany or Jyothi?  ----- Message -----  From: Natalia Hardy MA  Sent: 8/28/2020   2:28 PM EDT  To: [facility] [address] [facility]  Subject: TCM visit

## 2020-08-28 NOTE — OUTREACH NOTE
Call Center TCM Note      Responses   [facility] patient discharged from?  Non- [[facility]]   COVID-19 Test Status  Negative   Does the patient have one of the following disease processes/diagnoses(primary or secondary)?  Other   TCM attempt successful?  Yes   Call start time  1257   Call end time  1321   Discharge diagnosis  malaise   Is patient permission given to speak with other caregiver?  Yes   List who call center can speak with  Chris, daughter in law   Person spoke with today (if not patient) and relationship  patient   Meds reviewed with patient/caregiver?  No [Patient states that her daughter in law takes care of her meds. ]   Is the patient taking all medications as directed (includes completed medication regime)?  Yes   Does the patient have a primary care provider?   Yes   Does the patient have an appointment with their PCP within 7 days of discharge?  Greater than 7 days   Comments regarding PCP  PCP Dr Patel. 9/16 330pm   Nursing Interventions  Verified appointment date/time/provider   Has the patient kept scheduled appointments due by today?  N/A   What is the Home health agency?   Home - with Home Health Services   Has home health visited the patient within 72 hours of discharge?  Call prior to 72 hours   Home health comments  Patient states that she has not yet heard from [facility].    Pulse Ox monitoring  None   Psychosocial issues?  No   Did the patient receive a copy of their discharge instructions?  Yes   Nursing interventions  Reviewed instructions with patient   What is the patient's perception of their health status since discharge?  Improving   Is the patient/caregiver able to teach back signs and symptoms related to disease process for when to call PCP?  Yes   Is the patient/caregiver able to teach back signs and symptoms related to disease process for when to call 911?  Yes   Is the patient/caregiver able to teach back the hierarchy of who to call/visit for  symptoms/problems? PCP, Specialist, Home health nurse, Urgent Care, ED, 911  Yes   TCM call completed?  Yes          Jolene Mehta RN    8/28/2020, 13:22

## 2020-09-02 ENCOUNTER — TELEPHONE (OUTPATIENT)
Dept: INTERNAL MEDICINE | Facility: CLINIC | Age: 85
End: 2020-09-02

## 2020-09-04 ENCOUNTER — TELEMEDICINE (OUTPATIENT)
Dept: INTERNAL MEDICINE | Facility: CLINIC | Age: 85
End: 2020-09-04

## 2020-09-04 DIAGNOSIS — G31.84 MILD COGNITIVE IMPAIRMENT: ICD-10-CM

## 2020-09-04 DIAGNOSIS — F41.1 GENERALIZED ANXIETY DISORDER: ICD-10-CM

## 2020-09-04 DIAGNOSIS — M48.061 SPINAL STENOSIS OF LUMBAR REGION WITHOUT NEUROGENIC CLAUDICATION: ICD-10-CM

## 2020-09-04 DIAGNOSIS — M17.0 PRIMARY OSTEOARTHRITIS OF BOTH KNEES: ICD-10-CM

## 2020-09-04 DIAGNOSIS — I10 ESSENTIAL HYPERTENSION: Primary | ICD-10-CM

## 2020-09-04 DIAGNOSIS — I48.0 PAROXYSMAL ATRIAL FIBRILLATION (HCC): ICD-10-CM

## 2020-09-04 DIAGNOSIS — G63 POLYNEUROPATHY ASSOCIATED WITH UNDERLYING DISEASE (HCC): ICD-10-CM

## 2020-09-04 PROCEDURE — 99214 OFFICE O/P EST MOD 30 MIN: CPT | Performed by: INTERNAL MEDICINE

## 2020-09-04 RX ORDER — LISINOPRIL 5 MG/1
5 TABLET ORAL
Qty: 30 TABLET | Refills: 5 | Status: SHIPPED | OUTPATIENT
Start: 2020-09-04 | End: 2021-03-08

## 2020-09-04 NOTE — PROGRESS NOTES
[facility]     Temi Jarrett  5/8/1930   [id number]      Patient Care Team:  Eric Patel MD as PCP - General  Eric Patel MD as PCP - Family Medicine  Eric Patel MD as PCP - Von Montgomery MD as Cardiologist (Cardiology)    Chief Complaint::   Chief Complaint   Patient presents with   • Transitional Care Management      You have chosen to receive care through a telehealth visit.  Do you consent to use a video/audio connection for your medical care today? Yes      HPI  Mrs. Wong is now 89 years old.  She is seen today along with her daughter-in-law Afua via video visit.  She was hospitalized on August 2 with pneumonia.  She subsequently developed atrial fibrillation and was laced on amiodarone and Eliquis.  She was discharged on the ninth.  On the 14th she was brought back to the hospital because she could not walk.  She was felt to have ataxia and weakness secondary to amiodarone.  Amiodarone was discontinued and she was transferred on the 18th to [facility] where she stayed until the 27th.  She is now home.  She is able to walk with her walker and with assistance.  They have declined physical therapy via home health because of COVID concerns.  However her granddaughter is walking her up and down the hallways and the steps daily.  She also has a do leg lifts in the bed.  She is completed Ceftin near for urinary tract infection and has no symptoms.  Her biggest concern is that her blood pressures have recently been low.  During her first hospitalization her lisinopril was reduced from 20 mg to 10 mg a day.  She was taken off hydrochlorothiazide.  Blood pressures recently have ranged from 100/72 108/68 with a mean of about 105/75.  Yesterday because her blood pressure was 100/70 she did not take lisinopril and today is 130/81.  Her weight during the last hospitalization was 122.  In January here in this office it was  129.      Chronic Conditions:      Patient Active Problem List   Diagnosis   • Esophageal reflux   • Hypothyroidism (acquired)   • Generalized anxiety disorder   • Peripheral neuropathy   • Spinal stenosis of lumbar region   • Osteoporosis   • Disc disorder of lumbar region   • Bladder prolapse, female, acquired   • Essential hypertension   • Pernicious anemia   • Annual physical exam   • Primary osteoarthritis of both knees   • Massive hiatal hernia with intrathoracic stomach   • Acute UTI (urinary tract infection)   • Pneumonia of both lower lobes due to infectious organism   • Paroxysmal atrial fibrillation (CMS/HCC)   • Pericardial effusion   • Dementia (CMS/HCC)   • Ataxia   • Weakness of both lower extremities   • Prolonged Q-T interval on ECG        Past Medical History:   Diagnosis Date   • Arthritis    • Bladder prolapse, female, acquired    • Closed fracture of neck of left femur (CMS/HCC) 9/27/2019   • Dementia (CMS/[facility])    • Depression    • Disease of thyroid gland    • Gastric polyp    • GERD (gastroesophageal reflux disease)    • History of colonic polyps    • Hyperlipidemia    • Hypertension    • IBS (irritable bowel syndrome)    • Macular degeneration    • Torn meniscus     right knee        Past Surgical History:   Procedure Laterality Date   • CHOLECYSTECTOMY  1997   • EYE SURGERY  1998    Cataract extraction   • HERNIA REPAIR     • HIP HEMIARTHROPLASTY Left 9/28/2019    Procedure: HIP HEMIARTHROPLASTY LEFT;  Surgeon: Reddy Segundo Jr., MD;  Location: [facility];  Service: Orthopedics   • HYSTERECTOMY  1976   • KNEE SURGERY Right     arthroscopy- 2000   • TONSILLECTOMY         Family History   Problem Relation Age of Onset   • Hypertension Mother    • Breast cancer Mother    • Obesity Mother    • Hypertension Father    • Diabetes Son        Social History     Socioeconomic History   • Marital status:      Spouse name: Not on file   • Number of children: Not on file   • Years of education:  Not on file   • Highest education level: Not on file   Tobacco Use   • Smoking status: Never Smoker   • Smokeless tobacco: Never Used   Substance and Sexual Activity   • Alcohol use: No   • Drug use: Defer   • Sexual activity: Defer   Social History Narrative    Lives with son and daughter in law--  is at [place] getting rehab       Allergies   Allergen Reactions   • Codeine Nausea Only     Trouble Breathing    • Shrimp Hives         Current Outpatient Medications:   •  acetaminophen (TYLENOL) 325 MG tablet, Take 650 mg by mouth Every 6 (Six) Hours As Needed for Mild Pain ., Disp: , Rfl:   •  apixaban (ELIQUIS) 2.5 MG tablet tablet, Take 1 tablet by mouth Every 12 (Twelve) Hours. Indications: Atrial Fibrillation, Disp: 60 tablet, Rfl: 0  •  aspirin 81 MG EC tablet, Take 81 mg by mouth Daily., Disp: , Rfl:   •  bethanechol (URECHOLINE) 10 MG tablet, TAKE ONE TABLET BY MOUTH FOUR TIMES A DAY (Patient taking differently: Take 10 mg by mouth 3 (Three) Times a Day.), Disp: 120 tablet, Rfl: 5  •  busPIRone (BUSPAR) 10 MG tablet, TAKE ONE TABLET BY MOUTH TWICE A DAY, Disp: 60 tablet, Rfl: 0  •  Cholecalciferol (VITAMIN D PO), 2000 U qd, Disp: , Rfl:   •  cyanocobalamin 1000 MCG/ML injection, Inject 1 mL into the appropriate muscle as directed by prescriber Every 30 (Thirty) Days., Disp: 1 mL, Rfl: 5  •  diclofenac (VOLTAREN) 1 % gel gel, APPLY 4 GRAMS TOPICALLY TO THE APPROPRAITE AREA AS DIRECTED FOUR TIMES A DAY, Disp: 100 g, Rfl: 4  •  diphenoxylate-atropine (Lomotil) 2.5-0.025 MG per tablet, Take 1 tablet by mouth 4 (Four) Times a Day As Needed for Diarrhea. (Patient taking differently: Take 2 tablets by mouth Daily.), Disp: 120 tablet, Rfl: 2  •  donepezil (ARICEPT) 10 MG tablet, Take 1 tablet by mouth every night at bedtime., Disp: 90 tablet, Rfl: 3  •  gabapentin (NEURONTIN) 300 MG capsule, TAKE ONE CAPSULE BY MOUTH TWICE A DAY, Disp: 180 capsule, Rfl: 0  •  Lactobacillus tablet, Take 1 tablet by mouth Daily.,  "Disp: , Rfl:   •  melatonin 3 MG tablet, Take 3 mg by mouth Every Night., Disp: , Rfl:   •  memantine (NAMENDA) 10 MG tablet, TAKE ONE TABLET BY MOUTH TWICE A DAY, Disp: 180 tablet, Rfl: 3  •  Multiple Vitamins-Minerals (MULTIVITAMIN ADULTS 50+ PO), Take 1 tablet by mouth Daily., Disp: , Rfl:   •  Multiple Vitamins-Minerals (PRESERVISION AREDS PO), Take 1 tablet by mouth 2 (Two) Times a Day., Disp: , Rfl:   •  potassium chloride (MICRO-K) 10 MEQ CR capsule, TAKE TWO CAPSULES BY MOUTH DAILY (Patient taking differently: Take 20 mEq by mouth 2 (Two) Times a Day.), Disp: 180 capsule, Rfl: 2  •  sertraline (ZOLOFT) 50 MG tablet, TAKE ONE TABLET BY MOUTH DAILY, Disp: 90 tablet, Rfl: 4  •  sucralfate (CARAFATE) 1 g tablet, Take 1 tablet by mouth 3 (Three) Times a Day Before Meals. (Patient taking differently: Take 1 g by mouth 2 (Two) Times a Day.), Disp: 270 tablet, Rfl: 1  •  SYNTHROID 175 MCG tablet, Take 1 tablet by mouth Daily. Patient receives medication from patient assistance.  NDC:6945-9198-21 EXP:07/02/2020 LOT [id number], Disp: 90 tablet, Rfl: 1  •  traMADol (ULTRAM) 50 MG tablet, TAKE ONE TABLET BY MOUTH DAILY AS NEEDED FOR MODERATE PAIN IN THE RIGHT KNEE, Disp: 30 tablet, Rfl: 2  •  calcium citrate-vitamin d (CITRACAL) 200-250 MG-UNIT tablet tablet, Take 2 tablets by mouth Daily., Disp: , Rfl:   •  lisinopril (PRINIVIL,ZESTRIL) 5 MG tablet, Take 1 tablet by mouth Daily., Disp: 30 tablet, Rfl: 5  •  methocarbamol (ROBAXIN) 500 MG tablet, Take 1 tablet by mouth 3 (Three) Times a Day As Needed for Muscle Spasms., Disp: 120 tablet, Rfl: 5  •  omeprazole (priLOSEC) 20 MG capsule, TAKE ONE CAPSULE BY MOUTH DAILY, Disp: 30 capsule, Rfl: 5  •  Syringe 23G X 1\" 3 ML misc, Use to inject vitamin b12 once a month, Disp: 1 each, Rfl: 5    Review of Systems     Vital Signs  There were no vitals filed for this visit.    Physical Exam   Constitutional: She is oriented to person, place, and time. She appears well-developed and " "well-nourished. No distress.   HENT:   Head: Normocephalic and atraumatic.   Eyes: Pupils are equal, round, and reactive to light. EOM are normal.   Neck: Normal range of motion. No tracheal deviation present.   Pulmonary/Chest: Effort normal. No respiratory distress.   Neurological: She is alert and oriented to person, place, and time. No cranial nerve deficit.   Psychiatric: She has a normal mood and affect.      Procedures    ACE III MINI             Assessment/Plan:    Temi was seen today for transitional care management.    Diagnoses and all orders for this visit:    Essential hypertension    Paroxysmal atrial fibrillation (CMS/HCC)    Polyneuropathy associated with underlying disease (CMS/HCC)    Generalized anxiety disorder    Primary osteoarthritis of both knees    Spinal stenosis of lumbar region without neurogenic claudication    Other orders  -     lisinopril (PRINIVIL,ZESTRIL) 5 MG tablet; Take 1 tablet by mouth Daily.    Plan    Her blood pressure is too low, probably secondary weight loss.  I suggested that she hold lisinopril unless blood pressure is greater than 140/90.  If that occurs she will take lisinopril 5 mg daily monitor and see how this works.    She will continue Eliquis for atrial fibrillation.  She has apparently not had tachycardia.    She will continue B12 injections from home health and gabapentin for her neuropathy.    She will continue sertraline and buspirone for her anxiety.    She is now [facility] \"[facility]\" [facility]  They will continue doing her own therapy for her in the home.    She will continue donepezil for cognitive impairment.      Plan of care reviewed with patient at the conclusion of today's visit. Education was provided regarding diagnosis, management, and any prescribed or recommended OTC medications.Patient verbalizes understanding of and agreement with management plan.         Eric Patel MD           "

## 2020-09-08 RX ORDER — POTASSIUM CHLORIDE 750 MG/1
CAPSULE, EXTENDED RELEASE ORAL
Qty: 180 CAPSULE | Refills: 1 | Status: SHIPPED | OUTPATIENT
Start: 2020-09-08 | End: 2020-12-11 | Stop reason: SDUPTHER

## 2020-09-18 NOTE — TELEPHONE ENCOUNTER
Caller: Chris Jarrett    Relationship: Emergency Contact    Best call back number:[phone]     Medication needed:   Requested Prescriptions     Pending Prescriptions Disp Refills   • apixaban (ELIQUIS) 2.5 MG tablet tablet 60 tablet 0     Sig: Take 1 tablet by mouth Every 12 (Twelve) Hours. Indications: Atrial Fibrillation       When do you need the refill by: SOON    What details did the patient provide when requesting the medication: PT WILL BE OUT BY TOMORROW. CHRIS ALSO ASKED FOR REFILLS FOR   PANTOPRAZOLE-SOD DR 40 MG AND FUROSEMIDE 20 MG.    Does the patient have less than a 3 day supply:  [x] Yes  [] No    What is the patient's preferred pharmacy: [facility] [address] [place] [address] [phone] [facility] [phone]

## 2020-09-22 ENCOUNTER — TELEPHONE (OUTPATIENT)
Dept: INTERNAL MEDICINE | Facility: CLINIC | Age: 85
End: 2020-09-22

## 2020-09-22 RX ORDER — PANTOPRAZOLE SODIUM 40 MG/1
40 TABLET, DELAYED RELEASE ORAL DAILY
Qty: 30 TABLET | Refills: 5 | Status: SHIPPED | OUTPATIENT
Start: 2020-09-22 | End: 2020-10-21 | Stop reason: SDUPTHER

## 2020-09-22 RX ORDER — FUROSEMIDE 20 MG/1
20 TABLET ORAL DAILY
Qty: 30 TABLET | Refills: 5 | Status: SHIPPED | OUTPATIENT
Start: 2020-09-22 | End: 2021-03-22

## 2020-09-22 NOTE — TELEPHONE ENCOUNTER
Pharmacy Name: [facility] [phone] [facility] [phone]      Pharmacy representative name: JESS    Pharmacy representative phone number: [phone]    What medication are you calling in regards to: LASIZ 20MG 1 X DAY  PROTONIX 40MG 1 X DAY    What question does the pharmacy have: NEEDS REFILLS, OUT OF MEDICATION    Who is the provider that prescribed the medication:  [facility] , DR. RENARD BRADLEY    Additional notes: PATIENTS DAUGHTER IN LAW TOLD PHARMACY TO CALL DR. GRIFFITH FOR THIS.

## 2020-09-30 ENCOUNTER — TELEPHONE (OUTPATIENT)
Dept: INTERNAL MEDICINE | Facility: CLINIC | Age: 85
End: 2020-09-30

## 2020-10-09 DIAGNOSIS — E53.8 VITAMIN B 12 DEFICIENCY: ICD-10-CM

## 2020-10-09 RX ORDER — CYANOCOBALAMIN 1000 UG/ML
INJECTION, SOLUTION INTRAMUSCULAR; SUBCUTANEOUS
Qty: 1 ML | Refills: 4 | Status: SHIPPED | OUTPATIENT
Start: 2020-10-09 | End: 2021-03-08

## 2020-10-09 RX ORDER — SYRINGE WITH NEEDLE, 1 ML 25GX5/8"
SYRINGE, EMPTY DISPOSABLE MISCELLANEOUS
Qty: 1 EACH | Refills: 4 | Status: SHIPPED | OUTPATIENT
Start: 2020-10-09 | End: 2021-03-17

## 2020-10-09 RX ORDER — BETHANECHOL CHLORIDE 10 MG/1
10 TABLET ORAL 3 TIMES DAILY
Qty: 90 TABLET | Refills: 3 | Status: SHIPPED | OUTPATIENT
Start: 2020-10-09 | End: 2021-07-06 | Stop reason: SDUPTHER

## 2020-10-13 ENCOUNTER — TELEPHONE (OUTPATIENT)
Dept: INTERNAL MEDICINE | Facility: CLINIC | Age: 85
End: 2020-10-13

## 2020-10-13 RX ORDER — BUSPIRONE HYDROCHLORIDE 10 MG/1
10 TABLET ORAL 2 TIMES DAILY
Qty: 60 TABLET | Refills: 5 | Status: SHIPPED | OUTPATIENT
Start: 2020-10-13 | End: 2021-04-26

## 2020-10-13 NOTE — TELEPHONE ENCOUNTER
Kimber is at pt's home (has not been there in over a month).  Wanted to report bruising on the RLL that began the 1st of October.  From photos that the family took the bruising has subsided, however, there is a curvature in the rt lower leg & the area is hardened, with a purple bump.  Just not positioned normally & is tender and sore to the touch.  Pt is still able to walk.  Family does not want her to go out with the COVID.

## 2020-10-14 ENCOUNTER — TELEPHONE (OUTPATIENT)
Dept: INTERNAL MEDICINE | Facility: CLINIC | Age: 85
End: 2020-10-14

## 2020-10-14 NOTE — TELEPHONE ENCOUNTER
Rima, a  with [facility], called to provide information to Dr. Patel. Patient declined her social work visit today, 10/14/20, and the family informed Rima they sent pictures of the patient's leg via patient portal to Dr. Patel.    Rima's call back [phone]

## 2020-10-14 NOTE — TELEPHONE ENCOUNTER
PATIENT SON IS WANTING TO LET CONY KNOW TO LOOK IN PATIENT'S PORTAL TO LOOK AT PHOTOS TAKEN OF PATIENT'S LEG    CALL BACK NUMBER [phone]

## 2020-10-21 RX ORDER — PANTOPRAZOLE SODIUM 40 MG/1
40 TABLET, DELAYED RELEASE ORAL DAILY
Qty: 90 TABLET | Refills: 1 | Status: SHIPPED | OUTPATIENT
Start: 2020-10-21 | End: 2021-04-22

## 2020-10-23 RX ORDER — OMEPRAZOLE 20 MG/1
CAPSULE, DELAYED RELEASE ORAL
Qty: 90 CAPSULE | Refills: 4 | OUTPATIENT
Start: 2020-10-23

## 2020-11-24 DIAGNOSIS — G89.29 OTHER CHRONIC PAIN: ICD-10-CM

## 2020-11-24 DIAGNOSIS — G63 POLYNEUROPATHY ASSOCIATED WITH UNDERLYING DISEASE (HCC): ICD-10-CM

## 2020-11-24 RX ORDER — TRAMADOL HYDROCHLORIDE 50 MG/1
TABLET ORAL
Qty: 30 TABLET | Refills: 2 | Status: SHIPPED | OUTPATIENT
Start: 2020-11-24 | End: 2021-03-25

## 2020-11-24 RX ORDER — GABAPENTIN 300 MG/1
CAPSULE ORAL
Qty: 180 CAPSULE | Refills: 2 | Status: SHIPPED | OUTPATIENT
Start: 2020-11-24 | End: 2021-06-01

## 2020-12-02 DIAGNOSIS — K59.1 FUNCTIONAL DIARRHEA: ICD-10-CM

## 2020-12-02 RX ORDER — DIPHENOXYLATE HYDROCHLORIDE AND ATROPINE SULFATE 2.5; .025 MG/1; MG/1
TABLET ORAL
Qty: 120 TABLET | Refills: 1 | Status: SHIPPED | OUTPATIENT
Start: 2020-12-02 | End: 2021-01-18 | Stop reason: HOSPADM

## 2020-12-03 RX ORDER — LEVOTHYROXINE SODIUM 175 MCG
175 TABLET ORAL DAILY
Qty: 90 TABLET | Refills: 1 | Status: SHIPPED | OUTPATIENT
Start: 2020-12-03 | End: 2021-07-07

## 2020-12-04 ENCOUNTER — DOCUMENTATION (OUTPATIENT)
Dept: INTERNAL MEDICINE | Facility: CLINIC | Age: 85
End: 2020-12-04

## 2020-12-05 NOTE — PROGRESS NOTES
Daughter-in-law calling for cramping of her leg. Started earlier tonight, has taken Tylenol but does not resolve. Asking for advice. Able to ambulate. Given advised to stretch and activate opposite muscle by dorsiflexing in ankle and toes. Can also have some on assist her in stretching with the same flexing movement. Asking if they can use tramadol as needed. Dr. Patel has prescribed this medication for once daily, I would not recommend taking more than 1 tablet 2 times per day over the weekend, but I would expect the cramping to be better by tomorrow.    Slick Tatum MD

## 2020-12-07 ENCOUNTER — TELEPHONE (OUTPATIENT)
Dept: INTERNAL MEDICINE | Facility: CLINIC | Age: 85
End: 2020-12-07

## 2020-12-07 NOTE — TELEPHONE ENCOUNTER
Spoke to Janet and advised per Dr. Patel's instructions. They said she is really having problems with pain . She was given the methocarbamol in the hospital and is almost out. Should she continue?

## 2020-12-07 NOTE — TELEPHONE ENCOUNTER
Would not suggest more pain medication.  She has used diclofenac gel for her knees.  She could apply that to her upper legs. May apply up to 4 times a day.

## 2020-12-07 NOTE — TELEPHONE ENCOUNTER
Patients daughter in law, Chris, called and stated that the patient has been having muscle spasms in the back of her upper thigh. Chris states that she has been giving the paitent methocarbonal 500 mg 3 times a day, traMADol (ULTRAM) 50 MG tablet, and acetaminophen (TYLENOL) 325 MG tablet every 6 hours. She states the patient is still having some pain and would like to know if there is any other medication that the patient can take to help ease the pain. Please advise.     Chris call back [phone]

## 2020-12-08 RX ORDER — METHOCARBAMOL 500 MG/1
500 TABLET, FILM COATED ORAL 3 TIMES DAILY
Qty: 90 TABLET | Refills: 1 | Status: SHIPPED | OUTPATIENT
Start: 2020-12-08 | End: 2023-03-02

## 2020-12-08 NOTE — TELEPHONE ENCOUNTER
If it helps we can continue.  If it doesn't, and she is in severe pain, we could add a low dose of an opioid as long as she's not getting up and down by herself because it could be sedating and lead to falls.  Can also cause constipation.

## 2020-12-08 NOTE — TELEPHONE ENCOUNTER
Discusssed with Chris and Victor M and they do not want to use the opioid. Victor M said he thinks the methocarbamol is helping and they would like a rx sent in

## 2020-12-09 ENCOUNTER — TELEPHONE (OUTPATIENT)
Dept: INTERNAL MEDICINE | Facility: CLINIC | Age: 85
End: 2020-12-09

## 2020-12-09 NOTE — TELEPHONE ENCOUNTER
Kimber with [employer] called and said pt daughter-in-law did not want anyone coming out this week. Need VO to go next week for vitamin B12 injection. VO given

## 2020-12-10 ENCOUNTER — TELEPHONE (OUTPATIENT)
Dept: INTERNAL MEDICINE | Facility: CLINIC | Age: 85
End: 2020-12-10

## 2020-12-10 DIAGNOSIS — M17.0 PRIMARY OSTEOARTHRITIS OF BOTH KNEES: ICD-10-CM

## 2020-12-10 DIAGNOSIS — M51.9 DISC DISORDER OF LUMBAR REGION: Primary | ICD-10-CM

## 2020-12-10 RX ORDER — HYDROCODONE BITARTRATE AND ACETAMINOPHEN 5; 325 MG/1; MG/1
1 TABLET ORAL 2 TIMES DAILY PRN
Qty: 60 TABLET | Refills: 0 | Status: SHIPPED | OUTPATIENT
Start: 2020-12-10 | End: 2021-05-01

## 2020-12-10 NOTE — TELEPHONE ENCOUNTER
PATIENT'S SON - ALYSSA CALLED BACK AND HAD QUESTIONS ABOUT WHAT MEDICATIONS SHOULD BE GIVEN OR STOPPED SINCE SHE IS BEING STARTED ON HYDROCODONE.    CALL HIM [phone]

## 2020-12-10 NOTE — TELEPHONE ENCOUNTER
nury     Caller: Victor M Jarrett    Relationship: Emergency Contact    Best call back number: [phone]    What medication are you requesting: REQUESTING AN ALTERNATIVE PAIN MEDICATION    What are your current symptoms: INCREASE IN PAIN    How long have you been experiencing symptoms:     Have you had these symptoms before:    [x] Yes  [] No    Have you been treated for these symptoms before:   [x] Yes  [] No    If a prescription is needed, what is your preferred pharmacy and phone number:    [facility] [address] [phone] [facility] [phone] [facility]          Additional notes:REQUESTING A STRONGER PAIN MEDICATION STARTING AT THE LOWEST DOSE      ALSO  REQUESTING A CALL BACK REGARDING RX FOR diclofenac (VOLTAREN) 1 % gel gel AND THE POSSIBILITY OF RISK OF STROKES

## 2020-12-10 NOTE — TELEPHONE ENCOUNTER
She may take tramadol plus tylenol in between hydrocodone doses, 6 hours apart if needed.  Continue gabapentin.  Stop methacarbamal.

## 2020-12-10 NOTE — TELEPHONE ENCOUNTER
Sent in hydrocodone 5 mg.  Can take twice a day as needed.  Antiinflammatories such as diclofenac may slightly increase the risk of stroke, but no more than advil or aleve.  When you topically, there is no increased risk of stroke, but those warnings are listed for any form of these meds.

## 2020-12-11 ENCOUNTER — TELEPHONE (OUTPATIENT)
Dept: INTERNAL MEDICINE | Facility: CLINIC | Age: 85
End: 2020-12-11

## 2020-12-11 RX ORDER — POTASSIUM CHLORIDE 750 MG/1
20 CAPSULE, EXTENDED RELEASE ORAL 2 TIMES DAILY
Qty: 360 CAPSULE | Refills: 1 | Status: SHIPPED | OUTPATIENT
Start: 2020-12-11 | End: 2021-06-14

## 2020-12-11 NOTE — TELEPHONE ENCOUNTER
Daughter-in-law returned call - advised per Dr. Patel and also portal message sent. She will talk to pt and spouse

## 2020-12-11 NOTE — TELEPHONE ENCOUNTER
She tolerated tramadol, which has the same risk.  If her reaction to codeine was nausea and vomiting, that is a side effect, and it is safe for her to take hydrocodone.  If it was more serious, such as shortness of breath or swelling of the throat, that is a true allergic reaction, and there might be some risk of crossover allergy with hydrocodone.

## 2020-12-11 NOTE — TELEPHONE ENCOUNTER
Pharmacy called and said pt and family have been calling re: hydrocodone rx. Son said pt had reaction to codeine about 20 years ago. Please advise

## 2020-12-21 ENCOUNTER — TELEPHONE (OUTPATIENT)
Dept: INTERNAL MEDICINE | Facility: CLINIC | Age: 85
End: 2020-12-21

## 2020-12-21 NOTE — TELEPHONE ENCOUNTER
[facility] CALLED IN REGARDS TO AN ORDER THAT WAS FAXED TO THE OFFICE ON 12/9/20    [facility] HAS NOT RECEIVED THE SIGNED ORDER FOR 12/9/20    PLEASE ADVISE     CALL BACK NUMBER [phone]

## 2021-01-04 RX ORDER — SUCRALFATE 1 G/1
1 TABLET ORAL 2 TIMES DAILY
Qty: 180 TABLET | Refills: 1 | Status: SHIPPED | OUTPATIENT
Start: 2021-01-04 | End: 2021-05-19

## 2021-01-12 ENCOUNTER — TELEPHONE (OUTPATIENT)
Dept: INTERNAL MEDICINE | Facility: CLINIC | Age: 86
End: 2021-01-12

## 2021-01-12 NOTE — TELEPHONE ENCOUNTER
MARIA ANTONIA WITH [facility] CALLED STATING THAT THEY NEED TO RE-CERTIFY THE PATIENT FOR HER B12. SHE STATED THAT THE PATIENT REFUSED TO ALLOW THEM TO COME IN AND ASSESS HER LIVING CONDITIONS. MARIA ANTONIA STATED THAT THEY MIGHT HAVE TO DISCHARGE THE PATIENT    MARIA ANTONIA'S CONTACT  [phone]

## 2021-01-12 NOTE — TELEPHONE ENCOUNTER
GABRIEL FROM [facility] CALLED TO REQUEST VERBAL TO GIVE PT DAUGHTER INLAW DIRECTIONS TO GIVE INJECTIONS AND TO DISCHARGE PT.    CALL BACK:[phone]

## 2021-01-13 ENCOUNTER — TELEPHONE (OUTPATIENT)
Dept: INTERNAL MEDICINE | Facility: CLINIC | Age: 86
End: 2021-01-13

## 2021-01-13 NOTE — TELEPHONE ENCOUNTER
STANISLAV FROM [facility] CALLED TO SEE IF PATIENT HAS ANY FUTURE APPOINTMENTS AND WHEN LAST APPOINTMENT WAS.  INFO GIVEN.

## 2021-01-13 NOTE — TELEPHONE ENCOUNTER
Would continue phenergan every 8 hours for now, encourage fluid intake.  Hopefully it is a viral illness and will resolve.  If she is unable to eat and drink will need to go to hospital.

## 2021-01-13 NOTE — TELEPHONE ENCOUNTER
Called and talked to Victor M. He said she had bad reflux last night 2 x and he gave her almond milk with questran and she vomited that up. Her B/P avg today has been 162/95. They  Gave her 5 mg lisinopril at 1pm and again 6pm. Dr. Patel notified. Reiterated ER if continued vomiting.He voices understanding

## 2021-01-13 NOTE — TELEPHONE ENCOUNTER
Son (Victor M) called and said pt has been vomiting on and off last night and 3 times today. Temp 99.2. She has taken phenergan 25 mg at 2 am, 8 am and 2 pm. She has not been out anywhere or around anyone but them. Please advise  [phone]

## 2021-01-14 ENCOUNTER — APPOINTMENT (OUTPATIENT)
Dept: CT IMAGING | Facility: HOSPITAL | Age: 86
End: 2021-01-14

## 2021-01-14 ENCOUNTER — ANESTHESIA (OUTPATIENT)
Dept: GASTROENTEROLOGY | Facility: HOSPITAL | Age: 86
End: 2021-01-14

## 2021-01-14 ENCOUNTER — ANESTHESIA EVENT (OUTPATIENT)
Dept: GASTROENTEROLOGY | Facility: HOSPITAL | Age: 86
End: 2021-01-14

## 2021-01-14 ENCOUNTER — APPOINTMENT (OUTPATIENT)
Dept: GENERAL RADIOLOGY | Facility: HOSPITAL | Age: 86
End: 2021-01-14

## 2021-01-14 ENCOUNTER — HOSPITAL ENCOUNTER (INPATIENT)
Facility: HOSPITAL | Age: 86
LOS: 4 days | Discharge: HOME | End: 2021-01-18
Attending: EMERGENCY MEDICINE | Admitting: INTERNAL MEDICINE

## 2021-01-14 DIAGNOSIS — M51.9 DISC DISORDER OF LUMBAR REGION: ICD-10-CM

## 2021-01-14 DIAGNOSIS — J69.0 ASPIRATION PNEUMONIA, UNSPECIFIED ASPIRATION PNEUMONIA TYPE, UNSPECIFIED LATERALITY, UNSPECIFIED PART OF LUNG (HCC): ICD-10-CM

## 2021-01-14 DIAGNOSIS — Z79.01 ANTICOAGULATED: ICD-10-CM

## 2021-01-14 DIAGNOSIS — Z74.09 IMPAIRED FUNCTIONAL MOBILITY, BALANCE, GAIT, AND ENDURANCE: ICD-10-CM

## 2021-01-14 DIAGNOSIS — A41.9 ACUTE SEPSIS (HCC): Primary | ICD-10-CM

## 2021-01-14 DIAGNOSIS — R10.9 ACUTE ABDOMINAL PAIN: ICD-10-CM

## 2021-01-14 DIAGNOSIS — K31.1 GASTRIC OUTLET OBSTRUCTION: ICD-10-CM

## 2021-01-14 DIAGNOSIS — R13.10 DYSPHAGIA, UNSPECIFIED TYPE: ICD-10-CM

## 2021-01-14 DIAGNOSIS — M17.0 PRIMARY OSTEOARTHRITIS OF BOTH KNEES: ICD-10-CM

## 2021-01-14 DIAGNOSIS — M81.0 AGE-RELATED OSTEOPOROSIS WITHOUT CURRENT PATHOLOGICAL FRACTURE: ICD-10-CM

## 2021-01-14 PROBLEM — Z91.89 AT HIGH RISK FOR ASPIRATION: Status: ACTIVE | Noted: 2021-01-14

## 2021-01-14 PROBLEM — J18.9 PNEUMONIA OF BOTH LOWER LOBES DUE TO INFECTIOUS ORGANISM: Status: RESOLVED | Noted: 2020-08-03 | Resolved: 2021-01-14

## 2021-01-14 PROBLEM — N39.0 ACUTE UTI (URINARY TRACT INFECTION): Status: RESOLVED | Noted: 2019-12-18 | Resolved: 2021-01-14

## 2021-01-14 PROBLEM — R29.898 WEAKNESS OF BOTH LOWER EXTREMITIES: Status: RESOLVED | Noted: 2020-08-15 | Resolved: 2021-01-14

## 2021-01-14 PROBLEM — F41.8 ANXIETY WITH DEPRESSION: Status: ACTIVE | Noted: 2019-03-28

## 2021-01-14 PROBLEM — R27.0 ATAXIA: Status: RESOLVED | Noted: 2020-08-15 | Resolved: 2021-01-14

## 2021-01-14 PROBLEM — G89.29 CHRONIC PAIN: Status: ACTIVE | Noted: 2021-01-14

## 2021-01-14 PROBLEM — F03.90 DEMENTIA: Status: RESOLVED | Noted: 2020-08-03 | Resolved: 2021-01-14

## 2021-01-14 PROBLEM — R94.31 PROLONGED Q-T INTERVAL ON ECG: Status: RESOLVED | Noted: 2020-08-15 | Resolved: 2021-01-14

## 2021-01-14 LAB
ABO GROUP BLD: NORMAL
ALBUMIN SERPL-MCNC: 4 G/DL (ref 3.5–5.2)
ALBUMIN/GLOB SERPL: 1.6 G/DL
ALP SERPL-CCNC: 86 U/L (ref 39–117)
ALT SERPL W P-5'-P-CCNC: 17 U/L (ref 1–33)
ANION GAP SERPL CALCULATED.3IONS-SCNC: 11 MMOL/L (ref 5–15)
ANION GAP SERPL CALCULATED.3IONS-SCNC: 6 MMOL/L (ref 5–15)
AST SERPL-CCNC: 19 U/L (ref 1–32)
BACTERIA UR QL AUTO: ABNORMAL /HPF
BASOPHILS # BLD AUTO: 0.03 10*3/MM3 (ref 0–0.2)
BASOPHILS # BLD MANUAL: 0 10*3/MM3 (ref 0–0.2)
BASOPHILS NFR BLD AUTO: 0 % (ref 0–1.5)
BASOPHILS NFR BLD AUTO: 0.2 % (ref 0–1.5)
BILIRUB SERPL-MCNC: 0.6 MG/DL (ref 0–1.2)
BILIRUB UR QL STRIP: NEGATIVE
BLD GP AB SCN SERPL QL: NEGATIVE
BUN SERPL-MCNC: 20 MG/DL (ref 8–23)
BUN SERPL-MCNC: 21 MG/DL (ref 8–23)
BUN/CREAT SERPL: 30 (ref 7–25)
BUN/CREAT SERPL: 30.3 (ref 7–25)
CALCIUM SPEC-SCNC: 9.2 MG/DL (ref 8.2–9.6)
CALCIUM SPEC-SCNC: 9.5 MG/DL (ref 8.2–9.6)
CHLORIDE SERPL-SCNC: 100 MMOL/L (ref 98–107)
CHLORIDE SERPL-SCNC: 100 MMOL/L (ref 98–107)
CHOLEST SERPL-MCNC: 124 MG/DL (ref 0–200)
CLARITY UR: CLEAR
CO2 SERPL-SCNC: 28 MMOL/L (ref 22–29)
CO2 SERPL-SCNC: 34 MMOL/L (ref 22–29)
COLOR UR: YELLOW
CREAT SERPL-MCNC: 0.66 MG/DL (ref 0.57–1)
CREAT SERPL-MCNC: 0.7 MG/DL (ref 0.57–1)
D-LACTATE SERPL-SCNC: 3 MMOL/L (ref 0.5–2)
D-LACTATE SERPL-SCNC: 3.3 MMOL/L (ref 0.5–2)
DEPRECATED RDW RBC AUTO: 50.7 FL (ref 37–54)
DEPRECATED RDW RBC AUTO: 52 FL (ref 37–54)
EOSINOPHIL # BLD AUTO: 0.02 10*3/MM3 (ref 0–0.4)
EOSINOPHIL # BLD MANUAL: 0 10*3/MM3 (ref 0–0.4)
EOSINOPHIL NFR BLD AUTO: 0.2 % (ref 0.3–6.2)
EOSINOPHIL NFR BLD MANUAL: 0 % (ref 0.3–6.2)
ERYTHROCYTE [DISTWIDTH] IN BLOOD BY AUTOMATED COUNT: 15.4 % (ref 12.3–15.4)
ERYTHROCYTE [DISTWIDTH] IN BLOOD BY AUTOMATED COUNT: 15.4 % (ref 12.3–15.4)
FLUAV RNA RESP QL NAA+PROBE: NOT DETECTED
FLUBV RNA RESP QL NAA+PROBE: NOT DETECTED
GFR SERPL CREATININE-BSD FRML MDRD: 79 ML/MIN/1.73
GFR SERPL CREATININE-BSD FRML MDRD: 84 ML/MIN/1.73
GLOBULIN UR ELPH-MCNC: 2.5 GM/DL
GLUCOSE SERPL-MCNC: 114 MG/DL (ref 65–99)
GLUCOSE SERPL-MCNC: 164 MG/DL (ref 65–99)
GLUCOSE UR STRIP-MCNC: NEGATIVE MG/DL
HBA1C MFR BLD: 4.6 % (ref 4.8–5.6)
HCT VFR BLD AUTO: 32.6 % (ref 34–46.6)
HCT VFR BLD AUTO: 40.5 % (ref 34–46.6)
HDLC SERPL-MCNC: 51 MG/DL (ref 40–60)
HGB BLD-MCNC: 10.3 G/DL (ref 12–15.9)
HGB BLD-MCNC: 12.5 G/DL (ref 12–15.9)
HGB UR QL STRIP.AUTO: ABNORMAL
HOLD SPECIMEN: NORMAL
HOLD SPECIMEN: NORMAL
HYALINE CASTS UR QL AUTO: ABNORMAL /LPF
IMM GRANULOCYTES # BLD AUTO: 0.06 10*3/MM3 (ref 0–0.05)
IMM GRANULOCYTES NFR BLD AUTO: 0.5 % (ref 0–0.5)
INR PPP: 1.66 (ref 0.85–1.16)
KETONES UR QL STRIP: NEGATIVE
LACTATE HOLD SPECIMEN: NORMAL
LDLC SERPL CALC-MCNC: 63 MG/DL (ref 0–100)
LDLC/HDLC SERPL: 1.27 {RATIO}
LEUKOCYTE ESTERASE UR QL STRIP.AUTO: NEGATIVE
LIPASE SERPL-CCNC: 27 U/L (ref 13–60)
LYMPHOCYTES # BLD AUTO: 1.14 10*3/MM3 (ref 0.7–3.1)
LYMPHOCYTES # BLD MANUAL: 0.49 10*3/MM3 (ref 0.7–3.1)
LYMPHOCYTES NFR BLD AUTO: 9.1 % (ref 19.6–45.3)
LYMPHOCYTES NFR BLD MANUAL: 3 % (ref 19.6–45.3)
LYMPHOCYTES NFR BLD MANUAL: 3 % (ref 5–12)
MAGNESIUM SERPL-MCNC: 1.7 MG/DL (ref 1.6–2.4)
MCH RBC QN AUTO: 27.5 PG (ref 26.6–33)
MCH RBC QN AUTO: 28.9 PG (ref 26.6–33)
MCHC RBC AUTO-ENTMCNC: 30.9 G/DL (ref 31.5–35.7)
MCHC RBC AUTO-ENTMCNC: 31.6 G/DL (ref 31.5–35.7)
MCV RBC AUTO: 89 FL (ref 79–97)
MCV RBC AUTO: 91.6 FL (ref 79–97)
MONOCYTES # BLD AUTO: 0.49 10*3/MM3 (ref 0.1–0.9)
MONOCYTES # BLD AUTO: 1.01 10*3/MM3 (ref 0.1–0.9)
MONOCYTES NFR BLD AUTO: 8 % (ref 5–12)
NEUTROPHILS # BLD AUTO: 15.37 10*3/MM3 (ref 1.7–7)
NEUTROPHILS NFR BLD AUTO: 10.29 10*3/MM3 (ref 1.7–7)
NEUTROPHILS NFR BLD AUTO: 82 % (ref 42.7–76)
NEUTROPHILS NFR BLD MANUAL: 83 % (ref 42.7–76)
NEUTS BAND NFR BLD MANUAL: 11 % (ref 0–5)
NEUTS VAC BLD QL SMEAR: ABNORMAL
NITRITE UR QL STRIP: NEGATIVE
NRBC BLD AUTO-RTO: 0 /100 WBC (ref 0–0.2)
NT-PROBNP SERPL-MCNC: 3148 PG/ML (ref 0–1800)
PH UR STRIP.AUTO: 6.5 [PH] (ref 5–8)
PHOSPHATE SERPL-MCNC: 3.8 MG/DL (ref 2.5–4.5)
PLAT MORPH BLD: NORMAL
PLATELET # BLD AUTO: 143 10*3/MM3 (ref 140–450)
PLATELET # BLD AUTO: 209 10*3/MM3 (ref 140–450)
PMV BLD AUTO: 10.2 FL (ref 6–12)
PMV BLD AUTO: 10.4 FL (ref 6–12)
POTASSIUM SERPL-SCNC: 3.5 MMOL/L (ref 3.5–5.2)
POTASSIUM SERPL-SCNC: 3.6 MMOL/L (ref 3.5–5.2)
PROCALCITONIN SERPL-MCNC: 0.26 NG/ML (ref 0–0.25)
PROT SERPL-MCNC: 6.5 G/DL (ref 6–8.5)
PROT UR QL STRIP: ABNORMAL
PROTHROMBIN TIME: 19.3 SECONDS (ref 11.5–14)
QT INTERVAL: 426 MS
QTC INTERVAL: 432 MS
RBC # BLD AUTO: 3.56 10*6/MM3 (ref 3.77–5.28)
RBC # BLD AUTO: 4.55 10*6/MM3 (ref 3.77–5.28)
RBC # UR: ABNORMAL /HPF
RBC MORPH BLD: NORMAL
REF LAB TEST METHOD: ABNORMAL
RH BLD: POSITIVE
SARS-COV-2 RNA RESP QL NAA+PROBE: NOT DETECTED
SODIUM SERPL-SCNC: 139 MMOL/L (ref 136–145)
SODIUM SERPL-SCNC: 140 MMOL/L (ref 136–145)
SP GR UR STRIP: 1.04 (ref 1–1.03)
SQUAMOUS #/AREA URNS HPF: ABNORMAL /HPF
T&S EXPIRATION DATE: NORMAL
TRIGL SERPL-MCNC: 42 MG/DL (ref 0–150)
TROPONIN T SERPL-MCNC: <0.01 NG/ML (ref 0–0.03)
TSH SERPL DL<=0.05 MIU/L-ACNC: 0.26 UIU/ML (ref 0.27–4.2)
UROBILINOGEN UR QL STRIP: ABNORMAL
VLDLC SERPL-MCNC: 10 MG/DL (ref 5–40)
WBC # BLD AUTO: 12.55 10*3/MM3 (ref 3.4–10.8)
WBC # BLD AUTO: 16.35 10*3/MM3 (ref 3.4–10.8)
WBC UR QL AUTO: ABNORMAL /HPF
WHOLE BLOOD HOLD SPECIMEN: NORMAL
WHOLE BLOOD HOLD SPECIMEN: NORMAL

## 2021-01-14 PROCEDURE — 99285 EMERGENCY DEPT VISIT HI MDM: CPT

## 2021-01-14 PROCEDURE — 93010 ELECTROCARDIOGRAM REPORT: CPT | Performed by: INTERNAL MEDICINE

## 2021-01-14 PROCEDURE — 80053 COMPREHEN METABOLIC PANEL: CPT

## 2021-01-14 PROCEDURE — 84100 ASSAY OF PHOSPHORUS: CPT | Performed by: NURSE PRACTITIONER

## 2021-01-14 PROCEDURE — 83735 ASSAY OF MAGNESIUM: CPT | Performed by: NURSE PRACTITIONER

## 2021-01-14 PROCEDURE — 94799 UNLISTED PULMONARY SVC/PX: CPT

## 2021-01-14 PROCEDURE — 85025 COMPLETE CBC W/AUTO DIFF WBC: CPT | Performed by: NURSE PRACTITIONER

## 2021-01-14 PROCEDURE — 74018 RADEX ABDOMEN 1 VIEW: CPT

## 2021-01-14 PROCEDURE — 83605 ASSAY OF LACTIC ACID: CPT

## 2021-01-14 PROCEDURE — 85025 COMPLETE CBC W/AUTO DIFF WBC: CPT

## 2021-01-14 PROCEDURE — 25010000002 PIPERACILLIN SOD-TAZOBACTAM PER 1 G: Performed by: NURSE PRACTITIONER

## 2021-01-14 PROCEDURE — 25010000002 PROPOFOL 10 MG/ML EMULSION: Performed by: NURSE ANESTHETIST, CERTIFIED REGISTERED

## 2021-01-14 PROCEDURE — 86900 BLOOD TYPING SEROLOGIC ABO: CPT | Performed by: NURSE PRACTITIONER

## 2021-01-14 PROCEDURE — 71250 CT THORAX DX C-: CPT

## 2021-01-14 PROCEDURE — 84484 ASSAY OF TROPONIN QUANT: CPT | Performed by: NURSE PRACTITIONER

## 2021-01-14 PROCEDURE — 81001 URINALYSIS AUTO W/SCOPE: CPT | Performed by: EMERGENCY MEDICINE

## 2021-01-14 PROCEDURE — 86901 BLOOD TYPING SEROLOGIC RH(D): CPT | Performed by: NURSE PRACTITIONER

## 2021-01-14 PROCEDURE — 25010000002 IOPAMIDOL 61 % SOLUTION: Performed by: EMERGENCY MEDICINE

## 2021-01-14 PROCEDURE — 87636 SARSCOV2 & INF A&B AMP PRB: CPT | Performed by: INTERNAL MEDICINE

## 2021-01-14 PROCEDURE — 86850 RBC ANTIBODY SCREEN: CPT | Performed by: NURSE PRACTITIONER

## 2021-01-14 PROCEDURE — 83690 ASSAY OF LIPASE: CPT

## 2021-01-14 PROCEDURE — 84443 ASSAY THYROID STIM HORMONE: CPT | Performed by: NURSE PRACTITIONER

## 2021-01-14 PROCEDURE — 85610 PROTHROMBIN TIME: CPT | Performed by: NURSE PRACTITIONER

## 2021-01-14 PROCEDURE — 74177 CT ABD & PELVIS W/CONTRAST: CPT

## 2021-01-14 PROCEDURE — 85007 BL SMEAR W/DIFF WBC COUNT: CPT

## 2021-01-14 PROCEDURE — 92610 EVALUATE SWALLOWING FUNCTION: CPT

## 2021-01-14 PROCEDURE — 25010000002 PIPERACILLIN SOD-TAZOBACTAM PER 1 G: Performed by: INTERNAL MEDICINE

## 2021-01-14 PROCEDURE — 83880 ASSAY OF NATRIURETIC PEPTIDE: CPT | Performed by: NURSE PRACTITIONER

## 2021-01-14 PROCEDURE — 84145 PROCALCITONIN (PCT): CPT | Performed by: NURSE PRACTITIONER

## 2021-01-14 PROCEDURE — 99223 1ST HOSP IP/OBS HIGH 75: CPT | Performed by: INTERNAL MEDICINE

## 2021-01-14 PROCEDURE — 87040 BLOOD CULTURE FOR BACTERIA: CPT | Performed by: EMERGENCY MEDICINE

## 2021-01-14 PROCEDURE — 93005 ELECTROCARDIOGRAM TRACING: CPT | Performed by: NURSE PRACTITIONER

## 2021-01-14 PROCEDURE — 80061 LIPID PANEL: CPT | Performed by: NURSE PRACTITIONER

## 2021-01-14 PROCEDURE — 83605 ASSAY OF LACTIC ACID: CPT | Performed by: EMERGENCY MEDICINE

## 2021-01-14 PROCEDURE — 25010000003 LIDOCAINE 1 % SOLUTION: Performed by: NURSE ANESTHETIST, CERTIFIED REGISTERED

## 2021-01-14 PROCEDURE — 25010000002 AZITHROMYCIN PER 500 MG: Performed by: EMERGENCY MEDICINE

## 2021-01-14 PROCEDURE — 0DJ08ZZ INSPECTION OF UPPER INTESTINAL TRACT, VIA NATURAL OR ARTIFICIAL OPENING ENDOSCOPIC: ICD-10-PCS | Performed by: SURGERY

## 2021-01-14 PROCEDURE — 99284 EMERGENCY DEPT VISIT MOD MDM: CPT

## 2021-01-14 PROCEDURE — 83036 HEMOGLOBIN GLYCOSYLATED A1C: CPT | Performed by: NURSE PRACTITIONER

## 2021-01-14 RX ORDER — ACETAMINOPHEN 650 MG/1
650 SUPPOSITORY RECTAL EVERY 4 HOURS PRN
Status: DISCONTINUED | OUTPATIENT
Start: 2021-01-14 | End: 2021-01-18 | Stop reason: HOSPADM

## 2021-01-14 RX ORDER — SODIUM CHLORIDE 9 MG/ML
10 INJECTION INTRAVENOUS AS NEEDED
Status: DISCONTINUED | OUTPATIENT
Start: 2021-01-14 | End: 2021-01-18 | Stop reason: HOSPADM

## 2021-01-14 RX ORDER — LEVOTHYROXINE SODIUM 20 UG/ML
88 INJECTION, SOLUTION INTRAVENOUS
Status: DISCONTINUED | OUTPATIENT
Start: 2021-01-14 | End: 2021-01-15

## 2021-01-14 RX ORDER — SODIUM CHLORIDE 0.9 % (FLUSH) 0.9 %
10 SYRINGE (ML) INJECTION EVERY 12 HOURS SCHEDULED
Status: DISCONTINUED | OUTPATIENT
Start: 2021-01-14 | End: 2021-01-18 | Stop reason: HOSPADM

## 2021-01-14 RX ORDER — PANTOPRAZOLE SODIUM 40 MG/10ML
40 INJECTION, POWDER, LYOPHILIZED, FOR SOLUTION INTRAVENOUS EVERY 12 HOURS SCHEDULED
Status: DISCONTINUED | OUTPATIENT
Start: 2021-01-14 | End: 2021-01-15

## 2021-01-14 RX ORDER — ONDANSETRON 2 MG/ML
4 INJECTION INTRAMUSCULAR; INTRAVENOUS EVERY 6 HOURS PRN
Status: DISCONTINUED | OUTPATIENT
Start: 2021-01-14 | End: 2021-01-18 | Stop reason: HOSPADM

## 2021-01-14 RX ORDER — ONDANSETRON 4 MG/1
4 TABLET, FILM COATED ORAL EVERY 6 HOURS PRN
Status: DISCONTINUED | OUTPATIENT
Start: 2021-01-14 | End: 2021-01-18 | Stop reason: HOSPADM

## 2021-01-14 RX ORDER — SODIUM PHOSPHATE,MONO-DIBASIC 19G-7G/118
1 ENEMA (ML) RECTAL ONCE
Status: DISCONTINUED | OUTPATIENT
Start: 2021-01-14 | End: 2021-01-18 | Stop reason: HOSPADM

## 2021-01-14 RX ORDER — ONDANSETRON 2 MG/ML
4 INJECTION INTRAMUSCULAR; INTRAVENOUS ONCE AS NEEDED
Status: DISCONTINUED | OUTPATIENT
Start: 2021-01-14 | End: 2021-01-14 | Stop reason: HOSPADM

## 2021-01-14 RX ORDER — SODIUM CHLORIDE, SODIUM LACTATE, POTASSIUM CHLORIDE, CALCIUM CHLORIDE 600; 310; 30; 20 MG/100ML; MG/100ML; MG/100ML; MG/100ML
9 INJECTION, SOLUTION INTRAVENOUS CONTINUOUS
Status: DISCONTINUED | OUTPATIENT
Start: 2021-01-14 | End: 2021-01-18 | Stop reason: HOSPADM

## 2021-01-14 RX ORDER — SODIUM CHLORIDE 0.9 % (FLUSH) 0.9 %
10 SYRINGE (ML) INJECTION AS NEEDED
Status: DISCONTINUED | OUTPATIENT
Start: 2021-01-14 | End: 2021-01-18 | Stop reason: HOSPADM

## 2021-01-14 RX ORDER — 0.9 % SODIUM CHLORIDE 0.9 %
10 VIAL (ML) INJECTION ONCE
Status: COMPLETED | OUTPATIENT
Start: 2021-01-14 | End: 2021-01-14

## 2021-01-14 RX ORDER — BISACODYL 10 MG
10 SUPPOSITORY, RECTAL RECTAL DAILY PRN
Status: DISCONTINUED | OUTPATIENT
Start: 2021-01-14 | End: 2021-01-18 | Stop reason: HOSPADM

## 2021-01-14 RX ORDER — PROPOFOL 10 MG/ML
VIAL (ML) INTRAVENOUS AS NEEDED
Status: DISCONTINUED | OUTPATIENT
Start: 2021-01-14 | End: 2021-01-14 | Stop reason: SURG

## 2021-01-14 RX ORDER — LIDOCAINE HYDROCHLORIDE 10 MG/ML
INJECTION, SOLUTION INFILTRATION; PERINEURAL AS NEEDED
Status: DISCONTINUED | OUTPATIENT
Start: 2021-01-14 | End: 2021-01-14 | Stop reason: SURG

## 2021-01-14 RX ADMIN — SODIUM CHLORIDE, POTASSIUM CHLORIDE, SODIUM LACTATE AND CALCIUM CHLORIDE 9 ML/HR: 600; 310; 30; 20 INJECTION, SOLUTION INTRAVENOUS at 10:27

## 2021-01-14 RX ADMIN — TAZOBACTAM SODIUM AND PIPERACILLIN SODIUM 3.38 G: 375; 3 INJECTION, SOLUTION INTRAVENOUS at 13:22

## 2021-01-14 RX ADMIN — TAZOBACTAM SODIUM AND PIPERACILLIN SODIUM 3.38 G: 375; 3 INJECTION, SOLUTION INTRAVENOUS at 20:28

## 2021-01-14 RX ADMIN — PANTOPRAZOLE SODIUM 40 MG: 40 INJECTION, POWDER, FOR SOLUTION INTRAVENOUS at 09:39

## 2021-01-14 RX ADMIN — IOPAMIDOL 100 ML: 612 INJECTION, SOLUTION INTRAVENOUS at 03:06

## 2021-01-14 RX ADMIN — LIDOCAINE HYDROCHLORIDE 50 MG: 10 INJECTION, SOLUTION INFILTRATION; PERINEURAL at 11:13

## 2021-01-14 RX ADMIN — AZITHROMYCIN 500 MG: 500 INJECTION, POWDER, LYOPHILIZED, FOR SOLUTION INTRAVENOUS at 06:10

## 2021-01-14 RX ADMIN — SODIUM CHLORIDE, POTASSIUM CHLORIDE, SODIUM LACTATE AND CALCIUM CHLORIDE 1000 ML: 600; 310; 30; 20 INJECTION, SOLUTION INTRAVENOUS at 04:31

## 2021-01-14 RX ADMIN — PROPOFOL 20 MG: 10 INJECTION, EMULSION INTRAVENOUS at 11:17

## 2021-01-14 RX ADMIN — PANTOPRAZOLE SODIUM 40 MG: 40 INJECTION, POWDER, FOR SOLUTION INTRAVENOUS at 20:29

## 2021-01-14 RX ADMIN — SODIUM CHLORIDE, PRESERVATIVE FREE 10 ML: 5 INJECTION INTRAVENOUS at 20:29

## 2021-01-14 RX ADMIN — PROPOFOL 50 MG: 10 INJECTION, EMULSION INTRAVENOUS at 11:13

## 2021-01-14 RX ADMIN — SODIUM CHLORIDE, PRESERVATIVE FREE 10 ML: 5 INJECTION INTRAVENOUS at 10:27

## 2021-01-14 RX ADMIN — TAZOBACTAM SODIUM AND PIPERACILLIN SODIUM 3.38 G: 375; 3 INJECTION, SOLUTION INTRAVENOUS at 04:32

## 2021-01-14 RX ADMIN — LEVOTHYROXINE SODIUM 88 MCG: 20 INJECTION, SOLUTION INTRAVENOUS at 13:22

## 2021-01-14 RX ADMIN — PROPOFOL 20 MG: 10 INJECTION, EMULSION INTRAVENOUS at 11:15

## 2021-01-14 NOTE — ED PROVIDER NOTES
EMERGENCY DEPARTMENT ENCOUNTER      Pt Name: Temi Jarrett  MRN: [id number]  YOB: 1930  Date of evaluation: 1/14/2021  Provider: Nikhil Alcazar MD    CHIEF COMPLAINT       Chief Complaint   Patient presents with   • Abdominal Pain         HISTORY OF PRESENT ILLNESS  (Location/Symptom, Timing/Onset, Context/Setting, Quality, Duration, Modifying Factors, Severity.)   Temi Jarrett is a 89 y.o. female who presents to the emergency department with worsening epigastric abdominal pain for the past 3 days.  She has had decreased p.o. intake and describes aching, moderate, no modifying factors upper abdominal pain with significant regurgitation, occasional vomiting, and no diarrhea.  She denies any associated urinary symptoms.  She is also had cough and shortness of breath throughout the day today.  She has a history of hiatal hernia.      Nursing notes were reviewed.    REVIEW OF SYSTEMS    (2-9 systems for level 4, 10 or more for level 5)   ROS:  General:  No fevers, no chills, no weakness  Cardiovascular:  No chest pain, no palpitations  Respiratory: Shortness of breath, cough  Gastrointestinal: Abdominal pain, vomiting  Musculoskeletal:  No muscle pain, no joint pain  Skin:  No rash, no easy bruising  Neurologic:  No speech problems, no headache, no extremity numbness, no extremity tingling, no extremity weakness  Psychiatric:  No anxiety  Genitourinary:  No dysuria, no hematuria    Except as noted above the remainder of the review of systems was reviewed and negative.       PAST MEDICAL HISTORY     Past Medical History:   Diagnosis Date   • Arthritis    • Bladder prolapse, female, acquired    • Closed fracture of neck of left femur (CMS/[facility]) 9/27/2019   • Dementia (CMS/[facility])    • Depression    • Disease of thyroid gland    • Gastric polyp    • GERD (gastroesophageal reflux disease)    • History of colonic polyps    • Hyperlipidemia    • Hypertension    • IBS (irritable bowel syndrome)    • Macular  degeneration    • Torn meniscus     right knee          SURGICAL HISTORY       Past Surgical History:   Procedure Laterality Date   • CHOLECYSTECTOMY  1997   • EYE SURGERY  1998    Cataract extraction   • HERNIA REPAIR     • HIP HEMIARTHROPLASTY Left 9/28/2019    Procedure: HIP HEMIARTHROPLASTY LEFT;  Surgeon: Reddy Segundo Jr., MD;  Location: [facility];  Service: Orthopedics   • HYSTERECTOMY  1976   • KNEE SURGERY Right     arthroscopy- 2000   • TONSILLECTOMY           CURRENT MEDICATIONS       Current Facility-Administered Medications:   •  piperacillin-tazobactam (ZOSYN) 3.375 g in iso-osmotic dextrose 50 ml (premix), 3.375 g, Intravenous, Q8H, Jarrett Harrison DO, Stopped at 01/14/21 0734  •  Sodium Chloride (PF) 0.9 % 10 mL, 10 mL, Intravenous, PRN, Nikhil Alcazar MD    Current Outpatient Medications:   •  acetaminophen (TYLENOL) 325 MG tablet, Take 650 mg by mouth Every 6 (Six) Hours As Needed for Mild Pain ., Disp: , Rfl:   •  apixaban (ELIQUIS) 2.5 MG tablet tablet, Take 1 tablet by mouth Every 12 (Twelve) Hours. Indications: Atrial Fibrillation, Disp: 60 tablet, Rfl: 2  •  aspirin 81 MG EC tablet, Take 81 mg by mouth Daily., Disp: , Rfl:   •  bethanechol (URECHOLINE) 10 MG tablet, Take 1 tablet by mouth 3 (Three) Times a Day., Disp: 90 tablet, Rfl: 3  •  busPIRone (BUSPAR) 10 MG tablet, Take 1 tablet by mouth 2 (Two) Times a Day., Disp: 60 tablet, Rfl: 5  •  Cholecalciferol (VITAMIN D PO), 2000 U qd, Disp: , Rfl:   •  cyanocobalamin 1000 MCG/ML injection, INJECT 1 ML INTO THE APPROPRIATE MUSCLE AS DIRECTED BY PRESCRIBER EVERY 30 DAYS, Disp: 1 mL, Rfl: 4  •  diclofenac (VOLTAREN) 1 % gel gel, APPLY 4 GRAMS TOPICALLY TO THE APPROPRAITE AREA AS DIRECTED FOUR TIMES A DAY, Disp: 100 g, Rfl: 4  •  diphenoxylate-atropine (LOMOTIL) 2.5-0.025 MG per tablet, TAKE ONE TABLET BY MOUTH FOUR TIMES A DAY AS NEEDED FOR DIARRHEA, Disp: 120 tablet, Rfl: 1  •  donepezil (ARICEPT) 10 MG tablet, Take 1 tablet by mouth  "every night at bedtime., Disp: 90 tablet, Rfl: 3  •  furosemide (Lasix) 20 MG tablet, Take 1 tablet by mouth Daily., Disp: 30 tablet, Rfl: 5  •  gabapentin (NEURONTIN) 300 MG capsule, TAKE ONE CAPSULE BY MOUTH TWICE A DAY, Disp: 180 capsule, Rfl: 2  •  HYDROcodone-acetaminophen (NORCO) 5-325 MG per tablet, Take 1 tablet by mouth 2 (Two) Times a Day As Needed for Severe Pain ., Disp: 60 tablet, Rfl: 0  •  Lactobacillus tablet, Take 1 tablet by mouth Daily., Disp: , Rfl:   •  lisinopril (PRINIVIL,ZESTRIL) 5 MG tablet, Take 1 tablet by mouth Daily., Disp: 30 tablet, Rfl: 5  •  melatonin 3 MG tablet, Take 3 mg by mouth Every Night., Disp: , Rfl:   •  memantine (NAMENDA) 10 MG tablet, TAKE ONE TABLET BY MOUTH TWICE A DAY, Disp: 180 tablet, Rfl: 3  •  methocarbamol (Robaxin) 500 MG tablet, Take 1 tablet by mouth 3 (Three) Times a Day., Disp: 90 tablet, Rfl: 1  •  Multiple Vitamins-Minerals (MULTIVITAMIN ADULTS 50+ PO), Take 1 tablet by mouth Daily., Disp: , Rfl:   •  Multiple Vitamins-Minerals (PRESERVISION AREDS PO), Take 1 tablet by mouth 2 (Two) Times a Day., Disp: , Rfl:   •  pantoprazole (Protonix) 40 MG EC tablet, Take 1 tablet by mouth Daily., Disp: 90 tablet, Rfl: 1  •  potassium chloride (MICRO-K) 10 MEQ CR capsule, Take 2 capsules by mouth 2 (two) times a day., Disp: 360 capsule, Rfl: 1  •  sertraline (ZOLOFT) 50 MG tablet, TAKE ONE TABLET BY MOUTH DAILY, Disp: 90 tablet, Rfl: 4  •  sucralfate (CARAFATE) 1 g tablet, Take 1 tablet by mouth 2 (Two) Times a Day., Disp: 180 tablet, Rfl: 1  •  Synthroid 175 MCG tablet, Take 1 tablet by mouth Daily. Patient receives medication from patient assistance.  NDC:4473-6565-30 EXP:07/02/2020 LOT [id number], Disp: 90 tablet, Rfl: 1  •  Syringe/Needle, Disp, (B-D 3CC LUER-ROSY SYR 23GX1\") 23G X 1\" 3 ML misc, USE TO INJECT VITAMIN B 12 ONCE A MONTH, Disp: 1 each, Rfl: 4  •  traMADol (ULTRAM) 50 MG tablet, TAKE ONE TABLET BY MOUTH DAILY AS NEEDED FOR MODERATE PAIN IN THE RIGHT KNEE, " Disp: 30 tablet, Rfl: 2    ALLERGIES     Codeine and Shrimp    FAMILY HISTORY       Family History   Problem Relation Age of Onset   • Hypertension Mother    • Breast cancer Mother    • Obesity Mother    • Hypertension Father    • Diabetes Son           SOCIAL HISTORY       Social History     Socioeconomic History   • Marital status:      Spouse name: Not on file   • Number of children: Not on file   • Years of education: Not on file   • Highest education level: Not on file   Tobacco Use   • Smoking status: Never Smoker   • Smokeless tobacco: Never Used   Substance and Sexual Activity   • Alcohol use: No   • Drug use: Defer   • Sexual activity: Defer   Social History Narrative    Lives with son and daughter in law--  is at [place] getting rehab         PHYSICAL EXAM    (up to 7 for level 4, 8 or more for level 5)     Vitals:    01/14/21 0600 01/14/21 0630 01/14/21 0700 01/14/21 0701   BP: 129/59 124/64 143/89    BP Location:       Patient Position:       Pulse: 67   95   Resp: 18      Temp:       TempSrc:       SpO2: 97% 95%  95%   Weight:       Height:           Physical Exam  General: Awake, alert, no acute distress.  HEENT: Conjunctiva normal.  Neck: Trachea midline.  Cardiac: Heart regular rate, rhythm, no murmurs, rubs, or gallops  Lungs: Lungs are clear to auscultation, there is no wheezing, rhonchi, or rales. There is no use of accessory muscles.  Chest wall: There is no tenderness to palpation over the chest wall or over ribs  Abdomen: Moderate epigastric tenderness without any associated rebound or guarding.  There is no significant distention.  Musculoskeletal: No deformity.  Neuro: Alert and oriented x 4.  Dermatology: Skin is warm and dry  Psych: Mentation is grossly normal, cognition is grossly normal. Affect is appropriate.        DIAGNOSTIC RESULTS     EKG: All EKG's are interpreted by the Emergency Department Physician who either signs or Co-signs this chart in the absence of a  cardiologist.    NSR, no acute ST/T change, normal intervals, no ectopy    RADIOLOGY:   Non-plain film images such as CT, Ultrasound and MRI are read by the radiologist. Plain radiographic images are visualized and preliminarily interpreted by the emergency physician with the below findings:      [x] Radiologist's Report Reviewed:  XR Abdomen KUB   Final Result      1. Enteric tube tip appears to be at the level of the distal body stomach in this patient with a large hiatal hernia and intrathoracic stomach, better demonstrated on preceding chest CT.   2. Cardiomegaly with bilateral infiltrates likely reflecting pneumonia, better demonstrated on prior chest CT.   3. Postoperative changes in the right hemiabdomen. Status post left hip replacement. Advanced degenerative changes in the lumbar spine and osteoporosis.      Signer Name: Andriy Hester MD    Signed: 1/14/2021 7:17 AM    Workstation Name: [id number]     [facility]      CT CHEST WITHOUT CONTRAST DIAGNOSTIC   Final Result      1. Pneumonia involving the right lower and right middle lobes as well as the lingula.   2. Large hiatal hernia is again seen containing the majority of the stomach. The stomach is currently distended with fluid to the level of the diaphragmatic hiatus suggesting some component of gastric outlet obstruction at this level.   3. Gastroesophageal reflux to nearly the level of the thoracic inlet may place the patient risk for aspiration.   4. Moderate stool burden in the colon suggesting constipation. No bowel obstruction or acute GI tract inflammation.   5. Hepatosplenomegaly.   6. Cholecystectomy without biliary obstruction.   7. Hysterectomy and left hip arthroplasty.   8. Additional findings as above.               Signer Name: Zaheer Hastings MD    Signed: 1/14/2021 3:39 AM    Workstation Name: [facility]     [facility]      CT Abdomen Pelvis With Contrast   Final Result      1.  Pneumonia involving the right lower and right middle lobes as well as the lingula.   2. Large hiatal hernia is again seen containing the majority of the stomach. The stomach is currently distended with fluid to the level of the diaphragmatic hiatus suggesting some component of gastric outlet obstruction at this level.   3. Gastroesophageal reflux to nearly the level of the thoracic inlet may place the patient risk for aspiration.   4. Moderate stool burden in the colon suggesting constipation. No bowel obstruction or acute GI tract inflammation.   5. Hepatosplenomegaly.   6. Cholecystectomy without biliary obstruction.   7. Hysterectomy and left hip arthroplasty.   8. Additional findings as above.               Signer Name: Zaheer Hastings MD    Signed: 1/14/2021 3:39 AM    Workstation Name: [email or web address]     [facility] [place]            LABS:    I have reviewed and interpreted all of the currently available lab results from this visit (if applicable):  Results for orders placed or performed during the hospital encounter of 01/14/21   COVID-19 and FLU A/B PCR - Swab, Nasopharynx    Specimen: Nasopharynx; Swab   Result Value Ref Range    COVID19 Not Detected Not Detected - Ref. Range    Influenza A PCR Not Detected Not Detected    Influenza B PCR Not Detected Not Detected   Comprehensive Metabolic Panel    Specimen: Blood   Result Value Ref Range    Glucose 164 (H) 65 - 99 mg/dL    BUN 21 8 - 23 mg/dL    Creatinine 0.70 0.57 - 1.00 mg/dL    Sodium 139 136 - 145 mmol/L    Potassium 3.6 3.5 - 5.2 mmol/L    Chloride 100 98 - 107 mmol/L    CO2 28.0 22.0 - 29.0 mmol/L    Calcium 9.5 8.2 - 9.6 mg/dL    Total Protein 6.5 6.0 - 8.5 g/dL    Albumin 4.00 3.50 - 5.20 g/dL    ALT (SGPT) 17 1 - 33 U/L    AST (SGOT) 19 1 - 32 U/L    Alkaline Phosphatase 86 39 - 117 U/L    Total Bilirubin 0.6 0.0 - 1.2 mg/dL    eGFR Non African Amer 79 >60 mL/min/1.73    Globulin 2.5 gm/dL    A/G Ratio 1.6 g/dL    BUN/Creatinine  Ratio 30.0 (H) 7.0 - 25.0    Anion Gap 11.0 5.0 - 15.0 mmol/L   Lipase    Specimen: Blood   Result Value Ref Range    Lipase 27 13 - 60 U/L   Urinalysis With Microscopic If Indicated (No Culture) - Urine, Clean Catch    Specimen: Urine, Clean Catch   Result Value Ref Range    Color, UA Yellow Yellow, Straw    Appearance, UA Clear Clear    pH, UA 6.5 5.0 - 8.0    Specific Gravity, UA 1.043 (H) 1.001 - 1.030    Glucose, UA Negative Negative    Ketones, UA Negative Negative    Bilirubin, UA Negative Negative    Blood, UA Moderate (2+) (A) Negative    Protein, UA >=300 mg/dL (3+) (A) Negative    Leuk Esterase, UA Negative Negative    Nitrite, UA Negative Negative    Urobilinogen, UA 0.2 E.U./dL 0.2 - 1.0 E.U./dL   Lactic Acid, Plasma    Specimen: Blood   Result Value Ref Range    Lactate 3.3 (C) 0.5 - 2.0 mmol/L   CBC Auto Differential    Specimen: Blood   Result Value Ref Range    WBC 16.35 (H) 3.40 - 10.80 10*3/mm3    RBC 4.55 3.77 - 5.28 10*6/mm3    Hemoglobin 12.5 12.0 - 15.9 g/dL    Hematocrit 40.5 34.0 - 46.6 %    MCV 89.0 79.0 - 97.0 fL    MCH 27.5 26.6 - 33.0 pg    MCHC 30.9 (L) 31.5 - 35.7 g/dL    RDW 15.4 12.3 - 15.4 %    RDW-SD 50.7 37.0 - 54.0 fl    MPV 10.2 6.0 - 12.0 fL    Platelets 209 140 - 450 10*3/mm3   Lactic Acid, Reflex Timer (This will reflex a repeat order 3-3:15 hours after ordered.)    Specimen: Blood   Result Value Ref Range    Hold Tube Hold for add-ons.    Manual Differential    Specimen: Blood   Result Value Ref Range    Neutrophil % 83.0 (H) 42.7 - 76.0 %    Lymphocyte % 3.0 (L) 19.6 - 45.3 %    Monocyte % 3.0 (L) 5.0 - 12.0 %    Eosinophil % 0.0 (L) 0.3 - 6.2 %    Basophil % 0.0 0.0 - 1.5 %    Bands %  11.0 (H) 0.0 - 5.0 %    Neutrophils Absolute 15.37 (H) 1.70 - 7.00 10*3/mm3    Lymphocytes Absolute 0.49 (L) 0.70 - 3.10 10*3/mm3    Monocytes Absolute 0.49 0.10 - 0.90 10*3/mm3    Eosinophils Absolute 0.00 0.00 - 0.40 10*3/mm3    Basophils Absolute 0.00 0.00 - 0.20 10*3/mm3    RBC  Morphology Normal Normal    Vacuolated Neutrophils Slight/1+ None Seen    Platelet Morphology Normal Normal   Urinalysis, Microscopic Only - Urine, Clean Catch    Specimen: Urine, Clean Catch   Result Value Ref Range    RBC, UA 13-20 (A) None Seen, 0-2 /HPF    WBC, UA 6-12 (A) None Seen, 0-2 /HPF    Bacteria, UA None Seen None Seen, Trace /HPF    Squamous Epithelial Cells, UA 0-2 None Seen, 0-2 /HPF    Hyaline Casts, UA 0-6 0 - 6 /LPF    Methodology Automated Microscopy    Troponin    Specimen: Blood   Result Value Ref Range    Troponin T <0.010 0.000 - 0.030 ng/mL   proBNP    Specimen: Blood   Result Value Ref Range    proBNP 3,148.0 (H) 0.0-1,800.0 pg/mL   Lactic Acid, Reflex    Specimen: Blood   Result Value Ref Range    Lactate 3.0 (C) 0.5 - 2.0 mmol/L   Procalcitonin    Specimen: Blood   Result Value Ref Range    Procalcitonin 0.26 (H) 0.00 - 0.25 ng/mL   Light Blue Top   Result Value Ref Range    Extra Tube hold for add-on    Green Top (Gel)   Result Value Ref Range    Extra Tube Hold for add-ons.    Lavender Top   Result Value Ref Range    Extra Tube hold for add-on    Gold Top - SST   Result Value Ref Range    Extra Tube Hold for add-ons.         All other labs were within normal range or not returned as of this dictation.      EMERGENCY DEPARTMENT COURSE and DIFFERENTIAL DIAGNOSIS/MDM:   Vitals:    Vitals:    01/14/21 0600 01/14/21 0630 01/14/21 0700 01/14/21 0701   BP: 129/59 124/64 143/89    BP Location:       Patient Position:       Pulse: 67   95   Resp: 18      Temp:       TempSrc:       SpO2: 97% 95%  95%   Weight:       Height:           ED Course as of Jan 14 0734   Thu Jan 14, 2021   0404 Spoke w/ Dr. Santamaria on call for gen surg. Recs NGT and will see in the AM.    [NS]   0420 Spoke w/ Christy who accepts the pt for admission.    [NS]      ED Course User Index  [NS] Nikhil Alcazar MD       Patient presents with acute sepsis syndrome secondary to likely aspiration pneumonia requiring application  of broad-spectrum IV antibiotics and supplemental oxygen.  She also appears to have gastric outlet obstruction is likely contributing to her aspiration secondary to large hiatal hernia.  She has some lactic acidosis as well as leukocytosis.  She was given IV fluids in the emergency department as well and pain is well controlled with IV medications.  She is being admitted to the hospital service with surgical consultation.    MEDICATIONS ADMINISTERED IN ED:  Medications   Sodium Chloride (PF) 0.9 % 10 mL (has no administration in time range)   piperacillin-tazobactam (ZOSYN) 3.375 g in iso-osmotic dextrose 50 ml (premix) (0 g Intravenous Stopped 1/14/21 0734)   iopamidol (ISOVUE-300) 61 % injection 100 mL (100 mL Intravenous Given 1/14/21 0306)   lactated ringers bolus 1,000 mL (0 mL Intravenous Stopped 1/14/21 0610)   piperacillin-tazobactam (ZOSYN) 3.375 g in iso-osmotic dextrose 50 ml (premix) (0 g Intravenous Stopped 1/14/21 0610)   AZITHROMYCIN 500 MG/250 ML 0.9% NS IVPB (vial-mate) (500 mg Intravenous New Bag 1/14/21 0610)       CRITICAL CARE TIME    Approximately 35 minutes of discontinuous critical care time was provided to this patient by myself absent of any time spent performing procedures.  Patient presents critically ill with acute sepsis syndrome secondary to aspiration pneumonia with associated gastric outlet obstruction with cardiovascular, respiratory, neurologic systems at risk requiring the following interventions: Application of supplemental oxygen, administration of broad-spectrum IV antibiotics, resuscitation, consultation with multiple specialties, coordination of admission, interpretation of labs/ECG/imaging.  Patient at high risk of deterioration and possibly death without these interventions.        FINAL IMPRESSION      1. Acute sepsis (CMS/HCC)    2. Gastric outlet obstruction    3. Aspiration pneumonia, unspecified aspiration pneumonia type, unspecified laterality, unspecified part of  lung (CMS/[facility])    4. Acute abdominal pain    5. Anticoagulated          DISPOSITION/PLAN     ED Disposition     ED Disposition Condition Comment    Decision to Admit  Level of Care: Telemetry [5]   Diagnosis: Acute sepsis (CMS/[facility]) [[id number]]   Admitting Physician: ANTHONY IZQUIERDO [[id number]]   Attending Physician: ANTHONY IZQUIERDO [[id number]]   Bed Request Comments: covid rule out, low suspicion              Nikhil Alcazar MD  Attending Emergency Physician               Nikhil Alcazar MD  01/14/21 0759

## 2021-01-14 NOTE — ANESTHESIA PREPROCEDURE EVALUATION
Anesthesia Evaluation     Patient summary reviewed and Nursing notes reviewed   no history of anesthetic complications:  NPO Solid Status: > 8 hours  NPO Liquid Status: > 8 hours           Airway   Mallampati: II  TM distance: >3 FB  Neck ROM: full  No difficulty expected  Dental      Pulmonary - normal exam   (+) pneumonia ,   Cardiovascular - normal exam    (+) hypertension, dysrhythmias, pericardial effusion,       Neuro/Psych  (+) numbness, psychiatric history, dementia,     GI/Hepatic/Renal/Endo    (+)  hiatal hernia, GERD,      Musculoskeletal     Abdominal    Substance History      OB/GYN          Other   arthritis,                      Anesthesia Plan    ASA 3     general     intravenous induction     Anesthetic plan, all risks, benefits, and alternatives have been provided, discussed and informed consent has been obtained with: patient.    Plan discussed with CRNA.

## 2021-01-14 NOTE — OP NOTE
OPERATIVE NOTE    Patient Name:  Temi Jarrett  YOB: 1930  [id number]    1/14/2021        PREOPERATIVE DIAGNOSIS: Paraesophageal hernia      POSTOPERATIVE DIAGNOSIS: Same       PROCEDURE PERFORMED: Esophagogastroduodenoscopy        SURGEON: Serjio Guerrero MD       SPECIMENS: None       ANESTHESIA: MAC       FINDINGS:   1.  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  2.  No evidence of obstruction,  ulcers or significant mass lesion.       INDICATIONS:     The patient is a 89 y.o. female who presented with pneumonia as well as a large intrathoracic stomach. As part of the workup, they have agreed to EGD. The risks and benefits were discussed at length with the patient and/or their family, and they agree to proceed.       DESCRIPTION OF PROCEDURE:      After obtaining informed consent, the patient was taken to the endoscopy suite. she was placed in left lateral decubitus position, and after appropriate sedation as detailed above, a bite block was placed into the patient's mouth. The endoscope was advanced into the mouth and into the esophagus without difficulty.  The Z line was regular.  The scope was then advanced into the stomach, which was quite arduous due to her known paraesophageal hernia.  There was some evidence of mild NG trauma, but no significant mass lesions, ulcers, or bleeding site.  The scope could be advanced to the duodenum which was normal.  There was no retained food.  The scope was then used to desufflate  the stomach and removed from the patient's mouth without difficulty.  There were no immediate complications.      Serjio Guerrero MD  1/14/2021  11:26 EST

## 2021-01-14 NOTE — THERAPY EVALUATION
Acute Care - Speech Language Pathology   Swallow Initial Evaluation [facility]   Clinical Swallow Evaluation     Patient Name: Temi Jarrett  : 1930  MRN: [id number]  Today's Date: 2021               Admit Date: 2021    Visit Dx:     ICD-10-CM ICD-9-CM   1. Acute sepsis (CMS/[facility])  A41.9 038.9     995.91   2. Gastric outlet obstruction  K31.1 537.0   3. Aspiration pneumonia, unspecified aspiration pneumonia type, unspecified laterality, unspecified part of lung (CMS/[facility])  J69.0 507.0   4. Acute abdominal pain  R10.9 789.00     338.19   5. Anticoagulated  Z79.01 V58.61   6. Dysphagia, unspecified type  R13.10 787.20     Patient Active Problem List   Diagnosis   • Esophageal reflux   • Hypothyroidism (acquired)   • Anxiety with depression   • Peripheral neuropathy   • Spinal stenosis of lumbar region   • Osteoporosis   • Disc disorder of lumbar region   • Bladder prolapse, female, acquired   • Essential hypertension   • Pernicious anemia   • Annual physical exam   • Primary osteoarthritis of both knees   • Hiatal hernia   • Multifocal aspiration pneumonia due to large intrathoracic hiatal hernia   • Paroxysmal atrial fibrillation (CMS/[facility])   • Pericardial effusion   • Mild cognitive impairment   • Acute sepsis (CMS/[facility])   • At high risk for aspiration   • Chronic pain     Past Medical History:   Diagnosis Date   • Arthritis    • Bladder prolapse, female, acquired    • Closed fracture of neck of left femur (CMS/[facility]) 2019   • Dementia (CMS/[facility])    • Depression    • Disease of thyroid gland    • Gastric polyp    • GERD (gastroesophageal reflux disease)    • History of colonic polyps    • Hyperlipidemia    • Hypertension    • IBS (irritable bowel syndrome)    • Macular degeneration    • Torn meniscus     right knee      Past Surgical History:   Procedure Laterality Date   • CHOLECYSTECTOMY     • EYE SURGERY      Cataract extraction   • HERNIA REPAIR     • HIP HEMIARTHROPLASTY Left  9/28/2019    Procedure: HIP HEMIARTHROPLASTY LEFT;  Surgeon: Reddy Segundo Jr., MD;  Location: [facility];  Service: Orthopedics   • HYSTERECTOMY  1976   • KNEE SURGERY Right     arthroscopy- 2000   • TONSILLECTOMY          SWALLOW EVALUATION (last 72 hours)      SLP Adult Swallow Evaluation     Row Name 01/14/21 1555                   Rehab Evaluation    Document Type  evaluation  -AC        Subjective Information  no complaints  -AC        Patient Observations  alert;cooperative  -AC        Patient/Family/Caregiver Comments/Observations  No family present.  -AC        Patient Effort  good  -AC           General Information    Patient Profile Reviewed  yes  -AC        Pertinent History Of Current Problem  89yo adm w/ sepsis, pna. Thought to be aspiration pna r/t paraesophageal hernia per chart. Hx MCI, GERD. S/p EGD today.    -AC        Current Method of Nutrition  clear liquids per surgery  -AC        Precautions/Limitations, Vision  WFL;for purposes of eval  -AC        Precautions/Limitations, Hearing  WFL;for purposes of eval  -AC        Prior Level of Function-Swallowing  no diet consistency restrictions;esophageal concerns  -AC        Plans/Goals Discussed with  patient;agreed upon  -AC        Barriers to Rehab  medically complex  -AC        Patient's Goals for Discharge  patient did not state  -AC           Pain    Additional Documentation  Pain Scale: FACES Pre/Post-Treatment (Group)  -AC           Pain Scale: FACES Pre/Post-Treatment    Pain: FACES Scale, Pretreatment  0-->no hurt  -AC        Posttreatment Pain Rating  0-->no hurt  -AC           Oral Motor Structure and Function    Dentition Assessment  natural, present and adequate  -AC        Secretion Management  WNL/WFL  -AC        Mucosal Quality  moist, healthy  -AC           Oral Musculature and Cranial Nerve Assessment    Oral Motor General Assessment  WFL  -AC           General Eating/Swallowing Observations    Respiratory Support Currently in  Use  nasal cannula  -        Eating/Swallowing Skills  self-fed;appropriate self-feeding skills observed  -AC        Positioning During Eating  upright 90 degree;upright in bed  -        Utensils Used  cup;straw  -AC        Consistencies Trialed  thin liquids  -           Clinical Swallow Eval    Oral Prep Phase  WFL  -AC        Oral Transit  WFL  -AC        Oral Residue  WFL  -AC        Pharyngeal Phase  no overt signs/symptoms of pharyngeal impairment  -        Clinical Swallow Evaluation Summary  Trialed clear thin liquid only due to diet restrictions. Oral phase seemingly WFL for thin liquid. No overt clinical s/sxs aspiration, but may be at risk for silent aspiration. Discussed options w/ pt who indicated she would not want to proceed w/ further assessment, unless deemed necessary by physician. Spoke to Dr. Cordova who would like to proceed w/ more comprehensive assessment to r/o silent aspiration. MBS most appropriate @ this time due to known esophageal issues. Will determine if surgery clears pt for barium for this study.  -           Clinical Impression    SLP Swallowing Diagnosis  R/O pharyngeal dysphagia  -        Functional Impact  risk of aspiration/pneumonia  -        Rehab Potential/Prognosis, Swallowing  good, to achieve stated therapy goals  -        Swallow Criteria for Skilled Therapeutic Interventions Met  demonstrates skilled criteria  -AC           Recommendations    SLP Diet Recommendation  clear liquid diet;other (see comments) cont diet per surgery  -        Recommended Diagnostics  VFSS (MBS);other (see comments) if medically appropriate  -        Recommended Precautions and Strategies  upright posture during/after eating;general aspiration precautions;reflux precautions  -        Oral Care Recommendations  Oral Care BID/PRN  -AC        SLP Rec. for Method of Medication Administration  meds whole;with thin liquids;as tolerated  -        Monitor for Signs of Aspiration   notify SLP if any concerns  -        Anticipated Discharge Disposition (SLP)  anticipate therapy at next level of care  -          User Key  (r) = Recorded By, (t) = Taken By, (c) = Cosigned By    Initials Name Effective Dates    Debora Mead MS CCC-SLP 07/27/17 -           EDUCATION  The patient has been educated in the following areas:   Dysphagia (Swallowing Impairment) Oral Care/Hydration.    SLP Recommendation and Plan  SLP Swallowing Diagnosis: R/O pharyngeal dysphagia  SLP Diet Recommendation: clear liquid diet, other (see comments)(cont diet per surgery)  Recommended Precautions and Strategies: upright posture during/after eating, general aspiration precautions, reflux precautions  SLP Rec. for Method of Medication Administration: meds whole, with thin liquids, as tolerated     Monitor for Signs of Aspiration: notify SLP if any concerns  Recommended Diagnostics: VFSS (MBS), other (see comments)(if medically appropriate)  Swallow Criteria for Skilled Therapeutic Interventions Met: demonstrates skilled criteria  Anticipated Discharge Disposition (SLP): anticipate therapy at next level of care  Rehab Potential/Prognosis, Swallowing: good, to achieve stated therapy goals              Plan of Care Reviewed With: patient  Progress: no change           Time Calculation:   Time Calculation- SLP     Row Name 01/14/21 1708             Time Calculation- SLP    SLP Start Time  1555  -      SLP Received On  01/14/21  -        User Key  (r) = Recorded By, (t) = Taken By, (c) = Cosigned By    Initials Name Provider Type    Debora Mead MS CCC-SLP Speech and Language Pathologist          Therapy Charges for Today     Code Description Service Date Service Provider Modifiers Qty    [id number] HC ST EVAL ORAL PHARYNG SWALLOW 5 1/14/2021 Debora Angel MS CCC-SLP GN 1        Patient was not wearing a face mask and did not exhibit coughing during this therapy encounter.  Procedure performed was  aerosolizing, involved close contact (within 6 feet for at least 15 minutes or longer), and did not involve contact with infectious secretions or specimens.  Therapist used appropriate personal protective equipment including gloves, standard procedure mask, eye protection and N95 mask.  Appropriate PPE was worn during the entire therapy session.  Hand hygiene was completed before and after therapy session.          Debora Angel MS CCC-SLP  1/14/2021

## 2021-01-14 NOTE — PLAN OF CARE
Goal Outcome Evaluation:  Plan of Care Reviewed With: patient  Progress: no change   SLP evaluation completed. Will  f/u for further assessment, as medically appropriate . Please see note for further details and recommendations.

## 2021-01-14 NOTE — PROGRESS NOTES
Discharge Planning Assessment  [facility]     Patient Name: Temi Jarrett  MRN: [id number]  Today's Date: 1/14/2021    Admit Date: 1/14/2021    Discharge Needs Assessment     Row Name 01/14/21 1103       Living Environment    Lives With  child(darvin), adult    Name(s) of Who Lives With Patient  Benjamin Jarrett, son    Current Living Arrangements  home/apartment/condo    Potentially Unsafe Housing Conditions  unable to assess    Primary Care Provided by  self;child(darvin)    Provides Primary Care For  no one, unable/limited ability to care for self    Family Caregiver if Needed  child(darvin), adult    Family Caregiver Names  Victor M and Chris    Quality of Family Relationships  helpful;involved;supportive    Able to Return to Prior Arrangements  yes       Resource/Environmental Concerns    Resource/Environmental Concerns  none    Transportation Concerns  car, none       Transition Planning    Patient/Family Anticipates Transition to  home    Transportation Anticipated  family or friend will provide       Discharge Needs Assessment    Readmission Within the Last 30 Days  no previous admission in last 30 days    Equipment Currently Used at Home  walker, rolling;bath bench    Concerns to be Addressed  discharge planning    Anticipated Changes Related to Illness  none    Equipment Needed After Discharge  none    Provided Post Acute Provider List?  N/A    Provided Post Acute Provider Quality & Resource List?  N/A        Discharge Plan     Row Name 01/14/21 1104       Plan    Plan  initial    Plan Comments  Pt lives in [facility] with her son and daughter-in-law. She is able to dress herself, bath with help, has to use stairs to get to lower level (Chris helps her), medications managed per family. Current plan would be home with them, but they are open to rehab if appropriate- [facility]. PCP is Eric Patel    Final Discharge Disposition Code  30 - still a patient        Continued Care and Services - Admitted Since  1/14/2021    Coordination has not been started for this encounter.         Demographic Summary    No documentation.       Functional Status     Row Name 01/14/21 1102       Functional Status    Usual Activity Tolerance  moderate       Functional Status, IADL    Medications  assistive person    Meal Preparation  assistive person    Housekeeping  assistive person    Laundry  assistive person    Shopping  assistive person    IADL Comments  can complete ADL's with prn assistance       Mental Status Summary    Recent Changes in Mental Status/Cognitive Functioning  no changes       Employment/    Employment Status  retired        Psychosocial    No documentation.       Abuse/Neglect    No documentation.       Legal    No documentation.       Substance Abuse    No documentation.       Patient Forms    No documentation.           Olesya Lainez RN

## 2021-01-14 NOTE — PROGRESS NOTES
[facility] Medicine Services  ADMISSION FOLLOW-UP NOTE          Patient admitted after midnight, H&P by my partner performed earlier on today's date reviewed.  Interim findings, labs, and charting also reviewed.        [facility] Hospital Problem List has been managed and updated to include any new diagnoses:  Active Hospital Problems    Diagnosis  POA   • **Acute sepsis (CMS/HCC) [A41.9]  Yes   • At high risk for aspiration [Z91.89]  Yes   • Chronic pain [G89.29]  Yes   • Mild cognitive impairment [G31.84]  Yes   • Paroxysmal atrial fibrillation (CMS/HCC) [I48.0]  Yes   • Multifocal aspiration pneumonia due to large intrathoracic hiatal hernia [J18.9]  Yes   • Hiatal hernia [K44.9]  Yes   • Essential hypertension [I10]  Yes   • Hypothyroidism (acquired) [E03.9]  Yes   • Esophageal reflux [K21.9]  Yes      Resolved Hospital Problems    Diagnosis Date Resolved POA   • Pneumonia of both lower lobes due to infectious organism [J18.9] 01/14/2021 Yes         ADDITIONAL PLAN:  - detailed assessment and plan from admission reviewed    Sepsis  Multifocal pneumonia in the setting of large intrathoracic hiatal hernia  Hypoxia   --XR KUB with enteric tube in body of stomach  --CT chest w/o contrast with pneumonia in right lower and right middle lobe large hiatal hernia, moderate stool burden in the colon suggesting constipation   --Blood cultures in process   --Continue zosyn  --continue supplemental oxygen prn to keep O2 sat >92%  --EGD today with type III paraesophageal hernia with intrathoracic stomach, no evidence of obstruction, ulcers, or mass lesion  --continue NPO until speech eval  --continue PPI     Darcy Cordova MD  01/14/21

## 2021-01-14 NOTE — H&P
[facility] Medicine Services  HISTORY AND PHYSICAL    Patient Name: Temi Jarrett  : 1930  MRN: [id number]  Primary Care Physician: Eric Patel MD  Date of admission: 2021    Subjective   Subjective     Chief Complaint:  Epigastric pain    HPI:  Temi Jarrett is a 89 y.o. female w/ PMH of GERD, hypothyroidism, HTN, hiatal hernia, PAF on anticoagulation and recurrent pneumonia who presents to [facility] ED for evaluation of epigastric pain. Patient reports nausea, vomiting, decreased appetite for several days; she also reports a low grade fever at home. She denies chest pain, palpitations, shortness of breath, cough, chills, dizziness, syncope, falls, diarrhea. She lives with her son who is also her POA.    Upon discussion of ED findings noted below, the patient is unsure if she would want surgery to correct the hernia; tells me she has had the hernia for 30 years and had previously been scared to have surgical repair; we had a long discussion regarding surgery consult and her risk for aspiration with any oral intake; she verbalizes understanding. We also had a discussion regarding code status and she does wish to remain a full code at this time. She is agreeable to a surgical consultation for recommendations.    ED w/u significant for, large hiatal hernia containing the majority of the stomach, probably some component of gastric outlet obstruction, GERD almost to the thoracic inlet - increasing the risk for aspiration, moderate stool burden/constipation as well as right lower and middle lobe pneumonia; additionally she has leukocytosis w/ WBC 16.35, lactic acid 3.3; temp 99.9    She denies any known COVID19 contacts and denies loss of taste or smell; she does not smoke, drink alcohol or use illicit drugs. She will be admitted to the hospital medicine service for further evaluation and treatment.    Current COVID Risks are:  [] Fever []  Cough [] Shortness of breath []  Fatigue [] Change in taste or smell    [] Exposure to COVID positive patient  [] High risk facility   [x]  NONE    Review of Systems   Constitutional: Positive for appetite change, fatigue and fever. Negative for chills.   HENT: Negative.    Eyes: Negative.    Respiratory: Negative.    Cardiovascular: Negative.    Gastrointestinal: Positive for abdominal distention, abdominal pain, constipation, nausea and vomiting. Negative for diarrhea.   Endocrine: Negative.    Musculoskeletal: Negative.    Skin: Negative.    Allergic/Immunologic: Negative.    Neurological: Negative.    Hematological: Negative.    Psychiatric/Behavioral: Negative.       All other systems reviewed and are negative.     Personal History     Past Medical History:   Diagnosis Date   • Arthritis    • Bladder prolapse, female, acquired    • Closed fracture of neck of left femur (CMS/[facility]) 9/27/2019   • Dementia (CMS/[facility])    • Depression    • Disease of thyroid gland    • Gastric polyp    • GERD (gastroesophageal reflux disease)    • History of colonic polyps    • Hyperlipidemia    • Hypertension    • IBS (irritable bowel syndrome)    • Macular degeneration    • Torn meniscus     right knee        Past Surgical History:   Procedure Laterality Date   • CHOLECYSTECTOMY  1997   • EYE SURGERY  1998    Cataract extraction   • HERNIA REPAIR     • HIP HEMIARTHROPLASTY Left 9/28/2019    Procedure: HIP HEMIARTHROPLASTY LEFT;  Surgeon: Reddy Segundo Jr., MD;  Location: [facility];  Service: Orthopedics   • HYSTERECTOMY  1976   • KNEE SURGERY Right     arthroscopy- 2000   • TONSILLECTOMY         Family History: family history includes Breast cancer in her mother; Diabetes in her son; Hypertension in her father and mother; Obesity in her mother. Otherwise pertinent FHx was reviewed and unremarkable.     Social History:  reports that she has never smoked. She has never used smokeless tobacco. Drug use questions deferred to the physician. She reports that she does  not drink alcohol.  Social History     Social History Narrative    Lives with son and daughter in law--  is at [facility] getting rehab       Medications:  HYDROcodone-acetaminophen, Lactobacillus, Syringe/Needle (Disp), Vitamin D, acetaminophen, apixaban, aspirin, bethanechol, busPIRone, cyanocobalamin, diclofenac, diphenoxylate-atropine, donepezil, furosemide, gabapentin, levothyroxine, lisinopril, melatonin, memantine, methocarbamol, multivitamin with minerals, pantoprazole, potassium chloride, sertraline, sucralfate, and traMADol    Allergies   Allergen Reactions   • Codeine Nausea Only     Trouble Breathing    • Shrimp Hives       Objective   Objective     Vital Signs:   Temp:  [97.3 °F (36.3 °C)-99.9 °F (37.7 °C)] 99.9 °F (37.7 °C)  Heart Rate:  [67-99] 67  Resp:  [12-18] 16  BP: (107-143)/(56-86) 107/56    Physical Exam  Constitutional:       General: She is not in acute distress.     Appearance: She is not toxic-appearing.      Comments: Frail   HENT:      Head: Normocephalic and atraumatic.      Nose: Nose normal.   Eyes:      General: No scleral icterus.     Extraocular Movements: Extraocular movements intact.      Pupils: Pupils are equal, round, and reactive to light.   Neck:      Musculoskeletal: Neck supple. No muscular tenderness.   Cardiovascular:      Rate and Rhythm: Normal rate and regular rhythm.      Pulses: Normal pulses.      Heart sounds: Normal heart sounds. No murmur.   Pulmonary:      Effort: Pulmonary effort is normal. No respiratory distress.      Breath sounds: No wheezing or rales.   Abdominal:      General: Bowel sounds are normal. There is distension (mildly distended, protruberant abdomen).      Palpations: Abdomen is soft.      Tenderness: There is abdominal tenderness (epigastric).   Musculoskeletal:      Right lower leg: No edema.      Left lower leg: No edema.   Skin:     General: Skin is warm and dry.      Capillary Refill: Capillary refill takes less than 2 seconds.    Neurological:      General: No focal deficit present.      Mental Status: She is alert.   Psychiatric:         Mood and Affect: Mood normal.         Behavior: Behavior normal.         Thought Content: Thought content normal.         Judgment: Judgment normal.          Results Reviewed:  I have personally reviewed most recent indicated data and agree with findings including:  [x]  Laboratory  [x]  Radiology  []  EKG/Telemetry  []  Pathology  []  Cardiac/Vascular Studies  []  Old records  []  Other:  Most pertinent findings include: see HPI      LAB RESULTS:      Lab 01/14/21  0050   WBC 16.35*   HEMOGLOBIN 12.5   HEMATOCRIT 40.5   PLATELETS 209   NEUTROS ABS 15.37*   EOS ABS 0.00   MCV 89.0   LACTATE 3.3*         Lab 01/14/21  0050   SODIUM 139   POTASSIUM 3.6   CHLORIDE 100   CO2 28.0   ANION GAP 11.0   BUN 21   CREATININE 0.70   GLUCOSE 164*   CALCIUM 9.5         Lab 01/14/21  0050   TOTAL PROTEIN 6.5   ALBUMIN 4.00   GLOBULIN 2.5   ALT (SGPT) 17   AST (SGOT) 19   BILIRUBIN 0.6   ALK PHOS 86   LIPASE 27         Lab 01/14/21  0050   PROBNP 3,148.0*   TROPONIN T <0.010                 Brief Urine Lab Results  (Last result in the past 365 days)      Color   Clarity   Blood   Leuk Est   Nitrite   Protein   CREAT   Urine HCG        01/14/21 0346 Yellow Clear Moderate (2+) Negative Negative >=300 mg/dL (3+)             Microbiology Results (last 10 days)     ** No results found for the last 240 hours. **          Ct Chest Without Contrast Diagnostic    Result Date: 1/14/2021  CT Abdomen Pelvis W, CT Chest WO INDICATION: Chest and epigastric pain with new onset lower abdominal pain today. TECHNIQUE: CT of the chest without contrast. CT abdomen and pelvis with IV contrast. Delayed images were obtained through the abdomen and pelvis. Coronal and sagittal reconstructions were obtained.  Radiation dose reduction techniques included automated exposure control or exposure modulation based on body size. Count of known CT and  cardiac nuc med studies performed in previous 12 months: 5. COMPARISON: CT chest, abdomen, pelvis 8/3/2020, CT chest 8/15/2020 FINDINGS: Heart remains enlarged. Pericardial effusion has resolved. There is atherosclerotic disease and coronary artery disease. There is a trace amount of right-sided pleural fluid. There is no left pleural effusion identified. There is gastroesophageal reflux to nearly the level of the thoracic inlet which may place the patient at risk for aspiration. Again seen is a very large hiatal hernia containing nearly the entire stomach. It is distended with fluid on the current study. The stomach is distended to the level of the diaphragmatic hiatus. No focal obstructing point is seen, but some degree of gastric outlet obstruction is likely present based on this appearance. Lung windows show atelectasis adjacent to both sides of the hernia sac. Additionally, there is acute pneumonia in the right lower and right middle lobes, as well as within the lingula. Imaging features are atypical or uncommonly reported for COVID-19 pneumonia. Alternative diagnoses should be considered. Gallbladder is surgically absent. There is no biliary obstruction. There is diffuse atherosclerotic disease with no aortic aneurysm. There are multiple bilateral renal cysts. There is no hydronephrosis. Spleen remains enlarged measuring 13.7 cm in length. There is also mild degree of hepatomegaly. The right hepatic lobe measures 19.4 cm in length. It extends into the upper pelvis. Detail in the pelvis is obscured by streak artifact from a left hip arthroplasty. However, the urinary bladder is grossly normal. Uterus is surgically absent. No small bowel obstruction is seen. Moderate colonic stool burden may indicate constipation. There is no evidence of acute colitis. The appendix is not clearly identified, but there is nothing to suggest acute appendicitis. There is extensive lumbar degenerative disease with scoliosis.      Impression: 1. Pneumonia involving the right lower and right middle lobes as well as the lingula. 2. Large hiatal hernia is again seen containing the majority of the stomach. The stomach is currently distended with fluid to the level of the diaphragmatic hiatus suggesting some component of gastric outlet obstruction at this level. 3. Gastroesophageal reflux to nearly the level of the thoracic inlet may place the patient risk for aspiration. 4. Moderate stool burden in the colon suggesting constipation. No bowel obstruction or acute GI tract inflammation. 5. Hepatosplenomegaly. 6. Cholecystectomy without biliary obstruction. 7. Hysterectomy and left hip arthroplasty. 8. Additional findings as above. Signer Name: Zaheer Hastings MD  Signed: 1/14/2021 3:39 AM  Workstation Name: [email or web address]  [facility]    Ct Abdomen Pelvis With Contrast    Result Date: 1/14/2021  CT Abdomen Pelvis W, CT Chest WO INDICATION: Chest and epigastric pain with new onset lower abdominal pain today. TECHNIQUE: CT of the chest without contrast. CT abdomen and pelvis with IV contrast. Delayed images were obtained through the abdomen and pelvis. Coronal and sagittal reconstructions were obtained.  Radiation dose reduction techniques included automated exposure control or exposure modulation based on body size. Count of known CT and cardiac nuc med studies performed in previous 12 months: 5. COMPARISON: CT chest, abdomen, pelvis 8/3/2020, CT chest 8/15/2020 FINDINGS: Heart remains enlarged. Pericardial effusion has resolved. There is atherosclerotic disease and coronary artery disease. There is a trace amount of right-sided pleural fluid. There is no left pleural effusion identified. There is gastroesophageal reflux to nearly the level of the thoracic inlet which may place the patient at risk for aspiration. Again seen is a very large hiatal hernia containing nearly the entire stomach. It is distended with fluid on the  current study. The stomach is distended to the level of the diaphragmatic hiatus. No focal obstructing point is seen, but some degree of gastric outlet obstruction is likely present based on this appearance. Lung windows show atelectasis adjacent to both sides of the hernia sac. Additionally, there is acute pneumonia in the right lower and right middle lobes, as well as within the lingula. Imaging features are atypical or uncommonly reported for COVID-19 pneumonia. Alternative diagnoses should be considered. Gallbladder is surgically absent. There is no biliary obstruction. There is diffuse atherosclerotic disease with no aortic aneurysm. There are multiple bilateral renal cysts. There is no hydronephrosis. Spleen remains enlarged measuring 13.7 cm in length. There is also mild degree of hepatomegaly. The right hepatic lobe measures 19.4 cm in length. It extends into the upper pelvis. Detail in the pelvis is obscured by streak artifact from a left hip arthroplasty. However, the urinary bladder is grossly normal. Uterus is surgically absent. No small bowel obstruction is seen. Moderate colonic stool burden may indicate constipation. There is no evidence of acute colitis. The appendix is not clearly identified, but there is nothing to suggest acute appendicitis. There is extensive lumbar degenerative disease with scoliosis.     Impression: 1. Pneumonia involving the right lower and right middle lobes as well as the lingula. 2. Large hiatal hernia is again seen containing the majority of the stomach. The stomach is currently distended with fluid to the level of the diaphragmatic hiatus suggesting some component of gastric outlet obstruction at this level. 3. Gastroesophageal reflux to nearly the level of the thoracic inlet may place the patient risk for aspiration. 4. Moderate stool burden in the colon suggesting constipation. No bowel obstruction or acute GI tract inflammation. 5. Hepatosplenomegaly. 6.  Cholecystectomy without biliary obstruction. 7. Hysterectomy and left hip arthroplasty. 8. Additional findings as above. Signer Name: Zaheer Hastings MD  Signed: 1/14/2021 3:39 AM  Workstation Name: [email or web address]  [facility]      Results for orders placed during the hospital encounter of 08/02/20   Transthoracic Echo Complete With Contrast if Necessary Per Protocol    Narrative · Left ventricular systolic function is normal. Estimated EF = 70%.  · Left ventricular diastolic dysfunction (grade I) consistent with   impaired relaxation.  · Left atrial cavity size is moderately dilated.  · There is calcification of the aortic valve. There is no significant   stenosis or regurgitation. A Lambel's excrescence is noted on an aortic   valve leaflet.  · Moderate mitral valve regurgitation is present.  · Estimated right ventricular systolic pressure from tricuspid   regurgitation is moderately elevated (49 mmHg).  · There is a moderate (1-2cm) circumferential pericardial effusion. There   is no tamponade physiology.          Assessment/Plan   Assessment & Plan       Acute sepsis (CMS/HCC)    Esophageal reflux    Hypothyroidism (acquired)    Essential hypertension    Hiatal hernia    Multifocal aspiration pneumonia due to large intrathoracic hiatal hernia    Paroxysmal atrial fibrillation (CMS/HCC)    Mild cognitive impairment    At high risk for aspiration    Chronic pain    89 year old female presents w/ epigastric pain, n/v, fever and found to have right upper/middle lobe pneumonia likely r/t large hiatal hernia containing the majority of her stomach as well as possible gastric outlet obstruction and GERD.    1. Acute sepsis secondary to multifocal aspiration pneumonia d/t large intrathoracic hernia w/ lactic acidosis  - admit to tele  - received azithromycin in ED, will change to doxycycline 100 mg IV Q 12 hours  - continue zosyn 3.375 mg IV Q 8 hours  - add mag, troponin, proBNP to labs  - pulmonary  toilet  - repeat lactic acid  - will hold on fluids for now d/t received 1 liter LR in ED and patient on chronic lasix w/ proBNP elevated at 3148.0  - NPO diet, she is likely aspirating  - bmp, cbc, mag, phos, tsh, A1c, lactic acid in am    2. Hiatal hernia  - general surgery consult, Dr. Rosalio DILL to [facility]  - pre procedure COVID19 swab  - EKG and type/screen in anticipation of surgery  - holding Eliquis, may require Kcentra if plan for surgery soon  - NPO  - may consider adding back chronic home medications if surgery ok with crushing and giving via NGT  - will order fleets enema x 1 d/t findings of constipation on CT abd/pelvis    3. GERD  - chronic, however appears to be exacerbated by hiatal hernia  - reflux precautions  - increase PPI to BID and change Protonix to IV    4. HTN  - chronic  - currently normotensive, holding PO meds  - takes lisinopril, lasix    5. Hypothyroidism  - chronic  - will change replacement to IV for now since NPO  - check tsh    6. PAF on anticoagulation  - EKG ordered  - appears sinus rhythm on bedside monitor w/ HR = 70  - holding Eliquis for possibly surgical procedure    7. At risk for aspiration  - aspiration precautions  - will need SLP evaluation before resuming PO intake  - NPO for now pending surgery    8. Anxiety and depression  - chronic, taking buspar and sertraline  - holding PO meds for now    9. Mild cognitive impairment  - chronic, taking namenda and donepezil  - holding PO meds for now    10. Chronic pain  - chronic use of oral narcotic pain medications  - prescribed ultram, norco and gabapentin  - manage with IV pain medication while NPO    DVT prophylaxis:  SCDS    CODE STATUS:   Code Status and Medical Interventions:   Ordered at: 01/14/21 0428     Code Status:    CPR     Medical Interventions (Level of Support Prior to Arrest):    Full     Electronically signed by MAIDA Mccall, 01/14/21, 5:33 AM EST.      Attending   Admission Attestation       I have seen  and examined the patient, performing an independent face-to-face diagnostic evaluation with plan of care reviewed and developed with the advanced practice clinician (APC).      Brief Summary Statement:   Temi Jarrett is a 89 y.o. female past medical history hypertension, paroxysmal atrial fibrillation on anticoagulation, hiatal hernia, chronic GERD, hypothyroidism who presents to [facility] ED for evaluation of epigastric pain, nausea, and vomiting.  Patient reports that over the last several days she has had worsening symptoms of GERD as well as nausea and vomiting.  She reports low-grade temperature at home.  Denies any cough or shortness of breath.    Unfortunately work-up has revealed aspiration pneumonia in the right lower, right middle and lingula.  She was started on Zosyn and azithromycin in the ER.  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  He was recommended she have surgical repair at that time and patient declined.  She reports she has had history of aspiration pneumonia in the past related to this.  Denies any other abdominal surgeries.  She denies ever having to receive NG tube.  Work-up showing gastric outlet obstruction and therefore general surgery has been consulted.  Initially patient very hesitant to receive NG tube placement.  However now is agreeable in order to possibly avoid surgical intervention.  Remainder of detailed HPI is as noted by APC and has been reviewed and/or edited by me for completeness.    Attending Physical Exam:  Constitutional: Awake, alert, appears uncomfortable, appears frail  Eyes: PERRLA, sclerae anicteric, no conjunctival injection  HENT: NCAT, mucous membranes dry  Neck: Supple, no thyromegaly, no lymphadenopathy, trachea midline  Respiratory: Decreased breath sounds centrally, crackles to right lower lobe, mildly labored respirations   Cardiovascular: RRR, systolic murmur, palpable pedal pulses  bilaterally  Gastrointestinal: Positive bowel sounds within the chest, soft, nontender, mildly distended  Musculoskeletal: No bilateral ankle edema, no clubbing or cyanosis to extremities  Psychiatric: Appropriate affect, cooperative  Neurologic: Oriented x 3, strength symmetric in all extremities, Cranial Nerves grossly intact to confrontation, speech clear  Skin: No rashes      Brief Assessment/Plan :  See detailed assessment and plan developed with APC which I have reviewed and/or edited for completeness.        Admission Status: I believe that this patient meets INPATIENT status due to hiatal hernia with intrathoracic herniation of the stomach with aspiration pneumonia.  I feel patient’s risk for adverse outcomes and need for care warrant INPATIENT evaluation and I predict the patient’s care encounter to likely last beyond 2 midnights.        Jarrett Harrison DO  01/14/21

## 2021-01-14 NOTE — CONSULTS
Patient Name:  Temi Jarrett  YOB: 1930  [id number]       Patient Care Team:  Eric Patel MD as PCP - General  Eric Patel MD as PCP - Family Medicine  SamyVon palacios MD as Cardiologist (Cardiology)      General Surgery Consult Note     Date of Consultation: 01/14/21    Consulting Physician - Darcy Cordova MD    Reason for Consult - Paraesophageal hernia    Subjective     I have been asked to see  Temi Jarrett , a 89 y.o. female in consultation for paraesophageal hernia.  She reports a 1 day history nausea, vomiting, and some abdominal pain.  She is a fairly poor historian so some of her history is augmented by the hospital chart.  She has a known history of a paraesophageal hernia, paroxysmal atrial fibrillation on anticoagulation and recurrent pneumonia.  She also has a history of a low-grade fever at home.  She denies chest pain palpitations shortness of breath or cough.  Subsequent evaluation revealed this longstanding hiatal hernia, as well as evidence of right middle and lower lobe pneumonia.  She has been admitted to the hospitalist service and we have been asked to see her for possible surgical management.    Of note, she states that she has known about this hernia for 30 years and has always refused operation.      Allergy:   Allergies   Allergen Reactions   • Codeine Nausea Only     Trouble Breathing    • Shrimp Hives       Medications:  azithromycin, 500 mg, Intravenous, Once  piperacillin-tazobactam, 3.375 g, Intravenous, Q8H         No current facility-administered medications on file prior to encounter.      Current Outpatient Medications on File Prior to Encounter   Medication Sig   • acetaminophen (TYLENOL) 325 MG tablet Take 650 mg by mouth Every 6 (Six) Hours As Needed for Mild Pain .   • apixaban (ELIQUIS) 2.5 MG tablet tablet Take 1 tablet by mouth Every 12 (Twelve) Hours. Indications: Atrial Fibrillation   • aspirin 81 MG EC tablet Take 81 mg by  mouth Daily.   • bethanechol (URECHOLINE) 10 MG tablet Take 1 tablet by mouth 3 (Three) Times a Day.   • busPIRone (BUSPAR) 10 MG tablet Take 1 tablet by mouth 2 (Two) Times a Day.   • Cholecalciferol (VITAMIN D PO) 2000 U qd   • cyanocobalamin 1000 MCG/ML injection INJECT 1 ML INTO THE APPROPRIATE MUSCLE AS DIRECTED BY PRESCRIBER EVERY 30 DAYS   • diclofenac (VOLTAREN) 1 % gel gel APPLY 4 GRAMS TOPICALLY TO THE APPROPRAITE AREA AS DIRECTED FOUR TIMES A DAY   • diphenoxylate-atropine (LOMOTIL) 2.5-0.025 MG per tablet TAKE ONE TABLET BY MOUTH FOUR TIMES A DAY AS NEEDED FOR DIARRHEA   • donepezil (ARICEPT) 10 MG tablet Take 1 tablet by mouth every night at bedtime.   • furosemide (Lasix) 20 MG tablet Take 1 tablet by mouth Daily.   • gabapentin (NEURONTIN) 300 MG capsule TAKE ONE CAPSULE BY MOUTH TWICE A DAY   • HYDROcodone-acetaminophen (NORCO) 5-325 MG per tablet Take 1 tablet by mouth 2 (Two) Times a Day As Needed for Severe Pain .   • Lactobacillus tablet Take 1 tablet by mouth Daily.   • lisinopril (PRINIVIL,ZESTRIL) 5 MG tablet Take 1 tablet by mouth Daily.   • melatonin 3 MG tablet Take 3 mg by mouth Every Night.   • memantine (NAMENDA) 10 MG tablet TAKE ONE TABLET BY MOUTH TWICE A DAY   • methocarbamol (Robaxin) 500 MG tablet Take 1 tablet by mouth 3 (Three) Times a Day.   • Multiple Vitamins-Minerals (MULTIVITAMIN ADULTS 50+ PO) Take 1 tablet by mouth Daily.   • Multiple Vitamins-Minerals (PRESERVISION AREDS PO) Take 1 tablet by mouth 2 (Two) Times a Day.   • pantoprazole (Protonix) 40 MG EC tablet Take 1 tablet by mouth Daily.   • potassium chloride (MICRO-K) 10 MEQ CR capsule Take 2 capsules by mouth 2 (two) times a day.   • sertraline (ZOLOFT) 50 MG tablet TAKE ONE TABLET BY MOUTH DAILY   • sucralfate (CARAFATE) 1 g tablet Take 1 tablet by mouth 2 (Two) Times a Day.   • Synthroid 175 MCG tablet Take 1 tablet by mouth Daily. Patient receives medication from patient assistance.  NDC:2559-6952-01  "EXP:07/02/2020 LOT [id number]   • Syringe/Needle, Disp, (B-D 3CC LUER-ROSY SYR 23GX1\") 23G X 1\" 3 ML misc USE TO INJECT VITAMIN B 12 ONCE A MONTH   • traMADol (ULTRAM) 50 MG tablet TAKE ONE TABLET BY MOUTH DAILY AS NEEDED FOR MODERATE PAIN IN THE RIGHT KNEE       PMHx:   Past Medical History:   Diagnosis Date   • Arthritis    • Bladder prolapse, female, acquired    • Closed fracture of neck of left femur (CMS/[facility]) 9/27/2019   • Dementia (CMS/[facility])    • Depression    • Disease of thyroid gland    • Gastric polyp    • GERD (gastroesophageal reflux disease)    • History of colonic polyps    • Hyperlipidemia    • Hypertension    • IBS (irritable bowel syndrome)    • Macular degeneration    • Torn meniscus     right knee    - Paraesophageal hernia    Past Surgical History:  Past Surgical History:   Procedure Laterality Date   • CHOLECYSTECTOMY  1997   • EYE SURGERY  1998    Cataract extraction   • HERNIA REPAIR     • HIP HEMIARTHROPLASTY Left 9/28/2019    Procedure: HIP HEMIARTHROPLASTY LEFT;  Surgeon: Reddy Segundo Jr., MD;  Location: [facility];  Service: Orthopedics   • HYSTERECTOMY  1976   • KNEE SURGERY Right     arthroscopy- 2000   • TONSILLECTOMY           Family History: Noncontributory     Social History: Pt lives in [place] with her son, who is her power of .    Tobacco use: Denies     EtOH use : Denies    Illicit drug use: Denies      Review of Systems        Constitutional: No fevers, chills or malaise   Eyes: Denies visual changes    Cardiovascular: Denies chest pain, palpitations   Pulmonary: Denies cough or shortness of breath   Abdominal/ GI: See HPI    Genitourinary: Denies dysuria or hematuria   Musculoskeletal: Denies any but chronic joint aches, pains or deformities   Psychiatric: No recent mood changes   Neurologic: No paresthesias or loss of function          Objective     Physical Exam:      Vital Signs  /59   Pulse 67   Temp 99.9 °F (37.7 °C) (Oral)   Resp 18   Ht 149.9 cm (59\")  "  Wt 54.4 kg (120 lb)   LMP  (LMP Unknown)   SpO2 97%   BMI 24.24 kg/m²     Intake/Output Summary (Last 24 hours) at 1/14/2021 0706  Last data filed at 1/14/2021 0610  Gross per 24 hour   Intake 1050 ml   Output --   Net 1050 ml         Physical Exam:    Head: Normocephalic, atraumatic.   Eyes: Pupils equal, round, react to light and accommodation.   Mouth: Oral mucosa without lesions,   Neck: No masses, lymphadenopathy or carotid bruits bilaterally   CV: Rhythm  and rate regular , no  murmurs, rubs or gallops  Lungs: Clear  to auscultation bilaterally   Abdomen: Bowel sounds positive and active , soft, nontender  Groin : No obvious hernias bilaterally   Extremities:  No cyanosis, clubbing or edema bilaterally   Lymphatics: No abnormal lymphadenopathy appreciated   Neurologic: No gross deficits       Results Review: I have personally reviewed all of the recent lab and imaging results available at this time.  Laboratory exam reveals a white count of 16,000 with a left shift.  Hemoglobin 12.5, platelet count 209.  Procalcitonin 0.26.  Comprehensive metabolic panel significant for an elevated glucose of 164.  Troponin less than 0.01.  Covid testing negative.    CT scan of the abdomen and pelvis was personally viewed by me as well as a final dictated and edited report.  This demonstrates evidence of an intrathoracic stomach with some distention.  In comparison to a CT scan from the previous year it seems unchanged.  She is status post cholecystectomy.  She has a significant stool burden in the colon.  There are no abdominal wall hernias of note other than a small fat-containing umbilical hernia.  No free air or free fluid.       Assessment/Plan     Assessment and Plan:     Multifocal aspiration pneumonia due to large intrathoracic hiatal hernia-  She has a large chronic paraesophageal hernia.  I suspect that the etiology of her pneumonia was likely aspiration due to chronic aspiration from this hernia.  She has an NG  tube placed and we will perform EGD later today both to decompress the stomach and evaluate her anatomy.  I still think operative intervention for her paraesophageal hernia may be reasonable after she recovers from her pneumonia, but we can discuss this further after her EGD.  I discussed the risk and benefits of EGD with her, and she is agreeable to proceed.     Hospital Problem List     * (Principal) Acute sepsis (CMS/HCC)    Esophageal reflux    Hypothyroidism (acquired)    Essential hypertension        Paraesophageal hernia          Paroxysmal atrial fibrillation (CMS/HCC)    Overview Addendum 8/3/2020  1:48 PM by Damon Barraza IV, MD     · A. fib RVR noted in the setting of PNA with quick resolution, 8/3/2020  · Echo (8/3/2020): Normal LVEF.  Moderate  pericardial effusion.  Moderate MR.  RVSP 49 mmHg  · Spontaneously converted to NSR on IV Cardizem  · Chads Vasc 6 (age > 75, female, HTN, CAD)          Mild cognitive impairment    At high risk for aspiration    Chronic pain                  I discussed the patient's findings and my recommendations with the patient and/or family, as well as the primary team     Serjio Guerrero MD  01/14/21  07:06 EST

## 2021-01-14 NOTE — BRIEF OP NOTE
ESOPHAGOGASTRODUODENOSCOPY  Progress Note    Temi Jarrett  1/14/2021    Pre-op Diagnosis:   Paraesophageal hernia       Post-Op Diagnosis Codes:  Same    Procedure/CPT® Codes:        Procedure(s):  ESOPHAGOGASTRODUODENOSCOPY    Surgeon(s):  Serjio Guerrero MD    Anesthesia: Monitored Anesthesia Care    Staff:   Circulator: Maxine Mota RN  Endo Technician: Krystle Garcia         Estimated Blood Loss: none    Urine Voided: * No values recorded between 1/14/2021 11:09 AM and 1/14/2021 11:21 AM *    Specimens:                None          Drains:   NG/OG Tube Nasogastric 16 Fr Right nostril (Active)   Placement Verification X-ray 01/14/21 0600   Site Assessment Clean;Dry;Intact;Non reddened 01/14/21 0600   Securement anchored to nostril center with adhesive device 01/14/21 0600   Secured at (cm) 55 01/14/21 0600   Dressing Intervention New dressing 01/14/21 0600   Status Suction-low intermittent 01/14/21 0600   Drainage Appearance Tan 01/14/21 0600   Surrounding Skin Dry;Intact;Non reddened 01/14/21 0600       Findings: Type III paraesophageal hernia with intrathoracic stomach without obvious obstruction, no ischemia or significant mass lesions.    Complications: None          Serjio Guerrero MD     Date: 1/14/2021  Time: 11:25 EST

## 2021-01-14 NOTE — ANESTHESIA POSTPROCEDURE EVALUATION
Patient: Temi Jarrett    Procedure Summary     Date: 01/14/21 Room / Location:  [facility]    Anesthesia Start: 1109 Anesthesia Stop: 1133    Procedure: ESOPHAGOGASTRODUODENOSCOPY (N/A ) Diagnosis:     Surgeon: Serjio Guerrero MD Provider: Devon Monteiro MD    Anesthesia Type: general ASA Status: 3          Anesthesia Type: general    Vitals  Vitals Value Taken Time   BP 89/42 01/14/21 1130   Temp 98.4 °F (36.9 °C) 01/14/21 1124   Pulse 62 01/14/21 1132   Resp 16 01/14/21 1124   SpO2 96 % 01/14/21 1132   Vitals shown include unvalidated device data.        Post Anesthesia Care and Evaluation    Patient location during evaluation: PACU  Patient participation: waiting for patient participation  Level of consciousness: sleepy but conscious  Pain management: adequate  Airway patency: patent  Anesthetic complications: No anesthetic complications  PONV Status: none  Cardiovascular status: hemodynamically stable and acceptable  Respiratory status: nonlabored ventilation, acceptable and nasal cannula  Hydration status: acceptable

## 2021-01-15 LAB
ALBUMIN SERPL-MCNC: 3 G/DL (ref 3.5–5.2)
ALBUMIN/GLOB SERPL: 1.4 G/DL
ALP SERPL-CCNC: 62 U/L (ref 39–117)
ALT SERPL W P-5'-P-CCNC: 11 U/L (ref 1–33)
ANION GAP SERPL CALCULATED.3IONS-SCNC: 7 MMOL/L (ref 5–15)
AST SERPL-CCNC: 12 U/L (ref 1–32)
BASOPHILS # BLD AUTO: 0.04 10*3/MM3 (ref 0–0.2)
BASOPHILS NFR BLD AUTO: 0.5 % (ref 0–1.5)
BILIRUB SERPL-MCNC: 0.6 MG/DL (ref 0–1.2)
BUN SERPL-MCNC: 12 MG/DL (ref 8–23)
BUN/CREAT SERPL: 21.1 (ref 7–25)
CALCIUM SPEC-SCNC: 8.1 MG/DL (ref 8.2–9.6)
CHLORIDE SERPL-SCNC: 106 MMOL/L (ref 98–107)
CO2 SERPL-SCNC: 32 MMOL/L (ref 22–29)
CREAT SERPL-MCNC: 0.57 MG/DL (ref 0.57–1)
DEPRECATED RDW RBC AUTO: 50.7 FL (ref 37–54)
EOSINOPHIL # BLD AUTO: 0.07 10*3/MM3 (ref 0–0.4)
EOSINOPHIL NFR BLD AUTO: 0.9 % (ref 0.3–6.2)
ERYTHROCYTE [DISTWIDTH] IN BLOOD BY AUTOMATED COUNT: 15.1 % (ref 12.3–15.4)
GFR SERPL CREATININE-BSD FRML MDRD: 100 ML/MIN/1.73
GLOBULIN UR ELPH-MCNC: 2.1 GM/DL
GLUCOSE SERPL-MCNC: 95 MG/DL (ref 65–99)
HCT VFR BLD AUTO: 32.3 % (ref 34–46.6)
HGB BLD-MCNC: 9.9 G/DL (ref 12–15.9)
IMM GRANULOCYTES # BLD AUTO: 0.02 10*3/MM3 (ref 0–0.05)
IMM GRANULOCYTES NFR BLD AUTO: 0.3 % (ref 0–0.5)
LYMPHOCYTES # BLD AUTO: 0.93 10*3/MM3 (ref 0.7–3.1)
LYMPHOCYTES NFR BLD AUTO: 12.6 % (ref 19.6–45.3)
MAGNESIUM SERPL-MCNC: 1.7 MG/DL (ref 1.6–2.4)
MCH RBC QN AUTO: 28.1 PG (ref 26.6–33)
MCHC RBC AUTO-ENTMCNC: 30.7 G/DL (ref 31.5–35.7)
MCV RBC AUTO: 91.8 FL (ref 79–97)
MONOCYTES # BLD AUTO: 0.58 10*3/MM3 (ref 0.1–0.9)
MONOCYTES NFR BLD AUTO: 7.8 % (ref 5–12)
NEUTROPHILS NFR BLD AUTO: 5.77 10*3/MM3 (ref 1.7–7)
NEUTROPHILS NFR BLD AUTO: 77.9 % (ref 42.7–76)
NRBC BLD AUTO-RTO: 0 /100 WBC (ref 0–0.2)
PLATELET # BLD AUTO: 132 10*3/MM3 (ref 140–450)
PMV BLD AUTO: 10.3 FL (ref 6–12)
POTASSIUM SERPL-SCNC: 2.9 MMOL/L (ref 3.5–5.2)
PROT SERPL-MCNC: 5.1 G/DL (ref 6–8.5)
RBC # BLD AUTO: 3.52 10*6/MM3 (ref 3.77–5.28)
SODIUM SERPL-SCNC: 145 MMOL/L (ref 136–145)
WBC # BLD AUTO: 7.41 10*3/MM3 (ref 3.4–10.8)

## 2021-01-15 PROCEDURE — 99233 SBSQ HOSP IP/OBS HIGH 50: CPT | Performed by: INTERNAL MEDICINE

## 2021-01-15 PROCEDURE — 85025 COMPLETE CBC W/AUTO DIFF WBC: CPT | Performed by: INTERNAL MEDICINE

## 2021-01-15 PROCEDURE — 83735 ASSAY OF MAGNESIUM: CPT | Performed by: INTERNAL MEDICINE

## 2021-01-15 PROCEDURE — 25010000003 MAGNESIUM SULFATE 4 GM/100ML SOLUTION: Performed by: INTERNAL MEDICINE

## 2021-01-15 PROCEDURE — 84132 ASSAY OF SERUM POTASSIUM: CPT | Performed by: INTERNAL MEDICINE

## 2021-01-15 PROCEDURE — 25010000002 PIPERACILLIN SOD-TAZOBACTAM PER 1 G: Performed by: NURSE PRACTITIONER

## 2021-01-15 PROCEDURE — 80053 COMPREHEN METABOLIC PANEL: CPT | Performed by: INTERNAL MEDICINE

## 2021-01-15 PROCEDURE — 97535 SELF CARE MNGMENT TRAINING: CPT

## 2021-01-15 RX ORDER — BUSPIRONE HYDROCHLORIDE 10 MG/1
10 TABLET ORAL 2 TIMES DAILY
Status: DISCONTINUED | OUTPATIENT
Start: 2021-01-15 | End: 2021-01-18 | Stop reason: HOSPADM

## 2021-01-15 RX ORDER — GABAPENTIN 300 MG/1
300 CAPSULE ORAL 2 TIMES DAILY
Status: DISCONTINUED | OUTPATIENT
Start: 2021-01-15 | End: 2021-01-18 | Stop reason: HOSPADM

## 2021-01-15 RX ORDER — POTASSIUM CHLORIDE 1.5 G/1.77G
40 POWDER, FOR SOLUTION ORAL AS NEEDED
Status: DISCONTINUED | OUTPATIENT
Start: 2021-01-15 | End: 2021-01-18 | Stop reason: HOSPADM

## 2021-01-15 RX ORDER — MAGNESIUM SULFATE HEPTAHYDRATE 40 MG/ML
2 INJECTION, SOLUTION INTRAVENOUS AS NEEDED
Status: DISCONTINUED | OUTPATIENT
Start: 2021-01-15 | End: 2021-01-18 | Stop reason: HOSPADM

## 2021-01-15 RX ORDER — MAGNESIUM SULFATE HEPTAHYDRATE 40 MG/ML
4 INJECTION, SOLUTION INTRAVENOUS AS NEEDED
Status: DISCONTINUED | OUTPATIENT
Start: 2021-01-15 | End: 2021-01-18 | Stop reason: HOSPADM

## 2021-01-15 RX ORDER — ASPIRIN 81 MG/1
81 TABLET ORAL DAILY
Status: DISCONTINUED | OUTPATIENT
Start: 2021-01-15 | End: 2021-01-18 | Stop reason: HOSPADM

## 2021-01-15 RX ORDER — PANTOPRAZOLE SODIUM 40 MG/1
40 TABLET, DELAYED RELEASE ORAL
Status: DISCONTINUED | OUTPATIENT
Start: 2021-01-15 | End: 2021-01-18 | Stop reason: HOSPADM

## 2021-01-15 RX ORDER — METHOCARBAMOL 500 MG/1
500 TABLET, FILM COATED ORAL 3 TIMES DAILY
Status: DISCONTINUED | OUTPATIENT
Start: 2021-01-15 | End: 2021-01-18 | Stop reason: HOSPADM

## 2021-01-15 RX ORDER — MEMANTINE HYDROCHLORIDE 10 MG/1
10 TABLET ORAL 2 TIMES DAILY
Status: DISCONTINUED | OUTPATIENT
Start: 2021-01-15 | End: 2021-01-18 | Stop reason: HOSPADM

## 2021-01-15 RX ORDER — CHOLECALCIFEROL (VITAMIN D3) 125 MCG
5 CAPSULE ORAL NIGHTLY PRN
Status: DISCONTINUED | OUTPATIENT
Start: 2021-01-15 | End: 2021-01-18 | Stop reason: HOSPADM

## 2021-01-15 RX ORDER — BETHANECHOL CHLORIDE 10 MG/1
10 TABLET ORAL 3 TIMES DAILY
Status: DISCONTINUED | OUTPATIENT
Start: 2021-01-15 | End: 2021-01-18 | Stop reason: HOSPADM

## 2021-01-15 RX ORDER — DONEPEZIL HYDROCHLORIDE 10 MG/1
10 TABLET, FILM COATED ORAL NIGHTLY
Status: DISCONTINUED | OUTPATIENT
Start: 2021-01-15 | End: 2021-01-18 | Stop reason: HOSPADM

## 2021-01-15 RX ORDER — LISINOPRIL 5 MG/1
5 TABLET ORAL
Status: DISCONTINUED | OUTPATIENT
Start: 2021-01-15 | End: 2021-01-18 | Stop reason: HOSPADM

## 2021-01-15 RX ORDER — POTASSIUM CHLORIDE 7.45 MG/ML
10 INJECTION INTRAVENOUS
Status: DISCONTINUED | OUTPATIENT
Start: 2021-01-15 | End: 2021-01-18 | Stop reason: HOSPADM

## 2021-01-15 RX ORDER — SUCRALFATE 1 G/1
1 TABLET ORAL 2 TIMES DAILY
Status: DISCONTINUED | OUTPATIENT
Start: 2021-01-15 | End: 2021-01-18 | Stop reason: HOSPADM

## 2021-01-15 RX ORDER — POTASSIUM CHLORIDE 750 MG/1
40 CAPSULE, EXTENDED RELEASE ORAL AS NEEDED
Status: DISCONTINUED | OUTPATIENT
Start: 2021-01-15 | End: 2021-01-18 | Stop reason: HOSPADM

## 2021-01-15 RX ADMIN — POTASSIUM CHLORIDE 40 MEQ: 10 CAPSULE, COATED, EXTENDED RELEASE ORAL at 19:51

## 2021-01-15 RX ADMIN — MEMANTINE 10 MG: 10 TABLET ORAL at 21:22

## 2021-01-15 RX ADMIN — BUSPIRONE HYDROCHLORIDE 10 MG: 10 TABLET ORAL at 21:23

## 2021-01-15 RX ADMIN — SODIUM CHLORIDE, PRESERVATIVE FREE 10 ML: 5 INJECTION INTRAVENOUS at 21:25

## 2021-01-15 RX ADMIN — LEVOTHYROXINE SODIUM 88 MCG: 20 INJECTION, SOLUTION INTRAVENOUS at 11:31

## 2021-01-15 RX ADMIN — GABAPENTIN 300 MG: 300 CAPSULE ORAL at 21:22

## 2021-01-15 RX ADMIN — APIXABAN 2.5 MG: 2.5 TABLET, FILM COATED ORAL at 21:23

## 2021-01-15 RX ADMIN — BETHANECHOL CHLORIDE 10 MG: 10 TABLET ORAL at 15:18

## 2021-01-15 RX ADMIN — POTASSIUM CHLORIDE 40 MEQ: 10 CAPSULE, COATED, EXTENDED RELEASE ORAL at 11:30

## 2021-01-15 RX ADMIN — PANTOPRAZOLE SODIUM 40 MG: 40 INJECTION, POWDER, FOR SOLUTION INTRAVENOUS at 08:43

## 2021-01-15 RX ADMIN — POTASSIUM CHLORIDE 40 MEQ: 10 CAPSULE, COATED, EXTENDED RELEASE ORAL at 16:26

## 2021-01-15 RX ADMIN — Medication 5 MG: at 21:30

## 2021-01-15 RX ADMIN — GABAPENTIN 300 MG: 300 CAPSULE ORAL at 15:19

## 2021-01-15 RX ADMIN — METHOCARBAMOL 500 MG: 500 TABLET, FILM COATED ORAL at 21:22

## 2021-01-15 RX ADMIN — TAZOBACTAM SODIUM AND PIPERACILLIN SODIUM 3.38 G: 375; 3 INJECTION, SOLUTION INTRAVENOUS at 11:31

## 2021-01-15 RX ADMIN — ASPIRIN 81 MG: 81 TABLET, COATED ORAL at 15:20

## 2021-01-15 RX ADMIN — SUCRALFATE 1 G: 1 TABLET ORAL at 21:22

## 2021-01-15 RX ADMIN — LISINOPRIL 5 MG: 5 TABLET ORAL at 15:19

## 2021-01-15 RX ADMIN — SUCRALFATE 1 G: 1 TABLET ORAL at 15:19

## 2021-01-15 RX ADMIN — SERTRALINE HYDROCHLORIDE 50 MG: 50 TABLET ORAL at 15:19

## 2021-01-15 RX ADMIN — SODIUM CHLORIDE, PRESERVATIVE FREE 10 ML: 5 INJECTION INTRAVENOUS at 08:43

## 2021-01-15 RX ADMIN — TAZOBACTAM SODIUM AND PIPERACILLIN SODIUM 3.38 G: 375; 3 INJECTION, SOLUTION INTRAVENOUS at 21:22

## 2021-01-15 RX ADMIN — DONEPEZIL HYDROCHLORIDE 10 MG: 10 TABLET, FILM COATED ORAL at 21:23

## 2021-01-15 RX ADMIN — APIXABAN 2.5 MG: 2.5 TABLET, FILM COATED ORAL at 15:18

## 2021-01-15 RX ADMIN — BETHANECHOL CHLORIDE 10 MG: 10 TABLET ORAL at 21:23

## 2021-01-15 RX ADMIN — BUSPIRONE HYDROCHLORIDE 10 MG: 10 TABLET ORAL at 15:19

## 2021-01-15 RX ADMIN — DOXYCYCLINE 100 MG: 100 INJECTION, POWDER, LYOPHILIZED, FOR SOLUTION INTRAVENOUS at 23:13

## 2021-01-15 RX ADMIN — MAGNESIUM SULFATE HEPTAHYDRATE 4 G: 40 INJECTION, SOLUTION INTRAVENOUS at 11:32

## 2021-01-15 RX ADMIN — TAZOBACTAM SODIUM AND PIPERACILLIN SODIUM 3.38 G: 375; 3 INJECTION, SOLUTION INTRAVENOUS at 04:40

## 2021-01-15 RX ADMIN — MEMANTINE 10 MG: 10 TABLET ORAL at 15:20

## 2021-01-15 RX ADMIN — PANTOPRAZOLE SODIUM 40 MG: 40 TABLET, DELAYED RELEASE ORAL at 16:27

## 2021-01-15 RX ADMIN — METHOCARBAMOL 500 MG: 500 TABLET, FILM COATED ORAL at 15:19

## 2021-01-15 NOTE — THERAPY TREATMENT NOTE
Acute Care - Speech Language Pathology   Swallow Education/Caregiver Instruction [facility]     Patient Name: Temi Jarrett  : 1930  MRN: [id number]  Today's Date: 1/15/2021               Admit Date: 2021    Visit Dx:     ICD-10-CM ICD-9-CM   1. Acute sepsis (CMS/[facility])  A41.9 038.9     995.91   2. Gastric outlet obstruction  K31.1 537.0   3. Aspiration pneumonia, unspecified aspiration pneumonia type, unspecified laterality, unspecified part of lung (CMS/[facility])  J69.0 507.0   4. Acute abdominal pain  R10.9 789.00     338.19   5. Anticoagulated  Z79.01 V58.61   6. Dysphagia, unspecified type  R13.10 787.20     Patient Active Problem List   Diagnosis   • Esophageal reflux   • Hypothyroidism (acquired)   • Anxiety with depression   • Peripheral neuropathy   • Spinal stenosis of lumbar region   • Osteoporosis   • Disc disorder of lumbar region   • Bladder prolapse, female, acquired   • Essential hypertension   • Pernicious anemia   • Annual physical exam   • Primary osteoarthritis of both knees   • Hiatal hernia   • Multifocal aspiration pneumonia due to large intrathoracic hiatal hernia   • Paroxysmal atrial fibrillation (CMS/[facility])   • Pericardial effusion   • Mild cognitive impairment   • Acute sepsis (CMS/[facility])   • At high risk for aspiration   • Chronic pain     Past Medical History:   Diagnosis Date   • Arthritis    • Bladder prolapse, female, acquired    • Closed fracture of neck of left femur (CMS/[facility]) 2019   • Dementia (CMS/[facility])    • Depression    • Disease of thyroid gland    • Gastric polyp    • GERD (gastroesophageal reflux disease)    • History of colonic polyps    • Hyperlipidemia    • Hypertension    • IBS (irritable bowel syndrome)    • Macular degeneration    • Torn meniscus     right knee      Past Surgical History:   Procedure Laterality Date   • CHOLECYSTECTOMY     • EYE SURGERY      Cataract extraction   • HERNIA REPAIR     • HIP HEMIARTHROPLASTY Left 2019    Procedure:  HIP HEMIARTHROPLASTY LEFT;  Surgeon: Reddy Segundo Jr., MD;  Location: [facility];  Service: Orthopedics   • HYSTERECTOMY  1976   • KNEE SURGERY Right     arthroscopy- 2000   • TONSILLECTOMY          SWALLOW EVALUATION (last 72 hours)      SLP Adult Swallow Evaluation     Row Name 01/15/21 0910 01/14/21 1555                Rehab Evaluation    Document Type  other (see comments) education  -MP  evaluation  -AC       Subjective Information  no complaints  -MP  no complaints  -AC       Patient Observations  alert;cooperative  -MP  alert;cooperative  -AC       Patient/Family/Caregiver Comments/Observations  No family present  -MP  No family present.  -AC       Care Plan Review  care plan/treatment goals reviewed;patient/other agree to care plan  -MP  --       Patient Effort  good  -MP  good  -AC          General Information    Patient Profile Reviewed  --  yes  -AC       Pertinent History Of Current Problem  --  89yo adm w/ sepsis, pna. Thought to be aspiration pna r/t paraesophageal hernia per chart. Hx MCI, GERD. S/p EGD today.    -AC       Current Method of Nutrition  --  clear liquids per surgery  -AC       Precautions/Limitations, Vision  --  WFL;for purposes of eval  -AC       Precautions/Limitations, Hearing  --  WFL;for purposes of eval  -AC       Prior Level of Function-Swallowing  --  no diet consistency restrictions;esophageal concerns  -AC       Plans/Goals Discussed with  --  patient;agreed upon  -AC       Barriers to Rehab  --  medically complex  -AC       Patient's Goals for Discharge  --  patient did not state  -AC          Pain    Additional Documentation  Pain Scale: FACES Pre/Post-Treatment (Group)  -MP  Pain Scale: FACES Pre/Post-Treatment (Group)  -AC          Pain Scale: FACES Pre/Post-Treatment    Pain: FACES Scale, Pretreatment  0-->no hurt  -MP  0-->no hurt  -AC       Posttreatment Pain Rating  0-->no hurt  -MP  0-->no hurt  -AC          Oral Motor Structure and Function    Dentition  Assessment  --  natural, present and adequate  -AC       Secretion Management  --  WNL/WFL  -AC       Mucosal Quality  --  moist, healthy  -AC          Oral Musculature and Cranial Nerve Assessment    Oral Motor General Assessment  --  WFL  -AC          General Eating/Swallowing Observations    Respiratory Support Currently in Use  --  nasal cannula  -AC       Eating/Swallowing Skills  --  self-fed;appropriate self-feeding skills observed  -AC       Positioning During Eating  --  upright 90 degree;upright in bed  -AC       Utensils Used  --  cup;straw  -AC       Consistencies Trialed  --  thin liquids  -AC          Clinical Swallow Eval    Oral Prep Phase  --  WFL  -AC       Oral Transit  --  WFL  -AC       Oral Residue  --  WFL  -AC       Pharyngeal Phase  --  no overt signs/symptoms of pharyngeal impairment  -AC       Clinical Swallow Evaluation Summary  --  Trialed clear thin liquid only due to diet restrictions. Oral phase seemingly WFL for thin liquid. No overt clinical s/sxs aspiration, but may be at risk for silent aspiration. Discussed options w/ pt who indicated she would not want to proceed w/ further assessment, unless deemed necessary by physician. Spoke to Dr. Cordova who would like to proceed w/ more comprehensive assessment to r/o silent aspiration. MBS most appropriate @ this time due to known esophageal issues. Will determine if surgery clears pt for barium for this study.  -AC          Clinical Impression    SLP Swallowing Diagnosis  R/O pharyngeal dysphagia  -MP  R/O pharyngeal dysphagia  -AC       Functional Impact  risk of aspiration/pneumonia  -MP  risk of aspiration/pneumonia  -AC       Rehab Potential/Prognosis, Swallowing  good, to achieve stated therapy goals  -MP  good, to achieve stated therapy goals  -AC       Swallow Criteria for Skilled Therapeutic Interventions Met  demonstrates skilled criteria  -MP  demonstrates skilled criteria  -AC       Plan for Continued Treatment (SLP)   Discussed w/ pt @ bedside completing MBS to r/o pharyngeal impairment. Provided education re: assessment procedure & rationale behind completing MBS, as well as possible result scenarios. Answered all questions. Pt expressed understanding & stated that she would like to discuss MBS w/ her son today prior to proceeding w/ assessment. SLP will check back w/ pt later, & if agreement will plan for MBS tomorrow 1/16.  -MP  --          Recommendations    SLP Diet Recommendation  other (see comments) continue current diet per surgery  -MP  clear liquid diet;other (see comments) cont diet per surgery  -AC       Recommended Diagnostics  VFSS (MBS);other (see comments) 1/16, after pt discusses w/ son, if in agreement  -MP  VFSS (MBS);other (see comments) if medically appropriate  -       Recommended Precautions and Strategies  upright posture during/after eating;general aspiration precautions;reflux precautions  -MP  upright posture during/after eating;general aspiration precautions;reflux precautions  -       Oral Care Recommendations  Oral Care BID/PRN  -MP  Oral Care BID/PRN  -AC       SLP Rec. for Method of Medication Administration  as tolerated  -MP  meds whole;with thin liquids;as tolerated  -       Monitor for Signs of Aspiration  notify SLP if any concerns  -MP  notify SLP if any concerns  -AC       Anticipated Discharge Disposition (SLP)  anticipate therapy at next level of care  -MP  anticipate therapy at next level of care  -         User Key  (r) = Recorded By, (t) = Taken By, (c) = Cosigned By    Initials Name Effective Dates    AC Debora Angel MS CCC-SLP 07/27/17 -     Dorian Vernon MS CCC-SLP 06/19/19 -           EDUCATION  The patient has been educated in the following areas:   Dysphagia (Swallowing Impairment).    SLP Recommendation and Plan  SLP Swallowing Diagnosis: R/O pharyngeal dysphagia  SLP Diet Recommendation: other (see comments)(continue current diet per surgery)  Recommended  Precautions and Strategies: upright posture during/after eating, general aspiration precautions, reflux precautions  SLP Rec. for Method of Medication Administration: as tolerated     Monitor for Signs of Aspiration: notify SLP if any concerns  Recommended Diagnostics: VFSS (MBS), other (see comments)(1/16, after pt discusses w/ son, if in agreement)  Swallow Criteria for Skilled Therapeutic Interventions Met: demonstrates skilled criteria  Anticipated Discharge Disposition (SLP): anticipate therapy at next level of care  Rehab Potential/Prognosis, Swallowing: good, to achieve stated therapy goals                Plan for Continued Treatment (SLP): Discussed w/ pt @ bedside completing MBS to r/o pharyngeal impairment. Provided education re: assessment procedure & rationale behind completing MBS, as well as possible result scenarios. Answered all questions. Pt expressed understanding & stated that she would like to discuss MBS w/ her son today prior to proceeding w/ assessment. SLP will check back w/ pt later, & if agreement will plan for MBS tomorrow 1/16.              Plan of Care Reviewed With: patient           Time Calculation:   Time Calculation- SLP     Row Name 01/15/21 0943             Time Calculation- SLP    SLP Start Time  0910  -MP      SLP Received On  01/15/21  -        User Key  (r) = Recorded By, (t) = Taken By, (c) = Cosigned By    Initials Name Provider Type    MP Dorian Falcon MS CCC-SLP Speech and Language Pathologist          Therapy Charges for Today     Code Description Service Date Service Provider Modifiers Qty    15520373194  ST SELF CARE/MGMT/TRAIN EA 15 MIN 1/15/2021 Dorian Falcon MS CCC-SLP GN 2          Patient was not wearing a face mask and did not exhibit coughing during this therapy encounter.  Procedure performed was not aerosolizing, involved close contact (within 6 feet for at least 15 minutes or longer), and did not involve contact with infectious secretions or  specimens.  Therapist used appropriate personal protective equipment including gloves, standard procedure mask and eye protection.  Appropriate PPE was worn during the entire therapy session.  Hand hygiene was completed before and after therapy session.  Ruth Greenberg, SLP student, was also present during this encounter and the aforementioned applies to them, as well.        Dorian Falcon, MS CCC-SLP  1/15/2021

## 2021-01-15 NOTE — PROGRESS NOTES
[facility] Medicine Services  PROGRESS NOTE    Patient Name: Temi Jarrett  : 1930  MRN: [id number]    Date of Admission: 2021  Primary Care Physician: Eric Patel MD    Subjective   Subjective     CC:  F/u pneumonia, hiatal hernia    HPI:  Patient resting in bed. Feels okay today, no major complaints. Coughing some, no shortness of breath.    ROS:  Gen- No fevers, chills  CV- No chest pain, palpitations  Resp- +cough, no dyspnea  GI- No N/V/D, abd pain    Objective   Objective     Vital Signs:   Temp:  [97.2 °F (36.2 °C)-98.8 °F (37.1 °C)] 97.4 °F (36.3 °C)  Heart Rate:  [60-76] 69  Resp:  [16-18] 16  BP: (105-142)/(54-84) 133/66        Physical Exam:  Constitutional: No acute distress, awake, alert  HENT: NCAT, mucous membranes moist  Respiratory: Clear to auscultation bilaterally, respiratory effort normal   Cardiovascular: RRR, no murmurs, rubs, or gallops  Gastrointestinal: Positive bowel sounds, soft, nontender, nondistended  Musculoskeletal: No bilateral ankle edema  Psychiatric: Appropriate affect, cooperative  Neurologic: Oriented x 3, strength symmetric in all extremities, Cranial Nerves grossly intact to confrontation, speech clear  Skin: No rashes      Results Reviewed:  Results from last 7 days   Lab Units 01/15/21  0721  0050   WBC 10*3/mm3 7.41 12.55* 16.35*   HEMOGLOBIN g/dL 9.9* 10.3* 12.5   HEMATOCRIT % 32.3* 32.6* 40.5   PLATELETS 10*3/mm3 132* 143 209   INR   --  1.66*  --    PROCALCITONIN ng/mL  --   --  0.26*     Results from last 7 days   Lab Units 01/15/21  0721  0050   SODIUM mmol/L 145 140 139   POTASSIUM mmol/L 2.9* 3.5 3.6   CHLORIDE mmol/L 106 100 100   CO2 mmol/L 32.0* 34.0* 28.0   BUN mg/dL 12 20 21   CREATININE mg/dL 0.57 0.66 0.70   GLUCOSE mg/dL 95 114* 164*   CALCIUM mg/dL 8.1* 9.2 9.5   ALT (SGPT) U/L 11  --  17   AST (SGOT) U/L 12  --  19   TROPONIN T ng/mL  --   --  <0.010    PROBNP pg/mL  --   --  3,148.0*     Estimated Creatinine Clearance: 37.6 mL/min (by C-G formula based on SCr of 0.57 mg/dL).    Microbiology Results Abnormal     Procedure Component Value - Date/Time    Blood Culture - Blood, Hand, Left [[id number]] Collected: 01/14/21 0225    Lab Status: Preliminary result Specimen: Blood from Hand, Left Updated: 01/15/21 0331     Blood Culture No growth at 24 hours    Blood Culture - Blood, Arm, Left [[id number]] Collected: 01/14/21 0225    Lab Status: Preliminary result Specimen: Blood from Arm, Left Updated: 01/15/21 0331     Blood Culture No growth at 24 hours    COVID PRE-OP / PRE-PROCEDURE SCREENING ORDER (NO ISOLATION) - Swab, Nasopharynx [[id number]]  (Normal) Collected: 01/14/21 0454    Lab Status: Final result Specimen: Swab from Nasopharynx Updated: 01/14/21 0605    Narrative:      The following orders were created for panel order COVID PRE-OP / PRE-PROCEDURE SCREENING ORDER (NO ISOLATION) - Swab, Nasopharynx.  Procedure                               Abnormality         Status                     ---------                               -----------         ------                     COVID-19 and FLU A/B PCR...[[id number]]  Normal              Final result                 Please view results for these tests on the individual orders.    COVID-19 and FLU A/B PCR - Swab, Nasopharynx [[id number]]  (Normal) Collected: 01/14/21 0454    Lab Status: Final result Specimen: Swab from Nasopharynx Updated: 01/14/21 0605     COVID19 Not Detected     Influenza A PCR Not Detected     Influenza B PCR Not Detected    Narrative:      Fact sheet for providers: https://www.fda.gov/media/876897/download    Fact sheet for patients: https://www.fda.gov/media/103154/download    Test performed by PCR.          Imaging Results (Last 24 Hours)     ** No results found for the last 24 hours. **          Results for orders placed during the hospital encounter of 08/02/20   Transthoracic Echo Complete With  Contrast if Necessary Per Protocol    Narrative · Left ventricular systolic function is normal. Estimated EF = 70%.  · Left ventricular diastolic dysfunction (grade I) consistent with   impaired relaxation.  · Left atrial cavity size is moderately dilated.  · There is calcification of the aortic valve. There is no significant   stenosis or regurgitation. A Lambel's excrescence is noted on an aortic   valve leaflet.  · Moderate mitral valve regurgitation is present.  · Estimated right ventricular systolic pressure from tricuspid   regurgitation is moderately elevated (49 mmHg).  · There is a moderate (1-2cm) circumferential pericardial effusion. There   is no tamponade physiology.          I have reviewed the medications:  Scheduled Meds:doxycycline, 100 mg, Intravenous, Q12H  Fleets, 1 enema, Rectal, Once  Levothyroxine Sodium, 88 mcg, Intravenous, Q24H  pantoprazole, 40 mg, Intravenous, Q12H  piperacillin-tazobactam, 3.375 g, Intravenous, Q8H  sodium chloride, 10 mL, Intravenous, Q12H      Continuous Infusions:lactated ringers, 9 mL/hr, Last Rate: 9 mL/hr (01/14/21 1027)      PRN Meds:.•  acetaminophen  •  bisacodyl  •  magnesium sulfate **OR** magnesium sulfate **OR** magnesium sulfate  •  ondansetron **OR** ondansetron  •  potassium chloride **OR** potassium chloride **OR** potassium chloride  •  Sodium Chloride (PF)  •  sodium chloride    Assessment/Plan   Assessment & Plan     Active Hospital Problems    Diagnosis  POA   • **Acute sepsis (CMS/HCC) [A41.9]  Yes   • At high risk for aspiration [Z91.89]  Yes   • Chronic pain [G89.29]  Yes   • Mild cognitive impairment [G31.84]  Yes   • Paroxysmal atrial fibrillation (CMS/HCC) [I48.0]  Yes   • Multifocal aspiration pneumonia due to large intrathoracic hiatal hernia [J18.9]  Yes   • Hiatal hernia [K44.9]  Yes   • Essential hypertension [I10]  Yes   • Hypothyroidism (acquired) [E03.9]  Yes   • Esophageal reflux [K21.9]  Yes      Resolved Hospital Problems     Diagnosis Date Resolved POA   • Pneumonia of both lower lobes due to infectious organism [J18.9] 01/14/2021 Yes        Brief Hospital Course to date:  Temi Jarrett is a 89 y.o. female female w/ PMH of GERD, hypothyroidism, HTN, hiatal hernia, PAF on anticoagulation and recurrent pneumonia who presents to [facility] ED for evaluation of epigastric pain. Patient reports nausea, vomiting, decreased appetite for several days; she also reports a low grade fever at home.     Multifocal pneumonia in the setting of large intrathoracic hiatal hernia with Hypoxia  --CT chest w/o contrast with pneumonia in right lower and right middle lobe large hiatal hernia  --Blood cultures negative  --Continue zosyn, Doxy  --continue supplemental oxygen prn to keep O2 sat >92%  --EGD 1/14 showed type III paraesophageal hernia with intrathoracic stomach, no evidence of obstruction, ulcers, or mass lesion  --resume regular diet, SLP following  --continue PPI BID    Large Hiatal Hernia  GERD  - Dr. Cesar duong, can f/u outpatient to discuss possible repair when pneumonia resolved  - PPI BID, carafate     HTN:  - resume continue home medications    Hypothyroidism  - synthroid    Dementia:  - resume home regimen, aricept and namenda    DVT Prophylaxis:  Eliquis    Disposition: I expect the patient to be discharged 1-2 days    CODE STATUS:   Code Status and Medical Interventions:   Ordered at: 01/14/21 0428     Code Status:    CPR     Medical Interventions (Level of Support Prior to Arrest):    Full       Mili Doran,   01/15/21

## 2021-01-15 NOTE — PROGRESS NOTES
"Patient Name:  Temi Jarrett  YOB: 1930  [id number]    Surgery Progress Note    Date of visit: 1/15/2021    Subjective   Subjective: Feeling better. Tolerating PO.         Objective     Objective:     /63 (BP Location: Right arm, Patient Position: Lying)   Pulse 62   Temp 97.3 °F (36.3 °C) (Oral)   Resp 16   Ht 149.9 cm (59\")   Wt 51 kg (112 lb 6.4 oz)   LMP  (LMP Unknown)   SpO2 93%   BMI 22.70 kg/m²     Intake/Output Summary (Last 24 hours) at 1/15/2021 0903  Last data filed at 1/14/2021 1800  Gross per 24 hour   Intake 790 ml   Output --   Net 790 ml       CV:  Rhythm  regular and rate regular   L:  Clear  to auscultation bilaterally   Abd:  Bowel sounds positive , soft, nontender  Ext:  No cyanosis, clubbing, edema    Recent labs that are back at this time have been reviewed.        Assessment/Plan     Assessment/ Plan:     Multifocal aspiration pneumonia due to large intrathoracic hiatal hernia-  She is stable. I had a discussion with her son over the phone as well as with the patient.  I do think that repair of this hiatal hernia is reasonable to discuss, though perhaps timing would be better after she recovers from his pneumonia.  I will continue to follow this patient with you and we will continue to have these discussions.  In the meantime, I would continue with medical management of her pneumonia.  From my standpoint, she may resume her Eliquis if it is been held.     Hospital Problem List     * (Principal) Acute sepsis (CMS/HCC)    Esophageal reflux    Hypothyroidism (acquired)    Essential hypertension        Hiatal hernia        Paroxysmal atrial fibrillation (CMS/HCC)    Overview Addendum 8/3/2020  1:48 PM by Damon Barraza IV, MD     · A. fib RVR noted in the setting of PNA with quick resolution, 8/3/2020  · Echo (8/3/2020): Normal LVEF.  Moderate  pericardial effusion.  Moderate MR.  RVSP 49 mmHg  · Spontaneously converted to NSR on IV Cardizem  · Chads Vasc " 6 (age > 75, female, HTN, CAD)          Mild cognitive impairment    At high risk for aspiration    Chronic pain              Serjio Guerrero MD  1/15/2021  09:03 EST

## 2021-01-15 NOTE — PLAN OF CARE
Goal Outcome Evaluation:  Plan of Care Reviewed With: patient  Progress: no change     SLP caregiver instruction/education completed. Pt to discuss w/ her son completing MBS. SLP to check back in this PM. If in agreement after discussion, will plan for MBS tomorrow 1/16. Please see note for further details and recommendations.

## 2021-01-15 NOTE — PLAN OF CARE
Goal Outcome Evaluation:  Pt rested during the night. VSS. NSR/SB. 2L NC. Pt denies any pain. IV abx infusing. Will continue to monitor.   Problem: Adult Inpatient Plan of Care  Goal: Plan of Care Review  Outcome: Ongoing, Progressing  Flowsheets (Taken 1/14/2021 1707 by Debora Agnel MS CCC-SLP)  Plan of Care Reviewed With: patient     Plan of Care Reviewed With: patient  Progress: no change

## 2021-01-15 NOTE — PROGRESS NOTES
Continued Stay Note  [facility]     Patient Name: Temi Jarrett  MRN: [id number]  Today's Date: 1/15/2021    Admit Date: 1/14/2021    Discharge Plan     Row Name 01/15/21 1119       Plan    Plan  Home with family    Patient/Family in Agreement with Plan  yes    Plan Comments  Spoke to patient at bedside. Plan is home with family at discharge. Patient is refusing home health or rehab facility at this time. Patient has a rolling walker and a 3 wheeled walker at home. CM will continue to follow.    Final Discharge Disposition Code  01 - home or self-care        Discharge Codes    No documentation.             Oscar Sterling RN

## 2021-01-15 NOTE — PROGRESS NOTES
"Patient Name:  Temi Jarrett  YOB: 1930  [id number]    Surgery Post - Operative Note    Date of visit: 1/14/2021    Subjective   Subjective: Tolerating Po since EGD       Objective     Objective:    /74 (BP Location: Right arm, Patient Position: Lying)   Pulse 73   Temp 98.8 °F (37.1 °C) (Oral)   Resp 18   Ht 149.9 cm (59\")   Wt 54.4 kg (120 lb)   LMP  (LMP Unknown)   SpO2 99%   BMI 24.24 kg/m²     CV:  Rate  regular and rhythm  regular  L:  Clear  to auscultation bilaterally   ABD:  Soft, nontender  EXT:  No cyanosis, clubbing or edema         Assessment/Plan     Assessment/ Plan: Doing well after EGD. Continue Pulmonary toilet    Hospital Problem List     * (Principal) Acute sepsis (CMS/HCC)    Esophageal reflux    Hypothyroidism (acquired)    Essential hypertension        Hiatal hernia    Multifocal aspiration pneumonia due to large intrathoracic hiatal hernia    Paroxysmal atrial fibrillation (CMS/HCC)    Overview Addendum 8/3/2020  1:48 PM by Damon Barraza IV, MD     · A. fib RVR noted in the setting of PNA with quick resolution, 8/3/2020  · Echo (8/3/2020): Normal LVEF.  Moderate  pericardial effusion.  Moderate MR.  RVSP 49 mmHg  · Spontaneously converted to NSR on IV Cardizem  · Chads Vasc 6 (age > 75, female, HTN, CAD)          Mild cognitive impairment    At high risk for aspiration    Chronic pain              Serjio Guerrero MD  1/14/2021  19:39 EST    "

## 2021-01-16 ENCOUNTER — APPOINTMENT (OUTPATIENT)
Dept: GENERAL RADIOLOGY | Facility: HOSPITAL | Age: 86
End: 2021-01-16

## 2021-01-16 LAB
ANION GAP SERPL CALCULATED.3IONS-SCNC: 5 MMOL/L (ref 5–15)
BASOPHILS # BLD AUTO: 0.05 10*3/MM3 (ref 0–0.2)
BASOPHILS NFR BLD AUTO: 0.8 % (ref 0–1.5)
BUN SERPL-MCNC: 9 MG/DL (ref 8–23)
BUN/CREAT SERPL: 16.1 (ref 7–25)
CALCIUM SPEC-SCNC: 8 MG/DL (ref 8.2–9.6)
CHLORIDE SERPL-SCNC: 110 MMOL/L (ref 98–107)
CO2 SERPL-SCNC: 30 MMOL/L (ref 22–29)
CREAT SERPL-MCNC: 0.56 MG/DL (ref 0.57–1)
DEPRECATED RDW RBC AUTO: 51.2 FL (ref 37–54)
EOSINOPHIL # BLD AUTO: 0.15 10*3/MM3 (ref 0–0.4)
EOSINOPHIL NFR BLD AUTO: 2.4 % (ref 0.3–6.2)
ERYTHROCYTE [DISTWIDTH] IN BLOOD BY AUTOMATED COUNT: 14.8 % (ref 12.3–15.4)
GFR SERPL CREATININE-BSD FRML MDRD: 102 ML/MIN/1.73
GLUCOSE SERPL-MCNC: 88 MG/DL (ref 65–99)
HCT VFR BLD AUTO: 34.3 % (ref 34–46.6)
HGB BLD-MCNC: 10.5 G/DL (ref 12–15.9)
IMM GRANULOCYTES # BLD AUTO: 0.02 10*3/MM3 (ref 0–0.05)
IMM GRANULOCYTES NFR BLD AUTO: 0.3 % (ref 0–0.5)
LYMPHOCYTES # BLD AUTO: 1.06 10*3/MM3 (ref 0.7–3.1)
LYMPHOCYTES NFR BLD AUTO: 16.9 % (ref 19.6–45.3)
MAGNESIUM SERPL-MCNC: 2.6 MG/DL (ref 1.6–2.4)
MCH RBC QN AUTO: 28.5 PG (ref 26.6–33)
MCHC RBC AUTO-ENTMCNC: 30.6 G/DL (ref 31.5–35.7)
MCV RBC AUTO: 93.2 FL (ref 79–97)
MONOCYTES # BLD AUTO: 0.5 10*3/MM3 (ref 0.1–0.9)
MONOCYTES NFR BLD AUTO: 8 % (ref 5–12)
NEUTROPHILS NFR BLD AUTO: 4.5 10*3/MM3 (ref 1.7–7)
NEUTROPHILS NFR BLD AUTO: 71.6 % (ref 42.7–76)
NRBC BLD AUTO-RTO: 0 /100 WBC (ref 0–0.2)
PLATELET # BLD AUTO: 153 10*3/MM3 (ref 140–450)
PMV BLD AUTO: 10.9 FL (ref 6–12)
POTASSIUM SERPL-SCNC: 3.9 MMOL/L (ref 3.5–5.2)
POTASSIUM SERPL-SCNC: 3.9 MMOL/L (ref 3.5–5.2)
RBC # BLD AUTO: 3.68 10*6/MM3 (ref 3.77–5.28)
SODIUM SERPL-SCNC: 145 MMOL/L (ref 136–145)
WBC # BLD AUTO: 6.28 10*3/MM3 (ref 3.4–10.8)

## 2021-01-16 PROCEDURE — 25010000002 PIPERACILLIN SOD-TAZOBACTAM PER 1 G: Performed by: NURSE PRACTITIONER

## 2021-01-16 PROCEDURE — 83735 ASSAY OF MAGNESIUM: CPT | Performed by: INTERNAL MEDICINE

## 2021-01-16 PROCEDURE — 97530 THERAPEUTIC ACTIVITIES: CPT

## 2021-01-16 PROCEDURE — 85025 COMPLETE CBC W/AUTO DIFF WBC: CPT | Performed by: INTERNAL MEDICINE

## 2021-01-16 PROCEDURE — 97161 PT EVAL LOW COMPLEX 20 MIN: CPT

## 2021-01-16 PROCEDURE — 92611 MOTION FLUOROSCOPY/SWALLOW: CPT

## 2021-01-16 PROCEDURE — 80048 BASIC METABOLIC PNL TOTAL CA: CPT | Performed by: INTERNAL MEDICINE

## 2021-01-16 PROCEDURE — 74230 X-RAY XM SWLNG FUNCJ C+: CPT

## 2021-01-16 PROCEDURE — 99232 SBSQ HOSP IP/OBS MODERATE 35: CPT | Performed by: INTERNAL MEDICINE

## 2021-01-16 RX ORDER — CHOLECALCIFEROL (VITAMIN D3) 125 MCG
5 CAPSULE ORAL NIGHTLY
Status: DISCONTINUED | OUTPATIENT
Start: 2021-01-16 | End: 2021-01-18 | Stop reason: HOSPADM

## 2021-01-16 RX ORDER — GUAIFENESIN 600 MG/1
1200 TABLET, EXTENDED RELEASE ORAL EVERY 12 HOURS SCHEDULED
Status: DISCONTINUED | OUTPATIENT
Start: 2021-01-16 | End: 2021-01-18 | Stop reason: HOSPADM

## 2021-01-16 RX ORDER — BENZONATATE 100 MG/1
100 CAPSULE ORAL 3 TIMES DAILY PRN
Status: DISCONTINUED | OUTPATIENT
Start: 2021-01-16 | End: 2021-01-18 | Stop reason: HOSPADM

## 2021-01-16 RX ADMIN — BETHANECHOL CHLORIDE 10 MG: 10 TABLET ORAL at 16:05

## 2021-01-16 RX ADMIN — BARIUM SULFATE 100 ML: 0.81 POWDER, FOR SUSPENSION ORAL at 13:30

## 2021-01-16 RX ADMIN — SUCRALFATE 1 G: 1 TABLET ORAL at 09:57

## 2021-01-16 RX ADMIN — TAZOBACTAM SODIUM AND PIPERACILLIN SODIUM 3.38 G: 375; 3 INJECTION, SOLUTION INTRAVENOUS at 21:59

## 2021-01-16 RX ADMIN — ASPIRIN 81 MG: 81 TABLET, COATED ORAL at 09:57

## 2021-01-16 RX ADMIN — PANTOPRAZOLE SODIUM 40 MG: 40 TABLET, DELAYED RELEASE ORAL at 09:57

## 2021-01-16 RX ADMIN — METHOCARBAMOL 500 MG: 500 TABLET, FILM COATED ORAL at 09:57

## 2021-01-16 RX ADMIN — BETHANECHOL CHLORIDE 10 MG: 10 TABLET ORAL at 09:57

## 2021-01-16 RX ADMIN — APIXABAN 2.5 MG: 2.5 TABLET, FILM COATED ORAL at 21:04

## 2021-01-16 RX ADMIN — BETHANECHOL CHLORIDE 10 MG: 10 TABLET ORAL at 21:02

## 2021-01-16 RX ADMIN — Medication 5 MG: at 21:03

## 2021-01-16 RX ADMIN — DICLOFENAC SODIUM 2 G: 10 GEL TOPICAL at 21:05

## 2021-01-16 RX ADMIN — MEMANTINE 10 MG: 10 TABLET ORAL at 21:04

## 2021-01-16 RX ADMIN — TAZOBACTAM SODIUM AND PIPERACILLIN SODIUM 3.38 G: 375; 3 INJECTION, SOLUTION INTRAVENOUS at 12:04

## 2021-01-16 RX ADMIN — BARIUM SULFATE 20 ML: 400 PASTE ORAL at 13:30

## 2021-01-16 RX ADMIN — SODIUM CHLORIDE, PRESERVATIVE FREE 10 ML: 5 INJECTION INTRAVENOUS at 21:07

## 2021-01-16 RX ADMIN — DONEPEZIL HYDROCHLORIDE 10 MG: 10 TABLET, FILM COATED ORAL at 21:01

## 2021-01-16 RX ADMIN — GUAIFENESIN 1200 MG: 600 TABLET, EXTENDED RELEASE ORAL at 12:05

## 2021-01-16 RX ADMIN — DICLOFENAC SODIUM 2 G: 10 GEL TOPICAL at 12:05

## 2021-01-16 RX ADMIN — DOXYCYCLINE 100 MG: 100 INJECTION, POWDER, LYOPHILIZED, FOR SOLUTION INTRAVENOUS at 09:55

## 2021-01-16 RX ADMIN — TAZOBACTAM SODIUM AND PIPERACILLIN SODIUM 3.38 G: 375; 3 INJECTION, SOLUTION INTRAVENOUS at 03:23

## 2021-01-16 RX ADMIN — DICLOFENAC SODIUM 2 G: 10 GEL TOPICAL at 16:04

## 2021-01-16 RX ADMIN — GABAPENTIN 300 MG: 300 CAPSULE ORAL at 21:02

## 2021-01-16 RX ADMIN — METHOCARBAMOL 500 MG: 500 TABLET, FILM COATED ORAL at 16:05

## 2021-01-16 RX ADMIN — SODIUM CHLORIDE, PRESERVATIVE FREE 10 ML: 5 INJECTION INTRAVENOUS at 09:58

## 2021-01-16 RX ADMIN — BUSPIRONE HYDROCHLORIDE 10 MG: 10 TABLET ORAL at 09:57

## 2021-01-16 RX ADMIN — PANTOPRAZOLE SODIUM 40 MG: 40 TABLET, DELAYED RELEASE ORAL at 16:05

## 2021-01-16 RX ADMIN — APIXABAN 2.5 MG: 2.5 TABLET, FILM COATED ORAL at 09:57

## 2021-01-16 RX ADMIN — LEVOTHYROXINE SODIUM 175 MCG: 150 TABLET ORAL at 09:57

## 2021-01-16 RX ADMIN — BENZONATATE 100 MG: 100 CAPSULE ORAL at 12:05

## 2021-01-16 RX ADMIN — DOXYCYCLINE 100 MG: 100 INJECTION, POWDER, LYOPHILIZED, FOR SOLUTION INTRAVENOUS at 20:17

## 2021-01-16 RX ADMIN — LISINOPRIL 5 MG: 5 TABLET ORAL at 09:57

## 2021-01-16 RX ADMIN — BUSPIRONE HYDROCHLORIDE 10 MG: 10 TABLET ORAL at 21:05

## 2021-01-16 RX ADMIN — MEMANTINE 10 MG: 10 TABLET ORAL at 09:57

## 2021-01-16 RX ADMIN — GABAPENTIN 300 MG: 300 CAPSULE ORAL at 09:57

## 2021-01-16 RX ADMIN — SERTRALINE HYDROCHLORIDE 50 MG: 50 TABLET ORAL at 09:57

## 2021-01-16 NOTE — MBS/VFSS/FEES
Acute Care - Speech Language Pathology   Swallow Initial Evaluation [facility]   Modified Barium Swallow Study (MBS)     Patient Name: Temi Jarrett  : 1930  MRN: [id number]  Today's Date: 2021               Admit Date: 2021    Visit Dx:     ICD-10-CM ICD-9-CM   1. Acute sepsis (CMS/[facility])  A41.9 038.9     995.91   2. Gastric outlet obstruction  K31.1 537.0   3. Aspiration pneumonia, unspecified aspiration pneumonia type, unspecified laterality, unspecified part of lung (CMS/[facility])  J69.0 507.0   4. Acute abdominal pain  R10.9 789.00     338.19   5. Anticoagulated  Z79.01 V58.61   6. Dysphagia, unspecified type  R13.10 787.20   7. Impaired functional mobility, balance, gait, and endurance  Z74.09 V49.89     Patient Active Problem List   Diagnosis   • Esophageal reflux   • Hypothyroidism (acquired)   • Anxiety with depression   • Peripheral neuropathy   • Spinal stenosis of lumbar region   • Osteoporosis   • Disc disorder of lumbar region   • Bladder prolapse, female, acquired   • Essential hypertension   • Pernicious anemia   • Annual physical exam   • Primary osteoarthritis of both knees   • Hiatal hernia   • Multifocal aspiration pneumonia due to large intrathoracic hiatal hernia   • Paroxysmal atrial fibrillation (CMS/[facility])   • Pericardial effusion   • Mild cognitive impairment   • Acute sepsis (CMS/[facility])   • At high risk for aspiration   • Chronic pain     Past Medical History:   Diagnosis Date   • Arthritis    • Bladder prolapse, female, acquired    • Closed fracture of neck of left femur (CMS/[facility]) 2019   • Dementia (CMS/[facility])    • Depression    • Disease of thyroid gland    • Gastric polyp    • GERD (gastroesophageal reflux disease)    • History of colonic polyps    • Hyperlipidemia    • Hypertension    • IBS (irritable bowel syndrome)    • Macular degeneration    • Torn meniscus     right knee      Past Surgical History:   Procedure Laterality Date   • CHOLECYSTECTOMY     • ENDOSCOPY  N/A 1/14/2021    Procedure: ESOPHAGOGASTRODUODENOSCOPY;  Surgeon: Serjio Guerrero MD;  Location: [facility] ENDOSCOPY;  Service: General;  Laterality: N/A;   • EYE SURGERY  1998    Cataract extraction   • HERNIA REPAIR     • HIP HEMIARTHROPLASTY Left 9/28/2019    Procedure: HIP HEMIARTHROPLASTY LEFT;  Surgeon: Reddy Segundo Jr., MD;  Location:  [facility] OR;  Service: Orthopedics   • HYSTERECTOMY  1976   • KNEE SURGERY Right     arthroscopy- 2000   • TONSILLECTOMY          SWALLOW EVALUATION (last 72 hours)      SLP Adult Swallow Evaluation     Row Name 01/16/21 1245 01/15/21 0910 01/14/21 1555             Rehab Evaluation    Document Type  evaluation  -SM  other (see comments) education  -MP  evaluation  -AC      Subjective Information  no complaints  -SM  no complaints  -MP  no complaints  -AC      Patient Observations  alert;cooperative  -SM  alert;cooperative  -MP  alert;cooperative  -AC      Patient/Family/Caregiver Comments/Observations  --  No family present  -MP  No family present.  -AC      Care Plan Review  --  care plan/treatment goals reviewed;patient/other agree to care plan  -MP  --      Patient Effort  --  good  -MP  good  -AC      Comment  RN spoke with pt and pt's son earlier AM. Pt now agreeable and wished to pursue MBS.   -  --  --         General Information    Patient Profile Reviewed  --  --  yes  -AC      Pertinent History Of Current Problem  --  --  89yo adm w/ sepsis, pna. Thought to be aspiration pna r/t paraesophageal hernia per chart. Hx MCI, GERD. S/p EGD today.    -AC      Current Method of Nutrition  --  --  clear liquids per surgery  -AC      Precautions/Limitations, Vision  --  --  WFL;for purposes of eval  -AC      Precautions/Limitations, Hearing  --  --  WFL;for purposes of eval  -AC      Prior Level of Function-Swallowing  --  --  no diet consistency restrictions;esophageal concerns  -AC      Plans/Goals Discussed with  --  --  patient;agreed upon  -      Barriers to Rehab  --   --  medically complex  -      Patient's Goals for Discharge  --  --  patient did not state  -AC         Pain    Additional Documentation  --  Pain Scale: FACES Pre/Post-Treatment (Group)  -MP  Pain Scale: FACES Pre/Post-Treatment (Group)  -AC         Pain Scale: FACES Pre/Post-Treatment    Pain: FACES Scale, Pretreatment  --  0-->no hurt  -MP  0-->no hurt  -AC      Posttreatment Pain Rating  --  0-->no hurt  -MP  0-->no hurt  -AC         Oral Motor Structure and Function    Dentition Assessment  --  --  natural, present and adequate  -AC      Secretion Management  --  --  WNL/WFL  -AC      Mucosal Quality  --  --  moist, healthy  -AC         Oral Musculature and Cranial Nerve Assessment    Oral Motor General Assessment  --  --  WFL  -AC         General Eating/Swallowing Observations    Respiratory Support Currently in Use  --  --  nasal cannula  -      Eating/Swallowing Skills  --  --  self-fed;appropriate self-feeding skills observed  -      Positioning During Eating  --  --  upright 90 degree;upright in bed  -      Utensils Used  --  --  cup;straw  -      Consistencies Trialed  --  --  thin liquids  -AC         Clinical Swallow Eval    Oral Prep Phase  --  --  WFL  -AC      Oral Transit  --  --  WFL  -AC      Oral Residue  --  --  WFL  -AC      Pharyngeal Phase  --  --  no overt signs/symptoms of pharyngeal impairment  -      Clinical Swallow Evaluation Summary  --  --  Trialed clear thin liquid only due to diet restrictions. Oral phase seemingly WFL for thin liquid. No overt clinical s/sxs aspiration, but may be at risk for silent aspiration. Discussed options w/ pt who indicated she would not want to proceed w/ further assessment, unless deemed necessary by physician. Spoke to Dr. Cordova who would like to proceed w/ more comprehensive assessment to r/o silent aspiration. MBS most appropriate @ this time due to known esophageal issues. Will determine if surgery clears pt for barium for this study.   -AC         MBS/VFSS    Utensils Used  spoon;cup;straw  -SM  --  --      Consistencies Trialed  thin liquids;pureed;regular textures  -SM  --  --         MBS/VFSS Interpretation    Oral Prep Phase  WFL  -SM  --  --      Oral Transit Phase  WFL  -SM  --  --      Oral Residue  WFL  -SM  --  --         Initiation of Pharyngeal Swallow    Pharyngeal Phase  functional pharyngeal phase of swallowing  -SM  --  --      Pharyngeal Phase, Comment  cough x1 following initial tsp trial of thin liquids. Pt moving and could not visualize airway. No observed sublottic or laryngeal vestibule residue once back in position. No further coughing. No aspiraiton or significant residue. Would benefit from general aspiraiton precations given pt's age and generalized weakness. Also strict reflux precautions. No observed retrograde flow during MBS, though not large amounts of barium provided in attempt to visualize given known hernia/risks.   -SM  --  --         Clinical Impression    SLP Swallowing Diagnosis  functional oral phase;functional pharyngeal phase  -  R/O pharyngeal dysphagia  -MP  R/O pharyngeal dysphagia  -      Functional Impact  --  risk of aspiration/pneumonia  -MP  risk of aspiration/pneumonia  -AC      Rehab Potential/Prognosis, Swallowing  --  good, to achieve stated therapy goals  -MP  good, to achieve stated therapy goals  -AC      Swallow Criteria for Skilled Therapeutic Interventions Met  no problems identified which require skilled intervention  -SM  demonstrates skilled criteria  -MP  demonstrates skilled criteria  -AC      Plan for Continued Treatment (SLP)  --  Discussed w/ pt @ bedside completing MBS to r/o pharyngeal impairment. Provided education re: assessment procedure & rationale behind completing MBS, as well as possible result scenarios. Answered all questions. Pt expressed understanding & stated that she would like to discuss MBS w/ her son today prior to proceeding w/ assessment. SLP will check back  w/ pt later, & if agreement will plan for MBS tomorrow 1/16.  -MP  --         Recommendations    Therapy Frequency (Swallow)  evaluation only  -  --  --      SLP Diet Recommendation  regular textures;thin liquids  -SM  other (see comments) continue current diet per surgery  -MP  clear liquid diet;other (see comments) cont diet per surgery  -      Recommended Diagnostics  --  VFSS (MBS);other (see comments) 1/16, after pt discusses w/ son, if in agreement  -  VFSS (MBS);other (see comments) if medically appropriate  -AC      Recommended Precautions and Strategies  general aspiration precautions;reflux precautions  -SM  upright posture during/after eating;general aspiration precautions;reflux precautions  -MP  upright posture during/after eating;general aspiration precautions;reflux precautions  -      Oral Care Recommendations  Oral Care BID/PRN  -SM  Oral Care BID/PRN  -MP  Oral Care BID/PRN  -AC      SLP Rec. for Method of Medication Administration  as tolerated  -SM  as tolerated  -MP  meds whole;with thin liquids;as tolerated  -      Monitor for Signs of Aspiration  notify SLP if any concerns  -  notify SLP if any concerns  -MP  notify SLP if any concerns  -      Anticipated Discharge Disposition (SLP)  --  anticipate therapy at next level of care  -MP  anticipate therapy at next level of care  -        User Key  (r) = Recorded By, (t) = Taken By, (c) = Cosigned By    Initials Name Effective Dates     Tiffanie Rogers, MS CCC-SLP 08/09/20 -     AC Debora Angel, MS CCC-SLP 07/27/17 -     Dorian Vernon MS CCC-SLP 06/19/19 -           EDUCATION  The patient has been educated in the following areas:   Dysphagia (Swallowing Impairment) Modified Diet Instruction.    SLP Recommendation and Plan  SLP Swallowing Diagnosis: functional oral phase, functional pharyngeal phase  SLP Diet Recommendation: regular textures, thin liquids  Recommended Precautions and Strategies: general aspiration  precautions, reflux precautions  SLP Rec. for Method of Medication Administration: as tolerated     Monitor for Signs of Aspiration: notify SLP if any concerns     Swallow Criteria for Skilled Therapeutic Interventions Met: no problems identified which require skilled intervention        Therapy Frequency (Swallow): evaluation only              Plan of Care Reviewed With: patient  Progress: improving           Time Calculation:   Time Calculation- SLP     Row Name 01/16/21 1528             Time Calculation- SLP    SLP Start Time  1245  -SM      SLP Received On  01/16/21  -        User Key  (r) = Recorded By, (t) = Taken By, (c) = Cosigned By    Initials Name Provider Type    Tiffanie Calvo MS CCC-SLP Speech and Language Pathologist          Therapy Charges for Today     Code Description Service Date Service Provider Modifiers Qty    [id number] HC ST MOTION FLUORO EVAL SWALLOW 3 1/16/2021 Tiffanie Rogers MS CCC-SLP GN 1            Patient was not wearing a face mask and did not exhibit coughing during this therapy encounter.  Procedure performed was aerosolizing, involved close contact (within 6 feet for at least 15 minutes or longer), and did not involve contact with infectious secretions or specimens.  Therapist used appropriate personal protective equipment including gloves, standard procedure mask and eye protection.  Appropriate PPE was worn during the entire therapy session.  Hand hygiene was completed before and after therapy session.       Tiffanie Rogers MS CCC-SLP  1/16/2021

## 2021-01-16 NOTE — PROGRESS NOTES
"Patient Name:  Temi Jarrett  YOB: 1930  [id number]    Surgery Progress Note    Date of visit: 1/16/2021    Subjective   Subjective: Feeling better. Still reports that she is weak.         Objective     Objective:     /74 (BP Location: Right arm, Patient Position: Lying)   Pulse 82   Temp 97.9 °F (36.6 °C) (Oral)   Resp 16   Ht 149.9 cm (59\")   Wt 51 kg (112 lb 6.4 oz)   LMP  (LMP Unknown)   SpO2 93%   BMI 22.70 kg/m²     Intake/Output Summary (Last 24 hours) at 1/16/2021 1008  Last data filed at 1/16/2021 0851  Gross per 24 hour   Intake --   Output 900 ml   Net -900 ml       CV:  Rhythm  regular and rate regular   L:  Clear  to auscultation bilaterally   Abd:  Bowel sounds positive , soft, nontender  Ext:  No cyanosis, clubbing, edema    Recent labs that are back at this time have been reviewed.        Assessment/Plan     Assessment/ Plan:    Hospital Problem List     * (Principal) Acute sepsis (CMS/HCC)    Esophageal reflux    Hypothyroidism (acquired)    Essential hypertension        Hiatal hernia    Multifocal aspiration pneumonia due to large intrathoracic hiatal hernia- Stable. We discussed follow up with me in 2 weeks as an outpatient to plan for elective repair. She seems more agreeable to the proposition.      Paroxysmal atrial fibrillation (CMS/HCC)    Overview Addendum 8/3/2020  1:48 PM by Damon Barraza IV, MD     · A. fib RVR noted in the setting of PNA with quick resolution, 8/3/2020  · Echo (8/3/2020): Normal LVEF.  Moderate  pericardial effusion.  Moderate MR.  RVSP 49 mmHg  · Spontaneously converted to NSR on IV Cardizem  · Chads Vasc 6 (age > 75, female, HTN, CAD)          Mild cognitive impairment    At high risk for aspiration    Chronic pain              Serjio Guerrero MD  1/16/2021  10:08 EST      "

## 2021-01-16 NOTE — PLAN OF CARE
Goal Outcome Evaluation:  Plan of Care Reviewed With: patient  Progress: improving  Outcome Summary: PT eval completed. Pt presents with minor deficits in mobility compared to baseline (indep bed mobility; indep transfers and amb with a 3-wheel RW; CGA on stairs w/2 handrails). Today, pt transferred indep supine to with bedrail and HOB raised; CGA sit to stand at RW; and amb 300' CGA with RW. Pt would benefit from skilled PT services to improve functional independence. Anticipate home with family when ready for discharge from hospital. Discussed possibly HHPT, but pt said the family does not want anyone to come into the home due to COVID19. Pt said her grand daughter works with her and that they walk up and down the stairs for exercise

## 2021-01-16 NOTE — THERAPY EVALUATION
Patient Name: Temi Jarrett  : 1930    MRN: [id number]                              Today's Date: 2021       Admit Date: 2021    Visit Dx:     ICD-10-CM ICD-9-CM   1. Acute sepsis (CMS/[facility])  A41.9 038.9     995.91   2. Gastric outlet obstruction  K31.1 537.0   3. Aspiration pneumonia, unspecified aspiration pneumonia type, unspecified laterality, unspecified part of lung (CMS/[facility])  J69.0 507.0   4. Acute abdominal pain  R10.9 789.00     338.19   5. Anticoagulated  Z79.01 V58.61   6. Dysphagia, unspecified type  R13.10 787.20   7. Impaired functional mobility, balance, gait, and endurance  Z74.09 V49.89     Patient Active Problem List   Diagnosis   • Esophageal reflux   • Hypothyroidism (acquired)   • Anxiety with depression   • Peripheral neuropathy   • Spinal stenosis of lumbar region   • Osteoporosis   • Disc disorder of lumbar region   • Bladder prolapse, female, acquired   • Essential hypertension   • Pernicious anemia   • Annual physical exam   • Primary osteoarthritis of both knees   • Hiatal hernia   • Multifocal aspiration pneumonia due to large intrathoracic hiatal hernia   • Paroxysmal atrial fibrillation (CMS/[facility])   • Pericardial effusion   • Mild cognitive impairment   • Acute sepsis (CMS/[facility])   • At high risk for aspiration   • Chronic pain     Past Medical History:   Diagnosis Date   • Arthritis    • Bladder prolapse, female, acquired    • Closed fracture of neck of left femur (CMS/[facility]) 2019   • Dementia (CMS/[facility])    • Depression    • Disease of thyroid gland    • Gastric polyp    • GERD (gastroesophageal reflux disease)    • History of colonic polyps    • Hyperlipidemia    • Hypertension    • IBS (irritable bowel syndrome)    • Macular degeneration    • Torn meniscus     right knee      Past Surgical History:   Procedure Laterality Date   • CHOLECYSTECTOMY     • ENDOSCOPY N/A 2021    Procedure: ESOPHAGOGASTRODUODENOSCOPY;  Surgeon: Serjio Guerrero MD;  Location:   "[facility];  Service: General;  Laterality: N/A;   • EYE SURGERY  1998    Cataract extraction   • HERNIA REPAIR     • HIP HEMIARTHROPLASTY Left 9/28/2019    Procedure: HIP HEMIARTHROPLASTY LEFT;  Surgeon: Reddy Segundo Jr., MD;  [facility]: [facility];  Service: Orthopedics   • HYSTERECTOMY  1976   • KNEE SURGERY Right     arthroscopy- 2000   • TONSILLECTOMY       General Information     Row Name 01/16/21 1522 01/16/21 1430       Physical Therapy Time and Intention    Document Type  --  -LS  evaluation  -LS    Mode of Treatment  --  -LS  physical therapy  -LS    Row Name 01/16/21 1522 01/16/21 1430       General Information    Patient Profile Reviewed  --  yes  -LS    Prior Level of Function  --  -LS  independent:;gait;transfer;bed mobility Pt said she has two 3-wheeled walker at home that she uses and a 2-wheel RW she does not use. Pt said she sometimes \"furniture walks.\" Guarding assistance on stairs with a HR on each side (pt said she holds to each side)  -LS    Existing Precautions/Restrictions  --  -LS  fall  -    Row Name 01/16/21 1522 01/16/21 1430       Living Environment    Lives With  --  -LS  child(darvin), adult son, dtr-in-law, and adult grand daughter  -    Row Name 01/16/21 1522 01/16/21 1430       Home Main Entrance    Number of Stairs, Main Entrance  --  -LS  one  -LS    Stair Railings, Main Entrance  --  -LS  --    Row Name 01/16/21 1522 01/16/21 1430       Stairs Within Home, Primary    Stairs, Within Home, Primary  --  -LS  Pt said 9 or 10 steps to a landing and then 7 more steps; HRs on each side (pt said she can reach each rail).  -LS    Number of Stairs, Within Home, Primary  --  -LS  none  -LS    Stair Railings, Within Home, Primary  railings on both sides of stairs  -LS  railings on both sides of stairs  -LS    Row Name 01/16/21 1522 01/16/21 1430       Cognition    Orientation Status (Cognition)  --  -LS  oriented x 4  -LS    Row Name 01/16/21 1522 01/16/21 1430       Safety Issues, " Functional Mobility    Impairments Affecting Function (Mobility)  --  -LS  balance;endurance/activity tolerance;strength  -LS      User Key  (r) = Recorded By, (t) = Taken By, (c) = Cosigned By    Initials Name Provider Type    Kaleigh Blake PT Physical Therapist        Mobility     Row Name 01/16/21 1528 01/16/21 1430       Bed Mobility    Bed Mobility  --  -LS  supine-sit  -LS    Supine-Sit [place] (Bed Mobility)  --  -LS  modified independence  -LS    Assistive Device (Bed Mobility)  --  -LS  bed rails;head of bed elevated  -LS    Comment (Bed Mobility)  --  -LS  increased time and effort to transition supine to sit  -LS    Row Name 01/16/21 1528 01/16/21 1430       Sit-Stand Transfer    Sit-Stand [place] (Transfers)  --  -LS  contact guard  -LS    Assistive Device (Sit-Stand Transfers)  --  -LS  walker, front-wheeled  -LS    Row Name 01/16/21 1528 01/16/21 1430       Gait/Stairs (Locomotion)    McCulloch Level (Gait)  --  -LS  contact guard  -LS    Assistive Device (Gait)  --  -LS  walker, front-wheeled  -LS    Distance in Feet (Gait)  --  -LS  300  -LS    Deviations/Abnormal Patterns (Gait)  --  -LS  gait speed decreased;weight shifting decreased;bilateral deviations;stride length decreased  -LS    Bilateral Gait Deviations  --  -LS  --      User Key  (r) = Recorded By, (t) = Taken By, (c) = Cosigned By    Initials Name Provider Type    Kaleigh Blake PT Physical Therapist        Obj/Interventions     Row Name 01/16/21 1430          Range of Motion Comprehensive    General Range of Motion  bilateral upper extremity ROM WFL;bilateral lower extremity ROM WFL  -     Row Name 01/16/21 1430          Strength Comprehensive (MMT)    Comment, General Manual Muscle Testing (MMT) Assessment  B sh flexors 3+/5. B elbow flexors 4+. B elbow extensors 5. R hip flexors 4-. L hip flexors 5. B knee extensors 4+. B ankle dorsiflexors 4/5.  -LS       User Key  (r) = Recorded By, (t) = Taken By,  (c) = Cosigned By    Initials Name Provider Type    Kaleigh Blake, PT Physical Therapist        Goals/Plan     Row Name 01/16/21 1430          Bed Mobility Goal 1 (PT)    Activity/Assistive Device (Bed Mobility Goal 1, PT)  sit to supine/supine to sit  -LS     Elmo Level/Cues Needed (Bed Mobility Goal 1, PT)  independent  -LS     Time Frame (Bed Mobility Goal 1, PT)  10 days  -LS     Progress/Outcomes (Bed Mobility Goal 1, PT)  goal ongoing  -LS     Row Name 01/16/21 1430          Transfer Goal 1 (PT)    Activity/Assistive Device (Transfer Goal 1, PT)  sit-to-stand/stand-to-sit;walker, rolling  -LS     [place]mo Level/Cues Needed (Transfer Goal 1, PT)  modified independence  -LS     Time Frame (Transfer Goal 1, PT)  10 days  -LS     Progress/Outcome (Transfer Goal 1, PT)  goal ongoing  -LS     Row Name 01/16/21 1430          Gait Training Goal 1 (PT)    Activity/Assistive Device (Gait Training Goal 1, PT)  gait (walking locomotion);assistive device use;walker, rolling  -LS     Elmo Level (Gait Training Goal 1, PT)  independent  -LS     Distance (Gait Training Goal 1, PT)  500  -LS     Time Frame (Gait Training Goal 1, PT)  10 days  -LS     Progress/Outcome (Gait Training Goal 1, PT)  goal ongoing  -LS     Row Name 01/16/21 1430          Stairs Goal 1 (PT)    Activity/Assistive Device (Stairs Goal 1, PT)  ascending stairs;descending stairs  -LS     Elmo Level/Cues Needed (Stairs Goal 1, PT)  contact guard assist  -LS     Number of Stairs (Stairs Goal 1, PT)  10  -LS     Time Frame (Stairs Goal 1, PT)  10 days  -LS     Progress/Outcome (Stairs Goal 1, PT)  goal ongoing  -LS       User Key  (r) = Recorded By, (t) = Taken By, (c) = Cosigned By    Initials Name Provider Type    Kaleigh Blake, PT Physical Therapist        Clinical Impression     Row Name 01/16/21 1430          Pain    Additional Documentation  Pain Scale: Numbers Pre/Post-Treatment (Group)  -LS     Row Name  01/16/21 1430          Pain Scale: Numbers Pre/Post-Treatment    Pretreatment Pain Rating  0/10 - no pain  -LS     Posttreatment Pain Rating  0/10 - no pain  -LS     Row Name 01/16/21 1430          Plan of Care Review    Plan of Care Reviewed With  patient  -LS     Outcome Summary  PT eval completed. Pt presents with minor deficits in mobility compared to baseline (indep bed mobility; indep transfers and amb with a 3-wheel RW; CGA on stairs w/2 handrails). Today, pt transferred indep supine to with bedrail and HOB raised; CGA sit to stand at RW; and amb 300' CGA with RW. Pt would benefit from skilled PT services to improve functional independence. Anticipate home with family when ready for discharge from hospital. Discussed possibly HHPT, but pt said the family does not want anyone to come into the home due to COVID19. Pt said her grand daughter works with her and that they walk up and down the stairs for exercise  -LS     Row Name 01/16/21 1430          Therapy Assessment/Plan (PT)    Patient/Family Therapy Goals Statement (PT)  Return to PLOF  -LS     Rehab Potential (PT)  good, to achieve stated therapy goals  -LS     Criteria for Skilled Interventions Met (PT)  yes;meets criteria;skilled treatment is necessary  -LS     Row Name 01/16/21 1430          Positioning and Restraints    Pre-Treatment Position  in bed  -LS     Post Treatment Position  chair  -LS     In Chair  notified nsg;sitting;call light within reach;encouraged to call for assist;exit alarm on;waffle cushion  -LS       User Key  (r) = Recorded By, (t) = Taken By, (c) = Cosigned By    Initials Name Provider Type    Kaleigh Blake, PT Physical Therapist        Outcome Measures     Row Name 01/16/21 1430          How much help from another person do you currently need...    Turning from your back to your side while in flat bed without using bedrails?  4  -LS     Moving from lying on back to sitting on the side of a flat bed without bedrails?  3   -LS     Moving to and from a bed to a chair (including a wheelchair)?  3  -LS     Standing up from a chair using your arms (e.g., wheelchair, bedside chair)?  3  -LS     Climbing 3-5 steps with a railing?  3  -LS     To walk in hospital room?  3  -LS     AM-PAC 6 Clicks Score (PT)  19  -LS     Row Name 01/16/21 1430          Functional Assessment    Outcome Measure Options  AM-PAC 6 Clicks Basic Mobility (PT)  -       User Key  (r) = Recorded By, (t) = Taken By, (c) = Cosigned By    Initials Name Provider Type    LS Kaleigh Miller, PT Physical Therapist        Physical Therapy Education                 Title: PT OT SLP Therapies (Not Started)     Topic: Physical Therapy (In Progress)     Point: Mobility training (Not Started)     Learner Progress:  Not documented in this visit.          Point: Home exercise program (Done)     Learning Progress Summary           Patient Acceptance, E, VU by  at 1/16/2021 1430    Comment: Discussed benefits of activity.                   Point: Body mechanics (Not Started)     Learner Progress:  Not documented in this visit.          Point: Precautions (Not Started)     Learner Progress:  Not documented in this visit.                      User Key     Initials Effective Dates Name Provider Type Discipline     07/17/19 -  Kaleigh Miller, PT Physical Therapist PT              PT Recommendation and Plan  Planned Therapy Interventions (PT): balance training, bed mobility training, gait training, home exercise program, patient/family education, neuromuscular re-education, stair training, strengthening, transfer training  Plan of Care Reviewed With: patient  Outcome Summary: PT eval completed. Pt presents with minor deficits in mobility compared to baseline (indep bed mobility; indep transfers and amb with a 3-wheel RW; CGA on stairs w/2 handrails). Today, pt transferred indep supine to with bedrail and HOB raised; CGA sit to stand at RW; and amb 300' CGA with RW. Pt  would benefit from skilled PT services to improve functional independence. Anticipate home with family when ready for discharge from hospital. Discussed possibly HHPT, but pt said the family does not want anyone to come into the home due to COVID19. Pt said her grand daughter works with her and that they walk up and down the stairs for exercise     Time Calculation:   PT Charges     Row Name 01/16/21 1430             Time Calculation    Start Time  1430  -LS      PT Received On  01/16/21  -      PT Goal Re-Cert Due Date  01/26/21  -         Timed Charges    16052 - PT Therapeutic Activity Minutes  10  -LS        User Key  (r) = Recorded By, (t) = Taken By, (c) = Cosigned By    Initials Name Provider Type    Kaleigh Blake, PT Physical Therapist        Therapy Charges for Today     Code Description Service Date Service Provider Modifiers Qty    69031322799 HC PT EVAL LOW COMPLEXITY 4 1/16/2021 Kaleigh Miller, PT GP 1    71537430319 HC PT THERAPEUTIC ACT EA 15 MIN 1/16/2021 Kaleigh Miller, PT GP 1          PT G-Codes  Outcome Measure Options: AM-PAC 6 Clicks Basic Mobility (PT)  AM-PAC 6 Clicks Score (PT): 19    Kaleigh Miller PT  1/16/2021

## 2021-01-16 NOTE — PROGRESS NOTES
"    [facility] Medicine Services  PROGRESS NOTE    Patient Name: Temi Jarrett  : 1930  MRN: [id number]    Date of Admission: 2021  Primary Care Physician: Eric Patel MD    Subjective   Subjective     CC:  F/u pneumonia, hiatal hernia    HPI:  Patient resting in bed. Very anxious about MBS. States she needs to get up with PT \"or else I will be a vegetable in this bed\". Feels weak and deconditioned.    ROS:  Gen- No fevers, chills  CV- No chest pain, palpitations  Resp- +cough, no dyspnea  GI- No N/V/D, abd pain    Objective   Objective     Vital Signs:   Temp:  [97.5 °F (36.4 °C)-98.3 °F (36.8 °C)] 97.9 °F (36.6 °C)  Heart Rate:  [] 82  Resp:  [14-16] 16  BP: (107-145)/(60-87) 145/74        Physical Exam:  Constitutional: No acute distress, awake, alert  HENT: NCAT, mucous membranes moist  Respiratory: Clear to auscultation bilaterally, respiratory effort normal   Cardiovascular: RRR, no murmurs, rubs, or gallops  Gastrointestinal: Positive bowel sounds, soft, nontender, nondistended  Musculoskeletal: No bilateral ankle edema  Psychiatric: Appropriate affect, cooperative  Neurologic: Oriented x 3, strength symmetric in all extremities, Cranial Nerves grossly intact to confrontation, speech clear  Skin: No rashes    Exam unchanged from 1/15      Results Reviewed:  Results from last 7 days   Lab Units 01/16/21  0518 01/15/21  0730 21  0913 21  0050   WBC 10*3/mm3 6.28 7.41 12.55* 16.35*   HEMOGLOBIN g/dL 10.5* 9.9* 10.3* 12.5   HEMATOCRIT % 34.3 32.3* 32.6* 40.5   PLATELETS 10*3/mm3 153 132* 143 209   INR   --   --  1.66*  --    PROCALCITONIN ng/mL  --   --   --  0.26*     Results from last 7 days   Lab Units 21  0518 01/15/21  2354 01/15/21  0730 21  0913 21  0050   SODIUM mmol/L 145  --  145 140 139   POTASSIUM mmol/L 3.9 3.9 2.9* 3.5 3.6   CHLORIDE mmol/L 110*  --  106 100 100   CO2 mmol/L 30.0*  --  32.0* 34.0* 28.0   BUN mg/dL 9  " --  12 20 21   CREATININE mg/dL 0.56*  --  0.57 0.66 0.70   GLUCOSE mg/dL 88  --  95 114* 164*   CALCIUM mg/dL 8.0*  --  8.1* 9.2 9.5   ALT (SGPT) U/L  --   --  11  --  17   AST (SGOT) U/L  --   --  12  --  19   TROPONIN T ng/mL  --   --   --   --  <0.010   PROBNP pg/mL  --   --   --   --  3,148.0*     Estimated Creatinine Clearance: 37.6 mL/min (A) (by C-G formula based on SCr of 0.56 mg/dL (L)).    Microbiology Results Abnormal     Procedure Component Value - Date/Time    Blood Culture - Blood, Hand, Left [[id number]] Collected: 01/14/21 0225    Lab Status: Preliminary result Specimen: Blood from Hand, Left Updated: 01/16/21 0331     Blood Culture No growth at 2 days    Blood Culture - Blood, Arm, Left [[id number]] Collected: 01/14/21 0225    Lab Status: Preliminary result Specimen: Blood from Arm, Left Updated: 01/16/21 0331     Blood Culture No growth at 2 days    COVID PRE-OP / PRE-PROCEDURE SCREENING ORDER (NO ISOLATION) - Swab, Nasopharynx [[id number]]  (Normal) Collected: 01/14/21 0454    Lab Status: Final result Specimen: Swab from Nasopharynx Updated: 01/14/21 0605    Narrative:      The following orders were created for panel order COVID PRE-OP / PRE-PROCEDURE SCREENING ORDER (NO ISOLATION) - Swab, Nasopharynx.  Procedure                               Abnormality         Status                     ---------                               -----------         ------                     COVID-19 and FLU A/B PCR...[[id number]]  Normal              Final result                 Please view results for these tests on the individual orders.    COVID-19 and FLU A/B PCR - Swab, Nasopharynx [[id number]]  (Normal) Collected: 01/14/21 0454    Lab Status: Final result Specimen: Swab from Nasopharynx Updated: 01/14/21 0605     COVID19 Not Detected     Influenza A PCR Not Detected     Influenza B PCR Not Detected    Narrative:      Fact sheet for providers: https://www.fda.gov/media/712379/download    Fact sheet for patients:  https://www.fda.gov/media/630666/download    Test performed by PCR.          Imaging Results (Last 24 Hours)     ** No results found for the last 24 hours. **          Results for orders placed during the hospital encounter of 08/02/20   Transthoracic Echo Complete With Contrast if Necessary Per Protocol    Narrative · Left ventricular systolic function is normal. Estimated EF = 70%.  · Left ventricular diastolic dysfunction (grade I) consistent with   impaired relaxation.  · Left atrial cavity size is moderately dilated.  · There is calcification of the aortic valve. There is no significant   stenosis or regurgitation. A Lambel's excrescence is noted on an aortic   valve leaflet.  · Moderate mitral valve regurgitation is present.  · Estimated right ventricular systolic pressure from tricuspid   regurgitation is moderately elevated (49 mmHg).  · There is a moderate (1-2cm) circumferential pericardial effusion. There   is no tamponade physiology.          I have reviewed the medications:  Scheduled Meds:apixaban, 2.5 mg, Oral, Q12H  aspirin, 81 mg, Oral, Daily  bethanechol, 10 mg, Oral, TID  busPIRone, 10 mg, Oral, BID  Diclofenac Sodium, 2 g, Topical, 4x Daily  donepezil, 10 mg, Oral, Nightly  doxycycline, 100 mg, Intravenous, Q12H  Fleets, 1 enema, Rectal, Once  gabapentin, 300 mg, Oral, BID  levothyroxine, 175 mcg, Oral, Q AM  lisinopril, 5 mg, Oral, Q24H  melatonin, 5 mg, Oral, Nightly  memantine, 10 mg, Oral, BID  methocarbamol, 500 mg, Oral, TID  pantoprazole, 40 mg, Oral, BID AC  piperacillin-tazobactam, 3.375 g, Intravenous, Q8H  sertraline, 50 mg, Oral, Daily  sodium chloride, 10 mL, Intravenous, Q12H  sucralfate, 1 g, Oral, BID      Continuous Infusions:lactated ringers, 9 mL/hr, Last Rate: 9 mL/hr (01/14/21 1027)      PRN Meds:.•  acetaminophen  •  bisacodyl  •  magnesium sulfate **OR** magnesium sulfate **OR** magnesium sulfate  •  melatonin  •  ondansetron **OR** ondansetron  •  potassium chloride **OR**  potassium chloride **OR** potassium chloride  •  Sodium Chloride (PF)  •  sodium chloride    Assessment/Plan   Assessment & Plan     Active Hospital Problems    Diagnosis  POA   • **Acute sepsis (CMS/HCC) [A41.9]  Yes   • At high risk for aspiration [Z91.89]  Yes   • Chronic pain [G89.29]  Yes   • Mild cognitive impairment [G31.84]  Yes   • Paroxysmal atrial fibrillation (CMS/HCC) [I48.0]  Yes   • Multifocal aspiration pneumonia due to large intrathoracic hiatal hernia [J18.9]  Yes   • Hiatal hernia [K44.9]  Yes   • Essential hypertension [I10]  Yes   • Hypothyroidism (acquired) [E03.9]  Yes   • Esophageal reflux [K21.9]  Yes      Resolved Hospital Problems    Diagnosis Date Resolved POA   • Pneumonia of both lower lobes due to infectious organism [J18.9] 01/14/2021 Yes        Brief Hospital Course to date:  Temi Jarrett is a 89 y.o. female female w/ PMH of GERD, hypothyroidism, HTN, hiatal hernia, PAF on anticoagulation and recurrent pneumonia who presents to [facility] ED for evaluation of epigastric pain. Patient reports nausea, vomiting, decreased appetite for several days; she also reports a low grade fever at home.     Multifocal pneumonia in the setting of large intrathoracic hiatal hernia with Hypoxia  --CT chest w/o contrast with pneumonia in right lower and right middle lobe large hiatal hernia  --Blood cultures negative  --Continue zosyn, Doxy  --off O2 and stable on room air today  --EGD 1/14 showed type III paraesophageal hernia with intrathoracic stomach, no evidence of obstruction, ulcers, or mass lesion  --resume regular diet, SLP following, planning MBS today  --continue PPI BID    Large Hiatal Hernia  GERD  - Dr. Guerrero following, can f/u outpatient to discuss possible repair when pneumonia resolved  - PPI BID, carafate     HTN:  - resume continue home medications    Hypothyroidism  - synthroid    Dementia:  - resume home regimen, aricept and namenda    PT/OT consulted today, patient needs to be more  mobile prior to discharge.    DVT Prophylaxis:  Eliquis    Disposition: I expect the patient to be discharged 1-2 days    CODE STATUS:   Code Status and Medical Interventions:   Ordered at: 01/14/21 0428     Code Status:    CPR     Medical Interventions (Level of Support Prior to Arrest):    Full       Mili Doran,   01/16/21

## 2021-01-16 NOTE — PLAN OF CARE
Goal Outcome Evaluation:  Plan of Care Reviewed With: patient  Progress: improving     SLP evaluation completed. MBS: Functional swallow though would benefit from general aspiration precautions and continue reflux precautions. No further SLP needs. Will sign-off. Please see note for further details and recommendations.

## 2021-01-16 NOTE — PLAN OF CARE
Goal Outcome Evaluation:  Pt rested throughout the night. VSS. 2L NC worn while sleeping. NSR/SB. IV abx infusing. Will continue to monitor.   Problem: Adult Inpatient Plan of Care  Goal: Plan of Care Review  Outcome: Ongoing, Progressing  Flowsheets  Taken 1/16/2021 0436 by Lamar Black, RN  Progress: no change  Taken 1/15/2021 0942 by Dorian Falcon MS CCC-SLP  Plan of Care Reviewed With: patient     Plan of Care Reviewed With: patient  Progress: no change

## 2021-01-17 PROCEDURE — 97165 OT EVAL LOW COMPLEX 30 MIN: CPT

## 2021-01-17 PROCEDURE — 99232 SBSQ HOSP IP/OBS MODERATE 35: CPT | Performed by: NURSE PRACTITIONER

## 2021-01-17 PROCEDURE — 25010000002 PIPERACILLIN SOD-TAZOBACTAM PER 1 G: Performed by: NURSE PRACTITIONER

## 2021-01-17 RX ORDER — AMOXICILLIN AND CLAVULANATE POTASSIUM 875; 125 MG/1; MG/1
1 TABLET, FILM COATED ORAL EVERY 12 HOURS SCHEDULED
Status: DISCONTINUED | OUTPATIENT
Start: 2021-01-17 | End: 2021-01-18 | Stop reason: HOSPADM

## 2021-01-17 RX ADMIN — SERTRALINE HYDROCHLORIDE 50 MG: 50 TABLET ORAL at 08:37

## 2021-01-17 RX ADMIN — MEMANTINE 10 MG: 10 TABLET ORAL at 08:37

## 2021-01-17 RX ADMIN — METHOCARBAMOL 500 MG: 500 TABLET, FILM COATED ORAL at 22:13

## 2021-01-17 RX ADMIN — BETHANECHOL CHLORIDE 10 MG: 10 TABLET ORAL at 08:37

## 2021-01-17 RX ADMIN — GABAPENTIN 300 MG: 300 CAPSULE ORAL at 21:00

## 2021-01-17 RX ADMIN — TAZOBACTAM SODIUM AND PIPERACILLIN SODIUM 3.38 G: 375; 3 INJECTION, SOLUTION INTRAVENOUS at 05:04

## 2021-01-17 RX ADMIN — LISINOPRIL 5 MG: 5 TABLET ORAL at 08:37

## 2021-01-17 RX ADMIN — APIXABAN 2.5 MG: 2.5 TABLET, FILM COATED ORAL at 08:37

## 2021-01-17 RX ADMIN — ASPIRIN 81 MG: 81 TABLET, COATED ORAL at 08:37

## 2021-01-17 RX ADMIN — PANTOPRAZOLE SODIUM 40 MG: 40 TABLET, DELAYED RELEASE ORAL at 06:41

## 2021-01-17 RX ADMIN — SUCRALFATE 1 G: 1 TABLET ORAL at 21:00

## 2021-01-17 RX ADMIN — BUSPIRONE HYDROCHLORIDE 10 MG: 10 TABLET ORAL at 21:00

## 2021-01-17 RX ADMIN — Medication 5 MG: at 21:00

## 2021-01-17 RX ADMIN — DICLOFENAC SODIUM 2 G: 10 GEL TOPICAL at 17:11

## 2021-01-17 RX ADMIN — APIXABAN 2.5 MG: 2.5 TABLET, FILM COATED ORAL at 21:08

## 2021-01-17 RX ADMIN — AMOXICILLIN AND CLAVULANATE POTASSIUM 1 TABLET: 875; 125 TABLET, FILM COATED ORAL at 21:00

## 2021-01-17 RX ADMIN — DONEPEZIL HYDROCHLORIDE 10 MG: 10 TABLET, FILM COATED ORAL at 21:00

## 2021-01-17 RX ADMIN — DICLOFENAC SODIUM 2 G: 10 GEL TOPICAL at 20:59

## 2021-01-17 RX ADMIN — METHOCARBAMOL 500 MG: 500 TABLET, FILM COATED ORAL at 17:12

## 2021-01-17 RX ADMIN — AMOXICILLIN AND CLAVULANATE POTASSIUM 1 TABLET: 875; 125 TABLET, FILM COATED ORAL at 11:59

## 2021-01-17 RX ADMIN — DICLOFENAC SODIUM 2 G: 10 GEL TOPICAL at 08:38

## 2021-01-17 RX ADMIN — MEMANTINE 10 MG: 10 TABLET ORAL at 21:01

## 2021-01-17 RX ADMIN — PANTOPRAZOLE SODIUM 40 MG: 40 TABLET, DELAYED RELEASE ORAL at 17:12

## 2021-01-17 RX ADMIN — GUAIFENESIN 1200 MG: 600 TABLET, EXTENDED RELEASE ORAL at 08:37

## 2021-01-17 RX ADMIN — BISACODYL 10 MG: 10 SUPPOSITORY RECTAL at 13:33

## 2021-01-17 RX ADMIN — SODIUM CHLORIDE, PRESERVATIVE FREE 10 ML: 5 INJECTION INTRAVENOUS at 21:01

## 2021-01-17 RX ADMIN — BETHANECHOL CHLORIDE 10 MG: 10 TABLET ORAL at 17:11

## 2021-01-17 RX ADMIN — BETHANECHOL CHLORIDE 10 MG: 10 TABLET ORAL at 21:00

## 2021-01-17 RX ADMIN — LEVOTHYROXINE SODIUM 175 MCG: 150 TABLET ORAL at 06:41

## 2021-01-17 RX ADMIN — DOXYCYCLINE 100 MG: 100 INJECTION, POWDER, LYOPHILIZED, FOR SOLUTION INTRAVENOUS at 08:37

## 2021-01-17 RX ADMIN — METHOCARBAMOL 500 MG: 500 TABLET, FILM COATED ORAL at 08:37

## 2021-01-17 RX ADMIN — BUSPIRONE HYDROCHLORIDE 10 MG: 10 TABLET ORAL at 08:37

## 2021-01-17 RX ADMIN — GUAIFENESIN 1200 MG: 600 TABLET, EXTENDED RELEASE ORAL at 21:00

## 2021-01-17 RX ADMIN — SODIUM CHLORIDE, PRESERVATIVE FREE 10 ML: 5 INJECTION INTRAVENOUS at 08:38

## 2021-01-17 RX ADMIN — DICLOFENAC SODIUM 2 G: 10 GEL TOPICAL at 11:59

## 2021-01-17 RX ADMIN — GABAPENTIN 300 MG: 300 CAPSULE ORAL at 08:37

## 2021-01-17 RX ADMIN — SUCRALFATE 1 G: 1 TABLET ORAL at 08:37

## 2021-01-17 NOTE — PLAN OF CARE
Goal Outcome Evaluation:  Plan of Care Reviewed With: patient  Progress: improving  Outcome Summary: PT rested throughout the night.  PT denies any concerns or complaints at this time.

## 2021-01-17 NOTE — PLAN OF CARE
Goal Outcome Evaluation:  Plan of Care Reviewed With: patient  Progress: improving  Outcome Summary: OT evaluation completed.  Pt. able to sharon and doff socks independently.  Pt. declined performing other ADL tasks, but with balance, strength and mvmt demonstrated with professional judgment appears will be able to perform at baseline.  Pt. given CGA for transfers, mobilizing hallwy and up and down 9 stairs, but no unsteadiness noted thoughout session.  Per pt. she feels she is back to her baseline performance.  No skilled OT services needed.

## 2021-01-17 NOTE — THERAPY DISCHARGE NOTE
Acute Care - Occupational Therapy Discharge  [facility]    Patient Name: Temi Jarrett  : 1930    MRN: [id number]                              Today's Date: 2021       Admit Date: 2021    Visit Dx:     ICD-10-CM ICD-9-CM   1. Acute sepsis (CMS/[facility])  A41.9 038.9     995.91   2. Gastric outlet obstruction  K31.1 537.0   3. Aspiration pneumonia, unspecified aspiration pneumonia type, unspecified laterality, unspecified part of lung (CMS/[facility])  J69.0 507.0   4. Acute abdominal pain  R10.9 789.00     338.19   5. Anticoagulated  Z79.01 V58.61   6. Dysphagia, unspecified type  R13.10 787.20   7. Impaired functional mobility, balance, gait, and endurance  Z74.09 V49.89     Patient Active Problem List   Diagnosis   • Esophageal reflux   • Hypothyroidism (acquired)   • Anxiety with depression   • Peripheral neuropathy   • Spinal stenosis of lumbar region   • Osteoporosis   • Disc disorder of lumbar region   • Bladder prolapse, female, acquired   • Essential hypertension   • Pernicious anemia   • Annual physical exam   • Primary osteoarthritis of both knees   • Hiatal hernia   • Multifocal aspiration pneumonia due to large intrathoracic hiatal hernia   • Paroxysmal atrial fibrillation (CMS/[facility])   • Pericardial effusion   • Mild cognitive impairment   • Acute sepsis (CMS/[facility])   • At high risk for aspiration   • Chronic pain     Past Medical History:   Diagnosis Date   • Arthritis    • Bladder prolapse, female, acquired    • Closed fracture of neck of left femur (CMS/[facility]) 2019   • Dementia (CMS/[facility])    • Depression    • Disease of thyroid gland    • Gastric polyp    • GERD (gastroesophageal reflux disease)    • History of colonic polyps    • Hyperlipidemia    • Hypertension    • IBS (irritable bowel syndrome)    • Macular degeneration    • Torn meniscus     right knee      Past Surgical History:   Procedure Laterality Date   • CHOLECYSTECTOMY     • ENDOSCOPY N/A 2021    Procedure:  ESOPHAGOGASTRODUODENOSCOPY;  Surgeon: Serjio Guerrero MD;  Location: [facility] ENDOSCOPY;  Service: General;  Laterality: N/A;   • EYE SURGERY  1998    Cataract extraction   • HERNIA REPAIR     • HIP HEMIARTHROPLASTY Left 9/28/2019    Procedure: HIP HEMIARTHROPLASTY LEFT;  Surgeon: Reddy Segundo Jr., MD;  Location: [facility] OR;  Service: Orthopedics   • HYSTERECTOMY  1976   • KNEE SURGERY Right     arthroscopy- 2000   • TONSILLECTOMY       General Information     Row Name 01/17/21 1305          OT Time and Intention    Document Type  evaluation  -SANDEEP     Mode of Treatment  individual therapy;occupational therapy  -     Row Name 01/17/21 1305          General Information    Patient Profile Reviewed  yes  -SANDEEP     Prior Level of Function  independent:;gait;transfer;feeding;grooming;dressing;min assist:;bathing;dependent:;driving;shopping  -     Existing Precautions/Restrictions  fall  -     Barriers to Rehab  none identified  -     Row Name 01/17/21 1305          Living Environment    Lives With  child(darvin), adult son and DIL and grandtr.  -     Row Name 01/17/21 1305          Home Main Entrance    Number of Stairs, Main Entrance  one  -     Row Name 01/17/21 1305          Stairs Within Home, Primary    Stairs, Within Home, Primary  16 to 17 step with B hand rails  -     Stairs Comment, Within Home, Primary  tub shower with bars and shower seat, DIL assist pt. in and out of tub.  -     Row Name 01/17/21 1305          Cognition    Orientation Status (Cognition)  oriented x 4  -     Row Name 01/17/21 1305          Safety Issues, Functional Mobility    Impairments Affecting Function (Mobility)  balance;endurance/activity tolerance;strength  -       User Key  (r) = Recorded By, (t) = Taken By, (c) = Cosigned By    Initials Name Provider Type    SANDEEP Didi Tao, OT Occupational Therapist        Mobility/ADL's     Row Name 01/17/21 1300          Bed Mobility    Comment (Bed Mobility)  UI on arrival   -[facility] Name 01/17/21 1308          Transfers    Transfers  sit-stand transfer  -     Sit-Stand Crane (Transfers)  contact guard;verbal cues good hand placement  -     Row Name 01/17/21 1308          Sit-Stand Transfer    Assistive Device (Sit-Stand Transfers)  walker, front-wheeled  -[facility] Name 01/17/21 1308          Functional Mobility    Functional Mobility- Ind. Level  contact guard assist  -     Functional Mobility- Device  rolling walker  -     Functional Mobility-Distance (Feet)  280  -     Functional Mobility- Comment  CGA for safety, but no assist needed and periods of SBA.  Flexed posture.  Pt. also able to go up and down 9 steps using 2 rails.  -[facility] Name 01/17/21 1308          Activities of Daily Living    BADL Assessment/Intervention  lower body dressing;grooming;toileting  -[facility] Name 01/17/21 1308          Lower Body Dressing Assessment/Training    Crane Level (Lower Body Dressing)  doff;don;socks;independent  -     Position (Lower Body Dressing)  unsupported sitting  -[facility] Name 01/17/21 1308          Grooming Assessment/Training    Comment (Grooming)  pt. simulated independence for tasks  -[facility] Name 01/17/21 1308          Toileting Assessment/Training    Comment (Toileting)  pt. simulated independence for.  -       User Key  (r) = Recorded By, (t) = Taken By, (c) = Cosigned By    Initials Name Provider Type    Didi Chavez, OT Occupational Therapist        Obj/Interventions     Row Name 01/17/21 1313          Sensory Assessment (Somatosensory)    Sensory Assessment (Somatosensory)  UE sensation intact pt. did report history of numbness in feet.  -[facility] Name 01/17/21 1313          Vision Assessment/Intervention    Visual Impairment/Limitations  corrective lenses full-time left at home  -[facility] Name 01/17/21 1313          Range of Motion Comprehensive    General Range of Motion  bilateral upper extremity ROM WNL  -[facility]  Name 01/17/21 1313          Strength Comprehensive (MMT)    General Manual Muscle Testing (MMT) Assessment  upper extremity strength deficits identified  -     Comment, General Manual Muscle Testing (MMT) Assessment  BUE 4/5, elbow 4+/5 to 5/5, but demonstrated adequate strength for ADL task completion.  -     Row Name 01/17/21 1313          Balance    Balance Assessment  sitting static balance;sitting dynamic balance;standing static balance;standing dynamic balance  -     Dynamic Sitting Balance  WFL;sitting in chair  -SANDEEP     Static Standing Balance  WFL;unsupported LBD tasks  -     Dynamic Standing Balance  WFL;supported mobilizing steps  -       User Key  (r) = Recorded By, (t) = Taken By, (c) = Cosigned By    Initials Name Provider Type    Didi Chavez, OT Occupational Therapist        Goals/Plan    No documentation.       Clinical Impression     Row Name 01/17/21 1315          Pain Scale: Numbers Pre/Post-Treatment    Pretreatment Pain Rating  0/10 - no pain  -SANDEEP     Posttreatment Pain Rating  0/10 - no pain  -     Row Name 01/17/21 1315          Plan of Care Review    Plan of Care Reviewed With  patient  -     Progress  improving  -     Outcome Summary  OT evaluation completed.  Pt. able to sharon and doff socks independently.  Pt. declined performing other ADL tasks, but with balance, strength and mvmt demonstrated with professional judgment appears will be able to perform at baseline.  Pt. given CGA for transfers, mobilizing hallwy and up and down 9 stairs, but no unsteadiness noted thoughout session.  Per pt. she feels she is back to her baseline performance.  No skilled OT services needed.  -     Row Name 01/17/21 1315          Therapy Assessment/Plan (OT)    Criteria for Skilled Therapeutic Interventions Met (OT)  no;skilled treatment is necessary  -     Therapy Frequency (OT)  evaluation only  -     Row Name 01/17/21 1315          Therapy Plan Review/Discharge Plan (OT)     Anticipated Discharge Disposition (OT)  home with assist  -SANDEEP     Row Name 01/17/21 1315          Vital Signs    Pre Systolic BP Rehab  125  -SANDEEP     Pre Treatment Diastolic BP  70  -SANDEEP     Post Systolic BP Rehab  -- taken and stable  -SANDEEP     Pretreatment Heart Rate (beats/min)  65  -SANDEEP     Posttreatment Heart Rate (beats/min)  -- stable not recorded  -SANDEEP     Pre SpO2 (%)  97  -SANDEEP     O2 Delivery Pre Treatment  room air  -SANDEEP     Pre Patient Position  Sitting  -SANDEEP     Intra Patient Position  Standing  -SANDEEP     Post Patient Position  Sitting  -SANDEEP     Row Name 01/17/21 1315          Positioning and Restraints    Pre-Treatment Position  sitting in chair/recliner  -SANDEEP     Post Treatment Position  chair  -SANDEEP     In Chair  sitting;call light within reach;encouraged to call for assist;exit alarm on;waffle cushion lunch tray positioned over pt  -SANDEEP       User Key  (r) = Recorded By, (t) = Taken By, (c) = Cosigned By    Initials Name Provider Type    Didi Chavez OT Occupational Therapist        Outcome Measures     Row Name 01/17/21 1319          How much help from another is currently needed...    Putting on and taking off regular lower body clothing?  4  -SANDEEP     Bathing (including washing, rinsing, and drying)  3  -SANDEEP     Toileting (which includes using toilet bed pan or urinal)  4  -SANDEEP     Putting on and taking off regular upper body clothing  4  -SANDEEP     Taking care of personal grooming (such as brushing teeth)  4  -SANDEEP     Eating meals  4  -SANDEEP     AM-PAC 6 Clicks Score (OT)  23  -SANDEEP     Row Name 01/17/21 1319          Functional Assessment    Outcome Measure Options  AM-PAC 6 Clicks Daily Activity (OT)  -SANDEEP       User Key  (r) = Recorded By, (t) = Taken By, (c) = Cosigned By    Initials Name Provider Type    Didi Chavez OT Occupational Therapist        Occupational Therapy Education                 Title: PT OT SLP Therapies (Not Started)     Topic: Occupational Therapy (In Progress)     Point: ADL training  (Done)     Description:   Instruct learner(s) on proper safety adaptation and remediation techniques during self care or transfers.   Instruct in proper use of assistive devices.              Learning Progress Summary           Patient Acceptance, E, VU by SANDEEP at 1/17/2021 6344    Comment: reason for consult and evaluation results.                   Point: Home exercise program (Not Started)     Description:   Instruct learner(s) on appropriate technique for monitoring, assisting and/or progressing therapeutic exercises/activities.              Learner Progress:  Not documented in this visit.          Point: Precautions (Not Started)     Description:   Instruct learner(s) on prescribed precautions during self-care and functional transfers.              Learner Progress:  Not documented in this visit.          Point: Body mechanics (Not Started)     Description:   Instruct learner(s) on proper positioning and spine alignment during self-care, functional mobility activities and/or exercises.              Learner Progress:  Not documented in this visit.                      User Key     Initials Effective Dates Name Provider Type Discipline    SANDEEP 06/08/18 -  Didi Tao, OT Occupational Therapist OT              OT Recommendation and Plan  Retired Outcome Summary/Treatment Plan (OT)  Anticipated Discharge Disposition (OT): home with assist  Therapy Frequency (OT): evaluation only  Plan of Care Review  Plan of Care Reviewed With: patient  Progress: improving  Outcome Summary: OT evaluation completed.  Pt. able to sharon and doff socks independently.  Pt. declined performing other ADL tasks, but with balance, strength and mvmt demonstrated with professional judgment appears will be able to perform at baseline.  Pt. given CGA for transfers, mobilizing hallwy and up and down 9 stairs, but no unsteadiness noted thoughout session.  Per pt. she feels she is back to her baseline performance.  No skilled OT services  needed.  Plan of Care Reviewed With: patient  Outcome Summary: OT evaluation completed.  Pt. able to sharon and doff socks independently.  Pt. declined performing other ADL tasks, but with balance, strength and mvmt demonstrated with professional judgment appears will be able to perform at baseline.  Pt. given CGA for transfers, mobilizing hallwy and up and down 9 stairs, but no unsteadiness noted thoughout session.  Per pt. she feels she is back to her baseline performance.  No skilled OT services needed.     Time Calculation:   Time Calculation- OT     Row Name 01/17/21 1319             Time Calculation- OT    OT Start Time  1134  -SANDEEP      OT Received On  01/17/21  -SANDEEP        User Key  (r) = Recorded By, (t) = Taken By, (c) = Cosigned By    Initials Name Provider Type    Didi Chavez OT Occupational Therapist        Therapy Charges for Today     Code Description Service Date Service Provider Modifiers Qty    [id number]  OT EVAL LOW COMPLEXITY 4 1/17/2021 Didi Tao OT GO 1               Didi Tao OT  1/17/2021

## 2021-01-17 NOTE — PROGRESS NOTES
"Patient Name:  Temi Jarrett  YOB: 1930  [id number]    Surgery Progress Note    Date of visit: 1/17/2021    Subjective   Subjective: Stable. She states that she feels better, but is still weak. She is interested in rehab.         Objective     Objective:     /70 (BP Location: Left arm, Patient Position: Lying)   Pulse 62   Temp 98.3 °F (36.8 °C) (Oral)   Resp 16   Ht 149.9 cm (59\")   Wt 51 kg (112 lb 6.4 oz)   LMP  (LMP Unknown)   SpO2 94%   BMI 22.70 kg/m²     Intake/Output Summary (Last 24 hours) at 1/17/2021 1136  Last data filed at 1/16/2021 1900  Gross per 24 hour   Intake --   Output 1200 ml   Net -1200 ml       CV:  Rhythm  regular and rate regular   L:  Clear  to auscultation bilaterally   Abd:  Bowel sounds positive , soft, nontender  Ext:  No cyanosis, clubbing, edema    Recent labs that are back at this time have been reviewed.        Assessment/Plan     Assessment/ Plan:    Hospital Problem List     * (Principal) Acute sepsis (CMS/HCC)    Esophageal reflux    Hypothyroidism (acquired)    Essential hypertension        Hiatal hernia    Multifocal aspiration pneumonia due to large intrathoracic hiatal hernia- Improving. Will plan to see her back in 2 weeks for discussion of elective repair once she recovers from her pneumonia. OK to go to rehab from my perspective.      Paroxysmal atrial fibrillation (CMS/HCC)    Overview Addendum 8/3/2020  1:48 PM by Damon Barraza IV, MD     · A. fib RVR noted in the setting of PNA with quick resolution, 8/3/2020  · Echo (8/3/2020): Normal LVEF.  Moderate  pericardial effusion.  Moderate MR.  RVSP 49 mmHg  · Spontaneously converted to NSR on IV Cardizem  · Chads Vasc 6 (age > 75, female, HTN, CAD)          Mild cognitive impairment    At high risk for aspiration    Chronic pain              Serjio Guerrero MD  1/17/2021  11:36 EST      "

## 2021-01-17 NOTE — PLAN OF CARE
Goal Outcome Evaluation:  Plan of Care Reviewed With: patient  Progress: improving  Outcome Summary: A&Ox4, VSS, on RA. Resp even, unlabored. Ambulated in hallway with PT. D/C plans home in 1-2 days. Frequently calls out for staff for various needs. PRN dulcolax suppository administered with good results. Denies any c/o pain or further needs at this time. TM

## 2021-01-17 NOTE — PROGRESS NOTES
[facility] Medicine Services  PROGRESS NOTE    Patient Name: Temi Jarrett  : 1930  MRN: [id number]    Date of Admission: 2021  Primary Care Physician: Eric Patel MD    Subjective   Subjective     CC:  F/u pneumonia, hiatal hernia    HPI:  Patient resting in bed.  States she is feeling fairly well.  Tolerating diet and happy about modified barium swallow results.  No dyspnea, shortness of breath.  Has mild dry cough and does not feel she needs cough medicine at this point.  States she still weak and not ready to go home.  Still not wanting to do rehab or home health therapy due to Covid.    ROS:  Gen- No fevers, chills  CV- No chest pain, palpitations  Resp- +cough, no dyspnea  GI- No N/V/D, abd pain    Objective   Objective     Vital Signs:   Temp:  [97.7 °F (36.5 °C)-98.6 °F (37 °C)] 98.5 °F (36.9 °C)  Heart Rate:  [59-87] 59  Resp:  [16] 16  BP: (125-149)/(67-81) 125/67        Physical Exam:  Constitutional: No acute distress, awake, alert, sitting up in bed  HENT: NCAT, mucous membranes moist  Respiratory: Clear to auscultation bilaterally, respiratory effort normal on room air  Cardiovascular: RRR, no murmurs, rubs, or gallops  Gastrointestinal: Positive bowel sounds, soft, nontender, nondistended  Musculoskeletal: No bilateral ankle edema  Psychiatric: Appropriate affect, cooperative  Neurologic: Oriented x 3, strength symmetric in all extremities, Cranial Nerves grossly intact to confrontation, speech clear  Skin: No rashes    Exam unchanged from 1/15      Results Reviewed:  Results from last 7 days   Lab Units 21  0518 01/15/21  0730 21  0913 21  0050   WBC 10*3/mm3 6.28 7.41 12.55* 16.35*   HEMOGLOBIN g/dL 10.5* 9.9* 10.3* 12.5   HEMATOCRIT % 34.3 32.3* 32.6* 40.5   PLATELETS 10*3/mm3 153 132* 143 209   INR   --   --  1.66*  --    PROCALCITONIN ng/mL  --   --   --  0.26*     Results from last 7 days   Lab Units 21  0518  01/15/21  2354 01/15/21  0730 01/14/21  0913 01/14/21  0050   SODIUM mmol/L 145  --  145 140 139   POTASSIUM mmol/L 3.9 3.9 2.9* 3.5 3.6   CHLORIDE mmol/L 110*  --  106 100 100   CO2 mmol/L 30.0*  --  32.0* 34.0* 28.0   BUN mg/dL 9  --  12 20 21   CREATININE mg/dL 0.56*  --  0.57 0.66 0.70   GLUCOSE mg/dL 88  --  95 114* 164*   CALCIUM mg/dL 8.0*  --  8.1* 9.2 9.5   ALT (SGPT) U/L  --   --  11  --  17   AST (SGOT) U/L  --   --  12  --  19   TROPONIN T ng/mL  --   --   --   --  <0.010   PROBNP pg/mL  --   --   --   --  3,148.0*     Estimated Creatinine Clearance: 37.6 mL/min (A) (by C-G formula based on SCr of 0.56 mg/dL (L)).    Microbiology Results Abnormal     Procedure Component Value - Date/Time    Blood Culture - Blood, Hand, Left [[id number]] Collected: 01/14/21 0225    Lab Status: Preliminary result Specimen: Blood from Hand, Left Updated: 01/17/21 0331     Blood Culture No growth at 3 days    Blood Culture - Blood, Arm, Left [[id number]] Collected: 01/14/21 0225    Lab Status: Preliminary result Specimen: Blood from Arm, Left Updated: 01/17/21 0331     Blood Culture No growth at 3 days    COVID PRE-OP / PRE-PROCEDURE SCREENING ORDER (NO ISOLATION) - Swab, Nasopharynx [[id number]]  (Normal) Collected: 01/14/21 0454    Lab Status: Final result Specimen: Swab from Nasopharynx Updated: 01/14/21 0605    Narrative:      The following orders were created for panel order COVID PRE-OP / PRE-PROCEDURE SCREENING ORDER (NO ISOLATION) - Swab, Nasopharynx.  Procedure                               Abnormality         Status                     ---------                               -----------         ------                     COVID-19 and FLU A/B PCR...[[id number]]  Normal              Final result                 Please view results for these tests on the individual orders.    COVID-19 and FLU A/B PCR - Swab, Nasopharynx [[id number]]  (Normal) Collected: 01/14/21 0454    Lab Status: Final result Specimen: Swab from  Nasopharynx Updated: 01/14/21 0605     COVID19 Not Detected     Influenza A PCR Not Detected     Influenza B PCR Not Detected    Narrative:      Fact sheet for providers: https://www.fda.gov/media/688784/download    Fact sheet for patients: https://www.fda.gov/media/486025/download    Test performed by PCR.          Imaging Results (Last 24 Hours)     Procedure Component Value Units Date/Time    FL Video Swallow With Speech Single Contrast [[id number]] Collected: 01/16/21 1344     Updated: 01/16/21 1621    Narrative:      EXAMINATION: FL VIDEO SWALLOW W SPEECH SINGLE-CONTRAST - 01/16/2021     INDICATION: A41.9-Sepsis, unspecified organism; K31.1-Adult hypertrophic  pyloric stenosis; J69.0-Pneumonitis due to inhalation of food and vomit;  R10.9-Unspecified abdominal pain; Z79.01-Long term (current) use of  anticoagulants; R13.10-Dysphagia, unspecified. Dysphagia.     TECHNIQUE: 20 seconds of fluoroscopy and 5 sets of images were obtained  for a modified barium swallow.      COMPARISON: None.     FINDINGS: Fluoroscopy was provided for a modified barium swallow. The  patient was evaluated the sitting position. The speech therapist was on  hand for the procedure. Patient was evaluated the sitting position.  Various consistencies of barium were administered the patient.       Impression:      Fluoroscopy for a modified barium swallow. Please see the  speech therapy team report for full details.     DICTATED:   01/16/2021  EDITED/ls :   01/16/2021                 Results for orders placed during the hospital encounter of 08/02/20   Transthoracic Echo Complete With Contrast if Necessary Per Protocol    Narrative · Left ventricular systolic function is normal. Estimated EF = 70%.  · Left ventricular diastolic dysfunction (grade I) consistent with   impaired relaxation.  · Left atrial cavity size is moderately dilated.  · There is calcification of the aortic valve. There is no significant   stenosis or regurgitation. A  Lambel's excrescence is noted on an aortic   valve leaflet.  · Moderate mitral valve regurgitation is present.  · Estimated right ventricular systolic pressure from tricuspid   regurgitation is moderately elevated (49 mmHg).  · There is a moderate (1-2cm) circumferential pericardial effusion. There   is no tamponade physiology.          I have reviewed the medications:  Scheduled Meds:apixaban, 2.5 mg, Oral, Q12H  aspirin, 81 mg, Oral, Daily  bethanechol, 10 mg, Oral, TID  busPIRone, 10 mg, Oral, BID  Diclofenac Sodium, 2 g, Topical, 4x Daily  donepezil, 10 mg, Oral, Nightly  doxycycline, 100 mg, Intravenous, Q12H  Fleets, 1 enema, Rectal, Once  gabapentin, 300 mg, Oral, BID  guaiFENesin, 1,200 mg, Oral, Q12H  levothyroxine, 175 mcg, Oral, Q AM  lisinopril, 5 mg, Oral, Q24H  melatonin, 5 mg, Oral, Nightly  memantine, 10 mg, Oral, BID  methocarbamol, 500 mg, Oral, TID  pantoprazole, 40 mg, Oral, BID AC  piperacillin-tazobactam, 3.375 g, Intravenous, Q8H  sertraline, 50 mg, Oral, Daily  sodium chloride, 10 mL, Intravenous, Q12H  sucralfate, 1 g, Oral, BID      Continuous Infusions:lactated ringers, 9 mL/hr, Last Rate: 9 mL/hr (01/14/21 1027)      PRN Meds:.•  acetaminophen  •  benzonatate  •  bisacodyl  •  magnesium sulfate **OR** magnesium sulfate **OR** magnesium sulfate  •  melatonin  •  ondansetron **OR** ondansetron  •  potassium chloride **OR** potassium chloride **OR** potassium chloride  •  Sodium Chloride (PF)  •  sodium chloride    Assessment/Plan   Assessment & Plan     Active Hospital Problems    Diagnosis  POA   • **Acute sepsis (CMS/HCC) [A41.9]  Yes   • At high risk for aspiration [Z91.89]  Yes   • Chronic pain [G89.29]  Yes   • Mild cognitive impairment [G31.84]  Yes   • Paroxysmal atrial fibrillation (CMS/HCC) [I48.0]  Yes   • Multifocal aspiration pneumonia due to large intrathoracic hiatal hernia [J18.9]  Yes   • Hiatal hernia [K44.9]  Yes   • Essential hypertension [I10]  Yes   • Hypothyroidism  (acquired) [E03.9]  Yes   • Esophageal reflux [K21.9]  Yes      Resolved Hospital Problems    Diagnosis Date Resolved POA   • Pneumonia of both lower lobes due to infectious organism [J18.9] 01/14/2021 Yes        Brief Hospital Course to date:  Temi Jarrett is a 89 y.o. female female w/ PMH of GERD, hypothyroidism, HTN, hiatal hernia, PAF on anticoagulation and recurrent pneumonia who presents to [facility] ED for evaluation of epigastric pain. Patient reports nausea, vomiting, decreased appetite for several days; she also reports a low grade fever at home.     This patient's problems and plans were partially entered by my partner and updated as appropriate by me 01/17/21.    Multifocal pneumonia in the setting of large intrathoracic hiatal hernia with Hypoxia  --CT chest w/o contrast with pneumonia in right lower and right middle lobe large hiatal hernia  --Blood cultures negative  --Change Zosyn and doxycycline to Augmentin for remainder of therapy  --off O2 and stable on room air   --EGD 1/14 showed type III paraesophageal hernia with intrathoracic stomach, no evidence of obstruction, ulcers, or mass lesion.  Continue Carafate 4 times daily and PPI twice daily  --resume regular diet with thin liquids per SLP    Large Hiatal Hernia  GERD  - Dr. Guerrero has signed off, can f/u outpatient in 2 weeks to discuss possible repair when pneumonia resolved  - PPI BID, carafate     HTN:  -continue home medications    Hypothyroidism  - synthroid    Dementia:  -Continue home regimen, aricept and namenda    Continue PT/OT.  Discussed with nursing staff and patient need to ambulate and mobilize as much as possible today in preparation for discharge home soon    DVT Prophylaxis:  Eliquis    Disposition: I expect the patient to be discharged 1-2 days    CODE STATUS:   Code Status and Medical Interventions:   Ordered at: 01/14/21 0428     Code Status:    CPR     Medical Interventions (Level of Support Prior to Arrest):    Full        Sandy Castro, APRN  01/17/21

## 2021-01-18 ENCOUNTER — READMISSION MANAGEMENT (OUTPATIENT)
Dept: CALL CENTER | Facility: HOSPITAL | Age: 86
End: 2021-01-18

## 2021-01-18 VITALS
TEMPERATURE: 97.4 F | DIASTOLIC BLOOD PRESSURE: 60 MMHG | HEART RATE: 56 BPM | SYSTOLIC BLOOD PRESSURE: 122 MMHG | RESPIRATION RATE: 18 BRPM | BODY MASS INDEX: 24.37 KG/M2 | HEIGHT: 59 IN | OXYGEN SATURATION: 94 % | WEIGHT: 120.9 LBS

## 2021-01-18 PROCEDURE — 99239 HOSP IP/OBS DSCHRG MGMT >30: CPT | Performed by: NURSE PRACTITIONER

## 2021-01-18 PROCEDURE — U0004 COV-19 TEST NON-CDC HGH THRU: HCPCS | Performed by: NURSE PRACTITIONER

## 2021-01-18 PROCEDURE — 97116 GAIT TRAINING THERAPY: CPT

## 2021-01-18 PROCEDURE — C9803 HOPD COVID-19 SPEC COLLECT: HCPCS | Performed by: NURSE PRACTITIONER

## 2021-01-18 PROCEDURE — 97530 THERAPEUTIC ACTIVITIES: CPT

## 2021-01-18 RX ORDER — AMOXICILLIN AND CLAVULANATE POTASSIUM 875; 125 MG/1; MG/1
1 TABLET, FILM COATED ORAL EVERY 12 HOURS SCHEDULED
Qty: 7 TABLET | Refills: 0 | Status: SHIPPED | OUTPATIENT
Start: 2021-01-18 | End: 2021-01-21 | Stop reason: SINTOL

## 2021-01-18 RX ADMIN — MEMANTINE 10 MG: 10 TABLET ORAL at 08:15

## 2021-01-18 RX ADMIN — SERTRALINE HYDROCHLORIDE 50 MG: 50 TABLET ORAL at 08:15

## 2021-01-18 RX ADMIN — ASPIRIN 81 MG: 81 TABLET, COATED ORAL at 08:15

## 2021-01-18 RX ADMIN — GABAPENTIN 300 MG: 300 CAPSULE ORAL at 08:15

## 2021-01-18 RX ADMIN — LEVOTHYROXINE SODIUM 175 MCG: 150 TABLET ORAL at 07:35

## 2021-01-18 RX ADMIN — GUAIFENESIN 1200 MG: 600 TABLET, EXTENDED RELEASE ORAL at 08:15

## 2021-01-18 RX ADMIN — DICLOFENAC SODIUM 2 G: 10 GEL TOPICAL at 08:16

## 2021-01-18 RX ADMIN — SUCRALFATE 1 G: 1 TABLET ORAL at 08:15

## 2021-01-18 RX ADMIN — DICLOFENAC SODIUM 2 G: 10 GEL TOPICAL at 12:15

## 2021-01-18 RX ADMIN — BETHANECHOL CHLORIDE 10 MG: 10 TABLET ORAL at 08:15

## 2021-01-18 RX ADMIN — SODIUM CHLORIDE, PRESERVATIVE FREE 10 ML: 5 INJECTION INTRAVENOUS at 08:16

## 2021-01-18 RX ADMIN — METHOCARBAMOL 500 MG: 500 TABLET, FILM COATED ORAL at 08:15

## 2021-01-18 RX ADMIN — APIXABAN 2.5 MG: 2.5 TABLET, FILM COATED ORAL at 08:15

## 2021-01-18 RX ADMIN — LISINOPRIL 5 MG: 5 TABLET ORAL at 08:15

## 2021-01-18 RX ADMIN — BUSPIRONE HYDROCHLORIDE 10 MG: 10 TABLET ORAL at 08:15

## 2021-01-18 RX ADMIN — PANTOPRAZOLE SODIUM 40 MG: 40 TABLET, DELAYED RELEASE ORAL at 08:15

## 2021-01-18 RX ADMIN — AMOXICILLIN AND CLAVULANATE POTASSIUM 1 TABLET: 875; 125 TABLET, FILM COATED ORAL at 08:15

## 2021-01-18 NOTE — PLAN OF CARE
Goal Outcome Evaluation:  Plan of Care Reviewed With: patient  Progress: improving  Outcome Summary: Patient able to get out of bed and ambulated using walker 200 feet. She is able to tolerate this activitiy with out SOA.

## 2021-01-18 NOTE — PROGRESS NOTES
Case Management Discharge Note      Final Note: Plan is home with family. Family will transport. Rolling walker delivered by [employer]. Patient and family are refusing home health and rehab due to COVID.    Provided Post Acute Provider List?: N/A  Provided Post Acute Provider Quality & Resource List?: N/A    Selected Continued Care - Admitted Since 1/14/2021     Destination    No services have been selected for the patient.              Durable Medical Equipment Coordination complete    Service Provider Selected Services Address Phone Fax Patient Preferred    [email or web address]  Durable Medical Equipment [address] [place] [id number] [phone] --          Dialysis/Infusion    No services have been selected for the patient.              Home Medical Care    No services have been selected for the patient.              Therapy    No services have been selected for the patient.              Community Resources    No services have been selected for the patient.                       Final Discharge Disposition Code: 01 - home or self-care

## 2021-01-18 NOTE — PROGRESS NOTES
"Patient Name:  Temi Jarrett  YOB: 1930  [id number]    Surgery Progress Note    Date of visit: 1/18/2021    Subjective   Subjective: Stable, family refused placement.         Objective     Objective:     /71 (BP Location: Right arm, Patient Position: Lying)   Pulse 52   Temp 98.4 °F (36.9 °C) (Oral)   Resp 16   Ht 149.9 cm (59\")   Wt 54.8 kg (120 lb 14.4 oz)   LMP  (LMP Unknown)   SpO2 98%   BMI 24.42 kg/m²     Intake/Output Summary (Last 24 hours) at 1/18/2021 0720  Last data filed at 1/18/2021 0600  Gross per 24 hour   Intake 360 ml   Output 600 ml   Net -240 ml       CV:  Rhythm  regular and rate regular   L:  Clear  to auscultation bilaterally   Abd:  Bowel sounds positive , soft, nontender  Ext:  No cyanosis, clubbing, edema    Recent labs that are back at this time have been reviewed.        Assessment/Plan     Assessment/ Plan:    Hospital Problem List     * (Principal) Acute sepsis (CMS/HCC)    Esophageal reflux    Hypothyroidism (acquired)    Essential hypertension        Hiatal hernia    Multifocal aspiration pneumonia due to large intrathoracic hiatal hernia-  Stable. Will sign off for now, patient to follow up with me in the office in 2 weeks for discussion of elective repair of PEH.      Paroxysmal atrial fibrillation (CMS/HCC)    Overview Addendum 8/3/2020  1:48 PM by Damon Barraza IV, MD     · A. fib RVR noted in the setting of PNA with quick resolution, 8/3/2020  · Echo (8/3/2020): Normal LVEF.  Moderate  pericardial effusion.  Moderate MR.  RVSP 49 mmHg  · Spontaneously converted to NSR on IV Cardizem  · Chads Vasc 6 (age > 75, female, HTN, CAD)          Mild cognitive impairment    At high risk for aspiration    Chronic pain              Serjio Guerrero MD  1/18/2021  07:20 EST      "

## 2021-01-18 NOTE — OUTREACH NOTE
Prep Survey      Responses   Alevism facility patient discharged from?  [facility]   Is LACE score < 7 ?  No   Emergency Room discharge w/ pulse ox?  No   Eligibility  [facility]   Date of Admission  01/14/21   Date of Discharge  01/18/21   Discharge Disposition  Home or Self Care   Discharge diagnosis  Acute sepsis   Does the patient have one of the following disease processes/diagnoses(primary or secondary)?  Sepsis   Does the patient have Home health ordered?  No   Is there a DME ordered?  Yes   What DME was ordered?  [facility] for rolling walker   Prep survey completed?  Yes          Shelli Stroud RN

## 2021-01-18 NOTE — PROGRESS NOTES
Continued Stay Note   [facility]     Patient Name: Temi Jarrett  MRN: [id number]  Today's Date: 1/18/2021    Admit Date: 1/14/2021    Discharge Plan     Row Name 01/18/21 0856       Plan    Plan  Home with family    Patient/Family in Agreement with Plan  yes    Plan Comments  Spoke with patient at bedside. Plan is home with family. Patient is refusing home health and rehab because of COVID. She does not want to introduced it their home. CM will continue to follow.    Final Discharge Disposition Code  01 - home or self-care        Discharge Codes    No documentation.             Oscar Sterling RN

## 2021-01-18 NOTE — DISCHARGE SUMMARY
[facility] Medicine Services  DISCHARGE SUMMARY    Patient Name: Temi Jarrett  : 1930  MRN: [id number]    Date of Admission: 2021  1:43 AM  Date of Discharge:  2021  Primary Care Physician: Eric Patel MD    Consults     Date and Time Order Name Status Description    2021 0846 Inpatient General Surgery Consult Completed           Hospital Course     Presenting Problem:   Acute sepsis (CMS/HCC) [A41.9]  Acute sepsis (CMS/HCC) [A41.9]    Active Hospital Problems    Diagnosis  POA   • **Acute sepsis (CMS/HCC) [A41.9]  Yes   • At high risk for aspiration [Z91.89]  Yes   • Chronic pain [G89.29]  Yes   • Mild cognitive impairment [G31.84]  Yes   • Paroxysmal atrial fibrillation (CMS/HCC) [I48.0]  Yes   • Multifocal aspiration pneumonia due to large intrathoracic hiatal hernia [J18.9]  Yes   • Hiatal hernia [K44.9]  Yes   • Essential hypertension [I10]  Yes   • Hypothyroidism (acquired) [E03.9]  Yes   • Esophageal reflux [K21.9]  Yes      Resolved Hospital Problems    Diagnosis Date Resolved POA   • Pneumonia of both lower lobes due to infectious organism [J18.9] 2021 Yes          Hospital Course:  Temi Jarrett is a 89 y.o. female w/ PMH of GERD, hypothyroidism, HTN, hiatal hernia, PAF on anticoagulation and recurrent pneumonia who presents to [facility] ED for evaluation of epigastric pain. Patient reports nausea, vomiting, decreased appetite for several days; she also reports a low grade fever at home.         Multifocal pneumonia in the setting of large intrathoracic hiatal hernia with Hypoxia  --CT chest w/o contrast with pneumonia in right lower and right middle lobe large hiatal hernia  --Blood cultures negative  --Change Zosyn and doxycycline to Augmentin for remainder of therapy  To be complete   --off O2 and stable on room air   --EGD  showed type III paraesophageal hernia with intrathoracic stomach, no evidence of obstruction, ulcers, or  mass lesion.  Continue Carafate 4 times daily and PPI twice daily  --resume regular diet with thin liquids per SLP     Large Hiatal Hernia  GERD  - Dr. Guerrero has signed off, can f/u outpatient in 2 weeks to discuss possible repair when pneumonia resolved  - PPI BID, carafate     HTN:  -continue home medications     Hypothyroidism  - synthroid     Dementia:  -Continue home regimen, aricept and namenda    Patient has done with with PT/OT. Walked 200ft and up/ down stairs without assist. Patient/ family decline therapy services outpatient/ in home due to COVID. Now Victor M, son/ POA, concerned about taking home due to presence in hospital and would like COVID swab prior to dc. Explained multiple times this is not indicated as patient has had a negative swab 4 days ago and not displaying any symptoms indicative of COVID. He declines picking up mother until Have discussed with Dr. Trujillo and approved for Lexar screening which will return in the next ~ 24 hours. She is still ready for discharge and plans are to go home. We will call with the results tomorrow.    Discharge Follow Up Recommendations for outpatient labs/diagnostics:  Follow up with PCP 1 week  Follow up with Dr. Guerrero 2 weeks      Day of Discharge     HPI:   Patient sitting up in bed, getting ready to work with PT during my exam. States she is feeling very well and feels at baseline. Was able to walk up stairs yesterday and doing well with HW walks. No soa/ OROPEZA and cough resolved.     Review of Systems  Gen- No fevers, chills  CV- No chest pain, palpitations  Resp- No cough, dyspnea  GI- No N/V/D, abd pain        Vital Signs:   Temp:  [97.4 °F (36.3 °C)-98.4 °F (36.9 °C)] 97.4 °F (36.3 °C)  Heart Rate:  [52-70] 70  Resp:  [16-18] 18  BP: (122-159)/(60-77) 122/60     Physical Exam:  Constitutional: No acute distress, awake, alert, spry, elderly lady appears  Younger than stated age  HENT: NCAT, mucous membranes moist  Respiratory: Clear to auscultation  bilaterally, respiratory effort normal on RA  Cardiovascular: RRR, no murmurs, rubs, or gallops  Gastrointestinal: Positive bowel sounds, soft, nontender, nondistended  Musculoskeletal: No bilateral ankle edema  Psychiatric: Appropriate affect, cooperative  Neurologic: Oriented x 3, strength symmetric in all extremities, Cranial Nerves grossly intact to confrontation, speech clear  Skin: No rashes      Pertinent  and/or Most Recent Results     LAB RESULTS:      Lab 01/16/21  0518 01/15/21  0730 01/14/21  0913 01/14/21  0531 01/14/21  0050   WBC 6.28 7.41 12.55*  --  16.35*   HEMOGLOBIN 10.5* 9.9* 10.3*  --  12.5   HEMATOCRIT 34.3 32.3* 32.6*  --  40.5   PLATELETS 153 132* 143  --  209   NEUTROS ABS 4.50 5.77 10.29*  --  15.37*   IMMATURE GRANS (ABS) 0.02 0.02 0.06*  --   --    LYMPHS ABS 1.06 0.93 1.14  --   --    MONOS ABS 0.50 0.58 1.01*  --   --    EOS ABS 0.15 0.07 0.02  --  0.00   MCV 93.2 91.8 91.6  --  89.0   PROCALCITONIN  --   --   --   --  0.26*   LACTATE  --   --   --  3.0* 3.3*   PROTIME  --   --  19.3*  --   --          Lab 01/16/21  0518 01/15/21  2354 01/15/21  0730 01/14/21  0913 01/14/21  0050   SODIUM 145  --  145 140 139   POTASSIUM 3.9 3.9 2.9* 3.5 3.6   CHLORIDE 110*  --  106 100 100   CO2 30.0*  --  32.0* 34.0* 28.0   ANION GAP 5.0  --  7.0 6.0 11.0   BUN 9  --  12 20 21   CREATININE 0.56*  --  0.57 0.66 0.70   GLUCOSE 88  --  95 114* 164*   CALCIUM 8.0*  --  8.1* 9.2 9.5   MAGNESIUM 2.6*  --  1.7 1.7  --    PHOSPHORUS  --   --   --  3.8  --    HEMOGLOBIN A1C  --   --   --  4.60*  --    TSH  --   --   --  0.255*  --          Lab 01/15/21  0730 01/14/21  0050   TOTAL PROTEIN 5.1* 6.5   ALBUMIN 3.00* 4.00   GLOBULIN 2.1 2.5   ALT (SGPT) 11 17   AST (SGOT) 12 19   BILIRUBIN 0.6 0.6   ALK PHOS 62 86   LIPASE  --  27         Lab 01/14/21  0913 01/14/21  0050   PROBNP  --  3,148.0*   TROPONIN T  --  <0.010   PROTIME 19.3*  --    INR 1.66*  --          Lab 01/14/21 0913   CHOLESTEROL 124   LDL CHOL  63   HDL CHOL 51   TRIGLYCERIDES 42         Lab 01/14/21  0913   ABO TYPING AB   RH TYPING Positive   ANTIBODY SCREEN Negative         Brief Urine Lab Results  (Last result in the past 365 days)      Color   Clarity   Blood   Leuk Est   Nitrite   Protein   CREAT   Urine HCG        01/14/21 0346 Yellow Clear Moderate (2+) Negative Negative >=300 mg/dL (3+)             Microbiology Results (last 10 days)     Procedure Component Value - Date/Time    COVID PRE-OP / PRE-PROCEDURE SCREENING ORDER (NO ISOLATION) - Swab, Nasopharynx [[id number]]  (Normal) Collected: 01/14/21 0454    Lab Status: Final result Specimen: Swab from Nasopharynx Updated: 01/14/21 0605    Narrative:      The following orders were created for panel order COVID PRE-OP / PRE-PROCEDURE SCREENING ORDER (NO ISOLATION) - Swab, Nasopharynx.  Procedure                               Abnormality         Status                     ---------                               -----------         ------                     COVID-19 and FLU A/B PCR...[[id number]]  Normal              Final result                 Please view results for these tests on the individual orders.    COVID-19 and FLU A/B PCR - Swab, Nasopharynx [[id number]]  (Normal) Collected: 01/14/21 0454    Lab Status: Final result Specimen: Swab from Nasopharynx Updated: 01/14/21 0605     COVID19 Not Detected     Influenza A PCR Not Detected     Influenza B PCR Not Detected    Narrative:      Fact sheet for providers: https://www.fda.gov/media/958402/download    Fact sheet for patients: https://www.fda.gov/media/007738/download    Test performed by PCR.    Blood Culture - Blood, Hand, Left [[id number]] Collected: 01/14/21 0225    Lab Status: Preliminary result Specimen: Blood from Hand, Left Updated: 01/18/21 0331     Blood Culture No growth at 4 days    Blood Culture - Blood, Arm, Left [[id number]] Collected: 01/14/21 0225    Lab Status: Preliminary result Specimen: Blood from Arm, Left Updated: 01/18/21  0331     Blood Culture No growth at 4 days          Ct Chest Without Contrast Diagnostic    Result Date: 1/14/2021  CT Abdomen Pelvis W, CT Chest WO INDICATION: Chest and epigastric pain with new onset lower abdominal pain today. TECHNIQUE: CT of the chest without contrast. CT abdomen and pelvis with IV contrast. Delayed images were obtained through the abdomen and pelvis. Coronal and sagittal reconstructions were obtained.  Radiation dose reduction techniques included automated exposure control or exposure modulation based on body size. Count of known CT and cardiac nuc med studies performed in previous 12 months: 5. COMPARISON: CT chest, abdomen, pelvis 8/3/2020, CT chest 8/15/2020 FINDINGS: Heart remains enlarged. Pericardial effusion has resolved. There is atherosclerotic disease and coronary artery disease. There is a trace amount of right-sided pleural fluid. There is no left pleural effusion identified. There is gastroesophageal reflux to nearly the level of the thoracic inlet which may place the patient at risk for aspiration. Again seen is a very large hiatal hernia containing nearly the entire stomach. It is distended with fluid on the current study. The stomach is distended to the level of the diaphragmatic hiatus. No focal obstructing point is seen, but some degree of gastric outlet obstruction is likely present based on this appearance. Lung windows show atelectasis adjacent to both sides of the hernia sac. Additionally, there is acute pneumonia in the right lower and right middle lobes, as well as within the lingula. Imaging features are atypical or uncommonly reported for COVID-19 pneumonia. Alternative diagnoses should be considered. Gallbladder is surgically absent. There is no biliary obstruction. There is diffuse atherosclerotic disease with no aortic aneurysm. There are multiple bilateral renal cysts. There is no hydronephrosis. Spleen remains enlarged measuring 13.7 cm in length. There is also  mild degree of hepatomegaly. The right hepatic lobe measures 19.4 cm in length. It extends into the upper pelvis. Detail in the pelvis is obscured by streak artifact from a left hip arthroplasty. However, the urinary bladder is grossly normal. Uterus is surgically absent. No small bowel obstruction is seen. Moderate colonic stool burden may indicate constipation. There is no evidence of acute colitis. The appendix is not clearly identified, but there is nothing to suggest acute appendicitis. There is extensive lumbar degenerative disease with scoliosis.     1. Pneumonia involving the right lower and right middle lobes as well as the lingula. 2. Large hiatal hernia is again seen containing the majority of the stomach. The stomach is currently distended with fluid to the level of the diaphragmatic hiatus suggesting some component of gastric outlet obstruction at this level. 3. Gastroesophageal reflux to nearly the level of the thoracic inlet may place the patient risk for aspiration. 4. Moderate stool burden in the colon suggesting constipation. No bowel obstruction or acute GI tract inflammation. 5. Hepatosplenomegaly. 6. Cholecystectomy without biliary obstruction. 7. Hysterectomy and left hip arthroplasty. 8. Additional findings as above. Signer Name: Zaheer Hastings MD  Signed: 1/14/2021 3:39 AM  Workstation Name: [facility]  [facility]    Ct Abdomen Pelvis With Contrast    Result Date: 1/14/2021  CT Abdomen Pelvis W, CT Chest WO INDICATION: Chest and epigastric pain with new onset lower abdominal pain today. TECHNIQUE: CT of the chest without contrast. CT abdomen and pelvis with IV contrast. Delayed images were obtained through the abdomen and pelvis. Coronal and sagittal reconstructions were obtained.  Radiation dose reduction techniques included automated exposure control or exposure modulation based on body size. Count of known CT and cardiac nuc med studies performed in previous 12  months: 5. COMPARISON: CT chest, abdomen, pelvis 8/3/2020, CT chest 8/15/2020 FINDINGS: Heart remains enlarged. Pericardial effusion has resolved. There is atherosclerotic disease and coronary artery disease. There is a trace amount of right-sided pleural fluid. There is no left pleural effusion identified. There is gastroesophageal reflux to nearly the level of the thoracic inlet which may place the patient at risk for aspiration. Again seen is a very large hiatal hernia containing nearly the entire stomach. It is distended with fluid on the current study. The stomach is distended to the level of the diaphragmatic hiatus. No focal obstructing point is seen, but some degree of gastric outlet obstruction is likely present based on this appearance. Lung windows show atelectasis adjacent to both sides of the hernia sac. Additionally, there is acute pneumonia in the right lower and right middle lobes, as well as within the lingula. Imaging features are atypical or uncommonly reported for COVID-19 pneumonia. Alternative diagnoses should be considered. Gallbladder is surgically absent. There is no biliary obstruction. There is diffuse atherosclerotic disease with no aortic aneurysm. There are multiple bilateral renal cysts. There is no hydronephrosis. Spleen remains enlarged measuring 13.7 cm in length. There is also mild degree of hepatomegaly. The right hepatic lobe measures 19.4 cm in length. It extends into the upper pelvis. Detail in the pelvis is obscured by streak artifact from a left hip arthroplasty. However, the urinary bladder is grossly normal. Uterus is surgically absent. No small bowel obstruction is seen. Moderate colonic stool burden may indicate constipation. There is no evidence of acute colitis. The appendix is not clearly identified, but there is nothing to suggest acute appendicitis. There is extensive lumbar degenerative disease with scoliosis.     1. Pneumonia involving the right lower and right  middle lobes as well as the lingula. 2. Large hiatal hernia is again seen containing the majority of the stomach. The stomach is currently distended with fluid to the level of the diaphragmatic hiatus suggesting some component of gastric outlet obstruction at this level. 3. Gastroesophageal reflux to nearly the level of the thoracic inlet may place the patient risk for aspiration. 4. Moderate stool burden in the colon suggesting constipation. No bowel obstruction or acute GI tract inflammation. 5. Hepatosplenomegaly. 6. Cholecystectomy without biliary obstruction. 7. Hysterectomy and left hip arthroplasty. 8. Additional findings as above. 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 Dysphagia.  TECHNIQUE: 20 seconds of fluoroscopy and 5 sets of images were obtained for a modified barium swallow.  COMPARISON: None.  FINDINGS: Fluoroscopy was provided for a modified barium swallow. The patient was evaluated the sitting position. The speech therapist was on hand for the procedure. Patient was evaluated the sitting position. Various consistencies of barium were administered the patient.      Fluoroscopy for a modified barium swallow. Please see the speech therapy team report for full details.  DICTATED:   01/16/2021 EDITED/ls :   01/16/2021    This report was finalized on 1/17/2021 3:37 PM by Dr. Marita Vance MD.      Xr Abdomen Kub    Result Date: 1/14/2021  CR Abdomen 1 Vw INDICATION: 89-year-old female. NG tube placement verification.  Sepsis in the emergency Department. COMPARISON: CT chest 0326 hours FINDINGS: AP radiographs of the abdomen. There is an enteric tube present. The tip appears to be at the level of the distal body stomach in the setting of a large hiatal hernia and intrathoracic stomach. Heart is enlarged. Shallow lung expansion and bronchovascular crowding. No pneumothorax or significant effusion. There are perihilar areas of atelectasis/pneumonia, better demonstrated on preceding CT chest. No acute osseous abnormalities. No radiopaque foreign body.     1. Enteric tube tip appears to be at the level of the distal body stomach in this patient with a large hiatal hernia and intrathoracic stomach, better demonstrated on preceding chest CT. 2. Cardiomegaly with bilateral infiltrates likely reflecting pneumonia, better demonstrated on prior chest CT. 3. Postoperative changes in the right hemiabdomen. Status post left hip replacement. Advanced degenerative changes in the lumbar spine and osteoporosis. Signer Name: Andriy Hester MD  Signed: 1/14/2021 7:17 AM  Workstation Name: [employer]  [facility]                Results for orders placed during the hospital encounter of 08/02/20   Transthoracic Echo Complete With Contrast if Necessary Per Protocol    Narrative · Left ventricular systolic function is normal. Estimated EF = 70%.  · Left ventricular diastolic dysfunction (grade I) consistent with   impaired relaxation.  · Left atrial cavity size is moderately dilated.  · There is calcification of the aortic valve. There is no significant   stenosis or regurgitation. A Lambel's excrescence is noted on an aortic   valve leaflet.  · Moderate mitral valve regurgitation is present.  · Estimated right ventricular systolic pressure from tricuspid   regurgitation is moderately elevated (49 mmHg).  · There is a moderate (1-2cm) circumferential pericardial effusion. There   is no tamponade physiology.          Plan for  "Follow-up of Pending Labs/Results:   Pending Labs     Order Current Status    Blood Culture - Blood, Arm, Left Preliminary result    Blood Culture - Blood, Hand, Left Preliminary result        Discharge Details        Discharge Medications      New Medications      Instructions Start Date   amoxicillin-clavulanate 875-125 MG per tablet  Commonly known as: AUGMENTIN   1 tablet, Oral, Every 12 Hours Scheduled         Continue These Medications      Instructions Start Date   acetaminophen 325 MG tablet  Commonly known as: TYLENOL   650 mg, Oral, Every 6 Hours PRN      apixaban 2.5 MG tablet tablet  Commonly known as: ELIQUIS   2.5 mg, Oral, Every 12 Hours Scheduled      aspirin 81 MG EC tablet   81 mg, Oral, Daily      B-D 3CC LUER-ROSY SYR 23GX1\" 23G X 1\" 3 ML misc  Generic drug: Syringe/Needle (Disp)   USE TO INJECT VITAMIN B 12 ONCE A MONTH      bethanechol 10 MG tablet  Commonly known as: URECHOLINE   10 mg, Oral, 3 Times Daily      busPIRone 10 MG tablet  Commonly known as: BUSPAR   10 mg, Oral, 2 Times Daily      cyanocobalamin 1000 MCG/ML injection   INJECT 1 ML INTO THE APPROPRIATE MUSCLE AS DIRECTED BY PRESCRIBER EVERY 30 DAYS      diclofenac 1 % gel gel  Commonly known as: VOLTAREN   APPLY 4 GRAMS TOPICALLY TO THE APPROPRAITE AREA AS DIRECTED FOUR TIMES A DAY      donepezil 10 MG tablet  Commonly known as: ARICEPT   10 mg, Oral, Every Night at Bedtime      furosemide 20 MG tablet  Commonly known as: Lasix   20 mg, Oral, Daily      gabapentin 300 MG capsule  Commonly known as: NEURONTIN   TAKE ONE CAPSULE BY MOUTH TWICE A DAY      HYDROcodone-acetaminophen 5-325 MG per tablet  Commonly known as: NORCO   1 tablet, Oral, 2 Times Daily PRN      Lactobacillus tablet   1 tablet, Oral, Daily      lisinopril 5 MG tablet  Commonly known as: PRINIVIL,ZESTRIL   5 mg, Oral, Every 24 Hours Scheduled      melatonin 3 MG tablet   3 mg, Oral, Nightly      memantine 10 MG tablet  Commonly known as: NAMENDA   TAKE ONE TABLET BY " MOUTH TWICE A DAY      methocarbamol 500 MG tablet  Commonly known as: Robaxin   500 mg, Oral, 3 Times Daily      multivitamin with minerals tablet tablet   1 tablet, Oral, 2 Times Daily      multivitamin with minerals tablet tablet   1 tablet, Oral, Daily      pantoprazole 40 MG EC tablet  Commonly known as: Protonix   40 mg, Oral, Daily      potassium chloride 10 MEQ CR capsule  Commonly known as: MICRO-K   20 mEq, Oral, 2 times daily      sertraline 50 MG tablet  Commonly known as: ZOLOFT   TAKE ONE TABLET BY MOUTH DAILY      sucralfate 1 g tablet  Commonly known as: CARAFATE   1 g, Oral, 2 Times Daily      Synthroid 175 MCG tablet  Generic drug: levothyroxine   175 mcg, Oral, Daily, Patient receives medication from patient assistance.  NDC:6919-8064-59 EXP:07/02/2020 LOT [id number]      traMADol 50 MG tablet  Commonly known as: ULTRAM   TAKE ONE TABLET BY MOUTH DAILY AS NEEDED FOR MODERATE PAIN IN THE RIGHT KNEE      VITAMIN D PO   2000 U qd         Stop These Medications    diphenoxylate-atropine 2.5-0.025 MG per tablet  Commonly known as: LOMOTIL            Allergies   Allergen Reactions   • Codeine Nausea Only     Trouble Breathing    • Shrimp Hives   • Loving Unknown - Low Severity         Discharge Disposition:  Home or Self Care    Diet:  Hospital:  Diet Order   Procedures   • Diet Regular       Activity:  Activity Instructions     Activity as Tolerated            Restrictions or Other Recommendations:         CODE STATUS:    Code Status and Medical Interventions:   Ordered at: 01/14/21 0428     Code Status:    CPR     Medical Interventions (Level of Support Prior to Arrest):    Full       No future appointments.    Additional Instructions for the Follow-ups that You Need to Schedule     Discharge Follow-up with PCP   As directed       Currently Documented PCP:    Eric Patel MD    PCP Phone Number:    [phone]     Follow Up Details: 1 week         Discharge Follow-up with Specialty:   Cesar; 2 Weeks   As directed      Specialty: Dr. Guerrero    Follow Up: 2 Weeks                     MAIDA Rothman  01/18/21      Time Spent on Discharge:  I spent  120  minutes on this discharge activity which included: face-to-face encounter with the patient, reviewing the data in the system, coordination of the care with the nursing staff as well as consultants, documentation, and entering orders.        Electronically signed by MAIDA Rothman, 01/18/21, 12:38 PM EST.

## 2021-01-19 ENCOUNTER — TRANSITIONAL CARE MANAGEMENT TELEPHONE ENCOUNTER (OUTPATIENT)
Dept: CALL CENTER | Facility: HOSPITAL | Age: 86
End: 2021-01-19

## 2021-01-19 LAB
BACTERIA SPEC AEROBE CULT: NORMAL
BACTERIA SPEC AEROBE CULT: NORMAL
SARS-COV-2 RNA RESP QL NAA+PROBE: NOT DETECTED

## 2021-01-19 NOTE — OUTREACH NOTE
Call Center TCM Note      Responses   [facility] patient discharged from?  [place]   Does the patient have one of the following disease processes/diagnoses(primary or secondary)?  Sepsis   TCM attempt successful?  No   Unsuccessful attempts  Attempt 1 [Asked for callback later. ]          Jolene Mehta RN    1/19/2021, 11:18 EST

## 2021-01-19 NOTE — OUTREACH NOTE
Call Center TCM Note      Responses   [facility] patient discharged from?  [place]   Does the patient have one of the following disease processes/diagnoses(primary or secondary)?  Sepsis   TCM attempt successful?  Yes   Call start time  1459   Call end time  1511   Discharge diagnosis  Acute sepsis   Is patient permission given to speak with other caregiver?  Yes   List who call center can speak with  Chris Jarrett, daughter in law   Person spoke with today (if not patient) and relationship  Chris, daughter in law   Meds reviewed with patient/caregiver?  Yes   Is the patient having any side effects they believe may be caused by any medication additions or changes?  No   Does the patient have all medications related to this admission filled (includes all antibiotics, inhalers, nebulizers,steroids,etc.)  Yes   Is the patient taking all medications as directed (includes completed medication regime)?  No   What is preventing the patient from taking all medications as directed?  Other [Family states that they did give a dose of the lomotil today.]   Nursing Interventions  Advised patient to call provider [Advised to contact provider regarding lomotil DC order. ]   Medication comments  Per discharge summary, patient to take carafate 4 times a day. AVS, instructs for twice a day.    Does the patient have a primary care provider?   Yes   Comments regarding PCP  PCP Dr Patel. Telephone visit in place for 1/25/21. Family refuses in office appts.    Does the patient have an appointment with their PCP within 7 days of discharge?  Yes [telemed appt. ]   Has the patient kept scheduled appointments due by today?  N/A   Comments  Patient to follow up with Dr Guerrero 2 weeks.    Has home health visited the patient within 72 hours of discharge?  N/A   What DME was ordered?  [employer] for rolling walker   Has all DME been delivered?  Yes   Psychosocial issues?  No   Did the patient receive a copy of their discharge instructions?   Yes   Nursing interventions  Reviewed instructions with patient   What is the patient's perception of their health status since discharge?  Improving [Family reports patients breathing is easy, tolerated po intake today without N/V. ]   Is patient/caregiver able to teach back steps to recovery at home?  Set small, achievable goals for return to baseline health, Rest and regain strength, Eat a balanced diet   Is the patient/caregiver able to teach back signs and symptoms of worsening condition:  Fever, Shortness of breath/rapid respiratory rate   Is the patient/caregiver able to teach back the hierarchy of who to call/visit for symptoms/problems? PCP, Specialist, Home health nurse, Urgent Care, ED, 911  Yes   TCM call completed?  Yes          Jolene Mehta RN    1/19/2021, 15:11 EST

## 2021-01-21 ENCOUNTER — TELEPHONE (OUTPATIENT)
Dept: INTERNAL MEDICINE | Facility: CLINIC | Age: 86
End: 2021-01-21

## 2021-01-21 RX ORDER — CEFDINIR 300 MG/1
300 CAPSULE ORAL 2 TIMES DAILY
Qty: 4 CAPSULE | Refills: 0 | Status: SHIPPED | OUTPATIENT
Start: 2021-01-21 | End: 2021-01-23

## 2021-01-22 ENCOUNTER — TELEPHONE (OUTPATIENT)
Dept: INTERNAL MEDICINE | Facility: CLINIC | Age: 86
End: 2021-01-22

## 2021-01-22 RX ORDER — HYDROXYZINE HYDROCHLORIDE 25 MG/1
25 TABLET, FILM COATED ORAL 3 TIMES DAILY PRN
Qty: 30 TABLET | Refills: 1 | Status: SHIPPED | OUTPATIENT
Start: 2021-01-22 | End: 2021-05-01

## 2021-01-22 NOTE — TELEPHONE ENCOUNTER
PATIENT'S SON (PRADIP LUND) CALLED REQUESTING A MILD SEDATIVE FOR HIS MOM DUE TO THE FACT THAT SHE MAY GET ANXIOUS AND RESTLESS.    HE STATED THAT HIS DAD'S MEDICAL CONDITION IS WORSENING AND HE IS TRYING TO EXPLAIN TO HER WHAT IS GOING ON, BUT FEELS LIKE SHE WILL NEED SOMETHING DURING THIS TIME PERIOD.    CALL RX TO [address] POSSIBLY ASAP.     CALL HIM WHEN RX IS CALLED IN [phone]

## 2021-01-25 ENCOUNTER — READMISSION MANAGEMENT (OUTPATIENT)
Dept: CALL CENTER | Facility: HOSPITAL | Age: 86
End: 2021-01-25

## 2021-01-25 NOTE — OUTREACH NOTE
Sepsis Week 2 Survey      Responses   [facility] patient discharged from?  [place]   Does the patient have one of the following disease processes/diagnoses(primary or secondary)?  Sepsis   Week 2 attempt successful?  Yes   Call start time  1659   Call end time  1704   Discharge diagnosis  Acute sepsis   Person spoke with today (if not patient) and relationship  Son POA   Meds reviewed with patient/caregiver?  Yes   Is the patient having any side effects they believe may be caused by any medication additions or changes?  No   Prescription comments  Patient was changed to a different abx per provider.   Is the patient taking all medications as directed (includes completed medication regime)?  Yes   Does the patient have a primary care provider?   Yes   Comments regarding PCP  Patient has f/u with provider.   Does the patient have an appointment with their PCP within 7 days of discharge?  Yes   Has the patient kept scheduled appointments due by today?  Yes   What DME was ordered?  [employer] for rolling walker   Psychosocial issues?  No   Did the patient receive a copy of their discharge instructions?  Yes   Nursing interventions  Reviewed instructions with patient   What is the patient's perception of their health status since discharge?  Improving   Is the patient/caregiver able to teach back the hierarchy of who to call/visit for symptoms/problems? PCP, Specialist, Home health nurse, Urgent Care, ED, 911  Yes   Week 2 call completed?  Yes   Revoked  No further contact(revokes)-requires comment   Is the patient interested in additional calls from an ambulatory ?  NOTE:  applies to high risk patients requiring additional follow-up.  No   Graduated/Revoked comments  Son states patient is doing very well.  he feels she is back to baseline and denies any needs.          Shelli Stroud RN

## 2021-02-01 ENCOUNTER — OFFICE VISIT (OUTPATIENT)
Dept: INTERNAL MEDICINE | Facility: CLINIC | Age: 86
End: 2021-02-01

## 2021-02-01 DIAGNOSIS — G31.84 MILD COGNITIVE IMPAIRMENT: ICD-10-CM

## 2021-02-01 DIAGNOSIS — K52.9 CHRONIC DIARRHEA: ICD-10-CM

## 2021-02-01 DIAGNOSIS — Z91.89 AT HIGH RISK FOR ASPIRATION: ICD-10-CM

## 2021-02-01 DIAGNOSIS — I48.0 PAROXYSMAL ATRIAL FIBRILLATION (HCC): ICD-10-CM

## 2021-02-01 DIAGNOSIS — M17.0 PRIMARY OSTEOARTHRITIS OF BOTH KNEES: ICD-10-CM

## 2021-02-01 DIAGNOSIS — J18.9 MULTIFOCAL PNEUMONIA: Primary | ICD-10-CM

## 2021-02-01 DIAGNOSIS — I10 ESSENTIAL HYPERTENSION: ICD-10-CM

## 2021-02-01 DIAGNOSIS — K44.9 HIATAL HERNIA: ICD-10-CM

## 2021-02-01 DIAGNOSIS — M48.061 SPINAL STENOSIS OF LUMBAR REGION WITHOUT NEUROGENIC CLAUDICATION: ICD-10-CM

## 2021-02-01 PROCEDURE — 99443 PR PHYS/QHP TELEPHONE EVALUATION 21-30 MIN: CPT | Performed by: INTERNAL MEDICINE

## 2021-02-01 RX ORDER — DIPHENOXYLATE HYDROCHLORIDE AND ATROPINE SULFATE 2.5; .025 MG/1; MG/1
1 TABLET ORAL 4 TIMES DAILY PRN
Qty: 100 TABLET | Refills: 1 | Status: SHIPPED | OUTPATIENT
Start: 2021-02-01 | End: 2021-07-07 | Stop reason: SDUPTHER

## 2021-02-01 RX ORDER — PROMETHAZINE HYDROCHLORIDE 25 MG/1
25 TABLET ORAL EVERY 6 HOURS PRN
Qty: 30 TABLET | Refills: 2 | Status: SHIPPED | OUTPATIENT
Start: 2021-02-01 | End: 2022-02-04

## 2021-02-02 NOTE — PROGRESS NOTES
[facility]     Temi Jarrett  1930   [id number]      Patient Care Team:  Eric Patel MD as PCP - General  Eric Patel MD as PCP - Family Medicine  Von Pablo MD as Cardiologist (Cardiology)    Chief Complaint::   Chief Complaint   Patient presents with   • Pneumonia   • Hiatal Hernia      You have chosen to receive care through a telephone visit. Do you consent to use a telephone visit for your medical care today? Yes      HPI  Ms. Jarrett is seen today via telephone visit for follow-up above last months hospitalization for pneumonia and sepsis secondary to aspiration.  She has a known paraesophageal hiatal hernia.  She was hospitalized on  for several days treated aggressively with antibiotics.  She was seen by Dr. Guerrero who recommended surgical correction of the hiatal hernia to prevent further aspiration once she had recovered from pneumonia.  She has been home with her son and daughter-in-law since then.  They obsessively follow Covid precautions.  They basically do not leave the house.  They currently plan to postpone follow-up with Dr. Guerrero until summer when they have all had their Covid vaccines and the case numbers are down.  Currently she is doing well.  She is apparently eating and drinking reasonably well.  Her knee pain is chronic controlled by injections..  Apparently rheumatology comes out to the car once a month and injects her knee so she does not have to go inside.  They have concerns about the not having had her B12 shot because she does not want to come to the office.  Back pain is reasonably well controlled.  Of note her  recently  of Covid pneumonia, she is understandably grieving but seems to be doing well.    Chronic Conditions:      Patient Active Problem List   Diagnosis   • Esophageal reflux   • Hypothyroidism (acquired)   • Anxiety with depression   • Peripheral neuropathy   • Spinal stenosis of lumbar region   •  Osteoporosis   • Disc disorder of lumbar region   • Bladder prolapse, female, acquired   • Essential hypertension   • Pernicious anemia   • Annual physical exam   • Primary osteoarthritis of both knees   • Hiatal hernia   • Multifocal aspiration pneumonia due to large intrathoracic hiatal hernia   • Paroxysmal atrial fibrillation (CMS/HCC)   • Pericardial effusion   • Mild cognitive impairment   • Acute sepsis (CMS/HCC)   • At high risk for aspiration   • Chronic pain        Past Medical History:   Diagnosis Date   • Arthritis    • Bladder prolapse, female, acquired    • Closed fracture of neck of left femur (CMS/HCC) 9/27/2019   • Dementia (CMS/HCC)    • Depression    • Disease of thyroid gland    • Gastric polyp    • GERD (gastroesophageal reflux disease)    • History of colonic polyps    • Hyperlipidemia    • Hypertension    • IBS (irritable bowel syndrome)    • Macular degeneration    • Torn meniscus     right knee        Past Surgical History:   Procedure Laterality Date   • CHOLECYSTECTOMY  1997   • ENDOSCOPY N/A 1/14/2021    Procedure: ESOPHAGOGASTRODUODENOSCOPY;  Surgeon: Serjio Guerrero MD;  Location: [facility] ENDOSCOPY;  Service: General;  Laterality: N/A;   • EYE SURGERY  1998    Cataract extraction   • HERNIA REPAIR     • HIP HEMIARTHROPLASTY Left 9/28/2019    Procedure: HIP HEMIARTHROPLASTY LEFT;  Surgeon: Reddy Segundo Jr., MD;  Location: [facility] OR;  Service: Orthopedics   • HYSTERECTOMY  1976   • KNEE SURGERY Right     arthroscopy- 2000   • TONSILLECTOMY         Family History   Problem Relation Age of Onset   • Hypertension Mother    • Breast cancer Mother    • Obesity Mother    • Hypertension Father    • Diabetes Son        Social History     Socioeconomic History   • Marital status:      Spouse name: Not on file   • Number of children: Not on file   • Years of education: Not on file   • Highest education level: Not on file   Tobacco Use   • Smoking status: Never Smoker   • Smokeless  tobacco: Never Used   Substance and Sexual Activity   • Alcohol use: No   • Drug use: Defer   • Sexual activity: Defer   Social History Narrative    Lives with son and daughter in law--  is at [facility] getting rehab       Allergies   Allergen Reactions   • Codeine Nausea Only     Trouble Breathing    • Shrimp Hives   • [place] Unknown - Low Severity         Current Outpatient Medications:   •  acetaminophen (TYLENOL) 325 MG tablet, Take 650 mg by mouth Every 6 (Six) Hours As Needed for Mild Pain ., Disp: , Rfl:   •  apixaban (ELIQUIS) 2.5 MG tablet tablet, Take 1 tablet by mouth Every 12 (Twelve) Hours. Indications: Atrial Fibrillation, Disp: 60 tablet, Rfl: 2  •  aspirin 81 MG EC tablet, Take 81 mg by mouth Daily., Disp: , Rfl:   •  bethanechol (URECHOLINE) 10 MG tablet, Take 1 tablet by mouth 3 (Three) Times a Day., Disp: 90 tablet, Rfl: 3  •  busPIRone (BUSPAR) 10 MG tablet, Take 1 tablet by mouth 2 (Two) Times a Day., Disp: 60 tablet, Rfl: 5  •  Cholecalciferol (VITAMIN D PO), 2000 U qd, Disp: , Rfl:   •  cyanocobalamin 1000 MCG/ML injection, INJECT 1 ML INTO THE APPROPRIATE MUSCLE AS DIRECTED BY PRESCRIBER EVERY 30 DAYS, Disp: 1 mL, Rfl: 4  •  diclofenac (VOLTAREN) 1 % gel gel, APPLY 4 GRAMS TOPICALLY TO THE APPROPRAITE AREA AS DIRECTED FOUR TIMES A DAY, Disp: 100 g, Rfl: 4  •  diphenoxylate-atropine (Lomotil) 2.5-0.025 MG per tablet, Take 1 tablet by mouth 4 (Four) Times a Day As Needed for Diarrhea., Disp: 100 tablet, Rfl: 1  •  donepezil (ARICEPT) 10 MG tablet, Take 1 tablet by mouth every night at bedtime., Disp: 90 tablet, Rfl: 3  •  gabapentin (NEURONTIN) 300 MG capsule, TAKE ONE CAPSULE BY MOUTH TWICE A DAY, Disp: 180 capsule, Rfl: 2  •  HYDROcodone-acetaminophen (NORCO) 5-325 MG per tablet, Take 1 tablet by mouth 2 (Two) Times a Day As Needed for Severe Pain ., Disp: 60 tablet, Rfl: 0  •  hydrOXYzine (ATARAX) 25 MG tablet, Take 1 tablet by mouth 3 (Three) Times a Day As Needed for Anxiety.,  "Disp: 30 tablet, Rfl: 1  •  Lactobacillus tablet, Take 1 tablet by mouth Daily., Disp: , Rfl:   •  lisinopril (PRINIVIL,ZESTRIL) 5 MG tablet, Take 1 tablet by mouth Daily., Disp: 30 tablet, Rfl: 5  •  melatonin 3 MG tablet, Take 3 mg by mouth Every Night., Disp: , Rfl:   •  memantine (NAMENDA) 10 MG tablet, TAKE ONE TABLET BY MOUTH TWICE A DAY, Disp: 180 tablet, Rfl: 3  •  methocarbamol (Robaxin) 500 MG tablet, Take 1 tablet by mouth 3 (Three) Times a Day., Disp: 90 tablet, Rfl: 1  •  Multiple Vitamins-Minerals (MULTIVITAMIN ADULTS 50+ PO), Take 1 tablet by mouth Daily., Disp: , Rfl:   •  Multiple Vitamins-Minerals (PRESERVISION AREDS PO), Take 1 tablet by mouth 2 (Two) Times a Day., Disp: , Rfl:   •  pantoprazole (Protonix) 40 MG EC tablet, Take 1 tablet by mouth Daily., Disp: 90 tablet, Rfl: 1  •  potassium chloride (MICRO-K) 10 MEQ CR capsule, Take 2 capsules by mouth 2 (two) times a day., Disp: 360 capsule, Rfl: 1  •  promethazine (PHENERGAN) 25 MG tablet, Take 1 tablet by mouth Every 6 (Six) Hours As Needed for Nausea or Vomiting., Disp: 30 tablet, Rfl: 2  •  sertraline (ZOLOFT) 50 MG tablet, TAKE ONE TABLET BY MOUTH DAILY, Disp: 90 tablet, Rfl: 4  •  sucralfate (CARAFATE) 1 g tablet, Take 1 tablet by mouth 2 (Two) Times a Day., Disp: 180 tablet, Rfl: 1  •  Synthroid 175 MCG tablet, Take 1 tablet by mouth Daily. Patient receives medication from patient assistance.  NDC:8894-4404-60 EXP:07/02/2020 LOT [id number], Disp: 90 tablet, Rfl: 1  •  Syringe/Needle, Disp, (B-D 3CC LUER-ROSY SYR 23GX1\") 23G X 1\" 3 ML misc, USE TO INJECT VITAMIN B 12 ONCE A MONTH, Disp: 1 each, Rfl: 4  •  traMADol (ULTRAM) 50 MG tablet, TAKE ONE TABLET BY MOUTH DAILY AS NEEDED FOR MODERATE PAIN IN THE RIGHT KNEE, Disp: 30 tablet, Rfl: 2  •  furosemide (Lasix) 20 MG tablet, Take 1 tablet by mouth Daily., Disp: 30 tablet, Rfl: 5    Review of Systems   Constitutional: Negative.    Respiratory: Negative.  Negative for chest tightness and shortness " of breath.    Cardiovascular: Negative.  Negative for chest pain.   Gastrointestinal: Negative for abdominal pain, blood in stool, constipation and diarrhea.   Musculoskeletal: Positive for arthralgias, back pain and gait problem.        Vital Signs  There were no vitals filed for this visit.    Physical Exam     Procedures    ACE III MINI             Assessment/Plan:    Diagnoses and all orders for this visit:    1. Multifocal aspiration pneumonia due to large intrathoracic hiatal hernia (Primary)    2. Chronic diarrhea  -     diphenoxylate-atropine (Lomotil) 2.5-0.025 MG per tablet; Take 1 tablet by mouth 4 (Four) Times a Day As Needed for Diarrhea.  Dispense: 100 tablet; Refill: 1    3. At high risk for aspiration    4. Hiatal hernia    5. Paroxysmal atrial fibrillation (CMS/HCC)    6. Essential hypertension    7. Spinal stenosis of lumbar region without neurogenic claudication    8. Primary osteoarthritis of both knees    9. Mild cognitive impairment    Other orders  -     promethazine (PHENERGAN) 25 MG tablet; Take 1 tablet by mouth Every 6 (Six) Hours As Needed for Nausea or Vomiting.  Dispense: 30 tablet; Refill: 2    Plan    She has recovered completely from aspiration pneumonia.  I am a bit concerned that she and the family have plan to put off consideration of surgical repair of her hiatal hernia but giving Covid cannot blame them and at this point she is having no symptoms.  However she does remain at high risk for aspiration.    Atrial fibrillation is controlled on Eliquis.    Blood pressure is controlled according to home measurements on lisinopril.    Lumbar spinal stenosis is minimally symptomatic, continue tramadol and Robaxin as needed.    Cognitive impairment appears to be stable.  Continue Namenda and donepezil.    This visit has been rescheduled as a phone visit to comply with patient safety concerns in accordance with CDC recommendations. Total time of discussion was 30 minutes.          Plan of  care reviewed with patient at the conclusion of today's visit. Education was provided regarding diagnosis, management, and any prescribed or recommended OTC medications.Patient verbalizes understanding of and agreement with management plan.         Eric Patel MD

## 2021-03-07 DIAGNOSIS — E53.8 VITAMIN B 12 DEFICIENCY: ICD-10-CM

## 2021-03-08 RX ORDER — LISINOPRIL 5 MG/1
TABLET ORAL
Qty: 30 TABLET | Refills: 4 | Status: SHIPPED | OUTPATIENT
Start: 2021-03-08 | End: 2021-07-07 | Stop reason: SDUPTHER

## 2021-03-08 RX ORDER — POTASSIUM CHLORIDE 750 MG/1
CAPSULE, EXTENDED RELEASE ORAL
Qty: 180 CAPSULE | Refills: 0 | OUTPATIENT
Start: 2021-03-08

## 2021-03-08 RX ORDER — CYANOCOBALAMIN 1000 UG/ML
INJECTION, SOLUTION INTRAMUSCULAR; SUBCUTANEOUS
Qty: 1 ML | Refills: 5 | Status: SHIPPED | OUTPATIENT
Start: 2021-03-08 | End: 2021-03-18

## 2021-03-17 DIAGNOSIS — E53.8 VITAMIN B 12 DEFICIENCY: ICD-10-CM

## 2021-03-17 RX ORDER — SYRINGE WITH NEEDLE, 1 ML 25GX5/8"
SYRINGE, EMPTY DISPOSABLE MISCELLANEOUS
Qty: 1 EACH | Refills: 3 | Status: SHIPPED | OUTPATIENT
Start: 2021-03-17

## 2021-03-18 DIAGNOSIS — E53.8 VITAMIN B 12 DEFICIENCY: ICD-10-CM

## 2021-03-18 RX ORDER — CYANOCOBALAMIN 1000 UG/ML
INJECTION, SOLUTION INTRAMUSCULAR; SUBCUTANEOUS
Qty: 4 ML | Refills: 5 | Status: SHIPPED | OUTPATIENT
Start: 2021-03-18 | End: 2021-04-28

## 2021-03-22 RX ORDER — FUROSEMIDE 20 MG/1
TABLET ORAL
Qty: 90 TABLET | Refills: 1 | Status: SHIPPED | OUTPATIENT
Start: 2021-03-22 | End: 2021-10-04

## 2021-03-23 RX ORDER — DONEPEZIL HYDROCHLORIDE 10 MG/1
TABLET, FILM COATED ORAL
Qty: 90 TABLET | Refills: 2 | Status: SHIPPED | OUTPATIENT
Start: 2021-03-23 | End: 2022-02-04

## 2021-03-25 DIAGNOSIS — G89.29 OTHER CHRONIC PAIN: ICD-10-CM

## 2021-03-25 RX ORDER — TRAMADOL HYDROCHLORIDE 50 MG/1
TABLET ORAL
Qty: 30 TABLET | Refills: 1 | Status: SHIPPED | OUTPATIENT
Start: 2021-03-25 | End: 2021-06-01

## 2021-03-31 ENCOUNTER — TELEPHONE (OUTPATIENT)
Dept: INTERNAL MEDICINE | Facility: CLINIC | Age: 86
End: 2021-03-31

## 2021-03-31 DIAGNOSIS — E78.2 MIXED HYPERLIPIDEMIA: ICD-10-CM

## 2021-03-31 DIAGNOSIS — D51.0 PERNICIOUS ANEMIA: ICD-10-CM

## 2021-03-31 DIAGNOSIS — M81.0 AGE-RELATED OSTEOPOROSIS WITHOUT CURRENT PATHOLOGICAL FRACTURE: ICD-10-CM

## 2021-03-31 DIAGNOSIS — I10 ESSENTIAL HYPERTENSION: Primary | ICD-10-CM

## 2021-03-31 DIAGNOSIS — E03.9 HYPOTHYROIDISM (ACQUIRED): ICD-10-CM

## 2021-03-31 NOTE — TELEPHONE ENCOUNTER
PATIENT WOULD LIKE TO KNOW IF SHE CAN GET BLOOD WORK DONE BEFORE HER WELLNESS VISIT ON 04/28/2021. PLEASE CALL PATIENT TO LET HER KNOW IF SHE CAN GET HER BLOOD WORK DONE.    CALL [phone]

## 2021-04-15 RX ORDER — APIXABAN 2.5 MG/1
TABLET, FILM COATED ORAL
Qty: 30 TABLET | Refills: 5 | Status: SHIPPED | OUTPATIENT
Start: 2021-04-15 | End: 2022-02-10 | Stop reason: SDUPTHER

## 2021-04-20 NOTE — TELEPHONE ENCOUNTER
Caller: Chris Jarrett    Relationship: Emergency Contact    Best call back number: [phone]    What is the best time to reach you: ANYTIME    Who are you requesting to speak with (clinical staff, provider,  specific staff member): CLINICAL        What was the call regarding: PATIENT'S DAUGHTER-IN-LAW CALLED AND WANTED TO KNOW WHERE THE BLOOD WORK WILL BE DONE FOR THE PATIENT. SHE KNOWS THERE ARE ORDERS FOR THEM AND JUST NEEDS THE LOCATION.    Do you require a callback: YES

## 2021-04-22 ENCOUNTER — TELEPHONE (OUTPATIENT)
Dept: INTERNAL MEDICINE | Facility: CLINIC | Age: 86
End: 2021-04-22

## 2021-04-22 ENCOUNTER — OFFICE VISIT (OUTPATIENT)
Dept: ORTHOPEDIC SURGERY | Facility: CLINIC | Age: 86
End: 2021-04-22

## 2021-04-22 VITALS
SYSTOLIC BLOOD PRESSURE: 155 MMHG | HEART RATE: 77 BPM | WEIGHT: 126.4 LBS | DIASTOLIC BLOOD PRESSURE: 65 MMHG | BODY MASS INDEX: 25.48 KG/M2 | HEIGHT: 59 IN

## 2021-04-22 DIAGNOSIS — R30.0 DYSURIA: Primary | ICD-10-CM

## 2021-04-22 DIAGNOSIS — M17.11 PRIMARY OSTEOARTHRITIS OF RIGHT KNEE: Primary | ICD-10-CM

## 2021-04-22 PROCEDURE — 20610 DRAIN/INJ JOINT/BURSA W/O US: CPT | Performed by: ORTHOPAEDIC SURGERY

## 2021-04-22 PROCEDURE — 99214 OFFICE O/P EST MOD 30 MIN: CPT | Performed by: ORTHOPAEDIC SURGERY

## 2021-04-22 RX ORDER — LIDOCAINE HYDROCHLORIDE 10 MG/ML
3 INJECTION, SOLUTION EPIDURAL; INFILTRATION; INTRACAUDAL; PERINEURAL
Status: COMPLETED | OUTPATIENT
Start: 2021-04-22 | End: 2021-04-22

## 2021-04-22 RX ORDER — TRIAMCINOLONE ACETONIDE 40 MG/ML
40 INJECTION, SUSPENSION INTRA-ARTICULAR; INTRAMUSCULAR
Status: COMPLETED | OUTPATIENT
Start: 2021-04-22 | End: 2021-04-22

## 2021-04-22 RX ORDER — PANTOPRAZOLE SODIUM 40 MG/1
TABLET, DELAYED RELEASE ORAL
Qty: 90 TABLET | Refills: 1 | Status: SHIPPED | OUTPATIENT
Start: 2021-04-22 | End: 2021-10-26

## 2021-04-22 RX ADMIN — TRIAMCINOLONE ACETONIDE 40 MG: 40 INJECTION, SUSPENSION INTRA-ARTICULAR; INTRAMUSCULAR at 16:16

## 2021-04-22 RX ADMIN — LIDOCAINE HYDROCHLORIDE 3 ML: 10 INJECTION, SOLUTION EPIDURAL; INFILTRATION; INTRACAUDAL; PERINEURAL at 16:16

## 2021-04-22 NOTE — PROGRESS NOTES
"    [facility]        Subjective     CC: Follow-up (Primary osteoarthritis of right knee (cortisone injection 1/2/2020))      MARY Jarrett is a 89 y.o. female.  Patient returns for follow-up of her right knee arthritis.  Last seen on 5/20/2020 via telephone visit.    Overall, patient's symptoms are worsening in the right knee.    ROS:    Constiutional:Pt denies fever, chills, nausea, or vomiting.  MSK:as above        Objective      Past Medical History  Past Medical History:   Diagnosis Date   • Arthritis    • Bladder prolapse, female, acquired    • Closed fracture of neck of left femur (CMS/HCC) 9/27/2019   • Dementia (CMS/[facility])    • Depression    • Disease of thyroid gland    • Gastric polyp    • GERD (gastroesophageal reflux disease)    • History of colonic polyps    • Hyperlipidemia    • Hypertension    • IBS (irritable bowel syndrome)    • Macular degeneration    • Torn meniscus     right knee          Physical Exam  /65   Pulse 77   Ht 149.9 cm (59.02\")   Wt 57.3 kg (126 lb 6.4 oz)   LMP  (LMP Unknown)   BMI 25.52 kg/m²     Body mass index is 25.52 kg/m².    Patient is well nourished and well developed.        Ortho Exam      Musculoskeletal:  Global Assessment:  Overall assessment of Lower Extremity Muscle Strength and Tone:  Right quadriceps--5/5   Right hamstrings--5/5       Right tibialis anterior--5/5  Right gastroc-soleus--5/5  Right EHL --5/5    Lower Extremity:    Inspection and Palpation:  Right knee:  Tenderness:  Over the medial joint line and moderate severity  Effusion:  1+  Crepitus:  Positive  Pulses:  2+  Ecchymosis:  None  Warmth:  None     ROM:  Right:  Extension: 5    Flexion:120    Instability:    Right:  Lachman Test:  Negative   Varus stress test negative   Valgus stress test negative    Deformities/Malalignments/Discrepancies:    Left:  No deformities   Right:  Genu Varum    Functional Testing:  Deion's test:  Negative  Patella grind test: "  Positive  Q-angle:  normal          Imaging/Labs/EMG Reviewed:  Imaging Results (Last 24 Hours)     Procedure Component Value Units Date/Time    XR Knee 4+ View Right [[id number]] Resulted: 04/22/21 1616     Updated: 04/22/21 1617    Narrative:      Knee X-Ray    Indication: Pain    Study:  Upright AP, Skiers, Lateral, and Sunrise views of Right knee(s)    Comparison: None    Findings:    Patient appears to have hypertrophic severe degenerative changes in the   medial compartment.    There are mild to early moderate degenerative changes in the lateral   compartment.    There are severe changes in the patellofemoral compartment.    Patient has overall varus alignment.        Impression:   Severe medial compartment and  patellofemoral compartment degnerative   changes of the knee               Assessment    Assessment:  1. Primary osteoarthritis of right knee        Plan:  1. Recommend over the counter anti-inflammatories for pain and/or swelling  2. Severe right medial and patellofemoral compartment arthritis--steroid injection will be given today.  Follow-up in 3 months.    Procedure Note:    I discussed with the patient the potential benefits of performing a therapeutic injections as well as potential risks including but not limited to infection, swelling, pain, bleeding, bruising, nerve/vessel damage, skin color changes, transient elevation in blood glucose levels, and fat atrophy. After informed consent and after the areas were prepped with chlorhexadine soap, ethyl chloride was used to numb the skin. Via the anterolateral approach, 3mL of 1% lidocaine followed by 40mg of Kenalog were each injected into the right knee joint. The patient tolerated the procedure well. There were no complications. A sterile dressing was placed over the injection sites.        Gamal Denny MD  04/22/21  19:12 EDT      Dragon disclaimer:  Much of this encounter note is an electronic transcription/translation of spoken  language to printed text. The electronic translation of spoken language may permit erroneous, or at times, nonsensical words or phrases to be inadvertently transcribed; Although I have reviewed the note for such errors, some may still exist.

## 2021-04-22 NOTE — PROGRESS NOTES
Procedure   Large Joint Arthrocentesis: R knee  Date/Time: 4/22/2021 4:16 PM  Consent given by: patient  Site marked: site marked  Timeout: Immediately prior to procedure a time out was called to verify the correct patient, procedure, equipment, support staff and site/side marked as required   Supporting Documentation  Indications: pain   Procedure Details  Location: knee - R knee  Preparation: Patient was prepped and draped in the usual sterile fashion  Needle size: 25 G  Approach: anteromedial  Medications administered: 3 mL lidocaine PF 1% 1 %; 40 mg triamcinolone acetonide 40 MG/ML  Patient tolerance: patient tolerated the procedure well with no immediate complications

## 2021-04-22 NOTE — TELEPHONE ENCOUNTER
Caller: Chris Jarrett (NOT ON [facility] VERBAL)    Relationship: Emergency Contact    Best call back number: [phone]     What orders are you requesting (i.e. lab or imaging): URINE TEST    In what timeframe would the patient need to come in: TOMORROW MORNING    Where will you receive your lab/imaging services: [facility]    Additional notes: PATIENT IS COMING IN TO GET LABS DONE TOMORROW MORNING AND CHRIS WOULD LIKE TO CHECK TO SEE IF SHE HAS A BLADDER INFECTION.

## 2021-04-23 ENCOUNTER — LAB (OUTPATIENT)
Dept: LAB | Facility: HOSPITAL | Age: 86
End: 2021-04-23

## 2021-04-23 ENCOUNTER — TELEPHONE (OUTPATIENT)
Dept: INTERNAL MEDICINE | Facility: CLINIC | Age: 86
End: 2021-04-23

## 2021-04-23 DIAGNOSIS — E03.9 HYPOTHYROIDISM (ACQUIRED): ICD-10-CM

## 2021-04-23 DIAGNOSIS — I10 ESSENTIAL HYPERTENSION: ICD-10-CM

## 2021-04-23 DIAGNOSIS — D51.0 PERNICIOUS ANEMIA: ICD-10-CM

## 2021-04-23 DIAGNOSIS — E78.2 MIXED HYPERLIPIDEMIA: ICD-10-CM

## 2021-04-23 DIAGNOSIS — R30.0 DYSURIA: ICD-10-CM

## 2021-04-23 DIAGNOSIS — M81.0 AGE-RELATED OSTEOPOROSIS WITHOUT CURRENT PATHOLOGICAL FRACTURE: ICD-10-CM

## 2021-04-23 LAB
25(OH)D3 SERPL-MCNC: 44 NG/ML
ALBUMIN SERPL-MCNC: 4 G/DL (ref 3.5–5.2)
ALBUMIN/GLOB SERPL: 1.6 G/DL
ALP SERPL-CCNC: 81 U/L (ref 39–117)
ALT SERPL W P-5'-P-CCNC: 17 U/L (ref 1–33)
ANION GAP SERPL CALCULATED.3IONS-SCNC: 11 MMOL/L (ref 5–15)
AST SERPL-CCNC: 19 U/L (ref 1–32)
BACTERIA UR QL AUTO: NORMAL /HPF
BASOPHILS # BLD AUTO: 0.01 10*3/MM3 (ref 0–0.2)
BASOPHILS NFR BLD AUTO: 0.2 % (ref 0–1.5)
BILIRUB SERPL-MCNC: 0.5 MG/DL (ref 0–1.2)
BILIRUB UR QL STRIP: NEGATIVE
BUN SERPL-MCNC: 21 MG/DL (ref 8–23)
BUN/CREAT SERPL: 30.4 (ref 7–25)
CALCIUM SPEC-SCNC: 9.1 MG/DL (ref 8.2–9.6)
CHLORIDE SERPL-SCNC: 102 MMOL/L (ref 98–107)
CHOLEST SERPL-MCNC: 154 MG/DL (ref 0–200)
CLARITY UR: ABNORMAL
CO2 SERPL-SCNC: 29 MMOL/L (ref 22–29)
COLOR UR: ABNORMAL
CREAT SERPL-MCNC: 0.69 MG/DL (ref 0.57–1)
DEPRECATED RDW RBC AUTO: 43.2 FL (ref 37–54)
EOSINOPHIL # BLD AUTO: 0 10*3/MM3 (ref 0–0.4)
EOSINOPHIL NFR BLD AUTO: 0 % (ref 0.3–6.2)
ERYTHROCYTE [DISTWIDTH] IN BLOOD BY AUTOMATED COUNT: 14 % (ref 12.3–15.4)
GFR SERPL CREATININE-BSD FRML MDRD: 80 ML/MIN/1.73
GLOBULIN UR ELPH-MCNC: 2.5 GM/DL
GLUCOSE SERPL-MCNC: 118 MG/DL (ref 65–99)
GLUCOSE UR STRIP-MCNC: NEGATIVE MG/DL
HCT VFR BLD AUTO: 39.1 % (ref 34–46.6)
HDLC SERPL-MCNC: 56 MG/DL (ref 40–60)
HGB BLD-MCNC: 12.6 G/DL (ref 12–15.9)
HGB UR QL STRIP.AUTO: NEGATIVE
HYALINE CASTS UR QL AUTO: NORMAL /LPF
IMM GRANULOCYTES # BLD AUTO: 0.01 10*3/MM3 (ref 0–0.05)
IMM GRANULOCYTES NFR BLD AUTO: 0.2 % (ref 0–0.5)
KETONES UR QL STRIP: NEGATIVE
LDLC SERPL CALC-MCNC: 87 MG/DL (ref 0–100)
LDLC/HDLC SERPL: 1.57 {RATIO}
LEUKOCYTE ESTERASE UR QL STRIP.AUTO: ABNORMAL
LYMPHOCYTES # BLD AUTO: 0.56 10*3/MM3 (ref 0.7–3.1)
LYMPHOCYTES NFR BLD AUTO: 12.4 % (ref 19.6–45.3)
MCH RBC QN AUTO: 27.5 PG (ref 26.6–33)
MCHC RBC AUTO-ENTMCNC: 32.2 G/DL (ref 31.5–35.7)
MCV RBC AUTO: 85.2 FL (ref 79–97)
MONOCYTES # BLD AUTO: 0.16 10*3/MM3 (ref 0.1–0.9)
MONOCYTES NFR BLD AUTO: 3.5 % (ref 5–12)
MUCOUS THREADS URNS QL MICRO: NORMAL /HPF
NEUTROPHILS NFR BLD AUTO: 3.78 10*3/MM3 (ref 1.7–7)
NEUTROPHILS NFR BLD AUTO: 83.7 % (ref 42.7–76)
NITRITE UR QL STRIP: NEGATIVE
NRBC BLD AUTO-RTO: 0 /100 WBC (ref 0–0.2)
PH UR STRIP.AUTO: 7 [PH] (ref 5–8)
PLATELET # BLD AUTO: 160 10*3/MM3 (ref 140–450)
PMV BLD AUTO: 10.6 FL (ref 6–12)
POTASSIUM SERPL-SCNC: 4.4 MMOL/L (ref 3.5–5.2)
PROT SERPL-MCNC: 6.5 G/DL (ref 6–8.5)
PROT UR QL STRIP: ABNORMAL
RBC # BLD AUTO: 4.59 10*6/MM3 (ref 3.77–5.28)
RBC # UR: NORMAL /HPF
REF LAB TEST METHOD: NORMAL
SODIUM SERPL-SCNC: 142 MMOL/L (ref 136–145)
SP GR UR STRIP: >=1.03 (ref 1–1.03)
SQUAMOUS #/AREA URNS HPF: NORMAL /HPF
TRIGL SERPL-MCNC: 51 MG/DL (ref 0–150)
TSH SERPL DL<=0.05 MIU/L-ACNC: 0.06 UIU/ML (ref 0.27–4.2)
UROBILINOGEN UR QL STRIP: ABNORMAL
VIT B12 BLD-MCNC: 522 PG/ML (ref 211–946)
VLDLC SERPL-MCNC: 11 MG/DL (ref 5–40)
WBC # BLD AUTO: 4.52 10*3/MM3 (ref 3.4–10.8)
WBC UR QL AUTO: NORMAL /HPF

## 2021-04-23 PROCEDURE — 85025 COMPLETE CBC W/AUTO DIFF WBC: CPT

## 2021-04-23 PROCEDURE — 84443 ASSAY THYROID STIM HORMONE: CPT

## 2021-04-23 PROCEDURE — 81001 URINALYSIS AUTO W/SCOPE: CPT

## 2021-04-23 PROCEDURE — 82306 VITAMIN D 25 HYDROXY: CPT

## 2021-04-23 PROCEDURE — 80053 COMPREHEN METABOLIC PANEL: CPT

## 2021-04-23 PROCEDURE — 80061 LIPID PANEL: CPT

## 2021-04-23 PROCEDURE — 82607 VITAMIN B-12: CPT

## 2021-04-26 RX ORDER — BUSPIRONE HYDROCHLORIDE 10 MG/1
TABLET ORAL
Qty: 60 TABLET | Refills: 4 | Status: SHIPPED | OUTPATIENT
Start: 2021-04-26 | End: 2021-10-04

## 2021-04-28 ENCOUNTER — OFFICE VISIT (OUTPATIENT)
Dept: INTERNAL MEDICINE | Facility: CLINIC | Age: 86
End: 2021-04-28

## 2021-04-28 VITALS
HEIGHT: 59 IN | DIASTOLIC BLOOD PRESSURE: 70 MMHG | HEART RATE: 80 BPM | TEMPERATURE: 97.5 F | WEIGHT: 131 LBS | BODY MASS INDEX: 26.41 KG/M2 | SYSTOLIC BLOOD PRESSURE: 118 MMHG

## 2021-04-28 DIAGNOSIS — Z00.00 PREVENTATIVE HEALTH CARE: Primary | ICD-10-CM

## 2021-04-28 DIAGNOSIS — M48.061 SPINAL STENOSIS OF LUMBAR REGION WITHOUT NEUROGENIC CLAUDICATION: ICD-10-CM

## 2021-04-28 DIAGNOSIS — I10 ESSENTIAL HYPERTENSION: ICD-10-CM

## 2021-04-28 DIAGNOSIS — G31.84 MILD COGNITIVE IMPAIRMENT: ICD-10-CM

## 2021-04-28 DIAGNOSIS — K44.9 HIATAL HERNIA: ICD-10-CM

## 2021-04-28 DIAGNOSIS — I48.0 PAROXYSMAL ATRIAL FIBRILLATION (HCC): ICD-10-CM

## 2021-04-28 DIAGNOSIS — F41.8 ANXIETY WITH DEPRESSION: ICD-10-CM

## 2021-04-28 DIAGNOSIS — D51.0 PERNICIOUS ANEMIA: ICD-10-CM

## 2021-04-28 DIAGNOSIS — E03.9 HYPOTHYROIDISM (ACQUIRED): ICD-10-CM

## 2021-04-28 PROCEDURE — 96372 THER/PROPH/DIAG INJ SC/IM: CPT | Performed by: INTERNAL MEDICINE

## 2021-04-28 PROCEDURE — G0439 PPPS, SUBSEQ VISIT: HCPCS | Performed by: INTERNAL MEDICINE

## 2021-04-28 RX ORDER — CYANOCOBALAMIN 1000 UG/ML
1000 INJECTION, SOLUTION INTRAMUSCULAR; SUBCUTANEOUS
Status: SHIPPED | OUTPATIENT
Start: 2021-04-28

## 2021-04-28 RX ORDER — LEVOTHYROXINE SODIUM 0.15 MG/1
150 TABLET ORAL DAILY
Qty: 90 TABLET | Refills: 1 | Status: SHIPPED | OUTPATIENT
Start: 2021-04-28 | End: 2021-10-18 | Stop reason: SDUPTHER

## 2021-04-28 RX ADMIN — CYANOCOBALAMIN 1000 MCG: 1000 INJECTION, SOLUTION INTRAMUSCULAR; SUBCUTANEOUS at 17:30

## 2021-05-19 RX ORDER — SUCRALFATE 1 G/1
TABLET ORAL
Qty: 180 TABLET | Refills: 1 | Status: SHIPPED | OUTPATIENT
Start: 2021-05-19 | End: 2021-09-27

## 2021-05-28 ENCOUNTER — CLINICAL SUPPORT (OUTPATIENT)
Dept: INTERNAL MEDICINE | Facility: CLINIC | Age: 86
End: 2021-05-28

## 2021-05-28 DIAGNOSIS — E53.8 B12 DEFICIENCY: Primary | ICD-10-CM

## 2021-05-28 PROCEDURE — 96372 THER/PROPH/DIAG INJ SC/IM: CPT | Performed by: INTERNAL MEDICINE

## 2021-05-28 RX ADMIN — CYANOCOBALAMIN 1000 MCG: 1000 INJECTION, SOLUTION INTRAMUSCULAR; SUBCUTANEOUS at 16:24

## 2021-05-31 DIAGNOSIS — G89.29 OTHER CHRONIC PAIN: ICD-10-CM

## 2021-05-31 DIAGNOSIS — G63 POLYNEUROPATHY ASSOCIATED WITH UNDERLYING DISEASE (HCC): ICD-10-CM

## 2021-06-01 RX ORDER — TRAMADOL HYDROCHLORIDE 50 MG/1
TABLET ORAL
Qty: 30 TABLET | Refills: 2 | Status: SHIPPED | OUTPATIENT
Start: 2021-06-01 | End: 2021-09-13

## 2021-06-01 RX ORDER — GABAPENTIN 300 MG/1
CAPSULE ORAL
Qty: 180 CAPSULE | Refills: 2 | Status: SHIPPED | OUTPATIENT
Start: 2021-06-01 | End: 2021-12-06

## 2021-06-14 RX ORDER — POTASSIUM CHLORIDE 750 MG/1
CAPSULE, EXTENDED RELEASE ORAL
Qty: 360 CAPSULE | Refills: 1 | Status: SHIPPED | OUTPATIENT
Start: 2021-06-14 | End: 2022-01-24

## 2021-06-25 ENCOUNTER — CLINICAL SUPPORT (OUTPATIENT)
Dept: INTERNAL MEDICINE | Facility: CLINIC | Age: 86
End: 2021-06-25

## 2021-06-25 DIAGNOSIS — E53.8 B12 DEFICIENCY: Primary | ICD-10-CM

## 2021-06-25 PROCEDURE — 96372 THER/PROPH/DIAG INJ SC/IM: CPT | Performed by: INTERNAL MEDICINE

## 2021-06-25 RX ADMIN — CYANOCOBALAMIN 1000 MCG: 1000 INJECTION, SOLUTION INTRAMUSCULAR; SUBCUTANEOUS at 16:54

## 2021-07-06 RX ORDER — BETHANECHOL CHLORIDE 10 MG/1
10 TABLET ORAL 3 TIMES DAILY
Qty: 90 TABLET | Refills: 3 | Status: SHIPPED | OUTPATIENT
Start: 2021-07-06 | End: 2021-11-08

## 2021-07-07 ENCOUNTER — OFFICE VISIT (OUTPATIENT)
Dept: INTERNAL MEDICINE | Facility: CLINIC | Age: 86
End: 2021-07-07

## 2021-07-07 VITALS
DIASTOLIC BLOOD PRESSURE: 72 MMHG | BODY MASS INDEX: 44.39 KG/M2 | SYSTOLIC BLOOD PRESSURE: 116 MMHG | HEIGHT: 59 IN | HEART RATE: 76 BPM | TEMPERATURE: 98.4 F | WEIGHT: 220.2 LBS

## 2021-07-07 DIAGNOSIS — M17.0 PRIMARY OSTEOARTHRITIS OF BOTH KNEES: ICD-10-CM

## 2021-07-07 DIAGNOSIS — G31.84 MILD COGNITIVE IMPAIRMENT: ICD-10-CM

## 2021-07-07 DIAGNOSIS — S81.811A SKIN TEAR OF RIGHT LOWER LEG WITHOUT COMPLICATION, INITIAL ENCOUNTER: Primary | ICD-10-CM

## 2021-07-07 DIAGNOSIS — K44.9 HIATAL HERNIA: ICD-10-CM

## 2021-07-07 DIAGNOSIS — I10 ESSENTIAL HYPERTENSION: ICD-10-CM

## 2021-07-07 DIAGNOSIS — F41.8 ANXIETY WITH DEPRESSION: ICD-10-CM

## 2021-07-07 DIAGNOSIS — K52.9 CHRONIC DIARRHEA: ICD-10-CM

## 2021-07-07 PROCEDURE — 99214 OFFICE O/P EST MOD 30 MIN: CPT | Performed by: INTERNAL MEDICINE

## 2021-07-07 RX ORDER — LISINOPRIL 5 MG/1
5 TABLET ORAL DAILY
Qty: 90 TABLET | Refills: 3 | Status: SHIPPED | OUTPATIENT
Start: 2021-07-07 | End: 2022-07-11

## 2021-07-07 RX ORDER — DIPHENOXYLATE HYDROCHLORIDE AND ATROPINE SULFATE 2.5; .025 MG/1; MG/1
1 TABLET ORAL 4 TIMES DAILY PRN
Qty: 100 TABLET | Refills: 1 | Status: SHIPPED | OUTPATIENT
Start: 2021-07-07 | End: 2021-10-26

## 2021-07-07 RX ORDER — MEMANTINE HYDROCHLORIDE 10 MG/1
10 TABLET ORAL 2 TIMES DAILY
Qty: 180 TABLET | Refills: 3 | Status: SHIPPED | OUTPATIENT
Start: 2021-07-07 | End: 2022-07-01

## 2021-07-07 NOTE — PROGRESS NOTES
"[facility]     Temi Jarrett  5/8/1930   [id number]      Patient Care Team:  Eric Patel MD as PCP - General  Eric Patel MD as PCP - Family Medicine  Von Pablo MD as Cardiologist (Cardiology)    Chief Complaint::   Chief Complaint   Patient presents with   • Wound Check     right leg        HPI  Ms. Jarrett comes in for follow-up of her hypertension, anxiety and depression, bilateral osteoarthritis of the knees, cognitive impairment, and for evaluation of a skin tear on the right lower leg.  She apparently scraped her right pretibial area against furniture shearing off the skin.  They have been using antibiotic ointment.  At one point it apparently drained serous fluid.  It is not painful.  Her son and daughter-in-law state that cognitively she is getting slowly worse.  She lives in the home with them.  She has swelling in both legs.  She did have an ottoman to keep her feet up on but they are concerned she trips over that.  So they removed it.  There is still the issue of her paraesophageal hernia.  She has not had pneumonia in the past several months but she had 2 or 3 episodes over the winter which required hospitalization.  Surgery had recommended surgical repair.  She did not want to do this prior to getting a Covid vaccine but now the question is whether they should go see the surgeon or not.  She also has \"bone-on-bone\" in her knees.  This can be quite painful.    Chronic Conditions:      Patient Active Problem List   Diagnosis   • Esophageal reflux   • Hypothyroidism (acquired)   • Anxiety with depression   • Peripheral neuropathy   • Spinal stenosis of lumbar region   • Osteoporosis   • Disc disorder of lumbar region   • Bladder prolapse, female, acquired   • Essential hypertension   • Pernicious anemia   • Annual physical exam   • Primary osteoarthritis of both knees   • Hiatal hernia   • Multifocal aspiration pneumonia due to large intrathoracic hiatal " hernia   • Paroxysmal atrial fibrillation (CMS/[facility])   • Pericardial effusion   • Mild cognitive impairment   • At high risk for aspiration   • Chronic pain        Past Medical History:   Diagnosis Date   • Acute sepsis (CMS/[facility]) 1/14/2021   • Arthritis    • Bladder prolapse, female, acquired    • Closed fracture of neck of left femur (CMS/[facility]) 9/27/2019   • Dementia (CMS/[facility])    • Depression    • Disease of thyroid gland    • Gastric polyp    • GERD (gastroesophageal reflux disease)    • History of colonic polyps    • Hyperlipidemia    • Hypertension    • IBS (irritable bowel syndrome)    • Macular degeneration    • Torn meniscus     right knee        Past Surgical History:   Procedure Laterality Date   • CHOLECYSTECTOMY  1997   • ENDOSCOPY N/A 1/14/2021    Procedure: ESOPHAGOGASTRODUODENOSCOPY;  Surgeon: Serjio Guerrero MD;  Location:  [facility];  Service: General;  Laterality: N/A;   • EYE SURGERY  1998    Cataract extraction   • HERNIA REPAIR     • HIP HEMIARTHROPLASTY Left 9/28/2019    Procedure: HIP HEMIARTHROPLASTY LEFT;  Surgeon: Reddy Segundo Jr., MD;  Location: [facility] OR;  Service: Orthopedics   • HYSTERECTOMY  1976   • KNEE SURGERY Right     arthroscopy- 2000   • TONSILLECTOMY         Family History   Problem Relation Age of Onset   • Hypertension Mother    • Breast cancer Mother    • Obesity Mother    • Hypertension Father    • Diabetes Son        Social History     Socioeconomic History   • Marital status:      Spouse name: Not on file   • Number of children: Not on file   • Years of education: Not on file   • Highest education level: Not on file   Tobacco Use   • Smoking status: Never Smoker   • Smokeless tobacco: Never Used   Vaping Use   • Vaping Use: Never used   Substance and Sexual Activity   • Alcohol use: No   • Drug use: Defer   • Sexual activity: Defer       Allergies   Allergen Reactions   • Augmentin [Amoxicillin-Pot Clavulanate] Diarrhea   • Codeine Nausea Only      Trouble Breathing    • Shrimp Hives   • [place] Unknown - Low Severity         Current Outpatient Medications:   •  acetaminophen (TYLENOL) 325 MG tablet, Take 650 mg by mouth Every 6 (Six) Hours As Needed for Mild Pain ., Disp: , Rfl:   •  aspirin 81 MG EC tablet, Take 81 mg by mouth Daily., Disp: , Rfl:   •  bethanechol (URECHOLINE) 10 MG tablet, Take 1 tablet by mouth 3 (Three) Times a Day., Disp: 90 tablet, Rfl: 3  •  busPIRone (BUSPAR) 10 MG tablet, TAKE ONE TABLET BY MOUTH TWICE A DAY, Disp: 60 tablet, Rfl: 4  •  Calcium Citrate-Vitamin D (CITRACAL PETITES/VITAMIN D PO), Take 2 tablets by mouth 2 (two) times a day., Disp: , Rfl:   •  Cholecalciferol (VITAMIN D PO), 2000 U qd, Disp: , Rfl:   •  diclofenac (VOLTAREN) 1 % gel gel, APPLY 4 GRAMS TOPICALLY TO THE APPROPRAITE AREA AS DIRECTED FOUR TIMES A DAY, Disp: 100 g, Rfl: 4  •  diphenoxylate-atropine (Lomotil) 2.5-0.025 MG per tablet, Take 1 tablet by mouth 4 (Four) Times a Day As Needed for Diarrhea., Disp: 100 tablet, Rfl: 1  •  donepezil (ARICEPT) 10 MG tablet, TAKE ONE TABLET BY MOUTH EVERY NIGHT AT BEDTIME, Disp: 90 tablet, Rfl: 2  •  Eliquis 2.5 MG tablet tablet, TAKE ONE TABLET BY MOUTH EVERY 12 HOURS, Disp: 30 tablet, Rfl: 5  •  furosemide (LASIX) 20 MG tablet, TAKE ONE TABLET BY MOUTH DAILY, Disp: 90 tablet, Rfl: 1  •  gabapentin (NEURONTIN) 300 MG capsule, TAKE ONE CAPSULE BY MOUTH TWICE A DAY, Disp: 180 capsule, Rfl: 2  •  Lactobacillus tablet, Take 1 tablet by mouth Daily., Disp: , Rfl:   •  levothyroxine (Synthroid) 150 MCG tablet, Take 1 tablet by mouth Daily., Disp: 90 tablet, Rfl: 1  •  lisinopril (PRINIVIL,ZESTRIL) 5 MG tablet, Take 1 tablet by mouth Daily., Disp: 90 tablet, Rfl: 3  •  melatonin 3 MG tablet, Take 9 mg by mouth Every Night., Disp: , Rfl:   •  memantine (NAMENDA) 10 MG tablet, Take 1 tablet by mouth 2 (Two) Times a Day., Disp: 180 tablet, Rfl: 3  •  methocarbamol (Robaxin) 500 MG tablet, Take 1 tablet by mouth 3 (Three) Times  "a Day. (Patient taking differently: Take 500 mg by mouth 3 (Three) Times a Day As Needed.), Disp: 90 tablet, Rfl: 1  •  Multiple Vitamins-Minerals (MULTIVITAMIN ADULTS 50+ PO), Take 1 tablet by mouth Daily., Disp: , Rfl:   •  Multiple Vitamins-Minerals (PRESERVISION AREDS PO), Take 1 tablet by mouth 2 (Two) Times a Day., Disp: , Rfl:   •  pantoprazole (PROTONIX) 40 MG EC tablet, TAKE ONE TABLET BY MOUTH DAILY, Disp: 90 tablet, Rfl: 1  •  potassium chloride (MICRO-K) 10 MEQ CR capsule, TAKE TWO CAPSULES BY MOUTH TWICE A DAY, Disp: 360 capsule, Rfl: 1  •  promethazine (PHENERGAN) 25 MG tablet, Take 1 tablet by mouth Every 6 (Six) Hours As Needed for Nausea or Vomiting., Disp: 30 tablet, Rfl: 2  •  sertraline (ZOLOFT) 50 MG tablet, TAKE ONE TABLET BY MOUTH DAILY, Disp: 90 tablet, Rfl: 4  •  sucralfate (CARAFATE) 1 g tablet, TAKE ONE TABLET BY MOUTH TWICE A DAY, Disp: 180 tablet, Rfl: 1  •  Syringe/Needle, Disp, (B-D 3CC LUER-ROSY SYR 23GX1\") 23G X 1\" 3 ML misc, USE TO INJECT VITAMIN B-12 ONCE A MONTH, Disp: 1 each, Rfl: 3  •  traMADol (ULTRAM) 50 MG tablet, TAKE ONE TABLET BY MOUTH DAILY AS NEEDED FOR MODERATE PAIN IN THE RIGHT KNEE, Disp: 30 tablet, Rfl: 2    Current Facility-Administered Medications:   •  cyanocobalamin injection 1,000 mcg, 1,000 mcg, Intramuscular, Q28 Days, Eric Patel MD, 1,000 mcg at 06/25/21 1654    Review of Systems   Constitutional: Negative.    Respiratory: Negative.  Negative for chest tightness and shortness of breath.    Cardiovascular: Negative.  Negative for chest pain.   Gastrointestinal: Negative for abdominal pain, blood in stool, constipation and diarrhea.   Musculoskeletal: Positive for arthralgias.   Neurological: Positive for memory problem.        Vital Signs  Vitals:    07/07/21 1622   BP: 116/72   BP Location: Left arm   Patient Position: Sitting   Cuff Size: Adult   Pulse: 76   Temp: 98.4 °F (36.9 °C)   Weight: 99.9 kg (220 lb 3.2 oz)   Height: 149.9 cm (59.02\") "   PainSc:   3   PainLoc: Leg       Physical Exam  Vitals reviewed.   Constitutional:       Appearance: She is well-developed.   HENT:      Head: Normocephalic and atraumatic.   Cardiovascular:      Rate and Rhythm: Normal rate and regular rhythm.      Heart sounds: Normal heart sounds. No murmur heard.     Pulmonary:      Effort: Pulmonary effort is normal.      Breath sounds: Normal breath sounds.   Musculoskeletal:      Right lower leg: Edema present.      Left lower leg: Edema present.   Skin:     Comments: There is a superficial skin tear mid right tibia.  The margins are clean and there is no erythema or purulent drainage.   Neurological:      Mental Status: She is alert and oriented to person, place, and time.          Procedures    ACE III MINI             Assessment/Plan:    Diagnoses and all orders for this visit:    1. Skin tear of right lower leg without complication, initial encounter (Primary)    2. Chronic diarrhea  -     diphenoxylate-atropine (Lomotil) 2.5-0.025 MG per tablet; Take 1 tablet by mouth 4 (Four) Times a Day As Needed for Diarrhea.  Dispense: 100 tablet; Refill: 1    3. Essential hypertension    4. Anxiety with depression    5. Primary osteoarthritis of both knees    6. Mild cognitive impairment    Other orders  -     lisinopril (PRINIVIL,ZESTRIL) 5 MG tablet; Take 1 tablet by mouth Daily.  Dispense: 90 tablet; Refill: 3  -     memantine (NAMENDA) 10 MG tablet; Take 1 tablet by mouth 2 (Two) Times a Day.  Dispense: 180 tablet; Refill: 3    Plan    Recommend gentle cleansing daily with half-strength peroxide and using a nonstick pad to cover it.    Continue Lomotil for chronic diarrhea.    Blood pressure is controlled on lisinopril.    Anxiety appears to be reasonably well controlled with BuSpar and sertraline.    She will continue follow-up for knee injections.  Told the family that it is my opinion that she should not even consider knee replacement that the recovery would be  prohibitively risky.    Cognitive impairment slowly worsens.  Continue donepezil and memantine.    Recurrent aspiration pneumonia from her paraesophageal hernia is a significant threat.  I recommended that they follow-up with general surgery to consider surgical repair.      Plan of care reviewed with patient at the conclusion of today's visit. Education was provided regarding diagnosis, management, and any prescribed or recommended OTC medications.Patient verbalizes understanding of and agreement with management plan.         Eric Patel MD

## 2021-07-11 PROBLEM — A41.9 ACUTE SEPSIS: Status: RESOLVED | Noted: 2021-01-14 | Resolved: 2021-07-11

## 2021-07-11 PROBLEM — I31.39 PERICARDIAL EFFUSION: Status: RESOLVED | Noted: 2020-08-03 | Resolved: 2021-07-11

## 2021-07-23 ENCOUNTER — CLINICAL SUPPORT (OUTPATIENT)
Dept: INTERNAL MEDICINE | Facility: CLINIC | Age: 86
End: 2021-07-23

## 2021-07-23 DIAGNOSIS — D51.0 PERNICIOUS ANEMIA: ICD-10-CM

## 2021-07-23 PROCEDURE — 96372 THER/PROPH/DIAG INJ SC/IM: CPT | Performed by: INTERNAL MEDICINE

## 2021-07-23 RX ADMIN — CYANOCOBALAMIN 1000 MCG: 1000 INJECTION, SOLUTION INTRAMUSCULAR; SUBCUTANEOUS at 16:32

## 2021-07-27 ENCOUNTER — OFFICE VISIT (OUTPATIENT)
Dept: ORTHOPEDIC SURGERY | Facility: CLINIC | Age: 86
End: 2021-07-27

## 2021-07-27 VITALS
HEIGHT: 59 IN | BODY MASS INDEX: 44.4 KG/M2 | DIASTOLIC BLOOD PRESSURE: 59 MMHG | SYSTOLIC BLOOD PRESSURE: 131 MMHG | WEIGHT: 220.24 LBS | HEART RATE: 73 BPM

## 2021-07-27 DIAGNOSIS — M17.11 PRIMARY OSTEOARTHRITIS OF RIGHT KNEE: Primary | ICD-10-CM

## 2021-07-27 PROCEDURE — 99213 OFFICE O/P EST LOW 20 MIN: CPT | Performed by: ORTHOPAEDIC SURGERY

## 2021-07-27 PROCEDURE — 20610 DRAIN/INJ JOINT/BURSA W/O US: CPT | Performed by: ORTHOPAEDIC SURGERY

## 2021-07-27 RX ORDER — LIDOCAINE HYDROCHLORIDE 10 MG/ML
3 INJECTION, SOLUTION EPIDURAL; INFILTRATION; INTRACAUDAL; PERINEURAL
Status: COMPLETED | OUTPATIENT
Start: 2021-07-27 | End: 2021-07-27

## 2021-07-27 RX ORDER — TRIAMCINOLONE ACETONIDE 40 MG/ML
40 INJECTION, SUSPENSION INTRA-ARTICULAR; INTRAMUSCULAR
Status: COMPLETED | OUTPATIENT
Start: 2021-07-27 | End: 2021-07-27

## 2021-07-27 RX ADMIN — LIDOCAINE HYDROCHLORIDE 3 ML: 10 INJECTION, SOLUTION EPIDURAL; INFILTRATION; INTRACAUDAL; PERINEURAL at 15:59

## 2021-07-27 RX ADMIN — TRIAMCINOLONE ACETONIDE 40 MG: 40 INJECTION, SUSPENSION INTRA-ARTICULAR; INTRAMUSCULAR at 15:59

## 2021-07-27 NOTE — PROGRESS NOTES
"    [facility] Note        Subjective     CC: Follow-up (3 month follow up; Primary osteoarthritis of right knee-last cortisone injection given on 4/22/21)      MARY Jarrett is a 89 y.o. female.  Patient returns for follow-up of her right knee arthritis.  Last injected on 4/22/2021 she got very little relief from the last steroid shot which is atypical for her.  Her daughter is with her and tells me that she had a severe reaction 1 time to rooster comb.    Overall, patient's symptoms are as above.    ROS:    Constiutional:Pt denies fever, chills, nausea, or vomiting.  MSK:as above        Objective      Past Medical History  Past Medical History:   Diagnosis Date   • Acute sepsis (CMS/[facility]) 1/14/2021   • Arthritis    • Bladder prolapse, female, acquired    • Closed fracture of neck of left femur (CMS/[facility]) 9/27/2019   • Dementia (CMS/[facility])    • Depression    • Disease of thyroid gland    • Gastric polyp    • GERD (gastroesophageal reflux disease)    • History of colonic polyps    • Hyperlipidemia    • Hypertension    • IBS (irritable bowel syndrome)    • Macular degeneration    • Pericardial effusion 8/3/2020    Echo (8/3/2020): Normal LVEF.  Moderate pericardial effusion without tamponade.  Moderate MR. RVSP 49 mmHg   • Torn meniscus     right knee          Physical Exam  /59   Pulse 73   Ht 149.9 cm (59.02\")   Wt 99.9 kg (220 lb 3.8 oz)   LMP  (LMP Unknown)   BMI 44.46 kg/m²     Body mass index is 44.46 kg/m².    Patient is well nourished and well developed.        Ortho Exam  Genu valgum  Lateral joint line tenderness  Crepitance with range of motion  Range of motion     Imaging/Labs/EMG Reviewed:  Imaging Results (Last 24 Hours)     ** No results found for the last 24 hours. **            Assessment    Assessment:  1. Primary osteoarthritis of right knee        Plan:  1. Recommend over the counter anti-inflammatories for pain and/or swelling  2. Right knee arthritis, " end-stage, valgus pattern--patient has failed a steroid injection most recently.  She has historically done well with the injections.  Because of her rooster comb allergy, I have recommended that her daughter-in-law try and track down her paperwork from the [facility] where the injection was given we can see if there is any thing that might suggest an allergy to [place] derived products.  We will repeat the injection today and they will call us back in a few weeks if she gets no relief.  Can consider viscosupplementation at that point if we feel confident enough that we can avoid an allergic reaction.  An alternate that was discussed with them is also referral to pain management for RFA/Iovera    Procedure Note:    I discussed with the patient the potential benefits of performing a therapeutic injections as well as potential risks including but not limited to infection, swelling, pain, bleeding, bruising, nerve/vessel damage, skin color changes, transient elevation in blood glucose levels, and fat atrophy. After informed consent and after the areas were prepped with chlorhexadine soap, ethyl chloride was used to numb the skin. Via the anterolateral approach, 3mL of 1% lidocaine followed by 40mg of Kenalog were each injected into the right knee joint. The patient tolerated the procedure well. There were no complications. A sterile dressing was placed over the injection sites.        Gamal Denny MD  07/27/21  18:12 EDT      Dragon disclaimer:  Much of this encounter note is an electronic transcription/translation of spoken language to printed text. The electronic translation of spoken language may permit erroneous, or at times, nonsensical words or phrases to be inadvertently transcribed; Although I have reviewed the note for such errors, some may still exist.

## 2021-07-27 NOTE — PROGRESS NOTES
Procedure   Large Joint Arthrocentesis: R knee  Date/Time: 7/27/2021 3:59 PM  Consent given by: patient  Site marked: site marked  Timeout: Immediately prior to procedure a time out was called to verify the correct patient, procedure, equipment, support staff and site/side marked as required   Supporting Documentation  Indications: pain   Procedure Details  Location: knee - R knee  Preparation: Patient was prepped and draped in the usual sterile fashion  Needle size: 23 G  Approach: anterolateral  Medications administered: 3 mL lidocaine PF 1% 1 %; 40 mg triamcinolone acetonide 40 MG/ML  Patient tolerance: patient tolerated the procedure well with no immediate complications

## 2021-08-26 ENCOUNTER — CLINICAL SUPPORT (OUTPATIENT)
Dept: INTERNAL MEDICINE | Facility: CLINIC | Age: 86
End: 2021-08-26

## 2021-08-26 DIAGNOSIS — D51.0 PERNICIOUS ANEMIA: ICD-10-CM

## 2021-08-26 PROCEDURE — 96372 THER/PROPH/DIAG INJ SC/IM: CPT | Performed by: INTERNAL MEDICINE

## 2021-08-26 RX ADMIN — CYANOCOBALAMIN 1000 MCG: 1000 INJECTION, SOLUTION INTRAMUSCULAR; SUBCUTANEOUS at 15:37

## 2021-09-13 DIAGNOSIS — G89.29 OTHER CHRONIC PAIN: ICD-10-CM

## 2021-09-13 RX ORDER — TRAMADOL HYDROCHLORIDE 50 MG/1
TABLET ORAL
Qty: 30 TABLET | Refills: 2 | Status: SHIPPED | OUTPATIENT
Start: 2021-09-13 | End: 2022-01-03

## 2021-09-13 NOTE — TELEPHONE ENCOUNTER
Rx Refill Note  Requested Prescriptions     Pending Prescriptions Disp Refills   • traMADol (ULTRAM) 50 MG tablet [Pharmacy Med Name: traMADol HCL 50MG TABLET] 30 tablet      Sig: TAKE ONE TABLET BY MOUTH DAILY AS NEEDED FOR MODERATE PAIN IN THE RIGHT KNEE      Last office visit with prescribing clinician: 7/7/2021      Next office visit with prescribing clinician: 10/13/2021   LA:06/01/21 #30 2R             Ramandeep Trevino LPN  09/13/21, 14:36 EDT

## 2021-09-20 ENCOUNTER — TELEPHONE (OUTPATIENT)
Dept: INTERNAL MEDICINE | Facility: CLINIC | Age: 86
End: 2021-09-20

## 2021-09-20 NOTE — TELEPHONE ENCOUNTER
For diarrhea I would recommend over-the-counter Imodium/loperamide.  Colestipol is medication for cholesterol, I would recommend they discuss this with Dr. Patel when he is back.

## 2021-09-20 NOTE — TELEPHONE ENCOUNTER
PATIENT'S DAUGHTER IN LAW, TORO (NOT ON  VERBAL) CALLED TO REPORT THAT PATIENT IS HAVING ON AND OFF LOSE STOOL THAT TURNS IN TO DIARRHEA, SINCE LAST WEDNESDAY. SHE STATED THAT PATIENT SUFFERS FROM IBS  AND SHE IS ASKING WHAT MEDICATION SHE CAN GIVE HER TO HELP WITH THE DIARRHEA. TORO IS ALSO ASKING IF PATIENT SHOULD GO BACK ON COLESTIPOL MICRONIZED 1 GRAM? TORO STATED, IF PATIENT DOES NEED THAT MEDICATION AGAIN, SHE WILL NEED A PRESCRIPTION SENT TO  [facility]RE[facility] HOPK[facility] [address] [place] [address] [phone] [facility] [phone]

## 2021-09-21 ENCOUNTER — TELEPHONE (OUTPATIENT)
Dept: INTERNAL MEDICINE | Facility: CLINIC | Age: 86
End: 2021-09-21

## 2021-09-21 NOTE — TELEPHONE ENCOUNTER
Chris is returning Toshia's call. Phone disconnected before I could tell her that Toshia will return her call after lunch.

## 2021-09-21 NOTE — TELEPHONE ENCOUNTER
PATIENT'S DAUGHTER TORO RETURNED PHONE CALL AND WAS GIVEN INSTRUCTIONS PER AGAPITO LY.    TUYET WANTED TO KNOW WHAT KIND OF DIET SHOULD PATIENT BE TAKING    CALL TORO [phone]

## 2021-09-21 NOTE — TELEPHONE ENCOUNTER
For the diarrhea I would be to eat something that is gentle on the stomach.  If there is nausea or vomiting try saltine crackers.  Usually eating what ever is tolerated is acceptable, but smaller meals is usually better in the beginning.  Also getting enough fluids, ideally water.  Some people do better with ginger ale or apple juice.

## 2021-09-24 ENCOUNTER — CLINICAL SUPPORT (OUTPATIENT)
Dept: INTERNAL MEDICINE | Facility: CLINIC | Age: 86
End: 2021-09-24

## 2021-09-24 DIAGNOSIS — D51.0 PERNICIOUS ANEMIA: ICD-10-CM

## 2021-09-24 PROCEDURE — 96372 THER/PROPH/DIAG INJ SC/IM: CPT | Performed by: INTERNAL MEDICINE

## 2021-09-24 RX ADMIN — CYANOCOBALAMIN 1000 MCG: 1000 INJECTION, SOLUTION INTRAMUSCULAR; SUBCUTANEOUS at 16:41

## 2021-09-27 RX ORDER — SUCRALFATE 1 G/1
TABLET ORAL
Qty: 270 TABLET | Refills: 1 | Status: SHIPPED | OUTPATIENT
Start: 2021-09-27 | End: 2022-06-27

## 2021-10-04 RX ORDER — FUROSEMIDE 20 MG/1
TABLET ORAL
Qty: 30 TABLET | Refills: 5 | Status: SHIPPED | OUTPATIENT
Start: 2021-10-04 | End: 2022-06-02 | Stop reason: SDUPTHER

## 2021-10-04 RX ORDER — BUSPIRONE HYDROCHLORIDE 10 MG/1
TABLET ORAL
Qty: 60 TABLET | Refills: 4 | Status: SHIPPED | OUTPATIENT
Start: 2021-10-04 | End: 2022-04-05 | Stop reason: SDUPTHER

## 2021-10-04 NOTE — TELEPHONE ENCOUNTER
Rx Refill Note  Requested Prescriptions     Pending Prescriptions Disp Refills   • furosemide (LASIX) 20 MG tablet [Pharmacy Med Name: FUROSEMIDE 20 MG TABLET] 30 tablet      Sig: TAKE ONE TABLET BY MOUTH DAILY   • busPIRone (BUSPAR) 10 MG tablet [Pharmacy Med Name: busPIRone HCL 10 MG TABLET] 60 tablet 4     Sig: TAKE ONE TABLET BY MOUTH TWICE A DAY      Last office visit with prescribing clinician: 7/7/21   Next office visit with prescribing clinician: 10/13/21           Cortney Bob RN  10/04/21, 10:31 EDT

## 2021-10-18 ENCOUNTER — OFFICE VISIT (OUTPATIENT)
Dept: INTERNAL MEDICINE | Facility: CLINIC | Age: 86
End: 2021-10-18

## 2021-10-18 VITALS
WEIGHT: 140 LBS | SYSTOLIC BLOOD PRESSURE: 130 MMHG | TEMPERATURE: 98.6 F | DIASTOLIC BLOOD PRESSURE: 85 MMHG | BODY MASS INDEX: 28.22 KG/M2 | HEIGHT: 59 IN

## 2021-10-18 DIAGNOSIS — L89.211 PRESSURE INJURY OF RIGHT HIP, STAGE 1: Primary | ICD-10-CM

## 2021-10-18 PROCEDURE — G0008 ADMIN INFLUENZA VIRUS VAC: HCPCS | Performed by: INTERNAL MEDICINE

## 2021-10-18 PROCEDURE — 90662 IIV NO PRSV INCREASED AG IM: CPT | Performed by: INTERNAL MEDICINE

## 2021-10-18 PROCEDURE — 99213 OFFICE O/P EST LOW 20 MIN: CPT | Performed by: INTERNAL MEDICINE

## 2021-10-18 RX ORDER — LEVOTHYROXINE SODIUM 0.15 MG/1
150 TABLET ORAL DAILY
Qty: 90 TABLET | Refills: 1 | Status: SHIPPED | OUTPATIENT
Start: 2021-10-18 | End: 2022-04-07

## 2021-10-18 RX ADMIN — CYANOCOBALAMIN 1000 MCG: 1000 INJECTION, SOLUTION INTRAMUSCULAR; SUBCUTANEOUS at 14:51

## 2021-10-18 NOTE — PROGRESS NOTES
[facility]     Temi Jarrett  5/8/1930   [id number]      Patient Care Team:  Eric Patel MD as PCP - General  Eric Patel MD as PCP - Family Medicine  Von Pablo MD as Cardiologist (Cardiology)    Chief Complaint::   Chief Complaint   Patient presents with   • rash on butt check     right side         HPI  Ms. Jarrett comes in concerned about a rash on her right buttock.  Her daughter-in-law tells us that she spends most of the day sitting in a chair watching TV.  It is not painful.    Chronic Conditions:      Patient Active Problem List   Diagnosis   • Esophageal reflux   • Hypothyroidism (acquired)   • Anxiety with depression   • Peripheral neuropathy   • Spinal stenosis of lumbar region   • Osteoporosis   • Disc disorder of lumbar region   • Bladder prolapse, female, acquired   • Essential hypertension   • Pernicious anemia   • Annual physical exam   • Primary osteoarthritis of both knees   • Hiatal hernia   • Multifocal aspiration pneumonia due to large intrathoracic hiatal hernia   • Paroxysmal atrial fibrillation (HCC)   • Mild cognitive impairment   • At high risk for aspiration   • Chronic pain        Past Medical History:   Diagnosis Date   • Acute sepsis ([facility]) 1/14/2021   • Arthritis    • Bladder prolapse, female, acquired    • Closed fracture of neck of left femur (HCC) 9/27/2019   • Dementia ([facility])    • Depression    • Disease of thyroid gland    • Gastric polyp    • GERD (gastroesophageal reflux disease)    • History of colonic polyps    • Hyperlipidemia    • Hypertension    • IBS (irritable bowel syndrome)    • Macular degeneration    • Pericardial effusion 8/3/2020    Echo (8/3/2020): Normal LVEF.  Moderate pericardial effusion without tamponade.  Moderate MR. RVSP 49 mmHg   • Torn meniscus     right knee        Past Surgical History:   Procedure Laterality Date   • CHOLECYSTECTOMY  1997   • ENDOSCOPY N/A 1/14/2021    Procedure: ESOPHAGOGASTRODUODENOSCOPY;   Surgeon: Serjio Guerrero MD;  Location:  [facility];  Service: General;  Laterality: N/A;   • EYE SURGERY  1998    Cataract extraction   • HERNIA REPAIR     • HIP HEMIARTHROPLASTY Left 9/28/2019    Procedure: HIP HEMIARTHROPLASTY LEFT;  Surgeon: Reddy Segundo Jr., MD;  Location:  [facility];  Service: Orthopedics   • HYSTERECTOMY  1976   • KNEE SURGERY Right     arthroscopy- 2000   • TONSILLECTOMY         Family History   Problem Relation Age of Onset   • Hypertension Mother    • Breast cancer Mother    • Obesity Mother    • Hypertension Father    • Diabetes Son        Social History     Socioeconomic History   • Marital status:    Tobacco Use   • Smoking status: Never Smoker   • Smokeless tobacco: Never Used   Vaping Use   • Vaping Use: Never used   Substance and Sexual Activity   • Alcohol use: No   • Drug use: Defer   • Sexual activity: Defer       Allergies   Allergen Reactions   • Augmentin [Amoxicillin-Pot Clavulanate] Diarrhea   • Codeine Nausea Only     Trouble Breathing    • Shrimp Hives   • [place] Unknown - Low Severity         Current Outpatient Medications:   •  acetaminophen (TYLENOL) 325 MG tablet, Take 650 mg by mouth Every 6 (Six) Hours As Needed for Mild Pain ., Disp: , Rfl:   •  aspirin 81 MG EC tablet, Take 81 mg by mouth Daily., Disp: , Rfl:   •  bethanechol (URECHOLINE) 10 MG tablet, Take 1 tablet by mouth 3 (Three) Times a Day., Disp: 90 tablet, Rfl: 3  •  busPIRone (BUSPAR) 10 MG tablet, TAKE ONE TABLET BY MOUTH TWICE A DAY, Disp: 60 tablet, Rfl: 4  •  Calcium Citrate-Vitamin D (CITRACAL PETITES/VITAMIN D PO), Take 2 tablets by mouth 2 (two) times a day., Disp: , Rfl:   •  Cholecalciferol (VITAMIN D PO), 2000 U qd, Disp: , Rfl:   •  diclofenac (VOLTAREN) 1 % gel gel, APPLY 4 GRAMS TOPICALLY TO THE APPROPRAITE AREA AS DIRECTED FOUR TIMES A DAY, Disp: 100 g, Rfl: 4  •  diphenoxylate-atropine (Lomotil) 2.5-0.025 MG per tablet, Take 1 tablet by mouth 4 (Four) Times a Day As  "Needed for Diarrhea., Disp: 100 tablet, Rfl: 1  •  donepezil (ARICEPT) 10 MG tablet, TAKE ONE TABLET BY MOUTH EVERY NIGHT AT BEDTIME, Disp: 90 tablet, Rfl: 2  •  Eliquis 2.5 MG tablet tablet, TAKE ONE TABLET BY MOUTH EVERY 12 HOURS, Disp: 30 tablet, Rfl: 5  •  furosemide (LASIX) 20 MG tablet, TAKE ONE TABLET BY MOUTH DAILY, Disp: 30 tablet, Rfl: 5  •  gabapentin (NEURONTIN) 300 MG capsule, TAKE ONE CAPSULE BY MOUTH TWICE A DAY, Disp: 180 capsule, Rfl: 2  •  Lactobacillus tablet, Take 1 tablet by mouth Daily., Disp: , Rfl:   •  levothyroxine (Synthroid) 150 MCG tablet, Take 1 tablet by mouth Daily., Disp: 90 tablet, Rfl: 1  •  lisinopril (PRINIVIL,ZESTRIL) 5 MG tablet, Take 1 tablet by mouth Daily., Disp: 90 tablet, Rfl: 3  •  melatonin 3 MG tablet, Take 9 mg by mouth Every Night., Disp: , Rfl:   •  memantine (NAMENDA) 10 MG tablet, Take 1 tablet by mouth 2 (Two) Times a Day., Disp: 180 tablet, Rfl: 3  •  methocarbamol (Robaxin) 500 MG tablet, Take 1 tablet by mouth 3 (Three) Times a Day. (Patient taking differently: Take 500 mg by mouth 3 (Three) Times a Day As Needed.), Disp: 90 tablet, Rfl: 1  •  Multiple Vitamins-Minerals (MULTIVITAMIN ADULTS 50+ PO), Take 1 tablet by mouth Daily., Disp: , Rfl:   •  Multiple Vitamins-Minerals (PRESERVISION AREDS PO), Take 1 tablet by mouth 2 (Two) Times a Day., Disp: , Rfl:   •  pantoprazole (PROTONIX) 40 MG EC tablet, TAKE ONE TABLET BY MOUTH DAILY, Disp: 90 tablet, Rfl: 1  •  potassium chloride (MICRO-K) 10 MEQ CR capsule, TAKE TWO CAPSULES BY MOUTH TWICE A DAY, Disp: 360 capsule, Rfl: 1  •  promethazine (PHENERGAN) 25 MG tablet, Take 1 tablet by mouth Every 6 (Six) Hours As Needed for Nausea or Vomiting., Disp: 30 tablet, Rfl: 2  •  sertraline (ZOLOFT) 50 MG tablet, TAKE ONE TABLET BY MOUTH DAILY, Disp: 90 tablet, Rfl: 4  •  sucralfate (CARAFATE) 1 g tablet, TAKE ONE TABLET BY MOUTH TWICE A DAY, Disp: 270 tablet, Rfl: 1  •  Syringe/Needle, Disp, (B-D 3CC LUER-ROSY SYR 23GX1\") " "23G X 1\" 3 ML misc, USE TO INJECT VITAMIN B-12 ONCE A MONTH, Disp: 1 each, Rfl: 3  •  traMADol (ULTRAM) 50 MG tablet, TAKE ONE TABLET BY MOUTH DAILY AS NEEDED FOR MODERATE PAIN IN THE RIGHT KNEE, Disp: 30 tablet, Rfl: 2    Current Facility-Administered Medications:   •  cyanocobalamin injection 1,000 mcg, 1,000 mcg, Intramuscular, Q28 Days, Eric Patel MD, 1,000 mcg at 10/18/21 1451    Review of Systems   Constitutional: Negative.    Respiratory: Negative.  Negative for chest tightness and shortness of breath.    Cardiovascular: Negative.  Negative for chest pain.   Gastrointestinal: Negative for abdominal pain, blood in stool, constipation and diarrhea.        Vital Signs  Vitals:    10/18/21 1438   BP: 130/85   BP Location: Right arm   Patient Position: Sitting   Cuff Size: Adult   Temp: 98.6 °F (37 °C)   TempSrc: Temporal   Weight: 63.5 kg (140 lb)   Height: 149.9 cm (59.02\")   PainSc: 0-No pain       Physical Exam  Vitals reviewed.   Constitutional:       Appearance: She is well-developed.   HENT:      Head: Normocephalic and atraumatic.   Cardiovascular:      Rate and Rhythm: Normal rate and regular rhythm.      Heart sounds: Normal heart sounds. No murmur heard.      Pulmonary:      Effort: Pulmonary effort is normal.      Breath sounds: Normal breath sounds.   Skin:     Comments: There is a stage I pressure injury in the right buttock about 5 x 5 cm.  At this point there is no skin breakdown but there is induration throughout the area and slight erythema.   Neurological:      Mental Status: She is alert and oriented to person, place, and time.          Procedures    ACE III MINI             Assessment/Plan:    Diagnoses and all orders for this visit:    1. Pressure injury of right hip, stage 1 (Primary)    Other orders  -     Fluzone High-Dose 65+yrs  -     levothyroxine (Synthroid) 150 MCG tablet; Take 1 tablet by mouth Daily.  Dispense: 90 tablet; Refill: 1    Plan    The nature of a pressure " sore is discussed with the patient and her daughter.  This is directly due to his sitting on that area for long periods of time.  We discussed ways to take the pressure off including positioning with pillows, alternating sides every 2 hours and lying down part of the day.      Plan of care reviewed with patient at the conclusion of today's visit. Education was provided regarding diagnosis, management, and any prescribed or recommended OTC medications.Patient verbalizes understanding of and agreement with management plan.         Eric Patel MD

## 2021-10-22 DIAGNOSIS — K52.9 CHRONIC DIARRHEA: ICD-10-CM

## 2021-10-26 RX ORDER — PANTOPRAZOLE SODIUM 40 MG/1
TABLET, DELAYED RELEASE ORAL
Qty: 90 TABLET | Refills: 1 | Status: SHIPPED | OUTPATIENT
Start: 2021-10-26 | End: 2022-04-21

## 2021-10-26 RX ORDER — DIPHENOXYLATE HYDROCHLORIDE AND ATROPINE SULFATE 2.5; .025 MG/1; MG/1
TABLET ORAL
Qty: 100 TABLET | Refills: 2 | Status: SHIPPED | OUTPATIENT
Start: 2021-10-26 | End: 2022-03-22

## 2021-11-04 ENCOUNTER — OFFICE VISIT (OUTPATIENT)
Dept: ORTHOPEDIC SURGERY | Facility: CLINIC | Age: 86
End: 2021-11-04

## 2021-11-04 VITALS — WEIGHT: 133 LBS | BODY MASS INDEX: 26.81 KG/M2 | HEIGHT: 59 IN | OXYGEN SATURATION: 96 % | HEART RATE: 67 BPM

## 2021-11-04 DIAGNOSIS — M17.11 PRIMARY OSTEOARTHRITIS OF RIGHT KNEE: Primary | ICD-10-CM

## 2021-11-04 PROCEDURE — 99214 OFFICE O/P EST MOD 30 MIN: CPT | Performed by: ORTHOPAEDIC SURGERY

## 2021-11-04 PROCEDURE — 20610 DRAIN/INJ JOINT/BURSA W/O US: CPT | Performed by: ORTHOPAEDIC SURGERY

## 2021-11-04 RX ORDER — LIDOCAINE HYDROCHLORIDE 10 MG/ML
3 INJECTION, SOLUTION EPIDURAL; INFILTRATION; INTRACAUDAL; PERINEURAL
Status: COMPLETED | OUTPATIENT
Start: 2021-11-04 | End: 2021-11-04

## 2021-11-04 RX ADMIN — LIDOCAINE HYDROCHLORIDE 3 ML: 10 INJECTION, SOLUTION EPIDURAL; INFILTRATION; INTRACAUDAL; PERINEURAL at 15:53

## 2021-11-04 NOTE — PROGRESS NOTES
"    [facility] Note        Subjective     CC: Follow-up (3 months- Primary osteoarthritis of right knee)      HPI    Temi Jarrett is a 89 y.o. female.  Patient here today for follow-up of her right knee arthritis.  Corticosteroid injections most recently given in July 2021 have not helped with all that much.  She has had viscosupplementation in the past which the family calls \"rooster comb\" and said they did well but the patient developed a rash and allergy to the rooster comb.    Overall, patient's symptoms are worsening and the family is inquiring about future treatment options.    ROS:    Constiutional:Pt denies fever, chills, nausea, or vomiting.  MSK:as above        Objective      Past Medical History  Past Medical History:   Diagnosis Date   • Acute sepsis ([facility]) 1/14/2021   • Arthritis    • Bladder prolapse, female, acquired    • Closed fracture of neck of left femur ([facility]) 9/27/2019   • Dementia ([facility])    • Depression    • Disease of thyroid gland    • Gastric polyp    • GERD (gastroesophageal reflux disease)    • History of colonic polyps    • Hyperlipidemia    • Hypertension    • IBS (irritable bowel syndrome)    • Macular degeneration    • Pericardial effusion 8/3/2020    Echo (8/3/2020): Normal LVEF.  Moderate pericardial effusion without tamponade.  Moderate MR. RVSP 49 mmHg   • Torn meniscus     right knee          Physical Exam  Pulse 67   Ht 149.9 cm (59.02\")   Wt 60.3 kg (133 lb)   LMP  (LMP Unknown)   SpO2 96%   BMI 26.85 kg/m²     Body mass index is 26.85 kg/m².    Patient is well nourished and well developed.        Ortho Exam      Musculoskeletal:  Global Assessment:  Overall assessment of Lower Extremity Muscle Strength and Tone:  Right quadriceps--5/5   Right hamstrings--5/5       Right tibialis anterior--5/5  Right gastroc-soleus--5/5  Right EHL --5/5    Lower Extremity:    Inspection and Palpation:  Right knee:  Tenderness:  Over the medial joint line and moderate " severity  Effusion:  1+  Crepitus:  Positive  Pulses:  2+  Ecchymosis:  None  Warmth:  None     ROM:  Right:  Extension: 5    Flexion:120    Instability:    Right:  Lachman Test:  Negative   Varus stress test negative   Valgus stress test negative    Deformities/Malalignments/Discrepancies:    Left:  No deformities   Right:  Genu Varum    Functional Testing:  Deion's test:  Negative  Patella grind test:  Positive  Q-angle:  normal          Imaging/Labs/EMG Reviewed:  Imaging Results (Last 24 Hours)     ** No results found for the last 24 hours. **            Assessment    Assessment:  1. Primary osteoarthritis of right knee        Plan:  1. Recommend over the counter anti-inflammatories for pain and/or swelling  2. Osteoarthritis right knee--last set of x-rays was April 2021 which revealed end-stage medial and patellofemoral compartment arthritis.  After discussing treatment options and alternatives, we decided to proceed with an Monovisc injection which is bacteria derived and not Alvaro based.  We will see her back as needed going forward.  We told the family that this can only be done every 6 months.    Procedure Note:    I discussed with the patient the potential benefits of performing a therapeutic injections as well as potential risks including but not limited to infection, swelling, pain, bleeding, bruising, nerve/vessel damage, skin color changes, transient elevation in blood glucose levels, and fat atrophy. After informed consent and after the areas were prepped with chlorhexadine soap, ethyl chloride was used to numb the skin. Via the superiorlateral approach, 3mL of 1% lidocaine followed by Monovisc were each injected into the right knee joint. The patient tolerated the procedure well. There were no complications. A sterile dressing was placed over the injection sites.        Gamal Denny MD  11/04/21  17:35 EDT      Dragon disclaimer:  Much of this encounter note is an electronic  transcription/translation of spoken language to printed text. The electronic translation of spoken language may permit erroneous, or at times, nonsensical words or phrases to be inadvertently transcribed; Although I have reviewed the note for such errors, some may still exist.

## 2021-11-04 NOTE — PROGRESS NOTES
Procedure   Large Joint Arthrocentesis: R knee  Date/Time: 11/4/2021 3:53 PM  Consent given by: patient  Site marked: site marked  Timeout: Immediately prior to procedure a time out was called to verify the correct patient, procedure, equipment, support staff and site/side marked as required   Supporting Documentation  Indications: pain   Procedure Details  Location: knee - R knee  Preparation: Patient was prepped and draped in the usual sterile fashion  Needle size: 20 G  Approach: anterolateral  Medications administered: 88 mg Hyaluronan 88 MG/4ML; 3 mL lidocaine PF 1% 1 %  Patient tolerance: patient tolerated the procedure well with no immediate complications

## 2021-11-08 RX ORDER — BETHANECHOL CHLORIDE 10 MG/1
TABLET ORAL
Qty: 90 TABLET | Refills: 3 | Status: SHIPPED | OUTPATIENT
Start: 2021-11-08 | End: 2022-03-28

## 2021-11-12 DIAGNOSIS — M81.0 AGE-RELATED OSTEOPOROSIS WITHOUT CURRENT PATHOLOGICAL FRACTURE: ICD-10-CM

## 2021-11-12 DIAGNOSIS — E78.2 MIXED HYPERLIPIDEMIA: ICD-10-CM

## 2021-11-12 DIAGNOSIS — R73.09 ABNORMAL GLUCOSE: ICD-10-CM

## 2021-11-12 DIAGNOSIS — E03.9 HYPOTHYROIDISM (ACQUIRED): ICD-10-CM

## 2021-11-12 DIAGNOSIS — I10 ESSENTIAL HYPERTENSION: Primary | ICD-10-CM

## 2021-11-12 DIAGNOSIS — D51.0 PERNICIOUS ANEMIA: ICD-10-CM

## 2021-11-29 ENCOUNTER — CLINICAL SUPPORT (OUTPATIENT)
Dept: INTERNAL MEDICINE | Facility: CLINIC | Age: 86
End: 2021-11-29

## 2021-11-29 DIAGNOSIS — E53.8 B12 DEFICIENCY: ICD-10-CM

## 2021-11-29 PROCEDURE — 96372 THER/PROPH/DIAG INJ SC/IM: CPT | Performed by: INTERNAL MEDICINE

## 2021-11-29 RX ADMIN — CYANOCOBALAMIN 1000 MCG: 1000 INJECTION, SOLUTION INTRAMUSCULAR; SUBCUTANEOUS at 16:58

## 2021-12-06 DIAGNOSIS — G63 POLYNEUROPATHY ASSOCIATED WITH UNDERLYING DISEASE (HCC): ICD-10-CM

## 2021-12-06 RX ORDER — GABAPENTIN 300 MG/1
CAPSULE ORAL
Qty: 180 CAPSULE | Refills: 0 | Status: SHIPPED | OUTPATIENT
Start: 2021-12-06 | End: 2022-03-14

## 2021-12-28 ENCOUNTER — TELEPHONE (OUTPATIENT)
Dept: INTERNAL MEDICINE | Facility: CLINIC | Age: 86
End: 2021-12-28

## 2021-12-28 ENCOUNTER — CLINICAL SUPPORT (OUTPATIENT)
Dept: INTERNAL MEDICINE | Facility: CLINIC | Age: 86
End: 2021-12-28

## 2021-12-28 PROCEDURE — 96372 THER/PROPH/DIAG INJ SC/IM: CPT | Performed by: INTERNAL MEDICINE

## 2021-12-28 RX ADMIN — CYANOCOBALAMIN 1000 MCG: 1000 INJECTION, SOLUTION INTRAMUSCULAR; SUBCUTANEOUS at 17:02

## 2021-12-28 NOTE — TELEPHONE ENCOUNTER
Patient came in for a b12 shot. Daughter insisted we look at a sore on patients buttock. Looked like beginning pressure ulcer. I had Jyothi look at sore. She recommended patient use Calmoseptine. Sample given to patient. She was instructed to use BID. Do not scrub area and to have someone look at sore after getting out of shower to make sure it is improving and not worsening. Patient states she sits majority of the day. Daughter says she knows we have to get patient up and moving and that she sits on a pillow as well.

## 2021-12-30 ENCOUNTER — TELEPHONE (OUTPATIENT)
Dept: ORTHOPEDIC SURGERY | Facility: CLINIC | Age: 86
End: 2021-12-30

## 2021-12-30 DIAGNOSIS — M17.11 PRIMARY OSTEOARTHRITIS OF RIGHT KNEE: Primary | ICD-10-CM

## 2021-12-31 DIAGNOSIS — G89.29 OTHER CHRONIC PAIN: ICD-10-CM

## 2022-01-03 RX ORDER — TRAMADOL HYDROCHLORIDE 50 MG/1
TABLET ORAL
Qty: 30 TABLET | Refills: 2 | Status: SHIPPED | OUTPATIENT
Start: 2022-01-03 | End: 2022-04-05

## 2022-01-03 NOTE — TELEPHONE ENCOUNTER
Called and spoke to patient.  I let her know the referral is in and that the pain management office will be calling to schedule.  She understood.

## 2022-01-04 ENCOUNTER — TELEPHONE (OUTPATIENT)
Dept: PAIN MEDICINE | Facility: CLINIC | Age: 87
End: 2022-01-04

## 2022-01-04 NOTE — TELEPHONE ENCOUNTER
I spoke with Chris (daughter-in-law) and Victor M (patient's son) regarding the referral from orthopedics for consideration of genicular nerve blocks and genicular nerve radiofrequency ablation.  I answered all the questions regarding the procedure, including benefits, risks, side effects, to name a few.  They would like to discuss this with the patient (who suffers from cognitive deficits) and decide if they want to move forward.  I have provided them with the telephone numbers to speak with a [employer] agent/technical support so they can send me a message if they decide to move forward with a telehealth visit (due to the Covid crisis) and then schedule the procedures.  They were very appreciative of the call and they will send us a message back if patient decides to move forward with the procedures.

## 2022-01-04 NOTE — TELEPHONE ENCOUNTER
Provider: JASWINDER   Caller: TORO  Relationship to Patient: DAUGHTER-IN-LAW   Phone Number: [phone]  PRADIP LUND   Reason for Call: PTS CARE GIVER AND POA,  WANTS TO SPEAK WITH DR SAN ONLY (NO NURSES) ABOUT THE NERVE FREEZING THAT DR FERNANDEZ REFERRED HER FOR.  HE DOSE NOT WANT TO COME INTO THE OFFICE BEFORE HE GETS A PHONE CONVENTIONS WITH THE

## 2022-01-24 RX ORDER — POTASSIUM CHLORIDE 750 MG/1
CAPSULE, EXTENDED RELEASE ORAL
Qty: 360 CAPSULE | Refills: 1 | Status: SHIPPED | OUTPATIENT
Start: 2022-01-24 | End: 2022-08-01

## 2022-01-31 ENCOUNTER — CLINICAL SUPPORT (OUTPATIENT)
Dept: INTERNAL MEDICINE | Facility: CLINIC | Age: 87
End: 2022-01-31

## 2022-01-31 DIAGNOSIS — D51.0 PERNICIOUS ANEMIA: ICD-10-CM

## 2022-01-31 PROCEDURE — 96372 THER/PROPH/DIAG INJ SC/IM: CPT | Performed by: INTERNAL MEDICINE

## 2022-01-31 RX ADMIN — CYANOCOBALAMIN 1000 MCG: 1000 INJECTION, SOLUTION INTRAMUSCULAR; SUBCUTANEOUS at 16:22

## 2022-02-04 RX ORDER — DONEPEZIL HYDROCHLORIDE 10 MG/1
TABLET, FILM COATED ORAL
Qty: 90 TABLET | Refills: 2 | Status: SHIPPED | OUTPATIENT
Start: 2022-02-04 | End: 2022-11-07

## 2022-02-04 RX ORDER — PROMETHAZINE HYDROCHLORIDE 25 MG/1
TABLET ORAL
Qty: 30 TABLET | Refills: 2 | Status: SHIPPED | OUTPATIENT
Start: 2022-02-04 | End: 2023-03-02 | Stop reason: SDUPTHER

## 2022-02-04 NOTE — TELEPHONE ENCOUNTER
Rx Refill Note  Requested Prescriptions     Pending Prescriptions Disp Refills   • promethazine (PHENERGAN) 25 MG tablet [Pharmacy Med Name: PROMETHAZINE 25 MG TABLET] 30 tablet 2     Sig: TAKE ONE TABLET BY MOUTH EVERY 6 HOURS AS NEEDED FOR NAUSEA OR VOMITING   • donepezil (ARICEPT) 10 MG tablet [Pharmacy Med Name: DONEPEZIL HCL 10 MG TABLET] 90 tablet 2     Sig: TAKE ONE TABLET BY MOUTH EVERY NIGHT AT BEDTIME      Last office visit with prescribing clinician: 10/18/2021      Next office visit with prescribing clinician: 3/2/2022            Ángela Godfrey LPN  02/04/22, 13:52 EST

## 2022-02-07 ENCOUNTER — LAB (OUTPATIENT)
Dept: LAB | Facility: HOSPITAL | Age: 87
End: 2022-02-07

## 2022-02-07 ENCOUNTER — TELEPHONE (OUTPATIENT)
Dept: INTERNAL MEDICINE | Facility: CLINIC | Age: 87
End: 2022-02-07

## 2022-02-07 DIAGNOSIS — R30.0 DYSURIA: ICD-10-CM

## 2022-02-07 DIAGNOSIS — R30.0 DYSURIA: Primary | ICD-10-CM

## 2022-02-07 LAB
BACTERIA UR QL AUTO: NORMAL /HPF
BILIRUB UR QL STRIP: NEGATIVE
CLARITY UR: ABNORMAL
COD CRY URNS QL: NORMAL /HPF
COLOR UR: YELLOW
GLUCOSE UR STRIP-MCNC: NEGATIVE MG/DL
HGB UR QL STRIP.AUTO: NEGATIVE
HYALINE CASTS UR QL AUTO: NORMAL /LPF
KETONES UR QL STRIP: NEGATIVE
LEUKOCYTE ESTERASE UR QL STRIP.AUTO: NEGATIVE
NITRITE UR QL STRIP: NEGATIVE
PH UR STRIP.AUTO: 6.5 [PH] (ref 5–8)
PROT UR QL STRIP: ABNORMAL
RBC # UR STRIP: NORMAL /HPF
REF LAB TEST METHOD: NORMAL
SP GR UR STRIP: 1.02 (ref 1–1.03)
SQUAMOUS #/AREA URNS HPF: NORMAL /HPF
UROBILINOGEN UR QL STRIP: ABNORMAL
WBC # UR STRIP: NORMAL /HPF

## 2022-02-07 PROCEDURE — 81001 URINALYSIS AUTO W/SCOPE: CPT

## 2022-02-07 NOTE — TELEPHONE ENCOUNTER
Caller: Pradip Jarrett (POA)    Relationship: Emergency Contact    Best call back number: [phone]    What was the call regarding:   PATIENT'S (SON) PRADIP STATED THAT PATIENT DID A URINALYSIS TODAY 02/07/2022 AND WAITING ON THE RESULTS BUT WOULD LIKE FOR A ANTIBIOTIC TO BE CALLED INTO THE PHARMACY FOR PATIENT DUE TO PATIENT HALLUCINATING, DIARRHEA AND VOMITING AND THINKS PATIENT HAS A UTI AND WOULD FOR ANTIBIOTIC TO BE CALLED IT THE PHARMACY     SHREYA [address] [phone] [facility] [phone]   [phone]    Do you require a callback:YES

## 2022-02-07 NOTE — TELEPHONE ENCOUNTER
OVER THE COUNTER UA TEST WAS GIVEN Sunday NIGHT AND CAME BACK POSITIVE.  SICK OVER THE WEEKEND, NAUSEA, VOMITING AND DIARRHEA.  PROLAPSED BLADDER.  CONFUSION.   DAUGHTER IN LAW WOULD LIKE AN ORDER PUT IN FOR THIS AFTERNOON.  PLEASE GIVE TORO A CALL BACK [phone]

## 2022-02-10 ENCOUNTER — TELEPHONE (OUTPATIENT)
Dept: INTERNAL MEDICINE | Facility: CLINIC | Age: 87
End: 2022-02-10

## 2022-02-10 NOTE — TELEPHONE ENCOUNTER
Rec'd fax 2/9/22 from [facility] for Eliquis re: missing info on form faxed 2/3/22. Called 2/9 and was on hold 10 min.   Called 10:00 this am and on hold again 8 min. Need clarification on info needed

## 2022-02-25 ENCOUNTER — CLINICAL SUPPORT (OUTPATIENT)
Dept: INTERNAL MEDICINE | Facility: CLINIC | Age: 87
End: 2022-02-25

## 2022-02-25 PROCEDURE — 96372 THER/PROPH/DIAG INJ SC/IM: CPT | Performed by: INTERNAL MEDICINE

## 2022-02-25 RX ADMIN — CYANOCOBALAMIN 1000 MCG: 1000 INJECTION, SOLUTION INTRAMUSCULAR; SUBCUTANEOUS at 16:48

## 2022-03-02 NOTE — PROGRESS NOTES
"Telemedicine Provider: Shai Barfield MD   Location of Provider: Office   Patient location: Home   Facilitator/intake: LESLEY  Type of Service: Consult via telemedicine  Mode of transmission: Telephone (patient's preference)  Basis for telemedicine: COVID-19 crisis  All communications with the patient were documented in the patient's medical record per documentation standards.  You have chosen to receive care through a telephone visit today. Do you consent to use a telephone visit for your medical care today? Yes       Chief Complaint: \"Right knee pain\"      History of Present Illness:   Patient: Ms. Temi Jarrett, 89 y.o. female   Referring Physician: Leandra Reilly PA-C  Reason for Referral: Consultation for chronic intractable right knee joint pain.   Pain History: Patient reports a longstanding history of progressive intractable right knee joint pain, which began without incident.  Information was obtained from the patient, her daughter and her son.  Right knee x-rays 4/22/2021 revealed varus alignment.  Severe medial compartment and patellofemoral compartment degenerative changes of the knee and moderate degenerative changes in the lateral compartment. Patient has failed to obtain pain relief with conservative measures including oral analgesics, physical therapy, to name a few. Patient underwent right knee joint injections with corticosteroids as well as viscosupplementation injections without significant relief. She had a steroid injections in July 2021 without relief.  Viscosupplementation apparently provided some relief but patient developed an allergic response. She had a Monovisc injection (bacterial arrived and not [place] based) on 11/4/2021. Temi Jarrett underwent orthopedic surgical consultation with Leandra Reilly PA-C on 12/01/20211, and was found not to be a surgical candidate.  Patient has been referred for consideration of genicular nerve blocks and possible genicular nerve " radiofrequency ablation. Pain has progressed in intensity over the past years.   Pain Description: Constant medial more than lateral right knee pain with intermittent exacerbation, described as aching, sharp and stabbing sensation.  Patient denies lower back pain, lower extremity pain, or any radicular symptoms.  Radiation of Pain: The pain does not radiate  Pain intensity today: 2/10  Average pain intensity last week: 5/10  Pain intensity ranges from: 2/10 to 6/10  Aggravating factors: Pain increases with standing, walking, flexing the knee. Patient uses a walker  Alleviating factors: Pain decreases with sitting down, lying down   Associated Symptoms:   Patient denies numbness or weakness in the lower extremities.   Patient denies any new bladder or bowel problems.   Patient reports difficulties with her balance but denies recent falls.     Review of previous therapies and additional medical records:  Temi Jarrett has already failed the following measures, including:   Conservative Measures: Oral analgesics, ice, heat, physical therapy   Interventional Measures: Knee injections with orthopedics with short term relief  Surgical Measures: No history of previous lumbar spine or knee surgery. History of left hip surgery   Temi Jarrett underwent orthopedic surgical consultation with Leandra Reilly PA-C on 12/01/20211, and was found not to be a surgical candidate.  Temi Jarrett presents with significant comorbidities including bladder prolapse, dementia, depression, GERD, hyperlipidemia, hypertension, irritable bowel syndrome, macular degeneration, on Eliquis and ASA  In terms of current analgesics, Temi Jarrett takes: tramadol, gabapentin, Voltaren gel, methocarbamol.  Patient also takes BuSpar, donepezil, melatonin, memantine, sertraline  I have reviewed Jarrell Report #[id number] (tramadol, gabapentin by Dr. Patel) consistent with medication reconciliation.  SOAPP: Low Risk     Global Pain Scale  03-17 2022          Pain           Feelings           Clinical outcomes           Activities           GPS Total:             Review of Diagnostic Studies:  I have reviewed the images as well as the report, as follows;  Right knee x-rays 4/22/2021: Severe degenerative changes in the medial compartment.  Moderate degenerative changes in the lateral compartment.  Severe changes in the patellofemoral compartment.  Varus alignment.  Severe medial compartment and patellofemoral compartment degenerative changes of the knee      Review of Systems      Patient Active Problem List   Diagnosis   • Esophageal reflux   • Hypothyroidism (acquired)   • Anxiety with depression   • Peripheral neuropathy   • Spinal stenosis of lumbar region   • Osteoporosis   • Disc disorder of lumbar region   • Bladder prolapse, female, acquired   • Essential hypertension   • Pernicious anemia   • Annual physical exam   • Primary osteoarthritis of both knees   • Hiatal hernia   • Multifocal aspiration pneumonia due to large intrathoracic hiatal hernia   • Paroxysmal atrial fibrillation (HCC)   • Mild cognitive impairment   • At high risk for aspiration   • Chronic pain   • Osteoarthritis of right knee       Past Medical History:   Diagnosis Date   • Acute sepsis ([facility]) 01/14/2021   • Arthritis    • Bladder prolapse, female, acquired    • Closed fracture of neck of left femur ([facility]) 09/27/2019   • Dementia ([facility])    • Depression    • Disease of thyroid gland    • Gastric polyp    • GERD (gastroesophageal reflux disease)    • Hiatal hernia    • History of colonic polyps    • Hyperlipidemia    • Hypertension    • IBS (irritable bowel syndrome)    • Macular degeneration    • Pericardial effusion 08/03/2020    Echo (8/3/2020): Normal LVEF.  Moderate pericardial effusion without tamponade.  Moderate MR. RVSP 49 mmHg   • Torn meniscus     right knee          Past Surgical History:   Procedure Laterality Date   • CHOLECYSTECTOMY  1997   • ENDOSCOPY N/A  01/14/2021    Procedure: ESOPHAGOGASTRODUODENOSCOPY;  Surgeon: Serjio Guerrero MD;  Location: [facility] ENDOSCOPY;  Service: General;  Laterality: N/A;   • EYE SURGERY  1998    Cataract extraction   • HERNIA REPAIR     • HIP HEMIARTHROPLASTY Left 09/28/2019    Procedure: HIP HEMIARTHROPLASTY LEFT;  Surgeon: Reddy Segundo Jr., MD;  Location: [facility] OR;  Service: Orthopedics   • HYSTERECTOMY  1976   • KNEE SURGERY Right     arthroscopy- 2000   • SKIN CANCER EXCISION Left     [facility]; CANCEROUS SPOT DIDN'T  MOVE BUT NEEDED REMOVED   • TONSILLECTOMY           Family History   Problem Relation Age of Onset   • Hypertension Mother    • Breast cancer Mother    • Obesity Mother    • Hypertension Father    • Diabetes Son          Social History     Socioeconomic History   • Marital status:    Tobacco Use   • Smoking status: Never Smoker   • Smokeless tobacco: Never Used   Vaping Use   • Vaping Use: Never used   Substance and Sexual Activity   • Alcohol use: No   • Drug use: Defer   • Sexual activity: Defer          Current Outpatient Medications:   •  acetaminophen (TYLENOL) 325 MG tablet, Take 650 mg by mouth Every 6 (Six) Hours As Needed for Mild Pain ., Disp: , Rfl:   •  apixaban (Eliquis) 2.5 MG tablet tablet, Take 1 tablet by mouth Every 12 (Twelve) Hours., Disp: 90 tablet, Rfl: 3  •  aspirin 81 MG EC tablet, Take 81 mg by mouth Daily., Disp: , Rfl:   •  bethanechol (URECHOLINE) 10 MG tablet, TAKE ONE TABLET BY MOUTH THREE TIMES A DAY, Disp: 90 tablet, Rfl: 3  •  busPIRone (BUSPAR) 10 MG tablet, TAKE ONE TABLET BY MOUTH TWICE A DAY, Disp: 60 tablet, Rfl: 4  •  Calcium Citrate-Vitamin D (CITRACAL PETITES/VITAMIN D PO), Take 2 tablets by mouth 2 (two) times a day., Disp: , Rfl:   •  Cholecalciferol (VITAMIN D PO), 2000 U qd, Disp: , Rfl:   •  Cyanocobalamin (VITAMIN B-12 IJ), Inject  as directed. INJECTION COMPLETED MONTHLY WITH PCP OFFICE, Disp: , Rfl:   •  diclofenac (VOLTAREN) 1 %  gel gel, APPLY 4 GRAMS TOPICALLY TO THE APPROPRAITE AREA AS DIRECTED FOUR TIMES A DAY, Disp: 100 g, Rfl: 4  •  diphenoxylate-atropine (LOMOTIL) 2.5-0.025 MG per tablet, TAKE ONE TABLET BY MOUTH FOUR TIMES A DAY AS NEEDED FOR DIARRHEA, Disp: 100 tablet, Rfl: 2  •  donepezil (ARICEPT) 10 MG tablet, TAKE ONE TABLET BY MOUTH EVERY NIGHT AT BEDTIME, Disp: 90 tablet, Rfl: 2  •  furosemide (LASIX) 20 MG tablet, TAKE ONE TABLET BY MOUTH DAILY (Patient taking differently: Taking every other day), Disp: 30 tablet, Rfl: 5  •  gabapentin (NEURONTIN) 300 MG capsule, TAKE ONE CAPSULE BY MOUTH TWICE A DAY, Disp: 180 capsule, Rfl: 0  •  levothyroxine (Synthroid) 150 MCG tablet, Take 1 tablet by mouth Daily., Disp: 90 tablet, Rfl: 1  •  lisinopril (PRINIVIL,ZESTRIL) 5 MG tablet, Take 1 tablet by mouth Daily. (Patient taking differently: Take 5 mg by mouth Daily. Pt only takes if bp is above 140/90.), Disp: 90 tablet, Rfl: 3  •  melatonin 3 MG tablet, Take 9 mg by mouth Every Night., Disp: , Rfl:   •  memantine (NAMENDA) 10 MG tablet, Take 1 tablet by mouth 2 (Two) Times a Day., Disp: 180 tablet, Rfl: 3  •  methocarbamol (Robaxin) 500 MG tablet, Take 1 tablet by mouth 3 (Three) Times a Day. (Patient taking differently: Take 500 mg by mouth 3 (Three) Times a Day As Needed.), Disp: 90 tablet, Rfl: 1  •  Multiple Vitamins-Minerals (MULTIVITAMIN ADULTS 50+ PO), Take 1 tablet by mouth Daily., Disp: , Rfl:   •  Multiple Vitamins-Minerals (PRESERVISION AREDS PO), Take 1 tablet by mouth 2 (Two) Times a Day., Disp: , Rfl:   •  pantoprazole (PROTONIX) 40 MG EC tablet, TAKE ONE TABLET BY MOUTH DAILY, Disp: 90 tablet, Rfl: 1  •  potassium chloride (MICRO-K) 10 MEQ CR capsule, TAKE TWO CAPSULES BY MOUTH TWICE A DAY, Disp: 360 capsule, Rfl: 1  •  promethazine (PHENERGAN) 25 MG tablet, TAKE ONE TABLET BY MOUTH EVERY 6 HOURS AS NEEDED FOR NAUSEA OR VOMITING, Disp: 30 tablet, Rfl: 2  •  sertraline (ZOLOFT) 50 MG tablet, TAKE ONE TABLET BY MOUTH  "DAILY, Disp: 90 tablet, Rfl: 4  •  sucralfate (CARAFATE) 1 g tablet, TAKE ONE TABLET BY MOUTH TWICE A DAY, Disp: 270 tablet, Rfl: 1  •  Syringe/Needle, Disp, (B-D 3CC LUER-ROSY SYR 23GX1\") 23G X 1\" 3 ML misc, USE TO INJECT VITAMIN B-12 ONCE A MONTH, Disp: 1 each, Rfl: 3  •  traMADol (ULTRAM) 50 MG tablet, TAKE ONE TABLET BY MOUTH DAILY AS NEEDED FOR MODERATE PAIN IN THE RIGHT KNEE (Patient taking differently: TAKE ONE TABLET BY MOUTH DAILY AS NEEDED FOR MODERATE PAIN IN THE RIGHT KNEE  PT TAKING DAILY AFTER BREAKFAST), Disp: 30 tablet, Rfl: 2  •  Lactobacillus tablet, Take 1 tablet by mouth Daily., Disp: , Rfl:     Current Facility-Administered Medications:   •  cyanocobalamin injection 1,000 mcg, 1,000 mcg, Intramuscular, Q28 Days, Eric Patel MD, 1,000 mcg at 02/25/22 1648      Allergies   Allergen Reactions   • Augmentin [Amoxicillin-Pot Clavulanate] Diarrhea   • Codeine Nausea Only     Trouble Breathing    • Shrimp Hives   • Vevay Unknown - Low Severity         LMP  (LMP Unknown)       Physical Exam: Patient was guided through the physical examination to perform active maneuvers relevant to her chief complaint. Particular attention was paid to her right knee. Patient reports medial knee pain that increases with weight bearing and flexion of the right knee joint    ASSESSMENT:   1. Osteoarthritis of right knee, unspecified osteoarthritis type    2. Age-related osteoporosis without current pathological fracture    3. Mild cognitive impairment    4. Anxiety with depression          PLAN/MEDICAL DECISION MAKING:  Patient reports a longstanding history of progressive intractable right knee joint pain, which began without incident. Right knee x-rays 4/22/2021 revealed varus alignment. Severe medial compartment and patellofemoral compartment degenerative changes of the knee and moderate degenerative changes in the lateral compartment. Patient has failed to obtain pain relief with conservative measures " including oral analgesics, physical therapy, to name a few. Patient underwent right knee joint injections with corticosteroids as well as viscosupplementation injections without significant relief. She had a steroid injections in July 2021 without relief.  Viscosupplementation apparently provided some relief but patient developed an allergic response. She had a Monovisc injection on 11/4/2021. Temi Jarrett underwent orthopedic surgical consultation with Leandra Reilly PA-C on 12/01/20211, and was found not to be a surgical candidate.  Patient has been referred for consideration of genicular nerve blocks and possible genicular nerve radiofrequency ablation. Pain has progressed in intensity over the past years. I have reviewed all available patient's medical records as well as previous therapies as referenced above. I had a lengthy conversation with Ms. Temi Jarrett regarding her chronic pain condition and potential therapeutic options including risks, benefits, alternative therapies, to name a few. Therefore, I have proposed the following plan:  1. Diagnostic studies: None indicated at this time  2. Pharmacological measures: Reviewed and discussed; Patient takes tramadol, gabapentin, Voltaren gel, methocarbamol.  Patient also takes BuSpar, donepezil, melatonin, memantine, sertraline  3. Interventional pain management measures: Patient will be scheduled for diagnostic right superior medial genicular nerve block, right superior lateral genicular nerve block and right inferior medial genicular nerve block. If patient experiences more than 50% pain relief along with significant improvement in the range of motion of the knee joint, then, patient will be scheduled for a second set of diagnostic blocks, to then, proceed with bipolar radiofrequency ablation of the right superomedial, superolateral and inferomedial genicular nerves.  Patient will stop apixaban (Eliquis) at least 72 hours between last dose and  radiofrequency ablation.  I explained that the diagnostic blocks are performed with thin needles and the risk of bleeding is exceptionally low.  The family also asked me what other options are available for treatment of her chronic pain.  Patient could be a candidate for regenerative therapies, neuromodulation, the name a few.  4. Long-term rehabilitation efforts:  A. The patient does not have a history of falls. I did complete a risk assessment for falls  B. Patient will start a comprehensive physical therapy program at [facility] for gait and balance training and Alter-G, VMO/quadriceps/hamstrings strengthening once her pain is under control  C. Contrast therapy: Apply ice-packs for 15-20 minutes, followed by heating pads for 15-20 minutes to affected area   D. Patient has been screened for tobacco use: Current tobacco non-user   Temi Jarrett  reports that she has never smoked. She has never used smokeless tobacco.   5. The patient and her family have been instructed to contact my office with any questions or difficulties. The patient understands the plan and agrees to proceed accordingly.      Pre-Chartin minutes  Total time of discussion was 21 minutes  Time writing/finalizing note: 10  Total Time: 37 minutes    The patient has a documented plan of care to address chronic pain.   Temi Jarrett reports a pain score of 2-7/10.  Given her pain assessment as noted, treatment options were discussed and the following options were decided upon as a follow-up plan to address the patient's pain: continuation of current treatment plan for pain, educational materials on pain management, home exercises and therapy, prescription for non-opiod analgesics, referral to Physical Therapy, referral to Primary Care for assistance in pain treatment guidance, referral to specialist for assistance in pain treatment guidance, steroid injections, use of non-medical modalities (ice, heat, stretching and/or  behavior modifications) and interventional pain management measures.               Pain Management Panel     Pain Management Panel Latest Ref Rng & Units 6/10/2019    CREATININE UR mg/dL 81.4             KENDRA query complete. KENDRA reviewed by Shai Barfield MD.     Pain Medications             acetaminophen (TYLENOL) 325 MG tablet Take 650 mg by mouth Every 6 (Six) Hours As Needed for Mild Pain .    aspirin 81 MG EC tablet Take 81 mg by mouth Daily.    gabapentin (NEURONTIN) 300 MG capsule TAKE ONE CAPSULE BY MOUTH TWICE A DAY    methocarbamol (Robaxin) 500 MG tablet Take 1 tablet by mouth 3 (Three) Times a Day.    sertraline (ZOLOFT) 50 MG tablet TAKE ONE TABLET BY MOUTH DAILY    traMADol (ULTRAM) 50 MG tablet TAKE ONE TABLET BY MOUTH DAILY AS NEEDED FOR MODERATE PAIN IN THE RIGHT KNEE             Patient Care Team:  Eric Patel MD as PCP - General  Eric Patel MD as PCP - Family Medicine  Von Pablo MD as Cardiologist (Cardiology)     No orders of the defined types were placed in this encounter.        Future Appointments   Date Time Provider Department Center   4/20/2022 12:00 PM Shai Barfield MD [facility]   4/20/2022  3:30 PM Eric Patel MD [facility]         Shai LEOS. MD [place]     Please note that portions of this note were completed with a voice recognition program.

## 2022-03-14 DIAGNOSIS — G63 POLYNEUROPATHY ASSOCIATED WITH UNDERLYING DISEASE: ICD-10-CM

## 2022-03-14 RX ORDER — GABAPENTIN 300 MG/1
CAPSULE ORAL
Qty: 180 CAPSULE | Refills: 0 | Status: SHIPPED | OUTPATIENT
Start: 2022-03-14 | End: 2022-06-09

## 2022-03-17 ENCOUNTER — OFFICE VISIT (OUTPATIENT)
Dept: PAIN MEDICINE | Facility: CLINIC | Age: 87
End: 2022-03-17

## 2022-03-17 DIAGNOSIS — F41.8 ANXIETY WITH DEPRESSION: ICD-10-CM

## 2022-03-17 DIAGNOSIS — G31.84 MILD COGNITIVE IMPAIRMENT: ICD-10-CM

## 2022-03-17 DIAGNOSIS — M81.0 AGE-RELATED OSTEOPOROSIS WITHOUT CURRENT PATHOLOGICAL FRACTURE: ICD-10-CM

## 2022-03-17 DIAGNOSIS — M17.11 OSTEOARTHRITIS OF RIGHT KNEE, UNSPECIFIED OSTEOARTHRITIS TYPE: Primary | ICD-10-CM

## 2022-03-17 DIAGNOSIS — M17.11 OSTEOARTHRITIS OF RIGHT KNEE, UNSPECIFIED OSTEOARTHRITIS TYPE: ICD-10-CM

## 2022-03-17 PROCEDURE — 99443 PR PHYS/QHP TELEPHONE EVALUATION 21-30 MIN: CPT | Performed by: ANESTHESIOLOGY

## 2022-03-22 DIAGNOSIS — K52.9 CHRONIC DIARRHEA: ICD-10-CM

## 2022-03-22 RX ORDER — DIPHENOXYLATE HYDROCHLORIDE AND ATROPINE SULFATE 2.5; .025 MG/1; MG/1
TABLET ORAL
Qty: 100 TABLET | Refills: 2 | Status: SHIPPED | OUTPATIENT
Start: 2022-03-22 | End: 2022-08-23

## 2022-03-22 NOTE — TELEPHONE ENCOUNTER
Rx Refill Note  Requested Prescriptions     Pending Prescriptions Disp Refills   • diphenoxylate-atropine (LOMOTIL) 2.5-0.025 MG per tablet [Pharmacy Med Name: DIPHENOXYLATE-ATROP 2.5-0.025 MG TB] 100 tablet      Sig: TAKE ONE TABLET BY MOUTH FOUR TIMES A DAY AS NEEDED FOR DIARRHEA      Last office visit with prescribing clinician: 10/18/2021      Next office visit with prescribing clinician: 4/20/2022                Ramandeep Trevino LPN  03/22/22, 13:19 EDT

## 2022-03-28 ENCOUNTER — CLINICAL SUPPORT (OUTPATIENT)
Dept: INTERNAL MEDICINE | Facility: CLINIC | Age: 87
End: 2022-03-28

## 2022-03-28 DIAGNOSIS — D51.0 PERNICIOUS ANEMIA: ICD-10-CM

## 2022-03-28 PROCEDURE — 96372 THER/PROPH/DIAG INJ SC/IM: CPT | Performed by: INTERNAL MEDICINE

## 2022-03-28 RX ORDER — BETHANECHOL CHLORIDE 10 MG/1
TABLET ORAL
Qty: 90 TABLET | Refills: 3 | Status: SHIPPED | OUTPATIENT
Start: 2022-03-28 | End: 2022-08-03

## 2022-03-28 RX ADMIN — CYANOCOBALAMIN 1000 MCG: 1000 INJECTION, SOLUTION INTRAMUSCULAR; SUBCUTANEOUS at 16:35

## 2022-03-28 NOTE — TELEPHONE ENCOUNTER
Rx Refill Note  Requested Prescriptions     Pending Prescriptions Disp Refills   • bethanechol (URECHOLINE) 10 MG tablet [Pharmacy Med Name: BETHANECHOL 10 MG TABLET] 90 tablet 3     Sig: TAKE ONE TABLET BY MOUTH THREE TIMES A DAY      Last office visit with prescribing clinician: 10/18/2021      Next office visit with prescribing clinician: 4/20/2022            Ángela Godfrey LPN  03/28/22, 09:40 EDT

## 2022-04-05 DIAGNOSIS — G89.29 OTHER CHRONIC PAIN: ICD-10-CM

## 2022-04-05 RX ORDER — BUSPIRONE HYDROCHLORIDE 10 MG/1
10 TABLET ORAL 2 TIMES DAILY
Qty: 60 TABLET | Refills: 4 | Status: SHIPPED | OUTPATIENT
Start: 2022-04-05 | End: 2022-09-07

## 2022-04-05 RX ORDER — TRAMADOL HYDROCHLORIDE 50 MG/1
TABLET ORAL
Qty: 30 TABLET | Refills: 2 | Status: SHIPPED | OUTPATIENT
Start: 2022-04-05 | End: 2022-07-25 | Stop reason: SDUPTHER

## 2022-04-05 NOTE — TELEPHONE ENCOUNTER
Caller: Chris Jarrett (NO BH verbal on file)     Relationship: Emergency Contact    Best call back number: [phone]    Requested Prescriptions:   Requested Prescriptions     Pending Prescriptions Disp Refills   • busPIRone (BUSPAR) 10 MG tablet 60 tablet 4     Sig: Take 1 tablet by mouth 2 (Two) Times a Day.        Pharmacy where request should be sent: [facility] [address] [phone] [facility] [phone] [facility]     Additional details provided by patient: patient is completely out of medication    Does the patient have less than a 3 day supply:  [x] Yes  [] No    Anurag Bob Rep   04/05/22 12:18 EDT

## 2022-04-05 NOTE — TELEPHONE ENCOUNTER
Rx Refill Note  Requested Prescriptions     Pending Prescriptions Disp Refills   • busPIRone (BUSPAR) 10 MG tablet 60 tablet 4     Sig: Take 1 tablet by mouth 2 (Two) Times a Day.      Last office visit with prescribing clinician: 10/18/2021      Next office visit with prescribing clinician: 5/2/2022            Cortney Bob RN  04/05/22, 12:22 EDT

## 2022-04-07 RX ORDER — LEVOTHYROXINE SODIUM 150 MCG
TABLET ORAL
Qty: 90 TABLET | Refills: 1 | Status: SHIPPED | OUTPATIENT
Start: 2022-04-07 | End: 2022-10-07

## 2022-04-07 NOTE — TELEPHONE ENCOUNTER
Rx Refill Note  Requested Prescriptions     Pending Prescriptions Disp Refills   • Synthroid 150 MCG tablet [Pharmacy Med Name: SYNTHROID 150 MCG TABLET] 90 tablet 1     Sig: TAKE ONE TABLET BY MOUTH DAILY      Last office visit with prescribing clinician: 10/18/2021      Next office visit with prescribing clinician: 5/2/2022            Cortney Bob RN  04/07/22, 11:09 EDT

## 2022-04-14 ENCOUNTER — TELEPHONE (OUTPATIENT)
Dept: INTERNAL MEDICINE | Facility: CLINIC | Age: 87
End: 2022-04-14

## 2022-04-14 RX ORDER — SULFAMETHOXAZOLE AND TRIMETHOPRIM 800; 160 MG/1; MG/1
1 TABLET ORAL 2 TIMES DAILY
Qty: 14 TABLET | Refills: 0 | Status: SHIPPED | OUTPATIENT
Start: 2022-04-14 | End: 2022-05-27

## 2022-04-14 NOTE — TELEPHONE ENCOUNTER
Caller: Chris Jarrett    Relationship: Emergency Contact    Best call back number: [phone]    What orders are you requesting (i.e. lab or imaging): URINALYSIS     In what timeframe would the patient need to come in: ASAP- WANTS A ORDER SENT TO THE LAB TODAY OR FIRST THING IN THE MORNING     Where will you receive your lab/imaging services: WITHIN [facility]     Additional notes: PATIENT IS HAVING FREQUENT URINATION. PATIENT DID A HOME TEST AND THE PATIENT LEUKOCYTES ARE ELEVATED    Patient called today with regards to the following medication refill request: Patient will be out the day after tomorrow. He normally gets his medication via mail order pharmacy, but because he forgot to call in advance, he won't get it on time through the mail. Please send to local pharmacy instead.    Provider: Alexx Valera MD  Medication: Glipizide  Strength: 10 mg    Pharmacy:   [address] [address]  Phone: [phone] Fax: [phone]      For additional information, or if you need to contact the patient, please call patient at the following number:    Mobile [phone]       Patient states that it is okay to leave a detailed message.    Patient was instructed to call pharmacy directly for future refills.      Advised Patient that refill processing may take 48-72 hours and that the pharmacy will contact them when their prescription is ready for pickup.

## 2022-04-18 DIAGNOSIS — M17.11 OSTEOARTHRITIS OF RIGHT KNEE, UNSPECIFIED OSTEOARTHRITIS TYPE: Primary | ICD-10-CM

## 2022-04-20 ENCOUNTER — OUTSIDE FACILITY SERVICE (OUTPATIENT)
Dept: PAIN MEDICINE | Facility: CLINIC | Age: 87
End: 2022-04-20

## 2022-04-21 RX ORDER — PANTOPRAZOLE SODIUM 40 MG/1
TABLET, DELAYED RELEASE ORAL
Qty: 90 TABLET | Refills: 1 | Status: SHIPPED | OUTPATIENT
Start: 2022-04-21 | End: 2022-11-07

## 2022-04-26 ENCOUNTER — TELEPHONE (OUTPATIENT)
Dept: INTERNAL MEDICINE | Facility: CLINIC | Age: 87
End: 2022-04-26

## 2022-04-26 ENCOUNTER — CLINICAL SUPPORT (OUTPATIENT)
Dept: INTERNAL MEDICINE | Facility: CLINIC | Age: 87
End: 2022-04-26

## 2022-04-26 DIAGNOSIS — E53.8 VITAMIN B12 DEFICIENCY: ICD-10-CM

## 2022-04-26 DIAGNOSIS — I10 ESSENTIAL HYPERTENSION: Primary | ICD-10-CM

## 2022-04-26 PROCEDURE — 96372 THER/PROPH/DIAG INJ SC/IM: CPT | Performed by: INTERNAL MEDICINE

## 2022-04-26 RX ADMIN — CYANOCOBALAMIN 1000 MCG: 1000 INJECTION, SOLUTION INTRAMUSCULAR; SUBCUTANEOUS at 16:10

## 2022-04-26 NOTE — TELEPHONE ENCOUNTER
Chris wants to add urinalysis labs to the future labs for Temi. Chris or patients's daughter plan on taking her to get labs a few days before May appointment.

## 2022-05-18 ENCOUNTER — TELEPHONE (OUTPATIENT)
Dept: INTERNAL MEDICINE | Facility: CLINIC | Age: 87
End: 2022-05-18

## 2022-05-18 DIAGNOSIS — D51.0 PERNICIOUS ANEMIA: ICD-10-CM

## 2022-05-18 DIAGNOSIS — M81.0 AGE-RELATED OSTEOPOROSIS WITHOUT CURRENT PATHOLOGICAL FRACTURE: ICD-10-CM

## 2022-05-18 DIAGNOSIS — E78.5 DYSLIPIDEMIA: ICD-10-CM

## 2022-05-18 DIAGNOSIS — R73.09 ABNORMAL GLUCOSE: ICD-10-CM

## 2022-05-18 DIAGNOSIS — E03.9 HYPOTHYROIDISM (ACQUIRED): ICD-10-CM

## 2022-05-18 DIAGNOSIS — R30.0 DYSURIA: ICD-10-CM

## 2022-05-18 DIAGNOSIS — I10 ESSENTIAL HYPERTENSION: Primary | ICD-10-CM

## 2022-05-18 NOTE — TELEPHONE ENCOUNTER
TORO HAS CALLED REQUESTING IF LAB ORDERS CAN BE PUT IN ALONG WITH AN ORDER FOR URINALYSIS IN PREPARATION FOR UPCOMING APPOINTMENT AT END OF THE MONTH.   TORO IS WANTING TO TAKE PATIENT TOMORROW MORNING TO GET LABS DONE.    TORO IS REQUESTING A CALL BACK ONCE ORDERS HAVE BEEN PLACED.    CALL BACK NUMBER [phone]

## 2022-05-19 ENCOUNTER — LAB (OUTPATIENT)
Dept: LAB | Facility: HOSPITAL | Age: 87
End: 2022-05-19

## 2022-05-19 DIAGNOSIS — D51.0 PERNICIOUS ANEMIA: ICD-10-CM

## 2022-05-19 DIAGNOSIS — I10 ESSENTIAL HYPERTENSION: ICD-10-CM

## 2022-05-19 DIAGNOSIS — E78.5 DYSLIPIDEMIA: ICD-10-CM

## 2022-05-19 DIAGNOSIS — E03.9 HYPOTHYROIDISM (ACQUIRED): ICD-10-CM

## 2022-05-19 DIAGNOSIS — R30.0 DYSURIA: ICD-10-CM

## 2022-05-19 DIAGNOSIS — M81.0 AGE-RELATED OSTEOPOROSIS WITHOUT CURRENT PATHOLOGICAL FRACTURE: ICD-10-CM

## 2022-05-19 DIAGNOSIS — R73.09 ABNORMAL GLUCOSE: ICD-10-CM

## 2022-05-19 LAB
25(OH)D3 SERPL-MCNC: 54.5 NG/ML (ref 30–100)
ALBUMIN SERPL-MCNC: 4.3 G/DL (ref 3.5–5.2)
ALBUMIN/GLOB SERPL: 2.3 G/DL
ALP SERPL-CCNC: 70 U/L (ref 39–117)
ALT SERPL W P-5'-P-CCNC: 24 U/L (ref 1–33)
ANION GAP SERPL CALCULATED.3IONS-SCNC: 9.1 MMOL/L (ref 5–15)
AST SERPL-CCNC: 29 U/L (ref 1–32)
BASOPHILS # BLD AUTO: 0.05 10*3/MM3 (ref 0–0.2)
BASOPHILS NFR BLD AUTO: 0.8 % (ref 0–1.5)
BILIRUB SERPL-MCNC: 0.5 MG/DL (ref 0–1.2)
BILIRUB UR QL STRIP: NEGATIVE
BUN SERPL-MCNC: 19 MG/DL (ref 8–23)
BUN/CREAT SERPL: 24.1 (ref 7–25)
CALCIUM SPEC-SCNC: 9 MG/DL (ref 8.2–9.6)
CHLORIDE SERPL-SCNC: 102 MMOL/L (ref 98–107)
CHOLEST SERPL-MCNC: 146 MG/DL (ref 0–200)
CLARITY UR: CLEAR
CO2 SERPL-SCNC: 28.9 MMOL/L (ref 22–29)
COLOR UR: YELLOW
CREAT SERPL-MCNC: 0.79 MG/DL (ref 0.57–1)
DEPRECATED RDW RBC AUTO: 44 FL (ref 37–54)
EGFRCR SERPLBLD CKD-EPI 2021: 70.3 ML/MIN/1.73
EOSINOPHIL # BLD AUTO: 0.07 10*3/MM3 (ref 0–0.4)
EOSINOPHIL NFR BLD AUTO: 1.2 % (ref 0.3–6.2)
ERYTHROCYTE [DISTWIDTH] IN BLOOD BY AUTOMATED COUNT: 14.4 % (ref 12.3–15.4)
GLOBULIN UR ELPH-MCNC: 1.9 GM/DL
GLUCOSE SERPL-MCNC: 93 MG/DL (ref 65–99)
GLUCOSE UR STRIP-MCNC: NEGATIVE MG/DL
HBA1C MFR BLD: 4.7 % (ref 4.8–5.6)
HCT VFR BLD AUTO: 37.5 % (ref 34–46.6)
HDLC SERPL-MCNC: 49 MG/DL (ref 40–60)
HGB BLD-MCNC: 12.5 G/DL (ref 12–15.9)
HGB UR QL STRIP.AUTO: NEGATIVE
IMM GRANULOCYTES # BLD AUTO: 0.01 10*3/MM3 (ref 0–0.05)
IMM GRANULOCYTES NFR BLD AUTO: 0.2 % (ref 0–0.5)
KETONES UR QL STRIP: NEGATIVE
LDLC SERPL CALC-MCNC: 81 MG/DL (ref 0–100)
LDLC/HDLC SERPL: 1.63 {RATIO}
LEUKOCYTE ESTERASE UR QL STRIP.AUTO: NEGATIVE
LYMPHOCYTES # BLD AUTO: 1.51 10*3/MM3 (ref 0.7–3.1)
LYMPHOCYTES NFR BLD AUTO: 25.4 % (ref 19.6–45.3)
MCH RBC QN AUTO: 28.4 PG (ref 26.6–33)
MCHC RBC AUTO-ENTMCNC: 33.3 G/DL (ref 31.5–35.7)
MCV RBC AUTO: 85.2 FL (ref 79–97)
MONOCYTES # BLD AUTO: 0.48 10*3/MM3 (ref 0.1–0.9)
MONOCYTES NFR BLD AUTO: 8.1 % (ref 5–12)
NEUTROPHILS NFR BLD AUTO: 3.82 10*3/MM3 (ref 1.7–7)
NEUTROPHILS NFR BLD AUTO: 64.3 % (ref 42.7–76)
NITRITE UR QL STRIP: NEGATIVE
NRBC BLD AUTO-RTO: 0 /100 WBC (ref 0–0.2)
PH UR STRIP.AUTO: 7.5 [PH] (ref 5–8)
PLATELET # BLD AUTO: 167 10*3/MM3 (ref 140–450)
PMV BLD AUTO: 10.3 FL (ref 6–12)
POTASSIUM SERPL-SCNC: 4.1 MMOL/L (ref 3.5–5.2)
PROT SERPL-MCNC: 6.2 G/DL (ref 6–8.5)
PROT UR QL STRIP: NEGATIVE
RBC # BLD AUTO: 4.4 10*6/MM3 (ref 3.77–5.28)
SODIUM SERPL-SCNC: 140 MMOL/L (ref 136–145)
SP GR UR STRIP: 1.01 (ref 1–1.03)
TRIGL SERPL-MCNC: 85 MG/DL (ref 0–150)
TSH SERPL DL<=0.05 MIU/L-ACNC: 1.34 UIU/ML (ref 0.27–4.2)
UROBILINOGEN UR QL STRIP: NORMAL
VIT B12 BLD-MCNC: 545 PG/ML (ref 211–946)
VLDLC SERPL-MCNC: 16 MG/DL (ref 5–40)
WBC NRBC COR # BLD: 5.94 10*3/MM3 (ref 3.4–10.8)

## 2022-05-19 PROCEDURE — 80061 LIPID PANEL: CPT

## 2022-05-19 PROCEDURE — 80053 COMPREHEN METABOLIC PANEL: CPT

## 2022-05-19 PROCEDURE — 85025 COMPLETE CBC W/AUTO DIFF WBC: CPT

## 2022-05-19 PROCEDURE — 84443 ASSAY THYROID STIM HORMONE: CPT

## 2022-05-19 PROCEDURE — 81003 URINALYSIS AUTO W/O SCOPE: CPT

## 2022-05-19 PROCEDURE — 82306 VITAMIN D 25 HYDROXY: CPT

## 2022-05-19 PROCEDURE — 82607 VITAMIN B-12: CPT

## 2022-05-19 PROCEDURE — 83036 HEMOGLOBIN GLYCOSYLATED A1C: CPT

## 2022-05-27 ENCOUNTER — OFFICE VISIT (OUTPATIENT)
Dept: INTERNAL MEDICINE | Facility: CLINIC | Age: 87
End: 2022-05-27

## 2022-05-27 VITALS
BODY MASS INDEX: 26.93 KG/M2 | HEART RATE: 68 BPM | DIASTOLIC BLOOD PRESSURE: 80 MMHG | SYSTOLIC BLOOD PRESSURE: 128 MMHG | WEIGHT: 133.6 LBS | HEIGHT: 59 IN | TEMPERATURE: 98.4 F

## 2022-05-27 DIAGNOSIS — M48.061 SPINAL STENOSIS OF LUMBAR REGION WITHOUT NEUROGENIC CLAUDICATION: ICD-10-CM

## 2022-05-27 DIAGNOSIS — E03.9 HYPOTHYROIDISM (ACQUIRED): ICD-10-CM

## 2022-05-27 DIAGNOSIS — D51.0 PERNICIOUS ANEMIA: ICD-10-CM

## 2022-05-27 DIAGNOSIS — I10 ESSENTIAL HYPERTENSION: Primary | ICD-10-CM

## 2022-05-27 DIAGNOSIS — I48.0 PAROXYSMAL ATRIAL FIBRILLATION: ICD-10-CM

## 2022-05-27 DIAGNOSIS — G30.9 ALZHEIMER'S DISEASE: ICD-10-CM

## 2022-05-27 DIAGNOSIS — F02.80 ALZHEIMER'S DISEASE: ICD-10-CM

## 2022-05-27 PROCEDURE — 96372 THER/PROPH/DIAG INJ SC/IM: CPT | Performed by: INTERNAL MEDICINE

## 2022-05-27 PROCEDURE — 99214 OFFICE O/P EST MOD 30 MIN: CPT | Performed by: INTERNAL MEDICINE

## 2022-05-27 RX ADMIN — CYANOCOBALAMIN 1000 MCG: 1000 INJECTION, SOLUTION INTRAMUSCULAR; SUBCUTANEOUS at 16:11

## 2022-05-27 NOTE — PROGRESS NOTES
"[facility]     Temi Jarrett  5/8/1930   [id number]      Patient Care Team:  Eric Patel MD as PCP - General  Eric Patel MD as PCP - Family Medicine  Von Pablo MD as Cardiologist (Cardiology)    Chief Complaint::   Chief Complaint   Patient presents with   • Hypertension   • Hypothyroidism        HPI    The patient comes in for follow-up of hypertension, hypothyroidism, atrial fibrillation, Alzheimer's disease, and lumbar spinal stenosis. She is accompanied by an adult female.    Hypertension  The accompanying adult female states the patient's blood pressure is usually good. She states the patient used to take her blood pressure twice a day. She states the patient's systolic blood pressure was high the last 2 nights, but she thinks the patient was excited because her daughter was here. She states the patient takes 5 mg of lisinopril when her blood pressure is 140/90 mmHg. She states that sometimes the patient's blood pressure gets down to 104 mmHg. She states she will take patient's blood pressure in the morning twice a week.    Hypothyroidism  The accompanying adult female states the patient is to stay on Synthroid.    Leg and knee pain  She states the patient takes Lasix every other day. She states she did not give the patient Lasix today.     The accompanying adult female states the patient was referred to Dr. Barfield, Pain management. She states the patient is concerned if she should have the nerve block procedure. The patient states she is \"happy staying where she is\". The patient reports the pain is not increasing and is stable. She states she is afraid they may cut the \"wrong nerves\". The accompanying adult female states the patient is also afraid of anesthesia and that she has to go into the hospital to have it done. The patient states that she \"tend to want to leave well enough alone\". She states the Voltaren improves her symptoms and the brace she wears " helps. The patient reports she will stay on her gabapentin for her leg and knee pain.     Hiatal hernia  The patient reports if it is not life threatening she does not want the hiatal hernia surgery. The accompanying adult female states she may develop pneumonia. She states it has been a while since the patient had pneumonia. The patient states she has to chew her food very thoroughly to prevent aspirating.    Diarrhea/IBS issues  The accompanying adult female states the patient takes Imodium for intermittent diarrhea. She reports the patient does have IBS.    Chronic Conditions:      Patient Active Problem List   Diagnosis   • Esophageal reflux   • Hypothyroidism (acquired)   • Anxiety with depression   • Peripheral neuropathy   • Spinal stenosis of lumbar region   • Osteoporosis   • Disc disorder of lumbar region   • Bladder prolapse, female, acquired   • Essential hypertension   • Pernicious anemia   • Annual physical exam   • Primary osteoarthritis of both knees   • Hiatal hernia   • Multifocal aspiration pneumonia due to large intrathoracic hiatal hernia   • Paroxysmal atrial fibrillation (HCC)   • Alzheimer's disease (HCC)   • Mild cognitive impairment   • At high risk for aspiration   • Chronic pain   • Osteoarthritis of right knee        Past Medical History:   Diagnosis Date   • Acute sepsis ([facility]) 01/14/2021   • Arthritis    • Bladder prolapse, female, acquired    • Closed fracture of neck of left femur ([facility]) 09/27/2019   • Dementia ([facility])    • Depression    • Disease of thyroid gland    • Gastric polyp    • GERD (gastroesophageal reflux disease)    • Hiatal hernia    • History of colonic polyps    • Hyperlipidemia    • Hypertension    • IBS (irritable bowel syndrome)    • Macular degeneration    • Pericardial effusion 08/03/2020    Echo (8/3/2020): Normal LVEF.  Moderate pericardial effusion without tamponade.  Moderate MR. RVSP 49 mmHg   • Torn meniscus     right knee        Past Surgical History:    Procedure Laterality Date   • CHOLECYSTECTOMY  1997   • ENDOSCOPY N/A 01/14/2021    Procedure: ESOPHAGOGASTRODUODENOSCOPY;  Surgeon: Serjio Guerrero MD;  Location: [facility] ENDOSCOPY;  Service: General;  Laterality: N/A;   • EYE SURGERY  1998    Cataract extraction   • HERNIA REPAIR     • HIP HEMIARTHROPLASTY Left 09/28/2019    Procedure: HIP HEMIARTHROPLASTY LEFT;  Surgeon: Reddy Segundo Jr., MD;  Location: [facility] OR;  Service: Orthopedics   • HYSTERECTOMY  1976   • KNEE SURGERY Right     arthroscopy- 2000   • SKIN CANCER EXCISION Left     [facility]; CANCEROUS SPOT DIDN'T  MOVE BUT NEEDED REMOVED   • TONSILLECTOMY         Family History   Problem Relation Age of Onset   • Hypertension Mother    • Breast cancer Mother    • Obesity Mother    • Hypertension Father    • Diabetes Son        Social History     Socioeconomic History   • Marital status:    Tobacco Use   • Smoking status: Never Smoker   • Smokeless tobacco: Never Used   Vaping Use   • Vaping Use: Never used   Substance and Sexual Activity   • Alcohol use: No   • Drug use: Defer   • Sexual activity: Defer       Allergies   Allergen Reactions   • Augmentin [Amoxicillin-Pot Clavulanate] Diarrhea   • Codeine Nausea Only     Trouble Breathing    • Shrimp Hives   • [place] Unknown - Low Severity         Current Outpatient Medications:   •  acetaminophen (TYLENOL) 325 MG tablet, Take 650 mg by mouth Every 6 (Six) Hours As Needed for Mild Pain ., Disp: , Rfl:   •  apixaban (Eliquis) 2.5 MG tablet tablet, Take 1 tablet by mouth Every 12 (Twelve) Hours., Disp: 90 tablet, Rfl: 3  •  aspirin 81 MG EC tablet, Take 81 mg by mouth Daily., Disp: , Rfl:   •  bethanechol (URECHOLINE) 10 MG tablet, TAKE ONE TABLET BY MOUTH THREE TIMES A DAY, Disp: 90 tablet, Rfl: 3  •  busPIRone (BUSPAR) 10 MG tablet, Take 1 tablet by mouth 2 (Two) Times a Day., Disp: 60 tablet, Rfl: 4  •  Calcium Citrate-Vitamin D (CITRACAL PETITES/VITAMIN D PO), Take  2 tablets by mouth 2 (two) times a day., Disp: , Rfl:   •  Cholecalciferol (VITAMIN D PO), 2000 U qd, Disp: , Rfl:   •  diclofenac (VOLTAREN) 1 % gel gel, APPLY 4 GRAMS TOPICALLY TO THE APPROPRAITE AREA AS DIRECTED FOUR TIMES A DAY, Disp: 100 g, Rfl: 4  •  diphenoxylate-atropine (LOMOTIL) 2.5-0.025 MG per tablet, TAKE ONE TABLET BY MOUTH FOUR TIMES A DAY AS NEEDED FOR DIARRHEA (Patient taking differently: Take 2 tablets by mouth Daily.), Disp: 100 tablet, Rfl: 2  •  donepezil (ARICEPT) 10 MG tablet, TAKE ONE TABLET BY MOUTH EVERY NIGHT AT BEDTIME, Disp: 90 tablet, Rfl: 2  •  furosemide (LASIX) 20 MG tablet, TAKE ONE TABLET BY MOUTH DAILY (Patient taking differently: Taking every other day), Disp: 30 tablet, Rfl: 5  •  gabapentin (NEURONTIN) 300 MG capsule, TAKE ONE CAPSULE BY MOUTH TWICE A DAY, Disp: 180 capsule, Rfl: 0  •  lisinopril (PRINIVIL,ZESTRIL) 5 MG tablet, Take 1 tablet by mouth Daily. (Patient taking differently: Take 5 mg by mouth Daily. Pt only takes if bp is above 140/90.), Disp: 90 tablet, Rfl: 3  •  melatonin 3 MG tablet, Take 9 mg by mouth Every Night., Disp: , Rfl:   •  memantine (NAMENDA) 10 MG tablet, Take 1 tablet by mouth 2 (Two) Times a Day., Disp: 180 tablet, Rfl: 3  •  methocarbamol (Robaxin) 500 MG tablet, Take 1 tablet by mouth 3 (Three) Times a Day. (Patient taking differently: Take 500 mg by mouth 3 (Three) Times a Day As Needed.), Disp: 90 tablet, Rfl: 1  •  Multiple Vitamins-Minerals (MULTIVITAMIN ADULTS 50+ PO), Take 1 tablet by mouth Daily., Disp: , Rfl:   •  Multiple Vitamins-Minerals (PRESERVISION AREDS PO), Take 1 tablet by mouth 2 (Two) Times a Day., Disp: , Rfl:   •  pantoprazole (PROTONIX) 40 MG EC tablet, TAKE ONE TABLET BY MOUTH DAILY, Disp: 90 tablet, Rfl: 1  •  potassium chloride (MICRO-K) 10 MEQ CR capsule, TAKE TWO CAPSULES BY MOUTH TWICE A DAY, Disp: 360 capsule, Rfl: 1  •  promethazine (PHENERGAN) 25 MG tablet, TAKE ONE TABLET BY MOUTH EVERY 6 HOURS AS NEEDED FOR NAUSEA  "OR VOMITING, Disp: 30 tablet, Rfl: 2  •  sertraline (ZOLOFT) 50 MG tablet, TAKE ONE TABLET BY MOUTH DAILY, Disp: 90 tablet, Rfl: 4  •  sucralfate (CARAFATE) 1 g tablet, TAKE ONE TABLET BY MOUTH TWICE A DAY, Disp: 270 tablet, Rfl: 1  •  Synthroid 150 MCG tablet, TAKE ONE TABLET BY MOUTH DAILY, Disp: 90 tablet, Rfl: 1  •  Syringe/Needle, Disp, (B-D 3CC LUER-ROSY SYR 23GX1\") 23G X 1\" 3 ML misc, USE TO INJECT VITAMIN B-12 ONCE A MONTH, Disp: 1 each, Rfl: 3  •  traMADol (ULTRAM) 50 MG tablet, TAKE ONE TABLET BY MOUTH DAILY AS NEEDED FOR MODERATE PAIN IN THE RIGHT KNEE, Disp: 30 tablet, Rfl: 2    Current Facility-Administered Medications:   •  cyanocobalamin injection 1,000 mcg, 1,000 mcg, Intramuscular, Q28 Days, Eric Patel MD, 1,000 mcg at 05/27/22 1611    Review of Systems   Constitutional: Negative for appetite change, chills, fatigue and fever.   HENT: Negative for congestion, ear pain and sinus pressure.    Respiratory: Negative for cough, chest tightness, shortness of breath and wheezing.    Cardiovascular: Negative for chest pain and palpitations.   Gastrointestinal: Negative for abdominal pain, blood in stool and constipation.   Skin: Negative for color change.   Allergic/Immunologic: Negative for environmental allergies.   Neurological: Negative for dizziness, speech difficulty and headache.   Psychiatric/Behavioral: Negative for decreased concentration. The patient is not nervous/anxious.         Vital Signs  Vitals:    05/27/22 1458   BP: 128/80   BP Location: Left arm   Patient Position: Sitting   Cuff Size: Adult   Pulse: 68   Temp: 98.4 °F (36.9 °C)   Weight: 60.6 kg (133 lb 9.6 oz)   Height: 149.9 cm (59.02\")   PainSc: 0-No pain       Physical Exam  Vitals and nursing note reviewed.   Constitutional:       Appearance: She is well-developed.   HENT:      Head: Normocephalic.   Eyes:      Conjunctiva/sclera: Conjunctivae normal.      Pupils: Pupils are equal, round, and reactive to light.   Neck: "      Thyroid: No thyromegaly.   Cardiovascular:      Rate and Rhythm: Normal rate and regular rhythm.      Heart sounds: Normal heart sounds.      Comments: She has a 2/6 systolic ejection murmur.   Pulmonary:      Effort: Pulmonary effort is normal.      Breath sounds: Normal breath sounds. No wheezing.   Abdominal:      General: Bowel sounds are normal.      Palpations: Abdomen is soft.      Tenderness: There is no abdominal tenderness.   Musculoskeletal:         General: No tenderness. Normal range of motion.      Cervical back: Normal range of motion and neck supple.      Comments: She has 1+ pretibial edema.   Lymphadenopathy:      Cervical: No cervical adenopathy.   Skin:     General: Skin is warm and dry.      Findings: No rash.      Comments: Bilateral venous stasis dermatitis.   Neurological:      Mental Status: She is alert and oriented to person, place, and time.      Cranial Nerves: No cranial nerve deficit.      Sensory: No sensory deficit.      Coordination: Coordination normal.      Gait: Gait normal.   Psychiatric:         Speech: Speech normal.         Behavior: Behavior normal.         Thought Content: Thought content normal.         Judgment: Judgment normal.          Procedures    ACE III MINI             Assessment/Plan:    Diagnoses and all orders for this visit:    1. Essential hypertension (Primary)    2. Hypothyroidism (acquired)    3. Paroxysmal atrial fibrillation (HCC)    4. Alzheimer's disease (HCC)    5. Spinal stenosis of lumbar region without neurogenic claudication    6. Pernicious anemia           1. Hypertension  - Blood pressure is well controlled on lisinopril.    2. Hypothyroidism  - TSH is normal. Continue current dose of levothyroxine.    3. Atrial fibrillation  - Atrial fibrillation is compensated on low dose Eliquis.    4. Dementia  - Dementia does not appear to have progressed. She is well cared for at home and is in a safe environment. She will continue donepezil and  memantine.    5. Lumbar spinal stenosis  - Continue gabapentin and tramadol for spinal stenosis and peripheral neuropathy.    She will follow up in 6 months.      Plan of care reviewed with patient at the conclusion of today's visit. Education was provided regarding diagnosis, management, and any prescribed or recommended OTC medications.Patient verbalizes understanding of and agreement with management plan.            Transcribed from ambient dictation for Eric Patel MD by PAOLA HANDY.  05/27/22   17:59 EDT    Patient verbalized consent to the visit recording.

## 2022-06-02 RX ORDER — FUROSEMIDE 20 MG/1
TABLET ORAL
Qty: 30 TABLET | Refills: 5 | Status: SHIPPED | OUTPATIENT
Start: 2022-06-02

## 2022-06-09 DIAGNOSIS — G63 POLYNEUROPATHY ASSOCIATED WITH UNDERLYING DISEASE: ICD-10-CM

## 2022-06-09 RX ORDER — GABAPENTIN 300 MG/1
CAPSULE ORAL
Qty: 180 CAPSULE | Refills: 0 | Status: SHIPPED | OUTPATIENT
Start: 2022-06-09 | End: 2022-09-21

## 2022-06-09 NOTE — TELEPHONE ENCOUNTER
Rx Refill Note  Requested Prescriptions     Pending Prescriptions Disp Refills   • gabapentin (NEURONTIN) 300 MG capsule [Pharmacy Med Name: GABAPENTIN 300 MG CAPSULE] 180 capsule      Sig: TAKE ONE CAPSULE BY MOUTH TWICE A DAY     Last refill:   3/14/22 #180/0  Last office visit with prescribing clinician: 5/27/2022      Next office visit with prescribing clinician: 12/7/2022            Cortney Bob RN  06/09/22, 14:25 EDT

## 2022-06-27 ENCOUNTER — CLINICAL SUPPORT (OUTPATIENT)
Dept: INTERNAL MEDICINE | Facility: CLINIC | Age: 87
End: 2022-06-27

## 2022-06-27 DIAGNOSIS — D51.0 PERNICIOUS ANEMIA: ICD-10-CM

## 2022-06-27 PROCEDURE — 96372 THER/PROPH/DIAG INJ SC/IM: CPT | Performed by: INTERNAL MEDICINE

## 2022-06-27 RX ORDER — SUCRALFATE 1 G/1
TABLET ORAL
Qty: 270 TABLET | Refills: 1 | Status: SHIPPED | OUTPATIENT
Start: 2022-06-27

## 2022-06-27 RX ADMIN — CYANOCOBALAMIN 1000 MCG: 1000 INJECTION, SOLUTION INTRAMUSCULAR; SUBCUTANEOUS at 15:52

## 2022-06-27 NOTE — TELEPHONE ENCOUNTER
Rx Refill Note  Requested Prescriptions     Pending Prescriptions Disp Refills   • sucralfate (CARAFATE) 1 g tablet [Pharmacy Med Name: SUCRALFATE 1 GM TABLET] 270 tablet 1     Sig: TAKE ONE TABLET BY MOUTH TWICE A DAY      Last office visit with prescribing clinician: 5/27/2022      Next office visit with prescribing clinician: 12/7/2022            Elida Cazares  06/27/22, 08:29 EDT

## 2022-07-01 RX ORDER — MEMANTINE HYDROCHLORIDE 10 MG/1
TABLET ORAL
Qty: 180 TABLET | Refills: 3 | Status: SHIPPED | OUTPATIENT
Start: 2022-07-01

## 2022-07-11 RX ORDER — LISINOPRIL 5 MG/1
TABLET ORAL
Qty: 90 TABLET | Refills: 3 | Status: SHIPPED | OUTPATIENT
Start: 2022-07-11

## 2022-07-25 DIAGNOSIS — G89.29 OTHER CHRONIC PAIN: ICD-10-CM

## 2022-07-25 RX ORDER — TRAMADOL HYDROCHLORIDE 50 MG/1
50 TABLET ORAL DAILY PRN
Qty: 30 TABLET | Refills: 2 | Status: SHIPPED | OUTPATIENT
Start: 2022-07-25 | End: 2022-11-30

## 2022-07-28 ENCOUNTER — CLINICAL SUPPORT (OUTPATIENT)
Dept: INTERNAL MEDICINE | Facility: CLINIC | Age: 87
End: 2022-07-28

## 2022-07-28 DIAGNOSIS — D51.0 PERNICIOUS ANEMIA: ICD-10-CM

## 2022-07-28 PROCEDURE — 96372 THER/PROPH/DIAG INJ SC/IM: CPT | Performed by: INTERNAL MEDICINE

## 2022-07-28 RX ADMIN — CYANOCOBALAMIN 1000 MCG: 1000 INJECTION, SOLUTION INTRAMUSCULAR; SUBCUTANEOUS at 16:39

## 2022-07-29 ENCOUNTER — TELEPHONE (OUTPATIENT)
Dept: INTERNAL MEDICINE | Facility: CLINIC | Age: 87
End: 2022-07-29

## 2022-07-29 RX ORDER — NITROFURANTOIN 25; 75 MG/1; MG/1
100 CAPSULE ORAL 2 TIMES DAILY
Qty: 10 CAPSULE | Refills: 0 | Status: SHIPPED | OUTPATIENT
Start: 2022-07-29 | End: 2022-08-03

## 2022-07-29 NOTE — TELEPHONE ENCOUNTER
Caller: Chris Jarrett    Relationship to patient: Emergency Contact    Best call back number: [phone]     What is the call regarding:  PATIENT'S DAUGHTER IN LAW STATES THAT SHE TESTED HER URINE AND IT IS POSITIVE FOR LEUCOCITES.  SHE WOULD LIKE FOR DR GRIFFITH TO SEND AN ANTIBIOTIC TO [facility].  SHE IS ALLERGIC TO AUGMENTIN.

## 2022-08-01 RX ORDER — POTASSIUM CHLORIDE 750 MG/1
CAPSULE, EXTENDED RELEASE ORAL
Qty: 360 CAPSULE | Refills: 1 | Status: SHIPPED | OUTPATIENT
Start: 2022-08-01 | End: 2023-02-13

## 2022-08-03 RX ORDER — BETHANECHOL CHLORIDE 10 MG/1
TABLET ORAL
Qty: 90 TABLET | Refills: 3 | Status: SHIPPED | OUTPATIENT
Start: 2022-08-03 | End: 2022-12-27

## 2022-08-11 ENCOUNTER — TELEPHONE (OUTPATIENT)
Dept: INTERNAL MEDICINE | Facility: CLINIC | Age: 87
End: 2022-08-11

## 2022-08-11 DIAGNOSIS — R30.0 DYSURIA: Primary | ICD-10-CM

## 2022-08-11 PROCEDURE — 81003 URINALYSIS AUTO W/O SCOPE: CPT | Performed by: INTERNAL MEDICINE

## 2022-08-11 NOTE — TELEPHONE ENCOUNTER
With multiple UTI's will need urine and culture so recommedn [facility] tonight or could get [facility] here tomorrow

## 2022-08-11 NOTE — TELEPHONE ENCOUNTER
Caller: LuizaChris    Relationship: Emergency Contact    Best call back number: [phone]    What medication are you requesting: ANTIBIOTIC AT DOCTOR'S RECOMMENDATION     What are your current symptoms: TWISTING FEEL WHEN PATIENT URINATES (PER CALLER)    How long have you been experiencing symptoms: YESTERDAY    Have you had these symptoms before:    [x] Yes  [] No    Have you been treated for these symptoms before:   [x] Yes  [] No    If a prescription is needed, what is your preferred pharmacy and phone number: [facility] [address] [place] [address] - [phone] [facility] [phone] FX     Additional notes:    CALLER CHRIS LUIZA (NOT ON  VERBAL) SAID SHE TESTED PATIENTS URINE AND IT CAME OUT TO  LEUKOCYTES. PATIENT MAY NEED AN ANTIOBIOTIC. PATIENT HAS BEEN TREATED BEFORE FOR THIS ISSUE WITH NITROFURANTOIN BUT IT DID NOT WORK WELL. PATIENT CANNOT TAKE AUGMENTIN BECAUSE IT MAKES HER HAVE DIARRHEA    PLEASE ADVISE

## 2022-08-12 ENCOUNTER — OFFICE VISIT (OUTPATIENT)
Dept: INTERNAL MEDICINE | Facility: CLINIC | Age: 87
End: 2022-08-12

## 2022-08-12 VITALS
BODY MASS INDEX: 26.37 KG/M2 | WEIGHT: 130.8 LBS | HEIGHT: 59 IN | DIASTOLIC BLOOD PRESSURE: 76 MMHG | SYSTOLIC BLOOD PRESSURE: 124 MMHG | TEMPERATURE: 98.2 F | HEART RATE: 60 BPM

## 2022-08-12 DIAGNOSIS — N81.10 BLADDER PROLAPSE, FEMALE, ACQUIRED: Chronic | ICD-10-CM

## 2022-08-12 DIAGNOSIS — I10 ESSENTIAL HYPERTENSION: Chronic | ICD-10-CM

## 2022-08-12 DIAGNOSIS — N39.0 RECURRENT UTI: Primary | Chronic | ICD-10-CM

## 2022-08-12 LAB
BILIRUB BLD-MCNC: NEGATIVE MG/DL
CLARITY, POC: ABNORMAL
COLOR UR: YELLOW
EXPIRATION DATE: ABNORMAL
GLUCOSE UR STRIP-MCNC: NEGATIVE MG/DL
KETONES UR QL: NEGATIVE
LEUKOCYTE EST, POC: ABNORMAL
Lab: ABNORMAL
NITRITE UR-MCNC: NEGATIVE MG/ML
PH UR: 6.5 [PH] (ref 5–8)
PROT UR STRIP-MCNC: ABNORMAL MG/DL
RBC # UR STRIP: ABNORMAL /UL
SP GR UR: 1.01 (ref 1–1.03)
UROBILINOGEN UR QL: NORMAL

## 2022-08-12 PROCEDURE — 87086 URINE CULTURE/COLONY COUNT: CPT | Performed by: STUDENT IN AN ORGANIZED HEALTH CARE EDUCATION/TRAINING PROGRAM

## 2022-08-12 PROCEDURE — 87186 SC STD MICRODIL/AGAR DIL: CPT | Performed by: STUDENT IN AN ORGANIZED HEALTH CARE EDUCATION/TRAINING PROGRAM

## 2022-08-12 PROCEDURE — 99214 OFFICE O/P EST MOD 30 MIN: CPT | Performed by: STUDENT IN AN ORGANIZED HEALTH CARE EDUCATION/TRAINING PROGRAM

## 2022-08-12 PROCEDURE — 87077 CULTURE AEROBIC IDENTIFY: CPT | Performed by: STUDENT IN AN ORGANIZED HEALTH CARE EDUCATION/TRAINING PROGRAM

## 2022-08-12 RX ORDER — SULFAMETHOXAZOLE AND TRIMETHOPRIM 800; 160 MG/1; MG/1
1 TABLET ORAL 2 TIMES DAILY
Qty: 10 TABLET | Refills: 0 | Status: SHIPPED | OUTPATIENT
Start: 2022-08-12 | End: 2022-08-17

## 2022-08-12 NOTE — TELEPHONE ENCOUNTER
I put in urine for her and I will see her after urine if able to make it in before 10 am and if not please have on call deal with results and if she decides not to come in or goes to [facility] or GYN or ER instead please cancel the urine order

## 2022-08-12 NOTE — PROGRESS NOTES
"Chief Complaint  Temi Jarrett is a 89 y.o. female presenting for Urinary Tract Infection.     Patient has a past medical history of paroxysmal AF, hypertension, hypothyroidism, GERD, cystocele, pernicious anemia, anxiety, depression, osteoarthritis, osteoporosis, spinal stenosis, peripheral neuropathy, Alzheimer's disease and pneumonia.    History of Present Illness  Patient is here accompanied by her caretaker/daughter-in-law.    She has a history of cystocele and recurrent UTIs.  She was recently treated with nitrofurantoin, but then again developed symptoms over the last few days.  Increased urinary frequency, mild dysuria.  No fever or chills, no weakness, no back pain, no abdominal pain.  No nausea, vomiting or diarrhea.  No hematuria.    The following portions of the patient's history were reviewed and updated as appropriate: allergies, current medications, past family history, past medical history, past social history, past surgical history and problem list.    Objective  /76 (BP Location: Left arm, Patient Position: Sitting, Cuff Size: Adult)   Pulse 60   Temp 98.2 °F (36.8 °C) (Temporal)   Ht 149.9 cm (59.02\")   Wt 59.3 kg (130 lb 12.8 oz)   LMP  (LMP Unknown)   BMI 26.40 kg/m²     Physical Exam  Vitals reviewed.   Constitutional:       Appearance: Normal appearance.   HENT:      Head: Normocephalic and atraumatic.      Nose: No congestion.   Eyes:      Extraocular Movements: Extraocular movements intact.      Conjunctiva/sclera: Conjunctivae normal.   Cardiovascular:      Rate and Rhythm: Normal rate and regular rhythm.      Heart sounds: Normal heart sounds. No murmur heard.  Pulmonary:      Effort: Pulmonary effort is normal.      Breath sounds: Normal breath sounds.   Abdominal:      Tenderness: There is no right CVA tenderness or left CVA tenderness.   Musculoskeletal:      Cervical back: Neck supple.      Right lower leg: No edema.      Left lower leg: No edema.   Skin:     General: " Skin is warm and dry.   Neurological:      Mental Status: She is alert and oriented to person, place, and time. Mental status is at baseline.   Psychiatric:         Behavior: Behavior normal.         Thought Content: Thought content normal.         Assessment/Plan   1. Recurrent UTI  2. Bladder prolapse, female, acquired  Recurrent UTIs, most recently she was not seen and no culture available.  We will do culture of the urine.  He was treated with nitrofurantoin and failed, she does not tolerate Augmentin due to GI/diarrhea, she did tolerate Bactrim in the past.  We will do trial of Bactrim.  If culture comes back dictating different antibiotics we can change at that point.  If any worsening symptoms over next few days, specifically fever, chills, nausea, weakness or any other systemic or concerning symptoms she should go at least to urgent care or consider going to the emergency room.  Patient/caretaker verbalized understanding.  Of note patient does have follow-up with OB/GYN for her cystocele.  - Urine Culture - Urine, Urine, Clean Catch; Future  - sulfamethoxazole-trimethoprim (BACTRIM DS,SEPTRA DS) 800-160 MG per tablet; Take 1 tablet by mouth 2 (Two) Times a Day for 5 days.  Dispense: 10 tablet; Refill: 0    3. Essential hypertension  BP Readings from Last 3 Encounters:   08/12/22 124/76   05/27/22 128/80   10/18/21 130/85   Initial blood pressure elevated, repeat within normal.  Continue follow-up with PCP.      Return if symptoms worsen or fail to improve, for Next scheduled follow up.    Future Appointments       Provider Department Center    12/7/2022 4:00 PM Eric Patel MD [facility] INTERNAL MEDICINE [place]            Slick Tatum MD  Family Medicine  08/12/2022

## 2022-08-15 LAB — BACTERIA SPEC AEROBE CULT: ABNORMAL

## 2022-08-22 DIAGNOSIS — K52.9 CHRONIC DIARRHEA: ICD-10-CM

## 2022-08-23 ENCOUNTER — CLINICAL SUPPORT (OUTPATIENT)
Dept: INTERNAL MEDICINE | Facility: CLINIC | Age: 87
End: 2022-08-23

## 2022-08-23 DIAGNOSIS — D51.0 PERNICIOUS ANEMIA: ICD-10-CM

## 2022-08-23 PROCEDURE — 96372 THER/PROPH/DIAG INJ SC/IM: CPT | Performed by: INTERNAL MEDICINE

## 2022-08-23 RX ORDER — DIPHENOXYLATE HYDROCHLORIDE AND ATROPINE SULFATE 2.5; .025 MG/1; MG/1
TABLET ORAL
Qty: 100 TABLET | Refills: 1 | Status: SHIPPED | OUTPATIENT
Start: 2022-08-23 | End: 2022-11-28

## 2022-08-23 RX ADMIN — CYANOCOBALAMIN 1000 MCG: 1000 INJECTION, SOLUTION INTRAMUSCULAR; SUBCUTANEOUS at 16:14

## 2022-09-07 ENCOUNTER — TELEPHONE (OUTPATIENT)
Dept: INTERNAL MEDICINE | Facility: CLINIC | Age: 87
End: 2022-09-07

## 2022-09-07 RX ORDER — BUSPIRONE HYDROCHLORIDE 10 MG/1
TABLET ORAL
Qty: 60 TABLET | Refills: 4 | Status: SHIPPED | OUTPATIENT
Start: 2022-09-07

## 2022-09-07 NOTE — TELEPHONE ENCOUNTER
Caller: Chris Jarrett    Relationship to patient: Emergency Contact    Best call back number: [phone]    Patient is needing: CHRIS STATED THAT PATIENT HAS DIAHRREA FOR QUITE A WHILE NOW AND WOULD LIKE TO KNOW OF ANYTHING THAT SHE COULD BE DOING TO HELP STOP THE ISSUE OTHER THAN OTC MEDICATION     CHRIS STATED THAT PATIENT DOES HAVE IBS BUT WOULD LIKE TO SPEAK WITH CONY     PLEASE ADVISE

## 2022-09-07 NOTE — TELEPHONE ENCOUNTER
She has actually been on prescription medication for her diarrhea for some time.  The only other thing we might consider is a bile acid binder such as cholestyramine, which occasionally helps with chronic diarrhea

## 2022-09-07 NOTE — TELEPHONE ENCOUNTER
Rx Refill Note  Requested Prescriptions     Pending Prescriptions Disp Refills   • busPIRone (BUSPAR) 10 MG tablet [Pharmacy Med Name: busPIRone HCL 10 MG TABLET] 60 tablet 4     Sig: TAKE ONE TABLET BY MOUTH TWICE A DAY      Last office visit with prescribing clinician: 8/12/22  Next office visit with prescribing clinician: 12/7/22           Cortney Bob RN  09/07/22, 09:41 EDT

## 2022-09-08 ENCOUNTER — LAB (OUTPATIENT)
Dept: LAB | Facility: HOSPITAL | Age: 87
End: 2022-09-08

## 2022-09-08 ENCOUNTER — TELEPHONE (OUTPATIENT)
Dept: INTERNAL MEDICINE | Facility: CLINIC | Age: 87
End: 2022-09-08

## 2022-09-08 DIAGNOSIS — R30.0 DYSURIA: ICD-10-CM

## 2022-09-08 DIAGNOSIS — R30.0 DYSURIA: Primary | ICD-10-CM

## 2022-09-08 PROCEDURE — 81001 URINALYSIS AUTO W/SCOPE: CPT

## 2022-09-08 NOTE — TELEPHONE ENCOUNTER
Spoke to Chris and they would like to try cholestyramine. Should she continue lomotil? Chris notified it will be tomorrow

## 2022-09-08 NOTE — TELEPHONE ENCOUNTER
Caller: Chris Jarrett    Relationship: Emergency Contact    Best call back number: [phone]    What orders are you requesting (i.e. lab or imaging): URINE CULTURE     In what timeframe would the patient need to come in: TODAY IF POSSIBLE     [facility] your [facility]imaging [facility]     Additional notes: CHRIS WOULD LIKE TO REQUEST A CLEAN CATCH URINE CULTURE FOR THE PATIENT. CHRIS STATES SHE HAD THE PATIENT TAKE AN AT HOME URINE TEST AND THE PATIENT SHOWED +70 LEUKOCYTES.      CALLBACK: YES

## 2022-09-08 NOTE — TELEPHONE ENCOUNTER
"  Caller: Chris Jarrett    Relationship: Emergency Contact    Best call back number: [phone]    What is the best time to reach you: ANYTIME     Who are you requesting to speak with (clinical staff, provider,  specific staff member): CLINICAL STAFF     What was the call regarding: CHRIS STATES THE PATIENT HAS HAD DIARRHEA SINCE 9/2/22 BUT YESTERDAY THE DIARRHEA WAS \"EXPLOSIVE.\" CHRIS STATES THE PATIENT BEGAN TAKING IMODIUM EITHER 9/2/22 OR 9/3/22 AND BEGAN A CLEAR DIET AS WELL. CHRIS WOULD LIKE TO ASK PCP IF ANOTHER MEDICATION SHOULD BE INTRODUCED FOR PATIENT'S IBS.     Do you require a callback: YES CONY"

## 2022-09-09 LAB
BACTERIA UR QL AUTO: NORMAL /HPF
BILIRUB UR QL STRIP: NEGATIVE
CLARITY UR: CLEAR
COLOR UR: YELLOW
GLUCOSE UR STRIP-MCNC: NEGATIVE MG/DL
HGB UR QL STRIP.AUTO: ABNORMAL
HYALINE CASTS UR QL AUTO: NORMAL /LPF
KETONES UR QL STRIP: NEGATIVE
LEUKOCYTE ESTERASE UR QL STRIP.AUTO: NEGATIVE
NITRITE UR QL STRIP: NEGATIVE
PH UR STRIP.AUTO: 6.5 [PH] (ref 5–8)
PROT UR QL STRIP: NEGATIVE
RBC # UR STRIP: NORMAL /HPF
REF LAB TEST METHOD: NORMAL
SP GR UR STRIP: <1.005 (ref 1–1.03)
SQUAMOUS #/AREA URNS HPF: NORMAL /HPF
UROBILINOGEN UR QL STRIP: ABNORMAL
WBC # UR STRIP: NORMAL /HPF

## 2022-09-09 RX ORDER — MONTELUKAST SODIUM 4 MG/1
1 TABLET, CHEWABLE ORAL 2 TIMES DAILY
Qty: 60 TABLET | Refills: 5 | Status: SHIPPED | OUTPATIENT
Start: 2022-09-09 | End: 2023-01-27

## 2022-09-09 NOTE — TELEPHONE ENCOUNTER
colestid is the tablet form.  It may be taken once or twice a day, should be taken at least an hour after levothyroxine.  Depending on how well it works, she may continue to use lomotil as needed.

## 2022-09-20 ENCOUNTER — TELEPHONE (OUTPATIENT)
Dept: PAIN MEDICINE | Facility: CLINIC | Age: 87
End: 2022-09-20

## 2022-09-20 NOTE — TELEPHONE ENCOUNTER
Spoke with Chris [temi BEAR] and explained we dont give braces out in our office and also that we only saw her once and she was not given an order for a brace either.   Her  stated that she needs a new brace and I expressed that she did not get it from our office and I am not seeing anything regarding a brace and they would need to reach out to her PCP for information.   They said they would call us back when they are with Temi.

## 2022-09-20 NOTE — TELEPHONE ENCOUNTER
Provider: DR. SAN    Caller: TORO LUND    Relationship to Patient: EMERGENCY CONTACT    Phone Number: [phone]    Reason for Call: PATIENT'S EC STATES SHE WAS SUPPOSED TO  A KNEE BRACE FOR THE PATIENT TODAY BUT SHE FORGOT. SHE STATES SHE WILL BE THERE TOMORROW.

## 2022-09-21 DIAGNOSIS — G63 POLYNEUROPATHY ASSOCIATED WITH UNDERLYING DISEASE: ICD-10-CM

## 2022-09-21 RX ORDER — GABAPENTIN 300 MG/1
CAPSULE ORAL
Qty: 180 CAPSULE | Refills: 0 | Status: SHIPPED | OUTPATIENT
Start: 2022-09-21 | End: 2022-12-22 | Stop reason: SDUPTHER

## 2022-09-27 ENCOUNTER — CLINICAL SUPPORT (OUTPATIENT)
Dept: INTERNAL MEDICINE | Facility: CLINIC | Age: 87
End: 2022-09-27

## 2022-09-27 DIAGNOSIS — E53.8 B12 DEFICIENCY: ICD-10-CM

## 2022-09-27 PROCEDURE — 96372 THER/PROPH/DIAG INJ SC/IM: CPT | Performed by: INTERNAL MEDICINE

## 2022-09-27 RX ADMIN — CYANOCOBALAMIN 1000 MCG: 1000 INJECTION, SOLUTION INTRAMUSCULAR; SUBCUTANEOUS at 16:21

## 2022-10-07 RX ORDER — LEVOTHYROXINE SODIUM 150 MCG
TABLET ORAL
Qty: 90 TABLET | Refills: 1 | Status: SHIPPED | OUTPATIENT
Start: 2022-10-07

## 2022-10-10 ENCOUNTER — TELEPHONE (OUTPATIENT)
Dept: INTERNAL MEDICINE | Facility: CLINIC | Age: 87
End: 2022-10-10

## 2022-10-10 NOTE — TELEPHONE ENCOUNTER
She was given colestid for severe diarrhea last month.  It is possible that is the cause of her constipation.  Suggest she stop taking it.

## 2022-10-10 NOTE — TELEPHONE ENCOUNTER
Caller: Victor M Jarrett (POA)    Relationship: Emergency Contact    Best call back number:[phone]    What is the best time to reach you: AS SOON AS POSSIBLE    Who are you requesting to speak with (clinical staff, provider,  specific staff member): DR GRIFFITH    What was the call regarding: PATIENT HAS NOT HAD A DECENT BOWEL MOVEMENT SINCE 10/03/22, PATIENT IS BECOMING CONCERNED AND WOULD LIKE TO KNOW WHAT THE NEXT ADVISABLE STEP WOULD BE? COULD THIS BE RELATED TO NEW MEDICATIONS?    Do you require a callback: YES

## 2022-10-26 ENCOUNTER — CLINICAL SUPPORT (OUTPATIENT)
Dept: INTERNAL MEDICINE | Facility: CLINIC | Age: 87
End: 2022-10-26

## 2022-10-26 DIAGNOSIS — D51.0 PERNICIOUS ANEMIA: ICD-10-CM

## 2022-10-26 DIAGNOSIS — Z23 NEED FOR INFLUENZA VACCINATION: Primary | ICD-10-CM

## 2022-10-26 PROCEDURE — 90662 IIV NO PRSV INCREASED AG IM: CPT | Performed by: INTERNAL MEDICINE

## 2022-10-26 PROCEDURE — G0008 ADMIN INFLUENZA VIRUS VAC: HCPCS | Performed by: INTERNAL MEDICINE

## 2022-10-26 PROCEDURE — 96372 THER/PROPH/DIAG INJ SC/IM: CPT | Performed by: INTERNAL MEDICINE

## 2022-10-26 RX ADMIN — CYANOCOBALAMIN 1000 MCG: 1000 INJECTION, SOLUTION INTRAMUSCULAR; SUBCUTANEOUS at 17:23

## 2022-11-07 RX ORDER — DONEPEZIL HYDROCHLORIDE 10 MG/1
TABLET, FILM COATED ORAL
Qty: 90 TABLET | Refills: 2 | Status: SHIPPED | OUTPATIENT
Start: 2022-11-07

## 2022-11-07 RX ORDER — PANTOPRAZOLE SODIUM 40 MG/1
TABLET, DELAYED RELEASE ORAL
Qty: 90 TABLET | Refills: 1 | Status: SHIPPED | OUTPATIENT
Start: 2022-11-07

## 2022-11-07 NOTE — TELEPHONE ENCOUNTER
Rx Refill Note  Requested Prescriptions     Pending Prescriptions Disp Refills   • pantoprazole (PROTONIX) 40 MG EC tablet [Pharmacy Med Name: PANTOPRAZOLE SOD DR 40MG TAB, UU] 90 tablet 1     Sig: TAKE ONE TABLET BY MOUTH DAILY   • donepezil (ARICEPT) 10 MG tablet [Pharmacy Med Name: DONEPEZIL HCL 10 MG TABLET] 90 tablet 2     Sig: TAKE ONE TABLET BY MOUTH EVERY NIGHT AT BEDTIME      Last office visit with prescribing clinician: 5/27/2022      Next office visit with prescribing clinician: 12/7/2022            Ángela Godfrey LPN  11/07/22, 12:42 EST

## 2022-11-10 ENCOUNTER — TELEPHONE (OUTPATIENT)
Dept: INTERNAL MEDICINE | Facility: CLINIC | Age: 87
End: 2022-11-10

## 2022-11-28 DIAGNOSIS — K52.9 CHRONIC DIARRHEA: ICD-10-CM

## 2022-11-28 RX ORDER — DIPHENOXYLATE HYDROCHLORIDE AND ATROPINE SULFATE 2.5; .025 MG/1; MG/1
TABLET ORAL
Qty: 100 TABLET | Refills: 2 | Status: SHIPPED | OUTPATIENT
Start: 2022-11-28

## 2022-11-28 NOTE — TELEPHONE ENCOUNTER
Rx Refill Note  Requested Prescriptions     Pending Prescriptions Disp Refills   • diphenoxylate-atropine (LOMOTIL) 2.5-0.025 MG per tablet [Pharmacy Med Name: DIPHENOXYLATE-ATROP 2.5-0.025 MG TB] 100 tablet      Sig: TAKE ONE TABLET BY MOUTH FOUR TIMES A DAY AS NEEDED FOR DIARHEA      Last office visit with prescribing clinician: 09/27/22  Next office visit with prescribing clinician: 12/7/2022     LA: 08/23/22 #100 1R             Ramandeep Trevino LPN  11/28/22, 17:07 EST

## 2022-11-29 ENCOUNTER — CLINICAL SUPPORT (OUTPATIENT)
Dept: INTERNAL MEDICINE | Facility: CLINIC | Age: 87
End: 2022-11-29

## 2022-11-29 DIAGNOSIS — D51.0 PERNICIOUS ANEMIA: ICD-10-CM

## 2022-11-29 PROCEDURE — 96372 THER/PROPH/DIAG INJ SC/IM: CPT | Performed by: INTERNAL MEDICINE

## 2022-11-29 RX ADMIN — CYANOCOBALAMIN 1000 MCG: 1000 INJECTION, SOLUTION INTRAMUSCULAR; SUBCUTANEOUS at 16:34

## 2022-11-30 DIAGNOSIS — G89.29 OTHER CHRONIC PAIN: ICD-10-CM

## 2022-11-30 RX ORDER — TRAMADOL HYDROCHLORIDE 50 MG/1
50 TABLET ORAL DAILY
Qty: 30 TABLET | Refills: 2 | Status: SHIPPED | OUTPATIENT
Start: 2022-11-30

## 2022-11-30 NOTE — TELEPHONE ENCOUNTER
Rx Refill Note  Requested Prescriptions     Pending Prescriptions Disp Refills   • traMADol (ULTRAM) 50 MG tablet [Pharmacy Med Name: traMADol HCL 50MG TABLET] 30 tablet      Sig: TAKE ONE TABLET BY MOUTH DAILY AS NEEDED FOR MODERATE PAN     Last refill:   7/25/22 #30/2  Last office visit with prescribing clinician: 5/27/2022   Last telemedicine visit with prescribing clinician: 2/3/2023   Next office visit with prescribing clinician: 2/3/2023                         Would you like a call back once the refill request has been completed: [] Yes [] No    If the office needs to give you a call back, can they leave a voicemail: [] Yes [] No    Cortney Bob RN  11/30/22, 13:49 EST

## 2022-12-22 DIAGNOSIS — G63 POLYNEUROPATHY ASSOCIATED WITH UNDERLYING DISEASE: ICD-10-CM

## 2022-12-22 RX ORDER — GABAPENTIN 300 MG/1
300 CAPSULE ORAL 2 TIMES DAILY
Qty: 180 CAPSULE | Refills: 0 | Status: SHIPPED | OUTPATIENT
Start: 2022-12-22

## 2022-12-27 RX ORDER — BETHANECHOL CHLORIDE 10 MG/1
TABLET ORAL
Qty: 90 TABLET | Refills: 3 | Status: SHIPPED | OUTPATIENT
Start: 2022-12-27

## 2022-12-29 ENCOUNTER — CLINICAL SUPPORT (OUTPATIENT)
Dept: INTERNAL MEDICINE | Facility: CLINIC | Age: 87
End: 2022-12-29

## 2022-12-29 DIAGNOSIS — E53.8 B12 DEFICIENCY: ICD-10-CM

## 2022-12-29 PROCEDURE — 96372 THER/PROPH/DIAG INJ SC/IM: CPT | Performed by: INTERNAL MEDICINE

## 2022-12-29 RX ADMIN — CYANOCOBALAMIN 1000 MCG: 1000 INJECTION, SOLUTION INTRAMUSCULAR; SUBCUTANEOUS at 16:49

## 2023-01-10 ENCOUNTER — TELEPHONE (OUTPATIENT)
Dept: ORTHOPEDIC SURGERY | Facility: CLINIC | Age: 88
End: 2023-01-10

## 2023-01-10 ENCOUNTER — TELEPHONE (OUTPATIENT)
Dept: INTERNAL MEDICINE | Facility: CLINIC | Age: 88
End: 2023-01-10

## 2023-01-10 NOTE — TELEPHONE ENCOUNTER
Caller: TORO LUND      Relationship to patient: Self    Best call back number: [phone]    Chief complaint: RIGHT KNEE     Type of visit: FUP INJECTION

## 2023-01-10 NOTE — TELEPHONE ENCOUNTER
Caller: Victor M Jarrett (POA)    Relationship: Emergency Contact    Best call back number: [phone]    What is the best time to reach you: ANY    Who are you requesting to speak with (clinical staff, provider,  specific staff member): DR. GRIFFITH    Do you know the name of the person who called: SELF    What was the call regarding: CALLER WOULD LIKE TO DISCUSS A SURGERY HIS MOTHER IS GOING TO POTENTIALLY HAVE WITH HER PRIMARY CARE PROVIDER,  DR. GRIFFITH. HE INITIALLY WANTED AN APPOINTMENT BUT THERE WAS NONE AVAILABLE IN TIME.    Do you require a callback: YES

## 2023-01-11 ENCOUNTER — TELEPHONE (OUTPATIENT)
Dept: ORTHOPEDIC SURGERY | Facility: CLINIC | Age: 88
End: 2023-01-11
Payer: MEDICARE

## 2023-01-11 NOTE — TELEPHONE ENCOUNTER
ROLYI - Spoke to Tiffany (pt granddaughter). Victor M called on-call last night around 7pm and service advised pt to go to ED. They did take pt and she was admitted to [facility]

## 2023-01-17 ENCOUNTER — READMISSION MANAGEMENT (OUTPATIENT)
Dept: CALL CENTER | Facility: HOSPITAL | Age: 88
End: 2023-01-17
Payer: MEDICARE

## 2023-01-17 NOTE — OUTREACH NOTE
Prep Survey    Flowsheet Row Responses   [facility] facility patient discharged from? Non-BH   Is LACE score < 7 ? Non- Discharge   Eligibility [facility]   Date of Admission 01/10/23   Date of Discharge 01/16/23   Discharge Disposition Home or Self Care   Discharge diagnosis Atrioventricular block, UTI   Does the patient have one of the following disease processes/diagnoses(primary or secondary)? Other   Prep survey completed? 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

## 2023-01-18 ENCOUNTER — TRANSITIONAL CARE MANAGEMENT TELEPHONE ENCOUNTER (OUTPATIENT)
Dept: CALL CENTER | Facility: HOSPITAL | Age: 88
End: 2023-01-18
Payer: MEDICARE

## 2023-01-18 NOTE — OUTREACH NOTE
Call Center TCM Note    Flowsheet Row Responses   [facility] facility patient discharged from? Non-BH   Does the patient have one of the following disease processes/diagnoses(primary or secondary)? Other   TCM attempt successful? Yes   Call start time 1221   Change in Health Status Moved to LTC/SNF/Hospice   Call end time 1221   Discharge diagnosis Atrioventricular block, UTI   Person spoke with today (if not patient) and relationship cristina Ordonez   Call end time 1221          Brittanie Salcedo RN    1/18/2023, 12:22 EST

## 2023-01-27 ENCOUNTER — OFFICE VISIT (OUTPATIENT)
Dept: INTERNAL MEDICINE | Facility: CLINIC | Age: 88
End: 2023-01-27
Payer: MEDICARE

## 2023-01-27 ENCOUNTER — TELEPHONE (OUTPATIENT)
Dept: INTERNAL MEDICINE | Facility: CLINIC | Age: 88
End: 2023-01-27

## 2023-01-27 VITALS
WEIGHT: 126.4 LBS | TEMPERATURE: 98 F | BODY MASS INDEX: 25.48 KG/M2 | DIASTOLIC BLOOD PRESSURE: 72 MMHG | SYSTOLIC BLOOD PRESSURE: 128 MMHG | HEIGHT: 59 IN | HEART RATE: 76 BPM

## 2023-01-27 DIAGNOSIS — E03.9 HYPOTHYROIDISM (ACQUIRED): ICD-10-CM

## 2023-01-27 DIAGNOSIS — R73.09 ABNORMAL GLUCOSE: ICD-10-CM

## 2023-01-27 DIAGNOSIS — E78.5 DYSLIPIDEMIA: ICD-10-CM

## 2023-01-27 DIAGNOSIS — I10 ESSENTIAL HYPERTENSION: Primary | Chronic | ICD-10-CM

## 2023-01-27 DIAGNOSIS — D51.0 PERNICIOUS ANEMIA: ICD-10-CM

## 2023-01-27 DIAGNOSIS — I48.0 PAROXYSMAL ATRIAL FIBRILLATION: ICD-10-CM

## 2023-01-27 DIAGNOSIS — I44.2 AV BLOCK, 3RD DEGREE: ICD-10-CM

## 2023-01-27 DIAGNOSIS — F02.80 ALZHEIMER'S DISEASE: ICD-10-CM

## 2023-01-27 DIAGNOSIS — G30.9 ALZHEIMER'S DISEASE: ICD-10-CM

## 2023-01-27 DIAGNOSIS — G63 POLYNEUROPATHY ASSOCIATED WITH UNDERLYING DISEASE: ICD-10-CM

## 2023-01-27 DIAGNOSIS — M81.0 AGE-RELATED OSTEOPOROSIS WITHOUT CURRENT PATHOLOGICAL FRACTURE: ICD-10-CM

## 2023-01-27 PROCEDURE — 96372 THER/PROPH/DIAG INJ SC/IM: CPT | Performed by: INTERNAL MEDICINE

## 2023-01-27 PROCEDURE — 1111F DSCHRG MED/CURRENT MED MERGE: CPT | Performed by: INTERNAL MEDICINE

## 2023-01-27 PROCEDURE — 99496 TRANSJ CARE MGMT HIGH F2F 7D: CPT | Performed by: INTERNAL MEDICINE

## 2023-01-27 RX ORDER — AMOXICILLIN 250 MG
1 CAPSULE ORAL DAILY
COMMUNITY
End: 2023-03-02

## 2023-01-27 RX ORDER — ASCORBIC ACID 500 MG
500 TABLET ORAL DAILY
COMMUNITY

## 2023-01-27 RX ADMIN — CYANOCOBALAMIN 1000 MCG: 1000 INJECTION, SOLUTION INTRAMUSCULAR; SUBCUTANEOUS at 16:36

## 2023-01-27 NOTE — PROGRESS NOTES
Transitional Care Follow Up Visit  Subjective     Temi Aretha Jarrett is a 89 y.o. female who presents for a transitional care management visit.    Within 48 business hours after discharge our office contacted her via telephone to coordinate her care and needs.      I reviewed and discussed the details of that call along with the discharge summary, hospital problems, inpatient lab results, inpatient diagnostic studies, and consultation reports with Temi.     The patient was admitted to [facility] by her cardiologist because of high degree heart block. A pacemaker was placed on 01/11/2023. She was treated for urinary tract infection, low potassium, and was hydrated. She is accompanied by an adult female.    Heart block  The patient states she has a pacemaker. She states she checks her pacemaker every day. The adult female states they put in a smaller pacemaker because of her thin skin. The patient states she feels like she has more energy now than she did before it was put in. The adult female states the patient always was tired and not talkative.    Bladder infection  The adult female states the patient is on Septra. The adult female states the patient was sent home with a prescription of 5 pills. The adult female states the patient has 3 more pills left. The adult female states the nurse told her to watch the patient's urinary tract infections. The adult female states the patient has a prolapsed bladder.    Constipation  The patient states she has not had a bowel movement since she came home from the hospital. The adult female states she had to give the patient Colace tonight. The adult female states the patient was taking colestipol, but then she got diarrhea. The adult female states she thinks the patient may have IBS with constipation and diarrhea, but she is not sure. The adult female states the patient has not had a bowel movement since she came home from the hospital. The adult female states when she  gives the patient Colace, she usually has a bowel movement the second day. The adult female states the patient is taking 2 tablets of vitamin C and a multivitamin.    Puffiness under eyes  The adult female states she has had puffiness under her eyes for years, but it has become worse.    Hypertension  The adult female states the patient was given 5 mg of lisinopril every day. The adult female states she has been following [facility]. The adult female states the patient was referred to physical therapy. but she would like to know if she can give it to her every morning.    Dementia  The adult female states the patient is taking memantine 5 mg twice a day. The adult female states the patient's short term memory has worsened. The adult female states the patient fell once months ago. The adult female states the patient's daughter had 2 surgeries last year, so she could not pick her up, so they had to call the paramedics. The patient states she slipped on the carpet months ago. The adult female states the patient has hearing issues. The adult female states the patient needs a gastroenterologist referral since Dr. Borrego retired.     Current outpatient and discharge medications have been reconciled for the patient.  Reviewed by: Eric Patel MD      Date of TCM Phone Call 1/17/2023   [facility]   Date of Admission 1/10/2023   Date of Discharge 1/16/2023   Discharge Disposition Home or Self Care     Risk for Readmission (LACE) No data recorded    History of Present Illness   Course During Hospital Stay:  See above     The following portions of the patient's history were reviewed and updated as appropriate: allergies, current medications, past family history, past medical history, past social history, past surgical history and problem list.    Review of Systems  Review of systems is otherwise unremarkable.    Objective   Physical Exam  Vitals reviewed.   Constitutional:       Appearance: She is well-developed.    HENT:      Head: Normocephalic and atraumatic.   Cardiovascular:      Rate and Rhythm: Normal rate and regular rhythm.      Heart sounds: Normal heart sounds. No murmur heard.  Pulmonary:      Effort: Pulmonary effort is normal.      Breath sounds: Normal breath sounds.   Neurological:      Mental Status: She is alert and oriented to person, place, and time.        Both external auditory canals are occluded with cerumen. Examination is otherwise normal.      Assessment & Plan      1. Heart block  - Now treated with pacemaker. She seems to be tolerating this well and actually seems to be brighter and more energetic.    2. Hypertension  - Blood pressure is well controlled on daily lisinopril.    3. Dementia  - She will continue memantine 10 mg twice a day and donepezil.    She will follow up in 1 week as previously scheduled.    Diagnoses and all orders for this visit:    1. Essential hypertension (Primary)    2. Paroxysmal atrial fibrillation (HCC)    3. Hypothyroidism (acquired)    4. Pernicious anemia    5. Alzheimer's disease (HCC)    6. Polyneuropathy associated with underlying disease (HCC)    7. 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  01/27/23   17:43 EST    Patient or patient representative verbalized consent to the visit recording.  I have personally performed the services described in this document as transcribed by the above individual, and it is both accurate and complete.

## 2023-01-27 NOTE — TELEPHONE ENCOUNTER
WILL THE PT NEED LABS FOR HER VISIT ON 02/03/23?  IF SO, WILL YOU PLEASE PUT THOSE IN EARLY.    PLEASE CALL TORO @ [phone].

## 2023-01-30 NOTE — TELEPHONE ENCOUNTER
Called and left detailed voicemail advising the below, requesting a callback for follow-up to go over verbally:    Her lab order was sent in

## 2023-02-01 ENCOUNTER — TELEPHONE (OUTPATIENT)
Dept: INTERNAL MEDICINE | Facility: CLINIC | Age: 88
End: 2023-02-01

## 2023-02-01 NOTE — TELEPHONE ENCOUNTER
Is there redness or swelling?  Bruising is common and usually not that painful, especially 3 weeks out.

## 2023-02-01 NOTE — TELEPHONE ENCOUNTER
Caller: Chris Jarertt    Relationship: Emergency Contact    Best call back number: [phone]    What is the best time to reach you: ANYTIME    Who are you requesting to speak with (clinical staff, provider,  specific staff member): CONY    Do you know the name of the person who called: DAUGHTER IN LAW    What was the call regarding: CHRIS THE PATIENTS DAUGHTER IN LAW STATES THAT THE PATIENT HAS A PAIN OF A 4 WITH PAIN IN HER GROIN DUE TO THE PATIENTS INCISION OF THE PACE MAKER. CHRIS STATES THAT IT IS BRUISED AND THE PATIENT IS IN PAIN AND SHE WOULD LIKE A CALL FROM CONY. CHRIS STATES THAT SHE TRIED TO CALL THE CARDIOLOGY BUT THE LINES ARE FROZEN.     Do you require a callback: YES

## 2023-02-01 NOTE — TELEPHONE ENCOUNTER
Pain became so severe they called 911 and pt is being transported to [facility]

## 2023-02-13 RX ORDER — POTASSIUM CHLORIDE 750 MG/1
CAPSULE, EXTENDED RELEASE ORAL
Qty: 360 CAPSULE | Refills: 1 | Status: SHIPPED | OUTPATIENT
Start: 2023-02-13 | End: 2023-03-02 | Stop reason: SDUPTHER

## 2023-02-17 ENCOUNTER — TELEPHONE (OUTPATIENT)
Dept: INTERNAL MEDICINE | Facility: CLINIC | Age: 88
End: 2023-02-17
Payer: MEDICARE

## 2023-02-17 NOTE — TELEPHONE ENCOUNTER
Patient DC from [facility] tomorrow 2/18/23, hospital follow up scheduled 3/2/23 at 12:45 with Dr Patel

## 2023-02-28 ENCOUNTER — CLINICAL SUPPORT (OUTPATIENT)
Dept: INTERNAL MEDICINE | Facility: CLINIC | Age: 88
End: 2023-02-28
Payer: COMMERCIAL

## 2023-02-28 PROCEDURE — 96372 THER/PROPH/DIAG INJ SC/IM: CPT | Performed by: INTERNAL MEDICINE

## 2023-02-28 RX ADMIN — CYANOCOBALAMIN 1000 MCG: 1000 INJECTION, SOLUTION INTRAMUSCULAR; SUBCUTANEOUS at 16:25

## 2023-03-02 ENCOUNTER — OFFICE VISIT (OUTPATIENT)
Dept: INTERNAL MEDICINE | Facility: CLINIC | Age: 88
End: 2023-03-02
Payer: COMMERCIAL

## 2023-03-02 VITALS
DIASTOLIC BLOOD PRESSURE: 88 MMHG | WEIGHT: 132 LBS | SYSTOLIC BLOOD PRESSURE: 154 MMHG | HEART RATE: 68 BPM | HEIGHT: 59 IN | TEMPERATURE: 97.7 F | BODY MASS INDEX: 26.61 KG/M2

## 2023-03-02 DIAGNOSIS — S30.1XXD HEMATOMA OF LEFT PSOAS REGION TO ANTICOAGULANT THERAPY, SUBSEQUENT ENCOUNTER: Primary | ICD-10-CM

## 2023-03-02 DIAGNOSIS — N39.46 MIXED STRESS AND URGE URINARY INCONTINENCE: ICD-10-CM

## 2023-03-02 DIAGNOSIS — G63 POLYNEUROPATHY ASSOCIATED WITH UNDERLYING DISEASE: ICD-10-CM

## 2023-03-02 PROCEDURE — 99214 OFFICE O/P EST MOD 30 MIN: CPT | Performed by: INTERNAL MEDICINE

## 2023-03-02 RX ORDER — PROMETHAZINE HYDROCHLORIDE 25 MG/1
25 TABLET ORAL EVERY 6 HOURS PRN
Qty: 30 TABLET | Refills: 2 | Status: SHIPPED | OUTPATIENT
Start: 2023-03-02

## 2023-03-02 RX ORDER — POTASSIUM CHLORIDE 750 MG/1
20 CAPSULE, EXTENDED RELEASE ORAL DAILY
Qty: 360 CAPSULE | Refills: 1
Start: 2023-03-02

## 2023-03-02 RX ORDER — DOCUSATE SODIUM 100 MG/1
100 CAPSULE, LIQUID FILLED ORAL DAILY PRN
COMMUNITY

## 2023-03-02 NOTE — PROGRESS NOTES
[facility]     Temi Jarrett  5/8/1930   [id number]      Patient Care Team:  Eric Patel MD as PCP - General  Eric Patel MD as PCP - Family Medicine  Von Pablo MD as Cardiologist (Cardiology)    Chief Complaint::   Chief Complaint   Patient presents with   • Hypertension        HPI    The patient comes in for follow-up of recent hospitalization at [facility] initially for heart block and pacemaker placement. She was discharged home on 01/11/2023 on apixaban and readmitted on 02/02/2023 with lower extremity pain, subsequently found to be secondary to significant hematoma in the left lower extremity involving intramuscular hematoma and retroperitoneal hemorrhage in the left iliacus muscle. She was then transferred to [facility] where she stayed until 02/18/2023. She is accompanied by an adult female.    Psoas muscle hematoma  The adult female states that the patient had a psoas muscle hematoma in her left lower extremity, paramedics were called because she could not get out of her chair. The patient had a CT scan to find that and they said it could have been from Eliquis or when they put in her heart pacemaker. The patient is off the Eliquis according to adult female. They took her off the Lasix when she was in the hospital and she is off of potassium. She states that her potassium was normal. Adult female states that the patient's lower extremities and ankles are swollen as patient is off Lasix. She states that the patient was put back on the low-dose aspirin, they could not hear any atrial fibrillation with her heart. Adult female mentions patient is going to physical therapy to get her strength up. She notes the patient needs a renewal on the Phenergan for nausea. She notes the patient has furosemide and potassium at home. The patient does not remember how she had a big bruise or a big lump. Adult female notes patient is tired all the time and  has been falling asleep in her chair while she watches TV a lot.     Incontinence  Adult female notes the patient has another issue of incontinence. Adult female mentions the patient has woken up 2 to 3 times and she sleeps on a waterproof pad that she has urinated in bed as patient cannot get to the bathroom in time. Adult female notes that the patient has a prolapsed bladder. The patient takes Lomotil 2 pills once a day. She states that the patient is hard of hearing and she does not want to get a hearing aid.      Chronic Conditions:      Patient Active Problem List   Diagnosis   • Esophageal reflux   • Hypothyroidism (acquired)   • Anxiety with depression   • Peripheral neuropathy   • Spinal stenosis of lumbar region   • Osteoporosis   • Disc disorder of lumbar region   • Bladder prolapse, female, acquired   • Essential hypertension   • Pernicious anemia   • Annual physical exam   • Primary osteoarthritis of both knees   • Hiatal hernia   • Multifocal aspiration pneumonia due to large intrathoracic hiatal hernia   • Recurrent UTI   • Paroxysmal atrial fibrillation (HCC)   • Alzheimer's disease (HCC)   • Mild cognitive impairment   • At high risk for aspiration   • Chronic pain   • Osteoarthritis of right knee        Past Medical History:   Diagnosis Date   • Acute sepsis ([facility]) 01/14/2021   • Arthritis    • Bladder prolapse, female, acquired    • Closed fracture of neck of left femur ([facility]) 09/27/2019   • Dementia ([facility])    • Depression    • Disease of thyroid gland    • Gastric polyp    • GERD (gastroesophageal reflux disease)    • Hiatal hernia    • History of colonic polyps    • Hyperlipidemia    • Hypertension    • IBS (irritable bowel syndrome)    • Macular degeneration    • Pericardial effusion 08/03/2020    Echo (8/3/2020): Normal LVEF.  Moderate pericardial effusion without tamponade.  Moderate MR. RVSP 49 mmHg   • Torn meniscus     right knee        Past Surgical History:   Procedure Laterality Date   •  CHOLECYSTECTOMY  1997   • ENDOSCOPY N/A 01/14/2021    Procedure: ESOPHAGOGASTRODUODENOSCOPY;  Surgeon: Serjio Guerrero MD;  Location: [facility] ENDOSCOPY;  Service: General;  Laterality: N/A;   • EYE SURGERY  1998    Cataract extraction   • HERNIA REPAIR     • HIP HEMIARTHROPLASTY Left 09/28/2019    Procedure: HIP HEMIARTHROPLASTY LEFT;  Surgeon: Reddy Segundo Jr., MD;  Location:  [facility] OR;  Service: Orthopedics   • HYSTERECTOMY  1976   • KNEE SURGERY Right     arthroscopy- 2000   • SKIN CANCER EXCISION Left     [facility]; CANCEROUS SPOT DIDN'T  MOVE BUT NEEDED REMOVED   • TONSILLECTOMY         Family History   Problem Relation Age of Onset   • Hypertension Mother    • Breast cancer Mother    • Obesity Mother    • Hypertension Father    • Diabetes Son        Social History     Socioeconomic History   • Marital status:    Tobacco Use   • Smoking status: Never   • Smokeless tobacco: Never   Vaping Use   • Vaping Use: Never used   Substance and Sexual Activity   • Alcohol use: No   • Drug use: Defer   • Sexual activity: Defer       Allergies   Allergen Reactions   • Augmentin [Amoxicillin-Pot Clavulanate] Diarrhea   • Codeine Nausea Only     Trouble Breathing    • Shrimp Hives   • [place] Unknown - Low Severity         Current Outpatient Medications:   •  acetaminophen (TYLENOL) 325 MG tablet, Take 2 tablets by mouth Every 6 (Six) Hours As Needed for Mild Pain., Disp: , Rfl:   •  aspirin 81 MG EC tablet, Take 1 tablet by mouth Daily., Disp: , Rfl:   •  bethanechol (URECHOLINE) 10 MG tablet, TAKE ONE TABLET BY MOUTH THREE TIMES A DAY, Disp: 90 tablet, Rfl: 3  •  busPIRone (BUSPAR) 10 MG tablet, TAKE ONE TABLET BY MOUTH TWICE A DAY, Disp: 60 tablet, Rfl: 4  •  Calcium Citrate-Vitamin D (CITRACAL PETITES/VITAMIN D PO), Take 2 tablets by mouth 2 (two) times a day., Disp: , Rfl:   •  Cholecalciferol (VITAMIN D PO), 2000 U qd, Disp: , Rfl:   •  diclofenac (VOLTAREN) 1 % gel gel, APPLY 4  "GRAMS TOPICALLY TO THE APPROPRAITE AREA AS DIRECTED FOUR TIMES A DAY, Disp: 100 g, Rfl: 4  •  diphenoxylate-atropine (LOMOTIL) 2.5-0.025 MG per tablet, TAKE ONE TABLET BY MOUTH FOUR TIMES A DAY AS NEEDED FOR DIARHEA (Patient taking differently: Take 2 tablets by mouth Daily.), Disp: 100 tablet, Rfl: 2  •  docusate sodium (COLACE) 100 MG capsule, Take 1 capsule by mouth Daily As Needed for Constipation., Disp: , Rfl:   •  donepezil (ARICEPT) 10 MG tablet, TAKE ONE TABLET BY MOUTH EVERY NIGHT AT BEDTIME, Disp: 90 tablet, Rfl: 2  •  gabapentin (NEURONTIN) 300 MG capsule, Take 1 capsule by mouth 2 (Two) Times a Day., Disp: 180 capsule, Rfl: 0  •  lisinopril (PRINIVIL,ZESTRIL) 5 MG tablet, TAKE ONE TABLET BY MOUTH DAILY (Patient taking differently: Take 1 tablet by mouth Every Morning. (PRN At night only when BP is over 140/90 or above)), Disp: 90 tablet, Rfl: 3  •  memantine (NAMENDA) 10 MG tablet, TAKE ONE TABLET BY MOUTH TWICE A DAY, Disp: 180 tablet, Rfl: 3  •  Multiple Vitamins-Minerals (MULTIVITAMIN ADULTS 50+ PO), Take 1 tablet by mouth Daily., Disp: , Rfl:   •  Multiple Vitamins-Minerals (PRESERVISION AREDS PO), Take 1 tablet by mouth 2 (Two) Times a Day., Disp: , Rfl:   •  pantoprazole (PROTONIX) 40 MG EC tablet, TAKE ONE TABLET BY MOUTH DAILY, Disp: 90 tablet, Rfl: 1  •  potassium chloride (MICRO-K) 10 MEQ CR capsule, Take 2 capsules by mouth Daily., Disp: 360 capsule, Rfl: 1  •  promethazine (PHENERGAN) 25 MG tablet, Take 1 tablet by mouth Every 6 (Six) Hours As Needed for Nausea or Vomiting., Disp: 30 tablet, Rfl: 2  •  sertraline (ZOLOFT) 50 MG tablet, TAKE ONE TABLET BY MOUTH DAILY, Disp: 90 tablet, Rfl: 4  •  sucralfate (CARAFATE) 1 g tablet, TAKE ONE TABLET BY MOUTH TWICE A DAY, Disp: 270 tablet, Rfl: 1  •  Synthroid 150 MCG tablet, TAKE ONE TABLET BY MOUTH DAILY, Disp: 90 tablet, Rfl: 1  •  Syringe/Needle, Disp, (B-D 3CC LUER-ROSY SYR 23GX1\") 23G X 1\" 3 ML misc, USE TO INJECT VITAMIN B-12 ONCE A MONTH, " "Disp: 1 each, Rfl: 3  •  traMADol (ULTRAM) 50 MG tablet, Take 1 tablet by mouth Daily., Disp: 30 tablet, Rfl: 2  •  vitamin C (ASCORBIC ACID) 500 MG tablet, Take 1 tablet by mouth Daily., Disp: , Rfl:   •  furosemide (LASIX) 20 MG tablet, Taking every other day, Disp: 30 tablet, Rfl: 5    Current Facility-Administered Medications:   •  cyanocobalamin injection 1,000 mcg, 1,000 mcg, Intramuscular, Q28 Days, Eric Patel MD, 1,000 mcg at 02/28/23 1625    Review of Systems   Constitutional: Negative for chills, fatigue and fever.   HENT: Negative for congestion, ear pain and sinus pressure.    Respiratory: Negative for cough, chest tightness, shortness of breath and wheezing.    Cardiovascular: Negative for chest pain and palpitations.   Gastrointestinal: Negative for abdominal pain, blood in stool and constipation.   Skin: Negative for color change.   Allergic/Immunologic: Negative for environmental allergies.   Neurological: Negative for dizziness, speech difficulty and headache.   Psychiatric/Behavioral: Negative for decreased concentration. The patient is not nervous/anxious.         Vital Signs  Vitals:    03/02/23 1305   BP: 154/88   BP Location: Left arm   Patient Position: Sitting   Cuff Size: Adult   Pulse: 68   Temp: 97.7 °F (36.5 °C)   TempSrc: Temporal   Weight: 59.9 kg (132 lb)   Height: 149.9 cm (59.02\")   PainSc:   4   PainLoc: Knee       Physical Exam  Vitals reviewed.   Constitutional:       Appearance: She is well-developed.   HENT:      Head: Normocephalic and atraumatic.   Cardiovascular:      Rate and Rhythm: Normal rate and regular rhythm.      Heart sounds: Murmur heard.      Comments: Systolic 3/6.  Pulmonary:      Effort: Pulmonary effort is normal.      Breath sounds: Normal breath sounds.   Musculoskeletal:      Right lower leg: Edema present.      Left lower leg: Edema present.      Comments: She has 2+ pitting edema below the knees with venous stasis dermatitis anteriorly. "   Neurological:      Mental Status: She is alert and oriented to person, place, and time.          Procedures    ACE III MINI             Assessment/Plan:    Diagnoses and all orders for this visit:    1. Hematoma of left psoas region to anticoagulant therapy, subsequent encounter (Primary)    2. Mixed stress and urge urinary incontinence    3. Polyneuropathy associated with underlying disease (HCC)    Other orders  -     potassium chloride (MICRO-K) 10 MEQ CR capsule; Take 2 capsules by mouth Daily.  Dispense: 360 capsule; Refill: 1  -     promethazine (PHENERGAN) 25 MG tablet; Take 1 tablet by mouth Every 6 (Six) Hours As Needed for Nausea or Vomiting.  Dispense: 30 tablet; Refill: 2      1. Recent psoas/iliac hematoma  - Secondary to Eliquis, now resolved. She is now on aspirin.    2. Urinary incontinence  - She will continue bethanechol, but I would not recommend other medical treatments. She will continue using pads and Depends.    3. Peripheral neuropathy  - The gabapentin has helped her control her neuropathic pain, although it may contribute to her daytime sedation. She is primarily sedentary during the day and I think her risk of falling is low.    She will follow up in 3 months.      Plan of care reviewed with patient at the conclusion of today's visit. Education was provided regarding diagnosis, management, and any prescribed or recommended OTC medications.Patient verbalizes understanding of and agreement with management plan.         Eric Patel MD         Transcribed from ambient dictation for Eric Patel MD by Gretchen Jeffers.  03/02/23   14:58 EST    Patient or patient representative verbalized consent to the visit recording.  I have personally performed the services described in this document as transcribed by the above individual, and it is both accurate and complete.

## 2023-03-10 ENCOUNTER — HOME HEALTH ADMISSION (OUTPATIENT)
Dept: HOME HEALTH SERVICES | Facility: HOME HEALTHCARE | Age: 88
End: 2023-03-10
Payer: MEDICARE

## 2023-03-10 ENCOUNTER — OFFICE VISIT (OUTPATIENT)
Dept: INTERNAL MEDICINE | Facility: CLINIC | Age: 88
End: 2023-03-10
Payer: COMMERCIAL

## 2023-03-10 ENCOUNTER — TELEPHONE (OUTPATIENT)
Dept: INTERNAL MEDICINE | Facility: CLINIC | Age: 88
End: 2023-03-10
Payer: MEDICARE

## 2023-03-10 VITALS
SYSTOLIC BLOOD PRESSURE: 132 MMHG | DIASTOLIC BLOOD PRESSURE: 60 MMHG | TEMPERATURE: 97.8 F | OXYGEN SATURATION: 98 % | HEIGHT: 59 IN | HEART RATE: 62 BPM | WEIGHT: 126.8 LBS | BODY MASS INDEX: 25.56 KG/M2

## 2023-03-10 DIAGNOSIS — S81.812A LACERATION OF LEFT LOWER EXTREMITY, INITIAL ENCOUNTER: Primary | ICD-10-CM

## 2023-03-10 PROCEDURE — 99213 OFFICE O/P EST LOW 20 MIN: CPT | Performed by: STUDENT IN AN ORGANIZED HEALTH CARE EDUCATION/TRAINING PROGRAM

## 2023-03-10 NOTE — TELEPHONE ENCOUNTER
Pt was seen by Dr. Tatum for a wound on her leg and Temi is wanting to know if she can take Tramadol bid while this issue is going on

## 2023-03-10 NOTE — PROGRESS NOTES
"Chief Complaint  Temi Jarrett is a 89 y.o. female presenting for Laceration (Left leg).     Patient has a past medical history of hypertension, paroxysmal atrial fibrillation, hypothyroidism, acid reflux, hiatal hernia, recurrent UTIs, pernicious anemia, anxiety, depression, osteoporosis, osteoarthritis of multiple joints, spinal stenosis, Alzheimer's disease and peripheral neuropathy.    History of Present Illness  Patient is here accompanied by her daughter/caretaker.    Last time she bumped into the bed frame with her left leg and sustained a laceration recalled ENT who put on a dressing.  She did not have a fall.  She was on Eliquis, but has been off since January.    The following portions of the patient's history were reviewed and updated as appropriate: allergies, current medications, past family history, past medical history, past social history, past surgical history and problem list.    Image captured per patient verbal consent:          Objective  /60 (BP Location: Left arm, Patient Position: Sitting, Cuff Size: Adult)   Pulse 62   Temp 97.8 °F (36.6 °C) (Infrared)   Ht 149.9 cm (59.02\")   Wt 57.5 kg (126 lb 12.8 oz)   LMP  (LMP Unknown)   SpO2 98%   BMI 25.60 kg/m²     Physical Exam  Constitutional:       Appearance: Normal appearance.   Eyes:      Extraocular Movements: Extraocular movements intact.      Conjunctiva/sclera: Conjunctivae normal.   Pulmonary:      Effort: Pulmonary effort is normal. No respiratory distress.   Musculoskeletal:         General: Signs of injury present.      Cervical back: Neck supple.      Right lower leg: Edema present.      Left lower leg: Edema present.      Comments: Left leg laceration with dead skin on the lateral side, exposed subcutaneous tissue.  See image   Skin:     General: Skin is warm and dry.   Neurological:      Mental Status: She is alert and oriented to person, place, and time. Mental status is at baseline.   Psychiatric:         Behavior: " Behavior normal.         Thought Content: Thought content normal.         Assessment/Plan   1. Laceration of left lower extremity, initial encounter  Accidental laceration, no fall.  At this time no concern for infection.  I have used scissors and cut off the dead skin, no local anesthetic needed for this.  Wound is cleaned by nursing staff and will apply triple antibiotic ointment and I will refer to home health for assistance with wound care.  They need to pay close attention to any redness or swelling over the next few days.  This will take a while to heal.  If any sign of redness or swelling concerning for infection they should get in touch right away.  - Ambulatory Referral to Home Health      Return if symptoms worsen or fail to improve, for Next scheduled follow up.    Future Appointments       Provider Department Center    6/30/2023 3:30 PM Eric Patel MD [facility] [place]            Slick Tatum MD  Family Medicine  03/10/2023

## 2023-03-13 ENCOUNTER — TELEPHONE (OUTPATIENT)
Dept: INTERNAL MEDICINE | Facility: CLINIC | Age: 88
End: 2023-03-13
Payer: MEDICARE

## 2023-03-13 NOTE — TELEPHONE ENCOUNTER
Chris (daughter-in-law) notified [facility]  declined to see pt due to staffing issues. New referral was sent to [facility]

## 2023-03-14 NOTE — TELEPHONE ENCOUNTER
Eliana with [facility] called to let us know they did accept the patient and will start tomorrow    [phone]

## 2023-03-16 ENCOUNTER — TELEPHONE (OUTPATIENT)
Dept: INTERNAL MEDICINE | Facility: CLINIC | Age: 88
End: 2023-03-16
Payer: MEDICARE

## 2023-03-16 NOTE — TELEPHONE ENCOUNTER
[facility] IS SEEING THE PT FOR A LEG WOUND AND WOULD LIKE TO USE A COLLAGEN DRESSING AND THEN WON'T HAVE TO CHANGE IT EVERYDAY.  THEN THEY WILL ONLY CHANGE IT 3 TIMES A WEEK.    PLEASE CALL BREONNA BACK [phone]

## 2023-03-27 NOTE — TELEPHONE ENCOUNTER
mary grace, FROM [employer], CALLED AND STATED THAT THE FAMILY OF THE PATIENT REQUESTED A VERBAL ORDER TO CANCEL HOME HEALTH AID BUT NOT THE NURSE.     [facility] needs this signed  And resent    Was A Bandage Applied: Yes

## 2023-03-29 ENCOUNTER — CLINICAL SUPPORT (OUTPATIENT)
Dept: INTERNAL MEDICINE | Facility: CLINIC | Age: 88
End: 2023-03-29
Payer: COMMERCIAL

## 2023-03-29 DIAGNOSIS — D51.0 PERNICIOUS ANEMIA: ICD-10-CM

## 2023-03-29 PROCEDURE — 96372 THER/PROPH/DIAG INJ SC/IM: CPT | Performed by: INTERNAL MEDICINE

## 2023-03-29 RX ADMIN — CYANOCOBALAMIN 1000 MCG: 1000 INJECTION, SOLUTION INTRAMUSCULAR; SUBCUTANEOUS at 16:27

## 2023-04-03 ENCOUNTER — OUTSIDE FACILITY SERVICE (OUTPATIENT)
Dept: INTERNAL MEDICINE | Facility: CLINIC | Age: 88
End: 2023-04-03
Payer: MEDICARE

## 2023-04-13 ENCOUNTER — TELEPHONE (OUTPATIENT)
Dept: INTERNAL MEDICINE | Facility: CLINIC | Age: 88
End: 2023-04-13
Payer: MEDICARE

## 2023-04-13 NOTE — TELEPHONE ENCOUNTER
Home health has some concerns about her wound, there is an overgrowth of tissue.  Not sure if Dr Patel wants to continue the same care or see the pt.

## 2023-04-13 NOTE — TELEPHONE ENCOUNTER
Called and spoke to Amanda - wound is healing, but has a little overgrowth and some dead tissue around the edges - may be due to collagen dressing. The size is smaller and pink. Per Dr. Patel, continue current treatment and call with update next week. Discussed with Amanda and advised to call sooner if condition worsens. She voices understanding

## 2023-04-26 ENCOUNTER — TELEPHONE (OUTPATIENT)
Dept: INTERNAL MEDICINE | Facility: CLINIC | Age: 88
End: 2023-04-26
Payer: MEDICARE

## 2023-04-26 NOTE — TELEPHONE ENCOUNTER
PT HAS WOUND ON HER LEFT LOWER LEG AND BREONNA WOULD LIKE TO PUT SOME MEDIHONEY ON IT AND COVERING IT WITH A MEPITEL BANDAGE.     BREONNA WOULD LIKE A CALL BACK TO DISCUSS IF THIS WOULD BE OK.  [phone]

## 2023-04-28 ENCOUNTER — OFFICE VISIT (OUTPATIENT)
Dept: INTERNAL MEDICINE | Facility: CLINIC | Age: 88
End: 2023-04-28
Payer: MEDICARE

## 2023-04-28 VITALS
BODY MASS INDEX: 24.39 KG/M2 | SYSTOLIC BLOOD PRESSURE: 108 MMHG | WEIGHT: 121 LBS | HEART RATE: 72 BPM | DIASTOLIC BLOOD PRESSURE: 62 MMHG | TEMPERATURE: 97.8 F | HEIGHT: 59 IN

## 2023-04-28 DIAGNOSIS — H61.23 BILATERAL IMPACTED CERUMEN: Primary | ICD-10-CM

## 2023-04-28 DIAGNOSIS — D51.0 PERNICIOUS ANEMIA: ICD-10-CM

## 2023-04-28 DIAGNOSIS — E03.9 HYPOTHYROIDISM (ACQUIRED): ICD-10-CM

## 2023-04-28 DIAGNOSIS — M81.0 AGE-RELATED OSTEOPOROSIS WITHOUT CURRENT PATHOLOGICAL FRACTURE: ICD-10-CM

## 2023-04-28 DIAGNOSIS — H91.93 BILATERAL HEARING LOSS, UNSPECIFIED HEARING LOSS TYPE: ICD-10-CM

## 2023-04-28 DIAGNOSIS — G63 POLYNEUROPATHY ASSOCIATED WITH UNDERLYING DISEASE: ICD-10-CM

## 2023-04-28 DIAGNOSIS — R73.09 ABNORMAL GLUCOSE: ICD-10-CM

## 2023-04-28 DIAGNOSIS — E78.5 DYSLIPIDEMIA: ICD-10-CM

## 2023-04-28 RX ORDER — GABAPENTIN 300 MG/1
300 CAPSULE ORAL 2 TIMES DAILY
Qty: 180 CAPSULE | Refills: 0 | Status: SHIPPED | OUTPATIENT
Start: 2023-04-28

## 2023-04-28 RX ORDER — PANTOPRAZOLE SODIUM 40 MG/1
40 TABLET, DELAYED RELEASE ORAL DAILY
Qty: 90 TABLET | Refills: 1 | Status: SHIPPED | OUTPATIENT
Start: 2023-04-28

## 2023-04-28 RX ADMIN — CYANOCOBALAMIN 1000 MCG: 1000 INJECTION, SOLUTION INTRAMUSCULAR; SUBCUTANEOUS at 15:46

## 2023-04-28 NOTE — PROGRESS NOTES
"[facility]     Temi Jarrett  5/8/1930   [id number]      Patient Care Team:  Eric Patel MD as PCP - General  Eric Patel MD as PCP - Family Medicine  Von Pablo MD as Cardiologist (Cardiology)    Chief Complaint::   Chief Complaint   Patient presents with   • knot on left side   • Hearing Loss     Wants ears checked   • leg wound     left        HPI  The patient comes in complaining of hearing loss, ongoing wound in the left leg, and a tender area on the left side of her abdomen. She is accompanied by a female.    Hearing loss  The female accompanying the patient states that the patient needs her ears cleaned because after a shower, she was hard of hearing in one ear.     Bump  The patient states that she has a tiny cyst or bump, but it does not hurt.     Wound on left leg  The patient bumped into something at her home, and it is healing. She is seen by [facility] weekly. She conveys she has a \"yellow slough\" on her wound. She has a collagen patch on. She has bruising.     Chronic Conditions:      Patient Active Problem List   Diagnosis   • Esophageal reflux   • Hypothyroidism (acquired)   • Anxiety with depression   • Peripheral neuropathy   • Spinal stenosis of lumbar region   • Osteoporosis   • Disc disorder of lumbar region   • Bladder prolapse, female, acquired   • Essential hypertension   • Pernicious anemia   • Annual physical exam   • Primary osteoarthritis of both knees   • Hiatal hernia   • Multifocal aspiration pneumonia due to large intrathoracic hiatal hernia   • Recurrent UTI   • Paroxysmal atrial fibrillation   • Alzheimer's disease (HCC)   • Mild cognitive impairment   • At high risk for aspiration   • Chronic pain   • Osteoarthritis of right knee        Past Medical History:   Diagnosis Date   • Acute sepsis 01/14/2021   • Arthritis    • Bladder prolapse, female, acquired    • Closed fracture of neck of left femur 09/27/2019   • Dementia    • " Depression    • Disease of thyroid gland    • Gastric polyp    • GERD (gastroesophageal reflux disease)    • Hiatal hernia    • History of colonic polyps    • Hyperlipidemia    • Hypertension    • IBS (irritable bowel syndrome)    • Macular degeneration    • Pericardial effusion 08/03/2020    Echo (8/3/2020): Normal LVEF.  Moderate pericardial effusion without tamponade.  Moderate MR. RVSP 49 mmHg   • Torn meniscus     right knee        Past Surgical History:   Procedure Laterality Date   • CHOLECYSTECTOMY  1997   • ENDOSCOPY N/A 01/14/2021    Procedure: ESOPHAGOGASTRODUODENOSCOPY;  Surgeon: Serjio Guerrero MD;  Location:  [facility];  Service: General;  Laterality: N/A;   • EYE SURGERY  1998    Cataract extraction   • HERNIA REPAIR     • HIP HEMIARTHROPLASTY Left 09/28/2019    Procedure: HIP HEMIARTHROPLASTY LEFT;  Surgeon: Reddy Segundo Jr., MD;  Location:  [facility] OR;  Service: Orthopedics   • HYSTERECTOMY  1976   • KNEE SURGERY Right     arthroscopy- 2000   • SKIN CANCER EXCISION Left     [facility]; CANCEROUS SPOT DIDN'T  MOVE BUT NEEDED REMOVED   • TONSILLECTOMY         Family History   Problem Relation Age of Onset   • Hypertension Mother    • Breast cancer Mother    • Obesity Mother    • Hypertension Father    • Diabetes Son        Social History     Socioeconomic History   • Marital status:    Tobacco Use   • Smoking status: Never   • Smokeless tobacco: Never   Vaping Use   • Vaping Use: Never used   Substance and Sexual Activity   • Alcohol use: No   • Drug use: Defer   • Sexual activity: Defer       Allergies   Allergen Reactions   • Augmentin [Amoxicillin-Pot Clavulanate] Diarrhea   • Codeine Nausea Only     Trouble Breathing    • Shrimp Hives   • [place] Unknown - Low Severity         Current Outpatient Medications:   •  acetaminophen (TYLENOL) 325 MG tablet, Take 2 tablets by mouth Every 6 (Six) Hours As Needed for Mild Pain., Disp: , Rfl:   •  aspirin 81 MG  EC tablet, Take 1 tablet by mouth Daily., Disp: , Rfl:   •  bethanechol (URECHOLINE) 10 MG tablet, TAKE ONE TABLET BY MOUTH THREE TIMES A DAY, Disp: 90 tablet, Rfl: 3  •  busPIRone (BUSPAR) 10 MG tablet, TAKE ONE TABLET BY MOUTH TWICE A DAY, Disp: 60 tablet, Rfl: 4  •  Calcium Citrate-Vitamin D (CITRACAL PETITES/VITAMIN D PO), Take 2 tablets by mouth 2 (two) times a day., Disp: , Rfl:   •  Cholecalciferol (VITAMIN D PO), 2000 U qd, Disp: , Rfl:   •  diclofenac (VOLTAREN) 1 % gel gel, APPLY 4 GRAMS TOPICALLY TO THE APPROPRAITE AREA AS DIRECTED FOUR TIMES A DAY, Disp: 100 g, Rfl: 4  •  diphenoxylate-atropine (LOMOTIL) 2.5-0.025 MG per tablet, TAKE ONE TABLET BY MOUTH FOUR TIMES A DAY AS NEEDED FOR DIARHEA (Patient taking differently: Take 2 tablets by mouth Daily.), Disp: 100 tablet, Rfl: 2  •  docusate sodium (COLACE) 100 MG capsule, Take 1 capsule by mouth Daily As Needed for Constipation., Disp: , Rfl:   •  donepezil (ARICEPT) 10 MG tablet, TAKE ONE TABLET BY MOUTH EVERY NIGHT AT BEDTIME, Disp: 90 tablet, Rfl: 2  •  furosemide (LASIX) 20 MG tablet, Taking every other day (Patient taking differently: Daily.), Disp: 30 tablet, Rfl: 5  •  gabapentin (NEURONTIN) 300 MG capsule, Take 1 capsule by mouth 2 (Two) Times a Day., Disp: 180 capsule, Rfl: 0  •  lisinopril (PRINIVIL,ZESTRIL) 5 MG tablet, TAKE ONE TABLET BY MOUTH DAILY (Patient taking differently: Take 1 tablet by mouth Every Morning. (PRN At night only when BP is over 140/90 or above)), Disp: 90 tablet, Rfl: 3  •  memantine (NAMENDA) 10 MG tablet, TAKE ONE TABLET BY MOUTH TWICE A DAY, Disp: 180 tablet, Rfl: 3  •  Multiple Vitamins-Minerals (MULTIVITAMIN ADULTS 50+ PO), Take 1 tablet by mouth Daily., Disp: , Rfl:   •  Multiple Vitamins-Minerals (PRESERVISION AREDS PO), Take 1 tablet by mouth 2 (Two) Times a Day., Disp: , Rfl:   •  pantoprazole (PROTONIX) 40 MG EC tablet, Take 1 tablet by mouth Daily., Disp: 90 tablet, Rfl: 1  •  potassium chloride (MICRO-K) 10  "MEQ CR capsule, Take 2 capsules by mouth Daily., Disp: 360 capsule, Rfl: 1  •  promethazine (PHENERGAN) 25 MG tablet, Take 1 tablet by mouth Every 6 (Six) Hours As Needed for Nausea or Vomiting., Disp: 30 tablet, Rfl: 2  •  sertraline (ZOLOFT) 50 MG tablet, Take 1 tablet by mouth Daily., Disp: 90 tablet, Rfl: 3  •  sucralfate (CARAFATE) 1 g tablet, TAKE ONE TABLET BY MOUTH TWICE A DAY, Disp: 270 tablet, Rfl: 1  •  Synthroid 150 MCG tablet, TAKE ONE TABLET BY MOUTH DAILY, Disp: 90 tablet, Rfl: 1  •  Syringe/Needle, Disp, (B-D 3CC LUER-ROSY SYR 23GX1\") 23G X 1\" 3 ML misc, USE TO INJECT VITAMIN B-12 ONCE A MONTH, Disp: 1 each, Rfl: 3  •  traMADol (ULTRAM) 50 MG tablet, Take 1 tablet by mouth Daily., Disp: 30 tablet, Rfl: 2  •  vitamin C (ASCORBIC ACID) 500 MG tablet, Take 1 tablet by mouth Daily. (Patient not taking: Reported on 4/28/2023), Disp: , Rfl:     Current Facility-Administered Medications:   •  cyanocobalamin injection 1,000 mcg, 1,000 mcg, Intramuscular, Q28 Days, Eric Patel MD, 1,000 mcg at 04/28/23 1546    Review of Systems   HENT: Positive for hearing loss.    Skin: Positive for wound and bruise.   Review of systems is otherwise unremarkable.    Vital Signs  Vitals:    04/28/23 1522   BP: 108/62   BP Location: Left arm   Patient Position: Sitting   Cuff Size: Adult   Pulse: 72   Temp: 97.8 °F (36.6 °C)   Weight: 54.9 kg (121 lb)   Height: 149.9 cm (59.02\")   PainSc: 0-No pain       Physical Exam  HENT:      Ears:      Comments:  Both auditory canals are occluded with cerumen.  Skin:     Comments: There is a 1 cm freely movable cyst along the left flank mid axillary line. Overlying skin is normal. The right pretibial wound has clean edges without erythema.          Procedures    ACE III MINI             Assessment/Plan:    Diagnoses and all orders for this visit:    1. Bilateral impacted cerumen (Primary)  -     Ambulatory Referral to ENT (Otolaryngology)    2. Polyneuropathy associated with " underlying disease  -     gabapentin (NEURONTIN) 300 MG capsule; Take 1 capsule by mouth 2 (Two) Times a Day.  Dispense: 180 capsule; Refill: 0    3. Pernicious anemia  -     CBC & Differential; Future  -     Vitamin B12; Future    4. Bilateral hearing loss, unspecified hearing loss type  -     Ambulatory Referral to ENT (Otolaryngology)    5. Hypothyroidism (acquired)  -     TSH; Future    6. Age-related osteoporosis without current pathological fracture  -     Vitamin D,25-Hydroxy; Future    7. Dyslipidemia  -     Comprehensive Metabolic Panel; Future  -     Lipid Panel; Future    8. Abnormal glucose  -     Hemoglobin A1c; Future    Other orders  -     pantoprazole (PROTONIX) 40 MG EC tablet; Take 1 tablet by mouth Daily.  Dispense: 90 tablet; Refill: 1  -     sertraline (ZOLOFT) 50 MG tablet; Take 1 tablet by mouth Daily.  Dispense: 90 tablet; Refill: 3  -     Cerumen Removal        1. Cerumen impaction  - Ears are irrigated.    2. Leg ulcer  - Continue wound care by home health.    3. Benign cyst  - Patient is reassured.    Follow up as scheduled.    Plan of care reviewed with patient at the conclusion of today's visit. Education was provided regarding diagnosis, management, and any prescribed or recommended OTC medications.Patient verbalizes understanding of and agreement with management plan.      Transcribed from ambient dictation for Eric Patel MD by Dominga Montano.  04/28/23   20:46 EDT    Patient or patient representative verbalized consent to the visit recording.  I have personally performed the services described in this document as transcribed by the above individual, and it is both accurate and complete.      Eric Patel MD

## 2023-04-28 NOTE — PROGRESS NOTES
Ear Cerumen Removal    Date/Time: 4/28/2023 5:05 PM  Performed by: Eric Patel MD  Authorized by: Eric Patel MD   Location details: left ear and right ear  Patient tolerance: patient tolerated the procedure well with no immediate complications  Comments: Unable to remove wax. Dr. Patel referring to ENT  Procedure type: irrigation   Sedation:  Patient sedated: no

## 2023-05-05 ENCOUNTER — TELEPHONE (OUTPATIENT)
Dept: INTERNAL MEDICINE | Facility: CLINIC | Age: 88
End: 2023-05-05
Payer: MEDICARE

## 2023-05-05 NOTE — TELEPHONE ENCOUNTER
Received a call from Andrez. She is a nurse with [facility] [employer]. She asked for verbal for a wet to dry dressing for a small wound on the patients lower left leg. She believes this will take care of it and she will follow up with patient on Monday. Verbal given.     Andrez: [phone].

## 2023-05-08 RX ORDER — LEVOTHYROXINE SODIUM 150 MCG
TABLET ORAL
Qty: 90 TABLET | Refills: 1 | Status: SHIPPED | OUTPATIENT
Start: 2023-05-08

## 2023-05-15 RX ORDER — SUCRALFATE 1 G/1
1 TABLET ORAL 2 TIMES DAILY
Qty: 270 TABLET | Refills: 1 | Status: SHIPPED | OUTPATIENT
Start: 2023-05-15

## 2023-05-15 RX ORDER — BUSPIRONE HYDROCHLORIDE 10 MG/1
10 TABLET ORAL 2 TIMES DAILY
Qty: 60 TABLET | Refills: 4 | Status: SHIPPED | OUTPATIENT
Start: 2023-05-15

## 2023-05-19 DIAGNOSIS — G89.29 OTHER CHRONIC PAIN: ICD-10-CM

## 2023-05-19 RX ORDER — TRAMADOL HYDROCHLORIDE 50 MG/1
TABLET ORAL
Qty: 30 TABLET | Refills: 2 | Status: SHIPPED | OUTPATIENT
Start: 2023-05-19

## 2023-05-19 NOTE — TELEPHONE ENCOUNTER
Rx Refill Note  Requested Prescriptions     Pending Prescriptions Disp Refills   • traMADol (ULTRAM) 50 MG tablet [Pharmacy Med Name: traMADol HCL 50MG TABLET] 30 tablet      Sig: TAKE ONE TABLET BY MOUTH DAILY      Last office visit with prescribing clinician: 4/28/2023   Last telemedicine visit with prescribing clinician: 5/15/2023   Next office visit with prescribing clinician: 6/30/2023                         Would you like a call back once the refill request has been completed: [] Yes [] No    If the office needs to give you a call back, can they leave a voicemail: [] Yes [] No    Ángela Godfrey LPN  05/19/23, 08:50 EDT

## 2023-05-22 ENCOUNTER — OUTSIDE FACILITY SERVICE (OUTPATIENT)
Dept: INTERNAL MEDICINE | Facility: CLINIC | Age: 88
End: 2023-05-22
Payer: MEDICARE

## 2023-05-30 ENCOUNTER — OFFICE VISIT (OUTPATIENT)
Dept: ORTHOPEDIC SURGERY | Facility: CLINIC | Age: 88
End: 2023-05-30

## 2023-05-30 ENCOUNTER — CLINICAL SUPPORT (OUTPATIENT)
Dept: INTERNAL MEDICINE | Facility: CLINIC | Age: 88
End: 2023-05-30

## 2023-05-30 VITALS
DIASTOLIC BLOOD PRESSURE: 62 MMHG | SYSTOLIC BLOOD PRESSURE: 140 MMHG | HEIGHT: 58 IN | BODY MASS INDEX: 24.22 KG/M2 | WEIGHT: 115.4 LBS

## 2023-05-30 DIAGNOSIS — D51.0 PERNICIOUS ANEMIA: ICD-10-CM

## 2023-05-30 DIAGNOSIS — M17.11 PRIMARY OSTEOARTHRITIS OF RIGHT KNEE: Primary | ICD-10-CM

## 2023-05-30 PROCEDURE — 96372 THER/PROPH/DIAG INJ SC/IM: CPT | Performed by: INTERNAL MEDICINE

## 2023-05-30 RX ORDER — TRIAMCINOLONE ACETONIDE 40 MG/ML
40 INJECTION, SUSPENSION INTRA-ARTICULAR; INTRAMUSCULAR
Status: COMPLETED | OUTPATIENT
Start: 2023-05-30 | End: 2023-05-30

## 2023-05-30 RX ORDER — LIDOCAINE HYDROCHLORIDE 10 MG/ML
3 INJECTION, SOLUTION EPIDURAL; INFILTRATION; INTRACAUDAL; PERINEURAL
Status: COMPLETED | OUTPATIENT
Start: 2023-05-30 | End: 2023-05-30

## 2023-05-30 RX ADMIN — CYANOCOBALAMIN 1000 MCG: 1000 INJECTION, SOLUTION INTRAMUSCULAR; SUBCUTANEOUS at 14:04

## 2023-05-30 RX ADMIN — LIDOCAINE HYDROCHLORIDE 3 ML: 10 INJECTION, SOLUTION EPIDURAL; INFILTRATION; INTRACAUDAL; PERINEURAL at 15:26

## 2023-05-30 RX ADMIN — TRIAMCINOLONE ACETONIDE 40 MG: 40 INJECTION, SUSPENSION INTRA-ARTICULAR; INTRAMUSCULAR at 15:26

## 2023-05-30 NOTE — PROGRESS NOTES
"    [facility]        Subjective     CC: Follow-up (1 year f/u; Primary osteoarthritis of right knee )      HPI    Temi Jrarett is a 89 y.o. female.  Patient is here today with her daughter-in-law for evaluation of her right knee arthritis.  Has had some prior reactions to viscosupplementation.  Last injection given was a Monovisc injection.  They do not remember how beneficial that was.  She said it has been years since she has gotten a steroid injection and wants to see if it will help her now since she has had a break from it recently.  Patient is also requesting a brace.    Overall, patient's symptoms are as above.    ROS:    Constiutional:Pt denies fever, chills, nausea, or vomiting.  MSK:as above        Objective      Past Medical History  Past Medical History:   Diagnosis Date   • Acute sepsis 01/14/2021   • Arthritis    • Bladder prolapse, female, acquired    • Closed fracture of neck of left femur 09/27/2019   • Dementia    • Depression    • Disease of thyroid gland    • Gastric polyp    • GERD (gastroesophageal reflux disease)    • Hiatal hernia    • History of colonic polyps    • Hyperlipidemia    • Hypertension    • IBS (irritable bowel syndrome)    • Macular degeneration    • Pacemaker 2023   • Pericardial effusion 08/03/2020    Echo (8/3/2020): Normal LVEF.  Moderate pericardial effusion without tamponade.  Moderate MR. RVSP 49 mmHg   • Torn meniscus     right knee      Social History     Socioeconomic History   • Marital status:    Tobacco Use   • Smoking status: Never   • Smokeless tobacco: Never   Vaping Use   • Vaping Use: Never used   Substance and Sexual Activity   • Alcohol use: No   • Drug use: Defer   • Sexual activity: Defer          Physical Exam  /62   Ht 148.5 cm (58.47\")   Wt 52.3 kg (115 lb 6.4 oz)   LMP  (LMP Unknown)   BMI 23.74 kg/m²     Body mass index is 23.74 kg/m².    Patient is well nourished and well developed.        Ortho " Exam      Musculoskeletal:  Global Assessment:  Overall assessment of Lower Extremity Muscle Strength and Tone:  Right quadriceps--5/5   Right hamstrings--5/5       Right tibialis anterior--5/5  Right gastroc-soleus--5/5  Right EHL --5/5    Lower Extremity:    Inspection and Palpation:  Right knee:  Tenderness:  Over the medial joint line and moderate severity  Effusion:  1+  Crepitus:  Positive  Pulses:  2+  Ecchymosis:  None  Warmth:  None     ROM:  Right:  Extension: 20    flexion: 95    Instability:    Right:  Lachman Test:  Negative   Varus stress test negative   Valgus stress test negative    Deformities/Malalignments/Discrepancies:    Left:  No deformities   Right:  Genu Varum    Functional Testing:  Deion's test:  Negative  Patella grind test:  Positive  Q-angle:  normal          Imaging/Labs/EMG Reviewed:  Imaging Results (Last 24 Hours)     Procedure Component Value Units Date/Time    XR Knee 4+ View Right [[id number]] Resulted: 05/30/23 1456     Updated: 05/30/23 1457    Narrative:      Knee X-Ray    Indication: Pain    Study:  Upright AP, Skiers, Lateral, and Sunrise views of Right knee(s)    Comparison: Right knee 4/22/2021    Findings:    Patient appears to have severe degenerative changes in the medial   compartment.    There are mild degenerative changes in the lateral compartment.    There are severe changes in the patellofemoral compartment.    Patient has overall varus alignment.    Kellgren-Dorian thGthrthathdtheth:th th5th Osteopenia noted throughout the knees as well as calcification in the   popliteal vessels      Impression:   Severe medial compartment and patellofemoral compartment degnerative   changes of the knee   Diffuse osteopenia  Calcification posterior vasculature              Assessment    Assessment:  1. Primary osteoarthritis of right knee        Plan:  1. Recommend over the counter anti-inflammatories for pain and/or swelling  2. Severe right knee arthritis--patient will be treated  nonoperatively.  Follow-up in 4 months.  Corticosteroid injection will be given today intermediately.  We will also fit her for a brace for some instability in the knee.  Attempted to fit her for a custom medial compartment offloading brace and she feels like this is too bulky.    Procedure Note:    I discussed with the patient the potential benefits of performing a therapeutic injections as well as potential risks including but not limited to infection, swelling, pain, bleeding, bruising, nerve/vessel damage, skin color changes, transient elevation in blood glucose levels, and fat atrophy. After informed consent and after the areas were prepped with chlorhexadine soap, ethyl chloride was used to numb the skin. Via the anteromedial approach, 3mL of 1% lidocaine followed by 40mg of Kenalog were each injected into the right knee joint. The patient tolerated the procedure well. There were no complications. A sterile dressing was placed over the injection sites.        Gamal Denny MD  05/30/23  16:08 EDT      Dictated Utilizing Dragon Dictation.

## 2023-05-30 NOTE — PROGRESS NOTES
Procedure   - Large Joint Arthrocentesis: R knee on 5/30/2023 3:26 PM  Indications: pain  Details: 21 G needle, anterolateral approach  Medications: 3 mL lidocaine PF 1% 1 %; 40 mg triamcinolone acetonide 40 MG/ML  Outcome: tolerated well, no immediate complications  Procedure, treatment alternatives, risks and benefits explained, specific risks discussed. Consent was given by the patient. Immediately prior to procedure a time out was called to verify the correct patient, procedure, equipment, support staff and site/side marked as required. Patient was prepped and draped in the usual sterile fashion.

## 2023-06-08 DIAGNOSIS — K52.9 CHRONIC DIARRHEA: ICD-10-CM

## 2023-06-08 RX ORDER — DIPHENOXYLATE HYDROCHLORIDE AND ATROPINE SULFATE 2.5; .025 MG/1; MG/1
TABLET ORAL
Qty: 100 TABLET | Refills: 1 | Status: SHIPPED | OUTPATIENT
Start: 2023-06-08

## 2023-06-08 NOTE — TELEPHONE ENCOUNTER
Rx Refill Note  Requested Prescriptions     Pending Prescriptions Disp Refills    diphenoxylate-atropine (LOMOTIL) 2.5-0.025 MG per tablet [Pharmacy Med Name: DIPHENOXYLATE-ATROP 2.5-0.025 MG TB] 100 tablet      Sig: TAKE 1 TABLET BY MOUTH FOUR TIMES A DAY AS NEEDED FOR DIARRHEA      Last office visit with prescribing clinician: 4/28/2023   Next office visit with prescribing clinician: 6/30/2023     LA: 11/28/22 #100 2R                            Would you like a call back once the refill request has been completed: [] Yes [] No    If the office needs to give you a call back, can they leave a voicemail: [] Yes [] No    Ramandeep Trevino LPN  06/08/23, 09:44 EDT

## 2023-06-13 RX ORDER — FUROSEMIDE 20 MG/1
TABLET ORAL
Qty: 30 TABLET | Refills: 5 | Status: SHIPPED | OUTPATIENT
Start: 2023-06-13

## 2023-06-13 NOTE — TELEPHONE ENCOUNTER
Rx Refill Note  Requested Prescriptions     Pending Prescriptions Disp Refills    furosemide (LASIX) 20 MG tablet [Pharmacy Med Name: FUROSEMIDE 20 MG TABLET] 30 tablet 5     Sig: TAKE ONE TABLET BY MOUTH EVERY OTHER DAY      Last office visit with prescribing clinician: 4/28/2023   Last telemedicine visit with prescribing clinician: Visit date not found   Next office visit with prescribing clinician: 6/30/2023                         Would you like a call back once the refill request has been completed: [] Yes [] No    If the office needs to give you a call back, can they leave a voicemail: [] Yes [] No    Husam Lala MA  06/13/23, 11:30 EDT

## 2023-06-14 ENCOUNTER — TELEPHONE (OUTPATIENT)
Dept: INTERNAL MEDICINE | Facility: CLINIC | Age: 88
End: 2023-06-14
Payer: MEDICARE

## 2023-06-16 ENCOUNTER — TELEPHONE (OUTPATIENT)
Dept: INTERNAL MEDICINE | Facility: CLINIC | Age: 88
End: 2023-06-16
Payer: MEDICARE

## 2023-06-16 NOTE — TELEPHONE ENCOUNTER
Amanda with [facility] reports pt's leg wound has completely healed and she is ready for discharge.

## 2023-07-07 ENCOUNTER — TELEPHONE (OUTPATIENT)
Dept: INTERNAL MEDICINE | Facility: CLINIC | Age: 88
End: 2023-07-07

## 2023-07-07 DIAGNOSIS — R30.0 DYSURIA: Primary | ICD-10-CM

## 2023-07-07 RX ORDER — SULFAMETHOXAZOLE AND TRIMETHOPRIM 800; 160 MG/1; MG/1
1 TABLET ORAL 2 TIMES DAILY
Qty: 10 TABLET | Refills: 0 | Status: SHIPPED | OUTPATIENT
Start: 2023-07-07 | End: 2023-07-31

## 2023-07-07 NOTE — TELEPHONE ENCOUNTER
Caller: Chris Jarrett    Relationship: Emergency Contact    Best call back number [phone]/ [phone]    What orders are you requesting (i.e. lab or imaging): LABS/  URINALYSIS    In what timeframe would the patient need to come in: ASAP    Where will you receive your lab/imaging services: [facility]    Additional notes: SYMPTOMS OF UTI

## 2023-07-07 NOTE — TELEPHONE ENCOUNTER
Caller: Temi Jarrett    Relationship: Self    Best call back number: [phone]    What medication are you requesting: ANTIBIOTIC    What are your current symptoms: UTI     How long have you been experiencing symptoms: A FEW DAYS       If a prescription is needed, what is your preferred pharmacy and phone number: [facility] [id number] [address] - [phone] [facility] [phone]      Additional notes:  THE PATIENT TOOK AN AT HOME TEST AND IT CAME BACK POSITIVE FOR LUKOCITES THE PATIENT ALSO BROUGHT A URINE SAMPLE IN TO THE [facility] LAB TO BE TESTED THE PATIENT WOULD LIKE TO GET AN ANTIBIOTIC STARTED BECAUSE OF THE WEEKEND PLEASE CALL TO LET THEM KNOW IF THE DOCTOR CAN CALL IN THE MEDICATION

## 2023-07-31 ENCOUNTER — CLINICAL SUPPORT (OUTPATIENT)
Dept: INTERNAL MEDICINE | Facility: CLINIC | Age: 88
End: 2023-07-31
Payer: MEDICARE

## 2023-07-31 ENCOUNTER — DOCUMENTATION (OUTPATIENT)
Dept: INTERNAL MEDICINE | Facility: CLINIC | Age: 88
End: 2023-07-31
Payer: MEDICARE

## 2023-07-31 ENCOUNTER — TELEPHONE (OUTPATIENT)
Dept: INTERNAL MEDICINE | Facility: CLINIC | Age: 88
End: 2023-07-31

## 2023-07-31 ENCOUNTER — OFFICE VISIT (OUTPATIENT)
Dept: INTERNAL MEDICINE | Facility: CLINIC | Age: 88
End: 2023-07-31
Payer: MEDICARE

## 2023-07-31 VITALS
DIASTOLIC BLOOD PRESSURE: 70 MMHG | HEART RATE: 56 BPM | TEMPERATURE: 98.4 F | OXYGEN SATURATION: 96 % | SYSTOLIC BLOOD PRESSURE: 156 MMHG | BODY MASS INDEX: 24.52 KG/M2 | WEIGHT: 116.8 LBS | HEIGHT: 58 IN

## 2023-07-31 DIAGNOSIS — G63 POLYNEUROPATHY ASSOCIATED WITH UNDERLYING DISEASE: ICD-10-CM

## 2023-07-31 DIAGNOSIS — L89.312 PRESSURE INJURY OF RIGHT BUTTOCK, STAGE 2: Chronic | ICD-10-CM

## 2023-07-31 DIAGNOSIS — R09.81 NASAL CONGESTION: Chronic | ICD-10-CM

## 2023-07-31 DIAGNOSIS — I10 ESSENTIAL HYPERTENSION: Primary | Chronic | ICD-10-CM

## 2023-07-31 DIAGNOSIS — E53.8 B12 DEFICIENCY: Primary | ICD-10-CM

## 2023-07-31 DIAGNOSIS — R05.1 ACUTE COUGH: Chronic | ICD-10-CM

## 2023-07-31 LAB
EXPIRATION DATE: NORMAL
FLUAV AG UPPER RESP QL IA.RAPID: NOT DETECTED
FLUBV AG UPPER RESP QL IA.RAPID: NOT DETECTED
INTERNAL CONTROL: NORMAL
Lab: NORMAL
SARS-COV-2 AG UPPER RESP QL IA.RAPID: NOT DETECTED

## 2023-07-31 PROCEDURE — 1160F RVW MEDS BY RX/DR IN RCRD: CPT | Performed by: INTERNAL MEDICINE

## 2023-07-31 PROCEDURE — 96372 THER/PROPH/DIAG INJ SC/IM: CPT | Performed by: INTERNAL MEDICINE

## 2023-07-31 PROCEDURE — 1159F MED LIST DOCD IN RCRD: CPT | Performed by: INTERNAL MEDICINE

## 2023-07-31 PROCEDURE — 99214 OFFICE O/P EST MOD 30 MIN: CPT | Performed by: INTERNAL MEDICINE

## 2023-07-31 PROCEDURE — 87428 SARSCOV & INF VIR A&B AG IA: CPT | Performed by: INTERNAL MEDICINE

## 2023-07-31 RX ORDER — AZITHROMYCIN 250 MG/1
TABLET, FILM COATED ORAL
Qty: 6 TABLET | Refills: 0 | Status: SHIPPED | OUTPATIENT
Start: 2023-07-31

## 2023-07-31 RX ADMIN — CYANOCOBALAMIN 1000 MCG: 1000 INJECTION, SOLUTION INTRAMUSCULAR; SUBCUTANEOUS at 16:27

## 2023-08-01 RX ORDER — GABAPENTIN 300 MG/1
CAPSULE ORAL
Qty: 180 CAPSULE | Refills: 1 | Status: SHIPPED | OUTPATIENT
Start: 2023-08-01

## 2023-08-24 ENCOUNTER — APPOINTMENT (OUTPATIENT)
Dept: CT IMAGING | Facility: HOSPITAL | Age: 88
End: 2023-08-24
Payer: MEDICARE

## 2023-08-24 ENCOUNTER — APPOINTMENT (OUTPATIENT)
Dept: GENERAL RADIOLOGY | Facility: HOSPITAL | Age: 88
End: 2023-08-24
Payer: MEDICARE

## 2023-08-24 ENCOUNTER — HOSPITAL ENCOUNTER (EMERGENCY)
Facility: HOSPITAL | Age: 88
Discharge: OTHER INSTITUTION | End: 2023-08-25
Attending: EMERGENCY MEDICINE
Payer: MEDICARE

## 2023-08-24 VITALS
WEIGHT: 115 LBS | HEIGHT: 58 IN | BODY MASS INDEX: 24.14 KG/M2 | HEART RATE: 61 BPM | TEMPERATURE: 98.4 F | RESPIRATION RATE: 16 BRPM | OXYGEN SATURATION: 92 % | DIASTOLIC BLOOD PRESSURE: 68 MMHG | SYSTOLIC BLOOD PRESSURE: 136 MMHG

## 2023-08-24 DIAGNOSIS — K31.89 ACUTE GASTRIC VOLVULUS: ICD-10-CM

## 2023-08-24 DIAGNOSIS — A41.9 ACUTE SEPSIS: Primary | ICD-10-CM

## 2023-08-24 DIAGNOSIS — K55.9 COLONIC ISCHEMIA: ICD-10-CM

## 2023-08-24 LAB
ALBUMIN SERPL-MCNC: 3.8 G/DL (ref 3.5–5.2)
ALBUMIN/GLOB SERPL: 1.5 G/DL
ALP SERPL-CCNC: 112 U/L (ref 39–117)
ALT SERPL W P-5'-P-CCNC: 36 U/L (ref 1–33)
ANION GAP SERPL CALCULATED.3IONS-SCNC: 13 MMOL/L (ref 5–15)
AST SERPL-CCNC: 55 U/L (ref 1–32)
BASOPHILS # BLD AUTO: 0.06 10*3/MM3 (ref 0–0.2)
BASOPHILS NFR BLD AUTO: 0.3 % (ref 0–1.5)
BILIRUB SERPL-MCNC: 0.8 MG/DL (ref 0–1.2)
BUN SERPL-MCNC: 24 MG/DL (ref 8–23)
BUN/CREAT SERPL: 25 (ref 7–25)
CALCIUM SPEC-SCNC: 9.3 MG/DL (ref 8.2–9.6)
CHLORIDE SERPL-SCNC: 102 MMOL/L (ref 98–107)
CO2 SERPL-SCNC: 28 MMOL/L (ref 22–29)
CREAT SERPL-MCNC: 0.96 MG/DL (ref 0.57–1)
D-LACTATE SERPL-SCNC: 4.6 MMOL/L (ref 0.5–2)
DEPRECATED RDW RBC AUTO: 50.1 FL (ref 37–54)
EGFRCR SERPLBLD CKD-EPI 2021: 55.3 ML/MIN/1.73
EOSINOPHIL # BLD AUTO: 0 10*3/MM3 (ref 0–0.4)
EOSINOPHIL NFR BLD AUTO: 0 % (ref 0.3–6.2)
ERYTHROCYTE [DISTWIDTH] IN BLOOD BY AUTOMATED COUNT: 14.5 % (ref 12.3–15.4)
GLOBULIN UR ELPH-MCNC: 2.5 GM/DL
GLUCOSE SERPL-MCNC: 137 MG/DL (ref 65–99)
HCT VFR BLD AUTO: 49.3 % (ref 34–46.6)
HGB BLD-MCNC: 15.3 G/DL (ref 12–15.9)
HOLD SPECIMEN: NORMAL
IMM GRANULOCYTES # BLD AUTO: 0.08 10*3/MM3 (ref 0–0.05)
IMM GRANULOCYTES NFR BLD AUTO: 0.4 % (ref 0–0.5)
LIPASE SERPL-CCNC: 41 U/L (ref 13–60)
LYMPHOCYTES # BLD AUTO: 0.59 10*3/MM3 (ref 0.7–3.1)
LYMPHOCYTES NFR BLD AUTO: 2.8 % (ref 19.6–45.3)
MCH RBC QN AUTO: 29.3 PG (ref 26.6–33)
MCHC RBC AUTO-ENTMCNC: 31 G/DL (ref 31.5–35.7)
MCV RBC AUTO: 94.3 FL (ref 79–97)
MONOCYTES # BLD AUTO: 0.73 10*3/MM3 (ref 0.1–0.9)
MONOCYTES NFR BLD AUTO: 3.4 % (ref 5–12)
NEUTROPHILS NFR BLD AUTO: 19.94 10*3/MM3 (ref 1.7–7)
NEUTROPHILS NFR BLD AUTO: 93.1 % (ref 42.7–76)
NRBC BLD AUTO-RTO: 0 /100 WBC (ref 0–0.2)
PLATELET # BLD AUTO: 267 10*3/MM3 (ref 140–450)
PMV BLD AUTO: 10.2 FL (ref 6–12)
POTASSIUM SERPL-SCNC: 3.9 MMOL/L (ref 3.5–5.2)
PROT SERPL-MCNC: 6.3 G/DL (ref 6–8.5)
RBC # BLD AUTO: 5.23 10*6/MM3 (ref 3.77–5.28)
SODIUM SERPL-SCNC: 143 MMOL/L (ref 136–145)
WBC NRBC COR # BLD: 21.4 10*3/MM3 (ref 3.4–10.8)
WHOLE BLOOD HOLD COAG: NORMAL
WHOLE BLOOD HOLD SPECIMEN: NORMAL

## 2023-08-24 PROCEDURE — 25510000001 IOPAMIDOL PER 1 ML: Performed by: EMERGENCY MEDICINE

## 2023-08-24 PROCEDURE — 80053 COMPREHEN METABOLIC PANEL: CPT | Performed by: EMERGENCY MEDICINE

## 2023-08-24 PROCEDURE — 36415 COLL VENOUS BLD VENIPUNCTURE: CPT

## 2023-08-24 PROCEDURE — 85025 COMPLETE CBC W/AUTO DIFF WBC: CPT | Performed by: EMERGENCY MEDICINE

## 2023-08-24 PROCEDURE — 74174 CTA ABD&PLVS W/CONTRAST: CPT

## 2023-08-24 PROCEDURE — 83690 ASSAY OF LIPASE: CPT | Performed by: EMERGENCY MEDICINE

## 2023-08-24 PROCEDURE — 25010000002 PIPERACILLIN SOD-TAZOBACTAM PER 1 G

## 2023-08-24 PROCEDURE — 74018 RADEX ABDOMEN 1 VIEW: CPT

## 2023-08-24 PROCEDURE — 99285 EMERGENCY DEPT VISIT HI MDM: CPT

## 2023-08-24 PROCEDURE — 83605 ASSAY OF LACTIC ACID: CPT | Performed by: EMERGENCY MEDICINE

## 2023-08-24 PROCEDURE — 96365 THER/PROPH/DIAG IV INF INIT: CPT

## 2023-08-24 PROCEDURE — 87040 BLOOD CULTURE FOR BACTERIA: CPT | Performed by: EMERGENCY MEDICINE

## 2023-08-24 RX ORDER — SODIUM CHLORIDE 9 MG/ML
10 INJECTION INTRAVENOUS AS NEEDED
Status: DISCONTINUED | OUTPATIENT
Start: 2023-08-24 | End: 2023-08-25 | Stop reason: HOSPADM

## 2023-08-24 RX ADMIN — SODIUM CHLORIDE 500 ML: 9 INJECTION, SOLUTION INTRAVENOUS at 23:54

## 2023-08-24 RX ADMIN — SODIUM CHLORIDE 1000 ML: 9 INJECTION, SOLUTION INTRAVENOUS at 23:54

## 2023-08-24 RX ADMIN — IOPAMIDOL 80 ML: 755 INJECTION, SOLUTION INTRAVENOUS at 21:38

## 2023-08-24 RX ADMIN — PIPERACILLIN SODIUM AND TAZOBACTAM SODIUM 3.38 G: 3; .375 INJECTION, SOLUTION INTRAVENOUS at 22:52

## 2023-08-25 LAB — HOLD SPECIMEN: NORMAL

## 2023-08-25 NOTE — ED PROVIDER NOTES
[facility]    EMERGENCY DEPARTMENT ENCOUNTER      Pt Name: Temi Jarrett  MRN: [id number]  YOB: 1930  Date of evaluation: 8/24/2023  Provider: Nikhil Alcazar MD    CHIEF COMPLAINT       Chief Complaint   Patient presents with    Abdominal Pain         HISTORY OF PRESENT ILLNESS   Temi Jarrett is a 89 y.o. female who presents to the emergency department with progressively worsening moderate severity cramping generalized abdominal pain and vomiting that began last night and has persisted into today.  No diarrhea, fever, chills, chest pain, or shortness of breath.      Nursing notes were reviewed.    REVIEW OF SYSTEMS     ROS:  A chief complaint appropriate review of systems was completed and is negative except as noted in the HPI.      PAST MEDICAL HISTORY     Past Medical History:   Diagnosis Date    Acute sepsis 01/14/2021    Arthritis     Bladder prolapse, female, acquired     Closed fracture of neck of left femur 09/27/2019    Dementia     Depression     Disease of thyroid gland     Gastric polyp     GERD (gastroesophageal reflux disease)     Hiatal hernia     History of colonic polyps     Hyperlipidemia     Hypertension     IBS (irritable bowel syndrome)     Macular degeneration     Pacemaker 2023    Pericardial effusion 08/03/2020    Echo (8/3/2020): Normal LVEF.  Moderate pericardial effusion without tamponade.  Moderate MR. RVSP 49 mmHg    Torn meniscus     right knee          SURGICAL HISTORY       Past Surgical History:   Procedure Laterality Date    CHOLECYSTECTOMY  1997    ENDOSCOPY N/A 01/14/2021    Procedure: ESOPHAGOGASTRODUODENOSCOPY;  Surgeon: Serjio Guerrero MD;  Location: [facility] ENDOSCOPY;  Service: General;  Laterality: N/A;    EYE SURGERY  1998    Cataract extraction    HERNIA REPAIR      HIP HEMIARTHROPLASTY Left 09/28/2019    Procedure: HIP HEMIARTHROPLASTY LEFT;  Surgeon: Reddy Segundo Jr., MD;  Location: [facility] OR;  Service: Orthopedics    HYSTERECTOMY  1976    KNEE  SURGERY Right     arthroscopy- 2000    SKIN CANCER EXCISION Left     [facility]; CANCEROUS SPOT DIDN'T  MOVE BUT NEEDED REMOVED    TONSILLECTOMY           CURRENT MEDICATIONS       Current Facility-Administered Medications:     cyanocobalamin injection 1,000 mcg, 1,000 mcg, Intramuscular, Q28 Days, Eric Patel MD, 1,000 mcg at 07/31/23 1627    Sodium Chloride (PF) 0.9 % 10 mL, 10 mL, Intravenous, PRN, Nikhil Alcazar MD    sodium chloride 0.9 % bolus 1,000 mL, 1,000 mL, Intravenous, Once, Nikhil Alcazar MD    sodium chloride 0.9 % bolus 500 mL, 500 mL, Intravenous, Once, Nikhil Alcazar MD    Current Outpatient Medications:     acetaminophen (TYLENOL) 325 MG tablet, Take 2 tablets by mouth Every 6 (Six) Hours As Needed for Mild Pain., Disp: , Rfl:     aspirin 81 MG EC tablet, Take 1 tablet by mouth Daily., Disp: , Rfl:     azithromycin (Zithromax Z-Rubio) 250 MG tablet, Take 2 tablets the first day, then 1 tablet daily for 4 days., Disp: 6 tablet, Rfl: 0    bethanechol (URECHOLINE) 10 MG tablet, TAKE ONE TABLET BY MOUTH THREE TIMES A DAY, Disp: 90 tablet, Rfl: 3    busPIRone (BUSPAR) 10 MG tablet, Take 1 tablet by mouth 2 (Two) Times a Day., Disp: 60 tablet, Rfl: 4    Calcium Citrate-Vitamin D (CITRACAL PETITES/VITAMIN D PO), Take 2 tablets by mouth 2 (two) times a day., Disp: , Rfl:     Cholecalciferol (VITAMIN D PO), 2000 U qd, Disp: , Rfl:     diclofenac (VOLTAREN) 1 % gel gel, APPLY 4 GRAMS TOPICALLY TO THE APPROPRAITE AREA AS DIRECTED FOUR TIMES A DAY, Disp: 100 g, Rfl: 4    diphenoxylate-atropine (LOMOTIL) 2.5-0.025 MG per tablet, TAKE 1 TABLET BY MOUTH FOUR TIMES A DAY AS NEEDED FOR DIARRHEA, Disp: 100 tablet, Rfl: 1    docusate sodium (COLACE) 100 MG capsule, Take 1 capsule by mouth Daily As Needed for Constipation., Disp: , Rfl:     donepezil (ARICEPT) 10 MG tablet, TAKE ONE TABLET BY MOUTH EVERY NIGHT AT BEDTIME, Disp: 90 tablet, Rfl: 2    furosemide (LASIX) 20 MG  "tablet, Take 1 tablet by mouth Daily., Disp: 30 tablet, Rfl: 5    gabapentin (NEURONTIN) 300 MG capsule, TAKE ONE CAPSULE BY MOUTH TWICE A DAY, Disp: 180 capsule, Rfl: 1    lisinopril (PRINIVIL,ZESTRIL) 5 MG tablet, TAKE ONE TABLET BY MOUTH DAILY (Patient taking differently: Take 1 tablet by mouth Every Morning. (PRN At night only when BP is over 140/90 or above)), Disp: 90 tablet, Rfl: 3    memantine (NAMENDA) 10 MG tablet, TAKE ONE TABLET BY MOUTH TWICE A DAY, Disp: 180 tablet, Rfl: 3    Multiple Vitamins-Minerals (MULTIVITAMIN ADULTS 50+ PO), Take 1 tablet by mouth Daily., Disp: , Rfl:     Multiple Vitamins-Minerals (PRESERVISION AREDS PO), Take 1 tablet by mouth 2 (Two) Times a Day., Disp: , Rfl:     pantoprazole (PROTONIX) 40 MG EC tablet, Take 1 tablet by mouth Daily., Disp: 90 tablet, Rfl: 1    potassium chloride (MICRO-K) 10 MEQ CR capsule, Take 2 capsules by mouth Daily., Disp: 360 capsule, Rfl: 1    promethazine (PHENERGAN) 25 MG tablet, Take 1 tablet by mouth Every 6 (Six) Hours As Needed for Nausea or Vomiting., Disp: 30 tablet, Rfl: 2    sertraline (ZOLOFT) 50 MG tablet, Take 1 tablet by mouth Daily., Disp: 90 tablet, Rfl: 3    sucralfate (CARAFATE) 1 g tablet, Take 1 tablet by mouth 2 (Two) Times a Day., Disp: 270 tablet, Rfl: 1    Synthroid 150 MCG tablet, TAKE ONE TABLET BY MOUTH DAILY, Disp: 90 tablet, Rfl: 1    Syringe/Needle, Disp, (B-D 3CC LUER-ROSY SYR 23GX1\") 23G X 1\" 3 ML misc, USE TO INJECT VITAMIN B-12 ONCE A MONTH, Disp: 1 each, Rfl: 3    traMADol (ULTRAM) 50 MG tablet, TAKE ONE TABLET BY MOUTH DAILY, Disp: 30 tablet, Rfl: 2    vitamin C (ASCORBIC ACID) 500 MG tablet, Take 1 tablet by mouth Daily., Disp: , Rfl:     ALLERGIES     Augmentin [amoxicillin-pot clavulanate], Codeine, Shrimp, and Strawberry    FAMILY HISTORY       Family History   Problem Relation Age of Onset    Hypertension Mother     Breast cancer Mother     Obesity Mother     Hypertension Father     Diabetes Son       " "    SOCIAL HISTORY       Social History     Socioeconomic History    Marital status:    Tobacco Use    Smoking status: Never    Smokeless tobacco: Never   Vaping Use    Vaping Use: Never used   Substance and Sexual Activity    Alcohol use: No    Drug use: Defer    Sexual activity: Defer         PHYSICAL EXAM    (up to 7 for level 4, 8 or more for level 5)     Vitals:    08/24/23 2027   BP: 128/78   Pulse: 70   Resp: 16   Temp: 98.4 øF (36.9 øC)   TempSrc: Oral   SpO2: 95%   Weight: 52.2 kg (115 lb)   Height: 147.3 cm (58\")       General: Awake, alert, no acute distress.  HEENT: Conjunctivae normal.  Neck: Trachea midline.  Cardiac: Heart regular rate, rhythm, no murmurs, rubs, or gallops  Lungs: Lungs are clear to auscultation, there is no wheezing, rhonchi, or rales. There is no use of accessory muscles.  Chest wall: There is no tenderness to palpation over the chest wall or over ribs  Abdomen: Moderately distended and diffusely tender without rebound or guarding  Musculoskeletal: No deformity.  Neuro: Alert and oriented x 4.  Dermatology: Skin is warm and dry  Psych: Mentation is grossly normal, cognition is grossly normal. Affect is appropriate.        DIAGNOSTIC RESULTS     EKG: All EKGs are interpreted by the Emergency Department Physician who either signs or Co-signs this chart in the absence of a cardiologist.    No orders to display         RADIOLOGY:   [x] Radiologist's Report Reviewed:  CT Angiogram Abdomen Pelvis   Final Result   Impression:   No acute aortic pathology. No evidence of high-grade stenosis or complete occlusion of the abdominal aortic branch vessels.      Relative hypoenhancement of the colon at the splenic flecture worrisome for watershed ischemia. No definite pneumatosis or mesenteric/portal venous gas.      Very large hiatal hernia containing the entirety of the stomach and multiple loops of proximal jejunum. The stomach appears distended with fluid and air. A gastric volvulus " would be difficult to entirely exclude.      Moderate rectal stool load and moderate colonic stool burden overall. There is some ill-defined perirectal fluid or perirectal fat stranding, without conspicuous rectal wall thickening. Early stercoral colitis cannot be excluded.      Findings discussed with ordering provider Dr. Alcazar by Dr. Eriberto Garcia via telephone at 10:05 p.m. 8/24/2023.            Electronically Signed: Eriberto Garcia MD     8/24/2023 10:08 PM EDT     Workstation ID: [id number]      XR Abdomen KUB    (Results Pending)       I ordered and independently reviewed the above noted radiographic studies.        LABS:    I have reviewed and interpreted all of the currently available lab results from this visit (if applicable):  Results for orders placed or performed during the hospital encounter of 08/24/23   Comprehensive Metabolic Panel    Specimen: Blood   Result Value Ref Range    Glucose 137 (H) 65 - 99 mg/dL    BUN 24 (H) 8 - 23 mg/dL    Creatinine 0.96 0.57 - 1.00 mg/dL    Sodium 143 136 - 145 mmol/L    Potassium 3.9 3.5 - 5.2 mmol/L    Chloride 102 98 - 107 mmol/L    CO2 28.0 22.0 - 29.0 mmol/L    Calcium 9.3 8.2 - 9.6 mg/dL    Total Protein 6.3 6.0 - 8.5 g/dL    Albumin 3.8 3.5 - 5.2 g/dL    ALT (SGPT) 36 (H) 1 - 33 U/L    AST (SGOT) 55 (H) 1 - 32 U/L    Alkaline Phosphatase 112 39 - 117 U/L    Total Bilirubin 0.8 0.0 - 1.2 mg/dL    Globulin 2.5 gm/dL    A/G Ratio 1.5 g/dL    BUN/Creatinine Ratio 25.0 7.0 - 25.0    Anion Gap 13.0 5.0 - 15.0 mmol/L    eGFR 55.3 (L) >60.0 mL/min/1.73   Lipase    Specimen: Blood   Result Value Ref Range    Lipase 41 13 - 60 U/L   Lactic Acid, Plasma    Specimen: Blood   Result Value Ref Range    Lactate 4.6 (C) 0.5 - 2.0 mmol/L   CBC Auto Differential    Specimen: Blood   Result Value Ref Range    WBC 21.40 (H) 3.40 - 10.80 10*3/mm3    RBC 5.23 3.77 - 5.28 10*6/mm3    Hemoglobin 15.3 12.0 - 15.9 g/dL    Hematocrit 49.3 (H) 34.0 - 46.6 %    MCV 94.3 79.0 - 97.0 fL     MCH 29.3 26.6 - 33.0 pg    MCHC 31.0 (L) 31.5 - 35.7 g/dL    RDW 14.5 12.3 - 15.4 %    RDW-SD 50.1 37.0 - 54.0 fl    MPV 10.2 6.0 - 12.0 fL    Platelets 267 140 - 450 10*3/mm3    Neutrophil % 93.1 (H) 42.7 - 76.0 %    Lymphocyte % 2.8 (L) 19.6 - 45.3 %    Monocyte % 3.4 (L) 5.0 - 12.0 %    Eosinophil % 0.0 (L) 0.3 - 6.2 %    Basophil % 0.3 0.0 - 1.5 %    Immature Grans % 0.4 0.0 - 0.5 %    Neutrophils, Absolute 19.94 (H) 1.70 - 7.00 10*3/mm3    Lymphocytes, Absolute 0.59 (L) 0.70 - 3.10 10*3/mm3    Monocytes, Absolute 0.73 0.10 - 0.90 10*3/mm3    Eosinophils, Absolute 0.00 0.00 - 0.40 10*3/mm3    Basophils, Absolute 0.06 0.00 - 0.20 10*3/mm3    Immature Grans, Absolute 0.08 (H) 0.00 - 0.05 10*3/mm3    nRBC 0.0 0.0 - 0.2 /100 WBC   Green Top (Gel)   Result Value Ref Range    Extra Tube Hold for add-ons.    Lavender Top   Result Value Ref Range    Extra Tube hold for add-on    Light Blue Top   Result Value Ref Range    Extra Tube Hold for add-ons.         If labs were ordered, I independently reviewed the results and considered them in treating the patient.      EMERGENCY DEPARTMENT COURSE and DIFFERENTIAL DIAGNOSIS/MDM:   Vitals:  AS OF 23:36 EDT    BP - 128/78  HR - 70  TEMP - 98.4 øF (36.9 øC) (Oral)  O2 SATS - 95%        Discussion below represents my analysis of pertinent findings related to patient's condition, differential diagnosis, treatment plan and final disposition.      Differential diagnosis:  The differential diagnosis associated with the patient's presentation includes: SBO, AAA, mesenteric ischemia, diverticulitis, appendicitis, pancreatitis, biliary pathology      Independent interpretations (ECG/rhythm strip/X-ray/US/CT scan): I independently interpreted the patient's CTA abdomen and pelvis which shows no evidence of acute vascular pathology.  I dependently interpreted the patient's cardiac monitor which shows a paced rhythm.      Additional sources:  Discussed/obtained information from independent  historians:   [] Spouse:   [] Parent:   [] Friend:   [x] EMS: Report taken from EMS.  Vital signs stable during transport.   [x] Other: Additional history taken from patient's family member at bedside.  Reports that patient has been sick all day.  External (non-ED) record review:   [] Inpatient record:   [] Office record:   [] Outpatient record:   [] Prior Outpatient labs:   [] Prior Outpatient radiology:   [] Primary Care record:   [] Outside ED record:   [] Other:       Patient's care impacted by:   [] Diabetes   [] Hypertension   [] Coronary Artery Disease   [] Cancer   [x] Other: Dementia    Care significantly affected by Social Determinants of Health (housing and economic circumstances, unemployment)    [] Yes     [x] No   If yes, Patient's care significantly limited by  Social Determinants of Health including:    [] Inadequate housing    [] Low income    [] Alcoholism and drug addiction in family    [] Problems related to primary support group    [] Unemployment    [] Problems related to employment    [] Other Social Determinants of Health:       Consideration of admission/observation vs discharge: This patient presents critically ill with surgical emergency requiring transfer      I considered prescription management with:    [] Pain medication:   [] Antiviral:   [x] Antibiotic: Patient given IV Zosyn   [] Other:    ED Course:    ED Course as of 08/24/23 2336   Thu Aug 24, 2023   6406 I had an initial discussion about the CT results with the patient's daughter and son at the bedside.  Discussed that she would likely be a poor surgical candidate but they would like to have surgeons opinion.  I then called the on-call surgeon Dr. Lora.  I discussed the patient's clinical presentation, exam, labs, and CT findings in detail.  He feels that she would be a poor surgical candidate but recommends transfer to  as he does not perform these types of surgeries.  The patient's family did ask to speak with the surgeon  to determine if she would be a good surgical candidate and I relayed this to Dr. Lora, however he did not feel he would be qualified to speak with him regarding this as this is not a surgery that he does.  I have now paged  to attempt to arrange transfer [NS]   5831 I spoke with surgeon Dr. Ozuna at .  States that patient is very high risk and surgery would likely be fatal but that she would definitely need surgery to correct this condition.  I have accepted the patient for transfer. [NS]   2620 Phone numbers obtained from patient family at bedside.    [phone] [NS]      ED Course User Index  [NS] Nikhil Alcazar MD         CRITICAL CARE TIME    Approximately 35 minutes of discontinuous critical care time was provided to this patient by myself absent of any time spent performing procedures.  Patient presents critically ill with acute sepsis secondary to gastric volvulus/gastrointestinal emergency placing cardiovascular, respiratory, neurologic, renal systems at risk requiring the following interventions: IV fluid resuscitation, administration of broad-spectrum IV antibiotics, interpretation of labs and imaging, specialist consultation, frequent reassessment, multiple conversations with family, coordination of transfer.  Patient at high risk of deterioration and possibly death without these interventions.      FINAL IMPRESSION      1. Acute sepsis    2. Acute gastric volvulus    3. Colonic ischemia          DISPOSITION/PLAN     ED Disposition       ED Disposition   Transfer to Another Facility     Condition   --    Comment   --                 Comment: Please note this report has been produced using speech recognition software.      Nikhil Alcazar MD  Attending Emergency Physician             Nikhil Alcazar MD  08/24/23 9313

## 2023-08-28 DIAGNOSIS — G89.29 OTHER CHRONIC PAIN: ICD-10-CM

## 2023-08-28 RX ORDER — TRAMADOL HYDROCHLORIDE 50 MG/1
TABLET ORAL
Qty: 30 TABLET | Refills: 2 | Status: SHIPPED | OUTPATIENT
Start: 2023-08-28

## 2023-08-28 NOTE — TELEPHONE ENCOUNTER
Rx Refill Note  Requested Prescriptions     Pending Prescriptions Disp Refills    traMADol (ULTRAM) 50 MG tablet [Pharmacy Med Name: traMADol HCL 50MG TABLET] 30 tablet      Sig: TAKE 1 TABLET BY MOUTH DAILY      Last office visit with prescribing clinician: 07/31/23   Next office visit with prescribing clinician: 8/31/2023     LA: 05/19/23 #30 2R                          Would you like a call back once the refill request has been completed: [] Yes [] No    If the office needs to give you a call back, can they leave a voicemail: [] Yes [] No    Ramandeep Trevino LPN  08/28/23, 09:05 EDT

## 2023-08-29 LAB
BACTERIA SPEC AEROBE CULT: NORMAL
BACTERIA SPEC AEROBE CULT: NORMAL

## 2023-09-05 RX ORDER — MEMANTINE HYDROCHLORIDE 10 MG/1
TABLET ORAL
Qty: 180 TABLET | Refills: 3 | Status: SHIPPED | OUTPATIENT
Start: 2023-09-05

## 2023-10-12 ENCOUNTER — HOSPITAL ENCOUNTER (EMERGENCY)
Facility: HOSPITAL | Age: 88
Discharge: HOME | End: 2023-10-12
Attending: EMERGENCY MEDICINE
Payer: MEDICARE

## 2023-10-12 ENCOUNTER — APPOINTMENT (OUTPATIENT)
Dept: GENERAL RADIOLOGY | Facility: HOSPITAL | Age: 88
End: 2023-10-12
Payer: MEDICARE

## 2023-10-12 VITALS
DIASTOLIC BLOOD PRESSURE: 72 MMHG | OXYGEN SATURATION: 98 % | HEIGHT: 58 IN | HEART RATE: 74 BPM | RESPIRATION RATE: 16 BRPM | BODY MASS INDEX: 24.14 KG/M2 | SYSTOLIC BLOOD PRESSURE: 129 MMHG | TEMPERATURE: 97.9 F | WEIGHT: 115 LBS

## 2023-10-12 DIAGNOSIS — M25.511 ACUTE PAIN OF RIGHT SHOULDER: ICD-10-CM
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
## 2023-10-12 PROCEDURE — 73030 X-RAY EXAM OF SHOULDER: CPT

## 2023-10-12 PROCEDURE — 99283 EMERGENCY DEPT VISIT LOW MDM: CPT

## 2023-10-12 RX ORDER — IBUPROFEN 800 MG/1
800 TABLET ORAL ONCE
Status: COMPLETED | OUTPATIENT
Start: 2023-10-12 | End: 2023-10-12

## 2023-10-12 RX ORDER — ACETAMINOPHEN 500 MG
1000 TABLET ORAL ONCE
Status: COMPLETED | OUTPATIENT
Start: 2023-10-12 | End: 2023-10-12

## 2023-10-12 RX ADMIN — ACETAMINOPHEN 1000 MG: 500 TABLET ORAL at 02:34

## 2023-10-12 RX ADMIN — IBUPROFEN 800 MG: 800 TABLET, FILM COATED ORAL at 02:34

## 2023-10-12 NOTE — ED PROVIDER NOTES
Subjective   History of Present Illness  Patient presents for evaluation of acute onset pain in her right shoulder.  Patient has a history of dementia and is unable to provide a definitive history of events.  Patient states that she thinks she fell yesterday but cannot remember the fall.  Since the possible fall she has had pain in her right shoulder that radiates towards her right neck especially with movement of the right arm.  She does not have any headache, chest pain, abdominal pain, or other extremity pain.  She takes no blood thinning medications.    EMS reports that an x-ray was obtained of her right shoulder which showed a possible fracture at her living facility and so she was sent here for further evaluation.    History provided by:  Patient and EMS personnel  History limited by:  Age and dementia      Review of Systems    Past Medical History:   Diagnosis Date    Acute sepsis 01/14/2021    Arthritis     Bladder prolapse, female, acquired     Closed fracture of neck of left femur 09/27/2019    Dementia     Depression     Disease of thyroid gland     Gastric polyp     GERD (gastroesophageal reflux disease)     Hiatal hernia     History of colonic polyps     Hyperlipidemia     Hypertension     IBS (irritable bowel syndrome)     Macular degeneration     Pacemaker 2023    Pericardial effusion 08/03/2020    Echo (8/3/2020): Normal LVEF.  Moderate pericardial effusion without tamponade.  Moderate MR. RVSP 49 mmHg    Torn meniscus     right knee        Allergies   Allergen Reactions    Augmentin [Amoxicillin-Pot Clavulanate] Diarrhea    Codeine Nausea Only     Trouble Breathing     Shrimp Hives    Ingraham Unknown - Low Severity       Past Surgical History:   Procedure Laterality Date    CHOLECYSTECTOMY  1997    ENDOSCOPY N/A 01/14/2021    Procedure: ESOPHAGOGASTRODUODENOSCOPY;  Surgeon: Serjio Guerrero MD;  Location: [facility] ENDOSCOPY;  Service: General;  Laterality: N/A;    EYE SURGERY  1998    Cataract  extraction    HERNIA REPAIR      HIP HEMIARTHROPLASTY Left 09/28/2019    Procedure: HIP HEMIARTHROPLASTY LEFT;  Surgeon: Reddy Segundo Jr., MD;  Location: [facility] OR;  Service: Orthopedics    HYSTERECTOMY  1976    KNEE SURGERY Right     arthroscopy- 2000    SKIN CANCER EXCISION Left     [facility]; CANCEROUS SPOT DIDN'T  MOVE BUT NEEDED REMOVED    TONSILLECTOMY         Family History   Problem Relation Age of Onset    Hypertension Mother     Breast cancer Mother     Obesity Mother     Hypertension Father     Diabetes Son        Social History     Socioeconomic History    Marital status:    Tobacco Use    Smoking status: Never    Smokeless tobacco: Never   Vaping Use    Vaping Use: Never used   Substance and Sexual Activity    Alcohol use: No    Drug use: Defer    Sexual activity: Defer           Objective   Physical Exam  Constitutional:       General: She is not in acute distress.  HENT:      Head: Normocephalic and atraumatic.      Comments: No tender nodes to the bony prominences of the face or mandible.  No loose dentition.  No malocclusion.  No laceration  Eyes:      Conjunctiva/sclera: Conjunctivae normal.      Pupils: Pupils are equal, round, and reactive to light.   Neck:      Comments: No tenderness to the cervical spine.  No step-offs or deformities.  Normal range of motion    Cardiovascular:      Rate and Rhythm: Normal rate and regular rhythm.      Pulses: Normal pulses.      Heart sounds: No murmur heard.     No gallop.   Pulmonary:      Effort: Pulmonary effort is normal. No respiratory distress.      Breath sounds: No wheezing or rales.   Chest:      Chest wall: No tenderness.   Abdominal:      General: Abdomen is flat. There is no distension.      Tenderness: There is no abdominal tenderness.   Musculoskeletal:         General: No swelling or deformity. Normal range of motion.      Comments: No tenderness to the thoracic or lumbar spine.  No step-offs or deformities.   With range of motion of the right shoulder there is significant pain.  There are no deformities.  Extremity is warm dry and well-perfused with normal sensation and capillary refill     Skin:     General: Skin is warm and dry.      Capillary Refill: Capillary refill takes less than 2 seconds.   Neurological:      General: No focal deficit present.      Mental Status: She is alert.      Comments: GCS 1 14, patient is at her reported baseline mentation.  Cranial Nerves II-XII intact without deficit.  Strength 5/5 in the bilateral upper extremities.  Strength 5/5 in the bilateral lower extremities.  Sensation to light touch intact throughout.  Cerebellar function intact via finger-nose-finger.       Psychiatric:         Mood and Affect: Mood normal.         Behavior: Behavior normal.         Procedures           ED Course                                           Medical Decision Making  Differential diagnosis includes fracture, dislocation of the shoulder, soft tissue injury.  Radiographs were obtained.  Tylenol and ibuprofen given for pain control.    Patient placed in a sling for comfort, counseled on coming out of the sling for range of motion exercises, given follow-up with orthopedics as needed.  Discharged in good condition    Problems Addressed:  Acute pain of right shoulder: complicated acute illness or injury  Fall, initial encounter: complicated acute illness or injury    Amount and/or Complexity of Data Reviewed  Radiology: ordered and independent interpretation performed.     Details: Radiograph of the right shoulder independently interpreted by myself demonstrates no acute bony fractures or dislocations    Risk  OTC drugs.  Prescription drug management.        Final diagnoses:   Fall, initial encounter   Acute pain of right shoulder       ED Disposition  ED Disposition       ED Disposition   Discharge    Condition   Stable    Comment   --           No results found for this or any previous visit (from the  past 24 hour(s)).  Note: In addition to lab results from this visit, the labs listed above may include labs taken at another facility or during a different encounter within the last 24 hours. Please correlate lab times with ED admission and discharge times for further clarification of the services performed during this visit.    XR Shoulder 2+ View Right   Final Result   Impression:   Mild degenerative changes without acute osseous abnormality.            Electronically Signed: Reddy Perez MD     10/12/2023 2:30 AM EDT     Workstation ID: [id number]        Vitals:    10/12/23 0258 10/12/23 0328 10/12/23 0430 10/12/23 0500   BP: 129/67 138/68 124/67 128/74   Pulse: 68 71 67 71   Resp:       Temp:       TempSrc:       SpO2: 96% 98% 98% 98%   Weight:       Height:         Medications   acetaminophen (TYLENOL) tablet 1,000 mg (1,000 mg Oral Given 10/12/23 0234)   ibuprofen (ADVIL,MOTRIN) tablet 800 mg (800 mg Oral Given 10/12/23 0234)     ECG/EMG Results (last 24 hours)       ** No results found for the last 24 hours. **          No orders to display           Reddy Segundo Jr., MD  [address] [address] [phone]               Medication List        Changed      lisinopril 5 MG tablet  Commonly known as: PRINIVIL,ZESTRIL  TAKE ONE TABLET BY MOUTH DAILY  What changed:   when to take this  additional instructions                 Noah Lee MD  10/12/23 0322

## 2023-10-12 NOTE — DISCHARGE INSTRUCTIONS
You may keep your arm in a sling to help with your shoulder pain.  You should come out of that sling at least 1-2 times daily for range of motion exercises to prevent shoulder stiffness.  Follow-up with an orthopedic doctor as needed.  Return to the ER as needed for new or worsening symptoms

## 2023-12-22 ENCOUNTER — HOSPITAL ENCOUNTER (INPATIENT)
Facility: HOSPITAL | Age: 88
LOS: 14 days | Discharge: NURSING, REHAB OR LONG-TERM CARE | End: 2024-01-05
Attending: EMERGENCY MEDICINE | Admitting: FAMILY MEDICINE
Payer: MEDICARE

## 2023-12-22 ENCOUNTER — APPOINTMENT (OUTPATIENT)
Dept: GENERAL RADIOLOGY | Facility: HOSPITAL | Age: 88
End: 2023-12-22
Payer: MEDICARE

## 2023-12-22 ENCOUNTER — APPOINTMENT (OUTPATIENT)
Dept: CT IMAGING | Facility: HOSPITAL | Age: 88
End: 2023-12-22
Payer: MEDICARE

## 2023-12-22 DIAGNOSIS — G89.29 OTHER CHRONIC PAIN: ICD-10-CM

## 2023-12-22 DIAGNOSIS — C34.90 MALIGNANT NEOPLASM OF LUNG, UNSPECIFIED LATERALITY, UNSPECIFIED PART OF LUNG: Primary | ICD-10-CM

## 2023-12-22 DIAGNOSIS — R22.2 CHEST MASS: ICD-10-CM

## 2023-12-22 DIAGNOSIS — G63 POLYNEUROPATHY ASSOCIATED WITH UNDERLYING DISEASE: ICD-10-CM

## 2023-12-22 PROBLEM — C79.9 METASTATIC DISEASE: Status: ACTIVE | Noted: 2023-12-22

## 2023-12-22 LAB
ALBUMIN SERPL-MCNC: 3.8 G/DL (ref 3.5–5.2)
ALBUMIN/GLOB SERPL: 1.5 G/DL
ALP SERPL-CCNC: 70 U/L (ref 39–117)
ALT SERPL W P-5'-P-CCNC: 18 U/L (ref 1–33)
ANION GAP SERPL CALCULATED.3IONS-SCNC: 12 MMOL/L (ref 5–15)
AST SERPL-CCNC: 27 U/L (ref 1–32)
BASOPHILS # BLD AUTO: 0.04 10*3/MM3 (ref 0–0.2)
BASOPHILS NFR BLD AUTO: 0.6 % (ref 0–1.5)
BILIRUB SERPL-MCNC: 0.4 MG/DL (ref 0–1.2)
BUN SERPL-MCNC: 17 MG/DL (ref 8–23)
BUN/CREAT SERPL: 25 (ref 7–25)
CALCIUM SPEC-SCNC: 8.6 MG/DL (ref 8.2–9.6)
CHLORIDE SERPL-SCNC: 102 MMOL/L (ref 98–107)
CO2 SERPL-SCNC: 31 MMOL/L (ref 22–29)
CREAT SERPL-MCNC: 0.68 MG/DL (ref 0.57–1)
DEPRECATED RDW RBC AUTO: 50.8 FL (ref 37–54)
EGFRCR SERPLBLD CKD-EPI 2021: 81.3 ML/MIN/1.73
EOSINOPHIL # BLD AUTO: 0.18 10*3/MM3 (ref 0–0.4)
EOSINOPHIL NFR BLD AUTO: 2.7 % (ref 0.3–6.2)
ERYTHROCYTE [DISTWIDTH] IN BLOOD BY AUTOMATED COUNT: 15.6 % (ref 12.3–15.4)
GEN 5 2HR TROPONIN T REFLEX: 28 NG/L
GLOBULIN UR ELPH-MCNC: 2.6 GM/DL
GLUCOSE SERPL-MCNC: 145 MG/DL (ref 65–99)
HCT VFR BLD AUTO: 42.3 % (ref 34–46.6)
HGB BLD-MCNC: 13.4 G/DL (ref 12–15.9)
HOLD SPECIMEN: NORMAL
IMM GRANULOCYTES # BLD AUTO: 0.02 10*3/MM3 (ref 0–0.05)
IMM GRANULOCYTES NFR BLD AUTO: 0.3 % (ref 0–0.5)
LIPASE SERPL-CCNC: 33 U/L (ref 13–60)
LYMPHOCYTES # BLD AUTO: 0.78 10*3/MM3 (ref 0.7–3.1)
LYMPHOCYTES NFR BLD AUTO: 11.6 % (ref 19.6–45.3)
MAGNESIUM SERPL-MCNC: 1.8 MG/DL (ref 1.7–2.3)
MCH RBC QN AUTO: 28.3 PG (ref 26.6–33)
MCHC RBC AUTO-ENTMCNC: 31.7 G/DL (ref 31.5–35.7)
MCV RBC AUTO: 89.4 FL (ref 79–97)
MONOCYTES # BLD AUTO: 0.55 10*3/MM3 (ref 0.1–0.9)
MONOCYTES NFR BLD AUTO: 8.2 % (ref 5–12)
NEUTROPHILS NFR BLD AUTO: 5.14 10*3/MM3 (ref 1.7–7)
NEUTROPHILS NFR BLD AUTO: 76.6 % (ref 42.7–76)
NRBC BLD AUTO-RTO: 0 /100 WBC (ref 0–0.2)
NT-PROBNP SERPL-MCNC: 1322 PG/ML (ref 0–1800)
PHOSPHATE SERPL-MCNC: 3.4 MG/DL (ref 2.5–4.5)
PLATELET # BLD AUTO: 157 10*3/MM3 (ref 140–450)
PMV BLD AUTO: 10 FL (ref 6–12)
POTASSIUM SERPL-SCNC: 3.4 MMOL/L (ref 3.5–5.2)
PROT SERPL-MCNC: 6.4 G/DL (ref 6–8.5)
QT INTERVAL: 464 MS
QT INTERVAL: 488 MS
QTC INTERVAL: 532 MS
QTC INTERVAL: 544 MS
RBC # BLD AUTO: 4.73 10*6/MM3 (ref 3.77–5.28)
SODIUM SERPL-SCNC: 145 MMOL/L (ref 136–145)
TROPONIN T DELTA: -4 NG/L
TROPONIN T SERPL HS-MCNC: 32 NG/L
WBC NRBC COR # BLD AUTO: 6.71 10*3/MM3 (ref 3.4–10.8)
WHOLE BLOOD HOLD COAG: NORMAL
WHOLE BLOOD HOLD SPECIMEN: NORMAL

## 2023-12-22 PROCEDURE — 25810000003 LACTATED RINGERS PER 1000 ML: Performed by: NURSE PRACTITIONER

## 2023-12-22 PROCEDURE — 84484 ASSAY OF TROPONIN QUANT: CPT | Performed by: EMERGENCY MEDICINE

## 2023-12-22 PROCEDURE — 93005 ELECTROCARDIOGRAM TRACING: CPT | Performed by: EMERGENCY MEDICINE

## 2023-12-22 PROCEDURE — 74177 CT ABD & PELVIS W/CONTRAST: CPT

## 2023-12-22 PROCEDURE — 71260 CT THORAX DX C+: CPT

## 2023-12-22 PROCEDURE — 83735 ASSAY OF MAGNESIUM: CPT | Performed by: INTERNAL MEDICINE

## 2023-12-22 PROCEDURE — 71045 X-RAY EXAM CHEST 1 VIEW: CPT

## 2023-12-22 PROCEDURE — 80053 COMPREHEN METABOLIC PANEL: CPT | Performed by: EMERGENCY MEDICINE

## 2023-12-22 PROCEDURE — 93005 ELECTROCARDIOGRAM TRACING: CPT

## 2023-12-22 PROCEDURE — 25510000001 IOPAMIDOL 61 % SOLUTION: Performed by: NURSE PRACTITIONER

## 2023-12-22 PROCEDURE — 99223 1ST HOSP IP/OBS HIGH 75: CPT | Performed by: INTERNAL MEDICINE

## 2023-12-22 PROCEDURE — 85025 COMPLETE CBC W/AUTO DIFF WBC: CPT | Performed by: EMERGENCY MEDICINE

## 2023-12-22 PROCEDURE — 0202U NFCT DS 22 TRGT SARS-COV-2: CPT | Performed by: NURSE PRACTITIONER

## 2023-12-22 PROCEDURE — 36415 COLL VENOUS BLD VENIPUNCTURE: CPT

## 2023-12-22 PROCEDURE — 99285 EMERGENCY DEPT VISIT HI MDM: CPT

## 2023-12-22 PROCEDURE — 83880 ASSAY OF NATRIURETIC PEPTIDE: CPT | Performed by: EMERGENCY MEDICINE

## 2023-12-22 PROCEDURE — 25510000001 IOPAMIDOL 61 % SOLUTION: Performed by: EMERGENCY MEDICINE

## 2023-12-22 PROCEDURE — 84100 ASSAY OF PHOSPHORUS: CPT | Performed by: INTERNAL MEDICINE

## 2023-12-22 PROCEDURE — 83690 ASSAY OF LIPASE: CPT | Performed by: EMERGENCY MEDICINE

## 2023-12-22 RX ORDER — SODIUM CHLORIDE 0.9 % (FLUSH) 0.9 %
10 SYRINGE (ML) INJECTION AS NEEDED
Status: DISCONTINUED | OUTPATIENT
Start: 2023-12-22 | End: 2024-01-05 | Stop reason: HOSPADM

## 2023-12-22 RX ORDER — LISINOPRIL 20 MG/1
20 TABLET ORAL DAILY
Status: DISCONTINUED | OUTPATIENT
Start: 2023-12-23 | End: 2024-01-05 | Stop reason: HOSPADM

## 2023-12-22 RX ORDER — SODIUM CHLORIDE 0.9 % (FLUSH) 0.9 %
10 SYRINGE (ML) INJECTION EVERY 12 HOURS SCHEDULED
Status: DISCONTINUED | OUTPATIENT
Start: 2023-12-22 | End: 2024-01-05 | Stop reason: HOSPADM

## 2023-12-22 RX ORDER — SODIUM CHLORIDE, SODIUM LACTATE, POTASSIUM CHLORIDE, CALCIUM CHLORIDE 600; 310; 30; 20 MG/100ML; MG/100ML; MG/100ML; MG/100ML
75 INJECTION, SOLUTION INTRAVENOUS CONTINUOUS
Status: ACTIVE | OUTPATIENT
Start: 2023-12-22 | End: 2023-12-23

## 2023-12-22 RX ORDER — POLYETHYLENE GLYCOL 3350 17 G/17G
17 POWDER, FOR SOLUTION ORAL DAILY PRN
Status: DISCONTINUED | OUTPATIENT
Start: 2023-12-22 | End: 2024-01-05 | Stop reason: HOSPADM

## 2023-12-22 RX ORDER — BETHANECHOL CHLORIDE 10 MG/1
10 TABLET ORAL 3 TIMES DAILY
Status: DISCONTINUED | OUTPATIENT
Start: 2023-12-22 | End: 2024-01-05 | Stop reason: HOSPADM

## 2023-12-22 RX ORDER — ASPIRIN 81 MG/1
324 TABLET, CHEWABLE ORAL ONCE
Status: COMPLETED | OUTPATIENT
Start: 2023-12-22 | End: 2023-12-22

## 2023-12-22 RX ORDER — OMEPRAZOLE 20 MG/1
20 CAPSULE, DELAYED RELEASE ORAL DAILY
COMMUNITY

## 2023-12-22 RX ORDER — LOPERAMIDE HYDROCHLORIDE 2 MG/1
2 CAPSULE ORAL
COMMUNITY

## 2023-12-22 RX ORDER — LEVOTHYROXINE SODIUM 0.15 MG/1
150 TABLET ORAL
Status: DISCONTINUED | OUTPATIENT
Start: 2023-12-23 | End: 2024-01-05 | Stop reason: HOSPADM

## 2023-12-22 RX ORDER — BISACODYL 10 MG
10 SUPPOSITORY, RECTAL RECTAL DAILY PRN
Status: DISCONTINUED | OUTPATIENT
Start: 2023-12-22 | End: 2024-01-05 | Stop reason: HOSPADM

## 2023-12-22 RX ORDER — AMLODIPINE BESYLATE 5 MG/1
5 TABLET ORAL DAILY
COMMUNITY

## 2023-12-22 RX ORDER — GUAIFENESIN 200 MG/10ML
200 LIQUID ORAL EVERY 4 HOURS PRN
COMMUNITY

## 2023-12-22 RX ORDER — MULTIPLE VITAMINS W/ MINERALS TAB 9MG-400MCG
1 TAB ORAL DAILY
Status: DISCONTINUED | OUTPATIENT
Start: 2023-12-23 | End: 2024-01-05 | Stop reason: HOSPADM

## 2023-12-22 RX ORDER — MENTHOL AND ZINC OXIDE .44; 20.625 G/100G; G/100G
OINTMENT TOPICAL
COMMUNITY

## 2023-12-22 RX ORDER — ACETAMINOPHEN 650 MG/1
650 SUPPOSITORY RECTAL EVERY 4 HOURS PRN
Status: DISCONTINUED | OUTPATIENT
Start: 2023-12-22 | End: 2024-01-05 | Stop reason: HOSPADM

## 2023-12-22 RX ORDER — BENZONATATE 100 MG/1
200 CAPSULE ORAL 3 TIMES DAILY PRN
Status: DISCONTINUED | OUTPATIENT
Start: 2023-12-22 | End: 2024-01-05 | Stop reason: HOSPADM

## 2023-12-22 RX ORDER — ACETAMINOPHEN 500 MG
500 TABLET ORAL EVERY 4 HOURS PRN
COMMUNITY

## 2023-12-22 RX ORDER — BUSPIRONE HYDROCHLORIDE 10 MG/1
10 TABLET ORAL 2 TIMES DAILY
Status: DISCONTINUED | OUTPATIENT
Start: 2023-12-22 | End: 2024-01-05 | Stop reason: HOSPADM

## 2023-12-22 RX ORDER — POTASSIUM CHLORIDE 20 MEQ/1
40 TABLET, EXTENDED RELEASE ORAL EVERY 4 HOURS
Status: COMPLETED | OUTPATIENT
Start: 2023-12-22 | End: 2023-12-23

## 2023-12-22 RX ORDER — MEMANTINE HYDROCHLORIDE 10 MG/1
10 TABLET ORAL 2 TIMES DAILY
Status: DISCONTINUED | OUTPATIENT
Start: 2023-12-22 | End: 2024-01-05 | Stop reason: HOSPADM

## 2023-12-22 RX ORDER — GUAIFENESIN 200 MG/10ML
200 LIQUID ORAL EVERY 4 HOURS PRN
Status: DISCONTINUED | OUTPATIENT
Start: 2023-12-22 | End: 2023-12-24

## 2023-12-22 RX ORDER — PANTOPRAZOLE SODIUM 40 MG/1
40 TABLET, DELAYED RELEASE ORAL DAILY
Status: DISCONTINUED | OUTPATIENT
Start: 2023-12-23 | End: 2024-01-05 | Stop reason: HOSPADM

## 2023-12-22 RX ORDER — ACETAMINOPHEN 160 MG/5ML
650 SOLUTION ORAL EVERY 4 HOURS PRN
Status: DISCONTINUED | OUTPATIENT
Start: 2023-12-22 | End: 2024-01-05 | Stop reason: HOSPADM

## 2023-12-22 RX ORDER — GABAPENTIN 300 MG/1
300 CAPSULE ORAL 2 TIMES DAILY
Status: DISCONTINUED | OUTPATIENT
Start: 2023-12-22 | End: 2024-01-05 | Stop reason: HOSPADM

## 2023-12-22 RX ORDER — AMLODIPINE BESYLATE 5 MG/1
5 TABLET ORAL DAILY
Status: DISCONTINUED | OUTPATIENT
Start: 2023-12-23 | End: 2024-01-05 | Stop reason: HOSPADM

## 2023-12-22 RX ORDER — AMOXICILLIN 250 MG
2 CAPSULE ORAL 2 TIMES DAILY
Status: DISCONTINUED | OUTPATIENT
Start: 2023-12-22 | End: 2024-01-05 | Stop reason: HOSPADM

## 2023-12-22 RX ORDER — ONDANSETRON 4 MG/1
4 TABLET, FILM COATED ORAL EVERY 8 HOURS PRN
COMMUNITY

## 2023-12-22 RX ORDER — BISACODYL 5 MG/1
5 TABLET, DELAYED RELEASE ORAL DAILY PRN
Status: DISCONTINUED | OUTPATIENT
Start: 2023-12-22 | End: 2024-01-05 | Stop reason: HOSPADM

## 2023-12-22 RX ORDER — ACETAMINOPHEN 325 MG/1
650 TABLET ORAL EVERY 4 HOURS PRN
Status: DISCONTINUED | OUTPATIENT
Start: 2023-12-22 | End: 2024-01-05 | Stop reason: HOSPADM

## 2023-12-22 RX ORDER — NYSTATIN 100000 [USP'U]/G
POWDER TOPICAL
COMMUNITY

## 2023-12-22 RX ORDER — POLYETHYLENE GLYCOL 3350 17 G/17G
17 POWDER, FOR SOLUTION ORAL DAILY PRN
COMMUNITY

## 2023-12-22 RX ORDER — TRAMADOL HYDROCHLORIDE 50 MG/1
50 TABLET ORAL DAILY PRN
Status: DISCONTINUED | OUTPATIENT
Start: 2023-12-22 | End: 2024-01-05 | Stop reason: HOSPADM

## 2023-12-22 RX ADMIN — SODIUM CHLORIDE, POTASSIUM CHLORIDE, SODIUM LACTATE AND CALCIUM CHLORIDE 75 ML/HR: 600; 310; 30; 20 INJECTION, SOLUTION INTRAVENOUS at 23:08

## 2023-12-22 RX ADMIN — IOPAMIDOL 50 ML: 612 INJECTION, SOLUTION INTRAVENOUS at 22:29

## 2023-12-22 RX ADMIN — BENZONATATE 200 MG: 100 CAPSULE ORAL at 20:48

## 2023-12-22 RX ADMIN — BETHANECHOL CHLORIDE 10 MG: 10 TABLET ORAL at 23:12

## 2023-12-22 RX ADMIN — Medication 10 ML: at 23:01

## 2023-12-22 RX ADMIN — BUSPIRONE HYDROCHLORIDE 10 MG: 10 TABLET ORAL at 23:01

## 2023-12-22 RX ADMIN — MEMANTINE 10 MG: 10 TABLET ORAL at 23:01

## 2023-12-22 RX ADMIN — ASPIRIN 324 MG: 81 TABLET, CHEWABLE ORAL at 15:46

## 2023-12-22 RX ADMIN — IOPAMIDOL 80 ML: 612 INJECTION, SOLUTION INTRAVENOUS at 16:21

## 2023-12-22 RX ADMIN — GABAPENTIN 300 MG: 300 CAPSULE ORAL at 23:01

## 2023-12-22 RX ADMIN — POTASSIUM CHLORIDE 40 MEQ: 1500 TABLET, EXTENDED RELEASE ORAL at 23:01

## 2023-12-22 NOTE — ED PROVIDER NOTES
[place]    EMERGENCY DEPARTMENT ENCOUNTER      Pt Name: Temi Jarrett  MRN: [id number]  YOB: 1930  Date of evaluation: 12/22/2023  Provider: Jagdish Mclaughlin DO    CHIEF COMPLAINT       Chief Complaint   Patient presents with    Chest Pain     HPI  Stated Reason for Visit: pt here today from [facility] [place] for intermittant chest pain. pt had chest xray yesterday that showed possible mass in chest     HISTORY OF PRESENT ILLNESS  (Location/Symptom, Timing/Onset, Context/Setting, Quality, Duration, Modifying Factors, Severity.)   Temi Jarrett is a 89 y.o. female who presents to the emergency department from her nursing facility secondary to concern for abnormal chest x-ray which was completed yesterday.  EMS notes they were given paperwork about an abnormal chest mass, found  The patient evaluated in the hospital.  Patient herself does not have any acute complaints, no chest pain, shortness of breath, EMS did not need any type of interventions or oxygen administration prior to arrival.  Patient herself denies any chest pain, show she has a pacemaker.  Does have pretty significant dementia at baseline.  Does not have any other acute complaints.  Family is not initially present on arrival for additional history which we will try to obtain.      Nursing notes were reviewed.      PAST MEDICAL HISTORY     Past Medical History:   Diagnosis Date    Acute sepsis 01/14/2021    Arthritis     Bladder prolapse, female, acquired     Closed fracture of neck of left femur 09/27/2019    Dementia     Depression     Disease of thyroid gland     Gastric polyp     GERD (gastroesophageal reflux disease)     Hiatal hernia     History of colonic polyps     Hyperlipidemia     Hypertension     IBS (irritable bowel syndrome)     Macular degeneration     Pacemaker 2023    Pericardial effusion 08/03/2020    Echo (8/3/2020): Normal LVEF.  Moderate pericardial effusion without tamponade.  Moderate MR. RVSP 49 mmHg    Torn  meniscus     right knee          SURGICAL HISTORY       Past Surgical History:   Procedure Laterality Date    CHOLECYSTECTOMY  1997    ENDOSCOPY N/A 01/14/2021    Procedure: ESOPHAGOGASTRODUODENOSCOPY;  Surgeon: Serjio Guerrero MD;  Location: [facility] ENDOSCOPY;  Service: General;  Laterality: N/A;    EYE SURGERY  1998    Cataract extraction    HERNIA REPAIR      HIP HEMIARTHROPLASTY Left 09/28/2019    Procedure: HIP HEMIARTHROPLASTY LEFT;  Surgeon: Reddy Segundo Jr., MD;  Location: [facility] OR;  Service: Orthopedics    HYSTERECTOMY  1976    KNEE SURGERY Right     arthroscopy- 2000    SKIN CANCER EXCISION Left     [facility]; CANCEROUS SPOT DIDN'T  MOVE BUT NEEDED REMOVED    TONSILLECTOMY           CURRENT MEDICATIONS       Current Facility-Administered Medications:     cyanocobalamin injection 1,000 mcg, 1,000 mcg, Intramuscular, Q28 Days, Eric Patel MD, 1,000 mcg at 07/31/23 1627    sodium chloride 0.9 % flush 10 mL, 10 mL, Intravenous, PRN, Jagdish Mclaughlin, DO    Current Outpatient Medications:     acetaminophen (TYLENOL) 500 MG tablet, Take 1 tablet by mouth Every 4 (Four) Hours As Needed for Mild Pain, Moderate Pain or Fever., Disp: , Rfl:     amLODIPine (NORVASC) 5 MG tablet, Take 1 tablet by mouth Daily., Disp: , Rfl:     aspirin 81 MG EC tablet, Take 1 tablet by mouth Daily., Disp: , Rfl:     bethanechol (URECHOLINE) 10 MG tablet, TAKE ONE TABLET BY MOUTH THREE TIMES A DAY, Disp: 90 tablet, Rfl: 3    busPIRone (BUSPAR) 10 MG tablet, Take 1 tablet by mouth 2 (Two) Times a Day., Disp: 60 tablet, Rfl: 4    Calcium Citrate-Vitamin D (CITRACAL PETITES/VITAMIN D PO), Take 2 tablets by mouth 2 (two) times a day., Disp: , Rfl:     diclofenac (VOLTAREN) 1 % gel gel, APPLY 4 GRAMS TOPICALLY TO THE APPROPRAITE AREA AS DIRECTED FOUR TIMES A DAY (Patient taking differently: Apply to right shoulder 2 gm topically every 6 hours as needed for pain do no apply jorgensen than 8 gm daily  to any one specific joint of the upper extremities.), Disp: 100 g, Rfl: 4    Dimethicone 1.5 % cream, Apply to gregorio upper extremities topically twice a day for fragile skin., Disp: , Rfl:     donepezil (ARICEPT) 10 MG tablet, TAKE ONE TABLET BY MOUTH EVERY NIGHT AT BEDTIME, Disp: 90 tablet, Rfl: 2    furosemide (LASIX) 20 MG tablet, Take 1 tablet by mouth Daily. (Patient taking differently: Take 1 tablet by mouth Every Other Day.), Disp: 30 tablet, Rfl: 5    gabapentin (NEURONTIN) 300 MG capsule, TAKE ONE CAPSULE BY MOUTH TWICE A DAY, Disp: 180 capsule, Rfl: 1    guaifenesin (ROBITUSSIN) 100 MG/5ML liquid, Take 10 mL by mouth Every 4 (Four) Hours As Needed for Cough or Congestion., Disp: , Rfl:     lisinopril (PRINIVIL,ZESTRIL) 5 MG tablet, TAKE ONE TABLET BY MOUTH DAILY (Patient taking differently: Take 4 tablets by mouth Daily.), Disp: 90 tablet, Rfl: 3    loperamide (IMODIUM) 2 MG capsule, Take 1 capsule by mouth Every 3 (Three) Hours As Needed for Diarrhea., Disp: , Rfl:     memantine (NAMENDA) 10 MG tablet, TAKE ONE TABLET BY MOUTH TWICE A DAY, Disp: 180 tablet, Rfl: 3    menthol-zinc oxide (CALMOSEPTINE) 0.44-20.625 % ointment ointment, Apply to buttocks topically 3 times a day for MASD, Disp: , Rfl:     Multiple Vitamins-Minerals (MULTIVITAMIN ADULTS 50+ PO), Take 1 tablet by mouth Daily., Disp: , Rfl:     nystatin (MYCOSTATIN) 144667 UNIT/GM powder, Apply to rectal area topically every 12 hours as needed for redness, Disp: , Rfl:     omeprazole (priLOSEC) 20 MG capsule, Take 1 capsule by mouth Daily., Disp: , Rfl:     ondansetron (ZOFRAN) 4 MG tablet, Take 1 tablet by mouth Every 8 (Eight) Hours As Needed for Nausea or Vomiting., Disp: , Rfl:     polyethylene glycol (MIRALAX) 17 g packet, Take 17 g by mouth Daily As Needed., Disp: , Rfl:     sertraline (ZOLOFT) 50 MG tablet, Take 1 tablet by mouth Daily., Disp: 90 tablet, Rfl: 3    Synthroid 150 MCG tablet, TAKE ONE TABLET BY MOUTH DAILY, Disp: 90 tablet,  Rfl: 1    traMADol (ULTRAM) 50 MG tablet, TAKE 1 TABLET BY MOUTH DAILY (Patient taking differently: Take 1 tablet by mouth Daily As Needed for Severe Pain.), Disp: 30 tablet, Rfl: 2    ALLERGIES     Augmentin [amoxicillin-pot clavulanate], Codeine, Shrimp, and Strawberry    FAMILY HISTORY       Family History   Problem Relation Age of Onset    Hypertension Mother     Breast cancer Mother     Obesity Mother     Hypertension Father     Diabetes Son           SOCIAL HISTORY       Social History     Socioeconomic History    Marital status:    Tobacco Use    Smoking status: Never    Smokeless tobacco: Never   Vaping Use    Vaping Use: Never used   Substance and Sexual Activity    Alcohol use: No    Drug use: Defer    Sexual activity: Defer         PHYSICAL EXAM    (up to 7 for level 4, 8 or more for level 5)     Vitals:    12/22/23 1700 12/22/23 1715 12/22/23 1730 12/22/23 1745   BP: 129/65 143/78 126/84 136/73   Patient Position:       Pulse: 78 71 71 72   Resp:       Temp:       TempSrc:       SpO2: 98% 96% 96% 97%   Weight:       Height:           Physical Exam  General : Patient is awake, alert, in no acute distress, underlying dementia appreciated  HEENT: Pupils are equally round, EOMI, conjunctivae clear  Neck: Neck is supple, full range of motion, trachea midline  Cardiac: Heart regular rate, rhythm, no murmurs, rubs, or gallops  Lungs: Lungs are clear to auscultation, there is no wheezing, rhonchi, or rales. There is no use of accessory muscles  Abdomen: Abdomen is soft, nontender, nondistended.   Musculoskeletal: Generalized muscle atrophy no focal muscle deficits are appreciated  Dermatology: Skin is warm and dry      DIAGNOSTIC RESULTS     EKG:  All EKGs are interpreted by the Emergency Department Physician who either signs or Co-signs this chart in the absence of a cardiologist.    ECG 12 Lead ED Triage Standing Order; Chest Pain   Final Result   Test Reason : ED Triage Standing Order~   Blood Pressure  :   */*   mmHG   Vent. Rate :  75 BPM     Atrial Rate :  82 BPM      P-R Int :   * ms          QRS Dur : 160 ms       QT Int : 488 ms       P-R-T Axes :   * -85  92 degrees      QTc Int : 544 ms      Ventricular-paced rhythm   Abnormal ECG   When compared with ECG of 22-DEC-2023 15:08,   Vent. rate has decreased BY   4 BPM   Confirmed by IVAN ELIZONDO MD ([id number]) on 12/22/2023 5:42:19 PM      Referred By: MIKHAIL           Confirmed By: IVAN ELIZONDO MD      ECG 12 Lead ED Triage Standing Order; Chest Pain   Final Result   Test Reason : CP   Blood Pressure :   */*   mmHG   Vent. Rate :  79 BPM     Atrial Rate :  39 BPM      P-R Int :   * ms          QRS Dur : 164 ms       QT Int : 464 ms       P-R-T Axes :   * -89  94 degrees      QTc Int : 532 ms         Ventricular-paced rhythm   Abnormal ECG   When compared with ECG of 14-JAN-2021 05:47,   Electronic ventricular pacemaker has replaced Sinus rhythm   Confirmed by IVAN ELIZONDO MD ([id number]) on 12/22/2023 3:12:16 PM      Referred By: SONAL DELGADO           Confirmed By: IVAN ELIZONDO MD          RADIOLOGY:     [x] Radiologist's Report Reviewed:  CT Chest With Contrast Diagnostic   Final Result   Impression:   Findings compatible with metastatic disease to the lungs. Multiple rounded lesions are visualized bilaterally measuring up to 1.4 cm.      Markedly enlarged and centrally necrotic left-sided prevascular and hilar lymph nodes measuring up to 5 cm. Findings compatible with metastatic disease.      Enlargement of the main pulmonary artery measuring 4.8 cm. Finding compatible with pulmonary arterial hypertension.      Very large hiatal hernia containing the majority of the stomach, nondilated small bowel loops, pancreatic body and tail.      Moderate cardiomegaly.         Electronically Signed: Bay Duong MD     12/22/2023 4:32 PM EST     Workstation ID: [id number]      XR Chest 1 View   Final Result   Impression:      1. Mild CHF.      2. Moderate left basilar  atelectasis and effusion.      3. Massive hiatal hernia as on prior studies.      4. New approximately 7.5 cm left suprahilar/mediastinal mass compared to prior studies. Consider chest CT scan.         Electronically Signed: Joni Lagos MD     12/22/2023 3:53 PM EST     Workstation ID: [id number]          I ordered and independently reviewed the above noted radiographic studies.      I viewed images of chest x-ray which showed mild CHF, mediastinal mass per my independent interpretation.    See radiologist's dictation for official interpretation.      ED BEDSIDE ULTRASOUND:   Performed by ED Physician - none    LABS:    I have reviewed and interpreted all of the currently available lab results from this visit (if applicable):  Results for orders placed or performed during the hospital encounter of 12/22/23   High Sensitivity Troponin T    Specimen: Blood   Result Value Ref Range    HS Troponin T 32 (H) <14 ng/L   Comprehensive Metabolic Panel    Specimen: Blood   Result Value Ref Range    Glucose 145 (H) 65 - 99 mg/dL    BUN 17 8 - 23 mg/dL    Creatinine 0.68 0.57 - 1.00 mg/dL    Sodium 145 136 - 145 mmol/L    Potassium 3.4 (L) 3.5 - 5.2 mmol/L    Chloride 102 98 - 107 mmol/L    CO2 31.0 (H) 22.0 - 29.0 mmol/L    Calcium 8.6 8.2 - 9.6 mg/dL    Total Protein 6.4 6.0 - 8.5 g/dL    Albumin 3.8 3.5 - 5.2 g/dL    ALT (SGPT) 18 1 - 33 U/L    AST (SGOT) 27 1 - 32 U/L    Alkaline Phosphatase 70 39 - 117 U/L    Total Bilirubin 0.4 0.0 - 1.2 mg/dL    Globulin 2.6 gm/dL    A/G Ratio 1.5 g/dL    BUN/Creatinine Ratio 25.0 7.0 - 25.0    Anion Gap 12.0 5.0 - 15.0 mmol/L    eGFR 81.3 >60.0 mL/min/1.73   Lipase    Specimen: Blood   Result Value Ref Range    Lipase 33 13 - 60 U/L   BNP    Specimen: Blood   Result Value Ref Range    proBNP 1,322.0 0.0 - 1,800.0 pg/mL   CBC Auto Differential    Specimen: Blood   Result Value Ref Range    WBC 6.71 3.40 - 10.80 10*3/mm3    RBC 4.73 3.77 - 5.28 10*6/mm3    Hemoglobin 13.4 12.0 - 15.9 g/dL     Hematocrit 42.3 34.0 - 46.6 %    MCV 89.4 79.0 - 97.0 fL    MCH 28.3 26.6 - 33.0 pg    MCHC 31.7 31.5 - 35.7 g/dL    RDW 15.6 (H) 12.3 - 15.4 %    RDW-SD 50.8 37.0 - 54.0 fl    MPV 10.0 6.0 - 12.0 fL    Platelets 157 140 - 450 10*3/mm3    Neutrophil % 76.6 (H) 42.7 - 76.0 %    Lymphocyte % 11.6 (L) 19.6 - 45.3 %    Monocyte % 8.2 5.0 - 12.0 %    Eosinophil % 2.7 0.3 - 6.2 %    Basophil % 0.6 0.0 - 1.5 %    Immature Grans % 0.3 0.0 - 0.5 %    Neutrophils, Absolute 5.14 1.70 - 7.00 10*3/mm3    Lymphocytes, Absolute 0.78 0.70 - 3.10 10*3/mm3    Monocytes, Absolute 0.55 0.10 - 0.90 10*3/mm3    Eosinophils, Absolute 0.18 0.00 - 0.40 10*3/mm3    Basophils, Absolute 0.04 0.00 - 0.20 10*3/mm3    Immature Grans, Absolute 0.02 0.00 - 0.05 10*3/mm3    nRBC 0.0 0.0 - 0.2 /100 WBC   High Sensitivity Troponin T 2Hr    Specimen: Blood   Result Value Ref Range    HS Troponin T 28 (H) <14 ng/L    Troponin T Delta -4 (L) >=-4 - <+4 ng/L   Magnesium    Specimen: Blood   Result Value Ref Range    Magnesium 1.8 1.7 - 2.3 mg/dL   Phosphorus    Specimen: Blood   Result Value Ref Range    Phosphorus 3.4 2.5 - 4.5 mg/dL   ECG 12 Lead ED Triage Standing Order; Chest Pain   Result Value Ref Range    QT Interval 464 ms    QTC Interval 532 ms   ECG 12 Lead ED Triage Standing Order; Chest Pain   Result Value Ref Range    QT Interval 488 ms    QTC Interval 544 ms   Green Top (Gel)   Result Value Ref Range    Extra Tube Hold for add-ons.    Lavender Top   Result Value Ref Range    Extra Tube hold for add-on    Gold Top - SST   Result Value Ref Range    Extra Tube Hold for add-ons.    Light Blue Top   Result Value Ref Range    Extra Tube Hold for add-ons.         If labs were ordered, I independently reviewed the results and considered them in treating the patient.      EMERGENCY DEPARTMENT COURSE and DIFFERENTIAL DIAGNOSIS/MDM:   Vitals:  AS OF 18:01 EST    BP - 136/73  HR - 72  TEMP - 98.4 °F (36.9 °C) (Oral)  O2 SATS - 97%      Orders placed  during this visit:  Orders Placed This Encounter   Procedures    XR Chest 1 View    CT Chest With Contrast Diagnostic    [place] Draw    High Sensitivity Troponin T    Comprehensive Metabolic Panel    Lipase    BNP    CBC Auto Differential    High Sensitivity Troponin T 2Hr    Magnesium    Phosphorus    NPO Diet NPO Type: Strict NPO    Undress & Gown    Continuous Pulse Oximetry    Oxygen Therapy- Nasal Cannula; Titrate 1-6 LPM Per SpO2; 90 - 95%    ECG 12 Lead ED Triage Standing Order; Chest Pain    ECG 12 Lead ED Triage Standing Order; Chest Pain    Insert Peripheral IV    Inpatient Admission    CBC & Differential    Green Top (Gel)    Lavender Top    Gold Top - SST    Gray Top    Light Blue Top       All labs have been independently reviewed by me.  All radiology studies have been reviewed by me and the radiologist dictating the report.  All EKG's have been independently viewed and interpreted by me.      Discussion below represents my analysis of pertinent findings related to patient's condition, differential diagnosis, treatment plan and final disposition.    Differential diagnosis:  The differential diagnosis associated with the patient's presentation includes: Abnormal chest x-ray, chest mass, pneumonia, soft tissue mass, aneurysm    Additional sources  Discussed/ obtained information from independent historians:   [] Spouse  [] Parent  [] Family member  [] Friend  [x] EMS   [] Other:    External (non-ED) record review:   [] Inpatient record:   [] Office record:   [] Outpatient record:   [] Prior Outpatient labs:   [x] Prior Outpatient radiology: Imaging from outpatient x-ray from yesterday obtained which shows a 6 x 7 cm mass along the left chest wall around the aortic arch   [] Primary Care record:   [] Outside ED record:   [] Other:     Patient's care impacted by:   [] Diabetes  [] Hypertension  [] CHF  [] Hyperlipidemia  [] Coronary Artery Disease   [] COPD   [] Cancer   [] Tobacco Abuse   [] Substance  Abuse    [] Other:     Care significantly affected by Social Determinants of Health (housing and economic circumstances, unemployment)    [] Yes     [x] No   If yes, Patient's care significantly limited by Social Determinants of Health including:   [] Inadequate housing   [] Low income   [] Alcoholism and drug addiction in family   [] Problems related to primary support group   [] Unemployment   [] Problems related to employment   [] Other Social Determinants of Health:             MEDICATIONS ADMINISTERED IN ED:  Medications   sodium chloride 0.9 % flush 10 mL (has no administration in time range)   aspirin chewable tablet 324 mg (324 mg Oral Given 12/22/23 1546)   iopamidol (ISOVUE-300) 61 % injection 100 mL (80 mL Intravenous Given 12/22/23 1621)              89-year-old female presents with abnormal chest x-ray with a chest mass for further assessment.  Has been having intermittent chest pains, family wanted patient evaluated this evening rather than outpatient imaging.  She is nontoxic-appearing on my initial assessment.  She does have pretty significant dementia at baseline.  We did obtain IV, labs CT chest with IV contrast for further assessment.  CT scan of the chest does reveal multiple areas what appears to be metastatic lung cancer.  The family does not know of any previous prior cancerous lesions that the patient has battled with, she has not been a smoker.  She is requiring couple liters nasal cannula.  This is all new diagnosis for the patient to feel would benefit from further workup and hematologist oncology consultation for further imaging and discussion of treatment options.  They are in agreement with this.  Case discussed with hospitalist, Dr. Luz, for admit.        PROCEDURES:  Procedures    CRITICAL CARE TIME    Total Critical Care time was 0 minutes, excluding separately reportable procedures.   There was a high probability of clinically significant/life threatening deterioration in the  patient's condition which required my urgent intervention.      FINAL IMPRESSION      1. Malignant neoplasm of lung, unspecified laterality, unspecified part of lung    2. Chest mass          DISPOSITION/PLAN     ED Disposition       ED Disposition   Decision to Admit    Condition   --    Comment   Level of Care: Telemetry [5]   Diagnosis: Metastatic disease [451698]   Admitting Physician: MARIN RAYMOND [[id number]]   Certification: I Certify That Inpatient Hospital Services Are Medically Necessary For Greater Than 2 Midnights                   Comment: Please note this report has been produced using speech recognition software.      Jagdish Mclaughlin DO  Attending Emergency Physician         Jagdish Mclaughlin,   12/22/23 9915

## 2023-12-22 NOTE — ED NOTES
Temi Jarrett    Nursing Report ED to Floor:  Mental status: Alert and oriented x4, forgetful  Ambulatory status: Wheelchair per patient   Oxygen Therapy:  room air   Cardiac Rhythm: V paced,  PVCS   Admitted from: [facility]   Safety Concerns:  HIGH FALL RISK  Social Issues: N/A  ED Room #:  16    ED Nurse Phone Extension - [phone] or may call [phone].      HPI:   Chief Complaint   Patient presents with    Chest Pain       Past Medical History:  Past Medical History:   Diagnosis Date    Acute sepsis 01/14/2021    Arthritis     Bladder prolapse, female, acquired     Closed fracture of neck of left femur 09/27/2019    Dementia     Depression     Disease of thyroid gland     Gastric polyp     GERD (gastroesophageal reflux disease)     Hiatal hernia     History of colonic polyps     Hyperlipidemia     Hypertension     IBS (irritable bowel syndrome)     Macular degeneration     Pacemaker 2023    Pericardial effusion 08/03/2020    Echo (8/3/2020): Normal LVEF.  Moderate pericardial effusion without tamponade.  Moderate MR. RVSP 49 mmHg    Torn meniscus     right knee         Past Surgical History:  Past Surgical History:   Procedure Laterality Date    CHOLECYSTECTOMY  1997    ENDOSCOPY N/A 01/14/2021    Procedure: ESOPHAGOGASTRODUODENOSCOPY;  Surgeon: Serjio Guerrero MD;  Location:  [facility];  Service: General;  Laterality: N/A;    EYE SURGERY  1998    Cataract extraction    HERNIA REPAIR      HIP HEMIARTHROPLASTY Left 09/28/2019    Procedure: HIP HEMIARTHROPLASTY LEFT;  Surgeon: Reddy Segundo Jr., MD;  Location:  [facility] OR;  Service: Orthopedics    HYSTERECTOMY  1976    KNEE SURGERY Right     arthroscopy- 2000    SKIN CANCER EXCISION Left     [facility]; CANCEROUS SPOT DIDN'T  MOVE BUT NEEDED REMOVED    TONSILLECTOMY          Admitting Doctor:   Yg Luz MD    Consulting Provider(s):  Consults       No orders found from 11/23/2023 to 12/23/2023.              Admitting Diagnosis:   The primary encounter diagnosis was Malignant neoplasm of lung, unspecified laterality, unspecified part of lung. A diagnosis of Chest mass was also pertinent to this visit.    Most Recent Vitals:   Vitals:    12/22/23 1700 12/22/23 1715 12/22/23 1730 12/22/23 1745   BP: 129/65 143/78 126/84 136/73   Patient Position:       Pulse: 78 71 71 72   Resp:       Temp:       TempSrc:       SpO2: 98% 96% 96% 97%   Weight:       Height:           Active LDAs/IV Access:   Lines, Drains & Airways       Active LDAs       Name Placement date Placement time Site Days    Peripheral IV 12/22/23 1522 Right Antecubital 12/22/23  1522  Antecubital  less than 1                    Labs (abnormal labs have a star):   Labs Reviewed   TROPONIN - Abnormal; Notable for the following components:       Result Value    HS Troponin T 32 (*)     All other components within normal limits    Narrative:     High Sensitive Troponin T Reference Range:  <14.0 ng/L- Negative Female for AMI  <22.0 ng/L- Negative Male for AMI  >=14 - Abnormal Female indicating possible myocardial injury.  >=22 - Abnormal Male indicating possible myocardial injury.   Clinicians would have to utilize clinical acumen, EKG, Troponin, and serial changes to determine if it is an Acute Myocardial Infarction or myocardial injury due to an underlying chronic condition.        COMPREHENSIVE METABOLIC PANEL - Abnormal; Notable for the following components:    Glucose 145 (*)     Potassium 3.4 (*)     CO2 31.0 (*)     All other components within normal limits    Narrative:     GFR Normal >60  Chronic Kidney Disease <60  Kidney Failure <15    The GFR formula is only valid for adults with stable renal function between ages 18 and 70.   CBC WITH AUTO DIFFERENTIAL - Abnormal; Notable for the following components:    RDW 15.6 (*)     Neutrophil % 76.6 (*)     Lymphocyte % 11.6 (*)     All other components within normal limits   HIGH SENSITIVITIY TROPONIN T 2HR -  Abnormal; Notable for the following components:    HS Troponin T 28 (*)     Troponin T Delta -4 (*)     All other components within normal limits    Narrative:     High Sensitive Troponin T Reference Range:  <14.0 ng/L- Negative Female for AMI  <22.0 ng/L- Negative Male for AMI  >=14 - Abnormal Female indicating possible myocardial injury.  >=22 - Abnormal Male indicating possible myocardial injury.   Clinicians would have to utilize clinical acumen, EKG, Troponin, and serial changes to determine if it is an Acute Myocardial Infarction or myocardial injury due to an underlying chronic condition.        LIPASE - Normal   BNP (IN-HOUSE) - Normal    Narrative:     This assay is used as an aid in the diagnosis of individuals suspected of having heart failure. It can be used as an aid in the diagnosis of acute decompensated heart failure (ADHF) in patients presenting with signs and symptoms of ADHF to the emergency department (ED). In addition, NT-proBNP of <300 pg/mL indicates ADHF is not likely.    Age Range Result Interpretation  NT-proBNP Concentration (pg/mL:      <50             Positive            >450                   Gray                 300-450                    Negative             <300    50-75           Positive            >900                  Gray                300-900                  Negative            <300      >75             Positive            >1800                  Gray                300-1800                  Negative            <300   MAGNESIUM - Normal   PHOSPHORUS - Normal   RAINBOW DRAW    Narrative:     The following orders were created for panel order Rome[place] Draw.  Procedure                               Abnormality         Status                     ---------                               -----------         ------                     Green Top (Gel)[[id number]]                                  Final result               Lavender Top[[id number]]                                     Final  result               Gold Top - SST[[id number]]                                   Final result               Gray Top[[id number]]                                         In process                 Light Blue Top[[id number]]                                   Final result                 Please view results for these tests on the individual orders.   CBC AND DIFFERENTIAL    Narrative:     The following orders were created for panel order CBC & Differential.  Procedure                               Abnormality         Status                     ---------                               -----------         ------                     CBC Auto Differential[[id number]]        Abnormal            Final result                 Please view results for these tests on the individual orders.   GREEN TOP   LAVENDER TOP   GOLD TOP - SST   LIGHT BLUE TOP   GRAY TOP       Meds Given in ED:   Medications   sodium chloride 0.9 % flush 10 mL (has no administration in time range)   aspirin chewable tablet 324 mg (324 mg Oral Given 12/22/23 1546)   iopamidol (ISOVUE-300) 61 % injection 100 mL (80 mL Intravenous Given 12/22/23 1621)

## 2023-12-23 LAB
ANION GAP SERPL CALCULATED.3IONS-SCNC: 9 MMOL/L (ref 5–15)
B PARAPERT DNA SPEC QL NAA+PROBE: NOT DETECTED
B PERT DNA SPEC QL NAA+PROBE: NOT DETECTED
BASOPHILS # BLD AUTO: 0.02 10*3/MM3 (ref 0–0.2)
BASOPHILS NFR BLD AUTO: 0.4 % (ref 0–1.5)
BUN SERPL-MCNC: 12 MG/DL (ref 8–23)
BUN/CREAT SERPL: 27.3 (ref 7–25)
C PNEUM DNA NPH QL NAA+NON-PROBE: NOT DETECTED
CALCIUM SPEC-SCNC: 8.2 MG/DL (ref 8.2–9.6)
CHLORIDE SERPL-SCNC: 105 MMOL/L (ref 98–107)
CO2 SERPL-SCNC: 31 MMOL/L (ref 22–29)
CREAT SERPL-MCNC: 0.44 MG/DL (ref 0.57–1)
DEPRECATED RDW RBC AUTO: 50.8 FL (ref 37–54)
EGFRCR SERPLBLD CKD-EPI 2021: 90.3 ML/MIN/1.73
EOSINOPHIL # BLD AUTO: 0.17 10*3/MM3 (ref 0–0.4)
EOSINOPHIL NFR BLD AUTO: 3.1 % (ref 0.3–6.2)
ERYTHROCYTE [DISTWIDTH] IN BLOOD BY AUTOMATED COUNT: 15.7 % (ref 12.3–15.4)
FLUAV SUBTYP SPEC NAA+PROBE: NOT DETECTED
FLUBV RNA ISLT QL NAA+PROBE: NOT DETECTED
GLUCOSE SERPL-MCNC: 83 MG/DL (ref 65–99)
HADV DNA SPEC NAA+PROBE: NOT DETECTED
HCOV 229E RNA SPEC QL NAA+PROBE: NOT DETECTED
HCOV HKU1 RNA SPEC QL NAA+PROBE: NOT DETECTED
HCOV NL63 RNA SPEC QL NAA+PROBE: NOT DETECTED
HCOV OC43 RNA SPEC QL NAA+PROBE: NOT DETECTED
HCT VFR BLD AUTO: 39.5 % (ref 34–46.6)
HGB BLD-MCNC: 12.5 G/DL (ref 12–15.9)
HMPV RNA NPH QL NAA+NON-PROBE: NOT DETECTED
HPIV1 RNA ISLT QL NAA+PROBE: NOT DETECTED
HPIV2 RNA SPEC QL NAA+PROBE: NOT DETECTED
HPIV3 RNA NPH QL NAA+PROBE: NOT DETECTED
HPIV4 P GENE NPH QL NAA+PROBE: NOT DETECTED
IMM GRANULOCYTES # BLD AUTO: 0.01 10*3/MM3 (ref 0–0.05)
IMM GRANULOCYTES NFR BLD AUTO: 0.2 % (ref 0–0.5)
LYMPHOCYTES # BLD AUTO: 0.64 10*3/MM3 (ref 0.7–3.1)
LYMPHOCYTES NFR BLD AUTO: 11.6 % (ref 19.6–45.3)
M PNEUMO IGG SER IA-ACNC: NOT DETECTED
MCH RBC QN AUTO: 28.2 PG (ref 26.6–33)
MCHC RBC AUTO-ENTMCNC: 31.6 G/DL (ref 31.5–35.7)
MCV RBC AUTO: 89 FL (ref 79–97)
MONOCYTES # BLD AUTO: 0.5 10*3/MM3 (ref 0.1–0.9)
MONOCYTES NFR BLD AUTO: 9.1 % (ref 5–12)
NEUTROPHILS NFR BLD AUTO: 4.16 10*3/MM3 (ref 1.7–7)
NEUTROPHILS NFR BLD AUTO: 75.6 % (ref 42.7–76)
NRBC BLD AUTO-RTO: 0 /100 WBC (ref 0–0.2)
PLATELET # BLD AUTO: 111 10*3/MM3 (ref 140–450)
PMV BLD AUTO: 10.3 FL (ref 6–12)
POTASSIUM SERPL-SCNC: 4.3 MMOL/L (ref 3.5–5.2)
RBC # BLD AUTO: 4.44 10*6/MM3 (ref 3.77–5.28)
RHINOVIRUS RNA SPEC NAA+PROBE: NOT DETECTED
RSV RNA NPH QL NAA+NON-PROBE: DETECTED
SARS-COV-2 RNA NPH QL NAA+NON-PROBE: NOT DETECTED
SODIUM SERPL-SCNC: 145 MMOL/L (ref 136–145)
WBC NRBC COR # BLD AUTO: 5.5 10*3/MM3 (ref 3.4–10.8)

## 2023-12-23 PROCEDURE — 99222 1ST HOSP IP/OBS MODERATE 55: CPT | Performed by: INTERNAL MEDICINE

## 2023-12-23 PROCEDURE — 85025 COMPLETE CBC W/AUTO DIFF WBC: CPT | Performed by: NURSE PRACTITIONER

## 2023-12-23 PROCEDURE — 99232 SBSQ HOSP IP/OBS MODERATE 35: CPT | Performed by: INTERNAL MEDICINE

## 2023-12-23 PROCEDURE — 80048 BASIC METABOLIC PNL TOTAL CA: CPT | Performed by: NURSE PRACTITIONER

## 2023-12-23 RX ADMIN — AMLODIPINE BESYLATE 5 MG: 5 TABLET ORAL at 09:13

## 2023-12-23 RX ADMIN — SERTRALINE 50 MG: 50 TABLET, FILM COATED ORAL at 09:13

## 2023-12-23 RX ADMIN — GABAPENTIN 300 MG: 300 CAPSULE ORAL at 20:48

## 2023-12-23 RX ADMIN — Medication 1 TABLET: at 09:13

## 2023-12-23 RX ADMIN — LISINOPRIL 20 MG: 20 TABLET ORAL at 09:12

## 2023-12-23 RX ADMIN — GABAPENTIN 300 MG: 300 CAPSULE ORAL at 09:13

## 2023-12-23 RX ADMIN — Medication 10 ML: at 09:14

## 2023-12-23 RX ADMIN — SENNOSIDES AND DOCUSATE SODIUM 2 TABLET: 8.6; 5 TABLET ORAL at 20:49

## 2023-12-23 RX ADMIN — ACETAMINOPHEN 650 MG: 325 TABLET ORAL at 10:24

## 2023-12-23 RX ADMIN — GUAIFENESIN 200 MG: 200 SOLUTION ORAL at 12:26

## 2023-12-23 RX ADMIN — BENZONATATE 200 MG: 100 CAPSULE ORAL at 10:24

## 2023-12-23 RX ADMIN — BUSPIRONE HYDROCHLORIDE 10 MG: 10 TABLET ORAL at 20:49

## 2023-12-23 RX ADMIN — BETHANECHOL CHLORIDE 10 MG: 10 TABLET ORAL at 20:49

## 2023-12-23 RX ADMIN — MEMANTINE 10 MG: 10 TABLET ORAL at 09:12

## 2023-12-23 RX ADMIN — MEMANTINE 10 MG: 10 TABLET ORAL at 20:49

## 2023-12-23 RX ADMIN — BETHANECHOL CHLORIDE 10 MG: 10 TABLET ORAL at 17:29

## 2023-12-23 RX ADMIN — GUAIFENESIN 200 MG: 200 SOLUTION ORAL at 20:51

## 2023-12-23 RX ADMIN — Medication 10 ML: at 20:50

## 2023-12-23 RX ADMIN — SENNOSIDES AND DOCUSATE SODIUM 2 TABLET: 8.6; 5 TABLET ORAL at 09:13

## 2023-12-23 RX ADMIN — POTASSIUM CHLORIDE 40 MEQ: 1500 TABLET, EXTENDED RELEASE ORAL at 03:01

## 2023-12-23 RX ADMIN — BENZONATATE 200 MG: 100 CAPSULE ORAL at 19:38

## 2023-12-23 RX ADMIN — BUSPIRONE HYDROCHLORIDE 10 MG: 10 TABLET ORAL at 09:12

## 2023-12-23 RX ADMIN — ACETAMINOPHEN 650 MG: 325 TABLET ORAL at 19:38

## 2023-12-23 RX ADMIN — BETHANECHOL CHLORIDE 10 MG: 10 TABLET ORAL at 09:13

## 2023-12-23 RX ADMIN — PANTOPRAZOLE SODIUM 40 MG: 40 TABLET, DELAYED RELEASE ORAL at 09:13

## 2023-12-23 NOTE — PLAN OF CARE
Goal Outcome Evaluation:   Pt up in bed, 2L/nc, paced on tele, notified  that pt would like to see a chaplain Tato to contact a [employer], updated POA, cont. POC.

## 2023-12-23 NOTE — H&P
[facility] Medicine Services  HISTORY AND PHYSICAL    Patient Name: Temi Jarrett  : 1930  MRN: [id number]  Primary Care Physician: Eric Patel MD  Date of admission: 2023    Subjective   Subjective     Chief Complaint:  Abnormal CXR     HPI:  Temi Jarrett is a 89 y.o. female with a past medical history significant for alzheimer's disease, anxiety, depression, essential hypertension, large hiatal hernia, hypothyroidism and arthritis presents to the ED from [facility] due to abnormal CXR.  Patients only complaint is a cough.   Chest Xray showed a possible mass in chest prompting arrival to the ED tonight.  Spoke to daughter in [place] and son, Victor M whom is POA that notes patient has been doing well other than a cough over the past couple of days which prompted the outpatient CXR.  Patient denies any recent shortness of air, fever, chills, nausea, vomiting, diarrhea, abdominal pain, dysuria, headache or melena. Son states she has had multiple colonoscopies in the past in which benign polyps were removed.  CT of chest with contrast was obtained in the ED tonight that shows findings compatible with metastatic disease to the lungs.  Multiple rounded lesions are visualized bilaterally along with a centrally enlarged necrotic left sided pre-vascular and hilar lymph nodes measuring up to 5 cm.  Findings compatible with metastatic disease.  Findings were discussed with patient and multiple family members.  Patient will be admitted to [facility] under the care of the Hospitalist for further evaluation and treatment.         Review of Systems   Constitutional:  Negative for activity change, appetite change, chills, diaphoresis, fatigue, fever and unexpected weight change.   HENT:  Positive for congestion. Negative for postnasal drip and sore throat.    Eyes:  Negative for photophobia and visual disturbance.   Respiratory:  Positive for cough. Negative for  shortness of breath.    Cardiovascular:  Negative for chest pain, palpitations and leg swelling.   Gastrointestinal:  Negative for abdominal distention, abdominal pain, blood in stool, constipation, diarrhea, nausea and vomiting.   Genitourinary:  Negative for difficulty urinating, dysuria, flank pain and genital sores.   Musculoskeletal:  Negative for neck pain and neck stiffness.   Skin: Negative.    Neurological:  Negative for dizziness, weakness, light-headedness, numbness and headaches.   Psychiatric/Behavioral: Negative.                  Personal History     Past Medical History:   Diagnosis Date    Acute sepsis 01/14/2021    Arthritis     Bladder prolapse, female, acquired     Closed fracture of neck of left femur 09/27/2019    Dementia     Depression     Disease of thyroid gland     Gastric polyp     GERD (gastroesophageal reflux disease)     Hiatal hernia     History of colonic polyps     Hyperlipidemia     Hypertension     IBS (irritable bowel syndrome)     Macular degeneration     Pacemaker 2023    Pericardial effusion 08/03/2020    Echo (8/3/2020): Normal LVEF.  Moderate pericardial effusion without tamponade.  Moderate MR. RVSP 49 mmHg    Torn meniscus     right knee          Oncology Problem List:  Metastatic disease (12/22/2023; Status: Active)  Oncology/Hematology History       Past Surgical History:   Procedure Laterality Date    CHOLECYSTECTOMY  1997    ENDOSCOPY N/A 01/14/2021    Procedure: ESOPHAGOGASTRODUODENOSCOPY;  Surgeon: Serjio Guerrero MD;  Location: [facility] ENDOSCOPY;  Service: General;  Laterality: N/A;    EYE SURGERY  1998    Cataract extraction    HERNIA REPAIR      HIP HEMIARTHROPLASTY Left 09/28/2019    Procedure: HIP HEMIARTHROPLASTY LEFT;  Surgeon: Reddy Segundo Jr., MD;  Location: [facility] OR;  Service: Orthopedics    HYSTERECTOMY  1976    KNEE SURGERY Right     arthroscopy- 2000    SKIN CANCER EXCISION Left     [facility]; CANCEROUS SPOT DIDN'T  MOVE  BUT NEEDED REMOVED    TONSILLECTOMY         Family History:  family history includes Breast cancer in her mother; Diabetes in her son; Hypertension in her father and mother; Obesity in her mother.     Social History:  reports that she has never smoked. She has never used smokeless tobacco. Drug use questions deferred to the physician. She reports that she does not drink alcohol.  Social History     Social History Narrative    Lives with son and daughter in law--  is at [facility] getting rehab       Medications:  Calcium Citrate-Vitamin D, Dimethicone, acetaminophen, amLODIPine, aspirin, bethanechol, busPIRone, diclofenac, donepezil, furosemide, gabapentin, guaifenesin, levothyroxine, lisinopril, loperamide, memantine, menthol-zinc oxide, multivitamin with minerals, nystatin, omeprazole, ondansetron, polyethylene glycol, sertraline, and traMADol    Allergies   Allergen Reactions    Augmentin [Amoxicillin-Pot Clavulanate] Diarrhea    Codeine Nausea Only     Trouble Breathing     Shrimp Hives    [place] Unknown - Low Severity       Objective   Objective     Vital Signs:   Temp:  [98.4 °F (36.9 °C)] 98.4 °F (36.9 °C)  Heart Rate:  [68-85] 68  Resp:  [16] 16  BP: (100-143)/(57-84) 129/57  Flow (L/min):  [2] 2    Physical Exam  Vitals and nursing note reviewed.   Constitutional:       General: She is not in acute distress.     Appearance: Normal appearance. She is not ill-appearing, toxic-appearing or diaphoretic.   HENT:      Head: Normocephalic and atraumatic.      Nose: Nose normal.      Mouth/Throat:      Mouth: Mucous membranes are dry.      Pharynx: Oropharynx is clear.   Eyes:      Extraocular Movements: Extraocular movements intact.      Conjunctiva/sclera: Conjunctivae normal.      Pupils: Pupils are equal, round, and reactive to light.   Cardiovascular:      Rate and Rhythm: Normal rate and regular rhythm.      Pulses: Normal pulses.      Heart sounds: Murmur heard.   Pulmonary:      Effort: Pulmonary  effort is normal. No respiratory distress.      Breath sounds: Stridor present. Wheezing present. No rhonchi or rales.   Chest:      Chest wall: No tenderness.   Abdominal:      General: Bowel sounds are normal. There is no distension.      Palpations: Abdomen is soft. There is no mass.      Tenderness: There is no abdominal tenderness. There is no right CVA tenderness, left CVA tenderness, guarding or rebound.      Hernia: A hernia is present.   Musculoskeletal:         General: No swelling, tenderness, deformity or signs of injury. Normal range of motion.      Cervical back: Normal range of motion and neck supple.      Right lower leg: No edema.      Left lower leg: No edema.   Skin:     General: Skin is warm and dry.   Neurological:      General: No focal deficit present.      Mental Status: She is alert and oriented to person, place, and time.   Psychiatric:         Mood and Affect: Mood normal.         Behavior: Behavior normal.         Thought Content: Thought content normal.            Result Review:  I have personally reviewed the results from the time of this admission to 12/22/2023 20:41 EST and agree with these findings:  [x]  Laboratory list / accordion  [x]  Microbiology  [x]  Radiology  [x]  EKG/Telemetry   []  Cardiology/Vascular   []  Pathology  []  Old records  []  Other:  Most notable findings include:     LAB RESULTS:      Lab 12/22/23  1507   WBC 6.71   HEMOGLOBIN 13.4   HEMATOCRIT 42.3   PLATELETS 157   NEUTROS ABS 5.14   IMMATURE GRANS (ABS) 0.02   LYMPHS ABS 0.78   MONOS ABS 0.55   EOS ABS 0.18   MCV 89.4         Lab 12/22/23  1715 12/22/23  1507   SODIUM  --  145   POTASSIUM  --  3.4*   CHLORIDE  --  102   CO2  --  31.0*   ANION GAP  --  12.0   BUN  --  17   CREATININE  --  0.68   EGFR  --  81.3   GLUCOSE  --  145*   CALCIUM  --  8.6   MAGNESIUM 1.8  --    PHOSPHORUS 3.4  --          Lab 12/22/23  1507   TOTAL PROTEIN 6.4   ALBUMIN 3.8   GLOBULIN 2.6   ALT (SGPT) 18   AST (SGOT) 27    BILIRUBIN 0.4   ALK PHOS 70   LIPASE 33         Lab 12/22/23  1715 12/22/23  1507   PROBNP  --  1,322.0   HSTROP T 28* 32*                 Brief Urine Lab Results  (Last result in the past 365 days)        Color   Clarity   Blood   Leuk Est   Nitrite   Protein   CREAT   Urine HCG        09/05/23 2112 Yellow   Cloudy     Small                     Microbiology Results (last 10 days)       ** No results found for the last 240 hours. **            CT Chest With Contrast Diagnostic    Result Date: 12/22/2023  CT CHEST W CONTRAST DIAGNOSTIC Date of Exam: 12/22/2023 4:15 PM EST Indication: abnl mass on XR. Comparison: Chest radiograph performed on December 22, 2023. Noncontrast chest CT performed on January 14, 2021 Technique: Axial CT images were obtained of the chest after the uneventful intravenous administration of Isovue-300, 80 mL.  Reconstructed coronal and sagittal images were also obtained. Automated exposure control and iterative construction methods were used. Findings: Thyroid: The visualized portion of the thyroid is unremarkable. [place], Mediastinum, Heart, and Great vessels: Markedly enlarged centrally necrotic left-sided prevascular and hilar lymph nodes are visualized. Representative left prevascular lymph node measures 5 x 4.9 cm (series 2, image 33). Very large hiatal hernia is visualized containing the majority of the stomach and multiple nondilated small bowel loops as well as the pancreatic body and tail. The heart is moderately enlarged. Patient is post intracardiac pacemaker placement in the right ventricle. No pericardial effusion. The thoracic aorta is normal in caliber and contour. Mild atheromatous changes are visualized. There is enlargement of the main pulmonary artery measuring 4.8 cm. No filling defect is visualized to suggest pulmonary embolism. Lung Parenchyma and Pleura: Multiple rounded lesions are visualized bilaterally. Largest of these lesions is in the medial right lower lobe  "measuring 1.5 cm. Mild bilateral dependent atelectasis visualized. No significant pleural effusion.  Central airways are patent. Upper Abdomen: No acute process. Soft tissues: Unremarkable. Osseous structures: No aggressive focal lytic or sclerotic osseous lesions.[No acute fracture.] There is exaggerated thoracic kyphosis. Mild degenerative changes of the thoracic spine are visualized.     Impression: Impression: Findings compatible with metastatic disease to the lungs. Multiple rounded lesions are visualized bilaterally measuring up to 1.4 cm. Markedly enlarged and centrally necrotic left-sided prevascular and hilar lymph nodes measuring up to 5 cm. Findings compatible with metastatic disease. Enlargement of the main pulmonary artery measuring 4.8 cm. Finding compatible with pulmonary arterial hypertension. Very large hiatal hernia containing the majority of the stomach, nondilated small bowel loops, pancreatic body and tail. Moderate cardiomegaly. Electronically Signed: Bay Duong MD  12/22/2023 4:32 PM EST  Workstation ID: [id number]    XR Chest 1 View    Result Date: 12/22/2023  XR CHEST 1 VW Date of Exam: 12/22/2023 3:23 PM EST Indication: Chest Pain Triage Protocol Comparison: Most recent comparison study is the 1/14/2021 chest CT scan most recent chest radiograph is from 8/14/2020 Findings: There appears to be an intracardiac pacemaker. Heart shadow is partly obscured by the patient's massive hiatal hernia, essentially an intrathoracic stomach, but the heart appears to be enlarged. The vasculature is cephalized and there is probably mild interstitial edema. There is left basilar atelectasis and effusion. New rounded 7.5 cm \"mass \"of the left suprahilar region does not have any corresponding abnormality on the prior CT scan, and appears to lie well above the level of the hiatal hernia. Significance is uncertain, and chest CT scan evaluation is suggested.     Impression: Impression: 1. Mild CHF. 2. Moderate " left basilar atelectasis and effusion. 3. Massive hiatal hernia as on prior studies. 4. New approximately 7.5 cm left suprahilar/mediastinal mass compared to prior studies. Consider chest CT scan. Electronically Signed: Joni Lagos MD  12/22/2023 3:53 PM EST  Workstation ID: [id number]     Results for orders placed during the hospital encounter of 08/02/20    Transthoracic Echo Complete With Contrast if Necessary Per Protocol    Interpretation Summary  · Left ventricular systolic function is normal. Estimated EF = 70%.  · Left ventricular diastolic dysfunction (grade I) consistent with impaired relaxation.  · Left atrial cavity size is moderately dilated.  · There is calcification of the aortic valve. There is no significant stenosis or regurgitation. A Lambel's excrescence is noted on an aortic valve leaflet.  · Moderate mitral valve regurgitation is present.  · Estimated right ventricular systolic pressure from tricuspid regurgitation is moderately elevated (49 mmHg).  · There is a moderate (1-2cm) circumferential pericardial effusion. There is no tamponade physiology.      Assessment & Plan   Assessment & Plan       Metastatic disease    Hypothyroidism (acquired)    Anxiety with depression    Essential hypertension    Hiatal hernia    Alzheimer's disease    89 year old female presents to the ED from [facility] via EMS after an abnormal CXR.       1) Metastatic disease   -CXR mentioned above  -CT chest with contrast shows findings compatible with metastatic disease to the lungs.  Multiple rounded lesions are bilaterally measuring up to 1.4 cm.  Markedly enlarged and centrally necrotic left sided pre vascular and hilar lymph nodes measuring up to 5 cm.  Findings compatible with metastatic disease.  Enlargement of the main pulmonary artery measuring 4.8 cm.  Findings consistent with pulmonary artery hypertension.  Very large hiatal hernia containing majority of the stomach, non dilated small bowel loops,  pancreatic body and tail.  Moderate cardiomegaly noted.   -obtain CT abdomen and pelvis with contrast tonight   -consult hematology/oncology in am   -NPO after midnight for possible biopsies  -discussed all findings with POA and patient.        2)Hypokalemia  -replace per protocol  -check mag   -telemetry monitoring     3) Hypothyroidism  -continue synthroid     4) GERD  -PPI     5) Alzheimer's disease   -continue namenda, aricept    6) Essential hypertension   -on norvasc, lisinopril     7) Anxiety/depression   -on zoloft, buspar     8) Large Hiatal hernia  -CT with contrast mentioned above  -CT of abdomen and pelvis pending       DVT prophylaxis:  scds    CODE STATUS:  Full Code        Expected Discharge   TBD   This note has been completed as part of a split-shared workflow.     Signature: Electronically signed by MAIDA Rodriguez, 12/22/23, 9:55 PM EST.  Patient seen and examined at the bedside.  Patient is a 89-year-old  female with past medical history significant for Alzheimer's dementia, hypertension, hypothyroidism.  Patient is a resident of [facility], [place].  Patient has had persistent cough for several days now which is prompted chest x-ray evaluation.  This study was suspicious for malignancy and patient was referred to ER.  Here at the emergency room CT scan of the chest was done which shows evidence of metastatic disease to the lung.  There is evidence of a lung mass and multiple other lesions.  Evidently patient does not have any recent history of fever or chills.    Constitutional: No acute distress, awake, alert  HENT: NCAT, mucous membranes moist  Respiratory: Harsh breath sounds bilaterally, severe cough, respiratory effort normal   Cardiovascular: RRR, no murmurs, rubs, or gallops  Gastrointestinal: Positive bowel sounds, soft, nontender, nondistended  Musculoskeletal: No bilateral ankle edema, no muscle mass  Psychiatric: Appropriate affect, cooperative  Neurologic:, Alert,  follows commands  skin: No rashes     Assessment and plan:  Patient is a very pleasant 89-year-old  female, resident of [facility], past medical history significant for Alzheimer's dementia, hypertension, hypothyroidism.  Patient has had persistent cough for the past several days which prompted x-ray evaluation.  Patient was sent to the emergency room for evaluation for possible malignancy.  CT scan of the chest shows evidence of metastatic disease.  Please see the above for more details.  Will admit the patient as inpatient.

## 2023-12-23 NOTE — PLAN OF CARE
Goal Outcome Evaluation:                 Arrived from ED on 2L O2, no distress, V paced, bp wnl, family at bedside

## 2023-12-23 NOTE — PLAN OF CARE
Goal Outcome Evaluation:                 VSS, 2LNC, afebrile. Denies pain. Resp Panel positive for RSV. Continue POC.

## 2023-12-23 NOTE — PROGRESS NOTES
[facility] Medicine Services  PROGRESS NOTE    Patient Name: Temi Jarrett  : 1930  MRN: [id number]    Date of Admission: 2023  Primary Care Physician: Eric Patel MD    Subjective   Subjective     CC:  Cough    HPI:  No acute events. Oriented x 3. States she feels ok. States her cough is gone.  Reviewed imaging findings with patient. Reviewed RSV. She asked me what would happen if we just left things alone regarding her cancer. Oncology discussed no treatment options and she did not want to pursue diagnosis. Reviewed this with her son as well. Reviewed RSV. Answered all questions to the best of my ability.       Objective   Objective     Vital Signs:   Temp:  [97.3 °F (36.3 °C)-98.4 °F (36.9 °C)] 97.3 °F (36.3 °C)  Heart Rate:  [61-85] 79  Resp:  [16-18] 18  BP: (100-158)/(57-93) 158/93  Flow (L/min):  [2] 2     Physical Exam:  Constitutional: No acute distress, awake, alert; frail  HENT: NCAT, mucous membranes moist  Respiratory: diminished; no cough  Cardiovascular: RRR, II/VI murmur   Gastrointestinal: Positive bowel sounds, soft, nontender, nondistended  Musculoskeletal: No bilateral ankle edema  Psychiatric: Appropriate affect, cooperative  Neurologic: Oriented x 3, strength symmetric in all extremities, Cranial Nerves grossly intact to confrontation, speech clear  Skin: No rashes    Results Reviewed:  LAB RESULTS:      Lab 23  0840 23  1507   WBC 5.50 6.71   HEMOGLOBIN 12.5 13.4   HEMATOCRIT 39.5 42.3   PLATELETS 111* 157   NEUTROS ABS 4.16 5.14   IMMATURE GRANS (ABS) 0.01 0.02   LYMPHS ABS 0.64* 0.78   MONOS ABS 0.50 0.55   EOS ABS 0.17 0.18   MCV 89.0 89.4         Lab 23  0840 23  1715 23  1507   SODIUM 145  --  145   POTASSIUM 4.3  --  3.4*   CHLORIDE 105  --  102   CO2 31.0*  --  31.0*   ANION GAP 9.0  --  12.0   BUN 12  --  17   CREATININE 0.44*  --  0.68   EGFR 90.3  --  81.3   GLUCOSE 83  --  145*   CALCIUM 8.2  --  8.6    MAGNESIUM  --  1.8  --    PHOSPHORUS  --  3.4  --          Lab 12/22/23  1507   TOTAL PROTEIN 6.4   ALBUMIN 3.8   GLOBULIN 2.6   ALT (SGPT) 18   AST (SGOT) 27   BILIRUBIN 0.4   ALK PHOS 70   LIPASE 33         Lab 12/22/23  1715 12/22/23  1507   PROBNP  --  1,322.0   HSTROP T 28* 32*                 Brief Urine Lab Results  (Last result in the past 365 days)        Color   Clarity   Blood   Leuk Est   Nitrite   Protein   CREAT   Urine HCG        09/05/23 2112 Yellow   Cloudy     Small                       Microbiology Results Abnormal       None            CT Abdomen Pelvis With Contrast    Result Date: 12/22/2023  CT ABDOMEN PELVIS W CONTRAST Date of Exam: 12/22/2023 10:26 PM EST Indication: Metastasis to lungs, unknown primary. Comparison: Contrast-enhanced CT of the abdomen and pelvis performed on October 24, 2023 Technique: Axial CT images were obtained of the abdomen and pelvis following the uneventful intravenous administration of Isovue-300, 50 mL. Reconstructed coronal and sagittal images were also obtained. Automated exposure control and iterative construction methods were used. Findings: Lung Bases: Very large hiatal hernia containing the majority of the stomach, multiple nondilated small bowel loops, and the pancreatic body and tail. Peritoneum: No free intraperitoneal air or fluid. Abdominal wall: Unremarkable. Liver: Liver is normal in size and contour. No focal lesions. The portal vein is patent. Biliary/Gallbladder: The gallbladder is surgically absent. The biliary tree is nondilated. Pancreas: Pancreas is within normal limits. There is no evidence of pancreatic mass or peripancreatic fluid. Spleen: Spleen is normal in size and contour. Gastrointestinal/Mesentery: No evidence of bowel obstruction or gross inflammatory changes.the appendix is not visualized. There are no secondary signs of acute appendicitis. No mesenteric fluid collections identified. Adrenals: Adrenal glands are unremarkable.  Kidneys: The kidneys are in anatomic position. No definite evidence of nephrolithiasis. Excreted IV contrast is visualized in the urinary collecting system limiting evaluation for small calculi. No evidence of hydronephrosis or perinephric fat stranding. 5.3 cm right upper pole renal cyst is visualized. 3.9 cm right lower pole renal cyst is visualized. 4 cm left upper pole renal cyst is visualized. No solid renal masses visualized. Bladder: There is diffuse urinary bladder wall thickening and multiple small diverticula of the urinary bladder. Reproductive organs:  The patient is post hysterectomy. Retroperitoneal/Lymph Nodes: No retroperitoneal adenopathy is identified. Vasculature: Moderate to advanced atheromatous changes of the aortoiliac system. The aorta is normal in caliber.   Bony Structures:  No acute fracture or aggressive lesions. Osseous structures are osteopenic. Patient is post left hip arthroplasty.     Impression: Impression: No acute intra-abdominal process evident. Very large hiatal hernia containing the stomach, multiple nondilated small bowel loops, and the pancreatic body and tail. Post cholecystectomy. Diffuse urinary bladder wall thickening and multiple small urinary bladder diverticula. Finding may be secondary to chronic outlet obstruction or urinary retention. Additional findings per body of the report. Electronically Signed: Bay Duong MD  12/22/2023 10:48 PM EST  Workstation ID: [id number]    CT Chest With Contrast Diagnostic    Result Date: 12/22/2023  CT CHEST W CONTRAST DIAGNOSTIC Date of Exam: 12/22/2023 4:15 PM EST Indication: abnl mass on XR. Comparison: Chest radiograph performed on December 22, 2023. Noncontrast chest CT performed on January 14, 2021 Technique: Axial CT images were obtained of the chest after the uneventful intravenous administration of Isovue-300, 80 mL.  Reconstructed coronal and sagittal images were also obtained. Automated exposure control and iterative  construction methods were used. Findings: Thyroid: The visualized portion of the thyroid is unremarkable. Hilum, Mediastinum, Heart, and Great vessels: Markedly enlarged centrally necrotic left-sided prevascular and hilar lymph nodes are visualized. Representative left prevascular lymph node measures 5 x 4.9 cm (series 2, image 33). Very large hiatal hernia is visualized containing the majority of the stomach and multiple nondilated small bowel loops as well as the pancreatic body and tail. The heart is moderately enlarged. Patient is post intracardiac pacemaker placement in the right ventricle. No pericardial effusion. The thoracic aorta is normal in caliber and contour. Mild atheromatous changes are visualized. There is enlargement of the main pulmonary artery measuring 4.8 cm. No filling defect is visualized to suggest pulmonary embolism. Lung Parenchyma and Pleura: Multiple rounded lesions are visualized bilaterally. Largest of these lesions is in the medial right lower lobe measuring 1.5 cm. Mild bilateral dependent atelectasis visualized. No significant pleural effusion.  Central airways are patent. Upper Abdomen: No acute process. Soft tissues: Unremarkable. Osseous structures: No aggressive focal lytic or sclerotic osseous lesions.[No acute fracture.] There is exaggerated thoracic kyphosis. Mild degenerative changes of the thoracic spine are visualized.     Impression: Impression: Findings compatible with metastatic disease to the lungs. Multiple rounded lesions are visualized bilaterally measuring up to 1.4 cm. Markedly enlarged and centrally necrotic left-sided prevascular and hilar lymph nodes measuring up to 5 cm. Findings compatible with metastatic disease. Enlargement of the main pulmonary artery measuring 4.8 cm. Finding compatible with pulmonary arterial hypertension. Very large hiatal hernia containing the majority of the stomach, nondilated small bowel loops, pancreatic body and tail. Moderate  "cardiomegaly. Electronically Signed: Bay Duong MD  12/22/2023 4:32 PM EST  Workstation ID: [id number]    XR Chest 1 View    Result Date: 12/22/2023  XR CHEST 1 VW Date of Exam: 12/22/2023 3:23 PM EST Indication: Chest Pain Triage Protocol Comparison: Most recent comparison study is the 1/14/2021 chest CT scan most recent chest radiograph is from 8/14/2020 Findings: There appears to be an intracardiac pacemaker. Heart shadow is partly obscured by the patient's massive hiatal hernia, essentially an intrathoracic stomach, but the heart appears to be enlarged. The vasculature is cephalized and there is probably mild interstitial edema. There is left basilar atelectasis and effusion. New rounded 7.5 cm \"mass \"of the left suprahilar region does not have any corresponding abnormality on the prior CT scan, and appears to lie well above the level of the hiatal hernia. Significance is uncertain, and chest CT scan evaluation is suggested.     Impression: Impression: 1. Mild CHF. 2. Moderate left basilar atelectasis and effusion. 3. Massive hiatal hernia as on prior studies. 4. New approximately 7.5 cm left suprahilar/mediastinal mass compared to prior studies. Consider chest CT scan. Electronically Signed: Joni Lagos MD  12/22/2023 3:53 PM EST  Workstation ID: [id number]     Results for orders placed during the hospital encounter of 08/02/20    Transthoracic Echo Complete With Contrast if Necessary Per Protocol    Interpretation Summary  · Left ventricular systolic function is normal. Estimated EF = 70%.  · Left ventricular diastolic dysfunction (grade I) consistent with impaired relaxation.  · Left atrial cavity size is moderately dilated.  · There is calcification of the aortic valve. There is no significant stenosis or regurgitation. A Lambel's excrescence is noted on an aortic valve leaflet.  · Moderate mitral valve regurgitation is present.  · Estimated right ventricular systolic pressure from tricuspid regurgitation is " moderately elevated (49 mmHg).  · There is a moderate (1-2cm) circumferential pericardial effusion. There is no tamponade physiology.      Current medications:  Scheduled Meds:amLODIPine, 5 mg, Oral, Daily  bethanechol, 10 mg, Oral, TID  busPIRone, 10 mg, Oral, BID  gabapentin, 300 mg, Oral, BID  levothyroxine, 150 mcg, Oral, Q AM  lisinopril, 20 mg, Oral, Daily  memantine, 10 mg, Oral, BID  multivitamin with minerals, 1 tablet, Oral, Daily  pantoprazole, 40 mg, Oral, Daily  senna-docusate sodium, 2 tablet, Oral, BID  sertraline, 50 mg, Oral, Daily  sodium chloride, 10 mL, Intravenous, Q12H      Continuous Infusions:   PRN Meds:.  acetaminophen **OR** acetaminophen **OR** acetaminophen    benzonatate    senna-docusate sodium **AND** polyethylene glycol **AND** bisacodyl **AND** bisacodyl    Calcium Replacement - Follow Nurse / BPA Driven Protocol    guaifenesin    Magnesium Standard Dose Replacement - Follow Nurse / BPA Driven Protocol    Phosphorus Replacement - Follow Nurse / BPA Driven Protocol    Potassium Replacement - Follow Nurse / BPA Driven Protocol    sodium chloride    traMADol    Assessment & Plan   Assessment & Plan     Active Hospital Problems    Diagnosis  POA    **Metastatic disease [C79.9]  Yes    Alzheimer's disease [G30.9, F02.80]  Yes    Hiatal hernia [K44.9]  Yes    Anxiety with depression [F41.8]  Yes    Essential hypertension [I10]  Yes    Hypothyroidism (acquired) [E03.9]  Yes      Resolved Hospital Problems   No resolved problems to display.        Brief Hospital Course to date:  89 year old female presented with HTN, alzheimers, hiatal hernia, hypothryoid to the ED from [facility] via EMS after an abnormal CXR. Patient had a CXR for cough and was noted to have a new 7.5 cm left suprahilar/mediastinal mass. CT scan with findings compatible with metastatic disease to the lung. Multiple round lung nodules with enlaged centrally necrotic left sided prevascular and hilar lymph nodes  measuring up to 5 cm.  Patient was also noted to be RSV +. Oncology was consulted and after discussion with the patient, decision was made to not pursue diagnosis because she has no treatment options due to functional status and age.      Metastatic disease   -CXR mentioned above  -CT chest with contrast shows findings compatible with metastatic disease to the lungs.  Multiple rounded lesions are bilaterally measuring up to 1.4 cm.  Markedly enlarged and centrally necrotic left sided pre vascular and hilar lymph nodes measuring up to 5 cm.  Findings compatible with metastatic disease.  Enlargement of the main pulmonary artery measuring 4.8 cm.  Findings consistent with pulmonary artery hypertension.  Very large hiatal hernia containing majority of the stomach, non dilated small bowel loops, pancreatic body and tail.  Moderate cardiomegaly noted.   -obtain CT abdomen and pelvis with contrast with no acute process identified  - Evaluated by oncology - no treatment options due to age and functional status. This was discussed with patient and she decided against pursuing any additional workup. This was also discussed with POA.     RSV  - noted on resp PCR. Likely causing her acute symptoms at this time.   - Supportive care      Hypokalemia  -replace per protocol     Hypothyroidism  -continue synthroid      GERD  -PPI      Alzheimer's disease   -continue namenda, aricept  - Currently alert and oriented x 3     Essential hypertension   - continue norvasc, lisinopril      Anxiety/depression   - continue zoloft, buspar      Large Hiatal hernia  - CT with large hiatal hernia containing stomach, bowel and pancreatic body and tail  - Currently asymptomatic      Expected Discharge Location and Transportation: PT/OT pending   Expected Discharge   Expected Discharge Date: 12/24/2023; Expected Discharge Time:      DVT prophylaxis:  Mechanical DVT prophylaxis orders are present.     AM-PAC 6 Clicks Score (PT): 11 (12/22/23  2227)    CODE STATUS:   Code Status and Medical Interventions:   Ordered at: 12/22/23 8007     Level Of Support Discussed With:    Patient     Code Status (Patient has no pulse and is not breathing):    CPR (Attempt to Resuscitate)     Medical Interventions (Patient has pulse or is breathing):    Full Support       Amanda Claudio, DO  12/23/23

## 2023-12-23 NOTE — CONSULTS
HEMATOLOGY/ONCOLOGY INPATIENT CONSULTATION      REFERRING PHYSICIAN: Amanda Claudio DO    PRIMARY CARE PROVIDER: Eric Patel MD    REASON FOR CONSULTATION: Lung lesions concerning for malignancy      HISTORY OF PRESENT ILLNESS: 89-year-old lady with RSV presents to the hospital with shortness of breath.  Patient had imaging done while she was in the hospital and was found to have incidental findings of small lung lesions bilaterally.  She is fairly frail.  She denies any changes in her overall health recently.  Obviously she is in the hospital for her RSV infection which seems to be much better.  She is profoundly weak.  She does require assistance to get around.      Allergies   Allergen Reactions    Augmentin [Amoxicillin-Pot Clavulanate] Diarrhea    Codeine Nausea Only     Trouble Breathing     Shrimp Hives    Chalmers Unknown - Low Severity       Past Medical History:   Diagnosis Date    Acute sepsis 01/14/2021    Arthritis     Bladder prolapse, female, acquired     Closed fracture of neck of left femur 09/27/2019    Dementia     Depression     Disease of thyroid gland     Gastric polyp     GERD (gastroesophageal reflux disease)     Hiatal hernia     History of colonic polyps     Hyperlipidemia     Hypertension     IBS (irritable bowel syndrome)     Macular degeneration     Pacemaker 2023    Pericardial effusion 08/03/2020    Echo (8/3/2020): Normal LVEF.  Moderate pericardial effusion without tamponade.  Moderate MR. RVSP 49 mmHg    Torn meniscus     right knee          Current Facility-Administered Medications:     acetaminophen (TYLENOL) tablet 650 mg, 650 mg, Oral, Q4H PRN, 650 mg at 12/23/23 1024 **OR** acetaminophen (TYLENOL) 160 MG/5ML oral solution 650 mg, 650 mg, Oral, Q4H PRN **OR** acetaminophen (TYLENOL) suppository 650 mg, 650 mg, Rectal, Q4H PRN, Christine, Savannah, APRN    amLODIPine (NORVASC) tablet 5 mg, 5 mg, Oral, Daily, Christine, Savannah, APRN, 5 mg at 12/23/23 0913    benzonatate  (TESSALON) capsule 200 mg, 200 mg, Oral, TID PRN, Christine, Savannah, APRN, 200 mg at 12/23/23 1024    bethanechol (URECHOLINE) tablet 10 mg, 10 mg, Oral, TID, Christine, Savannah, APRN, 10 mg at 12/23/23 0913    sennosides-docusate (PERICOLACE) 8.6-50 MG per tablet 2 tablet, 2 tablet, Oral, BID, 2 tablet at 12/23/23 0913 **AND** polyethylene glycol (MIRALAX) packet 17 g, 17 g, Oral, Daily PRN **AND** bisacodyl (DULCOLAX) EC tablet 5 mg, 5 mg, Oral, Daily PRN **AND** bisacodyl (DULCOLAX) suppository 10 mg, 10 mg, Rectal, Daily PRN, Christine, Savannah, APRN    busPIRone (BUSPAR) tablet 10 mg, 10 mg, Oral, BID, Christine, Savannah, APRN, 10 mg at 12/23/23 0912    Calcium Replacement - Follow Nurse / BPA Driven Protocol, , Does not apply, PRN, Christine, Savannah, APRN    gabapentin (NEURONTIN) capsule 300 mg, 300 mg, Oral, BID, Christine, Savannah, APRN, 300 mg at 12/23/23 0913    guaifenesin (ROBITUSSIN) 100 MG/5ML liquid 200 mg, 200 mg, Oral, Q4H PRN, Christine, Savannah, APRN    levothyroxine (SYNTHROID, LEVOTHROID) tablet 150 mcg, 150 mcg, Oral, Q AM, Christine, Savannah, APRN    lisinopril (PRINIVIL,ZESTRIL) tablet 20 mg, 20 mg, Oral, Daily, Christine, Savannah, APRN, 20 mg at 12/23/23 0912    Magnesium Standard Dose Replacement - Follow Nurse / BPA Driven Protocol, , Does not apply, PRN, Christine, Savannah, APRN    memantine (NAMENDA) tablet 10 mg, 10 mg, Oral, BID, Christine, Savannah, APRN, 10 mg at 12/23/23 0912    multivitamin with minerals 1 tablet, 1 tablet, Oral, Daily, Christine, Savannah, APRN, 1 tablet at 12/23/23 0913    pantoprazole (PROTONIX) EC tablet 40 mg, 40 mg, Oral, Daily, Christine, Savannah, APRN, 40 mg at 12/23/23 0913    Phosphorus Replacement - Follow Nurse / BPA Driven Protocol, , Does not apply, PRN, Christine, Savannah, APRN    Potassium Replacement - Follow Nurse / BPA Driven Protocol, , Does not apply, PRN, Christine, Savannah, APRN    sertraline (ZOLOFT) tablet 50 mg, 50 mg, Oral, Daily, Christine, Savannah, APRN, 50 mg at  12/23/23 0913    sodium chloride 0.9 % flush 10 mL, 10 mL, Intravenous, PRN, Jagdish Mclaughlin,     sodium chloride 0.9 % flush 10 mL, 10 mL, Intravenous, Q12H, Christine, Savannah, APRN, 10 mL at 12/23/23 0914    traMADol (ULTRAM) tablet 50 mg, 50 mg, Oral, Daily PRN, Christine, Savannah, APRN    Past Surgical History:   Procedure Laterality Date    CHOLECYSTECTOMY  1997    ENDOSCOPY N/A 01/14/2021    Procedure: ESOPHAGOGASTRODUODENOSCOPY;  Surgeon: Serjio Guerrero MD;  Location:  [facility];  Service: General;  Laterality: N/A;    EYE SURGERY  1998    Cataract extraction    HERNIA REPAIR      HIP HEMIARTHROPLASTY Left 09/28/2019    Procedure: HIP HEMIARTHROPLASTY LEFT;  Surgeon: Reddy Segundo Jr., MD;  Location:  [facility];  Service: Orthopedics    HYSTERECTOMY  1976    KNEE SURGERY Right     arthroscopy- 2000    SKIN CANCER EXCISION Left     [facility]; CANCEROUS SPOT DIDN'T  MOVE BUT NEEDED REMOVED    TONSILLECTOMY         Social History     Socioeconomic History    Marital status:    Tobacco Use    Smoking status: Never    Smokeless tobacco: Never   Vaping Use    Vaping Use: Never used   Substance and Sexual Activity    Alcohol use: No    Drug use: Never    Sexual activity: Defer       Family History   Problem Relation Age of Onset    Hypertension Mother     Breast cancer Mother     Obesity Mother     Hypertension Father     Diabetes Son        Oncology/Hematology History    No history exists.         REVIEW OF SYSTEMS:  A 14 point review of systems was performed and is negative except as noted below.    Review of Systems   Constitutional:  Positive for appetite change and fatigue.   HENT:  Negative.     Eyes: Negative.    Respiratory:  Positive for cough and shortness of breath.    Cardiovascular: Negative.    Endocrine: Negative.    Skin: Negative.    Neurological:  Positive for extremity weakness.   Hematological: Negative.    Psychiatric/Behavioral: Negative.            Objective     Vitals:    12/23/23 0417 12/23/23 0548 12/23/23 0706 12/23/23 1051   BP: 145/82 128/85 158/93 164/91   BP Location: Right arm Right arm Right arm Right arm   Patient Position: Lying Lying Lying Lying   Pulse: 71 79     Resp: 18 18 18 18   Temp: 97.8 °F (36.6 °C) 97.3 °F (36.3 °C)     TempSrc: Axillary Axillary     SpO2: 96% 96%     Weight:       Height:                       Temp:  [97.3 °F (36.3 °C)-98.4 °F (36.9 °C)] 97.3 °F (36.3 °C)     Performance Status: 3    Physical Exam    General: Elderly frail female in no acute distress  HEENT: sclerae anicteric,  Lymphatics: no cervical, supraclavicular, or axillary adenopathy  Cardiovascular: regular rate and rhythm, no murmurs  Lungs: clear to auscultation bilaterally  Abdomen: soft, nontender, nondistended.  No palpable organomegaly  Extremities: no lower extremity edema  Skin: no rashes, lesions, bruising, or petechiae      LABS:    Lab Results   Component Value Date    HGB 12.5 12/23/2023    HCT 39.5 12/23/2023    MCV 89.0 12/23/2023     (L) 12/23/2023    WBC 5.50 12/23/2023    NEUTROABS 4.16 12/23/2023    LYMPHSABS 0.64 (L) 12/23/2023    MONOSABS 0.50 12/23/2023    EOSABS 0.17 12/23/2023    BASOSABS 0.02 12/23/2023     Lab Results   Component Value Date    GLUCOSE 83 12/23/2023    BUN 12 12/23/2023    CREATININE 0.44 (L) 12/23/2023     12/23/2023    K 4.3 12/23/2023     12/23/2023    CO2 31.0 (H) 12/23/2023    CALCIUM 8.2 12/23/2023    PROTEINTOT 6.4 12/22/2023    ALBUMIN 3.8 12/22/2023    BILITOT 0.4 12/22/2023    ALKPHOS 70 12/22/2023    AST 27 12/22/2023    ALT 18 12/22/2023         IMAGING    CT Abdomen Pelvis With Contrast    Result Date: 12/22/2023  CT ABDOMEN PELVIS W CONTRAST Date of Exam: 12/22/2023 10:26 PM EST Indication: Metastasis to lungs, unknown primary. Comparison: Contrast-enhanced CT of the abdomen and pelvis performed on October 24, 2023 Technique: Axial CT images were obtained of the abdomen and pelvis  following the uneventful intravenous administration of Isovue-300, 50 mL. Reconstructed coronal and sagittal images were also obtained. Automated exposure control and iterative construction methods were used. Findings: Lung Bases: Very large hiatal hernia containing the majority of the stomach, multiple nondilated small bowel loops, and the pancreatic body and tail. Peritoneum: No free intraperitoneal air or fluid. Abdominal wall: Unremarkable. Liver: Liver is normal in size and contour. No focal lesions. The portal vein is patent. Biliary/Gallbladder: The gallbladder is surgically absent. The biliary tree is nondilated. Pancreas: Pancreas is within normal limits. There is no evidence of pancreatic mass or peripancreatic fluid. Spleen: Spleen is normal in size and contour. Gastrointestinal/Mesentery: No evidence of bowel obstruction or gross inflammatory changes.the appendix is not visualized. There are no secondary signs of acute appendicitis. No mesenteric fluid collections identified. Adrenals: Adrenal glands are unremarkable. Kidneys: The kidneys are in anatomic position. No definite evidence of nephrolithiasis. Excreted IV contrast is visualized in the urinary collecting system limiting evaluation for small calculi. No evidence of hydronephrosis or perinephric fat stranding. 5.3 cm right upper pole renal cyst is visualized. 3.9 cm right lower pole renal cyst is visualized. 4 cm left upper pole renal cyst is visualized. No solid renal masses visualized. Bladder: There is diffuse urinary bladder wall thickening and multiple small diverticula of the urinary bladder. Reproductive organs:  The patient is post hysterectomy. Retroperitoneal/Lymph Nodes: No retroperitoneal adenopathy is identified. Vasculature: Moderate to advanced atheromatous changes of the aortoiliac system. The aorta is normal in caliber.   Bony Structures:  No acute fracture or aggressive lesions. Osseous structures are osteopenic. Patient is post  left hip arthroplasty.     Impression: No acute intra-abdominal process evident. Very large hiatal hernia containing the stomach, multiple nondilated small bowel loops, and the pancreatic body and tail. Post cholecystectomy. Diffuse urinary bladder wall thickening and multiple small urinary bladder diverticula. Finding may be secondary to chronic outlet obstruction or urinary retention. Additional findings per body of the report. Electronically Signed: Bay Duong MD  12/22/2023 10:48 PM EST  Workstation ID: [id number]    CT Chest With Contrast Diagnostic    Result Date: 12/22/2023  CT CHEST W CONTRAST DIAGNOSTIC Date of Exam: 12/22/2023 4:15 PM EST Indication: abnl mass on XR. Comparison: Chest radiograph performed on December 22, 2023. Noncontrast chest CT performed on January 14, 2021 Technique: Axial CT images were obtained of the chest after the uneventful intravenous administration of Isovue-300, 80 mL.  Reconstructed coronal and sagittal images were also obtained. Automated exposure control and iterative construction methods were used. Findings: Thyroid: The visualized portion of the thyroid is unremarkable. [place], Mediastinum, Heart, and Great vessels: Markedly enlarged centrally necrotic left-sided prevascular and hilar lymph nodes are visualized. Representative left prevascular lymph node measures 5 x 4.9 cm (series 2, image 33). Very large hiatal hernia is visualized containing the majority of the stomach and multiple nondilated small bowel loops as well as the pancreatic body and tail. The heart is moderately enlarged. Patient is post intracardiac pacemaker placement in the right ventricle. No pericardial effusion. The thoracic aorta is normal in caliber and contour. Mild atheromatous changes are visualized. There is enlargement of the main pulmonary artery measuring 4.8 cm. No filling defect is visualized to suggest pulmonary embolism. Lung Parenchyma and Pleura: Multiple rounded lesions are visualized  "bilaterally. Largest of these lesions is in the medial right lower lobe measuring 1.5 cm. Mild bilateral dependent atelectasis visualized. No significant pleural effusion.  Central airways are patent. Upper Abdomen: No acute process. Soft tissues: Unremarkable. Osseous structures: No aggressive focal lytic or sclerotic osseous lesions.[No acute fracture.] There is exaggerated thoracic kyphosis. Mild degenerative changes of the thoracic spine are visualized.     Impression: Findings compatible with metastatic disease to the lungs. Multiple rounded lesions are visualized bilaterally measuring up to 1.4 cm. Markedly enlarged and centrally necrotic left-sided prevascular and hilar lymph nodes measuring up to 5 cm. Findings compatible with metastatic disease. Enlargement of the main pulmonary artery measuring 4.8 cm. Finding compatible with pulmonary arterial hypertension. Very large hiatal hernia containing the majority of the stomach, nondilated small bowel loops, pancreatic body and tail. Moderate cardiomegaly. Electronically Signed: Bay Duong MD  12/22/2023 4:32 PM EST  Workstation ID: [id number]    XR Chest 1 View    Result Date: 12/22/2023  XR CHEST 1 VW Date of Exam: 12/22/2023 3:23 PM EST Indication: Chest Pain Triage Protocol Comparison: Most recent comparison study is the 1/14/2021 chest CT scan most recent chest radiograph is from 8/14/2020 Findings: There appears to be an intracardiac pacemaker. Heart shadow is partly obscured by the patient's massive hiatal hernia, essentially an intrathoracic stomach, but the heart appears to be enlarged. The vasculature is cephalized and there is probably mild interstitial edema. There is left basilar atelectasis and effusion. New rounded 7.5 cm \"mass \"of the left suprahilar region does not have any corresponding abnormality on the prior CT scan, and appears to lie well above the level of the hiatal hernia. Significance is uncertain, and chest CT scan evaluation is " suggested.     Impression: 1. Mild CHF. 2. Moderate left basilar atelectasis and effusion. 3. Massive hiatal hernia as on prior studies. 4. New approximately 7.5 cm left suprahilar/mediastinal mass compared to prior studies. Consider chest CT scan. Electronically Signed: Joni Lagos MD  12/22/2023 3:53 PM EST  Workstation ID: [id number]      ASSESSMENT/PLAN:    1.  Lung lesions bilaterally with the largest measuring about 1.4 cm.  I reviewed her scans which shows no other sign of an obvious malignancy.  These are very suggestive of possibly a renal cell carcinoma.  Patient is profoundly frail and with her performance status would not be a candidate for any treatment.  I discussed with her the utility of biopsy and she is not interested in any intervention or pursuing a diagnostic workup.  I think that is a very reasonable choice for her.  She may do well for quite some time since we do not know the velocity of her disease.  I would suggest a more palliative approach to her life and only treating her symptomatically.  If she declines I would recommend hospice care.        Delbert Sparks MD    12/23/2023

## 2023-12-24 LAB
ANION GAP SERPL CALCULATED.3IONS-SCNC: 7 MMOL/L (ref 5–15)
BUN SERPL-MCNC: 18 MG/DL (ref 8–23)
BUN/CREAT SERPL: 31.6 (ref 7–25)
CALCIUM SPEC-SCNC: 8.3 MG/DL (ref 8.2–9.6)
CHLORIDE SERPL-SCNC: 105 MMOL/L (ref 98–107)
CO2 SERPL-SCNC: 32 MMOL/L (ref 22–29)
CREAT SERPL-MCNC: 0.57 MG/DL (ref 0.57–1)
DEPRECATED RDW RBC AUTO: 51.5 FL (ref 37–54)
EGFRCR SERPLBLD CKD-EPI 2021: 84.9 ML/MIN/1.73
ERYTHROCYTE [DISTWIDTH] IN BLOOD BY AUTOMATED COUNT: 15.8 % (ref 12.3–15.4)
GLUCOSE SERPL-MCNC: 89 MG/DL (ref 65–99)
HCT VFR BLD AUTO: 40.4 % (ref 34–46.6)
HGB BLD-MCNC: 12.6 G/DL (ref 12–15.9)
MCH RBC QN AUTO: 27.9 PG (ref 26.6–33)
MCHC RBC AUTO-ENTMCNC: 31.2 G/DL (ref 31.5–35.7)
MCV RBC AUTO: 89.4 FL (ref 79–97)
PLATELET # BLD AUTO: 125 10*3/MM3 (ref 140–450)
PMV BLD AUTO: 10.6 FL (ref 6–12)
POTASSIUM SERPL-SCNC: 4.1 MMOL/L (ref 3.5–5.2)
RBC # BLD AUTO: 4.52 10*6/MM3 (ref 3.77–5.28)
SODIUM SERPL-SCNC: 144 MMOL/L (ref 136–145)
WBC NRBC COR # BLD AUTO: 6.03 10*3/MM3 (ref 3.4–10.8)

## 2023-12-24 PROCEDURE — 99232 SBSQ HOSP IP/OBS MODERATE 35: CPT | Performed by: INTERNAL MEDICINE

## 2023-12-24 PROCEDURE — 97166 OT EVAL MOD COMPLEX 45 MIN: CPT

## 2023-12-24 PROCEDURE — 85027 COMPLETE CBC AUTOMATED: CPT | Performed by: INTERNAL MEDICINE

## 2023-12-24 PROCEDURE — 80048 BASIC METABOLIC PNL TOTAL CA: CPT | Performed by: INTERNAL MEDICINE

## 2023-12-24 PROCEDURE — 97535 SELF CARE MNGMENT TRAINING: CPT

## 2023-12-24 RX ORDER — GUAIFENESIN 600 MG/1
1200 TABLET, EXTENDED RELEASE ORAL EVERY 12 HOURS SCHEDULED
Status: DISCONTINUED | OUTPATIENT
Start: 2023-12-24 | End: 2024-01-05 | Stop reason: HOSPADM

## 2023-12-24 RX ORDER — DEXTROMETHORPHAN POLISTIREX 30 MG/5ML
60 SUSPENSION ORAL EVERY 12 HOURS SCHEDULED
Status: DISCONTINUED | OUTPATIENT
Start: 2023-12-24 | End: 2024-01-05 | Stop reason: HOSPADM

## 2023-12-24 RX ADMIN — Medication 1 TABLET: at 08:44

## 2023-12-24 RX ADMIN — MEMANTINE 10 MG: 10 TABLET ORAL at 08:44

## 2023-12-24 RX ADMIN — BUSPIRONE HYDROCHLORIDE 10 MG: 10 TABLET ORAL at 20:10

## 2023-12-24 RX ADMIN — DEXTROMETHORPHAN POLISTIREX 60 MG: 30 SUSPENSION ORAL at 13:02

## 2023-12-24 RX ADMIN — BENZONATATE 200 MG: 100 CAPSULE ORAL at 11:14

## 2023-12-24 RX ADMIN — BETHANECHOL CHLORIDE 10 MG: 10 TABLET ORAL at 22:18

## 2023-12-24 RX ADMIN — GABAPENTIN 300 MG: 300 CAPSULE ORAL at 20:10

## 2023-12-24 RX ADMIN — BETHANECHOL CHLORIDE 10 MG: 10 TABLET ORAL at 13:02

## 2023-12-24 RX ADMIN — AMLODIPINE BESYLATE 5 MG: 5 TABLET ORAL at 08:45

## 2023-12-24 RX ADMIN — GUAIFENESIN 1200 MG: 600 TABLET, EXTENDED RELEASE ORAL at 20:10

## 2023-12-24 RX ADMIN — BUSPIRONE HYDROCHLORIDE 10 MG: 10 TABLET ORAL at 08:45

## 2023-12-24 RX ADMIN — BENZONATATE 200 MG: 100 CAPSULE ORAL at 20:14

## 2023-12-24 RX ADMIN — BETHANECHOL CHLORIDE 10 MG: 10 TABLET ORAL at 08:45

## 2023-12-24 RX ADMIN — SERTRALINE 50 MG: 50 TABLET, FILM COATED ORAL at 08:45

## 2023-12-24 RX ADMIN — LEVOTHYROXINE SODIUM 150 MCG: 0.15 TABLET ORAL at 06:26

## 2023-12-24 RX ADMIN — LISINOPRIL 20 MG: 20 TABLET ORAL at 08:45

## 2023-12-24 RX ADMIN — Medication 10 ML: at 20:10

## 2023-12-24 RX ADMIN — PANTOPRAZOLE SODIUM 40 MG: 40 TABLET, DELAYED RELEASE ORAL at 08:45

## 2023-12-24 RX ADMIN — Medication 10 ML: at 08:44

## 2023-12-24 RX ADMIN — GABAPENTIN 300 MG: 300 CAPSULE ORAL at 08:45

## 2023-12-24 RX ADMIN — GUAIFENESIN 1200 MG: 600 TABLET, EXTENDED RELEASE ORAL at 13:02

## 2023-12-24 RX ADMIN — MEMANTINE 10 MG: 10 TABLET ORAL at 20:09

## 2023-12-24 RX ADMIN — DEXTROMETHORPHAN POLISTIREX 60 MG: 30 SUSPENSION ORAL at 20:12

## 2023-12-24 NOTE — PLAN OF CARE
Goal Outcome Evaluation:  pt anxious and confused today.  VSS on o2 2 liters.   Up to chair  w therapy.   Inc B/B.   on tele.

## 2023-12-24 NOTE — THERAPY EVALUATION
Patient Name: Temi Jarrett  : 1930    MRN: [id number]                              Today's Date: 2023       Admit Date: 2023    Visit Dx:     ICD-10-CM ICD-9-CM   1. Malignant neoplasm of lung, unspecified laterality, unspecified part of lung  C34.90 162.9   2. Chest mass  R22.2 786.6     Patient Active Problem List   Diagnosis    Esophageal reflux    Hypothyroidism (acquired)    Anxiety with depression    Peripheral neuropathy    Spinal stenosis of lumbar region    Osteoporosis    Disc disorder of lumbar region    Bladder prolapse, female, acquired    Essential hypertension    Pernicious anemia    Annual physical exam    Primary osteoarthritis of both knees    Hiatal hernia    Multifocal aspiration pneumonia due to large intrathoracic hiatal hernia    Recurrent UTI    Paroxysmal atrial fibrillation    Alzheimer's disease    Mild cognitive impairment    At high risk for aspiration    Chronic pain    Osteoarthritis of right knee    Acute cough    Pressure injury of right buttock, stage 2    Nasal congestion    Metastatic disease     Past Medical History:   Diagnosis Date    Acute sepsis 2021    Arthritis     Bladder prolapse, female, acquired     Closed fracture of neck of left femur 2019    Dementia     Depression     Disease of thyroid gland     Gastric polyp     GERD (gastroesophageal reflux disease)     Hiatal hernia     History of colonic polyps     Hyperlipidemia     Hypertension     IBS (irritable bowel syndrome)     Macular degeneration     Pacemaker     Pericardial effusion 2020    Echo (8/3/2020): Normal LVEF.  Moderate pericardial effusion without tamponade.  Moderate MR. RVSP 49 mmHg    Torn meniscus     right knee      Past Surgical History:   Procedure Laterality Date    CHOLECYSTECTOMY      ENDOSCOPY N/A 2021    Procedure: ESOPHAGOGASTRODUODENOSCOPY;  Surgeon: Serjio Guerrero MD;  Location: [facility] ENDOSCOPY;  Service: General;  Laterality: N/A;     EYE SURGERY  1998    Cataract extraction    HERNIA REPAIR      HIP HEMIARTHROPLASTY Left 09/28/2019    Procedure: HIP HEMIARTHROPLASTY LEFT;  Surgeon: Reddy Segundo Jr., MD;  Location: [facility];  Service: Orthopedics    HYSTERECTOMY  1976    KNEE SURGERY Right     arthroscopy- 2000    SKIN CANCER EXCISION Left     [facility]; CANCEROUS SPOT DIDN'T  MOVE BUT NEEDED REMOVED    TONSILLECTOMY        General Information       Row Name 12/24/23 1421          OT Time and Intention    Document Type evaluation  -JR     Mode of Treatment occupational therapy  -JR       Row Name 12/24/23 1421          General Information    Patient Profile Reviewed yes  -JR     Prior Level of Function --  Pt poor historian, unknown PLOF and no family present  -JR     Existing Precautions/Restrictions fall;oxygen therapy device and L/min  contact and droplet  -JR     Barriers to Rehab medically complex;previous functional deficit;cognitive status;hearing deficit  dementia  -       Row Name 12/24/23 1421          Living Environment    People in Home facility resident  -       Row Name 12/24/23 1421          Cognition    Orientation Status (Cognition) oriented to;person  Pt confused, reported being at [facility], poor STM  -JR       Row Name 12/24/23 1421          Safety Issues, Functional Mobility    Impairments Affecting Function (Mobility) balance;cognition;endurance/activity tolerance;pain;shortness of breath;strength;postural/trunk control  -     Cognitive Impairments, Mobility Safety/Performance awareness, need for assistance;insight into deficits/self-awareness;judgment;problem-solving/reasoning;safety precaution awareness;safety precaution follow-through  -               User Key  (r) = Recorded By, (t) = Taken By, (c) = Cosigned By      Initials Name Provider Type    JR Tiffany Lord OT Occupational Therapist                     Mobility/ADL's       Row Name 12/24/23 1423          Bed Mobility     Bed Mobility supine-sit  -     Supine-Sit [place] (Bed Mobility) maximum assist (25% patient effort);verbal cues  -JR     Bed Mobility, Safety Issues cognitive deficits limit understanding;decreased use of arms for pushing/pulling;decreased use of legs for bridging/pushing;impaired trunk control for bed mobility  -     Assistive Device (Bed Mobility) bed rails;draw sheet;head of bed elevated  -JR     Comment, (Bed Mobility) Pt required verbal cues and increased time for supine to sit on EOB. Pt required assist to bring LE's to EOB and bring trunk into sitting  -       Row Name 12/24/23 1423          Transfers    Transfers bed-chair transfer  -       Row Name 12/24/23 1423          Bed-Chair Transfer    Bed-Chair [place] (Transfers) moderate assist (50% patient effort);verbal cues  -     Assistive Device (Bed-Chair Transfers) other (see comments)  B UE support  -     Comment, (Bed-Chair Transfer) Pt required verbal cues for hand placement as well as verbal cues for sequencing and safety  -       Row Name 12/24/23 1423          Activities of Daily Living    BADL Assessment/Intervention grooming  -       Row Name 12/24/23 1423          Grooming Assessment/Training    [place] Level (Grooming) hair care, combing/brushing;wash face, hands;set up;oral care regimen;minimum assist (75% patient effort)  -     Position (Grooming) supported sitting  -     Comment, (Grooming) Pt required assist to open toothbrush as well as load toothpaste on toothbrush this date.  -               User Key  (r) = Recorded By, (t) = Taken By, (c) = Cosigned By      Initials Name Provider Type    JR Tiffany Lord, OT Occupational Therapist                   Obj/Interventions       Row Name 12/24/23 1425          Sensory Assessment (Somatosensory)    Sensory Assessment (Somatosensory) sensation intact  -       Row Name 12/24/23 1425          Range of Motion Comprehensive    General Range of Motion no range  of motion deficits identified  -       Row Name 12/24/23 1425          Strength Comprehensive (MMT)    Comment, General Manual Muscle Testing (MMT) Assessment B UE strength WFL for ADL's  -       Row Name 12/24/23 1425          Balance    Balance Assessment sitting static balance;standing dynamic balance  -JR     Static Sitting Balance contact guard  -JR     Dynamic Standing Balance moderate assist  -JR               User Key  (r) = Recorded By, (t) = Taken By, (c) = Cosigned By      Initials Name Provider Type    JR Tiffany Lord OT Occupational Therapist                   Goals/Plan       Row Name 12/24/23 1428          Bed Mobility Goal 1 (OT)    Activity/Assistive Device (Bed Mobility Goal 1, OT) bed mobility activities, all  -JR     [place] Level/Cues Needed (Bed Mobility Goal 1, OT) minimum assist (75% or more patient effort);verbal cues required  -JR     Time Frame (Bed Mobility Goal 1, OT) long term goal (LTG);by discharge  -JR     Progress/Outcomes (Bed Mobility Goal 1, OT) new goal  -       Row Name 12/24/23 1428          Transfer Goal 1 (OT)    Activity/Assistive Device (Transfer Goal 1, OT) bed-to-chair/chair-to-bed  -JR     [place] Level/Cues Needed (Transfer Goal 1, OT) minimum assist (75% or more patient effort);verbal cues required  -JR     Time Frame (Transfer Goal 1, OT) long term goal (LTG);by discharge  -JR     Progress/Outcome (Transfer Goal 1, OT) new goal  -       Row Name 12/24/23 1428          Toileting Goal 1 (OT)    Activity/Device (Toileting Goal 1, OT) toileting skills, all  -JR     [place] Level/Cues Needed (Toileting Goal 1, OT) minimum assist (75% or more patient effort);verbal cues required  -JR     Time Frame (Toileting Goal 1, OT) long term goal (LTG);by discharge  -JR     Progress/Outcome (Toileting Goal 1, OT) new goal  -       Row Name 12/24/23 1428          Strength Goal 1 (OT)    Strength Goal 1 (OT) Pt to demo independence with B UE strengthening  HEP to support ADL and transfer independence  -     Time Frame (Strength Goal 1, OT) long term goal (LTG);by discharge  -     Progress/Outcome (Strength Goal 1, OT) new goal  -JR       Row Name 12/24/23 1428          Therapy Assessment/Plan (OT)    Planned Therapy Interventions (OT) activity tolerance training;adaptive equipment training;BADL retraining;functional balance retraining;occupation/activity based interventions;ROM/therapeutic exercise;strengthening exercise;transfer/mobility retraining;patient/caregiver education/training  -               User Key  (r) = Recorded By, (t) = Taken By, (c) = Cosigned By      Initials Name Provider Type    JR Tiffany Lord, OT Occupational Therapist                   Clinical Impression       [facility] Name 12/24/23 1426          Pain Assessment    Additional Documentation Pain Scale: FACES Pre/Post-Treatment (Group)  -JR       Row Name 12/24/23 1426          Pain Scale: FACES Pre/Post-Treatment    Pain: FACES Scale, Pretreatment 0-->no hurt  -     Posttreatment Pain Rating 0-->no hurt  -JR       Row Name 12/24/23 1426          Plan of Care Review    Plan of Care Reviewed With patient  -JR     Outcome Evaluation OT initial eval and expanded chart review completed. Pt presents with multiple comorbidities and decreased strength, cognition, balance and activity tolerance limiting independence with ADL's and mobility. Recommend continued skilled OT services and ECF at d/c.  -[facility] Name 12/24/23 1426          Therapy Assessment/Plan (OT)    Patient/Family Therapy Goal Statement (OT) pt requesting to die upon arrival of OT  -     Rehab Potential (OT) fair, will monitor progress closely  -     Criteria for Skilled Therapeutic Interventions Met (OT) yes;meets criteria;skilled treatment is necessary  -     Therapy Frequency (OT) daily  -[facility] Name 12/24/23 1426          Therapy Plan Review/Discharge Plan (OT)    Anticipated Discharge Disposition (OT)  extended care facility  -       Row Name 12/24/23 1426          Vital Signs    Pre Systolic BP Rehab 149  -JR     Pre Treatment Diastolic BP 70  -JR     Post Systolic BP Rehab 128  -JR     Post Treatment Diastolic BP 70  -JR     Pretreatment Heart Rate (beats/min) 69  -JR     Posttreatment Heart Rate (beats/min) 69  -JR     Pre SpO2 (%) 94  -JR     O2 Delivery Pre Treatment supplemental O2  -JR     Post SpO2 (%) 95  -JR     O2 Delivery Post Treatment supplemental O2  -JR     Pre Patient Position Supine  -JR     Intra Patient Position Standing  -JR     Post Patient Position Sitting  -JR       Row Name 12/24/23 1426          Positioning and Restraints    Pre-Treatment Position in bed  -JR     Post Treatment Position chair  -JR     In Chair notified nsg;reclined;call light within reach;encouraged to call for assist;exit alarm on;waffle cushion  -               User Key  (r) = Recorded By, (t) = Taken By, (c) = Cosigned By      Initials Name Provider Type    Tiffany Dhaliwal OT Occupational Therapist                   Outcome Measures       Row Name 12/24/23 1431          How much help from another is currently needed...    Putting on and taking off regular lower body clothing? 1  -JR     Bathing (including washing, rinsing, and drying) 2  -JR     Toileting (which includes using toilet bed pan or urinal) 1  -JR     Putting on and taking off regular upper body clothing 2  -JR     Taking care of personal grooming (such as brushing teeth) 3  -JR     Eating meals 3  -JR     AM-PAC 6 Clicks Score (OT) 12  -JR       Row Name 12/24/23 1431          Functional Assessment    Outcome Measure Options AM-PAC 6 Clicks Daily Activity (OT)  -               User Key  (r) = Recorded By, (t) = Taken By, (c) = Cosigned By      Initials Name Provider Type    Tiffany Dhaliwal OT Occupational Therapist                    Occupational Therapy Education       Title: PT OT SLP Therapies (In Progress)       Topic: Occupational  Therapy (In Progress)       Point: ADL training (In Progress)       Description:   Instruct learner(s) on proper safety adaptation and remediation techniques during self care or transfers.   Instruct in proper use of assistive devices.                  Learning Progress Summary             Patient Acceptance, E, NR by  at 12/24/2023 1020    Comment: educated pt regarding role of therapy                         Point: Home exercise program (Not Started)       Description:   Instruct learner(s) on appropriate technique for monitoring, assisting and/or progressing therapeutic exercises/activities.                  Learner Progress:  Not documented in this visit.              Point: Precautions (Not Started)       Description:   Instruct learner(s) on prescribed precautions during self-care and functional transfers.                  Learner Progress:  Not documented in this visit.              Point: Body mechanics (Not Started)       Description:   Instruct learner(s) on proper positioning and spine alignment during self-care, functional mobility activities and/or exercises.                  Learner Progress:  Not documented in this visit.                              User Key       Initials Effective Dates Name Provider Type Discipline     02/03/23 -  Tiffany Lord, OT Occupational Therapist OT                  OT Recommendation and Plan  Planned Therapy Interventions (OT): activity tolerance training, adaptive equipment training, BADL retraining, functional balance retraining, occupation/activity based interventions, ROM/therapeutic exercise, strengthening exercise, transfer/mobility retraining, patient/caregiver education/training  Therapy Frequency (OT): daily  Plan of Care Review  Plan of Care Reviewed With: patient  Outcome Evaluation: OT initial eval and expanded chart review completed. Pt presents with multiple comorbidities and decreased strength, cognition, balance and activity tolerance limiting  independence with ADL's and mobility. Recommend continued skilled OT services and ECF at d/c.     Time Calculation:   Evaluation Complexity (OT)  Review Occupational Profile/Medical/Therapy History Complexity: expanded/moderate complexity  Assessment, Occupational Performance/Identification of Deficit Complexity: 3-5 performance deficits  Clinical Decision Making Complexity (OT): detailed assessment/moderate complexity  Overall Complexity of Evaluation (OT): moderate complexity     Time Calculation- OT       Row Name 12/24/23 1432             Time Calculation- OT    OT Start Time 1020  -JR      OT Received On 12/24/23  -JR      OT Goal Re-Cert Due Date 01/03/24  -JR         Timed Charges    50898 - OT Self Care/Mgmt Minutes 8  -JR         Untimed Charges    OT Eval/Re-eval Minutes 46  -JR         Total Minutes    Timed Charges Total Minutes 8  -JR      Untimed Charges Total Minutes 46  -JR       Total Minutes 54  -JR                User Key  (r) = Recorded By, (t) = Taken By, (c) = Cosigned By      Initials Name Provider Type    JR Tiffany Lord, OT Occupational Therapist                  Therapy Charges for Today       Code Description Service Date Service Provider Modifiers Qty    [id number]  OT SELF CARE/MGMT/TRAIN EA 15 MIN 12/24/2023 Tiffany Lord OT GO 1    [id number]  OT EVAL MOD COMPLEXITY 4 12/24/2023 Tiffany Lord OT GO 1                 Tiffany Lord, OT  12/24/2023

## 2023-12-24 NOTE — PROGRESS NOTES
[facility] Medicine Services  PROGRESS NOTE    Patient Name: Temi Jarrett  : 1930  MRN: [id number]    Date of Admission: 2023  Primary Care Physician: Eric Patel MD    Subjective   Subjective     CC:  Cough     HPI:  No acute events. States she doesn't feel good today. Cough. Feeling worried. Called and updated son.       Objective   Objective     Vital Signs:   Temp:  [98 °F (36.7 °C)-98.3 °F (36.8 °C)] 98.1 °F (36.7 °C)  Heart Rate:  [52-82] 68  Resp:  [16-20] 20  BP: (119-158)/(52-93) 128/70  Flow (L/min):  [2] 2     Physical Exam:  Constitutional: frail; up in the chair  HENT: NCAT, mucous membranes moist  Respiratory: coarse; + cough  Cardiovascular: RRR, no murmurs, rubs, or gallops  Gastrointestinal: Positive bowel sounds, soft, nontender, nondistended  Musculoskeletal: No bilateral ankle edema  Psychiatric: slightly anxious today  Neurologic:  strength symmetric in all extremities, Cranial Nerves grossly intact to confrontation, speech clear  Skin: No rashes      Results Reviewed:  LAB RESULTS:      Lab 23  0324 23  0840 23  1507   WBC 6.03 5.50 6.71   HEMOGLOBIN 12.6 12.5 13.4   HEMATOCRIT 40.4 39.5 42.3   PLATELETS 125* 111* 157   NEUTROS ABS  --  4.16 5.14   IMMATURE GRANS (ABS)  --  0.01 0.02   LYMPHS ABS  --  0.64* 0.78   MONOS ABS  --  0.50 0.55   EOS ABS  --  0.17 0.18   MCV 89.4 89.0 89.4         Lab 23  0324 23  0840 23  1715 23  1507   SODIUM 144 145  --  145   POTASSIUM 4.1 4.3  --  3.4*   CHLORIDE 105 105  --  102   CO2 32.0* 31.0*  --  31.0*   ANION GAP 7.0 9.0  --  12.0   BUN 18 12  --  17   CREATININE 0.57 0.44*  --  0.68   EGFR 84.9 90.3  --  81.3   GLUCOSE 89 83  --  145*   CALCIUM 8.3 8.2  --  8.6   MAGNESIUM  --   --  1.8  --    PHOSPHORUS  --   --  3.4  --          Lab 23  1507   TOTAL PROTEIN 6.4   ALBUMIN 3.8   GLOBULIN 2.6   ALT (SGPT) 18   AST (SGOT) 27   BILIRUBIN 0.4   ALK PHOS 70    LIPASE 33         Lab 12/22/23  1715 12/22/23  1507   PROBNP  --  1,322.0   HSTROP T 28* 32*                 Brief Urine Lab Results  (Last result in the past 365 days)        Color   Clarity   Blood   Leuk Est   Nitrite   Protein   CREAT   Urine HCG        09/05/23 2112 Yellow   Cloudy     Small                       Microbiology Results Abnormal       None            CT Abdomen Pelvis With Contrast    Result Date: 12/22/2023  CT ABDOMEN PELVIS W CONTRAST Date of Exam: 12/22/2023 10:26 PM EST Indication: Metastasis to lungs, unknown primary. Comparison: Contrast-enhanced CT of the abdomen and pelvis performed on October 24, 2023 Technique: Axial CT images were obtained of the abdomen and pelvis following the uneventful intravenous administration of Isovue-300, 50 mL. Reconstructed coronal and sagittal images were also obtained. Automated exposure control and iterative construction methods were used. Findings: Lung Bases: Very large hiatal hernia containing the majority of the stomach, multiple nondilated small bowel loops, and the pancreatic body and tail. Peritoneum: No free intraperitoneal air or fluid. Abdominal wall: Unremarkable. Liver: Liver is normal in size and contour. No focal lesions. The portal vein is patent. Biliary/Gallbladder: The gallbladder is surgically absent. The biliary tree is nondilated. Pancreas: Pancreas is within normal limits. There is no evidence of pancreatic mass or peripancreatic fluid. Spleen: Spleen is normal in size and contour. Gastrointestinal/Mesentery: No evidence of bowel obstruction or gross inflammatory changes.the appendix is not visualized. There are no secondary signs of acute appendicitis. No mesenteric fluid collections identified. Adrenals: Adrenal glands are unremarkable. Kidneys: The kidneys are in anatomic position. No definite evidence of nephrolithiasis. Excreted IV contrast is visualized in the urinary collecting system limiting evaluation for small calculi.  No evidence of hydronephrosis or perinephric fat stranding. 5.3 cm right upper pole renal cyst is visualized. 3.9 cm right lower pole renal cyst is visualized. 4 cm left upper pole renal cyst is visualized. No solid renal masses visualized. Bladder: There is diffuse urinary bladder wall thickening and multiple small diverticula of the urinary bladder. Reproductive organs:  The patient is post hysterectomy. Retroperitoneal/Lymph Nodes: No retroperitoneal adenopathy is identified. Vasculature: Moderate to advanced atheromatous changes of the aortoiliac system. The aorta is normal in caliber.   Bony Structures:  No acute fracture or aggressive lesions. Osseous structures are osteopenic. Patient is post left hip arthroplasty.     Impression: Impression: No acute intra-abdominal process evident. Very large hiatal hernia containing the stomach, multiple nondilated small bowel loops, and the pancreatic body and tail. Post cholecystectomy. Diffuse urinary bladder wall thickening and multiple small urinary bladder diverticula. Finding may be secondary to chronic outlet obstruction or urinary retention. Additional findings per body of the report. Electronically Signed: Bay Duong MD  12/22/2023 10:48 PM EST  Workstation ID: [id number]    CT Chest With Contrast Diagnostic    Result Date: 12/22/2023  CT CHEST W CONTRAST DIAGNOSTIC Date of Exam: 12/22/2023 4:15 PM EST Indication: abnl mass on XR. Comparison: Chest radiograph performed on December 22, 2023. Noncontrast chest CT performed on January 14, 2021 Technique: Axial CT images were obtained of the chest after the uneventful intravenous administration of Isovue-300, 80 mL.  Reconstructed coronal and sagittal images were also obtained. Automated exposure control and iterative construction methods were used. Findings: Thyroid: The visualized portion of the thyroid is unremarkable. Hilum, Mediastinum, Heart, and Great vessels: Markedly enlarged centrally necrotic left-sided  prevascular and hilar lymph nodes are visualized. Representative left prevascular lymph node measures 5 x 4.9 cm (series 2, image 33). Very large hiatal hernia is visualized containing the majority of the stomach and multiple nondilated small bowel loops as well as the pancreatic body and tail. The heart is moderately enlarged. Patient is post intracardiac pacemaker placement in the right ventricle. No pericardial effusion. The thoracic aorta is normal in caliber and contour. Mild atheromatous changes are visualized. There is enlargement of the main pulmonary artery measuring 4.8 cm. No filling defect is visualized to suggest pulmonary embolism. Lung Parenchyma and Pleura: Multiple rounded lesions are visualized bilaterally. Largest of these lesions is in the medial right lower lobe measuring 1.5 cm. Mild bilateral dependent atelectasis visualized. No significant pleural effusion.  Central airways are patent. Upper Abdomen: No acute process. Soft tissues: Unremarkable. Osseous structures: No aggressive focal lytic or sclerotic osseous lesions.[No acute fracture.] There is exaggerated thoracic kyphosis. Mild degenerative changes of the thoracic spine are visualized.     Impression: Impression: Findings compatible with metastatic disease to the lungs. Multiple rounded lesions are visualized bilaterally measuring up to 1.4 cm. Markedly enlarged and centrally necrotic left-sided prevascular and hilar lymph nodes measuring up to 5 cm. Findings compatible with metastatic disease. Enlargement of the main pulmonary artery measuring 4.8 cm. Finding compatible with pulmonary arterial hypertension. Very large hiatal hernia containing the majority of the stomach, nondilated small bowel loops, pancreatic body and tail. Moderate cardiomegaly. Electronically Signed: Bay Duong MD  12/22/2023 4:32 PM EST  Workstation ID: [id number]    XR Chest 1 View    Result Date: 12/22/2023  XR CHEST 1 VW Date of Exam: 12/22/2023 3:23 PM EST  "Indication: Chest Pain Triage Protocol Comparison: Most recent comparison study is the 1/14/2021 chest CT scan most recent chest radiograph is from 8/14/2020 Findings: There appears to be an intracardiac pacemaker. Heart shadow is partly obscured by the patient's massive hiatal hernia, essentially an intrathoracic stomach, but the heart appears to be enlarged. The vasculature is cephalized and there is probably mild interstitial edema. There is left basilar atelectasis and effusion. New rounded 7.5 cm \"mass \"of the left suprahilar region does not have any corresponding abnormality on the prior CT scan, and appears to lie well above the level of the hiatal hernia. Significance is uncertain, and chest CT scan evaluation is suggested.     Impression: Impression: 1. Mild CHF. 2. Moderate left basilar atelectasis and effusion. 3. Massive hiatal hernia as on prior studies. 4. New approximately 7.5 cm left suprahilar/mediastinal mass compared to prior studies. Consider chest CT scan. Electronically Signed: Joni Lagos MD  12/22/2023 3:53 PM EST  Workstation ID: [id number]     Results for orders placed during the hospital encounter of 08/02/20    Transthoracic Echo Complete With Contrast if Necessary Per Protocol    Interpretation Summary  · Left ventricular systolic function is normal. Estimated EF = 70%.  · Left ventricular diastolic dysfunction (grade I) consistent with impaired relaxation.  · Left atrial cavity size is moderately dilated.  · There is calcification of the aortic valve. There is no significant stenosis or regurgitation. A Lambel's excrescence is noted on an aortic valve leaflet.  · Moderate mitral valve regurgitation is present.  · Estimated right ventricular systolic pressure from tricuspid regurgitation is moderately elevated (49 mmHg).  · There is a moderate (1-2cm) circumferential pericardial effusion. There is no tamponade physiology.      Current medications:  Scheduled Meds:amLODIPine, 5 mg, Oral, " Daily  bethanechol, 10 mg, Oral, TID  busPIRone, 10 mg, Oral, BID  dextromethorphan polistirex ER, 60 mg, Oral, Q12H  gabapentin, 300 mg, Oral, BID  guaiFENesin, 1,200 mg, Oral, Q12H  levothyroxine, 150 mcg, Oral, Q AM  lisinopril, 20 mg, Oral, Daily  memantine, 10 mg, Oral, BID  multivitamin with minerals, 1 tablet, Oral, Daily  pantoprazole, 40 mg, Oral, Daily  senna-docusate sodium, 2 tablet, Oral, BID  sertraline, 50 mg, Oral, Daily  sodium chloride, 10 mL, Intravenous, Q12H      Continuous Infusions:   PRN Meds:.  acetaminophen **OR** acetaminophen **OR** acetaminophen    benzonatate    senna-docusate sodium **AND** polyethylene glycol **AND** bisacodyl **AND** bisacodyl    Calcium Replacement - Follow Nurse / BPA Driven Protocol    Magnesium Standard Dose Replacement - Follow Nurse / BPA Driven Protocol    Phosphorus Replacement - Follow Nurse / BPA Driven Protocol    Potassium Replacement - Follow Nurse / BPA Driven Protocol    sodium chloride    traMADol    Assessment & Plan   Assessment & Plan     Active Hospital Problems    Diagnosis  POA    **Metastatic disease [C79.9]  Yes    Alzheimer's disease [G30.9, F02.80]  Yes    Hiatal hernia [K44.9]  Yes    Anxiety with depression [F41.8]  Yes    Essential hypertension [I10]  Yes    Hypothyroidism (acquired) [E03.9]  Yes      Resolved Hospital Problems   No resolved problems to display.        Brief Hospital Course to date:  89 year old female presented with HTN, alzheimers, hiatal hernia, hypothryoid to the ED from [facility] via EMS after an abnormal CXR. Patient had a CXR for cough and was noted to have a new 7.5 cm left suprahilar/mediastinal mass. CT scan with findings compatible with metastatic disease to the lung. Multiple round lung nodules with enlaged centrally necrotic left sided prevascular and hilar lymph nodes measuring up to 5 cm.  Patient was also noted to be RSV +. Oncology was consulted and after discussion with the patient,  decision was made to not pursue diagnosis because she has no treatment options due to functional status and age.      Metastatic disease   -CXR mentioned above  -CT chest with contrast shows findings compatible with metastatic disease to the lungs.  Multiple rounded lesions are bilaterally measuring up to 1.4 cm.  Markedly enlarged and centrally necrotic left sided pre vascular and hilar lymph nodes measuring up to 5 cm.  Findings compatible with metastatic disease.  Enlargement of the main pulmonary artery measuring 4.8 cm.  Findings consistent with pulmonary artery hypertension.  Very large hiatal hernia containing majority of the stomach, non dilated small bowel loops, pancreatic body and tail.  Moderate cardiomegaly noted.   -obtained CT abdomen and pelvis with contrast with no acute process identified  - Evaluated by oncology - no treatment options due to age and functional status. This was discussed with patient and she decided against pursuing any additional workup. This was also discussed with POA.   - Consider palliative following at [facility]      RSV  - Noted on resp PCR. Likely causing her acute symptoms at this time.   - Supportive care  - Mucinex. Delsym. Mobilize.      Hypokalemia  -replace per protocol     Hypothyroidism  -continue synthroid      GERD  -PPI      Alzheimer's disease   -continue namenda, aricept  -Delirium precautions - up in the chair, widows open     Essential hypertension   - continue norvasc, lisinopril      Anxiety/depression   - continue zoloft, buspar      Large Hiatal hernia  - CT with large hiatal hernia containing stomach, bowel and pancreatic body and tail  - Currently asymptomatic     GOC: reviewed code status with patient 12/23. She wanted to think about it and talk to her son. Reviewed code status with son and POA 12/24. Answered all questions to the best of my ability. They will discuss.    Expected Discharge Location and Transportation: [facility]   Expected Discharge   Expected  Discharge Date: 12/24/2023; Expected Discharge Time:      DVT prophylaxis:  Mechanical DVT prophylaxis orders are present.     AM-PAC 6 Clicks Score (PT): 10 (12/23/23 1940)    CODE STATUS:   Code Status and Medical Interventions:   Ordered at: 12/22/23 5560     Level Of Support Discussed With:    Patient     Code Status (Patient has no pulse and is not breathing):    CPR (Attempt to Resuscitate)     Medical Interventions (Patient has pulse or is breathing):    Full Support       Amanda Claudio DO  12/24/23

## 2023-12-24 NOTE — PLAN OF CARE
"  Problem: Skin Injury Risk Increased  Goal: Skin Health and Integrity  Intervention: Optimize Skin Protection  Recent Flowsheet Documentation  Taken 12/24/2023 0200 by Jolene Alexander RN  Pressure Reduction Techniques:   frequent weight shift encouraged   weight shift assistance provided  Pressure Reduction Devices:   heel offloading device utilized   positioning supports utilized  Skin Protection:   adhesive use limited   tubing/devices free from skin contact  Taken 12/24/2023 0000 by Jolene Alexander RN  Pressure Reduction Techniques:   frequent weight shift encouraged   weight shift assistance provided  Pressure Reduction Devices:   heel offloading device utilized   positioning supports utilized  Skin Protection:   adhesive use limited   tubing/devices free from skin contact  Taken 12/23/2023 2200 by Jolene Alexander RN  Pressure Reduction Techniques:   heels elevated off bed   weight shift assistance provided  Pressure Reduction Devices:   heel offloading device utilized   positioning supports utilized  Skin Protection:   adhesive use limited   tubing/devices free from skin contact  Taken 12/23/2023 1940 by Jolene Alexander RN  Pressure Reduction Techniques:   frequent weight shift encouraged   weight shift assistance provided  Pressure Reduction Devices:   positioning supports utilized   heel offloading device utilized  Skin Protection:   adhesive use limited   tubing/devices free from skin contact   Goal Outcome Evaluation:   Pt is A&OX3 with VSS on 2L NC. Early in the shift she had c/o chest pain r/t coughing with feelings of nausea (appropriate PRN's given). She was on the call light with frequent requests and c/o ailments until she was able to fall asleep. Stated, \"I don't want to here, I'm an [place] citizen and I have rights, you can't keep me here.\" Pt redirected and informed we were trying to get her better so she can go back home. Will CTM and provide care.                     "

## 2023-12-24 NOTE — PLAN OF CARE
Goal Outcome Evaluation:  Plan of Care Reviewed With: patient           Outcome Evaluation: OT initial eval and expanded chart review completed. Pt presents with multiple comorbidities and decreased strength, cognition, balance and activity tolerance limiting independence with ADL's and mobility. Recommend continued skilled OT services and ECF at d/c.      Anticipated Discharge Disposition (OT): extended care facility

## 2023-12-24 NOTE — CASE MANAGEMENT/SOCIAL WORK
Continued Stay Note  [facility]     Patient Name: Temi Jarrett  MRN: [id number]  Today's Date: 12/24/2023    Admit Date: 12/22/2023    Plan: update   Discharge Plan       Row Name 12/24/23 0840       Plan    Plan update    Plan Comments Spoke with Rachel kerr Muse; the DON (Clara) states patient cannot return with RSV until out of isolation.  CM will continue to follow.                   Discharge Codes    No documentation.                       Radha Yeh RN

## 2023-12-25 LAB — GLUCOSE BLDC GLUCOMTR-MCNC: 138 MG/DL (ref 70–130)

## 2023-12-25 PROCEDURE — 99232 SBSQ HOSP IP/OBS MODERATE 35: CPT | Performed by: INTERNAL MEDICINE

## 2023-12-25 PROCEDURE — 82948 REAGENT STRIP/BLOOD GLUCOSE: CPT

## 2023-12-25 RX ADMIN — ACETAMINOPHEN 650 MG: 325 TABLET ORAL at 12:08

## 2023-12-25 RX ADMIN — GUAIFENESIN 1200 MG: 600 TABLET, EXTENDED RELEASE ORAL at 08:42

## 2023-12-25 RX ADMIN — GABAPENTIN 300 MG: 300 CAPSULE ORAL at 08:43

## 2023-12-25 RX ADMIN — SENNOSIDES AND DOCUSATE SODIUM 2 TABLET: 8.6; 5 TABLET ORAL at 20:49

## 2023-12-25 RX ADMIN — MEMANTINE 10 MG: 10 TABLET ORAL at 20:50

## 2023-12-25 RX ADMIN — Medication 10 ML: at 20:50

## 2023-12-25 RX ADMIN — SERTRALINE 50 MG: 50 TABLET, FILM COATED ORAL at 08:43

## 2023-12-25 RX ADMIN — BETHANECHOL CHLORIDE 10 MG: 10 TABLET ORAL at 16:33

## 2023-12-25 RX ADMIN — BUSPIRONE HYDROCHLORIDE 10 MG: 10 TABLET ORAL at 20:50

## 2023-12-25 RX ADMIN — DEXTROMETHORPHAN POLISTIREX 60 MG: 30 SUSPENSION ORAL at 20:50

## 2023-12-25 RX ADMIN — AMLODIPINE BESYLATE 5 MG: 5 TABLET ORAL at 08:43

## 2023-12-25 RX ADMIN — GABAPENTIN 300 MG: 300 CAPSULE ORAL at 20:50

## 2023-12-25 RX ADMIN — DEXTROMETHORPHAN POLISTIREX 60 MG: 30 SUSPENSION ORAL at 08:41

## 2023-12-25 RX ADMIN — LISINOPRIL 20 MG: 20 TABLET ORAL at 08:42

## 2023-12-25 RX ADMIN — ACETAMINOPHEN 650 MG: 325 TABLET ORAL at 20:49

## 2023-12-25 RX ADMIN — MEMANTINE 10 MG: 10 TABLET ORAL at 08:43

## 2023-12-25 RX ADMIN — LEVOTHYROXINE SODIUM 150 MCG: 0.15 TABLET ORAL at 06:53

## 2023-12-25 RX ADMIN — GUAIFENESIN 1200 MG: 600 TABLET, EXTENDED RELEASE ORAL at 20:49

## 2023-12-25 RX ADMIN — Medication 10 ML: at 08:43

## 2023-12-25 RX ADMIN — BUSPIRONE HYDROCHLORIDE 10 MG: 10 TABLET ORAL at 08:42

## 2023-12-25 RX ADMIN — BETHANECHOL CHLORIDE 10 MG: 10 TABLET ORAL at 20:49

## 2023-12-25 RX ADMIN — Medication 1 TABLET: at 08:42

## 2023-12-25 RX ADMIN — PANTOPRAZOLE SODIUM 40 MG: 40 TABLET, DELAYED RELEASE ORAL at 08:42

## 2023-12-25 RX ADMIN — BETHANECHOL CHLORIDE 10 MG: 10 TABLET ORAL at 08:43

## 2023-12-25 RX ADMIN — BENZONATATE 200 MG: 100 CAPSULE ORAL at 12:08

## 2023-12-25 NOTE — PLAN OF CARE
Goal Outcome Evaluation:   Pt up to chair this shift, 2L/NC, paced on tele, family updated.

## 2023-12-25 NOTE — PLAN OF CARE
Problem: Fall Injury Risk  Goal: Absence of Fall and Fall-Related Injury  Intervention: Identify and Manage Contributors  Recent Flowsheet Documentation  Taken 12/25/2023 0000 by Jolene Alexander RN  Medication Review/Management: medications reviewed  Taken 12/24/2023 2200 by Jolene Alexander RN  Medication Review/Management: medications reviewed  Taken 12/24/2023 2015 by Jolene Alexander RN  Medication Review/Management: medications reviewed  Intervention: Promote Injury-Free Environment  Recent Flowsheet Documentation  Taken 12/25/2023 0400 by Jolene Alexander RN  Safety Promotion/Fall Prevention:   safety round/check completed   nonskid shoes/slippers when out of bed   fall prevention program maintained   clutter free environment maintained   assistive device/personal items within reach   activity supervised  Taken 12/25/2023 0200 by Jolene Alexander RN  Safety Promotion/Fall Prevention:   safety round/check completed   nonskid shoes/slippers when out of bed   fall prevention program maintained   clutter free environment maintained   assistive device/personal items within reach   activity supervised  Taken 12/25/2023 0000 by Jolene Alexander RN  Safety Promotion/Fall Prevention:   safety round/check completed   nonskid shoes/slippers when out of bed   fall prevention program maintained   clutter free environment maintained   assistive device/personal items within reach   activity supervised  Taken 12/24/2023 2200 by Jolene Alexander RN  Safety Promotion/Fall Prevention:   safety round/check completed   nonskid shoes/slippers when out of bed   fall prevention program maintained   clutter free environment maintained   assistive device/personal items within reach   activity supervised  Taken 12/24/2023 2015 by Jolene Alexander RN  Safety Promotion/Fall Prevention:   safety round/check completed   nonskid shoes/slippers when out of bed   fall prevention program maintained   clutter free environment maintained   assistive device/personal  items within reach   activity supervised     Problem: Skin Injury Risk Increased  Goal: Skin Health and Integrity  Intervention: Optimize Skin Protection  Recent Flowsheet Documentation  Taken 12/25/2023 0400 by Jolene Alexander RN  Pressure Reduction Techniques:   frequent weight shift encouraged   weight shift assistance provided  Pressure Reduction Devices:   heel offloading device utilized   positioning supports utilized  Skin Protection:   adhesive use limited   tubing/devices free from skin contact  Taken 12/25/2023 0200 by Jolene Alexander RN  Pressure Reduction Techniques: frequent weight shift encouraged  Pressure Reduction Devices: positioning supports utilized  Skin Protection: adhesive use limited  Taken 12/25/2023 0000 by Jolene Alexander RN  Pressure Reduction Techniques: frequent weight shift encouraged  Pressure Reduction Devices:   positioning supports utilized   heel offloading device utilized  Skin Protection:   adhesive use limited   tubing/devices free from skin contact  Taken 12/24/2023 2200 by Jolene Alexander RN  Pressure Reduction Techniques: frequent weight shift encouraged  Pressure Reduction Devices: positioning supports utilized  Skin Protection: adhesive use limited  Taken 12/24/2023 2015 by Jolene Alexander RN  Pressure Reduction Techniques: frequent weight shift encouraged  Pressure Reduction Devices: positioning supports utilized  Skin Protection:   adhesive use limited   tubing/devices free from skin contact   Goal Outcome Evaluation:   Pt is alert with confusion, VSS (2L NC), and only c/o congestion and frequent coughing (PRN cough medications given as appropriate). She has rested much better tonight then she did last night. Plan is to return back to [facility] once her RSV has resolved. Will CTM and provide care.

## 2023-12-25 NOTE — PROGRESS NOTES
[facility] Medicine Services  PROGRESS NOTE    Patient Name: Temi Jarrett  : 1930  MRN: [id number]    Date of Admission: 2023  Primary Care Physician: Eric Patel MD    Subjective   Subjective     CC:  cough    HPI:  No acute events. States she doesn't feel well today. Her whole body doesn't feel good. States her  has  and she doesn't know what to do without him. AAO x 3 today. Reviewed code status and she said she doesn't know what to do. Attempted to call son with no answer.      Objective   Objective     Vital Signs:   Temp:  [97.6 °F (36.4 °C)-98.5 °F (36.9 °C)] 97.6 °F (36.4 °C)  Heart Rate:  [52-74] 67  Resp:  [18-22] 18  BP: (120-139)/(70-81) 130/80  Flow (L/min):  [2] 2     Physical Exam:  Constitutional: frail  HENT: NCAT, mucous membranes moist  Respiratory: Coarse; + cough  Cardiovascular: RRR, II/VI murmur   Gastrointestinal: Positive bowel sounds, soft, nontender, nondistended  Musculoskeletal: No bilateral ankle edema  Psychiatric:  cooperative  Neurologic: Oriented x 3, strength symmetric in all extremities, Cranial Nerves grossly intact to confrontation, speech clear  Skin: No rashes      Results Reviewed:  LAB RESULTS:      Lab 23  0324 23  0840 23  1507   WBC 6.03 5.50 6.71   HEMOGLOBIN 12.6 12.5 13.4   HEMATOCRIT 40.4 39.5 42.3   PLATELETS 125* 111* 157   NEUTROS ABS  --  4.16 5.14   IMMATURE GRANS (ABS)  --  0.01 0.02   LYMPHS ABS  --  0.64* 0.78   MONOS ABS  --  0.50 0.55   EOS ABS  --  0.17 0.18   MCV 89.4 89.0 89.4         Lab 23  0324 23  0840 23  1715 23  1507   SODIUM 144 145  --  145   POTASSIUM 4.1 4.3  --  3.4*   CHLORIDE 105 105  --  102   CO2 32.0* 31.0*  --  31.0*   ANION GAP 7.0 9.0  --  12.0   BUN 18 12  --  17   CREATININE 0.57 0.44*  --  0.68   EGFR 84.9 90.3  --  81.3   GLUCOSE 89 83  --  145*   CALCIUM 8.3 8.2  --  8.6   MAGNESIUM  --   --  1.8  --    PHOSPHORUS  --   --   3.4  --          Lab 12/22/23  1507   TOTAL PROTEIN 6.4   ALBUMIN 3.8   GLOBULIN 2.6   ALT (SGPT) 18   AST (SGOT) 27   BILIRUBIN 0.4   ALK PHOS 70   LIPASE 33         Lab 12/22/23  1715 12/22/23  1507   PROBNP  --  1,322.0   HSTROP T 28* 32*                 Brief Urine Lab Results  (Last result in the past 365 days)        Color   Clarity   Blood   Leuk Est   Nitrite   Protein   CREAT   Urine HCG        09/05/23 2112 Yellow   Cloudy     Small                       Microbiology Results Abnormal       None            No radiology results from the last 24 hrs    Results for orders placed during the hospital encounter of 08/02/20    Transthoracic Echo Complete With Contrast if Necessary Per Protocol    Interpretation Summary  · Left ventricular systolic function is normal. Estimated EF = 70%.  · Left ventricular diastolic dysfunction (grade I) consistent with impaired relaxation.  · Left atrial cavity size is moderately dilated.  · There is calcification of the aortic valve. There is no significant stenosis or regurgitation. A Lambel's excrescence is noted on an aortic valve leaflet.  · Moderate mitral valve regurgitation is present.  · Estimated right ventricular systolic pressure from tricuspid regurgitation is moderately elevated (49 mmHg).  · There is a moderate (1-2cm) circumferential pericardial effusion. There is no tamponade physiology.      Current medications:  Scheduled Meds:amLODIPine, 5 mg, Oral, Daily  bethanechol, 10 mg, Oral, TID  busPIRone, 10 mg, Oral, BID  dextromethorphan polistirex ER, 60 mg, Oral, Q12H  gabapentin, 300 mg, Oral, BID  guaiFENesin, 1,200 mg, Oral, Q12H  levothyroxine, 150 mcg, Oral, Q AM  lisinopril, 20 mg, Oral, Daily  memantine, 10 mg, Oral, BID  multivitamin with minerals, 1 tablet, Oral, Daily  pantoprazole, 40 mg, Oral, Daily  senna-docusate sodium, 2 tablet, Oral, BID  sertraline, 50 mg, Oral, Daily  sodium chloride, 10 mL, Intravenous, Q12H      Continuous Infusions:   PRN  Meds:.  acetaminophen **OR** acetaminophen **OR** acetaminophen    benzonatate    senna-docusate sodium **AND** polyethylene glycol **AND** bisacodyl **AND** bisacodyl    Calcium Replacement - Follow Nurse / BPA Driven Protocol    Magnesium Standard Dose Replacement - Follow Nurse / BPA Driven Protocol    Phosphorus Replacement - Follow Nurse / BPA Driven Protocol    Potassium Replacement - Follow Nurse / BPA Driven Protocol    sodium chloride    traMADol    Assessment & Plan   Assessment & Plan     Active Hospital Problems    Diagnosis  POA    **Metastatic disease [C79.9]  Yes    Alzheimer's disease [G30.9, F02.80]  Yes    Hiatal hernia [K44.9]  Yes    Anxiety with depression [F41.8]  Yes    Essential hypertension [I10]  Yes    Hypothyroidism (acquired) [E03.9]  Yes      Resolved Hospital Problems   No resolved problems to display.        Brief Hospital Course to date:  89 year old female presented with HTN, alzheimers, hiatal hernia, hypothryoid to the ED from [facility] via EMS after an abnormal CXR. Patient had a CXR for cough and was noted to have a new 7.5 cm left suprahilar/mediastinal mass. CT scan with findings compatible with metastatic disease to the lung. Multiple round lung nodules with enlaged centrally necrotic left sided prevascular and hilar lymph nodes measuring up to 5 cm.  Patient was also noted to be RSV +. Oncology was consulted and after discussion with the patient, decision was made to not pursue diagnosis because she has no treatment options due to functional status and age.      Metastatic disease   -CXR mentioned above  -CT chest with contrast shows findings compatible with metastatic disease to the lungs.  Multiple rounded lesions are bilaterally measuring up to 1.4 cm.  Markedly enlarged and centrally necrotic left sided pre vascular and hilar lymph nodes measuring up to 5 cm.  Findings compatible with metastatic disease.  Enlargement of the main pulmonary artery measuring  4.8 cm.  Findings consistent with pulmonary artery hypertension.  Very large hiatal hernia containing majority of the stomach, non dilated small bowel loops, pancreatic body and tail.  Moderate cardiomegaly noted.   - obtained CT abdomen and pelvis with contrast with no acute process identified  - Evaluated by oncology - no treatment options due to age and functional status. This was discussed with patient and she decided against pursuing any additional workup. This was also discussed with POA.   - Consider palliative following at [place]      RSV  - Noted on resp PCR. Likely causing her acute symptoms at this time.   - Supportive care  - Mucinex. Delsym. Mobilize.   - Flutter     Prolonged QT  - Avoid prolonging medications      Hypokalemia  -replace per protocol     Hypothyroidism  -continue synthroid      GERD  -PPI      Alzheimer's disease   -continue namenda, aricept  -Delirium precautions - up in the chair, widows open     Essential hypertension   - continue norvasc, lisinopril      Anxiety/depression   - continue zoloft, buspar      Large Hiatal hernia  - CT with large hiatal hernia containing stomach, bowel and pancreatic body and tail  - Currently asymptomatic      GOC: reviewed code status with patient 12/23. She wanted to think about it and talk to her son. Reviewed code status with son and POA 12/24. Answered all questions to the best of my ability. They will discuss. Reviewed again with patient 12/25 and she states she doesn't know what to do. Attempted to call son again 12/25 with no answer.       Expected Discharge Location and Transportation: [facility]   Expected Discharge   Expected Discharge Date: 12/26/2023; Expected Discharge Time:      DVT prophylaxis:  Mechanical DVT prophylaxis orders are present.     AM-PAC 6 Clicks Score (PT): 10 (12/24/23 2015)    CODE STATUS:   Code Status and Medical Interventions:   Ordered at: 12/22/23 2821     Level Of Support Discussed With:    Patient     Code Status  (Patient has no pulse and is not breathing):    CPR (Attempt to Resuscitate)     Medical Interventions (Patient has pulse or is breathing):    Full Support       Amanda Claudio,   12/25/23

## 2023-12-26 LAB
ANION GAP SERPL CALCULATED.3IONS-SCNC: 6 MMOL/L (ref 5–15)
BUN SERPL-MCNC: 24 MG/DL (ref 8–23)
BUN/CREAT SERPL: 44.4 (ref 7–25)
CALCIUM SPEC-SCNC: 8.2 MG/DL (ref 8.2–9.6)
CHLORIDE SERPL-SCNC: 103 MMOL/L (ref 98–107)
CO2 SERPL-SCNC: 34 MMOL/L (ref 22–29)
CREAT SERPL-MCNC: 0.54 MG/DL (ref 0.57–1)
DEPRECATED RDW RBC AUTO: 49.7 FL (ref 37–54)
EGFRCR SERPLBLD CKD-EPI 2021: 86 ML/MIN/1.73
ERYTHROCYTE [DISTWIDTH] IN BLOOD BY AUTOMATED COUNT: 15.3 % (ref 12.3–15.4)
GLUCOSE SERPL-MCNC: 104 MG/DL (ref 65–99)
HCT VFR BLD AUTO: 39.6 % (ref 34–46.6)
HGB BLD-MCNC: 12.5 G/DL (ref 12–15.9)
MCH RBC QN AUTO: 27.8 PG (ref 26.6–33)
MCHC RBC AUTO-ENTMCNC: 31.6 G/DL (ref 31.5–35.7)
MCV RBC AUTO: 88 FL (ref 79–97)
PLATELET # BLD AUTO: 121 10*3/MM3 (ref 140–450)
PMV BLD AUTO: 10.2 FL (ref 6–12)
POTASSIUM SERPL-SCNC: 3.9 MMOL/L (ref 3.5–5.2)
RBC # BLD AUTO: 4.5 10*6/MM3 (ref 3.77–5.28)
SODIUM SERPL-SCNC: 143 MMOL/L (ref 136–145)
WBC NRBC COR # BLD AUTO: 7.06 10*3/MM3 (ref 3.4–10.8)

## 2023-12-26 PROCEDURE — 80048 BASIC METABOLIC PNL TOTAL CA: CPT | Performed by: INTERNAL MEDICINE

## 2023-12-26 PROCEDURE — 94761 N-INVAS EAR/PLS OXIMETRY MLT: CPT

## 2023-12-26 PROCEDURE — 85027 COMPLETE CBC AUTOMATED: CPT | Performed by: INTERNAL MEDICINE

## 2023-12-26 PROCEDURE — 97162 PT EVAL MOD COMPLEX 30 MIN: CPT

## 2023-12-26 PROCEDURE — 94799 UNLISTED PULMONARY SVC/PX: CPT

## 2023-12-26 PROCEDURE — 99232 SBSQ HOSP IP/OBS MODERATE 35: CPT | Performed by: INTERNAL MEDICINE

## 2023-12-26 PROCEDURE — 97110 THERAPEUTIC EXERCISES: CPT

## 2023-12-26 RX ADMIN — BETHANECHOL CHLORIDE 10 MG: 10 TABLET ORAL at 08:40

## 2023-12-26 RX ADMIN — LEVOTHYROXINE SODIUM 150 MCG: 0.15 TABLET ORAL at 05:55

## 2023-12-26 RX ADMIN — LISINOPRIL 20 MG: 20 TABLET ORAL at 08:38

## 2023-12-26 RX ADMIN — BENZONATATE 200 MG: 100 CAPSULE ORAL at 14:43

## 2023-12-26 RX ADMIN — SENNOSIDES AND DOCUSATE SODIUM 2 TABLET: 8.6; 5 TABLET ORAL at 21:08

## 2023-12-26 RX ADMIN — BETHANECHOL CHLORIDE 10 MG: 10 TABLET ORAL at 21:07

## 2023-12-26 RX ADMIN — GUAIFENESIN 1200 MG: 600 TABLET, EXTENDED RELEASE ORAL at 21:07

## 2023-12-26 RX ADMIN — GABAPENTIN 300 MG: 300 CAPSULE ORAL at 21:07

## 2023-12-26 RX ADMIN — Medication 1 TABLET: at 08:40

## 2023-12-26 RX ADMIN — ACETAMINOPHEN 650 MG: 325 TABLET ORAL at 14:44

## 2023-12-26 RX ADMIN — DEXTROMETHORPHAN POLISTIREX 60 MG: 30 SUSPENSION ORAL at 08:37

## 2023-12-26 RX ADMIN — BUSPIRONE HYDROCHLORIDE 10 MG: 10 TABLET ORAL at 21:07

## 2023-12-26 RX ADMIN — BUSPIRONE HYDROCHLORIDE 10 MG: 10 TABLET ORAL at 08:40

## 2023-12-26 RX ADMIN — AMLODIPINE BESYLATE 5 MG: 5 TABLET ORAL at 08:38

## 2023-12-26 RX ADMIN — BENZONATATE 200 MG: 100 CAPSULE ORAL at 08:38

## 2023-12-26 RX ADMIN — DEXTROMETHORPHAN POLISTIREX 60 MG: 30 SUSPENSION ORAL at 21:07

## 2023-12-26 RX ADMIN — MEMANTINE 10 MG: 10 TABLET ORAL at 08:40

## 2023-12-26 RX ADMIN — PANTOPRAZOLE SODIUM 40 MG: 40 TABLET, DELAYED RELEASE ORAL at 08:40

## 2023-12-26 RX ADMIN — GUAIFENESIN 1200 MG: 600 TABLET, EXTENDED RELEASE ORAL at 08:38

## 2023-12-26 RX ADMIN — ACETAMINOPHEN 650 MG: 325 TABLET ORAL at 08:37

## 2023-12-26 RX ADMIN — ACETAMINOPHEN 650 MG: 325 TABLET ORAL at 21:07

## 2023-12-26 RX ADMIN — Medication 10 ML: at 08:41

## 2023-12-26 RX ADMIN — GABAPENTIN 300 MG: 300 CAPSULE ORAL at 08:38

## 2023-12-26 RX ADMIN — BETHANECHOL CHLORIDE 10 MG: 10 TABLET ORAL at 16:19

## 2023-12-26 RX ADMIN — SERTRALINE 50 MG: 50 TABLET, FILM COATED ORAL at 08:40

## 2023-12-26 RX ADMIN — MEMANTINE 10 MG: 10 TABLET ORAL at 21:07

## 2023-12-26 NOTE — THERAPY EVALUATION
Patient Name: Temi Jarrett  : 1930    MRN: [id number]                              Today's Date: 2023       Admit Date: 2023    Visit Dx:     ICD-10-CM ICD-9-CM   1. Malignant neoplasm of lung, unspecified laterality, unspecified part of lung  C34.90 162.9   2. Chest mass  R22.2 786.6     Patient Active Problem List   Diagnosis    Esophageal reflux    Hypothyroidism (acquired)    Anxiety with depression    Peripheral neuropathy    Spinal stenosis of lumbar region    Osteoporosis    Disc disorder of lumbar region    Bladder prolapse, female, acquired    Essential hypertension    Pernicious anemia    Annual physical exam    Primary osteoarthritis of both knees    Hiatal hernia    Multifocal aspiration pneumonia due to large intrathoracic hiatal hernia    Recurrent UTI    Paroxysmal atrial fibrillation    Alzheimer's disease    Prolonged Q-T interval on ECG    Mild cognitive impairment    At high risk for aspiration    Chronic pain    Osteoarthritis of right knee    Acute cough    Pressure injury of right buttock, stage 2    Nasal congestion    Metastatic disease     Past Medical History:   Diagnosis Date    Acute sepsis 2021    Arthritis     Bladder prolapse, female, acquired     Closed fracture of neck of left femur 2019    Dementia     Depression     Disease of thyroid gland     Gastric polyp     GERD (gastroesophageal reflux disease)     Hiatal hernia     History of colonic polyps     Hyperlipidemia     Hypertension     IBS (irritable bowel syndrome)     Macular degeneration     Pacemaker     Pericardial effusion 2020    Echo (8/3/2020): Normal LVEF.  Moderate pericardial effusion without tamponade.  Moderate MR. RVSP 49 mmHg    Torn meniscus     right knee      Past Surgical History:   Procedure Laterality Date    CHOLECYSTECTOMY      ENDOSCOPY N/A 2021    Procedure: ESOPHAGOGASTRODUODENOSCOPY;  Surgeon: Serjio Guerrero MD;  Location: [facility] ENDOSCOPY;   Service: General;  Laterality: N/A;    EYE SURGERY  1998    Cataract extraction    HERNIA REPAIR      HIP HEMIARTHROPLASTY Left 09/28/2019    Procedure: HIP HEMIARTHROPLASTY LEFT;  Surgeon: Reddy Segundo Jr., MD;  Location: [facility] OR;  Service: Orthopedics    HYSTERECTOMY  1976    KNEE SURGERY Right     arthroscopy- 2000    SKIN CANCER EXCISION Left     [facility]; CANCEROUS SPOT DIDN'T  MOVE BUT NEEDED REMOVED    TONSILLECTOMY        General Information       Row Name 12/26/23 6127          Physical Therapy Time and Intention    Document Type evaluation  -ND     Mode of Treatment physical therapy  -ND       Row Name 12/26/23 1347          General Information    Patient Profile Reviewed yes  -ND     Prior Level of Function --  Pt is poor historian. No family present to collect history.  -ND     Existing Precautions/Restrictions oxygen therapy device and L/min;fall  -ND     Barriers to Rehab previous functional deficit;cognitive status;hearing deficit;visual deficit  Dementia  -ND       Row Name 12/26/23 1344          Living Environment    People in Home facility resident  -ND       Row Name 12/26/23 1340          Cognition    Orientation Status (Cognition) oriented x 3;other (see comments);disoriented to;situation  Oriented x3 but situationally and conversationally confused.  -ND       Row Name 12/26/23 1349          Safety Issues, Functional Mobility    Safety Issues Affecting Function (Mobility) awareness of need for assistance;impulsivity;insight into deficits/self-awareness;problem-solving;safety precaution awareness;safety precautions follow-through/compliance;sequencing abilities  -ND     Impairments Affecting Function (Mobility) balance;cognition;endurance/activity tolerance;shortness of breath;strength  -ND     Cognitive Impairments, Mobility Safety/Performance awareness, need for assistance;insight into deficits/self-awareness;judgment;problem-solving/reasoning;safety precaution  awareness;safety precaution follow-through;sequencing abilities  -ND               User Key  (r) = Recorded By, (t) = Taken By, (c) = Cosigned By      Initials Name Provider Type    Isha Baker PT Physical Therapist                   Mobility       Row Name 12/26/23 1349          Bed Mobility    Bed Mobility supine-sit  -ND     Supine-Sit [place] (Bed Mobility) standby assist  -ND     Assistive Device (Bed Mobility) bed rails;head of bed elevated  -ND     Comment, (Bed Mobility) Increased time for task and VC for sequencing of task.  -ND       Row Name 12/26/23 1349          Bed-Chair Transfer    Bed-Chair [place] (Transfers) minimum assist (75% patient effort);verbal cues;nonverbal cues (demo/gesture)  -ND     Assistive Device (Bed-Chair Transfers) other (see comments)  UE support  -ND     Comment, (Bed-Chair Transfer) Pt taking two side steps toward chair and requires VC for sequencing of task and hand placement for increased safety.  -ND       Row Name 12/26/23 1349          Sit-Stand Transfer    Sit-Stand [place] (Transfers) minimum assist (75% patient effort)  -ND     Assistive Device (Sit-Stand Transfers) walker, front-wheeled  -ND     Comment, (Sit-Stand Transfer) STS from EOB with UE support. 3 STS from bedside chair with RWx and min-A to stabilize once standing. Pt with posterior lean upon achieving standing position.  -ND       Row Name 12/26/23 1349          Gait/Stairs (Locomotion)    [place] Level (Gait) unable to assess  -ND     Comment, (Gait/Stairs) Unable to initiate gait due to strong posterior lean in standing which pt is unable to correct for.  -ND               User Key  (r) = Recorded By, (t) = Taken By, (c) = Cosigned By      Initials Name Provider Type    Isha Baker PT Physical Therapist                   Obj/Interventions       Row Name 12/26/23 1354          Range of Motion Comprehensive    General Range of Motion bilateral lower extremity ROM WFL   -ND       Row Name 12/26/23 1356          Strength Comprehensive (MMT)    General Manual Muscle Testing (MMT) Assessment lower extremity strength deficits identified  -ND     Comment, General Manual Muscle Testing (MMT) Assessment BLE MMT 3/5.  -ND       Row Name 12/26/23 9524          Motor Skills    Therapeutic Exercise hip;knee;ankle  -ND       Row Name 12/26/23 [facility]          Hip (Therapeutic Exercise)    Hip (Therapeutic Exercise) strengthening exercise  -ND     Hip Strengthening (Therapeutic Exercise) bilateral;marching while seated;10 repetitions  Glute squeeze x10 repetitions  -ND       Row Name 12/26/23 [facility]          Knee (Therapeutic Exercise)    Knee (Therapeutic Exercise) strengthening exercise  -ND     Knee Strengthening (Therapeutic Exercise) bilateral;SLR (straight leg raise);heel slides;10 repetitions  -ND       Row Name 12/26/23 [facility]          Ankle (Therapeutic Exercise)    Ankle (Therapeutic Exercise) AROM (active range of motion)  -ND     Ankle AROM (Therapeutic Exercise) bilateral;dorsiflexion;plantarflexion  -ND       Row Name 12/26/23 8965          Balance    Balance Assessment sitting static balance;sitting dynamic balance;sit to stand dynamic balance;standing static balance;standing dynamic balance  -ND     Static Sitting Balance contact guard  -ND     Dynamic Sitting Balance contact guard  -ND     Position, Sitting Balance unsupported;sitting edge of bed  -ND     Sit to Stand Dynamic Balance minimal assist;verbal cues  -ND     Static Standing Balance contact guard  -ND     Dynamic Standing Balance minimal assist  -ND     Position/Device Used, Standing Balance supported;walker, front-wheeled  -ND     Balance Interventions sitting;standing;sit to stand;supported  -ND     Comment, Balance Pt with strong posterior lean with standing that pt is unable to correct for. Pt with adequate sitting balance.  -ND       Row Name 12/26/23 4731          Sensory Assessment (Somatosensory)    Sensory  Assessment (Somatosensory) unable/difficult to assess  -ND               User Key  (r) = Recorded By, (t) = Taken By, (c) = Cosigned By      Initials Name Provider Type    ND Isha May, PT Physical Therapist                   Goals/Plan       Row Name 12/26/23 1404          Bed Mobility Goal 1 (PT)    Activity/Assistive Device (Bed Mobility Goal 1, PT) sit to supine/supine to sit  -ND     [place] Level/Cues Needed (Bed Mobility Goal 1, PT) modified independence  -ND     Time Frame (Bed Mobility Goal 1, PT) long term goal (LTG);10 days  -ND       Row Name 12/26/23 1404          Transfer Goal 1 (PT)    Activity/Assistive Device (Transfer Goal 1, PT) sit-to-stand/stand-to-sit;bed-to-chair/chair-to-bed  -ND     [place] Level/Cues Needed (Transfer Goal 1, PT) supervision required  -ND     Time Frame (Transfer Goal 1, PT) long term goal (LTG);10 days  -ND       Row Name 12/26/23 1404          Gait Training Goal 1 (PT)    Activity/Assistive Device (Gait Training Goal 1, PT) gait (walking locomotion);assistive device use;increase endurance/gait distance;decrease fall risk;walker, rolling  -ND     [place] Level (Gait Training Goal 1, PT) supervision required  -ND     Distance (Gait Training Goal 1, PT) 50  -ND     Time Frame (Gait Training Goal 1, PT) long term goal (LTG);10 days  -ND       Row Name 12/26/23 1404          Therapy Assessment/Plan (PT)    Planned Therapy Interventions (PT) balance training;bed mobility training;gait training;transfer training;patient/family education;home exercise program;strengthening;ROM (range of motion);postural re-education  -ND               User Key  (r) = Recorded By, (t) = Taken By, (c) = Cosigned By      Initials Name Provider Type    Isha Baker, PT Physical Therapist                   Clinical Impression       Row Name 12/26/23 6107          Pain Scale: FACES Pre/Post-Treatment    Pain: FACES Scale, Pretreatment 0-->no hurt  -ND     Posttreatment  Pain Rating 0-->no hurt  -ND       Row Name 12/26/23 1359          Plan of Care Review    Plan of Care Reviewed With patient  -ND     Outcome Evaluation Based on PT evaluation, pt is currently demonstrating deficits in activity tolerance, strength, and balance warranting IP PT services to facilitate increased independence with mobility. Recommending pt d/c back to prior facility.  -ND       Row Name 12/26/23 1355          Therapy Assessment/Plan (PT)    Patient/Family Therapy Goals Statement (PT) Return to PLOF.  -ND     Rehab Potential (PT) fair, will monitor progress closely  -ND     Criteria for Skilled Interventions Met (PT) yes;meets criteria;skilled treatment is necessary  -ND     Therapy Frequency (PT) daily  -ND       Row Name 12/26/23 1359          Vital Signs    Pre Systolic BP Rehab --  Pt deferred pre-BP  -ND     Post Systolic BP Rehab 123  -ND     Post Treatment Diastolic BP 75  -ND     Pretreatment Heart Rate (beats/min) 54  -ND     Posttreatment Heart Rate (beats/min) 59  -ND     Pre SpO2 (%) 92  -ND     O2 Delivery Pre Treatment nasal cannula  -ND     O2 Delivery Intra Treatment nasal cannula  -ND     Post SpO2 (%) 97  -ND     O2 Delivery Post Treatment nasal cannula  -ND     Pre Patient Position Supine  -ND     Intra Patient Position Standing  -ND     Post Patient Position Sitting  -ND       Row Name 12/26/23 1359          Positioning and Restraints    Pre-Treatment Position in bed  -ND     Post Treatment Position chair  -ND     In Chair notified nsg;reclined;legs elevated;waffle cushion;call light within reach;encouraged to call for assist;exit alarm on  -ND               User Key  (r) = Recorded By, (t) = Taken By, (c) = Cosigned By      Initials Name Provider Type    Isha Baker, PT Physical Therapist                   Outcome Measures       Row Name 12/26/23 9617          How much help from another person do you currently need...    Turning from your back to your side while in flat bed  without using bedrails? 3  -ND     Moving from lying on back to sitting on the side of a flat bed without bedrails? 2  -ND     Moving to and from a bed to a chair (including a wheelchair)? 3  -ND     Standing up from a chair using your arms (e.g., wheelchair, bedside chair)? 3  -ND     Climbing 3-5 steps with a railing? 1  -ND     To walk in hospital room? 1  -ND     AM-PAC 6 Clicks Score (PT) 13  -ND     Highest Level of Mobility Goal 4 --> Transfer to chair/commode  -ND       Row Name 12/26/23 1404          Functional Assessment    Outcome Measure Options AM-PAC 6 Clicks Basic Mobility (PT)  -ND               User Key  (r) = Recorded By, (t) = Taken By, (c) = Cosigned By      Initials Name Provider Type    ND sIha May, PT Physical Therapist                                 Physical Therapy Education       Title: PT OT SLP Therapies (In Progress)       Topic: Physical Therapy (In Progress)       Point: Mobility training (In Progress)       Learning Progress Summary             Patient Acceptance, E, NR by ND at 12/26/2023 1406                         Point: Home exercise program (In Progress)       Learning Progress Summary             Patient Acceptance, E, NR by ND at 12/26/2023 1406                         Point: Body mechanics (In Progress)       Learning Progress Summary             Patient Acceptance, E, NR by ND at 12/26/2023 1406                         Point: Precautions (In Progress)       Learning Progress Summary             Patient Acceptance, E, NR by ND at 12/26/2023 1406                                         User Key       Initials Effective Dates Name Provider Type Discipline    ND 11/16/23 -  Isha May, LEFTY Physical Therapist PT                  PT Recommendation and Plan  Planned Therapy Interventions (PT): balance training, bed mobility training, gait training, transfer training, patient/family education, home exercise program, strengthening, ROM (range of motion), postural  re-education  Plan of Care Reviewed With: patient  Outcome Evaluation: Based on PT evaluation, pt is currently demonstrating deficits in activity tolerance, strength, and balance warranting IP PT services to facilitate increased independence with mobility. Recommending pt d/c back to prior facility.     Time Calculation:   PT Evaluation Complexity  History, PT Evaluation Complexity: 3 or more personal factors and/or comorbidities  Examination of Body Systems (PT Eval Complexity): total of 4 or more elements  Clinical Presentation (PT Evaluation Complexity): evolving  Clinical Decision Making (PT Evaluation Complexity): moderate complexity  Overall Complexity (PT Evaluation Complexity): moderate complexity     PT Charges       Row Name 12/26/23 1406             Time Calculation    Start Time 1311  -ND      PT Received On 12/26/23  -ND      PT Goal Re-Cert Due Date 01/05/24  -ND         Timed Charges    67529 - PT Therapeutic Exercise Minutes 10  -ND         Untimed Charges    PT Eval/Re-eval Minutes 46  -ND         Total Minutes    Timed Charges Total Minutes 10  -ND      Untimed Charges Total Minutes 46  -ND       Total Minutes 56  -ND                User Key  (r) = Recorded By, (t) = Taken By, (c) = Cosigned By      Initials Name Provider Type    ND Isha May, PT Physical Therapist                  Therapy Charges for Today       Code Description Service Date Service Provider Modifiers Qty    10366865891 HC PT THER PROC EA 15 MIN 12/26/2023 Isha May, PT GP 1    51946361240 HC PT EVAL MOD COMPLEXITY 4 12/26/2023 Isha May, PT GP 1            PT G-Codes  Outcome Measure Options: AM-PAC 6 Clicks Basic Mobility (PT)  AM-PAC 6 Clicks Score (PT): 13  AM-PAC 6 Clicks Score (OT): 12  PT Discharge Summary  Anticipated Discharge Disposition (PT): other (see comments) (Return to prior facility.)    Isha May PT  12/26/2023

## 2023-12-26 NOTE — PLAN OF CARE
Goal Outcome Evaluation:  Plan of Care Reviewed With: patient           Outcome Evaluation: Based on PT evaluation, pt is currently demonstrating deficits in activity tolerance, strength, and balance warranting IP PT services to facilitate increased independence with mobility. Recommending pt d/c back to prior facility.      Anticipated Discharge Disposition (PT): other (see comments) (Return to prior facility.)

## 2023-12-26 NOTE — PROGRESS NOTES
[facility] Medicine Services  PROGRESS NOTE    Patient Name: Temi Jarrett  : 1930  MRN: [id number]    Date of Admission: 2023  Primary Care Physician: Eric Patel MD    Subjective   Subjective     CC:  cough    HPI:  Slight cough, no fevers.      Objective   Objective     Vital Signs:   Temp:  [96.4 °F (35.8 °C)-98.1 °F (36.7 °C)] 96.4 °F (35.8 °C)  Heart Rate:  [61-81] 61  Resp:  [16-18] 16  BP: (116-145)/() 142/70  Flow (L/min):  [2] 2     Physical Exam:  Gen: frail, in bed, staff assisting with bathing  MM Moist  RRR  Grossly clear breath sounds bilaterally  Abd soft, NT  No edema  Alert, speech clear  Normal affect      Results Reviewed:  LAB RESULTS:      Lab 23  03423  0324 23  0840 23  1507   WBC 7.06 6.03 5.50 6.71   HEMOGLOBIN 12.5 12.6 12.5 13.4   HEMATOCRIT 39.6 40.4 39.5 42.3   PLATELETS 121* 125* 111* 157   NEUTROS ABS  --   --  4.16 5.14   IMMATURE GRANS (ABS)  --   --  0.01 0.02   LYMPHS ABS  --   --  0.64* 0.78   MONOS ABS  --   --  0.50 0.55   EOS ABS  --   --  0.17 0.18   MCV 88.0 89.4 89.0 89.4         Lab 23  0341 23  0324 23  0840 23  1715 23  1507   SODIUM 143 144 145  --  145   POTASSIUM 3.9 4.1 4.3  --  3.4*   CHLORIDE 103 105 105  --  102   CO2 34.0* 32.0* 31.0*  --  31.0*   ANION GAP 6.0 7.0 9.0  --  12.0   BUN 24* 18 12  --  17   CREATININE 0.54* 0.57 0.44*  --  0.68   EGFR 86.0 84.9 90.3  --  81.3   GLUCOSE 104* 89 83  --  145*   CALCIUM 8.2 8.3 8.2  --  8.6   MAGNESIUM  --   --   --  1.8  --    PHOSPHORUS  --   --   --  3.4  --          Lab 23  1507   TOTAL PROTEIN 6.4   ALBUMIN 3.8   GLOBULIN 2.6   ALT (SGPT) 18   AST (SGOT) 27   BILIRUBIN 0.4   ALK PHOS 70   LIPASE 33         Lab 23  1715 23  1507   PROBNP  --  1,322.0   HSTROP T 28* 32*                 Brief Urine Lab Results  (Last result in the past 365 days)        Color   Clarity   Blood   Leuk Est    Nitrite   Protein   CREAT   Urine HCG        09/05/23 2112 Yellow   Cloudy     Small                       Microbiology Results Abnormal       None            No radiology results from the last 24 hrs    Results for orders placed during the hospital encounter of 08/02/20    Transthoracic Echo Complete With Contrast if Necessary Per Protocol    Interpretation Summary  · Left ventricular systolic function is normal. Estimated EF = 70%.  · Left ventricular diastolic dysfunction (grade I) consistent with impaired relaxation.  · Left atrial cavity size is moderately dilated.  · There is calcification of the aortic valve. There is no significant stenosis or regurgitation. A Lambel's excrescence is noted on an aortic valve leaflet.  · Moderate mitral valve regurgitation is present.  · Estimated right ventricular systolic pressure from tricuspid regurgitation is moderately elevated (49 mmHg).  · There is a moderate (1-2cm) circumferential pericardial effusion. There is no tamponade physiology.      Current medications:  Scheduled Meds:amLODIPine, 5 mg, Oral, Daily  bethanechol, 10 mg, Oral, TID  busPIRone, 10 mg, Oral, BID  dextromethorphan polistirex ER, 60 mg, Oral, Q12H  gabapentin, 300 mg, Oral, BID  guaiFENesin, 1,200 mg, Oral, Q12H  levothyroxine, 150 mcg, Oral, Q AM  lisinopril, 20 mg, Oral, Daily  memantine, 10 mg, Oral, BID  multivitamin with minerals, 1 tablet, Oral, Daily  pantoprazole, 40 mg, Oral, Daily  senna-docusate sodium, 2 tablet, Oral, BID  sertraline, 50 mg, Oral, Daily  sodium chloride, 10 mL, Intravenous, Q12H      Continuous Infusions:   PRN Meds:.  acetaminophen **OR** acetaminophen **OR** acetaminophen    benzonatate    senna-docusate sodium **AND** polyethylene glycol **AND** bisacodyl **AND** bisacodyl    Calcium Replacement - Follow Nurse / BPA Driven Protocol    Magnesium Standard Dose Replacement - Follow Nurse / BPA Driven Protocol    Phosphorus Replacement - Follow Nurse / BPA Driven  Protocol    Potassium Replacement - Follow Nurse / BPA Driven Protocol    sodium chloride    traMADol    Assessment & Plan   Assessment & Plan     Active Hospital Problems    Diagnosis  POA    **Metastatic disease [C79.9]  Yes    Prolonged Q-T interval on ECG [R94.31]  Yes    Alzheimer's disease [G30.9, F02.80]  Yes    Hiatal hernia [K44.9]  Yes    Anxiety with depression [F41.8]  Yes    Essential hypertension [I10]  Yes    Hypothyroidism (acquired) [E03.9]  Yes      Resolved Hospital Problems   No resolved problems to display.        Brief Hospital Course to date:  89 year old female presented with HTN, alzheimers, hiatal hernia, hypothryoid to the ED from [facility] via EMS after an abnormal CXR. Patient had a CXR for cough and was noted to have a new 7.5 cm left suprahilar/mediastinal mass. CT scan with findings compatible with metastatic disease to the lung. Multiple round lung nodules with enlaged centrally necrotic left sided prevascular and hilar lymph nodes measuring up to 5 cm.  Patient was also noted to be RSV +. Oncology was consulted and after discussion with the patient, decision was made to not pursue diagnosis because she has no treatment options due to functional status and age.      Metastatic disease   -CXR mentioned above  -CT chest with contrast shows findings compatible with metastatic disease to the lungs.  Multiple rounded lesions are bilaterally measuring up to 1.4 cm.  Markedly enlarged and centrally necrotic left sided pre vascular and hilar lymph nodes measuring up to 5 cm.  Findings compatible with metastatic disease.  Enlargement of the main pulmonary artery measuring 4.8 cm.  Findings consistent with pulmonary artery hypertension.  Very large hiatal hernia containing majority of the stomach, non dilated small bowel loops, pancreatic body and tail.  Moderate cardiomegaly noted.   - obtained CT abdomen and pelvis with contrast with no acute process identified  - Evaluated by  oncology - no treatment options due to age and functional status. This was discussed with patient and she decided against pursuing any additional workup. This was also discussed with POA by partner.   - Consider palliative following at [place]      RSV  - Noted on resp PCR. Likely causing her acute symptoms at this time.   - Supportive care  - Mucinex. Delsym. Mobilize.   - Flutter     Prolonged QT  - Avoid prolonging medications      Hypokalemia  -replace per protocol     Hypothyroidism  -continue synthroid      GERD  -PPI      Alzheimer's disease   -continue namenda, aricept  -Delirium precautions - up in the chair,  shades open     Essential hypertension   - continue norvasc, lisinopril      Anxiety/depression   - continue zoloft, buspar      Large Hiatal hernia  - CT with large hiatal hernia containing stomach, bowel and pancreatic body and tail  - Currently asymptomatic      GOC: partner reviewed code status with patient 12/23. Partner reviewed code status with son and POA 12/24. Answered all questions to the best of my ability. Partner again reviewed  with patient 12/25 and she stated she doesn't know what to do. Plan to reach out to son again.       Expected Discharge Location and Transportation: [facility]   Expected Discharge   Expected Discharge Date: 12/26/2023; Expected Discharge Time:      DVT prophylaxis:  Mechanical DVT prophylaxis orders are present.     AM-PAC 6 Clicks Score (PT): 10 (12/25/23 2015)    CODE STATUS:   Code Status and Medical Interventions:   Ordered at: 12/22/23 2154     Level Of Support Discussed With:    Patient     Code Status (Patient has no pulse and is not breathing):    CPR (Attempt to Resuscitate)     Medical Interventions (Patient has pulse or is breathing):    Full Support       Jose Mustafa MD  12/26/23

## 2023-12-26 NOTE — PLAN OF CARE
Goal Outcome Evaluation:  Plan of Care Reviewed With: patient        Progress: no change  Outcome Evaluation: No acute events overnight. Remains on 2LNC. No resp distress noted. VPaced on tele. Denies CP or SOA. VSS. Cont POC

## 2023-12-26 NOTE — PLAN OF CARE
Goal Outcome Evaluation:   Pt up in chair, paced on tele, O2 at 2L/nc, cont. POC

## 2023-12-26 NOTE — PROGRESS NOTES
called contact with the [facility] community to have a rabbi make a visit. They will call back with a better idea of a time frame to visit the patient as they are able to make plans.

## 2023-12-26 NOTE — CASE MANAGEMENT/SOCIAL WORK
Continued Stay Note  [facility]     Patient Name: Temi Jarrett  MRN: [id number]  Today's Date: 12/26/2023    Admit Date: 12/22/2023    Plan: [facility]   Discharge Plan       Row Name 12/26/23 1238       Plan    Plan [facility]    Patient/Family in Agreement with Plan yes    Plan Comments Discussed patient in MDR. Patient can return to [facility] when out of RSV isolation and resolution of symptoms (Thursday). Updated [place] with [facility]. Patient will likely need home oxygen arranged closer to discharge. CM will continue to follow and assist.    Final Discharge Disposition Code 04 - intermediate care facility                   Discharge Codes    No documentation.                 Expected Discharge Date and Time       Expected Discharge Date Expected Discharge Time    Dec 26, 2023               Vijaya Garrison RN

## 2023-12-27 PROCEDURE — 97535 SELF CARE MNGMENT TRAINING: CPT

## 2023-12-27 PROCEDURE — 99232 SBSQ HOSP IP/OBS MODERATE 35: CPT | Performed by: INTERNAL MEDICINE

## 2023-12-27 PROCEDURE — 97530 THERAPEUTIC ACTIVITIES: CPT

## 2023-12-27 RX ADMIN — BETHANECHOL CHLORIDE 10 MG: 10 TABLET ORAL at 16:23

## 2023-12-27 RX ADMIN — GABAPENTIN 300 MG: 300 CAPSULE ORAL at 20:11

## 2023-12-27 RX ADMIN — Medication 1 TABLET: at 08:59

## 2023-12-27 RX ADMIN — Medication 10 ML: at 09:00

## 2023-12-27 RX ADMIN — LISINOPRIL 20 MG: 20 TABLET ORAL at 08:59

## 2023-12-27 RX ADMIN — PHENOL 1 SPRAY: 1.4 SPRAY ORAL at 16:24

## 2023-12-27 RX ADMIN — DEXTROMETHORPHAN POLISTIREX 60 MG: 30 SUSPENSION ORAL at 20:11

## 2023-12-27 RX ADMIN — GUAIFENESIN 1200 MG: 600 TABLET, EXTENDED RELEASE ORAL at 09:00

## 2023-12-27 RX ADMIN — GUAIFENESIN 1200 MG: 600 TABLET, EXTENDED RELEASE ORAL at 20:10

## 2023-12-27 RX ADMIN — AMLODIPINE BESYLATE 5 MG: 5 TABLET ORAL at 08:59

## 2023-12-27 RX ADMIN — PANTOPRAZOLE SODIUM 40 MG: 40 TABLET, DELAYED RELEASE ORAL at 08:59

## 2023-12-27 RX ADMIN — ACETAMINOPHEN 650 MG: 325 TABLET ORAL at 20:10

## 2023-12-27 RX ADMIN — GABAPENTIN 300 MG: 300 CAPSULE ORAL at 08:59

## 2023-12-27 RX ADMIN — PHENOL 1 SPRAY: 1.4 SPRAY ORAL at 11:22

## 2023-12-27 RX ADMIN — BETHANECHOL CHLORIDE 10 MG: 10 TABLET ORAL at 09:00

## 2023-12-27 RX ADMIN — MEMANTINE 10 MG: 10 TABLET ORAL at 20:11

## 2023-12-27 RX ADMIN — BETHANECHOL CHLORIDE 10 MG: 10 TABLET ORAL at 23:52

## 2023-12-27 RX ADMIN — PHENOL 1 SPRAY: 1.4 SPRAY ORAL at 20:11

## 2023-12-27 RX ADMIN — BENZONATATE 200 MG: 100 CAPSULE ORAL at 20:11

## 2023-12-27 RX ADMIN — BUSPIRONE HYDROCHLORIDE 10 MG: 10 TABLET ORAL at 08:59

## 2023-12-27 RX ADMIN — BENZONATATE 200 MG: 100 CAPSULE ORAL at 12:43

## 2023-12-27 RX ADMIN — DEXTROMETHORPHAN POLISTIREX 60 MG: 30 SUSPENSION ORAL at 08:59

## 2023-12-27 RX ADMIN — SERTRALINE 50 MG: 50 TABLET, FILM COATED ORAL at 08:59

## 2023-12-27 RX ADMIN — BUSPIRONE HYDROCHLORIDE 10 MG: 10 TABLET ORAL at 20:11

## 2023-12-27 RX ADMIN — LEVOTHYROXINE SODIUM 150 MCG: 0.15 TABLET ORAL at 06:22

## 2023-12-27 RX ADMIN — SENNOSIDES AND DOCUSATE SODIUM 2 TABLET: 8.6; 5 TABLET ORAL at 09:00

## 2023-12-27 RX ADMIN — MEMANTINE 10 MG: 10 TABLET ORAL at 09:00

## 2023-12-27 NOTE — PLAN OF CARE
Goal Outcome Evaluation:  Plan of Care Reviewed With: patient        Progress: no change  Outcome Evaluation: Remains on 2LNC. No acute events overnight. Denies CP or SOA. No concerns at this time

## 2023-12-27 NOTE — PROGRESS NOTES
[facility] Medicine Services  PROGRESS NOTE    Patient Name: Temi Jarrett  : 1930  MRN: [id number]    Date of Admission: 2023  Primary Care Physician: Eric Patel MD    Subjective   Subjective     CC:  cough    HPI:  Coughing, non productive, non fevers      Objective   Objective     Vital Signs:   Temp:  [96.4 °F (35.8 °C)-97.3 °F (36.3 °C)] 97.3 °F (36.3 °C)  Heart Rate:  [62-86] 77  Resp:  [16-18] 16  BP: (132-170)/(72-90) 138/90  Flow (L/min):  [2] 2     Physical Exam:  Frail, in bed  MM moist  RRR  Diminished bilaterally  Abd soft, NT  No edema  Alert, speech clear  Normal affect      Results Reviewed:  LAB RESULTS:      Lab 23  03423  0324 23  0840 23  1507   WBC 7.06 6.03 5.50 6.71   HEMOGLOBIN 12.5 12.6 12.5 13.4   HEMATOCRIT 39.6 40.4 39.5 42.3   PLATELETS 121* 125* 111* 157   NEUTROS ABS  --   --  4.16 5.14   IMMATURE GRANS (ABS)  --   --  0.01 0.02   LYMPHS ABS  --   --  0.64* 0.78   MONOS ABS  --   --  0.50 0.55   EOS ABS  --   --  0.17 0.18   MCV 88.0 89.4 89.0 89.4         Lab 23  0341 23  0324 23  0840 23  1715 23  1507   SODIUM 143 144 145  --  145   POTASSIUM 3.9 4.1 4.3  --  3.4*   CHLORIDE 103 105 105  --  102   CO2 34.0* 32.0* 31.0*  --  31.0*   ANION GAP 6.0 7.0 9.0  --  12.0   BUN 24* 18 12  --  17   CREATININE 0.54* 0.57 0.44*  --  0.68   EGFR 86.0 84.9 90.3  --  81.3   GLUCOSE 104* 89 83  --  145*   CALCIUM 8.2 8.3 8.2  --  8.6   MAGNESIUM  --   --   --  1.8  --    PHOSPHORUS  --   --   --  3.4  --          Lab 23  1507   TOTAL PROTEIN 6.4   ALBUMIN 3.8   GLOBULIN 2.6   ALT (SGPT) 18   AST (SGOT) 27   BILIRUBIN 0.4   ALK PHOS 70   LIPASE 33         Lab 23  1715 23  1507   PROBNP  --  1,322.0   HSTROP T 28* 32*                 Brief Urine Lab Results  (Last result in the past 365 days)        Color   Clarity   Blood   Leuk Est   Nitrite   Protein   CREAT   Urine HCG         09/05/23 2112 Yellow   Cloudy     Small                       Microbiology Results Abnormal       None            No radiology results from the last 24 hrs    Results for orders placed during the hospital encounter of 08/02/20    Transthoracic Echo Complete With Contrast if Necessary Per Protocol    Interpretation Summary  · Left ventricular systolic function is normal. Estimated EF = 70%.  · Left ventricular diastolic dysfunction (grade I) consistent with impaired relaxation.  · Left atrial cavity size is moderately dilated.  · There is calcification of the aortic valve. There is no significant stenosis or regurgitation. A Lambel's excrescence is noted on an aortic valve leaflet.  · Moderate mitral valve regurgitation is present.  · Estimated right ventricular systolic pressure from tricuspid regurgitation is moderately elevated (49 mmHg).  · There is a moderate (1-2cm) circumferential pericardial effusion. There is no tamponade physiology.      Current medications:  Scheduled Meds:amLODIPine, 5 mg, Oral, Daily  bethanechol, 10 mg, Oral, TID  busPIRone, 10 mg, Oral, BID  dextromethorphan polistirex ER, 60 mg, Oral, Q12H  gabapentin, 300 mg, Oral, BID  guaiFENesin, 1,200 mg, Oral, Q12H  levothyroxine, 150 mcg, Oral, Q AM  lisinopril, 20 mg, Oral, Daily  memantine, 10 mg, Oral, BID  multivitamin with minerals, 1 tablet, Oral, Daily  pantoprazole, 40 mg, Oral, Daily  senna-docusate sodium, 2 tablet, Oral, BID  sertraline, 50 mg, Oral, Daily  sodium chloride, 10 mL, Intravenous, Q12H      Continuous Infusions:   PRN Meds:.  acetaminophen **OR** acetaminophen **OR** acetaminophen    benzonatate    senna-docusate sodium **AND** polyethylene glycol **AND** bisacodyl **AND** bisacodyl    Calcium Replacement - Follow Nurse / BPA Driven Protocol    Magnesium Standard Dose Replacement - Follow Nurse / BPA Driven Protocol    phenol    Phosphorus Replacement - Follow Nurse / BPA Driven Protocol    Potassium Replacement -  Follow Nurse / BPA Driven Protocol    sodium chloride    traMADol    Assessment & Plan   Assessment & Plan     Active Hospital Problems    Diagnosis  POA    **Metastatic disease [C79.9]  Yes    Prolonged Q-T interval on ECG [R94.31]  Yes    Alzheimer's disease [G30.9, F02.80]  Yes    Hiatal hernia [K44.9]  Yes    Anxiety with depression [F41.8]  Yes    Essential hypertension [I10]  Yes    Hypothyroidism (acquired) [E03.9]  Yes      Resolved Hospital Problems   No resolved problems to display.        Brief Hospital Course to date:  89 year old female presented with HTN, alzheimers, hiatal hernia, hypothryoid to the ED from [facility] via EMS after an abnormal CXR. Patient had a CXR for cough and was noted to have a new 7.5 cm left suprahilar/mediastinal mass. CT scan with findings compatible with metastatic disease to the lung. Multiple round lung nodules with enlaged centrally necrotic left sided prevascular and hilar lymph nodes measuring up to 5 cm.  Patient was also noted to be RSV +. Oncology was consulted and after discussion with the patient, decision was made to not pursue diagnosis because she has no treatment options due to functional status and age.      Metastatic disease   -CXR mentioned above  -CT chest with contrast shows findings compatible with metastatic disease to the lungs.  Multiple rounded lesions are bilaterally measuring up to 1.4 cm.  Markedly enlarged and centrally necrotic left sided pre vascular and hilar lymph nodes measuring up to 5 cm.  Findings compatible with metastatic disease.  Enlargement of the main pulmonary artery measuring 4.8 cm.  Findings consistent with pulmonary artery hypertension.  Very large hiatal hernia containing majority of the stomach, non dilated small bowel loops, pancreatic body and tail.  Moderate cardiomegaly noted.   - obtained CT abdomen and pelvis with contrast with no acute process identified  - Evaluated by oncology - no treatment options due  to age and functional status. This was discussed with patient and she decided against pursuing any additional workup. This was also discussed with POA by partner.   - Consider palliative following at [place]      RSV  - Noted on resp PCR. Likely causing her acute symptoms at this time.   - Supportive care  - Mucinex. Delsym. Tessaslon. Mobilize.   - Flutter valve    Prolonged QT  - Avoid prolonging medications      Hypokalemia  -replace per protocol     Hypothyroidism  -continue synthroid      GERD  -PPI      Alzheimer's disease   -continue namenda, aricept  -Delirium precautions - up in the chair,  shades open     Essential hypertension   - continue norvasc, lisinopril      Anxiety/depression   - continue zoloft, buspar      Large Hiatal hernia  - CT with large hiatal hernia containing stomach, bowel and pancreatic body and tail  - Currently asymptomatic      GOC: partner reviewed code status with patient 12/23. Partner reviewed code status with son and POA 12/24.  Partner again reviewed  with patient 12/25 and she stated she doesn't know what to do.       Expected Discharge Location and Transportation: [facility]   Expected Discharge   Expected Discharge Date: 12/26/2023; Expected Discharge Time:      DVT prophylaxis:  Mechanical DVT prophylaxis orders are present.     AM-PAC 6 Clicks Score (PT): 13 (12/27/23 2434)    CODE STATUS:   Code Status and Medical Interventions:   Ordered at: 12/22/23 9149     Level Of Support Discussed With:    Patient     Code Status (Patient has no pulse and is not breathing):    CPR (Attempt to Resuscitate)     Medical Interventions (Patient has pulse or is breathing):    Full Support       Jose Mustafa MD  12/27/23

## 2023-12-27 NOTE — THERAPY TREATMENT NOTE
Patient Name: Temi Jarrett  : 1930    MRN: [id number]                              Today's Date: 2023       Admit Date: 2023    Visit Dx:     ICD-10-CM ICD-9-CM   1. Malignant neoplasm of lung, unspecified laterality, unspecified part of lung  C34.90 162.9   2. Chest mass  R22.2 786.6     Patient Active Problem List   Diagnosis    Esophageal reflux    Hypothyroidism (acquired)    Anxiety with depression    Peripheral neuropathy    Spinal stenosis of lumbar region    Osteoporosis    Disc disorder of lumbar region    Bladder prolapse, female, acquired    Essential hypertension    Pernicious anemia    Annual physical exam    Primary osteoarthritis of both knees    Hiatal hernia    Multifocal aspiration pneumonia due to large intrathoracic hiatal hernia    Recurrent UTI    Paroxysmal atrial fibrillation    Alzheimer's disease    Prolonged Q-T interval on ECG    Mild cognitive impairment    At high risk for aspiration    Chronic pain    Osteoarthritis of right knee    Acute cough    Pressure injury of right buttock, stage 2    Nasal congestion    Metastatic disease     Past Medical History:   Diagnosis Date    Acute sepsis 2021    Arthritis     Bladder prolapse, female, acquired     Closed fracture of neck of left femur 2019    Dementia     Depression     Disease of thyroid gland     Gastric polyp     GERD (gastroesophageal reflux disease)     Hiatal hernia     History of colonic polyps     Hyperlipidemia     Hypertension     IBS (irritable bowel syndrome)     Macular degeneration     Pacemaker     Pericardial effusion 2020    Echo (8/3/2020): Normal LVEF.  Moderate pericardial effusion without tamponade.  Moderate MR. RVSP 49 mmHg    Torn meniscus     right knee      Past Surgical History:   Procedure Laterality Date    CHOLECYSTECTOMY      ENDOSCOPY N/A 2021    Procedure: ESOPHAGOGASTRODUODENOSCOPY;  Surgeon: Serjio Guerrero MD;  [facility];   Service: General;  Laterality: N/A;    EYE SURGERY  1998    Cataract extraction    HERNIA REPAIR      HIP HEMIARTHROPLASTY Left 09/28/2019    Procedure: HIP HEMIARTHROPLASTY LEFT;  Surgeon: Reddy Segundo Jr., MD;  Location: [facility] OR;  Service: Orthopedics    HYSTERECTOMY  1976    KNEE SURGERY Right     arthroscopy- 2000    SKIN CANCER EXCISION Left     [facility]; CANCEROUS SPOT DIDN'T  MOVE BUT NEEDED REMOVED    TONSILLECTOMY        General Information       Row Name 12/27/23 1454          OT Time and Intention    Document Type therapy note (daily note)  -KF     Mode of Treatment occupational therapy;individual therapy  -KF       Row Name 12/27/23 1454          General Information    Patient Profile Reviewed yes  -KF     Existing Precautions/Restrictions fall;oxygen therapy device and L/min  -KF     Barriers to Rehab medically complex;previous functional deficit;cognitive status;hearing deficit;visual deficit  -KF       Row Name 12/27/23 1454          Cognition    Orientation Status (Cognition) oriented to;person;place;time  -KF       Row Name 12/27/23 1454          Safety Issues, Functional Mobility    Safety Issues Affecting Function (Mobility) awareness of need for assistance;insight into deficits/self-awareness;judgment;positioning of assistive device;problem-solving;safety precaution awareness;safety precautions follow-through/compliance;sequencing abilities  -KF     Impairments Affecting Function (Mobility) balance;cognition;endurance/activity tolerance;shortness of breath;strength;postural/trunk control  -KF     Cognitive Impairments, Mobility Safety/Performance awareness, need for assistance;insight into deficits/self-awareness;judgment;problem-solving/reasoning;safety precaution awareness;safety precaution follow-through;sequencing abilities  -KF               User Key  (r) = Recorded By, (t) = Taken By, (c) = Cosigned By      Initials Name Provider Type    KF Jeni Trejo,  OT Occupational Therapist                     Mobility/ADL's       Row Name 12/27/23 1457          Bed Mobility    Bed Mobility supine-sit  -KF     Supine-Sit [place] (Bed Mobility) standby assist  -     Assistive Device (Bed Mobility) bed rails;head of bed elevated  -     Comment, (Bed Mobility) Increased time and effort needed.  -       Row Name 12/27/23 145          Transfers    Transfers bed-chair transfer;sit-stand transfer;stand-sit transfer  -KF     Comment, (Transfers) Pt completed STS x1 from EOB and STS x1 from chair using a RWx with CGA. Verbal/tactile cues given for hand placement.  -       Row Name 12/27/23 145          Bed-Chair Transfer    Bed-Chair [place] (Transfers) minimum assist (75% patient effort);1 person assist;verbal cues;nonverbal cues (demo/gesture)  -     Assistive Device (Bed-Chair Transfers) walker, front-wheeled  -       Row Name 12/27/23 0544          Sit-Stand Transfer    Sit-Stand [place] (Transfers) contact guard;1 person assist;verbal cues;nonverbal cues (demo/gesture)  -     Assistive Device (Sit-Stand Transfers) walker, front-wheeled  -       Row Name 12/27/23 1532          Stand-Sit Transfer    Stand-Sit [place] (Transfers) contact guard;1 person assist;verbal cues;nonverbal cues (demo/gesture)  -     Assistive Device (Stand-Sit Transfers) walker, front-wheeled  -       Row Name 12/27/23 8258          Functional Mobility    Functional Mobility- Ind. Level contact guard assist;1 person  -     Functional Mobility- Device walker, front-wheeled  -KF     Functional Mobility- Comment Pt ambulated 10' + 10' using a RWx with CGA, extended seated rest break in between.  -       Row Name 12/27/23 1453          Activities of Daily Living    BADL Assessment/Intervention grooming  -       Row Name 12/27/23 1453          Grooming Assessment/Training    [place] Level (Grooming) wash face, hands;hair care, combing/brushing;set up  -      Position (Grooming) unsupported sitting  -KF               User Key  (r) = Recorded By, (t) = Taken By, (c) = Cosigned By      Initials Name Provider Type    KF Jeni Trejo OT Occupational Therapist                   Obj/Interventions       Row Name 12/27/23 1457          Balance    Balance Assessment sitting static balance;sitting dynamic balance;sit to stand dynamic balance;standing static balance;standing dynamic balance  -KF     Static Sitting Balance supervision  -KF     Dynamic Sitting Balance standby assist  -KF     Position, Sitting Balance unsupported;sitting edge of bed  -KF     Sit to Stand Dynamic Balance contact guard;minimal assist  -KF     Static Standing Balance contact guard  -KF     Dynamic Standing Balance contact guard;minimal assist  -KF     Position/Device Used, Standing Balance supported;walker, front-wheeled  -KF     Balance Interventions sitting;standing;sit to stand;supported;static;dynamic;occupation based/functional task  -KF     Comment, Balance No overt LOB, fatigues quickly  -KF               User Key  (r) = Recorded By, (t) = Taken By, (c) = Cosigned By      Initials Name Provider Type    KF Jeni Trejo OT Occupational Therapist                   Goals/Plan    No documentation.                  Clinical Impression       Row Name 12/27/23 1458          Pain Assessment    Pretreatment Pain Rating 0/10 - no pain  -KF     Posttreatment Pain Rating 0/10 - no pain  -KF     Pain Intervention(s) Repositioned;Ambulation/increased activity  -KF       Row Name 12/27/23 1458          Plan of Care Review    Plan of Care Reviewed With patient  -KF     Progress improving  -KF     Outcome Evaluation Pt with good participation and progress toward goals during OT session. The pt completed transfer to chair and ambulated 10' + 10' using a RWx with CGA. Seated rest breaks required. The pt performed grooming ADLs while seated in chair with set up. The pt remains below her functional baseline  with generalized weakness, decreased activity tolerance, and balance deficits. Pt will benefit from continued IP OT services to address deficits listed. If deemed medically appropriate, recommend return to previous facility when medically ready.  -       Row Name 12/27/23 1458          Therapy Assessment/Plan (OT)    Rehab Potential (OT) good, to achieve stated therapy goals  -     Criteria for Skilled Therapeutic Interventions Met (OT) yes;meets criteria;skilled treatment is necessary  -     Therapy Frequency (OT) daily  -KF       Row Name 12/27/23 1458          Therapy Plan Review/Discharge Plan (OT)    Anticipated Discharge Disposition (OT) other (see comments)  Return to previous facility  -KF       Row Name 12/27/23 1458          Vital Signs    Pre Systolic BP Rehab 138  -KF     Pre Treatment Diastolic BP 90  -KF     Pretreatment Heart Rate (beats/min) 60  -KF     Pre SpO2 (%) 95  Pt found with NC doffed (appearing to have fallen off). Pt satting 87% on RA. NC reapplied and O2 quickly grisel.  -KF     Pre Patient Position Supine  -KF     Intra Patient Position Standing  -KF     Post Patient Position Sitting  -KF       Row Name 12/27/23 1458          Positioning and Restraints    Pre-Treatment Position in bed  -KF     Post Treatment Position chair  -KF     In Chair notified nsg;reclined;call light within reach;encouraged to call for assist;exit alarm on;legs elevated;waffle cushion;on mechanical lift sling  -KF               User Key  (r) = Recorded By, (t) = Taken By, (c) = Cosigned By      Initials Name Provider Type    Jeni Oliveira, OT Occupational Therapist                   Outcome Measures       Row Name 12/27/23 9810          How much help from another is currently needed...    Putting on and taking off regular lower body clothing? 2  -KF     Bathing (including washing, rinsing, and drying) 2  -KF     Toileting (which includes using toilet bed pan or urinal) 3  -KF     Putting on and taking off  regular upper body clothing 3  -KF     Taking care of personal grooming (such as brushing teeth) 3  -KF     Eating meals 3  -KF     AM-PAC 6 Clicks Score (OT) 16  -KF       Row Name 12/27/23 0859          How much help from another person do you currently need...    Turning from your back to your side while in flat bed without using bedrails? 3  -LC     Moving from lying on back to sitting on the side of a flat bed without bedrails? 2  -LC     Moving to and from a bed to a chair (including a wheelchair)? 2  -LC     Standing up from a chair using your arms (e.g., wheelchair, bedside chair)? 2  -LC     Climbing 3-5 steps with a railing? 2  -LC     To walk in hospital room? 2  -LC     AM-PAC 6 Clicks Score (PT) 13  -LC     Highest Level of Mobility Goal 4 --> Transfer to chair/commode  -LC       Row Name 12/27/23 1502          Functional Assessment    Outcome Measure Options AM-PAC 6 Clicks Daily Activity (OT)  -KF               User Key  (r) = Recorded By, (t) = Taken By, (c) = Cosigned By      Initials Name Provider Type     Lesvia Galindo, RN Registered Nurse    Jeni Oliveira OT Occupational Therapist                    Occupational Therapy Education       Title: PT OT SLP Therapies (In Progress)       Topic: Occupational Therapy (In Progress)       Point: ADL training (Done)       Description:   Instruct learner(s) on proper safety adaptation and remediation techniques during self care or transfers.   Instruct in proper use of assistive devices.                  Learning Progress Summary             Patient Acceptance, E,TB, VU,DU by KF at 12/27/2023 1329    Acceptance, E, NR by  at 12/24/2023 1020    Comment: educated pt regarding role of therapy                         Point: Home exercise program (Not Started)       Description:   Instruct learner(s) on appropriate technique for monitoring, assisting and/or progressing therapeutic exercises/activities.                  Learner Progress:  Not documented  in this visit.              Point: Precautions (Done)       Description:   Instruct learner(s) on prescribed precautions during self-care and functional transfers.                  Learning Progress Summary             Patient Acceptance, E,TB, VU,DU by  at 12/27/2023 1329                         Point: Body mechanics (Done)       Description:   Instruct learner(s) on proper positioning and spine alignment during self-care, functional mobility activities and/or exercises.                  Learning Progress Summary             Patient Acceptance, E,TB, VU,DU by  at 12/27/2023 1329                                         User Key       Initials Effective Dates Name Provider Type Discipline     02/03/23 -  Tiffany Lord, OT Occupational Therapist OT     08/09/23 -  Jeni Trejo OT Occupational Therapist OT                  OT Recommendation and Plan  Therapy Frequency (OT): daily  Plan of Care Review  Plan of Care Reviewed With: patient  Progress: improving  Outcome Evaluation: Pt with good participation and progress toward goals during OT session. The pt completed transfer to chair and ambulated 10' + 10' using a RWx with CGA. Seated rest breaks required. The pt performed grooming ADLs while seated in chair with set up. The pt remains below her functional baseline with generalized weakness, decreased activity tolerance, and balance deficits. Pt will benefit from continued IP OT services to address deficits listed. If deemed medically appropriate, recommend return to previous facility when medically ready.     Time Calculation:         Time Calculation- OT       Row Name 12/27/23 1503             Time Calculation- OT    OT Start Time 1329  -KF      OT Received On 12/27/23  -      OT Goal Re-Cert Due Date 01/03/23  -KF         Timed Charges    22383 - OT Therapeutic Activity Minutes 25  -KF      09074 - OT Self Care/Mgmt Minutes 13  -KF         Total Minutes    Timed Charges Total Minutes 38  -KF        Total Minutes 38  -KF                User Key  (r) = Recorded By, (t) = Taken By, (c) = Cosigned By      Initials Name Provider Type    Jeni Oliveira OT Occupational Therapist                  Therapy Charges for Today       Code Description Service Date Service Provider Modifiers Qty    [id number]  OT THERAPEUTIC ACT EA 15 MIN 12/27/2023 Jeni Trejo OT GO 2    [id number]  OT SELF CARE/MGMT/TRAIN EA 15 MIN 12/27/2023 Jeni Trejo OT GO 1                 Jeni Trejo OT  12/27/2023

## 2023-12-27 NOTE — PLAN OF CARE
Goal Outcome Evaluation:  Plan of Care Reviewed With: patient        Progress: improving  Outcome Evaluation: Pt with good participation and progress toward goals during OT session. The pt completed transfer to chair and ambulated 10' + 10' using a RWx with CGA. Seated rest breaks required. The pt performed grooming ADLs while seated in chair with set up. The pt remains below her functional baseline with generalized weakness, decreased activity tolerance, and balance deficits. Pt will benefit from continued IP OT services to address deficits listed. If deemed medically appropriate, recommend return to previous facility when medically ready.      Anticipated Discharge Disposition (OT): other (see comments) (Return to previous facility)

## 2023-12-27 NOTE — CASE MANAGEMENT/SOCIAL WORK
Continued Stay Note  [facility]     Patient Name: Temi Jarrett  MRN: [id number]  Today's Date: 12/27/2023    Admit Date: 12/22/2023    Plan: [facility]   Discharge Plan       Row Name 12/27/23 1408       Plan    Plan [facility]    Patient/Family in Agreement with Plan yes    Plan Comments Discussed patient in MDR. Patient can return to [facility] when out of RSV isolation (as early as Thursday) if symptoms are resolved. Home oxygen order is in [facility]. Referral called to Horacio with [employer]. Update Horacio when patient is discharged. CM will continue to follow.                   Discharge Codes    No documentation.                 Expected Discharge Date and Time       Expected Discharge Date Expected Discharge Time    Dec 26, 2023               Vijaya Garrison RN

## 2023-12-27 NOTE — PLAN OF CARE
"Goal Outcome Evaluation:              Outcome Evaluation: VSS on 2L NC, patient desats to 88% on room air. No complaints of pain, frequent complaints of coughing unaddressed by PRN and scheduled medications (example: patient given Delsym at 0859, patient calling at 0918 for cough medication). Patient waxing and waning, patient frequently calling out for staff, requires reorientation. Patient encouraged up to chair in AM, refusing to get up as \"I'm too weak\", patient up with therapy to chair this afternoon. Patient requiring frequent cues to ambulate to bedside commode. Skin interventions in place (waffle mattress, heel boots, incontinence pads, z guard), patient turned as tolerated. Patient reeducated about flutter valve use. PO intake encouraged, BM this shift. Contact and droplet precautions maintained.          "

## 2023-12-28 PROCEDURE — 99232 SBSQ HOSP IP/OBS MODERATE 35: CPT | Performed by: NURSE PRACTITIONER

## 2023-12-28 RX ORDER — DONEPEZIL HYDROCHLORIDE 10 MG/1
10 TABLET, FILM COATED ORAL NIGHTLY
Status: DISCONTINUED | OUTPATIENT
Start: 2023-12-28 | End: 2024-01-05 | Stop reason: HOSPADM

## 2023-12-28 RX ADMIN — PHENOL 1 SPRAY: 1.4 SPRAY ORAL at 08:21

## 2023-12-28 RX ADMIN — PANTOPRAZOLE SODIUM 40 MG: 40 TABLET, DELAYED RELEASE ORAL at 08:19

## 2023-12-28 RX ADMIN — BUSPIRONE HYDROCHLORIDE 10 MG: 10 TABLET ORAL at 20:56

## 2023-12-28 RX ADMIN — BETHANECHOL CHLORIDE 10 MG: 10 TABLET ORAL at 08:19

## 2023-12-28 RX ADMIN — GABAPENTIN 300 MG: 300 CAPSULE ORAL at 20:55

## 2023-12-28 RX ADMIN — BETHANECHOL CHLORIDE 10 MG: 10 TABLET ORAL at 20:55

## 2023-12-28 RX ADMIN — BUSPIRONE HYDROCHLORIDE 10 MG: 10 TABLET ORAL at 08:19

## 2023-12-28 RX ADMIN — SERTRALINE 50 MG: 50 TABLET, FILM COATED ORAL at 08:19

## 2023-12-28 RX ADMIN — ACETAMINOPHEN 650 MG: 325 TABLET ORAL at 20:56

## 2023-12-28 RX ADMIN — DEXTROMETHORPHAN POLISTIREX 60 MG: 30 SUSPENSION ORAL at 20:56

## 2023-12-28 RX ADMIN — GUAIFENESIN 1200 MG: 600 TABLET, EXTENDED RELEASE ORAL at 08:19

## 2023-12-28 RX ADMIN — Medication 1 TABLET: at 08:19

## 2023-12-28 RX ADMIN — GUAIFENESIN 1200 MG: 600 TABLET, EXTENDED RELEASE ORAL at 20:56

## 2023-12-28 RX ADMIN — AMLODIPINE BESYLATE 5 MG: 5 TABLET ORAL at 08:19

## 2023-12-28 RX ADMIN — LEVOTHYROXINE SODIUM 150 MCG: 0.15 TABLET ORAL at 06:14

## 2023-12-28 RX ADMIN — DONEPEZIL HYDROCHLORIDE 10 MG: 10 TABLET, FILM COATED ORAL at 20:56

## 2023-12-28 RX ADMIN — BENZONATATE 200 MG: 100 CAPSULE ORAL at 12:09

## 2023-12-28 RX ADMIN — GABAPENTIN 300 MG: 300 CAPSULE ORAL at 08:19

## 2023-12-28 RX ADMIN — SENNOSIDES AND DOCUSATE SODIUM 2 TABLET: 8.6; 5 TABLET ORAL at 20:55

## 2023-12-28 RX ADMIN — PHENOL 1 SPRAY: 1.4 SPRAY ORAL at 15:11

## 2023-12-28 RX ADMIN — DEXTROMETHORPHAN POLISTIREX 60 MG: 30 SUSPENSION ORAL at 08:19

## 2023-12-28 RX ADMIN — MEMANTINE 10 MG: 10 TABLET ORAL at 20:56

## 2023-12-28 RX ADMIN — SENNOSIDES AND DOCUSATE SODIUM 2 TABLET: 8.6; 5 TABLET ORAL at 08:19

## 2023-12-28 RX ADMIN — MEMANTINE 10 MG: 10 TABLET ORAL at 08:19

## 2023-12-28 RX ADMIN — BETHANECHOL CHLORIDE 10 MG: 10 TABLET ORAL at 15:11

## 2023-12-28 RX ADMIN — LISINOPRIL 20 MG: 20 TABLET ORAL at 08:19

## 2023-12-28 NOTE — CASE MANAGEMENT/SOCIAL WORK
Continued Stay Note  [facility]     Patient Name: Temi Jarrett  MRN: [id number]  Today's Date: 12/28/2023    Admit Date: 12/22/2023    Plan: [facility]   Discharge Plan       Row Name 12/28/23 1427       Plan    Plan [facility]    Patient/Family in Agreement with Plan yes    Plan Comments Updated son, Victor M ([phone]) on home oxygen order through [employer] that will be delivered when patient discharges. He requested a call from the provider. CM notified MAIDA Garcia. Camacho from [facility] called CM back and their medical director just said Ms. Jarrett can't come back for 14 days from the date she tested positive for RSV (12/22). He said she can return on 1/5. Updated MAIDA Garcia. CM will continue to follow.    Final Discharge Disposition Code 04 - intermediate care facility                   Discharge Codes    No documentation.                 Expected Discharge Date and Time       Expected Discharge Date Expected Discharge Time    Dec 29, 2023               Vijaya Garrison RN

## 2023-12-28 NOTE — PROGRESS NOTES
[facility] Medicine Services  PROGRESS NOTE    Patient Name: Temi Jarrett  : 1930  MRN: [id number]    Date of Admission: 2023  Primary Care Physician: Eric Patel MD    Subjective   Subjective     CC:  cough    HPI:  No new issues overnight  Cough continues  Patient is lonely and wants someone to come talk to her      Objective   Objective     Vital Signs:   Temp:  [96.6 °F (35.9 °C)-97.9 °F (36.6 °C)] 96.6 °F (35.9 °C)  Heart Rate:  [51-89] 89  Resp:  [16-18] 17  BP: (123-145)/(71-87) 144/87  Flow (L/min):  [2] 2     Physical Exam:  Constitutional: No acute distress, awake, alert, tearful, no family at bedside  HENT: NCAT, mucous membranes moist  Respiratory: Decreased, coarse cough, respiratory effort normal, sats stable on 2L  Cardiovascular: RRR, no murmurs, rubs, or gallops  Gastrointestinal: Positive bowel sounds, soft, nontender, nondistended  Musculoskeletal: No bilateral ankle edema  Psychiatric: Appropriate affect, cooperative  Neurologic: Oriented x 3, but frequently repeats same question, speech clear  Skin: No rashes noted      Results Reviewed:  LAB RESULTS:      Lab 23  03423  0324 23  0840 23  1507   WBC 7.06 6.03 5.50 6.71   HEMOGLOBIN 12.5 12.6 12.5 13.4   HEMATOCRIT 39.6 40.4 39.5 42.3   PLATELETS 121* 125* 111* 157   NEUTROS ABS  --   --  4.16 5.14   IMMATURE GRANS (ABS)  --   --  0.01 0.02   LYMPHS ABS  --   --  0.64* 0.78   MONOS ABS  --   --  0.50 0.55   EOS ABS  --   --  0.17 0.18   MCV 88.0 89.4 89.0 89.4         Lab 23  0341 23  0324 23  0840 23  1715 23  1507   SODIUM 143 144 145  --  145   POTASSIUM 3.9 4.1 4.3  --  3.4*   CHLORIDE 103 105 105  --  102   CO2 34.0* 32.0* 31.0*  --  31.0*   ANION GAP 6.0 7.0 9.0  --  12.0   BUN 24* 18 12  --  17   CREATININE 0.54* 0.57 0.44*  --  0.68   EGFR 86.0 84.9 90.3  --  81.3   GLUCOSE 104* 89 83  --  145*   CALCIUM 8.2 8.3 8.2  --  8.6    MAGNESIUM  --   --   --  1.8  --    PHOSPHORUS  --   --   --  3.4  --          Lab 12/22/23  1507   TOTAL PROTEIN 6.4   ALBUMIN 3.8   GLOBULIN 2.6   ALT (SGPT) 18   AST (SGOT) 27   BILIRUBIN 0.4   ALK PHOS 70   LIPASE 33         Lab 12/22/23  1715 12/22/23  1507   PROBNP  --  1,322.0   HSTROP T 28* 32*                 Brief Urine Lab Results  (Last result in the past 365 days)        Color   Clarity   Blood   Leuk Est   Nitrite   Protein   CREAT   Urine HCG        09/05/23 2112 Yellow   Cloudy     Small                       Microbiology Results Abnormal       None            No radiology results from the last 24 hrs    Results for orders placed during the hospital encounter of 08/02/20    Transthoracic Echo Complete With Contrast if Necessary Per Protocol    Interpretation Summary  · Left ventricular systolic function is normal. Estimated EF = 70%.  · Left ventricular diastolic dysfunction (grade I) consistent with impaired relaxation.  · Left atrial cavity size is moderately dilated.  · There is calcification of the aortic valve. There is no significant stenosis or regurgitation. A Lambel's excrescence is noted on an aortic valve leaflet.  · Moderate mitral valve regurgitation is present.  · Estimated right ventricular systolic pressure from tricuspid regurgitation is moderately elevated (49 mmHg).  · There is a moderate (1-2cm) circumferential pericardial effusion. There is no tamponade physiology.      Current medications:  Scheduled Meds:amLODIPine, 5 mg, Oral, Daily  bethanechol, 10 mg, Oral, TID  busPIRone, 10 mg, Oral, BID  dextromethorphan polistirex ER, 60 mg, Oral, Q12H  gabapentin, 300 mg, Oral, BID  guaiFENesin, 1,200 mg, Oral, Q12H  levothyroxine, 150 mcg, Oral, Q AM  lisinopril, 20 mg, Oral, Daily  memantine, 10 mg, Oral, BID  multivitamin with minerals, 1 tablet, Oral, Daily  pantoprazole, 40 mg, Oral, Daily  senna-docusate sodium, 2 tablet, Oral, BID  sertraline, 50 mg, Oral, Daily  sodium  chloride, 10 mL, Intravenous, Q12H      Continuous Infusions:   PRN Meds:.  acetaminophen **OR** acetaminophen **OR** acetaminophen    benzonatate    senna-docusate sodium **AND** polyethylene glycol **AND** bisacodyl **AND** bisacodyl    Calcium Replacement - Follow Nurse / BPA Driven Protocol    Magnesium Standard Dose Replacement - Follow Nurse / BPA Driven Protocol    phenol    Phosphorus Replacement - Follow Nurse / BPA Driven Protocol    Potassium Replacement - Follow Nurse / BPA Driven Protocol    sodium chloride    traMADol    Assessment & Plan   Assessment & Plan     Active Hospital Problems    Diagnosis  POA    **Metastatic disease [C79.9]  Yes    Prolonged Q-T interval on ECG [R94.31]  Yes    Alzheimer's disease [G30.9, F02.80]  Yes    Hiatal hernia [K44.9]  Yes    Anxiety with depression [F41.8]  Yes    Essential hypertension [I10]  Yes    Hypothyroidism (acquired) [E03.9]  Yes      Resolved Hospital Problems   No resolved problems to display.        Brief Hospital Course to date:  89 year old female presented with HTN, alzheimers, hiatal hernia, hypothryoid to the ED from [facility] via EMS after an abnormal CXR. Patient had a CXR for cough and was noted to have a new 7.5 cm left suprahilar/mediastinal mass. CT scan with findings compatible with metastatic disease to the lung. Multiple round lung nodules with enlaged centrally necrotic left sided prevascular and hilar lymph nodes measuring up to 5 cm.  Patient was also noted to be RSV +. Oncology was consulted and after discussion with the patient, decision was made to not pursue diagnosis because she has no treatment options due to functional status and age.      Metastatic disease   -CXR mentioned above  -CT chest with contrast shows findings compatible with metastatic disease to the lungs.  Multiple rounded lesions are bilaterally measuring up to 1.4 cm.  Markedly enlarged and centrally necrotic left sided pre vascular and hilar lymph  nodes measuring up to 5 cm.  Findings compatible with metastatic disease.  Enlargement of the main pulmonary artery measuring 4.8 cm.  Findings consistent with pulmonary artery hypertension.  Very large hiatal hernia containing majority of the stomach, non dilated small bowel loops, pancreatic body and tail.  Moderate cardiomegaly noted.   - obtained CT abdomen and pelvis with contrast with no acute process identified  - Evaluated by oncology - no treatment options due to age and functional status. This was discussed with patient and she decided against pursuing any additional workup. This was also discussed with POA by partner.   - Consider palliative following at [facility]   - With the history and diagnosis of a malignant neoplasm of the lung, coupled with the recorded resting room air saturation at 88% on 12/27 - would like Ms. Jarrett to be set up with supplemental home O2 at 2 lpm at discharge.     RSV  - Noted on resp PCR. Likely causing her acute symptoms at this time.   - Supportive care  - Mucinex. Delsym. Tessaslon. Mobilize.   - Flutter valve    Prolonged QT  - Avoid prolonging medications      Hypokalemia  -replace per protocol     Hypothyroidism  -continue synthroid      GERD  -PPI      Alzheimer's disease   - continue namenda, restart aricept  - Delirium precautions - up in the chair, window shades open     Essential hypertension   - continue norvasc, lisinopril      Anxiety/depression   - continue zoloft, buspar      Large Hiatal hernia  - CT with large hiatal hernia containing stomach, bowel and pancreatic body and tail  - Currently asymptomatic      [facility]  - partner reviewed code status with patient 12/23. Partner reviewed code status with son and POA 12/24.  Partner again reviewed with patient 12/25 and she stated she doesn't know what to do.     Expected Discharge Location and Transportation: back to [facility] when out of precautions.  She is feeling better, is afebrile, but continues to have a cough.   Unlikely that cough is solely related to RSV, but metastatic disease likely contributing as well    Expected Discharge   Expected Discharge Date: 12/29/2023; Expected Discharge Time:      DVT prophylaxis:  Mechanical DVT prophylaxis orders are present.     AM-PAC 6 Clicks Score (PT): 13 (12/27/23 2200)    CODE STATUS:   Code Status and Medical Interventions:   Ordered at: 12/22/23 2159     Level Of Support Discussed With:    Patient     Code Status (Patient has no pulse and is not breathing):    CPR (Attempt to Resuscitate)     Medical Interventions (Patient has pulse or is breathing):    Full Support       Daily Care Communication  Due to current limited visitation policies, an attempt will be made daily to update patient's identified best point-of-contact(s)   Contact: Victor M   Relation: son   Time of communication: 3489   Notes (if applicable): attempted to call son to update on POC, no answer         MAIDA Garcia  12/28/23

## 2023-12-28 NOTE — PLAN OF CARE
Goal Outcome Evaluation:              Outcome Evaluation: VSS on 2L NC. No cpmplains of pain, complains of frequent cough addressed by prn and scheduled cough meds. patient complains of being too weak. No acute events during the shifts.

## 2023-12-28 NOTE — PLAN OF CARE
Problem: Fall Injury Risk  Goal: Absence of Fall and Fall-Related Injury  Outcome: Ongoing, Progressing  Intervention: Promote Injury-Free Environment  Recent Flowsheet Documentation  Taken 12/28/2023 0424 by Emiliana Chatterjee, RN  Safety Promotion/Fall Prevention:   toileting scheduled   safety round/check completed   room organization consistent   nonskid shoes/slippers when out of bed   lighting adjusted   activity supervised   assistive device/personal items within reach   clutter free environment maintained  Taken 12/28/2023 0205 by Emiliana Chatterjee, RN  Safety Promotion/Fall Prevention:   toileting scheduled   safety round/check completed   room organization consistent   nonskid shoes/slippers when out of bed   lighting adjusted   activity supervised   assistive device/personal items within reach   clutter free environment maintained  Taken 12/27/2023 2200 by Emiliana Chatterjee, RN  Safety Promotion/Fall Prevention: activity supervised   Goal Outcome Evaluation:

## 2023-12-29 LAB
ANION GAP SERPL CALCULATED.3IONS-SCNC: 7 MMOL/L (ref 5–15)
BASOPHILS # BLD AUTO: 0.04 10*3/MM3 (ref 0–0.2)
BASOPHILS NFR BLD AUTO: 0.6 % (ref 0–1.5)
BUN SERPL-MCNC: 24 MG/DL (ref 8–23)
BUN/CREAT SERPL: 47.1 (ref 7–25)
CALCIUM SPEC-SCNC: 8.4 MG/DL (ref 8.2–9.6)
CHLORIDE SERPL-SCNC: 103 MMOL/L (ref 98–107)
CO2 SERPL-SCNC: 33 MMOL/L (ref 22–29)
CREAT SERPL-MCNC: 0.51 MG/DL (ref 0.57–1)
DEPRECATED RDW RBC AUTO: 48 FL (ref 37–54)
EGFRCR SERPLBLD CKD-EPI 2021: 87.2 ML/MIN/1.73
EOSINOPHIL # BLD AUTO: 0.15 10*3/MM3 (ref 0–0.4)
EOSINOPHIL NFR BLD AUTO: 2.4 % (ref 0.3–6.2)
ERYTHROCYTE [DISTWIDTH] IN BLOOD BY AUTOMATED COUNT: 15 % (ref 12.3–15.4)
GLUCOSE SERPL-MCNC: 88 MG/DL (ref 65–99)
HCT VFR BLD AUTO: 40 % (ref 34–46.6)
HGB BLD-MCNC: 12.8 G/DL (ref 12–15.9)
IMM GRANULOCYTES # BLD AUTO: 0.01 10*3/MM3 (ref 0–0.05)
IMM GRANULOCYTES NFR BLD AUTO: 0.2 % (ref 0–0.5)
LYMPHOCYTES # BLD AUTO: 1.58 10*3/MM3 (ref 0.7–3.1)
LYMPHOCYTES NFR BLD AUTO: 25.1 % (ref 19.6–45.3)
MCH RBC QN AUTO: 27.9 PG (ref 26.6–33)
MCHC RBC AUTO-ENTMCNC: 32 G/DL (ref 31.5–35.7)
MCV RBC AUTO: 87.3 FL (ref 79–97)
MONOCYTES # BLD AUTO: 0.48 10*3/MM3 (ref 0.1–0.9)
MONOCYTES NFR BLD AUTO: 7.6 % (ref 5–12)
NEUTROPHILS NFR BLD AUTO: 4.04 10*3/MM3 (ref 1.7–7)
NEUTROPHILS NFR BLD AUTO: 64.1 % (ref 42.7–76)
NRBC BLD AUTO-RTO: 0 /100 WBC (ref 0–0.2)
PLATELET # BLD AUTO: 173 10*3/MM3 (ref 140–450)
PMV BLD AUTO: 10.7 FL (ref 6–12)
POTASSIUM SERPL-SCNC: 3.8 MMOL/L (ref 3.5–5.2)
RBC # BLD AUTO: 4.58 10*6/MM3 (ref 3.77–5.28)
SODIUM SERPL-SCNC: 143 MMOL/L (ref 136–145)
WBC NRBC COR # BLD AUTO: 6.3 10*3/MM3 (ref 3.4–10.8)

## 2023-12-29 PROCEDURE — 97116 GAIT TRAINING THERAPY: CPT

## 2023-12-29 PROCEDURE — 97110 THERAPEUTIC EXERCISES: CPT

## 2023-12-29 PROCEDURE — 97530 THERAPEUTIC ACTIVITIES: CPT

## 2023-12-29 PROCEDURE — 99232 SBSQ HOSP IP/OBS MODERATE 35: CPT | Performed by: INTERNAL MEDICINE

## 2023-12-29 PROCEDURE — 85025 COMPLETE CBC W/AUTO DIFF WBC: CPT | Performed by: NURSE PRACTITIONER

## 2023-12-29 PROCEDURE — 80048 BASIC METABOLIC PNL TOTAL CA: CPT | Performed by: NURSE PRACTITIONER

## 2023-12-29 PROCEDURE — 97535 SELF CARE MNGMENT TRAINING: CPT

## 2023-12-29 RX ORDER — CHOLECALCIFEROL (VITAMIN D3) 125 MCG
5 CAPSULE ORAL NIGHTLY PRN
Status: DISCONTINUED | OUTPATIENT
Start: 2023-12-29 | End: 2024-01-05 | Stop reason: HOSPADM

## 2023-12-29 RX ORDER — HYDROCODONE POLISTIREX AND CHLORPHENIRAMINE POLISTIREX 10; 8 MG/5ML; MG/5ML
2.5 SUSPENSION, EXTENDED RELEASE ORAL EVERY 12 HOURS PRN
Status: DISPENSED | OUTPATIENT
Start: 2023-12-29 | End: 2024-01-05

## 2023-12-29 RX ADMIN — BETHANECHOL CHLORIDE 10 MG: 10 TABLET ORAL at 16:08

## 2023-12-29 RX ADMIN — Medication 5 MG: at 21:52

## 2023-12-29 RX ADMIN — BETHANECHOL CHLORIDE 10 MG: 10 TABLET ORAL at 21:36

## 2023-12-29 RX ADMIN — SERTRALINE 50 MG: 50 TABLET, FILM COATED ORAL at 08:24

## 2023-12-29 RX ADMIN — LISINOPRIL 20 MG: 20 TABLET ORAL at 08:24

## 2023-12-29 RX ADMIN — AMLODIPINE BESYLATE 5 MG: 5 TABLET ORAL at 08:23

## 2023-12-29 RX ADMIN — BUSPIRONE HYDROCHLORIDE 10 MG: 10 TABLET ORAL at 08:24

## 2023-12-29 RX ADMIN — PANTOPRAZOLE SODIUM 40 MG: 40 TABLET, DELAYED RELEASE ORAL at 08:24

## 2023-12-29 RX ADMIN — MEMANTINE 10 MG: 10 TABLET ORAL at 08:23

## 2023-12-29 RX ADMIN — GABAPENTIN 300 MG: 300 CAPSULE ORAL at 21:36

## 2023-12-29 RX ADMIN — DEXTROMETHORPHAN POLISTIREX 60 MG: 30 SUSPENSION ORAL at 21:35

## 2023-12-29 RX ADMIN — DONEPEZIL HYDROCHLORIDE 10 MG: 10 TABLET, FILM COATED ORAL at 21:36

## 2023-12-29 RX ADMIN — SENNOSIDES AND DOCUSATE SODIUM 2 TABLET: 8.6; 5 TABLET ORAL at 08:24

## 2023-12-29 RX ADMIN — Medication 1 TABLET: at 08:24

## 2023-12-29 RX ADMIN — MEMANTINE 10 MG: 10 TABLET ORAL at 21:36

## 2023-12-29 RX ADMIN — BUSPIRONE HYDROCHLORIDE 10 MG: 10 TABLET ORAL at 21:36

## 2023-12-29 RX ADMIN — DEXTROMETHORPHAN POLISTIREX 60 MG: 30 SUSPENSION ORAL at 08:23

## 2023-12-29 RX ADMIN — BETHANECHOL CHLORIDE 10 MG: 10 TABLET ORAL at 08:23

## 2023-12-29 RX ADMIN — GUAIFENESIN 1200 MG: 600 TABLET, EXTENDED RELEASE ORAL at 21:36

## 2023-12-29 RX ADMIN — LEVOTHYROXINE SODIUM 150 MCG: 0.15 TABLET ORAL at 05:32

## 2023-12-29 RX ADMIN — GABAPENTIN 300 MG: 300 CAPSULE ORAL at 08:24

## 2023-12-29 RX ADMIN — GUAIFENESIN 1200 MG: 600 TABLET, EXTENDED RELEASE ORAL at 08:23

## 2023-12-29 NOTE — CASE MANAGEMENT/SOCIAL WORK
Continued Stay Note  [facility]     Patient Name: Temi Jarrett  MRN: [id number]  Today's Date: 12/29/2023    Admit Date: 12/22/2023    Plan: [facility]   Discharge Plan       Row Name 12/29/23 1153       Plan    Plan [facility]    Patient/Family in Agreement with Plan yes    Plan Comments Discussed patient in MDR. The earliest she can return to [facility] is 1/5/24 due to 14 days of isolation for RSV. Left message with son, Victor M (POA) at [phone] to update. CM will continue to follow.    Final Discharge Disposition Code 04 - intermediate care facility                   Discharge Codes    No documentation.                 Expected Discharge Date and Time       Expected Discharge Date Expected Discharge Time    Jan 5, 2024               Vijaya Garrison RN

## 2023-12-29 NOTE — PLAN OF CARE
Goal Outcome Evaluation:  Plan of Care Reviewed With: patient        Progress: no change  Outcome Evaluation: Pt with reports of feeling generally unwell today, requiring max encouragement from therapist to participate in session. The pt completed bed mobility and transfer to chair with Nicole. The pt performed LBD with maxA and completed grooming ADLs with set up while sitting in the chair. The pt remains below her functional baseline with generalized weakness, decreased activity tolerance, balance deficits, and decreased safety awareness. The pt will benefit from continued IP OT services to address the deficits listed. Recommend a return to previous facility with HHOT when medically appropriate.      Anticipated Discharge Disposition (OT): other (see comments) (return to previous facility with HHOT)

## 2023-12-29 NOTE — THERAPY TREATMENT NOTE
Patient Name: Temi Jarrett  : 1930    MRN: [id number]                              Today's Date: 2023       Admit Date: 2023    Visit Dx:     ICD-10-CM ICD-9-CM   1. Malignant neoplasm of lung, unspecified laterality, unspecified part of lung  C34.90 162.9   2. Chest mass  R22.2 786.6     Patient Active Problem List   Diagnosis    Esophageal reflux    Hypothyroidism (acquired)    Anxiety with depression    Peripheral neuropathy    Spinal stenosis of lumbar region    Osteoporosis    Disc disorder of lumbar region    Bladder prolapse, female, acquired    Essential hypertension    Pernicious anemia    Annual physical exam    Primary osteoarthritis of both knees    Hiatal hernia    Multifocal aspiration pneumonia due to large intrathoracic hiatal hernia    Recurrent UTI    Paroxysmal atrial fibrillation    Alzheimer's disease    Prolonged Q-T interval on ECG    Mild cognitive impairment    At high risk for aspiration    Chronic pain    Osteoarthritis of right knee    Acute cough    Pressure injury of right buttock, stage 2    Nasal congestion    Metastatic disease     Past Medical History:   Diagnosis Date    Acute sepsis 2021    Arthritis     Bladder prolapse, female, acquired     Closed fracture of neck of left femur 2019    Dementia     Depression     Disease of thyroid gland     Gastric polyp     GERD (gastroesophageal reflux disease)     Hiatal hernia     History of colonic polyps     Hyperlipidemia     Hypertension     IBS (irritable bowel syndrome)     Macular degeneration     Pacemaker     Pericardial effusion 2020    Echo (8/3/2020): Normal LVEF.  Moderate pericardial effusion without tamponade.  Moderate MR. RVSP 49 mmHg    Torn meniscus     right knee      Past Surgical History:   Procedure Laterality Date    CHOLECYSTECTOMY      ENDOSCOPY N/A 2021    Procedure: ESOPHAGOGASTRODUODENOSCOPY;  Surgeon: Serjio Guerrero MD;  Location: [facility] ENDOSCOPY;   Service: General;  Laterality: N/A;    EYE SURGERY  1998    Cataract extraction    HERNIA REPAIR      HIP HEMIARTHROPLASTY Left 09/28/2019    Procedure: HIP HEMIARTHROPLASTY LEFT;  Surgeon: Reddy Segundo Jr., MD;  Location: [facility] OR;  Service: Orthopedics    HYSTERECTOMY  1976    KNEE SURGERY Right     arthroscopy- 2000    SKIN CANCER EXCISION Left     [facility]; CANCEROUS SPOT DIDN'T  MOVE BUT NEEDED REMOVED    TONSILLECTOMY        General Information       Row Name 12/29/23 1422          Physical Therapy Time and Intention    Document Type therapy note (daily note)  -SD     Mode of Treatment physical therapy  -SD       Row Name 12/29/23 1422          General Information    Existing Precautions/Restrictions fall;oxygen therapy device and L/min  -SD       Row Name 12/29/23 1422          Cognition    Orientation Status (Cognition) oriented x 3  -SD       Row Name 12/29/23 1422          Safety Issues, Functional Mobility    Safety Issues Affecting Function (Mobility) insight into deficits/self-awareness;judgment;sequencing abilities;safety precaution awareness  -SD     Impairments Affecting Function (Mobility) balance;endurance/activity tolerance;shortness of breath;strength;postural/trunk control  -SD               User Key  (r) = Recorded By, (t) = Taken By, (c) = Cosigned By      Initials Name Provider Type    SD Ángela Arana, PT Physical Therapist                   Mobility       Row Name 12/29/23 0598          Bed Mobility    Bed Mobility sit-supine;scooting/bridging  -SD     Scooting/Bridging [place] (Bed Mobility) standby assist;verbal cues  -SD     Sit-Supine [place] (Bed Mobility) standby assist  -SD     Comment, (Bed Mobility) cues for sequencing  -SD       Row Name 12/29/23 3567          Transfers    Comment, (Transfers) Pt performed STS x4 reps then SPT to EOB with Nicole. Pt dem good safety awareness with hand placement.  -SD       Row Name 12/29/23 4774           Bed-Chair Transfer    Bed-Chair Brule (Transfers) minimum assist (75% patient effort);1 person assist;verbal cues;nonverbal cues (demo/gesture)  -SD       Row Name 12/29/23 1523          Sit-Stand Transfer    Sit-Stand Brule (Transfers) contact guard;1 person assist;verbal cues  -SD     Assistive Device (Sit-Stand Transfers) walker, front-wheeled  -SD       Row Name 12/29/23 1523          Gait/Stairs (Locomotion)    [place] Level (Gait) contact guard;1 person assist;verbal cues  -SD     Assistive Device (Gait) walker, front-wheeled  -SD     Distance in Feet (Gait) 3 x 12ft  -SD     Deviations/Abnormal Patterns (Gait) bilateral deviations;base of support, narrow;kane decreased;festinating/shuffling;gait speed decreased  -SD     Bilateral Gait Deviations forward flexed posture;heel strike decreased  -SD     Comment, (Gait/Stairs) Pt amb a short distance in room with RW and CGA, dem forward flexed, kyphotic posture. Pt needed seated rests in between due to fatigue.  -SD               User Key  (r) = Recorded By, (t) = Taken By, (c) = Cosigned By      Initials Name Provider Type    SD Ángela rAana, PT Physical Therapist                   Obj/Interventions       Row Name 12/29/23 1526          Motor Skills    Therapeutic Exercise shoulder;hip;knee;ankle  -SD       Row Name 12/29/23 1526          Shoulder (Therapeutic Exercise)    Shoulder (Therapeutic Exercise) AROM (active range of motion)  -SD     Shoulder AROM (Therapeutic Exercise) bilateral;flexion;aBduction;10 repetitions;sitting  -SD       Row Name 12/29/23 1526          Hip (Therapeutic Exercise)    Hip Strengthening (Therapeutic Exercise) bilateral;aBduction;aDduction;heel slides;marching while seated;sitting;10 repetitions  -SD       Row Name 12/29/23 1526          Knee (Therapeutic Exercise)    Knee Strengthening (Therapeutic Exercise) bilateral;LAQ (long arc quad);sitting;10 repetitions  -SD       Row Name 12/29/23 1526           Ankle (Therapeutic Exercise)    Ankle (Therapeutic Exercise) AROM (active range of motion)  -SD     Ankle AROM (Therapeutic Exercise) bilateral;dorsiflexion;sitting;10 repetitions  -SD       Row Name 12/29/23 1526          Balance    Balance Assessment sitting static balance;standing static balance  -SD     Static Sitting Balance standby assist  -SD     Position, Sitting Balance unsupported;sitting in chair  -SD     Static Standing Balance standby assist  -SD     Dynamic Standing Balance minimal assist  -SD     Position/Device Used, Standing Balance supported  -SD               User Key  (r) = Recorded By, (t) = Taken By, (c) = Cosigned By      Initials Name Provider Type    SD Ángela Arana PT Physical Therapist                   Goals/Plan    No documentation.                  Clinical Impression       Row Name 12/29/23 1527          Pain    Pretreatment Pain Rating 0/10 - no pain  -SD     Posttreatment Pain Rating 0/10 - no pain  -SD       Row Name 12/29/23 1527          Plan of Care Review    Plan of Care Reviewed With patient  -SD     Progress improving  -SD     Outcome Evaluation Pt able to progress gait this date and gave good effort with therex, however remains below baseline level of function for mobility. IPPT services continue to be warranted at this time.  -SD       Row Name 12/29/23 1527          Vital Signs    Pre Systolic BP Rehab 114  -SD     Pre Treatment Diastolic BP 64  -SD     Pretreatment Heart Rate (beats/min) 96  -SD     Pre SpO2 (%) 99  -SD     O2 Delivery Pre Treatment nasal cannula  -SD     Intra SpO2 (%) 94  -SD     O2 Delivery Intra Treatment nasal cannula  -SD     Post SpO2 (%) 98  -SD     O2 Delivery Post Treatment nasal cannula  -SD     Pre Patient Position Sitting  -SD     Intra Patient Position Sitting  -SD     Post Patient Position Supine  -SD       Row Name 12/29/23 1527          Positioning and Restraints    Pre-Treatment Position sitting in chair/recliner  -SD     Post  Treatment Position bed  -SD     In Bed notified nsg;fowlers;call light within reach;encouraged to call for assist;exit alarm on;heels elevated  -SD               User Key  (r) = Recorded By, (t) = Taken By, (c) = Cosigned By      Initials Name Provider Type    Ángela Sánchez, PT Physical Therapist                   Outcome Measures       Row Name 12/29/23 1529          How much help from another person do you currently need...    Turning from your back to your side while in flat bed without using bedrails? 4  -SD     Moving from lying on back to sitting on the side of a flat bed without bedrails? 3  -SD     Moving to and from a bed to a chair (including a wheelchair)? 3  -SD     Standing up from a chair using your arms (e.g., wheelchair, bedside chair)? 3  -SD     Climbing 3-5 steps with a railing? 3  -SD     To walk in hospital room? 3  -SD     AM-PAC 6 Clicks Score (PT) 19  -SD     Highest Level of Mobility Goal 6 --> Walk 10 steps or more  -SD       Row Name 12/29/23 1529 12/29/23 1021       Functional Assessment    Outcome Measure Options AM-PAC 6 Clicks Basic Mobility (PT)  -SD AM-PAC 6 Clicks Daily Activity (OT)  -KF              User Key  (r) = Recorded By, (t) = Taken By, (c) = Cosigned By      Initials Name Provider Type    Ángela Sánchez, PT Physical Therapist    Jeni Oliveira OT Occupational Therapist                                 Physical Therapy Education       Title: PT OT SLP Therapies (In Progress)       Topic: Physical Therapy (In Progress)       Point: Mobility training (In Progress)       Learning Progress Summary             Patient Acceptance, E, NR by SD at 12/29/2023 1529    Acceptance, E, NR by ND at 12/26/2023 1406                         Point: Home exercise program (In Progress)       Learning Progress Summary             Patient Acceptance, E, NR by SD at 12/29/2023 1529    Acceptance, E, NR by ND at 12/26/2023 1406                         Point: Body mechanics  (In Progress)       Learning Progress Summary             Patient Acceptance, E, NR by SD at 12/29/2023 1529    Acceptance, E, NR by ND at 12/26/2023 1406                         Point: Precautions (In Progress)       Learning Progress Summary             Patient Acceptance, E, NR by SD at 12/29/2023 1529    Acceptance, E, NR by ND at 12/26/2023 1406                                         User Key       Initials Effective Dates Name Provider Type Discipline    SD 03/13/23 -  Ángela Arana, LEFTY Physical Therapist PT    ND 11/16/23 -  Isha May PT Physical Therapist PT                  PT Recommendation and Plan     Plan of Care Reviewed With: patient  Progress: improving  Outcome Evaluation: Pt able to progress gait this date and gave good effort with therex, however remains below baseline level of function for mobility. IPPT services continue to be warranted at this time.     Time Calculation:         PT Charges       Row Name 12/29/23 1530             Time Calculation    Start Time 1422  -SD      PT Received On 12/29/23  -SD      PT Goal Re-Cert Due Date 01/05/24  -SD         Time Calculation- PT    Total Timed Code Minutes- PT 39 minute(s)  -SD         Timed Charges    97251 - PT Therapeutic Exercise Minutes 16  -SD      61511 - Gait Training Minutes  23  -SD         Total Minutes    Timed Charges Total Minutes 39  -SD       Total Minutes 39  -SD                User Key  (r) = Recorded By, (t) = Taken By, (c) = Cosigned By      Initials Name Provider Type    SD Ángela Arana, LEFTY Physical Therapist                  Therapy Charges for Today       Code Description Service Date Service Provider Modifiers Qty    87208939473 HC GAIT TRAINING EA 15 MIN 12/29/2023 Ángela Arana, PT GP 2    48717007397 HC PT THER PROC EA 15 MIN 12/29/2023 Ángela Arana, PT GP 1            PT G-Codes  Outcome Measure Options: AM-PAC 6 Clicks Basic Mobility (PT)  AM-PAC 6 Clicks Score (PT): 19  AM-PAC 6  Clicks Score (OT): 16       Ángela Arana, PT  12/29/2023

## 2023-12-29 NOTE — PLAN OF CARE
Goal Outcome Evaluation:            VSS at 2L humidified nasal canula. Complains of cough addressed by cough meds. Paced rhythm. No acute events during the shift.

## 2023-12-29 NOTE — THERAPY TREATMENT NOTE
Patient Name: Temi Jarrett  : 1930    MRN: [id number]                              Today's Date: 2023       Admit Date: 2023    Visit Dx:     ICD-10-CM ICD-9-CM   1. Malignant neoplasm of lung, unspecified laterality, unspecified part of lung  C34.90 162.9   2. Chest mass  R22.2 786.6     Patient Active Problem List   Diagnosis    Esophageal reflux    Hypothyroidism (acquired)    Anxiety with depression    Peripheral neuropathy    Spinal stenosis of lumbar region    Osteoporosis    Disc disorder of lumbar region    Bladder prolapse, female, acquired    Essential hypertension    Pernicious anemia    Annual physical exam    Primary osteoarthritis of both knees    Hiatal hernia    Multifocal aspiration pneumonia due to large intrathoracic hiatal hernia    Recurrent UTI    Paroxysmal atrial fibrillation    Alzheimer's disease    Prolonged Q-T interval on ECG    Mild cognitive impairment    At high risk for aspiration    Chronic pain    Osteoarthritis of right knee    Acute cough    Pressure injury of right buttock, stage 2    Nasal congestion    Metastatic disease     Past Medical History:   Diagnosis Date    Acute sepsis 2021    Arthritis     Bladder prolapse, female, acquired     Closed fracture of neck of left femur 2019    Dementia     Depression     Disease of thyroid gland     Gastric polyp     GERD (gastroesophageal reflux disease)     Hiatal hernia     History of colonic polyps     Hyperlipidemia     Hypertension     IBS (irritable bowel syndrome)     Macular degeneration     Pacemaker     Pericardial effusion 2020    Echo (8/3/2020): Normal LVEF.  Moderate pericardial effusion without tamponade.  Moderate MR. RVSP 49 mmHg    Torn meniscus     right knee      Past Surgical History:   Procedure Laterality Date    CHOLECYSTECTOMY      ENDOSCOPY N/A 2021    Procedure: ESOPHAGOGASTRODUODENOSCOPY;  Surgeon: Serjio Guerrero MD;  Location: [facility] ENDOSCOPY;   Service: General;  Laterality: N/A;    EYE SURGERY  1998    Cataract extraction    HERNIA REPAIR      HIP HEMIARTHROPLASTY Left 09/28/2019    Procedure: HIP HEMIARTHROPLASTY LEFT;  Surgeon: Reddy Segundo Jr., MD;  Location: [facility] OR;  Service: Orthopedics    HYSTERECTOMY  1976    KNEE SURGERY Right     arthroscopy- 2000    SKIN CANCER EXCISION Left     [facility]; CANCEROUS SPOT DIDN'T  MOVE BUT NEEDED REMOVED    TONSILLECTOMY        General Information       Row Name 12/29/23 1013          OT Time and Intention    Document Type therapy note (daily note)  -KF     Mode of Treatment occupational therapy;individual therapy  -KF       Row Name 12/29/23 1013          General Information    Patient Profile Reviewed yes  -KF     Existing Precautions/Restrictions fall;oxygen therapy device and L/min  -KF     Barriers to Rehab medically complex;previous functional deficit;visual deficit;hearing deficit  -KF       Row Name 12/29/23 1013          Cognition    Orientation Status (Cognition) oriented x 3  -KF       Row Name 12/29/23 1013          Safety Issues, Functional Mobility    Safety Issues Affecting Function (Mobility) awareness of need for assistance;insight into deficits/self-awareness;judgment;problem-solving;safety precaution awareness;safety precautions follow-through/compliance;sequencing abilities  -KF     Impairments Affecting Function (Mobility) balance;endurance/activity tolerance;shortness of breath;strength;postural/trunk control  -KF               User Key  (r) = Recorded By, (t) = Taken By, (c) = Cosigned By      Initials Name Provider Type    KF Jeni Trejo OT Occupational Therapist                     Mobility/ADL's       Row Name 12/29/23 1013          Bed Mobility    Bed Mobility supine-sit;scooting/bridging  -KF     Scooting/Bridging [place] (Bed Mobility) minimum assist (75% patient effort);1 person assist;verbal cues;nonverbal cues (demo/gesture)  -KF      Supine-Sit [place] (Bed Mobility) minimum assist (75% patient effort);1 person assist;verbal cues;nonverbal cues (demo/gesture)  -KF     Bed Mobility, Safety Issues decreased use of arms for pushing/pulling;decreased use of legs for bridging/pushing;impaired trunk control for bed mobility  -KF     Assistive Device (Bed Mobility) bed rails;draw sheet;head of bed elevated  -KF     Comment, (Bed Mobility) Increased time and effort with max encouragement to participate. Verbal/tactile cues for hand placement and sequence.  -       Row Name 12/29/23 1013          Transfers    Transfers bed-chair transfer;sit-stand transfer;stand-sit transfer  -     Comment, (Transfers) Pt completed STS and transfer to chair using a RWx with Nicole. Pt utilized BUE support today, declining use of a RWx for transfer only. Pt declined additional ambulation due to fatigue and generally feeling unwell today.  -       Row Name 12/29/23 1013          Bed-Chair Transfer    Bed-Chair [place] (Transfers) minimum assist (75% patient effort);1 person assist;verbal cues;nonverbal cues (demo/gesture)  -KF     Assistive Device (Bed-Chair Transfers) other (see comments)  BUE support  -       Row Name 12/29/23 1013          Sit-Stand Transfer    Sit-Stand [place] (Transfers) minimum assist (75% patient effort);1 person assist;verbal cues;nonverbal cues (demo/gesture)  -KF     Assistive Device (Sit-Stand Transfers) other (see comments)  BUE support  -       Row Name 12/29/23 1013          Stand-Sit Transfer    Stand-Sit [place] (Transfers) minimum assist (75% patient effort);1 person assist;verbal cues;nonverbal cues (demo/gesture)  -KF     Assistive Device (Stand-Sit Transfers) other (see comments)  BUE support  -       Row Name 12/29/23 1013          Activities of Daily Living    BADL Assessment/Intervention upper body dressing;lower body dressing;grooming  -       Row Name 12/29/23 1013          Grooming  Assessment/Training    [place] Level (Grooming) oral care regimen;wash face, hands;set up  -KF     Position (Grooming) supported sitting  -KF       Row Name 12/29/23 1013          Upper Body Dressing Assessment/Training    [place] Level (Upper Body Dressing) doff;don;pajama/robe;minimum assist (75% patient effort)  -KF     Position (Upper Body Dressing) edge of bed sitting  -KF       Row Name 12/29/23 1013          Lower Body Dressing Assessment/Training    [place] Level (Lower Body Dressing) doff;don;socks;maximum assist (25% patient effort)  -KF     Position (Lower Body Dressing) edge of bed sitting  -KF     Comment, (Lower Body Dressing) Max encouragement provided for pt to attempt LBD, however pt stated she didn't feel up to it today  -KF               User Key  (r) = Recorded By, (t) = Taken By, (c) = Cosigned By      Initials Name Provider Type    Jeni Oliveira OT Occupational Therapist                   Obj/Interventions       Row Name 12/29/23 1016          Balance    Balance Assessment sitting static balance;sitting dynamic balance;sit to stand dynamic balance;standing static balance;standing dynamic balance  -KF     Static Sitting Balance standby assist  -KF     Dynamic Sitting Balance standby assist  -KF     Position, Sitting Balance unsupported;sitting edge of bed  -KF     Sit to Stand Dynamic Balance minimal assist;1-person assist  -KF     Static Standing Balance minimal assist;1-person assist  -KF     Dynamic Standing Balance minimal assist;1-person assist  -KF     Position/Device Used, Standing Balance supported;other (see comments)  BUE support  -KF     Balance Interventions sitting;standing;sit to stand;supported;static;dynamic;occupation based/functional task  -KF     Comment, Balance No overt LOB during transfer  -KF               User Key  (r) = Recorded By, (t) = Taken By, (c) = Cosigned By      Initials Name Provider Type    Jeni Oliveira OT Occupational Therapist                    Goals/Plan    No documentation.                  Clinical Impression       Row Name 12/29/23 1017          Pain Assessment    Pretreatment Pain Rating 0/10 - no pain  -KF     Posttreatment Pain Rating 0/10 - no pain  -KF     Pain Intervention(s) Repositioned;Ambulation/increased activity  -       Row Name 12/29/23 1017          Plan of Care Review    Plan of Care Reviewed With patient  -     Progress no change  -     Outcome Evaluation Pt with reports of feeling generally unwell today, requiring max encouragement from therapist to participate in session. The pt completed bed mobility and transfer to chair with Nicole. The pt performed LBD with maxA and completed grooming ADLs with set up while sitting in the chair. The pt remains below her functional baseline with generalized weakness, decreased activity tolerance, balance deficits, and decreased safety awareness. The pt will benefit from continued IP OT services to address the deficits listed. Recommend a return to previous facility with HHOT when medically appropriate.  -       Row Name 12/29/23 1017          Therapy Assessment/Plan (OT)    Rehab Potential (OT) good, to achieve stated therapy goals  -     Criteria for Skilled Therapeutic Interventions Met (OT) yes;meets criteria;skilled treatment is necessary  -     Therapy Frequency (OT) daily  -       Row Name 12/29/23 1017          Therapy Plan Review/Discharge Plan (OT)    Anticipated Discharge Disposition (OT) other (see comments)  return to previous facility with HHOT  -       Row Name 12/29/23 1017          Vital Signs    Pre Patient Position Supine  -KF     Intra Patient Position Standing  -KF     Post Patient Position Sitting  -       Row Name 12/29/23 1017          Positioning and Restraints    Pre-Treatment Position in bed  -KF     Post Treatment Position chair  -KF     In Chair notified nsg;reclined;call light within reach;encouraged to call for assist;exit alarm  on;legs elevated;waffle cushion;on mechanical lift sling  -KF               User Key  (r) = Recorded By, (t) = Taken By, (c) = Cosigned By      Initials Name Provider Type    Jeni Oliveira OT Occupational Therapist                   Outcome Measures       Row Name 12/29/23 1021          How much help from another is currently needed...    Putting on and taking off regular lower body clothing? 2  -KF     Bathing (including washing, rinsing, and drying) 2  -KF     Toileting (which includes using toilet bed pan or urinal) 3  -KF     Putting on and taking off regular upper body clothing 3  -KF     Taking care of personal grooming (such as brushing teeth) 3  -KF     Eating meals 3  -KF     AM-PAC 6 Clicks Score (OT) 16  -KF       Row Name 12/29/23 1021          Functional Assessment    Outcome Measure Options AM-PAC 6 Clicks Daily Activity (OT)  -KF               User Key  (r) = Recorded By, (t) = Taken By, (c) = Cosigned By      Initials Name Provider Type    Jeni Oliveira OT Occupational Therapist                    Occupational Therapy Education       Title: PT OT SLP Therapies (In Progress)       Topic: Occupational Therapy (In Progress)       Point: ADL training (Done)       Description:   Instruct learner(s) on proper safety adaptation and remediation techniques during self care or transfers.   Instruct in proper use of assistive devices.                  Learning Progress Summary             Patient Acceptance, E,TB, VU,DU by  at 12/29/2023 0845    Acceptance, E,TB, VU,DU by KF at 12/27/2023 1329    Acceptance, E, NR by  at 12/24/2023 1020    Comment: educated pt regarding role of therapy                         Point: Home exercise program (Not Started)       Description:   Instruct learner(s) on appropriate technique for monitoring, assisting and/or progressing therapeutic exercises/activities.                  Learner Progress:  Not documented in this visit.              Point: Precautions  (Done)       Description:   Instruct learner(s) on prescribed precautions during self-care and functional transfers.                  Learning Progress Summary             Patient Acceptance, E,TB, VU,DU by  at 12/29/2023 0845    Acceptance, E,TB, VU,DU by  at 12/27/2023 1329                         Point: Body mechanics (Done)       Description:   Instruct learner(s) on proper positioning and spine alignment during self-care, functional mobility activities and/or exercises.                  Learning Progress Summary             Patient Acceptance, E,TB, VU,DU by  at 12/29/2023 0845    Acceptance, E,TB, VU,DU by KF at 12/27/2023 1329                                         User Key       Initials Effective Dates Name Provider Type Discipline     02/03/23 -  Tiffany Lord, OT Occupational Therapist OT     08/09/23 -  Jeni Trejo OT Occupational Therapist OT                  OT Recommendation and Plan  Therapy Frequency (OT): daily  Plan of Care Review  Plan of Care Reviewed With: patient  Progress: no change  Outcome Evaluation: Pt with reports of feeling generally unwell today, requiring max encouragement from therapist to participate in session. The pt completed bed mobility and transfer to chair with Nicole. The pt performed LBD with maxA and completed grooming ADLs with set up while sitting in the chair. The pt remains below her functional baseline with generalized weakness, decreased activity tolerance, balance deficits, and decreased safety awareness. The pt will benefit from continued IP OT services to address the deficits listed. Recommend a return to previous facility with HHOT when medically appropriate.     Time Calculation:         Time Calculation- OT       Row Name 12/29/23 1022             Time Calculation- OT    OT Start Time 0845 -KF      OT Received On 12/29/23  -      OT Goal Re-Cert Due Date 01/03/23  -         Timed Charges    28017 - OT Therapeutic Activity Minutes 14  -KF       74989 - OT Self Care/Mgmt Minutes 25  -KF         Total Minutes    Timed Charges Total Minutes 39  -KF       Total Minutes 39  -KF                User Key  (r) = Recorded By, (t) = Taken By, (c) = Cosigned By      Initials Name Provider Type    Jeni Oliveira OT Occupational Therapist                  Therapy Charges for Today       Code Description Service Date Service Provider Modifiers Qty    82574943050  OT THERAPEUTIC ACT EA 15 MIN 12/29/2023 Jeni Trejo OT GO 1    [id number]  OT SELF CARE/MGMT/TRAIN EA 15 MIN 12/29/2023 Jeni Trejo OT GO 2                 Jeni Trejo OT  12/29/2023

## 2023-12-29 NOTE — PROGRESS NOTES
[facility]  PROGRESS NOTE    Patient Name: Temi Jarrett  : 1930  MRN: [id number]    Date of Admission: 2023  Primary Care Physician: Eric Patel MD    Subjective   Subjective     CC:  RSV    HPI:  Continues to have a cough despite cough medications. Wants to work with therapy today.       Objective   Objective     Vital Signs:   Temp:  [96.7 °F (35.9 °C)-98.1 °F (36.7 °C)] 97.9 °F (36.6 °C)  Heart Rate:  [58-89] 69  Resp:  [16-18] 16  BP: (131-153)/(74-96) 145/88  Flow (L/min):  [2] 2     Physical Exam:  Constitutional: thin, elderly female in bedside chair  Respiratory: Clear to auscultation bilaterally, respiratory effort normal on 2L, coughing with deep inspiration  Cardiovascular: RRR  Gastrointestinal: Positive bowel sounds, soft, nontender, nondistended  Musculoskeletal: No bilateral ankle edema  Psychiatric: Appropriate affect, cooperative  Neurologic: Oriented x 3, no focal deficits      Results Reviewed:  LAB RESULTS:      Lab 23  03423  0324 23  0840 23  1507   WBC 6.30 7.06 6.03 5.50 6.71   HEMOGLOBIN 12.8 12.5 12.6 12.5 13.4   HEMATOCRIT 40.0 39.6 40.4 39.5 42.3   PLATELETS 173 121* 125* 111* 157   NEUTROS ABS 4.04  --   --  4.16 5.14   IMMATURE GRANS (ABS) 0.01  --   --  0.01 0.02   LYMPHS ABS 1.58  --   --  0.64* 0.78   MONOS ABS 0.48  --   --  0.50 0.55   EOS ABS 0.15  --   --  0.17 0.18   MCV 87.3 88.0 89.4 89.0 89.4         Lab 23  04223  0341 23  0324 23  0840 23  1715 23  1507   SODIUM 143 143 144 145  --  145   POTASSIUM 3.8 3.9 4.1 4.3  --  3.4*   CHLORIDE 103 103 105 105  --  102   CO2 33.0* 34.0* 32.0* 31.0*  --  31.0*   ANION GAP 7.0 6.0 7.0 9.0  --  12.0   BUN 24* 24* 18 12  --  17   CREATININE 0.51* 0.54* 0.57 0.44*  --  0.68   EGFR 87.2 86.0 84.9 90.3  --  81.3   GLUCOSE 88 104* 89 83  --  145*   CALCIUM 8.4 8.2 8.3 8.2  --  8.6   MAGNESIUM   --   --   --   --  1.8  --    PHOSPHORUS  --   --   --   --  3.4  --          Lab 12/22/23  1507   TOTAL PROTEIN 6.4   ALBUMIN 3.8   GLOBULIN 2.6   ALT (SGPT) 18   AST (SGOT) 27   BILIRUBIN 0.4   ALK PHOS 70   LIPASE 33         Lab 12/22/23  1715 12/22/23  1507   PROBNP  --  1,322.0   HSTROP T 28* 32*                 Brief Urine Lab Results  (Last result in the past 365 days)        Color   Clarity   Blood   Leuk Est   Nitrite   Protein   CREAT   Urine HCG        09/05/23 2112 Yellow   Cloudy     Small                       Microbiology Results Abnormal       None            No radiology results from the last 24 hrs    Results for orders placed during the hospital encounter of 08/02/20    Transthoracic Echo Complete With Contrast if Necessary Per Protocol    Interpretation Summary  · Left ventricular systolic function is normal. Estimated EF = 70%.  · Left ventricular diastolic dysfunction (grade I) consistent with impaired relaxation.  · Left atrial cavity size is moderately dilated.  · There is calcification of the aortic valve. There is no significant stenosis or regurgitation. A Lambel's excrescence is noted on an aortic valve leaflet.  · Moderate mitral valve regurgitation is present.  · Estimated right ventricular systolic pressure from tricuspid regurgitation is moderately elevated (49 mmHg).  · There is a moderate (1-2cm) circumferential pericardial effusion. There is no tamponade physiology.      Current medications:  Scheduled Meds:amLODIPine, 5 mg, Oral, Daily  bethanechol, 10 mg, Oral, TID  busPIRone, 10 mg, Oral, BID  dextromethorphan polistirex ER, 60 mg, Oral, Q12H  donepezil, 10 mg, Oral, Nightly  gabapentin, 300 mg, Oral, BID  guaiFENesin, 1,200 mg, Oral, Q12H  levothyroxine, 150 mcg, Oral, Q AM  lisinopril, 20 mg, Oral, Daily  memantine, 10 mg, Oral, BID  multivitamin with minerals, 1 tablet, Oral, Daily  pantoprazole, 40 mg, Oral, Daily  senna-docusate sodium, 2 tablet, Oral, BID  sertraline, 50  mg, Oral, Daily  sodium chloride, 10 mL, Intravenous, Q12H      Continuous Infusions:   PRN Meds:.  acetaminophen **OR** acetaminophen **OR** acetaminophen    benzonatate    senna-docusate sodium **AND** polyethylene glycol **AND** bisacodyl **AND** bisacodyl    Calcium Replacement - Follow Nurse / BPA Driven Protocol    Magnesium Standard Dose Replacement - Follow Nurse / BPA Driven Protocol    phenol    Phosphorus Replacement - Follow Nurse / BPA Driven Protocol    Potassium Replacement - Follow Nurse / BPA Driven Protocol    sodium chloride    traMADol    Assessment & Plan   Assessment & Plan     Active Hospital Problems    Diagnosis  POA    **Metastatic disease [C79.9]  Yes    Prolonged Q-T interval on ECG [R94.31]  Yes    Alzheimer's disease [G30.9, F02.80]  Yes    Hiatal hernia [K44.9]  Yes    Anxiety with depression [F41.8]  Yes    Essential hypertension [I10]  Yes    Hypothyroidism (acquired) [E03.9]  Yes      Resolved Hospital Problems   No resolved problems to display.        Brief Hospital Course to date:  89 year old female with HTN, alzheimers, hiatal hernia, hypothryoid presented to the ED from [facility] via EMS after an abnormal CXR. Patient had a CXR for cough and was noted to have a new 7.5 cm left suprahilar/mediastinal mass. CT scan with findings compatible with metastatic disease to the lung. Multiple round lung nodules with enlaged centrally necrotic left sided prevascular and hilar lymph nodes measuring up to 5 cm. CT abd/pelvis with contrast was unremarkable. Patient was also noted to be RSV +. Oncology was consulted and after discussion with the patient, decision was made to not pursue diagnosis because she has no treatment options due to functional status and age.      Metastatic disease   - Evaluated by oncology - no treatment options due to age and functional status.   - Oxygen as needed. Currently on 2L NC     RSV  Hypoxia  - Noted on resp PCR. Likely causing her acute  symptoms at this time.   - Supportive care  - Mucinex. Delsym. Tessaslon. Mobilize.   - Flutter valve     Prolonged QT  - Avoid prolonging medications      Hypokalemia  -replace per protocol     Hypothyroidism  -continue synthroid      GERD  -PPI      Alzheimer's disease   - continue namenda and aricept   - Delirium precautions - up in the chair, window shades open     Essential hypertension   - continue norvasc, lisinopril      Anxiety/depression   - continue zoloft, buspar      Large Hiatal hernia  - CT with large hiatal hernia containing stomach, bowel and pancreatic body and tail  - Currently asymptomatic      GOC  - partner reviewed code status with patient 12/23. Partner reviewed code status with son and POA 12/24.  Partner again reviewed with patient 12/25 and she stated she doesn't know what to do.      Expected Discharge Location and Transportation: back to [facility] when out of precautions (1/5/24)    Expected Discharge   Expected Discharge Date: 1/5/2024; Expected Discharge Time:      DVT prophylaxis:  Mechanical DVT prophylaxis orders are present.     AM-PAC 6 Clicks Score (PT): 13 (12/28/23 2050)    CODE STATUS:   Code Status and Medical Interventions:   Ordered at: 12/22/23 2159     Level Of Support Discussed With:    Patient     Code Status (Patient has no pulse and is not breathing):    CPR (Attempt to Resuscitate)     Medical Interventions (Patient has pulse or is breathing):    Full Support   This patient's problems and plans were partially entered by my partner and updated as appropriate by me 12/29/23. Today is my first day evaluating this patient's active medical problems. I Personally reviewed chart and adjusted note to reflect daily changes in management/clinical condition. Copied text in this note has been reviewed and is accurate as of  12/29/23      Mahogany Henry MD  12/29/23

## 2023-12-29 NOTE — PLAN OF CARE
Goal Outcome Evaluation:  Plan of Care Reviewed With: patient        Progress: improving  Outcome Evaluation: Pt able to progress gait this date and gave good effort with therex, however remains below baseline level of function for mobility. IPPT services continue to be warranted at this time.

## 2023-12-29 NOTE — PLAN OF CARE
Goal Outcome Evaluation:  Plan of Care Reviewed With: patient        Progress: improving  Outcome Evaluation: No acute events overnight. Slept throughout the night. No complaints of pain or any discomfort. VSS. Remains on 2LNC.

## 2023-12-30 PROCEDURE — 99232 SBSQ HOSP IP/OBS MODERATE 35: CPT | Performed by: INTERNAL MEDICINE

## 2023-12-30 RX ADMIN — BUSPIRONE HYDROCHLORIDE 10 MG: 10 TABLET ORAL at 21:52

## 2023-12-30 RX ADMIN — DEXTROMETHORPHAN POLISTIREX 60 MG: 30 SUSPENSION ORAL at 09:00

## 2023-12-30 RX ADMIN — BUSPIRONE HYDROCHLORIDE 10 MG: 10 TABLET ORAL at 09:00

## 2023-12-30 RX ADMIN — LEVOTHYROXINE SODIUM 150 MCG: 0.15 TABLET ORAL at 06:17

## 2023-12-30 RX ADMIN — SERTRALINE 50 MG: 50 TABLET, FILM COATED ORAL at 09:00

## 2023-12-30 RX ADMIN — GUAIFENESIN 1200 MG: 600 TABLET, EXTENDED RELEASE ORAL at 09:00

## 2023-12-30 RX ADMIN — BETHANECHOL CHLORIDE 10 MG: 10 TABLET ORAL at 09:02

## 2023-12-30 RX ADMIN — Medication 5 MG: at 21:51

## 2023-12-30 RX ADMIN — GABAPENTIN 300 MG: 300 CAPSULE ORAL at 21:51

## 2023-12-30 RX ADMIN — MEMANTINE 10 MG: 10 TABLET ORAL at 21:52

## 2023-12-30 RX ADMIN — PANTOPRAZOLE SODIUM 40 MG: 40 TABLET, DELAYED RELEASE ORAL at 09:00

## 2023-12-30 RX ADMIN — BETHANECHOL CHLORIDE 10 MG: 10 TABLET ORAL at 21:51

## 2023-12-30 RX ADMIN — DEXTROMETHORPHAN POLISTIREX 60 MG: 30 SUSPENSION ORAL at 21:52

## 2023-12-30 RX ADMIN — GABAPENTIN 300 MG: 300 CAPSULE ORAL at 09:00

## 2023-12-30 RX ADMIN — LISINOPRIL 20 MG: 20 TABLET ORAL at 09:00

## 2023-12-30 RX ADMIN — Medication 1 TABLET: at 09:01

## 2023-12-30 RX ADMIN — MEMANTINE 10 MG: 10 TABLET ORAL at 09:00

## 2023-12-30 RX ADMIN — ACETAMINOPHEN 650 MG: 325 TABLET ORAL at 21:51

## 2023-12-30 RX ADMIN — TRAMADOL HYDROCHLORIDE 50 MG: 50 TABLET ORAL at 16:48

## 2023-12-30 RX ADMIN — GUAIFENESIN 1200 MG: 600 TABLET, EXTENDED RELEASE ORAL at 21:51

## 2023-12-30 RX ADMIN — BETHANECHOL CHLORIDE 10 MG: 10 TABLET ORAL at 16:44

## 2023-12-30 RX ADMIN — DONEPEZIL HYDROCHLORIDE 10 MG: 10 TABLET, FILM COATED ORAL at 21:52

## 2023-12-30 RX ADMIN — AMLODIPINE BESYLATE 5 MG: 5 TABLET ORAL at 09:01

## 2023-12-30 RX ADMIN — BENZONATATE 200 MG: 100 CAPSULE ORAL at 17:43

## 2023-12-30 RX ADMIN — SENNOSIDES AND DOCUSATE SODIUM 2 TABLET: 8.6; 5 TABLET ORAL at 09:00

## 2023-12-30 RX ADMIN — PHENOL 1 SPRAY: 1.4 SPRAY ORAL at 17:43

## 2023-12-30 NOTE — PLAN OF CARE
Goal Outcome Evaluation:         Patient is on 2L Humidified NC, V-Paced, Aox3 but forgetful, VSS - will continue POC.

## 2023-12-30 NOTE — PLAN OF CARE
Goal Outcome Evaluation:  Plan of Care Reviewed With: patient        Progress: improving  Outcome Evaluation: VSS. 2L NC. No acute changes on shift. Will cobtinue plan of care.

## 2023-12-30 NOTE — PROGRESS NOTES
[facility] Medicine Services  PROGRESS NOTE    Patient Name: Temi Jarrett  : 1930  MRN: [id number]    Date of Admission: 2023  Primary Care Physician: Eric Patel MD    Subjective   Subjective     CC:  RSV    HPI:  No acute events overnight. Currenly on 2L. Sleeping comfortably      Objective   Objective     Vital Signs:   Temp:  [96.9 °F (36.1 °C)-97.9 °F (36.6 °C)] 97.3 °F (36.3 °C)  Heart Rate:  [60-73] 65  Resp:  [16-18] 18  BP: (113-145)/(64-88) 113/74  Flow (L/min):  [2] 2     Physical Exam:  Constitutional: sleeping  Respiratory: Clear to auscultation bilaterally, respiratory effort normal on 2L.   Cardiovascular: RRR  Gastrointestinal: Positive bowel sounds, soft, nontender, nondistended  Musculoskeletal: No bilateral ankle edema  Psychiatric: Appropriate affect, cooperative  Neurologic: Oriented x 3, no focal deficits        Results Reviewed:  LAB RESULTS:      Lab 23  0324 23  0840   WBC 6.30 7.06 6.03 5.50   HEMOGLOBIN 12.8 12.5 12.6 12.5   HEMATOCRIT 40.0 39.6 40.4 39.5   PLATELETS 173 121* 125* 111*   NEUTROS ABS 4.04  --   --  4.16   IMMATURE GRANS (ABS) 0.01  --   --  0.01   LYMPHS ABS 1.58  --   --  0.64*   MONOS ABS 0.48  --   --  0.50   EOS ABS 0.15  --   --  0.17   MCV 87.3 88.0 89.4 89.0         Lab 23  04223  03423  0324 23  0840   SODIUM 143 143 144 145   POTASSIUM 3.8 3.9 4.1 4.3   CHLORIDE 103 103 105 105   CO2 33.0* 34.0* 32.0* 31.0*   ANION GAP 7.0 6.0 7.0 9.0   BUN 24* 24* 18 12   CREATININE 0.51* 0.54* 0.57 0.44*   EGFR 87.2 86.0 84.9 90.3   GLUCOSE 88 104* 89 83   CALCIUM 8.4 8.2 8.3 8.2                         Brief Urine Lab Results  (Last result in the past 365 days)        Color   Clarity   Blood   Leuk Est   Nitrite   Protein   CREAT   Urine HCG        23 2112 Yellow   Cloudy     Small                       Microbiology Results Abnormal       None             No radiology results from the last 24 hrs    Results for orders placed during the hospital encounter of 08/02/20    Transthoracic Echo Complete With Contrast if Necessary Per Protocol    Interpretation Summary  · Left ventricular systolic function is normal. Estimated EF = 70%.  · Left ventricular diastolic dysfunction (grade I) consistent with impaired relaxation.  · Left atrial cavity size is moderately dilated.  · There is calcification of the aortic valve. There is no significant stenosis or regurgitation. A Lambel's excrescence is noted on an aortic valve leaflet.  · Moderate mitral valve regurgitation is present.  · Estimated right ventricular systolic pressure from tricuspid regurgitation is moderately elevated (49 mmHg).  · There is a moderate (1-2cm) circumferential pericardial effusion. There is no tamponade physiology.      Current medications:  Scheduled Meds:amLODIPine, 5 mg, Oral, Daily  bethanechol, 10 mg, Oral, TID  busPIRone, 10 mg, Oral, BID  dextromethorphan polistirex ER, 60 mg, Oral, Q12H  donepezil, 10 mg, Oral, Nightly  gabapentin, 300 mg, Oral, BID  guaiFENesin, 1,200 mg, Oral, Q12H  levothyroxine, 150 mcg, Oral, Q AM  lisinopril, 20 mg, Oral, Daily  memantine, 10 mg, Oral, BID  multivitamin with minerals, 1 tablet, Oral, Daily  pantoprazole, 40 mg, Oral, Daily  senna-docusate sodium, 2 tablet, Oral, BID  sertraline, 50 mg, Oral, Daily  sodium chloride, 10 mL, Intravenous, Q12H      Continuous Infusions:   PRN Meds:.  acetaminophen **OR** acetaminophen **OR** acetaminophen    benzonatate    senna-docusate sodium **AND** polyethylene glycol **AND** bisacodyl **AND** bisacodyl    Calcium Replacement - Follow Nurse / BPA Driven Protocol    Hydrocod Marcelo-Chlorphe Marcelo ER    Magnesium Standard Dose Replacement - Follow Nurse / BPA Driven Protocol    melatonin    phenol    Phosphorus Replacement - Follow Nurse / BPA Driven Protocol    Potassium Replacement - Follow Nurse / BPA Driven  Protocol    sodium chloride    traMADol    Assessment & Plan   Assessment & Plan     Active Hospital Problems    Diagnosis  POA    **Metastatic disease [C79.9]  Yes    Prolonged Q-T interval on ECG [R94.31]  Yes    Alzheimer's disease [G30.9, F02.80]  Yes    Hiatal hernia [K44.9]  Yes    Anxiety with depression [F41.8]  Yes    Essential hypertension [I10]  Yes    Hypothyroidism (acquired) [E03.9]  Yes      Resolved Hospital Problems   No resolved problems to display.        Brief Hospital Course to date:  89 year old female with HTN, alzheimers, hiatal hernia, hypothryoid presented to the ED from [facility] via EMS after an abnormal CXR. Patient had a CXR for cough and was noted to have a new 7.5 cm left suprahilar/mediastinal mass. CT scan with findings compatible with metastatic disease to the lung. Multiple round lung nodules with enlaged centrally necrotic left sided prevascular and hilar lymph nodes measuring up to 5 cm. CT abd/pelvis with contrast was unremarkable. Patient was also noted to be RSV +. Oncology was consulted and after discussion with the patient, decision was made to not pursue diagnosis because she has no treatment options due to functional status and age.      Metastatic disease   - Evaluated by oncology - no treatment options due to age and functional status.   - Oxygen as needed. Currently on 2L NC     RSV  Hypoxia  - Noted on resp PCR. Likely causing her acute symptoms at this time.   - Supportive care  - Mucinex. Delsym. Tessaslon. Mobilize.   - Flutter valve  - Currently on 2L     Prolonged QT  - Avoid prolonging medications      Hypokalemia  -replace per protocol     Hypothyroidism  -continue synthroid      GERD  -PPI      Alzheimer's disease   - continue namenda and aricept   - Delirium precautions - up in the chair, window shades open     Essential hypertension   - continue norvasc, lisinopril      Anxiety/depression   - continue zoloft, buspar      Large Hiatal hernia  -  CT with large hiatal hernia containing stomach, bowel and pancreatic body and tail  - Currently asymptomatic      GOC  - partner reviewed code status with patient 12/23. Partner reviewed code status with son and POA 12/24.  Partner again reviewed with patient 12/25 and she stated she doesn't know what to do.      Expected Discharge Location and Transportation: back to [place] when out of precautions (1/5/24)     Expected Discharge   Expected Discharge Date: 1/5/2024; Expected Discharge Time:      DVT prophylaxis:  Mechanical DVT prophylaxis orders are present.     AM-PAC 6 Clicks Score (PT): 19 (12/29/23 1529)    CODE STATUS:   Code Status and Medical Interventions:   Ordered at: 12/22/23 3473     Level Of Support Discussed With:    Patient     Code Status (Patient has no pulse and is not breathing):    CPR (Attempt to Resuscitate)     Medical Interventions (Patient has pulse or is breathing):    Full Support     I have prepared this progress note with copied portions of the prior day's progress note of my own authorship to preserve accuracy and maintain consistency of documentation. I have reviewed these portions and edited them for correctness. I verify that the above documentation accurately and truly represents the evaluation and management performed on today's date.       Mahogany Henry MD  12/30/23

## 2023-12-31 PROCEDURE — 94640 AIRWAY INHALATION TREATMENT: CPT

## 2023-12-31 PROCEDURE — 99232 SBSQ HOSP IP/OBS MODERATE 35: CPT | Performed by: INTERNAL MEDICINE

## 2023-12-31 RX ORDER — IPRATROPIUM BROMIDE AND ALBUTEROL SULFATE 2.5; .5 MG/3ML; MG/3ML
3 SOLUTION RESPIRATORY (INHALATION) EVERY 4 HOURS PRN
Status: DISCONTINUED | OUTPATIENT
Start: 2023-12-31 | End: 2024-01-05 | Stop reason: HOSPADM

## 2023-12-31 RX ADMIN — DONEPEZIL HYDROCHLORIDE 10 MG: 10 TABLET, FILM COATED ORAL at 20:41

## 2023-12-31 RX ADMIN — BETHANECHOL CHLORIDE 10 MG: 10 TABLET ORAL at 16:58

## 2023-12-31 RX ADMIN — GABAPENTIN 300 MG: 300 CAPSULE ORAL at 09:00

## 2023-12-31 RX ADMIN — BUSPIRONE HYDROCHLORIDE 10 MG: 10 TABLET ORAL at 20:40

## 2023-12-31 RX ADMIN — GUAIFENESIN 1200 MG: 600 TABLET, EXTENDED RELEASE ORAL at 09:02

## 2023-12-31 RX ADMIN — PHENOL 1 SPRAY: 1.4 SPRAY ORAL at 11:47

## 2023-12-31 RX ADMIN — GABAPENTIN 300 MG: 300 CAPSULE ORAL at 20:41

## 2023-12-31 RX ADMIN — LISINOPRIL 20 MG: 20 TABLET ORAL at 09:02

## 2023-12-31 RX ADMIN — Medication 5 MG: at 20:40

## 2023-12-31 RX ADMIN — IPRATROPIUM BROMIDE AND ALBUTEROL SULFATE 3 ML: .5; 3 SOLUTION RESPIRATORY (INHALATION) at 23:29

## 2023-12-31 RX ADMIN — SENNOSIDES AND DOCUSATE SODIUM 2 TABLET: 8.6; 5 TABLET ORAL at 08:55

## 2023-12-31 RX ADMIN — BUSPIRONE HYDROCHLORIDE 10 MG: 10 TABLET ORAL at 09:01

## 2023-12-31 RX ADMIN — BENZONATATE 200 MG: 100 CAPSULE ORAL at 11:47

## 2023-12-31 RX ADMIN — BETHANECHOL CHLORIDE 10 MG: 10 TABLET ORAL at 20:47

## 2023-12-31 RX ADMIN — PHENOL 1 SPRAY: 1.4 SPRAY ORAL at 16:58

## 2023-12-31 RX ADMIN — ACETAMINOPHEN 650 MG: 325 TABLET ORAL at 20:41

## 2023-12-31 RX ADMIN — AMLODIPINE BESYLATE 5 MG: 5 TABLET ORAL at 09:00

## 2023-12-31 RX ADMIN — GUAIFENESIN 1200 MG: 600 TABLET, EXTENDED RELEASE ORAL at 20:40

## 2023-12-31 RX ADMIN — TRAMADOL HYDROCHLORIDE 50 MG: 50 TABLET ORAL at 15:00

## 2023-12-31 RX ADMIN — DEXTROMETHORPHAN POLISTIREX 60 MG: 30 SUSPENSION ORAL at 20:41

## 2023-12-31 RX ADMIN — MEMANTINE 10 MG: 10 TABLET ORAL at 20:40

## 2023-12-31 RX ADMIN — PANTOPRAZOLE SODIUM 40 MG: 40 TABLET, DELAYED RELEASE ORAL at 09:03

## 2023-12-31 RX ADMIN — BETHANECHOL CHLORIDE 10 MG: 10 TABLET ORAL at 09:01

## 2023-12-31 RX ADMIN — DEXTROMETHORPHAN POLISTIREX 60 MG: 30 SUSPENSION ORAL at 09:02

## 2023-12-31 RX ADMIN — MEMANTINE 10 MG: 10 TABLET ORAL at 09:02

## 2023-12-31 RX ADMIN — LEVOTHYROXINE SODIUM 150 MCG: 0.15 TABLET ORAL at 06:40

## 2023-12-31 RX ADMIN — SERTRALINE 50 MG: 50 TABLET, FILM COATED ORAL at 09:03

## 2023-12-31 RX ADMIN — Medication 1 TABLET: at 09:03

## 2023-12-31 RX ADMIN — Medication 10 ML: at 22:43

## 2023-12-31 RX ADMIN — HYDROCODONE POLISTIREX AND CHLORPHENIRAMINE POLISTIREX 2.5 ML: 10; 8 SUSPENSION, EXTENDED RELEASE ORAL at 22:41

## 2023-12-31 NOTE — PROGRESS NOTES
[facility] Medicine Services  PROGRESS NOTE    Patient Name: Temi Jarrett  : 1930  MRN: [id number]    Date of Admission: 2023  Primary Care Physician: Eric Patel MD    Subjective   Subjective     CC:  RSV    HPI:  Still on 2L oxygen. Frequent coughing but no significant changes.       Objective   Objective     Vital Signs:   Temp:  [96 °F (35.6 °C)-97.1 °F (36.2 °C)] 96 °F (35.6 °C)  Heart Rate:  [65-73] 73  Resp:  [18] 18  BP: (121-168)/(71-98) 137/87  Flow (L/min):  [2] 2     Physical Exam:  Constitutional: No acute distress, awake, alert, thin elderly female  Respiratory: Clear to auscultation bilaterally, respiratory effort normal   Cardiovascular: RRR, no murmurs, rubs, or gallops  Gastrointestinal: Positive bowel sounds, soft, nontender, nondistended  Musculoskeletal: No bilateral ankle edema  Psychiatric: Appropriate affect, cooperative  Neurologic: Oriented x 3, no focal deficits  Skin: No rashes      Results Reviewed:  LAB RESULTS:      Lab 23  0427 23  0341   WBC 6.30 7.06   HEMOGLOBIN 12.8 12.5   HEMATOCRIT 40.0 39.6   PLATELETS 173 121*   NEUTROS ABS 4.04  --    IMMATURE GRANS (ABS) 0.01  --    LYMPHS ABS 1.58  --    MONOS ABS 0.48  --    EOS ABS 0.15  --    MCV 87.3 88.0         Lab 23  0427 23  0341   SODIUM 143 143   POTASSIUM 3.8 3.9   CHLORIDE 103 103   CO2 33.0* 34.0*   ANION GAP 7.0 6.0   BUN 24* 24*   CREATININE 0.51* 0.54*   EGFR 87.2 86.0   GLUCOSE 88 104*   CALCIUM 8.4 8.2                         Brief Urine Lab Results  (Last result in the past 365 days)        Color   Clarity   Blood   Leuk Est   Nitrite   Protein   CREAT   Urine HCG        23 Yellow   Cloudy     Small                       Microbiology Results Abnormal       None            No radiology results from the last 24 hrs    Results for orders placed during the hospital encounter of 20    Transthoracic Echo Complete With Contrast if  Necessary Per Protocol    Interpretation Summary  · Left ventricular systolic function is normal. Estimated EF = 70%.  · Left ventricular diastolic dysfunction (grade I) consistent with impaired relaxation.  · Left atrial cavity size is moderately dilated.  · There is calcification of the aortic valve. There is no significant stenosis or regurgitation. A Lambel's excrescence is noted on an aortic valve leaflet.  · Moderate mitral valve regurgitation is present.  · Estimated right ventricular systolic pressure from tricuspid regurgitation is moderately elevated (49 mmHg).  · There is a moderate (1-2cm) circumferential pericardial effusion. There is no tamponade physiology.      Current medications:  Scheduled Meds:amLODIPine, 5 mg, Oral, Daily  bethanechol, 10 mg, Oral, TID  busPIRone, 10 mg, Oral, BID  dextromethorphan polistirex ER, 60 mg, Oral, Q12H  donepezil, 10 mg, Oral, Nightly  gabapentin, 300 mg, Oral, BID  guaiFENesin, 1,200 mg, Oral, Q12H  levothyroxine, 150 mcg, Oral, Q AM  lisinopril, 20 mg, Oral, Daily  memantine, 10 mg, Oral, BID  multivitamin with minerals, 1 tablet, Oral, Daily  pantoprazole, 40 mg, Oral, Daily  senna-docusate sodium, 2 tablet, Oral, BID  sertraline, 50 mg, Oral, Daily  sodium chloride, 10 mL, Intravenous, Q12H      Continuous Infusions:   PRN Meds:.  acetaminophen **OR** acetaminophen **OR** acetaminophen    benzonatate    senna-docusate sodium **AND** polyethylene glycol **AND** bisacodyl **AND** bisacodyl    Calcium Replacement - Follow Nurse / BPA Driven Protocol    Hydrocod Marcelo-Chlorphe Marcelo ER    Magnesium Standard Dose Replacement - Follow Nurse / BPA Driven Protocol    melatonin    phenol    Phosphorus Replacement - Follow Nurse / BPA Driven Protocol    Potassium Replacement - Follow Nurse / BPA Driven Protocol    sodium chloride    traMADol    Assessment & Plan   Assessment & Plan     Active Hospital Problems    Diagnosis  POA    **Metastatic disease [C79.9]  Yes     Prolonged Q-T interval on ECG [R94.31]  Yes    Alzheimer's disease [G30.9, F02.80]  Yes    Hiatal hernia [K44.9]  Yes    Anxiety with depression [F41.8]  Yes    Essential hypertension [I10]  Yes    Hypothyroidism (acquired) [E03.9]  Yes      Resolved Hospital Problems   No resolved problems to display.        Brief Hospital Course to date:  89 year old female with HTN, alzheimers, hiatal hernia, hypothryoid presented to the ED from [facility] via EMS after an abnormal CXR. Patient had a CXR for cough and was noted to have a new 7.5 cm left suprahilar/mediastinal mass. CT scan with findings compatible with metastatic disease to the lung. Multiple round lung nodules with enlaged centrally necrotic left sided prevascular and hilar lymph nodes measuring up to 5 cm. CT abd/pelvis with contrast was unremarkable. Patient was also noted to be RSV +. Oncology was consulted and after discussion with the patient, decision was made to not pursue diagnosis because she has no treatment options due to functional status and age.      Metastatic disease   - Evaluated by oncology - no treatment options due to age and functional status.   - Oxygen as needed. Currently on 2L NC     RSV  Hypoxia, stable  - Noted on resp PCR. Likely causing her acute symptoms at this time.   - Supportive care  - Mucinex. Delsym. Tessaslon. Mobilize.   - Flutter valve  - Currently on 2L     Prolonged QT  - Avoid prolonging medications      Hypokalemia  -replace per protocol     Hypothyroidism  -continue synthroid      GERD  -PPI      Alzheimer's disease   - continue namenda and aricept   - Delirium precautions - up in the chair, window shades open     Essential hypertension   - continue norvasc, lisinopril      Anxiety/depression   - continue zoloft, buspar      Large Hiatal hernia  - CT with large hiatal hernia containing stomach, bowel and pancreatic body and tail  - Currently asymptomatic      [facility]  - partner reviewed code status with  patient 12/23. Partner reviewed code status with son and POA 12/24.  Partner again reviewed with patient 12/25 and she stated she doesn't know what to do.      Expected Discharge Location and Transportation: back to [place] when out of precautions (1/5/24)     Expected Discharge   Expected Discharge Date: 1/5/2024; Expected Discharge Time:      DVT prophylaxis:  Mechanical DVT prophylaxis orders are present.     AM-PAC 6 Clicks Score (PT): 19 (12/31/23 1035)    CODE STATUS:   Code Status and Medical Interventions:   Ordered at: 12/22/23 9947     Level Of Support Discussed With:    Patient     Code Status (Patient has no pulse and is not breathing):    CPR (Attempt to Resuscitate)     Medical Interventions (Patient has pulse or is breathing):    Full Support     I have prepared this progress note with copied portions of the prior day's progress note of my own authorship to preserve accuracy and maintain consistency of documentation. I have reviewed these portions and edited them for correctness. I verify that the above documentation accurately and truly represents the evaluation and management performed on today's date.       Mahogany Henry MD  12/31/23

## 2023-12-31 NOTE — PLAN OF CARE
Goal Outcome Evaluation:            Patient is on 2L Humidified NC, V-Paced, did ambulate to the bathroom today w/ x1 assist from the Tech, unsteady gait, Aox3 but very forgetful, VSS - will continue POC.

## 2023-12-31 NOTE — PLAN OF CARE
Goal Outcome Evaluation:  Plan of Care Reviewed With: patient        Progress: no change  Outcome Evaluation: Patient resting in bed with no signs and symptoms of distress noted. VSS. 2L NC. 100% VPaced on the tele monitor. No acute changes and needs met all throughout the shift.

## 2024-01-01 ENCOUNTER — HOSPITAL ENCOUNTER (INPATIENT)
Facility: HOSPITAL | Age: 89
LOS: 12 days | End: 2024-05-22
Attending: EMERGENCY MEDICINE | Admitting: INTERNAL MEDICINE
Payer: MEDICARE

## 2024-01-01 ENCOUNTER — APPOINTMENT (OUTPATIENT)
Dept: CT IMAGING | Facility: HOSPITAL | Age: 89
End: 2024-01-01
Payer: MEDICARE

## 2024-01-01 ENCOUNTER — APPOINTMENT (OUTPATIENT)
Dept: GENERAL RADIOLOGY | Facility: HOSPITAL | Age: 89
End: 2024-01-01
Payer: MEDICARE

## 2024-01-01 ENCOUNTER — APPOINTMENT (OUTPATIENT)
Dept: CARDIOLOGY | Facility: HOSPITAL | Age: 89
End: 2024-01-01
Payer: MEDICARE

## 2024-01-01 ENCOUNTER — APPOINTMENT (OUTPATIENT)
Dept: ULTRASOUND IMAGING | Facility: HOSPITAL | Age: 89
End: 2024-01-01
Payer: MEDICARE

## 2024-01-01 ENCOUNTER — TELEPHONE (OUTPATIENT)
Dept: INTERNAL MEDICINE | Facility: CLINIC | Age: 89
End: 2024-01-01
Payer: MEDICARE

## 2024-01-01 VITALS
DIASTOLIC BLOOD PRESSURE: 26 MMHG | WEIGHT: 130.1 LBS | HEIGHT: 64 IN | TEMPERATURE: 100.4 F | RESPIRATION RATE: 14 BRPM | SYSTOLIC BLOOD PRESSURE: 60 MMHG | BODY MASS INDEX: 22.21 KG/M2 | OXYGEN SATURATION: 81 %

## 2024-01-01 DIAGNOSIS — C79.9 METASTATIC DISEASE: ICD-10-CM

## 2024-01-01 DIAGNOSIS — N39.0 ACUTE UTI: Primary | ICD-10-CM

## 2024-01-01 DIAGNOSIS — G93.40 ACUTE ENCEPHALOPATHY: ICD-10-CM

## 2024-01-01 LAB
ALBUMIN SERPL-MCNC: 3.5 G/DL (ref 3.5–5.2)
ALBUMIN/GLOB SERPL: 1.3 G/DL
ALP SERPL-CCNC: 56 U/L (ref 39–117)
ALT SERPL W P-5'-P-CCNC: 13 U/L (ref 1–33)
ANION GAP SERPL CALCULATED.3IONS-SCNC: 11 MMOL/L (ref 5–15)
ANION GAP SERPL CALCULATED.3IONS-SCNC: 11 MMOL/L (ref 5–15)
ANION GAP SERPL CALCULATED.3IONS-SCNC: 13 MMOL/L (ref 5–15)
ANION GAP SERPL CALCULATED.3IONS-SCNC: 7 MMOL/L (ref 5–15)
ANION GAP SERPL CALCULATED.3IONS-SCNC: 8 MMOL/L (ref 5–15)
ARTERIAL PATENCY WRIST A: POSITIVE
AST SERPL-CCNC: 23 U/L (ref 1–32)
ATMOSPHERIC PRESS: ABNORMAL MM[HG]
B PARAPERT DNA SPEC QL NAA+PROBE: NOT DETECTED
B PERT DNA SPEC QL NAA+PROBE: NOT DETECTED
BACTERIA SPEC AEROBE CULT: ABNORMAL
BACTERIA SPEC AEROBE CULT: NO GROWTH
BACTERIA SPEC AEROBE CULT: NORMAL
BACTERIA SPEC AEROBE CULT: NORMAL
BACTERIA UR QL AUTO: ABNORMAL /HPF
BACTERIA UR QL AUTO: ABNORMAL /HPF
BASE EXCESS BLDA CALC-SCNC: 2.2 MMOL/L (ref 0–2)
BASOPHILS # BLD AUTO: 0.04 10*3/MM3 (ref 0–0.2)
BASOPHILS # BLD AUTO: 0.05 10*3/MM3 (ref 0–0.2)
BASOPHILS NFR BLD AUTO: 0.3 % (ref 0–1.5)
BASOPHILS NFR BLD AUTO: 0.4 % (ref 0–1.5)
BASOPHILS NFR BLD AUTO: 0.4 % (ref 0–1.5)
BASOPHILS NFR BLD AUTO: 0.5 % (ref 0–1.5)
BDY SITE: ABNORMAL
BH CV LOW VAS LEFT GASTRONEMIUS VESSEL: 1
BH CV LOWER VASCULAR LEFT COMMON FEMORAL COMPRESS: NORMAL
BH CV LOWER VASCULAR LEFT COMMON FEMORAL PHASIC: NORMAL
BH CV LOWER VASCULAR LEFT COMMON FEMORAL SPONT: NORMAL
BH CV LOWER VASCULAR LEFT DISTAL FEMORAL COMPRESS: NORMAL
BH CV LOWER VASCULAR LEFT DISTAL FEMORAL PHASIC: NORMAL
BH CV LOWER VASCULAR LEFT DISTAL FEMORAL SPONT: NORMAL
BH CV LOWER VASCULAR LEFT GASTRONEMIUS COMPRESS: NORMAL
BH CV LOWER VASCULAR LEFT GREATER SAPH AK COMPRESS: NORMAL
BH CV LOWER VASCULAR LEFT GREATER SAPH BK COMPRESS: NORMAL
BH CV LOWER VASCULAR LEFT LESSER SAPH COMPRESS: NORMAL
BH CV LOWER VASCULAR LEFT MID FEMORAL COMPRESS: NORMAL
BH CV LOWER VASCULAR LEFT MID FEMORAL PHASIC: NORMAL
BH CV LOWER VASCULAR LEFT MID FEMORAL SPONT: NORMAL
BH CV LOWER VASCULAR LEFT PERONEAL AUGMENT: NORMAL
BH CV LOWER VASCULAR LEFT PERONEAL COMPRESS: NORMAL
BH CV LOWER VASCULAR LEFT POPLITEAL COMPRESS: NORMAL
BH CV LOWER VASCULAR LEFT POPLITEAL PHASIC: NORMAL
BH CV LOWER VASCULAR LEFT POPLITEAL SPONT: NORMAL
BH CV LOWER VASCULAR LEFT POSTERIOR TIBIAL AUGMENT: NORMAL
BH CV LOWER VASCULAR LEFT POSTERIOR TIBIAL COMPRESS: NORMAL
BH CV LOWER VASCULAR LEFT PROFUNDA FEMORAL COMPRESS: NORMAL
BH CV LOWER VASCULAR LEFT PROFUNDA FEMORAL PHASIC: NORMAL
BH CV LOWER VASCULAR LEFT PROFUNDA FEMORAL SPONT: NORMAL
BH CV LOWER VASCULAR LEFT PROXIMAL FEMORAL COMPRESS: NORMAL
BH CV LOWER VASCULAR LEFT PROXIMAL FEMORAL PHASIC: NORMAL
BH CV LOWER VASCULAR LEFT PROXIMAL FEMORAL SPONT: NORMAL
BH CV LOWER VASCULAR LEFT SAPHENOFEMORAL JUNCTION COMPRESS: NORMAL
BH CV LOWER VASCULAR LEFT SAPHENOFEMORAL JUNCTION PHASIC: NORMAL
BH CV LOWER VASCULAR LEFT SAPHENOFEMORAL JUNCTION SPONT: NORMAL
BH CV LOWER VASCULAR RIGHT COMMON FEMORAL COMPRESS: NORMAL
BH CV LOWER VASCULAR RIGHT COMMON FEMORAL PHASIC: NORMAL
BH CV LOWER VASCULAR RIGHT COMMON FEMORAL SPONT: NORMAL
BH CV LOWER VASCULAR RIGHT DISTAL FEMORAL COMPRESS: NORMAL
BH CV LOWER VASCULAR RIGHT DISTAL FEMORAL PHASIC: NORMAL
BH CV LOWER VASCULAR RIGHT DISTAL FEMORAL SPONT: NORMAL
BH CV LOWER VASCULAR RIGHT GREATER SAPH AK COMPRESS: NORMAL
BH CV LOWER VASCULAR RIGHT GREATER SAPH BK COMPRESS: NORMAL
BH CV LOWER VASCULAR RIGHT MID FEMORAL COMPRESS: NORMAL
BH CV LOWER VASCULAR RIGHT MID FEMORAL PHASIC: NORMAL
BH CV LOWER VASCULAR RIGHT MID FEMORAL SPONT: NORMAL
BH CV LOWER VASCULAR RIGHT PERONEAL AUGMENT: NORMAL
BH CV LOWER VASCULAR RIGHT POPLITEAL COMPRESS: NORMAL
BH CV LOWER VASCULAR RIGHT POPLITEAL PHASIC: NORMAL
BH CV LOWER VASCULAR RIGHT POPLITEAL SPONT: NORMAL
BH CV LOWER VASCULAR RIGHT POSTERIOR TIBIAL AUGMENT: NORMAL
BH CV LOWER VASCULAR RIGHT POSTERIOR TIBIAL COMPRESS: NORMAL
BH CV LOWER VASCULAR RIGHT PROFUNDA FEMORAL PHASIC: NORMAL
BH CV LOWER VASCULAR RIGHT PROFUNDA FEMORAL SPONT: NORMAL
BH CV LOWER VASCULAR RIGHT PROXIMAL FEMORAL COMPRESS: NORMAL
BH CV LOWER VASCULAR RIGHT PROXIMAL FEMORAL PHASIC: NORMAL
BH CV LOWER VASCULAR RIGHT PROXIMAL FEMORAL SPONT: NORMAL
BH CV LOWER VASCULAR RIGHT SAPHENOFEMORAL JUNCTION COMPRESS: NORMAL
BH CV LOWER VASCULAR RIGHT SAPHENOFEMORAL JUNCTION PHASIC: NORMAL
BH CV LOWER VASCULAR RIGHT SAPHENOFEMORAL JUNCTION SPONT: NORMAL
BH CV VAS PRELIMINARY FINDINGS SCRIPTING: 1
BILIRUB SERPL-MCNC: 0.6 MG/DL (ref 0–1.2)
BILIRUB UR QL STRIP: NEGATIVE
BILIRUB UR QL STRIP: NEGATIVE
BODY TEMPERATURE: 37
BUN SERPL-MCNC: 14 MG/DL (ref 8–23)
BUN SERPL-MCNC: 14 MG/DL (ref 8–23)
BUN SERPL-MCNC: 15 MG/DL (ref 8–23)
BUN SERPL-MCNC: 15 MG/DL (ref 8–23)
BUN SERPL-MCNC: 16 MG/DL (ref 8–23)
BUN SERPL-MCNC: 18 MG/DL (ref 8–23)
BUN SERPL-MCNC: 26 MG/DL (ref 8–23)
BUN/CREAT SERPL: 22.7 (ref 7–25)
BUN/CREAT SERPL: 25.5 (ref 7–25)
BUN/CREAT SERPL: 29.8 (ref 7–25)
BUN/CREAT SERPL: 30.2 (ref 7–25)
BUN/CREAT SERPL: 31.3 (ref 7–25)
BUN/CREAT SERPL: 36 (ref 7–25)
BUN/CREAT SERPL: 44.8 (ref 7–25)
C PNEUM DNA NPH QL NAA+NON-PROBE: NOT DETECTED
CALCIUM SPEC-SCNC: 7.2 MG/DL (ref 8.2–9.6)
CALCIUM SPEC-SCNC: 7.4 MG/DL (ref 8.2–9.6)
CALCIUM SPEC-SCNC: 7.5 MG/DL (ref 8.2–9.6)
CALCIUM SPEC-SCNC: 7.9 MG/DL (ref 8.2–9.6)
CALCIUM SPEC-SCNC: 8.7 MG/DL (ref 8.2–9.6)
CALCIUM SPEC-SCNC: 8.8 MG/DL (ref 8.2–9.6)
CALCIUM SPEC-SCNC: 8.9 MG/DL (ref 8.2–9.6)
CHLORIDE SERPL-SCNC: 102 MMOL/L (ref 98–107)
CHLORIDE SERPL-SCNC: 103 MMOL/L (ref 98–107)
CHLORIDE SERPL-SCNC: 104 MMOL/L (ref 98–107)
CHLORIDE SERPL-SCNC: 104 MMOL/L (ref 98–107)
CHLORIDE SERPL-SCNC: 99 MMOL/L (ref 98–107)
CLARITY UR: ABNORMAL
CLARITY UR: CLEAR
CO2 BLDA-SCNC: 28.2 MMOL/L (ref 22–33)
CO2 SERPL-SCNC: 25 MMOL/L (ref 22–29)
CO2 SERPL-SCNC: 28 MMOL/L (ref 22–29)
CO2 SERPL-SCNC: 29 MMOL/L (ref 22–29)
CO2 SERPL-SCNC: 31 MMOL/L (ref 22–29)
CO2 SERPL-SCNC: 31 MMOL/L (ref 22–29)
CO2 SERPL-SCNC: 32 MMOL/L (ref 22–29)
CO2 SERPL-SCNC: 33 MMOL/L (ref 22–29)
COARSE GRAN CASTS URNS QL MICRO: ABNORMAL /LPF
COHGB MFR BLD: 1.7 % (ref 0–2)
COLOR UR: YELLOW
COLOR UR: YELLOW
CREAT SERPL-MCNC: 0.47 MG/DL (ref 0.57–1)
CREAT SERPL-MCNC: 0.48 MG/DL (ref 0.57–1)
CREAT SERPL-MCNC: 0.5 MG/DL (ref 0.57–1)
CREAT SERPL-MCNC: 0.53 MG/DL (ref 0.57–1)
CREAT SERPL-MCNC: 0.55 MG/DL (ref 0.57–1)
CREAT SERPL-MCNC: 0.58 MG/DL (ref 0.57–1)
CREAT SERPL-MCNC: 0.66 MG/DL (ref 0.57–1)
CYTO UR: NORMAL
DEPRECATED RDW RBC AUTO: 45.5 FL (ref 37–54)
DEPRECATED RDW RBC AUTO: 45.6 FL (ref 37–54)
DEPRECATED RDW RBC AUTO: 46 FL (ref 37–54)
DEPRECATED RDW RBC AUTO: 48 FL (ref 37–54)
DEPRECATED RDW RBC AUTO: 48.1 FL (ref 37–54)
DEPRECATED RDW RBC AUTO: 48.2 FL (ref 37–54)
EGFRCR SERPLBLD CKD-EPI 2021: 81.4 ML/MIN/1.73
EGFRCR SERPLBLD CKD-EPI 2021: 84 ML/MIN/1.73
EGFRCR SERPLBLD CKD-EPI 2021: 85.1 ML/MIN/1.73
EGFRCR SERPLBLD CKD-EPI 2021: 85.8 ML/MIN/1.73
EGFRCR SERPLBLD CKD-EPI 2021: 87 ML/MIN/1.73
EGFRCR SERPLBLD CKD-EPI 2021: 87.9 ML/MIN/1.73
EGFRCR SERPLBLD CKD-EPI 2021: 88.3 ML/MIN/1.73
EOSINOPHIL # BLD AUTO: 0.01 10*3/MM3 (ref 0–0.4)
EOSINOPHIL # BLD AUTO: 0.01 10*3/MM3 (ref 0–0.4)
EOSINOPHIL # BLD AUTO: 0.09 10*3/MM3 (ref 0–0.4)
EOSINOPHIL # BLD AUTO: 0.1 10*3/MM3 (ref 0–0.4)
EOSINOPHIL NFR BLD AUTO: 0.1 % (ref 0.3–6.2)
EOSINOPHIL NFR BLD AUTO: 0.1 % (ref 0.3–6.2)
EOSINOPHIL NFR BLD AUTO: 0.7 % (ref 0.3–6.2)
EOSINOPHIL NFR BLD AUTO: 0.8 % (ref 0.3–6.2)
EPAP: 0
ERYTHROCYTE [DISTWIDTH] IN BLOOD BY AUTOMATED COUNT: 14.1 % (ref 12.3–15.4)
ERYTHROCYTE [DISTWIDTH] IN BLOOD BY AUTOMATED COUNT: 14.1 % (ref 12.3–15.4)
ERYTHROCYTE [DISTWIDTH] IN BLOOD BY AUTOMATED COUNT: 14.3 % (ref 12.3–15.4)
ERYTHROCYTE [DISTWIDTH] IN BLOOD BY AUTOMATED COUNT: 14.5 % (ref 12.3–15.4)
ERYTHROCYTE [DISTWIDTH] IN BLOOD BY AUTOMATED COUNT: 14.6 % (ref 12.3–15.4)
ERYTHROCYTE [DISTWIDTH] IN BLOOD BY AUTOMATED COUNT: 14.6 % (ref 12.3–15.4)
FINE GRAN CASTS URNS QL MICRO: ABNORMAL /LPF
FLUAV SUBTYP SPEC NAA+PROBE: NOT DETECTED
FLUBV RNA ISLT QL NAA+PROBE: NOT DETECTED
GLOBULIN UR ELPH-MCNC: 2.8 GM/DL
GLUCOSE BLDC GLUCOMTR-MCNC: 159 MG/DL (ref 70–130)
GLUCOSE SERPL-MCNC: 117 MG/DL (ref 65–99)
GLUCOSE SERPL-MCNC: 118 MG/DL (ref 65–99)
GLUCOSE SERPL-MCNC: 127 MG/DL (ref 65–99)
GLUCOSE SERPL-MCNC: 127 MG/DL (ref 65–99)
GLUCOSE SERPL-MCNC: 128 MG/DL (ref 65–99)
GLUCOSE SERPL-MCNC: 135 MG/DL (ref 65–99)
GLUCOSE SERPL-MCNC: 140 MG/DL (ref 65–99)
GLUCOSE UR STRIP-MCNC: NEGATIVE MG/DL
GLUCOSE UR STRIP-MCNC: NEGATIVE MG/DL
HADV DNA SPEC NAA+PROBE: NOT DETECTED
HCO3 BLDA-SCNC: 26.9 MMOL/L (ref 20–26)
HCOV 229E RNA SPEC QL NAA+PROBE: NOT DETECTED
HCOV HKU1 RNA SPEC QL NAA+PROBE: NOT DETECTED
HCOV NL63 RNA SPEC QL NAA+PROBE: NOT DETECTED
HCOV OC43 RNA SPEC QL NAA+PROBE: NOT DETECTED
HCT VFR BLD AUTO: 38.6 % (ref 34–46.6)
HCT VFR BLD AUTO: 39.3 % (ref 34–46.6)
HCT VFR BLD AUTO: 40.5 % (ref 34–46.6)
HCT VFR BLD AUTO: 40.7 % (ref 34–46.6)
HCT VFR BLD AUTO: 41 % (ref 34–46.6)
HCT VFR BLD AUTO: 41.8 % (ref 34–46.6)
HCT VFR BLD CALC: 46.1 % (ref 38–51)
HGB BLD-MCNC: 12.7 G/DL (ref 12–15.9)
HGB BLD-MCNC: 12.9 G/DL (ref 12–15.9)
HGB BLD-MCNC: 13 G/DL (ref 12–15.9)
HGB BLD-MCNC: 13.1 G/DL (ref 12–15.9)
HGB BLD-MCNC: 13.5 G/DL (ref 12–15.9)
HGB BLD-MCNC: 13.7 G/DL (ref 12–15.9)
HGB BLDA-MCNC: 15.1 G/DL (ref 14–18)
HGB UR QL STRIP.AUTO: ABNORMAL
HGB UR QL STRIP.AUTO: ABNORMAL
HMPV RNA NPH QL NAA+NON-PROBE: NOT DETECTED
HOLD SPECIMEN: NORMAL
HPIV1 RNA ISLT QL NAA+PROBE: NOT DETECTED
HPIV2 RNA SPEC QL NAA+PROBE: NOT DETECTED
HPIV3 RNA NPH QL NAA+PROBE: NOT DETECTED
HPIV4 P GENE NPH QL NAA+PROBE: NOT DETECTED
HYALINE CASTS UR QL AUTO: ABNORMAL /LPF
HYALINE CASTS UR QL AUTO: ABNORMAL /LPF
IMM GRANULOCYTES # BLD AUTO: 0.05 10*3/MM3 (ref 0–0.05)
IMM GRANULOCYTES # BLD AUTO: 0.06 10*3/MM3 (ref 0–0.05)
IMM GRANULOCYTES # BLD AUTO: 0.07 10*3/MM3 (ref 0–0.05)
IMM GRANULOCYTES # BLD AUTO: 0.08 10*3/MM3 (ref 0–0.05)
IMM GRANULOCYTES NFR BLD AUTO: 0.5 % (ref 0–0.5)
IMM GRANULOCYTES NFR BLD AUTO: 0.5 % (ref 0–0.5)
IMM GRANULOCYTES NFR BLD AUTO: 0.6 % (ref 0–0.5)
IMM GRANULOCYTES NFR BLD AUTO: 0.6 % (ref 0–0.5)
INHALED O2 CONCENTRATION: 40 %
IPAP: 0
KETONES UR QL STRIP: NEGATIVE
KETONES UR QL STRIP: NEGATIVE
LAB AP CASE REPORT: NORMAL
LAB AP CLINICAL INFORMATION: NORMAL
LEUKOCYTE ESTERASE UR QL STRIP.AUTO: ABNORMAL
LEUKOCYTE ESTERASE UR QL STRIP.AUTO: NEGATIVE
LYMPHOCYTES # BLD AUTO: 0.65 10*3/MM3 (ref 0.7–3.1)
LYMPHOCYTES # BLD AUTO: 0.82 10*3/MM3 (ref 0.7–3.1)
LYMPHOCYTES # BLD AUTO: 0.93 10*3/MM3 (ref 0.7–3.1)
LYMPHOCYTES # BLD AUTO: 1.33 10*3/MM3 (ref 0.7–3.1)
LYMPHOCYTES NFR BLD AUTO: 10.5 % (ref 19.6–45.3)
LYMPHOCYTES NFR BLD AUTO: 6.6 % (ref 19.6–45.3)
LYMPHOCYTES NFR BLD AUTO: 6.7 % (ref 19.6–45.3)
LYMPHOCYTES NFR BLD AUTO: 7.2 % (ref 19.6–45.3)
M PNEUMO IGG SER IA-ACNC: NOT DETECTED
MAGNESIUM SERPL-MCNC: 1.8 MG/DL (ref 1.7–2.3)
MAGNESIUM SERPL-MCNC: 1.9 MG/DL (ref 1.7–2.3)
MCH RBC QN AUTO: 28.7 PG (ref 26.6–33)
MCH RBC QN AUTO: 28.9 PG (ref 26.6–33)
MCH RBC QN AUTO: 29 PG (ref 26.6–33)
MCH RBC QN AUTO: 29.1 PG (ref 26.6–33)
MCH RBC QN AUTO: 29.1 PG (ref 26.6–33)
MCH RBC QN AUTO: 29.7 PG (ref 26.6–33)
MCHC RBC AUTO-ENTMCNC: 32.1 G/DL (ref 31.5–35.7)
MCHC RBC AUTO-ENTMCNC: 32.2 G/DL (ref 31.5–35.7)
MCHC RBC AUTO-ENTMCNC: 32.8 G/DL (ref 31.5–35.7)
MCHC RBC AUTO-ENTMCNC: 32.8 G/DL (ref 31.5–35.7)
MCHC RBC AUTO-ENTMCNC: 32.9 G/DL (ref 31.5–35.7)
MCHC RBC AUTO-ENTMCNC: 32.9 G/DL (ref 31.5–35.7)
MCV RBC AUTO: 87.9 FL (ref 79–97)
MCV RBC AUTO: 88.4 FL (ref 79–97)
MCV RBC AUTO: 88.4 FL (ref 79–97)
MCV RBC AUTO: 89.1 FL (ref 79–97)
MCV RBC AUTO: 90.6 FL (ref 79–97)
MCV RBC AUTO: 90.8 FL (ref 79–97)
METHGB BLD QL: 0.5 % (ref 0–1.5)
MODALITY: ABNORMAL
MONOCYTES # BLD AUTO: 0.37 10*3/MM3 (ref 0.1–0.9)
MONOCYTES # BLD AUTO: 0.62 10*3/MM3 (ref 0.1–0.9)
MONOCYTES # BLD AUTO: 1.04 10*3/MM3 (ref 0.1–0.9)
MONOCYTES # BLD AUTO: 1.05 10*3/MM3 (ref 0.1–0.9)
MONOCYTES NFR BLD AUTO: 3.8 % (ref 5–12)
MONOCYTES NFR BLD AUTO: 5.4 % (ref 5–12)
MONOCYTES NFR BLD AUTO: 7.6 % (ref 5–12)
MONOCYTES NFR BLD AUTO: 8.2 % (ref 5–12)
MUCOUS THREADS URNS QL MICRO: ABNORMAL /HPF
NEUTROPHILS NFR BLD AUTO: 10.07 10*3/MM3 (ref 1.7–7)
NEUTROPHILS NFR BLD AUTO: 11.64 10*3/MM3 (ref 1.7–7)
NEUTROPHILS NFR BLD AUTO: 79.6 % (ref 42.7–76)
NEUTROPHILS NFR BLD AUTO: 8.72 10*3/MM3 (ref 1.7–7)
NEUTROPHILS NFR BLD AUTO: 84 % (ref 42.7–76)
NEUTROPHILS NFR BLD AUTO: 86.4 % (ref 42.7–76)
NEUTROPHILS NFR BLD AUTO: 88.5 % (ref 42.7–76)
NEUTROPHILS NFR BLD AUTO: 9.89 10*3/MM3 (ref 1.7–7)
NITRITE UR QL STRIP: NEGATIVE
NITRITE UR QL STRIP: POSITIVE
NRBC BLD AUTO-RTO: 0 /100 WBC (ref 0–0.2)
OXYHGB MFR BLDV: 88.5 % (ref 94–99)
PATH REPORT.FINAL DX SPEC: NORMAL
PATH REPORT.GROSS SPEC: NORMAL
PAW @ PEAK INSP FLOW SETTING VENT: 0 CMH2O
PCO2 BLDA: 41 MM HG (ref 35–45)
PCO2 TEMP ADJ BLD: 41 MM HG (ref 35–45)
PH BLDA: 7.43 PH UNITS (ref 7.35–7.45)
PH UR STRIP.AUTO: 5.5 [PH] (ref 5–8)
PH UR STRIP.AUTO: 7.5 [PH] (ref 5–8)
PH, TEMP CORRECTED: 7.43 PH UNITS
PHOSPHATE SERPL-MCNC: 2.3 MG/DL (ref 2.5–4.5)
PLATELET # BLD AUTO: 181 10*3/MM3 (ref 140–450)
PLATELET # BLD AUTO: 186 10*3/MM3 (ref 140–450)
PLATELET # BLD AUTO: 204 10*3/MM3 (ref 140–450)
PLATELET # BLD AUTO: 217 10*3/MM3 (ref 140–450)
PLATELET # BLD AUTO: 219 10*3/MM3 (ref 140–450)
PLATELET # BLD AUTO: 251 10*3/MM3 (ref 140–450)
PMV BLD AUTO: 9.2 FL (ref 6–12)
PMV BLD AUTO: 9.2 FL (ref 6–12)
PMV BLD AUTO: 9.3 FL (ref 6–12)
PMV BLD AUTO: 9.5 FL (ref 6–12)
PMV BLD AUTO: 9.7 FL (ref 6–12)
PMV BLD AUTO: 9.7 FL (ref 6–12)
PO2 BLDA: 57.3 MM HG (ref 83–108)
PO2 TEMP ADJ BLD: 57.3 MM HG (ref 83–108)
POTASSIUM SERPL-SCNC: 3 MMOL/L (ref 3.5–5.2)
POTASSIUM SERPL-SCNC: 3.4 MMOL/L (ref 3.5–5.2)
POTASSIUM SERPL-SCNC: 3.7 MMOL/L (ref 3.5–5.2)
POTASSIUM SERPL-SCNC: 3.7 MMOL/L (ref 3.5–5.2)
POTASSIUM SERPL-SCNC: 3.9 MMOL/L (ref 3.5–5.2)
POTASSIUM SERPL-SCNC: 3.9 MMOL/L (ref 3.5–5.2)
POTASSIUM SERPL-SCNC: 4 MMOL/L (ref 3.5–5.2)
POTASSIUM SERPL-SCNC: 4.2 MMOL/L (ref 3.5–5.2)
POTASSIUM SERPL-SCNC: 5.1 MMOL/L (ref 3.5–5.2)
PROCALCITONIN SERPL-MCNC: 0.09 NG/ML (ref 0–0.25)
PROT SERPL-MCNC: 6.3 G/DL (ref 6–8.5)
PROT UR QL STRIP: ABNORMAL
PROT UR QL STRIP: ABNORMAL
QT INTERVAL: 498 MS
QTC INTERVAL: 517 MS
RBC # BLD AUTO: 4.34 10*6/MM3 (ref 3.77–5.28)
RBC # BLD AUTO: 4.39 10*6/MM3 (ref 3.77–5.28)
RBC # BLD AUTO: 4.46 10*6/MM3 (ref 3.77–5.28)
RBC # BLD AUTO: 4.57 10*6/MM3 (ref 3.77–5.28)
RBC # BLD AUTO: 4.64 10*6/MM3 (ref 3.77–5.28)
RBC # BLD AUTO: 4.73 10*6/MM3 (ref 3.77–5.28)
RBC # UR STRIP: ABNORMAL /HPF
RBC # UR STRIP: ABNORMAL /HPF
REF LAB TEST METHOD: ABNORMAL
REF LAB TEST METHOD: ABNORMAL
RHINOVIRUS RNA SPEC NAA+PROBE: NOT DETECTED
RSV RNA NPH QL NAA+NON-PROBE: NOT DETECTED
SARS-COV-2 RNA NPH QL NAA+NON-PROBE: NOT DETECTED
SODIUM SERPL-SCNC: 139 MMOL/L (ref 136–145)
SODIUM SERPL-SCNC: 140 MMOL/L (ref 136–145)
SODIUM SERPL-SCNC: 141 MMOL/L (ref 136–145)
SODIUM SERPL-SCNC: 142 MMOL/L (ref 136–145)
SODIUM SERPL-SCNC: 142 MMOL/L (ref 136–145)
SODIUM SERPL-SCNC: 143 MMOL/L (ref 136–145)
SODIUM SERPL-SCNC: 146 MMOL/L (ref 136–145)
SP GR UR STRIP: 1.01 (ref 1–1.03)
SP GR UR STRIP: 1.02 (ref 1–1.03)
SQUAMOUS #/AREA URNS HPF: ABNORMAL /HPF
SQUAMOUS #/AREA URNS HPF: ABNORMAL /HPF
TOTAL RATE: 0 BREATHS/MINUTE
TROPONIN T SERPL HS-MCNC: 28 NG/L
UROBILINOGEN UR QL STRIP: ABNORMAL
UROBILINOGEN UR QL STRIP: ABNORMAL
WBC # UR STRIP: ABNORMAL /HPF
WBC # UR STRIP: ABNORMAL /HPF
WBC NRBC COR # BLD AUTO: 11.45 10*3/MM3 (ref 3.4–10.8)
WBC NRBC COR # BLD AUTO: 12.03 10*3/MM3 (ref 3.4–10.8)
WBC NRBC COR # BLD AUTO: 12.65 10*3/MM3 (ref 3.4–10.8)
WBC NRBC COR # BLD AUTO: 13.4 10*3/MM3 (ref 3.4–10.8)
WBC NRBC COR # BLD AUTO: 13.84 10*3/MM3 (ref 3.4–10.8)
WBC NRBC COR # BLD AUTO: 9.85 10*3/MM3 (ref 3.4–10.8)
WHOLE BLOOD HOLD COAG: NORMAL
WHOLE BLOOD HOLD SPECIMEN: NORMAL

## 2024-01-01 PROCEDURE — 99232 SBSQ HOSP IP/OBS MODERATE 35: CPT | Performed by: INTERNAL MEDICINE

## 2024-01-01 PROCEDURE — 94799 UNLISTED PULMONARY SVC/PX: CPT

## 2024-01-01 PROCEDURE — 70450 CT HEAD/BRAIN W/O DYE: CPT

## 2024-01-01 PROCEDURE — 25010000002 CEFTRIAXONE PER 250 MG: Performed by: INTERNAL MEDICINE

## 2024-01-01 PROCEDURE — G0378 HOSPITAL OBSERVATION PER HR: HCPCS

## 2024-01-01 PROCEDURE — 87077 CULTURE AEROBIC IDENTIFY: CPT | Performed by: EMERGENCY MEDICINE

## 2024-01-01 PROCEDURE — 36600 WITHDRAWAL OF ARTERIAL BLOOD: CPT

## 2024-01-01 PROCEDURE — 85025 COMPLETE CBC W/AUTO DIFF WBC: CPT | Performed by: INTERNAL MEDICINE

## 2024-01-01 PROCEDURE — 25010000002 GLYCOPYRROLATE 0.2 MG/ML SOLUTION: Performed by: FAMILY MEDICINE

## 2024-01-01 PROCEDURE — 99231 SBSQ HOSP IP/OBS SF/LOW 25: CPT | Performed by: INTERNAL MEDICINE

## 2024-01-01 PROCEDURE — 25810000003 LACTATED RINGERS PER 1000 ML: Performed by: NURSE PRACTITIONER

## 2024-01-01 PROCEDURE — 76775 US EXAM ABDO BACK WALL LIM: CPT

## 2024-01-01 PROCEDURE — 83735 ASSAY OF MAGNESIUM: CPT | Performed by: INTERNAL MEDICINE

## 2024-01-01 PROCEDURE — 87086 URINE CULTURE/COLONY COUNT: CPT | Performed by: INTERNAL MEDICINE

## 2024-01-01 PROCEDURE — 83050 HGB METHEMOGLOBIN QUAN: CPT

## 2024-01-01 PROCEDURE — 83735 ASSAY OF MAGNESIUM: CPT

## 2024-01-01 PROCEDURE — 80048 BASIC METABOLIC PNL TOTAL CA: CPT | Performed by: INTERNAL MEDICINE

## 2024-01-01 PROCEDURE — 93970 EXTREMITY STUDY: CPT | Performed by: INTERNAL MEDICINE

## 2024-01-01 PROCEDURE — 88305 TISSUE EXAM BY PATHOLOGIST: CPT | Performed by: INTERNAL MEDICINE

## 2024-01-01 PROCEDURE — 25010000002 MORPHINE PER 10 MG: Performed by: INTERNAL MEDICINE

## 2024-01-01 PROCEDURE — 43241 EGD TUBE/CATH INSERTION: CPT | Performed by: INTERNAL MEDICINE

## 2024-01-01 PROCEDURE — C1769 GUIDE WIRE: HCPCS | Performed by: INTERNAL MEDICINE

## 2024-01-01 PROCEDURE — 0DH68UZ INSERTION OF FEEDING DEVICE INTO STOMACH, VIA NATURAL OR ARTIFICIAL OPENING ENDOSCOPIC: ICD-10-PCS | Performed by: INTERNAL MEDICINE

## 2024-01-01 PROCEDURE — 84484 ASSAY OF TROPONIN QUANT: CPT | Performed by: EMERGENCY MEDICINE

## 2024-01-01 PROCEDURE — 25010000002 MORPHINE PER 10 MG: Performed by: NURSE PRACTITIONER

## 2024-01-01 PROCEDURE — 84132 ASSAY OF SERUM POTASSIUM: CPT | Performed by: INTERNAL MEDICINE

## 2024-01-01 PROCEDURE — 82948 REAGENT STRIP/BLOOD GLUCOSE: CPT

## 2024-01-01 PROCEDURE — 36415 COLL VENOUS BLD VENIPUNCTURE: CPT

## 2024-01-01 PROCEDURE — 71045 X-RAY EXAM CHEST 1 VIEW: CPT

## 2024-01-01 PROCEDURE — 71250 CT THORAX DX C-: CPT

## 2024-01-01 PROCEDURE — 43239 EGD BIOPSY SINGLE/MULTIPLE: CPT | Performed by: INTERNAL MEDICINE

## 2024-01-01 PROCEDURE — 84145 PROCALCITONIN (PCT): CPT | Performed by: INTERNAL MEDICINE

## 2024-01-01 PROCEDURE — 99222 1ST HOSP IP/OBS MODERATE 55: CPT | Performed by: PHYSICIAN ASSISTANT

## 2024-01-01 PROCEDURE — 80048 BASIC METABOLIC PNL TOTAL CA: CPT | Performed by: NURSE PRACTITIONER

## 2024-01-01 PROCEDURE — P9612 CATHETERIZE FOR URINE SPEC: HCPCS

## 2024-01-01 PROCEDURE — 83735 ASSAY OF MAGNESIUM: CPT | Performed by: EMERGENCY MEDICINE

## 2024-01-01 PROCEDURE — 25010000002 MORPHINE PER 10 MG: Performed by: FAMILY MEDICINE

## 2024-01-01 PROCEDURE — 74176 CT ABD & PELVIS W/O CONTRAST: CPT

## 2024-01-01 PROCEDURE — 85027 COMPLETE CBC AUTOMATED: CPT | Performed by: STUDENT IN AN ORGANIZED HEALTH CARE EDUCATION/TRAINING PROGRAM

## 2024-01-01 PROCEDURE — 74018 RADEX ABDOMEN 1 VIEW: CPT

## 2024-01-01 PROCEDURE — 80048 BASIC METABOLIC PNL TOTAL CA: CPT | Performed by: STUDENT IN AN ORGANIZED HEALTH CARE EDUCATION/TRAINING PROGRAM

## 2024-01-01 PROCEDURE — 25010000002 ENOXAPARIN PER 10 MG: Performed by: INTERNAL MEDICINE

## 2024-01-01 PROCEDURE — 84100 ASSAY OF PHOSPHORUS: CPT

## 2024-01-01 PROCEDURE — 81001 URINALYSIS AUTO W/SCOPE: CPT | Performed by: INTERNAL MEDICINE

## 2024-01-01 PROCEDURE — 0202U NFCT DS 22 TRGT SARS-COV-2: CPT | Performed by: INTERNAL MEDICINE

## 2024-01-01 PROCEDURE — 99285 EMERGENCY DEPT VISIT HI MDM: CPT

## 2024-01-01 PROCEDURE — 81001 URINALYSIS AUTO W/SCOPE: CPT | Performed by: EMERGENCY MEDICINE

## 2024-01-01 PROCEDURE — 85025 COMPLETE CBC W/AUTO DIFF WBC: CPT

## 2024-01-01 PROCEDURE — 25010000002 CEFTRIAXONE PER 250 MG: Performed by: NURSE PRACTITIONER

## 2024-01-01 PROCEDURE — 99223 1ST HOSP IP/OBS HIGH 75: CPT

## 2024-01-01 PROCEDURE — 87040 BLOOD CULTURE FOR BACTERIA: CPT | Performed by: INTERNAL MEDICINE

## 2024-01-01 PROCEDURE — 99232 SBSQ HOSP IP/OBS MODERATE 35: CPT | Performed by: STUDENT IN AN ORGANIZED HEALTH CARE EDUCATION/TRAINING PROGRAM

## 2024-01-01 PROCEDURE — 93005 ELECTROCARDIOGRAM TRACING: CPT | Performed by: EMERGENCY MEDICINE

## 2024-01-01 PROCEDURE — 82805 BLOOD GASES W/O2 SATURATION: CPT

## 2024-01-01 PROCEDURE — 80053 COMPREHEN METABOLIC PANEL: CPT | Performed by: EMERGENCY MEDICINE

## 2024-01-01 PROCEDURE — 25010000002 POTASSIUM CHLORIDE 10 MEQ/100ML SOLUTION: Performed by: INTERNAL MEDICINE

## 2024-01-01 PROCEDURE — 83735 ASSAY OF MAGNESIUM: CPT | Performed by: NURSE PRACTITIONER

## 2024-01-01 PROCEDURE — 25010000002 CEFTRIAXONE PER 250 MG: Performed by: EMERGENCY MEDICINE

## 2024-01-01 PROCEDURE — 93970 EXTREMITY STUDY: CPT

## 2024-01-01 PROCEDURE — 85027 COMPLETE CBC AUTOMATED: CPT | Performed by: INTERNAL MEDICINE

## 2024-01-01 PROCEDURE — 94761 N-INVAS EAR/PLS OXIMETRY MLT: CPT

## 2024-01-01 PROCEDURE — 0DB68ZX EXCISION OF STOMACH, VIA NATURAL OR ARTIFICIAL OPENING ENDOSCOPIC, DIAGNOSTIC: ICD-10-PCS | Performed by: INTERNAL MEDICINE

## 2024-01-01 PROCEDURE — 87186 SC STD MICRODIL/AGAR DIL: CPT | Performed by: EMERGENCY MEDICINE

## 2024-01-01 PROCEDURE — 85025 COMPLETE CBC W/AUTO DIFF WBC: CPT | Performed by: NURSE PRACTITIONER

## 2024-01-01 PROCEDURE — 99222 1ST HOSP IP/OBS MODERATE 55: CPT | Performed by: INTERNAL MEDICINE

## 2024-01-01 PROCEDURE — 87086 URINE CULTURE/COLONY COUNT: CPT | Performed by: EMERGENCY MEDICINE

## 2024-01-01 PROCEDURE — 82375 ASSAY CARBOXYHB QUANT: CPT

## 2024-01-01 RX ORDER — BISACODYL 10 MG
10 SUPPOSITORY, RECTAL RECTAL DAILY PRN
Status: CANCELLED | OUTPATIENT
Start: 2024-01-01

## 2024-01-01 RX ORDER — MORPHINE SULFATE 2 MG/ML
1 INJECTION, SOLUTION INTRAMUSCULAR; INTRAVENOUS
Status: DISCONTINUED | OUTPATIENT
Start: 2024-01-01 | End: 2024-05-23 | Stop reason: HOSPADM

## 2024-01-01 RX ORDER — MULTIVIT AND MINERALS-FERROUS GLUCONATE 9 MG IRON/15 ML ORAL LIQUID 9 MG/15 ML
15 LIQUID (ML) ORAL DAILY
Status: DISCONTINUED | OUTPATIENT
Start: 2024-01-01 | End: 2024-01-01

## 2024-01-01 RX ORDER — MIDAZOLAM HYDROCHLORIDE 1 MG/ML
1 INJECTION INTRAMUSCULAR; INTRAVENOUS
Status: DISCONTINUED | OUTPATIENT
Start: 2024-01-01 | End: 2024-05-23 | Stop reason: HOSPADM

## 2024-01-01 RX ORDER — POTASSIUM CHLORIDE 7.45 MG/ML
10 INJECTION INTRAVENOUS
Status: COMPLETED | OUTPATIENT
Start: 2024-01-01 | End: 2024-01-01

## 2024-01-01 RX ORDER — LEVOTHYROXINE SODIUM 0.15 MG/1
150 TABLET ORAL
Status: DISCONTINUED | OUTPATIENT
Start: 2024-01-01 | End: 2024-01-01

## 2024-01-01 RX ORDER — PANTOPRAZOLE SODIUM 40 MG/1
40 TABLET, DELAYED RELEASE ORAL DAILY
Status: DISCONTINUED | OUTPATIENT
Start: 2024-01-01 | End: 2024-01-01

## 2024-01-01 RX ORDER — LISINOPRIL 20 MG/1
20 TABLET ORAL DAILY
Status: DISCONTINUED | OUTPATIENT
Start: 2024-01-01 | End: 2024-01-01

## 2024-01-01 RX ORDER — IBUPROFEN 400 MG/1
800 TABLET ORAL EVERY 4 HOURS PRN
Status: DISCONTINUED | OUTPATIENT
Start: 2024-01-01 | End: 2024-01-01

## 2024-01-01 RX ORDER — MORPHINE SULFATE 2 MG/ML
1 INJECTION, SOLUTION INTRAMUSCULAR; INTRAVENOUS
Status: DISCONTINUED | OUTPATIENT
Start: 2024-01-01 | End: 2024-01-01

## 2024-01-01 RX ORDER — AMLODIPINE BESYLATE 5 MG/1
5 TABLET ORAL DAILY
Status: DISCONTINUED | OUTPATIENT
Start: 2024-01-01 | End: 2024-01-01

## 2024-01-01 RX ORDER — ACETAMINOPHEN 650 MG/1
650 SUPPOSITORY RECTAL EVERY 4 HOURS PRN
Status: DISCONTINUED | OUTPATIENT
Start: 2024-01-01 | End: 2024-05-23 | Stop reason: HOSPADM

## 2024-01-01 RX ORDER — MEMANTINE HYDROCHLORIDE 10 MG/1
10 TABLET ORAL 2 TIMES DAILY
Status: CANCELLED | OUTPATIENT
Start: 2024-01-01

## 2024-01-01 RX ORDER — ONDANSETRON 2 MG/ML
4 INJECTION INTRAMUSCULAR; INTRAVENOUS ONCE AS NEEDED
Status: DISCONTINUED | OUTPATIENT
Start: 2024-01-01 | End: 2024-01-01 | Stop reason: HOSPADM

## 2024-01-01 RX ORDER — ACETAMINOPHEN 160 MG/5ML
650 SOLUTION ORAL EVERY 4 HOURS PRN
Status: CANCELLED | OUTPATIENT
Start: 2024-01-01

## 2024-01-01 RX ORDER — POLYETHYLENE GLYCOL 3350 17 G/17G
17 POWDER, FOR SOLUTION ORAL DAILY PRN
Status: CANCELLED | OUTPATIENT
Start: 2024-01-01

## 2024-01-01 RX ORDER — SODIUM CHLORIDE, SODIUM LACTATE, POTASSIUM CHLORIDE, CALCIUM CHLORIDE 600; 310; 30; 20 MG/100ML; MG/100ML; MG/100ML; MG/100ML
75 INJECTION, SOLUTION INTRAVENOUS CONTINUOUS
Status: ACTIVE | OUTPATIENT
Start: 2024-01-01 | End: 2024-01-01

## 2024-01-01 RX ORDER — BISACODYL 5 MG/1
5 TABLET, DELAYED RELEASE ORAL DAILY PRN
Status: CANCELLED | OUTPATIENT
Start: 2024-01-01

## 2024-01-01 RX ORDER — ACETAMINOPHEN 650 MG/1
650 SUPPOSITORY RECTAL EVERY 4 HOURS PRN
Status: DISCONTINUED | OUTPATIENT
Start: 2024-01-01 | End: 2024-01-01

## 2024-01-01 RX ORDER — AMLODIPINE BESYLATE 5 MG/1
5 TABLET ORAL DAILY
Status: CANCELLED | OUTPATIENT
Start: 2024-01-01

## 2024-01-01 RX ORDER — BUSPIRONE HYDROCHLORIDE 10 MG/1
10 TABLET ORAL 2 TIMES DAILY
Status: CANCELLED | OUTPATIENT
Start: 2024-01-01

## 2024-01-01 RX ORDER — BISACODYL 10 MG
10 SUPPOSITORY, RECTAL RECTAL DAILY PRN
Status: DISCONTINUED | OUTPATIENT
Start: 2024-01-01 | End: 2024-01-01

## 2024-01-01 RX ORDER — POLYETHYLENE GLYCOL 3350 17 G/17G
17 POWDER, FOR SOLUTION ORAL DAILY PRN
Status: DISCONTINUED | OUTPATIENT
Start: 2024-01-01 | End: 2024-01-01

## 2024-01-01 RX ORDER — ACETAMINOPHEN 160 MG/5ML
650 SOLUTION ORAL EVERY 4 HOURS PRN
Status: DISCONTINUED | OUTPATIENT
Start: 2024-01-01 | End: 2024-05-23 | Stop reason: HOSPADM

## 2024-01-01 RX ORDER — SODIUM CHLORIDE 0.9 % (FLUSH) 0.9 %
10 SYRINGE (ML) INJECTION AS NEEDED
Status: DISCONTINUED | OUTPATIENT
Start: 2024-01-01 | End: 2024-05-23 | Stop reason: HOSPADM

## 2024-01-01 RX ORDER — IBUPROFEN 400 MG/1
800 TABLET ORAL EVERY 4 HOURS PRN
Status: DISCONTINUED | OUTPATIENT
Start: 2024-01-01 | End: 2024-05-23 | Stop reason: HOSPADM

## 2024-01-01 RX ORDER — MEMANTINE HYDROCHLORIDE 10 MG/1
10 TABLET ORAL 2 TIMES DAILY
Status: DISCONTINUED | OUTPATIENT
Start: 2024-01-01 | End: 2024-05-23 | Stop reason: HOSPADM

## 2024-01-01 RX ORDER — MULTIPLE VITAMINS W/ MINERALS TAB 9MG-400MCG
1 TAB ORAL DAILY
Status: DISCONTINUED | OUTPATIENT
Start: 2024-01-01 | End: 2024-01-01

## 2024-01-01 RX ORDER — NYSTATIN 100000 [USP'U]/G
POWDER TOPICAL EVERY 12 HOURS SCHEDULED
Status: DISCONTINUED | OUTPATIENT
Start: 2024-01-01 | End: 2024-05-23 | Stop reason: HOSPADM

## 2024-01-01 RX ORDER — BISACODYL 5 MG/1
5 TABLET, DELAYED RELEASE ORAL DAILY PRN
Status: DISCONTINUED | OUTPATIENT
Start: 2024-01-01 | End: 2024-01-01

## 2024-01-01 RX ORDER — ACETAMINOPHEN 325 MG/1
650 TABLET ORAL EVERY 4 HOURS PRN
Status: DISCONTINUED | OUTPATIENT
Start: 2024-01-01 | End: 2024-01-01

## 2024-01-01 RX ORDER — MORPHINE SULFATE 2 MG/ML
1 INJECTION, SOLUTION INTRAMUSCULAR; INTRAVENOUS ONCE
Status: COMPLETED | OUTPATIENT
Start: 2024-01-01 | End: 2024-01-01

## 2024-01-01 RX ORDER — ACETAMINOPHEN 325 MG/1
650 TABLET ORAL EVERY 6 HOURS PRN
Status: DISCONTINUED | OUTPATIENT
Start: 2024-01-01 | End: 2024-01-01

## 2024-01-01 RX ORDER — BUSPIRONE HYDROCHLORIDE 10 MG/1
10 TABLET ORAL 2 TIMES DAILY
Status: DISCONTINUED | OUTPATIENT
Start: 2024-01-01 | End: 2024-05-23 | Stop reason: HOSPADM

## 2024-01-01 RX ORDER — ACETAMINOPHEN 650 MG/1
325 SUPPOSITORY RECTAL EVERY 4 HOURS PRN
Status: DISCONTINUED | OUTPATIENT
Start: 2024-01-01 | End: 2024-01-01

## 2024-01-01 RX ORDER — DONEPEZIL HYDROCHLORIDE 10 MG/1
10 TABLET, FILM COATED ORAL NIGHTLY
Status: DISCONTINUED | OUTPATIENT
Start: 2024-01-01 | End: 2024-05-23 | Stop reason: HOSPADM

## 2024-01-01 RX ORDER — ACETAMINOPHEN 325 MG/1
650 TABLET ORAL EVERY 4 HOURS PRN
Status: DISCONTINUED | OUTPATIENT
Start: 2024-01-01 | End: 2024-05-23 | Stop reason: HOSPADM

## 2024-01-01 RX ORDER — ENOXAPARIN SODIUM 100 MG/ML
60 INJECTION SUBCUTANEOUS ONCE
Qty: 0.6 ML | Refills: 0 | Status: COMPLETED | OUTPATIENT
Start: 2024-01-01 | End: 2024-01-01

## 2024-01-01 RX ORDER — ACETAMINOPHEN 160 MG/5ML
650 SOLUTION ORAL EVERY 4 HOURS PRN
Status: DISCONTINUED | OUTPATIENT
Start: 2024-01-01 | End: 2024-01-01

## 2024-01-01 RX ORDER — ACETAMINOPHEN 325 MG/1
650 TABLET ORAL EVERY 4 HOURS PRN
Status: CANCELLED | OUTPATIENT
Start: 2024-01-01

## 2024-01-01 RX ORDER — MEMANTINE HYDROCHLORIDE 10 MG/1
10 TABLET ORAL 2 TIMES DAILY
Status: DISCONTINUED | OUTPATIENT
Start: 2024-01-01 | End: 2024-01-01

## 2024-01-01 RX ORDER — LEVOTHYROXINE SODIUM 0.15 MG/1
150 TABLET ORAL
Status: CANCELLED | OUTPATIENT
Start: 2024-01-01

## 2024-01-01 RX ORDER — IPRATROPIUM BROMIDE AND ALBUTEROL SULFATE 2.5; .5 MG/3ML; MG/3ML
3 SOLUTION RESPIRATORY (INHALATION) EVERY 4 HOURS PRN
Status: DISCONTINUED | OUTPATIENT
Start: 2024-01-01 | End: 2024-05-23 | Stop reason: HOSPADM

## 2024-01-01 RX ORDER — NITROGLYCERIN 0.4 MG/1
0.4 TABLET SUBLINGUAL
Status: DISCONTINUED | OUTPATIENT
Start: 2024-01-01 | End: 2024-05-23 | Stop reason: HOSPADM

## 2024-01-01 RX ORDER — LISINOPRIL 20 MG/1
20 TABLET ORAL DAILY
Status: CANCELLED | OUTPATIENT
Start: 2024-01-01

## 2024-01-01 RX ORDER — ACETAMINOPHEN 650 MG/1
650 SUPPOSITORY RECTAL EVERY 4 HOURS PRN
Status: CANCELLED | OUTPATIENT
Start: 2024-01-01

## 2024-01-01 RX ORDER — BUSPIRONE HYDROCHLORIDE 10 MG/1
10 TABLET ORAL 2 TIMES DAILY
Status: DISCONTINUED | OUTPATIENT
Start: 2024-01-01 | End: 2024-01-01

## 2024-01-01 RX ORDER — IPRATROPIUM BROMIDE AND ALBUTEROL SULFATE 2.5; .5 MG/3ML; MG/3ML
3 SOLUTION RESPIRATORY (INHALATION) ONCE AS NEEDED
Status: DISCONTINUED | OUTPATIENT
Start: 2024-01-01 | End: 2024-01-01 | Stop reason: HOSPADM

## 2024-01-01 RX ORDER — AMOXICILLIN 250 MG
2 CAPSULE ORAL 2 TIMES DAILY PRN
Status: DISCONTINUED | OUTPATIENT
Start: 2024-01-01 | End: 2024-01-01

## 2024-01-01 RX ORDER — DONEPEZIL HYDROCHLORIDE 10 MG/1
10 TABLET, FILM COATED ORAL NIGHTLY
Status: DISCONTINUED | OUTPATIENT
Start: 2024-01-01 | End: 2024-01-01

## 2024-01-01 RX ORDER — POTASSIUM CHLORIDE 1.5 G/1.58G
40 POWDER, FOR SOLUTION ORAL EVERY 4 HOURS
Status: COMPLETED | OUTPATIENT
Start: 2024-01-01 | End: 2024-01-01

## 2024-01-01 RX ORDER — DONEPEZIL HYDROCHLORIDE 10 MG/1
10 TABLET, FILM COATED ORAL NIGHTLY
Status: CANCELLED | OUTPATIENT
Start: 2024-01-01

## 2024-01-01 RX ORDER — AMOXICILLIN 250 MG
2 CAPSULE ORAL 2 TIMES DAILY PRN
Status: CANCELLED | OUTPATIENT
Start: 2024-01-01

## 2024-01-01 RX ORDER — GLYCOPYRROLATE 0.2 MG/ML
0.1 INJECTION INTRAMUSCULAR; INTRAVENOUS EVERY 4 HOURS PRN
Status: DISCONTINUED | OUTPATIENT
Start: 2024-01-01 | End: 2024-05-23 | Stop reason: HOSPADM

## 2024-01-01 RX ADMIN — ENOXAPARIN SODIUM 60 MG: 60 INJECTION SUBCUTANEOUS at 16:31

## 2024-01-01 RX ADMIN — NYSTATIN: 100000 POWDER TOPICAL at 08:25

## 2024-01-01 RX ADMIN — ACETAMINOPHEN 650 MG: 325 TABLET ORAL at 16:53

## 2024-01-01 RX ADMIN — Medication 1 TABLET: at 08:47

## 2024-01-01 RX ADMIN — NYSTATIN: 100000 POWDER TOPICAL at 08:01

## 2024-01-01 RX ADMIN — ACETAMINOPHEN 650 MG: 650 SOLUTION ORAL at 21:00

## 2024-01-01 RX ADMIN — ASCORBIC ACID, THIAMINE, RIBOFLAVIN, NIACINAMIDE, PYRIDOXINE, FOLIC ACID, COBALAMIN, BIOTIN, PANTOTHENIC ACID 15 ML: 100; 1.5; 1.7; 20; 10; 1; 6; 300; 1 TABLET, COATED ORAL at 08:28

## 2024-01-01 RX ADMIN — MORPHINE SULFATE 1 MG: 2 INJECTION, SOLUTION INTRAMUSCULAR; INTRAVENOUS at 08:55

## 2024-01-01 RX ADMIN — MEMANTINE 10 MG: 10 TABLET ORAL at 08:25

## 2024-01-01 RX ADMIN — IBUPROFEN 800 MG: 400 TABLET, FILM COATED ORAL at 12:53

## 2024-01-01 RX ADMIN — DONEPEZIL HYDROCHLORIDE 10 MG: 10 TABLET, FILM COATED ORAL at 20:24

## 2024-01-01 RX ADMIN — DONEPEZIL HYDROCHLORIDE 10 MG: 10 TABLET, FILM COATED ORAL at 20:07

## 2024-01-01 RX ADMIN — MEMANTINE 10 MG: 10 TABLET ORAL at 23:03

## 2024-01-01 RX ADMIN — BUSPIRONE HYDROCHLORIDE 10 MG: 10 TABLET ORAL at 20:44

## 2024-01-01 RX ADMIN — BUSPIRONE HYDROCHLORIDE 10 MG: 10 TABLET ORAL at 20:06

## 2024-01-01 RX ADMIN — SERTRALINE HYDROCHLORIDE 50 MG: 50 TABLET ORAL at 09:33

## 2024-01-01 RX ADMIN — CEFTRIAXONE 2000 MG: 2 INJECTION, POWDER, FOR SOLUTION INTRAMUSCULAR; INTRAVENOUS at 12:01

## 2024-01-01 RX ADMIN — ACETAMINOPHEN 325 MG: 650 SUPPOSITORY RECTAL at 10:20

## 2024-01-01 RX ADMIN — Medication 1 TABLET: at 09:20

## 2024-01-01 RX ADMIN — DEXTROSE AND SODIUM CHLORIDE 75 ML/HR: 5; 450 INJECTION, SOLUTION INTRAVENOUS at 20:42

## 2024-01-01 RX ADMIN — SENNOSIDES AND DOCUSATE SODIUM 2 TABLET: 8.6; 5 TABLET ORAL at 08:48

## 2024-01-01 RX ADMIN — BUSPIRONE HYDROCHLORIDE 10 MG: 10 TABLET ORAL at 23:03

## 2024-01-01 RX ADMIN — MEMANTINE 10 MG: 10 TABLET ORAL at 20:27

## 2024-01-01 RX ADMIN — BUSPIRONE HYDROCHLORIDE 10 MG: 10 TABLET ORAL at 21:00

## 2024-01-01 RX ADMIN — Medication 10 ML: at 20:57

## 2024-01-01 RX ADMIN — MORPHINE SULFATE 1 MG: 2 INJECTION, SOLUTION INTRAMUSCULAR; INTRAVENOUS at 09:35

## 2024-01-01 RX ADMIN — IBUPROFEN 800 MG: 400 TABLET, FILM COATED ORAL at 08:35

## 2024-01-01 RX ADMIN — AMLODIPINE BESYLATE 5 MG: 5 TABLET ORAL at 08:12

## 2024-01-01 RX ADMIN — ACETAMINOPHEN 650 MG: 650 SOLUTION ORAL at 09:05

## 2024-01-01 RX ADMIN — DONEPEZIL HYDROCHLORIDE 10 MG: 10 TABLET, FILM COATED ORAL at 23:03

## 2024-01-01 RX ADMIN — ACETAMINOPHEN 650 MG: 325 TABLET ORAL at 10:16

## 2024-01-01 RX ADMIN — MEMANTINE 10 MG: 10 TABLET ORAL at 20:24

## 2024-01-01 RX ADMIN — BUSPIRONE HYDROCHLORIDE 10 MG: 10 TABLET ORAL at 08:28

## 2024-01-01 RX ADMIN — DONEPEZIL HYDROCHLORIDE 10 MG: 10 TABLET, FILM COATED ORAL at 20:06

## 2024-01-01 RX ADMIN — GABAPENTIN 300 MG: 300 CAPSULE ORAL at 08:47

## 2024-01-01 RX ADMIN — LISINOPRIL 20 MG: 20 TABLET ORAL at 09:20

## 2024-01-01 RX ADMIN — MORPHINE SULFATE 1 MG: 2 INJECTION, SOLUTION INTRAMUSCULAR; INTRAVENOUS at 03:30

## 2024-01-01 RX ADMIN — SERTRALINE HYDROCHLORIDE 50 MG: 50 TABLET ORAL at 08:12

## 2024-01-01 RX ADMIN — SERTRALINE HYDROCHLORIDE 50 MG: 50 TABLET ORAL at 08:55

## 2024-01-01 RX ADMIN — MEMANTINE 10 MG: 10 TABLET ORAL at 21:00

## 2024-01-01 RX ADMIN — BUSPIRONE HYDROCHLORIDE 10 MG: 10 TABLET ORAL at 08:25

## 2024-01-01 RX ADMIN — POTASSIUM CHLORIDE 10 MEQ: 7.46 INJECTION, SOLUTION INTRAVENOUS at 11:52

## 2024-01-01 RX ADMIN — Medication 10 ML: at 20:16

## 2024-01-01 RX ADMIN — LEVOTHYROXINE SODIUM 150 MCG: 0.15 TABLET ORAL at 05:51

## 2024-01-01 RX ADMIN — SODIUM CHLORIDE 1000 MG: 900 INJECTION INTRAVENOUS at 19:02

## 2024-01-01 RX ADMIN — ACETAMINOPHEN 650 MG: 650 SOLUTION ORAL at 11:56

## 2024-01-01 RX ADMIN — IBUPROFEN 800 MG: 400 TABLET, FILM COATED ORAL at 17:38

## 2024-01-01 RX ADMIN — LEVOTHYROXINE SODIUM 150 MCG: 0.15 TABLET ORAL at 06:09

## 2024-01-01 RX ADMIN — Medication 5 MG: at 20:25

## 2024-01-01 RX ADMIN — BUSPIRONE HYDROCHLORIDE 10 MG: 10 TABLET ORAL at 20:27

## 2024-01-01 RX ADMIN — PANTOPRAZOLE SODIUM 40 MG: 40 TABLET, DELAYED RELEASE ORAL at 08:48

## 2024-01-01 RX ADMIN — BUSPIRONE HYDROCHLORIDE 10 MG: 10 TABLET ORAL at 09:32

## 2024-01-01 RX ADMIN — NYSTATIN: 100000 POWDER TOPICAL at 20:27

## 2024-01-01 RX ADMIN — MEMANTINE 10 MG: 10 TABLET ORAL at 08:48

## 2024-01-01 RX ADMIN — BUSPIRONE HYDROCHLORIDE 10 MG: 10 TABLET ORAL at 21:09

## 2024-01-01 RX ADMIN — MORPHINE SULFATE 1 MG: 2 INJECTION, SOLUTION INTRAMUSCULAR; INTRAVENOUS at 07:38

## 2024-01-01 RX ADMIN — BUSPIRONE HYDROCHLORIDE 10 MG: 10 TABLET ORAL at 08:55

## 2024-01-01 RX ADMIN — MEMANTINE 10 MG: 10 TABLET ORAL at 21:07

## 2024-01-01 RX ADMIN — NYSTATIN: 100000 POWDER TOPICAL at 20:44

## 2024-01-01 RX ADMIN — BUSPIRONE HYDROCHLORIDE 10 MG: 10 TABLET ORAL at 08:01

## 2024-01-01 RX ADMIN — BUSPIRONE HYDROCHLORIDE 10 MG: 10 TABLET ORAL at 21:07

## 2024-01-01 RX ADMIN — NYSTATIN: 100000 POWDER TOPICAL at 03:18

## 2024-01-01 RX ADMIN — LEVOTHYROXINE SODIUM 150 MCG: 0.15 TABLET ORAL at 06:21

## 2024-01-01 RX ADMIN — SERTRALINE HYDROCHLORIDE 50 MG: 50 TABLET ORAL at 08:48

## 2024-01-01 RX ADMIN — ACETAMINOPHEN 650 MG: 325 TABLET ORAL at 05:51

## 2024-01-01 RX ADMIN — MEMANTINE 10 MG: 10 TABLET ORAL at 08:55

## 2024-01-01 RX ADMIN — NYSTATIN: 100000 POWDER TOPICAL at 08:29

## 2024-01-01 RX ADMIN — MORPHINE SULFATE 1 MG: 2 INJECTION, SOLUTION INTRAMUSCULAR; INTRAVENOUS at 16:42

## 2024-01-01 RX ADMIN — SODIUM CHLORIDE 1000 MG: 900 INJECTION INTRAVENOUS at 17:28

## 2024-01-01 RX ADMIN — MORPHINE SULFATE 1 MG: 2 INJECTION, SOLUTION INTRAMUSCULAR; INTRAVENOUS at 12:15

## 2024-01-01 RX ADMIN — GLYCOPYRROLATE 0.1 MG: 0.2 INJECTION INTRAMUSCULAR; INTRAVENOUS at 07:38

## 2024-01-01 RX ADMIN — NYSTATIN: 100000 POWDER TOPICAL at 20:24

## 2024-01-01 RX ADMIN — NYSTATIN: 100000 POWDER TOPICAL at 08:32

## 2024-01-01 RX ADMIN — NYSTATIN: 100000 POWDER TOPICAL at 21:00

## 2024-01-01 RX ADMIN — ACETAMINOPHEN 325 MG: 650 SUPPOSITORY RECTAL at 18:09

## 2024-01-01 RX ADMIN — GLYCOPYRROLATE 0.1 MG: 0.2 INJECTION INTRAMUSCULAR; INTRAVENOUS at 22:11

## 2024-01-01 RX ADMIN — ACETAMINOPHEN 650 MG: 650 SOLUTION ORAL at 16:34

## 2024-01-01 RX ADMIN — LANSOPRAZOLE 15 MG: 15 TABLET, ORALLY DISINTEGRATING ORAL at 05:51

## 2024-01-01 RX ADMIN — DONEPEZIL HYDROCHLORIDE 10 MG: 10 TABLET, FILM COATED ORAL at 20:57

## 2024-01-01 RX ADMIN — ASCORBIC ACID, THIAMINE, RIBOFLAVIN, NIACINAMIDE, PYRIDOXINE, FOLIC ACID, COBALAMIN, BIOTIN, PANTOTHENIC ACID 15 ML: 100; 1.5; 1.7; 20; 10; 1; 6; 300; 1 TABLET, COATED ORAL at 08:12

## 2024-01-01 RX ADMIN — DONEPEZIL HYDROCHLORIDE 10 MG: 10 TABLET, FILM COATED ORAL at 21:00

## 2024-01-01 RX ADMIN — NYSTATIN: 100000 POWDER TOPICAL at 20:16

## 2024-01-01 RX ADMIN — Medication 10 ML: at 08:35

## 2024-01-01 RX ADMIN — BUSPIRONE HYDROCHLORIDE 10 MG: 10 TABLET ORAL at 09:20

## 2024-01-01 RX ADMIN — BUSPIRONE HYDROCHLORIDE 10 MG: 10 TABLET ORAL at 20:25

## 2024-01-01 RX ADMIN — ACETAMINOPHEN 650 MG: 325 TABLET ORAL at 21:14

## 2024-01-01 RX ADMIN — PANTOPRAZOLE SODIUM 40 MG: 40 TABLET, DELAYED RELEASE ORAL at 09:20

## 2024-01-01 RX ADMIN — ACETAMINOPHEN 650 MG: 650 SOLUTION ORAL at 02:14

## 2024-01-01 RX ADMIN — MEMANTINE 10 MG: 10 TABLET ORAL at 08:12

## 2024-01-01 RX ADMIN — LISINOPRIL 20 MG: 20 TABLET ORAL at 08:28

## 2024-01-01 RX ADMIN — Medication 10 ML: at 20:44

## 2024-01-01 RX ADMIN — Medication 10 ML: at 08:48

## 2024-01-01 RX ADMIN — ACETAMINOPHEN 325 MG: 650 SUPPOSITORY RECTAL at 15:16

## 2024-01-01 RX ADMIN — MORPHINE SULFATE 1 MG: 2 INJECTION, SOLUTION INTRAMUSCULAR; INTRAVENOUS at 06:22

## 2024-01-01 RX ADMIN — LANSOPRAZOLE 15 MG: 15 TABLET, ORALLY DISINTEGRATING ORAL at 06:01

## 2024-01-01 RX ADMIN — BUSPIRONE HYDROCHLORIDE 10 MG: 10 TABLET ORAL at 20:16

## 2024-01-01 RX ADMIN — DEXTROSE AND SODIUM CHLORIDE 75 ML/HR: 5; 450 INJECTION, SOLUTION INTRAVENOUS at 19:10

## 2024-01-01 RX ADMIN — GLYCOPYRROLATE 0.1 MG: 0.2 INJECTION INTRAMUSCULAR; INTRAVENOUS at 20:09

## 2024-01-01 RX ADMIN — POTASSIUM CHLORIDE 10 MEQ: 7.46 INJECTION, SOLUTION INTRAVENOUS at 10:47

## 2024-01-01 RX ADMIN — MORPHINE SULFATE 1 MG: 2 INJECTION, SOLUTION INTRAMUSCULAR; INTRAVENOUS at 18:07

## 2024-01-01 RX ADMIN — ACETAMINOPHEN 325 MG: 650 SUPPOSITORY RECTAL at 22:51

## 2024-01-01 RX ADMIN — ACETAMINOPHEN 650 MG: 650 SOLUTION ORAL at 16:00

## 2024-01-01 RX ADMIN — Medication 10 ML: at 09:20

## 2024-01-01 RX ADMIN — LANSOPRAZOLE 15 MG: 15 TABLET, ORALLY DISINTEGRATING ORAL at 05:39

## 2024-01-01 RX ADMIN — DONEPEZIL HYDROCHLORIDE 10 MG: 10 TABLET, FILM COATED ORAL at 20:27

## 2024-01-01 RX ADMIN — NYSTATIN: 100000 POWDER TOPICAL at 09:05

## 2024-01-01 RX ADMIN — NYSTATIN: 100000 POWDER TOPICAL at 21:21

## 2024-01-01 RX ADMIN — DONEPEZIL HYDROCHLORIDE 10 MG: 10 TABLET, FILM COATED ORAL at 20:44

## 2024-01-01 RX ADMIN — PANTOPRAZOLE SODIUM 40 MG: 40 TABLET, DELAYED RELEASE ORAL at 09:33

## 2024-01-01 RX ADMIN — DEXTROMETHORPHAN POLISTIREX 60 MG: 30 SUSPENSION ORAL at 08:47

## 2024-01-01 RX ADMIN — LANSOPRAZOLE 15 MG: 15 TABLET, ORALLY DISINTEGRATING ORAL at 05:45

## 2024-01-01 RX ADMIN — MEMANTINE 10 MG: 10 TABLET ORAL at 21:09

## 2024-01-01 RX ADMIN — Medication 10 ML: at 20:27

## 2024-01-01 RX ADMIN — NYSTATIN: 100000 POWDER TOPICAL at 20:07

## 2024-01-01 RX ADMIN — ACETAMINOPHEN 650 MG: 650 SOLUTION ORAL at 01:57

## 2024-01-01 RX ADMIN — LEVOTHYROXINE SODIUM 150 MCG: 0.15 TABLET ORAL at 05:18

## 2024-01-01 RX ADMIN — AMLODIPINE BESYLATE 5 MG: 5 TABLET ORAL at 08:48

## 2024-01-01 RX ADMIN — AMLODIPINE BESYLATE 5 MG: 5 TABLET ORAL at 09:32

## 2024-01-01 RX ADMIN — MORPHINE SULFATE 1 MG: 2 INJECTION, SOLUTION INTRAMUSCULAR; INTRAVENOUS at 05:46

## 2024-01-01 RX ADMIN — DONEPEZIL HYDROCHLORIDE 10 MG: 10 TABLET, FILM COATED ORAL at 20:17

## 2024-01-01 RX ADMIN — IBUPROFEN 800 MG: 400 TABLET, FILM COATED ORAL at 12:58

## 2024-01-01 RX ADMIN — NYSTATIN: 100000 POWDER TOPICAL at 23:03

## 2024-01-01 RX ADMIN — SERTRALINE HYDROCHLORIDE 50 MG: 50 TABLET ORAL at 08:01

## 2024-01-01 RX ADMIN — Medication 10 ML: at 23:03

## 2024-01-01 RX ADMIN — GUAIFENESIN 1200 MG: 600 TABLET, EXTENDED RELEASE ORAL at 08:48

## 2024-01-01 RX ADMIN — POTASSIUM CHLORIDE 10 MEQ: 7.46 INJECTION, SOLUTION INTRAVENOUS at 17:08

## 2024-01-01 RX ADMIN — BUSPIRONE HYDROCHLORIDE 10 MG: 10 TABLET ORAL at 08:12

## 2024-01-01 RX ADMIN — SERTRALINE HYDROCHLORIDE 50 MG: 50 TABLET ORAL at 09:20

## 2024-01-01 RX ADMIN — MEMANTINE 10 MG: 10 TABLET ORAL at 09:33

## 2024-01-01 RX ADMIN — CEFTRIAXONE 2000 MG: 2 INJECTION, POWDER, FOR SOLUTION INTRAMUSCULAR; INTRAVENOUS at 13:32

## 2024-01-01 RX ADMIN — LISINOPRIL 20 MG: 20 TABLET ORAL at 08:47

## 2024-01-01 RX ADMIN — MEMANTINE 10 MG: 10 TABLET ORAL at 20:06

## 2024-01-01 RX ADMIN — DEXTROSE AND SODIUM CHLORIDE 75 ML/HR: 5; 450 INJECTION, SOLUTION INTRAVENOUS at 23:49

## 2024-01-01 RX ADMIN — IBUPROFEN 800 MG: 400 TABLET, FILM COATED ORAL at 08:01

## 2024-01-01 RX ADMIN — Medication 1 TABLET: at 09:33

## 2024-01-01 RX ADMIN — MEMANTINE 10 MG: 10 TABLET ORAL at 08:35

## 2024-01-01 RX ADMIN — BUSPIRONE HYDROCHLORIDE 10 MG: 10 TABLET ORAL at 08:48

## 2024-01-01 RX ADMIN — MEMANTINE 10 MG: 10 TABLET ORAL at 09:20

## 2024-01-01 RX ADMIN — MORPHINE SULFATE 1 MG: 2 INJECTION, SOLUTION INTRAMUSCULAR; INTRAVENOUS at 15:24

## 2024-01-01 RX ADMIN — MEMANTINE 10 MG: 10 TABLET ORAL at 20:44

## 2024-01-01 RX ADMIN — CEFTRIAXONE 2000 MG: 2 INJECTION, POWDER, FOR SOLUTION INTRAMUSCULAR; INTRAVENOUS at 11:51

## 2024-01-01 RX ADMIN — BUSPIRONE HYDROCHLORIDE 10 MG: 10 TABLET ORAL at 20:24

## 2024-01-01 RX ADMIN — LEVOTHYROXINE SODIUM 150 MCG: 0.15 TABLET ORAL at 05:45

## 2024-01-01 RX ADMIN — ACETAMINOPHEN 650 MG: 650 SOLUTION ORAL at 23:44

## 2024-01-01 RX ADMIN — NYSTATIN: 100000 POWDER TOPICAL at 08:31

## 2024-01-01 RX ADMIN — NYSTATIN: 100000 POWDER TOPICAL at 08:12

## 2024-01-01 RX ADMIN — LISINOPRIL 20 MG: 20 TABLET ORAL at 08:25

## 2024-01-01 RX ADMIN — ACETAMINOPHEN 650 MG: 325 TABLET ORAL at 21:07

## 2024-01-01 RX ADMIN — POTASSIUM CHLORIDE 10 MEQ: 7.46 INJECTION, SOLUTION INTRAVENOUS at 18:00

## 2024-01-01 RX ADMIN — MORPHINE SULFATE 1 MG: 2 INJECTION, SOLUTION INTRAMUSCULAR; INTRAVENOUS at 10:44

## 2024-01-01 RX ADMIN — Medication 10 ML: at 21:10

## 2024-01-01 RX ADMIN — MEMANTINE 10 MG: 10 TABLET ORAL at 20:57

## 2024-01-01 RX ADMIN — MORPHINE SULFATE 1 MG: 2 INJECTION, SOLUTION INTRAMUSCULAR; INTRAVENOUS at 21:07

## 2024-01-01 RX ADMIN — IBUPROFEN 800 MG: 400 TABLET, FILM COATED ORAL at 01:06

## 2024-01-01 RX ADMIN — NYSTATIN: 100000 POWDER TOPICAL at 20:57

## 2024-01-01 RX ADMIN — SERTRALINE 50 MG: 50 TABLET, FILM COATED ORAL at 08:48

## 2024-01-01 RX ADMIN — DONEPEZIL HYDROCHLORIDE 10 MG: 10 TABLET, FILM COATED ORAL at 21:07

## 2024-01-01 RX ADMIN — NYSTATIN: 100000 POWDER TOPICAL at 21:06

## 2024-01-01 RX ADMIN — SODIUM CHLORIDE, POTASSIUM CHLORIDE, SODIUM LACTATE AND CALCIUM CHLORIDE 75 ML/HR: 600; 310; 30; 20 INJECTION, SOLUTION INTRAVENOUS at 23:08

## 2024-01-01 RX ADMIN — MORPHINE SULFATE 1 MG: 2 INJECTION, SOLUTION INTRAMUSCULAR; INTRAVENOUS at 18:39

## 2024-01-01 RX ADMIN — ACETAMINOPHEN 650 MG: 650 SOLUTION ORAL at 03:51

## 2024-01-01 RX ADMIN — GUAIFENESIN 1200 MG: 600 TABLET, EXTENDED RELEASE ORAL at 20:24

## 2024-01-01 RX ADMIN — MEMANTINE 10 MG: 10 TABLET ORAL at 08:28

## 2024-01-01 RX ADMIN — Medication 10 ML: at 08:55

## 2024-01-01 RX ADMIN — IBUPROFEN 800 MG: 400 TABLET, FILM COATED ORAL at 17:51

## 2024-01-01 RX ADMIN — NYSTATIN: 100000 POWDER TOPICAL at 21:10

## 2024-01-01 RX ADMIN — SERTRALINE HYDROCHLORIDE 50 MG: 50 TABLET ORAL at 08:25

## 2024-01-01 RX ADMIN — Medication 1 TABLET: at 08:48

## 2024-01-01 RX ADMIN — MORPHINE SULFATE 1 MG: 2 INJECTION, SOLUTION INTRAMUSCULAR; INTRAVENOUS at 09:19

## 2024-01-01 RX ADMIN — NYSTATIN: 100000 POWDER TOPICAL at 08:48

## 2024-01-01 RX ADMIN — BETHANECHOL CHLORIDE 10 MG: 10 TABLET ORAL at 20:24

## 2024-01-01 RX ADMIN — AMLODIPINE BESYLATE 5 MG: 5 TABLET ORAL at 08:28

## 2024-01-01 RX ADMIN — BUSPIRONE HYDROCHLORIDE 10 MG: 10 TABLET ORAL at 20:57

## 2024-01-01 RX ADMIN — DONEPEZIL HYDROCHLORIDE 10 MG: 10 TABLET, FILM COATED ORAL at 20:25

## 2024-01-01 RX ADMIN — NYSTATIN: 100000 POWDER TOPICAL at 08:55

## 2024-01-01 RX ADMIN — NYSTATIN: 100000 POWDER TOPICAL at 20:37

## 2024-01-01 RX ADMIN — LANSOPRAZOLE 15 MG: 15 TABLET, ORALLY DISINTEGRATING ORAL at 05:57

## 2024-01-01 RX ADMIN — BUSPIRONE HYDROCHLORIDE 10 MG: 10 TABLET ORAL at 08:35

## 2024-01-01 RX ADMIN — ACETAMINOPHEN 650 MG: 650 SOLUTION ORAL at 22:33

## 2024-01-01 RX ADMIN — SENNOSIDES AND DOCUSATE SODIUM 2 TABLET: 8.6; 5 TABLET ORAL at 12:58

## 2024-01-01 RX ADMIN — ACETAMINOPHEN 650 MG: 650 SUPPOSITORY RECTAL at 02:00

## 2024-01-01 RX ADMIN — BETHANECHOL CHLORIDE 10 MG: 10 TABLET ORAL at 08:49

## 2024-01-01 RX ADMIN — DEXTROSE AND SODIUM CHLORIDE 75 ML/HR: 5; 450 INJECTION, SOLUTION INTRAVENOUS at 05:39

## 2024-01-01 RX ADMIN — AMLODIPINE BESYLATE 5 MG: 5 TABLET ORAL at 09:20

## 2024-01-01 RX ADMIN — ACETAMINOPHEN 650 MG: 650 SOLUTION ORAL at 23:47

## 2024-01-01 RX ADMIN — GLYCOPYRROLATE 0.1 MG: 0.2 INJECTION INTRAMUSCULAR; INTRAVENOUS at 16:48

## 2024-01-01 RX ADMIN — LEVOTHYROXINE SODIUM 150 MCG: 0.15 TABLET ORAL at 05:40

## 2024-01-01 RX ADMIN — NYSTATIN: 100000 POWDER TOPICAL at 09:20

## 2024-01-01 RX ADMIN — DEXTROSE AND SODIUM CHLORIDE 75 ML/HR: 5; 450 INJECTION, SOLUTION INTRAVENOUS at 09:08

## 2024-01-01 RX ADMIN — MORPHINE SULFATE 1 MG: 2 INJECTION, SOLUTION INTRAMUSCULAR; INTRAVENOUS at 19:34

## 2024-01-01 RX ADMIN — BETHANECHOL CHLORIDE 10 MG: 10 TABLET ORAL at 17:29

## 2024-01-01 RX ADMIN — MORPHINE SULFATE 1 MG: 2 INJECTION, SOLUTION INTRAMUSCULAR; INTRAVENOUS at 21:01

## 2024-01-01 RX ADMIN — MORPHINE SULFATE 1 MG: 2 INJECTION, SOLUTION INTRAMUSCULAR; INTRAVENOUS at 12:59

## 2024-01-01 RX ADMIN — Medication 10 ML: at 20:24

## 2024-01-01 RX ADMIN — POTASSIUM CHLORIDE 40 MEQ: 1.5 POWDER, FOR SOLUTION ORAL at 13:29

## 2024-01-01 RX ADMIN — POTASSIUM CHLORIDE 40 MEQ: 1.5 POWDER, FOR SOLUTION ORAL at 08:28

## 2024-01-01 RX ADMIN — NYSTATIN: 100000 POWDER TOPICAL at 20:06

## 2024-01-01 RX ADMIN — ACETAMINOPHEN 650 MG: 650 SOLUTION ORAL at 07:38

## 2024-01-01 RX ADMIN — POTASSIUM CHLORIDE 10 MEQ: 7.46 INJECTION, SOLUTION INTRAVENOUS at 15:50

## 2024-01-01 RX ADMIN — ACETAMINOPHEN 650 MG: 650 SOLUTION ORAL at 04:10

## 2024-01-01 RX ADMIN — Medication 10 ML: at 20:25

## 2024-01-01 RX ADMIN — DEXTROMETHORPHAN POLISTIREX 60 MG: 30 SUSPENSION ORAL at 20:24

## 2024-01-01 RX ADMIN — LISINOPRIL 20 MG: 20 TABLET ORAL at 08:48

## 2024-01-01 RX ADMIN — Medication 10 ML: at 20:06

## 2024-01-01 RX ADMIN — MEMANTINE 10 MG: 10 TABLET ORAL at 08:01

## 2024-01-01 RX ADMIN — MORPHINE SULFATE 1 MG: 2 INJECTION, SOLUTION INTRAMUSCULAR; INTRAVENOUS at 04:19

## 2024-01-01 RX ADMIN — SODIUM CHLORIDE 1000 MG: 900 INJECTION INTRAVENOUS at 18:16

## 2024-01-01 RX ADMIN — DEXTROSE AND SODIUM CHLORIDE 75 ML/HR: 5; 450 INJECTION, SOLUTION INTRAVENOUS at 08:32

## 2024-01-01 RX ADMIN — Medication 10 ML: at 20:07

## 2024-01-01 RX ADMIN — MEMANTINE 10 MG: 10 TABLET ORAL at 20:16

## 2024-01-01 RX ADMIN — LISINOPRIL 20 MG: 20 TABLET ORAL at 08:12

## 2024-01-01 RX ADMIN — MORPHINE SULFATE 1 MG: 2 INJECTION, SOLUTION INTRAMUSCULAR; INTRAVENOUS at 10:40

## 2024-01-01 RX ADMIN — LANSOPRAZOLE 15 MG: 15 TABLET, ORALLY DISINTEGRATING ORAL at 06:09

## 2024-01-01 RX ADMIN — GABAPENTIN 300 MG: 300 CAPSULE ORAL at 20:25

## 2024-01-01 RX ADMIN — IPRATROPIUM BROMIDE AND ALBUTEROL SULFATE 3 ML: .5; 3 SOLUTION RESPIRATORY (INHALATION) at 21:42

## 2024-01-01 RX ADMIN — ACETAMINOPHEN 650 MG: 650 SOLUTION ORAL at 08:25

## 2024-01-01 RX ADMIN — SERTRALINE HYDROCHLORIDE 50 MG: 50 TABLET ORAL at 08:35

## 2024-01-01 RX ADMIN — MORPHINE SULFATE 1 MG: 2 INJECTION, SOLUTION INTRAMUSCULAR; INTRAVENOUS at 09:14

## 2024-01-01 RX ADMIN — DONEPEZIL HYDROCHLORIDE 10 MG: 10 TABLET, FILM COATED ORAL at 21:10

## 2024-01-01 RX ADMIN — ACETAMINOPHEN 325 MG: 650 SUPPOSITORY RECTAL at 03:15

## 2024-01-01 RX ADMIN — DEXTROSE AND SODIUM CHLORIDE 75 ML/HR: 5; 450 INJECTION, SOLUTION INTRAVENOUS at 15:20

## 2024-01-01 RX ADMIN — POTASSIUM CHLORIDE 10 MEQ: 7.46 INJECTION, SOLUTION INTRAVENOUS at 14:59

## 2024-01-01 RX ADMIN — MEMANTINE 10 MG: 10 TABLET ORAL at 20:07

## 2024-01-01 RX ADMIN — ASCORBIC ACID, THIAMINE, RIBOFLAVIN, NIACINAMIDE, PYRIDOXINE, FOLIC ACID, COBALAMIN, BIOTIN, PANTOTHENIC ACID 15 ML: 100; 1.5; 1.7; 20; 10; 1; 6; 300; 1 TABLET, COATED ORAL at 08:25

## 2024-01-01 RX ADMIN — NYSTATIN: 100000 POWDER TOPICAL at 09:33

## 2024-01-01 RX ADMIN — LISINOPRIL 20 MG: 20 TABLET ORAL at 09:33

## 2024-01-01 RX ADMIN — ACETAMINOPHEN 650 MG: 325 TABLET ORAL at 15:18

## 2024-01-01 RX ADMIN — BUSPIRONE HYDROCHLORIDE 10 MG: 10 TABLET ORAL at 20:07

## 2024-01-01 RX ADMIN — AMLODIPINE BESYLATE 5 MG: 5 TABLET ORAL at 08:25

## 2024-01-01 RX ADMIN — SERTRALINE HYDROCHLORIDE 50 MG: 50 TABLET ORAL at 08:28

## 2024-01-01 RX ADMIN — ACETAMINOPHEN 650 MG: 650 SOLUTION ORAL at 02:15

## 2024-01-01 NOTE — PLAN OF CARE
Goal Outcome Evaluation:  Plan of Care Reviewed With: patient        Progress: improving  Outcome Evaluation: Pt very congested. VSS. 4L NC. V paced on monitor non tele monitor. Will continue plan of care.

## 2024-01-01 NOTE — PROGRESS NOTES
[facility] Medicine Services  PROGRESS NOTE    Patient Name: Temi Jarrett  : 1930  MRN: [id number]    Date of Admission: 2023  Primary Care Physician: Eric Patel MD    Subjective   Subjective     CC:  RSV    HPI:  Had more mucous overnight. Still with a cough      Objective   Objective     Vital Signs:   Temp:  [96.3 °F (35.7 °C)-97.7 °F (36.5 °C)] 97.4 °F (36.3 °C)  Heart Rate:  [62-81] 62  Resp:  [14-20] 16  BP: (113-154)/(49-87) 140/75  Flow (L/min):  [2-3.5] 3     Physical Exam:  Constitutional: No acute distress, awake, alert, frail, hard of hearing  Respiratory: rhonchi bilaterally  Cardiovascular: RRR, no murmurs, rubs, or gallops  Gastrointestinal: Positive bowel sounds, soft, nontender, nondistended  Musculoskeletal: No bilateral ankle edema  Psychiatric: Appropriate affect, cooperative  Neurologic: Oriented x 3, no focal deficits      Results Reviewed:  LAB RESULTS:      Lab 23  0427 23  0341   WBC 6.30 7.06   HEMOGLOBIN 12.8 12.5   HEMATOCRIT 40.0 39.6   PLATELETS 173 121*   NEUTROS ABS 4.04  --    IMMATURE GRANS (ABS) 0.01  --    LYMPHS ABS 1.58  --    MONOS ABS 0.48  --    EOS ABS 0.15  --    MCV 87.3 88.0         Lab 23  0427 23  0341   SODIUM 143 143   POTASSIUM 3.8 3.9   CHLORIDE 103 103   CO2 33.0* 34.0*   ANION GAP 7.0 6.0   BUN 24* 24*   CREATININE 0.51* 0.54*   EGFR 87.2 86.0   GLUCOSE 88 104*   CALCIUM 8.4 8.2                         Brief Urine Lab Results  (Last result in the past 365 days)        Color   Clarity   Blood   Leuk Est   Nitrite   Protein   CREAT   Urine HCG        23 Yellow   Cloudy     Small                       Microbiology Results Abnormal       None            No radiology results from the last 24 hrs    Results for orders placed during the hospital encounter of 20    Transthoracic Echo Complete With Contrast if Necessary Per Protocol    Interpretation Summary  · Left ventricular  systolic function is normal. Estimated EF = 70%.  · Left ventricular diastolic dysfunction (grade I) consistent with impaired relaxation.  · Left atrial cavity size is moderately dilated.  · There is calcification of the aortic valve. There is no significant stenosis or regurgitation. A Lambel's excrescence is noted on an aortic valve leaflet.  · Moderate mitral valve regurgitation is present.  · Estimated right ventricular systolic pressure from tricuspid regurgitation is moderately elevated (49 mmHg).  · There is a moderate (1-2cm) circumferential pericardial effusion. There is no tamponade physiology.      Current medications:  Scheduled Meds:amLODIPine, 5 mg, Oral, Daily  bethanechol, 10 mg, Oral, TID  busPIRone, 10 mg, Oral, BID  dextromethorphan polistirex ER, 60 mg, Oral, Q12H  donepezil, 10 mg, Oral, Nightly  gabapentin, 300 mg, Oral, BID  guaiFENesin, 1,200 mg, Oral, Q12H  levothyroxine, 150 mcg, Oral, Q AM  lisinopril, 20 mg, Oral, Daily  memantine, 10 mg, Oral, BID  multivitamin with minerals, 1 tablet, Oral, Daily  pantoprazole, 40 mg, Oral, Daily  senna-docusate sodium, 2 tablet, Oral, BID  sertraline, 50 mg, Oral, Daily  sodium chloride, 10 mL, Intravenous, Q12H      Continuous Infusions:   PRN Meds:.  acetaminophen **OR** acetaminophen **OR** acetaminophen    benzonatate    senna-docusate sodium **AND** polyethylene glycol **AND** bisacodyl **AND** bisacodyl    Calcium Replacement - Follow Nurse / BPA Driven Protocol    Hydrocod Marcelo-Chlorphe Marcelo ER    ipratropium-albuterol    Magnesium Standard Dose Replacement - Follow Nurse / BPA Driven Protocol    melatonin    phenol    Phosphorus Replacement - Follow Nurse / BPA Driven Protocol    Potassium Replacement - Follow Nurse / BPA Driven Protocol    sodium chloride    traMADol    Assessment & Plan   Assessment & Plan     Active Hospital Problems    Diagnosis  POA    **Metastatic disease [C79.9]  Yes    Prolonged Q-T interval on ECG [R94.31]  Yes     Alzheimer's disease [G30.9, F02.80]  Yes    Hiatal hernia [K44.9]  Yes    Anxiety with depression [F41.8]  Yes    Essential hypertension [I10]  Yes    Hypothyroidism (acquired) [E03.9]  Yes      Resolved Hospital Problems   No resolved problems to display.        Brief Hospital Course to date:  89 year old female with HTN, alzheimers, hiatal hernia, hypothryoid presented to the ED from [facility] via EMS after an abnormal CXR. Patient had a CXR for cough and was noted to have a new 7.5 cm left suprahilar/mediastinal mass. CT scan with findings compatible with metastatic disease to the lung. Multiple round lung nodules with enlaged centrally necrotic left sided prevascular and hilar lymph nodes measuring up to 5 cm. CT abd/pelvis with contrast was unremarkable. Patient was also noted to be RSV +. Oncology was consulted and after discussion with the patient, decision was made to not pursue diagnosis because she has no treatment options due to functional status and age.      Metastatic disease   - Evaluated by oncology - no treatment options due to age and functional status.   - Oxygen as needed. Currently on 3L NC  - Consider PNPD if unable to expectorate sputum     RSV  Hypoxia, stable  - Noted on resp PCR. Likely causing her acute symptoms at this time.   - Supportive care  - Mucinex. Delsym. Tessaslon. Mobilize.   - Flutter valve  - Currently on 2L     Prolonged QT  - Avoid prolonging medications      Hypokalemia  -replace per protocol     Hypothyroidism  -continue synthroid      GERD  -PPI      Alzheimer's disease   - continue namenda and aricept   - Delirium precautions - up in the chair, window shades open     Essential hypertension   - continue norvasc, lisinopril      Anxiety/depression   - continue zoloft, buspar      Large Hiatal hernia  - CT with large hiatal hernia containing stomach, bowel and pancreatic body and tail  - Currently asymptomatic      [facility]  - partner reviewed code status with  patient 12/23. Partner reviewed code status with son and POA 12/24.  Partner again reviewed with patient 12/25 and she stated she doesn't know what to do.      Expected Discharge Location and Transportation: back to [place] when out of precautions (1/5/24)     Expected Discharge   Expected Discharge Date: 1/5/2024; Expected Discharge Time:      DVT prophylaxis:  Mechanical DVT prophylaxis orders are present.     AM-PAC 6 Clicks Score (PT): 19 (01/01/24 0848)    CODE STATUS:   Code Status and Medical Interventions:   Ordered at: 12/22/23 7752     Level Of Support Discussed With:    Patient     Code Status (Patient has no pulse and is not breathing):    CPR (Attempt to Resuscitate)     Medical Interventions (Patient has pulse or is breathing):    Full Support     I have prepared this progress note with copied portions of the prior day's progress note of my own authorship to preserve accuracy and maintain consistency of documentation. I have reviewed these portions and edited them for correctness. I verify that the above documentation accurately and truly represents the evaluation and management performed on today's date.       Mahogany Henry MD  01/01/24

## 2024-01-02 PROCEDURE — 97530 THERAPEUTIC ACTIVITIES: CPT

## 2024-01-02 PROCEDURE — 97116 GAIT TRAINING THERAPY: CPT

## 2024-01-02 PROCEDURE — 97535 SELF CARE MNGMENT TRAINING: CPT

## 2024-01-02 PROCEDURE — 99232 SBSQ HOSP IP/OBS MODERATE 35: CPT | Performed by: INTERNAL MEDICINE

## 2024-01-02 RX ADMIN — LISINOPRIL 20 MG: 20 TABLET ORAL at 08:45

## 2024-01-02 RX ADMIN — BUSPIRONE HYDROCHLORIDE 10 MG: 10 TABLET ORAL at 08:45

## 2024-01-02 RX ADMIN — BUSPIRONE HYDROCHLORIDE 10 MG: 10 TABLET ORAL at 21:17

## 2024-01-02 RX ADMIN — GABAPENTIN 300 MG: 300 CAPSULE ORAL at 08:45

## 2024-01-02 RX ADMIN — MEMANTINE 10 MG: 10 TABLET ORAL at 08:45

## 2024-01-02 RX ADMIN — DONEPEZIL HYDROCHLORIDE 10 MG: 10 TABLET, FILM COATED ORAL at 21:17

## 2024-01-02 RX ADMIN — DEXTROMETHORPHAN POLISTIREX 60 MG: 30 SUSPENSION ORAL at 08:45

## 2024-01-02 RX ADMIN — GUAIFENESIN 1200 MG: 600 TABLET, EXTENDED RELEASE ORAL at 21:17

## 2024-01-02 RX ADMIN — GUAIFENESIN 1200 MG: 600 TABLET, EXTENDED RELEASE ORAL at 08:45

## 2024-01-02 RX ADMIN — Medication 10 ML: at 21:17

## 2024-01-02 RX ADMIN — SENNOSIDES AND DOCUSATE SODIUM 2 TABLET: 8.6; 5 TABLET ORAL at 08:45

## 2024-01-02 RX ADMIN — Medication 5 MG: at 21:17

## 2024-01-02 RX ADMIN — Medication 1 TABLET: at 08:45

## 2024-01-02 RX ADMIN — PANTOPRAZOLE SODIUM 40 MG: 40 TABLET, DELAYED RELEASE ORAL at 08:45

## 2024-01-02 RX ADMIN — BETHANECHOL CHLORIDE 10 MG: 10 TABLET ORAL at 16:29

## 2024-01-02 RX ADMIN — AMLODIPINE BESYLATE 5 MG: 5 TABLET ORAL at 08:45

## 2024-01-02 RX ADMIN — BETHANECHOL CHLORIDE 10 MG: 10 TABLET ORAL at 08:45

## 2024-01-02 RX ADMIN — GABAPENTIN 300 MG: 300 CAPSULE ORAL at 21:17

## 2024-01-02 RX ADMIN — DEXTROMETHORPHAN POLISTIREX 60 MG: 30 SUSPENSION ORAL at 21:17

## 2024-01-02 RX ADMIN — LEVOTHYROXINE SODIUM 150 MCG: 0.15 TABLET ORAL at 06:03

## 2024-01-02 RX ADMIN — SERTRALINE 50 MG: 50 TABLET, FILM COATED ORAL at 08:45

## 2024-01-02 RX ADMIN — MEMANTINE 10 MG: 10 TABLET ORAL at 21:17

## 2024-01-02 RX ADMIN — BETHANECHOL CHLORIDE 10 MG: 10 TABLET ORAL at 21:17

## 2024-01-02 NOTE — CONSULTS
Palliative Care Initial Consult   Attending Physician: Mahogany Henry MD  Referring Provider: Mahogany Henry    Reason for Referral:  assistance with clarification of goals of care    Code Status:   Code Status and Medical Interventions:   Ordered at: 12/22/23 2159     Level Of Support Discussed With:    Patient     Code Status (Patient has no pulse and is not breathing):    CPR (Attempt to Resuscitate)     Medical Interventions (Patient has pulse or is breathing):    Full Support      Advanced Directives: Advance Directive Status: Patient has advance directive, copy requested   Family/Support: son   Goals of Care: TBD.    HPI:   89 [place] [facility] [place].  Work up consistent metastatic dz.  They had elected no further work up or intervention after oncology eval.  She did test + for RSV as well and remains in isolation at this time.      ROS:   Limited but denied SOA, cough decreasing, no pain.  Denied constipation.    Past Medical History:   Diagnosis Date    Acute sepsis 01/14/2021    Arthritis     Bladder prolapse, female, acquired     Closed fracture of neck of left femur 09/27/2019    Dementia     Depression     Disease of thyroid gland     Gastric polyp     GERD (gastroesophageal reflux disease)     Hiatal hernia     History of colonic polyps     Hyperlipidemia     Hypertension     IBS (irritable bowel syndrome)     Macular degeneration     Pacemaker 2023    Pericardial effusion 08/03/2020    Echo (8/3/2020): Normal LVEF.  Moderate pericardial effusion without tamponade.  Moderate MR. RVSP 49 mmHg    Torn meniscus     right knee      Past Surgical History:   Procedure Laterality Date    CHOLECYSTECTOMY  1997    ENDOSCOPY N/A 01/14/2021    Procedure: ESOPHAGOGASTRODUODENOSCOPY;  Surgeon: Serjio Guerrero MD;  Location: [facility] ENDOSCOPY;  Service: General;  Laterality: N/A;    EYE SURGERY  1998    Cataract extraction    HERNIA REPAIR      HIP HEMIARTHROPLASTY Left 09/28/2019  "   Procedure: HIP HEMIARTHROPLASTY LEFT;  Surgeon: Reddy Segundo Jr., MD;  Location: [facility];  Service: Orthopedics    HYSTERECTOMY  1976    KNEE SURGERY Right     arthroscopy- 2000    SKIN CANCER EXCISION Left     [facility]; CANCEROUS SPOT DIDN'T  MOVE BUT NEEDED REMOVED    TONSILLECTOMY       Social History     Socioeconomic History    Marital status:    Tobacco Use    Smoking status: Never    Smokeless tobacco: Never   Vaping Use    Vaping Use: Never used   Substance and Sexual Activity    Alcohol use: No    Drug use: Never    Sexual activity: Defer     Family History   Problem Relation Age of Onset    Hypertension Mother     Breast cancer Mother     Obesity Mother     Hypertension Father     Diabetes Son        Allergies   Allergen Reactions    Augmentin [Amoxicillin-Pot Clavulanate] Diarrhea    Codeine Nausea Only     Trouble Breathing     Shrimp Hives    Lydia Unknown - Low Severity       Current medication reviewed for route, type, dose and frequency and are current per MAR at time of dictation.    Palliative Performance Scale Score:      /79 (BP Location: Right arm, Patient Position: Lying)   Pulse 63   Temp 97.3 °F (36.3 °C) (Oral)   Resp 18   Ht 152.4 cm (60\")   Wt 51.3 kg (113 lb)   LMP  (LMP Unknown)   SpO2 95%   BMI 22.07 kg/m²     Intake/Output Summary (Last 24 hours) at 1/2/2024 0847  Last data filed at 1/1/2024 1838  Gross per 24 hour   Intake 472 ml   Output 500 ml   Net -28 ml       Physical Exam:    General Appearance:    Alert, cooperative, NAD, eating breakfast   HEENT:    NC/AT, EOMI, anicteric, MMM, face relaxed   Neck:   supple, trachea midline, no JVD   Lungs:     CTA bilat, diminished in bases; respirations regular, even and unlabored    Heart:    RRR, normal S1 and S2, +M/-R/-G   Abdomen:     Normal bowel sounds, soft, nontender, nondistended   G/U:   Deferred   MSK/Extremities:   Wasting, no edema   Pulses:   Pulses palpable and " equal bilaterally   Skin:   Warm, dry   Neurologic:   A/Ox1, cooperative, KOCH   Psych:   Calm, appropriate         Labs:   Results from last 7 days   Lab Units 12/29/23  0427   WBC 10*3/mm3 6.30   HEMOGLOBIN g/dL 12.8   HEMATOCRIT % 40.0   PLATELETS 10*3/mm3 173     Results from last 7 days   Lab Units 12/29/23  0427   SODIUM mmol/L 143   POTASSIUM mmol/L 3.8   CHLORIDE mmol/L 103   CO2 mmol/L 33.0*   BUN mg/dL 24*   CREATININE mg/dL 0.51*   GLUCOSE mg/dL 88   CALCIUM mg/dL 8.4     Results from last 7 days   Lab Units 12/29/23  0427   SODIUM mmol/L 143   POTASSIUM mmol/L 3.8   CHLORIDE mmol/L 103   CO2 mmol/L 33.0*   BUN mg/dL 24*   CREATININE mg/dL 0.51*   CALCIUM mg/dL 8.4   GLUCOSE mg/dL 88     Imaging Results (Last 72 Hours)       ** No results found for the last 72 hours. **                Diagnostics: Reviewed    A:     Metastatic disease    Hypothyroidism (acquired)    Anxiety with depression    Essential hypertension    Hiatal hernia    Alzheimer's disease    Prolonged Q-T interval on ECG         Impression:  Metastatic CA unknown primary  Prolonged Qtc  Htn  Alzheimer's dz        P:    Spoke with son Victor M.  Reports he does have documents and will get to staff.  Reviewed events.  Explained current status and that CT did not identify any abnormalities int the abd or pelvis.  Accepted information.  No decision made.  Will continue to follow up with family.  Did explain that the disease is metastatic regardless of origin based on CT findings.  Will need to determine if would prefer hospice or palliative services at facility.  Hospice would seem most appropriate.  Thank you for this consult and allowing us to participate in patient's plan of care. Palliative Care Team will continue to follow patient.     Time spent:47 minutes spent reviewing medical and medication records, assessing and examining patient, discussing with family, answering questions, providing some guidance about a plan and documentation of  care, and coordinating care with other healthcare members, with > 50% time spent face to face.     Lyudmila Perry MD  1/2/2024

## 2024-01-02 NOTE — PLAN OF CARE
Goal Outcome Evaluation:  Plan of Care Reviewed With: patient        Progress: improving  Outcome Evaluation: VSS. 4L NC. V-paced. Will continue plan of care.

## 2024-01-02 NOTE — THERAPY TREATMENT NOTE
Patient Name: Temi Jarrett  : 1930    MRN: [id number]                              Today's Date: 2024       Admit Date: 2023    Visit Dx:     ICD-10-CM ICD-9-CM   1. Malignant neoplasm of lung, unspecified laterality, unspecified part of lung  C34.90 162.9   2. Chest mass  R22.2 786.6     Patient Active Problem List   Diagnosis    Esophageal reflux    Hypothyroidism (acquired)    Anxiety with depression    Peripheral neuropathy    Spinal stenosis of lumbar region    Osteoporosis    Disc disorder of lumbar region    Bladder prolapse, female, acquired    Essential hypertension    Pernicious anemia    Annual physical exam    Primary osteoarthritis of both knees    Hiatal hernia    Multifocal aspiration pneumonia due to large intrathoracic hiatal hernia    Recurrent UTI    Paroxysmal atrial fibrillation    Alzheimer's disease    Prolonged Q-T interval on ECG    Mild cognitive impairment    At high risk for aspiration    Chronic pain    Osteoarthritis of right knee    Acute cough    Pressure injury of right buttock, stage 2    Nasal congestion    Metastatic disease     Past Medical History:   Diagnosis Date    Acute sepsis 2021    Arthritis     Bladder prolapse, female, acquired     Closed fracture of neck of left femur 2019    Dementia     Depression     Disease of thyroid gland     Gastric polyp     GERD (gastroesophageal reflux disease)     Hiatal hernia     History of colonic polyps     Hyperlipidemia     Hypertension     IBS (irritable bowel syndrome)     Macular degeneration     Pacemaker     Pericardial effusion 2020    Echo (8/3/2020): Normal LVEF.  Moderate pericardial effusion without tamponade.  Moderate MR. RVSP 49 mmHg    Torn meniscus     right knee      Past Surgical History:   Procedure Laterality Date    CHOLECYSTECTOMY      ENDOSCOPY N/A 2021    Procedure: ESOPHAGOGASTRODUODENOSCOPY;  Surgeon: Serjio Guerrero MD;  Location: [facility] ENDOSCOPY;   Service: General;  Laterality: N/A;    EYE SURGERY  1998    Cataract extraction    HERNIA REPAIR      HIP HEMIARTHROPLASTY Left 09/28/2019    Procedure: HIP HEMIARTHROPLASTY LEFT;  Surgeon: Reddy Segundo Jr., MD;  Location: [facility] OR;  Service: Orthopedics    HYSTERECTOMY  1976    KNEE SURGERY Right     arthroscopy- 2000    SKIN CANCER EXCISION Left     [facility]; CANCEROUS SPOT DIDN'T  MOVE BUT NEEDED REMOVED    TONSILLECTOMY        General Information       Row Name 01/02/24 1049          OT Time and Intention    Document Type therapy note (daily note)  -KF     Mode of Treatment occupational therapy;individual therapy  -KF       Row Name 01/02/24 1049          General Information    Patient Profile Reviewed yes  -KF     Existing Precautions/Restrictions oxygen therapy device and L/min;fall  -KF     Barriers to Rehab medically complex;previous functional deficit;hearing deficit;visual deficit  -KF       Row Name 01/02/24 1049          Cognition    Orientation Status (Cognition) oriented x 3  -KF       Row Name 01/02/24 1049          Safety Issues, Functional Mobility    Safety Issues Affecting Function (Mobility) awareness of need for assistance;insight into deficits/self-awareness;judgment;positioning of assistive device;problem-solving;safety precaution awareness;safety precautions follow-through/compliance;sequencing abilities  -KF     Impairments Affecting Function (Mobility) balance;endurance/activity tolerance;strength;postural/trunk control;shortness of breath  -KF               User Key  (r) = Recorded By, (t) = Taken By, (c) = Cosigned By      Initials Name Provider Type    KF Jeni Trejo OT Occupational Therapist                     Mobility/ADL's       Row Name 01/02/24 1049          Bed Mobility    Bed Mobility sit-supine  -KF     Sit-Supine [place] (Bed Mobility) minimum assist (75% patient effort);1 person assist;verbal cues;nonverbal cues (demo/gesture)  -KF      Assistive Device (Bed Mobility) draw sheet  -       Row Name 01/02/24 1049          Transfers    Transfers sit-stand transfer;stand-sit transfer;toilet transfer  -     Comment, (Transfers) STS from bedside chair, commode, and shower chair while sink side using a RWx with Nicole  -       Row Name 01/02/24 1049          Sit-Stand Transfer    Sit-Stand [place] (Transfers) minimum assist (75% patient effort);1 person assist;verbal cues  -     Assistive Device (Sit-Stand Transfers) walker, front-wheeled  -[facility] Name 01/02/24 1049          Stand-Sit Transfer    Stand-Sit [place] (Transfers) minimum assist (75% patient effort);1 person assist;verbal cues  -KF     Assistive Device (Stand-Sit Transfers) walker, front-wheeled  -[facility] Name 01/02/24 1049          Toilet Transfer    Type (Toilet Transfer) sit-stand;stand-sit  -     [place] Level (Toilet Transfer) minimum assist (75% patient effort);1 person assist;verbal cues  -     Assistive Device (Toilet Transfer) walker, front-wheeled;commode;grab bars/safety frame  -       Row Name 01/02/24 1049          Functional Mobility    Functional Mobility- Ind. Level contact guard assist;verbal cues required  -     Functional Mobility- Device walker, front-wheeled  -     Functional Mobility- Comment In room ambulation to/from the bathroom completed using a RWx with CGA, verbal cues for upright posture  -[facility] Name 01/02/24 1049          Activities of Daily Living    BADL Assessment/Intervention grooming;toileting  -[facility] Name 01/02/24 1049          Grooming Assessment/Training    [place] Level (Grooming) oral care regimen;wash face, hands;contact guard assist;hair care, combing/brushing;set up  -     Position (Grooming) supported standing;supported sitting  -     Comment, (Grooming) Pt completed hand/facial hygiene and oral hygiene with CGA while standing sink side. Increased BUE reliance on sink counter noticed.  The pt required a seated rest break, completing hair care while sitting.  -KF       Row Name 01/02/24 1049          Toileting Assessment/Training    [place] Level (Toileting) adjust/manage clothing;perform perineal hygiene;set up;contact guard assist  -KF     Assistive Devices (Toileting) commode;grab bar/safety frame  -KF     Position (Toileting) unsupported sitting  -KF               User Key  (r) = Recorded By, (t) = Taken By, (c) = Cosigned By      Initials Name Provider Type    Jeni Oliveira OT Occupational Therapist                   Obj/Interventions       Row Name 01/02/24 1052          Balance    Balance Assessment sitting static balance;sitting dynamic balance;sit to stand dynamic balance;standing static balance;standing dynamic balance  -KF     Static Sitting Balance supervision  -KF     Dynamic Sitting Balance standby assist  -KF     Position, Sitting Balance unsupported;other (see comments)  commode and shower chair while sink side  -KF     Sit to Stand Dynamic Balance minimal assist  -KF     Static Standing Balance contact guard  -KF     Dynamic Standing Balance minimal assist  -KF     Position/Device Used, Standing Balance supported;walker, front-wheeled  -KF     Balance Interventions sitting;standing;sit to stand;supported;static;dynamic;occupation based/functional task  -KF     Comment, Balance No overt LOB  -KF               User Key  (r) = Recorded By, (t) = Taken By, (c) = Cosigned By      Initials Name Provider Type    Jeni Oliveira OT Occupational Therapist                   Goals/Plan    No documentation.                  Clinical Impression       Row Name 01/02/24 1052          Pain Assessment    Pretreatment Pain Rating 0/10 - no pain  -KF     Posttreatment Pain Rating 0/10 - no pain  -KF     Pain Intervention(s) Repositioned;Ambulation/increased activity  -KF       Row Name 01/02/24 1052          Plan of Care Review    Plan of Care Reviewed With patient  -KF     Progress  improving  -KF     Outcome Evaluation Pt with good participation and progress toward goals during OT session today. The pt performed in room ambulation to/from the bathroom using a RWx with CGA. Pt required Nicole to stand from commode and shower chair while sink side. The pt completed toileting and grooming ADLs while in the bathroom with CGA. Pt fatigues quickly, requiring a seated rest break during activity. The pt remains below her functional baseline with generalized weakness, decreased activity tolerance, and balance deficits. Pt will benefit form continued IP OT services to address deficits listed. If deemed medically appropriate, recommend a return to previous facility.  -       Row Name 01/02/24 1052          Therapy Assessment/Plan (OT)    Rehab Potential (OT) good, to achieve stated therapy goals  -     Criteria for Skilled Therapeutic Interventions Met (OT) yes;meets criteria;skilled treatment is necessary  -     Therapy Frequency (OT) daily  -       Row Name 01/02/24 1052          Therapy Plan Review/Discharge Plan (OT)    Anticipated Discharge Disposition (OT) other (see comments)  return to previous facility  -       Row Name 01/02/24 1052          Vital Signs    Pre Patient Position Sitting  -KF     Intra Patient Position Standing  -KF     Post Patient Position Supine  -       Row Name 01/02/24 1052          Positioning and Restraints    In Bed supine;call light within reach;encouraged to call for assist;exit alarm on  -KF               User Key  (r) = Recorded By, (t) = Taken By, (c) = Cosigned By      Initials Name Provider Type    Jeni Oliveira, ROSEMARY Occupational Therapist                   Outcome Measures       Row Name 01/02/24 1055          How much help from another is currently needed...    Putting on and taking off regular lower body clothing? 3  -KF     Bathing (including washing, rinsing, and drying) 3  -KF     Toileting (which includes using toilet bed pan or urinal) 3  -KF      Putting on and taking off regular upper body clothing 3  -KF     Taking care of personal grooming (such as brushing teeth) 3  -KF     Eating meals 3  -KF     AM-PAC 6 Clicks Score (OT) 18  -KF       Row Name 01/02/24 1022 01/02/24 1016       How much help from another person do you currently need...    Turning from your back to your side while in flat bed without using bedrails? 4  -SB 4  -ND    Moving from lying on back to sitting on the side of a flat bed without bedrails? 3  -SB 3  -ND    Moving to and from a bed to a chair (including a wheelchair)? 3  -SB 3  -ND    Standing up from a chair using your arms (e.g., wheelchair, bedside chair)? 3  -SB 3  -ND    Climbing 3-5 steps with a railing? 2  -SB 2  -ND    To walk in hospital room? 3  -SB 3  -ND    AM-PAC 6 Clicks Score (PT) 18  -SB 18  -ND    Highest Level of Mobility Goal 6 --> Walk 10 steps or more  -SB 6 --> Walk 10 steps or more  -ND      Row Name 01/02/24 0848          How much help from another person do you currently need...    Turning from your back to your side while in flat bed without using bedrails? 4  -SB     Moving from lying on back to sitting on the side of a flat bed without bedrails? 3  -SB     Moving to and from a bed to a chair (including a wheelchair)? 3  -SB     Standing up from a chair using your arms (e.g., wheelchair, bedside chair)? 3  -SB     Climbing 3-5 steps with a railing? 3  -SB     To walk in hospital room? 3  -SB     AM-PAC 6 Clicks Score (PT) 19  -SB     Highest Level of Mobility Goal 6 --> Walk 10 steps or more  -SB       Row Name 01/02/24 1055          Functional Assessment    Outcome Measure Options AM-PAC 6 Clicks Daily Activity (OT)  -KF               User Key  (r) = Recorded By, (t) = Taken By, (c) = Cosigned By      Initials Name Provider Type    Omid Goyal, RN Registered Nurse    Jeni Oliveira, OT Occupational Therapist    Isha Baker, PT Physical Therapist                    Occupational Therapy  Education       Title: PT OT SLP Therapies (In Progress)       Topic: Occupational Therapy (In Progress)       Point: ADL training (Done)       Description:   Instruct learner(s) on proper safety adaptation and remediation techniques during self care or transfers.   Instruct in proper use of assistive devices.                  Learning Progress Summary             Patient Acceptance, E,TB, VU,DU by  at 1/2/2024 0957    Acceptance, E,TB, VU,DU by  at 12/29/2023 0845    Acceptance, E,TB, VU,DU by  at 12/27/2023 1329    Acceptance, E, NR by  at 12/24/2023 1020    Comment: educated pt regarding role of therapy                         Point: Home exercise program (Not Started)       Description:   Instruct learner(s) on appropriate technique for monitoring, assisting and/or progressing therapeutic exercises/activities.                  Learner Progress:  Not documented in this visit.              Point: Precautions (Done)       Description:   Instruct learner(s) on prescribed precautions during self-care and functional transfers.                  Learning Progress Summary             Patient Acceptance, E,TB, VU,DU by  at 1/2/2024 0957    Acceptance, E,TB, VU,DU by  at 12/29/2023 0845    Acceptance, E,TB, VU,DU by  at 12/27/2023 1329                         Point: Body mechanics (Done)       Description:   Instruct learner(s) on proper positioning and spine alignment during self-care, functional mobility activities and/or exercises.                  Learning Progress Summary             Patient Acceptance, E,TB, VU,DU by  at 1/2/2024 0957    Acceptance, E,TB, VU,DU by  at 12/29/2023 0845    Acceptance, E,TB, VU,DU by  at 12/27/2023 1329                                         User Key       Initials Effective Dates Name Provider Type Discipline     02/03/23 -  Tiffany Lord OT Occupational Therapist OT     08/09/23 -  Jeni Trejo OT Occupational Therapist OT                  OT  Recommendation and Plan  Therapy Frequency (OT): daily  Plan of Care Review  Plan of Care Reviewed With: patient  Progress: improving  Outcome Evaluation: Pt with good participation and progress toward goals during OT session today. The pt performed in room ambulation to/from the bathroom using a RWx with CGA. Pt required Nicole to stand from commode and shower chair while sink side. The pt completed toileting and grooming ADLs while in the bathroom with CGA. Pt fatigues quickly, requiring a seated rest break during activity. The pt remains below her functional baseline with generalized weakness, decreased activity tolerance, and balance deficits. Pt will benefit form continued IP OT services to address deficits listed. If deemed medically appropriate, recommend a return to previous facility.     Time Calculation:         Time Calculation- OT       Row Name 01/02/24 1056 01/02/24 1016          Time Calculation- OT    OT Start Time 0957 -KF --     OT Received On 01/02/24 -KF --     OT Goal Re-Cert Due Date 01/02/24  -KF --        Timed Charges    88259 - Gait Training Minutes  -- 14  -ND     75758 - OT Therapeutic Activity Minutes 8  -KF --     10981 - OT Self Care/Mgmt Minutes 20  -KF --        Total Minutes    Timed Charges Total Minutes 28  -KF 14  -ND      Total Minutes 28  -KF 14  -ND               User Key  (r) = Recorded By, (t) = Taken By, (c) = Cosigned By      Initials Name Provider Type    Jeni Oliveira OT Occupational Therapist    ND Isha May, LEFTY Physical Therapist                  Therapy Charges for Today       Code Description Service Date Service Provider Modifiers Qty    [id number]  OT THERAPEUTIC ACT EA 15 MIN 1/2/2024 Jeni Trejo OT GO 1    [id number] HC OT SELF CARE/MGMT/TRAIN EA 15 MIN 1/2/2024 Jeni Trejo OT GO 1                 Jeni Trejo OT  1/2/2024

## 2024-01-02 NOTE — THERAPY TREATMENT NOTE
Patient Name: Temi Jarrett  : 1930    MRN: [id number]                              Today's Date: 2024       Admit Date: 2023    Visit Dx:     ICD-10-CM ICD-9-CM   1. Malignant neoplasm of lung, unspecified laterality, unspecified part of lung  C34.90 162.9   2. Chest mass  R22.2 786.6     Patient Active Problem List   Diagnosis    Esophageal reflux    Hypothyroidism (acquired)    Anxiety with depression    Peripheral neuropathy    Spinal stenosis of lumbar region    Osteoporosis    Disc disorder of lumbar region    Bladder prolapse, female, acquired    Essential hypertension    Pernicious anemia    Annual physical exam    Primary osteoarthritis of both knees    Hiatal hernia    Multifocal aspiration pneumonia due to large intrathoracic hiatal hernia    Recurrent UTI    Paroxysmal atrial fibrillation    Alzheimer's disease    Prolonged Q-T interval on ECG    Mild cognitive impairment    At high risk for aspiration    Chronic pain    Osteoarthritis of right knee    Acute cough    Pressure injury of right buttock, stage 2    Nasal congestion    Metastatic disease     Past Medical History:   Diagnosis Date    Acute sepsis 2021    Arthritis     Bladder prolapse, female, acquired     Closed fracture of neck of left femur 2019    Dementia     Depression     Disease of thyroid gland     Gastric polyp     GERD (gastroesophageal reflux disease)     Hiatal hernia     History of colonic polyps     Hyperlipidemia     Hypertension     IBS (irritable bowel syndrome)     Macular degeneration     Pacemaker     Pericardial effusion 2020    Echo (8/3/2020): Normal LVEF.  Moderate pericardial effusion without tamponade.  Moderate MR. RVSP 49 mmHg    Torn meniscus     right knee      Past Surgical History:   Procedure Laterality Date    CHOLECYSTECTOMY      ENDOSCOPY N/A 2021    Procedure: ESOPHAGOGASTRODUODENOSCOPY;  Surgeon: Serjio Guerrero MD;  Location: [facility] ENDOSCOPY;   Service: General;  Laterality: N/A;    EYE SURGERY  1998    Cataract extraction    HERNIA REPAIR      HIP HEMIARTHROPLASTY Left 09/28/2019    Procedure: HIP HEMIARTHROPLASTY LEFT;  Surgeon: Reddy Segundo Jr., MD;  Location: [facility] OR;  Service: Orthopedics    HYSTERECTOMY  1976    KNEE SURGERY Right     arthroscopy- 2000    SKIN CANCER EXCISION Left     [facility]; CANCEROUS SPOT DIDN'T  MOVE BUT NEEDED REMOVED    TONSILLECTOMY        General Information       Row Name 01/02/24 1007          Physical Therapy Time and Intention    Document Type therapy note (daily note)  -ND     Mode of Treatment physical therapy  -ND       Row Name 01/02/24 1007          General Information    Patient Profile Reviewed yes  -ND     Existing Precautions/Restrictions oxygen therapy device and L/min;fall  Dementia.  -ND     Barriers to Rehab medically complex;previous functional deficit;cognitive status;visual deficit;hearing deficit  -ND       Row Name 01/02/24 1007          Cognition    Orientation Status (Cognition) oriented to;person  -ND       Row Name 01/02/24 1007          Safety Issues, Functional Mobility    Safety Issues Affecting Function (Mobility) awareness of need for assistance;insight into deficits/self-awareness;positioning of assistive device;safety precaution awareness;safety precautions follow-through/compliance;sequencing abilities  -ND     Impairments Affecting Function (Mobility) balance;endurance/activity tolerance;cognition;strength;postural/trunk control  -ND     Cognitive Impairments, Mobility Safety/Performance awareness, need for assistance;insight into deficits/self-awareness;problem-solving/reasoning;safety precaution awareness;safety precaution follow-through;sequencing abilities  -ND               User Key  (r) = Recorded By, (t) = Taken By, (c) = Cosigned By      Initials Name Provider Type    ND Isha May PT Physical Therapist                   Mobility       Row  Name 01/02/24 1008          Bed Mobility    Bed Mobility supine-sit  -ND     Supine-Sit [place] (Bed Mobility) minimum assist (75% patient effort);verbal cues;nonverbal cues (demo/gesture);1 person assist  -ND     Assistive Device (Bed Mobility) bed rails;head of bed elevated;draw sheet  -ND     Comment, (Bed Mobility) Increased time to perform task with VC for sequencing.  -ND       Row Name 01/02/24 1008          Sit-Stand Transfer    Sit-Stand [place] (Transfers) contact guard;verbal cues;nonverbal cues (demo/gesture);1 person assist  -ND     Assistive Device (Sit-Stand Transfers) walker, front-wheeled  -ND     Comment, (Sit-Stand Transfer) STS from EOB and STS x2 from chair with VC for hand placement.  -ND       Row Name 01/02/24 1008          Gait/Stairs (Locomotion)    [place] Level (Gait) contact guard;1 person assist;verbal cues  -ND     Assistive Device (Gait) walker, front-wheeled  -ND     Distance in Feet (Gait) 3 + 5 + 12  -ND     Deviations/Abnormal Patterns (Gait) base of support, narrow;kane decreased;festinating/shuffling;gait speed decreased;stride length decreased;weight shifting decreased  -ND     Bilateral Gait Deviations forward flexed posture;heel strike decreased  -ND     Comment, (Gait/Stairs) Pt ambulating within room with CGA, RWx, and chair follow. Pt with forward flexed posture due to kyphosis. Seated rest btwn ambulation bouts due to fatigue.  -ND               User Key  (r) = Recorded By, (t) = Taken By, (c) = Cosigned By      Initials Name Provider Type    ND Isha May, PT Physical Therapist                   Obj/Interventions       Row Name 01/02/24 1012          Motor Skills    Therapeutic Exercise hip;knee;ankle  -ND       Row Name 01/02/24 1012          Hip (Therapeutic Exercise)    Hip (Therapeutic Exercise) strengthening exercise  -ND     Hip Strengthening (Therapeutic Exercise) bilateral;aBduction;aDduction;marching while seated;10 repetitions  -ND        Row Name 01/02/24 1012          Knee (Therapeutic Exercise)    Knee (Therapeutic Exercise) strengthening exercise  -ND     Knee Strengthening (Therapeutic Exercise) bilateral;LAQ (long arc quad);SLR (straight leg raise);10 repetitions  -ND       Row Name 01/02/24 1012          Ankle (Therapeutic Exercise)    Ankle (Therapeutic Exercise) AROM (active range of motion)  -ND     Ankle AROM (Therapeutic Exercise) bilateral;dorsiflexion;plantarflexion;10 repetitions  -ND       Row Name 01/02/24 1012          Balance    Balance Assessment sitting static balance;sitting dynamic balance;sit to stand dynamic balance;standing static balance;standing dynamic balance  -ND     Static Sitting Balance standby assist  -ND     Dynamic Sitting Balance standby assist  -ND     Position, Sitting Balance unsupported;sitting edge of bed;sitting in chair  -ND     Sit to Stand Dynamic Balance contact guard;verbal cues;1-person assist  -ND     Static Standing Balance contact guard;verbal cues  -ND     Dynamic Standing Balance contact guard;verbal cues  -ND     Position/Device Used, Standing Balance supported;walker, front-wheeled  -ND     Balance Interventions sitting;standing;sit to stand;supported;static;dynamic  -ND     Comment, Balance Pt with general instability with ambulation but no instances LOB noted with standing or ambulating tasks.  -ND               User Key  (r) = Recorded By, (t) = Taken By, (c) = Cosigned By      Initials Name Provider Type    ND Isha May, PT Physical Therapist                   Goals/Plan    No documentation.                  Clinical Impression       Row Name 01/02/24 1013          Pain    Pretreatment Pain Rating 0/10 - no pain  -ND     Posttreatment Pain Rating 0/10 - no pain  -ND       Row Name 01/02/24 1013          Plan of Care Review    Plan of Care Reviewed With patient  -ND     Progress improving  -ND     Outcome Evaluation Pt with increased ambulation distance this date. Pt continues  to demonstrate deficits in activity tolerance, balance, and strength and would benefit from continued skilled therapy. Recommending pt return to previous facility.  -ND       Row Name 01/02/24 1013          Therapy Assessment/Plan (PT)    Rehab Potential (PT) fair, will monitor progress closely  -ND     Therapy Frequency (PT) daily  -ND       Row Name 01/02/24 1013          Vital Signs    Pretreatment Heart Rate (beats/min) 62  -ND     Posttreatment Heart Rate (beats/min) 58  -ND     Pre SpO2 (%) 95  -ND     O2 Delivery Pre Treatment nasal cannula  -ND     O2 Delivery Intra Treatment nasal cannula  -ND     Post SpO2 (%) 95  -ND     O2 Delivery Post Treatment nasal cannula  -ND     Pre Patient Position Supine  -ND     Intra Patient Position Standing  -ND     Post Patient Position Sitting  -ND       Row Name 01/02/24 1013          Positioning and Restraints    Pre-Treatment Position in bed  -ND     Post Treatment Position chair  -ND     In Chair notified nsg;reclined;legs elevated;call light within reach;encouraged to call for assist;exit alarm on;waffle cushion;on mechanical lift sling  -ND               User Key  (r) = Recorded By, (t) = Taken By, (c) = Cosigned By      Initials Name Provider Type    Isha Baker, PT Physical Therapist                   Outcome Measures       Row Name 01/02/24 1022 01/02/24 1016       How much help from another person do you currently need...    Turning from your back to your side while in flat bed without using bedrails? 4  -SB 4  -ND    Moving from lying on back to sitting on the side of a flat bed without bedrails? 3  -SB 3  -ND    Moving to and from a bed to a chair (including a wheelchair)? 3  -SB 3  -ND    Standing up from a chair using your arms (e.g., wheelchair, bedside chair)? 3  -SB 3  -ND    Climbing 3-5 steps with a railing? 2  -SB 2  -ND    To walk in hospital room? 3  -SB 3  -ND    AM-PAC 6 Clicks Score (PT) 18  -SB 18  -ND    Highest Level of Mobility Goal 6  --> Walk 10 steps or more  -SB 6 --> Walk 10 steps or more  -ND      Row Name 01/02/24 0848          How much help from another person do you currently need...    Turning from your back to your side while in flat bed without using bedrails? 4  -SB     Moving from lying on back to sitting on the side of a flat bed without bedrails? 3  -SB     Moving to and from a bed to a chair (including a wheelchair)? 3  -SB     Standing up from a chair using your arms (e.g., wheelchair, bedside chair)? 3  -SB     Climbing 3-5 steps with a railing? 3  -SB     To walk in hospital room? 3  -SB     AM-PAC 6 Clicks Score (PT) 19  -SB     Highest Level of Mobility Goal 6 --> Walk 10 steps or more  -SB               User Key  (r) = Recorded By, (t) = Taken By, (c) = Cosigned By      Initials Name Provider Type    Omid Goyal, RN Registered Nurse    Isha Baker, PT Physical Therapist                                 Physical Therapy Education       Title: PT OT SLP Therapies (In Progress)       Topic: Physical Therapy (Done)       Point: Mobility training (Done)       Learning Progress Summary             Patient Acceptance, E, VU by ND at 1/2/2024 1016    Acceptance, E, NR by SD at 12/29/2023 1529    Acceptance, E, NR by ND at 12/26/2023 1406                         Point: Home exercise program (Done)       Learning Progress Summary             Patient Acceptance, E, VU by ND at 1/2/2024 1016    Acceptance, E, NR by SD at 12/29/2023 1529    Acceptance, E, NR by ND at 12/26/2023 1406                         Point: Body mechanics (Done)       Learning Progress Summary             Patient Acceptance, E, VU by ND at 1/2/2024 1016    Acceptance, E, NR by SD at 12/29/2023 1529    Acceptance, E, NR by ND at 12/26/2023 1406                         Point: Precautions (Done)       Learning Progress Summary             Patient Acceptance, E, VU by ND at 1/2/2024 1016    Acceptance, E, NR by SD at 12/29/2023 1529    Acceptance, E, NR by  ND at 12/26/2023 1406                                         User Key       Initials Effective Dates Name Provider Type Discipline    SD 03/13/23 -  Ángela Arana, LEFTY Physical Therapist PT    ND 11/16/23 -  Isha May PT Physical Therapist PT                  PT Recommendation and Plan  Planned Therapy Interventions (PT): balance training, bed mobility training, gait training, transfer training, patient/family education, home exercise program, strengthening, ROM (range of motion), postural re-education  Plan of Care Reviewed With: patient  Progress: improving  Outcome Evaluation: Pt with increased ambulation distance this date. Pt continues to demonstrate deficits in activity tolerance, balance, and strength and would benefit from continued skilled therapy. Recommending pt return to previous facility.     Time Calculation:   PT Evaluation Complexity  History, PT Evaluation Complexity: 3 or more personal factors and/or comorbidities  Examination of Body Systems (PT Eval Complexity): total of 4 or more elements  Clinical Presentation (PT Evaluation Complexity): evolving  Clinical Decision Making (PT Evaluation Complexity): moderate complexity  Overall Complexity (PT Evaluation Complexity): moderate complexity     PT Charges       Row Name 01/02/24 1016             Time Calculation    Start Time 0855  -ND      PT Received On 01/02/24  -ND      PT Goal Re-Cert Due Date 01/05/24  -ND         Timed Charges    36906 - Gait Training Minutes  14  -ND      37167 - PT Therapeutic Activity Minutes 15  -ND         Total Minutes    Timed Charges Total Minutes 29  -ND       Total Minutes 29  -ND                User Key  (r) = Recorded By, (t) = Taken By, (c) = Cosigned By      Initials Name Provider Type    ND Isha May, LEFTY Physical Therapist                  Therapy Charges for Today       Code Description Service Date Service Provider Modifiers Qty    31357382154 HC GAIT TRAINING EA 15 MIN 1/2/2024  Isha May, PT GP 1    [id number]  PT THERAPEUTIC ACT EA 15 MIN 1/2/2024 Isha May, PT GP 1            PT G-Codes  Outcome Measure Options: AM-PAC 6 Clicks Basic Mobility (PT)  AM-PAC 6 Clicks Score (PT): 18  AM-PAC 6 Clicks Score (OT): 16  PT Discharge Summary  Anticipated Discharge Disposition (PT): other (see comments) (Return to previous facility.)    Isha May, PT  1/2/2024

## 2024-01-02 NOTE — PLAN OF CARE
Goal Outcome Evaluation:  Plan of Care Reviewed With: patient        Progress: improving  Outcome Evaluation: Pt with increased ambulation distance this date. Pt continues to demonstrate deficits in activity tolerance, balance, and strength and would benefit from continued skilled therapy. Recommending pt return to previous facility.      Anticipated Discharge Disposition (PT): other (see comments) (Return to previous facility.)

## 2024-01-02 NOTE — PLAN OF CARE
Goal Outcome Evaluation:         Patient is on 3L Humidified NC, V-Paced on Monitor, Aox3 confused to time, still on the purewick but did ambulate 2X to the bathroom today, VSS - will continue POC.

## 2024-01-02 NOTE — CASE MANAGEMENT/SOCIAL WORK
Continued Stay Note  [facility]     Patient Name: Temi Jarrett  MRN: [id number]  Today's Date: 1/2/2024    Admit Date: 12/22/2023    Plan: [facility]   Discharge Plan       Row Name 01/02/24 1153       Plan    Plan [facility]    Patient/Family in Agreement with Plan yes    Plan Comments Discussed patient in MDR. The earliest she can return to [facility] is 1/5/24 due to 14 days of isolation for RSV. Order for home oxygen is in [facility] and was called to Horacio with [facility]. Horacio aware of earliest possible discharge of 1/5. CM to update Horacio of discharge for delivery of home oxygen to [facility], [address], room 229, bed 1. CM will continue to follow.    Final Discharge Disposition Code 04 - intermediate care facility                   Discharge Codes    No documentation.                 Expected Discharge Date and Time       Expected Discharge Date Expected Discharge Time    Jan 5, 2024               Vijaya Garrison RN

## 2024-01-02 NOTE — PROGRESS NOTES
[facility] Medicine Services  PROGRESS NOTE    Patient Name: Temi Jarrett  : 1930  MRN: [id number]    Date of Admission: 2023  Primary Care Physician: Eric Patel MD    Subjective   Subjective     CC:  RSV    HPI:  Patient with no complaints this morning. Currently requiring 4L of oxygen      Objective   Objective     Vital Signs:   Temp:  [96.8 °F (36 °C)-98 °F (36.7 °C)] 98 °F (36.7 °C)  Heart Rate:  [62-89] 63  Resp:  [16-17] 16  BP: (127-154)/(67-87) 127/67  Flow (L/min):  [3-15] 4     Physical Exam:  Constitutional: No acute distress, awake, alert, frail elderly lady  Respiratory: Clear to auscultation bilaterally, respiratory effort normal on room air  Cardiovascular: RRR  Gastrointestinal: Positive bowel sounds, soft, nontender, nondistended  Musculoskeletal: No bilateral ankle edema  Psychiatric: Appropriate affect, cooperative  Neurologic: Oriented x 3, no focal deficits  Skin: No rashes      Results Reviewed:  LAB RESULTS:      Lab 23  0427   WBC 6.30   HEMOGLOBIN 12.8   HEMATOCRIT 40.0   PLATELETS 173   NEUTROS ABS 4.04   IMMATURE GRANS (ABS) 0.01   LYMPHS ABS 1.58   MONOS ABS 0.48   EOS ABS 0.15   MCV 87.3         Lab 23  0427   SODIUM 143   POTASSIUM 3.8   CHLORIDE 103   CO2 33.0*   ANION GAP 7.0   BUN 24*   CREATININE 0.51*   EGFR 87.2   GLUCOSE 88   CALCIUM 8.4                         Brief Urine Lab Results  (Last result in the past 365 days)        Color   Clarity   Blood   Leuk Est   Nitrite   Protein   CREAT   Urine HCG        23 2112 Yellow   Cloudy     Small                       Microbiology Results Abnormal       None            No radiology results from the last 24 hrs    Results for orders placed during the hospital encounter of 20    Transthoracic Echo Complete With Contrast if Necessary Per Protocol    Interpretation Summary  · Left ventricular systolic function is normal. Estimated EF = 70%.  · Left ventricular  diastolic dysfunction (grade I) consistent with impaired relaxation.  · Left atrial cavity size is moderately dilated.  · There is calcification of the aortic valve. There is no significant stenosis or regurgitation. A Lambel's excrescence is noted on an aortic valve leaflet.  · Moderate mitral valve regurgitation is present.  · Estimated right ventricular systolic pressure from tricuspid regurgitation is moderately elevated (49 mmHg).  · There is a moderate (1-2cm) circumferential pericardial effusion. There is no tamponade physiology.      Current medications:  Scheduled Meds:amLODIPine, 5 mg, Oral, Daily  bethanechol, 10 mg, Oral, TID  busPIRone, 10 mg, Oral, BID  dextromethorphan polistirex ER, 60 mg, Oral, Q12H  donepezil, 10 mg, Oral, Nightly  gabapentin, 300 mg, Oral, BID  guaiFENesin, 1,200 mg, Oral, Q12H  levothyroxine, 150 mcg, Oral, Q AM  lisinopril, 20 mg, Oral, Daily  memantine, 10 mg, Oral, BID  multivitamin with minerals, 1 tablet, Oral, Daily  pantoprazole, 40 mg, Oral, Daily  senna-docusate sodium, 2 tablet, Oral, BID  sertraline, 50 mg, Oral, Daily  sodium chloride, 10 mL, Intravenous, Q12H      Continuous Infusions:   PRN Meds:.  acetaminophen **OR** acetaminophen **OR** acetaminophen    benzonatate    senna-docusate sodium **AND** polyethylene glycol **AND** bisacodyl **AND** bisacodyl    Calcium Replacement - Follow Nurse / BPA Driven Protocol    Hydrocod Marcelo-Chlorphe Marcelo ER    ipratropium-albuterol    Magnesium Standard Dose Replacement - Follow Nurse / BPA Driven Protocol    melatonin    phenol    Phosphorus Replacement - Follow Nurse / BPA Driven Protocol    Potassium Replacement - Follow Nurse / BPA Driven Protocol    sodium chloride    traMADol    Assessment & Plan   Assessment & Plan     Active Hospital Problems    Diagnosis  POA    **Metastatic disease [C79.9]  Yes    Prolonged Q-T interval on ECG [R94.31]  Yes    Alzheimer's disease [G30.9, F02.80]  Yes    Hiatal hernia [K44.9]  Yes     Anxiety with depression [F41.8]  Yes    Essential hypertension [I10]  Yes    Hypothyroidism (acquired) [E03.9]  Yes      Resolved Hospital Problems   No resolved problems to display.        Brief Hospital Course to date:  89 year old female with HTN, alzheimers, hiatal hernia, hypothryoid presented to the ED from [facility] via EMS after an abnormal CXR. Patient had a CXR for cough and was noted to have a new 7.5 cm left suprahilar/mediastinal mass. CT scan with findings compatible with metastatic disease to the lung. Multiple round lung nodules with enlaged centrally necrotic left sided prevascular and hilar lymph nodes measuring up to 5 cm. CT abd/pelvis with contrast was unremarkable. Patient was also noted to be RSV +. Oncology was consulted and after discussion with the patient, decision was made to not pursue diagnosis because she has no treatment options due to functional status and age.      Metastatic disease   - Evaluated by oncology - no treatment options due to age and functional status.   - Oxygen as needed. Currently on 3L NC  - Consider PNPD if unable to expectorate sputum  - palliative care consulted     RSV  Hypoxia, worsening  - Noted on resp PCR. Likely causing her acute symptoms at this time.   - Supportive care  - Mucinex. Delsym. Tessaslon. Mobilize.   - Flutter valve  - Oxygen up this morning at 4L, Otherwise VS WNL and no worsening leukocytosis      Prolonged QT  - Avoid prolonging medications      Hypokalemia  -replace per protocol     Hypothyroidism  -continue synthroid      GERD  -PPI      Alzheimer's disease   - continue namenda and aricept   - Delirium precautions - up in the chair, window shades open     Essential hypertension   - continue norvasc, lisinopril      Anxiety/depression   - continue zoloft, buspar      Large Hiatal hernia  - CT with large hiatal hernia containing stomach, bowel and pancreatic body and tail  - Currently asymptomatic      Expected Discharge  Location and Transportation: back to [place] when out of precautions (1/5/24)    Expected Discharge   Expected Discharge Date: 1/5/2024; Expected Discharge Time:      DVT prophylaxis:  Mechanical DVT prophylaxis orders are present.     AM-PAC 6 Clicks Score (PT): 19 (01/01/24 1649)    CODE STATUS:   Code Status and Medical Interventions:   Ordered at: 12/22/23 1623     Level Of Support Discussed With:    Patient     Code Status (Patient has no pulse and is not breathing):    CPR (Attempt to Resuscitate)     Medical Interventions (Patient has pulse or is breathing):    Full Support     This patient's problems and plans were partially entered by my partner and updated as appropriate by me 01/02/24. Today is my first day evaluating this patient's active medical problems. I Personally reviewed chart and adjusted note to reflect daily changes in management/clinical condition. Copied text in this note has been reviewed and is accurate as of  01/02/24      Mahogany Henry MD  01/02/24

## 2024-01-02 NOTE — PLAN OF CARE
Goal Outcome Evaluation:            Patient is on 4L Humidified NC, V-Paced, Aox4, had a Palliative Consult today, VSS - Will continue POC.

## 2024-01-02 NOTE — PLAN OF CARE
Problem: Palliative Care  Goal: Enhanced Quality of Life  Intervention: Optimize Psychosocial Wellbeing  Flowsheets (Taken 1/2/2024 1632)  Supportive Measures:   active listening utilized   verbalization of feelings encouraged     Problem: Palliative Care  Goal: Enhanced Quality of Life  Outcome: Ongoing, Progressing  Intervention: Promote Advance Care Planning  Flowsheets (Taken 1/2/2024 1632)  Life Transition/Adjustment:   palliative care initiated   palliative care discussed  Intervention: Optimize Psychosocial Wellbeing  Flowsheets (Taken 1/2/2024 1632)  Supportive Measures:   active listening utilized   verbalization of feelings encouraged   Goal Outcome Evaluation:  Plan of Care Reviewed With: patient        Progress: no change  Outcome Evaluation: new palliative consult for Dr Henry for [facility]. Pt is unable to make decisions for herself, but son Victor M Jarrett is poa.  Dr Perry contacted son and started goals discussions. Will plan to follow up with son. Pt's main complaint is her cough related to rsv. Tussionex prn for cough. Pt is a resident of [place].    Palliative IDT: Rn, Sw, APRN, MD,   After hours #[phone]

## 2024-01-02 NOTE — PLAN OF CARE
Goal Outcome Evaluation:  Plan of Care Reviewed With: patient        Progress: improving  Outcome Evaluation: Pt with good participation and progress toward goals during OT session today. The pt performed in room ambulation to/from the bathroom using a RWx with CGA. Pt required Nicole to stand from commode and shower chair while sink side. The pt completed toileting and grooming ADLs while in the bathroom with CGA. Pt fatigues quickly, requiring a seated rest break during activity. The pt remains below her functional baseline with generalized weakness, decreased activity tolerance, and balance deficits. Pt will benefit form continued IP OT services to address deficits listed. If deemed medically appropriate, recommend a return to previous facility.      Anticipated Discharge Disposition (OT): other (see comments) (return to previous facility)

## 2024-01-03 RX ADMIN — Medication 5 MG: at 20:37

## 2024-01-03 RX ADMIN — BETHANECHOL CHLORIDE 10 MG: 10 TABLET ORAL at 09:09

## 2024-01-03 RX ADMIN — HYDROCODONE POLISTIREX AND CHLORPHENIRAMINE POLISTIREX 2.5 ML: 10; 8 SUSPENSION, EXTENDED RELEASE ORAL at 15:37

## 2024-01-03 RX ADMIN — BUSPIRONE HYDROCHLORIDE 10 MG: 10 TABLET ORAL at 20:24

## 2024-01-03 RX ADMIN — BETHANECHOL CHLORIDE 10 MG: 10 TABLET ORAL at 17:57

## 2024-01-03 RX ADMIN — MEMANTINE 10 MG: 10 TABLET ORAL at 09:09

## 2024-01-03 RX ADMIN — AMLODIPINE BESYLATE 5 MG: 5 TABLET ORAL at 09:09

## 2024-01-03 RX ADMIN — GABAPENTIN 300 MG: 300 CAPSULE ORAL at 20:24

## 2024-01-03 RX ADMIN — SERTRALINE 50 MG: 50 TABLET, FILM COATED ORAL at 09:08

## 2024-01-03 RX ADMIN — LEVOTHYROXINE SODIUM 150 MCG: 0.15 TABLET ORAL at 06:42

## 2024-01-03 RX ADMIN — GABAPENTIN 300 MG: 300 CAPSULE ORAL at 09:09

## 2024-01-03 RX ADMIN — DONEPEZIL HYDROCHLORIDE 10 MG: 10 TABLET, FILM COATED ORAL at 20:24

## 2024-01-03 RX ADMIN — BUSPIRONE HYDROCHLORIDE 10 MG: 10 TABLET ORAL at 09:08

## 2024-01-03 RX ADMIN — LISINOPRIL 20 MG: 20 TABLET ORAL at 09:08

## 2024-01-03 RX ADMIN — BETHANECHOL CHLORIDE 10 MG: 10 TABLET ORAL at 22:42

## 2024-01-03 RX ADMIN — DEXTROMETHORPHAN POLISTIREX 60 MG: 30 SUSPENSION ORAL at 09:08

## 2024-01-03 RX ADMIN — SENNOSIDES AND DOCUSATE SODIUM 2 TABLET: 8.6; 5 TABLET ORAL at 09:08

## 2024-01-03 RX ADMIN — MEMANTINE 10 MG: 10 TABLET ORAL at 20:24

## 2024-01-03 RX ADMIN — GUAIFENESIN 1200 MG: 600 TABLET, EXTENDED RELEASE ORAL at 09:09

## 2024-01-03 RX ADMIN — PANTOPRAZOLE SODIUM 40 MG: 40 TABLET, DELAYED RELEASE ORAL at 09:09

## 2024-01-03 RX ADMIN — DEXTROMETHORPHAN POLISTIREX 60 MG: 30 SUSPENSION ORAL at 20:24

## 2024-01-03 RX ADMIN — GUAIFENESIN 1200 MG: 600 TABLET, EXTENDED RELEASE ORAL at 20:24

## 2024-01-03 NOTE — PROGRESS NOTES
"Palliative Care Daily Progress Note     Referring:Mahogany Henry    C/C: \"Where's my breakfast,? Why are my toe nails so long?\"    S: Medical record reviewed.  Events noted.  Denied pain or SOA.  Unaware of where she is or why she is here.    ROS: Limited but denied SOA, Pain, N/V.    O: Code Status:   Code Status and Medical Interventions:   Ordered at: 12/22/23 6803     Level Of Support Discussed With:    Patient     Code Status (Patient has no pulse and is not breathing):    CPR (Attempt to Resuscitate)     Medical Interventions (Patient has pulse or is breathing):    Full Support      Advanced Directives: Advance Directive Status: Patient has advance directive, copy requested   Goals of Care: Ongoing.   Palliative Performance Scale Score: 40%     /97 (BP Location: Right arm, Patient Position: Lying)   Pulse 67   Temp 96.5 °F (35.8 °C) (Oral)   Resp 18   Ht 152.4 cm (60\")   Wt 51.3 kg (113 lb)   LMP  (LMP Unknown)   SpO2 96%   BMI 22.07 kg/m²     Intake/Output Summary (Last 24 hours) at 1/3/2024 0911  Last data filed at 1/3/2024 0432  Gross per 24 hour   Intake --   Output 700 ml   Net -700 ml       PE:  General Appearance:    Alert, cooperative, NAD   HEENT:    NC/AT, EOMI, anicteric, MMM, face relaxed   Neck:   supple, trachea midline, no JVD   Lungs:     CTA bilaterally, diminished in bases; respirations regular, even and unlabored; RR on exam    Heart:    RRR, normal S1 and S2, no M/R/G   Abdomen:     Normal bowel sounds, soft, nontender, nondistended   G/U:   Deferred   MSK/Extremities:   Wasting, no edema   Pulses:   Pulses palpable and equal bilaterally   Skin:   Warm, dry   Neurologic:   A/Ox1, cooperative, KOCH   Psych:   Calm, appropriate     Meds: Reviewed and changes noted    Labs:   Results from last 7 days   Lab Units 12/29/23  0427   WBC 10*3/mm3 6.30   HEMOGLOBIN g/dL 12.8   HEMATOCRIT % 40.0   PLATELETS 10*3/mm3 173     Results from last 7 days   Lab Units 12/29/23  0427   SODIUM " mmol/L 143   POTASSIUM mmol/L 3.8   CHLORIDE mmol/L 103   CO2 mmol/L 33.0*   BUN mg/dL 24*   CREATININE mg/dL 0.51*   GLUCOSE mg/dL 88   CALCIUM mg/dL 8.4     Results from last 7 days   Lab Units 12/29/23  0427   SODIUM mmol/L 143   POTASSIUM mmol/L 3.8   CHLORIDE mmol/L 103   CO2 mmol/L 33.0*   BUN mg/dL 24*   CREATININE mg/dL 0.51*   CALCIUM mg/dL 8.4   GLUCOSE mg/dL 88     Imaging Results (Last 72 Hours)       ** No results found for the last 72 hours. **                  Diagnostics: Reviewed    A:     Metastatic disease    Hypothyroidism (acquired)    Anxiety with depression    Essential hypertension    Hiatal hernia    Alzheimer's disease    Prolonged Q-T interval on ECG       Impression:  Metastatic CA unknown primary  Prolonged Qtc  Htn  Alzheimer's dz  RSV    Symptoms:  Dyspnea    P:   Attempted to reach son today without success.  Will continue to reach out.  Would recommend hospice at facility and change to code status but first must reach again.  Palliative Care Team will continue to follow patient.     Lyudmila Perry MD  1/3/2024  Time spent:24

## 2024-01-03 NOTE — PROGRESS NOTES
[facility] Medicine Services  PROGRESS NOTE    Patient Name: Temi Jarrett  : 1930  MRN: [id number]    Date of Admission: 2023  Primary Care Physician: Eric Ptael MD    Subjective   Subjective     CC:  RSV    HPI:  No acute events overnight. Thanks me for coming to visit.       Objective   Objective     Vital Signs:   Temp:  [96.3 °F (35.7 °C)-98.2 °F (36.8 °C)] 96.3 °F (35.7 °C)  Heart Rate:  [62-67] 64  Resp:  [18] 18  BP: (122-147)/(78-92) 122/92  Flow (L/min):  [4] 4     Physical Exam:  Constitutional: thin frail, elderly female  Respiratory: rhonchi, no wheezing  Cardiovascular: RRR, no murmurs, rubs, or gallops  Gastrointestinal: Positive bowel sounds, soft, nontender, nondistended  Musculoskeletal: No bilateral ankle edema  Psychiatric: Appropriate affect, cooperative  Neurologic: Oriented to person      Results Reviewed:  LAB RESULTS:      Lab 23  0427   WBC 6.30   HEMOGLOBIN 12.8   HEMATOCRIT 40.0   PLATELETS 173   NEUTROS ABS 4.04   IMMATURE GRANS (ABS) 0.01   LYMPHS ABS 1.58   MONOS ABS 0.48   EOS ABS 0.15   MCV 87.3         Lab 23  0427   SODIUM 143   POTASSIUM 3.8   CHLORIDE 103   CO2 33.0*   ANION GAP 7.0   BUN 24*   CREATININE 0.51*   EGFR 87.2   GLUCOSE 88   CALCIUM 8.4                         Brief Urine Lab Results  (Last result in the past 365 days)        Color   Clarity   Blood   Leuk Est   Nitrite   Protein   CREAT   Urine HCG        23 2112 Yellow   Cloudy     Small                       Microbiology Results Abnormal       None            No radiology results from the last 24 hrs    Results for orders placed during the hospital encounter of 20    Transthoracic Echo Complete With Contrast if Necessary Per Protocol    Interpretation Summary  · Left ventricular systolic function is normal. Estimated EF = 70%.  · Left ventricular diastolic dysfunction (grade I) consistent with impaired relaxation.  · Left atrial cavity  size is moderately dilated.  · There is calcification of the aortic valve. There is no significant stenosis or regurgitation. A Lambel's excrescence is noted on an aortic valve leaflet.  · Moderate mitral valve regurgitation is present.  · Estimated right ventricular systolic pressure from tricuspid regurgitation is moderately elevated (49 mmHg).  · There is a moderate (1-2cm) circumferential pericardial effusion. There is no tamponade physiology.      Current medications:  Scheduled Meds:amLODIPine, 5 mg, Oral, Daily  bethanechol, 10 mg, Oral, TID  busPIRone, 10 mg, Oral, BID  dextromethorphan polistirex ER, 60 mg, Oral, Q12H  donepezil, 10 mg, Oral, Nightly  gabapentin, 300 mg, Oral, BID  guaiFENesin, 1,200 mg, Oral, Q12H  levothyroxine, 150 mcg, Oral, Q AM  lisinopril, 20 mg, Oral, Daily  memantine, 10 mg, Oral, BID  multivitamin with minerals, 1 tablet, Oral, Daily  pantoprazole, 40 mg, Oral, Daily  senna-docusate sodium, 2 tablet, Oral, BID  sertraline, 50 mg, Oral, Daily  sodium chloride, 10 mL, Intravenous, Q12H      Continuous Infusions:   PRN Meds:.  acetaminophen **OR** acetaminophen **OR** acetaminophen    benzonatate    senna-docusate sodium **AND** polyethylene glycol **AND** bisacodyl **AND** bisacodyl    Calcium Replacement - Follow Nurse / BPA Driven Protocol    Hydrocod Marcelo-Chlorphe Marcelo ER    ipratropium-albuterol    Magnesium Standard Dose Replacement - Follow Nurse / BPA Driven Protocol    melatonin    phenol    Phosphorus Replacement - Follow Nurse / BPA Driven Protocol    Potassium Replacement - Follow Nurse / BPA Driven Protocol    sodium chloride    traMADol    Assessment & Plan   Assessment & Plan     Active Hospital Problems    Diagnosis  POA    **Metastatic disease [C79.9]  Yes    Prolonged Q-T interval on ECG [R94.31]  Yes    Alzheimer's disease [G30.9, F02.80]  Yes    Hiatal hernia [K44.9]  Yes    Anxiety with depression [F41.8]  Yes    Essential hypertension [I10]  Yes     Hypothyroidism (acquired) [E03.9]  Yes      Resolved Hospital Problems   No resolved problems to display.        Brief Hospital Course to date:  89 year old female with HTN, alzheimers, hiatal hernia, hypothryoid presented to the ED from [facility] via EMS after an abnormal CXR. Patient had a CXR for cough and was noted to have a new 7.5 cm left suprahilar/mediastinal mass. CT scan with findings compatible with metastatic disease to the lung. Multiple round lung nodules with enlaged centrally necrotic left sided prevascular and hilar lymph nodes measuring up to 5 cm. CT abd/pelvis with contrast was unremarkable. Patient was also noted to be RSV +. Oncology was consulted and after discussion with the patient, decision was made to not pursue diagnosis because she has no treatment options due to functional status and age.      Metastatic disease   - Evaluated by oncology - no treatment options due to age and functional status.   - Oxygen as needed. Currently on 3L NC  - Consider PNPD if unable to expectorate sputum  - palliative care following, working with family regarding goals of care, hospice would be appropriate.      RSV  Hypoxia, stable  - Noted on resp PCR. Likely causing her acute symptoms at this time.   - Supportive care  - Mucinex. Delsym. Tessaslon. Mobilize.   - Flutter valve  - Oxygen stable at 4L     Prolonged QT  - Avoid prolonging medications      Hypokalemia  -replace per protocol     Hypothyroidism  -continue synthroid      GERD  -PPI      Alzheimer's disease   - continue namenda and aricept   - Delirium precautions - up in the chair, window shades open     Essential hypertension   - continue norvasc, lisinopril      Anxiety/depression   - continue zoloft, buspar      Large Hiatal hernia  - CT with large hiatal hernia containing stomach, bowel and pancreatic body and tail  - Currently asymptomatic      Expected Discharge Location and Transportation: back to [place] when out of precautions  (1/5/24)     Expected Discharge   Expected Discharge Date: 1/5/2024; Expected Discharge Time:      DVT prophylaxis:  Mechanical DVT prophylaxis orders are present.     AM-PAC 6 Clicks Score (PT): 18 (01/02/24 1840)    CODE STATUS:   Code Status and Medical Interventions:   Ordered at: 12/22/23 1020     Level Of Support Discussed With:    Patient     Code Status (Patient has no pulse and is not breathing):    CPR (Attempt to Resuscitate)     Medical Interventions (Patient has pulse or is breathing):    Full Support     I have prepared this progress note with copied portions of the prior day's progress note of my own authorship to preserve accuracy and maintain consistency of documentation. I have reviewed these portions and edited them for correctness. I verify that the above documentation accurately and truly represents the evaluation and management performed on today's date.       Mahogany Henry MD  01/03/24

## 2024-01-03 NOTE — PLAN OF CARE
Goal Outcome Evaluation:               Seeing patient per palliative care referral. Patient has shared that is of Shinto amanda and would only wish to talk religiously with someone of her amanda background.     I along with the  team reached out to the [facility] in [facility], hearing that both are unavailable. [facility] has offered a contact of a [facility] person who volunteers, that is willing to come sit and visit with the patient.     Coordination to continue taking place.     During encounter with patient, she was appreciative of my visit and noted kindness used. She also shared that her son has diabetes and therefore, unable to come visit her here in the hospital.     Chaplains to continue following patient and help as able.

## 2024-01-03 NOTE — PLAN OF CARE
Goal Outcome Evaluation:  Plan of Care Reviewed With: patient        Progress: no change  Outcome Evaluation: VSS. Pt remains on 4L NC with no signs of SOA. Pt remains V-paced on the monitor. No pain noted. PRN cough meds given.

## 2024-01-03 NOTE — CASE MANAGEMENT/SOCIAL WORK
Continued Stay Note  [facility]     Patient Name: Temi Jarrett  MRN: [id number]  Today's Date: 1/3/2024    Admit Date: 12/22/2023    Plan: [facility]   Discharge Plan       Row Name 01/03/24 0903       Plan    Plan [facility]    Patient/Family in Agreement with Plan yes    Plan Comments Spoke with patient at bedside. Plan is back to [facility]. Patient will need wheelchair transport. Earliest possible discharge is Fri. 1/5.  will continue to follow    Final Discharge Disposition Code 04 - intermediate care facility                   Discharge Codes    No documentation.                 Expected Discharge Date and Time       Expected Discharge Date Expected Discharge Time    Jan 5, 2024               Oscar Sterling RN

## 2024-01-04 ENCOUNTER — APPOINTMENT (OUTPATIENT)
Dept: GENERAL RADIOLOGY | Facility: HOSPITAL | Age: 89
End: 2024-01-04
Payer: MEDICARE

## 2024-01-04 LAB
ALBUMIN SERPL-MCNC: 3.3 G/DL (ref 3.5–5.2)
ALBUMIN/GLOB SERPL: 1.7 G/DL
ALP SERPL-CCNC: 67 U/L (ref 39–117)
ALT SERPL W P-5'-P-CCNC: 11 U/L (ref 1–33)
ANION GAP SERPL CALCULATED.3IONS-SCNC: 9 MMOL/L (ref 5–15)
AST SERPL-CCNC: 16 U/L (ref 1–32)
BASOPHILS # BLD AUTO: 0.07 10*3/MM3 (ref 0–0.2)
BASOPHILS NFR BLD AUTO: 0.6 % (ref 0–1.5)
BILIRUB SERPL-MCNC: 0.3 MG/DL (ref 0–1.2)
BUN SERPL-MCNC: 17 MG/DL (ref 8–23)
BUN/CREAT SERPL: 28.3 (ref 7–25)
CALCIUM SPEC-SCNC: 8.2 MG/DL (ref 8.2–9.6)
CHLORIDE SERPL-SCNC: 105 MMOL/L (ref 98–107)
CO2 SERPL-SCNC: 31 MMOL/L (ref 22–29)
CREAT SERPL-MCNC: 0.6 MG/DL (ref 0.57–1)
DEPRECATED RDW RBC AUTO: 47.9 FL (ref 37–54)
EGFRCR SERPLBLD CKD-EPI 2021: 83.8 ML/MIN/1.73
EOSINOPHIL # BLD AUTO: 0.16 10*3/MM3 (ref 0–0.4)
EOSINOPHIL NFR BLD AUTO: 1.4 % (ref 0.3–6.2)
ERYTHROCYTE [DISTWIDTH] IN BLOOD BY AUTOMATED COUNT: 15.4 % (ref 12.3–15.4)
GLOBULIN UR ELPH-MCNC: 1.9 GM/DL
GLUCOSE SERPL-MCNC: 94 MG/DL (ref 65–99)
HCT VFR BLD AUTO: 39.1 % (ref 34–46.6)
HGB BLD-MCNC: 12.5 G/DL (ref 12–15.9)
IMM GRANULOCYTES # BLD AUTO: 0.03 10*3/MM3 (ref 0–0.05)
IMM GRANULOCYTES NFR BLD AUTO: 0.3 % (ref 0–0.5)
LYMPHOCYTES # BLD AUTO: 1.06 10*3/MM3 (ref 0.7–3.1)
LYMPHOCYTES NFR BLD AUTO: 9.3 % (ref 19.6–45.3)
MAGNESIUM SERPL-MCNC: 1.8 MG/DL (ref 1.7–2.3)
MCH RBC QN AUTO: 27.6 PG (ref 26.6–33)
MCHC RBC AUTO-ENTMCNC: 32 G/DL (ref 31.5–35.7)
MCV RBC AUTO: 86.3 FL (ref 79–97)
MONOCYTES # BLD AUTO: 0.64 10*3/MM3 (ref 0.1–0.9)
MONOCYTES NFR BLD AUTO: 5.6 % (ref 5–12)
NEUTROPHILS NFR BLD AUTO: 82.8 % (ref 42.7–76)
NEUTROPHILS NFR BLD AUTO: 9.41 10*3/MM3 (ref 1.7–7)
NRBC BLD AUTO-RTO: 0 /100 WBC (ref 0–0.2)
PLATELET # BLD AUTO: 256 10*3/MM3 (ref 140–450)
PMV BLD AUTO: 9.6 FL (ref 6–12)
POTASSIUM SERPL-SCNC: 4 MMOL/L (ref 3.5–5.2)
PROT SERPL-MCNC: 5.2 G/DL (ref 6–8.5)
RBC # BLD AUTO: 4.53 10*6/MM3 (ref 3.77–5.28)
SODIUM SERPL-SCNC: 145 MMOL/L (ref 136–145)
WBC NRBC COR # BLD AUTO: 11.37 10*3/MM3 (ref 3.4–10.8)

## 2024-01-04 PROCEDURE — 83735 ASSAY OF MAGNESIUM: CPT | Performed by: FAMILY MEDICINE

## 2024-01-04 PROCEDURE — 97535 SELF CARE MNGMENT TRAINING: CPT

## 2024-01-04 PROCEDURE — 73060 X-RAY EXAM OF HUMERUS: CPT

## 2024-01-04 PROCEDURE — 73090 X-RAY EXAM OF FOREARM: CPT

## 2024-01-04 PROCEDURE — 80053 COMPREHEN METABOLIC PANEL: CPT | Performed by: FAMILY MEDICINE

## 2024-01-04 PROCEDURE — 97530 THERAPEUTIC ACTIVITIES: CPT

## 2024-01-04 PROCEDURE — 97116 GAIT TRAINING THERAPY: CPT

## 2024-01-04 PROCEDURE — 85025 COMPLETE CBC W/AUTO DIFF WBC: CPT | Performed by: FAMILY MEDICINE

## 2024-01-04 RX ADMIN — GABAPENTIN 300 MG: 300 CAPSULE ORAL at 09:33

## 2024-01-04 RX ADMIN — MEMANTINE 10 MG: 10 TABLET ORAL at 09:33

## 2024-01-04 RX ADMIN — HYDROCODONE POLISTIREX AND CHLORPHENIRAMINE POLISTIREX 2.5 ML: 10; 8 SUSPENSION, EXTENDED RELEASE ORAL at 22:20

## 2024-01-04 RX ADMIN — LISINOPRIL 20 MG: 20 TABLET ORAL at 09:33

## 2024-01-04 RX ADMIN — BETHANECHOL CHLORIDE 10 MG: 10 TABLET ORAL at 20:27

## 2024-01-04 RX ADMIN — GABAPENTIN 300 MG: 300 CAPSULE ORAL at 20:24

## 2024-01-04 RX ADMIN — BETHANECHOL CHLORIDE 10 MG: 10 TABLET ORAL at 09:33

## 2024-01-04 RX ADMIN — LEVOTHYROXINE SODIUM 150 MCG: 0.15 TABLET ORAL at 06:27

## 2024-01-04 RX ADMIN — DONEPEZIL HYDROCHLORIDE 10 MG: 10 TABLET, FILM COATED ORAL at 20:24

## 2024-01-04 RX ADMIN — GUAIFENESIN 1200 MG: 600 TABLET, EXTENDED RELEASE ORAL at 20:24

## 2024-01-04 RX ADMIN — AMLODIPINE BESYLATE 5 MG: 5 TABLET ORAL at 09:33

## 2024-01-04 RX ADMIN — MEMANTINE 10 MG: 10 TABLET ORAL at 20:24

## 2024-01-04 RX ADMIN — DEXTROMETHORPHAN POLISTIREX 60 MG: 30 SUSPENSION ORAL at 20:24

## 2024-01-04 RX ADMIN — BETHANECHOL CHLORIDE 10 MG: 10 TABLET ORAL at 17:00

## 2024-01-04 RX ADMIN — PANTOPRAZOLE SODIUM 40 MG: 40 TABLET, DELAYED RELEASE ORAL at 09:33

## 2024-01-04 RX ADMIN — BUSPIRONE HYDROCHLORIDE 10 MG: 10 TABLET ORAL at 09:33

## 2024-01-04 RX ADMIN — SENNOSIDES AND DOCUSATE SODIUM 2 TABLET: 8.6; 5 TABLET ORAL at 09:32

## 2024-01-04 RX ADMIN — BUSPIRONE HYDROCHLORIDE 10 MG: 10 TABLET ORAL at 20:24

## 2024-01-04 RX ADMIN — GUAIFENESIN 1200 MG: 600 TABLET, EXTENDED RELEASE ORAL at 09:33

## 2024-01-04 RX ADMIN — Medication 5 MG: at 20:24

## 2024-01-04 RX ADMIN — DEXTROMETHORPHAN POLISTIREX 60 MG: 30 SUSPENSION ORAL at 09:32

## 2024-01-04 RX ADMIN — SERTRALINE 50 MG: 50 TABLET, FILM COATED ORAL at 09:33

## 2024-01-04 NOTE — PLAN OF CARE
Goal Outcome Evaluation:  Plan of Care Reviewed With: patient        Progress: improving  Outcome Evaluation: Pt dem increased independence with transfers and able to progress total gait distance. Pt limited by generalized weakness and decreased activity bushra, needs extra time and effort for all mobility tasks. Pt remains below baseline level of function and will continue to benefit from IPPT services.

## 2024-01-04 NOTE — CASE MANAGEMENT/SOCIAL WORK
Continued Stay Note  [facility]     Patient Name: Temi Jarrett  MRN: [id number]  Today's Date: 1/4/2024    Admit Date: 12/22/2023    Plan: [facility]   Discharge Plan       Row Name 01/04/24 1206       Plan    [facility]    Patient/Family in Agreement with Plan yes    Plan Comments [facility]. [facility] van is scheduled for Fri. 1/5 at 15:30. Spoke with Camacho at [place] and patient is good to return.  will continue to follow.    Final Discharge Disposition Code 04 - intermediate care facility                   Discharge Codes    No documentation.                 Expected Discharge Date and Time       Expected Discharge Date Expected Discharge Time    Jan 5, 2024               Oscar Sterling RN

## 2024-01-04 NOTE — DISCHARGE PLACEMENT REQUEST
"Temi Jarrett (89 y.o. Female)   Juanpablo Sterling, RN Case Manager  [phone]      Date of Birth   05/08/1930    Social Security Number       Address   [address] [place] [id number]    Home Phone   [phone]    MRN   [id number]       Caodaism   Catholic    Marital Status                               Admission Date   12/22/23    Admission Type   Emergency    Admitting Provider   EBONY Verdugo DO    Attending Provider   EBONY Verdugo DO    Department, Room/Bed   [facility] 6B, N630/1       Discharge Date       Discharge Disposition       Discharge Destination                                 Attending Provider: EBONY Verdugo DO    Allergies: Augmentin [Amoxicillin-pot Clavulanate], Codeine, Shrimp, Strawberry    Isolation: Contact Drop   Infection: RSV (12/23/23)   Code Status: CPR    Ht: 152.4 cm (60\")   Wt: 51.3 kg (113 lb)    Admission Cmt: None   Principal Problem: Metastatic disease [C79.9]                   Active Insurance as of 12/22/2023       Primary Coverage       Payor Plan Insurance Group Employer/Plan Group    MEDICARE MEDICARE A & B        Payor Plan Address Payor Plan Phone Number Payor Plan Fax Number Effective Dates    [address] [phone]  5/1/1995 - None Entered    formerly [facility] [id number]         Subscriber Name Subscriber Birth Date Member ID       TEMI JARRETT 5/8/1930 [id number]               Secondary Coverage       Payor Plan Insurance Group Employer/Plan Group    ANTHEM BLUE CROSS ANTHEM BLUE CROSS BLUE SHIELD PPO [id number]       Payor Plan Address Payor Plan Phone Number Payor Plan Fax Number Effective Dates    [address] [phone]  12/1/2016 - None Entered    [facility] [id number]         Subscriber Name Subscriber Birth Date Member ID       TEMI JARRETT 5/8/1930 [id number]                     Emergency Contacts        (Rel.) Home Phone Work Phone Mobile Phone    Victor M Jarrett (POA) (Son) [phone] -- " [phone]    Chris Jarrett (Other) [phone] -- [phone]    Mery Soto (Daughter) [phone] -- [phone]              Vital Signs (last day)       Date/Time Temp Temp src Pulse Resp BP Patient Position SpO2    01/04/24 0426 98.8 (37.1) Oral 67 19 125/75 Lying 90    01/03/24 2322 97.9 (36.6) Oral 64 19 124/62 Lying 92    01/03/24 1219 96.7 (35.9) Oral -- 18 139/75 Lying --    01/03/24 0908 -- -- 67 -- -- -- --    01/03/24 0831 96.5 (35.8) Oral -- 18 128/97 Lying --    01/03/24 0432 96.3 (35.7) Oral 64 18 122/92 Lying 96    01/03/24 0033 98.1 (36.7) Oral 63 18 143/78 Lying 96          Current Facility-Administered Medications   Medication Dose Route Frequency Provider Last Rate Last Admin    acetaminophen (TYLENOL) tablet 650 mg  650 mg Oral Q4H PRN Christine, Savannah, APRN   650 mg at 12/31/23 2041    Or    acetaminophen (TYLENOL) 160 MG/5ML oral solution 650 mg  650 mg Oral Q4H PRN Christine, Savannah, APRN        Or    acetaminophen (TYLENOL) suppository 650 mg  650 mg Rectal Q4H PRN Christine, Savannah, APRN        amLODIPine (NORVASC) tablet 5 mg  5 mg Oral Daily Christine, Savannah, APRN   5 mg at 01/03/24 0909    benzonatate (TESSALON) capsule 200 mg  200 mg Oral TID PRN Christine, Savannah, APRN   200 mg at 12/31/23 1147    bethanechol (URECHOLINE) tablet 10 mg  10 mg Oral TID Christine, Savannah, APRN   10 mg at 01/03/24 2242    sennosides-docusate (PERICOLACE) 8.6-50 MG per tablet 2 tablet  2 tablet Oral BID Christine, Savannah, APRN   2 tablet at 01/03/24 0908    And    polyethylene glycol (MIRALAX) packet 17 g  17 g Oral Daily PRN Christine, Savannah, APRN        And    bisacodyl (DULCOLAX) EC tablet 5 mg  5 mg Oral Daily PRN Christine, Savannah, APRN        And    bisacodyl (DULCOLAX) suppository 10 mg  10 mg Rectal Daily PRN Christine, Savannah, APRN        busPIRone (BUSPAR) tablet 10 mg  10 mg Oral BID Christine, Savannah, APRN   10 mg at 01/03/24 2024    Calcium Replacement - Follow Nurse / BPA Driven Protocol   Does  not apply PRN Christine, Savannah, APRN        dextromethorphan polistirex ER (DELSYM) 30 MG/5ML oral suspension 60 mg  60 mg Oral Q12H Maria Luz Claudioey, DO   60 mg at 01/03/24 2024    donepezil (ARICEPT) tablet 10 mg  10 mg Oral Nightly DetroitRutha, APRN   10 mg at 01/03/24 2024    gabapentin (NEURONTIN) capsule 300 mg  300 mg Oral BID Christine, Savannah, APRN   300 mg at 01/03/24 2024    guaiFENesin (MUCINEX) 12 hr tablet 1,200 mg  1,200 mg Oral Q12H Amanda Claudio, DO   1,200 mg at 01/03/24 2024    Hydrocod Marcelo-Chlorphe Marcelo ER (TUSSIONEX PENNKINETIC) 10-8 MG/5ML ER suspension 2.5 mL  2.5 mL Oral Q12H PRN Mahogany Henry MD   2.5 mL at 01/03/24 1537    ipratropium-albuterol (DUO-NEB) nebulizer solution 3 mL  3 mL Nebulization Q4H PRN Sumi Yeh R, APRN   3 mL at 01/01/24 2142    levothyroxine (SYNTHROID, LEVOTHROID) tablet 150 mcg  150 mcg Oral Q AM Christine, Savannah, APRN   150 mcg at 01/04/24 0627    lisinopril (PRINIVIL,ZESTRIL) tablet 20 mg  20 mg Oral Daily Christine, Savannah, APRN   20 mg at 01/03/24 0908    Magnesium Standard Dose Replacement - Follow Nurse / BPA Driven Protocol   Does not apply PRN Christine, Savannah, APRN        melatonin tablet 5 mg  5 mg Oral Nightly PRN Sumi Yeh R, APRN   5 mg at 01/03/24 2037    memantine (NAMENDA) tablet 10 mg  10 mg Oral BID Christine, Savannah, APRN   10 mg at 01/03/24 2024    multivitamin with minerals 1 tablet  1 tablet Oral Daily Christine, Savannah, APRN   1 tablet at 01/02/24 0845    pantoprazole (PROTONIX) EC tablet 40 mg  40 mg Oral Daily Christine, Savannah, APRN   40 mg at 01/03/24 0909    phenol (CHLORASEPTIC) 1.4 % liquid 1 spray  1 spray Mouth/Throat Q2H PRN Jose Mustafa MD   1 spray at 12/31/23 1658    Phosphorus Replacement - Follow Nurse / BPA Driven Protocol   Does not apply PRN Christine, Savannah, APRN        Potassium Replacement - Follow Nurse / BPA Driven Protocol   Does not apply PRN Christine, Savannah, APRN        sertraline (ZOLOFT) tablet 50 mg  " 50 mg Oral Daily Christine, Savannah, APRN   50 mg at 01/03/24 0908    sodium chloride 0.9 % flush 10 mL  10 mL Intravenous PRN Jagdish Mclaughlin,         sodium chloride 0.9 % flush 10 mL  10 mL Intravenous Q12H Christine, Savannah, APRN   10 mL at 01/02/24 2117    traMADol (ULTRAM) tablet 50 mg  50 mg Oral Daily PRN Christine, Savannah, APRN   50 mg at 12/31/23 1500        Physician Progress Notes (last 24 hours)        Lyudmila Perry MD at 01/03/24 0911          Palliative Care Daily Progress Note     Referring:Mahogany Henry    C/C: \"Where's my breakfast,? Why are my toe nails so long?\"    S: Medical record reviewed.  Events noted.  Denied pain or SOA.  Unaware of where she is or why she is here.    ROS: Limited but denied SOA, Pain, N/V.    O: Code Status:   Code Status and Medical Interventions:   Ordered at: 12/22/23 2159     Level Of Support Discussed With:    Patient     Code Status (Patient has no pulse and is not breathing):    CPR (Attempt to Resuscitate)     Medical Interventions (Patient has pulse or is breathing):    Full Support      Advanced Directives: Advance Directive Status: Patient has advance directive, copy requested   Goals of Care: Ongoing.   Palliative Performance Scale Score: 40%     /97 (BP Location: Right arm, Patient Position: Lying)   Pulse 67   Temp 96.5 °F (35.8 °C) (Oral)   Resp 18   Ht 152.4 cm (60\")   Wt 51.3 kg (113 lb)   LMP  (LMP Unknown)   SpO2 96%   BMI 22.07 kg/m²     Intake/Output Summary (Last 24 hours) at 1/3/2024 0911  Last data filed at 1/3/2024 0432  Gross per 24 hour   Intake --   Output 700 ml   Net -700 ml       PE:  General Appearance:    Alert, cooperative, NAD   HEENT:    NC/AT, EOMI, anicteric, MMM, face relaxed   Neck:   supple, trachea midline, no JVD   Lungs:     CTA bilaterally, diminished in bases; respirations regular, even and unlabored; RR on exam    Heart:    RRR, normal S1 and S2, no M/R/G   Abdomen:     Normal bowel sounds, soft, " nontender, nondistended   G/U:   Deferred   MSK/Extremities:   Wasting, no edema   Pulses:   Pulses palpable and equal bilaterally   Skin:   Warm, dry   Neurologic:   A/Ox1, cooperative, KOCH   Psych:   Calm, appropriate     Meds: Reviewed and changes noted    Labs:   Results from last 7 days   Lab Units 23  0427   WBC 10*3/mm3 6.30   HEMOGLOBIN g/dL 12.8   HEMATOCRIT % 40.0   PLATELETS 10*3/mm3 173     Results from last 7 days   Lab Units 23  0427   SODIUM mmol/L 143   POTASSIUM mmol/L 3.8   CHLORIDE mmol/L 103   CO2 mmol/L 33.0*   BUN mg/dL 24*   CREATININE mg/dL 0.51*   GLUCOSE mg/dL 88   CALCIUM mg/dL 8.4     Results from last 7 days   Lab Units 23  0427   SODIUM mmol/L 143   POTASSIUM mmol/L 3.8   CHLORIDE mmol/L 103   CO2 mmol/L 33.0*   BUN mg/dL 24*   CREATININE mg/dL 0.51*   CALCIUM mg/dL 8.4   GLUCOSE mg/dL 88     Imaging Results (Last 72 Hours)       ** No results found for the last 72 hours. **                  Diagnostics: Reviewed    A:     Metastatic disease    Hypothyroidism (acquired)    Anxiety with depression    Essential hypertension    Hiatal hernia    Alzheimer's disease    Prolonged Q-T interval on ECG       Impression:  Metastatic CA unknown primary  Prolonged Qtc  Htn  Alzheimer's dz  RSV    Symptoms:  Dyspnea    P:   Attempted to reach son today without success.  Will continue to reach out.  Would recommend hospice at facility and change to code status but first must reach again.  Palliative Care Team will continue to follow patient.     Lyudmila Perry MD  1/3/2024  Time spent:24      Electronically signed by Lyudmila Perry MD at 24 [phone]       Mahogany Henry MD at 24 0736              [facility] Medicine Services  PROGRESS NOTE    Patient Name: Temi Jarrett  : 1930  MRN: [id number]    Date of Admission: 2023  Primary Care Physician: Eric Patel MD    Subjective   Subjective      CC:  RSV    HPI:  No acute events overnight. Thanks me for coming to visit.       Objective   Objective     Vital Signs:   Temp:  [96.3 °F (35.7 °C)-98.2 °F (36.8 °C)] 96.3 °F (35.7 °C)  Heart Rate:  [62-67] 64  Resp:  [18] 18  BP: (122-147)/(78-92) 122/92  Flow (L/min):  [4] 4     Physical Exam:  Constitutional: thin frail, elderly female  Respiratory: rhonchi, no wheezing  Cardiovascular: RRR, no murmurs, rubs, or gallops  Gastrointestinal: Positive bowel sounds, soft, nontender, nondistended  Musculoskeletal: No bilateral ankle edema  Psychiatric: Appropriate affect, cooperative  Neurologic: Oriented to person      Results Reviewed:  LAB RESULTS:      Lab 12/29/23  0427   WBC 6.30   HEMOGLOBIN 12.8   HEMATOCRIT 40.0   PLATELETS 173   NEUTROS ABS 4.04   IMMATURE GRANS (ABS) 0.01   LYMPHS ABS 1.58   MONOS ABS 0.48   EOS ABS 0.15   MCV 87.3         Lab 12/29/23  0427   SODIUM 143   POTASSIUM 3.8   CHLORIDE 103   CO2 33.0*   ANION GAP 7.0   BUN 24*   CREATININE 0.51*   EGFR 87.2   GLUCOSE 88   CALCIUM 8.4                         Brief Urine Lab Results  (Last result in the past 365 days)        Color   Clarity   Blood   Leuk Est   Nitrite   Protein   CREAT   Urine HCG        09/05/23 2112 Yellow   Cloudy     Small                       Microbiology Results Abnormal       None            No radiology results from the last 24 hrs    Results for orders placed during the hospital encounter of 08/02/20    Transthoracic Echo Complete With Contrast if Necessary Per Protocol    Interpretation Summary  · Left ventricular systolic function is normal. Estimated EF = 70%.  · Left ventricular diastolic dysfunction (grade I) consistent with impaired relaxation.  · Left atrial cavity size is moderately dilated.  · There is calcification of the aortic valve. There is no significant stenosis or regurgitation. A Lambel's excrescence is noted on an aortic valve leaflet.  · Moderate mitral valve regurgitation is present.  ·  Estimated right ventricular systolic pressure from tricuspid regurgitation is moderately elevated (49 mmHg).  · There is a moderate (1-2cm) circumferential pericardial effusion. There is no tamponade physiology.      Current medications:  Scheduled Meds:amLODIPine, 5 mg, Oral, Daily  bethanechol, 10 mg, Oral, TID  busPIRone, 10 mg, Oral, BID  dextromethorphan polistirex ER, 60 mg, Oral, Q12H  donepezil, 10 mg, Oral, Nightly  gabapentin, 300 mg, Oral, BID  guaiFENesin, 1,200 mg, Oral, Q12H  levothyroxine, 150 mcg, Oral, Q AM  lisinopril, 20 mg, Oral, Daily  memantine, 10 mg, Oral, BID  multivitamin with minerals, 1 tablet, Oral, Daily  pantoprazole, 40 mg, Oral, Daily  senna-docusate sodium, 2 tablet, Oral, BID  sertraline, 50 mg, Oral, Daily  sodium chloride, 10 mL, Intravenous, Q12H      Continuous Infusions:   PRN Meds:.  acetaminophen **OR** acetaminophen **OR** acetaminophen    benzonatate    senna-docusate sodium **AND** polyethylene glycol **AND** bisacodyl **AND** bisacodyl    Calcium Replacement - Follow Nurse / BPA Driven Protocol    Hydrocod Marcelo-Chlorphe Marcelo ER    ipratropium-albuterol    Magnesium Standard Dose Replacement - Follow Nurse / BPA Driven Protocol    melatonin    phenol    Phosphorus Replacement - Follow Nurse / BPA Driven Protocol    Potassium Replacement - Follow Nurse / BPA Driven Protocol    sodium chloride    traMADol    Assessment & Plan   Assessment & Plan     Active Hospital Problems    Diagnosis  POA    **Metastatic disease [C79.9]  Yes    Prolonged Q-T interval on ECG [R94.31]  Yes    Alzheimer's disease [G30.9, F02.80]  Yes    Hiatal hernia [K44.9]  Yes    Anxiety with depression [F41.8]  Yes    Essential hypertension [I10]  Yes    Hypothyroidism (acquired) [E03.9]  Yes      Resolved Hospital Problems   No resolved problems to display.        Brief Hospital Course to date:  89 year old female with HTN, alzheimers, hiatal hernia, hypothryoid presented to the ED from [facility]  [place] via EMS after an abnormal CXR. Patient had a CXR for cough and was noted to have a new 7.5 cm left suprahilar/mediastinal mass. CT scan with findings compatible with metastatic disease to the lung. Multiple round lung nodules with enlaged centrally necrotic left sided prevascular and hilar lymph nodes measuring up to 5 cm. CT abd/pelvis with contrast was unremarkable. Patient was also noted to be RSV +. Oncology was consulted and after discussion with the patient, decision was made to not pursue diagnosis because she has no treatment options due to functional status and age.      Metastatic disease   - Evaluated by oncology - no treatment options due to age and functional status.   - Oxygen as needed. Currently on 3L NC  - Consider PNPD if unable to expectorate sputum  - palliative care following, working with family regarding goals of care, hospice would be appropriate.      RSV  Hypoxia, stable  - Noted on resp PCR. Likely causing her acute symptoms at this time.   - Supportive care  - Mucinex. Delsym. Tessaslon. Mobilize.   - Flutter valve  - Oxygen stable at 4L     Prolonged QT  - Avoid prolonging medications      Hypokalemia  -replace per protocol     Hypothyroidism  -continue synthroid      GERD  -PPI      Alzheimer's disease   - continue namenda and aricept   - Delirium precautions - up in the chair, window shades open     Essential hypertension   - continue norvasc, lisinopril      Anxiety/depression   - continue zoloft, buspar      Large Hiatal hernia  - CT with large hiatal hernia containing stomach, bowel and pancreatic body and tail  - Currently asymptomatic      Expected Discharge Location and Transportation: back to [place] when out of precautions (1/5/24)     Expected Discharge   Expected Discharge Date: 1/5/2024; Expected Discharge Time:      DVT prophylaxis:  Mechanical DVT prophylaxis orders are present.     AM-PAC 6 Clicks Score (PT): 18 (01/02/24 5960)    CODE STATUS:   Code Status and  Medical Interventions:   Ordered at: 232     Level Of Support Discussed With:    Patient     Code Status (Patient has no pulse and is not breathing):    CPR (Attempt to Resuscitate)     Medical Interventions (Patient has pulse or is breathing):    Full Support     I have prepared this progress note with copied portions of the prior day's progress note of my own authorship to preserve accuracy and maintain consistency of documentation. I have reviewed these portions and edited them for correctness. I verify that the above documentation accurately and truly represents the evaluation and management performed on today's date.       Mahogany Henry MD  24        Electronically signed by Mahogany Henry MD at 24 1333       Consult Notes (last 24 hours)  Notes from 24 0715 through 24 0715   No notes of this type exist for this encounter.          Physical Therapy Notes (most recent note)        Isha May, PT at 24 0855  Version 1 of 1         Patient Name: Temi Jarrett  : 1930    MRN: [id number]                              Today's Date: 2024       Admit Date: 2023    Visit Dx:     ICD-10-CM ICD-9-CM   1. Malignant neoplasm of lung, unspecified laterality, unspecified part of lung  C34.90 162.9   2. Chest mass  R22.2 786.6     Patient Active Problem List   Diagnosis    Esophageal reflux    Hypothyroidism (acquired)    Anxiety with depression    Peripheral neuropathy    Spinal stenosis of lumbar region    Osteoporosis    Disc disorder of lumbar region    Bladder prolapse, female, acquired    Essential hypertension    Pernicious anemia    Annual physical exam    Primary osteoarthritis of both knees    Hiatal hernia    Multifocal aspiration pneumonia due to large intrathoracic hiatal hernia    Recurrent UTI    Paroxysmal atrial fibrillation    Alzheimer's disease    Prolonged Q-T interval on ECG    Mild cognitive impairment    At high risk for  aspiration    Chronic pain    Osteoarthritis of right knee    Acute cough    Pressure injury of right buttock, stage 2    Nasal congestion    Metastatic disease     Past Medical History:   Diagnosis Date    Acute sepsis 01/14/2021    Arthritis     Bladder prolapse, female, acquired     Closed fracture of neck of left femur 09/27/2019    Dementia     Depression     Disease of thyroid gland     Gastric polyp     GERD (gastroesophageal reflux disease)     Hiatal hernia     History of colonic polyps     Hyperlipidemia     Hypertension     IBS (irritable bowel syndrome)     Macular degeneration     Pacemaker 2023    Pericardial effusion 08/03/2020    Echo (8/3/2020): Normal LVEF.  Moderate pericardial effusion without tamponade.  Moderate MR. RVSP 49 mmHg    Torn meniscus     right knee      Past Surgical History:   Procedure Laterality Date    CHOLECYSTECTOMY  1997    ENDOSCOPY N/A 01/14/2021    Procedure: ESOPHAGOGASTRODUODENOSCOPY;  Surgeon: Serjio Guerrero MD;  Location: [facility] ENDOSCOPY;  Service: General;  Laterality: N/A;    EYE SURGERY  1998    Cataract extraction    HERNIA REPAIR      HIP HEMIARTHROPLASTY Left 09/28/2019    Procedure: HIP HEMIARTHROPLASTY LEFT;  Surgeon: Reddy Segundo Jr., MD;  Location: [facility] OR;  Service: Orthopedics    HYSTERECTOMY  1976    KNEE SURGERY Right     arthroscopy- 2000    SKIN CANCER EXCISION Left     [facility]; CANCEROUS SPOT DIDN'T  MOVE BUT NEEDED REMOVED    TONSILLECTOMY        General Information       Row Name 01/02/24 1007          Physical Therapy Time and Intention    Document Type therapy note (daily note)  -ND     Mode of Treatment physical therapy  -ND       Row Name 01/02/24 1007          General Information    Patient Profile Reviewed yes  -ND     Existing Precautions/Restrictions oxygen therapy device and L/min;fall  Dementia.  -ND     Barriers to Rehab medically complex;previous functional deficit;cognitive status;visual  deficit;hearing deficit  -ND       Row Name 01/02/24 1007          Cognition    Orientation Status (Cognition) oriented to;person  -ND       Row Name 01/02/24 1007          Safety Issues, Functional Mobility    Safety Issues Affecting Function (Mobility) awareness of need for assistance;insight into deficits/self-awareness;positioning of assistive device;safety precaution awareness;safety precautions follow-through/compliance;sequencing abilities  -ND     Impairments Affecting Function (Mobility) balance;endurance/activity tolerance;cognition;strength;postural/trunk control  -ND     Cognitive Impairments, Mobility Safety/Performance awareness, need for assistance;insight into deficits/self-awareness;problem-solving/reasoning;safety precaution awareness;safety precaution follow-through;sequencing abilities  -ND               User Key  (r) = Recorded By, (t) = Taken By, (c) = Cosigned By      Initials Name Provider Type    ND Isha May, PT Physical Therapist                   Mobility       Row Name 01/02/24 1008          Bed Mobility    Bed Mobility supine-sit  -ND     Supine-Sit [place] (Bed Mobility) minimum assist (75% patient effort);verbal cues;nonverbal cues (demo/gesture);1 person assist  -ND     Assistive Device (Bed Mobility) bed rails;head of bed elevated;draw sheet  -ND     Comment, (Bed Mobility) Increased time to perform task with VC for sequencing.  -ND       Row Name 01/02/24 1008          Sit-Stand Transfer    Sit-Stand [place] (Transfers) contact guard;verbal cues;nonverbal cues (demo/gesture);1 person assist  -ND     Assistive Device (Sit-Stand Transfers) walker, front-wheeled  -ND     Comment, (Sit-Stand Transfer) STS from EOB and STS x2 from chair with VC for hand placement.  -ND       Row Name 01/02/24 1008          Gait/Stairs (Locomotion)    [place] Level (Gait) contact guard;1 person assist;verbal cues  -ND     Assistive Device (Gait) walker, front-wheeled  -ND      Distance in Feet (Gait) 3 + 5 + 12  -ND     Deviations/Abnormal Patterns (Gait) base of support, narrow;kane decreased;festinating/shuffling;gait speed decreased;stride length decreased;weight shifting decreased  -ND     Bilateral Gait Deviations forward flexed posture;heel strike decreased  -ND     Comment, (Gait/Stairs) Pt ambulating within room with CGA, RWx, and chair follow. Pt with forward flexed posture due to kyphosis. Seated rest btwn ambulation bouts due to fatigue.  -ND               User Key  (r) = Recorded By, (t) = Taken By, (c) = Cosigned By      Initials Name Provider Type    ND Isha May PT Physical Therapist                   Obj/Interventions       Row Name 01/02/24 1012          Motor Skills    Therapeutic Exercise hip;knee;ankle  -ND       Row Name 01/02/24 1012          Hip (Therapeutic Exercise)    Hip (Therapeutic Exercise) strengthening exercise  -ND     Hip Strengthening (Therapeutic Exercise) bilateral;aBduction;aDduction;marching while seated;10 repetitions  -ND       Row Name 01/02/24 1012          Knee (Therapeutic Exercise)    Knee (Therapeutic Exercise) strengthening exercise  -ND     Knee Strengthening (Therapeutic Exercise) bilateral;LAQ (long arc quad);SLR (straight leg raise);10 repetitions  -ND       Row Name 01/02/24 1012          Ankle (Therapeutic Exercise)    Ankle (Therapeutic Exercise) AROM (active range of motion)  -ND     Ankle AROM (Therapeutic Exercise) bilateral;dorsiflexion;plantarflexion;10 repetitions  -ND       Row Name 01/02/24 1012          Balance    Balance Assessment sitting static balance;sitting dynamic balance;sit to stand dynamic balance;standing static balance;standing dynamic balance  -ND     Static Sitting Balance standby assist  -ND     Dynamic Sitting Balance standby assist  -ND     Position, Sitting Balance unsupported;sitting edge of bed;sitting in chair  -ND     Sit to Stand Dynamic Balance contact guard;verbal cues;1-person assist   -ND     Static Standing Balance contact guard;verbal cues  -ND     Dynamic Standing Balance contact guard;verbal cues  -ND     Position/Device Used, Standing Balance supported;walker, front-wheeled  -ND     Balance Interventions sitting;standing;sit to stand;supported;static;dynamic  -ND     Comment, Balance Pt with general instability with ambulation but no instances LOB noted with standing or ambulating tasks.  -ND               User Key  (r) = Recorded By, (t) = Taken By, (c) = Cosigned By      Initials Name Provider Type    Isha Baker, LEFTY Physical Therapist                   Goals/Plan    No documentation.                  Clinical Impression       Row Name 01/02/24 1013          Pain    Pretreatment Pain Rating 0/10 - no pain  -ND     Posttreatment Pain Rating 0/10 - no pain  -ND       Row Name 01/02/24 1013          Plan of Care Review    Plan of Care Reviewed With patient  -ND     Progress improving  -ND     Outcome Evaluation Pt with increased ambulation distance this date. Pt continues to demonstrate deficits in activity tolerance, balance, and strength and would benefit from continued skilled therapy. Recommending pt return to previous facility.  -ND       Row Name 01/02/24 1013          Therapy Assessment/Plan (PT)    Rehab Potential (PT) fair, will monitor progress closely  -ND     Therapy Frequency (PT) daily  -ND       Row Name 01/02/24 1013          Vital Signs    Pretreatment Heart Rate (beats/min) 62  -ND     Posttreatment Heart Rate (beats/min) 58  -ND     Pre SpO2 (%) 95  -ND     O2 Delivery Pre Treatment nasal cannula  -ND     O2 Delivery Intra Treatment nasal cannula  -ND     Post SpO2 (%) 95  -ND     O2 Delivery Post Treatment nasal cannula  -ND     Pre Patient Position Supine  -ND     Intra Patient Position Standing  -ND     Post Patient Position Sitting  -ND       Row Name 01/02/24 1013          Positioning and Restraints    Pre-Treatment Position in bed  -ND     Post Treatment  Position chair  -ND     In Chair notified nsg;reclined;legs elevated;call light within reach;encouraged to call for assist;exit alarm on;waffle cushion;on mechanical lift sling  -ND               User Key  (r) = Recorded By, (t) = Taken By, (c) = Cosigned By      Initials Name Provider Type    Isha Baker, PT Physical Therapist                   Outcome Measures       Row Name 01/02/24 1022 01/02/24 1016       How much help from another person do you currently need...    Turning from your back to your side while in flat bed without using bedrails? 4  -SB 4  -ND    Moving from lying on back to sitting on the side of a flat bed without bedrails? 3  -SB 3  -ND    Moving to and from a bed to a chair (including a wheelchair)? 3  -SB 3  -ND    Standing up from a chair using your arms (e.g., wheelchair, bedside chair)? 3  -SB 3  -ND    Climbing 3-5 steps with a railing? 2  -SB 2  -ND    To walk in hospital room? 3  -SB 3  -ND    AM-PAC 6 Clicks Score (PT) 18  -SB 18  -ND    Highest Level of Mobility Goal 6 --> Walk 10 steps or more  -SB 6 --> Walk 10 steps or more  -ND      Row Name 01/02/24 0848          How much help from another person do you currently need...    Turning from your back to your side while in flat bed without using bedrails? 4  -SB     Moving from lying on back to sitting on the side of a flat bed without bedrails? 3  -SB     Moving to and from a bed to a chair (including a wheelchair)? 3  -SB     Standing up from a chair using your arms (e.g., wheelchair, bedside chair)? 3  -SB     Climbing 3-5 steps with a railing? 3  -SB     To walk in hospital room? 3  -SB     AM-PAC 6 Clicks Score (PT) 19  -SB     Highest Level of Mobility Goal 6 --> Walk 10 steps or more  -SB               User Key  (r) = Recorded By, (t) = Taken By, (c) = Cosigned By      Initials Name Provider Type    Omid Goyal, RN Registered Nurse    Isha Baker, PT Physical Therapist                                  Physical Therapy Education       Title: PT OT SLP Therapies (In Progress)       Topic: Physical Therapy (Done)       Point: Mobility training (Done)       Learning Progress Summary             Patient Acceptance, E, VU by ND at 1/2/2024 1016    Acceptance, E, NR by SD at 12/29/2023 1529    Acceptance, E, NR by ND at 12/26/2023 1406                         Point: Home exercise program (Done)       Learning Progress Summary             Patient Acceptance, E, VU by ND at 1/2/2024 1016    Acceptance, E, NR by SD at 12/29/2023 1529    Acceptance, E, NR by ND at 12/26/2023 1406                         Point: Body mechanics (Done)       Learning Progress Summary             Patient Acceptance, E, VU by ND at 1/2/2024 1016    Acceptance, E, NR by SD at 12/29/2023 1529    Acceptance, E, NR by ND at 12/26/2023 1406                         Point: Precautions (Done)       Learning Progress Summary             Patient Acceptance, E, VU by ND at 1/2/2024 1016    Acceptance, E, NR by SD at 12/29/2023 1529    Acceptance, E, NR by ND at 12/26/2023 1406                                         User Key       Initials Effective Dates Name Provider Type Discipline    SD 03/13/23 -  Ángela Arana, PT Physical Therapist PT    ND 11/16/23 -  Isha May PT Physical Therapist PT                  PT Recommendation and Plan  Planned Therapy Interventions (PT): balance training, bed mobility training, gait training, transfer training, patient/family education, home exercise program, strengthening, ROM (range of motion), postural re-education  Plan of Care Reviewed With: patient  Progress: improving  Outcome Evaluation: Pt with increased ambulation distance this date. Pt continues to demonstrate deficits in activity tolerance, balance, and strength and would benefit from continued skilled therapy. Recommending pt return to previous facility.     Time Calculation:   PT Evaluation Complexity  History, PT Evaluation Complexity: 3  or more personal factors and/or comorbidities  Examination of Body Systems (PT Eval Complexity): total of 4 or more elements  Clinical Presentation (PT Evaluation Complexity): evolving  Clinical Decision Making (PT Evaluation Complexity): moderate complexity  Overall Complexity (PT Evaluation Complexity): moderate complexity     PT Charges       Row Name 24 1016             Time Calculation    Start Time 0855  -ND      PT Received On 24  -ND      PT Goal Re-Cert Due Date 24  -ND         Timed Charges    19310 - Gait Training Minutes  14  -ND      19245 - PT Therapeutic Activity Minutes 15  -ND         Total Minutes    Timed Charges Total Minutes 29  -ND       Total Minutes                 User Key  (r) = Recorded By, (t) = Taken By, (c) = Cosigned By      Initials Name Provider Type    ND Isha May, PT Physical Therapist                  Therapy Charges for Today       Code Description Service Date Service Provider Modifiers Qty    85235147368 HC GAIT TRAINING EA 15 MIN 2024 Isha May, PT GP 1    93938776074 HC PT THERAPEUTIC ACT EA 15 MIN 2024 Isha May, PT GP 1            PT G-Codes  Outcome Measure Options: AM-PAC 6 Clicks Basic Mobility (PT)  AM-PAC 6 Clicks Score (PT): 18  AM-PAC 6 Clicks Score (OT): 16  PT Discharge Summary  Anticipated Discharge Disposition (PT): other (see comments) (Return to previous facility.)    Isha May, PT  2024      Electronically signed by Isha May, PT at 24 1048          Occupational Therapy Notes (most recent note)        Jeni Trejo, OT at 24 0957          Patient Name: Temi Jarrett  : 1930    MRN: [id number]                              Today's Date: 2024       Admit Date: 2023    Visit Dx:     ICD-10-CM ICD-9-CM   1. Malignant neoplasm of lung, unspecified laterality, unspecified part of lung  C34.90 162.9   2. Chest mass  R22.2 786.6     Patient Active Problem  List   Diagnosis    Esophageal reflux    Hypothyroidism (acquired)    Anxiety with depression    Peripheral neuropathy    Spinal stenosis of lumbar region    Osteoporosis    Disc disorder of lumbar region    Bladder prolapse, female, acquired    Essential hypertension    Pernicious anemia    Annual physical exam    Primary osteoarthritis of both knees    Hiatal hernia    Multifocal aspiration pneumonia due to large intrathoracic hiatal hernia    Recurrent UTI    Paroxysmal atrial fibrillation    Alzheimer's disease    Prolonged Q-T interval on ECG    Mild cognitive impairment    At high risk for aspiration    Chronic pain    Osteoarthritis of right knee    Acute cough    Pressure injury of right buttock, stage 2    Nasal congestion    Metastatic disease     Past Medical History:   Diagnosis Date    Acute sepsis 01/14/2021    Arthritis     Bladder prolapse, female, acquired     Closed fracture of neck of left femur 09/27/2019    Dementia     Depression     Disease of thyroid gland     Gastric polyp     GERD (gastroesophageal reflux disease)     Hiatal hernia     History of colonic polyps     Hyperlipidemia     Hypertension     IBS (irritable bowel syndrome)     Macular degeneration     Pacemaker 2023    Pericardial effusion 08/03/2020    Echo (8/3/2020): Normal LVEF.  Moderate pericardial effusion without tamponade.  Moderate MR. RVSP 49 mmHg    Torn meniscus     right knee      Past Surgical History:   Procedure Laterality Date    CHOLECYSTECTOMY  1997    ENDOSCOPY N/A 01/14/2021    Procedure: ESOPHAGOGASTRODUODENOSCOPY;  Surgeon: Serjio Guerrero MD;  Location:  [facility];  Service: General;  Laterality: N/A;    EYE SURGERY  1998    Cataract extraction    HERNIA REPAIR      HIP HEMIARTHROPLASTY Left 09/28/2019    Procedure: HIP HEMIARTHROPLASTY LEFT;  Surgeon: Reddy Segundo Jr., MD;  Location:  [facility];  Service: Orthopedics    HYSTERECTOMY  1976    KNEE SURGERY Right     arthroscopy- 2000    SKIN  CANCER EXCISION Left     [facility]; CANCEROUS SPOT DIDN'T  MOVE BUT NEEDED REMOVED    TONSILLECTOMY        General Information       Row Name 01/02/24 1049          OT Time and Intention    Document Type therapy note (daily note)  -KF     Mode of Treatment occupational therapy;individual therapy  -       Row Name 01/02/24 1049          General Information    Patient Profile Reviewed yes  -KF     Existing Precautions/Restrictions oxygen therapy device and L/min;fall  -KF     Barriers to Rehab medically complex;previous functional deficit;hearing deficit;visual deficit  -       Row Name 01/02/24 1049          Cognition    Orientation Status (Cognition) oriented x 3  -       Row Name 01/02/24 1049          Safety Issues, Functional Mobility    Safety Issues Affecting Function (Mobility) awareness of need for assistance;insight into deficits/self-awareness;judgment;positioning of assistive device;problem-solving;safety precaution awareness;safety precautions follow-through/compliance;sequencing abilities  -KF     Impairments Affecting Function (Mobility) balance;endurance/activity tolerance;strength;postural/trunk control;shortness of breath  -KF               User Key  (r) = Recorded By, (t) = Taken By, (c) = Cosigned By      Initials Name Provider Type    KF Jeni Trejo, ROSEMARY Occupational Therapist                     Mobility/ADL's       Row Name 01/02/24 1049          Bed Mobility    Bed Mobility sit-supine  -     Sit-Supine [place] (Bed Mobility) minimum assist (75% patient effort);1 person assist;verbal cues;nonverbal cues (demo/gesture)  -     Assistive Device (Bed Mobility) draw sheet  -       Row Name 01/02/24 1049          Transfers    Transfers sit-stand transfer;stand-sit transfer;toilet transfer  -     Comment, (Transfers) STS from bedside chair, commode, and shower chair while sink side using a RWx with Nicole  -       Row Name 01/02/24 1049          Sit-Stand  Transfer    Sit-Stand [place] (Transfers) minimum assist (75% patient effort);1 person assist;verbal cues  -     Assistive Device (Sit-Stand Transfers) walker, front-wheeled  -       Row Name 01/02/24 1049          Stand-Sit Transfer    Stand-Sit [place] (Transfers) minimum assist (75% patient effort);1 person assist;verbal cues  -KF     Assistive Device (Stand-Sit Transfers) walker, front-wheeled  -       Row Name 01/02/24 1049          Toilet Transfer    Type (Toilet Transfer) sit-stand;stand-sit  -KF     [place] Level (Toilet Transfer) minimum assist (75% patient effort);1 person assist;verbal cues  -     Assistive Device (Toilet Transfer) walker, front-wheeled;commode;grab bars/safety frame  -       Row Name 01/02/24 1049          Functional Mobility    Functional Mobility- Ind. Level contact guard assist;verbal cues required  -     Functional Mobility- Device walker, front-wheeled  -     Functional Mobility- Comment In room ambulation to/from the bathroom completed using a RWx with CGA, verbal cues for upright posture  -       Row Name 01/02/24 1049          Activities of Daily Living    BADL Assessment/Intervention grooming;toileting  -       Row Name 01/02/24 1049          Grooming Assessment/Training    [place] Level (Grooming) oral care regimen;wash face, hands;contact guard assist;hair care, combing/brushing;set up  -     Position (Grooming) supported standing;supported sitting  -     Comment, (Grooming) Pt completed hand/facial hygiene and oral hygiene with CGA while standing sink side. Increased BUE reliance on sink counter noticed. The pt required a seated rest break, completing hair care while sitting.  -       Row Name 01/02/24 1049          Toileting Assessment/Training    [place] Level (Toileting) adjust/manage clothing;perform perineal hygiene;set up;contact guard assist  -     Assistive Devices (Toileting) commode;grab bar/safety frame  -      Position (Toileting) unsupported sitting  -KF               User Key  (r) = Recorded By, (t) = Taken By, (c) = Cosigned By      Initials Name Provider Type    KF Jeni Trejo OT Occupational Therapist                   Obj/Interventions       Row Name 01/02/24 1052          Balance    Balance Assessment sitting static balance;sitting dynamic balance;sit to stand dynamic balance;standing static balance;standing dynamic balance  -KF     Static Sitting Balance supervision  -KF     Dynamic Sitting Balance standby assist  -KF     Position, Sitting Balance unsupported;other (see comments)  commode and shower chair while sink side  -KF     Sit to Stand Dynamic Balance minimal assist  -KF     Static Standing Balance contact guard  -KF     Dynamic Standing Balance minimal assist  -KF     Position/Device Used, Standing Balance supported;walker, front-wheeled  -KF     Balance Interventions sitting;standing;sit to stand;supported;static;dynamic;occupation based/functional task  -KF     Comment, Balance No overt LOB  -KF               User Key  (r) = Recorded By, (t) = Taken By, (c) = Cosigned By      Initials Name Provider Type    KF Jeni Trejo OT Occupational Therapist                   Goals/Plan    No documentation.                  Clinical Impression       Row Name 01/02/24 1052          Pain Assessment    Pretreatment Pain Rating 0/10 - no pain  -KF     Posttreatment Pain Rating 0/10 - no pain  -KF     Pain Intervention(s) Repositioned;Ambulation/increased activity  -KF       Row Name 01/02/24 1052          Plan of Care Review    Plan of Care Reviewed With patient  -KF     Progress improving  -KF     Outcome Evaluation Pt with good participation and progress toward goals during OT session today. The pt performed in room ambulation to/from the bathroom using a RWx with CGA. Pt required Nicole to stand from commode and shower chair while sink side. The pt completed toileting and grooming ADLs while in the bathroom  with CGA. Pt fatigues quickly, requiring a seated rest break during activity. The pt remains below her functional baseline with generalized weakness, decreased activity tolerance, and balance deficits. Pt will benefit form continued IP OT services to address deficits listed. If deemed medically appropriate, recommend a return to previous facility.  -       Row Name 01/02/24 1052          Therapy Assessment/Plan (OT)    Rehab Potential (OT) good, to achieve stated therapy goals  -     Criteria for Skilled Therapeutic Interventions Met (OT) yes;meets criteria;skilled treatment is necessary  -KF     Therapy Frequency (OT) daily  -KF       Row Name 01/02/24 1052          Therapy Plan Review/Discharge Plan (OT)    Anticipated Discharge Disposition (OT) other (see comments)  return to previous facility  -       Row Name 01/02/24 1052          Vital Signs    Pre Patient Position Sitting  -KF     Intra Patient Position Standing  -KF     Post Patient Position Supine  -       Row Name 01/02/24 1052          Positioning and Restraints    In Bed supine;call light within reach;encouraged to call for assist;exit alarm on  -KF               User Key  (r) = Recorded By, (t) = Taken By, (c) = Cosigned By      Initials Name Provider Type    KF Jeni Trejo, OT Occupational Therapist                   Outcome Measures       Row Name 01/02/24 1055          How much help from another is currently needed...    Putting on and taking off regular lower body clothing? 3  -KF     Bathing (including washing, rinsing, and drying) 3  -KF     Toileting (which includes using toilet bed pan or urinal) 3  -KF     Putting on and taking off regular upper body clothing 3  -KF     Taking care of personal grooming (such as brushing teeth) 3  -KF     Eating meals 3  -KF     AM-PAC 6 Clicks Score (OT) 18  -KF       Row Name 01/02/24 1022 01/02/24 1016       How much help from another person do you currently need...    Turning from your back to  your side while in flat bed without using bedrails? 4  -SB 4  -ND    Moving from lying on back to sitting on the side of a flat bed without bedrails? 3  -SB 3  -ND    Moving to and from a bed to a chair (including a wheelchair)? 3  -SB 3  -ND    Standing up from a chair using your arms (e.g., wheelchair, bedside chair)? 3  -SB 3  -ND    Climbing 3-5 steps with a railing? 2  -SB 2  -ND    To walk in hospital room? 3  -SB 3  -ND    AM-PAC 6 Clicks Score (PT) 18  -SB 18  -ND    Highest Level of Mobility Goal 6 --> Walk 10 steps or more  -SB 6 --> Walk 10 steps or more  -ND      Row Name 01/02/24 0848          How much help from another person do you currently need...    Turning from your back to your side while in flat bed without using bedrails? 4  -SB     Moving from lying on back to sitting on the side of a flat bed without bedrails? 3  -SB     Moving to and from a bed to a chair (including a wheelchair)? 3  -SB     Standing up from a chair using your arms (e.g., wheelchair, bedside chair)? 3  -SB     Climbing 3-5 steps with a railing? 3  -SB     To walk in hospital room? 3  -SB     AM-PAC 6 Clicks Score (PT) 19  -SB     Highest Level of Mobility Goal 6 --> Walk 10 steps or more  -SB       Row Name 01/02/24 1055          Functional Assessment    Outcome Measure Options AM-PAC 6 Clicks Daily Activity (OT)  -KF               User Key  (r) = Recorded By, (t) = Taken By, (c) = Cosigned By      Initials Name Provider Type    Omid Goyal, RN Registered Nurse    Jeni Oliveira OT Occupational Therapist    Isha Baker, LEFTY Physical Therapist                    Occupational Therapy Education       Title: PT OT SLP Therapies (In Progress)       Topic: Occupational Therapy (In Progress)       Point: ADL training (Done)       Description:   Instruct learner(s) on proper safety adaptation and remediation techniques during self care or transfers.   Instruct in proper use of assistive devices.                   Learning Progress Summary             Patient Acceptance, E,TB, VU,DU by  at 1/2/2024 0957    Acceptance, E,TB, VU,DU by  at 12/29/2023 0845    Acceptance, E,TB, VU,DU by  at 12/27/2023 1329    Acceptance, E, NR by  at 12/24/2023 1020    Comment: educated pt regarding role of therapy                         Point: Home exercise program (Not Started)       Description:   Instruct learner(s) on appropriate technique for monitoring, assisting and/or progressing therapeutic exercises/activities.                  Learner Progress:  Not documented in this visit.              Point: Precautions (Done)       Description:   Instruct learner(s) on prescribed precautions during self-care and functional transfers.                  Learning Progress Summary             Patient Acceptance, E,TB, VU,DU by  at 1/2/2024 0957    Acceptance, E,TB, VU,DU by KF at 12/29/2023 0845    Acceptance, E,TB, VU,DU by  at 12/27/2023 1329                         Point: Body mechanics (Done)       Description:   Instruct learner(s) on proper positioning and spine alignment during self-care, functional mobility activities and/or exercises.                  Learning Progress Summary             Patient Acceptance, E,TB, VU,DU by  at 1/2/2024 0957    Acceptance, E,TB, VU,DU by  at 12/29/2023 0845    Acceptance, E,TB, VU,DU by  at 12/27/2023 1329                                         User Key       Initials Effective Dates Name Provider Type Discipline     02/03/23 -  Tiffany Lord OT Occupational Therapist OT     08/09/23 -  Jeni Trejo OT Occupational Therapist OT                  OT Recommendation and Plan  Therapy Frequency (OT): daily  Plan of Care Review  Plan of Care Reviewed With: patient  Progress: improving  Outcome Evaluation: Pt with good participation and progress toward goals during OT session today. The pt performed in room ambulation to/from the bathroom using a RWx with CGA. Pt required Nicole to stand  from commode and shower chair while sink side. The pt completed toileting and grooming ADLs while in the bathroom with CGA. Pt fatigues quickly, requiring a seated rest break during activity. The pt remains below her functional baseline with generalized weakness, decreased activity tolerance, and balance deficits. Pt will benefit form continued IP OT services to address deficits listed. If deemed medically appropriate, recommend a return to previous facility.     Time Calculation:         Time Calculation- OT       Row Name 01/02/24 1056 01/02/24 1016          Time Calculation- OT    OT Start Time 0957 -KF --     OT Received On 01/02/24 -KF --     OT Goal Re-Cert Due Date 01/02/24  -KF --        Timed Charges    20311 - Gait Training Minutes  -- 14  -ND 97530 - OT Therapeutic Activity Minutes 8  -KF --     64932 - OT Self Care/Mgmt Minutes 20  -KF --        Total Minutes    Timed Charges Total Minutes 28  -KF 14  -ND      Total Minutes 28  -KF 14  -ND               User Key  (r) = Recorded By, (t) = Taken By, (c) = Cosigned By      Initials Name Provider Type    KF Jeni Trejo OT Occupational Therapist    ND Isha May, PT Physical Therapist                  Therapy Charges for Today       Code Description Service Date Service Provider Modifiers Qty    77421540721 HC OT THERAPEUTIC ACT EA 15 MIN 1/2/2024 Jeni Trejo OT GO 1    78416646579 HC OT SELF CARE/MGMT/TRAIN EA 15 MIN 1/2/2024 Jeni Trejo OT GO 1                 Jeni Trejo OT  1/2/2024    Electronically signed by Jeni Trejo OT at 01/02/24 1057

## 2024-01-04 NOTE — PROGRESS NOTES
"Palliative Care Daily Progress Note     Referring: Mahogany Henry    C/C: I' having a BM    S: Medical record reviewed.  Events noted. LW still not received.  Discussed with RN.  Pt denied pain or SOA.  Up in chair in room.    ROS:   Limited by pt need for assistance toileting.  Denied pain, N/V.      O: Code Status:   Code Status and Medical Interventions:   Ordered at: 12/22/23 7467     Level Of Support Discussed With:    Patient     Code Status (Patient has no pulse and is not breathing):    CPR (Attempt to Resuscitate)     Medical Interventions (Patient has pulse or is breathing):    Full Support      Advanced Directives: Advance Directive Status: Patient has advance directive, copy requested   Goals of Care: Ongoing.   Palliative Performance Scale Score: 40%     /90 (BP Location: Right arm, Patient Position: Lying)   Pulse 73   Temp 98.3 °F (36.8 °C) (Oral)   Resp 18   Ht 152.4 cm (60\")   Wt 51.3 kg (113 lb)   LMP  (LMP Unknown)   SpO2 90%   BMI 22.07 kg/m²     Intake/Output Summary (Last 24 hours) at 1/4/2024 1050  Last data filed at 1/3/2024 2047  Gross per 24 hour   Intake --   Output 350 ml   Net -350 ml       PE:  General Appearance:    Alert, NAD   HEENT:     EOMI, anicteric, MMM, face relaxed   Neck:   supple, trachea midline, no JVD   Lungs:     CTA bilaterally, diminished in bases; respirations regular, even and unlabored    Heart:    RRR, normal S1 and S2, + M/-R/-G   Abdomen:     Normal bowel sounds, soft, nontender, nondistended   G/U:   Deferred   MSK/Extremities:   Wasting, no edema   Pulses:   Pulses palpable and equal bilaterally   Skin:   Warm, dry   Neurologic:   A/Ox1, cooperative, KOCH   Psych:   Calm, appropriate     Meds: Reviewed and changes not noted    Labs:   Results from last 7 days   Lab Units 01/04/24  0841   WBC 10*3/mm3 11.37*   HEMOGLOBIN g/dL 12.5   HEMATOCRIT % 39.1   PLATELETS 10*3/mm3 256     Results from last 7 days   Lab Units 01/04/24  0841   SODIUM mmol/L 145 "   POTASSIUM mmol/L 4.0   CHLORIDE mmol/L 105   CO2 mmol/L 31.0*   BUN mg/dL 17   CREATININE mg/dL 0.60   GLUCOSE mg/dL 94   CALCIUM mg/dL 8.2     Results from last 7 days   Lab Units 01/04/24  0841   SODIUM mmol/L 145   POTASSIUM mmol/L 4.0   CHLORIDE mmol/L 105   CO2 mmol/L 31.0*   BUN mg/dL 17   CREATININE mg/dL 0.60   CALCIUM mg/dL 8.2   BILIRUBIN mg/dL 0.3   ALK PHOS U/L 67   ALT (SGPT) U/L 11   AST (SGOT) U/L 16   GLUCOSE mg/dL 94     Imaging Results (Last 72 Hours)       ** No results found for the last 72 hours. **                  Diagnostics: Reviewed    A:     Metastatic disease    Hypothyroidism (acquired)    Anxiety with depression    Essential hypertension    Hiatal hernia    Alzheimer's disease    Prolonged Q-T interval on ECG       Impression:  Metastatic CA unknown primary  Prolonged Qtc  Htn  Alzheimer's dz  RSV     Symptoms:  Dyspnea    P:   Continuing to try to reach son and obtain documents. Staff aware of pt needs. Palliative Care Team will continue to follow patient.     Lyudmila Perry MD  1/4/2024  Time spent:23

## 2024-01-04 NOTE — PROGRESS NOTES
[facility] Medicine Services  PROGRESS NOTE    Patient Name: Temi Jarrett  : 1930  MRN: [id number]    Date of Admission: 2023  Primary Care Physician: Eric Patel MD    Subjective   Subjective     CC:  RSV    HPI:  States her stomach hurts. Just had BM and is cold      Objective   Objective     Vital Signs:   Temp:  [96 °F (35.6 °C)-98.8 °F (37.1 °C)] 96 °F (35.6 °C)  Heart Rate:  [63-73] 63  Resp:  [18-19] 18  BP: (113-139)/(62-90) 113/66  Flow (L/min):  [3-5] 5     Physical Exam:  Constitutional: NAD, chronically ill appearing, frail  Respiratory: few scattered rales  Cardiovascular: RRR, no murmurs, rubs, or gallops  Gastrointestinal: Positive bowel sounds, soft, nontender to deep palpation, nondistended  Musculoskeletal: No bilateral ankle edema, thin extremities  Psychiatric: Appropriate affect, cooperative  Neurologic: Oriented to person, KOCH, speech clear      Results Reviewed:  LAB RESULTS:      Lab 24  0841 23   WBC 11.37* 6.30   HEMOGLOBIN 12.5 12.8   HEMATOCRIT 39.1 40.0   PLATELETS 256 173   NEUTROS ABS 9.41* 4.04   IMMATURE GRANS (ABS) 0.03 0.01   LYMPHS ABS 1.06 1.58   MONOS ABS 0.64 0.48   EOS ABS 0.16 0.15   MCV 86.3 87.3         Lab 24  0841 23  0427   SODIUM 145 143   POTASSIUM 4.0 3.8   CHLORIDE 105 103   CO2 31.0* 33.0*   ANION GAP 9.0 7.0   BUN 17 24*   CREATININE 0.60 0.51*   EGFR 83.8 87.2   GLUCOSE 94 88   CALCIUM 8.2 8.4   MAGNESIUM 1.8  --          Lab 24  08   TOTAL PROTEIN 5.2*   ALBUMIN 3.3*   GLOBULIN 1.9   ALT (SGPT) 11   AST (SGOT) 16   BILIRUBIN 0.3   ALK PHOS 67                     Brief Urine Lab Results  (Last result in the past 365 days)        Color   Clarity   Blood   Leuk Est   Nitrite   Protein   CREAT   Urine HCG        23 Yellow   Cloudy     Small                       Microbiology Results Abnormal       None            No radiology results from the last 24 hrs    Results  for orders placed during the hospital encounter of 08/02/20    Transthoracic Echo Complete With Contrast if Necessary Per Protocol    Interpretation Summary  · Left ventricular systolic function is normal. Estimated EF = 70%.  · Left ventricular diastolic dysfunction (grade I) consistent with impaired relaxation.  · Left atrial cavity size is moderately dilated.  · There is calcification of the aortic valve. There is no significant stenosis or regurgitation. A Lambel's excrescence is noted on an aortic valve leaflet.  · Moderate mitral valve regurgitation is present.  · Estimated right ventricular systolic pressure from tricuspid regurgitation is moderately elevated (49 mmHg).  · There is a moderate (1-2cm) circumferential pericardial effusion. There is no tamponade physiology.      Current medications:  Scheduled Meds:amLODIPine, 5 mg, Oral, Daily  bethanechol, 10 mg, Oral, TID  busPIRone, 10 mg, Oral, BID  dextromethorphan polistirex ER, 60 mg, Oral, Q12H  donepezil, 10 mg, Oral, Nightly  gabapentin, 300 mg, Oral, BID  guaiFENesin, 1,200 mg, Oral, Q12H  levothyroxine, 150 mcg, Oral, Q AM  lisinopril, 20 mg, Oral, Daily  memantine, 10 mg, Oral, BID  multivitamin with minerals, 1 tablet, Oral, Daily  pantoprazole, 40 mg, Oral, Daily  senna-docusate sodium, 2 tablet, Oral, BID  sertraline, 50 mg, Oral, Daily  sodium chloride, 10 mL, Intravenous, Q12H      Continuous Infusions:   PRN Meds:.  acetaminophen **OR** acetaminophen **OR** acetaminophen    benzonatate    senna-docusate sodium **AND** polyethylene glycol **AND** bisacodyl **AND** bisacodyl    Calcium Replacement - Follow Nurse / BPA Driven Protocol    Hydrocod Marcelo-Chlorphe Marcelo ER    ipratropium-albuterol    Magnesium Standard Dose Replacement - Follow Nurse / BPA Driven Protocol    melatonin    phenol    Phosphorus Replacement - Follow Nurse / BPA Driven Protocol    Potassium Replacement - Follow Nurse / BPA Driven Protocol    sodium chloride     traMADol    Assessment & Plan   Assessment & Plan     Active Hospital Problems    Diagnosis  POA    **Metastatic disease [C79.9]  Yes    Prolonged Q-T interval on ECG [R94.31]  Yes    Alzheimer's disease [G30.9, F02.80]  Yes    Hiatal hernia [K44.9]  Yes    Anxiety with depression [F41.8]  Yes    Essential hypertension [I10]  Yes    Hypothyroidism (acquired) [E03.9]  Yes      Resolved Hospital Problems   No resolved problems to display.        Brief Hospital Course to date:  89 year old female with HTN, alzheimers, hiatal hernia, hypothryoid presented to the ED from [facility] via EMS after an abnormal CXR. Patient had a CXR for cough and was noted to have a new 7.5 cm left suprahilar/mediastinal mass. CT scan with findings compatible with metastatic disease to the lung. Multiple round lung nodules with enlaged centrally necrotic left sided prevascular and hilar lymph nodes measuring up to 5 cm. CT abd/pelvis with contrast was unremarkable. Patient was also noted to be RSV +. Oncology was consulted and after discussion with the patient, decision was made to not pursue diagnosis because she has no treatment options due to functional status and age.     This patient's problems and plans were partially entered by my partner and updated as appropriate by me 01/04/24.       Metastatic disease   - Evaluated by oncology - no treatment options due to age and functional status.   - Oxygen as needed. Currently on 3L-5L NC  - Consider PNPD if unable to expectorate sputum  - palliative care following, working with family regarding goals of care, hospice would be appropriate. Have been unable to reach Victor M ARAYA (Son). I have also attempted to contact today without success      RSV  Hypoxia, stable  - Noted on resp PCR. Likely causing her acute symptoms at this time.   - Supportive care  - Mucinex. Delsym. Tessaslon. Mobilize.   - Flutter valve     Prolonged QT  - Avoid prolonging medications       Hypokalemia  -replace per protocol     Hypothyroidism  -continue synthroid      GERD  -PPI      Alzheimer's disease   - continue namenda and aricept   - Delirium precautions - up in the chair, window shades open     Essential hypertension   - continue norvasc, lisinopril      Anxiety/depression   - continue zoloft, buspar      Large Hiatal hernia  - CT with large hiatal hernia containing stomach, bowel and pancreatic body and tail  - Currently asymptomatic      Expected Discharge Location and Transportation: back to [place] when out of precautions (1/5/24)     Expected Discharge   Expected Discharge Date: 1/5/2024; Expected Discharge Time:      DVT prophylaxis:  Mechanical DVT prophylaxis orders are present.     AM-PAC 6 Clicks Score (PT): 18 (01/02/24 1840)    CODE STATUS:   Code Status and Medical Interventions:   Ordered at: 12/22/23 6333     Level Of Support Discussed With:    Patient     Code Status (Patient has no pulse and is not breathing):    CPR (Attempt to Resuscitate)     Medical Interventions (Patient has pulse or is breathing):    Full Support       Sandy Castro, APRN  01/04/24

## 2024-01-04 NOTE — THERAPY TREATMENT NOTE
Patient Name: Temi Jarrett  : 1930    MRN: [id number]                              Today's Date: 2024       Admit Date: 2023    Visit Dx:     ICD-10-CM ICD-9-CM   1. Malignant neoplasm of lung, unspecified laterality, unspecified part of lung  C34.90 162.9   2. Chest mass  R22.2 786.6     Patient Active Problem List   Diagnosis    Esophageal reflux    Hypothyroidism (acquired)    Anxiety with depression    Peripheral neuropathy    Spinal stenosis of lumbar region    Osteoporosis    Disc disorder of lumbar region    Bladder prolapse, female, acquired    Essential hypertension    Pernicious anemia    Annual physical exam    Primary osteoarthritis of both knees    Hiatal hernia    Multifocal aspiration pneumonia due to large intrathoracic hiatal hernia    Recurrent UTI    Paroxysmal atrial fibrillation    Alzheimer's disease    Prolonged Q-T interval on ECG    Mild cognitive impairment    At high risk for aspiration    Chronic pain    Osteoarthritis of right knee    Acute cough    Pressure injury of right buttock, stage 2    Nasal congestion    Metastatic disease     Past Medical History:   Diagnosis Date    Acute sepsis 2021    Arthritis     Bladder prolapse, female, acquired     Closed fracture of neck of left femur 2019    Dementia     Depression     Disease of thyroid gland     Gastric polyp     GERD (gastroesophageal reflux disease)     Hiatal hernia     History of colonic polyps     Hyperlipidemia     Hypertension     IBS (irritable bowel syndrome)     Macular degeneration     Pacemaker     Pericardial effusion 2020    Echo (8/3/2020): Normal LVEF.  Moderate pericardial effusion without tamponade.  Moderate MR. RVSP 49 mmHg    Torn meniscus     right knee      Past Surgical History:   Procedure Laterality Date    CHOLECYSTECTOMY      ENDOSCOPY N/A 2021    Procedure: ESOPHAGOGASTRODUODENOSCOPY;  Surgeon: Serjio Guerrero MD;  Location: [facility] ENDOSCOPY;   Service: General;  Laterality: N/A;    EYE SURGERY  1998    Cataract extraction    HERNIA REPAIR      HIP HEMIARTHROPLASTY Left 09/28/2019    Procedure: HIP HEMIARTHROPLASTY LEFT;  Surgeon: Reddy Segundo Jr., MD;  Location: [facility] OR;  Service: Orthopedics    HYSTERECTOMY  1976    KNEE SURGERY Right     arthroscopy- 2000    SKIN CANCER EXCISION Left     [facility]; CANCEROUS SPOT DIDN'T  MOVE BUT NEEDED REMOVED    TONSILLECTOMY        General Information       Row Name 01/04/24 1107          Physical Therapy Time and Intention    Document Type therapy note (daily note)  -SD     Mode of Treatment physical therapy  -SD       Row Name 01/04/24 1107          General Information    Existing Precautions/Restrictions oxygen therapy device and L/min;fall  -SD     Barriers to Rehab hearing deficit;cognitive status  -SD       Row Name 01/04/24 1107          Cognition    Orientation Status (Cognition) oriented to;person;verbal cues/prompts needed for orientation;place;time  -SD       Row Name 01/04/24 1107          Safety Issues, Functional Mobility    Safety Issues Affecting Function (Mobility) awareness of need for assistance;insight into deficits/self-awareness;judgment;sequencing abilities;positioning of assistive device;safety precautions follow-through/compliance;safety precaution awareness;problem-solving  -SD     Impairments Affecting Function (Mobility) balance;endurance/activity tolerance;strength;postural/trunk control;shortness of breath  -SD               User Key  (r) = Recorded By, (t) = Taken By, (c) = Cosigned By      Initials Name Provider Type    SD Ángela Arana PT Physical Therapist                   Mobility       Row Name 01/04/24 1209          Bed Mobility    Bed Mobility supine-sit  -SD     Supine-Sit [place] (Bed Mobility) minimum assist (75% patient effort);verbal cues  -SD     Assistive Device (Bed Mobility) bed rails;head of bed elevated;draw sheet  -SD      Comment, (Bed Mobility) increased time and effort needed, assist provided through draw sheet to advance hips to EOB  -SD       Row Name 01/04/24 1209          Transfers    Comment, (Transfers) STS from EOB and recliner x4 reps, all with SBA with RW  -SD       Row Name 01/04/24 1209          Sit-Stand Transfer    Sit-Stand Robertson (Transfers) standby assist  -SD     Assistive Device (Sit-Stand Transfers) walker, front-wheeled  -SD       Row Name 01/04/24 1209          Gait/Stairs (Locomotion)    Robertson Level (Gait) contact guard;1 person assist;verbal cues;standby assist  -SD     Assistive Device (Gait) walker, front-wheeled  -SD     Distance in Feet (Gait) 3 x 18ft  -SD     Deviations/Abnormal Patterns (Gait) base of support, narrow;kane decreased;festinating/shuffling;gait speed decreased;stride length decreased;weight shifting decreased;bilateral deviations  -SD     Bilateral Gait Deviations forward flexed posture;heel strike decreased  -SD     Comment, (Gait/Stairs) Pt amb laps in room with seated rest in between. The first two laps performed on room air per pt request, with pt noted to desat to 85%. Third lap performed on 4LO2nc and pt able to maintain sat. Pt mostly SBA, except needing CGA during turns.  -SD               User Key  (r) = Recorded By, (t) = Taken By, (c) = Cosigned By      Initials Name Provider Type    Ángela Sánchez, PT Physical Therapist                   Obj/Interventions    No documentation.                  Goals/Plan    No documentation.                  Clinical Impression       Row Name 01/04/24 1212          Pain    Pretreatment Pain Rating 0/10 - no pain  -SD     Posttreatment Pain Rating 0/10 - no pain  -SD       Row Name 01/04/24 1212          Plan of Care Review    Plan of Care Reviewed With patient  -SD     Progress improving  -SD     Outcome Evaluation Pt dem increased independence with transfers and able to progress total gait distance. Pt limited by  generalized weakness and decreased activity bushra, needs extra time and effort for all mobility tasks. Pt remains below baseline level of function and will continue to benefit from IPPT services.  -SD       Row Name 01/04/24 1212          Vital Signs    Pre Systolic BP Rehab 113  -SD     Pre Treatment Diastolic BP 66  -SD     Pretreatment Heart Rate (beats/min) 64  -SD     Pre SpO2 (%) 97  -SD     O2 Delivery Pre Treatment nasal cannula  -SD     Intra SpO2 (%) 85  -SD     O2 Delivery Intra Treatment room air  -SD     Post SpO2 (%) 93  -SD     O2 Delivery Post Treatment nasal cannula  -SD     Pre Patient Position Supine  -SD     Intra Patient Position Sitting  -SD     Post Patient Position Sitting  -SD       Row Name 01/04/24 1212          Positioning and Restraints    Pre-Treatment Position in bed  -SD     Post Treatment Position chair  -SD     In Chair notified nsg;reclined;call light within reach;encouraged to call for assist;exit alarm on;waffle cushion;heels elevated  -SD               User Key  (r) = Recorded By, (t) = Taken By, (c) = Cosigned By      Initials Name Provider Type    Ángela Sánchez, PT Physical Therapist                   Outcome Measures       Row Name 01/04/24 1215          How much help from another person do you currently need...    Turning from your back to your side while in flat bed without using bedrails? 4  -SD     Moving from lying on back to sitting on the side of a flat bed without bedrails? 3  -SD     Moving to and from a bed to a chair (including a wheelchair)? 3  -SD     Standing up from a chair using your arms (e.g., wheelchair, bedside chair)? 4  -SD     Climbing 3-5 steps with a railing? 3  -SD     To walk in hospital room? 3  -SD     AM-PAC 6 Clicks Score (PT) 20  -SD     Highest Level of Mobility Goal 6 --> Walk 10 steps or more  -SD       Row Name 01/04/24 1215 01/04/24 0948       Functional Assessment    Outcome Measure Options AM-PAC 6 Clicks Basic Mobility (PT)  -SD  AM-PAC 6 Clicks Daily Activity (OT)  -              User Key  (r) = Recorded By, (t) = Taken By, (c) = Cosigned By      Initials Name Provider Type    SD Ángela Arana, PT Physical Therapist    Abbey Hidalgo OT Occupational Therapist                                 Physical Therapy Education       Title: PT OT SLP Therapies (In Progress)       Topic: Physical Therapy (Done)       Point: Mobility training (Done)       Learning Progress Summary             Patient Acceptance, E, VU,NR by SD at 1/4/2024 1216    Acceptance, E, VU by ND at 1/2/2024 1016    Acceptance, E, NR by SD at 12/29/2023 1529    Acceptance, E, NR by ND at 12/26/2023 1406                         Point: Home exercise program (Done)       Learning Progress Summary             Patient Acceptance, E, VU,NR by SD at 1/4/2024 1216    Acceptance, E, VU by ND at 1/2/2024 1016    Acceptance, E, NR by SD at 12/29/2023 1529    Acceptance, E, NR by ND at 12/26/2023 1406                         Point: Body mechanics (Done)       Learning Progress Summary             Patient Acceptance, E, VU,NR by SD at 1/4/2024 1216    Acceptance, E, VU by ND at 1/2/2024 1016    Acceptance, E, NR by SD at 12/29/2023 1529    Acceptance, E, NR by ND at 12/26/2023 1406                         Point: Precautions (Done)       Learning Progress Summary             Patient Acceptance, E, VU,NR by SD at 1/4/2024 1216    Acceptance, E, VU by ND at 1/2/2024 1016    Acceptance, E, NR by SD at 12/29/2023 1529    Acceptance, E, NR by ND at 12/26/2023 1406                                         User Key       Initials Effective Dates Name Provider Type Discipline    SD 03/13/23 -  Ángela Arana, LEFTY Physical Therapist PT    ND 11/16/23 -  Isha May PT Physical Therapist PT                  PT Recommendation and Plan     Plan of Care Reviewed With: patient  Progress: improving  Outcome Evaluation: Pt dem increased independence with transfers and able to  progress total gait distance. Pt limited by generalized weakness and decreased activity bushra, needs extra time and effort for all mobility tasks. Pt remains below baseline level of function and will continue to benefit from IPPT services.     Time Calculation:         PT Charges       Row Name 01/04/24 1216             Time Calculation    Start Time 1107  -SD      PT Received On 01/04/24  -SD      PT Goal Re-Cert Due Date 01/05/24  -SD         Time Calculation- PT    Total Timed Code Minutes- PT 26 minute(s)  -SD         Timed Charges    76547 - Gait Training Minutes  26  -SD         Total Minutes    Timed Charges Total Minutes 26  -SD       Total Minutes 26  -SD                User Key  (r) = Recorded By, (t) = Taken By, (c) = Cosigned By      Initials Name Provider Type    Ángela Sánchez PT Physical Therapist                  Therapy Charges for Today       Code Description Service Date Service Provider Modifiers Qty    [id number] HC GAIT TRAINING EA 15 MIN 1/4/2024 Ángela Arana, PT GP 2            PT G-Codes  Outcome Measure Options: AM-PAC 6 Clicks Basic Mobility (PT)  AM-PAC 6 Clicks Score (PT): 20  AM-PAC 6 Clicks Score (OT): 15       Ángela Arana PT  1/4/2024

## 2024-01-04 NOTE — CASE MANAGEMENT/SOCIAL WORK
Continued Stay Note  [facility]     Patient Name: Temi Jarrett  MRN: [id number]  Today's Date: 1/4/2024    Admit Date: 12/22/2023    Plan: [facility]   Discharge Plan       Row Name 01/04/24 0716       Plan    Plan [facility]    Patient/Family in Agreement with Plan yes    Plan Comments Plan is [facility] on or after 1/5. Will need WC transport. Updated notes faxed to [facility] today. CM will continue to follow.    Final Discharge Disposition Code 04 - intermediate care facility                   Discharge Codes    No documentation.                 Expected Discharge Date and Time       Expected Discharge Date Expected Discharge Time    Jan 5, 2024               Oscar Sterling RN

## 2024-01-04 NOTE — PLAN OF CARE
Goal Outcome Evaluation:  Plan of Care Reviewed With: patient        Progress: no change  Outcome Evaluation: Pt presents w/ generalized weakness, decreased functional endurance, and balance deficits limiting her ADL independence. Pt req extended time and effort for all tasks throughout session. Will continue to progress toward IPOT goals. Rec return to [facility] at d/c.      Anticipated Discharge Disposition (OT): extended care facility

## 2024-01-04 NOTE — THERAPY PROGRESS REPORT/RE-CERT
Patient Name: Temi Jarrett  : 1930    MRN: [id number]                              Today's Date: 2024       Admit Date: 2023    Visit Dx:     ICD-10-CM ICD-9-CM   1. Malignant neoplasm of lung, unspecified laterality, unspecified part of lung  C34.90 162.9   2. Chest mass  R22.2 786.6     Patient Active Problem List   Diagnosis    Esophageal reflux    Hypothyroidism (acquired)    Anxiety with depression    Peripheral neuropathy    Spinal stenosis of lumbar region    Osteoporosis    Disc disorder of lumbar region    Bladder prolapse, female, acquired    Essential hypertension    Pernicious anemia    Annual physical exam    Primary osteoarthritis of both knees    Hiatal hernia    Multifocal aspiration pneumonia due to large intrathoracic hiatal hernia    Recurrent UTI    Paroxysmal atrial fibrillation    Alzheimer's disease    Prolonged Q-T interval on ECG    Mild cognitive impairment    At high risk for aspiration    Chronic pain    Osteoarthritis of right knee    Acute cough    Pressure injury of right buttock, stage 2    Nasal congestion    Metastatic disease     Past Medical History:   Diagnosis Date    Acute sepsis 2021    Arthritis     Bladder prolapse, female, acquired     Closed fracture of neck of left femur 2019    Dementia     Depression     Disease of thyroid gland     Gastric polyp     GERD (gastroesophageal reflux disease)     Hiatal hernia     History of colonic polyps     Hyperlipidemia     Hypertension     IBS (irritable bowel syndrome)     Macular degeneration     Pacemaker     Pericardial effusion 2020    Echo (8/3/2020): Normal LVEF.  Moderate pericardial effusion without tamponade.  Moderate MR. RVSP 49 mmHg    Torn meniscus     right knee      Past Surgical History:   Procedure Laterality Date    CHOLECYSTECTOMY      ENDOSCOPY N/A 2021    Procedure: ESOPHAGOGASTRODUODENOSCOPY;  Surgeon: Serjio Guerrero MD;  Location: [facility] ENDOSCOPY;   Service: General;  Laterality: N/A;    EYE SURGERY  1998    Cataract extraction    HERNIA REPAIR      HIP HEMIARTHROPLASTY Left 09/28/2019    Procedure: HIP HEMIARTHROPLASTY LEFT;  Surgeon: Reddy Segundo Jr., MD;  Location: [facility] beds at the [facility] OR;  Service: Orthopedics    HYSTERECTOMY  1976    KNEE SURGERY Right     arthroscopy- 2000    SKIN CANCER EXCISION Left     [facility]; CANCEROUS SPOT DIDN'T  MOVE BUT NEEDED REMOVED    TONSILLECTOMY        General Information       Row Name 01/04/24 0942          OT Time and Intention    Document Type progress note/recertification  -     Mode of Treatment occupational therapy  -       Row Name 01/04/24 0942          General Information    Patient Profile Reviewed yes  -     Existing Precautions/Restrictions oxygen therapy device and L/min;fall  -     Barriers to Rehab medically complex;previous functional deficit;hearing deficit;visual deficit  -       Row Name 01/04/24 0942          Cognition    Orientation Status (Cognition) oriented to;person;verbal cues/prompts needed for orientation;place;time  -       Row Name 01/04/24 0942          Safety Issues, Functional Mobility    Safety Issues Affecting Function (Mobility) awareness of need for assistance;insight into deficits/self-awareness;safety precaution awareness;safety precautions follow-through/compliance;sequencing abilities  -     Impairments Affecting Function (Mobility) balance;endurance/activity tolerance;strength;postural/trunk control;shortness of breath  -     Cognitive Impairments, Mobility Safety/Performance awareness, need for assistance;insight into deficits/self-awareness;safety precaution awareness;safety precaution follow-through;sequencing abilities  -               User Key  (r) = Recorded By, (t) = Taken By, (c) = Cosigned By      Initials Name Provider Type     Abbey Sorenson OT Occupational Therapist                     Mobility/ADL's       Row Name 01/04/24 0916           Bed Mobility    Bed Mobility supine-sit  -     Supine-Sit [place] (Bed Mobility) minimum assist (75% patient effort);verbal cues  -     Bed Mobility, Safety Issues decreased use of arms for pushing/pulling;decreased use of legs for bridging/pushing;impaired trunk control for bed mobility  -     Assistive Device (Bed Mobility) bed rails;head of bed elevated  -       Row Name 01/04/24 0943          Transfers    Transfers sit-stand transfer;stand-sit transfer;bed-chair transfer  -       Row Name 01/04/24 0943          Bed-Chair Transfer    Bed-Chair [place] (Transfers) minimum assist (75% patient effort);verbal cues  -     Assistive Device (Bed-Chair Transfers) walker, front-wheeled  -       Row Name 01/04/24 0943          Sit-Stand Transfer    Sit-Stand [place] (Transfers) minimum assist (75% patient effort);verbal cues  -     Assistive Device (Sit-Stand Transfers) walker, front-wheeled  -       Row Name 01/04/24 0943          Stand-Sit Transfer    Stand-Sit [place] (Transfers) minimum assist (75% patient effort);verbal cues  -     Assistive Device (Stand-Sit Transfers) walker, front-wheeled  -       Row Name 01/04/24 0943          Activities of Daily Living    BADL Assessment/Intervention feeding  -       Row Name 01/04/24 0943          Self-Feeding Assessment/Training    [place] Level (Feeding) prepare tray/open items;maximum assist (25% patient effort);scoop food and bring to mouth;liquids to mouth;set up  -     Position (Self-Feeding) supported sitting  -               User Key  (r) = Recorded By, (t) = Taken By, (c) = Cosigned By      Initials Name Provider Type     Abbey Sorenson OT Occupational Therapist                   Obj/Interventions       Row Name 01/04/24 0964          Balance    Balance Assessment sitting static balance;sitting dynamic balance;sit to stand dynamic balance;standing static balance;standing dynamic balance  -      Static Sitting Balance contact guard  -     Dynamic Sitting Balance moderate assist;2-person assist;verbal cues  -     Position, Sitting Balance unsupported;sitting edge of bed;sitting in chair  -     Sit to Stand Dynamic Balance minimal assist;verbal cues  -     Static Standing Balance minimal assist;verbal cues  -     Dynamic Standing Balance minimal assist;verbal cues  -     Position/Device Used, Standing Balance supported;walker, front-wheeled  -     Balance Interventions sitting;sit to stand;occupation based/functional task  -               User Key  (r) = Recorded By, (t) = Taken By, (c) = Cosigned By      Initials Name Provider Type     Abbey Sorenson OT Occupational Therapist                   Goals/Plan       Row Name 01/04/24 0947          Bed Mobility Goal 1 (OT)    Activity/Assistive Device (Bed Mobility Goal 1, OT) sit to supine;supine to sit  -     [place] Level/Cues Needed (Bed Mobility Goal 1, OT) contact guard required  -     Time Frame (Bed Mobility Goal 1, OT) long term goal (LTG);10 days  -     Progress/Outcomes (Bed Mobility Goal 1, OT) goal revised this date;continuing progress toward goal  -       Row Name 01/04/24 0947          Transfer Goal 1 (OT)    Activity/Assistive Device (Transfer Goal 1, OT) bed-to-chair/chair-to-bed;walker, rolling;commode, bedside without drop arms  -     [place] Level/Cues Needed (Transfer Goal 1, OT) contact guard required;verbal cues required  -     Time Frame (Transfer Goal 1, OT) long term goal (LTG);10 days  -     Progress/Outcome (Transfer Goal 1, OT) goal revised this date;continuing progress toward goal  -       Row Name 01/04/24 0947          Toileting Goal 1 (OT)    Activity/Device (Toileting Goal 1, OT) toileting skills, all  -     [place] Level/Cues Needed (Toileting Goal 1, OT) minimum assist (75% or more patient effort);verbal cues required  -     Time Frame (Toileting Goal 1, OT) long term goal  (LTG);by discharge  -     Progress/Outcome (Toileting Goal 1, OT) goal ongoing  -       Row Name 01/04/24 0947          Strength Goal 1 (OT)    Strength Goal 1 (OT) Pt to demo independence with B UE strengthening HEP to support ADL and transfer independence  -     Time Frame (Strength Goal 1, OT) long term goal (LTG);by discharge  -     Progress/Outcome (Strength Goal 1, OT) goal ongoing  -               User Key  (r) = Recorded By, (t) = Taken By, (c) = Cosigned By      Initials Name Provider Type    Abbey Hidalgo, ROSEMARY Occupational Therapist                   Clinical Impression       [facility] Name 01/04/24 0944          Pain Assessment    Pretreatment Pain Rating 0/10 - no pain  -     Posttreatment Pain Rating 0/10 - no pain  -[facility] Name 01/04/24 0944          Plan of Care Review    Plan of Care Reviewed With patient  -     Progress no change  -     Outcome Evaluation Pt presents w/ generalized weakness, decreased functional endurance, and balance deficits limiting her ADL independence. Pt req extended time and effort for all tasks throughout session. Will continue to progress toward IPOT goals. Rec return to LT at d/c.  -       Row Name 01/04/24 0944          Therapy Assessment/Plan (OT)    Rehab Potential (OT) good, to achieve stated therapy goals  -     Criteria for Skilled Therapeutic Interventions Met (OT) yes;meets criteria;skilled treatment is necessary  -     Therapy Frequency (OT) daily  -       Row Name 01/04/24 0944          Therapy Plan Review/Discharge Plan (OT)    Anticipated Discharge Disposition (OT) extended care facility  -[facility] Name 01/04/24 0944          Vital Signs    O2 Delivery Pre Treatment nasal cannula  -     O2 Delivery Intra Treatment nasal cannula  -     O2 Delivery Post Treatment nasal cannula  -     Pre Patient Position Supine  -     Intra Patient Position Standing  -     Post Patient Position Sitting  -[facility] Name 01/04/24  0944          Positioning and Restraints    Pre-Treatment Position in bed  -     Post Treatment Position chair  -     In Chair notified nsg;call light within reach;encouraged to call for assist;exit alarm on;waffle cushion;sitting;legs elevated  -               User Key  (r) = Recorded By, (t) = Taken By, (c) = Cosigned By      Initials Name Provider Type    Abbey Hidalgo OT Occupational Therapist                   Outcome Measures       Row Name 01/04/24 0948          How much help from another is currently needed...    Putting on and taking off regular lower body clothing? 2  -MC     Bathing (including washing, rinsing, and drying) 2  -MC     Toileting (which includes using toilet bed pan or urinal) 2  -MC     Putting on and taking off regular upper body clothing 3  -MC     Taking care of personal grooming (such as brushing teeth) 3  -MC     Eating meals 3  -     AM-PAC 6 Clicks Score (OT) 15  -       Row Name 01/04/24 0948          Functional Assessment    Outcome Measure Options AM-PAC 6 Clicks Daily Activity (OT)  -               User Key  (r) = Recorded By, (t) = Taken By, (c) = Cosigned By      Initials Name Provider Type    Abbey Hidalgo OT Occupational Therapist                    Occupational Therapy Education       Title: PT OT SLP Therapies (In Progress)       Topic: Occupational Therapy (In Progress)       Point: ADL training (In Progress)       Description:   Instruct learner(s) on proper safety adaptation and remediation techniques during self care or transfers.   Instruct in proper use of assistive devices.                  Learning Progress Summary             Patient Acceptance, E, NR by  at 1/4/2024 0949    Acceptance, E,TB, VU,DU by  at 1/2/2024 0957    Acceptance, E,TB, VU,DU by KF at 12/29/2023 0845    Acceptance, E,TB, VU,DU by KF at 12/27/2023 1329    Acceptance, E, NR by  at 12/24/2023 1020    Comment: educated pt regarding role of therapy                          Point: Home exercise program (Not Started)       Description:   Instruct learner(s) on appropriate technique for monitoring, assisting and/or progressing therapeutic exercises/activities.                  Learner Progress:  Not documented in this visit.              Point: Precautions (In Progress)       Description:   Instruct learner(s) on prescribed precautions during self-care and functional transfers.                  Learning Progress Summary             Patient Acceptance, E, NR by  at 1/4/2024 0949    Acceptance, E,TB, VU,DU by  at 1/2/2024 0957    Acceptance, E,TB, VU,DU by  at 12/29/2023 0845    Acceptance, E,TB, VU,DU by  at 12/27/2023 1329                         Point: Body mechanics (In Progress)       Description:   Instruct learner(s) on proper positioning and spine alignment during self-care, functional mobility activities and/or exercises.                  Learning Progress Summary             Patient Acceptance, E, NR by  at 1/4/2024 0949    Acceptance, E,TB, VU,DU by  at 1/2/2024 0957    Acceptance, E,TB, VU,DU by  at 12/29/2023 0845    Acceptance, E,TB, VU,DU by  at 12/27/2023 1329                                         User Key       Initials Effective Dates Name Provider Type Discipline     02/03/23 -  Tiffany Lord OT Occupational Therapist OT     10/14/22 -  Abbey Sorenson OT Occupational Therapist OT     08/09/23 -  Jeni Trejo OT Occupational Therapist OT                  OT Recommendation and Plan  Therapy Frequency (OT): daily  Plan of Care Review  Plan of Care Reviewed With: patient  Progress: no change  Outcome Evaluation: Pt presents w/ generalized weakness, decreased functional endurance, and balance deficits limiting her ADL independence. Pt req extended time and effort for all tasks throughout session. Will continue to progress toward IPOT goals. Rec return to LTC at d/c.     Time Calculation:         Time Calculation- OT       Row Name  01/04/24 0949             Time Calculation- OT    OT Start Time 0903  -      OT Received On 01/04/24  -      OT Goal Re-Cert Due Date 01/14/24  -         Timed Charges    43625 - OT Therapeutic Activity Minutes 15  -      99935 - OT Self Care/Mgmt Minutes 8  -         Total Minutes    Timed Charges Total Minutes 23  -       Total Minutes 23  -                User Key  (r) = Recorded By, (t) = Taken By, (c) = Cosigned By      Initials Name Provider Type     Abbey Sorenson OT Occupational Therapist                  Therapy Charges for Today       Code Description Service Date Service Provider Modifiers Qty    62759941358  OT THERAPEUTIC ACT EA 15 MIN 1/4/2024 Abbey Sorenson OT GO 1    80320342973 HC OT SELF CARE/MGMT/TRAIN EA 15 MIN 1/4/2024 Abbey Sorenson OT GO 1                 Abbey Sorenson OT  1/4/2024

## 2024-01-04 NOTE — PLAN OF CARE
Goal Outcome Evaluation:  Plan of Care Reviewed With: patient  Progress: no change  Outcome Evaluation: Patient is A&Ox3, disoriented to time and forgetful. Titrated between 3-5L nasal cannula overnight. No complaints of SOA. Paced rhythm on tele. PRN melatonin given for sleep. Continue plan of care.

## 2024-01-05 VITALS
BODY MASS INDEX: 22.19 KG/M2 | WEIGHT: 113 LBS | DIASTOLIC BLOOD PRESSURE: 78 MMHG | TEMPERATURE: 96.2 F | HEIGHT: 60 IN | HEART RATE: 69 BPM | RESPIRATION RATE: 18 BRPM | SYSTOLIC BLOOD PRESSURE: 134 MMHG | OXYGEN SATURATION: 93 %

## 2024-01-05 LAB
ALBUMIN SERPL-MCNC: 2.9 G/DL (ref 3.5–5.2)
ALBUMIN/GLOB SERPL: 1.2 G/DL
ALP SERPL-CCNC: 58 U/L (ref 39–117)
ALT SERPL W P-5'-P-CCNC: 11 U/L (ref 1–33)
ANION GAP SERPL CALCULATED.3IONS-SCNC: 7 MMOL/L (ref 5–15)
AST SERPL-CCNC: 15 U/L (ref 1–32)
BASOPHILS # BLD AUTO: 0.08 10*3/MM3 (ref 0–0.2)
BASOPHILS NFR BLD AUTO: 1.1 % (ref 0–1.5)
BILIRUB SERPL-MCNC: 0.3 MG/DL (ref 0–1.2)
BUN SERPL-MCNC: 22 MG/DL (ref 8–23)
BUN/CREAT SERPL: 36.1 (ref 7–25)
CALCIUM SPEC-SCNC: 8 MG/DL (ref 8.2–9.6)
CHLORIDE SERPL-SCNC: 105 MMOL/L (ref 98–107)
CO2 SERPL-SCNC: 30 MMOL/L (ref 22–29)
CREAT SERPL-MCNC: 0.61 MG/DL (ref 0.57–1)
DEPRECATED RDW RBC AUTO: 49.6 FL (ref 37–54)
EGFRCR SERPLBLD CKD-EPI 2021: 83.5 ML/MIN/1.73
EOSINOPHIL # BLD AUTO: 0.2 10*3/MM3 (ref 0–0.4)
EOSINOPHIL NFR BLD AUTO: 2.7 % (ref 0.3–6.2)
ERYTHROCYTE [DISTWIDTH] IN BLOOD BY AUTOMATED COUNT: 15.5 % (ref 12.3–15.4)
GLOBULIN UR ELPH-MCNC: 2.5 GM/DL
GLUCOSE SERPL-MCNC: 87 MG/DL (ref 65–99)
HCT VFR BLD AUTO: 38.9 % (ref 34–46.6)
HGB BLD-MCNC: 12.2 G/DL (ref 12–15.9)
IMM GRANULOCYTES # BLD AUTO: 0.03 10*3/MM3 (ref 0–0.05)
IMM GRANULOCYTES NFR BLD AUTO: 0.4 % (ref 0–0.5)
LYMPHOCYTES # BLD AUTO: 1.35 10*3/MM3 (ref 0.7–3.1)
LYMPHOCYTES NFR BLD AUTO: 18 % (ref 19.6–45.3)
MAGNESIUM SERPL-MCNC: 2 MG/DL (ref 1.7–2.3)
MCH RBC QN AUTO: 27.7 PG (ref 26.6–33)
MCHC RBC AUTO-ENTMCNC: 31.4 G/DL (ref 31.5–35.7)
MCV RBC AUTO: 88.2 FL (ref 79–97)
MONOCYTES # BLD AUTO: 0.58 10*3/MM3 (ref 0.1–0.9)
MONOCYTES NFR BLD AUTO: 7.7 % (ref 5–12)
NEUTROPHILS NFR BLD AUTO: 5.25 10*3/MM3 (ref 1.7–7)
NEUTROPHILS NFR BLD AUTO: 70.1 % (ref 42.7–76)
NRBC BLD AUTO-RTO: 0 /100 WBC (ref 0–0.2)
PLATELET # BLD AUTO: 244 10*3/MM3 (ref 140–450)
PMV BLD AUTO: 9.8 FL (ref 6–12)
POTASSIUM SERPL-SCNC: 3.7 MMOL/L (ref 3.5–5.2)
PROT SERPL-MCNC: 5.4 G/DL (ref 6–8.5)
RBC # BLD AUTO: 4.41 10*6/MM3 (ref 3.77–5.28)
SODIUM SERPL-SCNC: 142 MMOL/L (ref 136–145)
WBC NRBC COR # BLD AUTO: 7.49 10*3/MM3 (ref 3.4–10.8)

## 2024-01-05 PROCEDURE — 83735 ASSAY OF MAGNESIUM: CPT | Performed by: FAMILY MEDICINE

## 2024-01-05 PROCEDURE — 80053 COMPREHEN METABOLIC PANEL: CPT | Performed by: FAMILY MEDICINE

## 2024-01-05 PROCEDURE — 85025 COMPLETE CBC W/AUTO DIFF WBC: CPT | Performed by: FAMILY MEDICINE

## 2024-01-05 RX ORDER — TRAMADOL HYDROCHLORIDE 50 MG/1
50 TABLET ORAL DAILY PRN
Qty: 3 TABLET | Refills: 0 | Status: SHIPPED | OUTPATIENT
Start: 2024-01-05

## 2024-01-05 RX ORDER — FUROSEMIDE 20 MG/1
20 TABLET ORAL DAILY PRN
Start: 2024-01-05

## 2024-01-05 RX ORDER — BENZONATATE 200 MG/1
200 CAPSULE ORAL 3 TIMES DAILY PRN
Start: 2024-01-05

## 2024-01-05 RX ORDER — LISINOPRIL 20 MG/1
20 TABLET ORAL DAILY
Start: 2024-01-05

## 2024-01-05 RX ORDER — GABAPENTIN 300 MG/1
300 CAPSULE ORAL 2 TIMES DAILY
Qty: 6 CAPSULE | Refills: 0 | Status: SHIPPED | OUTPATIENT
Start: 2024-01-05 | End: 2024-01-08

## 2024-01-05 RX ORDER — IPRATROPIUM BROMIDE AND ALBUTEROL SULFATE 2.5; .5 MG/3ML; MG/3ML
3 SOLUTION RESPIRATORY (INHALATION) EVERY 4 HOURS PRN
Start: 2024-01-05

## 2024-01-05 RX ADMIN — SERTRALINE 50 MG: 50 TABLET, FILM COATED ORAL at 08:56

## 2024-01-05 RX ADMIN — PANTOPRAZOLE SODIUM 40 MG: 40 TABLET, DELAYED RELEASE ORAL at 08:57

## 2024-01-05 RX ADMIN — GABAPENTIN 300 MG: 300 CAPSULE ORAL at 08:56

## 2024-01-05 RX ADMIN — LISINOPRIL 20 MG: 20 TABLET ORAL at 08:55

## 2024-01-05 RX ADMIN — BUSPIRONE HYDROCHLORIDE 10 MG: 10 TABLET ORAL at 08:56

## 2024-01-05 RX ADMIN — AMLODIPINE BESYLATE 5 MG: 5 TABLET ORAL at 08:57

## 2024-01-05 RX ADMIN — MEMANTINE 10 MG: 10 TABLET ORAL at 08:57

## 2024-01-05 RX ADMIN — GUAIFENESIN 1200 MG: 600 TABLET, EXTENDED RELEASE ORAL at 08:56

## 2024-01-05 RX ADMIN — SENNOSIDES AND DOCUSATE SODIUM 2 TABLET: 8.6; 5 TABLET ORAL at 08:55

## 2024-01-05 RX ADMIN — BETHANECHOL CHLORIDE 10 MG: 10 TABLET ORAL at 08:57

## 2024-01-05 RX ADMIN — LEVOTHYROXINE SODIUM 150 MCG: 0.15 TABLET ORAL at 06:02

## 2024-01-05 RX ADMIN — DEXTROMETHORPHAN POLISTIREX 60 MG: 30 SUSPENSION ORAL at 08:55

## 2024-01-05 NOTE — PLAN OF CARE
Goal Outcome Evaluation:  Plan of Care Reviewed With: patient  Progress: no change  Outcome Evaluation: Patient is A&Ox3.Paced rhythm on tele. VSS. Remains on 4L nasal cannula. No complaints of pain overnight. PRN cough medication given x1. Continue plan of care.

## 2024-01-05 NOTE — CASE MANAGEMENT/SOCIAL WORK
Case Management Discharge Note      Final Note: Patient's plan is a skilled bed at [facility] today, 1/5.  Nurse to call report to [phone], ask for [phone].  fac[facility]  [facility].  Patient needs to be at the Maternity entrance by 1520.         Selected Continued Care - Admitted Since 12/22/2023       Destination Coordination complete.      Service Provider Selected Services Address Phone Fax Patient Preferred    [facility] [address] [facility] [address] [phone] --              Durable Medical Equipment    No services have been selected for the patient.                Dialysis/Infusion    No services have been selected for the patient.                Home Medical Care    No services have been selected for the patient.                Therapy    No services have been selected for the patient.                Community Resources    No services have been selected for the patient.                Community & DME    No services have been selected for the patient.                    Transportation Services  [facility]    Final Discharge Disposition Code: 03 - skilled nursing facility (SNF)

## 2024-01-05 NOTE — DISCHARGE SUMMARY
[facility] Medicine Services  DISCHARGE SUMMARY    Patient Name: Temi Jarrett  : 1930  MRN: [id number]    Date of Admission: 2023  3:02 PM  Date of Discharge:  2024  Primary Care Physician: Eric Patel MD    Consults       Date and Time Order Name Status Description    2024  6:48 AM Inpatient Palliative Care MD Consult Completed     2023  9:59 PM Inpatient Hematology & Oncology Consult Completed             Hospital Course     Presenting Problem: abnormal CXR    Active Hospital Problems    Diagnosis  POA    **Metastatic disease [C79.9]  Yes    Prolonged Q-T interval on ECG [R94.31]  Yes    Alzheimer's disease [G30.9, F02.80]  Yes    Hiatal hernia [K44.9]  Yes    Anxiety with depression [F41.8]  Yes    Essential hypertension [I10]  Yes    Hypothyroidism (acquired) [E03.9]  Yes      Resolved Hospital Problems   No resolved problems to display.          Hospital Course:  Temi Jarrett is a 89 y.o. female with HTN, alzheimers, hiatal hernia, hypothryoid presented to the ED from [facility] via EMS after an abnormal CXR. Patient had a CXR for cough and was noted to have a new 7.5 cm left suprahilar/mediastinal mass. CT scan with findings compatible with metastatic disease to the lung. Multiple round lung nodules with enlarged centrally necrotic left sided prevascular and hilar lymph nodes measuring up to 5 cm. CT abd/pelvis with contrast was unremarkable. Patient was also noted to be RSV +. Oncology was consulted and after discussion with the patient, decision was made to not pursue diagnostic work up because she has no treatment options due to functional status and age.      Metastatic disease   - Evaluated by oncology - no treatment options due to age and functional status.   - Oxygen as needed. Currently on 3L-5L [place]- [place] [facility] [place]  - Consider PNPD if unable to expectorate sputum  - palliative care following, working with family  regarding goals of care. Patient is hospice appropriate. Would recommend at least palliative to follow at [place] for ongoing GOC. Attempted to contact Son and no answer     RSV  Hypoxia, stable  - Noted on resp PCR. Likely causing her acute symptoms at this time.   - Supportive care  -  Tessaslon. Mobilize.   - Flutter valve     Prolonged QT  - Avoid prolonging medications      Hypokalemia  -replaced per protocol     Hypothyroidism  -continue synthroid      GERD  -PPI      Alzheimer's disease   - continue namenda and aricept      Essential hypertension   - continue norvasc, lisinopril      Anxiety/depression   - continue zoloft, buspar      Large Hiatal hernia  - CT with large hiatal hernia containing stomach, bowel and pancreatic body and tail  - Currently asymptomatic       Discharge Follow Up Recommendations for outpatient labs/diagnostics:   PCP follow up one week  Recommend palliative medicine to follow. Patient is Hospice appropriate  Will need ongoing oxygen with new hypoxia and lung mets, currently on 4LNC; adjust as needed    Day of Discharge     HPI:   Patient has no complaints. Asking why she is here and where she is going. States she feels fine    Review of Systems  Gen- No fevers, chills  CV- No chest pain, palpitations  Resp-  dyspnea  GI- No N/V/D, abd pain      Vital Signs:   Temp:  [96.2 °F (35.7 °C)-98.4 °F (36.9 °C)] 96.2 °F (35.7 °C)  Heart Rate:  [63-69] 69  Resp:  [17-18] 18  BP: (120-143)/(72-93) 134/78  Flow (L/min):  [4] 4      Physical Exam:  Constitutional: No acute distress, awake, alert  HENT: NCAT, mucous membranes moist  Respiratory: Clear to auscultation bilaterally, respiratory effort normal on oxygen, congested cough  Cardiovascular: RRR, no murmurs, rubs, or gallops  Gastrointestinal: Positive bowel sounds, soft, nontender, nondistended  Musculoskeletal: No bilateral ankle edema  Psychiatric: Appropriate affect, cooperative  Neurologic: Oriented x 2, KOCH, speech clear  Skin: No  rashes      Pertinent  and/or Most Recent Results     LAB RESULTS:      Lab 01/05/24  0458 01/04/24  0841   WBC 7.49 11.37*   HEMOGLOBIN 12.2 12.5   HEMATOCRIT 38.9 39.1   PLATELETS 244 256   NEUTROS ABS 5.25 9.41*   IMMATURE GRANS (ABS) 0.03 0.03   LYMPHS ABS 1.35 1.06   MONOS ABS 0.58 0.64   EOS ABS 0.20 0.16   MCV 88.2 86.3         Lab 01/05/24  0458 01/04/24  0841   SODIUM 142 145   POTASSIUM 3.7 4.0   CHLORIDE 105 105   CO2 30.0* 31.0*   ANION GAP 7.0 9.0   BUN 22 17   CREATININE 0.61 0.60   EGFR 83.5 83.8   GLUCOSE 87 94   CALCIUM 8.0* 8.2   MAGNESIUM 2.0 1.8         Lab 01/05/24  0458 01/04/24  0841   TOTAL PROTEIN 5.4* 5.2*   ALBUMIN 2.9* 3.3*   GLOBULIN 2.5 1.9   ALT (SGPT) 11 11   AST (SGOT) 15 16   BILIRUBIN 0.3 0.3   ALK PHOS 58 67                     Brief Urine Lab Results  (Last result in the past 365 days)        Color   Clarity   Blood   Leuk Est   Nitrite   Protein   CREAT   Urine HCG        09/05/23 2112 Yellow   Cloudy     Small                     Microbiology Results (last 10 days)       ** No results found for the last 240 hours. **            XR Forearm 2 View Left    Result Date: 1/4/2024  XR FOREARM 2 VW LEFT Date of Exam: 1/4/2024 6:13 PM EST Indication: s/p fall, L arm bruise Comparison: None available. Findings: There is no evidence of fracture or dislocation. No focal lesions identified. No erosions. Joint spaces are maintained. No focal soft tissue abnormalities identified.     Impression: No acute abnormality. Electronically Signed: Luis Galdamez DO  1/4/2024 7:19 PM EST  Workstation ID: [id number]    XR Humerus Left    Result Date: 1/4/2024  XR HUMERUS LEFT Date of Exam: 1/4/2024 6:13 PM EST Indication: s/p fall, L arm bruise Comparison: None available. Findings: There is osteopenia. No acute fracture or dislocation. Glenohumeral joint appears grossly normal. Soft tissues are unremarkable.     Impression: No acute bone abnormality of the humerus. Electronically Signed: Enmanuel  MD Calin  1/4/2024 7:16 PM EST  Workstation ID: [id number]             Results for orders placed during the hospital encounter of 08/02/20    Transthoracic Echo Complete With Contrast if Necessary Per Protocol    Interpretation Summary  · Left ventricular systolic function is normal. Estimated EF = 70%.  · Left ventricular diastolic dysfunction (grade I) consistent with impaired relaxation.  · Left atrial cavity size is moderately dilated.  · There is calcification of the aortic valve. There is no significant stenosis or regurgitation. A Lambel's excrescence is noted on an aortic valve leaflet.  · Moderate mitral valve regurgitation is present.  · Estimated right ventricular systolic pressure from tricuspid regurgitation is moderately elevated (49 mmHg).  · There is a moderate (1-2cm) circumferential pericardial effusion. There is no tamponade physiology.      Plan for Follow-up of Pending Labs/Results:     Discharge Details        Discharge Medications        New Medications        Instructions Start Date   benzonatate 200 MG capsule  Commonly known as: TESSALON   200 mg, Oral, 3 Times Daily PRN      ipratropium-albuterol 0.5-2.5 mg/3 ml nebulizer  Commonly known as: DUO-NEB   3 mL, Nebulization, Every 4 Hours PRN             Changes to Medications        Instructions Start Date   diclofenac 1 % gel gel  Commonly known as: VOLTAREN  What changed: See the new instructions.   APPLY 4 GRAMS TOPICALLY TO THE APPROPRAITE AREA AS DIRECTED FOUR TIMES A DAY      furosemide 20 MG tablet  Commonly known as: LASIX  What changed:   when to take this  reasons to take this   20 mg, Oral, Daily PRN      lisinopril 20 MG tablet  Commonly known as: PRINIVIL,ZESTRIL  What changed:   medication strength  how much to take   20 mg, Oral, Daily             Continue These Medications        Instructions Start Date   acetaminophen 500 MG tablet  Commonly known as: TYLENOL   500 mg, Oral, Every 4 Hours PRN      amLODIPine 5 MG  tablet  Commonly known as: NORVASC   5 mg, Oral, Daily      bethanechol 10 MG tablet  Commonly known as: URECHOLINE   TAKE ONE TABLET BY MOUTH THREE TIMES A DAY      busPIRone 10 MG tablet  Commonly known as: BUSPAR   10 mg, Oral, 2 Times Daily      CITRACAL PETITES/VITAMIN D PO   2 tablets, Oral, 2 times daily      Dimethicone 1.5 % cream   Apply to gregorio upper extremities topically twice a day for fragile skin.      donepezil 10 MG tablet  Commonly known as: ARICEPT   TAKE ONE TABLET BY MOUTH EVERY NIGHT AT BEDTIME      gabapentin 300 MG capsule  Commonly known as: NEURONTIN   300 mg, Oral, 2 Times Daily      guaifenesin 100 MG/5ML liquid  Commonly known as: ROBITUSSIN   200 mg, Oral, Every 4 Hours PRN      loperamide 2 MG capsule  Commonly known as: IMODIUM   2 mg, Oral, Every 3 Hours PRN      memantine 10 MG tablet  Commonly known as: NAMENDA   TAKE ONE TABLET BY MOUTH TWICE A DAY      menthol-zinc oxide 0.44-20.625 % ointment ointment  Commonly known as: CALMOSEPTINE   Apply to buttocks topically 3 times a day for MASD      multivitamin with minerals tablet tablet   1 tablet, Oral, Daily      nystatin 458752 UNIT/GM powder  Commonly known as: MYCOSTATIN   Apply to rectal area topically every 12 hours as needed for redness      omeprazole 20 MG capsule  Commonly known as: priLOSEC   20 mg, Oral, Daily      ondansetron 4 MG tablet  Commonly known as: ZOFRAN   4 mg, Oral, Every 8 Hours PRN      polyethylene glycol 17 g packet  Commonly known as: MIRALAX   17 g, Oral, Daily PRN      sertraline 50 MG tablet  Commonly known as: ZOLOFT   50 mg, Oral, Daily      Synthroid 150 MCG tablet  Generic drug: levothyroxine   TAKE ONE TABLET BY MOUTH DAILY      traMADol 50 MG tablet  Commonly known as: ULTRAM   50 mg, Oral, Daily PRN             Stop These Medications      aspirin 81 MG EC tablet              Allergies   Allergen Reactions    Augmentin [Amoxicillin-Pot Clavulanate] Diarrhea    Codeine Nausea Only     Trouble  Breathing     Shrimp Hives    [place] Unknown - Low Severity         Discharge Disposition:  Skilled Nursing Facility (DC - External)    Diet:  Hospital:  Diet Order   Procedures    Diet: Cardiac Diets; Healthy Heart (2-3 Na+); Texture: Regular Texture (IDDSI 7); Fluid Consistency: Thin (IDDSI 0)       Diet Instructions       Diet: Regular/House Diet; Thin (IDDSI 0)      Discharge Diet: Regular/House Diet    Fluid Consistency: Thin (IDDSI 0)             Activity:  Activity Instructions       Activity as Tolerated              Restrictions or Other Recommendations:         CODE STATUS:    Code Status and Medical Interventions:   Ordered at: 12/22/23 4027     Level Of Support Discussed With:    Patient     Code Status (Patient has no pulse and is not breathing):    CPR (Attempt to Resuscitate)     Medical Interventions (Patient has pulse or is breathing):    Full Support       Future Appointments   Date Time Provider Department Center   1/12/2024  1:00 PM Linda Tristan APRN [facility]       Additional Instructions for the Follow-ups that You Need to Schedule       Discharge Follow-up with PCP   As directed       Currently Documented PCP:    Eric Patel MD    PCP Phone Number:    [phone]     Follow Up Details: 1 week                      MAIDA Rothman  01/05/24      Time Spent on Discharge:  I spent  35  minutes on this discharge activity which included: face-to-face encounter with the patient, reviewing the data in the system, coordination of the care with the nursing staff as well as consultants, documentation, and entering orders.        Electronically signed by MAIDA Rothman, 01/05/24, 12:26 PM EST.

## 2024-02-14 ENCOUNTER — TELEPHONE (OUTPATIENT)
Dept: INTERNAL MEDICINE | Facility: CLINIC | Age: 89
End: 2024-02-14
Payer: MEDICARE

## 2024-05-09 PROBLEM — N39.0 UTI (URINARY TRACT INFECTION): Status: ACTIVE | Noted: 2024-05-09

## 2024-05-09 NOTE — LETTER
EMS Transport Request  For use at [facility], [place], [place], [place], and [place] only   Patient Name: Temi Jarrett : 1930   Weight:53.4 kg (117 lb 12.8 oz) Pick-up Location: [facility] BLS/ALS: BLS/ALS: BLS   Insurance: MEDICARE Auth End Date: 2024   Pre-Cert #: D/C Summary complete:    Destination: [place] [id number]   Contact Precautions: None   Equipment (O2, Fluids, etc.): O2, settings 2 liters   Arrive By Date/Time: 2024 Stretcher/WC: Wheelchair   CM Requesting: Virginia Fitzpatrick RN Ext: [phone]   Notes/Medical Necessity: non-ambulatory. Poor truncal support.     ______________________________________________________________________    *Only 2 patient bags OR 1 carry-on size bag are permitted.  Wheelchairs and walkers CANNOT transported with the patient. Acknowledge: Yes

## 2024-05-09 NOTE — ED NOTES
Temi Jarrett    Nursing Report ED to Floor:  Mental status: GCS 14  Ambulatory status: unknown  Oxygen Therapy:  2L  Cardiac Rhythm:V PACED  Admitted from: [facility]  Safety Concerns:  falls  Social Issues:   ED Room #:  NA    ED Nurse Phone Extension - [phone] or may call [phone].      HPI:   Chief Complaint   Patient presents with    Altered Mental Status       Past Medical History:  Past Medical History:   Diagnosis Date    Acute sepsis 01/14/2021    Arthritis     Bladder prolapse, female, acquired     Closed fracture of neck of left femur 09/27/2019    Dementia     Depression     Disease of thyroid gland     Gastric polyp     GERD (gastroesophageal reflux disease)     Hiatal hernia     History of colonic polyps     Hyperlipidemia     Hypertension     IBS (irritable bowel syndrome)     Macular degeneration     Pacemaker 2023    Pericardial effusion 08/03/2020    Echo (8/3/2020): Normal LVEF.  Moderate pericardial effusion without tamponade.  Moderate MR. RVSP 49 mmHg    Torn meniscus     right knee         Past Surgical History:  Past Surgical History:   Procedure Laterality Date    CHOLECYSTECTOMY  1997    ENDOSCOPY N/A 01/14/2021    Procedure: ESOPHAGOGASTRODUODENOSCOPY;  Surgeon: Serjio Guerrero MD;  Location:  [facility];  Service: General;  Laterality: N/A;    EYE SURGERY  1998    Cataract extraction    HERNIA REPAIR      HIP HEMIARTHROPLASTY Left 09/28/2019    Procedure: HIP HEMIARTHROPLASTY LEFT;  Surgeon: Reddy Segundo Jr., MD;  Location:  [facility] OR;  Service: Orthopedics    HYSTERECTOMY  1976    KNEE SURGERY Right     arthroscopy- 2000    SKIN CANCER EXCISION Left     [facility]; CANCEROUS SPOT DIDN'T  MOVE BUT NEEDED REMOVED    TONSILLECTOMY          Admitting Doctor:   Jose Mustafa MD    Consulting Provider(s):  Consults       No orders found from 4/10/2024 to 5/10/2024.             Admitting Diagnosis:   The primary encounter diagnosis was Acute UTI. A  diagnosis of Acute encephalopathy was also pertinent to this visit.    Most Recent Vitals:   Vitals:    05/09/24 1728 05/09/24 1800 05/09/24 1830 05/09/24 1901   BP: 139/72 142/82 140/78 141/67   BP Location:       Patient Position:       Pulse: 65 83 70 63   Resp:       Temp:       TempSrc:       SpO2: 96% 94% 95% 96%   Weight:       Height:           Active LDAs/IV Access:   Lines, Drains & Airways       Active LDAs       Name Placement date Placement time Site Days    Peripheral IV 05/09/24 1635 Posterior;Right Forearm 05/09/24  1635  Forearm  less than 1    Peripheral IV 05/09/24 1831 Anterior;Right Forearm 05/09/24 1831  Forearm  less than 1    External Urinary Catheter 12/22/23 1931  --  138                    Labs (abnormal labs have a star):   Labs Reviewed   COMPREHENSIVE METABOLIC PANEL - Abnormal; Notable for the following components:       Result Value    Glucose 135 (*)     CO2 33.0 (*)     All other components within normal limits    Narrative:     GFR Normal >60  Chronic Kidney Disease <60  Kidney Failure <15    The GFR formula is only valid for adults with stable renal function between ages 18 and 70.   SINGLE HS TROPONIN T - Abnormal; Notable for the following components:    HS Troponin T 28 (*)     All other components within normal limits    Narrative:     High Sensitive Troponin T Reference Range:  <14.0 ng/L- Negative Female for AMI  <22.0 ng/L- Negative Male for AMI  >=14 - Abnormal Female indicating possible myocardial injury.  >=22 - Abnormal Male indicating possible myocardial injury.   Clinicians would have to utilize clinical acumen, EKG, Troponin, and serial changes to determine if it is an Acute Myocardial Infarction or myocardial injury due to an underlying chronic condition.        CBC WITH AUTO DIFFERENTIAL - Abnormal; Notable for the following components:    Neutrophil % 88.5 (*)     Lymphocyte % 6.6 (*)     Monocyte % 3.8 (*)     Eosinophil % 0.1 (*)     Neutrophils, Absolute 8.72  (*)     Lymphocytes, Absolute 0.65 (*)     All other components within normal limits   URINALYSIS W/ MICROSCOPIC IF INDICATED (NO CULTURE) - Abnormal; Notable for the following components:    Appearance, UA Turbid (*)     Blood, UA Small (1+) (*)     Protein, UA >=300 mg/dL (3+) (*)     Nitrite, UA Positive (*)     All other components within normal limits   URINALYSIS, MICROSCOPIC ONLY - Abnormal; Notable for the following components:    RBC, UA 11-20 (*)     WBC, UA 11-20 (*)     Bacteria, UA 4+ (*)     Squamous Epithelial Cells, UA 3-6 (*)     All other components within normal limits   MAGNESIUM - Normal   URINE CULTURE   RAINBOW DRAW    Narrative:     The following orders were created for panel order [facility] Draw.  Procedure                               Abnormality         Status                     ---------                               -----------         ------                     Green Top (Gel)[[id number]]                                  Final result               Lavender Top[[id number]]                                     Final result               Gold Top - SST[[id number]]                                   Final result               Gray Top[[id number]]                                         Final result               Light Blue Top[[id number]]                                   Final result                 Please view results for these tests on the individual orders.   CBC AND DIFFERENTIAL    Narrative:     The following orders were created for panel order CBC & Differential.  Procedure                               Abnormality         Status                     ---------                               -----------         ------                     CBC Auto Differential[[id number]]        Abnormal            Final result                 Please view results for these tests on the individual orders.   GREEN TOP   LAVENDER TOP   GOLD TOP - SST   GRAY TOP   LIGHT BLUE TOP       Meds Given in ED:   Medications    sodium chloride 0.9 % flush 10 mL (has no administration in time range)   cefTRIAXone (ROCEPHIN) 1,000 mg in sodium chloride 0.9 % 100 mL MBP (1,000 mg Intravenous New Bag 5/9/24 1902)           Last NIH score:                                                          Dysphagia screening results:        [place] Coma Scale:  No data recorded     CIWA:        Restraint Type:            Isolation Status:  No active isolations

## 2024-05-09 NOTE — H&P
[facility] Medicine Services  HISTORY AND PHYSICAL    Patient Name: Temi Jarrett  : 1930  MRN: [id number]  Primary Care Physician: Eric Patel MD  Date of admission: 2024    Subjective   Subjective     Chief Complaint:  Altered mental status    HPI:  Temi Jarrett is a 89 y.o. female with past medical history significant for essential hypertension, Alzheimer's, hiatal hernia, hypothyroidism and metastatic disease with unknown primary presents to the ED due to altered mental status.  At the time my evaluation no family is present therefore HPI is limited.  ED provider did speak with son whom is POA and reports patient has been more lethargic with increased confusion.  Son notes that over the past few days her oxygen has not been properly attached to the concentrator, last time was this morning.  He states that he has concerns about the level of tension she is getting at the nursing facility she is currently at.  While at bedside patient does confirm she is having some dysuria but denies any shortness of air, cough, nausea or vomiting.        Review of Systems   Unable to perform ROS: Mental status change                Personal History     Past Medical History:   Diagnosis Date    Acute sepsis 2021    Arthritis     Bladder prolapse, female, acquired     Closed fracture of neck of left femur 2019    Dementia     Depression     Disease of thyroid gland     Gastric polyp     GERD (gastroesophageal reflux disease)     Hiatal hernia     History of colonic polyps     Hyperlipidemia     Hypertension     IBS (irritable bowel syndrome)     Macular degeneration     Pacemaker     Pericardial effusion 2020    Echo (8/3/2020): Normal LVEF.  Moderate pericardial effusion without tamponade.  Moderate MR. RVSP 49 mmHg    Torn meniscus     right knee          Oncology Problem List:  Metastatic disease (2023; Status: Active)  Oncology/Hematology History        Past Surgical History:   Procedure Laterality Date    CHOLECYSTECTOMY  1997    ENDOSCOPY N/A 01/14/2021    Procedure: ESOPHAGOGASTRODUODENOSCOPY;  Surgeon: Serjio Guerrero MD;  Location: [facility] ENDOSCOPY;  Service: General;  Laterality: N/A;    EYE SURGERY  1998    Cataract extraction    HERNIA REPAIR      HIP HEMIARTHROPLASTY Left 09/28/2019    Procedure: HIP HEMIARTHROPLASTY LEFT;  Surgeon: Reddy Segundo Jr., MD;  Location: [facility] OR;  Service: Orthopedics    HYSTERECTOMY  1976    KNEE SURGERY Right     arthroscopy- 2000    SKIN CANCER EXCISION Left     [facility]; CANCEROUS SPOT DIDN'T  MOVE BUT NEEDED REMOVED    TONSILLECTOMY         Family History:  family history includes Breast cancer in her mother; Diabetes in her son; Hypertension in her father and mother; Obesity in her mother.     Social History:  reports that she has never smoked. She has never used smokeless tobacco. She reports that she does not drink alcohol and does not use drugs.  Social History     Social History Narrative    Lives with son and daughter in law--  is at [facility] getting rehab       Medications:  Calcium Citrate-Vitamin D, Dimethicone, acetaminophen, amLODIPine, benzonatate, bethanechol, busPIRone, diclofenac, donepezil, furosemide, gabapentin, guaifenesin, ipratropium-albuterol, levothyroxine, lisinopril, loperamide, memantine, menthol-zinc oxide, multivitamin with minerals, nystatin, omeprazole, ondansetron, polyethylene glycol, sertraline, and traMADol    Allergies   Allergen Reactions    Augmentin [Amoxicillin-Pot Clavulanate] Diarrhea    Codeine Nausea Only     Trouble Breathing     Shrimp Hives    [place] Unknown - Low Severity       Objective   Objective     Vital Signs:   Temp:  [98.2 °F (36.8 °C)] 98.2 °F (36.8 °C)  Heart Rate:  [63-83] 63  Resp:  [18] 18  BP: (139-154)/(67-97) 141/67  Flow (L/min):  [2] 2    Physical Exam  Vitals and nursing note reviewed.   Constitutional:        General: She is not in acute distress.     Appearance: Normal appearance. She is not ill-appearing, toxic-appearing or diaphoretic.   HENT:      Head: Normocephalic and atraumatic.      Nose: Nose normal.      Mouth/Throat:      Mouth: Mucous membranes are dry.      Pharynx: Oropharynx is clear.   Eyes:      Extraocular Movements: Extraocular movements intact.      Conjunctiva/sclera: Conjunctivae normal.      Pupils: Pupils are equal, round, and reactive to light.   Cardiovascular:      Rate and Rhythm: Normal rate and regular rhythm.      Pulses: Normal pulses.      Heart sounds: Murmur heard.   Pulmonary:      Effort: Pulmonary effort is normal.      Breath sounds: Normal breath sounds.   Abdominal:      General: Bowel sounds are normal. There is no distension.      Palpations: Abdomen is soft. There is no mass.      Tenderness: There is no abdominal tenderness. There is no right CVA tenderness, left CVA tenderness, guarding or rebound.      Hernia: No hernia is present.   Musculoskeletal:         General: No swelling, tenderness, deformity or signs of injury.      Cervical back: Normal range of motion and neck supple.      Right lower leg: No edema.      Left lower leg: No edema.   Skin:     General: Skin is warm.   Neurological:      Mental Status: She is alert. Mental status is at baseline. She is disoriented.   Psychiatric:         Mood and Affect: Mood normal.            Result Review:  I have personally reviewed the results from the time of this admission to 5/9/2024 19:34 EDT and agree with these findings:  [x]  Laboratory list / accordion  [x]  Microbiology  [x]  Radiology  [x]  EKG/Telemetry   []  Cardiology/Vascular   []  Pathology  []  Old records  []  Other:  Most notable findings include:     LAB RESULTS:      Lab 05/09/24  1703   WBC 9.85   HEMOGLOBIN 12.9   HEMATOCRIT 39.3   PLATELETS 181   NEUTROS ABS 8.72*   IMMATURE GRANS (ABS) 0.05   LYMPHS ABS 0.65*   MONOS ABS 0.37   EOS ABS 0.01   MCV 90.6          Lab 05/09/24  1703   SODIUM 142   POTASSIUM 3.9   CHLORIDE 102   CO2 33.0*   ANION GAP 7.0   BUN 15   CREATININE 0.66   EGFR 81.4   GLUCOSE 135*   CALCIUM 8.9   MAGNESIUM 1.8         Lab 05/09/24  1703   TOTAL PROTEIN 6.3   ALBUMIN 3.5   GLOBULIN 2.8   ALT (SGPT) 13   AST (SGOT) 23   BILIRUBIN 0.6   ALK PHOS 56         Lab 05/09/24  1703   HSTROP T 28*                 Brief Urine Lab Results  (Last result in the past 365 days)        Color   Clarity   Blood   Leuk Est   Nitrite   Protein   CREAT   Urine HCG        05/09/24 1728 Yellow   Turbid   Small (1+)   Negative   Positive   >=300 mg/dL (3+)                 Microbiology Results (last 10 days)       ** No results found for the last 240 hours. **            XR Chest 1 View    Result Date: 5/9/2024  XR CHEST 1 VW Date of Exam: 5/9/2024 5:32 PM EDT Indication: Weak/Dizzy/AMS triage protocol Comparison: Contrast-enhanced CT of the chest performed on December 22, 2023. Chest radiograph performed on December 22, 2023 Findings: Limited study due to patient's rotation to the right. Innumerable bilateral nodular opacities are visualized. Large hiatal hernia is visualized. The aorta is uncoiled. There is suggestion of a left paratracheal mass. Cardiac silhouette is mildly enlarged. Leadless pacemaker is noted. No acute osseous abnormalities. Osseous structures are demineralized.     Impression: Impression: Findings consistent with metastatic disease to the lungs, appears progressed when compared to prior studies. Large hiatal hernia. Electronically Signed: Bay Duong MD  5/9/2024 6:09 PM EDT  Workstation ID: [id number]     Results for orders placed during the hospital encounter of 08/02/20    Transthoracic Echo Complete With Contrast if Necessary Per Protocol    Interpretation Summary  · Left ventricular systolic function is normal. Estimated EF = 70%.  · Left ventricular diastolic dysfunction (grade I) consistent with impaired relaxation.  · Left atrial cavity  size is moderately dilated.  · There is calcification of the aortic valve. There is no significant stenosis or regurgitation. A Lambel's excrescence is noted on an aortic valve leaflet.  · Moderate mitral valve regurgitation is present.  · Estimated right ventricular systolic pressure from tricuspid regurgitation is moderately elevated (49 mmHg).  · There is a moderate (1-2cm) circumferential pericardial effusion. There is no tamponade physiology.      Assessment & Plan   Assessment & Plan       UTI (urinary tract infection)    Hypothyroidism (acquired)    Essential hypertension    Alzheimer's disease    Prolonged Q-T interval on ECG    Metastatic disease      89-year-old female presents the ED from nursing facility due to increased lethargy and confusion.    1) AMS      UTI-POA  - Prior urine cultures reviewed: Positive growth for yeast, Klebsiella pneumoniae ssp pneumoniae and Proteus Mirabilis  - UA noted above  - Urine cultures pending  - Continue Rocephin  - IV fluids  - Bladder scan every 6 for urinary retention  -prn ABG    2) metastatic disease  - Has been evaluated by oncology-no treatment options due to age and functional status  - Currently in home hospice at the nursing facility  - Unknown primary    3) Alzheimer's disease  - Continue Namenda and Aricept    4) GERD  - PPI    5) hypothyroidism  - Continue Synthroid, check TSH    6) large hiatal hernia  - Prior CTs noted above  - Currently asymptomatic    7) anxiety/depression  - Continue Zoloft, BuSpar    8) essential hypertension  - Continue Norvasc, lisinopril       DVT prophylaxis:  scds    CODE STATUS:  Full Code        Expected Discharge  TBD     This note has been completed as part of a split-shared workflow.     Signature:Electronically signed by MAIAD Rodriguez, 05/09/24, 7:44 PM EDT.        Attending   Admission Attestation       I have performed an independent face-to-face diagnostic evaluation including performing an independent physical  examination.  I approve of the documented plan of care above that was reviewed and developed with the advanced practice clinician (APC) and take responsibility for that plan along with its associated risks.  I have updated the HPI as appropriate.    Brief HPI    89 yo with history of alzheimer's dementia, Hypothyroid, and known lung lesions with hilar adenopathy (previously evaluated by Oncology with further workup deferred due to poor performance status), presents with increased lethargy and confusion at her care facility.    Attending Physical Exam:  Temp:  [97 °F (36.1 °C)-98.2 °F (36.8 °C)] 97 °F (36.1 °C)  Heart Rate:  [62-83] 62  Resp:  [18] 18  BP: (139-154)/(67-97) 149/84  Flow (L/min):  [2] 2    Very frail, in bed  MM dry  RRR  Breath sounds grossly clear, poor effort  Abd soft, NT  No edema  Flat affect  Somnolent, will attempt to answer intermittent questions briefly. Confused.     Result Review:  I have personally reviewed the results from the time of this admission to 5/9/2024 23:29 EDT and agree with these findings:  [x]  Laboratory list / accordion  []  Microbiology  []  Radiology  []  EKG/Telemetry   []  Cardiology/Vascular   []  Pathology  []  Old records  []  Other:  Most notable findings include:     [START ON 5/10/2024] amLODIPine, 5 mg, Oral, Daily  busPIRone, 10 mg, Oral, BID  [START ON 5/10/2024] cefTRIAXone, 1,000 mg, Intravenous, Q24H  donepezil, 10 mg, Oral, Nightly  [START ON 5/10/2024] levothyroxine, 150 mcg, Oral, Q AM  [START ON 5/10/2024] lisinopril, 20 mg, Oral, Daily  memantine, 10 mg, Oral, BID  [START ON 5/10/2024] multivitamin with minerals, 1 tablet, Oral, Daily  nystatin, , Topical, Q12H  [START ON 5/10/2024] pantoprazole, 40 mg, Oral, Daily  [START ON 5/10/2024] sertraline, 50 mg, Oral, Daily          Assessment and Plan:    Encephalopathy  - likely due to UTI in setting of underlying dementia    UTI  - rocephin, follow cultures    Metastatic lung nodules  - previously seen by  Oncology, not a candidate for therapy due to poor physical condition  - reportedly on hospice at her care facility, however currently full code  - consider Palliative consult to help patient/family define goals of care.    Alzheimer's dementia    Hypothyroid    HTN    Anxiety/depression        See assessment and plan documented by APC above and updated/edited by me as appropriate.    Jose Mustafa MD  05/09/24

## 2024-05-09 NOTE — LETTER
[facility]  [address] [facility] [address]  [phone]        May 18, 2024      Patient: Temi Jarrett  YOB: 1930  Date of Visit: 5/9/2024      Inpatient referral at [facility].    Attending: Natalia BEY  Certifying: Dr. Isha Taveras  Referrining: Dr. Bell Galeana    Thank you,        Aretha Smyth, RN

## 2024-05-09 NOTE — ED PROVIDER NOTES
"Subjective   History of Present Illness    Pt presents for altered mental status from the nursing home.  She has advanced dementia at baseline and is not considered a reliable historian.  She complains of generally feeling poorly but cannot isolate any particular symptoms to me.  She does not answer specific ROS questions, just keeps saying she doesn't feel good.  The NH reported she had increased confusion as well as \"lethargy.\"    History provided by:  Patient and nursing home  History limited by:  Dementia      Review of Systems   Unable to perform ROS: Dementia       Past Medical History:   Diagnosis Date    Acute sepsis 01/14/2021    Arthritis     Bladder prolapse, female, acquired     Closed fracture of neck of left femur 09/27/2019    Dementia     Depression     Disease of thyroid gland     Gastric polyp     GERD (gastroesophageal reflux disease)     Hiatal hernia     History of colonic polyps     Hyperlipidemia     Hypertension     IBS (irritable bowel syndrome)     Macular degeneration     Pacemaker 2023    Pericardial effusion 08/03/2020    Echo (8/3/2020): Normal LVEF.  Moderate pericardial effusion without tamponade.  Moderate MR. RVSP 49 mmHg    Torn meniscus     right knee        Allergies   Allergen Reactions    Augmentin [Amoxicillin-Pot Clavulanate] Diarrhea    Codeine Nausea Only     Trouble Breathing     Shrimp Hives    [place] Unknown - Low Severity       Past Surgical History:   Procedure Laterality Date    CHOLECYSTECTOMY  1997    ENDOSCOPY N/A 01/14/2021    Procedure: ESOPHAGOGASTRODUODENOSCOPY;  Surgeon: Serjio Guerrero MD;  Location:  [facility] ENDOSCOPY;  Service: General;  Laterality: N/A;    EYE SURGERY  1998    Cataract extraction    HERNIA REPAIR      HIP HEMIARTHROPLASTY Left 09/28/2019    Procedure: HIP HEMIARTHROPLASTY LEFT;  Surgeon: Reddy Segundo Jr., MD;  Location:  [facility] OR;  Service: Orthopedics    HYSTERECTOMY  1976    KNEE SURGERY Right     arthroscopy- 2000    SKIN " CANCER EXCISION Left     [facility]; CANCEROUS SPOT DIDN'T  MOVE BUT NEEDED REMOVED    TONSILLECTOMY         Family History   Problem Relation Age of Onset    Hypertension Mother     Breast cancer Mother     Obesity Mother     Hypertension Father     Diabetes Son        Social History     Socioeconomic History    Marital status:    Tobacco Use    Smoking status: Never    Smokeless tobacco: Never   Vaping Use    Vaping status: Never Used   Substance and Sexual Activity    Alcohol use: No    Drug use: Never    Sexual activity: Defer           Objective   Physical Exam  Vitals and nursing note reviewed.   Constitutional:       General: She is not in acute distress.     Appearance: Normal appearance. She is ill-appearing. She is not toxic-appearing.   HENT:      Head: Normocephalic and atraumatic.      Mouth/Throat:      Mouth: Mucous membranes are moist.   Eyes:      General: No scleral icterus.        Right eye: No discharge.         Left eye: No discharge.      Conjunctiva/sclera: Conjunctivae normal.   Cardiovascular:      Rate and Rhythm: Normal rate and regular rhythm.      Heart sounds: No murmur heard.  Pulmonary:      Effort: Pulmonary effort is normal. No respiratory distress.      Breath sounds: Normal breath sounds. No wheezing.   Abdominal:      General: Bowel sounds are normal. There is no distension.      Palpations: Abdomen is soft.      Tenderness: There is no abdominal tenderness. There is no guarding or rebound.   Musculoskeletal:         General: No swelling. Normal range of motion.      Cervical back: Normal range of motion and neck supple.   Skin:     General: Skin is warm and dry.      Findings: No rash.   Neurological:      General: No focal deficit present.      Mental Status: She is alert. She is disoriented.   Psychiatric:         Mood and Affect: Mood normal.         Behavior: Behavior normal.         Thought Content: Thought content normal.          Procedures           ED Course    NH records reviewed and indicate she is enrolled in home hospice.  Discharge summary 1/5/24 reviewed and describes chronic hypoxia related to metastatic cancer invading the lungs, with no treatment options available.    Labs notable for a UTI and a mild left shift on CBC, otherwise benign. Urine culture ordered and abx ordered.    EKG VPR, CXR shows her advanced cancer previously known.                             I called patient's son and MARSHAL Jarrett at 1846.  He says there have been issues last few days where her oxygen has not been properly attached to the concentrator, last time this morning.  He says she has indeed been sluggish the last few days.  He has concerns about the level of attention she is getting at the nursing home.                  Medical Decision Making  Problems Addressed:  Acute encephalopathy: complicated acute illness or injury with systemic symptoms that poses a threat to life or bodily functions  Acute UTI: complicated acute illness or injury    Amount and/or Complexity of Data Reviewed  Independent Historian: guardian     Details: son  Labs: ordered. Decision-making details documented in ED Course.  Radiology: ordered and independent interpretation performed. Decision-making details documented in ED Course.     Details: CXR read by me: lung cancer, no pneumonia  ECG/medicine tests: ordered and independent interpretation performed. Decision-making details documented in ED Course.     Details: EKG read by me: paced rhythm  Discussion of management or test interpretation with external provider(s): hospitalist    Risk  Prescription drug management.  Decision regarding hospitalization.        Final diagnoses:   Acute UTI   Acute encephalopathy       ED Disposition  ED Disposition       ED Disposition   Decision to Admit    Condition   --    Comment   Level of Care: Telemetry [5]   Diagnosis: UTI (urinary tract infection) [410032]                 No  follow-up provider specified.       Medication List      No changes were made to your prescriptions during this visit.            Robert Pablo MD  05/09/24 [id number]

## 2024-05-10 NOTE — PROGRESS NOTES
Continued Stay Note  [facility]     Patient Name: Temi Jarrett  MRN: [id number]  Today's Date: 5/10/2024    Admit Date: 5/9/2024    Plan: Home Hospice Patient   Discharge Plan       Row Name 05/10/24 1053       Plan    Plan Home Hospice Patient    Plan Comments Patient is a 89y female  with home hospice Lexyellow1. She resides at [facility] and presented to [facility] ED on 5/98/2024 for AMS. She was found to have a UTI and admitted to [facility] for IV antibiotic treatment. Patient is a full code/full support. Son and patient wish to remain full code. Bedside assessment completed.30% pps. Breathing is even and unlabored. No c/o discomfort voiced. Patient is A&O x4. Patient sitting up in bed consuming breakfast tray. Patient plans to return to [facility] when IV antibiotics complete. Son updated on visit via phone. Inpatient hospice will continue to follow on the periphery. Home team updated.  Please call ext. [phone] with any questions.                    Discharge Codes    No documentation.                 Expected Discharge Date and Time       Expected Discharge Date Expected Discharge Time    May 13, 2024               Celeste Ibarra RN

## 2024-05-10 NOTE — PLAN OF CARE
Goal Outcome Evaluation:           Progress: no change  Outcome Evaluation: Palliative RN and Dr. GAUDENCIO Gonzales saw pt on 5/10 at 0916.  New palliative consult for assistance with GOC per Dr. Yeh.  Pt. sitting up in bed asleep with untouched breakfast try before her.  She appeared very confused.  She stated she needed help with feeding.  Order entered for staff to assist with meals.  Pt. appeared mildly soa at rest.  She is a current [place] hosp[facility] pt. [facility]  Per hospice note, son would like pt to remain full code, full treat and return back to [facility] once IVABX are finished.  Pt. will return with hospice services.  Palliative care to follow while pt is in hospital.      Problem: Palliative Care  Goal: Enhanced Quality of Life  Intervention: Promote Advance Care Planning  Flowsheets (Taken 5/10/2024 1543)  Life Transition/Adjustment:   palliative care initiated   palliative care discussed      1300 Palliative IDT meeting: CHUCKY Cortes RN, CHPN; DYLAN Cooney, APRN; WHIT Stroud MDiv, [facility];GAUDENCIO Gonzales MD.; WHIT Burleson RN, CHPN; ELIZABETH Navarro, RN; LEVI Galeana, [facility], [facility]    After hours, weekends and holidays, contact Palliative Provider by calling [phone].

## 2024-05-10 NOTE — CASE MANAGEMENT/SOCIAL WORK
Discharge Planning Assessment  [facility]     Patient Name: Temi Jarrett  MRN: [id number]  Today's Date: 5/10/2024    Admit Date: 5/9/2024    Plan: [facility] with Hospice   Discharge Needs Assessment       Row Name 05/10/24 1654       Living Environment    People in Home facility resident    Current Living Arrangements extended care facility    Potentially Unsafe Housing Conditions none       Transition Planning    Patient/Family Anticipates Transition to long-term care facility    Patient/Family Anticipated Services at Transition ;hospice care    Transportation Anticipated health plan transportation                   Discharge Plan       Row Name 05/10/24 1651       Plan    Plan [facility] with Hospice    Plan Comments Patient lives at [facility] bed there with Hospice care. CM attempted to call Son. CM called [facility], and left voicemail. Awaiting call back. Patient dependent and needs assist with ADLs, per chart review. PCP is Eric Patel MD. Insurance is Medicare A and B and [employer] [place] [facility]. CM visited patient at bedside twice no family at bedside. Patient in bed and nurse at bedside. Patient discharge plan is back to [facility] with Hospice care. Patient will likely need EMS transport. CM will follow for any discharge needs.    Final Discharge Disposition Code 04 - intermediate care facility                  Continued Care and Services - Admitted Since 5/9/2024    No active coordination exists for this encounter.       Expected Discharge Date and Time       Expected Discharge Date Expected Discharge Time    May 13, 2024            Demographic Summary       Row Name 05/10/24 1653       General Information    Admission Type inpatient    Preferred Language English                   Functional Status       Row Name 05/10/24 1653       Functional Status    Usual Activity Tolerance poor    Current Activity Tolerance poor       Functional Status, IADL    Medications completely dependent     Meal Preparation completely dependent    Housekeeping completely dependent    Laundry completely dependent    Shopping completely dependent                   Psychosocial    No documentation.                  Abuse/Neglect    No documentation.                  Legal    No documentation.                  Substance Abuse    No documentation.                  Patient Forms    No documentation.                     Kristina Delvalle RN

## 2024-05-10 NOTE — PLAN OF CARE
Goal Outcome Evaluation:  Plan of Care Reviewed With: patient        Progress: no change  Outcome Evaluation: New admit. Pt arousable but confused and disoriented x4. Resp even and non-labored. On 2LNC. V-Paced on tele. No s/sx of pain or discomfort. Skin protocol inplace.

## 2024-05-10 NOTE — PLAN OF CARE
Goal Outcome Evaluation:              Outcome Evaluation: Pt slept between care throughtout the day. On 2L NC. New IV in left Forearm. Hospice and Palliative following. VSS.

## 2024-05-10 NOTE — PROGRESS NOTES
[facility] Medicine Services  PROGRESS NOTE    Patient Name: Temi Jarrett  : 1930  MRN: [id number]    Date of Admission: 2024  Primary Care Physician: Eric Patel MD    Subjective   Subjective     CC:  Altered mental status      HPI:  Patient resting comfortably, has no complaints.      Objective   Objective     Vital Signs:   Temp:  [97 °F (36.1 °C)-99.1 °F (37.3 °C)] 99.1 °F (37.3 °C)  Heart Rate:  [62-83] 66  Resp:  [16-18] 16  BP: (139-162)/() 151/89  Flow (L/min):  [2] 2     Physical Exam:  General appearance: Resting comfortably, in no acute distress, chronically ill-appearing  Cardiovascular: RRR, no murmurs or rubs, radial pulse full 2/4 BL   Respiratory: CTAB, no crackles or wheezes   Abdomen: soft, non-tender, no organomegaly, bowel sounds normoactive    Neuro/CNS: AMS  Skin: scatter superifical wounds on extremities     Results Reviewed:  LAB RESULTS:      Lab 05/10/24  0435 24  1703   WBC 11.45* 9.85   HEMOGLOBIN 13.7 12.9   HEMATOCRIT 41.8 39.3   PLATELETS 186 181   NEUTROS ABS 9.89* 8.72*   IMMATURE GRANS (ABS) 0.06* 0.05   LYMPHS ABS 0.82 0.65*   MONOS ABS 0.62 0.37   EOS ABS 0.01 0.01   MCV 88.4 90.6         Lab 05/10/24  0435 24  1703   SODIUM 143 142   POTASSIUM 3.9 3.9   CHLORIDE 99 102   CO2 31.0* 33.0*   ANION GAP 13.0 7.0   BUN 16 15   CREATININE 0.53* 0.66   EGFR 85.8 81.4   GLUCOSE 127* 135*   CALCIUM 8.8 8.9   MAGNESIUM 1.8 1.8         Lab 24  1703   TOTAL PROTEIN 6.3   ALBUMIN 3.5   GLOBULIN 2.8   ALT (SGPT) 13   AST (SGOT) 23   BILIRUBIN 0.6   ALK PHOS 56         Lab 24  1703   HSTROP T 28*                 Brief Urine Lab Results  (Last result in the past 365 days)        Color   Clarity   Blood   Leuk Est   Nitrite   Protein   CREAT   Urine HCG        24 1728 Yellow   Turbid   Small (1+)   Negative   Positive   >=300 mg/dL (3+)                   Microbiology Results Abnormal       None            XR  Chest 1 View    Result Date: 5/9/2024  XR CHEST 1 VW Date of Exam: 5/9/2024 5:32 PM EDT Indication: Weak/Dizzy/AMS triage protocol Comparison: Contrast-enhanced CT of the chest performed on December 22, 2023. Chest radiograph performed on December 22, 2023 Findings: Limited study due to patient's rotation to the right. Innumerable bilateral nodular opacities are visualized. Large hiatal hernia is visualized. The aorta is uncoiled. There is suggestion of a left paratracheal mass. Cardiac silhouette is mildly enlarged. Leadless pacemaker is noted. No acute osseous abnormalities. Osseous structures are demineralized.     Impression: Impression: Findings consistent with metastatic disease to the lungs, appears progressed when compared to prior studies. Large hiatal hernia. Electronically Signed: Bay Duong MD  5/9/2024 6:09 PM EDT  Workstation ID: [id number]     Results for orders placed during the hospital encounter of 08/02/20    Transthoracic Echo Complete With Contrast if Necessary Per Protocol    Interpretation Summary  · Left ventricular systolic function is normal. Estimated EF = 70%.  · Left ventricular diastolic dysfunction (grade I) consistent with impaired relaxation.  · Left atrial cavity size is moderately dilated.  · There is calcification of the aortic valve. There is no significant stenosis or regurgitation. A Lambel's excrescence is noted on an aortic valve leaflet.  · Moderate mitral valve regurgitation is present.  · Estimated right ventricular systolic pressure from tricuspid regurgitation is moderately elevated (49 mmHg).  · There is a moderate (1-2cm) circumferential pericardial effusion. There is no tamponade physiology.      Current medications:  Scheduled Meds:amLODIPine, 5 mg, Oral, Daily  busPIRone, 10 mg, Oral, BID  cefTRIAXone, 1,000 mg, Intravenous, Q24H  donepezil, 10 mg, Oral, Nightly  levothyroxine, 150 mcg, Oral, Q AM  lisinopril, 20 mg, Oral, Daily  memantine, 10 mg, Oral,  BID  multivitamin with minerals, 1 tablet, Oral, Daily  nystatin, , Topical, Q12H  pantoprazole, 40 mg, Oral, Daily  sertraline, 50 mg, Oral, Daily      Continuous Infusions:   PRN Meds:.  senna-docusate sodium **AND** polyethylene glycol **AND** bisacodyl **AND** bisacodyl    ipratropium-albuterol    nitroglycerin    sodium chloride    Assessment & Plan   Assessment & Plan     Active Hospital Problems    Diagnosis  POA    **UTI (urinary tract infection) [N39.0]  Yes    Metastatic disease [C79.9]  Yes    Prolonged Q-T interval on ECG [R94.31]  Yes    Alzheimer's disease [G30.9, F02.80]  Yes    Essential hypertension [I10]  Yes    Hypothyroidism (acquired) [E03.9]  Yes      Resolved Hospital Problems   No resolved problems to display.        Brief Hospital Course to date:  Temi Jarrett is a 89 y.o. female with past medical history significant for metastatic disease of unknown primary, Alzheimer's disease, was admitted for altered mental status found to be secondary to acute cystitis.  Patient found to be on hospice prior to admission.  Palliative care services consulted.    Acute encephalopathy, metabolic  Acute cystitis, present on admission, gram-negative bacilli  - Prior urine cultures reviewed: Positive growth for yeast, Klebsiella pneumoniae ssp pneumoniae and Proteus Mirabilis  - UA noted above  - Urine cultures with gram-negative bacilli, will switch to cefepime  - IV fluids     Metastatic disease of unknown primary  - Has been evaluated by oncology-no treatment options due to age and functional status  - Currently in home hospice at the nursing facility, palliative care consulted  - Unknown primary     Alzheimer's disease  - Continue Namenda and Aricept     GERD  - PPI     Hypothyroidism  - Continue Synthroid, check TSH     Large hiatal hernia  - Prior CTs noted above  - Currently asymptomatic     Anxiety/depression  - Continue Zoloft, BuSpar     Essential hypertension  - Continue Norvasc,  lisinopril    Expected Discharge Location and Transportation: Hospice   Expected Discharge   Expected Discharge Date: 5/13/2024; Expected Discharge Time:      DVT prophylaxis:  Mechanical DVT prophylaxis orders are present.         AM-PAC 6 Clicks Score (PT): 6 (05/09/24 2057)    CODE STATUS:   Code Status and Medical Interventions:   Ordered at: 05/09/24 1949     Level Of Support Discussed With:    Health Care Surrogate     Code Status (Patient has no pulse and is not breathing):    CPR (Attempt to Resuscitate)     Medical Interventions (Patient has pulse or is breathing):    Full Support       Oscar Yeh,   05/10/24

## 2024-05-10 NOTE — PROGRESS NOTES
"Nutrition Services    Patient Name:  Temi Jarrett  YOB: 1930  MRN: [id number]  Admit Date:  5/9/2024    Patient identified to be at nutrition risk per nurse admission screen based on indicator of \"unsure if weight loss\". Per EMR weight stable > past year. Noted pt home hospice pt currently full code with plans to return to [place], pt eating with no apparent nutritional needs at this time. Pt does not currently meet screen criteria for nutrition risk. RD will continue to follow per protocol.     Electronically signed by:  Gretchen Benitez RD  05/10/24 12:09 EDT   "

## 2024-05-10 NOTE — CONSULTS
Palliative Care Initial Consult     Attending Physician: Oscar Yeh DO  Referring Provider: Dr. Yeh    psychosocial support  Code Status:   Code Status and Medical Interventions:   Ordered at: 05/09/24 1949     Level Of Support Discussed With:    Health Care Surrogate     Code Status (Patient has no pulse and is not breathing):    CPR (Attempt to Resuscitate)     Medical Interventions (Patient has pulse or is breathing):    Full Support      Advanced Directives: Advance Directive Status: Patient has advance directive, copy in chart   Healthcare surrogate: Son      HPI:  89 y.o. female with hypertension, Alzheimer's dementia, hiatal hernia, hypothyroidism and metastatic disease with unknown primary presents to the ED due to altered mental status.  Son reports patient has been more lethargic with increased confusion, and over the past few days she is having some dysuria but denies any shortness of air, cough, nausea or vomiting.  Patient lives in a skilled nursing facility and receives assistance with all ADLs.  She is presently on hospice services but was sent to the hospital for evaluation.       ROS: Negative except as above in HPI.     Past Medical History:   Diagnosis Date    Acute sepsis 01/14/2021    Arthritis     Bladder prolapse, female, acquired     Closed fracture of neck of left femur 09/27/2019    Dementia     Depression     Disease of thyroid gland     Gastric polyp     GERD (gastroesophageal reflux disease)     Hiatal hernia     History of colonic polyps     Hyperlipidemia     Hypertension     IBS (irritable bowel syndrome)     Macular degeneration     Pacemaker 2023    Pericardial effusion 08/03/2020    Echo (8/3/2020): Normal LVEF.  Moderate pericardial effusion without tamponade.  Moderate MR. RVSP 49 mmHg    Torn meniscus     right knee      Past Surgical History:   Procedure Laterality Date    CHOLECYSTECTOMY  1997    ENDOSCOPY N/A 01/14/2021    Procedure: ESOPHAGOGASTRODUODENOSCOPY;   Surgeon: Serjio Guerrero MD;  Location:  [facility];  Service: General;  Laterality: N/A;    EYE SURGERY  1998    Cataract extraction    HERNIA REPAIR      HIP HEMIARTHROPLASTY Left 09/28/2019    Procedure: HIP HEMIARTHROPLASTY LEFT;  Surgeon: Reddy Segundo Jr., MD;  Location:  [facility];  Service: Orthopedics    HYSTERECTOMY  1976    KNEE SURGERY Right     arthroscopy- 2000    SKIN CANCER EXCISION Left     [facility]; CANCEROUS SPOT DIDN'T  MOVE BUT NEEDED REMOVED    TONSILLECTOMY       Social History     Socioeconomic History    Marital status:    Tobacco Use    Smoking status: Never    Smokeless tobacco: Never   Vaping Use    Vaping status: Never Used   Substance and Sexual Activity    Alcohol use: No    Drug use: Never    Sexual activity: Defer     Family History   Problem Relation Age of Onset    Hypertension Mother     Breast cancer Mother     Obesity Mother     Hypertension Father     Diabetes Son        Allergies   Allergen Reactions    Augmentin [Amoxicillin-Pot Clavulanate] Diarrhea    Codeine Nausea Only     Trouble Breathing     Shrimp Hives    [place] Unknown - Low Severity       Current Facility-Administered Medications   Medication Dose Route Frequency Provider Last Rate Last Admin    amLODIPine (NORVASC) tablet 5 mg  5 mg Oral Daily Christine, Savannah, APRN   5 mg at 05/10/24 0932    sennosides-docusate (PERICOLACE) 8.6-50 MG per tablet 2 tablet  2 tablet Oral BID PRN Christine, Savannah, APRN        And    polyethylene glycol (MIRALAX) packet 17 g  17 g Oral Daily PRN Christine, Savannah, APRN        And    bisacodyl (DULCOLAX) EC tablet 5 mg  5 mg Oral Daily PRN Christine, Savannah, APRN        And    bisacodyl (DULCOLAX) suppository 10 mg  10 mg Rectal Daily PRN Christine, Savannah, APRN        busPIRone (BUSPAR) tablet 10 mg  10 mg Oral BID Christine, Savannah, APRN   10 mg at 05/10/24 0932    cefTRIAXone (ROCEPHIN) 1,000 mg in sodium chloride 0.9 % 100 mL MBP  1,000 mg  "Intravenous Q24H Christine, Savannah, APRN        donepezil (ARICEPT) tablet 10 mg  10 mg Oral Nightly Christine, Savannah, APRN   10 mg at 05/09/24 2303    ipratropium-albuterol (DUO-NEB) nebulizer solution 3 mL  3 mL Nebulization Q4H PRN Christine, Savannah, APRN        levothyroxine (SYNTHROID, LEVOTHROID) tablet 150 mcg  150 mcg Oral Q AM Christine, Savannah, APRN   150 mcg at 05/10/24 0518    lisinopril (PRINIVIL,ZESTRIL) tablet 20 mg  20 mg Oral Daily Christine, Savannah, APRN   20 mg at 05/10/24 0933    memantine (NAMENDA) tablet 10 mg  10 mg Oral BID Christine, Savannah, APRN   10 mg at 05/10/24 0933    multivitamin with minerals 1 tablet  1 tablet Oral Daily Christine, Savannah, APRN   1 tablet at 05/10/24 0933    nitroglycerin (NITROSTAT) SL tablet 0.4 mg  0.4 mg Sublingual Q5 Min PRN Christine, Savannah, APRN        nystatin (MYCOSTATIN) powder   Topical Q12H Christine, Savannah, APRN   Given at 05/10/24 0933    pantoprazole (PROTONIX) EC tablet 40 mg  40 mg Oral Daily Christine, Savannah, APRN   40 mg at 05/10/24 0933    sertraline (ZOLOFT) tablet 50 mg  50 mg Oral Daily Christine, Savannah, APRN   50 mg at 05/10/24 0933    sodium chloride 0.9 % flush 10 mL  10 mL Intravenous PRN Robert Pablo MD   10 mL at 05/09/24 2303          senna-docusate sodium **AND** polyethylene glycol **AND** bisacodyl **AND** bisacodyl    ipratropium-albuterol    nitroglycerin    sodium chloride    Current medication reviewed for route, type, dose and frequency and are current per MAR.    Palliative Performance Scale Score: 30%   /89 (BP Location: Right arm, Patient Position: Lying)   Pulse 66   Temp 99.1 °F (37.3 °C) (Axillary)   Resp 16   Ht 162.6 cm (64\")   Wt 54.2 kg (119 lb 8 oz)   LMP  (LMP Unknown)   SpO2 96%   BMI 20.51 kg/m²     Intake/Output Summary (Last 24 hours) at 5/10/2024 1546  Last data filed at 5/10/2024 0324  Gross per 24 hour   Intake --   Output 400 ml   Net -400 ml       PE:  General Appearance:    Chronically ill " appearing,  NAD   HEENT:    NC/AT, dry mucous membranes   Neck:   supple, trachea midline, no JVD   Lungs:   Poor airflow    Heart:    RRR, normal S1 and S2, no M/R/G   Abdomen:     Soft, NT, ND, NABS    Extremities: 1+ edema   Pulses:   Pulses weak   Skin:   Warm, dry   Neurologic: Confused, generalized weakness with muscle wasting, unable to stay awake during interview/exam   Psych:   Calm       Labs:   Results from last 7 days   Lab Units 05/10/24  0435   WBC 10*3/mm3 11.45*   HEMOGLOBIN g/dL 13.7   HEMATOCRIT % 41.8   PLATELETS 10*3/mm3 186     Results from last 7 days   Lab Units 05/10/24  0435   SODIUM mmol/L 143   POTASSIUM mmol/L 3.9   CHLORIDE mmol/L 99   CO2 mmol/L 31.0*   BUN mg/dL 16   CREATININE mg/dL 0.53*   GLUCOSE mg/dL 127*   CALCIUM mg/dL 8.8     Results from last 7 days   Lab Units 05/10/24  0435 05/09/24  1703   SODIUM mmol/L 143 142   POTASSIUM mmol/L 3.9 3.9   CHLORIDE mmol/L 99 102   CO2 mmol/L 31.0* 33.0*   BUN mg/dL 16 15   CREATININE mg/dL 0.53* 0.66   CALCIUM mg/dL 8.8 8.9   BILIRUBIN mg/dL  --  0.6   ALK PHOS U/L  --  56   ALT (SGPT) U/L  --  13   AST (SGOT) U/L  --  23   GLUCOSE mg/dL 127* 135*     Imaging Results (Last 72 Hours)       Procedure Component Value Units Date/Time    XR Chest 1 View [[id number]] Collected: 05/09/24 1807     Updated: 05/09/24 1812    Narrative:      XR CHEST 1 VW    Date of Exam: 5/9/2024 5:32 PM EDT    Indication: Weak/Dizzy/AMS triage protocol    Comparison: Contrast-enhanced CT of the chest performed on December 22, 2023. Chest radiograph performed on December 22, 2023    Findings:  Limited study due to patient's rotation to the right. Innumerable bilateral nodular opacities are visualized. Large hiatal hernia is visualized. The aorta is uncoiled. There is suggestion of a left paratracheal mass. Cardiac silhouette is mildly   enlarged. Leadless pacemaker is noted. No acute osseous abnormalities. Osseous structures are demineralized.      Impression:       Impression:    Findings consistent with metastatic disease to the lungs, appears progressed when compared to prior studies.    Large hiatal hernia.      Electronically Signed: Bay Duong MD    5/9/2024 6:09 PM EDT    Workstation ID: [id number]            Diagnostics: Reviewed    A: 89-year-old female on hospice services, presently with UTI and overall decline.  We have reached out to the hospice services and they will come and to evaluate and provide support.         P: Continue present management.  Hospice to see the patient and provide support until she is discharged at which time she will return to hospice services.    We appreciate the consult and the opportunity to participate in Temi Jarrett's care. We will continue to follow along. Please do not hesitate to contact us regarding further symptom management or goals of care needs, including after hours or on weekends via our on call provider at [phone].     Time: 30 minutes spent reviewing medical and medication records, assessing and examining patient, discussing with family, answering questions, providing some guidance about a plan and documentation of care, and coordinating care with other healthcare members, with > 50% time spent face to face.     Abimael Gonzales MD    5/10/2024

## 2024-05-11 NOTE — CONSULTS
Pt was sleeping comfortably.  Did not attempt to wake pt for a visit.  Pt is [place].  Hospice chaplain will see pt when pt returns to [facility].

## 2024-05-11 NOTE — PLAN OF CARE
Goal Outcome Evaluation:  Plan of Care Reviewed With: patient        Progress: no change  Outcome Evaluation: Slept throughout the night. TMAX 100.2F. Notified MAIDA King. Tylenol given as ordered. Needs anticipated and attended. Cont current POC

## 2024-05-11 NOTE — PROGRESS NOTES
"    [facility] Medicine Services  PROGRESS NOTE    Patient Name: Temi Jarrett  : 1930  MRN: [id number]    Date of Admission: 2024  Primary Care Physician: Eric Patel MD    Subjective   Subjective     CC:  F/u end of life care    HPI:  Patient answers \"yes\" to all questions, even when not a yes/no question. No family at bedside.      Objective   Objective     Vital Signs:   Temp:  [99 °F (37.2 °C)-100.2 °F (37.9 °C)] 100.2 °F (37.9 °C)  Heart Rate:  [46-61] 61  Resp:  [16] 16  BP: (140-149)/(61-93) 143/93  Flow (L/min):  [2] 2     Physical Exam:  Constitutional: Ill appearing elderly female laying in bed in NAD  Respiratory: Clear to auscultation bilaterally, respiratory effort normal   Cardiovascular: RRR, palpable radial pulse  Gastrointestinal: Positive bowel sounds, soft, nontender, nondistended  Neurologic: Repetitively answering yes to ALL questions    Results Reviewed:  LAB RESULTS:      Lab 24  0352 05/10/24  0435 24  1703   WBC 12.03* 11.45* 9.85   HEMOGLOBIN 13.1 13.7 12.9   HEMATOCRIT 40.7 41.8 39.3   PLATELETS 219 186 181   NEUTROS ABS  --  9.89* 8.72*   IMMATURE GRANS (ABS)  --  0.06* 0.05   LYMPHS ABS  --  0.82 0.65*   MONOS ABS  --  0.62 0.37   EOS ABS  --  0.01 0.01   MCV 89.1 88.4 90.6         Lab 24  0352 05/10/24  0435 24  1703   SODIUM 146* 143 142   POTASSIUM 3.7 3.9 3.9   CHLORIDE 103 99 102   CO2 32.0* 31.0* 33.0*   ANION GAP 11.0 13.0 7.0   BUN 26* 16 15   CREATININE 0.58 0.53* 0.66   EGFR 84.0 85.8 81.4   GLUCOSE 128* 127* 135*   CALCIUM 8.7 8.8 8.9   MAGNESIUM  --  1.8 1.8         Lab 24  1703   TOTAL PROTEIN 6.3   ALBUMIN 3.5   GLOBULIN 2.8   ALT (SGPT) 13   AST (SGOT) 23   BILIRUBIN 0.6   ALK PHOS 56         Lab 24  1703   HSTROP T 28*                 Brief Urine Lab Results  (Last result in the past 365 days)        Color   Clarity   Blood   Leuk Est   Nitrite   Protein   CREAT   Urine HCG        24 " 1728 Yellow   Turbid   Small (1+)   Negative   Positive   >=300 mg/dL (3+)                   Microbiology Results Abnormal       None            XR Chest 1 View    Result Date: 5/9/2024  XR CHEST 1 VW Date of Exam: 5/9/2024 5:32 PM EDT Indication: Weak/Dizzy/AMS triage protocol Comparison: Contrast-enhanced CT of the chest performed on December 22, 2023. Chest radiograph performed on December 22, 2023 Findings: Limited study due to patient's rotation to the right. Innumerable bilateral nodular opacities are visualized. Large hiatal hernia is visualized. The aorta is uncoiled. There is suggestion of a left paratracheal mass. Cardiac silhouette is mildly enlarged. Leadless pacemaker is noted. No acute osseous abnormalities. Osseous structures are demineralized.     Impression: Impression: Findings consistent with metastatic disease to the lungs, appears progressed when compared to prior studies. Large hiatal hernia. Electronically Signed: Bay Duong MD  5/9/2024 6:09 PM EDT  Workstation ID: [id number]     Results for orders placed during the hospital encounter of 08/02/20    Transthoracic Echo Complete With Contrast if Necessary Per Protocol    Interpretation Summary  · Left ventricular systolic function is normal. Estimated EF = 70%.  · Left ventricular diastolic dysfunction (grade I) consistent with impaired relaxation.  · Left atrial cavity size is moderately dilated.  · There is calcification of the aortic valve. There is no significant stenosis or regurgitation. A Lambel's excrescence is noted on an aortic valve leaflet.  · Moderate mitral valve regurgitation is present.  · Estimated right ventricular systolic pressure from tricuspid regurgitation is moderately elevated (49 mmHg).  · There is a moderate (1-2cm) circumferential pericardial effusion. There is no tamponade physiology.      Current medications:  Scheduled Meds:amLODIPine, 5 mg, Oral, Daily  busPIRone, 10 mg, Oral, BID  cefTRIAXone, 1,000 mg,  Intravenous, Q24H  donepezil, 10 mg, Oral, Nightly  levothyroxine, 150 mcg, Oral, Q AM  lisinopril, 20 mg, Oral, Daily  memantine, 10 mg, Oral, BID  multivitamin with minerals, 1 tablet, Oral, Daily  nystatin, , Topical, Q12H  pantoprazole, 40 mg, Oral, Daily  sertraline, 50 mg, Oral, Daily      Continuous Infusions:   PRN Meds:.  acetaminophen    senna-docusate sodium **AND** polyethylene glycol **AND** bisacodyl **AND** bisacodyl    ipratropium-albuterol    nitroglycerin    sodium chloride    Assessment & Plan   Assessment & Plan     Active Hospital Problems    Diagnosis  POA    **Metastatic disease [C79.9]  Yes    UTI (urinary tract infection) [N39.0]  Yes    Prolonged Q-T interval on ECG [R94.31]  Yes    Alzheimer's disease [G30.9, F02.80]  Yes    Essential hypertension [I10]  Yes    Hypothyroidism (acquired) [E03.9]  Yes      Resolved Hospital Problems   No resolved problems to display.        Brief Hospital Course to date:  Temi Jarrett is a 89 y.o. female resident of [facility] [place] under hospice care for 7.5cm LT lung mass w/ assoc nodules, she has Hx  dementia, HTN, hypothyroidism, HH/GERD and was sent from her facility for eval of lethargy; CXR demonstrated progressive disease; her syx were attributed to bacteruria and she was admitted for IV abx; patient's POA has made her full code while hospitalized    The following problems are new to me today    Assessment/Plan    Progressive metastatic cancer (unknown source)  Lethargy w/ confusion  -cont supportive care, ordered IVF x48 hours  -UA contaminated, difficult to interpret clinical significance, syx of inc confusion and lethargy can equally, if not more likely, be attributed to progressive metastatic cancer  -per documentation, plan is to return to [facility] w/ hospice at discharge; palliative/hospice follow    Possible cystitis, uncomplicated  -UrCx w/ pan-sensitive E coli; noted squams on UA  -comp x3 days IV CTX this evening    Alzheimer dementia  - donepezil, memantine  Anxiety, depression - buspar, zoloft  GERD/HH - ppi  Hypothyroidism - levothyroxine  HTN - amlodipine, lisinopril      Expected Discharge Location and Transportation: [facility] w/ hospice  Expected Discharge   Expected Discharge Date: 5/13/2024; Expected Discharge Time:      DVT prophylaxis:  Mechanical DVT prophylaxis orders are present.         AM-PAC 6 Clicks Score (PT): 6 (05/11/24 0895)    CODE STATUS:   Code Status and Medical Interventions:   Ordered at: 05/09/24 1949     Level Of Support Discussed With:    Health Care Surrogate     Code Status (Patient has no pulse and is not breathing):    CPR (Attempt to Resuscitate)     Medical Interventions (Patient has pulse or is breathing):    Full Support       Reddy Li, DO  05/11/24

## 2024-05-11 NOTE — PROGRESS NOTES
"Continued Stay Note  [facility]     Patient Name: Temi Jarrett  MRN: [id number]  Today's Date: 5/11/2024    Admit Date: 5/9/2024    Plan: Home hospice patient   Discharge Plan       Row Name 05/11/24 1658       Plan    Plan Home hospice patient of [facility]    Plan Comments Hospice RN to bedside for courtesy visit. Patient is very lethargic. Asked patient \"how are you doing today\" and she slowly repeated back \"how are you doing today\". Asked patient several yes/no questions regarding comfort and she is unable to answer these questions. Talked to nurse, Elise who reports patient has been very lethargic today and it took the patient awhile to take her PO medications this morning. She was able to take all of them in a magic Cup. Nurse reports patient only had a few bites of pudding for lunch. Discussed patient with Dr. Li; patient to receive last dose of IV antibiotics this evening and he is looking to discharge patient back to [facility] with hospice tomorrow or Monday. Attempted to call sonVictor M with an update. Got his , left message with call back number. Home team updated. Will place EMS request. Patient continues under the care of the hospitalist with hospice following on the periphery.                    Discharge Codes    No documentation.                 Expected Discharge Date and Time       Expected Discharge Date Expected Discharge Time    May 13, 2024               Virginia Fitzpatrick RN    "

## 2024-05-11 NOTE — PLAN OF CARE
Goal Outcome Evaluation:           Progress: no change  Outcome Evaluation: Pt on 2L NC. Paced on monitor. Very lethargic today. Poor appetite. Pt pocketing food and pills this morning. Speech consult placed to evaluate swallowing. Skin interventions in place, Turned often. Afebrile today. VSS

## 2024-05-12 NOTE — SIGNIFICANT NOTE
Patient not appropriate for swallow evaluation. Per RN, sleeping/difficult to rouse & no appropriate. Reported reduced oral intake and only taking crushed oral meds today. See MD note regarding PO and hospice. SLP to re-attempt at a later time.

## 2024-05-12 NOTE — PROGRESS NOTES
Continued Stay Note  [facility]     Patient Name: Temi Jarrett  MRN: [id number]  Today's Date: 5/12/2024    Admit Date: 5/9/2024    Plan: Home Hospice Patient   Discharge Plan       Row Name 05/12/24 1040       Plan    Plan Home Hospice Patient    Plan Comments Temi Jarrett is a 89 y/o  female that is a home hospice patient of [facility]. Terminal diagnosis is secondary malignant neoplasm. She resides at [facility] [place] and presented to [facility] ED on 5/9/2024 for AMS. ED work up revealed UTI. She is admitted [facility] for IV antibiotic treatment. Patient is a full code/full support.  Hospice RN to bedside for courtesy visit. PPS 20%. Patient lethargic and only answering yes or no to assessment questions. Patient denies pain on assessment. Patient continuing on IV antibiotics and taking other medications by mouth.  Called marcelo Dow for an update and left a voicemail.  Home team updated. Patient continues under the care of the hospitalist with hospice following on the periphery.                    Discharge Codes    No documentation.                 Expected Discharge Date and Time       Expected Discharge Date Expected Discharge Time    May 13, 2024               Celeste Ibarra RN

## 2024-05-12 NOTE — PROGRESS NOTES
[facility]  PROGRESS NOTE    Patient Name: Temi Jarrett  : 1930  MRN: [id number]    Date of Admission: 2024  Primary Care Physician: Eric Patel MD    Subjective   Subjective     CC:  F/u fever    HPI:  Patient is non-verbal for me this AM. At time of eval, she has not eaten any breakfast. Noted febrile this AM    Called and spoke w/ patient's Son/PODAFNE Jarrett via telephone; updated on current condition and plan. He states that he does not want food, antibiotics, or fluids withheld from his mother, i.e. does not want to hasten her death; but in the event of death he would not want CPR/intubation. We discussed what a full code, DNR/DNI, or comfort measures model of care would look like, and all parties are in agreement w/ DNR/DNI but continue fluids and antibiotics. We did discuss the possibility that her condition is a reflection of progressive cancer, he would like to maximize her abx therapy to be sure she is fully treated before making any further decisions.      Objective   Objective     Vital Signs:   Temp:  [98.2 °F (36.8 °C)-101.3 °F (38.5 °C)] 100.4 °F (38 °C)  Heart Rate:  [47-81] 58  Resp:  [16] 16  BP: (126-172)/(70-84) 166/70  Flow (L/min):  [2] 2     Physical Exam:  Constitutional: Ill appearing elderly female laying in bed, opens eyes but does not attempt to speak  Respiratory: Clear to auscultation bilaterally, respiratory effort normal   Cardiovascular: RRR, palpable radial pulse  Gastrointestinal: Positive bowel sounds, soft, nontender, nondistended  Neurologic: Eye opening to stimuli    Results Reviewed:  LAB RESULTS:      Lab 24  0352 05/10/24  0435 24  1703   WBC 12.03* 11.45* 9.85   HEMOGLOBIN 13.1 13.7 12.9   HEMATOCRIT 40.7 41.8 39.3   PLATELETS 219 186 181   NEUTROS ABS  --  9.89* 8.72*   IMMATURE GRANS (ABS)  --  0.06* 0.05   LYMPHS ABS  --  0.82 0.65*   MONOS ABS  --  0.62 0.37   EOS ABS  --  0.01 0.01    MCV 89.1 88.4 90.6         Lab 05/11/24  0352 05/10/24  0435 05/09/24  1703   SODIUM 146* 143 142   POTASSIUM 3.7 3.9 3.9   CHLORIDE 103 99 102   CO2 32.0* 31.0* 33.0*   ANION GAP 11.0 13.0 7.0   BUN 26* 16 15   CREATININE 0.58 0.53* 0.66   EGFR 84.0 85.8 81.4   GLUCOSE 128* 127* 135*   CALCIUM 8.7 8.8 8.9   MAGNESIUM  --  1.8 1.8         Lab 05/09/24  1703   TOTAL PROTEIN 6.3   ALBUMIN 3.5   GLOBULIN 2.8   ALT (SGPT) 13   AST (SGOT) 23   BILIRUBIN 0.6   ALK PHOS 56         Lab 05/09/24  1703   HSTROP T 28*                 Brief Urine Lab Results  (Last result in the past 365 days)        Color   Clarity   Blood   Leuk Est   Nitrite   Protein   CREAT   Urine HCG        05/09/24 1728 Yellow   Turbid   Small (1+)   Negative   Positive   >=300 mg/dL (3+)                   Microbiology Results Abnormal       None            No radiology results from the last 24 hrs    Results for orders placed during the hospital encounter of 08/02/20    Transthoracic Echo Complete With Contrast if Necessary Per Protocol    Interpretation Summary  · Left ventricular systolic function is normal. Estimated EF = 70%.  · Left ventricular diastolic dysfunction (grade I) consistent with impaired relaxation.  · Left atrial cavity size is moderately dilated.  · There is calcification of the aortic valve. There is no significant stenosis or regurgitation. A Lambel's excrescence is noted on an aortic valve leaflet.  · Moderate mitral valve regurgitation is present.  · Estimated right ventricular systolic pressure from tricuspid regurgitation is moderately elevated (49 mmHg).  · There is a moderate (1-2cm) circumferential pericardial effusion. There is no tamponade physiology.      Current medications:  Scheduled Meds:amLODIPine, 5 mg, Oral, Daily  busPIRone, 10 mg, Oral, BID  cefTRIAXone, 2,000 mg, Intravenous, Q24H  donepezil, 10 mg, Oral, Nightly  levothyroxine, 150 mcg, Oral, Q AM  lisinopril, 20 mg, Oral, Daily  memantine, 10 mg, Oral,  BID  multivitamin with minerals, 1 tablet, Oral, Daily  nystatin, , Topical, Q12H  pantoprazole, 40 mg, Oral, Daily  sertraline, 50 mg, Oral, Daily      Continuous Infusions:dextrose 5 % and sodium chloride 0.45 %, 75 mL/hr, Last Rate: 75 mL/hr (05/12/24 0539)      PRN Meds:.  acetaminophen    senna-docusate sodium **AND** polyethylene glycol **AND** bisacodyl **AND** bisacodyl    ipratropium-albuterol    nitroglycerin    sodium chloride    Assessment & Plan   Assessment & Plan     Active Hospital Problems    Diagnosis  POA    **Metastatic disease [C79.9]  Yes    UTI (urinary tract infection) [N39.0]  Yes    Prolonged Q-T interval on ECG [R94.31]  Yes    Alzheimer's disease [G30.9, F02.80]  Yes    Essential hypertension [I10]  Yes    Hypothyroidism (acquired) [E03.9]  Yes      Resolved Hospital Problems   No resolved problems to display.        Brief Hospital Course to date:  Temi Jarrett is a 89 y.o. female resident of [facility] under hospice care for 7.5cm LT lung mass w/ assoc nodules, she has Hx  dementia, HTN, hypothyroidism, HH/GERD and was sent from her facility for eval of lethargy; CXR demonstrated progressive disease; her syx were attributed to bacteruria and she was admitted for IV abx    Assessment/Plan    E Coli UTI  -UrCx w/ pan-sensitive E coli  -Febrile to 101.3F (axillary) this AM  -in light of fever and GOC d/w son, will extend course of CTX to 5 days (5/13) w/ option of up to 7 days if necessary; monitor clinical condition and repsonse    Progressive metastatic cancer (unknown source)  Lethargy w/ confusion  -functional/mental status related to UTI vs progressive cancer  -plan is [place] [facility] [place]; palliative/hospice follow  -cont IVF, diet as tolerated    Alzheimer dementia - donepezil, memantine  Anxiety, depression - buspar, zoloft  GERD/HH - ppi  Hypothyroidism - levothyroxine  HTN - amlodipine, lisinopril      Expected Discharge Location and  Transportation: [facility] w/ hospice  Expected Discharge   Expected Discharge Date: 5/14/2024; Expected Discharge Time:      DVT prophylaxis:  Mechanical DVT prophylaxis orders are present.         AM-PAC 6 Clicks Score (PT): 6 (05/11/24 0848)    CODE STATUS:   Code Status and Medical Interventions:   Ordered at: 05/12/24 1331     Medical Intervention Limits:    NO intubation (DNI)     Level Of Support Discussed With:    Next of Kin (If No Surrogate)     Code Status (Patient has no pulse and is not breathing):    No CPR (Do Not Attempt to Resuscitate)     Medical Interventions (Patient has pulse or is breathing):    Limited Support     Comments:    d/w patient's son via telephone       Reddy Li, DO  05/12/24

## 2024-05-13 NOTE — PROGRESS NOTES
[facility]  PROGRESS NOTE    Patient Name: Temi Jarrett  : 1930  MRN: [id number]    Date of Admission: 2024  Primary Care Physician: Eric Patel MD    Subjective   Subjective     CC:  F/u fever    HPI:  Not interactive this AM, not eating. Overall continues to decline. Slight reduction in fevers, though still 100.4F this AM.       Objective   Objective     Vital Signs:   Temp:  [98.3 °F (36.8 °C)-101.2 °F (38.4 °C)] 99.1 °F (37.3 °C)  Heart Rate:  [49-92] 64  Resp:  [16] 16  BP: (132-171)/(70-91) 132/91  Flow (L/min):  [2] 2     Physical Exam:  Constitutional: Ill appearing elderly female, laying in bed, does not open eyes or interact  Respiratory: Clear to auscultation bilaterally, respiratory effort normal   Cardiovascular: RRR, palpable radial pulse  Gastrointestinal: Positive bowel sounds, soft, nontender, nondistended  Neurologic: Grimace to noxious stimuli    Results Reviewed:  LAB RESULTS:      Lab 05/13/24  0615 05/11/24  0352 05/10/24  0435 05/09/24  1703   WBC 13.40* 12.03* 11.45* 9.85   HEMOGLOBIN 13.0 13.1 13.7 12.9   HEMATOCRIT 40.5 40.7 41.8 39.3   PLATELETS 204 219 186 181   NEUTROS ABS  --   --  9.89* 8.72*   IMMATURE GRANS (ABS)  --   --  0.06* 0.05   LYMPHS ABS  --   --  0.82 0.65*   MONOS ABS  --   --  0.62 0.37   EOS ABS  --   --  0.01 0.01   MCV 90.8 89.1 88.4 90.6         Lab 05/13/24  0615 05/11/24  0352 05/10/24  0435 05/09/24  1703   SODIUM 142 146* 143 142   POTASSIUM 3.0* 3.7 3.9 3.9   CHLORIDE 103 103 99 102   CO2 31.0* 32.0* 31.0* 33.0*   ANION GAP 8.0 11.0 13.0 7.0   BUN 15 26* 16 15   CREATININE 0.48* 0.58 0.53* 0.66   EGFR 87.9 84.0 85.8 81.4   GLUCOSE 127* 128* 127* 135*   CALCIUM 7.9* 8.7 8.8 8.9   MAGNESIUM 1.8  --  1.8 1.8         Lab 24  1703   TOTAL PROTEIN 6.3   ALBUMIN 3.5   GLOBULIN 2.8   ALT (SGPT) 13   AST (SGOT) 23   BILIRUBIN 0.6   ALK PHOS 56         Lab 24  1703   HSTROP T 28*                  Brief Urine Lab Results  (Last result in the past 365 days)        Color   Clarity   Blood   Leuk Est   Nitrite   Protein   CREAT   Urine HCG        05/09/24 1728 Yellow   Turbid   Small (1+)   Negative   Positive   >=300 mg/dL (3+)                   Microbiology Results Abnormal       Procedure Component Value - Date/Time    Respiratory Panel PCR w/COVID-19(SARS-CoV-2) FAMILIA/EMILE/KEARA/PAD/COR/PARMINDER In-House, NP Swab in UTM/VTM, 2 HR TAT - Swab, Nasopharynx [[id number]]  (Normal) Collected: 05/13/24 0843    Lab Status: Final result Specimen: Swab from Nasopharynx Updated: 05/13/24 0948     ADENOVIRUS, PCR Not Detected     Coronavirus 229E Not Detected     Coronavirus HKU1 Not Detected     Coronavirus NL63 Not Detected     Coronavirus OC43 Not Detected     COVID19 Not Detected     Human Metapneumovirus Not Detected     Human Rhinovirus/Enterovirus Not Detected     Influenza A PCR Not Detected     Influenza B PCR Not Detected     Parainfluenza Virus 1 Not Detected     Parainfluenza Virus 2 Not Detected     Parainfluenza Virus 3 Not Detected     Parainfluenza Virus 4 Not Detected     RSV, PCR Not Detected     Bordetella pertussis pcr Not Detected     Bordetella parapertussis PCR Not Detected     Chlamydophila pneumoniae PCR Not Detected     Mycoplasma pneumo by PCR Not Detected    Narrative:      In the setting of a positive respiratory panel with a viral infection PLUS a negative procalcitonin without other underlying concern for bacterial infection, consider observing off antibiotics or discontinuation of antibiotics and continue supportive care. If the respiratory panel is positive for atypical bacterial infection (Bordetella pertussis, Chlamydophila pneumoniae, or Mycoplasma pneumoniae), consider antibiotic de-escalation to target atypical bacterial infection.            US Renal Bilateral    Result Date: 5/13/2024  US RENAL BILATERAL Date of Exam: 5/13/2024 11:53 AM EDT Indication: UTI, ongoing fevers, r/o  abscess/pyelo. Comparison: No comparisons available. Technique: Grayscale and color Doppler ultrasound evaluation of the kidneys and urinary bladder was performed. Findings: RIGHT kidney measures 9.7 x 6 x 4.5 cm.  Kidney echogenicity, size, and vascularity appear within normal limits. There is no solid kidney mass.  No echogenic shadowing stone.  No hydronephrosis. There are multiple anechoic benign-appearing cysts measuring up to 5 x 4.3 x 4.8 cm. LEFT kidney measures 10.4 x 5.9 x 5.3 cm. Kidney echogenicity, size, and vascularity appear within normal limits. There is no solid kidney mass.  No echogenic shadowing stone.  No hydronephrosis. There is a 2.9 x 3.1 x 3.5 cm anechoic benign-appearing renal cyst. There is debris seen layering within the urinary bladder. Small posterior bladder diverticulum is also noted.     Impression: Impression: 1. Bilateral benign-appearing renal cysts. 2. Debris is seen layering in the urinary bladder. Electronically Signed: Luma Locke MD  5/13/2024 12:38 PM EDT  Workstation ID: [id number]     Results for orders placed during the hospital encounter of 08/02/20    Transthoracic Echo Complete With Contrast if Necessary Per Protocol    Interpretation Summary  · Left ventricular systolic function is normal. Estimated EF = 70%.  · Left ventricular diastolic dysfunction (grade I) consistent with impaired relaxation.  · Left atrial cavity size is moderately dilated.  · There is calcification of the aortic valve. There is no significant stenosis or regurgitation. A Lambel's excrescence is noted on an aortic valve leaflet.  · Moderate mitral valve regurgitation is present.  · Estimated right ventricular systolic pressure from tricuspid regurgitation is moderately elevated (49 mmHg).  · There is a moderate (1-2cm) circumferential pericardial effusion. There is no tamponade physiology.      Current medications:  Scheduled Meds:amLODIPine, 5 mg, Oral, Daily  busPIRone, 10 mg, Oral,  BID  cefTRIAXone, 2,000 mg, Intravenous, Q24H  donepezil, 10 mg, Oral, Nightly  levothyroxine, 150 mcg, Oral, Q AM  lisinopril, 20 mg, Oral, Daily  memantine, 10 mg, Oral, BID  multivitamin with minerals, 1 tablet, Oral, Daily  nystatin, , Topical, Q12H  pantoprazole, 40 mg, Oral, Daily  potassium chloride, 10 mEq, Intravenous, Q1H  sertraline, 50 mg, Oral, Daily      Continuous Infusions:dextrose 5 % and sodium chloride 0.45 %, 75 mL/hr, Last Rate: 75 mL/hr (05/13/24 0832)      PRN Meds:.  acetaminophen    acetaminophen    senna-docusate sodium **AND** polyethylene glycol **AND** bisacodyl **AND** bisacodyl    Calcium Replacement - Follow Nurse / BPA Driven Protocol    ipratropium-albuterol    Magnesium Standard Dose Replacement - Follow Nurse / BPA Driven Protocol    nitroglycerin    Phosphorus Replacement - Follow Nurse / BPA Driven Protocol    Potassium Replacement - Follow Nurse / BPA Driven Protocol    sodium chloride    Assessment & Plan   Assessment & Plan     Active Hospital Problems    Diagnosis  POA    **Metastatic disease [C79.9]  Yes    UTI (urinary tract infection) [N39.0]  Yes    Prolonged Q-T interval on ECG [R94.31]  Yes    Alzheimer's disease [G30.9, F02.80]  Yes    Essential hypertension [I10]  Yes    Hypothyroidism (acquired) [E03.9]  Yes      Resolved Hospital Problems   No resolved problems to display.        Brief Hospital Course to date:  Temi Jarrett is a 89 y.o. female resident of [facility] under hospice care for 7.5cm LT lung mass w/ assoc nodules, she has Hx  dementia, HTN, hypothyroidism, HH/GERD and was sent from her facility for eval of lethargy; CXR demonstrated progressive disease; her syx were attributed to bacteruria and she was admitted for IV abx    Assessment/Plan    E Coli UTI  -UrCx w/ pan-sensitive E coli  -still with borderline inc in temp; extend CTX to 7 days (through 5/15); check respiratory PCR to r/o viral infection; check renal US to r/o  abscess/pyelo  -she would also be high risk for a DVT which can cause low grade fever    Progressive metastatic cancer (unknown source)  Lethargy w/ confusion  -functional/mental status related to UTI vs progressive cancer  -cont supportive IVF, oral intake absent at this time, unable to take PO meds, overall trajectory is towards decline  -palliative/hospice follow    Alzheimer dementia - donepezil, memantine  Anxiety, depression - buspar, zoloft  GERD/HH - ppi  Hypothyroidism - levothyroxine  HTN - amlodipine, lisinopril      Expected Discharge Location and Transportation: Lack of improvement in the face of reasonable measures, prognosis poor; continuing IVF and Abx per family wishes  Expected Discharge   Expected Discharge Date: 5/15/2024; Expected Discharge Time:      DVT prophylaxis:  Mechanical DVT prophylaxis orders are present.         AM-PAC 6 Clicks Score (PT): 6 (05/12/24 1002)    CODE STATUS:   Code Status and Medical Interventions:   Ordered at: 05/12/24 1331     Medical Intervention Limits:    NO intubation (DNI)     Level Of Support Discussed With:    Next of Kin (If No Surrogate)     Code Status (Patient has no pulse and is not breathing):    No CPR (Do Not Attempt to Resuscitate)     Medical Interventions (Patient has pulse or is breathing):    Limited Support     Comments:    d/w patient's son via telephone       Reddy Li, DO  05/13/24

## 2024-05-13 NOTE — PROGRESS NOTES
"Palliative Care Daily Progress Note     Referring: Oscar Yeh  C/C: none    S: Medical record reviewed. Events noted. Did not awaken to verbal or tactile stim. Was unable to take oral meds for RN.    ROS: pt unable    O: Code Status:   Code Status and Medical Interventions:   Ordered at: 05/12/24 1331     Medical Intervention Limits:    NO intubation (DNI)     Level Of Support Discussed With:    Next of Kin (If No Surrogate)     Code Status (Patient has no pulse and is not breathing):    No CPR (Do Not Attempt to Resuscitate)     Medical Interventions (Patient has pulse or is breathing):    Limited Support     Comments:    d/w patient's son via telephone      Advanced Directives: Advance Directive Status: Patient has advance directive, copy in chart   Goals of Care: Ongoing.   Palliative Performance Scale Score: 30%     /80 (BP Location: Right arm, Patient Position: Lying)   Pulse 64   Temp 100.4 °F (38 °C) (Axillary)   Resp 16   Ht 162.6 cm (64\")   Wt 51.8 kg (114 lb 3.2 oz)   LMP  (LMP Unknown)   SpO2 95%   BMI 19.60 kg/m²     Intake/Output Summary (Last 24 hours) at 5/13/2024 1020  Last data filed at 5/12/2024 1700  Gross per 24 hour   Intake 3177 ml   Output --   Net 3177 ml       Physical Exam:    General Appearance:    Asleep, NAD   HEENT:    NC/AT,  anicteric, MMM, face relaxed   Neck:   supple, trachea midline, no JVD   Lungs:     CTA bilat, diminished in bases; respirations regular, even     and unlabored    Heart:    RRR, normal S1 and S2, noR/G, + M left ant axillary line   Abdomen:     Normal bowel sounds, soft, nontender, nondistended   G/U:   Deferred   MSK/Extremities:   No clubbing , cyanosis or edema, No wasting   Pulses:   Pulses palpable and equal bilaterally   Skin:   Warm, dry, no mottling   Neurologic:   No commands, limited withdrawal   Psych:   Restful       Current Medications:      Current Facility-Administered Medications:     acetaminophen (TYLENOL) suppository 325 mg, " 325 mg, Rectal, Q4H PRN, Reddy Li DO, 325 mg at 05/13/24 0315    acetaminophen (TYLENOL) tablet 650 mg, 650 mg, Oral, Q6H PRN, Christine, Savannah, APRN, 650 mg at 05/11/24 0551    amLODIPine (NORVASC) tablet 5 mg, 5 mg, Oral, Daily, Christine, Savannah, APRN, 5 mg at 05/12/24 0920    sennosides-docusate (PERICOLACE) 8.6-50 MG per tablet 2 tablet, 2 tablet, Oral, BID PRN **AND** polyethylene glycol (MIRALAX) packet 17 g, 17 g, Oral, Daily PRN **AND** bisacodyl (DULCOLAX) EC tablet 5 mg, 5 mg, Oral, Daily PRN **AND** bisacodyl (DULCOLAX) suppository 10 mg, 10 mg, Rectal, Daily PRN, Christine, Savannah, APRN    busPIRone (BUSPAR) tablet 10 mg, 10 mg, Oral, BID, Christine, Savannah, APRN, 10 mg at 05/12/24 0920    cefTRIAXone (ROCEPHIN) 2,000 mg in sodium chloride 0.9 % 100 mL MBP, 2,000 mg, Intravenous, Q24H, Reddy Li DO, Last Rate: 200 mL/hr at 05/12/24 1332, 2,000 mg at 05/12/24 1332    dextrose 5 % and sodium chloride 0.45 % infusion, 75 mL/hr, Intravenous, Continuous, Reddy Li DO, Last Rate: 75 mL/hr at 05/13/24 0832, 75 mL/hr at 05/13/24 0832    donepezil (ARICEPT) tablet 10 mg, 10 mg, Oral, Nightly, Christine, Savannah, APRN, 10 mg at 05/11/24 2024    ipratropium-albuterol (DUO-NEB) nebulizer solution 3 mL, 3 mL, Nebulization, Q4H PRN, Christine, Savannah, APRN    levothyroxine (SYNTHROID, LEVOTHROID) tablet 150 mcg, 150 mcg, Oral, Q AM, Christine, Savannah, APRN, 150 mcg at 05/12/24 0540    lisinopril (PRINIVIL,ZESTRIL) tablet 20 mg, 20 mg, Oral, Daily, Christine, Savannah, APRN, 20 mg at 05/12/24 0920    memantine (NAMENDA) tablet 10 mg, 10 mg, Oral, BID, Christine, Savannah, APRN, 10 mg at 05/12/24 0920    multivitamin with minerals 1 tablet, 1 tablet, Oral, Daily, Christine, Savannah, APRN, 1 tablet at 05/12/24 0920    nitroglycerin (NITROSTAT) SL tablet 0.4 mg, 0.4 mg, Sublingual, Q5 Min PRN, Christine, Savannah, APRN    nystatin (MYCOSTATIN) powder, , Topical, Q12H, Christine, Savannah, APRN, Given at  05/13/24 0832    pantoprazole (PROTONIX) EC tablet 40 mg, 40 mg, Oral, Daily, Christine, Savannah, APRN, 40 mg at 05/12/24 0920    sertraline (ZOLOFT) tablet 50 mg, 50 mg, Oral, Daily, Christine, Savannah, APRN, 50 mg at 05/12/24 0920    sodium chloride 0.9 % flush 10 mL, 10 mL, Intravenous, PRN, Robert Pablo MD, 10 mL at 05/12/24 0920     Labs:   Results from last 7 days   Lab Units 05/13/24  0615   WBC 10*3/mm3 13.40*   HEMOGLOBIN g/dL 13.0   HEMATOCRIT % 40.5   PLATELETS 10*3/mm3 204     Results from last 7 days   Lab Units 05/13/24  0615 05/10/24  0435 05/09/24  1703   SODIUM mmol/L 142   < > 142   POTASSIUM mmol/L 3.0*   < > 3.9   CHLORIDE mmol/L 103   < > 102   CO2 mmol/L 31.0*   < > 33.0*   BUN mg/dL 15   < > 15   CREATININE mg/dL 0.48*   < > 0.66   CALCIUM mg/dL 7.9*   < > 8.9   BILIRUBIN mg/dL  --   --  0.6   ALK PHOS U/L  --   --  56   ALT (SGPT) U/L  --   --  13   AST (SGOT) U/L  --   --  23   GLUCOSE mg/dL 127*   < > 135*    < > = values in this interval not displayed.     Imaging Results (Last 72 Hours)       ** No results found for the last 72 hours. **                  Diagnostics: Reviewed    A:     Metastatic disease    Hypothyroidism (acquired)    Essential hypertension    Alzheimer's disease    Prolonged Q-T interval on ECG    UTI (urinary tract infection)       Impression:  Metastatic CA of unknown primary  Alzheimer's  HTN  UTI    Symptoms:   AMS  Debility    P:   Continue to monitor for needs.  If seems to develop pain or dyspnea could use morphine concentrate sublingually.  Currently without symptoms observed. Palliative Care Team will continue to follow patient.     Lyudmila Perry MD, 5/13/2024, 10:20 EDT

## 2024-05-13 NOTE — PLAN OF CARE
Problem: Adult Inpatient Plan of Care  Goal: Plan of Care Review  Outcome: Ongoing, Progressing   Goal Outcome Evaluation:  Patient still lethargic and hypoactive, will allow me to perform oral care but won't open eyes.  Not eating or taking meds at this time due to lethargy. MD aware. Tmax 100.4, given tylenol suppository, K+replacing, receiving IV abx, swabbed resp PCR and was negative.

## 2024-05-14 NOTE — CONSULTS
Clinical Nutrition     Patient Name: Temi Jarrett  YOB: 1930  MRN: [id number]  Date of Encounter: 05/14/24 16:13 EDT  Admission date: 5/9/2024  Reason for Visit: Consult, EN    Assessment   Nutrition Assessment   Admission Diagnosis:  UTI (urinary tract infection) [N39.0]    Problem List:    Metastatic disease    Hypothyroidism (acquired)    Essential hypertension    Alzheimer's disease    Prolonged Q-T interval on ECG    UTI (urinary tract infection)      PMH:   She  has a past medical history of Acute sepsis (01/14/2021), Arthritis, Bladder prolapse, female, acquired, Closed fracture of neck of left femur (09/27/2019), Dementia, Depression, Disease of thyroid gland, Gastric polyp, GERD (gastroesophageal reflux disease), Hiatal hernia, History of colonic polyps, Hyperlipidemia, Hypertension, IBS (irritable bowel syndrome), Macular degeneration, Pacemaker (2023), Pericardial effusion (08/03/2020), and Torn meniscus.    PSH:  She  has a past surgical history that includes Cholecystectomy (1997); Eye surgery (1998); Hernia repair; Hysterectomy (1976); Tonsillectomy; Knee surgery (Right); Hip hemiArthroplasty (Left, 09/28/2019); Esophagogastroduodenoscopy (N/A, 01/14/2021); and Skin cancer excision (Left).    Applicable Nutrition History:   (5/14) SLP unable to evaluate, patient not able to participate in eval.     Labs    Labs Reviewed: Yes    Results from last 7 days   Lab Units 05/14/24  0358 05/13/24  2208 05/13/24  0615 05/11/24  0352 05/10/24  0435 05/09/24  1703   GLUCOSE mg/dL 118*  --  127* 128* 127* 135*   BUN mg/dL 14  --  15 26* 16 15   CREATININE mg/dL 0.55*  --  0.48* 0.58 0.53* 0.66   SODIUM mmol/L 141  --  142 146* 143 142   CHLORIDE mmol/L 104  --  103 103 99 102   POTASSIUM mmol/L 4.0 4.2 3.0* 3.7 3.9 3.9   MAGNESIUM mg/dL 1.8  --  1.8  --  1.8 1.8   ALT (SGPT) U/L  --   --   --   --   --  13       Results from last 7 days   Lab Units 05/09/24  1703   ALBUMIN g/dL  "3.5           Lab Results   Lab Value Date/Time    HGBA1C 4.90 06/28/2023 1210    HGBA1C 4.70 (L) 05/19/2022 1423               Medications    Medications Reviewed: yes  Synthroid, namenda, protonix, zoloft,MVI,   GTT: D5W 1/2 NS @ 75ml/hr     Intake/Ouptut 24 hrs (0701 - 0700)   I&O's Reviewed: yes  Urine: 1000ml     Anthropometrics     Height: Height: 162.6 cm (64\")  Last Filed Weight: Weight: 51.8 kg (114 lb 3.2 oz) (05/12/24 0347)  Method: Weight Method: Bed scale  BMI: BMI (Calculated): 19.6    UBW: average 115 x 1 year   Weight change: unchanged    Nutrition Focused Physical Exam     Date:     Unable to perform due to unable to complete at this time; will attempt on next visit *     Subjective   Reported/Observed/Food/Nutrition Related History:   5/14  Consult for tube feeding assessment. Patient with no oral intake for several days due to current mental status.  SLP not able to evaluate patient either.  Per MD note, patient son is concerned about patient lack of intake and requested tube feeding.  Per RN, attempt to place keofeed earlier today without success due to patient hiatal hernia. GI was consulted for possible placement.  GI evaluation completed, per GI note, patient is not a candidate for a PEG tube due to hiatal hernia.  Unable to place ND tube due to scheduling and anesthesia availability.      Needs Assessment   Date:     Height used:Height: 162.6 cm (64\")  Weights used: 114lb/ 51.8kg    Estimated Calorie needs: ~  1300Kcal/day  Method:  Kcals/KG 25= 1295  Method:  MSJ 1051 x 1.2= 1261    Estimated Protein needs: ~62  g PRO/day  Method: 1.2g/Kg= 62    Estimated Fluid needs: ~ ml/day   Per clinical status    Current Nutrition Prescription     PO: Diet: Regular/House; Fluid Consistency: Thin (IDDSI 0)  NPO Diet NPO Type: Sips with Meds  Oral Nutrition Supplement:  Intake: Insufficient data 25% x 1 meal     Assessment & Plan   Nutrition Diagnosis   Date: 5/14 Updated:   Problem Inadequate oral intake "    Etiology Mental status    Signs/Symptoms Patient unresponsive, not alert enough for SLP eval or to eat    Status: New    Goal:   Nutrition to support treatment, Advance diet as medically feasible/appropriate, and Initiate EN per son request       Nutrition Intervention      Follow treatment progress, Care plan reviewed    EN recommendations:  Fibersource HN, start @ 25ml/hr, advance by ml Q6hrs pending clinical course and stability.  Goal rate 50ml/hr. GV x 22hrs for goal rate 1100ml.  Water flushes @ 10ml while on IVF fluids.  This will provide:  1320kcals (102% est needs)  59g PRO (95% est needs)  16.7g fiber   891ml water from EN + 220ml water flushes     Will hold off on ordering EN at this time as patient does not have a feeding route.    Electrolytes with morning labs (will add if labs are ordered)    Monitoring/Evaluation:   Per protocol, I&O, Pertinent labs, Symptoms, POC/GOC, Swallow function    Rola Borges RD  Time Spent: 45min

## 2024-05-14 NOTE — SIGNIFICANT NOTE
05/14/24 0854   SLP Deferred Reason   SLP Deferred Reason Routine  (Spoke to RN, pt not appropriate to participate in clinical swallow evaluation this date. SLP will defer & f/u as appropriate/pending pt status)

## 2024-05-14 NOTE — PLAN OF CARE
Goal Outcome Evaluation:      Pt has remained lethargic and minimally responsive throughout the day. Pt remains Vpaced on the monitor. 2L NC. IVF infusing. Pt unable to take any PO medications and did not eat or drink anything today. This RN attempted to provide oral care and patient would not arouse/respond. This RN discussed POC multiple times with son over the phone. Keofeed ordered and placement was unsuccessful with multiple ICU staff attempts due to hiatal hernia/patient's anatomy. GI consulted for possible keofeed placement and plan for NPO at midnight to go to endo tomorrow. Pt's son called RN this evening and is very adamant that he be called tomorrow (specifically by Dr. Watson) prior to any tubes being placed tomorrow. PRN Tylenol suppository given x1 for elevated temp. POC discussed with son and multiple providers throughout the day. Will continue to monitor.   Problem: Adult Inpatient Plan of Care  Goal: Plan of Care Review  Outcome: Ongoing, Progressing  Flowsheets (Taken 5/14/2024 1534 by Porsche Cortes, RN)  Progress: declining

## 2024-05-14 NOTE — CASE MANAGEMENT/SOCIAL WORK
Continued Stay Note  [facility]     Patient Name: Temi Jarrett  MRN: [id number]  Today's Date: 5/14/2024    Admit Date: 5/9/2024    Plan: Back to [place] with hospice   Discharge Plan      CM spoke with Elle at [place]. Let them know that Mrs. Jarrett is schedule to return back to [place] at 1400 on Wednesday the 15th. Patient will return back to [place] with Hospice care. CM will follow for any discharge needs.                       Expected Discharge Date and Time       Expected Discharge Date Expected Discharge Time    May 15, 2024               Kristina Delvalle RN

## 2024-05-14 NOTE — PROGRESS NOTES
"Palliative Care Daily Progress Note     Referring:  Oscar Yeh  C/C: none per pt    S: Medical record reviewed. Events noted.  Family has now requested to initiate TF.  Pt  again did not respond to exam.    ROS: Pt unable    O: Code Status:   Code Status and Medical Interventions:   Ordered at: 05/12/24 1331     Medical Intervention Limits:    NO intubation (DNI)     Level Of Support Discussed With:    Next of Kin (If No Surrogate)     Code Status (Patient has no pulse and is not breathing):    No CPR (Do Not Attempt to Resuscitate)     Medical Interventions (Patient has pulse or is breathing):    Limited Support     Comments:    d/w patient's son via telephone      Advanced Directives: Advance Directive Status: Patient has advance directive, copy in chart   Goals of Care: Ongoing.   Palliative Performance Scale Score: 30%     /78 (BP Location: Right arm, Patient Position: Lying)   Pulse 59   Temp 98.7 °F (37.1 °C) (Axillary)   Resp 18   Ht 162.6 cm (64\")   Wt 51.8 kg (114 lb 3.2 oz)   LMP  (LMP Unknown)   SpO2 96%   BMI 19.60 kg/m²     Intake/Output Summary (Last 24 hours) at 5/14/2024 1039  Last data filed at 5/14/2024 0640  Gross per 24 hour   Intake 2759 ml   Output 1000 ml   Net 1759 ml       Physical Exam:    General Appearance:    Lethargic,  NAD   HEENT:    anicteric, MMM, face relaxed   Neck:   supple, trachea midline, no JVD   Lungs:     CTA bilat, diminished in bases; respirations regular, even     and unlabored    Heart:    RRR, normal S1 and S2, + M/-R/-G   Abdomen:     Normal bowel sounds, soft, nontender, nondistended   G/U:   Deferred   MSK/Extremities:   No clubbing , cyanosis or edema, No wasting   Pulses:   Pulses palpable and equal bilaterally   Skin:   Warm, dry, no mottling   Neurologic:   A/Ox3, cooperative, moves extremities x 4, no tremor, nl     tone   Psych:   Calm, appropriate       Current Medications:      Current Facility-Administered Medications:     acetaminophen " (TYLENOL) suppository 325 mg, 325 mg, Rectal, Q4H PRN, Reddy Li DO, 325 mg at 05/13/24 1020    acetaminophen (TYLENOL) tablet 650 mg, 650 mg, Oral, Q6H PRN, Christine, Savannah, APRN, 650 mg at 05/11/24 0551    amLODIPine (NORVASC) tablet 5 mg, 5 mg, Oral, Daily, Christine, Savannah, APRN, 5 mg at 05/12/24 0920    sennosides-docusate (PERICOLACE) 8.6-50 MG per tablet 2 tablet, 2 tablet, Oral, BID PRN **AND** polyethylene glycol (MIRALAX) packet 17 g, 17 g, Oral, Daily PRN **AND** bisacodyl (DULCOLAX) EC tablet 5 mg, 5 mg, Oral, Daily PRN **AND** bisacodyl (DULCOLAX) suppository 10 mg, 10 mg, Rectal, Daily PRN, Christine, Savannah, APRN    busPIRone (BUSPAR) tablet 10 mg, 10 mg, Oral, BID, Christine, Savannah, APRN, 10 mg at 05/12/24 0920    Calcium Replacement - Follow Nurse / BPA Driven Protocol, , Does not apply, PRN, Reddy Li DO    cefTRIAXone (ROCEPHIN) 2,000 mg in sodium chloride 0.9 % 100 mL MBP, 2,000 mg, Intravenous, Q24H, Reddy Li DO, Last Rate: 200 mL/hr at 05/13/24 1151, 2,000 mg at 05/13/24 1151    dextrose 5 % and sodium chloride 0.45 % infusion, 75 mL/hr, Intravenous, Continuous, Reddy Li DO, Last Rate: 75 mL/hr at 05/14/24 0908, 75 mL/hr at 05/14/24 0908    donepezil (ARICEPT) tablet 10 mg, 10 mg, Oral, Nightly, Christine, Savannah, APRN, 10 mg at 05/11/24 2024    ipratropium-albuterol (DUO-NEB) nebulizer solution 3 mL, 3 mL, Nebulization, Q4H PRN, Christine, Savannah, APRN    levothyroxine (SYNTHROID, LEVOTHROID) tablet 150 mcg, 150 mcg, Oral, Q AM, Christine, Savannah, APRN, 150 mcg at 05/12/24 0540    lisinopril (PRINIVIL,ZESTRIL) tablet 20 mg, 20 mg, Oral, Daily, Christine, Savannah, APRN, 20 mg at 05/12/24 0920    Magnesium Standard Dose Replacement - Follow Nurse / BPA Driven Protocol, , Does not apply, PRN, Reddy Li, DO    memantine (NAMENDA) tablet 10 mg, 10 mg, Oral, BID, Christine, Savannah, APRN, 10 mg at 05/12/24 0920    multivitamin with minerals 1  tablet, 1 tablet, Oral, Daily, Christine, Savannah, APRN, 1 tablet at 05/12/24 0920    nitroglycerin (NITROSTAT) SL tablet 0.4 mg, 0.4 mg, Sublingual, Q5 Min PRN, Christine, Savannah, APRN    nystatin (MYCOSTATIN) powder, , Topical, Q12H, Christine, Savannah, APRN, Given at 05/14/24 0905    pantoprazole (PROTONIX) EC tablet 40 mg, 40 mg, Oral, Daily, Christine, Savannah, APRN, 40 mg at 05/12/24 0920    Phosphorus Replacement - Follow Nurse / BPA Driven Protocol, , Does not apply, PRN, Reddy Li,     Potassium Replacement - Follow Nurse / BPA Driven Protocol, , Does not apply, PRN, Reddy Li, DO    sertraline (ZOLOFT) tablet 50 mg, 50 mg, Oral, Daily, Christine, Savannah, APRN, 50 mg at 05/12/24 0920    sodium chloride 0.9 % flush 10 mL, 10 mL, Intravenous, PRN, Robert Pablo MD, 10 mL at 05/12/24 0920     Labs:   Results from last 7 days   Lab Units 05/13/24  0615   WBC 10*3/mm3 13.40*   HEMOGLOBIN g/dL 13.0   HEMATOCRIT % 40.5   PLATELETS 10*3/mm3 204     Results from last 7 days   Lab Units 05/14/24  0358 05/10/24  0435 05/09/24  1703   SODIUM mmol/L 141   < > 142   POTASSIUM mmol/L 4.0   < > 3.9   CHLORIDE mmol/L 104   < > 102   CO2 mmol/L 29.0   < > 33.0*   BUN mg/dL 14   < > 15   CREATININE mg/dL 0.55*   < > 0.66   CALCIUM mg/dL 7.5*   < > 8.9   BILIRUBIN mg/dL  --   --  0.6   ALK PHOS U/L  --   --  56   ALT (SGPT) U/L  --   --  13   AST (SGOT) U/L  --   --  23   GLUCOSE mg/dL 118*   < > 135*    < > = values in this interval not displayed.     Imaging Results (Last 72 Hours)       Procedure Component Value Units Date/Time    US Renal Bilateral [[id number]] Collected: 05/13/24 1236     Updated: 05/13/24 1242    Narrative:      US RENAL BILATERAL    Date of Exam: 5/13/2024 11:53 AM EDT    Indication: UTI, ongoing fevers, r/o abscess/pyelo.    Comparison: No comparisons available.    Technique: Grayscale and color Doppler ultrasound evaluation of the kidneys and urinary bladder was  performed.      Findings:  RIGHT kidney measures 9.7 x 6 x 4.5 cm.  Kidney echogenicity, size, and vascularity appear within normal limits. There is no solid kidney mass.  No echogenic shadowing stone.  No hydronephrosis. There are multiple anechoic benign-appearing cysts   measuring up to 5 x 4.3 x 4.8 cm.    LEFT kidney measures 10.4 x 5.9 x 5.3 cm. Kidney echogenicity, size, and vascularity appear within normal limits. There is no solid kidney mass.  No echogenic shadowing stone.  No hydronephrosis. There is a 2.9 x 3.1 x 3.5 cm anechoic benign-appearing   renal cyst.    There is debris seen layering within the urinary bladder. Small posterior bladder diverticulum is also noted.        Impression:      Impression:    1. Bilateral benign-appearing renal cysts.  2. Debris is seen layering in the urinary bladder.        Electronically Signed: Luma Locke MD    5/13/2024 12:38 PM EDT    Workstation ID: [id number]                  Diagnostics: Reviewed    A:     Metastatic disease    Hypothyroidism (acquired)    Essential hypertension    Alzheimer's disease    Prolonged Q-T interval on ECG    UTI (urinary tract infection)       Impression:  Metastatic CA of unknown primary  Alzheimer's  HTN  UTI     Symptoms:   AMS  Debility      P:   Spoke with son via phone.  Son expressed that he thought pt was at a hospice stage and not palliative Hospice is following.  Mr Jarrett expressed desire for TF as his mother should not die from lack of nutrition and he thinks she needs a different antibiotic despite cultures showing sensitivity to received anitibiotic.  Attempted discussion of goals if not responding to TF in a few days unsuccessfully.  Will monitor for needs but uncertain palliative has anything to contribute to pt care at this time.  Am not 100% certain that goals align with hospice plan of care as well.  Palliative Care Team will continue to follow patient.     Lyudmila Perry MD, 5/14/2024, 10:39 EDT

## 2024-05-14 NOTE — PLAN OF CARE
Goal Outcome Evaluation:  Plan of Care Reviewed With: patient        Progress: declining  Outcome Evaluation: Palliative RN and Dr. LEVI Perry saw pt on 5/14 at 0938.  Pt. was asleep on 2LNC and did not rouse to voice nor touch.  No response at all noted.  Pt. did not demonstrate any visible sign of distress nor discomfort during palliative visit.  Dr. Perry called son to discuss GOC.  Son would like TF and different ABX therapy for his mother.  Palliaitve to continue to follow for support and ongoing POC.         1300 Palliative IDT meeting: CHUCKY Cortes RN, CHPN; DYLAN Cooney, APRN; WHIT Stroud MDiv, [facility]; LEVI Perry MD.; WHIT Burleson RN, CHPN; LEVI Galeana, [facility], [facility]-    After hours, weekends and holidays, contact Palliative Provider by calling [phone].

## 2024-05-14 NOTE — CONSULTS
[facility]    Referring Provider: Reddy Li DO     PCP: Eric Patel MD    Reason for Consultation: Request for post pyloric feeding tube placement     Chief complaint: Altered mental status     History of present illness:    Temi Jarrett is a 89 y.o. female who is admitted with lethargy.   Patient is somnolent and does not awaken on exam.  History obtained by chart review and in discussion with her hospitalist Dr. Li.   She resides at [facility] and has history of Alzheimer's dementia as well as metastatic cancer to the lungs of unknown primary.  She was seen by oncology in December 2023 and due to her frailty and poor performance status no further evaluation was recommended.   She is currently in hospice care at her nursing facility but sent to the ER for increased lethargy.   Work up pertinent for E. Coli UTI.       GI is consulted for family request of endoscopic placement of nasojejunal tube for enteral nutrition.   Patient has a very large hiatal hernia, intrathoracic stomach.    Attempts for bedside keofeed placement have been unsuccessful.        Allergies:  Augmentin [amoxicillin-pot clavulanate], Codeine, Shrimp, and Strawberry    Scheduled Meds:  amLODIPine, 5 mg, Oral, Daily  busPIRone, 10 mg, Oral, BID  cefTRIAXone, 2,000 mg, Intravenous, Q24H  donepezil, 10 mg, Oral, Nightly  levothyroxine, 150 mcg, Oral, Q AM  lisinopril, 20 mg, Oral, Daily  memantine, 10 mg, Oral, BID  multivitamin with minerals, 1 tablet, Oral, Daily  nystatin, , Topical, Q12H  pantoprazole, 40 mg, Oral, Daily  sertraline, 50 mg, Oral, Daily         Infusions:  dextrose 5 % and sodium chloride 0.45 %, 75 mL/hr, Last Rate: 75 mL/hr (05/14/24 0908)        PRN Meds:    acetaminophen    acetaminophen    senna-docusate sodium **AND** polyethylene glycol **AND** bisacodyl **AND** bisacodyl    Calcium Replacement - Follow Nurse / BPA Driven Protocol     ipratropium-albuterol    Magnesium Standard Dose Replacement - Follow Nurse / BPA Driven Protocol    nitroglycerin    Phosphorus Replacement - Follow Nurse / BPA Driven Protocol    Potassium Replacement - Follow Nurse / BPA Driven Protocol    sodium chloride    Home Meds:  Facility-Administered Medications Prior to Admission   Medication Dose Route Frequency Provider Last Rate Last Admin    cyanocobalamin injection 1,000 mcg  1,000 mcg Intramuscular Q28 Days Eric Patel MD   1,000 mcg at 07/31/23 1627     Medications Prior to Admission   Medication Sig Dispense Refill Last Dose    acetaminophen (TYLENOL) 500 MG tablet Take 1 tablet by mouth Every 4 (Four) Hours As Needed for Mild Pain, Moderate Pain or Fever.       amLODIPine (NORVASC) 5 MG tablet Take 1 tablet by mouth Daily.       benzonatate (TESSALON) 200 MG capsule Take 1 capsule by mouth 3 (Three) Times a Day As Needed for Cough.       bethanechol (URECHOLINE) 10 MG tablet TAKE ONE TABLET BY MOUTH THREE TIMES A DAY 90 tablet 3     busPIRone (BUSPAR) 10 MG tablet Take 1 tablet by mouth 2 (Two) Times a Day. 60 tablet 4     Calcium Citrate-Vitamin D (CITRACAL PETITES/VITAMIN D PO) Take 2 tablets by mouth 2 (two) times a day.       diclofenac (VOLTAREN) 1 % gel gel APPLY 4 GRAMS TOPICALLY TO THE APPROPRAITE AREA AS DIRECTED FOUR TIMES A DAY (Patient taking differently: Apply to right shoulder 2 gm topically every 6 hours as needed for pain do no apply jorgensen than 8 gm daily to any one specific joint of the upper extremities.) 100 g 4     Dimethicone 1.5 % cream Apply to gregorio upper extremities topically twice a day for fragile skin.       donepezil (ARICEPT) 10 MG tablet TAKE ONE TABLET BY MOUTH EVERY NIGHT AT BEDTIME 90 tablet 2     furosemide (LASIX) 20 MG tablet Take 1 tablet by mouth Daily As Needed (edema/ soa).       gabapentin (NEURONTIN) 300 MG capsule Take 1 capsule by mouth 2 (Two) Times a Day for 3 days. 6 capsule 0     guaifenesin (ROBITUSSIN)  100 MG/5ML liquid Take 10 mL by mouth Every 4 (Four) Hours As Needed for Cough or Congestion.       ipratropium-albuterol (DUO-NEB) 0.5-2.5 mg/3 ml nebulizer Take 3 mL by nebulization Every 4 (Four) Hours As Needed for Wheezing or Shortness of Air.       lisinopril (PRINIVIL,ZESTRIL) 20 MG tablet Take 1 tablet by mouth Daily.       loperamide (IMODIUM) 2 MG capsule Take 1 capsule by mouth Every 3 (Three) Hours As Needed for Diarrhea.       memantine (NAMENDA) 10 MG tablet TAKE ONE TABLET BY MOUTH TWICE A  tablet 3     menthol-zinc oxide (CALMOSEPTINE) 0.44-20.625 % ointment ointment Apply to buttocks topically 3 times a day for MASD       Multiple Vitamins-Minerals (MULTIVITAMIN ADULTS 50+ PO) Take 1 tablet by mouth Daily.       nystatin (MYCOSTATIN) 438310 UNIT/GM powder Apply to rectal area topically every 12 hours as needed for redness       omeprazole (priLOSEC) 20 MG capsule Take 1 capsule by mouth Daily.       ondansetron (ZOFRAN) 4 MG tablet Take 1 tablet by mouth Every 8 (Eight) Hours As Needed for Nausea or Vomiting.       polyethylene glycol (MIRALAX) 17 g packet Take 17 g by mouth Daily As Needed.       sertraline (ZOLOFT) 50 MG tablet Take 1 tablet by mouth Daily. 90 tablet 3     Synthroid 150 MCG tablet TAKE ONE TABLET BY MOUTH DAILY 90 tablet 1     traMADol (ULTRAM) 50 MG tablet Take 1 tablet by mouth Daily As Needed for Severe Pain. 3 tablet 0        ROS: Review of Systems   Unable to perform ROS: Mental status change       PAST MED HX:  Past Medical History:   Diagnosis Date    Acute sepsis 01/14/2021    Arthritis     Bladder prolapse, female, acquired     Closed fracture of neck of left femur 09/27/2019    Dementia     Depression     Disease of thyroid gland     Gastric polyp     GERD (gastroesophageal reflux disease)     Hiatal hernia     History of colonic polyps     Hyperlipidemia     Hypertension     IBS (irritable bowel syndrome)     Macular degeneration     Pacemaker 2023    Pericardial  "effusion 08/03/2020    Echo (8/3/2020): Normal LVEF.  Moderate pericardial effusion without tamponade.  Moderate MR. RVSP 49 mmHg    Torn meniscus     right knee        PAST SURG HX:  Past Surgical History:   Procedure Laterality Date    CHOLECYSTECTOMY  1997    ENDOSCOPY N/A 01/14/2021    Procedure: ESOPHAGOGASTRODUODENOSCOPY;  Surgeon: Serjio Guerrero MD;  Location:  [facility];  Service: General;  Laterality: N/A;    EYE SURGERY  1998    Cataract extraction    HERNIA REPAIR      HIP HEMIARTHROPLASTY Left 09/28/2019    Procedure: HIP HEMIARTHROPLASTY LEFT;  Surgeon: Reddy Segundo Jr., MD;  Location:  [facility];  Service: Orthopedics    HYSTERECTOMY  1976    KNEE SURGERY Right     arthroscopy- 2000    SKIN CANCER EXCISION Left     [facility]; CANCEROUS SPOT DIDN'T  MOVE BUT NEEDED REMOVED    TONSILLECTOMY         FAM HX:  Family History   Problem Relation Age of Onset    Hypertension Mother     Breast cancer Mother     Obesity Mother     Hypertension Father     Diabetes Son        SOC HX:  Social History     Socioeconomic History    Marital status:    Tobacco Use    Smoking status: Never    Smokeless tobacco: Never   Vaping Use    Vaping status: Never Used   Substance and Sexual Activity    Alcohol use: No    Drug use: Never    Sexual activity: Defer       PHYSICAL EXAM  /84 (BP Location: Right arm, Patient Position: Lying)   Pulse 61   Temp 100.1 °F (37.8 °C) (Axillary)   Resp 18   Ht 162.6 cm (64\")   Wt 51.8 kg (114 lb 3.2 oz)   LMP  (LMP Unknown)   SpO2 90%   BMI 19.60 kg/m²   Wt Readings from Last 3 Encounters:   05/12/24 51.8 kg (114 lb 3.2 oz)   12/22/23 51.3 kg (113 lb)   10/12/23 52.2 kg (115 lb)   ,body mass index is 19.6 kg/m².  Physical Exam  HENT:      Head: Normocephalic and atraumatic.   Cardiovascular:      Rate and Rhythm: Normal rate and regular rhythm.   Pulmonary:      Effort: Pulmonary effort is normal. No respiratory distress.      " Comments: On 2 L O2 via nasal cannula   Abdominal:      General: Bowel sounds are normal. There is no distension.      Palpations: Abdomen is soft.      Tenderness: There is no abdominal tenderness.   Neurological:      Mental Status: She is lethargic.     Results Review:   I reviewed the patient's new clinical results.    Lab Results   Component Value Date    WBC 13.40 (H) 05/13/2024    HGB 13.0 05/13/2024    HGB 13.1 05/11/2024    HGB 13.7 05/10/2024    HCT 40.5 05/13/2024    MCV 90.8 05/13/2024     05/13/2024     Lab Results   Component Value Date    INR 1.7 (H) 08/25/2023    INR 1.13 02/01/2023    INR 1.22 (H) 01/11/2023     Lab Results   Component Value Date    GLUCOSE 118 (H) 05/14/2024    BUN 14 05/14/2024    CREATININE 0.55 (L) 05/14/2024    EGFRIFNONA 80 04/23/2021    BCR 25.5 (H) 05/14/2024     05/14/2024    K 4.0 05/14/2024    CO2 29.0 05/14/2024    CALCIUM 7.5 (L) 05/14/2024    ALBUMIN 3.5 05/09/2024    ALKPHOS 56 05/09/2024    BILITOT 0.6 05/09/2024    ALT 13 05/09/2024    AST 23 05/09/2024     CT Abdomen/Pelvis:   Findings:   Lung Bases:  Very large hiatal hernia containing the majority of the stomach, multiple nondilated small bowel loops, and the pancreatic body and tail.   Peritoneum:  No free intraperitoneal air or fluid.    Abdominal wall:  Unremarkable.   Liver:  Liver is normal in size and contour. No focal lesions. The portal vein is patent.   Biliary/Gallbladder:  The gallbladder is surgically absent. The biliary tree is nondilated.   Pancreas:  Pancreas is within normal limits. There is no evidence of pancreatic mass or peripancreatic fluid.   Spleen:  Spleen is normal in size and contour.   Gastrointestinal/Mesentery:   No evidence of bowel obstruction or gross inflammatory changes.the appendix is not visualized. There are no secondary signs of acute appendicitis. No mesenteric fluid collections identified.   Adrenals:  Adrenal glands are unremarkable.   Kidneys:  The kidneys  are in anatomic position. No definite evidence of nephrolithiasis. Excreted IV contrast is visualized in the urinary collecting system limiting evaluation for small calculi. No evidence of hydronephrosis or perinephric fat stranding. 5.3 cm   right upper pole renal cyst is visualized. 3.9 cm right lower pole renal cyst is visualized. 4 cm left upper pole renal cyst is visualized. No solid renal masses visualized.   Bladder:   There is diffuse urinary bladder wall thickening and multiple small diverticula of the urinary bladder.   Reproductive organs:    The patient is post hysterectomy.   Retroperitoneal/Lymph Nodes:  No retroperitoneal adenopathy is identified.    Vasculature:  Moderate to advanced atheromatous changes of the aortoiliac system. The aorta is normal in caliber.   Bony Structures:    No acute fracture or aggressive lesions. Osseous structures are osteopenic. Patient is post left hip arthroplasty.   IMPRESSION:  Impression:  No acute intra-abdominal process evident.   Very large hiatal hernia containing the stomach, multiple nondilated small bowel loops, and the pancreatic body and tail.   Post cholecystectomy.   Diffuse urinary bladder wall thickening and multiple small urinary bladder diverticula. Finding may be secondary to chronic outlet obstruction or urinary retention.   Additional findings per body of the report.    ASSESSMENTS/PLANS    Altered mental status   Metastatic cancer, unknown primary  Feeding difficulty  Alzheimers disease   Very large hiatal hernia     Consulted for EGD for endoscopic placement of nasojejunal tube for enteral nutrition.  This cannot be performed today due to scheduling and anesthesia availability.   I contacted the patient's son, Victor M Jarrett, regarding this.  Patient will not be a candidate for PEG tube secondary to her very large hiatal hernia, intrathoracic stomach.   Victor M requested to speak with the physician regarding further details of endoscopic placement and  I explained Dr. Watson is an inpatient hospital gastroenterologist and not available to discuss today.   I encouraged him to come to the hospital tomorrow to meet with our inpatient GI team as well as anesthesia prior to her procedure but he stated he has health restrictions that permit this.       I discussed the patient's findings and my recommendations with patient and hospitalist Dr. Li.      RABIA Au  05/14/24  13:45 EDT

## 2024-05-14 NOTE — PROGRESS NOTES
"    [facility] Medicine Services  PROGRESS NOTE    Patient Name: Temi Jarrett  : 1930  MRN: [id number]    Date of Admission: 2024  Primary Care Physician: Eric Patel MD    Subjective   Subjective     CC:  F/u fever    HPI:  Remains unresponsive, no oral intake for several days. Noted night shift documentation.    Called and updated patient's son Victor M Jarrett at 09:40. He has concerns about his mother's lack of oral intake and feels that she needs more substantial nutritional support. We discussed Keofeed (tube feeds) which he states that he \"insists on.\" I attempted to raise the question of the terminal plan if she does not improve with temporary tube feeds, as an NG cannot remain permanently, he was unwilling to discuss future plans further. He states that if she does not improve, he wants another antibiotic.    Keofeed order was placed, ICU nursing attempted ~1hr and were unsuccessful, due to Hx hiatal hernia they have recommended endoscopically placed tube. I d/w GI who will attempt to place, this cannot be done today due to endo/OR availability, plan for tomorrow. She is not a candidate for PEG due to the size of her HH      Objective   Objective     Vital Signs:   Temp:  [98.7 °F (37.1 °C)-101.3 °F (38.5 °C)] 101.3 °F (38.5 °C)  Heart Rate:  [50-68] 68  Resp:  [16-24] 18  BP: (151-171)/(73-86) 163/78  Flow (L/min):  [2] 2     Physical Exam:  Constitutional: Elderly female laying in bed, ill appearing, unresponsive  Respiratory: Clear to auscultation bilaterally, respiratory effort normal   Cardiovascular: RRR, palpable radial pulse  Gastrointestinal: Positive bowel sounds, soft, nontender, nondistended  Neurologic: Grimace to sternal rub    Results Reviewed:  LAB RESULTS:      Lab 24  0615 24  0352 05/10/24  0435 24  1703   WBC 13.40* 12.03* 11.45* 9.85   HEMOGLOBIN 13.0 13.1 13.7 12.9   HEMATOCRIT 40.5 40.7 41.8 39.3   PLATELETS 204 219 186 181 "   NEUTROS ABS  --   --  9.89* 8.72*   IMMATURE GRANS (ABS)  --   --  0.06* 0.05   LYMPHS ABS  --   --  0.82 0.65*   MONOS ABS  --   --  0.62 0.37   EOS ABS  --   --  0.01 0.01   MCV 90.8 89.1 88.4 90.6         Lab 05/14/24  0358 05/13/24  2208 05/13/24  0615 05/11/24  0352 05/10/24  0435 05/09/24  1703   SODIUM 141  --  142 146* 143 142   POTASSIUM 4.0 4.2 3.0* 3.7 3.9 3.9   CHLORIDE 104  --  103 103 99 102   CO2 29.0  --  31.0* 32.0* 31.0* 33.0*   ANION GAP 8.0  --  8.0 11.0 13.0 7.0   BUN 14  --  15 26* 16 15   CREATININE 0.55*  --  0.48* 0.58 0.53* 0.66   EGFR 85.1  --  87.9 84.0 85.8 81.4   GLUCOSE 118*  --  127* 128* 127* 135*   CALCIUM 7.5*  --  7.9* 8.7 8.8 8.9   MAGNESIUM 1.8  --  1.8  --  1.8 1.8         Lab 05/09/24  1703   TOTAL PROTEIN 6.3   ALBUMIN 3.5   GLOBULIN 2.8   ALT (SGPT) 13   AST (SGOT) 23   BILIRUBIN 0.6   ALK PHOS 56         Lab 05/09/24  1703   HSTROP T 28*                 Brief Urine Lab Results  (Last result in the past 365 days)        Color   Clarity   Blood   Leuk Est   Nitrite   Protein   CREAT   Urine HCG        05/09/24 1728 Yellow   Turbid   Small (1+)   Negative   Positive   >=300 mg/dL (3+)                   Microbiology Results Abnormal       Procedure Component Value - Date/Time    Respiratory Panel PCR w/COVID-19(SARS-CoV-2) [facility] In-House, NP Swab in [facility], 2 HR TAT - Swab, Nasopharynx [[id number]]  (Normal) Collected: 05/13/24 0843    Lab Status: Final result Specimen: Swab from Nasopharynx Updated: 05/13/24 0948     ADENOVIRUS, PCR Not Detected     Coronavirus 229E Not Detected     Coronavirus HKU1 Not Detected     Coronavirus NL63 Not Detected     Coronavirus OC43 Not Detected     COVID19 Not Detected     Human Metapneumovirus Not Detected     Human Rhinovirus/Enterovirus Not Detected     Influenza A PCR Not Detected     Influenza B PCR Not Detected     Parainfluenza Virus 1 Not Detected     Parainfluenza Virus 2 Not Detected     Parainfluenza Virus 3 Not  Detected     Parainfluenza Virus 4 Not Detected     RSV, PCR Not Detected     Bordetella pertussis pcr Not Detected     Bordetella parapertussis PCR Not Detected     Chlamydophila pneumoniae PCR Not Detected     Mycoplasma pneumo by PCR Not Detected    Narrative:      In the setting of a positive respiratory panel with a viral infection PLUS a negative procalcitonin without other underlying concern for bacterial infection, consider observing off antibiotics or discontinuation of antibiotics and continue supportive care. If the respiratory panel is positive for atypical bacterial infection (Bordetella pertussis, Chlamydophila pneumoniae, or Mycoplasma pneumoniae), consider antibiotic de-escalation to target atypical bacterial infection.            US Renal Bilateral    Result Date: 5/13/2024  US RENAL BILATERAL Date of Exam: 5/13/2024 11:53 AM EDT Indication: UTI, ongoing fevers, r/o abscess/pyelo. Comparison: No comparisons available. Technique: Grayscale and color Doppler ultrasound evaluation of the kidneys and urinary bladder was performed. Findings: RIGHT kidney measures 9.7 x 6 x 4.5 cm.  Kidney echogenicity, size, and vascularity appear within normal limits. There is no solid kidney mass.  No echogenic shadowing stone.  No hydronephrosis. There are multiple anechoic benign-appearing cysts measuring up to 5 x 4.3 x 4.8 cm. LEFT kidney measures 10.4 x 5.9 x 5.3 cm. Kidney echogenicity, size, and vascularity appear within normal limits. There is no solid kidney mass.  No echogenic shadowing stone.  No hydronephrosis. There is a 2.9 x 3.1 x 3.5 cm anechoic benign-appearing renal cyst. There is debris seen layering within the urinary bladder. Small posterior bladder diverticulum is also noted.     Impression: Impression: 1. Bilateral benign-appearing renal cysts. 2. Debris is seen layering in the urinary bladder. Electronically Signed: Luma Locke MD  5/13/2024 12:38 PM EDT  Workstation ID: [id number]     Results  for orders placed during the hospital encounter of 08/02/20    Transthoracic Echo Complete With Contrast if Necessary Per Protocol    Interpretation Summary  · Left ventricular systolic function is normal. Estimated EF = 70%.  · Left ventricular diastolic dysfunction (grade I) consistent with impaired relaxation.  · Left atrial cavity size is moderately dilated.  · There is calcification of the aortic valve. There is no significant stenosis or regurgitation. A Lambel's excrescence is noted on an aortic valve leaflet.  · Moderate mitral valve regurgitation is present.  · Estimated right ventricular systolic pressure from tricuspid regurgitation is moderately elevated (49 mmHg).  · There is a moderate (1-2cm) circumferential pericardial effusion. There is no tamponade physiology.      Current medications:  Scheduled Meds:amLODIPine, 5 mg, Oral, Daily  busPIRone, 10 mg, Oral, BID  cefTRIAXone, 2,000 mg, Intravenous, Q24H  donepezil, 10 mg, Oral, Nightly  levothyroxine, 150 mcg, Oral, Q AM  lisinopril, 20 mg, Oral, Daily  memantine, 10 mg, Oral, BID  multivitamin with minerals, 1 tablet, Oral, Daily  nystatin, , Topical, Q12H  pantoprazole, 40 mg, Oral, Daily  sertraline, 50 mg, Oral, Daily      Continuous Infusions:dextrose 5 % and sodium chloride 0.45 %, 75 mL/hr, Last Rate: 75 mL/hr (05/14/24 0908)      PRN Meds:.  acetaminophen    acetaminophen    senna-docusate sodium **AND** polyethylene glycol **AND** bisacodyl **AND** bisacodyl    Calcium Replacement - Follow Nurse / BPA Driven Protocol    ipratropium-albuterol    Magnesium Standard Dose Replacement - Follow Nurse / BPA Driven Protocol    nitroglycerin    Phosphorus Replacement - Follow Nurse / BPA Driven Protocol    Potassium Replacement - Follow Nurse / BPA Driven Protocol    sodium chloride    Assessment & Plan   Assessment & Plan     Active Hospital Problems    Diagnosis  POA    **Metastatic disease [C79.9]  Yes    UTI (urinary tract infection) [N39.0]  Yes     Prolonged Q-T interval on ECG [R94.31]  Yes    Alzheimer's disease [G30.9, F02.80]  Yes    Essential hypertension [I10]  Yes    Hypothyroidism (acquired) [E03.9]  Yes      Resolved Hospital Problems   No resolved problems to display.        Brief Hospital Course to date:  Temi Jarrett is a 89 y.o. female resident of [facility] under hospice care for 7.5cm LT lung mass w/ assoc nodules, she has Hx  dementia, HTN, hypothyroidism, HH/GERD and was sent from her facility for eval of lethargy; CXR demonstrated progressive disease; her syx were attributed to bacteruria and she was admitted for IV abx; she has had progressive clinical decline w/ lack of oral intake; she cont to have ongoing fevers, eval being expanded    Assessment/Plan    Febrile illness  E Coli UTI  -intake CXR w/ metastatic disease, no other process  -UrCx w/ pan-sensitive E coli; renal US neg for abscess/pyelo  -respiratory PCR negative  -cont CTX planned through 5/15 (7 days therapy), unless alternate source of infection identified sooner  -current GOC is treatment of any infection: temp this afternoon 101.3(axillary), she is otherwise unable to provide any history/symptoms, check BCx, will CT c/a/p to rule out occult infection; venous duplex to r/o DVT; noted that metastatic cancer can potentially cause hyperthermia    Progressive metastatic cancer (unknown source)  Lethargy w/ confusion  -cont supportive IVF today  -palliative/hospice follow  -as noted, d/w son, he would like tube feeds started; order for Keofeed and nutrition placed this AM during phone conversation; ICU attempted multiple times and were unsuccessful, at their recs, GI consulted for endoscopically placed tube; anticipate tomorrow due to OR/endo availability; she is NOT a candidate for PEG placement due to her hiatal hernia    Alzheimer dementia - donepezil, memantine  Anxiety, depression - buspar, zoloft  GERD/HH - ppi  Hypothyroidism - levothyroxine  HTN -  amlodipine, lisinopril      Expected Discharge Location and Transportation: [facility]  Expected Discharge   Expected Discharge Date: 5/17/2024; Expected Discharge Time:      DVT prophylaxis:  Mechanical DVT prophylaxis orders are present.         AM-PAC 6 Clicks Score (PT): 6 (05/12/24 1002)    CODE STATUS:   Code Status and Medical Interventions:   Ordered at: 05/12/24 1331     Medical Intervention Limits:    NO intubation (DNI)     Level Of Support Discussed With:    Next of Kin (If No Surrogate)     Code Status (Patient has no pulse and is not breathing):    No CPR (Do Not Attempt to Resuscitate)     Medical Interventions (Patient has pulse or is breathing):    Limited Support     Comments:    d/w patient's son via telephone       Reddy Li,   05/14/24

## 2024-05-14 NOTE — PROGRESS NOTES
Continued Stay Note  [facility]     Patient Name: Temi Jarrett  MRN: [id number]  Today's Date: 5/14/2024    Admit Date: 5/9/2024    Plan: I   Discharge Plan       Row Name 05/14/24 1639       Plan    Plan I    Plan Comments Hospice RN coordinated care with hospitalist Dr. Li who has consulted gastro for tube feeding placement per the son's wishes. Per gastroenterology PA, Mahogany Perez, the patient will have placement by EGD tomorrow 5/15. She relays that the son is aware this will take place tomorrow. Called son, Victor M to discuss goals and plan of care. Victor M confirms that he does not want the patient resuscitated or put on a ventilator. Son states he does not want IVF, IV antibiotics, or nutrition withheld from patient. After discussing goals of care with patient's son and Dr. Alexa Pablo, son relays that he desires feeding tube placement and enteral nutrition for patient and that if they want hospice again in the future they will consider it. Updated Dr. Li and palliative team that son has elected revocation from hospice. Currently hospice is awaiting revocation paperwork reflecting this. Once this paperwork is received from the inpatient hospice team, a note will be put in Epic and hospice will need to reconsulted if family decides they want patient readmitted to hospice.                   Discharge Codes    No documentation.                 Expected Discharge Date and Time       Expected Discharge Date Expected Discharge Time    May 17, 2024               Virginia Fitzpatrick RN

## 2024-05-14 NOTE — SIGNIFICANT NOTE
RN called with concerns per son/POA r/t nutrition/antibiotics/goals of treatment.    I called and discussed with son.    He would like his mother to now have some form of nutrition and iv antibiotics to continue.    I discussed that our options are limited tonight as we have no dietician.   I told him I would make his request known in the chart that they now wish nutrition to be started asap on pt.  I also told him that antibiotics/dextrose/ivf are still ordered on his mother.

## 2024-05-14 NOTE — PLAN OF CARE
Goal Outcome Evaluation:              Outcome Evaluation: Patient remains on 2LNC. Continuing lethargy. Patient's son/POA  called to address patient's nutrition. RN paged APRN on call to phone son to discuss treatment plan.

## 2024-05-15 ENCOUNTER — ANESTHESIA (OUTPATIENT)
Dept: GASTROENTEROLOGY | Facility: HOSPITAL | Age: 89
End: 2024-05-15
Payer: MEDICARE

## 2024-05-15 ENCOUNTER — ANESTHESIA EVENT (OUTPATIENT)
Dept: GASTROENTEROLOGY | Facility: HOSPITAL | Age: 89
End: 2024-05-15
Payer: MEDICARE

## 2024-05-15 PROCEDURE — 25810000003 SODIUM CHLORIDE 0.9 % SOLUTION: Performed by: NURSE ANESTHETIST, CERTIFIED REGISTERED

## 2024-05-15 PROCEDURE — 25010000002 PROPOFOL 10 MG/ML EMULSION: Performed by: NURSE ANESTHETIST, CERTIFIED REGISTERED

## 2024-05-15 RX ORDER — LIDOCAINE HYDROCHLORIDE 10 MG/ML
INJECTION, SOLUTION EPIDURAL; INFILTRATION; INTRACAUDAL; PERINEURAL AS NEEDED
Status: DISCONTINUED | OUTPATIENT
Start: 2024-05-15 | End: 2024-05-15 | Stop reason: SURG

## 2024-05-15 RX ORDER — PROPOFOL 10 MG/ML
VIAL (ML) INTRAVENOUS AS NEEDED
Status: DISCONTINUED | OUTPATIENT
Start: 2024-05-15 | End: 2024-05-15 | Stop reason: SURG

## 2024-05-15 RX ORDER — EPHEDRINE SULFATE 50 MG/ML
INJECTION, SOLUTION INTRAVENOUS AS NEEDED
Status: DISCONTINUED | OUTPATIENT
Start: 2024-05-15 | End: 2024-05-15 | Stop reason: SURG

## 2024-05-15 RX ORDER — SODIUM CHLORIDE 9 MG/ML
INJECTION, SOLUTION INTRAVENOUS CONTINUOUS PRN
Status: DISCONTINUED | OUTPATIENT
Start: 2024-05-15 | End: 2024-05-15 | Stop reason: SURG

## 2024-05-15 RX ADMIN — LIDOCAINE HYDROCHLORIDE 50 MG: 10 INJECTION, SOLUTION EPIDURAL; INFILTRATION; INTRACAUDAL; PERINEURAL at 11:24

## 2024-05-15 RX ADMIN — PROPOFOL 50 MG: 10 INJECTION, EMULSION INTRAVENOUS at 11:24

## 2024-05-15 RX ADMIN — EPHEDRINE SULFATE 5 MG: 50 INJECTION INTRAVENOUS at 11:54

## 2024-05-15 RX ADMIN — SODIUM CHLORIDE: 9 INJECTION, SOLUTION INTRAVENOUS at 11:22

## 2024-05-15 RX ADMIN — PROPOFOL 50 MG: 10 INJECTION, EMULSION INTRAVENOUS at 11:50

## 2024-05-15 RX ADMIN — PROPOFOL 50 MG: 10 INJECTION, EMULSION INTRAVENOUS at 11:32

## 2024-05-15 RX ADMIN — EPHEDRINE SULFATE 10 MG: 50 INJECTION INTRAVENOUS at 11:57

## 2024-05-15 NOTE — PLAN OF CARE
Goal Outcome Evaluation:  Plan of Care Reviewed With: patient        Progress: declining  Outcome Evaluation: Pt not responding to verbal stimuli. PRN tylenol given for fever, Current temp noted of 99.2. Continue with cooling measures. Needs anticipated and attended to. Other vitals stable.

## 2024-05-15 NOTE — SIGNIFICANT NOTE
Patient's head CT returned this evening w/ new intracranial hemorrhages. I called and updated the patient's son Victor M Jarrett immediately after receiving notification of the same. I explained to him about her subacute DVT finding today with need for blood thinner, the bleeding in her brain, and overall poor prognosis. He did state that his goal for her is to continue tube feeding and see if she improves. I explained that treatment of DVT and of ICH are at odds with one another and that between the two the ICH would have to take priority. He would like to continue to give her an opportunity to improve. I have ordered a repeat head CT for the AM and placed a NSGY consult. I do not anticipate that she will be a surgical candidate but appreciate prognostic opinion and management recommendations. If after NSGY eval he would like to continue the same, we will need to consider IVC filter placement.

## 2024-05-15 NOTE — SIGNIFICANT NOTE
Patient NPO for GI tube feed placement and somnolent. RN reports patient is not response to voice touch.

## 2024-05-15 NOTE — PLAN OF CARE
KUB read by radiologist noted.  The tip is post-pyloric in the duodenum.  This was confirmed by direct endoscopic views (see EGD report).  She has intrathoracic stomach which makes tube course unconventional.  X-ray obtained for baseline in the event tube becomes dislodged/displaced.

## 2024-05-15 NOTE — PROGRESS NOTES
"    [facility] Medicine Services  PROGRESS NOTE    Patient Name: Temi Jarrett  : 1930  MRN: [id number]    Date of Admission: 2024  Primary Care Physician: Eric Patel MD    Subjective   Subjective     CC:  F/u febrile illness    HPI:  Nursing replacing IV, patient did verbalize \"ouch\" during IV placement, but otherwise remains non-responsive. No family at bedside. Planned for endoscopically placed keofeed today      Objective   Objective     Vital Signs:   Temp:  [98.2 °F (36.8 °C)-100.8 °F (38.2 °C)] 99.2 °F (37.3 °C)  Heart Rate:  [46-93] 49  Resp:  [14-20] 16  BP: (108-159)/(66-98) 157/72  Flow (L/min):  [2] 2     Physical Exam:  Constitutional: Chronically ill appearing elderly female laying in bed, responds \"ouch\" to IV placement, but does not respond to verbal, tactile, or sternal rub stimuli; actively clenches eyes when trying to passively open them (has been doing this for several days)  Respiratory: Clear to auscultation bilaterally, respiratory effort normal   Cardiovascular: RRR, palpable radial pulse  Gastrointestinal: Positive bowel sounds, soft, nontender, nondistended  Neurologic: Not responsive    Results Reviewed:  LAB RESULTS:      Lab 05/15/24  0411 24  0615 24  0352 05/10/24  0435 24  1703   WBC 12.65* 13.40* 12.03* 11.45* 9.85   HEMOGLOBIN 12.7 13.0 13.1 13.7 12.9   HEMATOCRIT 38.6 40.5 40.7 41.8 39.3   PLATELETS 217 204 219 186 181   NEUTROS ABS 10.07*  --   --  9.89* 8.72*   IMMATURE GRANS (ABS) 0.07*  --   --  0.06* 0.05   LYMPHS ABS 1.33  --   --  0.82 0.65*   MONOS ABS 1.04*  --   --  0.62 0.37   EOS ABS 0.10  --   --  0.01 0.01   MCV 87.9 90.8 89.1 88.4 90.6         Lab 05/15/24  0411 24  0358 24  2208 24  0615 24  0352 05/10/24  0435 24  1703   SODIUM 140 141  --  142 146* 143 142   POTASSIUM 3.7 4.0 4.2 3.0* 3.7 3.9 3.9   CHLORIDE 104 104  --  103 103 99 102   CO2 28.0 29.0  --  31.0* 32.0* " 31.0* 33.0*   ANION GAP 8.0 8.0  --  8.0 11.0 13.0 7.0   BUN 14 14  --  15 26* 16 15   CREATININE 0.47* 0.55*  --  0.48* 0.58 0.53* 0.66   EGFR 88.3 85.1  --  87.9 84.0 85.8 81.4   GLUCOSE 117* 118*  --  127* 128* 127* 135*   CALCIUM 7.2* 7.5*  --  7.9* 8.7 8.8 8.9   MAGNESIUM  --  1.8  --  1.8  --  1.8 1.8         Lab 05/09/24  1703   TOTAL PROTEIN 6.3   ALBUMIN 3.5   GLOBULIN 2.8   ALT (SGPT) 13   AST (SGOT) 23   BILIRUBIN 0.6   ALK PHOS 56         Lab 05/09/24  1703   HSTROP T 28*                 Brief Urine Lab Results  (Last result in the past 365 days)        Color   Clarity   Blood   Leuk Est   Nitrite   Protein   CREAT   Urine HCG        05/09/24 1728 Yellow   Turbid   Small (1+)   Negative   Positive   >=300 mg/dL (3+)                   Microbiology Results Abnormal       Procedure Component Value - Date/Time    Respiratory Panel PCR w/COVID-19(SARS-CoV-2) FAMILIA/EMILE/KEARA/PAD/COR/PARMINDER In-House, NP Swab in UTM/VTM, 2 HR TAT - Swab, Nasopharynx [[id number]]  (Normal) Collected: 05/13/24 0843    Lab Status: Final result Specimen: Swab from Nasopharynx Updated: 05/13/24 0948     ADENOVIRUS, PCR Not Detected     Coronavirus 229E Not Detected     Coronavirus HKU1 Not Detected     Coronavirus NL63 Not Detected     Coronavirus OC43 Not Detected     COVID19 Not Detected     Human Metapneumovirus Not Detected     Human Rhinovirus/Enterovirus Not Detected     Influenza A PCR Not Detected     Influenza B PCR Not Detected     Parainfluenza Virus 1 Not Detected     Parainfluenza Virus 2 Not Detected     Parainfluenza Virus 3 Not Detected     Parainfluenza Virus 4 Not Detected     RSV, PCR Not Detected     Bordetella pertussis pcr Not Detected     Bordetella parapertussis PCR Not Detected     Chlamydophila pneumoniae PCR Not Detected     Mycoplasma pneumo by PCR Not Detected    Narrative:      In the setting of a positive respiratory panel with a viral infection PLUS a negative procalcitonin without other underlying concern for  bacterial infection, consider observing off antibiotics or discontinuation of antibiotics and continue supportive care. If the respiratory panel is positive for atypical bacterial infection (Bordetella pertussis, Chlamydophila pneumoniae, or Mycoplasma pneumoniae), consider antibiotic de-escalation to target atypical bacterial infection.            Duplex Venous Lower Extremity - Bilateral CAR    Result Date: 5/15/2024    Sub-acute left lower extremity deep vein thrombosis noted in the gastrocnemius.   All other veins appeared normal bilaterally.     XR Abdomen KUB    Result Date: 5/15/2024  DATE OF EXAM: 5/15/2024 11:48 AM  PROCEDURE: XR ABDOMEN KUB-  INDICATIONS: NJ tube placement; N39.0-Urinary tract infection, site not specified; G93.40-Encephalopathy, unspecified; C79.9-Secondary malignant neoplasm of unspecified site  COMPARISON 8/24/2023  Technique: Single radiographic view of the abdomen was obtained.  FINDINGS: An enteric tube is in place with the tip in the left midabdomen. This is presumably within the distal stomach. Bowel gas pattern is normal. There are surgical clips of the right abdomen.    Impression: Enteric tube tip in the left mid abdomen, presumably in the distal stomach.   This report was finalized on 5/15/2024 12:06 PM by Kimberly Dudley MD.      CT Chest Without Contrast Diagnostic    Result Date: 5/14/2024  Examination: CT CHEST WO CONTRAST DIAGNOSTIC- CT ABDOMEN PELVIS WO CONTRAST-  Date of Exam: 5/14/2024 4:30 PM  Indication: Recurrent fever, unclear source (dementia/unreponsive); N39.0-Urinary tract infection, site not specified; G93.40-Encephalopathy, unspecified.  Comparison: 12/22/2023.  Technique: Non-contrast axial volumetric CT imaging of the chest, abdomen and pelvis was performed. Automated exposure control and iterative reconstruction methods were used.  Findings: Chest: Previously noted prevascular mediastinal mass has increased in size now measuring up to 6.9 x 4.9 cm. Thyroid  is not well seen. The trachea appears unremarkable. The esophagus is largely obscured in the lower aspect due to a massive hiatal hernia that contains the entire stomach and a large portion of the small bowel. The heart appears normal size. There are aortic valvular calcifications and extensive mitral annular calcifications. There are moderate coronary artery calcifications. The main pulmonary artery is again enlarged suggesting pulmonary arterial hypertension. There is a soft tissue density nodule in the anterior mediastinal fat measuring 17 mm on image 50 of the axial series.  Previously noted lung nodules have markedly increased in number and size diffusely. There are more than 50 nodules in each lung. One of the larger nodules on the right occurs in the superior segment of the right lower lobe and measures 20 mm and one of the largest nodules on the left occurs in the left lower lobe on CT image 20 measuring 27 mm diameter. There is no pneumothorax or pleural effusion. There is significant bibasilar atelectasis due to mass effect from large hiatal hernia. Airways appear patent centrally. No definite pneumonia.  There are no acute findings in the superficial soft tissues. The bones are diffusely demineralized. No acute osseous abnormalities or destructive bone lesions. There is moderate thoracic spondylosis and there is dextroscoliosis of the thoracolumbar spine. There is markedly increased thoracic kyphosis, which distorts the anatomy.  Abdomen and pelvis: There is mild body wall edema. There is a left hip prosthesis in place. There is mild DJD at the right hip. The bones are demineralized. There is moderate lumbar spondylosis. There is S-shaped scoliosis of the lumbar spine. No acute osseous abnormality or destructive bone lesion.  The liver appears homogeneous. Patient appears status post cholecystectomy. The bile ducts do not appear distended although poorly visualized. The pancreas is partially contained  within a large hiatal hernia along with numerous small bowel loops and the stomach. The spleen is normal size. Adrenal glands appear normal. There are bilateral simple appearing kidney cysts. No urolithiasis or hydronephrosis. Urinary bladder is nondistended. Patient appears status post hysterectomy. The ovaries are not seen. The appendix is not definitely seen. No small bowel distention. There is mild diffuse mesenteric edema. There is mild colonic fecal retention and mild colonic diverticulosis. No ascites, pneumoperitoneum or lymphadenopathy.      Impression:  1. Significant worsening of metastatic disease in the chest with significantly increased size of an mediastinal mass and increased size and number of diffuse pulmonary metastatic nodules. 2. Redemonstration of a massive hiatal hernia containing the entire stomach, numerous small bowel loops and a portion of the pancreas. No definite complication. 3. Numerous chronic findings in the chest including aortic valvular and mitral annular calcifications, coronary artery disease and enlarged main pulmonary artery suggesting pulmonary arterial hypertension. 4. Numerous chronic findings in the abdomen including left hip prosthesis, cholecystectomy, colonic diverticulosis and kidney cysts. 4. Mild anasarca with unclear etiology.  This report was finalized on 5/14/2024 8:31 PM by Reddy Perez MD.      CT Abdomen Pelvis Without Contrast    Result Date: 5/14/2024  Examination: CT CHEST WO CONTRAST DIAGNOSTIC- CT ABDOMEN PELVIS WO CONTRAST-  Date of Exam: 5/14/2024 4:30 PM  Indication: Recurrent fever, unclear source (dementia/unreponsive); N39.0-Urinary tract infection, site not specified; G93.40-Encephalopathy, unspecified.  Comparison: 12/22/2023.  Technique: Non-contrast axial volumetric CT imaging of the chest, abdomen and pelvis was performed. Automated exposure control and iterative reconstruction methods were used.  Findings: Chest: Previously noted prevascular  mediastinal mass has increased in size now measuring up to 6.9 x 4.9 cm. Thyroid is not well seen. The trachea appears unremarkable. The esophagus is largely obscured in the lower aspect due to a massive hiatal hernia that contains the entire stomach and a large portion of the small bowel. The heart appears normal size. There are aortic valvular calcifications and extensive mitral annular calcifications. There are moderate coronary artery calcifications. The main pulmonary artery is again enlarged suggesting pulmonary arterial hypertension. There is a soft tissue density nodule in the anterior mediastinal fat measuring 17 mm on image 50 of the axial series.  Previously noted lung nodules have markedly increased in number and size diffusely. There are more than 50 nodules in each lung. One of the larger nodules on the right occurs in the superior segment of the right lower lobe and measures 20 mm and one of the largest nodules on the left occurs in the left lower lobe on CT image 20 measuring 27 mm diameter. There is no pneumothorax or pleural effusion. There is significant bibasilar atelectasis due to mass effect from large hiatal hernia. Airways appear patent centrally. No definite pneumonia.  There are no acute findings in the superficial soft tissues. The bones are diffusely demineralized. No acute osseous abnormalities or destructive bone lesions. There is moderate thoracic spondylosis and there is dextroscoliosis of the thoracolumbar spine. There is markedly increased thoracic kyphosis, which distorts the anatomy.  Abdomen and pelvis: There is mild body wall edema. There is a left hip prosthesis in place. There is mild DJD at the right hip. The bones are demineralized. There is moderate lumbar spondylosis. There is S-shaped scoliosis of the lumbar spine. No acute osseous abnormality or destructive bone lesion.  The liver appears homogeneous. Patient appears status post cholecystectomy. The bile ducts do not  appear distended although poorly visualized. The pancreas is partially contained within a large hiatal hernia along with numerous small bowel loops and the stomach. The spleen is normal size. Adrenal glands appear normal. There are bilateral simple appearing kidney cysts. No urolithiasis or hydronephrosis. Urinary bladder is nondistended. Patient appears status post hysterectomy. The ovaries are not seen. The appendix is not definitely seen. No small bowel distention. There is mild diffuse mesenteric edema. There is mild colonic fecal retention and mild colonic diverticulosis. No ascites, pneumoperitoneum or lymphadenopathy.      Impression:  1. Significant worsening of metastatic disease in the chest with significantly increased size of an mediastinal mass and increased size and number of diffuse pulmonary metastatic nodules. 2. Redemonstration of a massive hiatal hernia containing the entire stomach, numerous small bowel loops and a portion of the pancreas. No definite complication. 3. Numerous chronic findings in the chest including aortic valvular and mitral annular calcifications, coronary artery disease and enlarged main pulmonary artery suggesting pulmonary arterial hypertension. 4. Numerous chronic findings in the abdomen including left hip prosthesis, cholecystectomy, colonic diverticulosis and kidney cysts. 4. Mild anasarca with unclear etiology.  This report was finalized on 5/14/2024 8:31 PM by Reddy Perez MD.       Results for orders placed during the hospital encounter of 08/02/20    Transthoracic Echo Complete With Contrast if Necessary Per Protocol    Interpretation Summary  · Left ventricular systolic function is normal. Estimated EF = 70%.  · Left ventricular diastolic dysfunction (grade I) consistent with impaired relaxation.  · Left atrial cavity size is moderately dilated.  · There is calcification of the aortic valve. There is no significant stenosis or regurgitation. A Lambel's excrescence  is noted on an aortic valve leaflet.  · Moderate mitral valve regurgitation is present.  · Estimated right ventricular systolic pressure from tricuspid regurgitation is moderately elevated (49 mmHg).  · There is a moderate (1-2cm) circumferential pericardial effusion. There is no tamponade physiology.      Current medications:  Scheduled Meds:[START ON 5/16/2024] amLODIPine, 5 mg, Nasogastric, Daily  busPIRone, 10 mg, Nasogastric, BID  donepezil, 10 mg, Nasogastric, Nightly  [START ON 5/16/2024] lansoprazole, 15 mg, Nasogastric, Q AM  [START ON 5/16/2024] levothyroxine, 150 mcg, Nasogastric, Q AM  [START ON 5/16/2024] lisinopril, 20 mg, Nasogastric, Daily  memantine, 10 mg, Nasogastric, BID  [START ON 5/16/2024] multivitamin and minerals, 15 mL, Nasogastric, Daily  nystatin, , Topical, Q12H  [START ON 5/16/2024] sertraline, 50 mg, Nasogastric, Daily      Continuous Infusions:     PRN Meds:.  acetaminophen **OR** [PENDING] acetaminophen **OR** acetaminophen **OR** [PENDING] acetaminophen **OR** acetaminophen **OR** [PENDING] acetaminophen    senna-docusate sodium **AND** [PENDING] senna-docusate sodium **AND** polyethylene glycol **AND** [PENDING] polyethylene glycol **AND** [DISCONTINUED] bisacodyl **AND** [PENDING] bisacodyl **AND** bisacodyl **AND** [PENDING] bisacodyl    Calcium Replacement - Follow Nurse / BPA Driven Protocol    ipratropium-albuterol    Magnesium Standard Dose Replacement - Follow Nurse / BPA Driven Protocol    nitroglycerin    Phosphorus Replacement - Follow Nurse / BPA Driven Protocol    Potassium Replacement - Follow Nurse / BPA Driven Protocol    sodium chloride    Assessment & Plan   Assessment & Plan     Active Hospital Problems    Diagnosis  POA    **Metastatic disease [C79.9]  Yes    UTI (urinary tract infection) [N39.0]  Yes    Prolonged Q-T interval on ECG [R94.31]  Yes    Alzheimer's disease [G30.9, F02.80]  Yes    Essential hypertension [I10]  Yes    Hypothyroidism (acquired) [E03.9]   Yes      Resolved Hospital Problems   No resolved problems to display.        Brief Hospital Course to date:  Temi Jarrett is a 89 y.o. female resident of [facility] under hospice care for 7.5cm LT lung mass w/ assoc nodules, she has Hx  dementia, HTN, hypothyroidism, HH/GERD and was sent from her facility for eval of lethargy; CXR demonstrated progressive disease; her syx were attributed to bacteruria and she was admitted for IV abx; she has had progressive clinical decline w/ lack of oral intake; she cont to have ongoing fevers, eval being expanded    Assessment/Plan    Recurrent fevers  E Coli UTI  -intake CXR w/ metastatic disease, no other process  -UrCx w/ pan-sensitive E coli; renal US neg for abscess/pyelo  -respiratory PCR negative  -CT c/a/p w/ extensive malignant changes but no other signs of infectious process  -now s/p 6 days of culture directed Abx for urinary infection, no clinical improvement; I do suspect alternate cause of her fever, most likely from her malignancy, also potentially drug fever from ceftriaxone  -stop CTX, monitor off abx today; will ask ID for opinion on cause of fever    Preliminary positive DVT  -notified by vascular tech about prelim positive venous duplex, final report pending; pt did get Bx during endo placed feeding tube today; d/w GI, will do single dose of lovenox this afternoon, await final duplex report, and likely start DOAC tomorrow    Progressive metastatic cancer (unknown primary)  Lethargy w/ confusion  -palliative/hospice have followed, patient's son has elected to revoke hospice benefits  -intermittent discussions w/ patient's son, requesting full invasive measures; s/p endoscopically placed feeding tube today; let IVF  as she will get nutrition/hydration enterally; due to significant hiatal hernia she is not a PEG candidate  -will check non-con head CT to assess for intracranial process    Alzheimer dementia - donepezil, memantine  Anxiety,  depression - buspar, zoloft  GERD/HH - ppi  Hypothyroidism - levothyroxine  HTN - amlodipine, lisinopril      Expected Discharge Location and Transportation: [facility]  Expected Discharge   Expected Discharge Date: 5/17/2024; Expected Discharge Time:      DVT prophylaxis:  Mechanical DVT prophylaxis orders are present.         AM-PAC 6 Clicks Score (PT): 6 (05/15/24 0832)    CODE STATUS:   Code Status and Medical Interventions:   Ordered at: 05/12/24 1331     Medical Intervention Limits:    NO intubation (DNI)     Level Of Support Discussed With:    Next of Kin (If No Surrogate)     Code Status (Patient has no pulse and is not breathing):    No CPR (Do Not Attempt to Resuscitate)     Medical Interventions (Patient has pulse or is breathing):    Limited Support     Comments:    d/w patient's son via telephone       Reddy Li, DO  05/15/24

## 2024-05-15 NOTE — PROGRESS NOTES
Nutrition Services    Patient Name:  Temi Jarrett  YOB: 1930  MRN: [id number]  Admit Date:  5/9/2024    NJ placed in ENDO and okay to use per MD.     EN recommendations:  Fibersource HN, start @ 25ml/hr, advance by ml Q8hrs pending clinical course and stability.  Goal rate 50ml/hr. GV x 22hrs for goal rate 1100ml.  Water flushes @ 10ml while on IVF fluids.  This will provide:  1320kcals (102% est needs)  59g PRO (95% est needs)  16.7g fiber   891ml water from EN + 220ml water flushes     Electronically signed by:  Gretchen Benitez RD  05/15/24 15:10 EDT

## 2024-05-15 NOTE — ANESTHESIA POSTPROCEDURE EVALUATION
Patient: Temi Jarrett    Procedure Summary       Date: 05/15/24 Room / Location:  [facility]    Anesthesia Start: 1118 Anesthesia Stop: 1208    Procedure: ESOPHAGOGASTRODUODENOSCOPY WITH NASOJEJUNAL TUBE INSERTION Diagnosis:     Surgeons: Hoang Watson MD Provider: Vincent Fonseca MD    Anesthesia Type: MAC, general ASA Status: 4            Anesthesia Type: MAC, general    Vitals  Vitals Value Taken Time   /68 05/15/24 1203   Temp 98.9 °F (37.2 °C) 05/15/24 1203   Pulse 61 05/15/24 1207   Resp 16 05/15/24 1203   SpO2 95 % 05/15/24 1207   Vitals shown include unfiled device data.        Post Anesthesia Care and Evaluation    Patient location during evaluation: PACU  Patient participation: complete - patient cannot participate  Level of consciousness: obtunded/minimal responses (baseline)    Airway patency: patent  Anesthetic complications: No anesthetic complications  PONV Status: none  Cardiovascular status: hemodynamically stable and acceptable  Respiratory status: nonlabored ventilation, acceptable and nasal cannula  Hydration status: acceptable

## 2024-05-15 NOTE — PLAN OF CARE
I called and discussed clinical status with patient's son and POA, Victor M Jarrett this morning.  I discussed results of patient's CT scan chest, abdomen and pelvis showing significant worsening of metastatic disease in the chest with significantly increased size of mediastinal mass and size and number of diffuse pulmonary metastatic nodules as well as massive hiatal hernia containing the entire stomach, numerous small bowel loops and portion of the pancreas.  I also discussed these findings with attention to previous hospice status and underlying Alzheimers disease.  Patient is currently being treated for a UTI.  Patient's son would like to treat UTI and support nutrition with enteral feeding to see if her mentation improves short term.  He reports he would like to give this 10-14 days and if no improvement over hospital course potentially try a different antibiotic.  I explained that risks of EGD with nasojejunal/nasoenteral placement include but are not limited to bleeding, perforation, infection (specifically aspiration), adverse effect with medications for anesthesia, even death.   I also discussed potential temporary reversal of status for the sake of the procedure and he would like to further discuss this with anesthesiologist.  I further explained that supporting nutrition to see if this helps her get through UTI with goal of improved mentation and PO eating is possible however with underlying baseline Alzheimer's and significantly worsening metastatic malignancy that it is this is less likely to improve quantity or quality of life.  I further explained that nasojejunal/nasoenteral tubes go through the nose into the esophagus, stomach and ideally terminate in the proximal small bowel (either duodenum or proximal jejunum if possible) and that these can cause some mild discomfort given the tube's course through the naris.  I explained that nasojejunal/nasoenteral tubes typically can provide enteral route for 4-6  weeks before encountering issues with tube function (e.g. clogged tube).   I also explained that given her complex hiatal hernia with small bowel and pancreas involvement, a PEG tube is not possible.  Patient's son is understanding of all of the above risks, limitations of nasojejunal/nasoenteral tube placement, potential benefits with attention to patient's age and comorbid medical conditions and recent hospice status.  Patient's son would like to proceed with EGD with nasojejunal/nasoenteral feeding tube placement.  I further discussed that goals of care discussions will likely continue on a daily or near daily basis with attention to her clinical progress throughout the hospital stay.  Patient's son reports goal is for her to return to her baseline that he describes as wheelchair bound with underlying Alzheimer's but periods of lucidity where she enjoys conversing and spending time with family and to be as pain free as possible.

## 2024-05-15 NOTE — ANESTHESIA PREPROCEDURE EVALUATION
Anesthesia Evaluation     Patient summary reviewed and Nursing notes reviewed   NPO Solid Status: > 8 hours  NPO Liquid Status: > 2 hours           Airway   Dental      Pulmonary    (+) asthma (duoneb),  (-) pneumonia (large met nodules bilat- paratracheal mass  poss aspiration PNA)  Cardiovascular     ECG reviewed    (+) pacemaker (2023 [address] [employer]) pacemaker, hypertension, dysrhythmias Paroxysmal Atrial Fib, pericardial effusion, hyperlipidemia    ROS comment: ECG Ventricular Paced   ECHO 2020 EF = 70%.LVDD  (grade I) consistent with impaired relaxation.  LA  moderately dilated.  AV calcification  No stenosis/regurgitation. A Lambel's excrescence ·   MV Moderate MR RVSP moderately elevated (49 mmHg).  moderate (1-2cm) circumferential pericardial effusion sno tamponade    ECHO 2017 mild AS EF60% pulm HTN     Neuro/Psych  (+) numbness, psychiatric history, dementia (namenda)  GI/Hepatic/Renal/Endo    (+) hiatal hernia, GERD, thyroid problem hypothyroidism    Musculoskeletal     Abdominal    Substance History      OB/GYN          Other   arthritis,   history of cancer    ROS/Med Hx Other: Alzheimer's dementia    metastatic cancer lung unk primary.    Onc: no further evaluation .      Hospice care at her nursing facility     ER for increased lethargy.AMS change   Work up pertinent for E. Coli UTI.       Unresponsive       Phys Exam Other: Unable to assess -will not open                   Anesthesia Plan    ASA 4     MAC and general     (Dicussed c her son who has power of [place]  (Jagruti Orona BSRN present for phone conversation)  OK with DNR lifting   Does not want Chest compressions/prolonged intubation/ventilator     Aim MAP >70 by carefuil titration PFL and Pressors )  intravenous induction     Anesthetic plan, risks, benefits, and alternatives have been provided, discussed and informed consent has been obtained with: patient.    Plan discussed with CRNA.        CODE STATUS:    Medical Intervention Limits: NO  intubation (DNI)  Level Of Support Discussed With: Next of Kin (If No Surrogate)  Code Status (Patient has no pulse and is not breathing): No CPR (Do Not Attempt to Resuscitate)  Medical Interventions (Patient has pulse or is breathing): Limited Support  Comments: d/w patient's son via telephone

## 2024-05-15 NOTE — PLAN OF CARE
"Goal Outcome Evaluation:              Outcome Evaluation: Patient on 2L NC, lethargic, patient stated \"ow\". PRN tylenol given for fever, TMAX 100.6F. Tube feeding ongoing via nasojejunal tube that was placed in endoscopy (Fibersource HN at 25ml/hr, free water flush 10 ml/hr). Patient with bowel movement this shift. Skin interventions in place (z guard, waffle mattress, heel boots, incontinence pads), patient turned.                               "

## 2024-05-15 NOTE — PROGRESS NOTES
Continued Stay Note  [facility]     Patient Name: Temi Jarrett  MRN: [id number]  Today's Date: 5/15/2024    Admit Date: 5/9/2024    Plan: I   Discharge Plan       Row Name 05/15/24 1320       Plan    Plan Comments Effective 5- patient/family revocation of hospice medicare benefit. Hospice will sign off.                   Discharge Codes    No documentation.                 Expected Discharge Date and Time       Expected Discharge Date Expected Discharge Time    May 17, 2024               Aretha Smyth RN

## 2024-05-16 PROBLEM — I62.9 INTRACRANIAL HEMORRHAGE: Status: ACTIVE | Noted: 2024-01-01

## 2024-05-16 NOTE — PLAN OF CARE
Goal Outcome Evaluation:  Plan of Care Reviewed With: patient        Progress: declining  Outcome Evaluation: Palliative RN saw pt on 5/16 at 1053.  Pt. was asleep on 2LNC.  She was tachypneic with TF infusing via keofeed.  Otherwise she did not demonstrate any visible signs of distress during visit.  Pt. was lethargic and did not rouse during visit.  Per nursingreport, pt is not responsive and only moans to painful stimuli.  Palliative care to follow for support, POC and ongoing GOC.    1300 Palliative IDT meeting: CHUCKY Cortes RN, CHPN; DYLAN Cooney, APRN; WHIT Stroud MDiv, [facility]; LEVI Perry MD.; WHIT Burleson RN, CHPN; LEVI Galeana, ADEW, [facility]; IGNACIA Goodman RN, CHPN    After hours, weekends and holidays, contact Palliative Provider by calling [phone].

## 2024-05-16 NOTE — PROGRESS NOTES
[facility] Medicine Services  PROGRESS NOTE    Patient Name: Temi Jarrett  : 1930  MRN: [id number]    Date of Admission: 2024  Primary Care Physician: Eric Patel MD    Subjective   Subjective     CC:  F/u ICH    HPI:  Remains unresponsive w/ NJ tube feeds ongoing. No family at bedside this AM. Slightly more tachypneic.      Per son's request last night, I did contact patient's PCP Dr. Patel to update him on patient's situation, progress, etc. This included a full summary of events up to that point.      Patient's daughter called and requested an update from the physician. I spoke w/ dtr Mery Soto via telephone ~11:30 this AM. I explained all active problems (including completion of abx, progression of cancer, intracranial bleeding, DVT, etc), as well as overall poor prognosis and that her mother is not expected to improve/survive hospitalization. I updated her on specialist's recommendations that were available at the time of our phone call. She expressed understanding of the same.      I did call and speak with patient's son Victor M Jarrett via telephone ~17:05 today. I updated him on ID evaluation and recommendations, as well as NSGY eval and recommendations. I reiterated that her current condition is not caused by a UTI. I expressed that her condition is expected to be terminal. He again requests that I send him an email which I expressed is not HIPAA appropriate but that we remain happy to set up a family conference, he declined. He would like to continue with current care at this time.      Objective   Objective     Vital Signs:   Temp:  [96.4 °F (35.8 °C)-100.6 °F (38.1 °C)] 99.6 °F (37.6 °C)  Heart Rate:  [49-93] 56  Resp:  [14-26] 26  BP: (108-165)/(68-98) 161/79  Flow (L/min):  [2] 2     Physical Exam:  Constitutional: Acutely/chronically ill appearing female, remains unresponsive, NJ w/ ongoing tube feeds  Respiratory: Clear to auscultation  bilaterally, respiratory effort normal   Cardiovascular: RRR, palpable radial pulse  Gastrointestinal: Positive bowel sounds, soft, nontender, nondistended  Neurologic: Not responsive    Results Reviewed:  LAB RESULTS:      Lab 05/16/24  0502 05/15/24  0411 05/13/24  0615 05/11/24  0352 05/10/24  0435 05/09/24  1703   WBC 13.84* 12.65* 13.40* 12.03* 11.45* 9.85   HEMOGLOBIN 13.5 12.7 13.0 13.1 13.7 12.9   HEMATOCRIT 41.0 38.6 40.5 40.7 41.8 39.3   PLATELETS 251 217 204 219 186 181   NEUTROS ABS 11.64* 10.07*  --   --  9.89* 8.72*   IMMATURE GRANS (ABS) 0.08* 0.07*  --   --  0.06* 0.05   LYMPHS ABS 0.93 1.33  --   --  0.82 0.65*   MONOS ABS 1.05* 1.04*  --   --  0.62 0.37   EOS ABS 0.09 0.10  --   --  0.01 0.01   MCV 88.4 87.9 90.8 89.1 88.4 90.6   PROCALCITONIN 0.09  --   --   --   --   --          Lab 05/16/24  0502 05/15/24  0411 05/14/24  0358 05/13/24  2208 05/13/24  0615 05/11/24  0352 05/10/24  0435 05/09/24  1703   SODIUM 139 140 141  --  142 146* 143 142   POTASSIUM 3.4* 3.7 4.0 4.2 3.0* 3.7 3.9 3.9   CHLORIDE 103 104 104  --  103 103 99 102   CO2 25.0 28.0 29.0  --  31.0* 32.0* 31.0* 33.0*   ANION GAP 11.0 8.0 8.0  --  8.0 11.0 13.0 7.0   BUN 18 14 14  --  15 26* 16 15   CREATININE 0.50* 0.47* 0.55*  --  0.48* 0.58 0.53* 0.66   EGFR 87.0 88.3 85.1  --  87.9 84.0 85.8 81.4   GLUCOSE 140* 117* 118*  --  127* 128* 127* 135*   CALCIUM 7.4* 7.2* 7.5*  --  7.9* 8.7 8.8 8.9   MAGNESIUM 1.9  --  1.8  --  1.8  --  1.8 1.8   PHOSPHORUS 2.3*  --   --   --   --   --   --   --          Lab 05/09/24  1703   TOTAL PROTEIN 6.3   ALBUMIN 3.5   GLOBULIN 2.8   ALT (SGPT) 13   AST (SGOT) 23   BILIRUBIN 0.6   ALK PHOS 56         Lab 05/09/24  1703   HSTROP T 28*                 Brief Urine Lab Results  (Last result in the past 365 days)        Color   Clarity   Blood   Leuk Est   Nitrite   Protein   CREAT   Urine HCG        05/09/24 1728 Yellow   Turbid   Small (1+)   Negative   Positive   >=300 mg/dL (3+)                    Microbiology Results Abnormal       Procedure Component Value - Date/Time    Blood Culture - Blood, Hand, Left [[id number]]  (Normal) Collected: 05/14/24 1842    Lab Status: Preliminary result Specimen: Blood from Hand, Left Updated: 05/15/24 2000     Blood Culture No growth at 24 hours    Narrative:      Aerobic Bottle Only    Less than seven (7) mL's of blood was collected.  Insufficient quantity may yield false negative results.    Blood Culture - Blood, Arm, Right [[id number]]  (Normal) Collected: 05/14/24 1846    Lab Status: Preliminary result Specimen: Blood from Arm, Right Updated: 05/15/24 2000     Blood Culture No growth at 24 hours    Respiratory Panel PCR w/COVID-19(SARS-CoV-2) FAMILIA/EMILE/KEARA/PAD/COR/PARMINDER In-House, NP Swab in UTM/VTM, 2 HR TAT - Swab, Nasopharynx [[id number]]  (Normal) Collected: 05/13/24 0843    Lab Status: Final result Specimen: Swab from Nasopharynx Updated: 05/13/24 0948     ADENOVIRUS, PCR Not Detected     Coronavirus 229E Not Detected     Coronavirus HKU1 Not Detected     Coronavirus NL63 Not Detected     Coronavirus OC43 Not Detected     COVID19 Not Detected     Human Metapneumovirus Not Detected     Human Rhinovirus/Enterovirus Not Detected     Influenza A PCR Not Detected     Influenza B PCR Not Detected     Parainfluenza Virus 1 Not Detected     Parainfluenza Virus 2 Not Detected     Parainfluenza Virus 3 Not Detected     Parainfluenza Virus 4 Not Detected     RSV, PCR Not Detected     Bordetella pertussis pcr Not Detected     Bordetella parapertussis PCR Not Detected     Chlamydophila pneumoniae PCR Not Detected     Mycoplasma pneumo by PCR Not Detected    Narrative:      In the setting of a positive respiratory panel with a viral infection PLUS a negative procalcitonin without other underlying concern for bacterial infection, consider observing off antibiotics or discontinuation of antibiotics and continue supportive care. If the respiratory panel is positive for atypical  bacterial infection (Bordetella pertussis, Chlamydophila pneumoniae, or Mycoplasma pneumoniae), consider antibiotic de-escalation to target atypical bacterial infection.            CT Head Without Contrast    Result Date: 5/15/2024  CT HEAD WO CONTRAST Date of Exam: 5/15/2024 5:35 PM EDT Indication: Decreased level of consciousness. Comparison: None available. Technique: Axial CT images were obtained of the head without contrast administration.  Automated exposure control and iterative construction methods were used. Findings: New right parietal intraparenchymal hemorrhage. Small focus of right occipital lobe intraparenchymal hemorrhage with small foci of right frontal lobe intraparenchymal hemorrhages. Intraventricular extension into the collapsed right lateral ventricle and layering within the occipital horn of the left lateral ventricle. There appears to be extra-axial extension along the tentorium cerebelli. Extensive white matter changes are noted consistent with edema. Dilation of the lateral ventricular system compared  with the prior study suggest a component of developing hydrocephalus. Prior bilateral lens replacements.     Impression: New intraparenchymal hemorrhages with intraventricular and extra-axial extension. Findings were discussed with the patient's care nurse at the time of interpretation who stated that she would contact Dr. Li. Attempting to contact Dr. Li through the  were unsuccessful. Electronically Signed: Carlitos Mary MD  5/15/2024 6:01 PM EDT  Workstation ID: [id number]    Duplex Venous Lower Extremity - Bilateral CAR    Result Date: 5/15/2024    Sub-acute left lower extremity deep vein thrombosis noted in the gastrocnemius.   All other veins appeared normal bilaterally.     XR Abdomen KUB    Result Date: 5/15/2024  DATE OF EXAM: 5/15/2024 11:48 AM  PROCEDURE: XR ABDOMEN KUB-  INDICATIONS: NJ tube placement; N39.0-Urinary tract infection, site not specified;  G93.40-Encephalopathy, unspecified; C79.9-Secondary malignant neoplasm of unspecified site  COMPARISON 8/24/2023  Technique: Single radiographic view of the abdomen was obtained.  FINDINGS: An enteric tube is in place with the tip in the left midabdomen. This is presumably within the distal stomach. Bowel gas pattern is normal. There are surgical clips of the right abdomen.    Impression: Enteric tube tip in the left mid abdomen, presumably in the distal stomach.   This report was finalized on 5/15/2024 12:06 PM by Kimberly Dudley MD.      CT Chest Without Contrast Diagnostic    Result Date: 5/14/2024  Examination: CT CHEST WO CONTRAST DIAGNOSTIC- CT ABDOMEN PELVIS WO CONTRAST-  Date of Exam: 5/14/2024 4:30 PM  Indication: Recurrent fever, unclear source (dementia/unreponsive); N39.0-Urinary tract infection, site not specified; G93.40-Encephalopathy, unspecified.  Comparison: 12/22/2023.  Technique: Non-contrast axial volumetric CT imaging of the chest, abdomen and pelvis was performed. Automated exposure control and iterative reconstruction methods were used.  Findings: Chest: Previously noted prevascular mediastinal mass has increased in size now measuring up to 6.9 x 4.9 cm. Thyroid is not well seen. The trachea appears unremarkable. The esophagus is largely obscured in the lower aspect due to a massive hiatal hernia that contains the entire stomach and a large portion of the small bowel. The heart appears normal size. There are aortic valvular calcifications and extensive mitral annular calcifications. There are moderate coronary artery calcifications. The main pulmonary artery is again enlarged suggesting pulmonary arterial hypertension. There is a soft tissue density nodule in the anterior mediastinal fat measuring 17 mm on image 50 of the axial series.  Previously noted lung nodules have markedly increased in number and size diffusely. There are more than 50 nodules in each lung. One of the larger nodules on  the right occurs in the superior segment of the right lower lobe and measures 20 mm and one of the largest nodules on the left occurs in the left lower lobe on CT image 20 measuring 27 mm diameter. There is no pneumothorax or pleural effusion. There is significant bibasilar atelectasis due to mass effect from large hiatal hernia. Airways appear patent centrally. No definite pneumonia.  There are no acute findings in the superficial soft tissues. The bones are diffusely demineralized. No acute osseous abnormalities or destructive bone lesions. There is moderate thoracic spondylosis and there is dextroscoliosis of the thoracolumbar spine. There is markedly increased thoracic kyphosis, which distorts the anatomy.  Abdomen and pelvis: There is mild body wall edema. There is a left hip prosthesis in place. There is mild DJD at the right hip. The bones are demineralized. There is moderate lumbar spondylosis. There is S-shaped scoliosis of the lumbar spine. No acute osseous abnormality or destructive bone lesion.  The liver appears homogeneous. Patient appears status post cholecystectomy. The bile ducts do not appear distended although poorly visualized. The pancreas is partially contained within a large hiatal hernia along with numerous small bowel loops and the stomach. The spleen is normal size. Adrenal glands appear normal. There are bilateral simple appearing kidney cysts. No urolithiasis or hydronephrosis. Urinary bladder is nondistended. Patient appears status post hysterectomy. The ovaries are not seen. The appendix is not definitely seen. No small bowel distention. There is mild diffuse mesenteric edema. There is mild colonic fecal retention and mild colonic diverticulosis. No ascites, pneumoperitoneum or lymphadenopathy.      Impression:  1. Significant worsening of metastatic disease in the chest with significantly increased size of an mediastinal mass and increased size and number of diffuse pulmonary  metastatic nodules. 2. Redemonstration of a massive hiatal hernia containing the entire stomach, numerous small bowel loops and a portion of the pancreas. No definite complication. 3. Numerous chronic findings in the chest including aortic valvular and mitral annular calcifications, coronary artery disease and enlarged main pulmonary artery suggesting pulmonary arterial hypertension. 4. Numerous chronic findings in the abdomen including left hip prosthesis, cholecystectomy, colonic diverticulosis and kidney cysts. 4. Mild anasarca with unclear etiology.  This report was finalized on 5/14/2024 8:31 PM by Reddy Perez MD.      CT Abdomen Pelvis Without Contrast    Result Date: 5/14/2024  Examination: CT CHEST WO CONTRAST DIAGNOSTIC- CT ABDOMEN PELVIS WO CONTRAST-  Date of Exam: 5/14/2024 4:30 PM  Indication: Recurrent fever, unclear source (dementia/unreponsive); N39.0-Urinary tract infection, site not specified; G93.40-Encephalopathy, unspecified.  Comparison: 12/22/2023.  Technique: Non-contrast axial volumetric CT imaging of the chest, abdomen and pelvis was performed. Automated exposure control and iterative reconstruction methods were used.  Findings: Chest: Previously noted prevascular mediastinal mass has increased in size now measuring up to 6.9 x 4.9 cm. Thyroid is not well seen. The trachea appears unremarkable. The esophagus is largely obscured in the lower aspect due to a massive hiatal hernia that contains the entire stomach and a large portion of the small bowel. The heart appears normal size. There are aortic valvular calcifications and extensive mitral annular calcifications. There are moderate coronary artery calcifications. The main pulmonary artery is again enlarged suggesting pulmonary arterial hypertension. There is a soft tissue density nodule in the anterior mediastinal fat measuring 17 mm on image 50 of the axial series.  Previously noted lung nodules have markedly increased in number and  size diffusely. There are more than 50 nodules in each lung. One of the larger nodules on the right occurs in the superior segment of the right lower lobe and measures 20 mm and one of the largest nodules on the left occurs in the left lower lobe on CT image 20 measuring 27 mm diameter. There is no pneumothorax or pleural effusion. There is significant bibasilar atelectasis due to mass effect from large hiatal hernia. Airways appear patent centrally. No definite pneumonia.  There are no acute findings in the superficial soft tissues. The bones are diffusely demineralized. No acute osseous abnormalities or destructive bone lesions. There is moderate thoracic spondylosis and there is dextroscoliosis of the thoracolumbar spine. There is markedly increased thoracic kyphosis, which distorts the anatomy.  Abdomen and pelvis: There is mild body wall edema. There is a left hip prosthesis in place. There is mild DJD at the right hip. The bones are demineralized. There is moderate lumbar spondylosis. There is S-shaped scoliosis of the lumbar spine. No acute osseous abnormality or destructive bone lesion.  The liver appears homogeneous. Patient appears status post cholecystectomy. The bile ducts do not appear distended although poorly visualized. The pancreas is partially contained within a large hiatal hernia along with numerous small bowel loops and the stomach. The spleen is normal size. Adrenal glands appear normal. There are bilateral simple appearing kidney cysts. No urolithiasis or hydronephrosis. Urinary bladder is nondistended. Patient appears status post hysterectomy. The ovaries are not seen. The appendix is not definitely seen. No small bowel distention. There is mild diffuse mesenteric edema. There is mild colonic fecal retention and mild colonic diverticulosis. No ascites, pneumoperitoneum or lymphadenopathy.      Impression:  1. Significant worsening of metastatic disease in the chest with significantly  increased size of an mediastinal mass and increased size and number of diffuse pulmonary metastatic nodules. 2. Redemonstration of a massive hiatal hernia containing the entire stomach, numerous small bowel loops and a portion of the pancreas. No definite complication. 3. Numerous chronic findings in the chest including aortic valvular and mitral annular calcifications, coronary artery disease and enlarged main pulmonary artery suggesting pulmonary arterial hypertension. 4. Numerous chronic findings in the abdomen including left hip prosthesis, cholecystectomy, colonic diverticulosis and kidney cysts. 4. Mild anasarca with unclear etiology.  This report was finalized on 5/14/2024 8:31 PM by Reddy Perez MD.       Results for orders placed during the hospital encounter of 08/02/20    Transthoracic Echo Complete With Contrast if Necessary Per Protocol    Interpretation Summary  · Left ventricular systolic function is normal. Estimated EF = 70%.  · Left ventricular diastolic dysfunction (grade I) consistent with impaired relaxation.  · Left atrial cavity size is moderately dilated.  · There is calcification of the aortic valve. There is no significant stenosis or regurgitation. A Lambel's excrescence is noted on an aortic valve leaflet.  · Moderate mitral valve regurgitation is present.  · Estimated right ventricular systolic pressure from tricuspid regurgitation is moderately elevated (49 mmHg).  · There is a moderate (1-2cm) circumferential pericardial effusion. There is no tamponade physiology.      Current medications:  Scheduled Meds:amLODIPine, 5 mg, Nasogastric, Daily  busPIRone, 10 mg, Nasogastric, BID  donepezil, 10 mg, Nasogastric, Nightly  lansoprazole, 15 mg, Nasogastric, Q AM  levothyroxine, 150 mcg, Nasogastric, Q AM  lisinopril, 20 mg, Nasogastric, Daily  memantine, 10 mg, Nasogastric, BID  multivitamin and minerals, 15 mL, Nasogastric, Daily  nystatin, , Topical, Q12H  potassium chloride, 40 mEq,  Oral, Q4H  sertraline, 50 mg, Nasogastric, Daily      Continuous Infusions:     PRN Meds:.  acetaminophen **OR** [PENDING] acetaminophen **OR** acetaminophen **OR** [PENDING] acetaminophen **OR** acetaminophen **OR** [PENDING] acetaminophen    senna-docusate sodium **AND** [PENDING] senna-docusate sodium **AND** polyethylene glycol **AND** [PENDING] polyethylene glycol **AND** [DISCONTINUED] bisacodyl **AND** [PENDING] bisacodyl **AND** bisacodyl **AND** [PENDING] bisacodyl    Calcium Replacement - Follow Nurse / BPA Driven Protocol    ipratropium-albuterol    Magnesium Standard Dose Replacement - Follow Nurse / BPA Driven Protocol    nitroglycerin    Phosphorus Replacement - Follow Nurse / BPA Driven Protocol    Potassium Replacement - Follow Nurse / BPA Driven Protocol    sodium chloride    Assessment & Plan   Assessment & Plan     Active Hospital Problems    Diagnosis  POA    **Intracranial hemorrhage [I62.9]  Yes    UTI (urinary tract infection) [N39.0]  Yes    Metastatic disease [C79.9]  Yes    Prolonged Q-T interval on ECG [R94.31]  Yes    Alzheimer's disease [G30.9, F02.80]  Yes    Essential hypertension [I10]  Yes    Hypothyroidism (acquired) [E03.9]  Yes      Resolved Hospital Problems   No resolved problems to display.        Brief Hospital Course to date:  Temi Jarrett is a 89 y.o. female resident of [facility] diagnosed with 7.5 cm LT lung mass with associated nodules 12/2023, seen by oncology at that time and deemed not a candidate for therapy, she agreed to defer invasive biopsy, she has been at her facility under hospice care recently; other history includes dementia, HTN, hypothyroidism, hiatal hernia, GERD, she was sent to the ED for evaluation of increasing lethargy.  On arrival in the ED she had CXR which demonstrated progressive malignancy, UA was obtained which was abnormal and her symptoms were attributed to bacteriuria.  She was admitted and placed on IV antibiotics with  culture returning positive for pansensitive E. coli, she has completed 6 days of IV antibiotics for the same.  She had progressively decreasing level of consciousness, now completely obtunded with only minimal grimace to noxious stimuli, and continued to have ongoing fevers in the face of adequate antimicrobial coverage without other source of infection.  Per her family's wishes hospice benefits were revoked, a Keofeed was attempted but was unsuccessful due to her large hiatal hernia (stomach is completely intrathoracic), GI was consulted who placed an NJ tube endoscopically for tube feeds.  She continued to have fevers and remained altered so further evaluation was expanded, lower extremity venous duplex demonstrated subacute appearing DVT, she received a dose of Lovenox, CT of the head was also obtained which later revealed large intracranial hemorrhage within the ventricle with extra-axial extension    Assessment/Plan    ICH (Intraventricular w/ extra-axial extension)  -suspect this to be the cause of her fevers, dec LOC, etc (likely poa)  -neurochecks  -NSGY consulted, I do not anticipate her to be a surgical candidate, appreciate prognostic evaluation  -noted to Dr. Mejía attempted to call the patient's son today, identified himself and the reason for the call, and was told by the son to please call later    Recurrent fevers  E Coli UTI  -intake CXR w/ metastatic disease, no other process  -UrCx w/ pan-sensitive E coli; renal US neg for abscess/pyelo  -respiratory PCR negative  -CT c/a/p w/ extensive malignant changes but no other signs of infectious process  -s/p x6 days of culture directed Abx (CTX)  -d/w ID who are in agreement that fevers are likely from DVT and intracranial bleed    Subacute LT gastrocnemius DVT  -s/p single dose lovenox, subsequent ICH discovered and AC discontinued    Progressive metastatic cancer (unknown primary)  Lethargy w/ confusion  -palliative/hospice have followed, patient's son  has elected to revoke hospice benefits  -s/p endoscopically placed feeding tube 5/15; due to significant hiatal hernia she is not a PEG candidate    Alzheimer dementia - donepezil, memantine  Anxiety, depression - buspar, zoloft  GERD/HH - ppi  Hypothyroidism - levothyroxine  HTN - amlodipine, lisinopril      Expected Discharge Location and Transportation: At baseline she has an underlying terminal malignancy w/o options for treatment, in addition she has multiple complications including ICH (expected to be terminal), DVT, etc; her anticipated outcome is death either in hospital or w/ hospice; we continue supportive measures as outlined by her POA/son who remains optimistic that she will recover; I continue to recommend a family meeting/conference to discuss her care, which her son is continuing to decline at this time  Expected Discharge   Expected Discharge Date: 5/20/2024; Expected Discharge Time:      DVT prophylaxis:  Mechanical DVT prophylaxis orders are present.         AM-PAC 6 Clicks Score (PT): 6 (05/16/24 0879)    CODE STATUS:   Code Status and Medical Interventions:   Ordered at: 05/12/24 1331     Medical Intervention Limits:    NO intubation (DNI)     Level Of Support Discussed With:    Next of Kin (If No Surrogate)     Code Status (Patient has no pulse and is not breathing):    No CPR (Do Not Attempt to Resuscitate)     Medical Interventions (Patient has pulse or is breathing):    Limited Support     Comments:    d/w patient's son via telephone       Reddy Li, DO  05/16/24

## 2024-05-16 NOTE — SIGNIFICANT NOTE
05/16/24 0913   SLP Deferred Reason   SLP Deferred Reason Unable to evaluate, medical status change  (Patient not appropraite for dysphagia evaluation as she is unresponsive at this time, per RN. Will check back as appropriate.)

## 2024-05-16 NOTE — TELEPHONE ENCOUNTER
PT'S SON CALLED AND STATED THE PT IS CURRENTLY IN  HOSPITAL WITH A BRAIN BLEED AND IT'S LIFE THREATENING.  SON WOULD LIKE TO KNOW IF HE CAN MAKE AN APPT AND COME IN TO TALK TO DR GRIFFITH ABOUT THIS.  SON HAS QUESTIONS AND DOESN'T BELIEVE THEY ARE GETTING ANSWERED FOR THE HOSPITALIST. OR HE IS JUST NOT UNDERSTANDING THE ANSWERS.      SON WOULD LIKE A CALL BACK AT THE NUMBER BELOW THIS MESSAGE.

## 2024-05-16 NOTE — CASE MANAGEMENT/SOCIAL WORK
Continued Stay Note   [facility]     Patient Name: Temi Jarrett  MRN: [id number]  Today's Date: 5/16/2024    Admit Date: 5/9/2024    Plan: I   Discharge Plan       Row Name 05/16/24 1438       Plan    Plan Comments Per note from Neuro, son would like a call back from him after 4:00 pm. CM will follow up after Neurosurgon speak with him. Patient has a LTC bed at [place]. CM will follow.    Final Discharge Disposition Code 04 - intermediate care facility                   Discharge Codes    No documentation.                 Expected Discharge Date and Time       Expected Discharge Date Expected Discharge Time    May 17, 2024               Kristina Delvalle RN

## 2024-05-16 NOTE — PROGRESS NOTES
I spoke with the patient's son and power of  via telephone.  He states that he is not available to talk at this time.  I identified myself as Dr. Mejía with neurosurgery and was calling to discuss his mom's brain bleed.  He requested a call back after 4 PM at the following number     I will certainly do my best to accommodate him.    Formal consultation is pending, however given her age, and medical comorbidities, the intracranial hemorrhage that was identified on her most recent head CT is nonsurgical, and is likely to be fatal.  I would not recommend any surgical intervention.  My recommendation is for palliative measures and comfort care.

## 2024-05-16 NOTE — PLAN OF CARE
Goal Outcome Evaluation:       Provider communication due to family's concern with patient high blood pressure. No new orders at this time. Increased tube feed to 35 ml patient tolerated well. One SCUD right LE due to DVT in left leg. Febrile through out the night highest temperature 99.6 axillary at 0200. Acetaminophen given twice, last dose at 0200. AV paced bradycardic in the 50's. No noted neurological changes through the shift

## 2024-05-16 NOTE — PLAN OF CARE
"  Problem: Palliative Care  Goal: Enhanced Quality of Life  Intervention: Optimize Psychosocial Wellbeing  Recent Flowsheet Documentation  Taken 5/15/2024 1300 by Eliana Galeana \"Marisol\" MARIVEL BECK  Supportive Measures: (Palliative IDT: LEVI Perry MD; DYLAN Cooney APRN; WHIT Galeana LCSW; CHUCKY Cortes RN; ELIZABETH Navarro RN; WHIT Stroud MDiv) --  Taken 5/15/2024 1100 by Eliana Galeana \"Marisol\" MARIVEL BECK  Supportive Measures: (Patient off the floor in endoscopy at time of attempted visit.) --     "

## 2024-05-16 NOTE — CONSULTS
INFECTIOUS DISEASE CONSULT/INITIAL HOSPITAL VISIT    Temi Jarrett  5/8/1930  [id number]    Date of Consult: 5/16/2024    Admission Date: 5/9/2024      Requesting Provider: No ref. provider found  Evaluating Physician: Nicolás Negron MD    Reason for Consultation: fever    History of present illness:    Patient is a 89 y.o. female with past medical history significant for essential hypertension, Alzheimer's, hiatal hernia, hypothyroidism and metastatic disease with unknown primary presents to the ED due to altered mental status.  Was admitted on 5/9/24;  Treated for UTI, urine cultures from 5/9 revealed E. Coli.  CT head shows new intraparenchymal hemorhages with intraventricular and extra-axial extension. U/S whowed suba acute left lower extremity DVT in gastrochneumius.    Hospitalist consulting us for opinion on cause of fevers    Past Medical History:   Diagnosis Date    Acute sepsis 01/14/2021    Arthritis     Bladder prolapse, female, acquired     Closed fracture of neck of left femur 09/27/2019    Dementia     Depression     Disease of thyroid gland     Gastric polyp     GERD (gastroesophageal reflux disease)     Hiatal hernia     History of colonic polyps     Hyperlipidemia     Hypertension     IBS (irritable bowel syndrome)     Macular degeneration     Pacemaker 2023    Pericardial effusion 08/03/2020    Echo (8/3/2020): Normal LVEF.  Moderate pericardial effusion without tamponade.  Moderate MR. RVSP 49 mmHg    Torn meniscus     right knee        Past Surgical History:   Procedure Laterality Date    CHOLECYSTECTOMY  1997    ENDOSCOPY N/A 01/14/2021    Procedure: ESOPHAGOGASTRODUODENOSCOPY;  Surgeon: Serjio Guerrero MD;  Location:  [facility];  Service: General;  Laterality: N/A;    ENDOSCOPY N/A 5/15/2024    Procedure: ESOPHAGOGASTRODUODENOSCOPY WITH NASOJEJUNAL TUBE INSERTION;  Surgeon: Hoang Watson MD;  Location:  [facility];  Service: Gastroenterology;  Laterality: N/A;    EYE  SURGERY  1998    Cataract extraction    HERNIA REPAIR      HIP HEMIARTHROPLASTY Left 09/28/2019    Procedure: HIP HEMIARTHROPLASTY LEFT;  Surgeon: Reddy Segundo Jr., MD;  Location: [facility];  Service: Orthopedics    HYSTERECTOMY  1976    KNEE SURGERY Right     arthroscopy- 2000    SKIN CANCER EXCISION Left     [facility]; CANCEROUS SPOT DIDN'T  MOVE BUT NEEDED REMOVED    TONSILLECTOMY         Family History   Problem Relation Age of Onset    Hypertension Mother     Breast cancer Mother     Obesity Mother     Hypertension Father     Diabetes Son        Social History     Socioeconomic History    Marital status:    Tobacco Use    Smoking status: Never    Smokeless tobacco: Never   Vaping Use    Vaping status: Never Used   Substance and Sexual Activity    Alcohol use: No    Drug use: Never    Sexual activity: Defer       Allergies   Allergen Reactions    Augmentin [Amoxicillin-Pot Clavulanate] Diarrhea    Codeine Nausea Only     Trouble Breathing     Shrimp Hives    [place] Unknown - Low Severity         Medication:    Current Facility-Administered Medications:     acetaminophen (TYLENOL) tablet 650 mg, 650 mg, Nasogastric, Q4H PRN, 650 mg at 05/15/24 2107 **OR** [PENDING] acetaminophen (TYLENOL) tablet 650 mg, 650 mg, Nasogastric, Q4H PRN **OR** acetaminophen (TYLENOL) 160 MG/5ML oral solution 650 mg, 650 mg, Nasogastric, Q4H PRN, 650 mg at 05/16/24 0157 **OR** [PENDING] acetaminophen (TYLENOL) 160 MG/5ML oral solution 650 mg, 650 mg, Nasogastric, Q4H PRN **OR** acetaminophen (TYLENOL) suppository 650 mg, 650 mg, Rectal, Q4H PRN **OR** [PENDING] acetaminophen (TYLENOL) suppository 650 mg, 650 mg, Rectal, Q4H PRN,     amLODIPine (NORVASC) tablet 5 mg, 5 mg, Nasogastric, Daily, Reddy Li, , 5 mg at 05/16/24 0828    sennosides-docusate (PERICOLACE) 8.6-50 MG per tablet 2 tablet, 2 tablet, Nasogastric, BID PRN **AND** [PENDING] sennosides-docusate (PERICOLACE) 8.6-50 MG  per tablet 2 tablet, 2 tablet, Nasogastric, BID PRN **AND** polyethylene glycol (MIRALAX) packet 17 g, 17 g, Nasogastric, Daily PRN **AND** [PENDING] polyethylene glycol (MIRALAX) packet 17 g, 17 g, Nasogastric, Daily PRN **AND** [DISCONTINUED] bisacodyl (DULCOLAX) EC tablet 5 mg, 5 mg, Oral, Daily PRN **AND** [PENDING] bisacodyl (DULCOLAX) EC tablet 5 mg, 5 mg, Oral, Daily PRN **AND** bisacodyl (DULCOLAX) suppository 10 mg, 10 mg, Rectal, Daily PRN **AND** [PENDING] bisacodyl (DULCOLAX) suppository 10 mg, 10 mg, Rectal, Daily PRN,     busPIRone (BUSPAR) tablet 10 mg, 10 mg, Nasogastric, BID, Reddy Li DO, 10 mg at 05/16/24 0828    Calcium Replacement - Follow Nurse / BPA Driven Protocol, , Does not apply, PRN, Hoang Watson MD    donepezil (ARICEPT) tablet 10 mg, 10 mg, Nasogastric, Nightly, Reddy Li DO, 10 mg at 05/15/24 2107    ipratropium-albuterol (DUO-NEB) nebulizer solution 3 mL, 3 mL, Nebulization, Q4H PRKAMINI, Hoang Watson MD    lansoprazole (PREVACID SOLUTAB) disintegrating tablet Tablet Delayed Release Dispersible 15 mg, 15 mg, Nasogastric, Q AM, Hoang Watson MD, 15 mg at 05/16/24 0609    levothyroxine (SYNTHROID, LEVOTHROID) tablet 150 mcg, 150 mcg, Nasogastric, Q AM, Reddy Li DO, 150 mcg at 05/16/24 0609    lisinopril (PRINIVIL,ZESTRIL) tablet 20 mg, 20 mg, Nasogastric, Daily, Reddy Li DO, 20 mg at 05/16/24 0828    Magnesium Standard Dose Replacement - Follow Nurse / BPA Driven Protocol, , Does not apply, PRNWalter John A, MD    memantine (NAMENDA) tablet 10 mg, 10 mg, Nasogastric, BID, Reddy Li, , 10 mg at 05/16/24 0828    multivitamin and minerals liquid 15 mL, 15 mL, Nasogastric, Daily, Devon Haines, [facility], 15 mL at 05/16/24 0828    nitroglycerin (NITROSTAT) SL tablet 0.4 mg, 0.4 mg, Sublingual, Q5 Min PRN, Hoang Watson MD    nystatin (MYCOSTATIN) powder, , Topical, Q12H, Hoang Watson MD, Given at 05/16/24 0829    Phosphorus  Replacement - Follow Nurse / BPA Driven Protocol, , Does not apply, Walter IQBAL John A, MD    potassium chloride (KLOR-CON) packet 40 mEq, 40 mEq, Oral, Q4H, Reddy Li DO, 40 mEq at 24 0828    Potassium Replacement - Follow Nurse / BPA Driven Protocol, , Does not apply, Walter IQBAL John A, MD    sertraline (ZOLOFT) tablet 50 mg, 50 mg, Nasogastric, Daily, Reddy Li DO, 50 mg at 24 0828    sodium chloride 0.9 % flush 10 mL, 10 mL, Intravenous, PRN, Hoang Watson MD, 10 mL at 05/15/24 0835    Antibiotics:  Anti-Infectives (From admission, onward)      Ordered     Dose/Rate Route Frequency Start Stop    24  cefTRIAXone (ROCEPHIN) 1,000 mg in sodium chloride 0.9 % 100 mL MBP        Ordering Provider: Reddy Li DO    1,000 mg  200 mL/hr over 30 Minutes Intravenous Every 24 Hours 05/10/24 1800 24 1758    24 1851  cefTRIAXone (ROCEPHIN) 1,000 mg in sodium chloride 0.9 % 100 mL MBP        Ordering Provider: Robert Pablo MD    1,000 mg  200 mL/hr over 30 Minutes Intravenous Once 24 1907 24 1932              Review of Systems:  Unobtainable      Physical Exam:   Vital Signs  Temp (24hrs), Av.8 °F (37.1 °C), Min:96.4 °F (35.8 °C), Max:100.6 °F (38.1 °C)    Temp  Min: 96.4 °F (35.8 °C)  Max: 100.6 °F (38.1 °C)  BP  Min: 108/72  Max: 165/91  Pulse  Min: 49  Max: 93  Resp  Min: 14  Max: 26  SpO2  Min: 93 %  Max: 98 %    GENERAL: Resting quietly no distress nonverbal  HEENT: Normocephalic, atraumatic.   No external oral lesions    HEART: RRR; No murmur  LUNGS: Clear to auscultation bilaterally   ABDOMEN: Soft, nontender,   EXT:  No edema  :  Without Larry catheter.  MSK: No joint effusions or erythema  SKIN: Warm and dry without cutaneous eruptions on Inspection/palpation.      Laboratory Data    Results from last 7 days   Lab Units 24  0502 05/15/24  0411 24  0615   WBC 10*3/mm3 13.84* 12.65* 13.40*   HEMOGLOBIN g/dL  "13.5 12.7 13.0   HEMATOCRIT % 41.0 38.6 40.5   PLATELETS 10*3/mm3 251 217 204     Results from last 7 days   Lab Units 05/16/24  0502   SODIUM mmol/L 139   POTASSIUM mmol/L 3.4*   CHLORIDE mmol/L 103   CO2 mmol/L 25.0   BUN mg/dL 18   CREATININE mg/dL 0.50*   GLUCOSE mg/dL 140*   CALCIUM mg/dL 7.4*     Results from last 7 days   Lab Units 05/09/24  1703   ALK PHOS U/L 56   BILIRUBIN mg/dL 0.6   ALT (SGPT) U/L 13   AST (SGOT) U/L 23                         Estimated Creatinine Clearance: 62.2 mL/min (A) (by C-G formula based on SCr of 0.5 mg/dL (L)).      Microbiology:  Blood Culture   Date Value Ref Range Status   05/14/2024 No growth at 24 hours  Preliminary     No results found for: \"BCIDPCR\", \"CXREFLEX\", \"CSFCX\", \"CULTURETIS\"  No results found for: \"CULTURES\", \"HSVCX\", \"URCX\"  No results found for: \"EYECULTURE\", \"GCCX\", \"HSVCULTURE\", \"LABHSV\"  No results found for: \"LEGIONELLA\", \"MRSACX\", \"MUMPSCX\", \"MYCOPLASCX\"  No results found for: \"NOCARDIACX\", \"STOOLCX\"  Urine Culture   Date Value Ref Range Status   05/09/2024 >100,000 CFU/mL Escherichia coli (A)  Final     No results found for: \"VIRALCULTU\", \"WOUNDCX\"        Radiology:  Imaging Results (Last 72 Hours)       Procedure Component Value Units Date/Time    CT Head Without Contrast [[id number]] Collected: 05/15/24 1746     Updated: 05/15/24 1804    Narrative:      CT HEAD WO CONTRAST    Date of Exam: 5/15/2024 5:35 PM EDT    Indication: Decreased level of consciousness.    Comparison: None available.    Technique: Axial CT images were obtained of the head without contrast administration.  Automated exposure control and iterative construction methods were used.    Findings:  New right parietal intraparenchymal hemorrhage. Small focus of right occipital lobe intraparenchymal hemorrhage with small foci of right frontal lobe intraparenchymal hemorrhages. Intraventricular extension into the collapsed right lateral ventricle and   layering within the occipital horn of " the left lateral ventricle. There appears to be extra-axial extension along the tentorium cerebelli. Extensive white matter changes are noted consistent with edema. Dilation of the lateral ventricular system compared   with the prior study suggest a component of developing hydrocephalus.    Prior bilateral lens replacements.      Impression:      New intraparenchymal hemorrhages with intraventricular and extra-axial extension.    Findings were discussed with the patient's care nurse at the time of interpretation who stated that she would contact Dr. Li. Attempting to contact Dr. Li through the  were unsuccessful.          Electronically Signed: Carlitos Mary MD    5/15/2024 6:01 PM EDT    Workstation ID: [id number]    XR Abdomen KUB [[id number]] Collected: 05/15/24 1203     Updated: 05/15/24 1209    Narrative:      DATE OF EXAM: 5/15/2024 11:48 AM     PROCEDURE: XR ABDOMEN KUB-     INDICATIONS: NJ tube placement; N39.0-Urinary tract infection, site not  specified; G93.40-Encephalopathy, unspecified; C79.9-Secondary malignant  neoplasm of unspecified site     COMPARISON 8/24/2023     Technique: Single radiographic view of the abdomen was obtained.     FINDINGS:  An enteric tube is in place with the tip in the left midabdomen. This is  presumably within the distal stomach. Bowel gas pattern is normal. There  are surgical clips of the right abdomen.    Impression:      Enteric tube tip in the left mid abdomen, presumably in the distal  stomach.        This report was finalized on 5/15/2024 12:06 PM by Kimberly Dudley MD.       CT Chest Without Contrast Diagnostic [[id number]] Collected: 05/14/24 2019     Updated: 05/14/24 2034    Narrative:      Examination:  CT CHEST WO CONTRAST DIAGNOSTIC-  CT ABDOMEN PELVIS WO CONTRAST-     Date of Exam: 5/14/2024 4:30 PM     Indication: Recurrent fever, unclear source (dementia/unreponsive);  N39.0-Urinary tract infection, site not specified;  G93.40-Encephalopathy,  unspecified.     Comparison: 12/22/2023.     Technique: Non-contrast axial volumetric CT imaging of the chest,  abdomen and pelvis was performed. Automated exposure control and  iterative reconstruction methods were used.     Findings:  Chest: Previously noted prevascular mediastinal mass has increased in  size now measuring up to 6.9 x 4.9 cm. Thyroid is not well seen. The  trachea appears unremarkable. The esophagus is largely obscured in the  lower aspect due to a massive hiatal hernia that contains the entire  stomach and a large portion of the small bowel. The heart appears normal  size. There are aortic valvular calcifications and extensive mitral  annular calcifications. There are moderate coronary artery  calcifications. The main pulmonary artery is again enlarged suggesting  pulmonary arterial hypertension. There is a soft tissue density nodule  in the anterior mediastinal fat measuring 17 mm on image 50 of the axial  series.     Previously noted lung nodules have markedly increased in number and size  diffusely. There are more than 50 nodules in each lung. One of the  larger nodules on the right occurs in the superior segment of the right  lower lobe and measures 20 mm and one of the largest nodules on the left  occurs in the left lower lobe on CT image 20 measuring 27 mm diameter.  There is no pneumothorax or pleural effusion. There is significant  bibasilar atelectasis due to mass effect from large hiatal hernia.  Airways appear patent centrally. No definite pneumonia.     There are no acute findings in the superficial soft tissues. The bones  are diffusely demineralized. No acute osseous abnormalities or  destructive bone lesions. There is moderate thoracic spondylosis and  there is dextroscoliosis of the thoracolumbar spine. There is markedly  increased thoracic kyphosis, which distorts the anatomy.     Abdomen and pelvis: There is mild body wall edema. There is a left hip  prosthesis in place. There  is mild DJD at the right hip. The bones are  demineralized. There is moderate lumbar spondylosis. There is S-shaped  scoliosis of the lumbar spine. No acute osseous abnormality or  destructive bone lesion.     The liver appears homogeneous. Patient appears status post  cholecystectomy. The bile ducts do not appear distended although poorly  visualized. The pancreas is partially contained within a large hiatal  hernia along with numerous small bowel loops and the stomach. The spleen  is normal size. Adrenal glands appear normal. There are bilateral simple  appearing kidney cysts. No urolithiasis or hydronephrosis. Urinary  bladder is nondistended. Patient appears status post hysterectomy. The  ovaries are not seen. The appendix is not definitely seen. No small  bowel distention. There is mild diffuse mesenteric edema. There is mild  colonic fecal retention and mild colonic diverticulosis. No ascites,  pneumoperitoneum or lymphadenopathy.       Impression:         1. Significant worsening of metastatic disease in the chest with  significantly increased size of an mediastinal mass and increased size  and number of diffuse pulmonary metastatic nodules.  2. Redemonstration of a massive hiatal hernia containing the entire  stomach, numerous small bowel loops and a portion of the pancreas. No  definite complication.  3. Numerous chronic findings in the chest including aortic valvular and  mitral annular calcifications, coronary artery disease and enlarged main  pulmonary artery suggesting pulmonary arterial hypertension.  4. Numerous chronic findings in the abdomen including left hip  prosthesis, cholecystectomy, colonic diverticulosis and kidney cysts.  4. Mild anasarca with unclear etiology.     This report was finalized on 5/14/2024 8:31 PM by Reddy Perez MD.       CT Abdomen Pelvis Without Contrast [[id number]] Collected: 05/14/24 2019     Updated: 05/14/24 2034    Narrative:      Examination:  CT CHEST WO CONTRAST  DIAGNOSTIC-  CT ABDOMEN PELVIS WO CONTRAST-     Date of Exam: 5/14/2024 4:30 PM     Indication: Recurrent fever, unclear source (dementia/unreponsive);  N39.0-Urinary tract infection, site not specified;  G93.40-Encephalopathy, unspecified.     Comparison: 12/22/2023.     Technique: Non-contrast axial volumetric CT imaging of the chest,  abdomen and pelvis was performed. Automated exposure control and  iterative reconstruction methods were used.     Findings:  Chest: Previously noted prevascular mediastinal mass has increased in  size now measuring up to 6.9 x 4.9 cm. Thyroid is not well seen. The  trachea appears unremarkable. The esophagus is largely obscured in the  lower aspect due to a massive hiatal hernia that contains the entire  stomach and a large portion of the small bowel. The heart appears normal  size. There are aortic valvular calcifications and extensive mitral  annular calcifications. There are moderate coronary artery  calcifications. The main pulmonary artery is again enlarged suggesting  pulmonary arterial hypertension. There is a soft tissue density nodule  in the anterior mediastinal fat measuring 17 mm on image 50 of the axial  series.     Previously noted lung nodules have markedly increased in number and size  diffusely. There are more than 50 nodules in each lung. One of the  larger nodules on the right occurs in the superior segment of the right  lower lobe and measures 20 mm and one of the largest nodules on the left  occurs in the left lower lobe on CT image 20 measuring 27 mm diameter.  There is no pneumothorax or pleural effusion. There is significant  bibasilar atelectasis due to mass effect from large hiatal hernia.  Airways appear patent centrally. No definite pneumonia.     There are no acute findings in the superficial soft tissues. The bones  are diffusely demineralized. No acute osseous abnormalities or  destructive bone lesions. There is moderate thoracic spondylosis and  there  is dextroscoliosis of the thoracolumbar spine. There is markedly  increased thoracic kyphosis, which distorts the anatomy.     Abdomen and pelvis: There is mild body wall edema. There is a left hip  prosthesis in place. There is mild DJD at the right hip. The bones are  demineralized. There is moderate lumbar spondylosis. There is S-shaped  scoliosis of the lumbar spine. No acute osseous abnormality or  destructive bone lesion.     The liver appears homogeneous. Patient appears status post  cholecystectomy. The bile ducts do not appear distended although poorly  visualized. The pancreas is partially contained within a large hiatal  hernia along with numerous small bowel loops and the stomach. The spleen  is normal size. Adrenal glands appear normal. There are bilateral simple  appearing kidney cysts. No urolithiasis or hydronephrosis. Urinary  bladder is nondistended. Patient appears status post hysterectomy. The  ovaries are not seen. The appendix is not definitely seen. No small  bowel distention. There is mild diffuse mesenteric edema. There is mild  colonic fecal retention and mild colonic diverticulosis. No ascites,  pneumoperitoneum or lymphadenopathy.       Impression:         1. Significant worsening of metastatic disease in the chest with  significantly increased size of an mediastinal mass and increased size  and number of diffuse pulmonary metastatic nodules.  2. Redemonstration of a massive hiatal hernia containing the entire  stomach, numerous small bowel loops and a portion of the pancreas. No  definite complication.  3. Numerous chronic findings in the chest including aortic valvular and  mitral annular calcifications, coronary artery disease and enlarged main  pulmonary artery suggesting pulmonary arterial hypertension.  4. Numerous chronic findings in the abdomen including left hip  prosthesis, cholecystectomy, colonic diverticulosis and kidney cysts.  4. Mild anasarca with unclear etiology.      This report was finalized on 5/14/2024 8:31 PM by Reddy Perez MD.       US Renal Bilateral [[id number]] Collected: 05/13/24 1236     Updated: 05/13/24 1242    Narrative:      US RENAL BILATERAL    Date of Exam: 5/13/2024 11:53 AM EDT    Indication: UTI, ongoing fevers, r/o abscess/pyelo.    Comparison: No comparisons available.    Technique: Grayscale and color Doppler ultrasound evaluation of the kidneys and urinary bladder was performed.      Findings:  RIGHT kidney measures 9.7 x 6 x 4.5 cm.  Kidney echogenicity, size, and vascularity appear within normal limits. There is no solid kidney mass.  No echogenic shadowing stone.  No hydronephrosis. There are multiple anechoic benign-appearing cysts   measuring up to 5 x 4.3 x 4.8 cm.    LEFT kidney measures 10.4 x 5.9 x 5.3 cm. Kidney echogenicity, size, and vascularity appear within normal limits. There is no solid kidney mass.  No echogenic shadowing stone.  No hydronephrosis. There is a 2.9 x 3.1 x 3.5 cm anechoic benign-appearing   renal cyst.    There is debris seen layering within the urinary bladder. Small posterior bladder diverticulum is also noted.        Impression:      Impression:    1. Bilateral benign-appearing renal cysts.  2. Debris is seen layering in the urinary bladder.        Electronically Signed: Luma Locke MD    5/13/2024 12:38 PM EDT    Workstation ID: [id number]              Impression:   Fever  Metastatic disease  Mediastinal mass  Diffuse pulmonary nodules  DVT  Brain hemorrhage  Leukocytosis with neutrophilia  PLAN/RECOMMENDATIONS:   Thank you for asking us to see Temi Jarrett, I recommend the following:  Temperatures in setting of low procalcitonin and in setting of DVT, brain hemorrhage may be noninfectious.    Previous urinalysis from 5/9 may represent colonization; this has been appropriately treated with ceftriaxone x 7 days       Procalcitonin low normal    Imaging of CT brain remarkable for brain hemorrhage which I think is  the best explanation for the fevers.    Patient was on hospice previously and I suspect with age along has poor prognosis.    Ongoing metastatic cancer, DVT, brain hemorrhage could all be causes of fevers.    Observe off abx    D/w Dr. Jen Negron MD  5/16/2024  09:14 EDT

## 2024-05-16 NOTE — TELEPHONE ENCOUNTER
I would be happy to talk to him, but I will not be here tomorrow, I wouldn't be able to talk with him before Monday.

## 2024-05-16 NOTE — CONSULTS
Reason for consultation: [facility]    Referring Physician:  No ref. provider found     Chief complaint AMS    Admit Diagnosis:   UTI (urinary tract infection) [N39.0]     History of present illness:  This is a 89-year-old female who was admitted on 5/9/2024 due to increased lethargy and altered mental status from a nursing home facility.  Patient is unable to provide history and the majority of her history was obtained under chart review.  She has advanced dementia and multiple medical comorbidities significant for Alzheimer's and metastatic cancer of unknown primary. She was admitted for treatment of UTI.  During her admission head CT revealed intraparenchymal hemorrhages with intraventricular and extra-axial extension.  She was also found to have subacute DVT of the left lower extremity. Ultimately, neurosurgery was consulted for further recommendations.    ROS:  A 12 point review of systems was unable to be performed.      Past medical history:  Past Medical History:   Diagnosis Date    Acute sepsis 01/14/2021    Arthritis     Bladder prolapse, female, acquired     Closed fracture of neck of left femur 09/27/2019    Dementia     Depression     Disease of thyroid gland     Gastric polyp     GERD (gastroesophageal reflux disease)     Hiatal hernia     History of colonic polyps     Hyperlipidemia     Hypertension     IBS (irritable bowel syndrome)     Macular degeneration     Pacemaker 2023    Pericardial effusion 08/03/2020    Echo (8/3/2020): Normal LVEF.  Moderate pericardial effusion without tamponade.  Moderate MR. RVSP 49 mmHg    Torn meniscus     right knee        Past surgical history:  Past Surgical History:   Procedure Laterality Date    CHOLECYSTECTOMY  1997    ENDOSCOPY N/A 01/14/2021    Procedure: ESOPHAGOGASTRODUODENOSCOPY;  Surgeon: Serjio Guerrero MD;  Location: [facility] ENDOSCOPY;  Service: General;  Laterality: N/A;    ENDOSCOPY N/A 5/15/2024    Procedure: ESOPHAGOGASTRODUODENOSCOPY WITH NASOJEJUNAL  TUBE INSERTION;  Surgeon: Hoang Watson MD;  Location: [facility] ENDOSCOPY;  Service: Gastroenterology;  Laterality: N/A;    EYE SURGERY  1998    Cataract extraction    HERNIA REPAIR      HIP HEMIARTHROPLASTY Left 09/28/2019    Procedure: HIP HEMIARTHROPLASTY LEFT;  Surgeon: Reddy Segundo Jr., MD;  Location:  [facility];  Service: Orthopedics    HYSTERECTOMY  1976    KNEE SURGERY Right     arthroscopy- 2000    SKIN CANCER EXCISION Left     [facility]; CANCEROUS SPOT DIDN'T  MOVE BUT NEEDED REMOVED    TONSILLECTOMY         Social history:  Social History     Socioeconomic History    Marital status:    Tobacco Use    Smoking status: Never    Smokeless tobacco: Never   Vaping Use    Vaping status: Never Used   Substance and Sexual Activity    Alcohol use: No    Drug use: Never    Sexual activity: Defer       Family history:  Family History   Problem Relation Age of Onset    Hypertension Mother     Breast cancer Mother     Obesity Mother     Hypertension Father     Diabetes Son        Allergies:  Allergies   Allergen Reactions    Augmentin [Amoxicillin-Pot Clavulanate] Diarrhea    Codeine Nausea Only     Trouble Breathing     Shrimp Hives    [place] Unknown - Low Severity       Outpatient medications:  Scheduled Meds:amLODIPine, 5 mg, Nasogastric, Daily  busPIRone, 10 mg, Nasogastric, BID  donepezil, 10 mg, Nasogastric, Nightly  lansoprazole, 15 mg, Nasogastric, Q AM  levothyroxine, 150 mcg, Nasogastric, Q AM  lisinopril, 20 mg, Nasogastric, Daily  memantine, 10 mg, Nasogastric, BID  multivitamin and minerals, 15 mL, Nasogastric, Daily  nystatin, , Topical, Q12H  potassium chloride, 40 mEq, Oral, Q4H  sertraline, 50 mg, Nasogastric, Daily      Continuous Infusions:   PRN Meds:.  acetaminophen **OR** [PENDING] acetaminophen **OR** acetaminophen **OR** [PENDING] acetaminophen **OR** acetaminophen **OR** [PENDING] acetaminophen    senna-docusate sodium **AND** [PENDING] senna-docusate  sodium **AND** polyethylene glycol **AND** [PENDING] polyethylene glycol **AND** [DISCONTINUED] bisacodyl **AND** [PENDING] bisacodyl **AND** bisacodyl **AND** [PENDING] bisacodyl    Calcium Replacement - Follow Nurse / BPA Driven Protocol    ipratropium-albuterol    Magnesium Standard Dose Replacement - Follow Nurse / BPA Driven Protocol    nitroglycerin    Phosphorus Replacement - Follow Nurse / BPA Driven Protocol    Potassium Replacement - Follow Nurse / BPA Driven Protocol    sodium chloride    Physical Examination:  General:  Vitals:    24 1212   BP: 164/69   Pulse: 52   Resp: (!) 32   Temp: 99.5 °F (37.5 °C)   SpO2: 93%     Systolic (24hrs), Av , Min:134 , Max:165     Diastolic (24hrs), Av, Min:69, Max:91    Pulse  Av.7  Min: 49  Max: 75    Temp (24hrs), Av.9 °F (37.2 °C), Min:96.4 °F (35.8 °C), Max:100.6 °F (38.1 °C)    General: chronically ill appearing, elderly female. She is minimally responsive during physical exam and therefore it was extremely limited. She is sleeping in bed with an NG tube.   Neurological:She does not respond to painful stimulus. She is unable to follow prompted, one-step, or two step commands.   GCS 3 on exam.   Extremities: Extremely cachetic. Muscle atrophy throughout.     Imaging:  I have for my review a CT of the head without contrast that reveals a right parietal lobe intraparenchymal hemorrhage with stable hydrocephalus.       Assessment and Plan:    ICH    This is a 89 year old with multiple medical comorbidities significant for Alzheimer's dementia, DVT, and metastatic cancer of unknown primary. Her head CT returned this evening with intracranial hemorrhages in the right parietal lobe. Given her overall poor prognosis, she is not a neurosurgical candidate. Her cranial bleed will likely be fatal. Recommend hospice/palliative/comfort care at this point, given her poor prognosis. Appreciate the consult. Dr. Mejía will reach out to the patient's son via  telephone.

## 2024-05-16 NOTE — PLAN OF CARE
Goal Outcome Evaluation:  Plan of Care Reviewed With: patient        Progress: declining  Outcome Evaluation: Pt remains on 2L NC. NSR on monitor. Pt not responsive today only responding to pain when being turned. Pt tachypneic with resp rates in 30s. Tube feed at goal rate of 50 and pt tolerating. Repeat CT completed this am. Skin interventions in place. Pt turned often.

## 2024-05-16 NOTE — TELEPHONE ENCOUNTER
I can put him for an appt on Monday at the end of the day or do you just want to call him? He said he is ok with either.

## 2024-05-16 NOTE — PROGRESS NOTES
Clinical Nutrition     Patient Name: Temi Jarrett  YOB: 1930  MRN: [id number]  Date of Encounter: 05/16/24 12:55 EDT  Admission date: 5/9/2024  Reason for Visit: Follow-up protocol, EN    Assessment   Nutrition Assessment   Admission Diagnosis:  UTI (urinary tract infection) [N39.0]    Problem List:    Metastatic disease    Hypothyroidism (acquired)    Essential hypertension    Alzheimer's disease    Prolonged Q-T interval on ECG    UTI (urinary tract infection)      PMH:   She  has a past medical history of Acute sepsis (01/14/2021), Arthritis, Bladder prolapse, female, acquired, Closed fracture of neck of left femur (09/27/2019), Dementia, Depression, Disease of thyroid gland, Gastric polyp, GERD (gastroesophageal reflux disease), Hiatal hernia, History of colonic polyps, Hyperlipidemia, Hypertension, IBS (irritable bowel syndrome), Macular degeneration, Pacemaker (2023), Pericardial effusion (08/03/2020), and Torn meniscus.    PSH:  She  has a past surgical history that includes Cholecystectomy (1997); Eye surgery (1998); Hernia repair; Hysterectomy (1976); Tonsillectomy; Knee surgery (Right); Hip hemiArthroplasty (Left, 09/28/2019); Esophagogastroduodenoscopy (N/A, 01/14/2021); Skin cancer excision (Left); and Esophagogastroduodenoscopy (N/A, 5/15/2024).    Applicable Nutrition History:   (5/14) SLP unable to evaluate, patient not able to participate in eval.     Labs    Labs Reviewed: Yes    Results from last 7 days   Lab Units 05/16/24  0502 05/15/24  0411 05/14/24  0358 05/13/24  2208 05/13/24  0615 05/10/24  0435 05/09/24  1703   GLUCOSE mg/dL 140* 117* 118*  --  127*   < > 135*   BUN mg/dL 18 14 14  --  15   < > 15   CREATININE mg/dL 0.50* 0.47* 0.55*  --  0.48*   < > 0.66   SODIUM mmol/L 139 140 141  --  142   < > 142   CHLORIDE mmol/L 103 104 104  --  103   < > 102   POTASSIUM mmol/L 3.4* 3.7 4.0   < > 3.0*   < > 3.9   PHOSPHORUS mg/dL 2.3*  --   --   --   --   --   --   "  MAGNESIUM mg/dL 1.9  --  1.8  --  1.8   < > 1.8   ALT (SGPT) U/L  --   --   --   --   --   --  13    < > = values in this interval not displayed.       Results from last 7 days   Lab Units 05/09/24  1703   ALBUMIN g/dL 3.5           Lab Results   Lab Value Date/Time    HGBA1C 4.90 06/28/2023 1210    HGBA1C 4.70 (L) 05/19/2022 1423               Medications    Medications Reviewed: yes  Synthroid, namenda, protonix, zoloft, MVI, lansoprazole  GTT:     Intake/Ouptut 24 hrs (0701 - 0700)   I&O's Reviewed: yes  Intake & Output (last day)         05/15 0701  05/16 0700 05/16 0701  05/17 0700    I.V. (mL/kg) 3019 (52.7)     Other 295     NG/ 60    Total Intake(mL/kg) 3838 (67) 60 (1)    Urine (mL/kg/hr) 650 (0.5)     Stool 0     Total Output 650     Net +3188 +60          Urine Unmeasured Occurrence 1 x     Stool Unmeasured Occurrence 1 x 1 x          Anthropometrics     Height: Height: 162.6 cm (64\")  Last Filed Weight: Weight: 57.4 kg (126 lb 8 oz) (05/16/24 0921)  Method: Weight Method: Bed scale  BMI: BMI (Calculated): 21.7    UBW: average 115 x 1 year   Weight change: unchanged    Nutrition Focused Physical Exam     Date:     Unable to perform due to Pt unable to participate at time of visit     Subjective   Reported/Observed/Food/Nutrition Related History:     5/16  Pt tolerating EN almost to goal. Pt remains unresponsive. Pt has developed a DVT, neurosurgery consulted for management recommendations in setting of ICH.     5/14  Consult for tube feeding assessment. Patient with no oral intake for several days due to current mental status.  SLP not able to evaluate patient either.  Per MD note, patient son is concerned about patient lack of intake and requested tube feeding.  Per RN, attempt to place keofeed earlier today without success due to patient hiatal hernia. GI was consulted for possible placement.  GI evaluation completed, per GI note, patient is not a candidate for a PEG tube due to hiatal hernia.  " "Unable to place ND tube due to scheduling and anesthesia availability.      Needs Assessment   Date:     Height used:Height: 162.6 cm (64\")  Weights used: 114lb/ 51.8kg    Estimated Calorie needs: ~  1300Kcal/day  Method:  Kcals/KG 25= 1295  Method:  MSJ 1051 x 1.2= 1261    Estimated Protein needs: ~62  g PRO/day  Method: 1.2g/Kg= 62    Estimated Fluid needs: ~ ml/day   Per clinical status    Current Nutrition Prescription     PO: NPO Diet NPO Type: Sips with Meds  Oral Nutrition Supplement:  Intake: Insufficient data 25% x 1 meal documented    EN: FiberSource HN  Goal Rate: 50 ml/hr  Water Flushes: 10 ml/hr  Modular: None  Route: NJ  Tube: Small bore    At goal over: 22Hrs/day      Rx will supply:   Goal Volume 1100  mL/day     Flush Volume 220 mL/day     Energy 1320 Kcal/day 102 % Est Need   Protein 59 g/day 95 % Est Need   Fiber 16.7 g/day     Water in   mL     Total Water 1111 mL     Meet DRI Yes        --------------------------------------------------------------------------  Product/Rate verified at bedside: Yes  Infusing Rate at time of visit: 45 ml/hr    Average Delivery from [place] Day: (while advancing)  Volume 349 mL/day   % Goal Vol.   Flush Volume 295 mL/day     Energy  Kcal/day  % Est Need   Protein  g/day  % Est Need   Fiber  g/day     Water in  EN  mL     Total Water  mL     Meet DRI Yes          Assessment & Plan   Nutrition Diagnosis   Date:  Updated:   Problem Inadequate oral intake    Etiology Mental status    Signs/Symptoms Patient unresponsive, not alert enough for SLP eval or to eat    Status: Ongoing, receiving EN    Goal:   Nutrition to support treatment, Advance diet as medically feasible/appropriate, and Maintain EN      Nutrition Intervention      Follow treatment progress, Care plan reviewed, Nutrition support order placed    Continue current EN regimen per order as tolerated.    Monitoring/Evaluation:   Per protocol, I&O, Pertinent labs, EN delivery/tolerance, GI " status, Symptoms, POC/GOC, Swallow function    Gretchen Benitez RD  Time Spent: 30m

## 2024-05-17 NOTE — PROGRESS NOTES
[facility] Medicine Services  PROGRESS NOTE    Patient Name: Temi Jarrett  : 1930  MRN: [id number]    Date of Admission: 2024  Primary Care Physician: Eric Patel MD    Subjective   Subjective     CC:  Follow-up for ICH    HPI:  Patient seen and examined this morning.  Obtunded and responding to painful stimuli only.      Objective   Objective     Vital Signs:   Temp:  [98.3 °F (36.8 °C)-102.2 °F (39 °C)] 98.3 °F (36.8 °C)  Heart Rate:  [52-60] 53  Resp:  [16-33] 18  BP: (159-174)/(69-95) 159/76  Flow (L/min):  [2] 2     Physical Exam:  General: Acutely ill looking, not in distress, conversant and cooperative  Head: Atraumatic and normocephalic  Eyes: No Icterus. No pallor  Ears:  Ears appear intact with no abnormalities noted  Throat: No oral lesions, no thrush  Neck: Supple, trachea midline  Lungs:  diminished air entry both lung fields   Heart:  Normal S1 and S2, no murmur, no gallop, No JVD, no lower extremity swelling  Abdomen:  Soft, no tenderness, no organomegaly, normal bowel sounds, no organomegaly  Extremities: pulses equal bilaterally  Skin: No bleeding, bruising or rash, normal skin turgor and elasticity  Neurologic: Responding to painful stimuli only.    Psych: Completely confused      Results Reviewed:  LAB RESULTS:      Lab 24  0502 05/15/24  0411 24  0615 24  0352   WBC 13.84* 12.65* 13.40* 12.03*   HEMOGLOBIN 13.5 12.7 13.0 13.1   HEMATOCRIT 41.0 38.6 40.5 40.7   PLATELETS 251 217 204 219   NEUTROS ABS 11.64* 10.07*  --   --    IMMATURE GRANS (ABS) 0.08* 0.07*  --   --    LYMPHS ABS 0.93 1.33  --   --    MONOS ABS 1.05* 1.04*  --   --    EOS ABS 0.09 0.10  --   --    MCV 88.4 87.9 90.8 89.1   PROCALCITONIN 0.09  --   --   --          Lab 24  1620 24  0502 05/15/24  0411 24  0358 24  2208 24  0615 24  0352   SODIUM  --  139 140 141  --  142 146*   POTASSIUM 5.1 3.4* 3.7 4.0 4.2 3.0* 3.7    CHLORIDE  --  103 104 104  --  103 103   CO2  --  25.0 28.0 29.0  --  31.0* 32.0*   ANION GAP  --  11.0 8.0 8.0  --  8.0 11.0   BUN  --  18 14 14  --  15 26*   CREATININE  --  0.50* 0.47* 0.55*  --  0.48* 0.58   EGFR  --  87.0 88.3 85.1  --  87.9 84.0   GLUCOSE  --  140* 117* 118*  --  127* 128*   CALCIUM  --  7.4* 7.2* 7.5*  --  7.9* 8.7   MAGNESIUM  --  1.9  --  1.8  --  1.8  --    PHOSPHORUS  --  2.3*  --   --   --   --   --                              Brief Urine Lab Results  (Last result in the past 365 days)        Color   Clarity   Blood   Leuk Est   Nitrite   Protein   CREAT   Urine HCG        05/09/24 1728 Yellow   Turbid   Small (1+)   Negative   Positive   >=300 mg/dL (3+)                   Microbiology Results Abnormal       Procedure Component Value - Date/Time    Blood Culture - Blood, Hand, Left [[id number]]  (Normal) Collected: 05/14/24 1842    Lab Status: Preliminary result Specimen: Blood from Hand, Left Updated: 05/16/24 2000     Blood Culture No growth at 2 days    Narrative:      Aerobic Bottle Only    Less than seven (7) mL's of blood was collected.  Insufficient quantity may yield false negative results.    Blood Culture - Blood, Arm, Right [[id number]]  (Normal) Collected: 05/14/24 1846    Lab Status: Preliminary result Specimen: Blood from Arm, Right Updated: 05/16/24 2000     Blood Culture No growth at 2 days    Respiratory Panel PCR w/COVID-19(SARS-CoV-2) FAMILIA/EMILE/KEARA/PAD/COR/PARMINDER In-House, NP Swab in UTM/VTM, 2 HR TAT - Swab, Nasopharynx [[id number]]  (Normal) Collected: 05/13/24 0843    Lab Status: Final result Specimen: Swab from Nasopharynx Updated: 05/13/24 0948     ADENOVIRUS, PCR Not Detected     Coronavirus 229E Not Detected     Coronavirus HKU1 Not Detected     Coronavirus NL63 Not Detected     Coronavirus OC43 Not Detected     COVID19 Not Detected     Human Metapneumovirus Not Detected     Human Rhinovirus/Enterovirus Not Detected     Influenza A PCR Not Detected     Influenza B  PCR Not Detected     Parainfluenza Virus 1 Not Detected     Parainfluenza Virus 2 Not Detected     Parainfluenza Virus 3 Not Detected     Parainfluenza Virus 4 Not Detected     RSV, PCR Not Detected     Bordetella pertussis pcr Not Detected     Bordetella parapertussis PCR Not Detected     Chlamydophila pneumoniae PCR Not Detected     Mycoplasma pneumo by PCR Not Detected    Narrative:      In the setting of a positive respiratory panel with a viral infection PLUS a negative procalcitonin without other underlying concern for bacterial infection, consider observing off antibiotics or discontinuation of antibiotics and continue supportive care. If the respiratory panel is positive for atypical bacterial infection (Bordetella pertussis, Chlamydophila pneumoniae, or Mycoplasma pneumoniae), consider antibiotic de-escalation to target atypical bacterial infection.            CT Head Without Contrast    Result Date: 5/16/2024  CT HEAD WO CONTRAST Date of Exam: 5/16/2024 9:49 AM EDT Indication: f/u ich. Comparison: 5/15/2024. Technique: Axial CT images were obtained of the head without contrast administration.  Automated exposure control and iterative construction methods were used. Findings: A large right parietal lobe intraparenchymal hemorrhage is again identified and measures 5.5 x 3.8 cm on image #39 of series 3 as compared to 5.2 x 3.8 cm previously. An approximate 12 mm hemorrhage in the right occipital lobe posteriorly is stable in size. Hemorrhage in the right lateral ventricle is similar as is layering hemorrhage in the posterior left lateral ventricle. There is stable hydrocephalus. Somewhat poorly defined right frontal lobe hemorrhage measures 13 mm in maximal dimension on image #36 of series 3 which is stable. Some low-density edema in the white matter adjacent to the hemorrhages is similar. Artifact in the region of the tentorium obscures detail of this area. There is no midline shift. There is no  transtentorial herniation.     Impression: Impression: Minimal increase in size of right parietal lobe intraparenchymal hemorrhage. Other intraparenchymal hemorrhages are stable in size as is hemorrhage within the lateral ventricles. Stable hydrocephalus. Electronically Signed: Kimberly Dudley MD  5/16/2024 10:47 AM EDT  Workstation ID: [id number]    CT Head Without Contrast    Result Date: 5/15/2024  CT HEAD WO CONTRAST Date of Exam: 5/15/2024 5:35 PM EDT Indication: Decreased level of consciousness. Comparison: None available. Technique: Axial CT images were obtained of the head without contrast administration.  Automated exposure control and iterative construction methods were used. Findings: New right parietal intraparenchymal hemorrhage. Small focus of right occipital lobe intraparenchymal hemorrhage with small foci of right frontal lobe intraparenchymal hemorrhages. Intraventricular extension into the collapsed right lateral ventricle and layering within the occipital horn of the left lateral ventricle. There appears to be extra-axial extension along the tentorium cerebelli. Extensive white matter changes are noted consistent with edema. Dilation of the lateral ventricular system compared  with the prior study suggest a component of developing hydrocephalus. Prior bilateral lens replacements.     Impression: New intraparenchymal hemorrhages with intraventricular and extra-axial extension. Findings were discussed with the patient's care nurse at the time of interpretation who stated that she would contact Dr. Li. Attempting to contact Dr. Li through the  were unsuccessful. Electronically Signed: Carlitos Mary MD  5/15/2024 6:01 PM EDT  Workstation ID: [id number]    Duplex Venous Lower Extremity - Bilateral CAR    Result Date: 5/15/2024    Sub-acute left lower extremity deep vein thrombosis noted in the gastrocnemius.   All other veins appeared normal bilaterally.     XR Abdomen KUB    Result Date:  5/15/2024  DATE OF EXAM: 5/15/2024 11:48 AM  PROCEDURE: XR ABDOMEN KUB-  INDICATIONS: NJ tube placement; N39.0-Urinary tract infection, site not specified; G93.40-Encephalopathy, unspecified; C79.9-Secondary malignant neoplasm of unspecified site  COMPARISON 8/24/2023  Technique: Single radiographic view of the abdomen was obtained.  FINDINGS: An enteric tube is in place with the tip in the left midabdomen. This is presumably within the distal stomach. Bowel gas pattern is normal. There are surgical clips of the right abdomen.    Impression: Enteric tube tip in the left mid abdomen, presumably in the distal stomach.   This report was finalized on 5/15/2024 12:06 PM by Kimberly Dudley MD.       Results for orders placed during the hospital encounter of 08/02/20    Transthoracic Echo Complete With Contrast if Necessary Per Protocol    Interpretation Summary  · Left ventricular systolic function is normal. Estimated EF = 70%.  · Left ventricular diastolic dysfunction (grade I) consistent with impaired relaxation.  · Left atrial cavity size is moderately dilated.  · There is calcification of the aortic valve. There is no significant stenosis or regurgitation. A Lambel's excrescence is noted on an aortic valve leaflet.  · Moderate mitral valve regurgitation is present.  · Estimated right ventricular systolic pressure from tricuspid regurgitation is moderately elevated (49 mmHg).  · There is a moderate (1-2cm) circumferential pericardial effusion. There is no tamponade physiology.      Current medications:  Scheduled Meds:amLODIPine, 5 mg, Nasogastric, Daily  busPIRone, 10 mg, Nasogastric, BID  donepezil, 10 mg, Nasogastric, Nightly  lansoprazole, 15 mg, Nasogastric, Q AM  levothyroxine, 150 mcg, Nasogastric, Q AM  lisinopril, 20 mg, Nasogastric, Daily  memantine, 10 mg, Nasogastric, BID  multivitamin and minerals, 15 mL, Nasogastric, Daily  nystatin, , Topical, Q12H  sertraline, 50 mg, Nasogastric, Daily      Continuous  Infusions:     PRN Meds:.  acetaminophen **OR** [PENDING] acetaminophen **OR** acetaminophen **OR** [PENDING] acetaminophen **OR** acetaminophen **OR** [PENDING] acetaminophen    senna-docusate sodium **AND** [PENDING] senna-docusate sodium **AND** polyethylene glycol **AND** [PENDING] polyethylene glycol **AND** [DISCONTINUED] bisacodyl **AND** [PENDING] bisacodyl **AND** bisacodyl **AND** [PENDING] bisacodyl    Calcium Replacement - Follow Nurse / BPA Driven Protocol    ipratropium-albuterol    Magnesium Standard Dose Replacement - Follow Nurse / BPA Driven Protocol    nitroglycerin    Phosphorus Replacement - Follow Nurse / BPA Driven Protocol    Potassium Replacement - Follow Nurse / BPA Driven Protocol    sodium chloride    Assessment & Plan   Assessment & Plan     Active Hospital Problems    Diagnosis  POA    **Intracranial hemorrhage [I62.9]  Yes    UTI (urinary tract infection) [N39.0]  Yes    Metastatic disease [C79.9]  Yes    Prolonged Q-T interval on ECG [R94.31]  Yes    Alzheimer's disease [G30.9, F02.80]  Yes    Essential hypertension [I10]  Yes    Hypothyroidism (acquired) [E03.9]  Yes      Resolved Hospital Problems   No resolved problems to display.        Brief Hospital Course to date:  Temi Jarrett is a 89 y.o. female resident of [facility] diagnosed with 7.5 cm LT lung mass with associated nodules 12/2023, seen by oncology at that time and deemed not a candidate for therapy, she agreed to defer invasive biopsy, she has been at her facility under hospice care recently; other history includes dementia, HTN, hypothyroidism, hiatal hernia, GERD, she was sent to the ED for evaluation of increasing lethargy.  On arrival in the ED she had CXR which demonstrated progressive malignancy, UA was obtained which was abnormal and her symptoms were attributed to bacteriuria.  She was admitted and placed on IV antibiotics with culture returning positive for pansensitive E. coli, she has completed  6 days of IV antibiotics for the same.  She had progressively decreasing level of consciousness, now completely obtunded with only minimal grimace to noxious stimuli, and continued to have ongoing fevers in the face of adequate antimicrobial coverage without other source of infection.  Per her family's wishes hospice benefits were revoked, a Keofeed was attempted but was unsuccessful due to her large hiatal hernia (stomach is completely intrathoracic), GI was consulted who placed an NJ tube endoscopically for tube feeds.  She continued to have fevers and remained altered so further evaluation was expanded, lower extremity venous duplex demonstrated subacute appearing DVT, she received a dose of Lovenox, CT of the head was also obtained which later revealed large intracranial hemorrhage within the ventricle with extra-axial extension    This patient is new to me today 5/17/2024    Goals of care  Had extensive discussion with the patient's son/POA Mr. Dow for about 55 minutes about GOA.  He is well aware of the fact that his mom has a terminal cancer and now with significant life-threatening intracerebral bleed.  She did not want to admit that she is comatosed and he feels that she is improving despite me explaining to him that she is declining  He expressed his wishes that he wants to continue full supportive care except for CPR, with IV fluids, enteral feeding and antibiotics if needed till she dies naturally  He is upset that hospice care requires discontinuation of IV fluids, IV antibiotics and enteral feeding  He requested to repeat her urine analysis because of persistent fever despite me explaining that she was already treated for UTI there is no need to repeat the urine analysis which is not standard of care, and that the fever could be related to the cancer, DVT or even central fever due to her intracerebral bleed  He wants comfort measures to be implemented only when she is near death and he is not willing  to take her home with hospice.  He wants to go for facility that will take her with comfort care, ongoing tube feed, IV fluids and antibiotics if needed.  I explained to him that this is not feasible.  She will qualify for LTAC for acute rehab to be able to get those services and explained to him that most of the nursing home and rehab facilities will not take the patient with NG tube.  He expressed his frustration about that he requested to talk to the   For now, he wants his mother to be under hospitalist service and to continue current treatment plan    ICH (Intraventricular w/ extra-axial extension)  This could be the cause of her fevers, altered sensorium, etc  NSGY consulted.  Patient deemed to be nonsurgical candidate.  Neurosurgery team recommended hospice care  Palliative care following    Recurrent fevers  E Coli UTI  CXR on admission w/ metastatic disease, no other process  UrCx w/ pan-sensitive E coli; renal US neg for abscess/pyelo  Respiratory PCR negative  CT chest, abdomen and pelvis w/ extensive malignant changes but no other signs of infectious process  Status post x6 days of culture directed Abx (CTX)  ID following.  in agreement that fevers are likely from DVT and intracranial bleed    Subacute LT gastrocnemius DVT  S/p single dose lovenox, subsequent ICH discovered and AC discontinued    Progressive metastatic cancer (unknown primary)  Lethargy w/ confusion  Palliative/hospice have followed, patient's son has elected to revoke hospice benefits  S/p endoscopically placed feeding tube 5/15; due to significant hiatal hernia she is not a PEG candidate    Alzheimer dementia - donepezil, memantine  Anxiety, depression - buspar, zoloft  GERD/HH - ppi  Hypothyroidism - levothyroxine  HTN - amlodipine, lisinopril    Attempted to call the patient's son twice at around 2 PM but nobody answered  I called the daughter/Meyr who lives in [place] and quickly updated her about the fact no change  in her overall current clinical condition      Expected Discharge Location and Transportation: At baseline she has an underlying terminal malignancy w/o options for treatment, in addition she has multiple complications including ICH (expected to be terminal), DVT, etc; her anticipated outcome is death either in hospital or w/ hospice; we continue supportive measures as outlined by her POA/son who remains optimistic that she will recover; I continue to recommend a family meeting/conference to discuss her care, which her son is continuing to decline at this time  Expected Discharge   Expected Discharge Date: 5/20/2024; Expected Discharge Time:      DVT prophylaxis:  Mechanical DVT prophylaxis orders are present.         AM-PAC 6 Clicks Score (PT): 6 (05/16/24 6402)    CODE STATUS:   Code Status and Medical Interventions:   Ordered at: 05/12/24 1331     Medical Intervention Limits:    NO intubation (DNI)     Level Of Support Discussed With:    Next of Kin (If No Surrogate)     Code Status (Patient has no pulse and is not breathing):    No CPR (Do Not Attempt to Resuscitate)     Medical Interventions (Patient has pulse or is breathing):    Limited Support     Comments:    d/w patient's son via telephone       Edgardo Ly MD  05/17/24

## 2024-05-17 NOTE — SIGNIFICANT NOTE
05/17/24 1021   SLP Deferred Reason   SLP Deferred Reason   (RN reports pt is not appropriate for SLP services at this time.  SLP to follow along and assess when appropriate.)

## 2024-05-17 NOTE — TELEPHONE ENCOUNTER
PT'S SON CALLED BACK ABOUT THE APPT ON MONDAY.  I TOLD HIM THAT WE ARE WAITING FOR DR GRIFFITH TO RESPOND.  MR. LUND STATED THAT HE WOULD LIKE AN END OF THE APPT.  HE WOULD LIKE SOMEONE TO CALL HIM BACK ON MONDAY 05/20/24.

## 2024-05-17 NOTE — CASE MANAGEMENT/SOCIAL WORK
Continued Stay Note  [facility]     Patient Name: Temi Jarrett  MRN: [id number]  Today's Date: 5/17/2024    Admit Date: 5/9/2024    Plan: ongoing   Discharge Plan       Row Name 05/17/24 1434       Plan    Plan ongoing    Patient/Family in Agreement with Plan unable to assess    Plan Comments CM attemped to call SonVictor M POA , CM left voicemail. Patient has a LTC bed at [facility]. CM will continue to follow for any needs.    Final Discharge Disposition Code 30 - still a patient                   Discharge Codes    No documentation.                 Expected Discharge Date and Time       Expected Discharge Date Expected Discharge Time    May 20, 2024               Kristina Delvalle RN

## 2024-05-17 NOTE — PROGRESS NOTES
I called and discussed pathology from gastric polyp and gastric biopsies with patient's son.  No evidence of malignancy in the gastric polyp and benign gastric biopsies as well.  Given multitude of medical issues including metastatic malignancy that appears to be worsening, brain bleed, DVT and underlying Alzheimers in addition advanced age no further GI work up is recommended.  Prognosis appears to be extremely poor.  I encouraged patient's son to continue to consider and discuss goals of care with hospitalist, palliative team and any other specialists he may have questions for.  GI will remain available as needed.

## 2024-05-17 NOTE — CASE MANAGEMENT/SOCIAL WORK
Continued Stay Note  [facility]     Patient Name: Temi Jarrett  MRN: [id number]  Today's Date: 5/17/2024    Admit Date: 5/9/2024    Plan: Ongoing   Discharge Plan       Row Name 05/17/24 1624       Plan    Plan Ongoing    Patient/Family in Agreement with Plan yes    Plan Comments Spoke with MARSHAL Dow, by phone with new number. We discussed Keofeed in place and going back to [facility] with NG. MARSHAL Dow, also expressed concerns about not wanting hospice because he wants his mother to continue to get nutrition with tube feed. He doesn't want to withhold nutrition or fluids from his mother. He wants his mother to be comfortable. He would like to go to a LTC bed with Keofeed in place. CM called [facility] to see if they can take patient with Keofeed. CM left voicemail with Marciano for the admission coordinator to call back. CM will see if there is any LTC places that are willing to accept Keofeeds. CM will follow up on Monday.    Final Discharge Disposition Code 30 - still a patient      Row Name 05/17/24 1434       Plan    Plan ongoing    Patient/Family in Agreement with Plan unable to assess    Plan Comments CM attemped to call Son, Victor M MARSHAL , CM left voicemail. Patient has a LTC bed at [facility]. CM will continue to follow for any needs.    Final Discharge Disposition Code 30 - still a patient                   Discharge Codes    No documentation.                 Expected Discharge Date and Time       Expected Discharge Date Expected Discharge Time    May 20, 2024               Kristina Delvalle RN

## 2024-05-17 NOTE — PROGRESS NOTES
INFECTIOUS DISEASE follow-up    Temi Jarrett  5/8/1930  [id number]    Date of Consult: 5/17/2024    Admission Date: 5/9/2024      Requesting Provider: No ref. provider found  Evaluating Physician: Nicolás Negron MD    Reason for Consultation: fever    History of present illness:    Patient is a 89 y.o. female with past medical history significant for essential hypertension, Alzheimer's, hiatal hernia, hypothyroidism and metastatic disease with unknown primary presents to the ED due to altered mental status.  Was admitted on 5/9/24;  Treated for UTI, urine cultures from 5/9 revealed E. Coli.  CT head shows new intraparenchymal hemorhages with intraventricular and extra-axial extension. U/S whowed suba acute left lower extremity DVT in gastrochneumius.    Hospitalist consulting us for opinion on cause of fevers    5/17/2024 neurosurgery is recommending palliative measures and comfort care    Past Medical History:   Diagnosis Date    Acute sepsis 01/14/2021    Arthritis     Bladder prolapse, female, acquired     Closed fracture of neck of left femur 09/27/2019    Dementia     Depression     Disease of thyroid gland     Gastric polyp     GERD (gastroesophageal reflux disease)     Hiatal hernia     History of colonic polyps     Hyperlipidemia     Hypertension     IBS (irritable bowel syndrome)     Macular degeneration     Pacemaker 2023    Pericardial effusion 08/03/2020    Echo (8/3/2020): Normal LVEF.  Moderate pericardial effusion without tamponade.  Moderate MR. RVSP 49 mmHg    Torn meniscus     right knee        Past Surgical History:   Procedure Laterality Date    CHOLECYSTECTOMY  1997    ENDOSCOPY N/A 01/14/2021    Procedure: ESOPHAGOGASTRODUODENOSCOPY;  Surgeon: Serjio Guerrero MD;  [facility];  Service: General;  Laterality: N/A;    ENDOSCOPY N/A 5/15/2024    Procedure: ESOPHAGOGASTRODUODENOSCOPY WITH NASOJEJUNAL TUBE INSERTION;  Surgeon: Hoang Watson MD;  [facility]  ENDOSCOPY;  Service: Gastroenterology;  Laterality: N/A;    EYE SURGERY  1998    Cataract extraction    HERNIA REPAIR      HIP HEMIARTHROPLASTY Left 09/28/2019    Procedure: HIP HEMIARTHROPLASTY LEFT;  Surgeon: Reddy Segundo Jr., MD;  Location: [facility];  Service: Orthopedics    HYSTERECTOMY  1976    KNEE SURGERY Right     arthroscopy- 2000    SKIN CANCER EXCISION Left     [facility]; CANCEROUS SPOT DIDN'T  MOVE BUT NEEDED REMOVED    TONSILLECTOMY         Family History   Problem Relation Age of Onset    Hypertension Mother     Breast cancer Mother     Obesity Mother     Hypertension Father     Diabetes Son        Social History     Socioeconomic History    Marital status:    Tobacco Use    Smoking status: Never    Smokeless tobacco: Never   Vaping Use    Vaping status: Never Used   Substance and Sexual Activity    Alcohol use: No    Drug use: Never    Sexual activity: Defer       Allergies   Allergen Reactions    Augmentin [Amoxicillin-Pot Clavulanate] Diarrhea    Codeine Nausea Only     Trouble Breathing     Shrimp Hives    Munday Unknown - Low Severity         Medication:    Current Facility-Administered Medications:     acetaminophen (TYLENOL) tablet 650 mg, 650 mg, Nasogastric, Q4H PRN, 650 mg at 05/15/24 2107 **OR** [PENDING] acetaminophen (TYLENOL) tablet 650 mg, 650 mg, Nasogastric, Q4H PRN **OR** acetaminophen (TYLENOL) 160 MG/5ML oral solution 650 mg, 650 mg, Nasogastric, Q4H PRN, 650 mg at 05/17/24 0825 **OR** [PENDING] acetaminophen (TYLENOL) 160 MG/5ML oral solution 650 mg, 650 mg, Nasogastric, Q4H PRN **OR** acetaminophen (TYLENOL) suppository 650 mg, 650 mg, Rectal, Q4H PRN **OR** [PENDING] acetaminophen (TYLENOL) suppository 650 mg, 650 mg, Rectal, Q4H PRN,     amLODIPine (NORVASC) tablet 5 mg, 5 mg, Nasogastric, Daily, Reddy Li, , 5 mg at 05/17/24 0825    sennosides-docusate (PERICOLACE) 8.6-50 MG per tablet 2 tablet, 2 tablet, Nasogastric, BID  PRN **AND** [PENDING] sennosides-docusate (PERICOLACE) 8.6-50 MG per tablet 2 tablet, 2 tablet, Nasogastric, BID PRN **AND** polyethylene glycol (MIRALAX) packet 17 g, 17 g, Nasogastric, Daily PRN **AND** [PENDING] polyethylene glycol (MIRALAX) packet 17 g, 17 g, Nasogastric, Daily PRN **AND** [DISCONTINUED] bisacodyl (DULCOLAX) EC tablet 5 mg, 5 mg, Oral, Daily PRN **AND** [PENDING] bisacodyl (DULCOLAX) EC tablet 5 mg, 5 mg, Oral, Daily PRN **AND** bisacodyl (DULCOLAX) suppository 10 mg, 10 mg, Rectal, Daily PRN **AND** [PENDING] bisacodyl (DULCOLAX) suppository 10 mg, 10 mg, Rectal, Daily PRN,     busPIRone (BUSPAR) tablet 10 mg, 10 mg, Nasogastric, BID, Reddy Li DO, 10 mg at 05/17/24 0825    Calcium Replacement - Follow Nurse / BPA Driven Protocol, , Does not apply, PRN, Hoang Watson MD    donepezil (ARICEPT) tablet 10 mg, 10 mg, Nasogastric, Nightly, Reddy Li DO, 10 mg at 05/16/24 2007    ipratropium-albuterol (DUO-NEB) nebulizer solution 3 mL, 3 mL, Nebulization, Q4H PRN, Hoang Watson MD    lansoprazole (PREVACID SOLUTAB) disintegrating tablet Tablet Delayed Release Dispersible 15 mg, 15 mg, Nasogastric, Q AM, Hoang Watson MD, 15 mg at 05/17/24 0545    levothyroxine (SYNTHROID, LEVOTHROID) tablet 150 mcg, 150 mcg, Nasogastric, Q AM, Reddy Li DO, 150 mcg at 05/17/24 0545    lisinopril (PRINIVIL,ZESTRIL) tablet 20 mg, 20 mg, Nasogastric, Daily, Reddy Li DO, 20 mg at 05/17/24 0825    Magnesium Standard Dose Replacement - Follow Nurse / BPA Driven Protocol, , Does not apply, PRN, Hoang Watson MD    memantine (NAMENDA) tablet 10 mg, 10 mg, Nasogastric, BID, Reddy Li, , 10 mg at 05/17/24 0825    multivitamin and minerals liquid 15 mL, 15 mL, Nasogastric, Daily, Devon Haines, [facility], 15 mL at 05/17/24 0825    nitroglycerin (NITROSTAT) SL tablet 0.4 mg, 0.4 mg, Sublingual, Q5 Min PRN, Hoang aWtson MD    nystatin (MYCOSTATIN) powder, ,  Topical, Q12H, Hoang Watson MD, Given at 24 0825    Phosphorus Replacement - Follow Nurse / BPA Driven Protocol, , Does not apply, Walter IQBAL John A, MD    Potassium Replacement - Follow Nurse / BPA Driven Protocol, , Does not apply, Walter IQBAL John A, MD    sertraline (ZOLOFT) tablet 50 mg, 50 mg, Nasogastric, Daily, Reddy Li DO, 50 mg at 24 0825    sodium chloride 0.9 % flush 10 mL, 10 mL, Intravenous, PRNWalter John A, MD, 10 mL at 24    Antibiotics:  Anti-Infectives (From admission, onward)      Ordered     Dose/Rate Route Frequency Start Stop    24  cefTRIAXone (ROCEPHIN) 1,000 mg in sodium chloride 0.9 % 100 mL MBP        Ordering Provider: Reddy Li DO    1,000 mg  200 mL/hr over 30 Minutes Intravenous Every 24 Hours 05/10/24 1800 24 1758    24 1851  cefTRIAXone (ROCEPHIN) 1,000 mg in sodium chloride 0.9 % 100 mL MBP        Ordering Provider: Robert Pablo MD    1,000 mg  200 mL/hr over 30 Minutes Intravenous Once 24 1907 24 193              Review of Systems:  Unobtainable      Physical Exam:   Vital Signs  Temp (24hrs), Av.9 °F (37.7 °C), Min:98.3 °F (36.8 °C), Max:102.2 °F (39 °C)    Temp  Min: 98.3 °F (36.8 °C)  Max: 102.2 °F (39 °C)  BP  Min: 154/72  Max: 174/95  Pulse  Min: 53  Max: 60  Resp  Min: 18  Max: 33  SpO2  Min: 92 %  Max: 94 %    GENERAL: Resting quietly no distress nonverbal  HEENT: Normocephalic, atraumatic.   No external oral lesions    LUNGS: Symmetrical bilaterally  ABDOMEN: Soft, nontender,   EXT:  No edema  :  Without Larry catheter.  MSK: No joint effusions or erythema  SKIN: Warm and dry without cutaneous eruptions on Inspection/palpation.      Laboratory Data    Results from last 7 days   Lab Units 24  0502 05/15/24  0411 24  0615   WBC 10*3/mm3 13.84* 12.65* 13.40*   HEMOGLOBIN g/dL 13.5 12.7 13.0   HEMATOCRIT % 41.0 38.6 40.5   PLATELETS 10*3/mm3 251 217 204  "    Results from last 7 days   Lab Units 05/16/24  1620 05/16/24  0502   SODIUM mmol/L  --  139   POTASSIUM mmol/L 5.1 3.4*   CHLORIDE mmol/L  --  103   CO2 mmol/L  --  25.0   BUN mg/dL  --  18   CREATININE mg/dL  --  0.50*   GLUCOSE mg/dL  --  140*   CALCIUM mg/dL  --  7.4*                               Estimated Creatinine Clearance: 60 mL/min (A) (by C-G formula based on SCr of 0.5 mg/dL (L)).      Microbiology:  Blood Culture   Date Value Ref Range Status   05/14/2024 No growth at 24 hours  Preliminary     No results found for: \"BCIDPCR\", \"CXREFLEX\", \"CSFCX\", \"CULTURETIS\"  No results found for: \"CULTURES\", \"HSVCX\", \"URCX\"  No results found for: \"EYECULTURE\", \"GCCX\", \"HSVCULTURE\", \"LABHSV\"  No results found for: \"LEGIONELLA\", \"MRSACX\", \"MUMPSCX\", \"MYCOPLASCX\"  No results found for: \"NOCARDIACX\", \"STOOLCX\"  Urine Culture   Date Value Ref Range Status   05/09/2024 >100,000 CFU/mL Escherichia coli (A)  Final     No results found for: \"VIRALCULTU\", \"WOUNDCX\"        Radiology:  Imaging Results (Last 72 Hours)       Procedure Component Value Units Date/Time    CT Head Without Contrast [[id number]] Collected: 05/16/24 1040     Updated: 05/16/24 1050    Narrative:      CT HEAD WO CONTRAST    Date of Exam: 5/16/2024 9:49 AM EDT    Indication: f/u ich.    Comparison: 5/15/2024.    Technique: Axial CT images were obtained of the head without contrast administration.  Automated exposure control and iterative construction methods were used.      Findings:  A large right parietal lobe intraparenchymal hemorrhage is again identified and measures 5.5 x 3.8 cm on image #39 of series 3 as compared to 5.2 x 3.8 cm previously. An approximate 12 mm hemorrhage in the right occipital lobe posteriorly is stable in   size. Hemorrhage in the right lateral ventricle is similar as is layering hemorrhage in the posterior left lateral ventricle. There is stable hydrocephalus. Somewhat poorly defined right frontal lobe hemorrhage measures 13 mm " in maximal dimension on   image #36 of series 3 which is stable. Some low-density edema in the white matter adjacent to the hemorrhages is similar. Artifact in the region of the tentorium obscures detail of this area. There is no midline shift. There is no transtentorial   herniation.      Impression:      Impression:  Minimal increase in size of right parietal lobe intraparenchymal hemorrhage. Other intraparenchymal hemorrhages are stable in size as is hemorrhage within the lateral ventricles. Stable hydrocephalus.        Electronically Signed: Kimberly Dudley MD    5/16/2024 10:47 AM EDT    Workstation ID: [id number]    CT Head Without Contrast [[id number]] Collected: 05/15/24 1746     Updated: 05/15/24 1804    Narrative:      CT HEAD WO CONTRAST    Date of Exam: 5/15/2024 5:35 PM EDT    Indication: Decreased level of consciousness.    Comparison: None available.    Technique: Axial CT images were obtained of the head without contrast administration.  Automated exposure control and iterative construction methods were used.    Findings:  New right parietal intraparenchymal hemorrhage. Small focus of right occipital lobe intraparenchymal hemorrhage with small foci of right frontal lobe intraparenchymal hemorrhages. Intraventricular extension into the collapsed right lateral ventricle and   layering within the occipital horn of the left lateral ventricle. There appears to be extra-axial extension along the tentorium cerebelli. Extensive white matter changes are noted consistent with edema. Dilation of the lateral ventricular system compared   with the prior study suggest a component of developing hydrocephalus.    Prior bilateral lens replacements.      Impression:      New intraparenchymal hemorrhages with intraventricular and extra-axial extension.    Findings were discussed with the patient's care nurse at the time of interpretation who stated that she would contact Dr. Li. Attempting to contact Dr. Li  through the  were unsuccessful.          Electronically Signed: Carlitos Mary MD    5/15/2024 6:01 PM EDT    Workstation ID: [id number]    XR Abdomen KUB [[id number]] Collected: 05/15/24 1203     Updated: 05/15/24 1209    Narrative:      DATE OF EXAM: 5/15/2024 11:48 AM     PROCEDURE: XR ABDOMEN KUB-     INDICATIONS: NJ tube placement; N39.0-Urinary tract infection, site not  specified; G93.40-Encephalopathy, unspecified; C79.9-Secondary malignant  neoplasm of unspecified site     COMPARISON 8/24/2023     Technique: Single radiographic view of the abdomen was obtained.     FINDINGS:  An enteric tube is in place with the tip in the left midabdomen. This is  presumably within the distal stomach. Bowel gas pattern is normal. There  are surgical clips of the right abdomen.    Impression:      Enteric tube tip in the left mid abdomen, presumably in the distal  stomach.        This report was finalized on 5/15/2024 12:06 PM by Kimberly Dudley MD.       CT Chest Without Contrast Diagnostic [[id number]] Collected: 05/14/24 2019     Updated: 05/14/24 2034    Narrative:      Examination:  CT CHEST WO CONTRAST DIAGNOSTIC-  CT ABDOMEN PELVIS WO CONTRAST-     Date of Exam: 5/14/2024 4:30 PM     Indication: Recurrent fever, unclear source (dementia/unreponsive);  N39.0-Urinary tract infection, site not specified;  G93.40-Encephalopathy, unspecified.     Comparison: 12/22/2023.     Technique: Non-contrast axial volumetric CT imaging of the chest,  abdomen and pelvis was performed. Automated exposure control and  iterative reconstruction methods were used.     Findings:  Chest: Previously noted prevascular mediastinal mass has increased in  size now measuring up to 6.9 x 4.9 cm. Thyroid is not well seen. The  trachea appears unremarkable. The esophagus is largely obscured in the  lower aspect due to a massive hiatal hernia that contains the entire  stomach and a large portion of the small bowel. The heart appears normal  size.  There are aortic valvular calcifications and extensive mitral  annular calcifications. There are moderate coronary artery  calcifications. The main pulmonary artery is again enlarged suggesting  pulmonary arterial hypertension. There is a soft tissue density nodule  in the anterior mediastinal fat measuring 17 mm on image 50 of the axial  series.     Previously noted lung nodules have markedly increased in number and size  diffusely. There are more than 50 nodules in each lung. One of the  larger nodules on the right occurs in the superior segment of the right  lower lobe and measures 20 mm and one of the largest nodules on the left  occurs in the left lower lobe on CT image 20 measuring 27 mm diameter.  There is no pneumothorax or pleural effusion. There is significant  bibasilar atelectasis due to mass effect from large hiatal hernia.  Airways appear patent centrally. No definite pneumonia.     There are no acute findings in the superficial soft tissues. The bones  are diffusely demineralized. No acute osseous abnormalities or  destructive bone lesions. There is moderate thoracic spondylosis and  there is dextroscoliosis of the thoracolumbar spine. There is markedly  increased thoracic kyphosis, which distorts the anatomy.     Abdomen and pelvis: There is mild body wall edema. There is a left hip  prosthesis in place. There is mild DJD at the right hip. The bones are  demineralized. There is moderate lumbar spondylosis. There is S-shaped  scoliosis of the lumbar spine. No acute osseous abnormality or  destructive bone lesion.     The liver appears homogeneous. Patient appears status post  cholecystectomy. The bile ducts do not appear distended although poorly  visualized. The pancreas is partially contained within a large hiatal  hernia along with numerous small bowel loops and the stomach. The spleen  is normal size. Adrenal glands appear normal. There are bilateral simple  appearing kidney cysts. No urolithiasis  or hydronephrosis. Urinary  bladder is nondistended. Patient appears status post hysterectomy. The  ovaries are not seen. The appendix is not definitely seen. No small  bowel distention. There is mild diffuse mesenteric edema. There is mild  colonic fecal retention and mild colonic diverticulosis. No ascites,  pneumoperitoneum or lymphadenopathy.       Impression:         1. Significant worsening of metastatic disease in the chest with  significantly increased size of an mediastinal mass and increased size  and number of diffuse pulmonary metastatic nodules.  2. Redemonstration of a massive hiatal hernia containing the entire  stomach, numerous small bowel loops and a portion of the pancreas. No  definite complication.  3. Numerous chronic findings in the chest including aortic valvular and  mitral annular calcifications, coronary artery disease and enlarged main  pulmonary artery suggesting pulmonary arterial hypertension.  4. Numerous chronic findings in the abdomen including left hip  prosthesis, cholecystectomy, colonic diverticulosis and kidney cysts.  4. Mild anasarca with unclear etiology.     This report was finalized on 5/14/2024 8:31 PM by Reddy Perez MD.       CT Abdomen Pelvis Without Contrast [[id number]] Collected: 05/14/24 2019     Updated: 05/14/24 2034    Narrative:      Examination:  CT CHEST WO CONTRAST DIAGNOSTIC-  CT ABDOMEN PELVIS WO CONTRAST-     Date of Exam: 5/14/2024 4:30 PM     Indication: Recurrent fever, unclear source (dementia/unreponsive);  N39.0-Urinary tract infection, site not specified;  G93.40-Encephalopathy, unspecified.     Comparison: 12/22/2023.     Technique: Non-contrast axial volumetric CT imaging of the chest,  abdomen and pelvis was performed. Automated exposure control and  iterative reconstruction methods were used.     Findings:  Chest: Previously noted prevascular mediastinal mass has increased in  size now measuring up to 6.9 x 4.9 cm. Thyroid is not well seen.  The  trachea appears unremarkable. The esophagus is largely obscured in the  lower aspect due to a massive hiatal hernia that contains the entire  stomach and a large portion of the small bowel. The heart appears normal  size. There are aortic valvular calcifications and extensive mitral  annular calcifications. There are moderate coronary artery  calcifications. The main pulmonary artery is again enlarged suggesting  pulmonary arterial hypertension. There is a soft tissue density nodule  in the anterior mediastinal fat measuring 17 mm on image 50 of the axial  series.     Previously noted lung nodules have markedly increased in number and size  diffusely. There are more than 50 nodules in each lung. One of the  larger nodules on the right occurs in the superior segment of the right  lower lobe and measures 20 mm and one of the largest nodules on the left  occurs in the left lower lobe on CT image 20 measuring 27 mm diameter.  There is no pneumothorax or pleural effusion. There is significant  bibasilar atelectasis due to mass effect from large hiatal hernia.  Airways appear patent centrally. No definite pneumonia.     There are no acute findings in the superficial soft tissues. The bones  are diffusely demineralized. No acute osseous abnormalities or  destructive bone lesions. There is moderate thoracic spondylosis and  there is dextroscoliosis of the thoracolumbar spine. There is markedly  increased thoracic kyphosis, which distorts the anatomy.     Abdomen and pelvis: There is mild body wall edema. There is a left hip  prosthesis in place. There is mild DJD at the right hip. The bones are  demineralized. There is moderate lumbar spondylosis. There is S-shaped  scoliosis of the lumbar spine. No acute osseous abnormality or  destructive bone lesion.     The liver appears homogeneous. Patient appears status post  cholecystectomy. The bile ducts do not appear distended although poorly  visualized. The pancreas is  partially contained within a large hiatal  hernia along with numerous small bowel loops and the stomach. The spleen  is normal size. Adrenal glands appear normal. There are bilateral simple  appearing kidney cysts. No urolithiasis or hydronephrosis. Urinary  bladder is nondistended. Patient appears status post hysterectomy. The  ovaries are not seen. The appendix is not definitely seen. No small  bowel distention. There is mild diffuse mesenteric edema. There is mild  colonic fecal retention and mild colonic diverticulosis. No ascites,  pneumoperitoneum or lymphadenopathy.       Impression:         1. Significant worsening of metastatic disease in the chest with  significantly increased size of an mediastinal mass and increased size  and number of diffuse pulmonary metastatic nodules.  2. Redemonstration of a massive hiatal hernia containing the entire  stomach, numerous small bowel loops and a portion of the pancreas. No  definite complication.  3. Numerous chronic findings in the chest including aortic valvular and  mitral annular calcifications, coronary artery disease and enlarged main  pulmonary artery suggesting pulmonary arterial hypertension.  4. Numerous chronic findings in the abdomen including left hip  prosthesis, cholecystectomy, colonic diverticulosis and kidney cysts.  4. Mild anasarca with unclear etiology.     This report was finalized on 5/14/2024 8:31 PM by Reddy Perez MD.                 Impression:   Fever  Metastatic disease  Mediastinal mass  Diffuse pulmonary nodules  DVT  Brain hemorrhage  Leukocytosis with neutrophilia  PLAN/RECOMMENDATIONS:   Thank you for asking us to see Temi Jarrett, I recommend the following:  Temperatures in setting of low procalcitonin and in setting of DVT, brain hemorrhage may be noninfectious.    Previous urinalysis from 5/9 may represent colonization; this has been appropriately treated with ceftriaxone x 7 days       Procalcitonin low normal    Imaging of  CT brain remarkable for brain hemorrhage which I think is the best explanation for the fevers.    Patient was on hospice previously and I suspect with age along has poor prognosis.    Ongoing metastatic cancer, DVT, brain hemorrhage could all be causes of fevers.    Observe off abx    Agree with palliative medicine/comfort measures       Nicolás Negron MD  5/17/2024  13:31 EDT

## 2024-05-17 NOTE — TELEPHONE ENCOUNTER
Schedule a telephone visit at 2:15, and block out everything else between 1:30 and 3.  Might take a while.

## 2024-05-17 NOTE — PROGRESS NOTES
"Palliative Care Daily Progress Note     Referring:  Oscar Yeh  C/C: pt unable    S: Medical record reviewed. Events noted. Pt has had multiple ICH on right.  Not a candidate for intervention per NS.  Note son declined initial conversation with NS.  Ho signs of distress noted.  Remains on TF and appears to be tolerating.  Has also been seen by ID. No abx at this time.    ROS:   Pt unable    O: Code Status:   Code Status and Medical Interventions:   Ordered at: 05/12/24 1331     Medical Intervention Limits:    NO intubation (DNI)     Level Of Support Discussed With:    Next of Kin (If No Surrogate)     Code Status (Patient has no pulse and is not breathing):    No CPR (Do Not Attempt to Resuscitate)     Medical Interventions (Patient has pulse or is breathing):    Limited Support     Comments:    d/w patient's son via telephone      Advanced Directives: Advance Directive Status: Patient has advance directive, copy in chart   Goals of Care: Ongoing.   Palliative Performance Scale Score: 10     /72 (BP Location: Right arm, Patient Position: Lying)   Pulse 54   Temp 98.8 °F (37.1 °C) (Axillary)   Resp (!) 30   Ht 162.6 cm (64\")   Wt 55.2 kg (121 lb 12.8 oz)   LMP  (LMP Unknown)   SpO2 94%   BMI 20.91 kg/m²     Intake/Output Summary (Last 24 hours) at 5/17/2024 1045  Last data filed at 5/17/2024 0545  Gross per 24 hour   Intake 1369 ml   Output 650 ml   Net 719 ml       Physical Exam:    General Appearance:    unresponsive, NAD   HEENT:    PERRL, anicteric, MMM, face relaxed   Neck:   supple, trachea midline, no JVD   Lungs:     CTA bilat, diminished in bases; respirations symmetric, + tachypnea    Heart:    RRR, normal S1 and S2, +M/-R/-G   Abdomen:     Normal bowel sounds, soft, nontender, nondistended   G/U:   Deferred   MSK/Extremities:   No clubbing , cyanosis or edema, No wasting   Pulses:   Pulses palpable and equal bilaterally   Skin:   Warm, dry, no mottling   Neurologic:   Largely " unresponsive, occ response to noxious stim           Current Medications:      Current Facility-Administered Medications:     acetaminophen (TYLENOL) tablet 650 mg, 650 mg, Nasogastric, Q4H PRN, 650 mg at 05/15/24 2107 **OR** [PENDING] acetaminophen (TYLENOL) tablet 650 mg, 650 mg, Nasogastric, Q4H PRN **OR** acetaminophen (TYLENOL) 160 MG/5ML oral solution 650 mg, 650 mg, Nasogastric, Q4H PRN, 650 mg at 05/17/24 0825 **OR** [PENDING] acetaminophen (TYLENOL) 160 MG/5ML oral solution 650 mg, 650 mg, Nasogastric, Q4H PRN **OR** acetaminophen (TYLENOL) suppository 650 mg, 650 mg, Rectal, Q4H PRN **OR** [PENDING] acetaminophen (TYLENOL) suppository 650 mg, 650 mg, Rectal, Q4H PRN,     amLODIPine (NORVASC) tablet 5 mg, 5 mg, Nasogastric, Daily, Reddy Li, DO, 5 mg at 05/17/24 0825    sennosides-docusate (PERICOLACE) 8.6-50 MG per tablet 2 tablet, 2 tablet, Nasogastric, BID PRN **AND** [PENDING] sennosides-docusate (PERICOLACE) 8.6-50 MG per tablet 2 tablet, 2 tablet, Nasogastric, BID PRN **AND** polyethylene glycol (MIRALAX) packet 17 g, 17 g, Nasogastric, Daily PRN **AND** [PENDING] polyethylene glycol (MIRALAX) packet 17 g, 17 g, Nasogastric, Daily PRN **AND** [DISCONTINUED] bisacodyl (DULCOLAX) EC tablet 5 mg, 5 mg, Oral, Daily PRN **AND** [PENDING] bisacodyl (DULCOLAX) EC tablet 5 mg, 5 mg, Oral, Daily PRN **AND** bisacodyl (DULCOLAX) suppository 10 mg, 10 mg, Rectal, Daily PRN **AND** [PENDING] bisacodyl (DULCOLAX) suppository 10 mg, 10 mg, Rectal, Daily PRN,     busPIRone (BUSPAR) tablet 10 mg, 10 mg, Nasogastric, BID, Reddy Li DO, 10 mg at 05/17/24 0825    Calcium Replacement - Follow Nurse / BPA Driven Protocol, , Does not apply, PRN, Hoang Watson MD    donepezil (ARICEPT) tablet 10 mg, 10 mg, Nasogastric, Nightly, Reddy Li DO, 10 mg at 05/16/24 2007    ipratropium-albuterol (DUO-NEB) nebulizer solution 3 mL, 3 mL, Nebulization, Q4H PRN, Hoang Watson MD    lansoprazole  (PREVACID SOLUTAB) disintegrating tablet Tablet Delayed Release Dispersible 15 mg, 15 mg, Nasogastric, Q AM, Hoang Watson MD, 15 mg at 05/17/24 0545    levothyroxine (SYNTHROID, LEVOTHROID) tablet 150 mcg, 150 mcg, Nasogastric, Q AM, Reddy Li DO, 150 mcg at 05/17/24 0545    lisinopril (PRINIVIL,ZESTRIL) tablet 20 mg, 20 mg, Nasogastric, Daily, Reddy Li DO, 20 mg at 05/17/24 0825    Magnesium Standard Dose Replacement - Follow Nurse / BPA Driven Protocol, , Does not apply, PRWalter SUÁREZ John A, MD    memantine (NAMENDA) tablet 10 mg, 10 mg, Nasogastric, BID, Reddy Li DO, 10 mg at 05/17/24 0825    multivitamin and minerals liquid 15 mL, 15 mL, Nasogastric, Daily, Devon Haines, RPH, 15 mL at 05/17/24 0825    nitroglycerin (NITROSTAT) SL tablet 0.4 mg, 0.4 mg, Sublingual, Q5 Min PRN, Hoang Watson MD    nystatin (MYCOSTATIN) powder, , Topical, Q12H, Hoang Watson MD, Given at 05/17/24 0825    Phosphorus Replacement - Follow Nurse / BPA Driven Protocol, , Does not apply, Walter IQBAL John A, MD    Potassium Replacement - Follow Nurse / BPA Driven Protocol, , Does not apply, Walter IQBAL John A, MD    sertraline (ZOLOFT) tablet 50 mg, 50 mg, Nasogastric, Daily, Reddy Li DO, 50 mg at 05/17/24 0825    sodium chloride 0.9 % flush 10 mL, 10 mL, Intravenous, PRWalter SUÁREZ John A, MD, 10 mL at 05/16/24 2007     Labs:   Results from last 7 days   Lab Units 05/16/24  0502   WBC 10*3/mm3 13.84*   HEMOGLOBIN g/dL 13.5   HEMATOCRIT % 41.0   PLATELETS 10*3/mm3 251     Results from last 7 days   Lab Units 05/16/24  1620 05/16/24  0502   SODIUM mmol/L  --  139   POTASSIUM mmol/L 5.1 3.4*   CHLORIDE mmol/L  --  103   CO2 mmol/L  --  25.0   BUN mg/dL  --  18   CREATININE mg/dL  --  0.50*   CALCIUM mg/dL  --  7.4*   GLUCOSE mg/dL  --  140*     Imaging Results (Last 72 Hours)       Procedure Component Value Units Date/Time    CT Head Without Contrast [[id number]] Collected: 05/16/24  1040     Updated: 05/16/24 1050    Narrative:      CT HEAD WO CONTRAST    Date of Exam: 5/16/2024 9:49 AM EDT    Indication: f/u ich.    Comparison: 5/15/2024.    Technique: Axial CT images were obtained of the head without contrast administration.  Automated exposure control and iterative construction methods were used.      Findings:  A large right parietal lobe intraparenchymal hemorrhage is again identified and measures 5.5 x 3.8 cm on image #39 of series 3 as compared to 5.2 x 3.8 cm previously. An approximate 12 mm hemorrhage in the right occipital lobe posteriorly is stable in   size. Hemorrhage in the right lateral ventricle is similar as is layering hemorrhage in the posterior left lateral ventricle. There is stable hydrocephalus. Somewhat poorly defined right frontal lobe hemorrhage measures 13 mm in maximal dimension on   image #36 of series 3 which is stable. Some low-density edema in the white matter adjacent to the hemorrhages is similar. Artifact in the region of the tentorium obscures detail of this area. There is no midline shift. There is no transtentorial   herniation.      Impression:      Impression:  Minimal increase in size of right parietal lobe intraparenchymal hemorrhage. Other intraparenchymal hemorrhages are stable in size as is hemorrhage within the lateral ventricles. Stable hydrocephalus.        Electronically Signed: Kimberly Dudley MD    5/16/2024 10:47 AM EDT    Workstation ID: [id number]    CT Head Without Contrast [[id number]] Collected: 05/15/24 1746     Updated: 05/15/24 1804    Narrative:      CT HEAD WO CONTRAST    Date of Exam: 5/15/2024 5:35 PM EDT    Indication: Decreased level of consciousness.    Comparison: None available.    Technique: Axial CT images were obtained of the head without contrast administration.  Automated exposure control and iterative construction methods were used.    Findings:  New right parietal intraparenchymal hemorrhage. Small focus of right occipital  lobe intraparenchymal hemorrhage with small foci of right frontal lobe intraparenchymal hemorrhages. Intraventricular extension into the collapsed right lateral ventricle and   layering within the occipital horn of the left lateral ventricle. There appears to be extra-axial extension along the tentorium cerebelli. Extensive white matter changes are noted consistent with edema. Dilation of the lateral ventricular system compared   with the prior study suggest a component of developing hydrocephalus.    Prior bilateral lens replacements.      Impression:      New intraparenchymal hemorrhages with intraventricular and extra-axial extension.    Findings were discussed with the patient's care nurse at the time of interpretation who stated that she would contact Dr. Li. Attempting to contact Dr. Li through the  were unsuccessful.          Electronically Signed: aCrlitos Mary MD    5/15/2024 6:01 PM EDT    Workstation ID: [id number]    XR Abdomen KUB [[id number]] Collected: 05/15/24 1203     Updated: 05/15/24 1209    Narrative:      DATE OF EXAM: 5/15/2024 11:48 AM     PROCEDURE: XR ABDOMEN KUB-     INDICATIONS: NJ tube placement; N39.0-Urinary tract infection, site not  specified; G93.40-Encephalopathy, unspecified; C79.9-Secondary malignant  neoplasm of unspecified site     COMPARISON 8/24/2023     Technique: Single radiographic view of the abdomen was obtained.     FINDINGS:  An enteric tube is in place with the tip in the left midabdomen. This is  presumably within the distal stomach. Bowel gas pattern is normal. There  are surgical clips of the right abdomen.    Impression:      Enteric tube tip in the left mid abdomen, presumably in the distal  stomach.        This report was finalized on 5/15/2024 12:06 PM by Kimberly Dudley MD.       CT Chest Without Contrast Diagnostic [[id number]] Collected: 05/14/24 2019     Updated: 05/14/24 2034    Narrative:      Examination:  CT CHEST WO CONTRAST DIAGNOSTIC-  CT  ABDOMEN PELVIS WO CONTRAST-     Date of Exam: 5/14/2024 4:30 PM     Indication: Recurrent fever, unclear source (dementia/unreponsive);  N39.0-Urinary tract infection, site not specified;  G93.40-Encephalopathy, unspecified.     Comparison: 12/22/2023.     Technique: Non-contrast axial volumetric CT imaging of the chest,  abdomen and pelvis was performed. Automated exposure control and  iterative reconstruction methods were used.     Findings:  Chest: Previously noted prevascular mediastinal mass has increased in  size now measuring up to 6.9 x 4.9 cm. Thyroid is not well seen. The  trachea appears unremarkable. The esophagus is largely obscured in the  lower aspect due to a massive hiatal hernia that contains the entire  stomach and a large portion of the small bowel. The heart appears normal  size. There are aortic valvular calcifications and extensive mitral  annular calcifications. There are moderate coronary artery  calcifications. The main pulmonary artery is again enlarged suggesting  pulmonary arterial hypertension. There is a soft tissue density nodule  in the anterior mediastinal fat measuring 17 mm on image 50 of the axial  series.     Previously noted lung nodules have markedly increased in number and size  diffusely. There are more than 50 nodules in each lung. One of the  larger nodules on the right occurs in the superior segment of the right  lower lobe and measures 20 mm and one of the largest nodules on the left  occurs in the left lower lobe on CT image 20 measuring 27 mm diameter.  There is no pneumothorax or pleural effusion. There is significant  bibasilar atelectasis due to mass effect from large hiatal hernia.  Airways appear patent centrally. No definite pneumonia.     There are no acute findings in the superficial soft tissues. The bones  are diffusely demineralized. No acute osseous abnormalities or  destructive bone lesions. There is moderate thoracic spondylosis and  there is  dextroscoliosis of the thoracolumbar spine. There is markedly  increased thoracic kyphosis, which distorts the anatomy.     Abdomen and pelvis: There is mild body wall edema. There is a left hip  prosthesis in place. There is mild DJD at the right hip. The bones are  demineralized. There is moderate lumbar spondylosis. There is S-shaped  scoliosis of the lumbar spine. No acute osseous abnormality or  destructive bone lesion.     The liver appears homogeneous. Patient appears status post  cholecystectomy. The bile ducts do not appear distended although poorly  visualized. The pancreas is partially contained within a large hiatal  hernia along with numerous small bowel loops and the stomach. The spleen  is normal size. Adrenal glands appear normal. There are bilateral simple  appearing kidney cysts. No urolithiasis or hydronephrosis. Urinary  bladder is nondistended. Patient appears status post hysterectomy. The  ovaries are not seen. The appendix is not definitely seen. No small  bowel distention. There is mild diffuse mesenteric edema. There is mild  colonic fecal retention and mild colonic diverticulosis. No ascites,  pneumoperitoneum or lymphadenopathy.       Impression:         1. Significant worsening of metastatic disease in the chest with  significantly increased size of an mediastinal mass and increased size  and number of diffuse pulmonary metastatic nodules.  2. Redemonstration of a massive hiatal hernia containing the entire  stomach, numerous small bowel loops and a portion of the pancreas. No  definite complication.  3. Numerous chronic findings in the chest including aortic valvular and  mitral annular calcifications, coronary artery disease and enlarged main  pulmonary artery suggesting pulmonary arterial hypertension.  4. Numerous chronic findings in the abdomen including left hip  prosthesis, cholecystectomy, colonic diverticulosis and kidney cysts.  4. Mild anasarca with unclear etiology.     This  report was finalized on 5/14/2024 8:31 PM by Reddy Perez MD.       CT Abdomen Pelvis Without Contrast [[id number]] Collected: 05/14/24 2019     Updated: 05/14/24 2034    Narrative:      Examination:  CT CHEST WO CONTRAST DIAGNOSTIC-  CT ABDOMEN PELVIS WO CONTRAST-     Date of Exam: 5/14/2024 4:30 PM     Indication: Recurrent fever, unclear source (dementia/unreponsive);  N39.0-Urinary tract infection, site not specified;  G93.40-Encephalopathy, unspecified.     Comparison: 12/22/2023.     Technique: Non-contrast axial volumetric CT imaging of the chest,  abdomen and pelvis was performed. Automated exposure control and  iterative reconstruction methods were used.     Findings:  Chest: Previously noted prevascular mediastinal mass has increased in  size now measuring up to 6.9 x 4.9 cm. Thyroid is not well seen. The  trachea appears unremarkable. The esophagus is largely obscured in the  lower aspect due to a massive hiatal hernia that contains the entire  stomach and a large portion of the small bowel. The heart appears normal  size. There are aortic valvular calcifications and extensive mitral  annular calcifications. There are moderate coronary artery  calcifications. The main pulmonary artery is again enlarged suggesting  pulmonary arterial hypertension. There is a soft tissue density nodule  in the anterior mediastinal fat measuring 17 mm on image 50 of the axial  series.     Previously noted lung nodules have markedly increased in number and size  diffusely. There are more than 50 nodules in each lung. One of the  larger nodules on the right occurs in the superior segment of the right  lower lobe and measures 20 mm and one of the largest nodules on the left  occurs in the left lower lobe on CT image 20 measuring 27 mm diameter.  There is no pneumothorax or pleural effusion. There is significant  bibasilar atelectasis due to mass effect from large hiatal hernia.  Airways appear patent centrally. No definite  pneumonia.     There are no acute findings in the superficial soft tissues. The bones  are diffusely demineralized. No acute osseous abnormalities or  destructive bone lesions. There is moderate thoracic spondylosis and  there is dextroscoliosis of the thoracolumbar spine. There is markedly  increased thoracic kyphosis, which distorts the anatomy.     Abdomen and pelvis: There is mild body wall edema. There is a left hip  prosthesis in place. There is mild DJD at the right hip. The bones are  demineralized. There is moderate lumbar spondylosis. There is S-shaped  scoliosis of the lumbar spine. No acute osseous abnormality or  destructive bone lesion.     The liver appears homogeneous. Patient appears status post  cholecystectomy. The bile ducts do not appear distended although poorly  visualized. The pancreas is partially contained within a large hiatal  hernia along with numerous small bowel loops and the stomach. The spleen  is normal size. Adrenal glands appear normal. There are bilateral simple  appearing kidney cysts. No urolithiasis or hydronephrosis. Urinary  bladder is nondistended. Patient appears status post hysterectomy. The  ovaries are not seen. The appendix is not definitely seen. No small  bowel distention. There is mild diffuse mesenteric edema. There is mild  colonic fecal retention and mild colonic diverticulosis. No ascites,  pneumoperitoneum or lymphadenopathy.       Impression:         1. Significant worsening of metastatic disease in the chest with  significantly increased size of an mediastinal mass and increased size  and number of diffuse pulmonary metastatic nodules.  2. Redemonstration of a massive hiatal hernia containing the entire  stomach, numerous small bowel loops and a portion of the pancreas. No  definite complication.  3. Numerous chronic findings in the chest including aortic valvular and  mitral annular calcifications, coronary artery disease and enlarged main  pulmonary artery  suggesting pulmonary arterial hypertension.  4. Numerous chronic findings in the abdomen including left hip  prosthesis, cholecystectomy, colonic diverticulosis and kidney cysts.  4. Mild anasarca with unclear etiology.     This report was finalized on 5/14/2024 8:31 PM by Reddy Perez MD.                     Diagnostics: Reviewed    A:     Intracranial hemorrhage    Hypothyroidism (acquired)    Essential hypertension    Alzheimer's disease    Prolonged Q-T interval on ECG    Metastatic disease    UTI (urinary tract infection)       Impression:  Metastatic CA of unknown primary  Multiple R ICH  Alzheimer's  HTN  UTI    Symptoms:   AMS  Debility    P:   Await NS conversation with son prior further attempts at goals of care.  Palliative Care Team will continue to follow patient.     Lyudmila Perry MD, 5/17/2024, 10:45 EDT

## 2024-05-18 NOTE — SIGNIFICANT NOTE
[facility] called to bedside around 3 AM for increased work of breathing, agitation, rhonchi and worsening hypoxia.  Patient sounded wet, tube feeds were stopped.  Chest x-ray without overt pneumonia however the event was acute and likely has not had a chance to show pneumonitis yet.  Currently on high flow nasal cannula after failing titration of typical nasal cannula.  Patient was given 1 mg IV morphine with good resolution of agitation and work of breathing, now patient appears much more comfortable and vitals are stabilizing.      Son, Victor M, was called by [facility] and alerted to patient's decline.  Victor M is now at the bedside and I have just finished a 1 hour conversation with him regarding patient's current downward trajectory after having intracranial hemorrhage shown to be expanding upon last imaging findings, now with acute respiratory failure secondary to likely aspiration of keofeeds. Lethargic, baseline severe dementia, debilitated, advanced age of 89yo.      We discussed the complex multi-factorial decline happening.  In order to help him with his decision he asked my thoughts... I was clear that I do not expect the patient to survive this hospital stay and I expect her to decline further over the next 24 hours now that she has respiratory failure from probable acute aspiration event. Ultimately he agreed with this clinical assessment and is now accepting of her terminal trajectory after much discussion.    Victor M (POA) has discussed the current clinical decline with his supportive family member and would like to pursue HOSPICE consultation. I fully support this decision in context of all of the above to provide this patient with a comfortable, dignified terminal decline.    IVF's and TF's turned OFF due to respiratory failure  We discussed risks of ongoing TF's and futility of ongoing IV care given patient's trajectory, further interventions will not change her terminal decline trajectory at her advanced age and  debility.  No IV abx interventions initiated due to futility and comfort goals (Hospice consulted)  Orders placed for IV morphine, versed, robinul PRN and care plan discussed with patient's RN        Electronically signed by Bell Galeana MD, 05/18/24, 5:36 AM EDT.

## 2024-05-18 NOTE — PROGRESS NOTES
Continued Stay Note  [facility]     Patient Name: Temi Jarrett  MRN: [id number]  Today's Date: 5/18/2024    Admit Date: 5/9/2024    Plan: Ongoing   Discharge Plan       Row Name 05/18/24 1614       Plan    Plan Comments Hospice spoke with patient's son, Victor M Jarrett via telephone with RICHARD BEY present. He relayed that he was told this morning that the patient would be on hospice services with her current level of oxygen and treatment that is receiving with the exception of iv fluids and tube feeding. He relays that this is his desire to care to continue current interventions and level of care. He relayed that he does not want to wean off of high flow oxygen. He does not want hospice services if the oxygen level would be changed at all. He does not want oxygen changed to nasal cannula. Noted use of explicative and that Mr. Jarrett stated that he does not want hospice services. Rn thanked Mr. Jarrett for speaking with hospice. Mr. Jarrett terminated call. Mr. Jarrett has verbalized that he does not want hospice services or to change the level of care. Please note we have closed this consult per decline of services.                    Discharge Codes    No documentation.                 Expected Discharge Date and Time       Expected Discharge Date Expected Discharge Time    May 20, 2024               Aretha Smyth RN

## 2024-05-18 NOTE — DISCHARGE PLACEMENT REQUEST
"Temi Jarrett (89 y.o. Female)       Date of Birth   05/08/1930    Social Security Number       Address   [place] [address]    Home Phone   [phone]    MRN   [id number]       Tenriism   Congregation    Marital Status                               Admission Date   5/9/24    Admission Type   Emergency    Admitting Provider   Mili Doran DO    Attending Provider   Mili Doran DO    Department, Room/Bed   [facility] 6B, N640/1       Discharge Date       Discharge Disposition       Discharge Destination                                 Attending Provider: Mili Doran DO    Allergies: Augmentin [Amoxicillin-pot Clavulanate], Codeine, Shrimp, Strawberry    Isolation: None   Infection: None   Code Status: No CPR    Ht: 162.6 cm (64\")   Wt: 57.2 kg (126 lb)    Admission Cmt: None   Principal Problem: Intracranial hemorrhage [I62.9]                   Active Insurance as of 5/9/2024       Primary Coverage       Payor Plan Insurance Group Employer/Plan Group    MEDICARE MEDICARE A & B        Payor Plan Address Payor Plan Phone Number Payor Plan Fax Number Effective Dates    [address] [phone]  5/1/1995 - None Entered    [facility] [id number]         Subscriber Name Subscriber Birth Date Member ID       TEMI JARRETT 5/8/1930 [id number]               Secondary Coverage       Payor Plan Insurance Group Employer/Plan Group    ANTHEM BLUE CROSS ANTHEM BLUE CROSS BLUE SHIELD PPO [id number]       Payor Plan Address Payor Plan Phone Number Payor Plan Fax Number Effective Dates    [address] [phone]  12/1/2016 - None Entered    [facility] [id number]         Subscriber Name Subscriber Birth Date Member ID       TEMI JARRETT 5/8/1930 [id number]                     Emergency Contacts        (Rel.) Home Phone Work Phone Mobile Phone    Victor M Jarrett (POA) (Son) [phone] -- --    Chris Jarrett (Other) [phone] -- [phone]    " Mery Soto (Daughter) [phone] -- [phone]              Emergency Contact Information       Name Relation Home Work Mobile    Victor M Jarrett (POA) Son [phone]      Chris Jarrett [phone]    Mery Soto Daughter [phone]          Insurance Information                  MEDICARE/MEDICARE A & B Phone: [phone]    Subscriber: Temi Jarrett Subscriber#: [id number]    Group#: -- Precert#: --        ANTHEM BLUE CROSS/ANTHEM BLUE CROSS BLUE SHIELD PPO Phone: [phone]    Subscriber: Temi Jarrett Subscriber#: [id number]    Group#: [id number] Precert#: --             History & Physical        Jose Mustafa MD at 24              [facility] Medicine Services  HISTORY AND PHYSICAL    Patient Name: Temi Jarrett  : 1930  MRN: [id number]  Primary Care Physician: Eric Patel MD  Date of admission: 2024    Subjective  Subjective     Chief Complaint:  Altered mental status    HPI:  Temi Jarrett is a 89 y.o. female with past medical history significant for essential hypertension, Alzheimer's, hiatal hernia, hypothyroidism and metastatic disease with unknown primary presents to the ED due to altered mental status.  At the time my evaluation no family is present therefore HPI is limited.  ED provider did speak with son whom is POA and reports patient has been more lethargic with increased confusion.  Son notes that over the past few days her oxygen has not been properly attached to the concentrator, last time was this morning.  He states that he has concerns about the level of tension she is getting at the nursing facility she is currently at.  While at bedside patient does confirm she is having some dysuria but denies any shortness of air, cough, nausea or vomiting.        Review of Systems   Unable to perform ROS: Mental status change                Personal History     Past Medical History:   Diagnosis Date     Acute sepsis 01/14/2021    Arthritis     Bladder prolapse, female, acquired     Closed fracture of neck of left femur 09/27/2019    Dementia     Depression     Disease of thyroid gland     Gastric polyp     GERD (gastroesophageal reflux disease)     Hiatal hernia     History of colonic polyps     Hyperlipidemia     Hypertension     IBS (irritable bowel syndrome)     Macular degeneration     Pacemaker 2023    Pericardial effusion 08/03/2020    Echo (8/3/2020): Normal LVEF.  Moderate pericardial effusion without tamponade.  Moderate MR. RVSP 49 mmHg    Torn meniscus     right knee          Oncology Problem List:  Metastatic disease (12/22/2023; Status: Active)  Oncology/Hematology History       Past Surgical History:   Procedure Laterality Date    CHOLECYSTECTOMY  1997    ENDOSCOPY N/A 01/14/2021    Procedure: ESOPHAGOGASTRODUODENOSCOPY;  Surgeon: Serjio Guerrero MD;  Location:  [facility];  Service: General;  Laterality: N/A;    EYE SURGERY  1998    Cataract extraction    HERNIA REPAIR      HIP HEMIARTHROPLASTY Left 09/28/2019    Procedure: HIP HEMIARTHROPLASTY LEFT;  Surgeon: Reddy Segundo Jr., MD;  Location:  [facility];  Service: Orthopedics    HYSTERECTOMY  1976    KNEE SURGERY Right     arthroscopy- 2000    SKIN CANCER EXCISION Left     [facility]; CANCEROUS SPOT DIDN'T  MOVE BUT NEEDED REMOVED    TONSILLECTOMY         Family History:  family history includes Breast cancer in her mother; Diabetes in her son; Hypertension in her father and mother; Obesity in her mother.     Social History:  reports that she has never smoked. She has never used smokeless tobacco. She reports that she does not drink alcohol and does not use drugs.  Social History     Social History Narrative    Lives with son and daughter in law--  is at [facility] getting rehab       Medications:  Calcium Citrate-Vitamin D, Dimethicone, acetaminophen, amLODIPine, benzonatate, bethanechol, busPIRone,  diclofenac, donepezil, furosemide, gabapentin, guaifenesin, ipratropium-albuterol, levothyroxine, lisinopril, loperamide, memantine, menthol-zinc oxide, multivitamin with minerals, nystatin, omeprazole, ondansetron, polyethylene glycol, sertraline, and traMADol    Allergies   Allergen Reactions    Augmentin [Amoxicillin-Pot Clavulanate] Diarrhea    Codeine Nausea Only     Trouble Breathing     Shrimp Hives    [place] Unknown - Low Severity       Objective  Objective     Vital Signs:   Temp:  [98.2 °F (36.8 °C)] 98.2 °F (36.8 °C)  Heart Rate:  [63-83] 63  Resp:  [18] 18  BP: (139-154)/(67-97) 141/67  Flow (L/min):  [2] 2    Physical Exam  Vitals and nursing note reviewed.   Constitutional:       General: She is not in acute distress.     Appearance: Normal appearance. She is not ill-appearing, toxic-appearing or diaphoretic.   HENT:      Head: Normocephalic and atraumatic.      Nose: Nose normal.      Mouth/Throat:      Mouth: Mucous membranes are dry.      Pharynx: Oropharynx is clear.   Eyes:      Extraocular Movements: Extraocular movements intact.      Conjunctiva/sclera: Conjunctivae normal.      Pupils: Pupils are equal, round, and reactive to light.   Cardiovascular:      Rate and Rhythm: Normal rate and regular rhythm.      Pulses: Normal pulses.      Heart sounds: Murmur heard.   Pulmonary:      Effort: Pulmonary effort is normal.      Breath sounds: Normal breath sounds.   Abdominal:      General: Bowel sounds are normal. There is no distension.      Palpations: Abdomen is soft. There is no mass.      Tenderness: There is no abdominal tenderness. There is no right CVA tenderness, left CVA tenderness, guarding or rebound.      Hernia: No hernia is present.   Musculoskeletal:         General: No swelling, tenderness, deformity or signs of injury.      Cervical back: Normal range of motion and neck supple.      Right lower leg: No edema.      Left lower leg: No edema.   Skin:     General: Skin is warm.    Neurological:      Mental Status: She is alert. Mental status is at baseline. She is disoriented.   Psychiatric:         Mood and Affect: Mood normal.            Result Review:  I have personally reviewed the results from the time of this admission to 5/9/2024 19:34 EDT and agree with these findings:  [x]  Laboratory list / accordion  [x]  Microbiology  [x]  Radiology  [x]  EKG/Telemetry   []  Cardiology/Vascular   []  Pathology  []  Old records  []  Other:  Most notable findings include:     LAB RESULTS:      Lab 05/09/24  1703   WBC 9.85   HEMOGLOBIN 12.9   HEMATOCRIT 39.3   PLATELETS 181   NEUTROS ABS 8.72*   IMMATURE GRANS (ABS) 0.05   LYMPHS ABS 0.65*   MONOS ABS 0.37   EOS ABS 0.01   MCV 90.6         Lab 05/09/24  1703   SODIUM 142   POTASSIUM 3.9   CHLORIDE 102   CO2 33.0*   ANION GAP 7.0   BUN 15   CREATININE 0.66   EGFR 81.4   GLUCOSE 135*   CALCIUM 8.9   MAGNESIUM 1.8         Lab 05/09/24  1703   TOTAL PROTEIN 6.3   ALBUMIN 3.5   GLOBULIN 2.8   ALT (SGPT) 13   AST (SGOT) 23   BILIRUBIN 0.6   ALK PHOS 56         Lab 05/09/24  1703   HSTROP T 28*                 Brief Urine Lab Results  (Last result in the past 365 days)        Color   Clarity   Blood   Leuk Est   Nitrite   Protein   CREAT   Urine HCG        05/09/24 1728 Yellow   Turbid   Small (1+)   Negative   Positive   >=300 mg/dL (3+)                 Microbiology Results (last 10 days)       ** No results found for the last 240 hours. **            XR Chest 1 View    Result Date: 5/9/2024  XR CHEST 1 VW Date of Exam: 5/9/2024 5:32 PM EDT Indication: Weak/Dizzy/AMS triage protocol Comparison: Contrast-enhanced CT of the chest performed on December 22, 2023. Chest radiograph performed on December 22, 2023 Findings: Limited study due to patient's rotation to the right. Innumerable bilateral nodular opacities are visualized. Large hiatal hernia is visualized. The aorta is uncoiled. There is suggestion of a left paratracheal mass. Cardiac silhouette is  mildly enlarged. Leadless pacemaker is noted. No acute osseous abnormalities. Osseous structures are demineralized.     Impression: Impression: Findings consistent with metastatic disease to the lungs, appears progressed when compared to prior studies. Large hiatal hernia. Electronically Signed: Bay Duong MD  5/9/2024 6:09 PM EDT  Workstation ID: [id number]     Results for orders placed during the hospital encounter of 08/02/20    Transthoracic Echo Complete With Contrast if Necessary Per Protocol    Interpretation Summary  · Left ventricular systolic function is normal. Estimated EF = 70%.  · Left ventricular diastolic dysfunction (grade I) consistent with impaired relaxation.  · Left atrial cavity size is moderately dilated.  · There is calcification of the aortic valve. There is no significant stenosis or regurgitation. A Lambel's excrescence is noted on an aortic valve leaflet.  · Moderate mitral valve regurgitation is present.  · Estimated right ventricular systolic pressure from tricuspid regurgitation is moderately elevated (49 mmHg).  · There is a moderate (1-2cm) circumferential pericardial effusion. There is no tamponade physiology.      Assessment & Plan  Assessment & Plan       UTI (urinary tract infection)    Hypothyroidism (acquired)    Essential hypertension    Alzheimer's disease    Prolonged Q-T interval on ECG    Metastatic disease      89-year-old female presents the ED from nursing facility due to increased lethargy and confusion.    1) AMS      UTI-POA  - Prior urine cultures reviewed: Positive growth for yeast, Klebsiella pneumoniae ssp pneumoniae and Proteus Mirabilis  - UA noted above  - Urine cultures pending  - Continue Rocephin  - IV fluids  - Bladder scan every 6 for urinary retention  -prn ABG    2) metastatic disease  - Has been evaluated by oncology-no treatment options due to age and functional status  - Currently in home hospice at the nursing facility  - Unknown primary    3)  Alzheimer's disease  - Continue Namenda and Aricept    4) GERD  - PPI    5) hypothyroidism  - Continue Synthroid, check TSH    6) large hiatal hernia  - Prior CTs noted above  - Currently asymptomatic    7) anxiety/depression  - Continue Zoloft, BuSpar    8) essential hypertension  - Continue Norvasc, lisinopril       DVT prophylaxis:  scds    CODE STATUS:  Full Code        Expected Discharge  TBD     This note has been completed as part of a split-shared workflow.     Signature:Electronically signed by MAIDA Rodriguez, 05/09/24, 7:44 PM EDT.        Attending   Admission Attestation       I have performed an independent face-to-face diagnostic evaluation including performing an independent physical examination.  I approve of the documented plan of care above that was reviewed and developed with the advanced practice clinician (APC) and take responsibility for that plan along with its associated risks.  I have updated the HPI as appropriate.    Brief HPI    89 yo with history of alzheimer's dementia, Hypothyroid, and known lung lesions with hilar adenopathy (previously evaluated by Oncology with further workup deferred due to poor performance status), presents with increased lethargy and confusion at her care facility.    Attending Physical Exam:  Temp:  [97 °F (36.1 °C)-98.2 °F (36.8 °C)] 97 °F (36.1 °C)  Heart Rate:  [62-83] 62  Resp:  [18] 18  BP: (139-154)/(67-97) 149/84  Flow (L/min):  [2] 2    Very frail, in bed  MM dry  RRR  Breath sounds grossly clear, poor effort  Abd soft, NT  No edema  Flat affect  Somnolent, will attempt to answer intermittent questions briefly. Confused.     Result Review:  I have personally reviewed the results from the time of this admission to 5/9/2024 23:29 EDT and agree with these findings:  [x]  Laboratory list / accordion  []  Microbiology  []  Radiology  []  EKG/Telemetry   []  Cardiology/Vascular   []  Pathology  []  Old records  []  Other:  Most notable findings include:      [START ON 5/10/2024] amLODIPine, 5 mg, Oral, Daily  busPIRone, 10 mg, Oral, BID  [START ON 5/10/2024] cefTRIAXone, 1,000 mg, Intravenous, Q24H  donepezil, 10 mg, Oral, Nightly  [START ON 5/10/2024] levothyroxine, 150 mcg, Oral, Q AM  [START ON 5/10/2024] lisinopril, 20 mg, Oral, Daily  memantine, 10 mg, Oral, BID  [START ON 5/10/2024] multivitamin with minerals, 1 tablet, Oral, Daily  nystatin, , Topical, Q12H  [START ON 5/10/2024] pantoprazole, 40 mg, Oral, Daily  [START ON 5/10/2024] sertraline, 50 mg, Oral, Daily          Assessment and Plan:    Encephalopathy  - likely due to UTI in setting of underlying dementia    UTI  - rocephin, follow cultures    Metastatic lung nodules  - previously seen by Oncology, not a candidate for therapy due to poor physical condition  - reportedly on hospice at her care facility, however currently full code  - consider Palliative consult to help patient/family define goals of care.    Alzheimer's dementia    Hypothyroid    HTN    Anxiety/depression        See assessment and plan documented by APC above and updated/edited by me as appropriate.    Jose Mustafa MD  05/09/24                         Electronically signed by Jose Mustafa MD at 05/09/24 9811

## 2024-05-18 NOTE — PROGRESS NOTES
"[facility]:    Called son Victor M this evening. He was quite irate about his discussion with hospice team and was having significant difficulty understanding why patient is not allowed to have high flow O2 and hospice care. We spent 30 minutes discussing reasoning behind terminal O2 wean and having medications in place for comfort and air hunger during this time. He states he feels like we are \"just wanting my mother to die ASAP\" and stated that \"I do not understand how you can remove high flow when it is keeping her comfortable and alive\". I attempted to explain in multiple different ways how the transition would look, however he is adamant that we DO NOT remove her high flow at this time. He was agreeable otherwise to keep her as comfortable as possible with the exception of her O2 removal. We discussed stopping tube feeds, IVF, IV abx at this time which he was initially hesitant to go through with, although after discussion was agreeable to stopping these. He is adamant that he DOES NOT want hospice on board at this time. We discussed DNR/DNI, no defibrillation, CPR or mechanical ventilation which he is agreeable to. We discussed NO escalation of care of which he verbalized he was in agreement with.  I discussed that his mother is actively dying with multiple terminal conditions and that we should try to make this as comfortable and peaceful for her as possible, he was agreeable to comfort measures w/the exception of the removal of her high flow cannula. Hopefully with additional discussions he will be open to weaning this down, but at this time he is not.    Difficult discussion and difficulty understanding true terminal nature of her condition and how her transition to full comfort measures/hospice would align w/his goals. Will leave comfort meds in place.  He would like to be contacted with any changes overnight.    Victor M Jarrett [phone]    Electronically signed by Mili Doran DO, 05/18/24, 5:54 PM EDT.    "

## 2024-05-18 NOTE — PLAN OF CARE
Goal Outcome Evaluation:      Continuing POC for patient. Frequent repositioning and interventions for comfort performed. Meds adjusted for fevers. Patient hasn't shown any signs of pain or distress this shift. Unresponsive to touch or voice. Tachypnea has resolved at this time. Frequent communication with MD and other members of team to coordinate care. Spoke with son one time via phone, as well as son's wife once earlier in afternoon and updated them per their request. Hi flow cannula remains in place.

## 2024-05-18 NOTE — PLAN OF CARE
Goal Outcome Evaluation:  Plan of Care Reviewed With: patient, son, family        Progress: declining  Outcome Evaluation: Patient GCS 3 this shift. Grimicing with oral care and suctioning. On 2L NC early in shift with oxygen saturation 90-92%. Lungs clear to auscultation at start of shift. At 0200 round patient noted to be tac hypnic with RR 38 breather per minute. Patient noted to be grunting with breathing. Oxygen saturation decreased to 88% . On 2L NC. Oxygen increased to 5L NC with improvement in oxygen saturation to 91-93%. Lungs course throughout. Provider notified, ABG and CXR ordered. Provider at bedside. Patient placed on HFNC per provider order. Tube feed being held per APRN order. One time dose Morphine 1mg IV given. Patient RR decreased to 28 breaths per minute after morphine dose. Grunting stopped after morphibne.                               Problem: Adult Inpatient Plan of Care  Goal: Plan of Care Review  Outcome: Unable to Meet, Plan Revised  Flowsheets (Taken 5/18/2024 0531)  Progress: declining  Plan of Care Reviewed With:   patient   son   family  Outcome Evaluation: Patient GCS 3 this shift. Grimicing with oral care and suctioning. On 2L NC early in shift with oxygen saturation 90-92%. Lungs clear to auscultation at start of shift. At 0200 round patient noted to be tac hypnic with RR 38 breather per minute. Patient noted to be grunting with breathing. Oxygen saturation decreased to 88% . On 2L NC. Oxygen increased to 5L NC with improvement in oxygen saturation to 91-93%. Lungs course throughout. Provider notified, ABG and CXR ordered. Provider at bedside. Patient placed on HFNC per provider order. Tube feed being held per APRN order. One time dose Morphine 1mg IV given. Patient RR decreased to 28 breaths per minute after morphine dose. Grunting stopped after morphibne.  Goal: Patient-Specific Goal (Individualized)  Outcome: Unable to Meet, Plan Revised  Goal: Absence of Hospital-Acquired  Illness or Injury  Outcome: Unable to Meet, Plan Revised  Goal: Readiness for Transition of Care  Outcome: Unable to Meet, Plan Revised     Problem: UTI (Urinary Tract Infection)  Goal: Improved Infection Symptoms  Outcome: Unable to Meet, Plan Revised     Problem: Palliative Care  Goal: Enhanced Quality of Life  Outcome: Unable to Meet, Plan Revised     Problem: Skin Injury Risk Increased  Goal: Skin Health and Integrity  Outcome: Unable to Meet, Plan Revised     Problem: Fall Injury Risk  Goal: Absence of Fall and Fall-Related Injury  Outcome: Unable to Meet, Plan Revised

## 2024-05-18 NOTE — SIGNIFICANT NOTE
05/18/24 0858   SLP Deferred Reason   SLP Deferred Reason   (Chart reviewed. Spoke with RN. SLP signing off given decline and likely move towards Hospice care. Reconsult if any change/needs. Thanks)

## 2024-05-18 NOTE — CONSULTS
Continued Stay Note  [facility]     Patient Name: Temi Jarrett  MRN: [id number]  Today's Date: 5/18/2024    Admit Date: 5/9/2024    Plan: Ongoing   Discharge Plan       Row Name 05/18/24 1122       Plan    Plan Comments Inpatient hospice in presence of ELIZABETH Greenberg SW made contact with patient's son, Victor M and his wife. They relay that the son was awoken by phone call, was sleeping because he was at hospital all night with his mother, and requested this discussion be had later today. They requested 1500 call back.                   Discharge Codes    No documentation.                 Expected Discharge Date and Time       Expected Discharge Date Expected Discharge Time    May 20, 2024               Aretha Smyth RN

## 2024-05-19 ENCOUNTER — TELEPHONE (OUTPATIENT)
Dept: INTERNAL MEDICINE | Facility: CLINIC | Age: 89
End: 2024-05-19
Payer: MEDICARE

## 2024-05-19 NOTE — PROGRESS NOTES
[facility] Medicine Services  PROGRESS NOTE    Patient Name: Temi Jarrett  : 1930  MRN: [id number]    Date of Admission: 2024  Primary Care Physician: Eric Patel MD    Subjective   Subjective     CC:  Follow-up for ICH    HPI:  Patient sleeping, remains obtunded and not responsive. On high flow, appears very comfortable.    Objective   Objective     Vital Signs:   Temp:  [98.7 °F (37.1 °C)-102.8 °F (39.3 °C)] 98.9 °F (37.2 °C)  Heart Rate:  [50-61] 52  Resp:  [14-23] 18  BP: (101-127)/(52-60) 127/58  Flow (L/min):  [40-50] 40     Physical Exam:  Constitutional: No acute distress, sleeping in bed.  HENT: NCAT, mucous membranes dry  Respiratory: non-labored on high flow O2  Cardiovascular: RRR  Gastrointestinal: soft, nontender, nondistended  Musculoskeletal: No bilateral ankle edema  Psychiatric: UTO  Neurologic: obtunded  Skin: No rashes        Results Reviewed:  LAB RESULTS:      Lab 24  0502 05/15/24  [id number]   WBC 13.84* 12.65* 13.40*   HEMOGLOBIN 13.5 12.7 13.0   HEMATOCRIT 41.0 38.6 40.5   PLATELETS 251 217 204   NEUTROS ABS 11.64* 10.07*  --    IMMATURE GRANS (ABS) 0.08* 0.07*  --    LYMPHS ABS 0.93 1.33  --    MONOS ABS 1.05* 1.04*  --    EOS ABS 0.09 0.10  --    MCV 88.4 87.9 90.8   PROCALCITONIN 0.09  --   --          Lab 24  1620 24  0502 05/15/24  0411 24  0358 2415   SODIUM  --  139 140 141  --  142   POTASSIUM 5.1 3.4* 3.7 4.0 4.2 3.0*   CHLORIDE  --  103 104 104  --  103   CO2  --  25.0 28.0 29.0  --  31.0*   ANION GAP  --  11.0 8.0 8.0  --  8.0   BUN  --  18 14 14  --  15   CREATININE  --  0.50* 0.47* 0.55*  --  0.48*   EGFR  --  87.0 88.3 85.1  --  87.9   GLUCOSE  --  140* 117* 118*  --  127*   CALCIUM  --  7.4* 7.2* 7.5*  --  7.9*   MAGNESIUM  --  1.9  --  1.8  --  1.8   PHOSPHORUS  --  2.3*  --   --   --   --                              Lab 24  0258   PH, ARTERIAL 7.426   PCO2,  ARTERIAL 41.0   PO2 ART 57.3*   FIO2 40   HCO3 ART 26.9*   BASE EXCESS ART 2.2*   CARBOXYHEMOGLOBIN 1.7     Brief Urine Lab Results  (Last result in the past 365 days)        Color   Clarity   Blood   Leuk Est   Nitrite   Protein   CREAT   Urine HCG        05/17/24 1620 Yellow   Clear   Moderate (2+)   Trace   Negative   >=300 mg/dL (3+)                   Microbiology Results Abnormal       Procedure Component Value - Date/Time    Blood Culture - Blood, Hand, Left [[id number]]  (Normal) Collected: 05/14/24 1842    Lab Status: Preliminary result Specimen: Blood from Hand, Left Updated: 05/18/24 2000     Blood Culture No growth at 4 days    Narrative:      Aerobic Bottle Only    Less than seven (7) mL's of blood was collected.  Insufficient quantity may yield false negative results.    Blood Culture - Blood, Arm, Right [[id number]]  (Normal) Collected: 05/14/24 1846    Lab Status: Preliminary result Specimen: Blood from Arm, Right Updated: 05/18/24 2000     Blood Culture No growth at 4 days    Urine Culture - Urine, Straight Cath [[id number]]  (Normal) Collected: 05/17/24 1620    Lab Status: Preliminary result Specimen: Urine from Straight Cath Updated: 05/18/24 1250     Urine Culture No growth    Respiratory Panel PCR w/COVID-19(SARS-CoV-2) [facility]/EMILE/KEARA/[facility]/PARMINDER In-House, NP Swab in UTM/VTM, 2 HR TAT - Swab, Nasopharynx [[id number]]  (Normal) Collected: 05/13/24 0843    Lab Status: Final result Specimen: Swab from Nasopharynx Updated: 05/13/24 0948     ADENOVIRUS, PCR Not Detected     Coronavirus 229E Not Detected     Coronavirus HKU1 Not Detected     Coronavirus NL63 Not Detected     Coronavirus OC43 Not Detected     COVID19 Not Detected     Human Metapneumovirus Not Detected     Human Rhinovirus/Enterovirus Not Detected     Influenza A PCR Not Detected     Influenza B PCR Not Detected     Parainfluenza Virus 1 Not Detected     Parainfluenza Virus 2 Not Detected     Parainfluenza Virus 3 Not Detected     Parainfluenza  Virus 4 Not Detected     RSV, PCR Not Detected     Bordetella pertussis pcr Not Detected     Bordetella parapertussis PCR Not Detected     Chlamydophila pneumoniae PCR Not Detected     Mycoplasma pneumo by PCR Not Detected    Narrative:      In the setting of a positive respiratory panel with a viral infection PLUS a negative procalcitonin without other underlying concern for bacterial infection, consider observing off antibiotics or discontinuation of antibiotics and continue supportive care. If the respiratory panel is positive for atypical bacterial infection (Bordetella pertussis, Chlamydophila pneumoniae, or Mycoplasma pneumoniae), consider antibiotic de-escalation to target atypical bacterial infection.            XR Chest 1 View    Result Date: 5/18/2024  XR CHEST 1 VW Date of Exam: 5/18/2024 2:45 AM EDT Indication: respiratory distress Comparison: Chest radiograph dated May 9, 2024 Findings: There is a feeding tube in the stomach. The heart is enlarged. There are small bilateral pleural effusions. There is a mass in the left perihilar region as seen on recent CT scan. There are diffuse bilateral pulmonary nodules and masses consistent with widespread metastatic disease to the chest.     Impression: Impression: Innumerable pulmonary nodules and left perihilar mass as described on recent CT. There is a feeding tube in the stomach. Electronically Signed: Jabari Ruiz MD  5/18/2024 3:20 AM EDT  Workstation ID: [id number]     Results for orders placed during the hospital encounter of 08/02/20    Transthoracic Echo Complete With Contrast if Necessary Per Protocol    Interpretation Summary  · Left ventricular systolic function is normal. Estimated EF = 70%.  · Left ventricular diastolic dysfunction (grade I) consistent with impaired relaxation.  · Left atrial cavity size is moderately dilated.  · There is calcification of the aortic valve. There is no significant stenosis or regurgitation. A Lambel's excrescence is  noted on an aortic valve leaflet.  · Moderate mitral valve regurgitation is present.  · Estimated right ventricular systolic pressure from tricuspid regurgitation is moderately elevated (49 mmHg).  · There is a moderate (1-2cm) circumferential pericardial effusion. There is no tamponade physiology.      Current medications:  Scheduled Meds:busPIRone, 10 mg, Nasogastric, BID  donepezil, 10 mg, Nasogastric, Nightly  lansoprazole, 15 mg, Nasogastric, Q AM  memantine, 10 mg, Nasogastric, BID  nystatin, , Topical, Q12H  sertraline, 50 mg, Nasogastric, Daily      Continuous Infusions:     PRN Meds:.  acetaminophen **OR** [PENDING] acetaminophen **OR** acetaminophen **OR** [PENDING] acetaminophen **OR** acetaminophen **OR** [PENDING] acetaminophen    glycopyrrolate    ibuprofen    ipratropium-albuterol    midazolam    Morphine    nitroglycerin    sodium chloride    Assessment & Plan   Assessment & Plan     Active Hospital Problems    Diagnosis  POA    **Intracranial hemorrhage [I62.9]  Yes    UTI (urinary tract infection) [N39.0]  Yes    Metastatic disease [C79.9]  Yes    Prolonged Q-T interval on ECG [R94.31]  Yes    Alzheimer's disease [G30.9, F02.80]  Yes    Essential hypertension [I10]  Yes    Hypothyroidism (acquired) [E03.9]  Yes      Resolved Hospital Problems   No resolved problems to display.        Brief Hospital Course to date:  Temi Jarrett is a 89 y.o. female resident of [facility] diagnosed with 7.5 cm LT lung mass with associated nodules 12/2023, seen by oncology at that time and deemed not a candidate for therapy, she agreed to defer invasive biopsy, she has been at her facility under hospice care recently; other history includes dementia, HTN, hypothyroidism, hiatal hernia, GERD, she was sent to the ED for evaluation of increasing lethargy.  On arrival in the ED she had CXR which demonstrated progressive malignancy, UA was obtained which was abnormal and her symptoms were attributed to  bacteriuria.  She was admitted and placed on IV antibiotics with culture returning positive for pansensitive E. coli, she has completed 6 days of IV antibiotics for the same.  She had progressively decreasing level of consciousness, now completely obtunded with only minimal grimace to noxious stimuli, and continued to have ongoing fevers in the face of adequate antimicrobial coverage without other source of infection.  Per her family's wishes hospice benefits were revoked, a Keofeed was attempted but was unsuccessful due to her large hiatal hernia (stomach is completely intrathoracic), GI was consulted who placed an NJ tube endoscopically for tube feeds.  She continued to have fevers and remained altered so further evaluation was expanded, lower extremity venous duplex demonstrated subacute appearing DVT, she received a dose of Lovenox, CT of the head was also obtained which later revealed large intracranial hemorrhage within the ventricle with extra-axial extension    Goals of care  - multiple physicians encouraging hospice transition including myself, son currently refusing transition and hospice closed consult. Please see my note dated 5/18 for conversation. Currently comfort measures, however son desires to leave her high flow O2 in place. Plan no further escalation of care    ICH (Intraventricular w/ extra-axial extension)  - Patient deemed to be nonsurgical candidate.  Neurosurgery team recommended hospice care  - palliative care following    Recurrent fevers  E Coli UTI  - UrCx w/ pan-sensitive E coli; renal US neg for abscess/pyelo  - CT chest, abdomen and pelvis w/ extensive malignant changes but no other signs of infectious process  - s/p x6 days of culture directed Abx (CTX)  - ID followed, agreement that fevers are likely from DVT and intracranial bleed    Subacute LT gastrocnemius DVT  - S/p single dose lovenox, subsequent ICH discovered and AC discontinued    Progressive metastatic cancer (unknown  primary)  - Palliative/hospice have followed, patient's son elected to revoke hospice benefits on admission and is still declining hospice transition.  -CT chest with significant worsening of metastatic disease in the chest with significantly increased size of an mediastinal mass and increased size and number of diffuse pulmonary metastatic nodules.     Lethargy w/Decreased PO intake  - S/p endoscopically placed feeding tube 5/15; due to significant hiatal hernia she is not a PEG candidate    Alzheimer dementia - donepezil, memantine  Anxiety, depression - buspar, zoloft  GERD/HH - ppi  Hypothyroidism -  levothyroxine  HTN - amlodipine, lisinopril    Expected Discharge Location and Transportation: comfort measures, will not survive hospital stay  Expected Discharge   Expected Discharge Date: 5/20/2024; Expected Discharge Time:      DVT prophylaxis:  Mechanical DVT prophylaxis orders are present.         AM-PAC 6 Clicks Score (PT): 6 (05/18/24 2048)    CODE STATUS:   Code Status and Medical Interventions:   Ordered at: 05/18/24 0333     Level Of Support Discussed With:    Health Care Surrogate     Code Status (Patient has no pulse and is not breathing):    No CPR (Do Not Attempt to Resuscitate)     Medical Interventions (Patient has pulse or is breathing):    Comfort Measures     Comments:    PLEASE DO NOT REMOVE HIGH FLOW       Mili Doran, DO  05/19/24

## 2024-05-19 NOTE — PLAN OF CARE
Goal Outcome Evaluation:      Patient in no apparent discomfort entire shift. Afebrile. Son called and updated this morning. Daughter called and updated this afternoon.

## 2024-05-19 NOTE — PLAN OF CARE
Goal Outcome Evaluation:   No noted signs of discomfort. No PRN's given. Remains on HFNC.

## 2024-05-19 NOTE — TELEPHONE ENCOUNTER
Looks like she took a severe turn for the worse early AM of the 18th.  He may not need to have a conversation with me.  Let him know that I agree with Dr Galeana about hospice and supportive care.

## 2024-05-20 NOTE — PLAN OF CARE
Goal Outcome Evaluation:   Patient unresponsive. Remains on HFNC. PRN robinul given x1. Febrile throughout the night, PRN tylenol and ibuprofen given. Currently resting comfortably. Continue plan of care.

## 2024-05-20 NOTE — PLAN OF CARE
"  Problem: Palliative Care  Goal: Enhanced Quality of Life  Intervention: Optimize Psychosocial Wellbeing  Recent Flowsheet Documentation  Taken 5/20/2024 1424 by Eliana Galeana \"Marisol\" MARIVEL BECK  Family/Support System Care: (spoke with patient's dtr in law, she will give son my contact number)   support provided   caregiver stress acknowledged  Taken 5/20/2024 1404 by Eliana Galeana \"Marisol\" ADE BECKW  Supportive Measures: (visit at bedside, pt unrepsonsive, agonal breathing on HFNC, actively dying) other (see comments)     "

## 2024-05-20 NOTE — PLAN OF CARE
Problem: Palliative Care  Goal: Enhanced Quality of Life  Intervention: Optimize Psychosocial Wellbeing  Flowsheets (Taken 5/20/2024 1503)  Supportive Measures: other (see comments)     Problem: Palliative Care  Goal: Enhanced Quality of Life  Intervention: Promote Advance Care Planning  Flowsheets (Taken 5/20/2024 1508)  Life Transition/Adjustment: end-of-life care initiated   Goal Outcome Evaluation:           Progress: declining  Outcome Evaluation: Pt is actively dying on 45%/40L HFNC. Pt has agonal breathing.  Labored at times. Morphine prn for tachypnea/labored breathing.  Pt is febrile. Continue tylenol and ibuprofen via NJ tube.  Pt is comfort measures except family wish to continue HFNC. Do not titrate HFNC. Continue comfort meds.  Family does NOT want hospice. [facility] for EOL care.  no family at bedside. Discussed plan with Mg DONNELLY.    Palliative IDT:RN, SW, APRMD KAMINI,   After hours# [phone]

## 2024-05-20 NOTE — PROGRESS NOTES
[facility] Medicine Services  PROGRESS NOTE    Patient Name: Temi Jarrett  : 1930  MRN: [id number]    Date of Admission: 2024  Primary Care Physician: Eric Patel MD    Subjective   Subjective     CC:  Follow-up for ICH    HPI:  Patient sleeping in bed. No changes, on high flow. Overall comfortable    Objective   Objective     Vital Signs:   Temp:  [98.2 °F (36.8 °C)-101.8 °F (38.8 °C)] 101.8 °F (38.8 °C)  Heart Rate:  [52-63] 57  Resp:  [16-20] 16  BP: (124-136)/(57-63) 136/62  Flow (L/min):  [40] 40     Physical Exam:  Constitutional: No acute distress, sleeping in bed.  HENT: NCAT, mucous membranes dry  Respiratory: more-labored on high flow O2  Cardiovascular: RRR  Gastrointestinal: soft, nontender, nondistended  Musculoskeletal: No bilateral ankle edema  Psychiatric: UTO  Neurologic: obtunded  Skin: No rashes        Results Reviewed:  LAB RESULTS:      Lab 24  0502 05/15/24  0411   WBC 13.84* 12.65*   HEMOGLOBIN 13.5 12.7   HEMATOCRIT 41.0 38.6   PLATELETS 251 217   NEUTROS ABS 11.64* 10.07*   IMMATURE GRANS (ABS) 0.08* 0.07*   LYMPHS ABS 0.93 1.33   MONOS ABS 1.05* 1.04*   EOS ABS 0.09 0.10   MCV 88.4 87.9   PROCALCITONIN 0.09  --          Lab 24  1620 24  0502 05/15/24  0411 24  0358 24  2208   SODIUM  --  139 140 141  --    POTASSIUM 5.1 3.4* 3.7 4.0 4.2   CHLORIDE  --  103 104 104  --    CO2  --  25.0 28.0 29.0  --    ANION GAP  --  11.0 8.0 8.0  --    BUN  --  18  14  --    CREATININE  --  0.50* 0.47* 0.55*  --    EGFR  --  87.0 88.3 85.1  --    GLUCOSE  --  140* 117* 118*  --    CALCIUM  --  7.4* 7.2* 7.5*  --    MAGNESIUM  --  1.9  --  1.8  --    PHOSPHORUS  --  2.3*  --   --   --                              Lab 24  0258   PH, ARTERIAL 7.426   PCO2, ARTERIAL 41.0   PO2 ART 57.3*   FIO2 40   HCO3 ART 26.9*   BASE EXCESS ART 2.2*   CARBOXYHEMOGLOBIN 1.7     Brief Urine Lab Results  (Last result in the past 365 days)         Color   Clarity   Blood   Leuk Est   Nitrite   Protein   CREAT   Urine HCG        05/17/24 1620 Yellow   Clear   Moderate (2+)   Trace   Negative   >=300 mg/dL (3+)                   Microbiology Results Abnormal       Procedure Component Value - Date/Time    Blood Culture - Blood, Arm, Right [[id number]]  (Normal) Collected: 05/14/24 1846    Lab Status: Final result Specimen: Blood from Arm, Right Updated: 05/19/24 2001     Blood Culture No growth at 5 days    Blood Culture - Blood, Hand, Left [[id number]]  (Normal) Collected: 05/14/24 1842    Lab Status: Final result Specimen: Blood from Hand, Left Updated: 05/19/24 2000     Blood Culture No growth at 5 days    Narrative:      Aerobic Bottle Only    Less than seven (7) mL's of blood was collected.  Insufficient quantity may yield false negative results.    Urine Culture - Urine, Straight Cath [[id number]]  (Normal) Collected: 05/17/24 1620    Lab Status: Final result Specimen: Urine from Straight Cath Updated: 05/19/24 1157     Urine Culture No growth    Respiratory Panel PCR w/COVID-19(SARS-CoV-2) [facility]IL[facility]/EMILE/KEARA/PAD/COR/PARMINDER In-House, NP Swab in UTM/VTM, 2 HR TAT - Swab, Nasopharynx [[id number]]  (Normal) Collected: 05/13/24 0843    Lab Status: Final result Specimen: Swab from Nasopharynx Updated: 05/13/24 0948     ADENOVIRUS, PCR Not Detected     Coronavirus 229E Not Detected     Coronavirus HKU1 Not Detected     Coronavirus NL63 Not Detected     Coronavirus OC43 Not Detected     COVID19 Not Detected     Human Metapneumovirus Not Detected     Human Rhinovirus/Enterovirus Not Detected     Influenza A PCR Not Detected     Influenza B PCR Not Detected     Parainfluenza Virus 1 Not Detected     Parainfluenza Virus 2 Not Detected     Parainfluenza Virus 3 Not Detected     Parainfluenza Virus 4 Not Detected     RSV, PCR Not Detected     Bordetella pertussis pcr Not Detected     Bordetella parapertussis PCR Not Detected     Chlamydophila pneumoniae PCR Not Detected      Mycoplasma pneumo by PCR Not Detected    Narrative:      In the setting of a positive respiratory panel with a viral infection PLUS a negative procalcitonin without other underlying concern for bacterial infection, consider observing off antibiotics or discontinuation of antibiotics and continue supportive care. If the respiratory panel is positive for atypical bacterial infection (Bordetella pertussis, Chlamydophila pneumoniae, or Mycoplasma pneumoniae), consider antibiotic de-escalation to target atypical bacterial infection.            No radiology results from the last 24 hrs    Results for orders placed during the hospital encounter of 08/02/20    Transthoracic Echo Complete With Contrast if Necessary Per Protocol    Interpretation Summary  · Left ventricular systolic function is normal. Estimated EF = 70%.  · Left ventricular diastolic dysfunction (grade I) consistent with impaired relaxation.  · Left atrial cavity size is moderately dilated.  · There is calcification of the aortic valve. There is no significant stenosis or regurgitation. A Lambel's excrescence is noted on an aortic valve leaflet.  · Moderate mitral valve regurgitation is present.  · Estimated right ventricular systolic pressure from tricuspid regurgitation is moderately elevated (49 mmHg).  · There is a moderate (1-2cm) circumferential pericardial effusion. There is no tamponade physiology.      Current medications:  Scheduled Meds:busPIRone, 10 mg, Nasogastric, BID  donepezil, 10 mg, Nasogastric, Nightly  lansoprazole, 15 mg, Nasogastric, Q AM  memantine, 10 mg, Nasogastric, BID  nystatin, , Topical, Q12H  sertraline, 50 mg, Nasogastric, Daily      Continuous Infusions:     PRN Meds:.  acetaminophen **OR** [PENDING] acetaminophen **OR** acetaminophen **OR** [PENDING] acetaminophen **OR** acetaminophen **OR** [PENDING] acetaminophen    glycopyrrolate    ibuprofen    ipratropium-albuterol    midazolam    Morphine    nitroglycerin    sodium  chloride    Assessment & Plan   Assessment & Plan     Active Hospital Problems    Diagnosis  POA    **Intracranial hemorrhage [I62.9]  Yes    UTI (urinary tract infection) [N39.0]  Yes    Metastatic disease [C79.9]  Yes    Prolonged Q-T interval on ECG [R94.31]  Yes    Alzheimer's disease [G30.9, F02.80]  Yes    Essential hypertension [I10]  Yes    Hypothyroidism (acquired) [E03.9]  Yes      Resolved Hospital Problems   No resolved problems to display.        Brief Hospital Course to date:  Temi Jarrett is a 89 y.o. female resident of [facility] diagnosed with 7.5 cm LT lung mass with associated nodules 12/2023, seen by oncology at that time and deemed not a candidate for therapy, she agreed to defer invasive biopsy, she has been at her facility under hospice care recently; other history includes dementia, HTN, hypothyroidism, hiatal hernia, GERD, she was sent to the ED for evaluation of increasing lethargy.  On arrival in the ED she had CXR which demonstrated progressive malignancy, UA was obtained which was abnormal and her symptoms were attributed to bacteriuria.  She was admitted and placed on IV antibiotics with culture returning positive for pansensitive E. coli, she has completed 6 days of IV antibiotics for the same.  She had progressively decreasing level of consciousness, now completely obtunded with only minimal grimace to noxious stimuli, and continued to have ongoing fevers in the face of adequate antimicrobial coverage without other source of infection.  Per her family's wishes hospice benefits were revoked, a Keofeed was attempted but was unsuccessful due to her large hiatal hernia (stomach is completely intrathoracic), GI was consulted who placed an NJ tube endoscopically for tube feeds.  She continued to have fevers and remained altered so further evaluation was expanded, lower extremity venous duplex demonstrated subacute appearing DVT, she received a dose of Lovenox, CT of the head  was also obtained which later revealed large intracranial hemorrhage within the ventricle with extra-axial extension    Goals of care  - multiple physicians encouraging hospice transition including myself, son currently refusing transition and hospice closed consult. Please see my note dated 5/18 for conversation. Currently comfort measures, however son desires to leave her high flow O2 in place. Plan no further escalation of care, no tube feeds, IV abx or IVF.    ICH (Intraventricular w/ extra-axial extension)  - Patient deemed to be nonsurgical candidate.  Neurosurgery team recommended hospice care  - palliative care following    Recurrent fevers  E Coli UTI  - UrCx w/ pan-sensitive E coli; renal US neg for abscess/pyelo  - CT chest, abdomen and pelvis w/ extensive malignant changes but no other signs of infectious process  - s/p x6 days of culture directed Abx (CTX)  - ID followed, agreement that fevers are likely from DVT and intracranial bleed    Subacute LT gastrocnemius DVT  - S/p single dose lovenox, subsequent ICH discovered and AC discontinued    Progressive metastatic cancer (unknown primary)  - Palliative/hospice have followed, patient's son elected to revoke hospice benefits on admission and is still declining hospice transition.  -CT chest with significant worsening of metastatic disease in the chest with significantly increased size of an mediastinal mass and increased size and number of diffuse pulmonary metastatic nodules.     Lethargy w/Decreased PO intake  - S/p endoscopically placed feeding tube 5/15; due to significant hiatal hernia she is not a PEG candidate    Alzheimer dementia - donepezil, memantine  Anxiety, depression - buspar, zoloft  GERD/HH - ppi  Hypothyroidism -  levothyroxine  HTN - amlodipine, lisinopril    Expected Discharge Location and Transportation: comfort measures, will not survive hospital stay  Expected Discharge   Expected Discharge Date: 5/20/2024; Expected Discharge  Time:      DVT prophylaxis:  Mechanical DVT prophylaxis orders are present.         AM-PAC 6 Clicks Score (PT): 6 (05/18/24 2048)    CODE STATUS:   Code Status and Medical Interventions:   Ordered at: 05/18/24 2256     Level Of Support Discussed With:    Health Care Surrogate     Code Status (Patient has no pulse and is not breathing):    No CPR (Do Not Attempt to Resuscitate)     Medical Interventions (Patient has pulse or is breathing):    Comfort Measures     Comments:    PLEASE DO NOT REMOVE HIGH FLOW       Mili Doran DO  05/20/24

## 2024-05-20 NOTE — PROGRESS NOTES
Nutrition Services    Patient Name:  Temi Jarrett  YOB: 1930  MRN: [id number]  Admit Date:  5/9/2024    RD notes pt now with comfort measures only. NPO as unresponsive, EN stopped 5/18. RD will follow in the peripheral, please consult Nutrition if needed for specific dietary needs or in the event that GOC should change, thank you.    Electronically signed by:  Gretchen Benitez RD  05/20/24 11:21 EDT

## 2024-05-20 NOTE — PLAN OF CARE
Goal Outcome Evaluation:         Son's wife called for update. Patient appears comfortable, received PRN meds for fever and labored breathing this shift. HFNC in place.

## 2024-05-20 NOTE — PROGRESS NOTES
INFECTIOUS DISEASE follow-up    Temi Jarrett  5/8/1930  [id number]    Date of Consult: 5/20/2024    Admission Date: 5/9/2024      Requesting Provider: No ref. provider found  Evaluating Physician: Nicolás Negron MD    Reason for Consultation: fever    History of present illness:    Patient is a 89 y.o. female with past medical history significant for essential hypertension, Alzheimer's, hiatal hernia, hypothyroidism and metastatic disease with unknown primary presents to the ED due to altered mental status.  Was admitted on 5/9/24;  Treated for UTI, urine cultures from 5/9 revealed E. Coli.  CT head shows new intraparenchymal hemorhages with intraventricular and extra-axial extension. U/S whowed suba acute left lower extremity DVT in gastrochneumius.    Hospitalist consulting us for opinion on cause of fevers    5/20/2024 resting quietly still has fevers normotensive on high flow nasal cannula    Past Medical History:   Diagnosis Date    Acute sepsis 01/14/2021    Arthritis     Bladder prolapse, female, acquired     Closed fracture of neck of left femur 09/27/2019    Dementia     Depression     Disease of thyroid gland     Gastric polyp     GERD (gastroesophageal reflux disease)     Hiatal hernia     History of colonic polyps     Hyperlipidemia     Hypertension     IBS (irritable bowel syndrome)     Macular degeneration     Pacemaker 2023    Pericardial effusion 08/03/2020    Echo (8/3/2020): Normal LVEF.  Moderate pericardial effusion without tamponade.  Moderate MR. RVSP 49 mmHg    Torn meniscus     right knee        Past Surgical History:   Procedure Laterality Date    CHOLECYSTECTOMY  1997    ENDOSCOPY N/A 01/14/2021    Procedure: ESOPHAGOGASTRODUODENOSCOPY;  Surgeon: Serjio Guerrero MD;  Location: [facility] ENDOSCOPY;  Service: General;  Laterality: N/A;    ENDOSCOPY N/A 5/15/2024    Procedure: ESOPHAGOGASTRODUODENOSCOPY WITH NASOJEJUNAL TUBE INSERTION;  Surgeon: Hoang Watson MD;  Location:   [facility];  Service: Gastroenterology;  Laterality: N/A;    EYE SURGERY  1998    Cataract extraction    HERNIA REPAIR      HIP HEMIARTHROPLASTY Left 09/28/2019    Procedure: HIP HEMIARTHROPLASTY LEFT;  Surgeon: Reddy Segundo Jr., MD;  Location: [facility] OR;  Service: Orthopedics    HYSTERECTOMY  1976    KNEE SURGERY Right     arthroscopy- 2000    SKIN CANCER EXCISION Left     [facility]; CANCEROUS SPOT DIDN'T  MOVE BUT NEEDED REMOVED    TONSILLECTOMY         Family History   Problem Relation Age of Onset    Hypertension Mother     Breast cancer Mother     Obesity Mother     Hypertension Father     Diabetes Son        Social History     Socioeconomic History    Marital status:    Tobacco Use    Smoking status: Never    Smokeless tobacco: Never   Vaping Use    Vaping status: Never Used   Substance and Sexual Activity    Alcohol use: No    Drug use: Never    Sexual activity: Defer       Allergies   Allergen Reactions    Augmentin [Amoxicillin-Pot Clavulanate] Diarrhea    Codeine Nausea Only     Trouble Breathing     Shrimp Hives    Thatcher Unknown - Low Severity         Medication:    Current Facility-Administered Medications:     acetaminophen (TYLENOL) tablet 650 mg, 650 mg, Nasogastric, Q4H PRN, 650 mg at 05/20/24 1016 **OR** [PENDING] acetaminophen (TYLENOL) tablet 650 mg, 650 mg, Nasogastric, Q4H PRN **OR** acetaminophen (TYLENOL) 160 MG/5ML oral solution 650 mg, 650 mg, Nasogastric, Q4H PRN, 650 mg at 05/20/24 0351 **OR** [PENDING] acetaminophen (TYLENOL) 160 MG/5ML oral solution 650 mg, 650 mg, Nasogastric, Q4H PRN **OR** acetaminophen (TYLENOL) suppository 650 mg, 650 mg, Rectal, Q4H PRN **OR** [PENDING] acetaminophen (TYLENOL) suppository 650 mg, 650 mg, Rectal, Q4H PRN,     busPIRone (BUSPAR) tablet 10 mg, 10 mg, Nasogastric, BID, Reddy Li DO, 10 mg at 05/20/24 0835    donepezil (ARICEPT) tablet 10 mg, 10 mg, Nasogastric, Nightly, Reddy Li DO,  10 mg at 24    glycopyrrolate (ROBINUL) injection 0.1 mg, 0.1 mg, Intravenous, Q4H PRN, Bell Galeana MD, 0.1 mg at 24    ibuprofen (ADVIL,MOTRIN) tablet 800 mg, 800 mg, Nasogastric, Q4H PRN, Mili Doran DO, 800 mg at 24 1253    ipratropium-albuterol (DUO-NEB) nebulizer solution 3 mL, 3 mL, Nebulization, Q4H PRN, Hoang Watson MD    lansoprazole (PREVACID SOLUTAB) disintegrating tablet Tablet Delayed Release Dispersible 15 mg, 15 mg, Nasogastric, Q AM, Haong Watson MD, 15 mg at 24 0539    memantine (NAMENDA) tablet 10 mg, 10 mg, Nasogastric, BID, Reddy Li DO, 10 mg at 24 0835    midazolam (VERSED) injection 1 mg, 1 mg, Intravenous, Q2H PRN, Bell Galeana MD    morphine injection 1 mg, 1 mg, Intravenous, Q1H PRN, Mili Doran DO, 1 mg at 24 1259    nitroglycerin (NITROSTAT) SL tablet 0.4 mg, 0.4 mg, Sublingual, Q5 Min PRN, Hoang Watson MD    nystatin (MYCOSTATIN) powder, , Topical, Q12H, Hoang Watson MD, Given at 24    sertraline (ZOLOFT) tablet 50 mg, 50 mg, Nasogastric, Daily, Reddy Li DO, 50 mg at 24 0835    sodium chloride 0.9 % flush 10 mL, 10 mL, Intravenous, PRN, Hoang Watson MD, 10 mL at 24    Antibiotics:  Anti-Infectives (From admission, onward)      Ordered     Dose/Rate Route Frequency Start Stop    24  cefTRIAXone (ROCEPHIN) 1,000 mg in sodium chloride 0.9 % 100 mL MBP        Ordering Provider: Reddy Li DO    1,000 mg  200 mL/hr over 30 Minutes Intravenous Every 24 Hours 05/10/24 1800 24 6623    24 185  cefTRIAXone (ROCEPHIN) 1,000 mg in sodium chloride 0.9 % 100 mL MBP        Ordering Provider: Robert Pablo MD    1,000 mg  200 mL/hr over 30 Minutes Intravenous Once 24 193              Review of Systems:  Unobtainable      Physical Exam:   Vital Signs  Temp (24hrs), Av.3 °F (37.9 °C), Min:98.5 °F (36.9 °C),  "Max:101.8 °F (38.8 °C)    Temp  Min: 98.5 °F (36.9 °C)  Max: 101.8 °F (38.8 °C)  BP  Min: 128/58  Max: 136/62  Pulse  Min: 54  Max: 63  Resp  Min: 16  Max: 20  SpO2  Min: 91 %  Max: 95 %    GENERAL: Resting quietly no distress nonverbal  HEENT: Normocephalic, atraumatic.   No external oral lesions    ABDOMEN: Soft, nontender,   EXT:  No edema  Or  MSK: No joint effusions or erythema  SKIN: Warm and dry without cutaneous eruptions on Inspection/palpation.      Laboratory Data    Results from last 7 days   Lab Units 05/16/24  0502 05/15/24  0411   WBC 10*3/mm3 13.84* 12.65*   HEMOGLOBIN g/dL 13.5 12.7   HEMATOCRIT % 41.0 38.6   PLATELETS 10*3/mm3 251 217     Results from last 7 days   Lab Units 05/16/24  1620 05/16/24  0502   SODIUM mmol/L  --  139   POTASSIUM mmol/L 5.1 3.4*   CHLORIDE mmol/L  --  103   CO2 mmol/L  --  25.0   BUN mg/dL  --  18   CREATININE mg/dL  --  0.50*   GLUCOSE mg/dL  --  140*   CALCIUM mg/dL  --  7.4*                               Estimated Creatinine Clearance: 63.1 mL/min (A) (by C-G formula based on SCr of 0.5 mg/dL (L)).      Microbiology:  Blood Culture   Date Value Ref Range Status   05/14/2024 No growth at 24 hours  Preliminary     No results found for: \"BCIDPCR\", \"CXREFLEX\", \"CSFCX\", \"CULTURETIS\"  No results found for: \"CULTURES\", \"HSVCX\", \"URCX\"  No results found for: \"EYECULTURE\", \"GCCX\", \"HSVCULTURE\", \"LABHSV\"  No results found for: \"LEGIONELLA\", \"MRSACX\", \"MUMPSCX\", \"MYCOPLASCX\"  No results found for: \"NOCARDIACX\", \"STOOLCX\"  Urine Culture   Date Value Ref Range Status   05/09/2024 >100,000 CFU/mL Escherichia coli (A)  Final     No results found for: \"VIRALCULTU\", \"WOUNDCX\"        Radiology:  Imaging Results (Last 72 Hours)       Procedure Component Value Units Date/Time    XR Chest 1 View [[id number]] Collected: 05/18/24 0316     Updated: 05/18/24 0323    Narrative:      XR CHEST 1 VW    Date of Exam: 5/18/2024 2:45 AM EDT    Indication: respiratory distress    Comparison: Chest " radiograph dated May 9, 2024     Findings:  There is a feeding tube in the stomach. The heart is enlarged. There are small bilateral pleural effusions. There is a mass in the left perihilar region as seen on recent CT scan. There are diffuse bilateral pulmonary nodules and masses consistent with   widespread metastatic disease to the chest.        Impression:      Impression:  Innumerable pulmonary nodules and left perihilar mass as described on recent CT. There is a feeding tube in the stomach.      Electronically Signed: Jabari Ruiz MD    5/18/2024 3:20 AM EDT    Workstation ID: [id number]              Impression:   Fever  Metastatic disease  Mediastinal mass  Diffuse pulmonary nodules  DVT  Brain hemorrhage  Leukocytosis with neutrophilia  PLAN/RECOMMENDATIONS:   Thank you for asking us to see Temi Jarrett, I recommend the following:  Temperatures in setting of low procalcitonin and in setting of DVT, brain hemorrhage may be noninfectious.    Previous urinalysis from 5/9 may represent colonization; this has been appropriately treated with ceftriaxone x 7 days       Procalcitonin low normal    Imaging of CT brain remarkable for brain hemorrhage which I think is the best explanation for the fevers.    Patient was on hospice previously and I suspect with age along has poor prognosis.    Ongoing metastatic cancer, DVT, brain hemorrhage could all be causes of fevers.    Agree with observation off antibiotics       Nicolás Negron MD  5/20/2024  16:57 EDT

## 2024-05-20 NOTE — CASE MANAGEMENT/SOCIAL WORK
Continued Stay Note  [facility]     Patient Name: Temi Jarrett  MRN: [id number]  Today's Date: 5/20/2024    Admit Date: 5/9/2024    Plan: Ongoing   Discharge Plan       Row Name 05/20/24 1213       Plan    Plan Comments Discussed in MDRs. Patient is comfort measures and is on high flow. Palliative Care is following. CM will follow for any assistances.    Final Discharge Disposition Code 30 - still a patient                   Discharge Codes    No documentation.                 Expected Discharge Date and Time       Expected Discharge Date Expected Discharge Time    May 20, 2024               Kristina Delvalle RN

## 2024-05-20 NOTE — PROGRESS NOTES
No indication for neurosurgical intervention.  Please reconsult if questions or additional issues arise.

## 2024-05-21 NOTE — PLAN OF CARE
Goal Outcome Evaluation:  Plan of Care Reviewed With: (P) patient, son        Progress: (P) declining  Outcome Evaluation: (P) Pt remains obtunded. HFNC on 40L/50%. PRNs given for comfort. TMAX 101.2 overnight, PRN Tylenol given. Pt is currently resting comfortably. Continue plan of care.

## 2024-05-21 NOTE — PROGRESS NOTES
[facility]  PROGRESS NOTE    Patient Name: Temi Jarrett  : 1930  MRN: [id number]    Date of Admission: 2024  Primary Care Physician: Eric Patel MD    Subjective   Subjective     CC:  Follow-up for ICH    HPI:  Patient sleeping in bed. Remains lethargic and unresponsive to stimuli. Agonal breathing at times    Objective   Objective     Vital Signs:   Temp:  [98.1 °F (36.7 °C)-101.9 °F (38.8 °C)] 101.9 °F (38.8 °C)  Heart Rate:  [54-58] 58  Resp:  [16-24] 20  BP: (112-137)/(53-62) 126/54  Flow (L/min):  [40] 40     Physical Exam:  Constitutional: No acute distress, sleeping in bed.  HENT: NCAT, mucous membranes dry  Respiratory: more-labored/agonal on high flow O2  Cardiovascular: RRR  Musculoskeletal: No bilateral ankle edema  Psychiatric: UTO  Neurologic: obtunded  Skin: No rashes        Results Reviewed:  LAB RESULTS:      Lab 24  0502 05/15/24  0411   WBC 13.84* 12.65*   HEMOGLOBIN 13.5 12.7   HEMATOCRIT 41.0 38.6   PLATELETS 251 217   NEUTROS ABS 11.64* 10.07*   IMMATURE GRANS (ABS) 0.08* 0.07*   LYMPHS ABS 0.93 1.33   MONOS ABS 1.05* 1.04*   EOS ABS 0.09 0.10   MCV 88.4 87.9   PROCALCITONIN 0.09  --          Lab 24  1620 24  0502 05/15/24  0411   SODIUM  --  139 140   POTASSIUM 5.1 3.4* 3.7   CHLORIDE  --  103 104   CO2  --  25.0 28.0   ANION GAP  --  11.0 8.0   BUN  --  18 14   CREATININE  --  0.50* 0.47*   EGFR  --  87.0 88.3   GLUCOSE  --  140* 117*   CALCIUM  --  7.4* 7.2*   MAGNESIUM  --  1.9  --    PHOSPHORUS  --  2.3*  --                              Lab 24  0258   PH, ARTERIAL 7.426   PCO2, ARTERIAL 41.0   PO2 ART 57.3*   FIO2 40   HCO3 ART 26.9*   BASE EXCESS ART 2.2*   CARBOXYHEMOGLOBIN 1.7     Brief Urine Lab Results  (Last result in the past 365 days)        Color   Clarity   Blood   Leuk Est   Nitrite   Protein   CREAT   Urine HCG        24 1620 Yellow   Clear   Moderate (2+)   Trace   Negative    >=300 mg/dL (3+)                   Microbiology Results Abnormal       Procedure Component Value - Date/Time    Blood Culture - Blood, Arm, Right [[id number]]  (Normal) Collected: 05/14/24 1846    Lab Status: Final result Specimen: Blood from Arm, Right Updated: 05/19/24 2001     Blood Culture No growth at 5 days    Blood Culture - Blood, Hand, Left [[id number]]  (Normal) Collected: 05/14/24 1842    Lab Status: Final result Specimen: Blood from Hand, Left Updated: 05/19/24 2000     Blood Culture No growth at 5 days    Narrative:      Aerobic Bottle Only    Less than seven (7) mL's of blood was collected.  Insufficient quantity may yield false negative results.    Urine Culture - Urine, Straight Cath [[id number]]  (Normal) Collected: 05/17/24 1620    Lab Status: Final result Specimen: Urine from Straight Cath Updated: 05/19/24 1157     Urine Culture No growth    Respiratory Panel PCR w/COVID-19(SARS-CoV-2) [facility]-House, NP Swab in UTM/VTM, 2 HR TAT - Swab, Nasopharynx [[id number]]  (Normal) Collected: 05/13/24 0843    Lab Status: Final result Specimen: Swab from Nasopharynx Updated: 05/13/24 0948     ADENOVIRUS, PCR Not Detected     Coronavirus 229E Not Detected     Coronavirus HKU1 Not Detected     Coronavirus NL63 Not Detected     Coronavirus OC43 Not Detected     COVID19 Not Detected     Human Metapneumovirus Not Detected     Human Rhinovirus/Enterovirus Not Detected     Influenza A PCR Not Detected     Influenza B PCR Not Detected     Parainfluenza Virus 1 Not Detected     Parainfluenza Virus 2 Not Detected     Parainfluenza Virus 3 Not Detected     Parainfluenza Virus 4 Not Detected     RSV, PCR Not Detected     Bordetella pertussis pcr Not Detected     Bordetella parapertussis PCR Not Detected     Chlamydophila pneumoniae PCR Not Detected     Mycoplasma pneumo by PCR Not Detected    Narrative:      In the setting of a positive respiratory panel with a viral infection PLUS a negative  procalcitonin without other underlying concern for bacterial infection, consider observing off antibiotics or discontinuation of antibiotics and continue supportive care. If the respiratory panel is positive for atypical bacterial infection (Bordetella pertussis, Chlamydophila pneumoniae, or Mycoplasma pneumoniae), consider antibiotic de-escalation to target atypical bacterial infection.            No radiology results from the last 24 hrs    Results for orders placed during the hospital encounter of 08/02/20    Transthoracic Echo Complete With Contrast if Necessary Per Protocol    Interpretation Summary  · Left ventricular systolic function is normal. Estimated EF = 70%.  · Left ventricular diastolic dysfunction (grade I) consistent with impaired relaxation.  · Left atrial cavity size is moderately dilated.  · There is calcification of the aortic valve. There is no significant stenosis or regurgitation. A Lambel's excrescence is noted on an aortic valve leaflet.  · Moderate mitral valve regurgitation is present.  · Estimated right ventricular systolic pressure from tricuspid regurgitation is moderately elevated (49 mmHg).  · There is a moderate (1-2cm) circumferential pericardial effusion. There is no tamponade physiology.      Current medications:  Scheduled Meds:busPIRone, 10 mg, Nasogastric, BID  donepezil, 10 mg, Nasogastric, Nightly  lansoprazole, 15 mg, Nasogastric, Q AM  memantine, 10 mg, Nasogastric, BID  nystatin, , Topical, Q12H  sertraline, 50 mg, Nasogastric, Daily      Continuous Infusions:     PRN Meds:.  acetaminophen **OR** [PENDING] acetaminophen **OR** acetaminophen **OR** [PENDING] acetaminophen **OR** acetaminophen **OR** [PENDING] acetaminophen    glycopyrrolate    ibuprofen    ipratropium-albuterol    midazolam    Morphine    nitroglycerin    sodium chloride    Assessment & Plan   Assessment & Plan     Active Hospital Problems    Diagnosis  POA    **Intracranial hemorrhage [I62.9]  Yes    UTI  (urinary tract infection) [N39.0]  Yes    Metastatic disease [C79.9]  Yes    Prolonged Q-T interval on ECG [R94.31]  Yes    Alzheimer's disease [G30.9, F02.80]  Yes    Essential hypertension [I10]  Yes    Hypothyroidism (acquired) [E03.9]  Yes      Resolved Hospital Problems   No resolved problems to display.        Brief Hospital Course to date:  Temi Jarrett is a 89 y.o. female resident of [facility] diagnosed with 7.5 cm LT lung mass with associated nodules 12/2023, seen by oncology at that time and deemed not a candidate for therapy, she agreed to defer invasive biopsy, she has been at her facility under hospice care recently; other history includes dementia, HTN, hypothyroidism, hiatal hernia, GERD, she was sent to the ED for evaluation of increasing lethargy.  On arrival in the ED she had CXR which demonstrated progressive malignancy, UA was obtained which was abnormal and her symptoms were attributed to bacteriuria.  She was admitted and placed on IV antibiotics with culture returning positive for pansensitive E. coli, she has completed 6 days of IV antibiotics for the same.  She had progressively decreasing level of consciousness, now completely obtunded with only minimal grimace to noxious stimuli, and continued to have ongoing fevers in the face of adequate antimicrobial coverage without other source of infection.  Per her family's wishes hospice benefits were revoked, a Keofeed was attempted but was unsuccessful due to her large hiatal hernia (stomach is completely intrathoracic), GI was consulted who placed an NJ tube endoscopically for tube feeds.  She continued to have fevers and remained altered so further evaluation was expanded, lower extremity venous duplex demonstrated subacute appearing DVT, she received a dose of Lovenox, CT of the head was also obtained which later revealed large intracranial hemorrhage within the ventricle with extra-axial extension    Goals of care  - multiple  physicians encouraging hospice transition including myself, son currently refusing transition and hospice closed consult. Please see my note dated 5/18 for conversation. Currently comfort measures, however son desires to leave her high flow O2 in place. Plan no further escalation of care, no tube feeds, IV abx or IVF. Continue comfort meds    ICH (Intraventricular w/ extra-axial extension)  - Patient deemed to be nonsurgical candidate.  Neurosurgery team recommended hospice care  - palliative care following    Recurrent fevers  E Coli UTI  - UrCx w/ pan-sensitive E coli; renal US neg for abscess/pyelo  - CT chest, abdomen and pelvis w/ extensive malignant changes but no other signs of infectious process  - s/p x6 days of culture directed Abx (CTX)  - ID followed, agreement that fevers are likely from DVT and intracranial bleed    Subacute LT gastrocnemius DVT  - S/p single dose lovenox, subsequent ICH discovered and AC discontinued    Progressive metastatic cancer (unknown primary)  - Palliative/hospice have followed, patient's son elected to revoke hospice benefits on admission and is still declining hospice transition.  -CT chest with significant worsening of metastatic disease in the chest with significantly increased size of an mediastinal mass and increased size and number of diffuse pulmonary metastatic nodules.     Lethargy w/Decreased PO intake  - S/p endoscopically placed feeding tube 5/15; due to significant hiatal hernia she is not PEG candidate    Alzheimer dementia - donepezil, memantine  Anxiety, depression - buspar, zoloft  GERD/HH - ppi  Hypothyroidism -  levothyroxine  HTN - amlodipine, lisinopril stopped, comfort measures    Expected Discharge Location and Transportation: comfort measures, will not survive hospital stay    Expected Discharge   Expected Discharge Date: 5/20/2024; Expected Discharge Time:      DVT prophylaxis:  Mechanical DVT prophylaxis orders are present.         AM-PAC 6 Clicks  Score (PT): 6 (05/18/24 2048)    CODE STATUS:   Code Status and Medical Interventions:   Ordered at: 05/18/24 5170     Level Of Support Discussed With:    Health Care Surrogate     Code Status (Patient has no pulse and is not breathing):    No CPR (Do Not Attempt to Resuscitate)     Medical Interventions (Patient has pulse or is breathing):    Comfort Measures     Comments:    PLEASE DO NOT REMOVE HIGH FLOW       Mili Doran,   05/21/24

## 2024-05-21 NOTE — PLAN OF CARE
Problem: Adult Inpatient Plan of Care  Goal: Plan of Care Review  Recent Flowsheet Documentation  Taken 5/21/2024 1807 by Atul Macario RN  Progress: no change  Plan of Care Reviewed With: marcelo  Outcome Evaluation: Patient continues to be unresponsive with agonal breathing. TMax 102.6, PRN Tylenol and Ibuprofen given. Morphine given for air hunger. Patient remains on HFNC @ 40L50%. Patient tunred Q2H. UOP 125ML this shift. Continue with POC.   Goal Outcome Evaluation:  Plan of Care Reviewed With: marcelo        Progress: no change  Outcome Evaluation: Patient continues to be unresponsive with agonal breathing. TMax 102.6, PRN Tylenol and Ibuprofen given. Morphine given for air hunger. Patient remains on HFNC @ 40L50%. Patient tunred Q2H. UOP 125ML this shift. Continue with POC.

## 2024-05-21 NOTE — PLAN OF CARE
Goal Outcome Evaluation:           Progress: declining  Outcome Evaluation: Pt is actively dying.  Resp 20 and irregular and agonal . Continue prn morphine for labored breathing. Continue tylenol/ibuprofen for fever. Pt's urine is gold in cunha. Pt continues on HFNC. NO oral intake and pt is unresponsive.  Continue comfort measures and continue HFNC. Discussed plan of care with nursing. EOL care at [facility].              Palliative Team Meeting 1300  MD, APRN, SW, RN   Please call Palliative after hours at [phone]

## 2024-05-22 NOTE — PLAN OF CARE
Problem: Adult Inpatient Plan of Care  Goal: Plan of Care Review  Recent Flowsheet Documentation  Taken 5/22/2024 5676 by Atul Macario RN  Progress: declining  Plan of Care Reviewed With: family  Outcome Evaluation: Patient continues to be unresponsive. HFNC increased to 25U943%. Morphine given and Tylenol for fevers.   Goal Outcome Evaluation:  Plan of Care Reviewed With: family        Progress: declining  Outcome Evaluation: Patient continues to be unresponsive. HFNC increased to 41X122%. Morphine given and Tylenol for fevers.

## 2024-05-22 NOTE — PLAN OF CARE
Goal Outcome Evaluation:      Problem: Palliative Care  Goal: Enhanced Quality of Life  Outcome: Ongoing, Progressing             Palliative RN visited with patient at bedside. Agonal breathing noted. Pt remains on HFNC at 45L/100%. HR in the 40s. Pt does not respond to verbal/touch. No family at bedside. Palliative will continue to follow patient for symptom management and support.    Discussed patient in Palliative IDT with MD, APRN, RN's, SW, .

## 2024-05-23 NOTE — SIGNIFICANT NOTE
Exam confirms with auscultation zero audible heart tones and zero audible respirations. Ms. Temi Jarrett was pronounced dead at 2118.  MD notified by patient's RN.     Sully Arvizu RN  Clinical House Supervisor    5/22/2024 2131

## 2024-05-27 NOTE — DISCHARGE SUMMARY
[facility] Medicine Services  DEATH SUMMARY    Patient Name: Temi Jarrett  : 1930  MRN: [id number]    Date of Admission: 2024  Date of Death:  2024  Time of Death:      Primary Care Physician: Eric Patel MD    Consults       Date and Time Order Name Status Description    5/15/2024  6:36 PM Inpatient Neurosurgery Consult Completed     5/15/2024  2:14 PM Inpatient Infectious Diseases Consult Completed     2024 12:01 PM Inpatient Gastroenterology Consult Completed     5/10/2024  7:47 AM Inpatient Palliative Care MD Consult Completed             Summary of Hospital Events   Presenting Problem: ICH    Active Hospital Problems    Diagnosis  POA    **Intracranial hemorrhage [I62.9]  Yes    UTI (urinary tract infection) [N39.0]  Yes    Metastatic disease [C79.9]  Yes    Prolonged Q-T interval on ECG [R94.31]  Yes    Alzheimer's disease [G30.9, F02.80]  Yes    Essential hypertension [I10]  Yes    Hypothyroidism (acquired) [E03.9]  Yes      Resolved Hospital Problems   No resolved problems to display.          Hospital Course:  Temi Jarrett was a 89 y.o. female resident of [facility] diagnosed with metastatic cancer, unknown primary, dementia, HTN, hypothyroidism, hiatal hernia, GERD. She presented with lethargy with decreasing level of consciousness.    Aspiration w/Respiratory Failure  - IV abx stopped, left on high flow oxygen per family request with comfort measures in place      ICH (Intraventricular w/ extra-axial extension)  - Patient deemed to be nonsurgical candidate.  Neurosurgery team recommended hospice care     Recurrent fevers  E Coli UTI  - s/p full course of IV abx per infectious Disease     Subacute LT gastrocnemius DVT  - S/p single dose lovenox, subsequent ICH discovered and AC discontinued     Progressive metastatic cancer (unknown primary)  -CT chest with significant worsening of metastatic disease in the chest with  significantly increased size of an mediastinal mass and increased size and number of diffuse pulmonary metastatic nodules.      Lethargy w/Decreased PO intake  - S/p endoscopically placed feeding tube 5/15; due to significant hiatal hernia she is not PEG candidate     Official Cause of Death and any directly related diagnoses:  Respiratory Failure/Aspiration PNA, ICH, Metastatic Cancer unknown primary      Mili Doran, DO  05/27/24  \

## (undated) DEVICE — GLV SURG SENSICARE MICRO PF LF 8 STRL

## (undated) DEVICE — LUBE GEL ENDOGLIDE 1.1OZ

## (undated) DEVICE — LUBE JELLY FOIL PACK 1.4 OZ: Brand: MEDLINE INDUSTRIES, INC.

## (undated) DEVICE — GW JAG HYDRA STR .035IN 450CM

## (undated) DEVICE — "MH-438 A/W VLVE F/140 EVIS-140": Brand: AIR/WATER VALVE

## (undated) DEVICE — INTRO ACCSR BLNT TP

## (undated) DEVICE — GLV SURG TRIUMPH ORTHO W/ALOE PF LTX 8 STRL

## (undated) DEVICE — HDRST POSTIN FM CRDL TRACH SLOT 9X8X4IN

## (undated) DEVICE — SOLIDIFIER LIQ PREMISORB 1500CC

## (undated) DEVICE — ENDOGATOR HYBRID TUBING KIT FOR USE WITH ENDOGATOR IRRIGATION PUMP, OLYMPUS PUMP, GI4000 ESU, AND TORRENT IRRIGATION PUMP.: Brand: ENDOGATOR KIT

## (undated) DEVICE — FIRST STEP BEDSIDE ADD WATER KIT - RESEALABLE STAND-UP POUCH, ENDOSCOPIC CLEANING PAD - 1 POUCH: Brand: FIRST STEP BEDSIDE ADD WATER KIT - RESEALABLE STAND-UP POUCH, ENDOSCOPIC CLEANIN

## (undated) DEVICE — SYS SKIN CLS DERMABOND PRINEO W/22CM MESH TP

## (undated) DEVICE — HANDPIECE SET WITH HIGH FLOW TIP AND SUCTION TUBE: Brand: INTERPULSE

## (undated) DEVICE — HLDR TBG NG AMT BRIDLE PRO 12F

## (undated) DEVICE — HEWSON SUTURE RETRIEVER: Brand: HEWSON SUTURE RETRIEVER

## (undated) DEVICE — THE BITE BLOCK MAXI, LATEX FREE STRAP IS USED TO PROTECT THE ENDOSCOPE INSERTION TUBE FROM BEING BITTEN BY THE PATIENT.

## (undated) DEVICE — SST TWIST DRILL, STANDARD, 2.4MM DIA. X 127MM: Brand: MICROAIRE®

## (undated) DEVICE — ANTIBACTERIAL UNDYED BRAIDED (POLYGLACTIN 910), SYNTHETIC ABSORBABLE SUTURE: Brand: COATED VICRYL

## (undated) DEVICE — SOL IRR H2O BTL 1000ML STRL

## (undated) DEVICE — "MH-443 SUCTION VALVE F/EVIS140 EVIS160": Brand: SUCTION VALVE

## (undated) DEVICE — TUBING, SUCTION, 1/4" X 10', STRAIGHT: Brand: MEDLINE

## (undated) DEVICE — ADAPT CLN LUM OLYMP AIR/H20

## (undated) DEVICE — Device: Brand: AIR/WATER CHANNEL CLEANING ADAPTER

## (undated) DEVICE — CONTN GRAD MEAS TRIANG 32OZ BLK

## (undated) DEVICE — HYBRID CO2 TUBING/CAP SET FOR OLYMPUS® SCOPES & CO2 SOURCE: Brand: ERBE

## (undated) DEVICE — SYR LUERLOK 50ML

## (undated) DEVICE — SUT MONOCRYL PLS ANTIB UND 3/0  PS1 27IN

## (undated) DEVICE — 20FR ENTRISTAR ADAPTOR, DOMESTIC: Brand: KANGAROO

## (undated) DEVICE — TUBING,OXYGEN,CRUSH RES,7',CLEAR,UC: Brand: MEDLINE INDUSTRIES, INC.

## (undated) DEVICE — KT ORCA ORCAPOD DISP STRL

## (undated) DEVICE — SAFELINER SUCTION CANISTER 1000CC: Brand: DEROYAL

## (undated) DEVICE — PK HIP TOTL UNIV 10